# Patient Record
Sex: MALE | Race: BLACK OR AFRICAN AMERICAN | NOT HISPANIC OR LATINO | Employment: OTHER | RURAL
[De-identification: names, ages, dates, MRNs, and addresses within clinical notes are randomized per-mention and may not be internally consistent; named-entity substitution may affect disease eponyms.]

---

## 2018-01-26 ENCOUNTER — HISTORICAL (OUTPATIENT)
Dept: ADMINISTRATIVE | Facility: HOSPITAL | Age: 33
End: 2018-01-26

## 2018-01-31 LAB
LAB AP CLINICAL INFORMATION: NORMAL
LAB AP COMMENTS: NORMAL
LAB AP DIAGNOSIS - HISTORICAL: NORMAL
LAB AP GROSS PATHOLOGY - HISTORICAL: NORMAL
LAB AP SPECIMEN SUBMITTED - HISTORICAL: NORMAL

## 2018-10-09 ENCOUNTER — HISTORICAL (OUTPATIENT)
Dept: ADMINISTRATIVE | Facility: HOSPITAL | Age: 33
End: 2018-10-09

## 2018-10-12 LAB
LAB AP CLINICAL INFORMATION: NORMAL
LAB AP DIAGNOSIS - HISTORICAL: NORMAL
LAB AP GROSS PATHOLOGY - HISTORICAL: NORMAL
LAB AP SPECIMEN SUBMITTED - HISTORICAL: NORMAL

## 2019-10-28 ENCOUNTER — HISTORICAL (OUTPATIENT)
Dept: ADMINISTRATIVE | Facility: HOSPITAL | Age: 34
End: 2019-10-28

## 2020-02-21 ENCOUNTER — HISTORICAL (OUTPATIENT)
Dept: ADMINISTRATIVE | Facility: HOSPITAL | Age: 35
End: 2020-02-21

## 2020-10-01 ENCOUNTER — HISTORICAL (OUTPATIENT)
Dept: ADMINISTRATIVE | Facility: HOSPITAL | Age: 35
End: 2020-10-01

## 2020-10-01 LAB
ANION GAP SERPL CALCULATED.3IONS-SCNC: 10 MMOL/L
BACTERIA #/AREA URNS HPF: ABNORMAL /HPF
BASOPHILS # BLD AUTO: 0.03 X10E3/UL (ref 0–0.2)
BASOPHILS NFR BLD AUTO: 0.2 % (ref 0–1)
BILIRUB UR QL STRIP: NEGATIVE MG/DL
BUN SERPL-MCNC: 23 MG/DL (ref 7–18)
CALCIUM SERPL-MCNC: 8.5 MG/DL (ref 8.5–10.1)
CHLORIDE SERPL-SCNC: 99 MMOL/L (ref 98–107)
CLARITY UR: CLEAR
CLARITY UR: CLEAR
CO2 SERPL-SCNC: 27 MMOL/L (ref 21–32)
COLOR UR: YELLOW
COLOR UR: YELLOW
CREAT SERPL-MCNC: 1.67 MG/DL (ref 0.7–1.3)
CRP SERPL-MCNC: 16.2 UG/ML (ref 0–0.8)
EOSINOPHIL # BLD AUTO: 0.03 X10E3/UL (ref 0–0.5)
EOSINOPHIL NFR BLD AUTO: 0.2 % (ref 1–4)
ERYTHROCYTE [DISTWIDTH] IN BLOOD BY AUTOMATED COUNT: 13.8 % (ref 11.5–14.5)
ERYTHROCYTE [SEDIMENTATION RATE] IN BLOOD BY WESTERGREN METHOD: 135 MM/HR (ref 0–15)
GLUCOSE SERPL-MCNC: 146 MG/DL (ref 74–106)
GLUCOSE UR STRIP-MCNC: NEGATIVE MG/DL
HCT VFR BLD AUTO: 31.4 % (ref 40–54)
HGB BLD-MCNC: 9.9 G/DL (ref 13.5–18)
HYALINE CASTS #/AREA URNS LPF: ABNORMAL /LPF (ref 0–2)
IMM GRANULOCYTES # BLD AUTO: 0.06 X10E3/UL (ref 0–0.04)
IMM GRANULOCYTES NFR BLD: 0.5 % (ref 0–0.4)
KETONES UR STRIP-SCNC: 15 MG/DL
LEUKOCYTE ESTERASE UR QL STRIP: NEGATIVE LEU/UL
LYMPHOCYTES # BLD AUTO: 1.8 X10E3/UL (ref 1–4.8)
LYMPHOCYTES NFR BLD AUTO: 14.1 % (ref 27–41)
MCH RBC QN AUTO: 26.4 PG (ref 27–31)
MCHC RBC AUTO-ENTMCNC: 31.5 G/DL (ref 32–36)
MCV RBC AUTO: 83.7 FL (ref 80–96)
MONOCYTES # BLD AUTO: 1.29 X10E3/UL (ref 0–0.8)
MONOCYTES NFR BLD AUTO: 10.1 % (ref 2–6)
MPC BLD CALC-MCNC: 10 FL (ref 9.4–12.4)
MUCOUS THREADS #/AREA URNS HPF: ABNORMAL /HPF
NEUTROPHILS # BLD AUTO: 9.59 X10E3/UL (ref 1.8–7.7)
NEUTROPHILS NFR BLD AUTO: 74.9 % (ref 53–65)
NITRITE UR QL STRIP: NEGATIVE
NRBC # BLD AUTO: 0 X10E3/UL (ref 0–0)
NRBC, AUTO (.00): 0 /100 (ref 0–0)
PH UR STRIP: 5.5 PH UNITS (ref 5–8)
PLATELET # BLD AUTO: 312 X10E3/UL (ref 150–400)
POTASSIUM SERPL-SCNC: 4.3 MMOL/L (ref 3.5–5.1)
PROT UR QL STRIP: 100 MG/DL
RBC # BLD AUTO: 3.75 X10E6/UL (ref 4.6–6.2)
RBC # UR STRIP: ABNORMAL ERY/UL
RBC #/AREA URNS HPF: ABNORMAL /HPF (ref 0–3)
SARS-COV-2 RNA AMPLIFICATION, QUAL: NEGATIVE
SODIUM SERPL-SCNC: 132 MMOL/L (ref 136–145)
SP GR UR STRIP: 1.02 (ref 1–1.03)
SQUAMOUS #/AREA URNS LPF: ABNORMAL /LPF
TRICHOMONAS #/AREA URNS HPF: ABNORMAL /HPF
UROBILINOGEN UR STRIP-ACNC: 1 EU/DL
WBC # BLD AUTO: 12.8 X10E3/UL (ref 4.5–11)
WBC #/AREA URNS HPF: ABNORMAL /HPF (ref 0–5)
YEAST #/AREA URNS HPF: ABNORMAL /HPF

## 2020-10-02 ENCOUNTER — HISTORICAL (OUTPATIENT)
Dept: ADMINISTRATIVE | Facility: HOSPITAL | Age: 35
End: 2020-10-02

## 2020-10-04 ENCOUNTER — HISTORICAL (OUTPATIENT)
Dept: ADMINISTRATIVE | Facility: HOSPITAL | Age: 35
End: 2020-10-04

## 2020-10-05 ENCOUNTER — HISTORICAL (OUTPATIENT)
Dept: ADMINISTRATIVE | Facility: HOSPITAL | Age: 35
End: 2020-10-05

## 2020-10-05 LAB
GLUCOSE SERPL-MCNC: 110 MG/DL (ref 70–105)
GLUCOSE SERPL-MCNC: 145 MG/DL (ref 70–105)
GLUCOSE SERPL-MCNC: 183 MG/DL (ref 70–105)
GLUCOSE SERPL-MCNC: 211 MG/DL (ref 70–105)
GLUCOSE SERPL-MCNC: 252 MG/DL (ref 70–105)
REPORT: 25
REPORT: 38
REPORT: 38
REPORT: NORMAL

## 2020-11-22 ENCOUNTER — HISTORICAL (OUTPATIENT)
Dept: ADMINISTRATIVE | Facility: HOSPITAL | Age: 35
End: 2020-11-22

## 2020-11-22 LAB
ALBUMIN SERPL BCP-MCNC: 2.6 G/DL (ref 3.4–5)
ALBUMIN/GLOB SERPL: 0 {RATIO}
ALP SERPL-CCNC: 166 U/L (ref 50–136)
ALT SERPL W P-5'-P-CCNC: 26 U/L (ref 12–78)
ANION GAP SERPL CALCULATED.3IONS-SCNC: 13 MMOL/L
APTT PPP: 41.8 SECONDS (ref 25.2–37.3)
AST SERPL W P-5'-P-CCNC: 25 U/L (ref 15–37)
BASOPHILS # BLD AUTO: 0.01 X10E3/UL (ref 0–0.2)
BASOPHILS NFR BLD AUTO: 0.2 % (ref 0–1)
BILIRUB SERPL-MCNC: 0.3 MG/DL (ref 0.2–1)
BILIRUB UR QL STRIP: NEGATIVE MG/DL
BUN SERPL-MCNC: 31 MG/DL (ref 7–18)
CALCIUM SERPL-MCNC: 8.6 MG/DL (ref 8.5–10.1)
CHLORIDE SERPL-SCNC: 100 MMOL/L (ref 101–111)
CLARITY UR: CLEAR
CO2 SERPL-SCNC: 27 MMOL/L (ref 21–32)
COLOR UR: YELLOW
CREAT SERPL-MCNC: 2.3 MG/DL (ref 0.6–1.3)
EOSINOPHIL # BLD AUTO: 0.03 X10E3/UL (ref 0–0.5)
EOSINOPHIL NFR BLD AUTO: 0.5 % (ref 1–4)
ERYTHROCYTE [DISTWIDTH] IN BLOOD BY AUTOMATED COUNT: 14.2 % (ref 11.5–14.5)
GLOBULIN SER-MCNC: 5.7 G/DL
GLUCOSE SERPL-MCNC: 173 MG/DL (ref 74–106)
GLUCOSE UR STRIP-MCNC: >=1000 MG/DL
HCT VFR BLD AUTO: 36 % (ref 40–54)
HGB BLD-MCNC: 11.8 G/DL (ref 13.5–18)
INR BLD: 0.93 (ref 0–3.3)
KETONES UR STRIP-SCNC: NEGATIVE MG/DL
LEUKOCYTE ESTERASE UR QL STRIP: NEGATIVE LEU/UL
LYMPHOCYTES # BLD AUTO: 1.47 X10E3/UL (ref 1–4.8)
LYMPHOCYTES NFR BLD AUTO: 26.9 % (ref 27–41)
MAGNESIUM SERPL-MCNC: 1.5 MG/DL (ref 1.8–2.4)
MCH RBC QN AUTO: 26.8 PG (ref 27–31)
MCHC RBC AUTO-ENTMCNC: 32.8 G/DL (ref 32–36)
MCV RBC AUTO: 82 FL (ref 80–96)
MONOCYTES # BLD AUTO: 0.65 X10E3/UL (ref 0–0.8)
MONOCYTES NFR BLD AUTO: 11.9 % (ref 2–6)
MPC BLD CALC-MCNC: 10.3 FL (ref 9.4–12.4)
NEUTROPHILS # BLD AUTO: 3.3 X10E3/UL (ref 1.8–7.7)
NEUTROPHILS NFR BLD AUTO: 60.5 % (ref 53–65)
NITRITE UR QL STRIP: NEGATIVE
PH UR STRIP: 5 PH UNITS (ref 5–8)
PLATELET # BLD AUTO: 276 X10E3/UL (ref 150–400)
POTASSIUM SERPL-SCNC: 4.6 MMOL/L (ref 3.6–5)
PROT SERPL-MCNC: 8.3 G/DL (ref 6.4–8.2)
PROT UR QL STRIP: 30 MG/DL
PROTHROMBIN TIME: 12.7 SECONDS (ref 11.7–14.7)
RBC # BLD AUTO: 4.4 X10E6/UL (ref 4.6–6.2)
RBC # UR STRIP: ABNORMAL ERY/UL
SODIUM SERPL-SCNC: 135 MMOL/L (ref 135–145)
SP GR UR STRIP: >=1.03 (ref 1–1.03)
TROPONIN I SERPL-MCNC: <0.017 NG/ML (ref 0–0.06)
UROBILINOGEN UR STRIP-ACNC: 0.2 MG/DL
WBC # BLD AUTO: 5.46 X10E3/UL (ref 4.5–11)

## 2020-12-16 ENCOUNTER — HISTORICAL (OUTPATIENT)
Dept: ADMINISTRATIVE | Facility: HOSPITAL | Age: 35
End: 2020-12-16

## 2021-01-08 ENCOUNTER — HISTORICAL (OUTPATIENT)
Dept: ADMINISTRATIVE | Facility: HOSPITAL | Age: 36
End: 2021-01-08

## 2021-03-01 ENCOUNTER — HISTORICAL (OUTPATIENT)
Dept: ADMINISTRATIVE | Facility: HOSPITAL | Age: 36
End: 2021-03-01

## 2021-03-02 ENCOUNTER — HISTORICAL (OUTPATIENT)
Dept: ADMINISTRATIVE | Facility: HOSPITAL | Age: 36
End: 2021-03-02

## 2021-03-03 ENCOUNTER — HISTORICAL (OUTPATIENT)
Dept: ADMINISTRATIVE | Facility: HOSPITAL | Age: 36
End: 2021-03-03

## 2021-03-03 LAB
GLUCOSE SERPL-MCNC: 148 MG/DL (ref 70–105)
GLUCOSE SERPL-MCNC: 158 MG/DL (ref 70–105)
GLUCOSE SERPL-MCNC: 164 MG/DL (ref 70–105)

## 2021-03-15 ENCOUNTER — OFFICE VISIT (OUTPATIENT)
Dept: WOUND CARE | Facility: HOSPITAL | Age: 36
End: 2021-03-15
Attending: NURSE PRACTITIONER
Payer: MEDICARE

## 2021-03-15 DIAGNOSIS — E11.622 ULCER OF LOWER EXTREMITY DUE TO DIABETES MELLITUS: Primary | ICD-10-CM

## 2021-03-15 DIAGNOSIS — L97.909 ULCER OF LOWER EXTREMITY DUE TO DIABETES MELLITUS: Primary | ICD-10-CM

## 2021-03-15 DIAGNOSIS — L98.499 DIABETES WITH SKIN ULCER: ICD-10-CM

## 2021-03-15 DIAGNOSIS — E11.622 DIABETES WITH SKIN ULCER: ICD-10-CM

## 2021-03-15 DIAGNOSIS — L97.516 NON-PRESSURE CHRONIC ULCER OF OTHER PART OF RIGHT FOOT WITH BONE INVOLVEMENT WITHOUT EVIDENCE OF NECROSIS: ICD-10-CM

## 2021-03-15 PROCEDURE — 99183 HYPERBARIC OXYGEN THERAPY: CPT | Mod: ,,, | Performed by: NURSE PRACTITIONER

## 2021-03-15 PROCEDURE — 99183 PR HYPERBARIC OXYGEN THERAPY ATTENDANCE/SUPERVISION, PER SESSION: ICD-10-PCS | Mod: ,,, | Performed by: NURSE PRACTITIONER

## 2021-03-15 PROCEDURE — G0277 HBOT, FULL BODY CHAMBER, 30M: HCPCS

## 2021-03-17 ENCOUNTER — PROCEDURE VISIT (OUTPATIENT)
Dept: WOUND CARE | Facility: HOSPITAL | Age: 36
End: 2021-03-17
Attending: NURSE PRACTITIONER
Payer: MEDICARE

## 2021-03-17 DIAGNOSIS — L97.414: Primary | ICD-10-CM

## 2021-03-17 DIAGNOSIS — E11.622 DIABETES MELLITUS WITH SKIN ULCER: ICD-10-CM

## 2021-03-17 DIAGNOSIS — I10 BENIGN HYPERTENSION: ICD-10-CM

## 2021-03-17 DIAGNOSIS — L98.499 DIABETES MELLITUS WITH SKIN ULCER: ICD-10-CM

## 2021-03-17 DIAGNOSIS — L97.414 ULCER OF RIGHT HEEL, WITH NECROSIS OF BONE: ICD-10-CM

## 2021-03-17 PROCEDURE — G0277 HBOT, FULL BODY CHAMBER, 30M: HCPCS

## 2021-03-17 PROCEDURE — 99183 HYPERBARIC OXYGEN THERAPY: CPT | Mod: ,,, | Performed by: NURSE PRACTITIONER

## 2021-03-17 PROCEDURE — 99183 PR HYPERBARIC OXYGEN THERAPY ATTENDANCE/SUPERVISION, PER SESSION: ICD-10-PCS | Mod: ,,, | Performed by: NURSE PRACTITIONER

## 2021-03-18 VITALS
DIASTOLIC BLOOD PRESSURE: 101 MMHG | SYSTOLIC BLOOD PRESSURE: 164 MMHG | RESPIRATION RATE: 20 BRPM | BODY MASS INDEX: 41.75 KG/M2 | HEIGHT: 73 IN | WEIGHT: 315 LBS

## 2021-03-19 PROBLEM — I10 BENIGN HYPERTENSION: Status: ACTIVE | Noted: 2021-03-19

## 2021-03-19 PROBLEM — E11.622 DIABETES MELLITUS WITH SKIN ULCER: Status: ACTIVE | Noted: 2021-03-19

## 2021-03-19 PROBLEM — L97.414: Status: ACTIVE | Noted: 2021-03-19

## 2021-03-19 PROBLEM — L98.499 DIABETES MELLITUS WITH SKIN ULCER: Status: ACTIVE | Noted: 2021-03-19

## 2021-03-22 ENCOUNTER — OFFICE VISIT (OUTPATIENT)
Dept: WOUND CARE | Facility: HOSPITAL | Age: 36
End: 2021-03-22
Attending: NURSE PRACTITIONER
Payer: MEDICARE

## 2021-03-22 DIAGNOSIS — I73.9 PERIPHERAL VASCULAR DISEASE, UNSPECIFIED: ICD-10-CM

## 2021-03-22 DIAGNOSIS — L97.414 ULCER OF RIGHT HEEL, WITH NECROSIS OF BONE: ICD-10-CM

## 2021-03-22 DIAGNOSIS — L97.514 ULCER OF RIGHT FOOT WITH NECROSIS OF BONE: ICD-10-CM

## 2021-03-22 DIAGNOSIS — R60.0 LOWER EXTREMITY EDEMA: ICD-10-CM

## 2021-03-22 DIAGNOSIS — E11.622 DIABETES WITH SKIN ULCER: Primary | ICD-10-CM

## 2021-03-22 DIAGNOSIS — L98.499 DIABETES WITH SKIN ULCER: Primary | ICD-10-CM

## 2021-03-22 DIAGNOSIS — I10 BENIGN HYPERTENSION: ICD-10-CM

## 2021-03-22 PROCEDURE — 99183 HYPERBARIC OXYGEN THERAPY: CPT

## 2021-03-22 PROCEDURE — 99183 HYPERBARIC OXYGEN THERAPY: CPT | Mod: ,,, | Performed by: NURSE PRACTITIONER

## 2021-03-22 PROCEDURE — 99183 PR HYPERBARIC OXYGEN THERAPY ATTENDANCE/SUPERVISION, PER SESSION: ICD-10-PCS | Mod: ,,, | Performed by: NURSE PRACTITIONER

## 2021-03-23 ENCOUNTER — OFFICE VISIT (OUTPATIENT)
Dept: WOUND CARE | Facility: HOSPITAL | Age: 36
End: 2021-03-23
Attending: NURSE PRACTITIONER
Payer: MEDICARE

## 2021-03-23 DIAGNOSIS — L97.514 NON-PRESSURE CHRONIC ULCER OF OTHER PART OF RIGHT FOOT WITH NECROSIS OF BONE: Primary | ICD-10-CM

## 2021-03-23 PROCEDURE — 99183 PR HYPERBARIC OXYGEN THERAPY ATTENDANCE/SUPERVISION, PER SESSION: ICD-10-PCS | Mod: ,,, | Performed by: NURSE PRACTITIONER

## 2021-03-23 PROCEDURE — G0277 HBOT, FULL BODY CHAMBER, 30M: HCPCS

## 2021-03-23 PROCEDURE — 99183 HYPERBARIC OXYGEN THERAPY: CPT | Mod: ,,, | Performed by: NURSE PRACTITIONER

## 2021-03-24 ENCOUNTER — OFFICE VISIT (OUTPATIENT)
Dept: WOUND CARE | Facility: HOSPITAL | Age: 36
End: 2021-03-24
Attending: NURSE PRACTITIONER
Payer: MEDICARE

## 2021-03-24 DIAGNOSIS — I10 BENIGN HYPERTENSION: ICD-10-CM

## 2021-03-24 DIAGNOSIS — E08.621 DIABETIC ULCER OF MIDFOOT ASSOCIATED WITH DIABETES MELLITUS DUE TO UNDERLYING CONDITION, WITH NECROSIS OF BONE, UNSPECIFIED LATERALITY: Primary | ICD-10-CM

## 2021-03-24 DIAGNOSIS — I10 ESSENTIAL HYPERTENSION, MALIGNANT: ICD-10-CM

## 2021-03-24 DIAGNOSIS — L97.404 DIABETIC ULCER OF MIDFOOT ASSOCIATED WITH DIABETES MELLITUS DUE TO UNDERLYING CONDITION, WITH NECROSIS OF BONE, UNSPECIFIED LATERALITY: Primary | ICD-10-CM

## 2021-03-24 DIAGNOSIS — L97.414 ULCER OF RIGHT HEEL, WITH NECROSIS OF BONE: ICD-10-CM

## 2021-03-24 DIAGNOSIS — I73.9 PERIPHERAL VASCULAR DISEASE, UNSPECIFIED: ICD-10-CM

## 2021-03-24 PROCEDURE — G0277 HBOT, FULL BODY CHAMBER, 30M: HCPCS

## 2021-03-24 PROCEDURE — 99183 HYPERBARIC OXYGEN THERAPY: CPT | Mod: ,,, | Performed by: NURSE PRACTITIONER

## 2021-03-24 PROCEDURE — 99183 PR HYPERBARIC OXYGEN THERAPY ATTENDANCE/SUPERVISION, PER SESSION: ICD-10-PCS | Mod: ,,, | Performed by: NURSE PRACTITIONER

## 2021-03-25 PROBLEM — I73.9 PERIPHERAL VASCULAR DISEASE, UNSPECIFIED: Status: ACTIVE | Noted: 2021-03-25

## 2021-03-25 PROBLEM — L97.514 ULCER OF RIGHT FOOT WITH NECROSIS OF BONE: Status: ACTIVE | Noted: 2021-03-25

## 2021-03-25 PROBLEM — R60.0 LOWER EXTREMITY EDEMA: Status: ACTIVE | Noted: 2021-03-25

## 2021-03-26 PROBLEM — L97.404: Status: ACTIVE | Noted: 2021-03-26

## 2021-03-26 PROBLEM — E08.621: Status: ACTIVE | Noted: 2021-03-26

## 2021-03-29 ENCOUNTER — OFFICE VISIT (OUTPATIENT)
Dept: WOUND CARE | Facility: HOSPITAL | Age: 36
End: 2021-03-29
Attending: NURSE PRACTITIONER
Payer: MEDICARE

## 2021-03-29 DIAGNOSIS — E08.621 DIABETIC ULCER OF MIDFOOT ASSOCIATED WITH DIABETES MELLITUS DUE TO UNDERLYING CONDITION, WITH NECROSIS OF BONE, UNSPECIFIED LATERALITY: Primary | ICD-10-CM

## 2021-03-29 DIAGNOSIS — L97.404 DIABETIC ULCER OF MIDFOOT ASSOCIATED WITH DIABETES MELLITUS DUE TO UNDERLYING CONDITION, WITH NECROSIS OF BONE, UNSPECIFIED LATERALITY: Primary | ICD-10-CM

## 2021-03-29 PROCEDURE — 99183 PR HYPERBARIC OXYGEN THERAPY ATTENDANCE/SUPERVISION, PER SESSION: ICD-10-PCS | Mod: ,,, | Performed by: NURSE PRACTITIONER

## 2021-03-29 PROCEDURE — 99183 HYPERBARIC OXYGEN THERAPY: CPT | Mod: ,,, | Performed by: NURSE PRACTITIONER

## 2021-03-30 ENCOUNTER — OFFICE VISIT (OUTPATIENT)
Dept: WOUND CARE | Facility: HOSPITAL | Age: 36
End: 2021-03-30
Attending: NURSE PRACTITIONER
Payer: MEDICARE

## 2021-03-30 DIAGNOSIS — L98.499 DIABETES WITH SKIN ULCER: ICD-10-CM

## 2021-03-30 DIAGNOSIS — L97.514 ULCER OF RIGHT FOOT WITH NECROSIS OF BONE: ICD-10-CM

## 2021-03-30 DIAGNOSIS — E11.622 DIABETES WITH SKIN ULCER: ICD-10-CM

## 2021-03-30 DIAGNOSIS — E08.621 DIABETIC ULCER OF MIDFOOT ASSOCIATED WITH DIABETES MELLITUS DUE TO UNDERLYING CONDITION, WITH NECROSIS OF BONE, UNSPECIFIED LATERALITY: Primary | ICD-10-CM

## 2021-03-30 DIAGNOSIS — L97.404 DIABETIC ULCER OF MIDFOOT ASSOCIATED WITH DIABETES MELLITUS DUE TO UNDERLYING CONDITION, WITH NECROSIS OF BONE, UNSPECIFIED LATERALITY: Primary | ICD-10-CM

## 2021-03-30 DIAGNOSIS — I10 BENIGN HYPERTENSION: ICD-10-CM

## 2021-03-30 PROCEDURE — 99183 HYPERBARIC OXYGEN THERAPY: CPT | Mod: ,,, | Performed by: NURSE PRACTITIONER

## 2021-03-30 PROCEDURE — G0277 HBOT, FULL BODY CHAMBER, 30M: HCPCS

## 2021-03-30 PROCEDURE — 99183 PR HYPERBARIC OXYGEN THERAPY ATTENDANCE/SUPERVISION, PER SESSION: ICD-10-PCS | Mod: ,,, | Performed by: NURSE PRACTITIONER

## 2021-03-31 ENCOUNTER — OFFICE VISIT (OUTPATIENT)
Dept: WOUND CARE | Facility: HOSPITAL | Age: 36
End: 2021-03-31
Attending: NURSE PRACTITIONER
Payer: MEDICARE

## 2021-03-31 DIAGNOSIS — L97.414 ULCER OF RIGHT HEEL, WITH NECROSIS OF BONE: Primary | ICD-10-CM

## 2021-03-31 DIAGNOSIS — L98.499 DIABETES WITH SKIN ULCER: ICD-10-CM

## 2021-03-31 DIAGNOSIS — I10 BENIGN HYPERTENSION: ICD-10-CM

## 2021-03-31 DIAGNOSIS — E11.622 DIABETES WITH SKIN ULCER: ICD-10-CM

## 2021-03-31 DIAGNOSIS — R60.0 LOWER EXTREMITY EDEMA: ICD-10-CM

## 2021-03-31 DIAGNOSIS — I73.9 PERIPHERAL VASCULAR DISEASE, UNSPECIFIED: ICD-10-CM

## 2021-03-31 PROCEDURE — 99183 PR HYPERBARIC OXYGEN THERAPY ATTENDANCE/SUPERVISION, PER SESSION: ICD-10-PCS | Mod: ,,, | Performed by: NURSE PRACTITIONER

## 2021-03-31 PROCEDURE — 99183 HYPERBARIC OXYGEN THERAPY: CPT | Mod: ,,, | Performed by: NURSE PRACTITIONER

## 2021-03-31 PROCEDURE — G0277 HBOT, FULL BODY CHAMBER, 30M: HCPCS

## 2021-04-01 ENCOUNTER — OFFICE VISIT (OUTPATIENT)
Dept: WOUND CARE | Facility: HOSPITAL | Age: 36
End: 2021-04-01
Attending: NURSE PRACTITIONER
Payer: MEDICARE

## 2021-04-01 DIAGNOSIS — L97.404 DIABETIC ULCER OF MIDFOOT ASSOCIATED WITH DIABETES MELLITUS DUE TO UNDERLYING CONDITION, WITH NECROSIS OF BONE, UNSPECIFIED LATERALITY: ICD-10-CM

## 2021-04-01 DIAGNOSIS — E11.622 DIABETES WITH SKIN ULCER: ICD-10-CM

## 2021-04-01 DIAGNOSIS — L97.414 ULCER OF RIGHT HEEL, WITH NECROSIS OF BONE: ICD-10-CM

## 2021-04-01 DIAGNOSIS — L98.499 DIABETES WITH SKIN ULCER: ICD-10-CM

## 2021-04-01 DIAGNOSIS — I73.9 PERIPHERAL VASCULAR DISEASE, UNSPECIFIED: ICD-10-CM

## 2021-04-01 DIAGNOSIS — R60.0 LOWER EXTREMITY EDEMA: ICD-10-CM

## 2021-04-01 DIAGNOSIS — E08.621 DIABETIC ULCER OF MIDFOOT ASSOCIATED WITH DIABETES MELLITUS DUE TO UNDERLYING CONDITION, WITH NECROSIS OF BONE, UNSPECIFIED LATERALITY: ICD-10-CM

## 2021-04-01 DIAGNOSIS — L97.514 ULCER OF RIGHT FOOT WITH NECROSIS OF BONE: Primary | ICD-10-CM

## 2021-04-01 DIAGNOSIS — I10 BENIGN HYPERTENSION: ICD-10-CM

## 2021-04-01 PROCEDURE — 99183 PR HYPERBARIC OXYGEN THERAPY ATTENDANCE/SUPERVISION, PER SESSION: ICD-10-PCS | Mod: ,,, | Performed by: NURSE PRACTITIONER

## 2021-04-01 PROCEDURE — 99183 HYPERBARIC OXYGEN THERAPY: CPT | Mod: ,,, | Performed by: NURSE PRACTITIONER

## 2021-04-01 PROCEDURE — G0277 HBOT, FULL BODY CHAMBER, 30M: HCPCS

## 2021-04-13 ENCOUNTER — OFFICE VISIT (OUTPATIENT)
Dept: PAIN MEDICINE | Facility: CLINIC | Age: 36
End: 2021-04-13
Payer: MEDICARE

## 2021-04-13 VITALS
HEIGHT: 73 IN | HEART RATE: 90 BPM | DIASTOLIC BLOOD PRESSURE: 77 MMHG | SYSTOLIC BLOOD PRESSURE: 122 MMHG | RESPIRATION RATE: 19 BRPM | BODY MASS INDEX: 41.75 KG/M2 | WEIGHT: 315 LBS

## 2021-04-13 DIAGNOSIS — I73.9 PERIPHERAL VASCULAR DISEASE: Chronic | ICD-10-CM

## 2021-04-13 DIAGNOSIS — E08.621 DIABETIC ULCER OF MIDFOOT ASSOCIATED WITH DIABETES MELLITUS DUE TO UNDERLYING CONDITION, WITH NECROSIS OF BONE, UNSPECIFIED LATERALITY: Primary | ICD-10-CM

## 2021-04-13 DIAGNOSIS — E11.40 DIABETIC NEUROPATHY WITH NEUROLOGIC COMPLICATION: Chronic | ICD-10-CM

## 2021-04-13 DIAGNOSIS — E11.49 DIABETIC NEUROPATHY WITH NEUROLOGIC COMPLICATION: Chronic | ICD-10-CM

## 2021-04-13 DIAGNOSIS — Z79.899 ENCOUNTER FOR LONG-TERM (CURRENT) USE OF OTHER MEDICATIONS: ICD-10-CM

## 2021-04-13 DIAGNOSIS — L97.404 DIABETIC ULCER OF MIDFOOT ASSOCIATED WITH DIABETES MELLITUS DUE TO UNDERLYING CONDITION, WITH NECROSIS OF BONE, UNSPECIFIED LATERALITY: Primary | ICD-10-CM

## 2021-04-13 DIAGNOSIS — L97.514 ULCER OF RIGHT FOOT WITH NECROSIS OF BONE: ICD-10-CM

## 2021-04-13 DIAGNOSIS — G89.4 CHRONIC PAIN SYNDROME: Chronic | ICD-10-CM

## 2021-04-13 LAB

## 2021-04-13 PROCEDURE — 99214 OFFICE O/P EST MOD 30 MIN: CPT | Mod: PBBFAC | Performed by: PHYSICIAN ASSISTANT

## 2021-04-13 PROCEDURE — 99999 PR PBB SHADOW E&M-EST. PATIENT-LVL IV: CPT | Mod: PBBFAC,,, | Performed by: PHYSICIAN ASSISTANT

## 2021-04-13 PROCEDURE — 99204 PR OFFICE/OUTPT VISIT, NEW, LEVL IV, 45-59 MIN: ICD-10-PCS | Mod: S$PBB,,, | Performed by: PHYSICIAN ASSISTANT

## 2021-04-13 PROCEDURE — 80305 DRUG TEST PRSMV DIR OPT OBS: CPT | Mod: PBBFAC | Performed by: PHYSICIAN ASSISTANT

## 2021-04-13 PROCEDURE — 99204 OFFICE O/P NEW MOD 45 MIN: CPT | Mod: S$PBB,,, | Performed by: PHYSICIAN ASSISTANT

## 2021-04-13 PROCEDURE — 99999 PR PBB SHADOW E&M-EST. PATIENT-LVL IV: ICD-10-PCS | Mod: PBBFAC,,, | Performed by: PHYSICIAN ASSISTANT

## 2021-04-13 RX ORDER — LISINOPRIL 20 MG/1
20 TABLET ORAL DAILY
Status: ON HOLD | COMMUNITY
End: 2021-10-25 | Stop reason: HOSPADM

## 2021-04-13 RX ORDER — HYDROCHLOROTHIAZIDE 25 MG/1
25 TABLET ORAL EVERY OTHER DAY
Status: ON HOLD | COMMUNITY
End: 2021-10-25 | Stop reason: HOSPADM

## 2021-04-13 RX ORDER — DAPAGLIFLOZIN AND METFORMIN HYDROCHLORIDE 10; 1000 MG/1; MG/1
1 TABLET, FILM COATED, EXTENDED RELEASE ORAL DAILY
COMMUNITY
End: 2021-11-08

## 2021-04-13 RX ORDER — FERROUS SULFATE 325(65) MG
325 TABLET, DELAYED RELEASE (ENTERIC COATED) ORAL
COMMUNITY
End: 2022-01-28 | Stop reason: DRUGHIGH

## 2021-04-13 RX ORDER — AMOXICILLIN 500 MG/1
500 CAPSULE ORAL EVERY 12 HOURS
Status: ON HOLD | COMMUNITY
End: 2021-10-25 | Stop reason: HOSPADM

## 2021-04-13 RX ORDER — SELENIUM 50 MCG
TABLET ORAL
Status: ON HOLD | COMMUNITY
End: 2021-10-08 | Stop reason: CLARIF

## 2021-04-13 RX ORDER — GABAPENTIN 100 MG/1
100 CAPSULE ORAL EVERY 8 HOURS
Qty: 90 CAPSULE | Refills: 0 | Status: SHIPPED | OUTPATIENT
Start: 2021-04-13 | End: 2021-05-13

## 2021-04-16 LAB
6-ACETYLMORPHINE: NEGATIVE 10 NG/ML
7-AMINOCLONAZEPAM: NEGATIVE 25 NG/ML
A-HYDROXYALPRAZOLAM: NEGATIVE 25 NG/ML
ACETYL FENTANYL: NEGATIVE 2.5 NG/ML
ACETYL NORFENTANYL OXALATE: NEGATIVE 5 NG/ML
AMPHETAMINE: NEGATIVE 100 NG/ML
BENZOYLECGONINE: NEGATIVE 100 NG/ML
BUPRENORPHINE: NEGATIVE 25 NG/ML
CODEINE: NEGATIVE 25 NG/ML
CREATININE, URINE: 63 MG/DL (ref 39–259)
EDDP: NEGATIVE 25 NG/ML
FENTANYL: NEGATIVE 2.5 NG/ML
HYDROCODONE: NEGATIVE 25 NG/ML
HYDROMORPHONE: NEGATIVE 25 NG/ML
LORAZEPAM: NEGATIVE 25 NG/ML
METHADONE: NEGATIVE 25 NG/ML
METHAMPHETAMINE: NEGATIVE 100 NG/ML
MORPHINE: NEGATIVE 25 NG/ML
NORBUPRENORPHINE UR-MCNC: NEGATIVE 25 NG/ML
NORDIAZEPAM: NEGATIVE 25 NG/ML
NORFENTANYL OXOLATE: NEGATIVE 5 NG/ML
NORHYDROCODONE: NEGATIVE 50 NG/ML
NOROXYCODONE HCL: NEGATIVE 50 NG/ML
OXAZEPAM: NEGATIVE 25 NG/ML
OXYCODONE: NEGATIVE 25 NG/ML
OXYMORPHONE: NEGATIVE 25 NG/ML
PH UR STRIP: 5.5 PH UNITS (ref 5–8)
SP GR UR STRIP: 1.02 (ref 1–1.03)
TAPENTADOL: NEGATIVE 25 NG/ML
TEMAZEPAM: NEGATIVE 25 NG/ML
THC-COOH: NEGATIVE 25 NG/ML
TRAMADOL: NEGATIVE 100 NG/ML

## 2021-04-21 ENCOUNTER — OFFICE VISIT (OUTPATIENT)
Dept: WOUND CARE | Facility: HOSPITAL | Age: 36
End: 2021-04-21
Attending: NURSE PRACTITIONER
Payer: MEDICARE

## 2021-04-21 VITALS
HEART RATE: 86 BPM | SYSTOLIC BLOOD PRESSURE: 147 MMHG | DIASTOLIC BLOOD PRESSURE: 107 MMHG | WEIGHT: 315 LBS | BODY MASS INDEX: 41.75 KG/M2 | HEIGHT: 73 IN | RESPIRATION RATE: 20 BRPM

## 2021-04-21 DIAGNOSIS — I10 BENIGN HYPERTENSION: ICD-10-CM

## 2021-04-21 DIAGNOSIS — L98.499 DIABETES WITH SKIN ULCER: ICD-10-CM

## 2021-04-21 DIAGNOSIS — E11.49 DIABETIC NEUROPATHY WITH NEUROLOGIC COMPLICATION: Chronic | ICD-10-CM

## 2021-04-21 DIAGNOSIS — E11.622 DIABETES WITH SKIN ULCER: ICD-10-CM

## 2021-04-21 DIAGNOSIS — E11.40 DIABETIC NEUROPATHY WITH NEUROLOGIC COMPLICATION: Chronic | ICD-10-CM

## 2021-04-21 DIAGNOSIS — I73.9 PERIPHERAL VASCULAR DISEASE, UNSPECIFIED: ICD-10-CM

## 2021-04-21 DIAGNOSIS — I73.9 PERIPHERAL VASCULAR DISEASE: Chronic | ICD-10-CM

## 2021-04-21 DIAGNOSIS — R60.0 LOWER EXTREMITY EDEMA: ICD-10-CM

## 2021-04-21 DIAGNOSIS — L97.514 ULCER OF RIGHT FOOT WITH NECROSIS OF BONE: Primary | ICD-10-CM

## 2021-04-21 PROCEDURE — 11043 DBRDMT MUSC&/FSCA 1ST 20/<: CPT

## 2021-04-21 PROCEDURE — 87186 SC STD MICRODIL/AGAR DIL: CPT | Mod: ,,, | Performed by: CLINICAL MEDICAL LABORATORY

## 2021-04-21 PROCEDURE — 87070 CULTURE OTHR SPECIMN AEROBIC: CPT | Mod: ,,, | Performed by: CLINICAL MEDICAL LABORATORY

## 2021-04-21 PROCEDURE — 11043 DBRDMT MUSC&/FSCA 1ST 20/<: CPT | Mod: ,,, | Performed by: NURSE PRACTITIONER

## 2021-04-21 PROCEDURE — 87076 CULTURE ANAEROBE IDENT EACH: CPT | Mod: ,,, | Performed by: CLINICAL MEDICAL LABORATORY

## 2021-04-21 PROCEDURE — 11046 DBRDMT MUSC&/FSCA EA ADDL: CPT | Mod: ,,, | Performed by: NURSE PRACTITIONER

## 2021-04-21 PROCEDURE — 11046 PR DEB MUSC/FASCIA ADD-ON: ICD-10-PCS | Mod: ,,, | Performed by: NURSE PRACTITIONER

## 2021-04-21 PROCEDURE — 11046 DBRDMT MUSC&/FSCA EA ADDL: CPT | Performed by: NURSE PRACTITIONER

## 2021-04-21 PROCEDURE — 27000272 HC BANDAGE KERLIX

## 2021-04-21 PROCEDURE — 87077 CULTURE AEROBIC IDENTIFY: CPT | Mod: ,,, | Performed by: CLINICAL MEDICAL LABORATORY

## 2021-04-21 PROCEDURE — 87075 CULTURE, ANAEROBE: ICD-10-PCS | Mod: ,,, | Performed by: CLINICAL MEDICAL LABORATORY

## 2021-04-21 PROCEDURE — 87070 CULTURE, WOUND: ICD-10-PCS | Mod: ,,, | Performed by: CLINICAL MEDICAL LABORATORY

## 2021-04-21 PROCEDURE — 87070 CULTURE OTHR SPECIMN AEROBIC: CPT | Performed by: NURSE PRACTITIONER

## 2021-04-21 PROCEDURE — 87076 CULTURE, ANAEROBE: ICD-10-PCS | Mod: ,,, | Performed by: CLINICAL MEDICAL LABORATORY

## 2021-04-21 PROCEDURE — 87075 CULTR BACTERIA EXCEPT BLOOD: CPT | Mod: ,,, | Performed by: CLINICAL MEDICAL LABORATORY

## 2021-04-21 PROCEDURE — 87075 CULTR BACTERIA EXCEPT BLOOD: CPT | Performed by: NURSE PRACTITIONER

## 2021-04-21 PROCEDURE — 87077 CULTURE, WOUND: ICD-10-PCS | Mod: ,,, | Performed by: CLINICAL MEDICAL LABORATORY

## 2021-04-21 PROCEDURE — 11043 PR DEBRIDEMENT, SKIN, SUB-Q TISSUE,MUSCLE,=<20 SQ CM: ICD-10-PCS | Mod: ,,, | Performed by: NURSE PRACTITIONER

## 2021-04-21 PROCEDURE — 87186 CULTURE, WOUND: ICD-10-PCS | Mod: ,,, | Performed by: CLINICAL MEDICAL LABORATORY

## 2021-04-25 LAB
BACTERIA SPEC ANAEROBE CULT: ABNORMAL
MICROORGANISM SPEC CULT: ABNORMAL

## 2021-05-05 ENCOUNTER — OFFICE VISIT (OUTPATIENT)
Dept: WOUND CARE | Facility: HOSPITAL | Age: 36
End: 2021-05-05
Attending: NURSE PRACTITIONER
Payer: MEDICARE

## 2021-05-05 ENCOUNTER — OFFICE VISIT (OUTPATIENT)
Dept: PAIN MEDICINE | Facility: CLINIC | Age: 36
End: 2021-05-05
Payer: MEDICARE

## 2021-05-05 VITALS
HEIGHT: 73 IN | BODY MASS INDEX: 41.75 KG/M2 | SYSTOLIC BLOOD PRESSURE: 101 MMHG | HEART RATE: 96 BPM | WEIGHT: 315 LBS | DIASTOLIC BLOOD PRESSURE: 64 MMHG

## 2021-05-05 VITALS
DIASTOLIC BLOOD PRESSURE: 83 MMHG | WEIGHT: 315 LBS | RESPIRATION RATE: 18 BRPM | HEART RATE: 80 BPM | BODY MASS INDEX: 41.75 KG/M2 | SYSTOLIC BLOOD PRESSURE: 140 MMHG | HEIGHT: 73 IN

## 2021-05-05 DIAGNOSIS — G89.29 CHRONIC PAIN IN RIGHT FOOT: Chronic | ICD-10-CM

## 2021-05-05 DIAGNOSIS — L97.416 DIABETIC ULCER OF RIGHT MIDFOOT ASSOCIATED WITH DIABETES MELLITUS DUE TO UNDERLYING CONDITION, WITH BONE INVOLVEMENT WITHOUT EVIDENCE OF NECROSIS: Chronic | ICD-10-CM

## 2021-05-05 DIAGNOSIS — E08.621 DIABETIC ULCER OF RIGHT MIDFOOT ASSOCIATED WITH DIABETES MELLITUS DUE TO UNDERLYING CONDITION, WITH BONE INVOLVEMENT WITHOUT EVIDENCE OF NECROSIS: Chronic | ICD-10-CM

## 2021-05-05 DIAGNOSIS — L97.514 ULCER OF RIGHT FOOT, WITH NECROSIS OF BONE: Primary | ICD-10-CM

## 2021-05-05 DIAGNOSIS — M79.671 CHRONIC PAIN IN RIGHT FOOT: Chronic | ICD-10-CM

## 2021-05-05 DIAGNOSIS — G89.4 CHRONIC PAIN DISORDER: Primary | Chronic | ICD-10-CM

## 2021-05-05 DIAGNOSIS — E11.40 DIABETIC NEUROPATHY, PAINFUL: Chronic | ICD-10-CM

## 2021-05-05 PROCEDURE — 99213 OFFICE O/P EST LOW 20 MIN: CPT | Mod: ,,, | Performed by: FAMILY MEDICINE

## 2021-05-05 PROCEDURE — 99213 PR OFFICE/OUTPT VISIT, EST, LEVL III, 20-29 MIN: ICD-10-PCS | Mod: ,,, | Performed by: FAMILY MEDICINE

## 2021-05-05 PROCEDURE — 99213 HC OFFICE/OUTPT VISIT, EST, LEVL III, 20-29 MIN: ICD-10-PCS | Mod: PBBFAC,,, | Performed by: PAIN MEDICINE

## 2021-05-05 PROCEDURE — 99213 OFFICE O/P EST LOW 20 MIN: CPT

## 2021-05-05 PROCEDURE — 99213 OFFICE O/P EST LOW 20 MIN: CPT | Mod: PBBFAC,,, | Performed by: PAIN MEDICINE

## 2021-05-05 PROCEDURE — 99213 OFFICE O/P EST LOW 20 MIN: CPT | Mod: PBBFAC,25 | Performed by: PAIN MEDICINE

## 2021-05-05 PROCEDURE — 99214 PR OFFICE/OUTPT VISIT, EST, LEVL IV, 30-39 MIN: ICD-10-PCS | Mod: S$PBB,,, | Performed by: PAIN MEDICINE

## 2021-05-05 PROCEDURE — 99214 OFFICE O/P EST MOD 30 MIN: CPT | Mod: S$PBB,,, | Performed by: PAIN MEDICINE

## 2021-05-05 RX ORDER — HYDROCODONE BITARTRATE AND ACETAMINOPHEN 10; 325 MG/1; MG/1
1 TABLET ORAL
Qty: 30 TABLET | Refills: 0 | Status: SHIPPED | OUTPATIENT
Start: 2021-05-05 | End: 2021-06-04

## 2021-05-05 RX ORDER — SILVER SULFADIAZINE 10 G/1000G
1 CREAM TOPICAL DAILY
Status: ON HOLD | COMMUNITY
Start: 2021-04-26 | End: 2021-10-08 | Stop reason: CLARIF

## 2021-05-05 RX ORDER — MEROPENEM 1 G/1
INJECTION, POWDER, FOR SOLUTION INTRAVENOUS
Status: ON HOLD | COMMUNITY
Start: 2021-02-10 | End: 2021-10-08 | Stop reason: CLARIF

## 2021-05-05 RX ORDER — GABAPENTIN 300 MG/1
300 CAPSULE ORAL 3 TIMES DAILY
Qty: 90 CAPSULE | Refills: 3 | Status: SHIPPED | OUTPATIENT
Start: 2021-05-13 | End: 2021-06-14 | Stop reason: SDUPTHER

## 2021-05-05 RX ORDER — ROSUVASTATIN CALCIUM 10 MG/1
1 TABLET, COATED ORAL DAILY
COMMUNITY
Start: 2021-04-26 | End: 2021-11-08

## 2021-05-05 RX ORDER — LINEZOLID 600 MG/1
1 TABLET, FILM COATED ORAL
Status: ON HOLD | COMMUNITY
Start: 2021-02-10 | End: 2021-10-08 | Stop reason: CLARIF

## 2021-05-14 VITALS
HEART RATE: 78 BPM | BODY MASS INDEX: 41.75 KG/M2 | DIASTOLIC BLOOD PRESSURE: 84 MMHG | OXYGEN SATURATION: 98 % | TEMPERATURE: 98 F | SYSTOLIC BLOOD PRESSURE: 136 MMHG | RESPIRATION RATE: 20 BRPM | WEIGHT: 315 LBS | HEIGHT: 73 IN

## 2021-05-14 RX ORDER — IBUPROFEN 200 MG
1 CAPSULE ORAL DAILY
COMMUNITY
End: 2022-01-05 | Stop reason: SDUPTHER

## 2021-05-14 RX ORDER — IBUPROFEN 200 MG
CAPSULE ORAL
Status: ON HOLD | COMMUNITY
End: 2021-10-25 | Stop reason: HOSPADM

## 2021-05-19 ENCOUNTER — OFFICE VISIT (OUTPATIENT)
Dept: WOUND CARE | Facility: CLINIC | Age: 36
End: 2021-05-19
Attending: NURSE PRACTITIONER
Payer: MEDICARE

## 2021-05-19 VITALS
BODY MASS INDEX: 41.75 KG/M2 | RESPIRATION RATE: 20 BRPM | DIASTOLIC BLOOD PRESSURE: 97 MMHG | HEART RATE: 83 BPM | TEMPERATURE: 98 F | SYSTOLIC BLOOD PRESSURE: 157 MMHG | HEIGHT: 73 IN | WEIGHT: 315 LBS

## 2021-05-19 DIAGNOSIS — L98.499 DIABETES WITH SKIN ULCER: ICD-10-CM

## 2021-05-19 DIAGNOSIS — E11.40 DIABETIC NEUROPATHY WITH NEUROLOGIC COMPLICATION: Chronic | ICD-10-CM

## 2021-05-19 DIAGNOSIS — I73.9 PERIPHERAL VASCULAR DISEASE: Chronic | ICD-10-CM

## 2021-05-19 DIAGNOSIS — E11.49 DIABETIC NEUROPATHY WITH NEUROLOGIC COMPLICATION: Chronic | ICD-10-CM

## 2021-05-19 DIAGNOSIS — L97.514 ULCER OF RIGHT FOOT WITH NECROSIS OF BONE: Primary | ICD-10-CM

## 2021-05-19 DIAGNOSIS — E11.622 DIABETES WITH SKIN ULCER: ICD-10-CM

## 2021-05-19 DIAGNOSIS — R60.0 LOWER EXTREMITY EDEMA: ICD-10-CM

## 2021-05-19 DIAGNOSIS — I10 BENIGN HYPERTENSION: ICD-10-CM

## 2021-05-19 PROCEDURE — 11042 DBRDMT SUBQ TIS 1ST 20SQCM/<: CPT | Mod: PBBFAC | Performed by: NURSE PRACTITIONER

## 2021-05-19 PROCEDURE — 11042 DBRDMT SUBQ TIS 1ST 20SQCM/<: CPT

## 2021-05-19 PROCEDURE — 11045 DBRDMT SUBQ TISS EACH ADDL: CPT | Mod: PBBFAC

## 2021-05-19 PROCEDURE — 99215 OFFICE O/P EST HI 40 MIN: CPT | Mod: PBBFAC,25 | Performed by: NURSE PRACTITIONER

## 2021-05-19 PROCEDURE — 11042 DBRDMT SUBQ TIS 1ST 20SQCM/<: CPT | Mod: S$PBB,,, | Performed by: NURSE PRACTITIONER

## 2021-05-19 PROCEDURE — 11042 PR DEBRIDEMENT, SKIN, SUB-Q TISSUE,=<20 SQ CM: ICD-10-PCS | Mod: S$PBB,,, | Performed by: NURSE PRACTITIONER

## 2021-05-19 PROCEDURE — 11045 PR DEB SUBQ TISSUE ADD-ON: ICD-10-PCS | Mod: S$PBB,,, | Performed by: NURSE PRACTITIONER

## 2021-05-19 PROCEDURE — 11045 DBRDMT SUBQ TISS EACH ADDL: CPT | Mod: S$PBB,,, | Performed by: NURSE PRACTITIONER

## 2021-06-14 ENCOUNTER — OFFICE VISIT (OUTPATIENT)
Dept: PAIN MEDICINE | Facility: CLINIC | Age: 36
End: 2021-06-14
Payer: MEDICARE

## 2021-06-14 VITALS
WEIGHT: 315 LBS | HEIGHT: 73 IN | DIASTOLIC BLOOD PRESSURE: 76 MMHG | HEART RATE: 92 BPM | RESPIRATION RATE: 18 BRPM | SYSTOLIC BLOOD PRESSURE: 122 MMHG | BODY MASS INDEX: 41.75 KG/M2

## 2021-06-14 DIAGNOSIS — E11.40 DIABETIC NEUROPATHY WITH NEUROLOGIC COMPLICATION: Primary | Chronic | ICD-10-CM

## 2021-06-14 DIAGNOSIS — L97.514 ULCER OF RIGHT FOOT WITH NECROSIS OF BONE: Chronic | ICD-10-CM

## 2021-06-14 DIAGNOSIS — E11.49 DIABETIC NEUROPATHY WITH NEUROLOGIC COMPLICATION: Primary | Chronic | ICD-10-CM

## 2021-06-14 PROCEDURE — 99214 OFFICE O/P EST MOD 30 MIN: CPT | Mod: S$PBB,,, | Performed by: PHYSICIAN ASSISTANT

## 2021-06-14 PROCEDURE — 99214 OFFICE O/P EST MOD 30 MIN: CPT | Mod: PBBFAC | Performed by: PHYSICIAN ASSISTANT

## 2021-06-14 PROCEDURE — 99214 PR OFFICE/OUTPT VISIT, EST, LEVL IV, 30-39 MIN: ICD-10-PCS | Mod: S$PBB,,, | Performed by: PHYSICIAN ASSISTANT

## 2021-06-14 RX ORDER — GABAPENTIN 300 MG/1
300 CAPSULE ORAL 3 TIMES DAILY
Qty: 90 CAPSULE | Refills: 0 | Status: SHIPPED | OUTPATIENT
Start: 2021-06-14 | End: 2021-07-13 | Stop reason: SDUPTHER

## 2021-06-14 RX ORDER — HYDROCODONE BITARTRATE AND ACETAMINOPHEN 10; 325 MG/1; MG/1
1 TABLET ORAL EVERY 8 HOURS
COMMUNITY
End: 2021-06-14 | Stop reason: SDUPTHER

## 2021-06-14 RX ORDER — HYDROCODONE BITARTRATE AND ACETAMINOPHEN 10; 325 MG/1; MG/1
1 TABLET ORAL DAILY
Qty: 30 TABLET | Refills: 0 | Status: SHIPPED | OUTPATIENT
Start: 2021-06-14 | End: 2021-07-13 | Stop reason: SDUPTHER

## 2021-06-23 ENCOUNTER — OFFICE VISIT (OUTPATIENT)
Dept: WOUND CARE | Facility: CLINIC | Age: 36
End: 2021-06-23
Attending: NURSE PRACTITIONER
Payer: MEDICARE

## 2021-06-23 VITALS
BODY MASS INDEX: 41.08 KG/M2 | SYSTOLIC BLOOD PRESSURE: 153 MMHG | HEART RATE: 88 BPM | TEMPERATURE: 97 F | DIASTOLIC BLOOD PRESSURE: 92 MMHG | HEIGHT: 73 IN | RESPIRATION RATE: 20 BRPM | WEIGHT: 310 LBS

## 2021-06-23 DIAGNOSIS — L97.514 ULCER OF RIGHT FOOT WITH NECROSIS OF BONE: ICD-10-CM

## 2021-06-23 DIAGNOSIS — I73.9 PERIPHERAL VASCULAR DISEASE, UNSPECIFIED: ICD-10-CM

## 2021-06-23 DIAGNOSIS — R60.0 LOWER EXTREMITY EDEMA: ICD-10-CM

## 2021-06-23 DIAGNOSIS — L97.404 DIABETIC ULCER OF MIDFOOT ASSOCIATED WITH DIABETES MELLITUS DUE TO UNDERLYING CONDITION, WITH NECROSIS OF BONE, UNSPECIFIED LATERALITY: Primary | ICD-10-CM

## 2021-06-23 DIAGNOSIS — E08.621 DIABETIC ULCER OF MIDFOOT ASSOCIATED WITH DIABETES MELLITUS DUE TO UNDERLYING CONDITION, WITH NECROSIS OF BONE, UNSPECIFIED LATERALITY: Primary | ICD-10-CM

## 2021-06-23 PROCEDURE — 11045 DBRDMT SUBQ TISS EACH ADDL: CPT | Mod: PBBFAC | Performed by: NURSE PRACTITIONER

## 2021-06-23 PROCEDURE — 11042 DBRDMT SUBQ TIS 1ST 20SQCM/<: CPT | Mod: PBBFAC | Performed by: NURSE PRACTITIONER

## 2021-06-23 PROCEDURE — 11042 PR DEBRIDEMENT, SKIN, SUB-Q TISSUE,=<20 SQ CM: ICD-10-PCS | Mod: S$PBB,,, | Performed by: NURSE PRACTITIONER

## 2021-06-23 PROCEDURE — 11045 PR DEB SUBQ TISSUE ADD-ON: ICD-10-PCS | Mod: S$PBB,,, | Performed by: NURSE PRACTITIONER

## 2021-06-23 PROCEDURE — 99215 OFFICE O/P EST HI 40 MIN: CPT | Mod: PBBFAC,25 | Performed by: NURSE PRACTITIONER

## 2021-06-23 PROCEDURE — 11045 DBRDMT SUBQ TISS EACH ADDL: CPT | Mod: S$PBB,,, | Performed by: NURSE PRACTITIONER

## 2021-06-23 PROCEDURE — 11042 DBRDMT SUBQ TIS 1ST 20SQCM/<: CPT | Mod: S$PBB,,, | Performed by: NURSE PRACTITIONER

## 2021-07-07 PROBLEM — L97.516 NON-PRESSURE CHRONIC ULCER OF OTHER PART OF RIGHT FOOT WITH BONE INVOLVEMENT WITHOUT EVIDENCE OF NECROSIS: Status: ACTIVE | Noted: 2021-07-07

## 2021-07-13 ENCOUNTER — OFFICE VISIT (OUTPATIENT)
Dept: PAIN MEDICINE | Facility: CLINIC | Age: 36
End: 2021-07-13
Payer: MEDICARE

## 2021-07-13 VITALS
DIASTOLIC BLOOD PRESSURE: 84 MMHG | SYSTOLIC BLOOD PRESSURE: 127 MMHG | WEIGHT: 315 LBS | HEART RATE: 92 BPM | HEIGHT: 73 IN | RESPIRATION RATE: 18 BRPM | BODY MASS INDEX: 41.75 KG/M2

## 2021-07-13 DIAGNOSIS — Z79.899 ENCOUNTER FOR LONG-TERM (CURRENT) USE OF OTHER MEDICATIONS: ICD-10-CM

## 2021-07-13 DIAGNOSIS — E11.40 DIABETIC NEUROPATHY WITH NEUROLOGIC COMPLICATION: Primary | Chronic | ICD-10-CM

## 2021-07-13 DIAGNOSIS — E11.49 DIABETIC NEUROPATHY WITH NEUROLOGIC COMPLICATION: Primary | Chronic | ICD-10-CM

## 2021-07-13 DIAGNOSIS — I73.9 PERIPHERAL VASCULAR DISEASE, UNSPECIFIED: Chronic | ICD-10-CM

## 2021-07-13 DIAGNOSIS — L97.414 ULCER OF RIGHT HEEL, WITH NECROSIS OF BONE: Chronic | ICD-10-CM

## 2021-07-13 LAB
CTP QC/QA: YES
POC (AMP) AMPHETAMINE: NEGATIVE
POC (BAR) BARBITURATES: NEGATIVE
POC (BUP) BUPRENORPHINE: NEGATIVE
POC (BZO) BENZODIAZEPINES: NEGATIVE
POC (COC) COCAINE: NEGATIVE
POC (MDMA) METHYLENEDIOXYMETHAMPHETAMINE 3,4: NEGATIVE
POC (MET) METHAMPHETAMINE: NEGATIVE
POC (MOP) OPIATES: ABNORMAL
POC (MTD) METHADONE: NEGATIVE
POC (OXY) OXYCODONE: NEGATIVE
POC (PCP) PHENCYCLIDINE: NEGATIVE
POC (TCA) TRICYCLIC ANTIDEPRESSANTS: NEGATIVE
POC TEMPERATURE (URINE): 92
POC THC: NEGATIVE

## 2021-07-13 PROCEDURE — 80305 DRUG TEST PRSMV DIR OPT OBS: CPT | Mod: PBBFAC | Performed by: PHYSICIAN ASSISTANT

## 2021-07-13 PROCEDURE — 99214 OFFICE O/P EST MOD 30 MIN: CPT | Mod: S$PBB,,, | Performed by: PHYSICIAN ASSISTANT

## 2021-07-13 PROCEDURE — 99214 PR OFFICE/OUTPT VISIT, EST, LEVL IV, 30-39 MIN: ICD-10-PCS | Mod: S$PBB,,, | Performed by: PHYSICIAN ASSISTANT

## 2021-07-13 PROCEDURE — 99214 OFFICE O/P EST MOD 30 MIN: CPT | Mod: PBBFAC | Performed by: PHYSICIAN ASSISTANT

## 2021-07-13 RX ORDER — HYDROCODONE BITARTRATE AND ACETAMINOPHEN 10; 325 MG/1; MG/1
1 TABLET ORAL DAILY
Qty: 30 TABLET | Refills: 0 | Status: SHIPPED | OUTPATIENT
Start: 2021-07-14 | End: 2021-08-13

## 2021-07-13 RX ORDER — HYDROCODONE BITARTRATE AND ACETAMINOPHEN 10; 325 MG/1; MG/1
1 TABLET ORAL DAILY
Qty: 30 TABLET | Refills: 0 | Status: SHIPPED | OUTPATIENT
Start: 2021-08-13 | End: 2021-09-23 | Stop reason: SDUPTHER

## 2021-07-13 RX ORDER — GABAPENTIN 300 MG/1
300 CAPSULE ORAL 3 TIMES DAILY
Qty: 90 CAPSULE | Refills: 1 | Status: SHIPPED | OUTPATIENT
Start: 2021-07-13 | End: 2021-09-23 | Stop reason: SDUPTHER

## 2021-08-16 ENCOUNTER — CLINICAL SUPPORT (OUTPATIENT)
Dept: WOUND CARE | Facility: CLINIC | Age: 36
End: 2021-08-16
Payer: MEDICARE

## 2021-08-16 VITALS — HEART RATE: 79 BPM | RESPIRATION RATE: 20 BRPM | DIASTOLIC BLOOD PRESSURE: 92 MMHG | SYSTOLIC BLOOD PRESSURE: 147 MMHG

## 2021-08-16 DIAGNOSIS — E11.40 DIABETIC NEUROPATHY WITH NEUROLOGIC COMPLICATION: ICD-10-CM

## 2021-08-16 DIAGNOSIS — I10 BENIGN HYPERTENSION: ICD-10-CM

## 2021-08-16 DIAGNOSIS — L97.514 ULCER OF RIGHT FOOT, WITH NECROSIS OF BONE: ICD-10-CM

## 2021-08-16 DIAGNOSIS — E11.49 DIABETIC NEUROPATHY WITH NEUROLOGIC COMPLICATION: ICD-10-CM

## 2021-08-16 DIAGNOSIS — E11.622 DIABETES WITH SKIN ULCER: ICD-10-CM

## 2021-08-16 DIAGNOSIS — I10 ESSENTIAL HYPERTENSION, MALIGNANT: ICD-10-CM

## 2021-08-16 DIAGNOSIS — L98.499 DIABETES WITH SKIN ULCER: ICD-10-CM

## 2021-08-16 DIAGNOSIS — G89.4 CHRONIC PAIN SYNDROME: Chronic | ICD-10-CM

## 2021-08-16 DIAGNOSIS — I73.9 PERIPHERAL VASCULAR DISEASE, UNSPECIFIED: ICD-10-CM

## 2021-08-16 DIAGNOSIS — I73.9 PERIPHERAL VASCULAR DISEASE: ICD-10-CM

## 2021-08-16 DIAGNOSIS — L97.404 DIABETIC ULCER OF MIDFOOT ASSOCIATED WITH DIABETES MELLITUS DUE TO UNDERLYING CONDITION, WITH NECROSIS OF BONE, UNSPECIFIED LATERALITY: ICD-10-CM

## 2021-08-16 DIAGNOSIS — L97.514 ULCER OF RIGHT FOOT WITH NECROSIS OF BONE: Primary | ICD-10-CM

## 2021-08-16 DIAGNOSIS — E08.621 DIABETIC ULCER OF MIDFOOT ASSOCIATED WITH DIABETES MELLITUS DUE TO UNDERLYING CONDITION, WITH NECROSIS OF BONE, UNSPECIFIED LATERALITY: ICD-10-CM

## 2021-08-16 DIAGNOSIS — R60.0 LOWER EXTREMITY EDEMA: ICD-10-CM

## 2021-08-16 DIAGNOSIS — L97.414 ULCER OF RIGHT HEEL, WITH NECROSIS OF BONE: ICD-10-CM

## 2021-08-16 PROCEDURE — 99214 PR OFFICE/OUTPT VISIT, EST, LEVL IV, 30-39 MIN: ICD-10-PCS | Mod: S$PBB,,, | Performed by: FAMILY MEDICINE

## 2021-08-16 PROCEDURE — 99214 OFFICE O/P EST MOD 30 MIN: CPT | Mod: S$PBB,,, | Performed by: FAMILY MEDICINE

## 2021-08-16 PROCEDURE — 99214 OFFICE O/P EST MOD 30 MIN: CPT | Mod: PBBFAC | Performed by: FAMILY MEDICINE

## 2021-09-16 ENCOUNTER — CLINICAL SUPPORT (OUTPATIENT)
Dept: WOUND CARE | Facility: CLINIC | Age: 36
End: 2021-09-16
Attending: FAMILY MEDICINE
Payer: MEDICARE

## 2021-09-16 VITALS
RESPIRATION RATE: 20 BRPM | SYSTOLIC BLOOD PRESSURE: 129 MMHG | TEMPERATURE: 97 F | DIASTOLIC BLOOD PRESSURE: 94 MMHG | HEART RATE: 80 BPM

## 2021-09-16 DIAGNOSIS — L97.414 ULCER OF RIGHT HEEL, WITH NECROSIS OF BONE: ICD-10-CM

## 2021-09-16 DIAGNOSIS — I10 BENIGN HYPERTENSION: ICD-10-CM

## 2021-09-16 DIAGNOSIS — I10 ESSENTIAL HYPERTENSION, MALIGNANT: ICD-10-CM

## 2021-09-16 DIAGNOSIS — E11.49 DIABETIC NEUROPATHY WITH NEUROLOGIC COMPLICATION: ICD-10-CM

## 2021-09-16 DIAGNOSIS — L97.514 ULCER OF RIGHT FOOT, WITH NECROSIS OF BONE: ICD-10-CM

## 2021-09-16 DIAGNOSIS — M86.9 OSTEOMYELITIS, UNSPECIFIED SITE, UNSPECIFIED TYPE: ICD-10-CM

## 2021-09-16 DIAGNOSIS — I73.9 PERIPHERAL VASCULAR DISEASE: ICD-10-CM

## 2021-09-16 DIAGNOSIS — E08.621 DIABETIC ULCER OF MIDFOOT ASSOCIATED WITH DIABETES MELLITUS DUE TO UNDERLYING CONDITION, WITH NECROSIS OF BONE, UNSPECIFIED LATERALITY: ICD-10-CM

## 2021-09-16 DIAGNOSIS — L97.514 ULCER OF RIGHT FOOT WITH NECROSIS OF BONE: Primary | ICD-10-CM

## 2021-09-16 DIAGNOSIS — L97.404 DIABETIC ULCER OF MIDFOOT ASSOCIATED WITH DIABETES MELLITUS DUE TO UNDERLYING CONDITION, WITH NECROSIS OF BONE, UNSPECIFIED LATERALITY: ICD-10-CM

## 2021-09-16 DIAGNOSIS — E11.40 DIABETIC NEUROPATHY WITH NEUROLOGIC COMPLICATION: ICD-10-CM

## 2021-09-16 DIAGNOSIS — I73.9 PERIPHERAL VASCULAR DISEASE, UNSPECIFIED: ICD-10-CM

## 2021-09-16 DIAGNOSIS — R60.0 LOWER EXTREMITY EDEMA: ICD-10-CM

## 2021-09-16 DIAGNOSIS — E11.622 DIABETES WITH SKIN ULCER: ICD-10-CM

## 2021-09-16 DIAGNOSIS — L98.499 DIABETES WITH SKIN ULCER: ICD-10-CM

## 2021-09-16 PROCEDURE — 99214 OFFICE O/P EST MOD 30 MIN: CPT | Mod: S$PBB,,, | Performed by: FAMILY MEDICINE

## 2021-09-16 PROCEDURE — 99214 PR OFFICE/OUTPT VISIT, EST, LEVL IV, 30-39 MIN: ICD-10-PCS | Mod: S$PBB,,, | Performed by: FAMILY MEDICINE

## 2021-09-16 PROCEDURE — 99215 OFFICE O/P EST HI 40 MIN: CPT | Mod: PBBFAC | Performed by: FAMILY MEDICINE

## 2021-09-27 ENCOUNTER — OFFICE VISIT (OUTPATIENT)
Dept: PAIN MEDICINE | Facility: CLINIC | Age: 36
End: 2021-09-27
Payer: MEDICARE

## 2021-09-27 ENCOUNTER — CLINICAL SUPPORT (OUTPATIENT)
Dept: WOUND CARE | Facility: CLINIC | Age: 36
End: 2021-09-27
Attending: FAMILY MEDICINE
Payer: MEDICARE

## 2021-09-27 VITALS
DIASTOLIC BLOOD PRESSURE: 92 MMHG | RESPIRATION RATE: 20 BRPM | TEMPERATURE: 98 F | SYSTOLIC BLOOD PRESSURE: 149 MMHG | HEART RATE: 95 BPM

## 2021-09-27 VITALS
HEART RATE: 78 BPM | RESPIRATION RATE: 18 BRPM | WEIGHT: 315 LBS | BODY MASS INDEX: 41.75 KG/M2 | HEIGHT: 73 IN | DIASTOLIC BLOOD PRESSURE: 87 MMHG | SYSTOLIC BLOOD PRESSURE: 135 MMHG

## 2021-09-27 DIAGNOSIS — I10 BENIGN HYPERTENSION: ICD-10-CM

## 2021-09-27 DIAGNOSIS — I73.9 PERIPHERAL VASCULAR DISEASE, UNSPECIFIED: Chronic | ICD-10-CM

## 2021-09-27 DIAGNOSIS — R60.0 LOWER EXTREMITY EDEMA: ICD-10-CM

## 2021-09-27 DIAGNOSIS — I10 ESSENTIAL HYPERTENSION, MALIGNANT: ICD-10-CM

## 2021-09-27 DIAGNOSIS — E11.622 DIABETES WITH SKIN ULCER: ICD-10-CM

## 2021-09-27 DIAGNOSIS — L97.514 ULCER OF RIGHT FOOT, WITH NECROSIS OF BONE: ICD-10-CM

## 2021-09-27 DIAGNOSIS — E08.621 DIABETIC ULCER OF MIDFOOT ASSOCIATED WITH DIABETES MELLITUS DUE TO UNDERLYING CONDITION, WITH NECROSIS OF BONE, UNSPECIFIED LATERALITY: ICD-10-CM

## 2021-09-27 DIAGNOSIS — E11.40 DIABETIC NEUROPATHY WITH NEUROLOGIC COMPLICATION: ICD-10-CM

## 2021-09-27 DIAGNOSIS — E11.49 DIABETIC NEUROPATHY WITH NEUROLOGIC COMPLICATION: ICD-10-CM

## 2021-09-27 DIAGNOSIS — L98.499 DIABETES WITH SKIN ULCER: ICD-10-CM

## 2021-09-27 DIAGNOSIS — L97.404 DIABETIC ULCER OF MIDFOOT ASSOCIATED WITH DIABETES MELLITUS DUE TO UNDERLYING CONDITION, WITH NECROSIS OF BONE, UNSPECIFIED LATERALITY: ICD-10-CM

## 2021-09-27 DIAGNOSIS — E11.40 DIABETIC NEUROPATHY WITH NEUROLOGIC COMPLICATION: Primary | Chronic | ICD-10-CM

## 2021-09-27 DIAGNOSIS — E11.49 DIABETIC NEUROPATHY WITH NEUROLOGIC COMPLICATION: Primary | Chronic | ICD-10-CM

## 2021-09-27 DIAGNOSIS — L97.414 ULCER OF RIGHT HEEL, WITH NECROSIS OF BONE: Chronic | ICD-10-CM

## 2021-09-27 DIAGNOSIS — L97.514 ULCER OF RIGHT FOOT WITH NECROSIS OF BONE: Primary | ICD-10-CM

## 2021-09-27 DIAGNOSIS — I73.9 PERIPHERAL VASCULAR DISEASE: ICD-10-CM

## 2021-09-27 DIAGNOSIS — L97.414 ULCER OF RIGHT HEEL, WITH NECROSIS OF BONE: ICD-10-CM

## 2021-09-27 DIAGNOSIS — I73.9 PERIPHERAL VASCULAR DISEASE, UNSPECIFIED: ICD-10-CM

## 2021-09-27 PROCEDURE — 99214 OFFICE O/P EST MOD 30 MIN: CPT | Mod: S$PBB,,, | Performed by: FAMILY MEDICINE

## 2021-09-27 PROCEDURE — 99214 OFFICE O/P EST MOD 30 MIN: CPT | Mod: PBBFAC | Performed by: FAMILY MEDICINE

## 2021-09-27 PROCEDURE — 99214 OFFICE O/P EST MOD 30 MIN: CPT | Mod: S$PBB,,, | Performed by: PHYSICIAN ASSISTANT

## 2021-09-27 PROCEDURE — 99214 OFFICE O/P EST MOD 30 MIN: CPT | Mod: PBBFAC | Performed by: PHYSICIAN ASSISTANT

## 2021-09-27 PROCEDURE — 99214 PR OFFICE/OUTPT VISIT, EST, LEVL IV, 30-39 MIN: ICD-10-PCS | Mod: S$PBB,,, | Performed by: FAMILY MEDICINE

## 2021-09-27 PROCEDURE — 99214 PR OFFICE/OUTPT VISIT, EST, LEVL IV, 30-39 MIN: ICD-10-PCS | Mod: S$PBB,,, | Performed by: PHYSICIAN ASSISTANT

## 2021-09-27 RX ORDER — HYDROCODONE BITARTRATE AND ACETAMINOPHEN 10; 325 MG/1; MG/1
1 TABLET ORAL DAILY
Qty: 30 TABLET | Refills: 0 | Status: SHIPPED | OUTPATIENT
Start: 2021-09-27 | End: 2021-11-01 | Stop reason: SDUPTHER

## 2021-09-27 RX ORDER — GABAPENTIN 300 MG/1
300 CAPSULE ORAL EVERY 8 HOURS
Qty: 90 CAPSULE | Refills: 0 | Status: SHIPPED | OUTPATIENT
Start: 2021-09-27 | End: 2021-11-01 | Stop reason: SDUPTHER

## 2021-09-27 RX ORDER — HYDROCODONE BITARTRATE AND ACETAMINOPHEN 10; 325 MG/1; MG/1
1 TABLET ORAL DAILY
Status: ON HOLD | COMMUNITY
End: 2021-10-08 | Stop reason: CLARIF

## 2021-10-08 ENCOUNTER — HOSPITAL ENCOUNTER (INPATIENT)
Facility: HOSPITAL | Age: 36
LOS: 18 days | Discharge: HOME HEALTH | DRG: 540 | End: 2021-10-26
Attending: FAMILY MEDICINE | Admitting: FAMILY MEDICINE
Payer: MEDICARE

## 2021-10-08 ENCOUNTER — OFFICE VISIT (OUTPATIENT)
Dept: WOUND CARE | Facility: CLINIC | Age: 36
End: 2021-10-08
Attending: SURGERY
Payer: MEDICARE

## 2021-10-08 VITALS
TEMPERATURE: 97 F | HEART RATE: 82 BPM | SYSTOLIC BLOOD PRESSURE: 177 MMHG | DIASTOLIC BLOOD PRESSURE: 95 MMHG | RESPIRATION RATE: 18 BRPM

## 2021-10-08 DIAGNOSIS — E11.49 DIABETIC NEUROPATHY WITH NEUROLOGIC COMPLICATION: Chronic | ICD-10-CM

## 2021-10-08 DIAGNOSIS — I73.9 PERIPHERAL VASCULAR DISEASE, UNSPECIFIED: ICD-10-CM

## 2021-10-08 DIAGNOSIS — E11.622 DIABETES WITH SKIN ULCER: ICD-10-CM

## 2021-10-08 DIAGNOSIS — E11.40 DIABETIC NEUROPATHY WITH NEUROLOGIC COMPLICATION: ICD-10-CM

## 2021-10-08 DIAGNOSIS — I73.9 PERIPHERAL VASCULAR DISEASE: ICD-10-CM

## 2021-10-08 DIAGNOSIS — I10 PRIMARY HYPERTENSION: ICD-10-CM

## 2021-10-08 DIAGNOSIS — E11.621 TYPE 2 DIABETES MELLITUS WITH RIGHT DIABETIC FOOT ULCER: ICD-10-CM

## 2021-10-08 DIAGNOSIS — R60.0 LOWER EXTREMITY EDEMA: ICD-10-CM

## 2021-10-08 DIAGNOSIS — E11.40 DIABETIC NEUROPATHY WITH NEUROLOGIC COMPLICATION: Chronic | ICD-10-CM

## 2021-10-08 DIAGNOSIS — L97.404: ICD-10-CM

## 2021-10-08 DIAGNOSIS — E11.49 DIABETIC NEUROPATHY WITH NEUROLOGIC COMPLICATION: ICD-10-CM

## 2021-10-08 DIAGNOSIS — M86.60 CHRONIC OSTEOMYELITIS: ICD-10-CM

## 2021-10-08 DIAGNOSIS — G89.4 CHRONIC PAIN SYNDROME: Chronic | ICD-10-CM

## 2021-10-08 DIAGNOSIS — L97.404 DIABETIC ULCER OF MIDFOOT ASSOCIATED WITH DIABETES MELLITUS DUE TO UNDERLYING CONDITION, WITH NECROSIS OF BONE, UNSPECIFIED LATERALITY: ICD-10-CM

## 2021-10-08 DIAGNOSIS — E08.621 DIABETIC ULCER OF MIDFOOT ASSOCIATED WITH DIABETES MELLITUS DUE TO UNDERLYING CONDITION, WITH NECROSIS OF BONE, UNSPECIFIED LATERALITY: ICD-10-CM

## 2021-10-08 DIAGNOSIS — L97.519 TYPE 2 DIABETES MELLITUS WITH RIGHT DIABETIC FOOT ULCER: ICD-10-CM

## 2021-10-08 DIAGNOSIS — L97.514 ULCER OF RIGHT FOOT WITH NECROSIS OF BONE: Primary | ICD-10-CM

## 2021-10-08 DIAGNOSIS — L97.514 ULCER OF RIGHT FOOT, WITH NECROSIS OF BONE: ICD-10-CM

## 2021-10-08 DIAGNOSIS — E11.622 DIABETES WITH SKIN ULCER: Primary | ICD-10-CM

## 2021-10-08 DIAGNOSIS — L97.414 ULCER OF RIGHT HEEL, WITH NECROSIS OF BONE: ICD-10-CM

## 2021-10-08 DIAGNOSIS — L98.499 DIABETES WITH SKIN ULCER: Primary | ICD-10-CM

## 2021-10-08 DIAGNOSIS — E08.621 DIABETIC ULCER OF RIGHT MIDFOOT ASSOCIATED WITH DIABETES MELLITUS DUE TO UNDERLYING CONDITION, WITH NECROSIS OF BONE: ICD-10-CM

## 2021-10-08 DIAGNOSIS — L98.499 DIABETES WITH SKIN ULCER: ICD-10-CM

## 2021-10-08 DIAGNOSIS — E08.621: ICD-10-CM

## 2021-10-08 DIAGNOSIS — D86.9 SARCOIDOSIS: ICD-10-CM

## 2021-10-08 DIAGNOSIS — I10 BENIGN HYPERTENSION: ICD-10-CM

## 2021-10-08 DIAGNOSIS — L97.414 DIABETIC ULCER OF RIGHT MIDFOOT ASSOCIATED WITH DIABETES MELLITUS DUE TO UNDERLYING CONDITION, WITH NECROSIS OF BONE: ICD-10-CM

## 2021-10-08 DIAGNOSIS — I10 ESSENTIAL HYPERTENSION, MALIGNANT: ICD-10-CM

## 2021-10-08 LAB
ANION GAP SERPL CALCULATED.3IONS-SCNC: 13 MMOL/L (ref 7–16)
BUN SERPL-MCNC: 12 MG/DL (ref 7–18)
BUN/CREAT SERPL: 13 (ref 6–20)
CALCIUM SERPL-MCNC: 8.5 MG/DL (ref 8.5–10.1)
CHLORIDE SERPL-SCNC: 105 MMOL/L (ref 98–107)
CO2 SERPL-SCNC: 30 MMOL/L (ref 21–32)
CREAT SERPL-MCNC: 0.94 MG/DL (ref 0.7–1.3)
EST. AVERAGE GLUCOSE BLD GHB EST-MCNC: 174 MG/DL
FLUAV AG UPPER RESP QL IA.RAPID: NEGATIVE
FLUBV AG UPPER RESP QL IA.RAPID: NEGATIVE
GLUCOSE SERPL-MCNC: 121 MG/DL (ref 74–106)
GLUCOSE SERPL-MCNC: 127 MG/DL (ref 70–105)
GLUCOSE SERPL-MCNC: 170 MG/DL (ref 70–105)
HBA1C MFR BLD HPLC: 7.8 % (ref 4.5–6.6)
POTASSIUM SERPL-SCNC: 4.4 MMOL/L (ref 3.5–5.1)
SARS-COV+SARS-COV-2 AG RESP QL IA.RAPID: NEGATIVE
SODIUM SERPL-SCNC: 144 MMOL/L (ref 136–145)

## 2021-10-08 PROCEDURE — 99499 UNLISTED E&M SERVICE: CPT | Mod: S$PBB,,, | Performed by: SURGERY

## 2021-10-08 PROCEDURE — 87075 CULTR BACTERIA EXCEPT BLOOD: CPT | Mod: ,,, | Performed by: CLINICAL MEDICAL LABORATORY

## 2021-10-08 PROCEDURE — 11046 PR DEB MUSC/FASCIA ADD-ON: ICD-10-PCS | Mod: S$PBB,,, | Performed by: SURGERY

## 2021-10-08 PROCEDURE — 87428 SARS-COV2 (COVID) W/ FLU ANTIGEN: ICD-10-PCS | Mod: QW,CR,, | Performed by: CLINICAL MEDICAL LABORATORY

## 2021-10-08 PROCEDURE — 11000001 HC ACUTE MED/SURG PRIVATE ROOM

## 2021-10-08 PROCEDURE — 25000003 PHARM REV CODE 250: Performed by: HOSPITALIST

## 2021-10-08 PROCEDURE — 87076 CULTURE, ANAEROBE: ICD-10-PCS | Mod: ,,, | Performed by: CLINICAL MEDICAL LABORATORY

## 2021-10-08 PROCEDURE — 87076 CULTURE ANAEROBE IDENT EACH: CPT | Mod: ,,, | Performed by: CLINICAL MEDICAL LABORATORY

## 2021-10-08 PROCEDURE — 36415 COLL VENOUS BLD VENIPUNCTURE: CPT | Performed by: HOSPITALIST

## 2021-10-08 PROCEDURE — 11043 PR DEBRIDEMENT, SKIN, SUB-Q TISSUE,MUSCLE,=<20 SQ CM: ICD-10-PCS | Mod: S$PBB,,, | Performed by: SURGERY

## 2021-10-08 PROCEDURE — 87186 CULTURE, WOUND: ICD-10-PCS | Mod: ,,, | Performed by: CLINICAL MEDICAL LABORATORY

## 2021-10-08 PROCEDURE — 88304 TISSUE EXAM BY PATHOLOGIST: CPT | Mod: SUR | Performed by: SURGERY

## 2021-10-08 PROCEDURE — 99222 PR INITIAL HOSPITAL CARE,LEVL II: ICD-10-PCS | Mod: AI,,, | Performed by: HOSPITALIST

## 2021-10-08 PROCEDURE — 83036 HEMOGLOBIN GLYCOSYLATED A1C: CPT | Performed by: HOSPITALIST

## 2021-10-08 PROCEDURE — 11046 DBRDMT MUSC&/FSCA EA ADDL: CPT | Mod: S$PBB,,, | Performed by: SURGERY

## 2021-10-08 PROCEDURE — 11043 DBRDMT MUSC&/FSCA 1ST 20/<: CPT | Mod: S$PBB,,, | Performed by: SURGERY

## 2021-10-08 PROCEDURE — 87428 SARSCOV & INF VIR A&B AG IA: CPT | Mod: QW,CR,, | Performed by: CLINICAL MEDICAL LABORATORY

## 2021-10-08 PROCEDURE — 99222 1ST HOSP IP/OBS MODERATE 55: CPT | Mod: AI,,, | Performed by: HOSPITALIST

## 2021-10-08 PROCEDURE — 99499 NO LOS: ICD-10-PCS | Mod: S$PBB,,, | Performed by: SURGERY

## 2021-10-08 PROCEDURE — 11046 DBRDMT MUSC&/FSCA EA ADDL: CPT | Mod: PBBFAC | Performed by: SURGERY

## 2021-10-08 PROCEDURE — 87070 CULTURE OTHR SPECIMN AEROBIC: CPT | Mod: ,,, | Performed by: CLINICAL MEDICAL LABORATORY

## 2021-10-08 PROCEDURE — 99214 OFFICE O/P EST MOD 30 MIN: CPT | Mod: PBBFAC | Performed by: SURGERY

## 2021-10-08 PROCEDURE — 88304 TISSUE EXAM BY PATHOLOGIST: CPT | Mod: 26,,, | Performed by: PATHOLOGY

## 2021-10-08 PROCEDURE — 88304 SURGICAL PATHOLOGY: ICD-10-PCS | Mod: 26,,, | Performed by: PATHOLOGY

## 2021-10-08 PROCEDURE — 87070 CULTURE, WOUND: ICD-10-PCS | Mod: ,,, | Performed by: CLINICAL MEDICAL LABORATORY

## 2021-10-08 PROCEDURE — 63600175 PHARM REV CODE 636 W HCPCS: Performed by: HOSPITALIST

## 2021-10-08 PROCEDURE — 11043 DBRDMT MUSC&/FSCA 1ST 20/<: CPT | Mod: PBBFAC | Performed by: SURGERY

## 2021-10-08 PROCEDURE — 82962 GLUCOSE BLOOD TEST: CPT

## 2021-10-08 PROCEDURE — 80048 BASIC METABOLIC PNL TOTAL CA: CPT | Performed by: HOSPITALIST

## 2021-10-08 PROCEDURE — 87077 CULTURE, WOUND: ICD-10-PCS | Mod: ,,, | Performed by: CLINICAL MEDICAL LABORATORY

## 2021-10-08 PROCEDURE — 87075 CULTURE, ANAEROBE: ICD-10-PCS | Mod: ,,, | Performed by: CLINICAL MEDICAL LABORATORY

## 2021-10-08 PROCEDURE — 87077 CULTURE AEROBIC IDENTIFY: CPT | Mod: ,,, | Performed by: CLINICAL MEDICAL LABORATORY

## 2021-10-08 PROCEDURE — 87186 SC STD MICRODIL/AGAR DIL: CPT | Mod: ,,, | Performed by: CLINICAL MEDICAL LABORATORY

## 2021-10-08 RX ORDER — HYDROCHLOROTHIAZIDE 12.5 MG/1
25 TABLET ORAL EVERY OTHER DAY
Status: DISCONTINUED | OUTPATIENT
Start: 2021-10-09 | End: 2021-10-14

## 2021-10-08 RX ORDER — GABAPENTIN 300 MG/1
300 CAPSULE ORAL EVERY 8 HOURS
Status: DISCONTINUED | OUTPATIENT
Start: 2021-10-08 | End: 2021-10-26 | Stop reason: HOSPADM

## 2021-10-08 RX ORDER — LANOLIN ALCOHOL/MO/W.PET/CERES
800 CREAM (GRAM) TOPICAL
Status: DISCONTINUED | OUTPATIENT
Start: 2021-10-08 | End: 2021-10-26 | Stop reason: HOSPADM

## 2021-10-08 RX ORDER — LISINOPRIL 20 MG/1
20 TABLET ORAL DAILY
Status: DISCONTINUED | OUTPATIENT
Start: 2021-10-08 | End: 2021-10-18

## 2021-10-08 RX ORDER — ATORVASTATIN CALCIUM 20 MG/1
20 TABLET, FILM COATED ORAL DAILY
Status: DISCONTINUED | OUTPATIENT
Start: 2021-10-08 | End: 2021-10-26 | Stop reason: HOSPADM

## 2021-10-08 RX ORDER — GLUCAGON 1 MG
1 KIT INJECTION
Status: DISCONTINUED | OUTPATIENT
Start: 2021-10-08 | End: 2021-10-26 | Stop reason: HOSPADM

## 2021-10-08 RX ORDER — HYDROCODONE BITARTRATE AND ACETAMINOPHEN 10; 325 MG/1; MG/1
1 TABLET ORAL EVERY 8 HOURS PRN
Status: DISCONTINUED | OUTPATIENT
Start: 2021-10-08 | End: 2021-10-13

## 2021-10-08 RX ORDER — ENOXAPARIN SODIUM 100 MG/ML
40 INJECTION SUBCUTANEOUS EVERY 24 HOURS
Status: DISCONTINUED | OUTPATIENT
Start: 2021-10-08 | End: 2021-10-26 | Stop reason: HOSPADM

## 2021-10-08 RX ORDER — INSULIN ASPART 100 [IU]/ML
1-10 INJECTION, SOLUTION INTRAVENOUS; SUBCUTANEOUS
Status: DISCONTINUED | OUTPATIENT
Start: 2021-10-08 | End: 2021-10-26 | Stop reason: HOSPADM

## 2021-10-08 RX ADMIN — VANCOMYCIN HYDROCHLORIDE 2500 MG: 10 INJECTION, POWDER, LYOPHILIZED, FOR SOLUTION INTRAVENOUS at 09:10

## 2021-10-08 RX ADMIN — ENOXAPARIN SODIUM 40 MG: 40 INJECTION SUBCUTANEOUS at 04:10

## 2021-10-08 RX ADMIN — GABAPENTIN 300 MG: 300 CAPSULE ORAL at 09:10

## 2021-10-08 RX ADMIN — LISINOPRIL 20 MG: 20 TABLET ORAL at 04:10

## 2021-10-08 RX ADMIN — HYDROCODONE BITARTRATE AND ACETAMINOPHEN 1 TABLET: 10; 325 TABLET ORAL at 04:10

## 2021-10-08 RX ADMIN — ATORVASTATIN CALCIUM 20 MG: 20 TABLET, FILM COATED ORAL at 04:10

## 2021-10-09 LAB
ANION GAP SERPL CALCULATED.3IONS-SCNC: 11 MMOL/L (ref 7–16)
BUN SERPL-MCNC: 13 MG/DL (ref 7–18)
BUN/CREAT SERPL: 12 (ref 6–20)
CALCIUM SERPL-MCNC: 8.2 MG/DL (ref 8.5–10.1)
CHLORIDE SERPL-SCNC: 103 MMOL/L (ref 98–107)
CO2 SERPL-SCNC: 31 MMOL/L (ref 21–32)
CREAT SERPL-MCNC: 1.06 MG/DL (ref 0.7–1.3)
GLUCOSE SERPL-MCNC: 135 MG/DL (ref 74–106)
GLUCOSE SERPL-MCNC: 150 MG/DL (ref 70–105)
GLUCOSE SERPL-MCNC: 158 MG/DL (ref 70–105)
GLUCOSE SERPL-MCNC: 176 MG/DL (ref 70–105)
POTASSIUM SERPL-SCNC: 4 MMOL/L (ref 3.5–5.1)
SODIUM SERPL-SCNC: 141 MMOL/L (ref 136–145)

## 2021-10-09 PROCEDURE — 11000001 HC ACUTE MED/SURG PRIVATE ROOM

## 2021-10-09 PROCEDURE — 25000003 PHARM REV CODE 250: Performed by: HOSPITALIST

## 2021-10-09 PROCEDURE — 63600175 PHARM REV CODE 636 W HCPCS: Performed by: HOSPITALIST

## 2021-10-09 PROCEDURE — 97161 PT EVAL LOW COMPLEX 20 MIN: CPT

## 2021-10-09 PROCEDURE — 97165 OT EVAL LOW COMPLEX 30 MIN: CPT

## 2021-10-09 PROCEDURE — 82962 GLUCOSE BLOOD TEST: CPT

## 2021-10-09 PROCEDURE — 36415 COLL VENOUS BLD VENIPUNCTURE: CPT | Performed by: HOSPITALIST

## 2021-10-09 PROCEDURE — 80048 BASIC METABOLIC PNL TOTAL CA: CPT | Performed by: HOSPITALIST

## 2021-10-09 RX ADMIN — VANCOMYCIN HYDROCHLORIDE 2500 MG: 10 INJECTION, POWDER, LYOPHILIZED, FOR SOLUTION INTRAVENOUS at 09:10

## 2021-10-09 RX ADMIN — ENOXAPARIN SODIUM 40 MG: 40 INJECTION SUBCUTANEOUS at 05:10

## 2021-10-09 RX ADMIN — ATORVASTATIN CALCIUM 20 MG: 20 TABLET, FILM COATED ORAL at 09:10

## 2021-10-09 RX ADMIN — HYDROCHLOROTHIAZIDE 25 MG: 12.5 TABLET ORAL at 09:10

## 2021-10-09 RX ADMIN — INSULIN ASPART 2 UNITS: 100 INJECTION, SOLUTION INTRAVENOUS; SUBCUTANEOUS at 11:10

## 2021-10-09 RX ADMIN — INSULIN ASPART 2 UNITS: 100 INJECTION, SOLUTION INTRAVENOUS; SUBCUTANEOUS at 06:10

## 2021-10-09 RX ADMIN — HYDROCODONE BITARTRATE AND ACETAMINOPHEN 1 TABLET: 10; 325 TABLET ORAL at 11:10

## 2021-10-09 RX ADMIN — GABAPENTIN 300 MG: 300 CAPSULE ORAL at 02:10

## 2021-10-09 RX ADMIN — LISINOPRIL 20 MG: 20 TABLET ORAL at 09:10

## 2021-10-09 RX ADMIN — GABAPENTIN 300 MG: 300 CAPSULE ORAL at 06:10

## 2021-10-09 RX ADMIN — GABAPENTIN 300 MG: 300 CAPSULE ORAL at 09:10

## 2021-10-10 LAB
ANION GAP SERPL CALCULATED.3IONS-SCNC: 9 MMOL/L (ref 7–16)
BUN SERPL-MCNC: 13 MG/DL (ref 7–18)
BUN/CREAT SERPL: 12 (ref 6–20)
CALCIUM SERPL-MCNC: 8 MG/DL (ref 8.5–10.1)
CHLORIDE SERPL-SCNC: 104 MMOL/L (ref 98–107)
CO2 SERPL-SCNC: 33 MMOL/L (ref 21–32)
CREAT SERPL-MCNC: 1.12 MG/DL (ref 0.7–1.3)
GLUCOSE SERPL-MCNC: 158 MG/DL (ref 70–105)
GLUCOSE SERPL-MCNC: 173 MG/DL (ref 74–106)
GLUCOSE SERPL-MCNC: 175 MG/DL (ref 70–105)
GLUCOSE SERPL-MCNC: 185 MG/DL (ref 70–105)
POTASSIUM SERPL-SCNC: 4.2 MMOL/L (ref 3.5–5.1)
SODIUM SERPL-SCNC: 142 MMOL/L (ref 136–145)
VANCOMYCIN TROUGH SERPL-MCNC: 20.9 ΜG/ML (ref 10–20)

## 2021-10-10 PROCEDURE — 96372 THER/PROPH/DIAG INJ SC/IM: CPT

## 2021-10-10 PROCEDURE — 63600175 PHARM REV CODE 636 W HCPCS: Performed by: HOSPITALIST

## 2021-10-10 PROCEDURE — 99232 PR SUBSEQUENT HOSPITAL CARE,LEVL II: ICD-10-PCS | Mod: ,,, | Performed by: HOSPITALIST

## 2021-10-10 PROCEDURE — 80048 BASIC METABOLIC PNL TOTAL CA: CPT | Performed by: HOSPITALIST

## 2021-10-10 PROCEDURE — 36415 COLL VENOUS BLD VENIPUNCTURE: CPT | Performed by: HOSPITALIST

## 2021-10-10 PROCEDURE — 99232 SBSQ HOSP IP/OBS MODERATE 35: CPT | Mod: ,,, | Performed by: HOSPITALIST

## 2021-10-10 PROCEDURE — 80202 ASSAY OF VANCOMYCIN: CPT | Performed by: HOSPITALIST

## 2021-10-10 PROCEDURE — 82962 GLUCOSE BLOOD TEST: CPT

## 2021-10-10 PROCEDURE — 25000003 PHARM REV CODE 250: Performed by: HOSPITALIST

## 2021-10-10 PROCEDURE — 11000001 HC ACUTE MED/SURG PRIVATE ROOM

## 2021-10-10 RX ADMIN — LISINOPRIL 20 MG: 20 TABLET ORAL at 09:10

## 2021-10-10 RX ADMIN — HYDROCODONE BITARTRATE AND ACETAMINOPHEN 1 TABLET: 10; 325 TABLET ORAL at 05:10

## 2021-10-10 RX ADMIN — GABAPENTIN 300 MG: 300 CAPSULE ORAL at 01:10

## 2021-10-10 RX ADMIN — ATORVASTATIN CALCIUM 20 MG: 20 TABLET, FILM COATED ORAL at 09:10

## 2021-10-10 RX ADMIN — ENOXAPARIN SODIUM 40 MG: 40 INJECTION SUBCUTANEOUS at 04:10

## 2021-10-10 RX ADMIN — INSULIN ASPART 2 UNITS: 100 INJECTION, SOLUTION INTRAVENOUS; SUBCUTANEOUS at 04:10

## 2021-10-10 RX ADMIN — VANCOMYCIN HYDROCHLORIDE 2000 MG: 1 INJECTION, POWDER, LYOPHILIZED, FOR SOLUTION INTRAVENOUS at 04:10

## 2021-10-10 RX ADMIN — INSULIN ASPART 2 UNITS: 100 INJECTION, SOLUTION INTRAVENOUS; SUBCUTANEOUS at 06:10

## 2021-10-10 RX ADMIN — GABAPENTIN 300 MG: 300 CAPSULE ORAL at 09:10

## 2021-10-10 RX ADMIN — HYDROCODONE BITARTRATE AND ACETAMINOPHEN 1 TABLET: 10; 325 TABLET ORAL at 09:10

## 2021-10-10 RX ADMIN — INSULIN ASPART 2 UNITS: 100 INJECTION, SOLUTION INTRAVENOUS; SUBCUTANEOUS at 12:10

## 2021-10-10 RX ADMIN — GABAPENTIN 300 MG: 300 CAPSULE ORAL at 06:10

## 2021-10-11 LAB
ANION GAP SERPL CALCULATED.3IONS-SCNC: 6 MMOL/L (ref 7–16)
BUN SERPL-MCNC: 17 MG/DL (ref 7–18)
BUN/CREAT SERPL: 17 (ref 6–20)
CALCIUM SERPL-MCNC: 9 MG/DL (ref 8.5–10.1)
CHLORIDE SERPL-SCNC: 105 MMOL/L (ref 98–107)
CO2 SERPL-SCNC: 31 MMOL/L (ref 21–32)
CREAT SERPL-MCNC: 1.01 MG/DL (ref 0.7–1.3)
ESTROGEN SERPL-MCNC: NORMAL PG/ML
GLUCOSE SERPL-MCNC: 156 MG/DL (ref 70–105)
GLUCOSE SERPL-MCNC: 162 MG/DL (ref 70–105)
GLUCOSE SERPL-MCNC: 189 MG/DL (ref 70–105)
GLUCOSE SERPL-MCNC: 214 MG/DL (ref 74–106)
GLUCOSE SERPL-MCNC: 241 MG/DL (ref 70–105)
LAB AP CLINICAL INFORMATION: NORMAL
LAB AP GROSS DESCRIPTION: NORMAL
LAB AP LABORATORY NOTES: NORMAL
POTASSIUM SERPL-SCNC: 4.2 MMOL/L (ref 3.5–5.1)
SODIUM SERPL-SCNC: 138 MMOL/L (ref 136–145)
T3RU NFR SERPL: NORMAL %

## 2021-10-11 PROCEDURE — 36415 COLL VENOUS BLD VENIPUNCTURE: CPT | Performed by: HOSPITALIST

## 2021-10-11 PROCEDURE — 63600175 PHARM REV CODE 636 W HCPCS: Performed by: HOSPITALIST

## 2021-10-11 PROCEDURE — 80048 BASIC METABOLIC PNL TOTAL CA: CPT | Performed by: HOSPITALIST

## 2021-10-11 PROCEDURE — 82962 GLUCOSE BLOOD TEST: CPT

## 2021-10-11 PROCEDURE — 11000001 HC ACUTE MED/SURG PRIVATE ROOM

## 2021-10-11 PROCEDURE — 25000003 PHARM REV CODE 250: Performed by: HOSPITALIST

## 2021-10-11 RX ADMIN — INSULIN ASPART 1 UNITS: 100 INJECTION, SOLUTION INTRAVENOUS; SUBCUTANEOUS at 04:10

## 2021-10-11 RX ADMIN — GABAPENTIN 300 MG: 300 CAPSULE ORAL at 09:10

## 2021-10-11 RX ADMIN — ATORVASTATIN CALCIUM 20 MG: 20 TABLET, FILM COATED ORAL at 09:10

## 2021-10-11 RX ADMIN — GABAPENTIN 300 MG: 300 CAPSULE ORAL at 01:10

## 2021-10-11 RX ADMIN — VANCOMYCIN HYDROCHLORIDE 2000 MG: 1 INJECTION, POWDER, LYOPHILIZED, FOR SOLUTION INTRAVENOUS at 04:10

## 2021-10-11 RX ADMIN — HYDROCODONE BITARTRATE AND ACETAMINOPHEN 1 TABLET: 10; 325 TABLET ORAL at 04:10

## 2021-10-11 RX ADMIN — GABAPENTIN 300 MG: 300 CAPSULE ORAL at 05:10

## 2021-10-11 RX ADMIN — HYDROCODONE BITARTRATE AND ACETAMINOPHEN 1 TABLET: 10; 325 TABLET ORAL at 01:10

## 2021-10-11 RX ADMIN — HYDROCHLOROTHIAZIDE 25 MG: 12.5 TABLET ORAL at 09:10

## 2021-10-11 RX ADMIN — ENOXAPARIN SODIUM 40 MG: 40 INJECTION SUBCUTANEOUS at 05:10

## 2021-10-11 RX ADMIN — LISINOPRIL 20 MG: 20 TABLET ORAL at 09:10

## 2021-10-11 RX ADMIN — INSULIN ASPART 4 UNITS: 100 INJECTION, SOLUTION INTRAVENOUS; SUBCUTANEOUS at 07:10

## 2021-10-11 RX ADMIN — HYDROCODONE BITARTRATE AND ACETAMINOPHEN 1 TABLET: 10; 325 TABLET ORAL at 09:10

## 2021-10-11 RX ADMIN — VANCOMYCIN HYDROCHLORIDE 2500 MG: 10 INJECTION, POWDER, LYOPHILIZED, FOR SOLUTION INTRAVENOUS at 10:10

## 2021-10-11 RX ADMIN — INSULIN ASPART 2 UNITS: 100 INJECTION, SOLUTION INTRAVENOUS; SUBCUTANEOUS at 11:10

## 2021-10-12 ENCOUNTER — APPOINTMENT (OUTPATIENT)
Dept: RADIOLOGY | Facility: HOSPITAL | Age: 36
End: 2021-10-12
Payer: MEDICARE

## 2021-10-12 LAB
ANION GAP SERPL CALCULATED.3IONS-SCNC: 7 MMOL/L (ref 7–16)
BACTERIA SPEC ANAEROBE CULT: ABNORMAL
BUN SERPL-MCNC: 20 MG/DL (ref 7–18)
BUN/CREAT SERPL: 16 (ref 6–20)
CALCIUM SERPL-MCNC: 8.8 MG/DL (ref 8.5–10.1)
CHLORIDE SERPL-SCNC: 103 MMOL/L (ref 98–107)
CO2 SERPL-SCNC: 32 MMOL/L (ref 21–32)
CREAT SERPL-MCNC: 1.22 MG/DL (ref 0.7–1.3)
GLUCOSE SERPL-MCNC: 143 MG/DL (ref 70–105)
GLUCOSE SERPL-MCNC: 151 MG/DL (ref 70–105)
GLUCOSE SERPL-MCNC: 159 MG/DL (ref 74–106)
GLUCOSE SERPL-MCNC: 200 MG/DL (ref 70–105)
GLUCOSE SERPL-MCNC: 214 MG/DL (ref 70–105)
MICROORGANISM SPEC CULT: ABNORMAL
POTASSIUM SERPL-SCNC: 4.6 MMOL/L (ref 3.5–5.1)
SODIUM SERPL-SCNC: 137 MMOL/L (ref 136–145)
VANCOMYCIN SERPL-MCNC: 16.2 ΜG/ML (ref 0–20)

## 2021-10-12 PROCEDURE — 80202 ASSAY OF VANCOMYCIN: CPT | Performed by: HOSPITALIST

## 2021-10-12 PROCEDURE — 80048 BASIC METABOLIC PNL TOTAL CA: CPT | Performed by: HOSPITALIST

## 2021-10-12 PROCEDURE — 73700 CT LOWER EXTREMITY W/O DYE: CPT | Mod: 26,RT,, | Performed by: RADIOLOGY

## 2021-10-12 PROCEDURE — 11000001 HC ACUTE MED/SURG PRIVATE ROOM

## 2021-10-12 PROCEDURE — 36415 COLL VENOUS BLD VENIPUNCTURE: CPT | Performed by: HOSPITALIST

## 2021-10-12 PROCEDURE — 99232 SBSQ HOSP IP/OBS MODERATE 35: CPT | Mod: ,,, | Performed by: HOSPITALIST

## 2021-10-12 PROCEDURE — 25000003 PHARM REV CODE 250: Performed by: HOSPITALIST

## 2021-10-12 PROCEDURE — 63600175 PHARM REV CODE 636 W HCPCS: Performed by: HOSPITALIST

## 2021-10-12 PROCEDURE — 82962 GLUCOSE BLOOD TEST: CPT

## 2021-10-12 PROCEDURE — 73700 CT LOWER EXTREMITY W/O DYE: CPT | Mod: TC,RT

## 2021-10-12 PROCEDURE — 73700 CT FOOT WITHOUT CONTRAST RIGHT: ICD-10-PCS | Mod: 26,RT,, | Performed by: RADIOLOGY

## 2021-10-12 PROCEDURE — 99232 PR SUBSEQUENT HOSPITAL CARE,LEVL II: ICD-10-PCS | Mod: ,,, | Performed by: HOSPITALIST

## 2021-10-12 RX ADMIN — INSULIN ASPART 2 UNITS: 100 INJECTION, SOLUTION INTRAVENOUS; SUBCUTANEOUS at 05:10

## 2021-10-12 RX ADMIN — ENOXAPARIN SODIUM 40 MG: 40 INJECTION SUBCUTANEOUS at 05:10

## 2021-10-12 RX ADMIN — GABAPENTIN 300 MG: 300 CAPSULE ORAL at 09:10

## 2021-10-12 RX ADMIN — GABAPENTIN 300 MG: 300 CAPSULE ORAL at 05:10

## 2021-10-12 RX ADMIN — GABAPENTIN 300 MG: 300 CAPSULE ORAL at 02:10

## 2021-10-12 RX ADMIN — HYDROCODONE BITARTRATE AND ACETAMINOPHEN 1 TABLET: 10; 325 TABLET ORAL at 05:10

## 2021-10-12 RX ADMIN — HYDROCODONE BITARTRATE AND ACETAMINOPHEN 1 TABLET: 10; 325 TABLET ORAL at 09:10

## 2021-10-12 RX ADMIN — LISINOPRIL 20 MG: 20 TABLET ORAL at 09:10

## 2021-10-12 RX ADMIN — INSULIN ASPART 4 UNITS: 100 INJECTION, SOLUTION INTRAVENOUS; SUBCUTANEOUS at 02:10

## 2021-10-12 RX ADMIN — VANCOMYCIN HYDROCHLORIDE 2500 MG: 10 INJECTION, POWDER, LYOPHILIZED, FOR SOLUTION INTRAVENOUS at 05:10

## 2021-10-12 RX ADMIN — ATORVASTATIN CALCIUM 20 MG: 20 TABLET, FILM COATED ORAL at 09:10

## 2021-10-13 LAB
ANION GAP SERPL CALCULATED.3IONS-SCNC: 13 MMOL/L (ref 7–16)
BUN SERPL-MCNC: 27 MG/DL (ref 7–18)
BUN/CREAT SERPL: 22 (ref 6–20)
CALCIUM SERPL-MCNC: 8.5 MG/DL (ref 8.5–10.1)
CHLORIDE SERPL-SCNC: 102 MMOL/L (ref 98–107)
CO2 SERPL-SCNC: 31 MMOL/L (ref 21–32)
CREAT SERPL-MCNC: 1.21 MG/DL (ref 0.7–1.3)
GLUCOSE SERPL-MCNC: 168 MG/DL (ref 74–106)
GLUCOSE SERPL-MCNC: 173 MG/DL (ref 70–105)
GLUCOSE SERPL-MCNC: 177 MG/DL (ref 70–105)
GLUCOSE SERPL-MCNC: 250 MG/DL (ref 70–105)
GLUCOSE SERPL-MCNC: 290 MG/DL (ref 70–105)
POTASSIUM SERPL-SCNC: 4.3 MMOL/L (ref 3.5–5.1)
SODIUM SERPL-SCNC: 142 MMOL/L (ref 136–145)

## 2021-10-13 PROCEDURE — 82962 GLUCOSE BLOOD TEST: CPT

## 2021-10-13 PROCEDURE — 63600175 PHARM REV CODE 636 W HCPCS: Performed by: HOSPITALIST

## 2021-10-13 PROCEDURE — 25000003 PHARM REV CODE 250: Performed by: HOSPITALIST

## 2021-10-13 PROCEDURE — 11000001 HC ACUTE MED/SURG PRIVATE ROOM

## 2021-10-13 PROCEDURE — 36415 COLL VENOUS BLD VENIPUNCTURE: CPT | Performed by: HOSPITALIST

## 2021-10-13 PROCEDURE — 80048 BASIC METABOLIC PNL TOTAL CA: CPT | Performed by: HOSPITALIST

## 2021-10-13 RX ORDER — HYDROCODONE BITARTRATE AND ACETAMINOPHEN 10; 325 MG/1; MG/1
1 TABLET ORAL EVERY 6 HOURS PRN
Status: DISCONTINUED | OUTPATIENT
Start: 2021-10-13 | End: 2021-10-26 | Stop reason: HOSPADM

## 2021-10-13 RX ADMIN — INSULIN ASPART 2 UNITS: 100 INJECTION, SOLUTION INTRAVENOUS; SUBCUTANEOUS at 11:10

## 2021-10-13 RX ADMIN — ENOXAPARIN SODIUM 40 MG: 40 INJECTION SUBCUTANEOUS at 05:10

## 2021-10-13 RX ADMIN — HYDROCODONE BITARTRATE AND ACETAMINOPHEN 1 TABLET: 10; 325 TABLET ORAL at 02:10

## 2021-10-13 RX ADMIN — CEFEPIME HYDROCHLORIDE 1 G: 1 INJECTION, POWDER, FOR SOLUTION INTRAMUSCULAR; INTRAVENOUS at 11:10

## 2021-10-13 RX ADMIN — CEFEPIME HYDROCHLORIDE 1 G: 1 INJECTION, POWDER, FOR SOLUTION INTRAMUSCULAR; INTRAVENOUS at 05:10

## 2021-10-13 RX ADMIN — GABAPENTIN 300 MG: 300 CAPSULE ORAL at 02:10

## 2021-10-13 RX ADMIN — VANCOMYCIN HYDROCHLORIDE 2500 MG: 10 INJECTION, POWDER, LYOPHILIZED, FOR SOLUTION INTRAVENOUS at 10:10

## 2021-10-13 RX ADMIN — HYDROCHLOROTHIAZIDE 25 MG: 12.5 TABLET ORAL at 08:10

## 2021-10-13 RX ADMIN — INSULIN ASPART 2 UNITS: 100 INJECTION, SOLUTION INTRAVENOUS; SUBCUTANEOUS at 06:10

## 2021-10-13 RX ADMIN — INSULIN ASPART 6 UNITS: 100 INJECTION, SOLUTION INTRAVENOUS; SUBCUTANEOUS at 05:10

## 2021-10-13 RX ADMIN — ATORVASTATIN CALCIUM 20 MG: 20 TABLET, FILM COATED ORAL at 08:10

## 2021-10-13 RX ADMIN — LISINOPRIL 20 MG: 20 TABLET ORAL at 08:10

## 2021-10-13 RX ADMIN — HYDROCODONE BITARTRATE AND ACETAMINOPHEN 1 TABLET: 10; 325 TABLET ORAL at 09:10

## 2021-10-13 RX ADMIN — GABAPENTIN 300 MG: 300 CAPSULE ORAL at 09:10

## 2021-10-13 RX ADMIN — GABAPENTIN 300 MG: 300 CAPSULE ORAL at 05:10

## 2021-10-13 RX ADMIN — HYDROCODONE BITARTRATE AND ACETAMINOPHEN 1 TABLET: 10; 325 TABLET ORAL at 03:10

## 2021-10-14 PROBLEM — I73.9 PERIPHERAL VASCULAR DISEASE, UNSPECIFIED: Status: RESOLVED | Noted: 2021-03-25 | Resolved: 2021-10-14

## 2021-10-14 PROBLEM — M86.60 CHRONIC OSTEOMYELITIS: Status: ACTIVE | Noted: 2021-03-26

## 2021-10-14 LAB
GLUCOSE SERPL-MCNC: 154 MG/DL (ref 70–105)
GLUCOSE SERPL-MCNC: 208 MG/DL (ref 70–105)
GLUCOSE SERPL-MCNC: 308 MG/DL (ref 70–105)

## 2021-10-14 PROCEDURE — 25000003 PHARM REV CODE 250: Performed by: HOSPITALIST

## 2021-10-14 PROCEDURE — 99232 PR SUBSEQUENT HOSPITAL CARE,LEVL II: ICD-10-PCS | Mod: ,,, | Performed by: HOSPITALIST

## 2021-10-14 PROCEDURE — 99222 1ST HOSP IP/OBS MODERATE 55: CPT | Mod: ,,, | Performed by: SURGERY

## 2021-10-14 PROCEDURE — 99222 PR INITIAL HOSPITAL CARE,LEVL II: ICD-10-PCS | Mod: ,,, | Performed by: SURGERY

## 2021-10-14 PROCEDURE — 99232 SBSQ HOSP IP/OBS MODERATE 35: CPT | Mod: ,,, | Performed by: HOSPITALIST

## 2021-10-14 PROCEDURE — 63600175 PHARM REV CODE 636 W HCPCS: Performed by: HOSPITALIST

## 2021-10-14 PROCEDURE — 82962 GLUCOSE BLOOD TEST: CPT

## 2021-10-14 PROCEDURE — 11000001 HC ACUTE MED/SURG PRIVATE ROOM

## 2021-10-14 RX ORDER — AMLODIPINE BESYLATE 5 MG/1
5 TABLET ORAL DAILY
Status: DISCONTINUED | OUTPATIENT
Start: 2021-10-14 | End: 2021-10-15

## 2021-10-14 RX ORDER — HYDROCHLOROTHIAZIDE 12.5 MG/1
25 TABLET ORAL DAILY
Status: DISCONTINUED | OUTPATIENT
Start: 2021-10-15 | End: 2021-10-14

## 2021-10-14 RX ORDER — HYDRALAZINE HYDROCHLORIDE 25 MG/1
25 TABLET, FILM COATED ORAL EVERY 8 HOURS PRN
Status: DISCONTINUED | OUTPATIENT
Start: 2021-10-14 | End: 2021-10-26 | Stop reason: HOSPADM

## 2021-10-14 RX ORDER — HYDROCHLOROTHIAZIDE 12.5 MG/1
25 TABLET ORAL DAILY
Status: DISCONTINUED | OUTPATIENT
Start: 2021-10-14 | End: 2021-10-26 | Stop reason: HOSPADM

## 2021-10-14 RX ADMIN — CEFEPIME HYDROCHLORIDE 1 G: 1 INJECTION, POWDER, FOR SOLUTION INTRAMUSCULAR; INTRAVENOUS at 09:10

## 2021-10-14 RX ADMIN — CEFEPIME HYDROCHLORIDE 1 G: 1 INJECTION, POWDER, FOR SOLUTION INTRAMUSCULAR; INTRAVENOUS at 11:10

## 2021-10-14 RX ADMIN — GABAPENTIN 300 MG: 300 CAPSULE ORAL at 01:10

## 2021-10-14 RX ADMIN — ENOXAPARIN SODIUM 40 MG: 40 INJECTION SUBCUTANEOUS at 04:10

## 2021-10-14 RX ADMIN — HYDROCODONE BITARTRATE AND ACETAMINOPHEN 1 TABLET: 10; 325 TABLET ORAL at 09:10

## 2021-10-14 RX ADMIN — GABAPENTIN 300 MG: 300 CAPSULE ORAL at 09:10

## 2021-10-14 RX ADMIN — INSULIN ASPART 8 UNITS: 100 INJECTION, SOLUTION INTRAVENOUS; SUBCUTANEOUS at 04:10

## 2021-10-14 RX ADMIN — GABAPENTIN 300 MG: 300 CAPSULE ORAL at 05:10

## 2021-10-14 RX ADMIN — ATORVASTATIN CALCIUM 20 MG: 20 TABLET, FILM COATED ORAL at 08:10

## 2021-10-14 RX ADMIN — LISINOPRIL 20 MG: 20 TABLET ORAL at 08:10

## 2021-10-14 RX ADMIN — CEFEPIME HYDROCHLORIDE 1 G: 1 INJECTION, POWDER, FOR SOLUTION INTRAMUSCULAR; INTRAVENOUS at 02:10

## 2021-10-14 RX ADMIN — HYDRALAZINE HYDROCHLORIDE 25 MG: 25 TABLET ORAL at 04:10

## 2021-10-14 RX ADMIN — AMLODIPINE BESYLATE 5 MG: 5 TABLET ORAL at 04:10

## 2021-10-14 RX ADMIN — INSULIN ASPART 4 UNITS: 100 INJECTION, SOLUTION INTRAVENOUS; SUBCUTANEOUS at 06:10

## 2021-10-14 RX ADMIN — HYDROCODONE BITARTRATE AND ACETAMINOPHEN 1 TABLET: 10; 325 TABLET ORAL at 01:10

## 2021-10-14 RX ADMIN — HYDROCHLOROTHIAZIDE 25 MG: 12.5 TABLET ORAL at 04:10

## 2021-10-15 LAB
GLUCOSE SERPL-MCNC: 140 MG/DL (ref 70–105)
GLUCOSE SERPL-MCNC: 172 MG/DL (ref 70–105)
GLUCOSE SERPL-MCNC: 186 MG/DL (ref 70–105)
GLUCOSE SERPL-MCNC: 194 MG/DL (ref 70–105)

## 2021-10-15 PROCEDURE — 63600175 PHARM REV CODE 636 W HCPCS: Performed by: HOSPITALIST

## 2021-10-15 PROCEDURE — 99233 SBSQ HOSP IP/OBS HIGH 50: CPT | Mod: ,,, | Performed by: HOSPITALIST

## 2021-10-15 PROCEDURE — 82962 GLUCOSE BLOOD TEST: CPT

## 2021-10-15 PROCEDURE — 11000001 HC ACUTE MED/SURG PRIVATE ROOM

## 2021-10-15 PROCEDURE — 25000003 PHARM REV CODE 250: Performed by: HOSPITALIST

## 2021-10-15 PROCEDURE — 99233 PR SUBSEQUENT HOSPITAL CARE,LEVL III: ICD-10-PCS | Mod: ,,, | Performed by: HOSPITALIST

## 2021-10-15 RX ORDER — AMLODIPINE BESYLATE 10 MG/1
10 TABLET ORAL DAILY
Status: DISCONTINUED | OUTPATIENT
Start: 2021-10-16 | End: 2021-10-26 | Stop reason: HOSPADM

## 2021-10-15 RX ADMIN — GABAPENTIN 300 MG: 300 CAPSULE ORAL at 01:10

## 2021-10-15 RX ADMIN — CEFEPIME HYDROCHLORIDE 1 G: 1 INJECTION, POWDER, FOR SOLUTION INTRAMUSCULAR; INTRAVENOUS at 12:10

## 2021-10-15 RX ADMIN — GABAPENTIN 300 MG: 300 CAPSULE ORAL at 05:10

## 2021-10-15 RX ADMIN — AMLODIPINE BESYLATE 5 MG: 5 TABLET ORAL at 08:10

## 2021-10-15 RX ADMIN — HYDROCODONE BITARTRATE AND ACETAMINOPHEN 1 TABLET: 10; 325 TABLET ORAL at 06:10

## 2021-10-15 RX ADMIN — CEFEPIME HYDROCHLORIDE 1 G: 1 INJECTION, POWDER, FOR SOLUTION INTRAMUSCULAR; INTRAVENOUS at 05:10

## 2021-10-15 RX ADMIN — ATORVASTATIN CALCIUM 20 MG: 20 TABLET, FILM COATED ORAL at 08:10

## 2021-10-15 RX ADMIN — GABAPENTIN 300 MG: 300 CAPSULE ORAL at 09:10

## 2021-10-15 RX ADMIN — LISINOPRIL 20 MG: 20 TABLET ORAL at 08:10

## 2021-10-15 RX ADMIN — HYDROCHLOROTHIAZIDE 25 MG: 12.5 TABLET ORAL at 08:10

## 2021-10-15 RX ADMIN — INSULIN ASPART 2 UNITS: 100 INJECTION, SOLUTION INTRAVENOUS; SUBCUTANEOUS at 12:10

## 2021-10-15 RX ADMIN — INSULIN ASPART 2 UNITS: 100 INJECTION, SOLUTION INTRAVENOUS; SUBCUTANEOUS at 04:10

## 2021-10-15 RX ADMIN — HYDROCODONE BITARTRATE AND ACETAMINOPHEN 1 TABLET: 10; 325 TABLET ORAL at 08:10

## 2021-10-15 RX ADMIN — INSULIN ASPART 2 UNITS: 100 INJECTION, SOLUTION INTRAVENOUS; SUBCUTANEOUS at 06:10

## 2021-10-15 RX ADMIN — ENOXAPARIN SODIUM 40 MG: 40 INJECTION SUBCUTANEOUS at 05:10

## 2021-10-16 LAB
GLUCOSE SERPL-MCNC: 179 MG/DL (ref 70–105)
GLUCOSE SERPL-MCNC: 198 MG/DL (ref 70–105)
GLUCOSE SERPL-MCNC: 204 MG/DL (ref 70–105)

## 2021-10-16 PROCEDURE — 82962 GLUCOSE BLOOD TEST: CPT

## 2021-10-16 PROCEDURE — 25000003 PHARM REV CODE 250: Performed by: HOSPITALIST

## 2021-10-16 PROCEDURE — 96372 THER/PROPH/DIAG INJ SC/IM: CPT

## 2021-10-16 PROCEDURE — 11000001 HC ACUTE MED/SURG PRIVATE ROOM

## 2021-10-16 PROCEDURE — 63600175 PHARM REV CODE 636 W HCPCS: Performed by: HOSPITALIST

## 2021-10-16 RX ADMIN — LISINOPRIL 20 MG: 20 TABLET ORAL at 09:10

## 2021-10-16 RX ADMIN — CEFEPIME HYDROCHLORIDE 1 G: 1 INJECTION, POWDER, FOR SOLUTION INTRAMUSCULAR; INTRAVENOUS at 05:10

## 2021-10-16 RX ADMIN — CEFEPIME HYDROCHLORIDE 1 G: 1 INJECTION, POWDER, FOR SOLUTION INTRAMUSCULAR; INTRAVENOUS at 12:10

## 2021-10-16 RX ADMIN — INSULIN ASPART 2 UNITS: 100 INJECTION, SOLUTION INTRAVENOUS; SUBCUTANEOUS at 06:10

## 2021-10-16 RX ADMIN — HYDROCODONE BITARTRATE AND ACETAMINOPHEN 1 TABLET: 10; 325 TABLET ORAL at 11:10

## 2021-10-16 RX ADMIN — ENOXAPARIN SODIUM 40 MG: 40 INJECTION SUBCUTANEOUS at 05:10

## 2021-10-16 RX ADMIN — INSULIN ASPART 2 UNITS: 100 INJECTION, SOLUTION INTRAVENOUS; SUBCUTANEOUS at 12:10

## 2021-10-16 RX ADMIN — GABAPENTIN 300 MG: 300 CAPSULE ORAL at 05:10

## 2021-10-16 RX ADMIN — ATORVASTATIN CALCIUM 20 MG: 20 TABLET, FILM COATED ORAL at 09:10

## 2021-10-16 RX ADMIN — GABAPENTIN 300 MG: 300 CAPSULE ORAL at 09:10

## 2021-10-16 RX ADMIN — INSULIN ASPART 4 UNITS: 100 INJECTION, SOLUTION INTRAVENOUS; SUBCUTANEOUS at 04:10

## 2021-10-16 RX ADMIN — AMLODIPINE BESYLATE 10 MG: 10 TABLET ORAL at 09:10

## 2021-10-16 RX ADMIN — GABAPENTIN 300 MG: 300 CAPSULE ORAL at 02:10

## 2021-10-16 RX ADMIN — CEFEPIME HYDROCHLORIDE 1 G: 1 INJECTION, POWDER, FOR SOLUTION INTRAMUSCULAR; INTRAVENOUS at 11:10

## 2021-10-16 RX ADMIN — HYDROCHLOROTHIAZIDE 25 MG: 12.5 TABLET ORAL at 09:10

## 2021-10-17 LAB
GLUCOSE SERPL-MCNC: 147 MG/DL (ref 70–105)
GLUCOSE SERPL-MCNC: 161 MG/DL (ref 70–105)
GLUCOSE SERPL-MCNC: 162 MG/DL (ref 70–105)

## 2021-10-17 PROCEDURE — 82962 GLUCOSE BLOOD TEST: CPT

## 2021-10-17 PROCEDURE — 63600175 PHARM REV CODE 636 W HCPCS: Performed by: HOSPITALIST

## 2021-10-17 PROCEDURE — 99232 PR SUBSEQUENT HOSPITAL CARE,LEVL II: ICD-10-PCS | Mod: ,,, | Performed by: HOSPITALIST

## 2021-10-17 PROCEDURE — 11000001 HC ACUTE MED/SURG PRIVATE ROOM

## 2021-10-17 PROCEDURE — 99232 SBSQ HOSP IP/OBS MODERATE 35: CPT | Mod: ,,, | Performed by: HOSPITALIST

## 2021-10-17 PROCEDURE — 25000003 PHARM REV CODE 250: Performed by: HOSPITALIST

## 2021-10-17 RX ADMIN — INSULIN ASPART 2 UNITS: 100 INJECTION, SOLUTION INTRAVENOUS; SUBCUTANEOUS at 11:10

## 2021-10-17 RX ADMIN — CEFEPIME HYDROCHLORIDE 1 G: 1 INJECTION, POWDER, FOR SOLUTION INTRAMUSCULAR; INTRAVENOUS at 11:10

## 2021-10-17 RX ADMIN — CEFEPIME HYDROCHLORIDE 1 G: 1 INJECTION, POWDER, FOR SOLUTION INTRAMUSCULAR; INTRAVENOUS at 05:10

## 2021-10-17 RX ADMIN — HYDROCHLOROTHIAZIDE 25 MG: 12.5 TABLET ORAL at 08:10

## 2021-10-17 RX ADMIN — AMLODIPINE BESYLATE 10 MG: 10 TABLET ORAL at 08:10

## 2021-10-17 RX ADMIN — GABAPENTIN 300 MG: 300 CAPSULE ORAL at 05:10

## 2021-10-17 RX ADMIN — LISINOPRIL 20 MG: 20 TABLET ORAL at 08:10

## 2021-10-17 RX ADMIN — GABAPENTIN 300 MG: 300 CAPSULE ORAL at 09:10

## 2021-10-17 RX ADMIN — INSULIN ASPART 2 UNITS: 100 INJECTION, SOLUTION INTRAVENOUS; SUBCUTANEOUS at 03:10

## 2021-10-17 RX ADMIN — CEFEPIME HYDROCHLORIDE 1 G: 1 INJECTION, POWDER, FOR SOLUTION INTRAMUSCULAR; INTRAVENOUS at 12:10

## 2021-10-17 RX ADMIN — GABAPENTIN 300 MG: 300 CAPSULE ORAL at 02:10

## 2021-10-17 RX ADMIN — ATORVASTATIN CALCIUM 20 MG: 20 TABLET, FILM COATED ORAL at 08:10

## 2021-10-17 RX ADMIN — HYDROCODONE BITARTRATE AND ACETAMINOPHEN 1 TABLET: 10; 325 TABLET ORAL at 09:10

## 2021-10-17 RX ADMIN — ENOXAPARIN SODIUM 40 MG: 40 INJECTION SUBCUTANEOUS at 05:10

## 2021-10-18 LAB
ANION GAP SERPL CALCULATED.3IONS-SCNC: 13 MMOL/L (ref 7–16)
BASOPHILS # BLD AUTO: 0.06 K/UL (ref 0–0.2)
BASOPHILS NFR BLD AUTO: 1 % (ref 0–1)
BUN SERPL-MCNC: 48 MG/DL (ref 7–18)
BUN/CREAT SERPL: 36 (ref 6–20)
CALCIUM SERPL-MCNC: 8.5 MG/DL (ref 8.5–10.1)
CHLORIDE SERPL-SCNC: 102 MMOL/L (ref 98–107)
CO2 SERPL-SCNC: 30 MMOL/L (ref 21–32)
CREAT SERPL-MCNC: 1.34 MG/DL (ref 0.7–1.3)
DIFFERENTIAL METHOD BLD: ABNORMAL
EOSINOPHIL # BLD AUTO: 0.38 K/UL (ref 0–0.5)
EOSINOPHIL NFR BLD AUTO: 6.6 % (ref 1–4)
ERYTHROCYTE [DISTWIDTH] IN BLOOD BY AUTOMATED COUNT: 13.8 % (ref 11.5–14.5)
GLUCOSE SERPL-MCNC: 159 MG/DL (ref 70–105)
GLUCOSE SERPL-MCNC: 160 MG/DL (ref 74–106)
GLUCOSE SERPL-MCNC: 220 MG/DL (ref 70–105)
GLUCOSE SERPL-MCNC: 281 MG/DL (ref 70–105)
HCT VFR BLD AUTO: 39.8 % (ref 40–54)
HGB BLD-MCNC: 12.6 G/DL (ref 13.5–18)
IMM GRANULOCYTES # BLD AUTO: 0.01 K/UL (ref 0–0.04)
IMM GRANULOCYTES NFR BLD: 0.2 % (ref 0–0.4)
LYMPHOCYTES # BLD AUTO: 2.67 K/UL (ref 1–4.8)
LYMPHOCYTES NFR BLD AUTO: 46.4 % (ref 27–41)
MCH RBC QN AUTO: 25.9 PG (ref 27–31)
MCHC RBC AUTO-ENTMCNC: 31.7 G/DL (ref 32–36)
MCV RBC AUTO: 81.7 FL (ref 80–96)
MONOCYTES # BLD AUTO: 0.53 K/UL (ref 0–0.8)
MONOCYTES NFR BLD AUTO: 9.2 % (ref 2–6)
MPC BLD CALC-MCNC: 9.6 FL (ref 9.4–12.4)
NEUTROPHILS # BLD AUTO: 2.11 K/UL (ref 1.8–7.7)
NEUTROPHILS NFR BLD AUTO: 36.6 % (ref 53–65)
NRBC # BLD AUTO: 0 X10E3/UL
NRBC, AUTO (.00): 0 %
PLATELET # BLD AUTO: 261 K/UL (ref 150–400)
POTASSIUM SERPL-SCNC: 4.4 MMOL/L (ref 3.5–5.1)
RBC # BLD AUTO: 4.87 M/UL (ref 4.6–6.2)
SODIUM SERPL-SCNC: 141 MMOL/L (ref 136–145)
WBC # BLD AUTO: 5.76 K/UL (ref 4.5–11)

## 2021-10-18 PROCEDURE — 63600175 PHARM REV CODE 636 W HCPCS: Performed by: INTERNAL MEDICINE

## 2021-10-18 PROCEDURE — 11000001 HC ACUTE MED/SURG PRIVATE ROOM

## 2021-10-18 PROCEDURE — 63600175 PHARM REV CODE 636 W HCPCS: Performed by: FAMILY MEDICINE

## 2021-10-18 PROCEDURE — 25000003 PHARM REV CODE 250: Performed by: NURSE PRACTITIONER

## 2021-10-18 PROCEDURE — S4991 NICOTINE PATCH NONLEGEND: HCPCS | Performed by: NURSE PRACTITIONER

## 2021-10-18 PROCEDURE — 99223 PR INITIAL HOSPITAL CARE,LEVL III: ICD-10-PCS | Mod: ,,, | Performed by: INTERNAL MEDICINE

## 2021-10-18 PROCEDURE — 63600175 PHARM REV CODE 636 W HCPCS: Performed by: HOSPITALIST

## 2021-10-18 PROCEDURE — 85025 COMPLETE CBC W/AUTO DIFF WBC: CPT | Performed by: HOSPITALIST

## 2021-10-18 PROCEDURE — 99233 PR SUBSEQUENT HOSPITAL CARE,LEVL III: ICD-10-PCS | Mod: ,,, | Performed by: FAMILY MEDICINE

## 2021-10-18 PROCEDURE — 82962 GLUCOSE BLOOD TEST: CPT

## 2021-10-18 PROCEDURE — 99233 SBSQ HOSP IP/OBS HIGH 50: CPT | Mod: ,,, | Performed by: FAMILY MEDICINE

## 2021-10-18 PROCEDURE — 80048 BASIC METABOLIC PNL TOTAL CA: CPT | Performed by: HOSPITALIST

## 2021-10-18 PROCEDURE — 36415 COLL VENOUS BLD VENIPUNCTURE: CPT | Performed by: HOSPITALIST

## 2021-10-18 PROCEDURE — 25000003 PHARM REV CODE 250: Performed by: HOSPITALIST

## 2021-10-18 PROCEDURE — 25000003 PHARM REV CODE 250: Performed by: INTERNAL MEDICINE

## 2021-10-18 PROCEDURE — 99223 1ST HOSP IP/OBS HIGH 75: CPT | Mod: ,,, | Performed by: INTERNAL MEDICINE

## 2021-10-18 PROCEDURE — 25000003 PHARM REV CODE 250: Performed by: FAMILY MEDICINE

## 2021-10-18 RX ORDER — IBUPROFEN 200 MG
1 TABLET ORAL DAILY
Status: DISCONTINUED | OUTPATIENT
Start: 2021-10-18 | End: 2021-10-26 | Stop reason: HOSPADM

## 2021-10-18 RX ORDER — CIPROFLOXACIN 250 MG/1
500 TABLET, FILM COATED ORAL EVERY 12 HOURS
Status: DISCONTINUED | OUTPATIENT
Start: 2021-10-18 | End: 2021-10-20

## 2021-10-18 RX ORDER — SODIUM CHLORIDE 450 MG/100ML
INJECTION, SOLUTION INTRAVENOUS CONTINUOUS
Status: DISCONTINUED | OUTPATIENT
Start: 2021-10-18 | End: 2021-10-25

## 2021-10-18 RX ADMIN — PIPERACILLIN AND TAZOBACTAM 4.5 G: 4; .5 INJECTION, POWDER, FOR SOLUTION INTRAVENOUS at 08:10

## 2021-10-18 RX ADMIN — HYDROCHLOROTHIAZIDE 25 MG: 12.5 TABLET ORAL at 09:10

## 2021-10-18 RX ADMIN — GABAPENTIN 300 MG: 300 CAPSULE ORAL at 05:10

## 2021-10-18 RX ADMIN — SODIUM CHLORIDE: 4.5 INJECTION, SOLUTION INTRAVENOUS at 03:10

## 2021-10-18 RX ADMIN — LISINOPRIL 20 MG: 20 TABLET ORAL at 09:10

## 2021-10-18 RX ADMIN — INSULIN ASPART 6 UNITS: 100 INJECTION, SOLUTION INTRAVENOUS; SUBCUTANEOUS at 04:10

## 2021-10-18 RX ADMIN — HYDROCODONE BITARTRATE AND ACETAMINOPHEN 1 TABLET: 10; 325 TABLET ORAL at 09:10

## 2021-10-18 RX ADMIN — GABAPENTIN 300 MG: 300 CAPSULE ORAL at 02:10

## 2021-10-18 RX ADMIN — AMLODIPINE BESYLATE 10 MG: 10 TABLET ORAL at 09:10

## 2021-10-18 RX ADMIN — CEFEPIME HYDROCHLORIDE 1 G: 1 INJECTION, POWDER, FOR SOLUTION INTRAMUSCULAR; INTRAVENOUS at 05:10

## 2021-10-18 RX ADMIN — CEFEPIME HYDROCHLORIDE 1 G: 1 INJECTION, POWDER, FOR SOLUTION INTRAMUSCULAR; INTRAVENOUS at 11:10

## 2021-10-18 RX ADMIN — HYDROCODONE BITARTRATE AND ACETAMINOPHEN 1 TABLET: 10; 325 TABLET ORAL at 08:10

## 2021-10-18 RX ADMIN — GABAPENTIN 300 MG: 300 CAPSULE ORAL at 09:10

## 2021-10-18 RX ADMIN — ATORVASTATIN CALCIUM 20 MG: 20 TABLET, FILM COATED ORAL at 09:10

## 2021-10-18 RX ADMIN — PIPERACILLIN SODIUM AND TAZOBACTAM SODIUM 4.5 G: 4; .5 INJECTION, POWDER, LYOPHILIZED, FOR SOLUTION INTRAVENOUS at 04:10

## 2021-10-18 RX ADMIN — CIPROFLOXACIN 500 MG: 250 TABLET ORAL at 08:10

## 2021-10-18 RX ADMIN — ENOXAPARIN SODIUM 40 MG: 40 INJECTION SUBCUTANEOUS at 04:10

## 2021-10-18 RX ADMIN — INSULIN ASPART 2 UNITS: 100 INJECTION, SOLUTION INTRAVENOUS; SUBCUTANEOUS at 06:10

## 2021-10-18 RX ADMIN — Medication 1 PATCH: at 03:10

## 2021-10-18 RX ADMIN — INSULIN ASPART 4 UNITS: 100 INJECTION, SOLUTION INTRAVENOUS; SUBCUTANEOUS at 11:10

## 2021-10-19 LAB
GLUCOSE SERPL-MCNC: 153 MG/DL (ref 70–105)
GLUCOSE SERPL-MCNC: 183 MG/DL (ref 70–105)
GLUCOSE SERPL-MCNC: 191 MG/DL (ref 70–105)

## 2021-10-19 PROCEDURE — 63600175 PHARM REV CODE 636 W HCPCS: Performed by: HOSPITALIST

## 2021-10-19 PROCEDURE — 11000001 HC ACUTE MED/SURG PRIVATE ROOM

## 2021-10-19 PROCEDURE — 99233 PR SUBSEQUENT HOSPITAL CARE,LEVL III: ICD-10-PCS | Mod: ,,, | Performed by: FAMILY MEDICINE

## 2021-10-19 PROCEDURE — 99232 PR SUBSEQUENT HOSPITAL CARE,LEVL II: ICD-10-PCS | Mod: ,,, | Performed by: INTERNAL MEDICINE

## 2021-10-19 PROCEDURE — 63600175 PHARM REV CODE 636 W HCPCS: Performed by: INTERNAL MEDICINE

## 2021-10-19 PROCEDURE — 25000003 PHARM REV CODE 250: Performed by: INTERNAL MEDICINE

## 2021-10-19 PROCEDURE — 25000003 PHARM REV CODE 250: Performed by: NURSE PRACTITIONER

## 2021-10-19 PROCEDURE — 82962 GLUCOSE BLOOD TEST: CPT

## 2021-10-19 PROCEDURE — S4991 NICOTINE PATCH NONLEGEND: HCPCS | Performed by: NURSE PRACTITIONER

## 2021-10-19 PROCEDURE — 99233 SBSQ HOSP IP/OBS HIGH 50: CPT | Mod: ,,, | Performed by: FAMILY MEDICINE

## 2021-10-19 PROCEDURE — 25000003 PHARM REV CODE 250: Performed by: FAMILY MEDICINE

## 2021-10-19 PROCEDURE — 25000003 PHARM REV CODE 250: Performed by: HOSPITALIST

## 2021-10-19 PROCEDURE — 99232 SBSQ HOSP IP/OBS MODERATE 35: CPT | Mod: ,,, | Performed by: INTERNAL MEDICINE

## 2021-10-19 RX ORDER — BISACODYL 5 MG
10 TABLET, DELAYED RELEASE (ENTERIC COATED) ORAL DAILY PRN
Status: DISCONTINUED | OUTPATIENT
Start: 2021-10-19 | End: 2021-10-26 | Stop reason: HOSPADM

## 2021-10-19 RX ADMIN — INSULIN ASPART 2 UNITS: 100 INJECTION, SOLUTION INTRAVENOUS; SUBCUTANEOUS at 04:10

## 2021-10-19 RX ADMIN — AMLODIPINE BESYLATE 10 MG: 10 TABLET ORAL at 09:10

## 2021-10-19 RX ADMIN — ATORVASTATIN CALCIUM 20 MG: 20 TABLET, FILM COATED ORAL at 09:10

## 2021-10-19 RX ADMIN — SODIUM CHLORIDE: 4.5 INJECTION, SOLUTION INTRAVENOUS at 05:10

## 2021-10-19 RX ADMIN — GABAPENTIN 300 MG: 300 CAPSULE ORAL at 09:10

## 2021-10-19 RX ADMIN — PIPERACILLIN AND TAZOBACTAM 4.5 G: 4; .5 INJECTION, POWDER, FOR SOLUTION INTRAVENOUS at 09:10

## 2021-10-19 RX ADMIN — INSULIN ASPART 2 UNITS: 100 INJECTION, SOLUTION INTRAVENOUS; SUBCUTANEOUS at 06:10

## 2021-10-19 RX ADMIN — Medication 1 PATCH: at 09:10

## 2021-10-19 RX ADMIN — GABAPENTIN 300 MG: 300 CAPSULE ORAL at 02:10

## 2021-10-19 RX ADMIN — GABAPENTIN 300 MG: 300 CAPSULE ORAL at 05:10

## 2021-10-19 RX ADMIN — HYDROCODONE BITARTRATE AND ACETAMINOPHEN 1 TABLET: 10; 325 TABLET ORAL at 04:10

## 2021-10-19 RX ADMIN — CIPROFLOXACIN 500 MG: 250 TABLET ORAL at 09:10

## 2021-10-19 RX ADMIN — PIPERACILLIN AND TAZOBACTAM 4.5 G: 4; .5 INJECTION, POWDER, FOR SOLUTION INTRAVENOUS at 11:10

## 2021-10-19 RX ADMIN — PIPERACILLIN AND TAZOBACTAM 4.5 G: 4; .5 INJECTION, POWDER, FOR SOLUTION INTRAVENOUS at 05:10

## 2021-10-19 RX ADMIN — ENOXAPARIN SODIUM 40 MG: 40 INJECTION SUBCUTANEOUS at 04:10

## 2021-10-19 RX ADMIN — INSULIN ASPART 2 UNITS: 100 INJECTION, SOLUTION INTRAVENOUS; SUBCUTANEOUS at 11:10

## 2021-10-19 RX ADMIN — HYDROCHLOROTHIAZIDE 25 MG: 12.5 TABLET ORAL at 09:10

## 2021-10-20 LAB
ANION GAP SERPL CALCULATED.3IONS-SCNC: 11 MMOL/L (ref 7–16)
BUN SERPL-MCNC: 46 MG/DL (ref 7–18)
BUN/CREAT SERPL: 27 (ref 6–20)
CALCIUM SERPL-MCNC: 8.7 MG/DL (ref 8.5–10.1)
CHLORIDE SERPL-SCNC: 101 MMOL/L (ref 98–107)
CO2 SERPL-SCNC: 30 MMOL/L (ref 21–32)
CREAT SERPL-MCNC: 1.71 MG/DL (ref 0.7–1.3)
GLUCOSE SERPL-MCNC: 157 MG/DL (ref 70–105)
GLUCOSE SERPL-MCNC: 175 MG/DL (ref 70–105)
GLUCOSE SERPL-MCNC: 190 MG/DL (ref 70–105)
GLUCOSE SERPL-MCNC: 239 MG/DL (ref 74–106)
POTASSIUM SERPL-SCNC: 4.2 MMOL/L (ref 3.5–5.1)
SODIUM SERPL-SCNC: 138 MMOL/L (ref 136–145)

## 2021-10-20 PROCEDURE — 82962 GLUCOSE BLOOD TEST: CPT

## 2021-10-20 PROCEDURE — 25000003 PHARM REV CODE 250: Performed by: INTERNAL MEDICINE

## 2021-10-20 PROCEDURE — 99232 PR SUBSEQUENT HOSPITAL CARE,LEVL II: ICD-10-PCS | Mod: ,,, | Performed by: HOSPITALIST

## 2021-10-20 PROCEDURE — 63600175 PHARM REV CODE 636 W HCPCS: Performed by: INTERNAL MEDICINE

## 2021-10-20 PROCEDURE — 99233 SBSQ HOSP IP/OBS HIGH 50: CPT | Mod: ,,, | Performed by: INTERNAL MEDICINE

## 2021-10-20 PROCEDURE — 99233 PR SUBSEQUENT HOSPITAL CARE,LEVL III: ICD-10-PCS | Mod: ,,, | Performed by: INTERNAL MEDICINE

## 2021-10-20 PROCEDURE — 11000001 HC ACUTE MED/SURG PRIVATE ROOM

## 2021-10-20 PROCEDURE — S4991 NICOTINE PATCH NONLEGEND: HCPCS | Performed by: NURSE PRACTITIONER

## 2021-10-20 PROCEDURE — 99232 SBSQ HOSP IP/OBS MODERATE 35: CPT | Mod: ,,, | Performed by: HOSPITALIST

## 2021-10-20 PROCEDURE — 25000003 PHARM REV CODE 250: Performed by: FAMILY MEDICINE

## 2021-10-20 PROCEDURE — 80048 BASIC METABOLIC PNL TOTAL CA: CPT | Performed by: NURSE PRACTITIONER

## 2021-10-20 PROCEDURE — 25000003 PHARM REV CODE 250: Performed by: HOSPITALIST

## 2021-10-20 PROCEDURE — 63600175 PHARM REV CODE 636 W HCPCS: Performed by: HOSPITALIST

## 2021-10-20 PROCEDURE — 25000003 PHARM REV CODE 250: Performed by: NURSE PRACTITIONER

## 2021-10-20 PROCEDURE — 36415 COLL VENOUS BLD VENIPUNCTURE: CPT | Performed by: NURSE PRACTITIONER

## 2021-10-20 RX ORDER — CIPROFLOXACIN 250 MG/1
500 TABLET, FILM COATED ORAL EVERY 24 HOURS
Status: DISCONTINUED | OUTPATIENT
Start: 2021-10-21 | End: 2021-10-26 | Stop reason: HOSPADM

## 2021-10-20 RX ADMIN — INSULIN ASPART 2 UNITS: 100 INJECTION, SOLUTION INTRAVENOUS; SUBCUTANEOUS at 06:10

## 2021-10-20 RX ADMIN — ENOXAPARIN SODIUM 40 MG: 40 INJECTION SUBCUTANEOUS at 05:10

## 2021-10-20 RX ADMIN — HYDROCHLOROTHIAZIDE 25 MG: 12.5 TABLET ORAL at 08:10

## 2021-10-20 RX ADMIN — CIPROFLOXACIN 500 MG: 250 TABLET ORAL at 08:10

## 2021-10-20 RX ADMIN — AMLODIPINE BESYLATE 10 MG: 10 TABLET ORAL at 08:10

## 2021-10-20 RX ADMIN — INSULIN ASPART 2 UNITS: 100 INJECTION, SOLUTION INTRAVENOUS; SUBCUTANEOUS at 12:10

## 2021-10-20 RX ADMIN — PIPERACILLIN AND TAZOBACTAM 4.5 G: 4; .5 INJECTION, POWDER, FOR SOLUTION INTRAVENOUS at 12:10

## 2021-10-20 RX ADMIN — INSULIN ASPART 2 UNITS: 100 INJECTION, SOLUTION INTRAVENOUS; SUBCUTANEOUS at 05:10

## 2021-10-20 RX ADMIN — PIPERACILLIN AND TAZOBACTAM 4.5 G: 4; .5 INJECTION, POWDER, FOR SOLUTION INTRAVENOUS at 04:10

## 2021-10-20 RX ADMIN — Medication 1 PATCH: at 08:10

## 2021-10-20 RX ADMIN — PIPERACILLIN AND TAZOBACTAM 4.5 G: 4; .5 INJECTION, POWDER, FOR SOLUTION INTRAVENOUS at 08:10

## 2021-10-20 RX ADMIN — SODIUM CHLORIDE: 4.5 INJECTION, SOLUTION INTRAVENOUS at 10:10

## 2021-10-20 RX ADMIN — ATORVASTATIN CALCIUM 20 MG: 20 TABLET, FILM COATED ORAL at 08:10

## 2021-10-20 RX ADMIN — GABAPENTIN 300 MG: 300 CAPSULE ORAL at 05:10

## 2021-10-20 RX ADMIN — GABAPENTIN 300 MG: 300 CAPSULE ORAL at 09:10

## 2021-10-20 RX ADMIN — GABAPENTIN 300 MG: 300 CAPSULE ORAL at 02:10

## 2021-10-21 LAB
ANION GAP SERPL CALCULATED.3IONS-SCNC: 10 MMOL/L (ref 7–16)
BASOPHILS # BLD AUTO: 0.07 K/UL (ref 0–0.2)
BASOPHILS NFR BLD AUTO: 1.3 % (ref 0–1)
BUN SERPL-MCNC: 37 MG/DL (ref 7–18)
BUN/CREAT SERPL: 22 (ref 6–20)
CALCIUM SERPL-MCNC: 8.8 MG/DL (ref 8.5–10.1)
CHLORIDE SERPL-SCNC: 101 MMOL/L (ref 98–107)
CO2 SERPL-SCNC: 30 MMOL/L (ref 21–32)
CREAT SERPL-MCNC: 1.65 MG/DL (ref 0.7–1.3)
DIFFERENTIAL METHOD BLD: ABNORMAL
EOSINOPHIL # BLD AUTO: 0.25 K/UL (ref 0–0.5)
EOSINOPHIL NFR BLD AUTO: 4.5 % (ref 1–4)
ERYTHROCYTE [DISTWIDTH] IN BLOOD BY AUTOMATED COUNT: 13.8 % (ref 11.5–14.5)
GLUCOSE SERPL-MCNC: 179 MG/DL (ref 70–105)
GLUCOSE SERPL-MCNC: 215 MG/DL (ref 70–105)
GLUCOSE SERPL-MCNC: 239 MG/DL (ref 74–106)
GLUCOSE SERPL-MCNC: 250 MG/DL (ref 70–105)
HCT VFR BLD AUTO: 39.3 % (ref 40–54)
HGB BLD-MCNC: 12.7 G/DL (ref 13.5–18)
IMM GRANULOCYTES # BLD AUTO: 0.01 K/UL (ref 0–0.04)
IMM GRANULOCYTES NFR BLD: 0.2 % (ref 0–0.4)
LYMPHOCYTES # BLD AUTO: 2.7 K/UL (ref 1–4.8)
LYMPHOCYTES NFR BLD AUTO: 48.7 % (ref 27–41)
MCH RBC QN AUTO: 26.1 PG (ref 27–31)
MCHC RBC AUTO-ENTMCNC: 32.3 G/DL (ref 32–36)
MCV RBC AUTO: 80.7 FL (ref 80–96)
MONOCYTES # BLD AUTO: 0.55 K/UL (ref 0–0.8)
MONOCYTES NFR BLD AUTO: 9.9 % (ref 2–6)
MPC BLD CALC-MCNC: 9.9 FL (ref 9.4–12.4)
NEUTROPHILS # BLD AUTO: 1.96 K/UL (ref 1.8–7.7)
NEUTROPHILS NFR BLD AUTO: 35.4 % (ref 53–65)
NRBC # BLD AUTO: 0 X10E3/UL
NRBC, AUTO (.00): 0 %
PLATELET # BLD AUTO: 260 K/UL (ref 150–400)
POTASSIUM SERPL-SCNC: 4.1 MMOL/L (ref 3.5–5.1)
RBC # BLD AUTO: 4.87 M/UL (ref 4.6–6.2)
SODIUM SERPL-SCNC: 137 MMOL/L (ref 136–145)
WBC # BLD AUTO: 5.54 K/UL (ref 4.5–11)

## 2021-10-21 PROCEDURE — 85025 COMPLETE CBC W/AUTO DIFF WBC: CPT | Performed by: HOSPITALIST

## 2021-10-21 PROCEDURE — 80048 BASIC METABOLIC PNL TOTAL CA: CPT | Performed by: HOSPITALIST

## 2021-10-21 PROCEDURE — 99233 SBSQ HOSP IP/OBS HIGH 50: CPT | Mod: ,,, | Performed by: HOSPITALIST

## 2021-10-21 PROCEDURE — 99232 PR SUBSEQUENT HOSPITAL CARE,LEVL II: ICD-10-PCS | Mod: ,,, | Performed by: INTERNAL MEDICINE

## 2021-10-21 PROCEDURE — 63600175 PHARM REV CODE 636 W HCPCS: Performed by: HOSPITALIST

## 2021-10-21 PROCEDURE — 25000003 PHARM REV CODE 250: Performed by: HOSPITALIST

## 2021-10-21 PROCEDURE — 82962 GLUCOSE BLOOD TEST: CPT

## 2021-10-21 PROCEDURE — 99232 SBSQ HOSP IP/OBS MODERATE 35: CPT | Mod: ,,, | Performed by: INTERNAL MEDICINE

## 2021-10-21 PROCEDURE — 36415 COLL VENOUS BLD VENIPUNCTURE: CPT | Performed by: HOSPITALIST

## 2021-10-21 PROCEDURE — 25000003 PHARM REV CODE 250: Performed by: FAMILY MEDICINE

## 2021-10-21 PROCEDURE — 11000001 HC ACUTE MED/SURG PRIVATE ROOM

## 2021-10-21 PROCEDURE — 25000003 PHARM REV CODE 250: Performed by: NURSE PRACTITIONER

## 2021-10-21 PROCEDURE — 99233 PR SUBSEQUENT HOSPITAL CARE,LEVL III: ICD-10-PCS | Mod: ,,, | Performed by: HOSPITALIST

## 2021-10-21 PROCEDURE — S4991 NICOTINE PATCH NONLEGEND: HCPCS | Performed by: NURSE PRACTITIONER

## 2021-10-21 PROCEDURE — 25000003 PHARM REV CODE 250: Performed by: INTERNAL MEDICINE

## 2021-10-21 PROCEDURE — 63600175 PHARM REV CODE 636 W HCPCS: Performed by: INTERNAL MEDICINE

## 2021-10-21 RX ADMIN — PIPERACILLIN AND TAZOBACTAM 4.5 G: 4; .5 INJECTION, POWDER, FOR SOLUTION INTRAVENOUS at 09:10

## 2021-10-21 RX ADMIN — HYDROCODONE BITARTRATE AND ACETAMINOPHEN 1 TABLET: 10; 325 TABLET ORAL at 09:10

## 2021-10-21 RX ADMIN — INSULIN ASPART 2 UNITS: 100 INJECTION, SOLUTION INTRAVENOUS; SUBCUTANEOUS at 04:10

## 2021-10-21 RX ADMIN — INSULIN ASPART 4 UNITS: 100 INJECTION, SOLUTION INTRAVENOUS; SUBCUTANEOUS at 12:10

## 2021-10-21 RX ADMIN — GABAPENTIN 300 MG: 300 CAPSULE ORAL at 02:10

## 2021-10-21 RX ADMIN — Medication 1 PATCH: at 09:10

## 2021-10-21 RX ADMIN — HYDROCHLOROTHIAZIDE 25 MG: 12.5 TABLET ORAL at 09:10

## 2021-10-21 RX ADMIN — PIPERACILLIN AND TAZOBACTAM 4.5 G: 4; .5 INJECTION, POWDER, FOR SOLUTION INTRAVENOUS at 12:10

## 2021-10-21 RX ADMIN — CIPROFLOXACIN 500 MG: 250 TABLET ORAL at 09:10

## 2021-10-21 RX ADMIN — GABAPENTIN 300 MG: 300 CAPSULE ORAL at 06:10

## 2021-10-21 RX ADMIN — ATORVASTATIN CALCIUM 20 MG: 20 TABLET, FILM COATED ORAL at 09:10

## 2021-10-21 RX ADMIN — INSULIN ASPART 4 UNITS: 100 INJECTION, SOLUTION INTRAVENOUS; SUBCUTANEOUS at 06:10

## 2021-10-21 RX ADMIN — ENOXAPARIN SODIUM 40 MG: 40 INJECTION SUBCUTANEOUS at 05:10

## 2021-10-21 RX ADMIN — GABAPENTIN 300 MG: 300 CAPSULE ORAL at 09:10

## 2021-10-21 RX ADMIN — PIPERACILLIN AND TAZOBACTAM 4.5 G: 4; .5 INJECTION, POWDER, FOR SOLUTION INTRAVENOUS at 04:10

## 2021-10-21 RX ADMIN — SODIUM CHLORIDE: 4.5 INJECTION, SOLUTION INTRAVENOUS at 09:10

## 2021-10-21 RX ADMIN — AMLODIPINE BESYLATE 10 MG: 10 TABLET ORAL at 09:10

## 2021-10-22 ENCOUNTER — HOSPITAL ENCOUNTER (OUTPATIENT)
Dept: RADIOLOGY | Facility: HOSPITAL | Age: 36
Discharge: HOME | End: 2021-10-22
Attending: HOSPITALIST
Payer: MEDICARE

## 2021-10-22 PROBLEM — L97.414: Status: RESOLVED | Noted: 2021-03-19 | Resolved: 2021-10-22

## 2021-10-22 LAB
ANION GAP SERPL CALCULATED.3IONS-SCNC: 11 MMOL/L (ref 7–16)
BUN SERPL-MCNC: 40 MG/DL (ref 7–18)
BUN/CREAT SERPL: 26 (ref 6–20)
CALCIUM SERPL-MCNC: 8.8 MG/DL (ref 8.5–10.1)
CHLORIDE SERPL-SCNC: 100 MMOL/L (ref 98–107)
CO2 SERPL-SCNC: 29 MMOL/L (ref 21–32)
CREAT SERPL-MCNC: 1.56 MG/DL (ref 0.7–1.3)
GLUCOSE SERPL-MCNC: 123 MG/DL (ref 70–105)
GLUCOSE SERPL-MCNC: 160 MG/DL (ref 74–106)
GLUCOSE SERPL-MCNC: 196 MG/DL (ref 70–105)
GLUCOSE SERPL-MCNC: 197 MG/DL (ref 70–105)
GLUCOSE SERPL-MCNC: 214 MG/DL (ref 70–105)
POTASSIUM SERPL-SCNC: 4.2 MMOL/L (ref 3.5–5.1)
SODIUM SERPL-SCNC: 136 MMOL/L (ref 136–145)

## 2021-10-22 PROCEDURE — 99232 PR SUBSEQUENT HOSPITAL CARE,LEVL II: ICD-10-PCS | Mod: ,,, | Performed by: HOSPITALIST

## 2021-10-22 PROCEDURE — S4991 NICOTINE PATCH NONLEGEND: HCPCS | Performed by: NURSE PRACTITIONER

## 2021-10-22 PROCEDURE — 80048 BASIC METABOLIC PNL TOTAL CA: CPT | Performed by: HOSPITALIST

## 2021-10-22 PROCEDURE — 25000003 PHARM REV CODE 250: Performed by: NURSE PRACTITIONER

## 2021-10-22 PROCEDURE — 63600175 PHARM REV CODE 636 W HCPCS: Performed by: INTERNAL MEDICINE

## 2021-10-22 PROCEDURE — 25000003 PHARM REV CODE 250: Performed by: INTERNAL MEDICINE

## 2021-10-22 PROCEDURE — 36415 COLL VENOUS BLD VENIPUNCTURE: CPT | Performed by: HOSPITALIST

## 2021-10-22 PROCEDURE — 11000001 HC ACUTE MED/SURG PRIVATE ROOM

## 2021-10-22 PROCEDURE — 82962 GLUCOSE BLOOD TEST: CPT

## 2021-10-22 PROCEDURE — 63600175 PHARM REV CODE 636 W HCPCS: Performed by: HOSPITALIST

## 2021-10-22 PROCEDURE — 36573 INSJ PICC RS&I 5 YR+: CPT

## 2021-10-22 PROCEDURE — 99232 SBSQ HOSP IP/OBS MODERATE 35: CPT | Mod: ,,, | Performed by: HOSPITALIST

## 2021-10-22 PROCEDURE — 25000003 PHARM REV CODE 250: Performed by: HOSPITALIST

## 2021-10-22 RX ADMIN — ENOXAPARIN SODIUM 40 MG: 40 INJECTION SUBCUTANEOUS at 05:10

## 2021-10-22 RX ADMIN — ATORVASTATIN CALCIUM 20 MG: 20 TABLET, FILM COATED ORAL at 10:10

## 2021-10-22 RX ADMIN — AMLODIPINE BESYLATE 10 MG: 10 TABLET ORAL at 10:10

## 2021-10-22 RX ADMIN — HYDROCHLOROTHIAZIDE 25 MG: 12.5 TABLET ORAL at 10:10

## 2021-10-22 RX ADMIN — PIPERACILLIN AND TAZOBACTAM 4.5 G: 4; .5 INJECTION, POWDER, FOR SOLUTION INTRAVENOUS at 08:10

## 2021-10-22 RX ADMIN — GABAPENTIN 300 MG: 300 CAPSULE ORAL at 05:10

## 2021-10-22 RX ADMIN — INSULIN ASPART 2 UNITS: 100 INJECTION, SOLUTION INTRAVENOUS; SUBCUTANEOUS at 06:10

## 2021-10-22 RX ADMIN — INSULIN ASPART 4 UNITS: 100 INJECTION, SOLUTION INTRAVENOUS; SUBCUTANEOUS at 05:10

## 2021-10-22 RX ADMIN — GABAPENTIN 300 MG: 300 CAPSULE ORAL at 09:10

## 2021-10-22 RX ADMIN — CIPROFLOXACIN 500 MG: 250 TABLET ORAL at 10:10

## 2021-10-22 RX ADMIN — HYDROCODONE BITARTRATE AND ACETAMINOPHEN 1 TABLET: 10; 325 TABLET ORAL at 06:10

## 2021-10-22 RX ADMIN — GABAPENTIN 300 MG: 300 CAPSULE ORAL at 02:10

## 2021-10-22 RX ADMIN — PIPERACILLIN AND TAZOBACTAM 4.5 G: 4; .5 INJECTION, POWDER, FOR SOLUTION INTRAVENOUS at 04:10

## 2021-10-22 RX ADMIN — HYDROCODONE BITARTRATE AND ACETAMINOPHEN 1 TABLET: 10; 325 TABLET ORAL at 04:10

## 2021-10-22 RX ADMIN — Medication 1 PATCH: at 10:10

## 2021-10-22 RX ADMIN — PIPERACILLIN AND TAZOBACTAM 4.5 G: 4; .5 INJECTION, POWDER, FOR SOLUTION INTRAVENOUS at 12:10

## 2021-10-23 LAB
GLUCOSE SERPL-MCNC: 199 MG/DL (ref 70–105)
GLUCOSE SERPL-MCNC: 245 MG/DL (ref 70–105)

## 2021-10-23 PROCEDURE — 82962 GLUCOSE BLOOD TEST: CPT

## 2021-10-23 PROCEDURE — 25000003 PHARM REV CODE 250: Performed by: INTERNAL MEDICINE

## 2021-10-23 PROCEDURE — 63600175 PHARM REV CODE 636 W HCPCS: Performed by: HOSPITALIST

## 2021-10-23 PROCEDURE — 25000003 PHARM REV CODE 250: Performed by: NURSE PRACTITIONER

## 2021-10-23 PROCEDURE — 11000001 HC ACUTE MED/SURG PRIVATE ROOM

## 2021-10-23 PROCEDURE — 99232 SBSQ HOSP IP/OBS MODERATE 35: CPT | Mod: ,,, | Performed by: HOSPITALIST

## 2021-10-23 PROCEDURE — 25000003 PHARM REV CODE 250: Performed by: HOSPITALIST

## 2021-10-23 PROCEDURE — 99232 PR SUBSEQUENT HOSPITAL CARE,LEVL II: ICD-10-PCS | Mod: ,,, | Performed by: HOSPITALIST

## 2021-10-23 PROCEDURE — 63600175 PHARM REV CODE 636 W HCPCS: Performed by: INTERNAL MEDICINE

## 2021-10-23 PROCEDURE — S4991 NICOTINE PATCH NONLEGEND: HCPCS | Performed by: NURSE PRACTITIONER

## 2021-10-23 RX ADMIN — HYDROCHLOROTHIAZIDE 25 MG: 12.5 TABLET ORAL at 10:10

## 2021-10-23 RX ADMIN — INSULIN ASPART 2 UNITS: 100 INJECTION, SOLUTION INTRAVENOUS; SUBCUTANEOUS at 11:10

## 2021-10-23 RX ADMIN — HYDROCODONE BITARTRATE AND ACETAMINOPHEN 1 TABLET: 10; 325 TABLET ORAL at 11:10

## 2021-10-23 RX ADMIN — CIPROFLOXACIN 500 MG: 250 TABLET ORAL at 10:10

## 2021-10-23 RX ADMIN — ATORVASTATIN CALCIUM 20 MG: 20 TABLET, FILM COATED ORAL at 10:10

## 2021-10-23 RX ADMIN — AMLODIPINE BESYLATE 10 MG: 10 TABLET ORAL at 10:10

## 2021-10-23 RX ADMIN — INSULIN ASPART 4 UNITS: 100 INJECTION, SOLUTION INTRAVENOUS; SUBCUTANEOUS at 06:10

## 2021-10-23 RX ADMIN — PIPERACILLIN AND TAZOBACTAM 4.5 G: 4; .5 INJECTION, POWDER, FOR SOLUTION INTRAVENOUS at 11:10

## 2021-10-23 RX ADMIN — GABAPENTIN 300 MG: 300 CAPSULE ORAL at 06:10

## 2021-10-23 RX ADMIN — PIPERACILLIN AND TAZOBACTAM 4.5 G: 4; .5 INJECTION, POWDER, FOR SOLUTION INTRAVENOUS at 04:10

## 2021-10-23 RX ADMIN — Medication 1 PATCH: at 10:10

## 2021-10-24 LAB
GLUCOSE SERPL-MCNC: 176 MG/DL (ref 70–105)
GLUCOSE SERPL-MCNC: 191 MG/DL (ref 70–105)

## 2021-10-24 PROCEDURE — 11000001 HC ACUTE MED/SURG PRIVATE ROOM

## 2021-10-24 PROCEDURE — 63600175 PHARM REV CODE 636 W HCPCS: Performed by: HOSPITALIST

## 2021-10-24 PROCEDURE — 25000003 PHARM REV CODE 250: Performed by: HOSPITALIST

## 2021-10-24 PROCEDURE — 82962 GLUCOSE BLOOD TEST: CPT

## 2021-10-24 PROCEDURE — 25000003 PHARM REV CODE 250: Performed by: INTERNAL MEDICINE

## 2021-10-24 PROCEDURE — 63600175 PHARM REV CODE 636 W HCPCS: Performed by: INTERNAL MEDICINE

## 2021-10-24 PROCEDURE — 25000003 PHARM REV CODE 250: Performed by: FAMILY MEDICINE

## 2021-10-24 RX ADMIN — GABAPENTIN 300 MG: 300 CAPSULE ORAL at 05:10

## 2021-10-24 RX ADMIN — INSULIN ASPART 2 UNITS: 100 INJECTION, SOLUTION INTRAVENOUS; SUBCUTANEOUS at 06:10

## 2021-10-24 RX ADMIN — HYDROCHLOROTHIAZIDE 25 MG: 12.5 TABLET ORAL at 09:10

## 2021-10-24 RX ADMIN — CIPROFLOXACIN 500 MG: 250 TABLET ORAL at 09:10

## 2021-10-24 RX ADMIN — PIPERACILLIN AND TAZOBACTAM 4.5 G: 4; .5 INJECTION, POWDER, FOR SOLUTION INTRAVENOUS at 04:10

## 2021-10-24 RX ADMIN — PIPERACILLIN AND TAZOBACTAM 4.5 G: 4; .5 INJECTION, POWDER, FOR SOLUTION INTRAVENOUS at 12:10

## 2021-10-24 RX ADMIN — ATORVASTATIN CALCIUM 20 MG: 20 TABLET, FILM COATED ORAL at 09:10

## 2021-10-24 RX ADMIN — INSULIN ASPART 2 UNITS: 100 INJECTION, SOLUTION INTRAVENOUS; SUBCUTANEOUS at 12:10

## 2021-10-24 RX ADMIN — SODIUM CHLORIDE: 4.5 INJECTION, SOLUTION INTRAVENOUS at 05:10

## 2021-10-24 RX ADMIN — PIPERACILLIN AND TAZOBACTAM 4.5 G: 4; .5 INJECTION, POWDER, FOR SOLUTION INTRAVENOUS at 11:10

## 2021-10-24 RX ADMIN — AMLODIPINE BESYLATE 10 MG: 10 TABLET ORAL at 09:10

## 2021-10-25 LAB
ANION GAP SERPL CALCULATED.3IONS-SCNC: 7 MMOL/L (ref 7–16)
BASOPHILS # BLD AUTO: 0.08 K/UL (ref 0–0.2)
BASOPHILS NFR BLD AUTO: 1 % (ref 0–1)
BUN SERPL-MCNC: 34 MG/DL (ref 7–18)
BUN/CREAT SERPL: 22 (ref 6–20)
CALCIUM SERPL-MCNC: 8.8 MG/DL (ref 8.5–10.1)
CHLORIDE SERPL-SCNC: 104 MMOL/L (ref 98–107)
CO2 SERPL-SCNC: 28 MMOL/L (ref 21–32)
CREAT SERPL-MCNC: 1.58 MG/DL (ref 0.7–1.3)
CRP SERPL-MCNC: 1.27 MG/DL (ref 0–0.8)
DIFFERENTIAL METHOD BLD: ABNORMAL
EOSINOPHIL # BLD AUTO: 0.42 K/UL (ref 0–0.5)
EOSINOPHIL NFR BLD AUTO: 5.4 % (ref 1–4)
ERYTHROCYTE [DISTWIDTH] IN BLOOD BY AUTOMATED COUNT: 13.9 % (ref 11.5–14.5)
ERYTHROCYTE [SEDIMENTATION RATE] IN BLOOD BY WESTERGREN METHOD: 52 MM/HR (ref 0–15)
GLUCOSE SERPL-MCNC: 184 MG/DL (ref 70–105)
GLUCOSE SERPL-MCNC: 184 MG/DL (ref 70–105)
GLUCOSE SERPL-MCNC: 234 MG/DL (ref 70–105)
GLUCOSE SERPL-MCNC: 236 MG/DL (ref 74–106)
HCT VFR BLD AUTO: 35.8 % (ref 40–54)
HGB BLD-MCNC: 11.9 G/DL (ref 13.5–18)
IMM GRANULOCYTES # BLD AUTO: 0.02 K/UL (ref 0–0.04)
IMM GRANULOCYTES NFR BLD: 0.3 % (ref 0–0.4)
LYMPHOCYTES # BLD AUTO: 3.37 K/UL (ref 1–4.8)
LYMPHOCYTES NFR BLD AUTO: 43.5 % (ref 27–41)
MCH RBC QN AUTO: 26.4 PG (ref 27–31)
MCHC RBC AUTO-ENTMCNC: 33.2 G/DL (ref 32–36)
MCV RBC AUTO: 79.4 FL (ref 80–96)
MONOCYTES # BLD AUTO: 0.68 K/UL (ref 0–0.8)
MONOCYTES NFR BLD AUTO: 8.8 % (ref 2–6)
MPC BLD CALC-MCNC: 10 FL (ref 9.4–12.4)
NEUTROPHILS # BLD AUTO: 3.18 K/UL (ref 1.8–7.7)
NEUTROPHILS NFR BLD AUTO: 41 % (ref 53–65)
NRBC # BLD AUTO: 0 X10E3/UL
NRBC, AUTO (.00): 0 %
PLATELET # BLD AUTO: 253 K/UL (ref 150–400)
POTASSIUM SERPL-SCNC: 3.9 MMOL/L (ref 3.5–5.1)
RBC # BLD AUTO: 4.51 M/UL (ref 4.6–6.2)
SODIUM SERPL-SCNC: 135 MMOL/L (ref 136–145)
WBC # BLD AUTO: 7.75 K/UL (ref 4.5–11)

## 2021-10-25 PROCEDURE — 80048 BASIC METABOLIC PNL TOTAL CA: CPT | Performed by: HOSPITALIST

## 2021-10-25 PROCEDURE — 82962 GLUCOSE BLOOD TEST: CPT

## 2021-10-25 PROCEDURE — 99232 SBSQ HOSP IP/OBS MODERATE 35: CPT | Mod: ,,, | Performed by: INTERNAL MEDICINE

## 2021-10-25 PROCEDURE — 85651 RBC SED RATE NONAUTOMATED: CPT | Performed by: INTERNAL MEDICINE

## 2021-10-25 PROCEDURE — 85025 COMPLETE CBC W/AUTO DIFF WBC: CPT | Performed by: HOSPITALIST

## 2021-10-25 PROCEDURE — 63600175 PHARM REV CODE 636 W HCPCS: Performed by: INTERNAL MEDICINE

## 2021-10-25 PROCEDURE — 36415 COLL VENOUS BLD VENIPUNCTURE: CPT | Performed by: HOSPITALIST

## 2021-10-25 PROCEDURE — S4991 NICOTINE PATCH NONLEGEND: HCPCS | Performed by: NURSE PRACTITIONER

## 2021-10-25 PROCEDURE — 11000001 HC ACUTE MED/SURG PRIVATE ROOM

## 2021-10-25 PROCEDURE — 25000003 PHARM REV CODE 250: Performed by: FAMILY MEDICINE

## 2021-10-25 PROCEDURE — 25000003 PHARM REV CODE 250: Performed by: HOSPITALIST

## 2021-10-25 PROCEDURE — 63600175 PHARM REV CODE 636 W HCPCS: Performed by: HOSPITALIST

## 2021-10-25 PROCEDURE — 86140 C-REACTIVE PROTEIN: CPT | Performed by: INTERNAL MEDICINE

## 2021-10-25 PROCEDURE — 25000003 PHARM REV CODE 250: Performed by: INTERNAL MEDICINE

## 2021-10-25 PROCEDURE — 25000003 PHARM REV CODE 250: Performed by: NURSE PRACTITIONER

## 2021-10-25 PROCEDURE — 99232 PR SUBSEQUENT HOSPITAL CARE,LEVL II: ICD-10-PCS | Mod: ,,, | Performed by: INTERNAL MEDICINE

## 2021-10-25 RX ORDER — AMLODIPINE BESYLATE 10 MG/1
10 TABLET ORAL DAILY
Qty: 30 TABLET | Refills: 0 | Status: SHIPPED | OUTPATIENT
Start: 2021-10-26 | End: 2022-04-07 | Stop reason: SDUPTHER

## 2021-10-25 RX ORDER — CIPROFLOXACIN 500 MG/1
500 TABLET ORAL DAILY
Qty: 25 TABLET | Refills: 0 | Status: SHIPPED | OUTPATIENT
Start: 2021-10-25 | End: 2021-11-19

## 2021-10-25 RX ORDER — HYDROCHLOROTHIAZIDE 25 MG/1
25 TABLET ORAL DAILY
Qty: 30 TABLET | Refills: 0 | Status: SHIPPED | OUTPATIENT
Start: 2021-10-26 | End: 2021-12-15 | Stop reason: DRUGHIGH

## 2021-10-25 RX ADMIN — AMLODIPINE BESYLATE 10 MG: 10 TABLET ORAL at 09:10

## 2021-10-25 RX ADMIN — GABAPENTIN 300 MG: 300 CAPSULE ORAL at 02:10

## 2021-10-25 RX ADMIN — PIPERACILLIN AND TAZOBACTAM 4.5 G: 4; .5 INJECTION, POWDER, FOR SOLUTION INTRAVENOUS at 12:10

## 2021-10-25 RX ADMIN — HYDROCHLOROTHIAZIDE 25 MG: 12.5 TABLET ORAL at 09:10

## 2021-10-25 RX ADMIN — PIPERACILLIN AND TAZOBACTAM 4.5 G: 4; .5 INJECTION, POWDER, FOR SOLUTION INTRAVENOUS at 04:10

## 2021-10-25 RX ADMIN — Medication 1 PATCH: at 09:10

## 2021-10-25 RX ADMIN — CIPROFLOXACIN 500 MG: 250 TABLET ORAL at 09:10

## 2021-10-25 RX ADMIN — GABAPENTIN 300 MG: 300 CAPSULE ORAL at 12:10

## 2021-10-25 RX ADMIN — HYDROCODONE BITARTRATE AND ACETAMINOPHEN 1 TABLET: 10; 325 TABLET ORAL at 11:10

## 2021-10-25 RX ADMIN — ATORVASTATIN CALCIUM 20 MG: 20 TABLET, FILM COATED ORAL at 09:10

## 2021-10-25 RX ADMIN — INSULIN ASPART 2 UNITS: 100 INJECTION, SOLUTION INTRAVENOUS; SUBCUTANEOUS at 11:10

## 2021-10-25 RX ADMIN — GABAPENTIN 300 MG: 300 CAPSULE ORAL at 05:10

## 2021-10-25 RX ADMIN — INSULIN ASPART 4 UNITS: 100 INJECTION, SOLUTION INTRAVENOUS; SUBCUTANEOUS at 06:10

## 2021-10-25 RX ADMIN — HYDROCODONE BITARTRATE AND ACETAMINOPHEN 1 TABLET: 10; 325 TABLET ORAL at 12:10

## 2021-10-25 RX ADMIN — SODIUM CHLORIDE: 4.5 INJECTION, SOLUTION INTRAVENOUS at 04:10

## 2021-10-26 VITALS
RESPIRATION RATE: 19 BRPM | WEIGHT: 315 LBS | OXYGEN SATURATION: 96 % | DIASTOLIC BLOOD PRESSURE: 98 MMHG | HEART RATE: 74 BPM | SYSTOLIC BLOOD PRESSURE: 137 MMHG | BODY MASS INDEX: 41.75 KG/M2 | HEIGHT: 73 IN | TEMPERATURE: 98 F

## 2021-10-26 LAB — GLUCOSE SERPL-MCNC: 339 MG/DL (ref 70–105)

## 2021-10-26 PROCEDURE — 82962 GLUCOSE BLOOD TEST: CPT

## 2021-10-26 PROCEDURE — 99239 HOSP IP/OBS DSCHRG MGMT >30: CPT | Mod: ,,, | Performed by: INTERNAL MEDICINE

## 2021-10-26 PROCEDURE — 63600175 PHARM REV CODE 636 W HCPCS: Performed by: INTERNAL MEDICINE

## 2021-10-26 PROCEDURE — 25000003 PHARM REV CODE 250: Performed by: HOSPITALIST

## 2021-10-26 PROCEDURE — 25000003 PHARM REV CODE 250: Performed by: INTERNAL MEDICINE

## 2021-10-26 PROCEDURE — 99239 PR HOSPITAL DISCHARGE DAY,>30 MIN: ICD-10-PCS | Mod: ,,, | Performed by: INTERNAL MEDICINE

## 2021-10-26 PROCEDURE — 63600175 PHARM REV CODE 636 W HCPCS: Performed by: HOSPITALIST

## 2021-10-26 RX ADMIN — PIPERACILLIN AND TAZOBACTAM 4.5 G: 4; .5 INJECTION, POWDER, FOR SOLUTION INTRAVENOUS at 03:10

## 2021-10-26 RX ADMIN — AMLODIPINE BESYLATE 10 MG: 10 TABLET ORAL at 09:10

## 2021-10-26 RX ADMIN — INSULIN ASPART 8 UNITS: 100 INJECTION, SOLUTION INTRAVENOUS; SUBCUTANEOUS at 06:10

## 2021-10-26 RX ADMIN — CIPROFLOXACIN 500 MG: 250 TABLET ORAL at 09:10

## 2021-10-26 RX ADMIN — ATORVASTATIN CALCIUM 20 MG: 20 TABLET, FILM COATED ORAL at 09:10

## 2021-10-26 RX ADMIN — HYDROCHLOROTHIAZIDE 25 MG: 12.5 TABLET ORAL at 09:10

## 2021-10-26 RX ADMIN — GABAPENTIN 300 MG: 300 CAPSULE ORAL at 05:10

## 2021-10-27 ENCOUNTER — TELEPHONE (OUTPATIENT)
Dept: FAMILY MEDICINE | Facility: CLINIC | Age: 36
End: 2021-10-27
Payer: MEDICARE

## 2021-10-27 DIAGNOSIS — Z79.4 TYPE 2 DIABETES MELLITUS WITH OTHER SPECIFIED COMPLICATION, WITH LONG-TERM CURRENT USE OF INSULIN: Primary | ICD-10-CM

## 2021-10-27 DIAGNOSIS — E11.69 TYPE 2 DIABETES MELLITUS WITH OTHER SPECIFIED COMPLICATION, WITH LONG-TERM CURRENT USE OF INSULIN: Primary | ICD-10-CM

## 2021-10-27 RX ORDER — INSULIN PUMP SYRINGE, 3 ML
1 EACH MISCELLANEOUS 3 TIMES DAILY
Qty: 1 EACH | Refills: 0 | Status: SHIPPED | OUTPATIENT
Start: 2021-10-27 | End: 2022-03-08

## 2021-11-01 ENCOUNTER — OFFICE VISIT (OUTPATIENT)
Dept: SURGERY | Facility: CLINIC | Age: 36
End: 2021-11-01
Attending: SURGERY
Payer: MEDICARE

## 2021-11-01 DIAGNOSIS — L97.516 NON-PRESSURE CHRONIC ULCER OF OTHER PART OF RIGHT FOOT WITH BONE INVOLVEMENT WITHOUT EVIDENCE OF NECROSIS: ICD-10-CM

## 2021-11-01 PROCEDURE — 99213 OFFICE O/P EST LOW 20 MIN: CPT | Mod: PBBFAC | Performed by: SURGERY

## 2021-11-01 PROCEDURE — 99212 OFFICE O/P EST SF 10 MIN: CPT | Mod: S$PBB,,, | Performed by: SURGERY

## 2021-11-01 PROCEDURE — 99212 PR OFFICE/OUTPT VISIT, EST, LEVL II, 10-19 MIN: ICD-10-PCS | Mod: S$PBB,,, | Performed by: SURGERY

## 2021-11-02 ENCOUNTER — LAB REQUISITION (OUTPATIENT)
Dept: LAB | Facility: HOSPITAL | Age: 36
End: 2021-11-02
Attending: SURGERY
Payer: MEDICARE

## 2021-11-02 ENCOUNTER — OFFICE VISIT (OUTPATIENT)
Dept: PAIN MEDICINE | Facility: CLINIC | Age: 36
End: 2021-11-02
Payer: MEDICARE

## 2021-11-02 VITALS
DIASTOLIC BLOOD PRESSURE: 89 MMHG | RESPIRATION RATE: 18 BRPM | HEIGHT: 74 IN | HEART RATE: 95 BPM | SYSTOLIC BLOOD PRESSURE: 142 MMHG | BODY MASS INDEX: 40.43 KG/M2 | WEIGHT: 315 LBS

## 2021-11-02 DIAGNOSIS — I73.9 PERIPHERAL VASCULAR DISEASE, UNSPECIFIED: Chronic | ICD-10-CM

## 2021-11-02 DIAGNOSIS — E11.49 DIABETIC NEUROPATHY WITH NEUROLOGIC COMPLICATION: Primary | Chronic | ICD-10-CM

## 2021-11-02 DIAGNOSIS — Z79.899 ENCOUNTER FOR LONG-TERM (CURRENT) USE OF OTHER MEDICATIONS: ICD-10-CM

## 2021-11-02 DIAGNOSIS — E11.40 DIABETIC NEUROPATHY WITH NEUROLOGIC COMPLICATION: Primary | Chronic | ICD-10-CM

## 2021-11-02 DIAGNOSIS — Z79.2 LONG TERM (CURRENT) USE OF ANTIBIOTICS: ICD-10-CM

## 2021-11-02 DIAGNOSIS — M86.671 OTHER CHRONIC OSTEOMYELITIS, RIGHT ANKLE AND FOOT: ICD-10-CM

## 2021-11-02 DIAGNOSIS — L97.414 ULCER OF RIGHT HEEL, WITH NECROSIS OF BONE: Chronic | ICD-10-CM

## 2021-11-02 LAB
ANION GAP SERPL CALCULATED.3IONS-SCNC: 11 MMOL/L (ref 7–16)
BASOPHILS # BLD AUTO: 0.03 K/UL (ref 0–0.2)
BASOPHILS NFR BLD AUTO: 0.6 % (ref 0–1)
BUN SERPL-MCNC: 21 MG/DL (ref 7–18)
BUN/CREAT SERPL: 15 (ref 6–20)
CALCIUM SERPL-MCNC: 8.7 MG/DL (ref 8.5–10.1)
CHLORIDE SERPL-SCNC: 103 MMOL/L (ref 98–107)
CO2 SERPL-SCNC: 28 MMOL/L (ref 21–32)
CREAT SERPL-MCNC: 1.4 MG/DL (ref 0.7–1.3)
CRP SERPL-MCNC: 1.47 MG/DL (ref 0–0.8)
CTP QC/QA: YES
DIFFERENTIAL METHOD BLD: ABNORMAL
EOSINOPHIL # BLD AUTO: 0.3 K/UL (ref 0–0.5)
EOSINOPHIL NFR BLD AUTO: 6.1 % (ref 1–4)
ERYTHROCYTE [DISTWIDTH] IN BLOOD BY AUTOMATED COUNT: 14.7 % (ref 11.5–14.5)
ERYTHROCYTE [SEDIMENTATION RATE] IN BLOOD BY WESTERGREN METHOD: 50 MM/HR (ref 0–15)
GLUCOSE SERPL-MCNC: 274 MG/DL (ref 74–106)
HCT VFR BLD AUTO: 35.6 % (ref 40–54)
HGB BLD-MCNC: 12.2 G/DL (ref 13.5–18)
LYMPHOCYTES # BLD AUTO: 1.88 K/UL (ref 1–4.8)
LYMPHOCYTES NFR BLD AUTO: 38.1 % (ref 27–41)
MCH RBC QN AUTO: 27.4 PG (ref 27–31)
MCHC RBC AUTO-ENTMCNC: 34.3 G/DL (ref 32–36)
MCV RBC AUTO: 79.8 FL (ref 80–96)
MONOCYTES # BLD AUTO: 0.56 K/UL (ref 0–0.8)
MONOCYTES NFR BLD AUTO: 11.4 % (ref 2–6)
MPC BLD CALC-MCNC: 10.6 FL (ref 9.4–12.4)
NEUTROPHILS # BLD AUTO: 2.16 K/UL (ref 1.8–7.7)
NEUTROPHILS NFR BLD AUTO: 43.8 % (ref 53–65)
PLATELET # BLD AUTO: 242 K/UL (ref 150–400)
POC (AMP) AMPHETAMINE: NEGATIVE
POC (BAR) BARBITURATES: NEGATIVE
POC (BUP) BUPRENORPHINE: NEGATIVE
POC (BZO) BENZODIAZEPINES: NEGATIVE
POC (COC) COCAINE: NEGATIVE
POC (MDMA) METHYLENEDIOXYMETHAMPHETAMINE 3,4: NEGATIVE
POC (MET) METHAMPHETAMINE: NEGATIVE
POC (MOP) OPIATES: ABNORMAL
POC (MTD) METHADONE: NEGATIVE
POC (OXY) OXYCODONE: NEGATIVE
POC (PCP) PHENCYCLIDINE: NEGATIVE
POC (TCA) TRICYCLIC ANTIDEPRESSANTS: NEGATIVE
POC TEMPERATURE (URINE): 94
POC THC: NEGATIVE
POTASSIUM SERPL-SCNC: 4.3 MMOL/L (ref 3.5–5.1)
RBC # BLD AUTO: 4.46 M/UL (ref 4.6–6.2)
SODIUM SERPL-SCNC: 138 MMOL/L (ref 136–145)
WBC # BLD AUTO: 4.93 K/UL (ref 4.5–11)

## 2021-11-02 PROCEDURE — 80305 DRUG TEST PRSMV DIR OPT OBS: CPT | Mod: PBBFAC | Performed by: PHYSICIAN ASSISTANT

## 2021-11-02 PROCEDURE — 99214 OFFICE O/P EST MOD 30 MIN: CPT | Mod: S$PBB,,, | Performed by: PHYSICIAN ASSISTANT

## 2021-11-02 PROCEDURE — 99214 PR OFFICE/OUTPT VISIT, EST, LEVL IV, 30-39 MIN: ICD-10-PCS | Mod: S$PBB,,, | Performed by: PHYSICIAN ASSISTANT

## 2021-11-02 PROCEDURE — 80048 BASIC METABOLIC PNL TOTAL CA: CPT | Performed by: SURGERY

## 2021-11-02 PROCEDURE — 86140 C-REACTIVE PROTEIN: CPT | Performed by: SURGERY

## 2021-11-02 PROCEDURE — 85651 RBC SED RATE NONAUTOMATED: CPT | Performed by: SURGERY

## 2021-11-02 PROCEDURE — 99214 OFFICE O/P EST MOD 30 MIN: CPT | Mod: PBBFAC | Performed by: PHYSICIAN ASSISTANT

## 2021-11-02 PROCEDURE — 85025 COMPLETE CBC W/AUTO DIFF WBC: CPT | Performed by: SURGERY

## 2021-11-02 RX ORDER — HYDROCODONE BITARTRATE AND ACETAMINOPHEN 10; 325 MG/1; MG/1
1 TABLET ORAL DAILY
Qty: 30 TABLET | Refills: 0 | Status: SHIPPED | OUTPATIENT
Start: 2021-11-02 | End: 2021-12-02

## 2021-11-02 RX ORDER — HYDROCODONE BITARTRATE AND ACETAMINOPHEN 10; 325 MG/1; MG/1
1 TABLET ORAL DAILY
COMMUNITY
End: 2021-11-08

## 2021-11-02 RX ORDER — HYDROCODONE BITARTRATE AND ACETAMINOPHEN 10; 325 MG/1; MG/1
1 TABLET ORAL DAILY
Qty: 30 TABLET | Refills: 0 | Status: SHIPPED | OUTPATIENT
Start: 2021-12-02 | End: 2021-11-08

## 2021-11-02 RX ORDER — GABAPENTIN 300 MG/1
300 CAPSULE ORAL EVERY 8 HOURS
Qty: 90 CAPSULE | Refills: 1 | Status: SHIPPED | OUTPATIENT
Start: 2021-11-02 | End: 2021-12-20 | Stop reason: SDUPTHER

## 2021-11-08 ENCOUNTER — LAB REQUISITION (OUTPATIENT)
Dept: LAB | Facility: HOSPITAL | Age: 36
End: 2021-11-08
Attending: SURGERY
Payer: MEDICARE

## 2021-11-08 ENCOUNTER — OFFICE VISIT (OUTPATIENT)
Dept: FAMILY MEDICINE | Facility: CLINIC | Age: 36
End: 2021-11-08
Payer: MEDICARE

## 2021-11-08 VITALS
RESPIRATION RATE: 18 BRPM | SYSTOLIC BLOOD PRESSURE: 138 MMHG | TEMPERATURE: 98 F | OXYGEN SATURATION: 99 % | HEIGHT: 74 IN | DIASTOLIC BLOOD PRESSURE: 82 MMHG | HEART RATE: 99 BPM | WEIGHT: 315 LBS | BODY MASS INDEX: 40.43 KG/M2

## 2021-11-08 DIAGNOSIS — E11.69 TYPE 2 DIABETES MELLITUS WITH OTHER SPECIFIED COMPLICATION: ICD-10-CM

## 2021-11-08 DIAGNOSIS — E11.621 TYPE 2 DIABETES MELLITUS WITH RIGHT DIABETIC FOOT ULCER: ICD-10-CM

## 2021-11-08 DIAGNOSIS — E11.40 DIABETIC NEUROPATHY WITH NEUROLOGIC COMPLICATION: Primary | Chronic | ICD-10-CM

## 2021-11-08 DIAGNOSIS — M86.671 OTHER CHRONIC OSTEOMYELITIS, RIGHT ANKLE AND FOOT: ICD-10-CM

## 2021-11-08 DIAGNOSIS — E11.49 DIABETIC NEUROPATHY WITH NEUROLOGIC COMPLICATION: Primary | Chronic | ICD-10-CM

## 2021-11-08 DIAGNOSIS — L97.519 TYPE 2 DIABETES MELLITUS WITH RIGHT DIABETIC FOOT ULCER: ICD-10-CM

## 2021-11-08 LAB
ANION GAP SERPL CALCULATED.3IONS-SCNC: 12 MMOL/L (ref 7–16)
BASOPHILS # BLD AUTO: 0.04 K/UL (ref 0–0.2)
BASOPHILS NFR BLD AUTO: 0.6 % (ref 0–1)
BUN SERPL-MCNC: 33 MG/DL (ref 7–18)
BUN/CREAT SERPL: 24 (ref 6–20)
CALCIUM SERPL-MCNC: 8.9 MG/DL (ref 8.5–10.1)
CHLORIDE SERPL-SCNC: 101 MMOL/L (ref 98–107)
CO2 SERPL-SCNC: 29 MMOL/L (ref 21–32)
CREAT SERPL-MCNC: 1.38 MG/DL (ref 0.7–1.3)
CRP SERPL-MCNC: 1.34 MG/DL (ref 0–0.8)
DIFFERENTIAL METHOD BLD: ABNORMAL
EOSINOPHIL # BLD AUTO: 0.31 K/UL (ref 0–0.5)
EOSINOPHIL NFR BLD AUTO: 4.7 % (ref 1–4)
ERYTHROCYTE [DISTWIDTH] IN BLOOD BY AUTOMATED COUNT: 14.4 % (ref 11.5–14.5)
ERYTHROCYTE [SEDIMENTATION RATE] IN BLOOD BY WESTERGREN METHOD: 85 MM/HR (ref 0–15)
GLUCOSE SERPL-MCNC: 199 MG/DL (ref 74–106)
HCT VFR BLD AUTO: 35.8 % (ref 40–54)
HGB BLD-MCNC: 12 G/DL (ref 13.5–18)
LYMPHOCYTES # BLD AUTO: 1.66 K/UL (ref 1–4.8)
LYMPHOCYTES NFR BLD AUTO: 25.1 % (ref 27–41)
MCH RBC QN AUTO: 26.7 PG (ref 27–31)
MCHC RBC AUTO-ENTMCNC: 33.5 G/DL (ref 32–36)
MCV RBC AUTO: 79.6 FL (ref 80–96)
MONOCYTES # BLD AUTO: 0.69 K/UL (ref 0–0.8)
MONOCYTES NFR BLD AUTO: 10.4 % (ref 2–6)
MPC BLD CALC-MCNC: 10.4 FL (ref 9.4–12.4)
NEUTROPHILS # BLD AUTO: 3.91 K/UL (ref 1.8–7.7)
NEUTROPHILS NFR BLD AUTO: 59.2 % (ref 53–65)
PLATELET # BLD AUTO: 214 K/UL (ref 150–400)
POTASSIUM SERPL-SCNC: 4.4 MMOL/L (ref 3.5–5.1)
RBC # BLD AUTO: 4.5 M/UL (ref 4.6–6.2)
SODIUM SERPL-SCNC: 138 MMOL/L (ref 136–145)
WBC # BLD AUTO: 6.61 K/UL (ref 4.5–11)

## 2021-11-08 PROCEDURE — 99495 TRANSJ CARE MGMT MOD F2F 14D: CPT | Mod: ,,, | Performed by: NURSE PRACTITIONER

## 2021-11-08 PROCEDURE — 99495 TCM SERVICES (MODERATE COMPLEXITY): ICD-10-PCS | Mod: ,,, | Performed by: NURSE PRACTITIONER

## 2021-11-08 PROCEDURE — 85025 COMPLETE CBC W/AUTO DIFF WBC: CPT | Performed by: SURGERY

## 2021-11-08 PROCEDURE — 86140 C-REACTIVE PROTEIN: CPT | Performed by: SURGERY

## 2021-11-08 PROCEDURE — 80048 BASIC METABOLIC PNL TOTAL CA: CPT | Performed by: SURGERY

## 2021-11-08 PROCEDURE — 85651 RBC SED RATE NONAUTOMATED: CPT | Performed by: SURGERY

## 2021-11-12 ENCOUNTER — NUTRITION (OUTPATIENT)
Dept: FAMILY MEDICINE | Facility: CLINIC | Age: 36
End: 2021-11-12
Payer: MEDICARE

## 2021-11-12 DIAGNOSIS — E11.621 TYPE 2 DIABETES MELLITUS WITH FOOT ULCER, UNSPECIFIED WHETHER LONG TERM INSULIN USE: Primary | ICD-10-CM

## 2021-11-12 DIAGNOSIS — L97.509 TYPE 2 DIABETES MELLITUS WITH FOOT ULCER, UNSPECIFIED WHETHER LONG TERM INSULIN USE: Primary | ICD-10-CM

## 2021-11-12 LAB
EST. AVERAGE GLUCOSE BLD GHB EST-MCNC: 194 MG/DL
HBA1C MFR BLD HPLC: 8.4 % (ref 4.5–6.6)

## 2021-11-12 PROCEDURE — 83036 HEMOGLOBIN GLYCOSYLATED A1C: CPT | Mod: ,,, | Performed by: CLINICAL MEDICAL LABORATORY

## 2021-11-12 PROCEDURE — 83036 HEMOGLOBIN A1C: ICD-10-PCS | Mod: ,,, | Performed by: CLINICAL MEDICAL LABORATORY

## 2021-11-15 ENCOUNTER — LAB REQUISITION (OUTPATIENT)
Dept: LAB | Facility: HOSPITAL | Age: 36
End: 2021-11-15
Attending: SURGERY
Payer: MEDICARE

## 2021-11-15 DIAGNOSIS — M86.671 OTHER CHRONIC OSTEOMYELITIS, RIGHT ANKLE AND FOOT: ICD-10-CM

## 2021-11-15 DIAGNOSIS — E11.621 TYPE 2 DIABETES MELLITUS WITH FOOT ULCER (CODE): ICD-10-CM

## 2021-11-15 LAB
ANION GAP SERPL CALCULATED.3IONS-SCNC: 12 MMOL/L (ref 7–16)
BASOPHILS # BLD AUTO: 0.02 K/UL (ref 0–0.2)
BASOPHILS NFR BLD AUTO: 0.4 % (ref 0–1)
BUN SERPL-MCNC: 30 MG/DL (ref 7–18)
BUN/CREAT SERPL: 24 (ref 6–20)
CALCIUM SERPL-MCNC: 8.8 MG/DL (ref 8.5–10.1)
CHLORIDE SERPL-SCNC: 104 MMOL/L (ref 98–107)
CO2 SERPL-SCNC: 27 MMOL/L (ref 21–32)
CREAT SERPL-MCNC: 1.27 MG/DL (ref 0.7–1.3)
CRP SERPL-MCNC: 3.29 MG/DL (ref 0–0.8)
DIFFERENTIAL METHOD BLD: ABNORMAL
EOSINOPHIL # BLD AUTO: 0.17 K/UL (ref 0–0.5)
EOSINOPHIL NFR BLD AUTO: 3.8 % (ref 1–4)
ERYTHROCYTE [DISTWIDTH] IN BLOOD BY AUTOMATED COUNT: 14.8 % (ref 11.5–14.5)
ERYTHROCYTE [SEDIMENTATION RATE] IN BLOOD BY WESTERGREN METHOD: 10 MM/HR (ref 0–15)
GLUCOSE SERPL-MCNC: 115 MG/DL (ref 74–106)
HCT VFR BLD AUTO: 46 % (ref 40–54)
HGB BLD-MCNC: 15.9 G/DL (ref 13.5–18)
LYMPHOCYTES # BLD AUTO: 1.29 K/UL (ref 1–4.8)
LYMPHOCYTES NFR BLD AUTO: 28.5 % (ref 27–41)
MCH RBC QN AUTO: 27.1 PG (ref 27–31)
MCHC RBC AUTO-ENTMCNC: 34.6 G/DL (ref 32–36)
MCV RBC AUTO: 78.4 FL (ref 80–96)
MONOCYTES # BLD AUTO: 0.4 K/UL (ref 0–0.8)
MONOCYTES NFR BLD AUTO: 8.8 % (ref 2–6)
MPC BLD CALC-MCNC: 10.6 FL (ref 9.4–12.4)
NEUTROPHILS # BLD AUTO: 2.64 K/UL (ref 1.8–7.7)
NEUTROPHILS NFR BLD AUTO: 58.5 % (ref 53–65)
PLATELET # BLD AUTO: 167 K/UL (ref 150–400)
POTASSIUM SERPL-SCNC: 4.4 MMOL/L (ref 3.5–5.1)
RBC # BLD AUTO: 5.87 M/UL (ref 4.6–6.2)
SODIUM SERPL-SCNC: 139 MMOL/L (ref 136–145)
WBC # BLD AUTO: 4.52 K/UL (ref 4.5–11)

## 2021-11-15 PROCEDURE — 80048 BASIC METABOLIC PNL TOTAL CA: CPT | Performed by: SURGERY

## 2021-11-15 PROCEDURE — 86140 C-REACTIVE PROTEIN: CPT | Performed by: SURGERY

## 2021-11-15 PROCEDURE — 85025 COMPLETE CBC W/AUTO DIFF WBC: CPT | Performed by: SURGERY

## 2021-11-15 PROCEDURE — 85651 RBC SED RATE NONAUTOMATED: CPT | Performed by: SURGERY

## 2021-11-16 ENCOUNTER — TELEPHONE (OUTPATIENT)
Dept: FAMILY MEDICINE | Facility: CLINIC | Age: 36
End: 2021-11-16
Payer: MEDICARE

## 2021-11-24 ENCOUNTER — CLINICAL SUPPORT (OUTPATIENT)
Dept: WOUND CARE | Facility: CLINIC | Age: 36
End: 2021-11-24
Attending: FAMILY MEDICINE
Payer: MEDICARE

## 2021-11-24 VITALS
HEART RATE: 93 BPM | SYSTOLIC BLOOD PRESSURE: 142 MMHG | TEMPERATURE: 98 F | RESPIRATION RATE: 18 BRPM | DIASTOLIC BLOOD PRESSURE: 91 MMHG

## 2021-11-24 DIAGNOSIS — I10 BENIGN HYPERTENSION: ICD-10-CM

## 2021-11-24 DIAGNOSIS — E11.40 DIABETIC NEUROPATHY WITH NEUROLOGIC COMPLICATION: ICD-10-CM

## 2021-11-24 DIAGNOSIS — L98.499 DIABETES WITH SKIN ULCER: ICD-10-CM

## 2021-11-24 DIAGNOSIS — E11.49 DIABETIC NEUROPATHY WITH NEUROLOGIC COMPLICATION: ICD-10-CM

## 2021-11-24 DIAGNOSIS — E11.622 DIABETES WITH SKIN ULCER: ICD-10-CM

## 2021-11-24 DIAGNOSIS — L97.514 ULCER OF RIGHT FOOT, WITH NECROSIS OF BONE: ICD-10-CM

## 2021-11-24 DIAGNOSIS — I10 ESSENTIAL HYPERTENSION, MALIGNANT: ICD-10-CM

## 2021-11-24 DIAGNOSIS — R60.0 LOWER EXTREMITY EDEMA: ICD-10-CM

## 2021-11-24 DIAGNOSIS — I73.9 PERIPHERAL VASCULAR DISEASE: ICD-10-CM

## 2021-11-24 DIAGNOSIS — L97.514 ULCER OF RIGHT FOOT WITH NECROSIS OF BONE: Primary | ICD-10-CM

## 2021-11-24 PROCEDURE — 99214 OFFICE O/P EST MOD 30 MIN: CPT | Mod: S$PBB,,, | Performed by: FAMILY MEDICINE

## 2021-11-24 PROCEDURE — 99214 PR OFFICE/OUTPT VISIT, EST, LEVL IV, 30-39 MIN: ICD-10-PCS | Mod: S$PBB,,, | Performed by: FAMILY MEDICINE

## 2021-11-24 PROCEDURE — 99213 OFFICE O/P EST LOW 20 MIN: CPT | Mod: PBBFAC | Performed by: FAMILY MEDICINE

## 2021-11-24 RX ORDER — SILVER NITRATE 38.21; 12.74 MG/1; MG/1
2 STICK TOPICAL ONCE
Status: DISCONTINUED | OUTPATIENT
Start: 2021-11-24 | End: 2022-02-16 | Stop reason: CLARIF

## 2021-11-24 RX ORDER — SILVER SULFADIAZINE 10 G/1000G
1 CREAM TOPICAL DAILY
Status: ON HOLD | COMMUNITY
Start: 2021-11-18 | End: 2022-02-23 | Stop reason: HOSPADM

## 2021-12-10 ENCOUNTER — TELEPHONE (OUTPATIENT)
Dept: FAMILY MEDICINE | Facility: CLINIC | Age: 36
End: 2021-12-10
Payer: MEDICARE

## 2021-12-15 ENCOUNTER — CLINICAL SUPPORT (OUTPATIENT)
Dept: WOUND CARE | Facility: CLINIC | Age: 36
End: 2021-12-15
Attending: FAMILY MEDICINE
Payer: MEDICARE

## 2021-12-15 VITALS
DIASTOLIC BLOOD PRESSURE: 84 MMHG | TEMPERATURE: 98 F | SYSTOLIC BLOOD PRESSURE: 126 MMHG | RESPIRATION RATE: 20 BRPM | HEART RATE: 89 BPM

## 2021-12-15 DIAGNOSIS — I10 BENIGN HYPERTENSION: ICD-10-CM

## 2021-12-15 DIAGNOSIS — I73.9 PERIPHERAL VASCULAR DISEASE: ICD-10-CM

## 2021-12-15 DIAGNOSIS — R60.0 LOWER EXTREMITY EDEMA: ICD-10-CM

## 2021-12-15 DIAGNOSIS — L98.499 DIABETES WITH SKIN ULCER: ICD-10-CM

## 2021-12-15 DIAGNOSIS — L97.514 ULCER OF RIGHT FOOT, WITH NECROSIS OF BONE: ICD-10-CM

## 2021-12-15 DIAGNOSIS — I10 ESSENTIAL HYPERTENSION, MALIGNANT: ICD-10-CM

## 2021-12-15 DIAGNOSIS — E11.622 DIABETES WITH SKIN ULCER: ICD-10-CM

## 2021-12-15 DIAGNOSIS — L97.404 DIABETIC ULCER OF MIDFOOT ASSOCIATED WITH DIABETES MELLITUS DUE TO UNDERLYING CONDITION, WITH NECROSIS OF BONE, UNSPECIFIED LATERALITY: ICD-10-CM

## 2021-12-15 DIAGNOSIS — L97.414 ULCER OF RIGHT HEEL, WITH NECROSIS OF BONE: ICD-10-CM

## 2021-12-15 DIAGNOSIS — E11.49 DIABETIC NEUROPATHY WITH NEUROLOGIC COMPLICATION: ICD-10-CM

## 2021-12-15 DIAGNOSIS — I73.9 PERIPHERAL VASCULAR DISEASE, UNSPECIFIED: ICD-10-CM

## 2021-12-15 DIAGNOSIS — E08.621 DIABETIC ULCER OF MIDFOOT ASSOCIATED WITH DIABETES MELLITUS DUE TO UNDERLYING CONDITION, WITH NECROSIS OF BONE, UNSPECIFIED LATERALITY: ICD-10-CM

## 2021-12-15 DIAGNOSIS — L97.514 ULCER OF RIGHT FOOT WITH NECROSIS OF BONE: Primary | ICD-10-CM

## 2021-12-15 DIAGNOSIS — E11.40 DIABETIC NEUROPATHY WITH NEUROLOGIC COMPLICATION: ICD-10-CM

## 2021-12-15 PROCEDURE — 99214 OFFICE O/P EST MOD 30 MIN: CPT | Mod: S$PBB,,, | Performed by: FAMILY MEDICINE

## 2021-12-15 PROCEDURE — 99214 PR OFFICE/OUTPT VISIT, EST, LEVL IV, 30-39 MIN: ICD-10-PCS | Mod: S$PBB,,, | Performed by: FAMILY MEDICINE

## 2021-12-15 PROCEDURE — 99214 OFFICE O/P EST MOD 30 MIN: CPT | Mod: PBBFAC | Performed by: FAMILY MEDICINE

## 2021-12-15 RX ORDER — HYDROCODONE BITARTRATE AND ACETAMINOPHEN 10; 325 MG/1; MG/1
1 TABLET ORAL DAILY
COMMUNITY
Start: 2021-12-03 | End: 2021-12-20 | Stop reason: SDUPTHER

## 2021-12-15 RX ORDER — HYDROCHLOROTHIAZIDE 25 MG/1
25 TABLET ORAL DAILY
COMMUNITY
Start: 2021-12-03 | End: 2022-02-09 | Stop reason: SDUPTHER

## 2022-01-03 ENCOUNTER — OFFICE VISIT (OUTPATIENT)
Dept: PAIN MEDICINE | Facility: CLINIC | Age: 37
End: 2022-01-03
Payer: MEDICARE

## 2022-01-03 ENCOUNTER — OFFICE VISIT (OUTPATIENT)
Dept: WOUND CARE | Facility: CLINIC | Age: 37
End: 2022-01-03
Attending: FAMILY MEDICINE
Payer: MEDICARE

## 2022-01-03 VITALS
RESPIRATION RATE: 18 BRPM | HEIGHT: 74 IN | BODY MASS INDEX: 40.43 KG/M2 | SYSTOLIC BLOOD PRESSURE: 152 MMHG | HEART RATE: 95 BPM | DIASTOLIC BLOOD PRESSURE: 90 MMHG | WEIGHT: 315 LBS

## 2022-01-03 VITALS
TEMPERATURE: 98 F | SYSTOLIC BLOOD PRESSURE: 172 MMHG | DIASTOLIC BLOOD PRESSURE: 106 MMHG | HEART RATE: 80 BPM | RESPIRATION RATE: 20 BRPM

## 2022-01-03 DIAGNOSIS — L98.499 DIABETES WITH SKIN ULCER: ICD-10-CM

## 2022-01-03 DIAGNOSIS — L97.414 ULCER OF RIGHT HEEL, WITH NECROSIS OF BONE: Chronic | ICD-10-CM

## 2022-01-03 DIAGNOSIS — L97.514 ULCER OF RIGHT FOOT, WITH NECROSIS OF BONE: ICD-10-CM

## 2022-01-03 DIAGNOSIS — L97.514 ULCER OF RIGHT FOOT WITH NECROSIS OF BONE: Primary | ICD-10-CM

## 2022-01-03 DIAGNOSIS — E11.40 DIABETIC NEUROPATHY WITH NEUROLOGIC COMPLICATION: Primary | Chronic | ICD-10-CM

## 2022-01-03 DIAGNOSIS — I73.9 PERIPHERAL VASCULAR DISEASE, UNSPECIFIED: Chronic | ICD-10-CM

## 2022-01-03 DIAGNOSIS — E08.621 DIABETIC ULCER OF MIDFOOT ASSOCIATED WITH DIABETES MELLITUS DUE TO UNDERLYING CONDITION, WITH NECROSIS OF BONE, UNSPECIFIED LATERALITY: ICD-10-CM

## 2022-01-03 DIAGNOSIS — E11.49 DIABETIC NEUROPATHY WITH NEUROLOGIC COMPLICATION: ICD-10-CM

## 2022-01-03 DIAGNOSIS — E11.40 DIABETIC NEUROPATHY WITH NEUROLOGIC COMPLICATION: ICD-10-CM

## 2022-01-03 DIAGNOSIS — L97.404 DIABETIC ULCER OF MIDFOOT ASSOCIATED WITH DIABETES MELLITUS DUE TO UNDERLYING CONDITION, WITH NECROSIS OF BONE, UNSPECIFIED LATERALITY: ICD-10-CM

## 2022-01-03 DIAGNOSIS — I73.9 PERIPHERAL VASCULAR DISEASE, UNSPECIFIED: ICD-10-CM

## 2022-01-03 DIAGNOSIS — I73.9 PERIPHERAL VASCULAR DISEASE: ICD-10-CM

## 2022-01-03 DIAGNOSIS — I10 ESSENTIAL HYPERTENSION, MALIGNANT: ICD-10-CM

## 2022-01-03 DIAGNOSIS — E11.49 DIABETIC NEUROPATHY WITH NEUROLOGIC COMPLICATION: Primary | Chronic | ICD-10-CM

## 2022-01-03 DIAGNOSIS — I10 BENIGN HYPERTENSION: ICD-10-CM

## 2022-01-03 DIAGNOSIS — E11.622 DIABETES WITH SKIN ULCER: ICD-10-CM

## 2022-01-03 DIAGNOSIS — R60.0 LOWER EXTREMITY EDEMA: ICD-10-CM

## 2022-01-03 DIAGNOSIS — L97.414 ULCER OF RIGHT HEEL, WITH NECROSIS OF BONE: ICD-10-CM

## 2022-01-03 PROCEDURE — 99214 PR OFFICE/OUTPT VISIT, EST, LEVL IV, 30-39 MIN: ICD-10-PCS | Mod: S$PBB,,, | Performed by: PHYSICIAN ASSISTANT

## 2022-01-03 PROCEDURE — 99214 PR OFFICE/OUTPT VISIT, EST, LEVL IV, 30-39 MIN: ICD-10-PCS | Mod: S$PBB,,, | Performed by: FAMILY MEDICINE

## 2022-01-03 PROCEDURE — 99214 OFFICE O/P EST MOD 30 MIN: CPT | Mod: S$PBB,,, | Performed by: FAMILY MEDICINE

## 2022-01-03 PROCEDURE — 99214 OFFICE O/P EST MOD 30 MIN: CPT | Mod: S$PBB,,, | Performed by: PHYSICIAN ASSISTANT

## 2022-01-03 PROCEDURE — 99214 OFFICE O/P EST MOD 30 MIN: CPT | Mod: PBBFAC | Performed by: PHYSICIAN ASSISTANT

## 2022-01-03 PROCEDURE — 99214 OFFICE O/P EST MOD 30 MIN: CPT | Mod: PBBFAC | Performed by: FAMILY MEDICINE

## 2022-01-03 RX ORDER — HYDROCODONE BITARTRATE AND ACETAMINOPHEN 10; 325 MG/1; MG/1
1 TABLET ORAL DAILY
Qty: 30 TABLET | Refills: 0 | Status: SHIPPED | OUTPATIENT
Start: 2022-02-02 | End: 2022-03-11 | Stop reason: SDUPTHER

## 2022-01-03 RX ORDER — GABAPENTIN 300 MG/1
300 CAPSULE ORAL EVERY 8 HOURS
Qty: 90 CAPSULE | Refills: 1 | Status: SHIPPED | OUTPATIENT
Start: 2022-01-03 | End: 2022-03-11 | Stop reason: SDUPTHER

## 2022-01-03 RX ORDER — HYDROCODONE BITARTRATE AND ACETAMINOPHEN 10; 325 MG/1; MG/1
1 TABLET ORAL DAILY
Qty: 30 TABLET | Refills: 0 | Status: ON HOLD | OUTPATIENT
Start: 2022-01-03 | End: 2022-02-16 | Stop reason: SDUPTHER

## 2022-01-03 NOTE — PATIENT INSTRUCTIONS
Right foot wound    Clean with baby shampoo and water    Apply skin barrier to wound edges    Apply silvadene to wound    Cover with dry gauze,wrap with leobardo,paper tape    Change daily and as needed    Elevate feet as much as possible    No prolong standing

## 2022-01-03 NOTE — PROGRESS NOTES
See wound assessment. Cont silvadene/dry dressing daily. Cont to wear offloading shoe to right right foot. RTC 3 weeks. Orders faxed to [facility].

## 2022-01-03 NOTE — PATIENT INSTRUCTIONS
Patient Education       Chronic Pain Discharge Instructions   About this topic   Pain can be an unpleasant feeling that happens in any part of the body. It can be mild or very bad. Pain may come and go or you may feel it all of the time. It may be dull, sharp, or throbbing. When you are in pain, you may not feel hungry. Pain can cause upset stomach and throwing up. You may also feel nervous or have problems sleeping.  Pain is most often a warning that something is wrong. You may have had surgery or an injury. Pain may be from health problems such as migraine or cancer. Acute pain lasts for only a short time and then goes away. Chronic pain lasts for a long time and most often does not go away completely. Chronic pain may disrupt your daily activities. How a doctor treats chronic pain depends on things like the kind of pain, how bad it is, and what is causing the pain.       What care is needed at home?   · Ask your doctor what you need to do when you go home. Make sure you ask questions if you do not understand what the doctor says. This way you will know what you need to do.  · Pay attention to your levels of pain.  · Take your drugs safely.  ? Take drugs only as directed and take only drugs ordered for you. Do not share drugs.  · Store your drugs safely.  ? Keep drugs out of the reach of children and pets. A locked cabinet is the safest place to store drugs.  ? Make sure you store your drug in a safe location after every use. Set an alarm to remind you of the next dosing time rather than leaving the drug out to serve as a reminder.  · It is a good idea to keep a diary and write about your pain. This might help to see if there is a pattern to your pain. Make notes about:  ? Where your pain is  ? When you have the pain  ? How your pain feels. Is it dull, sharp, burning, stabbing, or cramping?  ? What causes your pain  ? What makes your pain better or worse  · Ice or heat may be used to ease pain.  ? Ice may help  with swelling that may happen with some pain. Place an ice pack or a bag of frozen peas wrapped in a towel over the painful part. Never put ice right on the skin. Do not leave the ice on more than 10 to 15 minutes at a time.  ? If your doctor tells you to use heat, put a heating pad on the painful part for no more than 20 minutes at a time. Never go to sleep with a heating pad on as this can cause burns.  · Try to stay calm. Anxiety and stress may make your pain worse.  · Try using massage, relaxation, breathing exercises, yoga, kathya chi, and music therapy.  · You may consider other ways to help with pain. Some of them are acupuncture, biofeedback, physical therapy, electrical stimulation, counseling, or meditation. Ask your doctor if these may help manage your pain.  What follow-up care is needed?   · Your doctor may ask you to make visits to the office to check on your progress. Be sure to keep these visits.  · If the pain is worse or comes more often, see your doctor. Also talk to your doctor if you are having trouble sleeping at night.  · You may also need to see a:  ? Physical therapist to teach you exercises to help you stretch  ? Occupational therapist to help you find ways to make you more comfortable doing your regular daily activities  ? Psychological therapist to help deal with the stress of chronic pain  ? Doctor who specializes in managing ongoing pain  What drugs may be needed?   The doctor may order drugs to:  · Help with pain and swelling  · Help treat other problems that may go along with chronic pain, such as low mood or being worried  Take your drugs as ordered by your doctor. Some of these drugs can be habit forming and may cause side effects.  Will physical activity be limited?   Physical activities may be limited due to the pain that you have.  What changes to diet are needed?   Changes in food or diet may depend on what kind of pain you have. Talk with your doctor about what kind of food is  good for you.  What problems could happen?   · Not able to function well  · Irritation, sadness, anxiety, and low mood  · Sexual dysfunction  · Loss of appetite  · Need more drugs for pain  · Side effects from drugs for pain, such as constipation, upset stomach, and dizziness  · Addiction to certain drugs for pain  What can be done to prevent this health problem?   The best thing you can do is talk to your doctor about any pain you have. Your doctor can help you make a plan to lower your pain.  Some causes of pain get better by staying active and working out. Your doctor may send you to a physical therapist to help you work on strength exercises and stretching.  Stop smoking if you are a smoker. Studies show that smokers tend to have more pain than people who do not smoke.  When do I need to call the doctor?   · Signs of infection. These include a fever of 100.4°F (38°C) or higher, chills, very bad sore throat, ear or sinus pain, pain or blood with passing urine.  · Very bad upset stomach, throwing up, or belly pain; not able to eat or drink anything  · Weight loss without trying to lose weight  · Dizziness or seeing things that are not really there  · Chest pain or trouble breathing  · Back or side pain that lasts and you dont know why. (You have not done any hard exercises or other activity that may have pulled a muscle.)  · Not able to move or do daily actions  · Very bad pain that is not helped by your drugs  · Health problem is not better or you are feeling worse  Helpful tips   · Get rid of any drug that is no longer needed. Check with your pharmacy to learn about how to get rid of unused drugs.  ? Most drugs may be thrown away in household trash after mixing with coffee grounds or edu litter and sealing in a plastic bag.  ? In [place], take any unused drugs to the pharmacy.  Teach Back: Helping You Understand   The Teach Back Method helps you understand the information we are giving you. After you talk with  the staff, tell them in your own words what you learned. This helps to make sure the staff has described each thing clearly. It also helps to explain things that may have been confusing. Before going home, make sure you can do these:  · I can tell you about my pain.  · I can tell you what may help ease my pain.  · I can tell you what I will do if I have very bad back, side, chest, or belly pain, or the pain is not helped by my drugs.  Where can I learn more?   American Academy of Family Physicians  Familydoctor.org/condition/chronic-pain   [facility]  https://www.[facility].nih.gov/health/chronic-pain-in-depth   [employer]roke  https://www.ninds.nih.gov/Disorders/All-Disorders/Chronic-Pain-Information-Page   Last Reviewed Date   2021-07-09  Consumer Information Use and Disclaimer   This information is not specific medical advice and does not replace information you receive from your health care provider. This is only a brief summary of general information. It does NOT include all information about conditions, illnesses, injuries, tests, procedures, treatments, therapies, discharge instructions or life-style choices that may apply to you. You must talk with your health care provider for complete information about your health and treatment options. This information should not be used to decide whether or not to accept your health care providers advice, instructions or recommendations. Only your health care provider has the knowledge and training to provide advice that is right for you.  Copyright   Copyright © 2021 [employer] and its affiliates and/or licensors. All rights reserved.

## 2022-01-04 NOTE — PROGRESS NOTES
Subjective:      Patient ID: Jean Pierre Paulson is a 36 y.o. male.    Chief Complaint: Non-healing Wound Follow Up and Diabetic Foot Ulcer (Right foot)    Jean Pierre Paulson a 36 y.o. male presents for follow up on all regular problems which are reviewed and discussed.     Problem List Items Addressed This Visit        Neuro    Diabetic neuropathy with neurologic complication (Chronic)       Derm    Ulcer of right foot with necrosis of bone - Primary       Cardiac/Vascular    Peripheral vascular disease (Chronic)       Endocrine    Diabetes with skin ulcer       Other    Lower extremity edema      Other Visit Diagnoses     Ulcer of right heel, with necrosis of bone        Peripheral vascular disease, unspecified        Benign hypertension        Essential hypertension, malignant        Diabetic ulcer of midfoot associated with diabetes mellitus due to underlying condition, with necrosis of bone, unspecified laterality        Ulcer of right foot, with necrosis of bone              Past Medical History:  Past Medical History:   Diagnosis Date    Anemia     Diabetes mellitus, type 2     Hypertension     Neuropathy     Obesity     Osteomyelitis 10/2020    Hx of R foot, Tx with IV Abx    Peripheral vascular disease      Past Surgical History:   Procedure Laterality Date    CHOLECYSTECTOMY      DEBRIDEMENT OF FOOT Right 10/2020    FOOT SURGERY      Right diabetic foot ulcer Elliott's Grade 3      Bone exposure to R heel, Hx of IV Abx, cultures show multiple bacterial growth, Hx of Osteomyelitis, HBOT     Review of patient's allergies indicates:   Allergen Reactions    Tomato Itching     Current Outpatient Medications on File Prior to Visit   Medication Sig Dispense Refill    amLODIPine (NORVASC) 10 MG tablet Take 1 tablet (10 mg total) by mouth once daily. 30 tablet 0    blood sugar diagnostic (BLOOD GLUCOSE TEST) Strp 1 strip by Misc.(Non-Drug; Combo Route) route once daily. accu-chek Veronika strips      blood-glucose  meter kit 1 each by Other route 3 (three) times daily. 1 each 0    ferrous sulfate 325 (65 FE) MG EC tablet Take 325 mg by mouth every 48 hours.       gabapentin (NEURONTIN) 300 MG capsule Take 1 capsule (300 mg total) by mouth every 8 (eight) hours. 90 capsule 1    hydroCHLOROthiazide (HYDRODIURIL) 50 MG tablet Take 50 mg by mouth every other day.      HYDROcodone-acetaminophen (NORCO)  mg per tablet Take 1 tablet by mouth once daily. 30 tablet 0    [START ON 2/2/2022] HYDROcodone-acetaminophen (NORCO)  mg per tablet Take 1 tablet by mouth once daily. 30 tablet 0    lisinopriL (PRINIVIL,ZESTRIL) 20 MG tablet TAKE 1 TABLET EVERY DAY (Patient not taking: No sig reported) 90 tablet 0    rosuvastatin (CRESTOR) 10 MG tablet TAKE 1 TABLET EVERY DAY 90 tablet 0    SSD 1 % cream Apply 1 application topically once daily. Right foot wound       Current Facility-Administered Medications on File Prior to Visit   Medication Dose Route Frequency Provider Last Rate Last Admin    silver nitrate applicators applicator 2 applicator  2 applicator Topical (Top) Once Tristan Topete III, DO         Social History     Socioeconomic History    Marital status: Single   Tobacco Use    Smoking status: Former Smoker    Smokeless tobacco: Never Used   Substance and Sexual Activity    Alcohol use: Yes     Comment: occasionally    Drug use: Yes     Types: Hydrocodone    Sexual activity: Yes     Family History   Problem Relation Age of Onset    Hypertension Father     Diabetes Father        Review of Systems   Constitutional: Negative.    HENT: Negative for congestion, ear pain, nosebleeds and trouble swallowing.    Eyes: Negative for pain and itching.   Respiratory: Negative for chest tightness.    Cardiovascular: Negative for chest pain.   Gastrointestinal: Negative for abdominal distention.   Endocrine: Negative for cold intolerance and heat intolerance.   Genitourinary: Negative for difficulty urinating.    Musculoskeletal: Negative for arthralgias.   Skin: Positive for color change and wound.   Neurological: Negative for dizziness.       Objective:     BP (!) 172/106   Pulse 80   Temp 97.5 °F (36.4 °C)   Resp 20     Physical Exam  Constitutional:       Appearance: Normal appearance. He is obese.   HENT:      Head: Normocephalic and atraumatic.      Right Ear: External ear normal.      Left Ear: External ear normal.      Nose: Nose normal.      Mouth/Throat:      Mouth: Mucous membranes are moist.      Pharynx: Oropharynx is clear.   Eyes:      Pupils: Pupils are equal, round, and reactive to light.   Cardiovascular:      Rate and Rhythm: Normal rate and regular rhythm.      Heart sounds: Normal heart sounds.   Pulmonary:      Effort: Pulmonary effort is normal.      Breath sounds: Normal breath sounds.   Abdominal:      Palpations: Abdomen is soft.   Musculoskeletal:         General: Normal range of motion.      Cervical back: Normal range of motion and neck supple.   Skin:     General: Skin is warm and dry.   Neurological:      General: No focal deficit present.      Mental Status: He is alert.   Psychiatric:         Mood and Affect: Mood normal.         Behavior: Behavior normal.         Thought Content: Thought content normal.         Judgment: Judgment normal.       Assessment:     1. Ulcer of right foot with necrosis of bone    2. Lower extremity edema    3. Ulcer of right heel, with necrosis of bone    4. Diabetes with skin ulcer    5. Peripheral vascular disease, unspecified    6. Peripheral vascular disease    7. Benign hypertension    8. Diabetic neuropathy with neurologic complication    9. Essential hypertension, malignant    10. Diabetic ulcer of midfoot associated with diabetes mellitus due to underlying condition, with necrosis of bone, unspecified laterality    11. Ulcer of right foot, with necrosis of bone        Plan:     Problem List Items Addressed This Visit        Neuro    Diabetic neuropathy  with neurologic complication (Chronic)       Derm    Ulcer of right foot with necrosis of bone - Primary       Cardiac/Vascular    Peripheral vascular disease (Chronic)       Endocrine    Diabetes with skin ulcer       Other    Lower extremity edema      Other Visit Diagnoses     Ulcer of right heel, with necrosis of bone        Peripheral vascular disease, unspecified        Benign hypertension        Essential hypertension, malignant        Diabetic ulcer of midfoot associated with diabetes mellitus due to underlying condition, with necrosis of bone, unspecified laterality        Ulcer of right foot, with necrosis of bone            Follow up in about 3 weeks (around 1/24/2022).      I am having Jean Pierre Paulson maintain his ferrous sulfate, BLOOD GLUCOSE TEST, amLODIPine, blood-glucose meter, lisinopriL, rosuvastatin, SSD, and hydroCHLOROthiazide. We will continue to administer silver nitrate applicators.    Jean Pierre was seen today for non-healing wound follow up and diabetic foot ulcer.    Diagnoses and all orders for this visit:    Ulcer of right foot with necrosis of bone    Lower extremity edema    Ulcer of right heel, with necrosis of bone    Diabetes with skin ulcer    Peripheral vascular disease, unspecified    Peripheral vascular disease    Benign hypertension    Diabetic neuropathy with neurologic complication    Essential hypertension, malignant    Diabetic ulcer of midfoot associated with diabetes mellitus due to underlying condition, with necrosis of bone, unspecified laterality    Ulcer of right foot, with necrosis of bone         [unfilled]  No orders of the defined types were placed in this encounter.

## 2022-01-13 RX ORDER — DAPAGLIFLOZIN AND METFORMIN HYDROCHLORIDE 5; 1000 MG/1; MG/1
1 TABLET, FILM COATED, EXTENDED RELEASE ORAL DAILY
COMMUNITY
End: 2022-01-13 | Stop reason: SDUPTHER

## 2022-01-13 RX ORDER — DAPAGLIFLOZIN AND METFORMIN HYDROCHLORIDE 5; 1000 MG/1; MG/1
1 TABLET, FILM COATED, EXTENDED RELEASE ORAL DAILY
Qty: 30 TABLET | Refills: 0 | Status: ON HOLD | OUTPATIENT
Start: 2022-01-13 | End: 2022-02-16 | Stop reason: ALTCHOICE

## 2022-02-04 ENCOUNTER — OFFICE VISIT (OUTPATIENT)
Dept: WOUND CARE | Facility: CLINIC | Age: 37
End: 2022-02-04
Attending: NURSE PRACTITIONER
Payer: MEDICARE

## 2022-02-04 VITALS
RESPIRATION RATE: 20 BRPM | DIASTOLIC BLOOD PRESSURE: 93 MMHG | TEMPERATURE: 98 F | SYSTOLIC BLOOD PRESSURE: 132 MMHG | HEART RATE: 84 BPM

## 2022-02-04 DIAGNOSIS — L97.414 ULCER OF RIGHT HEEL, WITH NECROSIS OF BONE: Primary | ICD-10-CM

## 2022-02-04 PROCEDURE — 87070 CULTURE, WOUND: ICD-10-PCS | Mod: ,,, | Performed by: CLINICAL MEDICAL LABORATORY

## 2022-02-04 PROCEDURE — 11045 PR DEB SUBQ TISSUE ADD-ON: ICD-10-PCS | Mod: S$PBB,,, | Performed by: NURSE PRACTITIONER

## 2022-02-04 PROCEDURE — 99499 UNLISTED E&M SERVICE: CPT | Mod: S$PBB,,, | Performed by: NURSE PRACTITIONER

## 2022-02-04 PROCEDURE — 87186 SC STD MICRODIL/AGAR DIL: CPT | Mod: 91,,, | Performed by: CLINICAL MEDICAL LABORATORY

## 2022-02-04 PROCEDURE — 87077 CULTURE AEROBIC IDENTIFY: CPT | Mod: ,,, | Performed by: CLINICAL MEDICAL LABORATORY

## 2022-02-04 PROCEDURE — 11045 DBRDMT SUBQ TISS EACH ADDL: CPT | Mod: S$PBB,,, | Performed by: NURSE PRACTITIONER

## 2022-02-04 PROCEDURE — 11042 DBRDMT SUBQ TIS 1ST 20SQCM/<: CPT | Mod: S$PBB,,, | Performed by: NURSE PRACTITIONER

## 2022-02-04 PROCEDURE — 99215 OFFICE O/P EST HI 40 MIN: CPT | Mod: PBBFAC,25 | Performed by: NURSE PRACTITIONER

## 2022-02-04 PROCEDURE — 87075 CULTURE, ANAEROBE: ICD-10-PCS | Mod: ,,, | Performed by: CLINICAL MEDICAL LABORATORY

## 2022-02-04 PROCEDURE — 97598 DBRDMT OPN WND ADDL 20CM/<: CPT | Mod: PBBFAC | Performed by: NURSE PRACTITIONER

## 2022-02-04 PROCEDURE — 87077 CULTURE, WOUND: ICD-10-PCS | Mod: ,,, | Performed by: CLINICAL MEDICAL LABORATORY

## 2022-02-04 PROCEDURE — 87076 CULTURE, ANAEROBE: ICD-10-PCS | Mod: ,,, | Performed by: CLINICAL MEDICAL LABORATORY

## 2022-02-04 PROCEDURE — 87075 CULTR BACTERIA EXCEPT BLOOD: CPT | Mod: ,,, | Performed by: CLINICAL MEDICAL LABORATORY

## 2022-02-04 PROCEDURE — 97597 DBRDMT OPN WND 1ST 20 CM/<: CPT | Mod: PBBFAC | Performed by: NURSE PRACTITIONER

## 2022-02-04 PROCEDURE — 87076 CULTURE ANAEROBE IDENT EACH: CPT | Mod: ,,, | Performed by: CLINICAL MEDICAL LABORATORY

## 2022-02-04 PROCEDURE — 87186 CULTURE, WOUND: ICD-10-PCS | Mod: ,,, | Performed by: CLINICAL MEDICAL LABORATORY

## 2022-02-04 PROCEDURE — 99499 NO LOS: ICD-10-PCS | Mod: S$PBB,,, | Performed by: NURSE PRACTITIONER

## 2022-02-04 PROCEDURE — 11042 PR DEBRIDEMENT, SKIN, SUB-Q TISSUE,=<20 SQ CM: ICD-10-PCS | Mod: S$PBB,,, | Performed by: NURSE PRACTITIONER

## 2022-02-04 PROCEDURE — 87070 CULTURE OTHR SPECIMN AEROBIC: CPT | Mod: ,,, | Performed by: CLINICAL MEDICAL LABORATORY

## 2022-02-04 RX ORDER — SODIUM HYPOCHLORITE 5 MG/ML
SOLUTION TOPICAL DAILY
Qty: 473 ML | Refills: 5 | Status: SHIPPED | OUTPATIENT
Start: 2022-02-04 | End: 2022-02-23

## 2022-02-04 RX ORDER — CLINDAMYCIN HYDROCHLORIDE 300 MG/1
300 CAPSULE ORAL EVERY 8 HOURS
Qty: 90 CAPSULE | Refills: 0 | Status: ON HOLD | OUTPATIENT
Start: 2022-02-04 | End: 2022-02-23 | Stop reason: HOSPADM

## 2022-02-04 NOTE — PATIENT INSTRUCTIONS
Clean wound with baby shampoo and water then spray with prescribed foot spray    Apply skin barrier to wound edges    Apply silvadene to wound bed    Cover with dry gauze,wrap with kerlix,paper tape    Change daily and as needed    Elevate feet as much as possible    No prolong standing    Increase protein as tolerated    Clindamycin 300 mg by mouth 2 x day      Patient Education       Diabetic Foot Ulcer Discharge Instructions   About this topic   Diabetes is an illness that makes your blood sugar too high. If your blood sugar is not controlled, you may have problems with your nerves and blood vessels. This can cause loss of feeling or numbness in your feet. You may not feel if you have a cut, blister, or sore on your foot. You may also have poor blood flow to your feet. This makes it harder for a wound to heal. Diabetes can also cause the skin on your feet to become very dry, leading to peeling and cracking of the skin. These are all reasons that people with diabetes are more likely to get sores on their feet. If the sore is not taken care of, it can become infected. This may lead to serious problems.     What care is needed at home?   · Ask your doctor what you need to do when you go home. Make sure you ask questions if you do not understand what the doctor says. This way you will know what you need to do.  · Talk to your doctor about how to care for your sores. Ask your doctor about:  ? When you should change your bandages  ? When you may take a bath or shower  ? If you need to use special creams or bandages on your sore  ? If you need to be careful with putting pressure on the sore. Your doctor may want you to use a wheelchair, cane, or crutches.  ? When you may go back to your normal activities like work or driving.  · Be sure to wash your hands before and after touching your wound or dressing.  · Raise your feet while in bed or when sitting in a chair to stop swelling and help the sore get better  faster.  What follow-up care is needed?   · Your doctor may ask you to make visits to the office to check on your progress. Be sure to keep these visits.  · Your doctor may have you go see a specialist. You may need to see a foot doctor called a podiatrist or a blood vessel doctor called a vascular specialist.  What drugs may be needed?   The doctor may order drugs to:  · Help with pain  · Lower swelling  · Fight an infection  · Help increase blood flow  Will physical activity be limited?   You may have to limit your activity. Talk to your doctor about the right amount of activity for you.  What changes to diet are needed?   Eating a healthy diet is important during this time. Work to keep your blood sugar within your goal ranges. Talk to your doctor if you are not sure of your blood sugar goals. This means:  · Eat whole grain foods and foods high in fiber.  · Choose many different fruits and vegetables. Fresh or frozen is best.  · Cut back on solid fats like butter or margarine. Eat less fatty or processed foods.  · Eat more low-fat or lean meats like chicken, fish, or turkey. Eat less red meat.  · Limit beer, wine, and mixed drinks (alcohol).  · Limit caffeine.  · If you need help, ask to see a dietitian.  What problems could happen?   · Infection  · The skin in and around the sore can die  · Amputation  What can be done to prevent this health problem?   · Take care of your diabetes and keep your blood sugar controlled.  · Know your blood sugar goals.  · Stop smoking. Smoking damages all the blood vessels in your body, including those in your feet. Many people who need amputations are smokers. If you need help with quitting, talk to your doctor.  · Take care of your feet.  ? Wash your feet each day with soap and warm water. Dry them carefully and check for any signs of sores or wounds. If you are unable to see the bottom of your feet, use a mirror or ask someone else to look at them. It is important to check the  bottom of your feet and toes, so you can catch any changes.  ? Put on lotion or moisturizer. Avoid lotion in the areas between your toes, as the extra moisture can cause infection.  ? Do not soak your feet, as soaking can cause dry skin.  ? Trim your nails straight across to avoid harming the skin.  · Protect your feet.  ? Do not walk barefoot. Wear shoes at all times, even in the house.  ? Wear shoes and socks that fit properly. Ask about special shoes that may be covered by insurance with a prescription.  ? Before putting on your shoes, check the inside for small items such as a pebble. If you have decreased feeling in your feet, walking on such items could cause injury.  ? If your feet are cold, wear warm socks. Do not use heating pads, any kind of heater, or soak your feet to get them warm.  ? Talk to your doctor about using a pumice stone to prevent calluses from forming.  · See your doctor if:  ? You need to have a corn or callus taken off. Do not attempt to remove corns and calluses yourself by cutting them or using chemicals.  ? You have sores or blisters on your feet.  ? You step on an object that cuts or penetrates the sole of your foot  When do I need to call the doctor?   · Signs of infection. These include a fever of 100.4°F (38°C) or higher, chills, or sore that will not heal.  · Signs of wound infection. These include swelling, redness, warmth around the wound; too much pain when touched; yellowish, greenish, or bloody discharge; foul smell coming from the wound; wound opens up.  · New blisters, cuts, or sores on your foot  · Feet or legs are numb  · Black or dead tissue in or around your ulcer  · Sore is not better or is getting worse  Teach Back: Helping You Understand   The Teach Back Method helps you understand the information we are giving you. After you talk with the staff, tell them in your own words what you learned. This helps to make sure the staff has described each thing clearly. It also  helps to explain things that may have been confusing. Before going home, make sure you can do these:  · I can tell you about my condition.  · I can tell you how I will take care of my sore and my feet.  · I can tell you what I will do if I have a new sore or signs of a wound infection.  Where can I learn more?   Centers for Disease Control and Prevention  https://www.cdc.gov/diabetes/library/features/healthy-feet.html   Last Reviewed Date   2021-04-13  Consumer Information Use and Disclaimer   This information is not specific medical advice and does not replace information you receive from your health care provider. This is only a brief summary of general information. It does NOT include all information about conditions, illnesses, injuries, tests, procedures, treatments, therapies, discharge instructions or life-style choices that may apply to you. You must talk with your health care provider for complete information about your health and treatment options. This information should not be used to decide whether or not to accept your health care providers advice, instructions or recommendations. Only your health care provider has the knowledge and training to provide advice that is right for you.  Copyright   Copyright © 2021 [employer] and its affiliates and/or licensors. All rights reserved.  Patient Education       Wound Care Discharge Instructions   About this topic   A wound is an injury to the skin that breaks the barrier to the outside. The skin protects the inside of the body from the outside world. When the skin is damaged or broken open, the wound can become infected or bleed.  Cuts and scrapes are one type of wound. You may also hear these called abrasions. Scrapes on the surface leave deeper skin layers in place. These are often caused by friction or rubbing against a rough surface.  Another kind is a puncture wound. This comes from something like a bite or stepping on a nail. Puncture wounds are  caused by a pointed or sharp object, such as a nail, needle, or tooth entering the skin. Bleeding can be very little, and the wound may be barely obvious. Bites from a human or an animal always have germs in them and need extra care.  A laceration is a cut on your skin. It is most often caused by a sharp object like a knife blade, glass, or from other things with sharp edges. If the cut is shallow, it does not need stitches.     What care is needed at home?   · Ask your doctor what you need to do when you go home. Make sure you ask questions if you do not understand what the doctor says.  · The doctor may want you to keep your wound covered as it heals. You may want to use a thin layer of antibiotic ointment to help keep the wound moist. This will also keep the dressing from sticking to the wound.  · After 24 hours, you can gently wash the wound with soap and water. Pat dry and put on a clean dressing. A telfa pad will not stick to the wound.  · Change your dressing once a day or every other day.  · Always wash your hands before and after touching the wound.  · Each time you change the dressing, look closely at the wound to be sure it is healing the right way. The wound may have a yellowish discharge, and this is normal.  · Avoid picking the scab or scratching the site which may cause more irritation.  · Do not soak in water or swim with an open wound.  · Do not rub the wound or take off any small strips of tape.  What follow-up care is needed?   Your doctor may ask you to make visits to the office to check on your progress. Be sure to keep these visits.  What drugs may be needed?   The doctor may order drugs to:  · Help with pain  · Prevent or fight an infection  You may need to have a tetanus shot.  Will physical activity be limited?   You may need to limit your activity until your wound is fully healed. Talk to your doctor about the right amount of activity for you or when it is safe to return to sports.  What  problems could happen?   · Bleeding  · Infection  · Scarring  · Poor healing  When do I need to call the doctor?   · Signs of infection. These include a fever of 100.4°F (38°C) or higher, chills, or wound that will not heal.  · The pain in and around the area gets much worse.  · There is a bad smell or pus (thick yellow, green, or gray fluid) coming from your wound.  · You notice a crunchy feeling or blisters in the skin around the wound.  · The redness around your wound gets bigger or is spreading up your arm or leg.  · Fluid that is not pus drains from your wound.  · Your swelling doesnt improve or gets worse.  Teach Back: Helping You Understand   The Teach Back Method helps you understand the information we are giving you. After you talk with the staff, tell them in your own words what you learned. This helps to make sure the staff has described each thing clearly. It also helps to explain things that may have been confusing. Before going home, make sure you can do these:  · I can tell you about my wound.  · I can tell you how to care for my wound.  · I can tell you what I will do if I have swelling, redness, or warmth around my wound.  Where can I learn more?   American Academy of Pediatrics  https://www.healthychildren.org/English/health-issues/injuries-emergencies/Pages/Treating-Cuts.aspx   Better Health Channel  https://www.betterhealth.surendra.gov.au/health/conditionsandtreatments/skin-cuts-and-abrasions   Kids Health  http://kidshealth.org/en/teens/wounds.html?ref=search   Last Reviewed Date   2021-10-08  Consumer Information Use and Disclaimer   This information is not specific medical advice and does not replace information you receive from your health care provider. This is only a brief summary of general information. It does NOT include all information about conditions, illnesses, injuries, tests, procedures, treatments, therapies, discharge instructions or life-style choices that may apply to you. You  must talk with your health care provider for complete information about your health and treatment options. This information should not be used to decide whether or not to accept your health care providers advice, instructions or recommendations. Only your health care provider has the knowledge and training to provide advice that is right for you.  Copyright   Copyright © 2021 [employer] and its affiliates and/or licensors. All rights reserved.

## 2022-02-04 NOTE — PROGRESS NOTES
See wound assessment. See debridement note. Aquacel ag/dry dressing daily. RTC 2 weeks. Refill [place] antibiotic spray called in. Start clindamycin 300 mg po BID. Cultures done.

## 2022-02-04 NOTE — PROGRESS NOTES
AUGUSTUS Macedo   [facility] - WOUND CARE  [address] [facility] [place] [address]  [phone]      PATIENT NAME: Jean Pierre Paulson  : 1985  DATE: 22  MRN: [id number]      Billing Provider: AUGUSTUS Macedo  Level of Service:   Patient PCP Information     Provider PCP Type    Mojgan Lomeli NP General          Reason for Visit / Chief Complaint: Non-healing Wound Follow Up and Diabetic Foot Ulcer (Right plantar foot)       History of Present Illness / Problem Focused Workflow     Jean Pierre Paulson presents to the clinic with 36 y.o. male who presents to clinic for follow up on chronic-non healing wound on the right Foot and Heel. Patient is semi-compliant with current treatment plan. He reports he ran out of supplies to change his dressing.  Pertinent factors that delay wound healing include multiple co-morbidities, poor vascular supply, diabetes, edema, heavy drainage, excessive wound moisture and macerated tissue, large surface area, large volume, no protective sensation, infection, and history of poor compliance. Past interventions include silvadene, debridements, and antibiotics.  Reports odor from wound and large amount of drainage.          Review of Systems     Review of Systems   Constitutional: Negative for activity change, chills and fever.   Respiratory: Negative for chest tightness and shortness of breath.    Cardiovascular: Positive for leg swelling. Negative for chest pain and palpitations.   Musculoskeletal: Positive for joint swelling.   Skin: Positive for wound.        See wound assessment, strong odor noted from wound today     Neurological: Positive for weakness and numbness.   Psychiatric/Behavioral: Negative for agitation, behavioral problems, confusion and self-injury.       Medical / Social / Family History     Past Medical History:   Diagnosis Date    Anemia     Diabetes mellitus, type 2     Hypertension     Neuropathy     Obesity      Osteomyelitis 10/2020    Hx of R foot, Tx with IV Abx    Peripheral vascular disease        Past Surgical History:   Procedure Laterality Date    CHOLECYSTECTOMY      DEBRIDEMENT OF FOOT Right 10/2020    FOOT SURGERY      Right diabetic foot ulcer Elliott's Grade 3      Bone exposure to R heel, Hx of IV Abx, cultures show multiple bacterial growth, Hx of Osteomyelitis, HBOT       Social History  Mr. Jean Pierre Paulson  reports that he has quit smoking. He has never used smokeless tobacco. He reports current alcohol use. He reports current drug use. Drug: Hydrocodone.    Family History  Mr.'s Jean Pierre Paulson family history includes Diabetes in his father; Hypertension in his father.    Medications and Allergies     Medications  No outpatient medications have been marked as taking for the 2/4/22 encounter (Office Visit) with AUGUSTUS Macedo.     Current Facility-Administered Medications for the 2/4/22 encounter (Office Visit) with AUGUSTUS Macedo   Medication Dose Route Frequency Provider Last Rate Last Admin    silver nitrate applicators applicator 2 applicator  2 applicator Topical (Top) Once Tristan Topete III, DO           Allergies  Review of patient's allergies indicates:   Allergen Reactions    Tomato Itching       Physical Examination     Vitals:    02/04/22 1109   BP: (!) 132/93   Pulse: 84   Resp: 20   Temp: 97.5 °F (36.4 °C)     Physical Exam  Vitals and nursing note reviewed.   Constitutional:       Appearance: Normal appearance.   HENT:      Head: Normocephalic.   Cardiovascular:      Rate and Rhythm: Normal rate and regular rhythm.      Pulses: Normal pulses.      Heart sounds: Normal heart sounds.   Pulmonary:      Effort: Pulmonary effort is normal. No respiratory distress.   Chest:      Chest wall: No tenderness.   Musculoskeletal:         General: Swelling present.      Right lower leg: Edema present.   Skin:     General: Skin is warm and dry.      Capillary Refill: Capillary refill  takes more than 3 seconds.      Comments: See wound assessment    Neurological:      General: No focal deficit present.      Mental Status: He is alert and oriented to person, place, and time. Mental status is at baseline.   Psychiatric:         Mood and Affect: Mood normal.         Behavior: Behavior normal.         Thought Content: Thought content normal.         Judgment: Judgment normal.         Assessment and Plan      No diagnosis found.       Wound 10/21/20 Diabetic Ulcer Right plantar Foot #1 (Active)   10/21/20     Pre-existing: Yes   Primary Wound Type: Diabetic ulc   Side: Right   Orientation: plantar   Location: Foot   Wound Number: #1   Ankle-Brachial Index:    Pulses: normal   Removal Indication and Assessment:    Wound Outcome:    (Retired) Wound Type:    (Retired) Wound Length (cm):    (Retired) Wound Width (cm):    (Retired) Depth (cm):    Wound Description (Comments):    Removal Indications:    Wound Image    02/04/22 1112   Dressing Appearance Dry;Intact;Dried drainage 02/04/22 1112   Drainage Amount Moderate 02/04/22 1112   Drainage Characteristics/Odor Serosanguineous 02/04/22 1112   Appearance Pink;Moist;Granulating 02/04/22 1112   Tissue loss description Full thickness 02/04/22 1112   Black (%), Wound Tissue Color 0 % 02/04/22 1112   Red (%), Wound Tissue Color 90 % 02/04/22 1112   Yellow (%), Wound Tissue Color 10 % 02/04/22 1112   Periwound Area Moist 02/04/22 1112   Wound Edges Callused 02/04/22 1112   Elliott Classification (diabetic foot ulcers only) Grade 3 02/04/22 1112   Wound Length (cm) 5.6 cm 02/04/22 1112   Wound Width (cm) 11.1 cm 02/04/22 1112   Wound Depth (cm) 0.6 cm 02/04/22 1112   Wound Volume (cm^3) 37.296 cm^3 02/04/22 1112   Wound Surface Area (cm^2) 62.16 cm^2 02/04/22 1112   Care Cleansed with:;Antimicrobial agent 02/04/22 1112   Dressing Applied;Silver;Gauze;Rolled gauze 02/04/22 1112   Dressing Change Due 02/05/22 02/04/22 1112         Active Problem List with Overview  Notes    Diagnosis Date Noted    Type 2 diabetes mellitus with right diabetic foot ulcer 10/08/2021    Sarcoidosis 10/08/2021    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  You may remove all bandages and shower prior to dressing changes. Bathe with a mild soap such as Dove. Do not sure antibacterial soaps such as Dial. Irrigate the wound with tepid water for 5 minutes. Dry thoroughly.  Do not take tub baths or soak in a Jacuzzi or swimming pool.  If you are unable to shower, you may use wound  or saline wash to cleanse the wound.  Elevate your leg above your heart for 30 to 45 minutes twice daily.  Elevated glucose will interfere with the healing process. Carbohydrates should be consumed sparingly and avoid anything with white flour, white sugar, white rice, white pasta, and white bread. Use whole wheat whole grain products instead.  If you are on steroids or immunosuppressants, understand that this will delay the healing process.  Monitor for signs and symptoms of infection including fever, chills, purulent drainage, increased pain, swelling, or redness and notify clinic at [phone]  Lifestyle Modifications: begin progressive daily aerobic exercise program, follow a low fat, low cholesterol diet, attempt to lose weight, decrease or avoid alcohol intake, reduce salt in diet and cooking, improve dietary compliance and continue current medications  Follow up in 2 weeks      Problem List Items Addressed This Visit     None         Future Appointments   Date Time Provider Department Center   2/7/2022  8:30 AM AWV NURSE, [facility] FAMILY MEDIC[facility] [place]   2/25/2022  9:30 AM ROOM 4, [facility] WOUND NDC [facility]   3/3/2022 10:45 AM VONNIE Ma PNTRE [facility]            Signature:  AUGUSTUS Macedo  [facility] - WOUND CARE  [address] [facility] [address]  [phone]    Date of encounter: 2/4/22

## 2022-02-07 LAB
MICROORGANISM SPEC CULT: ABNORMAL

## 2022-02-08 ENCOUNTER — TELEPHONE (OUTPATIENT)
Dept: WOUND CARE | Facility: CLINIC | Age: 37
End: 2022-02-08
Payer: MEDICARE

## 2022-02-08 DIAGNOSIS — L98.499 DIABETES WITH SKIN ULCER: ICD-10-CM

## 2022-02-08 DIAGNOSIS — E11.622 DIABETES WITH SKIN ULCER: ICD-10-CM

## 2022-02-08 DIAGNOSIS — L97.514 ULCER OF RIGHT FOOT WITH NECROSIS OF BONE: Primary | ICD-10-CM

## 2022-02-08 RX ORDER — GENTAMICIN SULFATE 1 MG/G
CREAM TOPICAL 3 TIMES DAILY
Qty: 30 G | Refills: 5 | Status: ON HOLD | OUTPATIENT
Start: 2022-02-08 | End: 2022-02-23 | Stop reason: HOSPADM

## 2022-02-08 RX ORDER — AMOXICILLIN AND CLAVULANATE POTASSIUM 875; 125 MG/1; MG/1
1 TABLET, FILM COATED ORAL EVERY 12 HOURS
Qty: 28 TABLET | Refills: 0 | Status: ON HOLD | OUTPATIENT
Start: 2022-02-08 | End: 2022-02-23 | Stop reason: HOSPADM

## 2022-02-09 LAB — BACTERIA SPEC ANAEROBE CULT: ABNORMAL

## 2022-02-09 RX ORDER — HYDROCHLOROTHIAZIDE 25 MG/1
25 TABLET ORAL DAILY
Qty: 30 TABLET | Refills: 0 | OUTPATIENT
Start: 2022-02-09

## 2022-02-09 RX ORDER — HYDROCHLOROTHIAZIDE 25 MG/1
25 TABLET ORAL DAILY
Qty: 30 TABLET | Refills: 11 | OUTPATIENT
Start: 2022-02-09 | End: 2023-02-09

## 2022-02-09 RX ORDER — HYDROCHLOROTHIAZIDE 25 MG/1
25 TABLET ORAL DAILY
Qty: 90 TABLET | Refills: 1 | Status: SHIPPED | OUTPATIENT
Start: 2022-02-09 | End: 2022-07-27 | Stop reason: SDUPTHER

## 2022-02-11 ENCOUNTER — TELEPHONE (OUTPATIENT)
Dept: WOUND CARE | Facility: CLINIC | Age: 37
End: 2022-02-11
Payer: MEDICARE

## 2022-02-11 NOTE — TELEPHONE ENCOUNTER
Contacted patient regarding culture results and scheduled f/u for Tuesday. Patient aware of possible admission and informed to bring essentials to appointment.

## 2022-02-15 ENCOUNTER — OFFICE VISIT (OUTPATIENT)
Dept: WOUND CARE | Facility: CLINIC | Age: 37
End: 2022-02-15
Attending: NURSE PRACTITIONER
Payer: MEDICARE

## 2022-02-15 ENCOUNTER — HOSPITAL ENCOUNTER (INPATIENT)
Facility: HOSPITAL | Age: 37
LOS: 21 days | Discharge: HOME HEALTH | DRG: 623 | End: 2022-03-08
Attending: FAMILY MEDICINE | Admitting: INTERNAL MEDICINE
Payer: MEDICARE

## 2022-02-15 VITALS
DIASTOLIC BLOOD PRESSURE: 98 MMHG | TEMPERATURE: 97 F | SYSTOLIC BLOOD PRESSURE: 189 MMHG | RESPIRATION RATE: 20 BRPM | HEART RATE: 94 BPM

## 2022-02-15 DIAGNOSIS — E11.622 DIABETES WITH SKIN ULCER: ICD-10-CM

## 2022-02-15 DIAGNOSIS — L97.414 ULCER OF RIGHT HEEL, WITH NECROSIS OF BONE: ICD-10-CM

## 2022-02-15 DIAGNOSIS — E13.621 FOOT ULCER DUE TO SECONDARY DM: ICD-10-CM

## 2022-02-15 DIAGNOSIS — L97.509 FOOT ULCER DUE TO SECONDARY DM: ICD-10-CM

## 2022-02-15 DIAGNOSIS — E11.621 CHRONIC DIABETIC ULCER OF RIGHT FOOT DETERMINED BY EXAMINATION: ICD-10-CM

## 2022-02-15 DIAGNOSIS — E11.40 DIABETIC NEUROPATHY WITH NEUROLOGIC COMPLICATION: Primary | Chronic | ICD-10-CM

## 2022-02-15 DIAGNOSIS — L97.516 NON-PRESSURE CHRONIC ULCER OF OTHER PART OF RIGHT FOOT WITH BONE INVOLVEMENT WITHOUT EVIDENCE OF NECROSIS: ICD-10-CM

## 2022-02-15 DIAGNOSIS — L97.519 CHRONIC DIABETIC ULCER OF RIGHT FOOT DETERMINED BY EXAMINATION: ICD-10-CM

## 2022-02-15 DIAGNOSIS — L98.499 DIABETES WITH SKIN ULCER: ICD-10-CM

## 2022-02-15 DIAGNOSIS — E11.49 DIABETIC NEUROPATHY WITH NEUROLOGIC COMPLICATION: Primary | Chronic | ICD-10-CM

## 2022-02-15 DIAGNOSIS — L97.519 TYPE 2 DIABETES MELLITUS WITH RIGHT DIABETIC FOOT ULCER: ICD-10-CM

## 2022-02-15 DIAGNOSIS — E11.621 TYPE 2 DIABETES MELLITUS WITH RIGHT DIABETIC FOOT ULCER: ICD-10-CM

## 2022-02-15 DIAGNOSIS — I73.9 PERIPHERAL VASCULAR DISEASE: Primary | ICD-10-CM

## 2022-02-15 LAB
ANION GAP SERPL CALCULATED.3IONS-SCNC: 12 MMOL/L (ref 7–16)
BASOPHILS # BLD AUTO: 0.05 K/UL (ref 0–0.2)
BASOPHILS NFR BLD AUTO: 0.8 % (ref 0–1)
BUN SERPL-MCNC: 28 MG/DL (ref 7–18)
BUN/CREAT SERPL: 22 (ref 6–20)
CALCIUM SERPL-MCNC: 8.6 MG/DL (ref 8.5–10.1)
CHLORIDE SERPL-SCNC: 103 MMOL/L (ref 98–107)
CO2 SERPL-SCNC: 29 MMOL/L (ref 21–32)
CREAT SERPL-MCNC: 1.29 MG/DL (ref 0.7–1.3)
DIFFERENTIAL METHOD BLD: ABNORMAL
EOSINOPHIL # BLD AUTO: 0.17 K/UL (ref 0–0.5)
EOSINOPHIL NFR BLD AUTO: 2.6 % (ref 1–4)
ERYTHROCYTE [DISTWIDTH] IN BLOOD BY AUTOMATED COUNT: 13.9 % (ref 11.5–14.5)
EST. AVERAGE GLUCOSE BLD GHB EST-MCNC: 174 MG/DL
GLUCOSE SERPL-MCNC: 131 MG/DL (ref 70–105)
GLUCOSE SERPL-MCNC: 165 MG/DL (ref 74–106)
GLUCOSE SERPL-MCNC: 215 MG/DL (ref 70–105)
HBA1C MFR BLD HPLC: 7.8 % (ref 4.5–6.6)
HCT VFR BLD AUTO: 35.8 % (ref 40–54)
HGB BLD-MCNC: 11.1 G/DL (ref 13.5–18)
IMM GRANULOCYTES # BLD AUTO: 0.01 K/UL (ref 0–0.04)
IMM GRANULOCYTES NFR BLD: 0.2 % (ref 0–0.4)
LYMPHOCYTES # BLD AUTO: 2.65 K/UL (ref 1–4.8)
LYMPHOCYTES NFR BLD AUTO: 40.5 % (ref 27–41)
MCH RBC QN AUTO: 26 PG (ref 27–31)
MCHC RBC AUTO-ENTMCNC: 31 G/DL (ref 32–36)
MCV RBC AUTO: 83.8 FL (ref 80–96)
MONOCYTES # BLD AUTO: 0.54 K/UL (ref 0–0.8)
MONOCYTES NFR BLD AUTO: 8.3 % (ref 2–6)
MPC BLD CALC-MCNC: 9.8 FL (ref 9.4–12.4)
NEUTROPHILS # BLD AUTO: 3.12 K/UL (ref 1.8–7.7)
NEUTROPHILS NFR BLD AUTO: 47.6 % (ref 53–65)
NRBC # BLD AUTO: 0 X10E3/UL
NRBC, AUTO (.00): 0 %
PLATELET # BLD AUTO: 301 K/UL (ref 150–400)
POTASSIUM SERPL-SCNC: 4.4 MMOL/L (ref 3.5–5.1)
RBC # BLD AUTO: 4.27 M/UL (ref 4.6–6.2)
SODIUM SERPL-SCNC: 140 MMOL/L (ref 136–145)
WBC # BLD AUTO: 6.54 K/UL (ref 4.5–11)

## 2022-02-15 PROCEDURE — 63600175 PHARM REV CODE 636 W HCPCS

## 2022-02-15 PROCEDURE — 25000003 PHARM REV CODE 250

## 2022-02-15 PROCEDURE — 99214 OFFICE O/P EST MOD 30 MIN: CPT | Mod: S$PBB,,, | Performed by: NURSE PRACTITIONER

## 2022-02-15 PROCEDURE — C9399 UNCLASSIFIED DRUGS OR BIOLOG: HCPCS

## 2022-02-15 PROCEDURE — 87040 BLOOD CULTURE FOR BACTERIA: CPT

## 2022-02-15 PROCEDURE — 36415 COLL VENOUS BLD VENIPUNCTURE: CPT

## 2022-02-15 PROCEDURE — 99215 OFFICE O/P EST HI 40 MIN: CPT | Mod: PBBFAC | Performed by: NURSE PRACTITIONER

## 2022-02-15 PROCEDURE — 82962 GLUCOSE BLOOD TEST: CPT

## 2022-02-15 PROCEDURE — 36415 COLL VENOUS BLD VENIPUNCTURE: CPT | Performed by: INTERNAL MEDICINE

## 2022-02-15 PROCEDURE — 80048 BASIC METABOLIC PNL TOTAL CA: CPT

## 2022-02-15 PROCEDURE — 99223 1ST HOSP IP/OBS HIGH 75: CPT | Mod: AI,GC,, | Performed by: INTERNAL MEDICINE

## 2022-02-15 PROCEDURE — 11000008

## 2022-02-15 PROCEDURE — 99223 PR INITIAL HOSPITAL CARE,LEVL III: ICD-10-PCS | Mod: AI,GC,, | Performed by: INTERNAL MEDICINE

## 2022-02-15 PROCEDURE — 85025 COMPLETE CBC W/AUTO DIFF WBC: CPT

## 2022-02-15 PROCEDURE — 99214 PR OFFICE/OUTPT VISIT, EST, LEVL IV, 30-39 MIN: ICD-10-PCS | Mod: S$PBB,,, | Performed by: NURSE PRACTITIONER

## 2022-02-15 PROCEDURE — 83036 HEMOGLOBIN GLYCOSYLATED A1C: CPT

## 2022-02-15 RX ORDER — ACETAMINOPHEN 325 MG/1
650 TABLET ORAL EVERY 4 HOURS PRN
Status: DISCONTINUED | OUTPATIENT
Start: 2022-02-15 | End: 2022-03-08 | Stop reason: HOSPADM

## 2022-02-15 RX ORDER — TALC
6 POWDER (GRAM) TOPICAL NIGHTLY PRN
Status: DISCONTINUED | OUTPATIENT
Start: 2022-02-15 | End: 2022-03-08 | Stop reason: HOSPADM

## 2022-02-15 RX ORDER — LACTOBACILLUS ACIDOPHILUS 500MM CELL
2 CAPSULE ORAL
Status: DISCONTINUED | OUTPATIENT
Start: 2022-02-16 | End: 2022-03-08 | Stop reason: HOSPADM

## 2022-02-15 RX ORDER — GABAPENTIN 300 MG/1
300 CAPSULE ORAL EVERY 8 HOURS
Status: DISCONTINUED | OUTPATIENT
Start: 2022-02-15 | End: 2022-03-08 | Stop reason: HOSPADM

## 2022-02-15 RX ORDER — CLINDAMYCIN PHOSPHATE 600 MG/50ML
600 INJECTION, SOLUTION INTRAVENOUS
Status: DISCONTINUED | OUTPATIENT
Start: 2022-02-15 | End: 2022-02-15

## 2022-02-15 RX ORDER — HYDROCODONE BITARTRATE AND ACETAMINOPHEN 10; 325 MG/1; MG/1
1 TABLET ORAL EVERY 6 HOURS PRN
Status: DISCONTINUED | OUTPATIENT
Start: 2022-02-15 | End: 2022-03-08 | Stop reason: HOSPADM

## 2022-02-15 RX ORDER — GLUCAGON 1 MG
1 KIT INJECTION
Status: DISCONTINUED | OUTPATIENT
Start: 2022-02-15 | End: 2022-03-08 | Stop reason: HOSPADM

## 2022-02-15 RX ORDER — ONDANSETRON 4 MG/1
8 TABLET, ORALLY DISINTEGRATING ORAL EVERY 8 HOURS PRN
Status: DISCONTINUED | OUTPATIENT
Start: 2022-02-15 | End: 2022-02-15

## 2022-02-15 RX ORDER — HYDROCHLOROTHIAZIDE 12.5 MG/1
25 TABLET ORAL DAILY
Status: DISCONTINUED | OUTPATIENT
Start: 2022-02-15 | End: 2022-03-08 | Stop reason: HOSPADM

## 2022-02-15 RX ORDER — IBUPROFEN 200 MG
16 TABLET ORAL
Status: DISCONTINUED | OUTPATIENT
Start: 2022-02-15 | End: 2022-03-08 | Stop reason: HOSPADM

## 2022-02-15 RX ORDER — ONDANSETRON 4 MG/1
8 TABLET, FILM COATED ORAL EVERY 8 HOURS PRN
Status: DISCONTINUED | OUTPATIENT
Start: 2022-02-15 | End: 2022-03-08 | Stop reason: HOSPADM

## 2022-02-15 RX ORDER — INSULIN ASPART 100 [IU]/ML
1-10 INJECTION, SOLUTION INTRAVENOUS; SUBCUTANEOUS
Status: DISCONTINUED | OUTPATIENT
Start: 2022-02-15 | End: 2022-03-08 | Stop reason: HOSPADM

## 2022-02-15 RX ORDER — POLYETHYLENE GLYCOL 3350 17 G/17G
17 POWDER, FOR SOLUTION ORAL DAILY PRN
Status: DISCONTINUED | OUTPATIENT
Start: 2022-02-15 | End: 2022-03-08 | Stop reason: HOSPADM

## 2022-02-15 RX ORDER — ATORVASTATIN CALCIUM 40 MG/1
40 TABLET, FILM COATED ORAL DAILY
Status: DISCONTINUED | OUTPATIENT
Start: 2022-02-15 | End: 2022-02-28

## 2022-02-15 RX ORDER — IBUPROFEN 200 MG
24 TABLET ORAL
Status: DISCONTINUED | OUTPATIENT
Start: 2022-02-15 | End: 2022-03-08 | Stop reason: HOSPADM

## 2022-02-15 RX ORDER — PROCHLORPERAZINE EDISYLATE 5 MG/ML
5 INJECTION INTRAMUSCULAR; INTRAVENOUS EVERY 6 HOURS PRN
Status: DISCONTINUED | OUTPATIENT
Start: 2022-02-15 | End: 2022-03-08 | Stop reason: HOSPADM

## 2022-02-15 RX ORDER — AMLODIPINE BESYLATE 10 MG/1
10 TABLET ORAL DAILY
Status: DISCONTINUED | OUTPATIENT
Start: 2022-02-15 | End: 2022-03-08 | Stop reason: HOSPADM

## 2022-02-15 RX ADMIN — CLINDAMYCIN PHOSPHATE 600 MG: 600 INJECTION, SOLUTION INTRAVENOUS at 05:02

## 2022-02-15 RX ADMIN — INSULIN ASPART 4 UNITS: 100 INJECTION, SOLUTION INTRAVENOUS; SUBCUTANEOUS at 09:02

## 2022-02-15 RX ADMIN — GABAPENTIN 300 MG: 300 CAPSULE ORAL at 09:02

## 2022-02-15 RX ADMIN — HYDROCODONE BITARTRATE AND ACETAMINOPHEN 1 TABLET: 10; 325 TABLET ORAL at 08:02

## 2022-02-15 RX ADMIN — INSULIN DETEMIR 10 UNITS: 100 INJECTION, SOLUTION SUBCUTANEOUS at 09:02

## 2022-02-15 RX ADMIN — ATORVASTATIN CALCIUM 40 MG: 40 TABLET, FILM COATED ORAL at 02:02

## 2022-02-15 RX ADMIN — INSULIN ASPART 2 UNITS: 100 INJECTION, SOLUTION INTRAVENOUS; SUBCUTANEOUS at 04:02

## 2022-02-15 RX ADMIN — HYDROCHLOROTHIAZIDE 25 MG: 12.5 TABLET ORAL at 02:02

## 2022-02-15 RX ADMIN — GABAPENTIN 300 MG: 300 CAPSULE ORAL at 04:02

## 2022-02-15 RX ADMIN — AMLODIPINE BESYLATE 10 MG: 10 TABLET ORAL at 02:02

## 2022-02-15 NOTE — PROGRESS NOTES
Pharmacist Renal Dose Adjustment Note    Jean Pierre Paulson is a 36 y.o. male being treated with the medication meropenem    Patient Data:    Vital Signs (Most Recent):  Temp: 97.9 °F (36.6 °C) (02/15/22 1430)  Pulse: 83 (02/15/22 1430)  Resp: 16 (02/15/22 1430)  BP: (!) 142/83 (02/15/22 1459)  SpO2: 96 % (02/15/22 1430)   Vital Signs (72h Range):  Temp:  [97.4 °F (36.3 °C)-97.9 °F (36.6 °C)]   Pulse:  [83-94]   Resp:  [16-20]   BP: (142-189)/(82-98)   SpO2:  [96 %]      No results for input(s): CREATININE in the last 168 hours.  Creatinine clearance cannot be calculated (Patient's most recent lab result is older than the maximum 7 days allowed.)    Medication:meropenem dose: 500 mg frequency q8h will be changed to medication:meropenem dose:500 mg frequency:q6h    Pharmacist's Name: Jami Rubi  Pharmacist's Extension: [phone]

## 2022-02-15 NOTE — PROGRESS NOTES
AUGUSTUS Macedo   [facility] - WOUND CARE  [address] [facility] [address]  [phone]      PATIENT NAME: Jean Pierre Paulson  : 1985  DATE: 2/15/22  MRN: [id number]      Billing Provider: AUGUSTUS Macedo  Level of Service:   Patient PCP Information     Provider PCP Type    Mojgan Lomeli NP General          Reason for Visit / Chief Complaint: Non-healing Wound Follow Up and Diabetic Foot Ulcer (Right plantar foot)       History of Present Illness / Problem Focused Workflow     Jean Pierre Paulson is a 36 y.o. male who presents to clinic for follow up on chronic-non healing wound on the right foot. Wound cultures are positive for multiple bacteria and wound has strong odor.  Pertinent factors that delay wound healing include multiple co-morbidities, poor vascular supply, diabetes, edema, heavy drainage, excessive wound moisture and macerated tissue, no protective sensation, infection and history of poor compliance. Admit patient to [facility] for IV antibiotics, HBOT, and debridements.       Review of Systems     Review of Systems   Constitutional: Negative for activity change, chills and fever.   Respiratory: Negative for chest tightness and shortness of breath.    Cardiovascular: Positive for leg swelling. Negative for chest pain and palpitations.   Musculoskeletal: Positive for arthralgias and joint swelling.   Skin: Positive for wound.        See wound assessment   Neurological: Positive for weakness and numbness.   Psychiatric/Behavioral: Negative for agitation, behavioral problems, confusion and self-injury.       Medical / Social / Family History     Past Medical History:   Diagnosis Date    Anemia     Diabetes mellitus with neuropathy     Diabetes mellitus, type 2     DM2 (diabetes mellitus, type 2)     HTN (hypertension)     Hypertension     Neuropathy     Obesity     Osteomyelitis 10/2020    Hx of R foot, Tx with IV Abx    Peripheral vascular disease         Past Surgical History:   Procedure Laterality Date    CHOLECYSTECTOMY      DEBRIDEMENT OF FOOT Right 10/2020    FOOT SURGERY      Right diabetic foot ulcer Elliott's Grade 3      Bone exposure to R heel, Hx of IV Abx, cultures show multiple bacterial growth, Hx of Osteomyelitis, HBOT       Social History  Mr. Jean Pierre Paulson  reports that he has quit smoking. He has never used smokeless tobacco. He reports current alcohol use. He reports current drug use. Drug: Hydrocodone.    Family History  's Jean Pierre Paulson family history includes Diabetes in his father; Hypertension in his father.    Medications and Allergies     Medications  Current Facility-Administered Medications for the 2/15/22 encounter (Office Visit) with AUGUSTUS Macedo   Medication Dose Route Frequency Provider Last Rate Last Admin    silver nitrate applicators applicator 2 applicator  2 applicator Topical (Top) Once Tristan Topete III, DO         Outpatient Medications Marked as Taking for the 2/15/22 encounter (Office Visit) with AUGUSTUS Macedo   Medication Sig Dispense Refill    blood sugar diagnostic (BLOOD GLUCOSE TEST) Strp 1 strip by Misc.(Non-Drug; Combo Route) route once daily. accu-chek Veronika strips 90 strip 5    blood-glucose meter kit 1 each by Other route 3 (three) times daily. 1 each 0    clindamycin (CLEOCIN) 300 MG capsule Take 1 capsule (300 mg total) by mouth every 8 (eight) hours. 90 capsule 0    dapagliflozin-metformin (XIGDUO XR) 5-1,000 mg TBph Take 1 tablet by mouth once daily. 30 tablet 0    FEROSUL 325 mg (65 mg iron) Tab tablet Take by mouth once daily.      gabapentin (NEURONTIN) 300 MG capsule Take 1 capsule (300 mg total) by mouth every 8 (eight) hours. 90 capsule 1    gentamicin (GARAMYCIN) 0.1 % cream Apply topically 3 (three) times daily. Apply to wound three times daily 30 g 5    hydroCHLOROthiazide (HYDRODIURIL) 25 MG tablet Take 1 tablet (25 mg total) by mouth once daily. 90 tablet 1     HYDROcodone-acetaminophen (NORCO)  mg per tablet Take 1 tablet by mouth once daily. 30 tablet 0    rosuvastatin (CRESTOR) 10 MG tablet TAKE 1 TABLET EVERY DAY 90 tablet 0    sodium hypochlorite 0.5 % (DAKIN'S SOLUTION) external solution Apply topically once daily. 473 mL 5       Allergies  Review of patient's allergies indicates:   Allergen Reactions    Tomato Itching       Physical Examination     Vitals:    02/15/22 1006   BP: (!) 189/98   Pulse: 94   Resp: 20   Temp: 97.4 °F (36.3 °C)     Physical Exam  Vitals and nursing note reviewed.   Constitutional:       Appearance: Normal appearance.   HENT:      Head: Normocephalic.   Cardiovascular:      Rate and Rhythm: Normal rate and regular rhythm.      Pulses: Normal pulses.      Heart sounds: Normal heart sounds.   Pulmonary:      Effort: Pulmonary effort is normal. No respiratory distress.   Chest:      Chest wall: No tenderness.   Musculoskeletal:         General: Swelling present.      Right lower leg: Edema present.   Skin:     General: Skin is warm and dry.      Comments: See wound assessment    Neurological:      General: No focal deficit present.      Mental Status: He is alert and oriented to person, place, and time. Mental status is at baseline.   Psychiatric:         Mood and Affect: Mood normal.         Behavior: Behavior normal.         Thought Content: Thought content normal.         Judgment: Judgment normal.         Assessment and Plan      1. Peripheral vascular disease    2. Diabetes with skin ulcer    3. Ulcer of right heel, with necrosis of bone           Wound 10/21/20 Diabetic Ulcer Right plantar Foot #1 (Active)   10/21/20     Pre-existing: Yes   Primary Wound Type: Diabetic ulc   Side: Right   Orientation: plantar   Location: Foot   Wound Number: #1   Ankle-Brachial Index:    Pulses: normal   Removal Indication and Assessment:    Wound Outcome:    (Retired) Wound Type:    (Retired) Wound Length (cm):    (Retired) Wound Width  (cm):    (Retired) Depth (cm):    Wound Description (Comments):    Removal Indications:    Wound Image    02/15/22 1021   Dressing Appearance Moist drainage;Intact 02/15/22 1021   Drainage Amount Moderate 02/15/22 1021   Drainage Characteristics/Odor Serous 02/15/22 1021   Appearance Pink;Moist;Granulating 02/15/22 1021   Tissue loss description Full thickness 02/15/22 1021   Black (%), Wound Tissue Color 0 % 02/15/22 1021   Red (%), Wound Tissue Color 100 % 02/15/22 1021   Yellow (%), Wound Tissue Color 0 % 02/15/22 1021   Periwound Area Moist 02/15/22 1021   Wound Edges Callused 02/15/22 1021   Elliott Classification (diabetic foot ulcers only) Grade 2 02/15/22 1021   Wound Length (cm) 5.5 cm 02/15/22 1021   Wound Width (cm) 10.9 cm 02/15/22 1021   Wound Depth (cm) 1 cm 02/15/22 1021   Wound Volume (cm^3) 59.95 cm^3 02/15/22 1021   Wound Surface Area (cm^2) 59.95 cm^2 02/15/22 1021   Care Cleansed with:;Antimicrobial agent 02/15/22 1021   Dressing Applied;Gauze, wet to dry;Gauze;Rolled gauze 02/15/22 1021   Dressing Change Due 02/16/22 02/15/22 1021         Active Problem List with Overview Notes    Diagnosis Date Noted    Type 2 diabetes mellitus with right diabetic foot ulcer 10/08/2021    Sarcoidosis 10/08/2021    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 07/07/2021    Chronic pain syndrome 04/13/2021    Peripheral vascular disease     Diabetic neuropathy with neurologic complication     Chronic osteomyelitis 03/26/2021    Ulcer of right foot with necrosis of bone 03/25/2021    Lower extremity edema 03/25/2021    Diabetes with skin ulcer 03/19/2021    Primary hypertension 03/19/2021         :    Plan:   Wound Treatment: Continue Dakin's to wounds  You may remove all bandages and shower prior to dressing changes. Bathe with a mild soap such as Dove. Do not sure antibacterial soaps such as Dial. Irrigate the wound with tepid water for 5  minutes. Dry thoroughly.  Do not take tub baths or soak in a Jacuzzi or swimming pool.  If you are unable to shower, you may use wound  or saline wash to cleanse the wound.  Elevate your leg above your heart for 30 to 45 minutes twice daily.  Elevated glucose will interfere with the healing process. Carbohydrates should be consumed sparingly and avoid anything with white flour, white sugar, white rice, white pasta, and white bread. Use whole wheat whole grain products instead.  If you are on steroids or immunosuppressants, understand that this will delay the healing process.  Monitor for signs and symptoms of infection including fever, chills, purulent drainage, increased pain, swelling, or redness and notify clinic at [phone]  Lifestyle Modifications: follow a low fat, low cholesterol diet, attempt to lose weight, decrease or avoid alcohol intake, reduce salt in diet and cooking, improve dietary compliance, use calcium 1 gram daily with Vit D and continue current medications  Admit to [facility]      Problem List Items Addressed This Visit        Cardiac/Vascular    Peripheral vascular disease - Primary (Chronic)       Endocrine    Diabetes with skin ulcer      Other Visit Diagnoses     Ulcer of right heel, with necrosis of bone              Future Appointments   Date Time Provider Department Center   2/25/2022  9:30 AM AUGUSTUS Macedo [facility]   3/3/2022 10:45 AM VONNIE aM [facility]            Signature:  AUGUSTUS Macedo  [facility] - WOUND CARE  [address] [facility] [address]  [phone]    Date of encounter: 2/15/22

## 2022-02-15 NOTE — NURSING
Received patient to room from wound care, patient ambulatory to room. Patient is alert and oriented. Can answer all questions and make needs known.

## 2022-02-15 NOTE — ASSESSMENT & PLAN NOTE
- Patient's latest A1C was 8.4 in November  - Repeat A1C pending   - Patient on  at home  - Moderate SSI  - POCT glucose checks   - Diabetic diet   - Levemir 10 units QHS

## 2022-02-15 NOTE — PROGRESS NOTES
See wound assessment. Dakin' moist gauze applied. Admit to [facility] under care of  for ulcer to right foot. Follow up with wound care after d/c.

## 2022-02-15 NOTE — H&P
[facility] Medicine  History & Physical    Patient Name: Jean Pierre Paulson  MRN: [id number]  Patient Class: IP- Inpatient  Admission Date: 2/15/2022  Attending Physician: Carey Vivas,*   Primary Care Provider: Mojgan Lomeli NP         Patient information was obtained from patient, past medical records and ER records.     Subjective:     Principal Problem:Ulcer of right foot with necrosis of bone    Chief Complaint:   Chief Complaint   Patient presents with    Wound Infection        HPI: The patient is a 37yo AA male with a MedHX of DM2 with neuropathy, a necrotic right foot ulcer, anemia, PVD, HTN, HLD, chronic pain syndrome, and amputation of the right big toe. [facility] to [facility] on 02/15 for a follow up on wound cultures that had been taken from his right foot plantar ulcer. The wound cultures had grown MRSA, ESBL and proteus mirabilis. The patient says that he had first noticed the ulcer in late 2018 after he stepped on a nail. He says that this was around the time that he had had his right big toe amputated. He came in to the hospital last November when he noticed that the ulcer had grown very large. He had a debridement of the ulcer by Dr. Ramírez at that time. He has been taking Clindamycin PO prior to this admission. A wound care nurse from [facility] has been visiting the patient weekly to treat his wound with silver nitrate and redress it. The patient does also have two small, superficial ulcers on his right second toe and his right ankle.     The patient will be admitted to [facility] under the [facility] (Dr. Casper) for treatment of his infected right foot ulcer and management of other medical problems.       Past Medical History:   Diagnosis Date    Anemia     Diabetes mellitus with neuropathy     DM2 (diabetes mellitus, type 2)     HTN (hypertension)     Neuropathy     Obesity     Osteomyelitis 10/2020    Diabetic foot ulcers,  right     Peripheral vascular disease        Past Surgical History:   Procedure Laterality Date    CHOLECYSTECTOMY      DEBRIDEMENT OF FOOT Right 10/2020    FOOT SURGERY      Right diabetic foot ulcer Elliott's Grade 3      Bone exposure to R heel, Hx of IV Abx, cultures show multiple bacterial growth, Hx of Osteomyelitis, HBOT       Review of patient's allergies indicates:   Allergen Reactions    Tomato Itching       No current facility-administered medications on file prior to encounter.     Current Outpatient Medications on File Prior to Encounter   Medication Sig    amLODIPine (NORVASC) 10 MG tablet Take 1 tablet (10 mg total) by mouth once daily.    blood sugar diagnostic (BLOOD GLUCOSE TEST) Strp 1 strip by Misc.(Non-Drug; Combo Route) route once daily. accu-chek Veronika strips    blood-glucose meter kit 1 each by Other route 3 (three) times daily.    clindamycin (CLEOCIN) 300 MG capsule Take 1 capsule (300 mg total) by mouth every 8 (eight) hours.    dapagliflozin-metformin (XIGDUO XR) 5-1,000 mg TBph Take 1 tablet by mouth once daily.    FEROSUL 325 mg (65 mg iron) Tab tablet Take by mouth once daily.    gabapentin (NEURONTIN) 300 MG capsule Take 1 capsule (300 mg total) by mouth every 8 (eight) hours.    gentamicin (GARAMYCIN) 0.1 % cream Apply topically 3 (three) times daily. Apply to wound three times daily    hydroCHLOROthiazide (HYDRODIURIL) 25 MG tablet Take 1 tablet (25 mg total) by mouth once daily.    HYDROcodone-acetaminophen (NORCO)  mg per tablet Take 1 tablet by mouth once daily.    rosuvastatin (CRESTOR) 10 MG tablet TAKE 1 TABLET EVERY DAY    sodium hypochlorite 0.5 % (DAKIN'S SOLUTION) external solution Apply topically once daily.    SSD 1 % cream Apply 1 application topically once daily. Right foot wound    amoxicillin-clavulanate 875-125mg (AUGMENTIN) 875-125 mg per tablet Take 1 tablet by mouth every 12 (twelve) hours. for 14 days (Patient not taking: Reported on  2/15/2022)    HYDROcodone-acetaminophen (NORCO)  mg per tablet Take 1 tablet by mouth once daily.    lisinopriL (PRINIVIL,ZESTRIL) 20 MG tablet TAKE 1 TABLET EVERY DAY (Patient not taking: No sig reported)     Family History     Problem Relation (Age of Onset)    Diabetes Father    Hypertension Father        Tobacco Use    Smoking status: Former Smoker    Smokeless tobacco: Never Used   Substance and Sexual Activity    Alcohol use: Yes     Comment: occasionally    Drug use: Yes     Types: Hydrocodone    Sexual activity: Yes     Review of Systems   Constitutional: Negative for activity change, appetite change, chills, diaphoresis, fatigue and fever.   HENT: Negative for sore throat and trouble swallowing.    Eyes: Negative for photophobia and visual disturbance.   Respiratory: Negative for cough, chest tightness and shortness of breath.    Cardiovascular: Positive for leg swelling. Negative for chest pain and palpitations.   Gastrointestinal: Negative for abdominal pain, blood in stool, constipation, diarrhea, nausea and vomiting.   Genitourinary: Negative for dysuria and hematuria.   Musculoskeletal: Positive for joint swelling. Negative for arthralgias.   Skin: Positive for wound.   Neurological: Negative for weakness, light-headedness, numbness and headaches.   Psychiatric/Behavioral: Negative for agitation, confusion and dysphoric mood.     Objective:     Vital Signs (Most Recent):    Vital Signs (24h Range):  Temp:  [97.4 °F (36.3 °C)] 97.4 °F (36.3 °C)  Pulse:  [94] 94  Resp:  [20] 20  BP: (189)/(98) 189/98        There is no height or weight on file to calculate BMI.    Physical Exam  Vitals reviewed.   Constitutional:       Appearance: Normal appearance. He is obese.   HENT:      Head: Normocephalic and atraumatic.      Nose: Nose normal.      Mouth/Throat:      Mouth: Mucous membranes are moist.      Pharynx: Oropharynx is clear.   Eyes:      Extraocular Movements: Extraocular movements intact.       Conjunctiva/sclera: Conjunctivae normal.      Pupils: Pupils are equal, round, and reactive to light.   Cardiovascular:      Rate and Rhythm: Normal rate and regular rhythm.      Pulses: Normal pulses.      Heart sounds: Normal heart sounds.   Pulmonary:      Effort: Pulmonary effort is normal.      Breath sounds: Normal breath sounds.   Abdominal:      General: Abdomen is flat. Bowel sounds are normal.      Palpations: Abdomen is soft.   Musculoskeletal:         General: Swelling present. Normal range of motion.      Cervical back: Normal range of motion.      Right lower leg: Edema present.      Left lower leg: Edema present.   Skin:     General: Skin is warm and dry.      Findings: Wound present.      Comments: Large ulcer bottom of right foot  Right big toe amputated  Two small ulcers tip of second toe right foot and by right ankle    Neurological:      General: No focal deficit present.      Mental Status: He is alert and oriented to person, place, and time.   Psychiatric:         Mood and Affect: Mood normal.         Behavior: Behavior normal.           CRANIAL NERVES     CN III, IV, VI   Pupils are equal, round, and reactive to light.       Significant Labs: All pertinent labs within the past 24 hours have been reviewed.    Significant Imaging: I have reviewed all pertinent imaging results/findings within the past 24 hours.    Assessment/Plan:     * Ulcer of right foot with necrosis of bone  * Patient's blood cultures from 02/04 taken from ulcer showed growth of ESBL, MRSA and Proteus Mirabilis   - Clindamycin 600mg IV q8  - Merrem 500mg IV q8  - Admit to [facility] under Dr. Casper (ID)  - Consult to wound care  - PT/OT  - Repeat Bcx pending       Diabetic neuropathy with neurologic complication  - Patient's latest A1C was 8.4 in November  - Repeat A1C pending   - Patient on  at home  - Moderate SSI  - POCT glucose checks   - Diabetic diet   - Levemir 10 units QHS         Peripheral vascular  disease  - Continue home gabapentin 300mg q8      Primary hypertension  - amlodipine 10mg qd  - hctz 25mg qd      Chronic pain syndrome  - gabapentin 300mg q8  - Norco 10mg q6 prn       VTE Risk Mitigation (From admission, onward)         Ordered     IP VTE LOW RISK PATIENT  Once         02/15/22 1401     Place DONATO [email or web address]  Until discontinued         02/15/22 1401                   Clara Gutierrez MD  Department of Hospital Medicine   [facility]

## 2022-02-15 NOTE — PATIENT INSTRUCTIONS
Clean wound with dakin solution    Apply skin barrier to wound edges    Apply silvadene to wound    Cover with dry gauze,ABD,wrap with kerlix and paper tape    Change daily and as needed    Elevate feet as much as possible    No prolong standing    Patient Education       Diabetic Foot Ulcer Discharge Instructions   About this topic   Diabetes is an illness that makes your blood sugar too high. If your blood sugar is not controlled, you may have problems with your nerves and blood vessels. This can cause loss of feeling or numbness in your feet. You may not feel if you have a cut, blister, or sore on your foot. You may also have poor blood flow to your feet. This makes it harder for a wound to heal. Diabetes can also cause the skin on your feet to become very dry, leading to peeling and cracking of the skin. These are all reasons that people with diabetes are more likely to get sores on their feet. If the sore is not taken care of, it can become infected. This may lead to serious problems.     What care is needed at home?   · Ask your doctor what you need to do when you go home. Make sure you ask questions if you do not understand what the doctor says. This way you will know what you need to do.  · Talk to your doctor about how to care for your sores. Ask your doctor about:  ? When you should change your bandages  ? When you may take a bath or shower  ? If you need to use special creams or bandages on your sore  ? If you need to be careful with putting pressure on the sore. Your doctor may want you to use a wheelchair, cane, or crutches.  ? When you may go back to your normal activities like work or driving.  · Be sure to wash your hands before and after touching your wound or dressing.  · Raise your feet while in bed or when sitting in a chair to stop swelling and help the sore get better faster.  What follow-up care is needed?   · Your doctor may ask you to make visits to the office to check on your progress. Be  sure to keep these visits.  · Your doctor may have you go see a specialist. You may need to see a foot doctor called a podiatrist or a blood vessel doctor called a vascular specialist.  What drugs may be needed?   The doctor may order drugs to:  · Help with pain  · Lower swelling  · Fight an infection  · Help increase blood flow  Will physical activity be limited?   You may have to limit your activity. Talk to your doctor about the right amount of activity for you.  What changes to diet are needed?   Eating a healthy diet is important during this time. Work to keep your blood sugar within your goal ranges. Talk to your doctor if you are not sure of your blood sugar goals. This means:  · Eat whole grain foods and foods high in fiber.  · Choose many different fruits and vegetables. Fresh or frozen is best.  · Cut back on solid fats like butter or margarine. Eat less fatty or processed foods.  · Eat more low-fat or lean meats like chicken, fish, or turkey. Eat less red meat.  · Limit beer, wine, and mixed drinks (alcohol).  · Limit caffeine.  · If you need help, ask to see a dietitian.  What problems could happen?   · Infection  · The skin in and around the sore can die  · Amputation  What can be done to prevent this health problem?   · Take care of your diabetes and keep your blood sugar controlled.  · Know your blood sugar goals.  · Stop smoking. Smoking damages all the blood vessels in your body, including those in your feet. Many people who need amputations are smokers. If you need help with quitting, talk to your doctor.  · Take care of your feet.  ? Wash your feet each day with soap and warm water. Dry them carefully and check for any signs of sores or wounds. If you are unable to see the bottom of your feet, use a mirror or ask someone else to look at them. It is important to check the bottom of your feet and toes, so you can catch any changes.  ? Put on lotion or moisturizer. Avoid lotion in the areas between  your toes, as the extra moisture can cause infection.  ? Do not soak your feet, as soaking can cause dry skin.  ? Trim your nails straight across to avoid harming the skin.  · Protect your feet.  ? Do not walk barefoot. Wear shoes at all times, even in the house.  ? Wear shoes and socks that fit properly. Ask about special shoes that may be covered by insurance with a prescription.  ? Before putting on your shoes, check the inside for small items such as a pebble. If you have decreased feeling in your feet, walking on such items could cause injury.  ? If your feet are cold, wear warm socks. Do not use heating pads, any kind of heater, or soak your feet to get them warm.  ? Talk to your doctor about using a pumice stone to prevent calluses from forming.  · See your doctor if:  ? You need to have a corn or callus taken off. Do not attempt to remove corns and calluses yourself by cutting them or using chemicals.  ? You have sores or blisters on your feet.  ? You step on an object that cuts or penetrates the sole of your foot  When do I need to call the doctor?   · Signs of infection. These include a fever of 100.4°F (38°C) or higher, chills, or sore that will not heal.  · Signs of wound infection. These include swelling, redness, warmth around the wound; too much pain when touched; yellowish, greenish, or bloody discharge; foul smell coming from the wound; wound opens up.  · New blisters, cuts, or sores on your foot  · Feet or legs are numb  · Black or dead tissue in or around your ulcer  · Sore is not better or is getting worse  Teach Back: Helping You Understand   The Teach Back Method helps you understand the information we are giving you. After you talk with the staff, tell them in your own words what you learned. This helps to make sure the staff has described each thing clearly. It also helps to explain things that may have been confusing. Before going home, make sure you can do these:  · I can tell you about my  condition.  · I can tell you how I will take care of my sore and my feet.  · I can tell you what I will do if I have a new sore or signs of a wound infection.  Where can I learn more?   Centers for Disease Control and Prevention  https://www.cdc.gov/diabetes/library/features/healthy-feet.html   Last Reviewed Date   2021-04-13  Consumer Information Use and Disclaimer   This information is not specific medical advice and does not replace information you receive from your health care provider. This is only a brief summary of general information. It does NOT include all information about conditions, illnesses, injuries, tests, procedures, treatments, therapies, discharge instructions or life-style choices that may apply to you. You must talk with your health care provider for complete information about your health and treatment options. This information should not be used to decide whether or not to accept your health care providers advice, instructions or recommendations. Only your health care provider has the knowledge and training to provide advice that is right for you.  Copyright   Copyright © 2021 [employer] and its affiliates and/or licensors. All rights reserved.  Patient Education       Wound Care Discharge Instructions   About this topic   A wound is an injury to the skin that breaks the barrier to the outside. The skin protects the inside of the body from the outside world. When the skin is damaged or broken open, the wound can become infected or bleed.  Cuts and scrapes are one type of wound. You may also hear these called abrasions. Scrapes on the surface leave deeper skin layers in place. These are often caused by friction or rubbing against a rough surface.  Another kind is a puncture wound. This comes from something like a bite or stepping on a nail. Puncture wounds are caused by a pointed or sharp object, such as a nail, needle, or tooth entering the skin. Bleeding can be very little, and the  wound may be barely obvious. Bites from a human or an animal always have germs in them and need extra care.  A laceration is a cut on your skin. It is most often caused by a sharp object like a knife blade, glass, or from other things with sharp edges. If the cut is shallow, it does not need stitches.     What care is needed at home?   · Ask your doctor what you need to do when you go home. Make sure you ask questions if you do not understand what the doctor says.  · The doctor may want you to keep your wound covered as it heals. You may want to use a thin layer of antibiotic ointment to help keep the wound moist. This will also keep the dressing from sticking to the wound.  · After 24 hours, you can gently wash the wound with soap and water. Pat dry and put on a clean dressing. A telfa pad will not stick to the wound.  · Change your dressing once a day or every other day.  · Always wash your hands before and after touching the wound.  · Each time you change the dressing, look closely at the wound to be sure it is healing the right way. The wound may have a yellowish discharge, and this is normal.  · Avoid picking the scab or scratching the site which may cause more irritation.  · Do not soak in water or swim with an open wound.  · Do not rub the wound or take off any small strips of tape.  What follow-up care is needed?   Your doctor may ask you to make visits to the office to check on your progress. Be sure to keep these visits.  What drugs may be needed?   The doctor may order drugs to:  · Help with pain  · Prevent or fight an infection  You may need to have a tetanus shot.  Will physical activity be limited?   You may need to limit your activity until your wound is fully healed. Talk to your doctor about the right amount of activity for you or when it is safe to return to sports.  What problems could happen?   · Bleeding  · Infection  · Scarring  · Poor healing  When do I need to call the doctor?   · Signs of  infection. These include a fever of 100.4°F (38°C) or higher, chills, or wound that will not heal.  · The pain in and around the area gets much worse.  · There is a bad smell or pus (thick yellow, green, or gray fluid) coming from your wound.  · You notice a crunchy feeling or blisters in the skin around the wound.  · The redness around your wound gets bigger or is spreading up your arm or leg.  · Fluid that is not pus drains from your wound.  · Your swelling doesnt improve or gets worse.  Teach Back: Helping You Understand   The Teach Back Method helps you understand the information we are giving you. After you talk with the staff, tell them in your own words what you learned. This helps to make sure the staff has described each thing clearly. It also helps to explain things that may have been confusing. Before going home, make sure you can do these:  · I can tell you about my wound.  · I can tell you how to care for my wound.  · I can tell you what I will do if I have swelling, redness, or warmth around my wound.  Where can I learn more?   American Academy of Pediatrics  https://www.healthychildren.org/English/health-issues/injuries-emergencies/Pages/Treating-Cuts.aspx   [employer]  https://www.betterhealth.surendra.gov.au/health/conditionsandtreatments/skin-cuts-and-abrasions   Kids Health  http://kidshealth.org/en/teens/wounds.html?ref=search   Last Reviewed Date   2021-10-08  Consumer Information Use and Disclaimer   This information is not specific medical advice and does not replace information you receive from your health care provider. This is only a brief summary of general information. It does NOT include all information about conditions, illnesses, injuries, tests, procedures, treatments, therapies, discharge instructions or life-style choices that may apply to you. You must talk with your health care provider for complete information about your health and treatment options. This information  should not be used to decide whether or not to accept your health care providers advice, instructions or recommendations. Only your health care provider has the knowledge and training to provide advice that is right for you.  Copyright   Copyright © 2021 [employer], [employer]. and its affiliates and/or licensors. All rights reserved.

## 2022-02-15 NOTE — ASSESSMENT & PLAN NOTE
* Patient's blood cultures from 02/04 taken from ulcer showed growth of ESBL, MRSA and Proteus Mirabilis   - Clindamycin 600mg IV q8  - Merrem 500mg IV q8  - Admit to [facility] hospital under Dr. Casper (ID)  - Consult to wound care  - PT/OT  - Repeat Bcx pending

## 2022-02-15 NOTE — SUBJECTIVE & OBJECTIVE
Past Medical History:   Diagnosis Date    Anemia     Diabetes mellitus with neuropathy     DM2 (diabetes mellitus, type 2)     HTN (hypertension)     Neuropathy     Obesity     Osteomyelitis 10/2020    Diabetic foot ulcers, right     Peripheral vascular disease        Past Surgical History:   Procedure Laterality Date    CHOLECYSTECTOMY      DEBRIDEMENT OF FOOT Right 10/2020    FOOT SURGERY      Right diabetic foot ulcer Elliott's Grade 3      Bone exposure to R heel, Hx of IV Abx, cultures show multiple bacterial growth, Hx of Osteomyelitis, HBOT       Review of patient's allergies indicates:   Allergen Reactions    Tomato Itching       No current facility-administered medications on file prior to encounter.     Current Outpatient Medications on File Prior to Encounter   Medication Sig    amLODIPine (NORVASC) 10 MG tablet Take 1 tablet (10 mg total) by mouth once daily.    blood sugar diagnostic (BLOOD GLUCOSE TEST) Strp 1 strip by Misc.(Non-Drug; Combo Route) route once daily. accu-chek Veronika strips    blood-glucose meter kit 1 each by Other route 3 (three) times daily.    clindamycin (CLEOCIN) 300 MG capsule Take 1 capsule (300 mg total) by mouth every 8 (eight) hours.    dapagliflozin-metformin (XIGDUO XR) 5-1,000 mg TBph Take 1 tablet by mouth once daily.    FEROSUL 325 mg (65 mg iron) Tab tablet Take by mouth once daily.    gabapentin (NEURONTIN) 300 MG capsule Take 1 capsule (300 mg total) by mouth every 8 (eight) hours.    gentamicin (GARAMYCIN) 0.1 % cream Apply topically 3 (three) times daily. Apply to wound three times daily    hydroCHLOROthiazide (HYDRODIURIL) 25 MG tablet Take 1 tablet (25 mg total) by mouth once daily.    HYDROcodone-acetaminophen (NORCO)  mg per tablet Take 1 tablet by mouth once daily.    rosuvastatin (CRESTOR) 10 MG tablet TAKE 1 TABLET EVERY DAY    sodium hypochlorite 0.5 % (DAKIN'S SOLUTION) external solution Apply topically once daily.    SSD 1 %  cream Apply 1 application topically once daily. Right foot wound    amoxicillin-clavulanate 875-125mg (AUGMENTIN) 875-125 mg per tablet Take 1 tablet by mouth every 12 (twelve) hours. for 14 days (Patient not taking: Reported on 2/15/2022)    HYDROcodone-acetaminophen (NORCO)  mg per tablet Take 1 tablet by mouth once daily.    lisinopriL (PRINIVIL,ZESTRIL) 20 MG tablet TAKE 1 TABLET EVERY DAY (Patient not taking: No sig reported)     Family History     Problem Relation (Age of Onset)    Diabetes Father    Hypertension Father        Tobacco Use    Smoking status: Former Smoker    Smokeless tobacco: Never Used   Substance and Sexual Activity    Alcohol use: Yes     Comment: occasionally    Drug use: Yes     Types: Hydrocodone    Sexual activity: Yes     Review of Systems   Constitutional: Negative for activity change, appetite change, chills, diaphoresis, fatigue and fever.   HENT: Negative for sore throat and trouble swallowing.    Eyes: Negative for photophobia and visual disturbance.   Respiratory: Negative for cough, chest tightness and shortness of breath.    Cardiovascular: Positive for leg swelling. Negative for chest pain and palpitations.   Gastrointestinal: Negative for abdominal pain, blood in stool, constipation, diarrhea, nausea and vomiting.   Genitourinary: Negative for dysuria and hematuria.   Musculoskeletal: Positive for joint swelling. Negative for arthralgias.   Skin: Positive for wound.   Neurological: Negative for weakness, light-headedness, numbness and headaches.   Psychiatric/Behavioral: Negative for agitation, confusion and dysphoric mood.     Objective:     Vital Signs (Most Recent):    Vital Signs (24h Range):  Temp:  [97.4 °F (36.3 °C)] 97.4 °F (36.3 °C)  Pulse:  [94] 94  Resp:  [20] 20  BP: (189)/(98) 189/98        There is no height or weight on file to calculate BMI.    Physical Exam  Vitals reviewed.   Constitutional:       Appearance: Normal appearance. He is obese.    HENT:      Head: Normocephalic and atraumatic.      Nose: Nose normal.      Mouth/Throat:      Mouth: Mucous membranes are moist.      Pharynx: Oropharynx is clear.   Eyes:      Extraocular Movements: Extraocular movements intact.      Conjunctiva/sclera: Conjunctivae normal.      Pupils: Pupils are equal, round, and reactive to light.   Cardiovascular:      Rate and Rhythm: Normal rate and regular rhythm.      Pulses: Normal pulses.      Heart sounds: Normal heart sounds.   Pulmonary:      Effort: Pulmonary effort is normal.      Breath sounds: Normal breath sounds.   Abdominal:      General: Abdomen is flat. Bowel sounds are normal.      Palpations: Abdomen is soft.   Musculoskeletal:         General: Swelling present. Normal range of motion.      Cervical back: Normal range of motion.      Right lower leg: Edema present.      Left lower leg: Edema present.   Skin:     General: Skin is warm and dry.      Findings: Wound present.      Comments: Large ulcer bottom of right foot  Right big toe amputated  Two small ulcers tip of second toe right foot and by right ankle    Neurological:      General: No focal deficit present.      Mental Status: He is alert and oriented to person, place, and time.   Psychiatric:         Mood and Affect: Mood normal.         Behavior: Behavior normal.           CRANIAL NERVES     CN III, IV, VI   Pupils are equal, round, and reactive to light.       Significant Labs: All pertinent labs within the past 24 hours have been reviewed.    Significant Imaging: I have reviewed all pertinent imaging results/findings within the past 24 hours.

## 2022-02-15 NOTE — PLAN OF CARE
Problem: Adult Inpatient Plan of Care  Goal: Plan of Care Review  Outcome: Ongoing, Progressing  Flowsheets (Taken 2/15/2022 1515)  Plan of Care Reviewed With: patient  Goal: Patient-Specific Goal (Individualized)  Outcome: Ongoing, Progressing  Goal: Absence of Hospital-Acquired Illness or Injury  Outcome: Ongoing, Progressing

## 2022-02-16 LAB
ANION GAP SERPL CALCULATED.3IONS-SCNC: 15 MMOL/L (ref 7–16)
BASOPHILS # BLD AUTO: 0.04 K/UL (ref 0–0.2)
BASOPHILS NFR BLD AUTO: 0.9 % (ref 0–1)
BUN SERPL-MCNC: 23 MG/DL (ref 7–18)
BUN/CREAT SERPL: 20 (ref 6–20)
CALCIUM SERPL-MCNC: 9.1 MG/DL (ref 8.5–10.1)
CHLORIDE SERPL-SCNC: 103 MMOL/L (ref 98–107)
CO2 SERPL-SCNC: 28 MMOL/L (ref 21–32)
CREAT SERPL-MCNC: 1.13 MG/DL (ref 0.7–1.3)
DIFFERENTIAL METHOD BLD: ABNORMAL
EOSINOPHIL # BLD AUTO: 0.21 K/UL (ref 0–0.5)
EOSINOPHIL NFR BLD AUTO: 4.6 % (ref 1–4)
ERYTHROCYTE [DISTWIDTH] IN BLOOD BY AUTOMATED COUNT: 13.8 % (ref 11.5–14.5)
GLUCOSE SERPL-MCNC: 139 MG/DL (ref 70–105)
GLUCOSE SERPL-MCNC: 140 MG/DL (ref 74–106)
GLUCOSE SERPL-MCNC: 161 MG/DL (ref 70–105)
GLUCOSE SERPL-MCNC: 198 MG/DL (ref 70–105)
GLUCOSE SERPL-MCNC: 269 MG/DL (ref 70–105)
HCT VFR BLD AUTO: 37.4 % (ref 40–54)
HGB BLD-MCNC: 12 G/DL (ref 13.5–18)
IMM GRANULOCYTES # BLD AUTO: 0.02 K/UL (ref 0–0.04)
IMM GRANULOCYTES NFR BLD: 0.4 % (ref 0–0.4)
LYMPHOCYTES # BLD AUTO: 2.19 K/UL (ref 1–4.8)
LYMPHOCYTES NFR BLD AUTO: 48.3 % (ref 27–41)
MCH RBC QN AUTO: 26.3 PG (ref 27–31)
MCHC RBC AUTO-ENTMCNC: 32.1 G/DL (ref 32–36)
MCV RBC AUTO: 81.8 FL (ref 80–96)
MONOCYTES # BLD AUTO: 0.4 K/UL (ref 0–0.8)
MONOCYTES NFR BLD AUTO: 8.8 % (ref 2–6)
MPC BLD CALC-MCNC: 9.9 FL (ref 9.4–12.4)
NEUTROPHILS # BLD AUTO: 1.67 K/UL (ref 1.8–7.7)
NEUTROPHILS NFR BLD AUTO: 37 % (ref 53–65)
NRBC # BLD AUTO: 0 X10E3/UL
NRBC, AUTO (.00): 0 %
PLATELET # BLD AUTO: 301 K/UL (ref 150–400)
POTASSIUM SERPL-SCNC: 4.7 MMOL/L (ref 3.5–5.1)
RBC # BLD AUTO: 4.57 M/UL (ref 4.6–6.2)
SODIUM SERPL-SCNC: 141 MMOL/L (ref 136–145)
WBC # BLD AUTO: 4.53 K/UL (ref 4.5–11)

## 2022-02-16 PROCEDURE — 85025 COMPLETE CBC W/AUTO DIFF WBC: CPT

## 2022-02-16 PROCEDURE — 82962 GLUCOSE BLOOD TEST: CPT

## 2022-02-16 PROCEDURE — 11000008

## 2022-02-16 PROCEDURE — 99232 PR SUBSEQUENT HOSPITAL CARE,LEVL II: ICD-10-PCS | Mod: GC,,, | Performed by: INTERNAL MEDICINE

## 2022-02-16 PROCEDURE — 63600175 PHARM REV CODE 636 W HCPCS: Performed by: INTERNAL MEDICINE

## 2022-02-16 PROCEDURE — 99232 SBSQ HOSP IP/OBS MODERATE 35: CPT | Mod: GC,,, | Performed by: INTERNAL MEDICINE

## 2022-02-16 PROCEDURE — 80048 BASIC METABOLIC PNL TOTAL CA: CPT

## 2022-02-16 PROCEDURE — 36415 COLL VENOUS BLD VENIPUNCTURE: CPT

## 2022-02-16 PROCEDURE — C9399 UNCLASSIFIED DRUGS OR BIOLOG: HCPCS

## 2022-02-16 PROCEDURE — 96372 THER/PROPH/DIAG INJ SC/IM: CPT

## 2022-02-16 PROCEDURE — 25000003 PHARM REV CODE 250

## 2022-02-16 PROCEDURE — 99232 PR SUBSEQUENT HOSPITAL CARE,LEVL II: ICD-10-PCS | Mod: ,,, | Performed by: NURSE PRACTITIONER

## 2022-02-16 PROCEDURE — 99232 SBSQ HOSP IP/OBS MODERATE 35: CPT | Mod: ,,, | Performed by: NURSE PRACTITIONER

## 2022-02-16 PROCEDURE — 25000003 PHARM REV CODE 250: Performed by: INTERNAL MEDICINE

## 2022-02-16 PROCEDURE — 97161 PT EVAL LOW COMPLEX 20 MIN: CPT

## 2022-02-16 PROCEDURE — 63600175 PHARM REV CODE 636 W HCPCS

## 2022-02-16 RX ADMIN — GABAPENTIN 300 MG: 300 CAPSULE ORAL at 06:02

## 2022-02-16 RX ADMIN — MEROPENEM 500 MG: 500 INJECTION, POWDER, FOR SOLUTION INTRAVENOUS at 12:02

## 2022-02-16 RX ADMIN — INSULIN DETEMIR 15 UNITS: 100 INJECTION, SOLUTION SUBCUTANEOUS at 09:02

## 2022-02-16 RX ADMIN — MEROPENEM 500 MG: 500 INJECTION, POWDER, FOR SOLUTION INTRAVENOUS at 06:02

## 2022-02-16 RX ADMIN — ATORVASTATIN CALCIUM 40 MG: 40 TABLET, FILM COATED ORAL at 08:02

## 2022-02-16 RX ADMIN — Medication 2 CAPSULE: at 07:02

## 2022-02-16 RX ADMIN — INSULIN ASPART 1 UNITS: 100 INJECTION, SOLUTION INTRAVENOUS; SUBCUTANEOUS at 09:02

## 2022-02-16 RX ADMIN — HYDROCHLOROTHIAZIDE 25 MG: 12.5 TABLET ORAL at 08:02

## 2022-02-16 RX ADMIN — INSULIN ASPART 6 UNITS: 100 INJECTION, SOLUTION INTRAVENOUS; SUBCUTANEOUS at 06:02

## 2022-02-16 RX ADMIN — GABAPENTIN 300 MG: 300 CAPSULE ORAL at 02:02

## 2022-02-16 RX ADMIN — GABAPENTIN 300 MG: 300 CAPSULE ORAL at 09:02

## 2022-02-16 RX ADMIN — INSULIN ASPART 2 UNITS: 100 INJECTION, SOLUTION INTRAVENOUS; SUBCUTANEOUS at 04:02

## 2022-02-16 RX ADMIN — AMLODIPINE BESYLATE 10 MG: 10 TABLET ORAL at 08:02

## 2022-02-16 RX ADMIN — HYDROCODONE BITARTRATE AND ACETAMINOPHEN 1 TABLET: 10; 325 TABLET ORAL at 06:02

## 2022-02-16 RX ADMIN — Medication 2 CAPSULE: at 05:02

## 2022-02-16 RX ADMIN — Medication 2 CAPSULE: at 11:02

## 2022-02-16 RX ADMIN — POLYETHYLENE GLYCOL 3350 17 G: 17 POWDER, FOR SOLUTION ORAL at 09:02

## 2022-02-16 NOTE — SUBJECTIVE & OBJECTIVE
Interval History: Patient is having some right foot pain today but says it is controlled by his prn Norco. No other acute complaints. No overnight events reported. Will increased levemir to 15 units qhs as patient's POCT glucose high during the night. Foot wrapped in bandage, was changed by wound care but prior to that did appreciate some bloody discharge through wrapping.     Review of Systems   Constitutional: Negative for activity change, appetite change, chills, diaphoresis, fatigue and fever.   HENT: Negative for sore throat and trouble swallowing.    Eyes: Negative for photophobia and visual disturbance.   Respiratory: Negative for cough, chest tightness and shortness of breath.    Cardiovascular: Positive for leg swelling. Negative for chest pain and palpitations.   Gastrointestinal: Negative for abdominal pain, blood in stool, constipation, diarrhea, nausea and vomiting.   Genitourinary: Negative for dysuria and hematuria.   Musculoskeletal: Positive for joint swelling. Negative for arthralgias.   Skin: Positive for wound.   Neurological: Negative for weakness, light-headedness, numbness and headaches.   Psychiatric/Behavioral: Negative for agitation, confusion and dysphoric mood.     Objective:     Vital Signs (Most Recent):  Temp: 98 °F (36.7 °C) (02/16/22 0712)  Pulse: 77 (02/16/22 0712)  Resp: 20 (02/16/22 0712)  BP: 125/84 (02/16/22 0712)  SpO2: 97 % (02/16/22 0712) Vital Signs (24h Range):  Temp:  [97.9 °F (36.6 °C)-98.5 °F (36.9 °C)] 98 °F (36.7 °C)  Pulse:  [73-83] 77  Resp:  [16-20] 20  SpO2:  [95 %-98 %] 97 %  BP: (125-150)/(82-95) 125/84     Weight: (!) 156.9 kg (345 lb 14.4 oz)  Body mass index is 44.41 kg/m².    Intake/Output Summary (Last 24 hours) at 2/16/2022 1227  Last data filed at 2/16/2022 0500  Gross per 24 hour   Intake 240 ml   Output --   Net 240 ml      Physical Exam  Vitals reviewed.   Constitutional:       Appearance: Normal appearance. He is obese.   HENT:      Head: Normocephalic  and atraumatic.      Nose: Nose normal.      Mouth/Throat:      Mouth: Mucous membranes are moist.      Pharynx: Oropharynx is clear.   Eyes:      Extraocular Movements: Extraocular movements intact.      Conjunctiva/sclera: Conjunctivae normal.      Pupils: Pupils are equal, round, and reactive to light.   Cardiovascular:      Rate and Rhythm: Normal rate and regular rhythm.      Pulses: Normal pulses.      Heart sounds: Normal heart sounds.   Pulmonary:      Effort: Pulmonary effort is normal.      Breath sounds: Normal breath sounds.   Abdominal:      General: Abdomen is flat. Bowel sounds are normal.      Palpations: Abdomen is soft.   Musculoskeletal:         General: Swelling present. Normal range of motion.      Cervical back: Normal range of motion.      Right lower leg: Edema present.      Left lower leg: Edema present.   Skin:     General: Skin is warm and dry.      Findings: Wound present.      Comments: Large ulcer bottom of right foot  Right big toe amputated  Two small ulcers tip of second toe right foot and by right ankle    Neurological:      General: No focal deficit present.      Mental Status: He is alert and oriented to person, place, and time.   Psychiatric:         Mood and Affect: Mood normal.         Behavior: Behavior normal.         Significant Labs: All pertinent labs within the past 24 hours have been reviewed.    Significant Imaging: I have reviewed all pertinent imaging results/findings within the past 24 hours.

## 2022-02-16 NOTE — ASSESSMENT & PLAN NOTE
* Patient's blood cultures from 02/04 taken from ulcer showed growth of ESBL, Staph Aureus and Proteus Mirabilis   - Merrem 500mg IV q6  - Admit to [facility] hospital under Dr. Casper (ID)  - Consult to wound care  - PT/OT  - Repeat Bcx pending   - daily probiotics

## 2022-02-16 NOTE — ASSESSMENT & PLAN NOTE
- Patient's latest A1C was 8.4 in November  - Repeat A1C 7.8   - Moderate SSI  - POCT glucose checks   - Diabetic diet   - Levemir 15 units QHS

## 2022-02-16 NOTE — SUBJECTIVE & OBJECTIVE
Past Medical History:   Diagnosis Date    Anemia     Chronic osteomyelitis     Diabetes mellitus with neuropathy     Diabetes mellitus, type 2     DM2 (diabetes mellitus, type 2)     HTN (hypertension)     Hypertension     Neuropathy     Obesity     Osteomyelitis 10/2020    Hx of R foot, Tx with IV Abx    Peripheral vascular disease        Past Surgical History:   Procedure Laterality Date    CHOLECYSTECTOMY      DEBRIDEMENT OF FOOT Right 10/2020    FOOT SURGERY      Right diabetic foot ulcer Elliott's Grade 3      Bone exposure to R heel, Hx of IV Abx, cultures show multiple bacterial growth, Hx of Osteomyelitis, HBOT       Review of patient's allergies indicates:   Allergen Reactions    Tomato Itching       No current facility-administered medications on file prior to encounter.     Current Outpatient Medications on File Prior to Encounter   Medication Sig    amLODIPine (NORVASC) 10 MG tablet Take 1 tablet (10 mg total) by mouth once daily.    blood sugar diagnostic (BLOOD GLUCOSE TEST) Strp 1 strip by Misc.(Non-Drug; Combo Route) route once daily. accu-chek Veronika strips    blood-glucose meter kit 1 each by Other route 3 (three) times daily.    clindamycin (CLEOCIN) 300 MG capsule Take 1 capsule (300 mg total) by mouth every 8 (eight) hours.    dapagliflozin-metformin (XIGDUO XR) 5-1,000 mg TBph Take 1 tablet by mouth once daily.    FEROSUL 325 mg (65 mg iron) Tab tablet Take by mouth once daily.    gabapentin (NEURONTIN) 300 MG capsule Take 1 capsule (300 mg total) by mouth every 8 (eight) hours.    gentamicin (GARAMYCIN) 0.1 % cream Apply topically 3 (three) times daily. Apply to wound three times daily    hydroCHLOROthiazide (HYDRODIURIL) 25 MG tablet Take 1 tablet (25 mg total) by mouth once daily.    HYDROcodone-acetaminophen (NORCO)  mg per tablet Take 1 tablet by mouth once daily.    rosuvastatin (CRESTOR) 10 MG tablet TAKE 1 TABLET EVERY DAY    sodium hypochlorite 0.5 %  (DAKIN'S SOLUTION) external solution Apply topically once daily.    SSD 1 % cream Apply 1 application topically once daily. Right foot wound    amoxicillin-clavulanate 875-125mg (AUGMENTIN) 875-125 mg per tablet Take 1 tablet by mouth every 12 (twelve) hours. for 14 days (Patient not taking: Reported on 2/15/2022)    HYDROcodone-acetaminophen (NORCO)  mg per tablet Take 1 tablet by mouth once daily.    lisinopriL (PRINIVIL,ZESTRIL) 20 MG tablet TAKE 1 TABLET EVERY DAY (Patient not taking: No sig reported)     Family History     Problem Relation (Age of Onset)    Diabetes Father    Hypertension Father        Tobacco Use    Smoking status: Former Smoker    Smokeless tobacco: Never Used   Substance and Sexual Activity    Alcohol use: Yes     Comment: occasionally    Drug use: Yes     Types: Hydrocodone    Sexual activity: Yes     Review of Systems   Constitutional: Negative for activity change, appetite change, chills, diaphoresis, fatigue and fever.   HENT: Negative for sore throat and trouble swallowing.    Eyes: Negative for photophobia and visual disturbance.   Respiratory: Negative for cough, chest tightness and shortness of breath.    Cardiovascular: Positive for leg swelling. Negative for chest pain and palpitations.   Gastrointestinal: Negative for abdominal pain, blood in stool, constipation, diarrhea, nausea and vomiting.   Genitourinary: Negative for dysuria and hematuria.   Musculoskeletal: Positive for joint swelling. Negative for arthralgias.   Skin: Positive for wound.   Neurological: Negative for weakness, light-headedness, numbness and headaches.   Psychiatric/Behavioral: Negative for agitation, confusion and dysphoric mood.     Objective:     Vital Signs (Most Recent):  Temp: 98 °F (36.7 °C) (02/15/22 1600)  Pulse: 83 (02/15/22 1600)  Resp: 20 (02/15/22 1600)  BP: (!) 150/95 (02/15/22 1600)  SpO2: 96 % (02/15/22 1600) Vital Signs (24h Range):  Temp:  [97.4 °F (36.3 °C)-98 °F (36.7 °C)]  98 °F (36.7 °C)  Pulse:  [83-94] 83  Resp:  [16-20] 20  SpO2:  [96 %] 96 %  BP: (142-189)/(82-98) 150/95     Weight: (!) 156.9 kg (346 lb)  Body mass index is 44.42 kg/m².    Physical Exam  Vitals reviewed.   Constitutional:       Appearance: Normal appearance. He is obese.   HENT:      Head: Normocephalic and atraumatic.      Nose: Nose normal.      Mouth/Throat:      Mouth: Mucous membranes are moist.      Pharynx: Oropharynx is clear.   Eyes:      Extraocular Movements: Extraocular movements intact.      Conjunctiva/sclera: Conjunctivae normal.      Pupils: Pupils are equal, round, and reactive to light.   Cardiovascular:      Rate and Rhythm: Normal rate and regular rhythm.      Pulses: Normal pulses.      Heart sounds: Normal heart sounds.   Pulmonary:      Effort: Pulmonary effort is normal.      Breath sounds: Normal breath sounds.   Abdominal:      General: Abdomen is flat. Bowel sounds are normal.      Palpations: Abdomen is soft.   Musculoskeletal:         General: Swelling present. Normal range of motion.      Cervical back: Normal range of motion.      Right lower leg: Edema present.      Left lower leg: Edema present.   Skin:     General: Skin is warm and dry.      Findings: Wound present.      Comments: Large ulcer bottom of right foot  Right big toe amputated  Two small ulcers tip of second toe right foot and by right ankle    Neurological:      General: No focal deficit present.      Mental Status: He is alert and oriented to person, place, and time.   Psychiatric:         Mood and Affect: Mood normal.         Behavior: Behavior normal.           CRANIAL NERVES     CN III, IV, VI   Pupils are equal, round, and reactive to light.       Significant Labs: All pertinent labs within the past 24 hours have been reviewed.    Significant Imaging: I have reviewed all pertinent imaging results/findings within the past 24 hours.

## 2022-02-16 NOTE — H&P
[facility]  History & Physical    Patient Name: Jean Pierre Paulson  MRN: [id number]  Patient Class: IP- Inpatient  Admission Date: 2/15/2022  Attending Physician: Carey Vivas,*   Primary Care Provider: Mojgan Lomeli NP         Patient information was obtained from patient, past medical records and ER records.     Subjective:     Principal Problem:Chronic diabetic ulcer of right foot determined by examination    Chief Complaint:   Chief Complaint   Patient presents with    Wound Infection        HPI: The patient is a 37yo AA male with a MedHX of DM2 with neuropathy, chronic osteomyelitis, a necrotic right foot ulcer, anemia, PVD, HTN, HLD, chronic pain syndrome, and amputation of the right big toe. [facility] on [facility]15 for a follow up on wound cultures that had been taken from his chronic right foot plantar ulcer. The wound cultures had grown Staph aureus, ESBL e.coli and proteus mirabilis. The patient says that he had first noticed the ulcer in late 2019 after he stepped on a nail. He says that this was around the time that he had had his right big toe amputated. He came in to the hospital last November when he noticed that the ulcer had grown very large. He had a debridement of the ulcer by Dr. Ramírez at that time. He has been taking Clindamycin PO prior to this admission. A wound care nurse from [facility] has been visiting the patient weekly to treat his wound with silver nitrate and redress it. The patient does also have two small, superficial ulcers on his right second toe and his right ankle.     The patient will be admitted to [facility] under the [facility] (Dr. Casper) for treatment of his infected right foot ulcer and management of other medical problems.       Past Medical History:   Diagnosis Date    Anemia     Chronic osteomyelitis     Diabetes mellitus with neuropathy     Diabetes mellitus, type 2     DM2 (diabetes  mellitus, type 2)     HTN (hypertension)     Hypertension     Neuropathy     Obesity     Osteomyelitis 10/2020    Hx of R foot, Tx with IV Abx    Peripheral vascular disease        Past Surgical History:   Procedure Laterality Date    CHOLECYSTECTOMY      DEBRIDEMENT OF FOOT Right 10/2020    FOOT SURGERY      Right diabetic foot ulcer Elliott's Grade 3      Bone exposure to R heel, Hx of IV Abx, cultures show multiple bacterial growth, Hx of Osteomyelitis, HBOT       Review of patient's allergies indicates:   Allergen Reactions    Tomato Itching       No current facility-administered medications on file prior to encounter.     Current Outpatient Medications on File Prior to Encounter   Medication Sig    amLODIPine (NORVASC) 10 MG tablet Take 1 tablet (10 mg total) by mouth once daily.    blood sugar diagnostic (BLOOD GLUCOSE TEST) Strp 1 strip by Misc.(Non-Drug; Combo Route) route once daily. accu-chek Veronika strips    blood-glucose meter kit 1 each by Other route 3 (three) times daily.    clindamycin (CLEOCIN) 300 MG capsule Take 1 capsule (300 mg total) by mouth every 8 (eight) hours.    dapagliflozin-metformin (XIGDUO XR) 5-1,000 mg TBph Take 1 tablet by mouth once daily.    FEROSUL 325 mg (65 mg iron) Tab tablet Take by mouth once daily.    gabapentin (NEURONTIN) 300 MG capsule Take 1 capsule (300 mg total) by mouth every 8 (eight) hours.    gentamicin (GARAMYCIN) 0.1 % cream Apply topically 3 (three) times daily. Apply to wound three times daily    hydroCHLOROthiazide (HYDRODIURIL) 25 MG tablet Take 1 tablet (25 mg total) by mouth once daily.    HYDROcodone-acetaminophen (NORCO)  mg per tablet Take 1 tablet by mouth once daily.    rosuvastatin (CRESTOR) 10 MG tablet TAKE 1 TABLET EVERY DAY    sodium hypochlorite 0.5 % (DAKIN'S SOLUTION) external solution Apply topically once daily.    SSD 1 % cream Apply 1 application topically once daily. Right foot wound    amoxicillin-clavulanate  875-125mg (AUGMENTIN) 875-125 mg per tablet Take 1 tablet by mouth every 12 (twelve) hours. for 14 days (Patient not taking: Reported on 2/15/2022)    HYDROcodone-acetaminophen (NORCO)  mg per tablet Take 1 tablet by mouth once daily.    lisinopriL (PRINIVIL,ZESTRIL) 20 MG tablet TAKE 1 TABLET EVERY DAY (Patient not taking: No sig reported)     Family History     Problem Relation (Age of Onset)    Diabetes Father    Hypertension Father        Tobacco Use    Smoking status: Former Smoker    Smokeless tobacco: Never Used   Substance and Sexual Activity    Alcohol use: Yes     Comment: occasionally    Drug use: Yes     Types: Hydrocodone    Sexual activity: Yes     Review of Systems   Constitutional: Negative for activity change, appetite change, chills, diaphoresis, fatigue and fever.   HENT: Negative for sore throat and trouble swallowing.    Eyes: Negative for photophobia and visual disturbance.   Respiratory: Negative for cough, chest tightness and shortness of breath.    Cardiovascular: Positive for leg swelling. Negative for chest pain and palpitations.   Gastrointestinal: Negative for abdominal pain, blood in stool, constipation, diarrhea, nausea and vomiting.   Genitourinary: Negative for dysuria and hematuria.   Musculoskeletal: Positive for joint swelling. Negative for arthralgias.   Skin: Positive for wound.   Neurological: Negative for weakness, light-headedness, numbness and headaches.   Psychiatric/Behavioral: Negative for agitation, confusion and dysphoric mood.     Objective:     Vital Signs (Most Recent):  Temp: 98 °F (36.7 °C) (02/15/22 1600)  Pulse: 83 (02/15/22 1600)  Resp: 20 (02/15/22 1600)  BP: (!) 150/95 (02/15/22 1600)  SpO2: 96 % (02/15/22 1600) Vital Signs (24h Range):  Temp:  [97.4 °F (36.3 °C)-98 °F (36.7 °C)] 98 °F (36.7 °C)  Pulse:  [83-94] 83  Resp:  [16-20] 20  SpO2:  [96 %] 96 %  BP: (142-189)/(82-98) 150/95     Weight: (!) 156.9 kg (346 lb)  Body mass index is 44.42  kg/m².    Physical Exam  Vitals reviewed.   Constitutional:       Appearance: Normal appearance. He is obese.   HENT:      Head: Normocephalic and atraumatic.      Nose: Nose normal.      Mouth/Throat:      Mouth: Mucous membranes are moist.      Pharynx: Oropharynx is clear.   Eyes:      Extraocular Movements: Extraocular movements intact.      Conjunctiva/sclera: Conjunctivae normal.      Pupils: Pupils are equal, round, and reactive to light.   Cardiovascular:      Rate and Rhythm: Normal rate and regular rhythm.      Pulses: Normal pulses.      Heart sounds: Normal heart sounds.   Pulmonary:      Effort: Pulmonary effort is normal.      Breath sounds: Normal breath sounds.   Abdominal:      General: Abdomen is flat. Bowel sounds are normal.      Palpations: Abdomen is soft.   Musculoskeletal:         General: Swelling present. Normal range of motion.      Cervical back: Normal range of motion.      Right lower leg: Edema present.      Left lower leg: Edema present.   Skin:     General: Skin is warm and dry.      Findings: Wound present.      Comments: Large ulcer bottom of right foot  Right big toe amputated  Two small ulcers tip of second toe right foot and by right ankle    Neurological:      General: No focal deficit present.      Mental Status: He is alert and oriented to person, place, and time.   Psychiatric:         Mood and Affect: Mood normal.         Behavior: Behavior normal.           CRANIAL NERVES     CN III, IV, VI   Pupils are equal, round, and reactive to light.       Significant Labs: All pertinent labs within the past 24 hours have been reviewed.    Significant Imaging: I have reviewed all pertinent imaging results/findings within the past 24 hours.    Assessment/Plan:     * Chronic diabetic ulcer of right foot determined by examination  * Patient's blood cultures from 02/04 taken from ulcer showed growth of ESBL, Staph Aureus and Proteus Mirabilis   - Merrem 500mg IV q6  - [facility] under Dr. Casper (ID)  - Consult to wound care  - PT/OT  - Repeat Bcx pending   - daily probiotics       Diabetic neuropathy with neurologic complication  - Patient's latest A1C was 8.4 in November  - Repeat A1C pending   - Moderate SSI  - POCT glucose checks   - Diabetic diet   - Levemir 10 units QHS         Peripheral vascular disease  - Continue home gabapentin 300mg q8      Primary hypertension  - amlodipine 10mg qd  - hctz 25mg qd      Chronic pain syndrome  - gabapentin 300mg q8  - Norco 10mg q6 prn         VTE Risk Mitigation (From admission, onward)         Ordered     IP VTE LOW RISK PATIENT  Once         02/15/22 1401     Place DONATO wadsworthe  Until discontinued         02/15/22 1401                   Clara Gutierrez MD  Department of Hospital Medicine   [facility]

## 2022-02-16 NOTE — CONSULTS
[facility]  Wound Care  Consult Note    Patient Name: Jean Pierre Paulson  MRN: [id number]  Admission Date: 2/15/2022  Hospital Length of Stay: 1 days  Attending Physician: Carey Vivas,*  Primary Care Provider: Mojgan Lomeli NP     Inpatient consult to Wound Care Nurse (New Patient)  Consult performed by: AUGUSTUS Macedo  Consult ordered by: Clara Gutierrez MD  Reason for consult: Wound care  Assessment/Recommendations:   1. Three phase bone scan, suspected chronic osteomyelitis   2. Daily dressing changes.   3. Bedside debridements.   4. Tentatively plan for HBOT pending additional imaging.         Subjective:     History of Present Illness:  36 y.o. male who presents to clinic for follow up on chronic-non healing wound on the right plantar foot. Patient was admitted due to infection in right foot.  Pertinent factors that delay wound healing include multiple co-morbidities, diabetes, edema, heavy drainage, excessive wound moisture and macerated tissue and no protective sensation.     Review of Systems   Constitutional: Negative for activity change, appetite change, chills, diaphoresis, fatigue and fever.     Respiratory: Negative for cough, chest tightness and shortness of breath.    Cardiovascular: Positive for leg swelling. Negative for chest pain and palpitations.   Musculoskeletal: Positive for joint swelling. Negative for arthralgias.   Skin: Positive for wound.   Neurological: Negative for weakness, light-headedness, and headaches.   Psychiatric/Behavioral: Negative for agitation, confusion and dysphoric mood.    Physical Exam  Vitals reviewed.   Constitutional:       General: He is not in acute distress.     Appearance: Normal appearance. He is obese.   HENT:      Head: Normocephalic.   Cardiovascular:      Rate and Rhythm: Normal rate and regular rhythm.      Pulses: Normal pulses.      Heart sounds: Normal heart sounds.   Pulmonary:      Effort: Pulmonary effort is  normal.      Breath sounds: Normal breath sounds.   Musculoskeletal:         General: Swelling, deformity and signs of injury present.      Right lower leg: Edema present.   Skin:     Capillary Refill: Capillary refill takes 2 to 3 seconds.      Comments: Wound right foot   Neurological:      General: No focal deficit present.      Mental Status: He is alert and oriented to person, place, and time. Mental status is at baseline.   Psychiatric:         Mood and Affect: Mood normal.         Behavior: Behavior normal.         Thought Content: Thought content normal.         Judgment: Judgment normal.       Assessment/Plan:     No new Assessment & Plan notes have been filed under this hospital service since the last note was generated.  Service: Wound Care      Thank you for your consult. I will follow-up with patient. Please contact us if you have any additional questions.    AUGUSTUS Macedo  Wound Care  [facility]

## 2022-02-16 NOTE — ASSESSMENT & PLAN NOTE
* Patient's blood cultures from 02/04 taken from ulcer showed growth of ESBL, Staph Aureus and Proteus Mirabilis   - Merrem 500mg IV q6  - Admit to [facility] hospital under Dr. Casper (ID)  - Consult to wound care  - PT/OT  - Repeat Bcx pending   - daily probiotics

## 2022-02-16 NOTE — ASSESSMENT & PLAN NOTE
- Patient's latest A1C was 8.4 in November  - Repeat A1C pending   - Moderate SSI  - POCT glucose checks   - Diabetic diet   - Levemir 10 units QHS

## 2022-02-16 NOTE — PLAN OF CARE
[facility]  Initial Discharge Assessment       Primary Care Provider: Mojgan Lomeli NP    Admission Diagnosis: Foot ulcer due to secondary DM [E13.621, L97.509]    Admission Date: 2/15/2022  Expected Discharge Date:     Discharge Barriers Identified: None    Payor: MEDICARE / Plan: MEDICARE PART A & B / Product Type: Government /     Extended Emergency Contact Information  Primary Emergency Contact: VERONICA GAXIOLA  Mobile Phone: [phone]  Relation: Mother  Preferred language: English   needed? No    Discharge Plan A: Home Health,Home  Discharge Plan B: Home,Home Health      [facility] [place] [address]  [place] [address]  Phone: [phone] Fax: [phone]    [facility] - [facility] [address]  [address] [address]  Phone: [phone] Fax: [phone]      Initial Assessment (most recent)     Adult Discharge Assessment - 02/16/22 1132        Discharge Assessment    Assessment Type Discharge Planning Assessment     Source of Information patient     Communicated WOOD with patient/caregiver Date not available/Unable to determine     Lives With alone     Do you have help at home or someone to help you manage your care at home? No     Equipment Currently Used at Home crutches     Do you currently have service(s) that help you manage your care at home? Yes     Name and Contact number of agency [facility]     Is the pt/caregiver preference to resume services with current agency No   Pt wants to change companies    Discharge Plan A Home Health;Home     Discharge Plan B Home;Home Health     DME Needed Upon Discharge  none     Discharge Plan discussed with: Patient     Discharge Barriers Identified None                  SS spoke with pt in room. Pt lives at home alone. Pt uses crutches. Password set and is [place]. Informed of IDT meeting and does not think anyone will need to  attend

## 2022-02-16 NOTE — PLAN OF CARE
Problem: Adult Inpatient Plan of Care  Goal: Plan of Care Review  2/16/2022 0624 by Zainab Gracia RN  Outcome: Ongoing, Progressing  2/16/2022 0408 by Zainab Gracia RN  Outcome: Ongoing, Progressing  Goal: Patient-Specific Goal (Individualized)  2/16/2022 0624 by Zainab Gracia RN  Outcome: Ongoing, Progressing  2/16/2022 0408 by Zainab Gracia RN  Outcome: Ongoing, Progressing  Goal: Absence of Hospital-Acquired Illness or Injury  2/16/2022 0624 by Zainab Gracia RN  Outcome: Ongoing, Progressing  2/16/2022 0408 by Zainab Gracia RN  Outcome: Ongoing, Progressing  Goal: Optimal Comfort and Wellbeing  2/16/2022 0624 by Zainab Gracia RN  Outcome: Ongoing, Progressing  2/16/2022 0408 by Zainab Gracia RN  Outcome: Ongoing, Progressing  Goal: Readiness for Transition of Care  2/16/2022 0624 by Zainab Gracia RN  Outcome: Ongoing, Progressing  2/16/2022 0408 by Zainab Gracia RN  Outcome: Ongoing, Progressing     Problem: Diabetes Comorbidity  Goal: Blood Glucose Level Within Targeted Range  2/16/2022 0624 by Zainab Gracia RN  Outcome: Ongoing, Progressing  2/16/2022 0408 by Zainab Gracia RN  Outcome: Ongoing, Progressing     Problem: Bariatric Environmental Safety  Goal: Safety Maintained with Care  2/16/2022 0624 by Zainab Gracia RN  Outcome: Ongoing, Progressing  2/16/2022 0408 by Zainab Gracia RN  Outcome: Ongoing, Progressing     Problem: Infection  Goal: Absence of Infection Signs and Symptoms  2/16/2022 0624 by Zainab Gracia RN  Outcome: Ongoing, Progressing  2/16/2022 0408 by Zainab Gracia RN  Outcome: Ongoing, Progressing     Problem: Impaired Wound Healing  Goal: Optimal Wound Healing  2/16/2022 0624 by Zainab Gracia RN  Outcome: Ongoing, Progressing  2/16/2022 0408 by Zainab Gracia RN  Outcome: Ongoing, Progressing     Problem: Skin Injury Risk Increased  Goal: Skin Health and Integrity  2/16/2022 0624 by Zainab Gracia RN  Outcome: Ongoing,  Progressing  2/16/2022 0408 by Zainab Gracia, RN  Outcome: Ongoing, Progressing

## 2022-02-16 NOTE — PLAN OF CARE
Problem: Occupational Therapy Goal  Goal: Occupational Therapy Goal  Description: OT evaluation completed. Pt is an eval only. Pt presents with no decline with ability to perform self care and mobility and has good strength (B). No skilled OT indicated.  Outcome: Met

## 2022-02-16 NOTE — PT/OT/SLP EVAL
Physical Therapy Evaluation and Discharge Note    Patient Name:  Jean Pierre Paulson   MRN:  [id number]    Recommendations:     Discharge Recommendations:  home   Discharge Equipment Recommendations: none   Barriers to discharge: None    Assessment:     Jean Pierre Paulson is a 36 y.o. male admitted with a medical diagnosis of Chronic diabetic ulcer of right foot determined by examination. .  At this time, patient is functioning at their prior level of function and does not require further acute PT services.     Recent Surgery: * No surgery found *      Plan:     During this hospitalization, patient does not require further acute PT services.  Please re-consult if situation changes.      Subjective     Chief Complaint: none  Patient/Family Comments/goals: agreeable to eval  Pain/Comfort:  · Pain Rating 1: 0/10    Patients cultural, spiritual, Gnosticist conflicts given the current situation: no    Living Environment:  Home alone in upper-story apartment with 2 flights of stairs, 10 steps each.   Prior to admission, patients level of function was indep, no AD, still driving.  Equipment used at home: crutches.  DME owned (not currently used): crutches.  Upon discharge, patient will have assistance from self.    Objective:     Communicated with CARIN Aguayo prior to session.  Patient found HOB elevated with peripheral IV upon PT entry to room.    General Precautions: Standard, fall   Orthopedic Precautions:    Braces: N/A   Respiratory Status: Room air    Exams:  · Cognitive Exam:  Patient is oriented to Person, Place, Time and Situation  · Sensation:    · -       Impaired  R foot absent sensation; L foot WFL  · Skin Integrity/Edema:      · -       Skin integrity: Wound on R foot  · RLE ROM: WNL  · RLE Strength: WNL  · LLE ROM: WNL  · LLE Strength: WNL    Functional Mobility:  · Bed Mobility:     · Supine to Sit: independence  · Transfers:     · Sit to Stand:  independence with no AD  · Gait: 30ft with no AD, indep  · Balance: indep  with no AD    AM-PAC 6 CLICK MOBILITY  Total Score:24       Therapeutic Activities and Exercises:   Pt is indep in bed mobility, transfers and ambulation. No PT warranted at this time.     AM-PAC 6 CLICK MOBILITY  Total Score:24     Patient left up in chair with all lines intact and call button in reach.    GOALS:   Multidisciplinary Problems     Physical Therapy Goals     Not on file          Multidisciplinary Problems (Resolved)        Problem: Physical Therapy Goal    Goal Priority Disciplines Outcome Goal Variances Interventions   Physical Therapy Goal   (Resolved)     PT, PT/OT Met     Description: Pt is indep in bed mobility, transfers and ambulation. No PT warranted at this time.                    History:     Past Medical History:   Diagnosis Date    Anemia     Chronic osteomyelitis     Diabetes mellitus with neuropathy     Diabetes mellitus, type 2     DM2 (diabetes mellitus, type 2)     HTN (hypertension)     Hypertension     Neuropathy     Obesity     Osteomyelitis 10/2020    Hx of R foot, Tx with IV Abx    Peripheral vascular disease        Past Surgical History:   Procedure Laterality Date    CHOLECYSTECTOMY      DEBRIDEMENT OF FOOT Right 10/2020    FOOT SURGERY      Right diabetic foot ulcer Elliott's Grade 3      Bone exposure to R heel, Hx of IV Abx, cultures show multiple bacterial growth, Hx of Osteomyelitis, HBOT       Time Tracking:     PT Received On: 02/16/22  PT Start Time: 0904     PT Stop Time: 0913  PT Total Time (min): 9 min     Billable Minutes: Evaluation low complexity      02/16/2022

## 2022-02-16 NOTE — PT/OT/SLP EVAL
Occupational Therapy   Evaluation and Discharge Note    Name: Jean Pierre Paulson  MRN: [id number]  Admitting Diagnosis:  Chronic diabetic ulcer of right foot determined by examination   Recent Surgery: * No surgery found *      Recommendations:     Discharge Recommendations: home  Discharge Equipment Recommendations:  none  Barriers to discharge:  None    Assessment:     Jean Pierre Paulson is a 36 y.o. male with a medical diagnosis of Chronic diabetic ulcer of right foot determined by examination. At this time, patient is functioning at their prior level of function and does not require further acute OT services.     Plan:     During this hospitalization, patient does not require further acute OT services.  Please re-consult if situation changes.    · Plan of Care Reviewed with: patient (Pt is an eval only. Educated to alert nurse/physician of change/decline in self care status.)    Subjective     Chief Complaint: Chronic Diabetic Ulcer of (R) Foot  Patient/Family Comments/goals: To return home    Occupational Profile:  Living Environment: Pt lives in an apartment with 2 steps to enter(10 steps them landing then 10 more steps)  Previous level of function: I with self care prior  Roles and Routines: Pt is a   Equipment Used at home:  crutches  Assistance upon Discharge: none stated    Pain/Comfort:  · Pain Rating 1: 0/10    Patients cultural, spiritual, Orthodoxy conflicts given the current situation: no    Objective:     Communicated with: JOVITA Mars prior to session.  Patient found HOB elevated with peripheral IV upon OT entry to room.    General Precautions: Standard, fall   Orthopedic Precautions:N/A   Braces: N/A  Respiratory Status: Room air     Occupational Performance:    Bed Mobility:    · Patient completed Rolling/Turning to Right with independence  · Patient completed Supine to Sit with independence    Functional Mobility/Transfers:  · Patient completed Sit <> Stand Transfer with independence  with  no  assistive device   · Patient completed Bed <> Chair Transfer using Step Transfer technique with independence with no assistive device  · Functional Mobility: (I)    Activities of Daily Living:  · Upper Body Dressing: independence donning gown as a robe  · Lower Body Dressing: independence donning sock and shoe cover on (R)    Cognitive/Visual Perceptual:  Cognitive/Psychosocial Skills:     -       Oriented to: Person, Place, Time and Situation   -       Follows Commands/attention:Follows one-step commands  Visual/Perceptual:      -Intact reading glasses    Physical Exam:  Balance:    -       I with EOB and mobility  Skin integrity: Visible skin intact  Edema:  None noted  Sensation:    -       Intact  Motor Planning:  wfl  Dominant hand:    -       right  Upper Extremity Range of Motion:     -       Right Upper Extremity: WFL  -       Left Upper Extremity: WFL  Upper Extremity Strength:    -       Right Upper Extremity: WFL  -       Left Upper Extremity: WFL   Strength:    -       Right Upper Extremity: WFL  -       Left Upper Extremity: WFL  Fine Motor Coordination:    -       Intact  Gross motor coordination:   WFL    AMPAC 6 Click ADL:  AMPAC Total Score: 24    Treatment & Education:  OT evaluation completed. Pt is an eval only. Pt presents with no decline in self care, mobility or strength. No skilled OT indicated  Education:    Patient left up in chair with all lines intact, call button in reach and nurse notified    GOALS:   Multidisciplinary Problems     Occupational Therapy Goals     Not on file          Multidisciplinary Problems (Resolved)        Problem: Occupational Therapy Goal    Goal Priority Disciplines Outcome Interventions   Occupational Therapy Goal   (Resolved)     OT, PT/OT Met    Description: OT evaluation completed. Pt is an eval only. Pt presents with no decline with ability to perform self care and mobility and has good strength (B). No skilled OT indicated.                   History:      Past Medical History:   Diagnosis Date    Anemia     Chronic osteomyelitis     Diabetes mellitus with neuropathy     Diabetes mellitus, type 2     DM2 (diabetes mellitus, type 2)     HTN (hypertension)     Hypertension     Neuropathy     Obesity     Osteomyelitis 10/2020    Hx of R foot, Tx with IV Abx    Peripheral vascular disease        Past Surgical History:   Procedure Laterality Date    CHOLECYSTECTOMY      DEBRIDEMENT OF FOOT Right 10/2020    FOOT SURGERY      Right diabetic foot ulcer Elliott's Grade 3      Bone exposure to R heel, Hx of IV Abx, cultures show multiple bacterial growth, Hx of Osteomyelitis, HBOT       Time Tracking:     OT Date of Treatment: 02/16/22  OT Start Time: 0852  OT Stop Time: 0910  OT Total Time (min): 18 min    Billable Minutes:Evaluation 18    2/16/2022

## 2022-02-16 NOTE — PROGRESS NOTES
[facility]  Adult Nutrition  First Assessment Note         Reason for Assessment  Reason For Assessment: consult  Nutrition Risk Screen: large or nonhealing wound, burn or pressure injury  Malnutrition  Is Patient Malnourished: No  Nutrition Diagnosis  Increased protein Needs   related to Decreased/ impaired skin integrity as evidenced by wounds    Nutrition Diagnosis Status: Chronic/ continues      Nutrition Risk  Level of Risk/Frequency of Follow-up: high   Chewing or Swallowing Difficulty?: No Chewing or swallowing difficulty  Estimated/Assessed Needs  RMR (Yazoo-St. Jeor Equation): 2558.75 Activity Factor: 1 Injury Factor: 1.2     Temp: 98 °F (36.7 °C)Oral  Weight Used For Calorie Calculations: (!) 156.9 kg (345 lb 14.4 oz)   Energy Need Method: Yazoo-St Jeor    Weight Used For Protein Calculations: 104 kg (229 lb 4.5 oz) (ABW)  Protein Requirements: 125-156  Estimated Fluid Requirement Method: RDA Method Fluid Requirements (mL): 3071        Nutrition Prescription / Recommendations  Recommendation/Intervention: will change diet to 2000 constant carbohydrate , high protein; will add angel to all meals to help facilitate wound healing  Goals: wound healing; pt will meet estimated nutritional needs  Nutrition Goal Status: new  Communication of RD Recs: reviewed with physician  Current Diet Order: diabetic  Nutrition Order Comments: no % meal intake documentation  Recommended Diet: Consistent Carbohydrate 2000 (75g Carbs)  Recommended Oral Supplement: Angel [90 kcals, 2.5g Protein, 10g Carbs(3g Sugar), 7g L-Arginine, 7g L-Glutamine, Vitamin C 300mg, 9.5mg Zinc] three times daily  Is Nutrition Support Recommended: No  Is Education Recommended: No  Monitor and Evaluation  % current Intake: New Diet order with no P.O. intake documented currently  % intake to meet estimated needs: 75 - 100 %  Food and Nutrient Intake: energy intake,food and beverage intake  Food and Nutrient Adminstration: diet  "order  Anthropometric Measurements: height/length,weight,weight change,body mass index  Biochemical Data, Medical Tests and Procedures: electrolyte and renal panel,glucose/endocrine profile  Nutrition-Focused Physical Findings: skin     Current Medical Diagnosis and Past Medical History  Diagnosis: diabetes diagnosis/complications  Past Medical History:   Diagnosis Date    Anemia     Chronic osteomyelitis     Diabetes mellitus with neuropathy     Diabetes mellitus, type 2     DM2 (diabetes mellitus, type 2)     HTN (hypertension)     Hypertension     Neuropathy     Obesity     Osteomyelitis 10/2020    Hx of R foot, Tx with IV Abx    Peripheral vascular disease      Nutrition/Diet History  Spiritual, Cultural Beliefs, Mu-ism Practices, Values that Affect Care: no  Food Allergies: other (see comments) (tomato)  Lab/Procedures/Meds  Recent Labs   Lab 02/16/22  0647      K 4.7   BUN 23*   CREATININE 1.13   *   CALCIUM 9.1        Last A1c:   Lab Results   Component Value Date    HGBA1C 7.8 (H) 02/15/2022     Lab Results   Component Value Date    RBC 4.57 (L) 02/16/2022    HGB 12.0 (L) 02/16/2022    HCT 37.4 (L) 02/16/2022    MCV 81.8 02/16/2022    MCH 26.3 (L) 02/16/2022    MCHC 32.1 02/16/2022     Pertinent Labs Reviewed: reviewed  Pertinent Labs Comments: Hct 26.3, BUN 23, Glu 140, Alb 2.6  Pertinent Medications Reviewed: reviewed  Anthropometrics  Temp: 98 °F (36.7 °C)  Height Method: Stated  Height: 6' 2" (188 cm)  Height (inches): 74 in  Weight Method: Bed Scale  Weight: (!) 156.9 kg (345 lb 14.4 oz)  Weight (lb): (!) 345.91 lb  Ideal Body Weight (IBW), Male: 190 lb  % Ideal Body Weight, Male (lb): 182.06 %  BMI (Calculated): 44.4  BMI Grade: greater than 40 - morbid obesity     Nutrition by Nursing  Diet/Nutrition Received: consistent carb/diabetic diet     Diet/Feeding Assistance: none     Last Bowel Movement: 02/14/22              Nutrition Follow-Up     Assessment and Plan  No " new Assessment & Plan notes have been filed under this hospital service since the last note was generated.  Service: Nutrition

## 2022-02-16 NOTE — PLAN OF CARE
Problem: Physical Therapy Goal  Goal: Physical Therapy Goal  Description: Pt is indep in bed mobility, transfers and ambulation. No PT warranted at this time.   Outcome: Met

## 2022-02-16 NOTE — PROGRESS NOTES
[facility] Medicine  Progress Note    Patient Name: Jean Pierre Paulson  MRN: [id number]  Patient Class: IP- Inpatient   Admission Date: 2/15/2022  Length of Stay: 1 days  Attending Physician: Carey Vivas,*  Primary Care Provider: Mojgan Lomeli NP        Subjective:     Principal Problem:Chronic diabetic ulcer of right foot determined by examination        HPI:  The patient is a 37yo AA male with a MedHX of DM2 with neuropathy, chronic osteomyelitis, a necrotic right foot ulcer, anemia, PVD, HTN, HLD, chronic pain syndrome, and amputation of the right big toe. [facility] to [facility] on [facility]/15 for a follow up on wound cultures that had been taken from his chronic right foot plantar ulcer. The wound cultures had grown Staph aureus, ESBL e.coli and proteus mirabilis. The patient says that he had first noticed the ulcer in late 2019 after he stepped on a nail. He says that this was around the time that he had had his right big toe amputated. He came in to the hospital last November when he noticed that the ulcer had grown very large. He had a debridement of the ulcer by Dr. Ramírez at that time. He has been taking Clindamycin PO prior to this admission. A wound care nurse from [facility] has been visiting the patient weekly to treat his wound with silver nitrate and redress it. The patient does also have two small, superficial ulcers on his right second toe and his right ankle.     The patient will be admitted to [facility] under the [facility] (Dr. Casper) for treatment of his infected right foot ulcer and management of other medical problems.       Overview/Hospital Course:  No notes on file    Interval History: Patient is having some right foot pain today but says it is controlled by his prn Norco. No other acute complaints. No overnight events reported. Will increased levemir to 15 units qhs as patient's POCT glucose high during the night. Foot wrapped in  bandage, was changed by wound care but prior to that did appreciate some bloody discharge through wrapping.     Review of Systems   Constitutional: Negative for activity change, appetite change, chills, diaphoresis, fatigue and fever.   HENT: Negative for sore throat and trouble swallowing.    Eyes: Negative for photophobia and visual disturbance.   Respiratory: Negative for cough, chest tightness and shortness of breath.    Cardiovascular: Positive for leg swelling. Negative for chest pain and palpitations.   Gastrointestinal: Negative for abdominal pain, blood in stool, constipation, diarrhea, nausea and vomiting.   Genitourinary: Negative for dysuria and hematuria.   Musculoskeletal: Positive for joint swelling. Negative for arthralgias.   Skin: Positive for wound.   Neurological: Negative for weakness, light-headedness, numbness and headaches.   Psychiatric/Behavioral: Negative for agitation, confusion and dysphoric mood.     Objective:     Vital Signs (Most Recent):  Temp: 98 °F (36.7 °C) (02/16/22 0712)  Pulse: 77 (02/16/22 0712)  Resp: 20 (02/16/22 0712)  BP: 125/84 (02/16/22 0712)  SpO2: 97 % (02/16/22 0712) Vital Signs (24h Range):  Temp:  [97.9 °F (36.6 °C)-98.5 °F (36.9 °C)] 98 °F (36.7 °C)  Pulse:  [73-83] 77  Resp:  [16-20] 20  SpO2:  [95 %-98 %] 97 %  BP: (125-150)/(82-95) 125/84     Weight: (!) 156.9 kg (345 lb 14.4 oz)  Body mass index is 44.41 kg/m².    Intake/Output Summary (Last 24 hours) at 2/16/2022 1227  Last data filed at 2/16/2022 0500  Gross per 24 hour   Intake 240 ml   Output --   Net 240 ml      Physical Exam  Vitals reviewed.   Constitutional:       Appearance: Normal appearance. He is obese.   HENT:      Head: Normocephalic and atraumatic.      Nose: Nose normal.      Mouth/Throat:      Mouth: Mucous membranes are moist.      Pharynx: Oropharynx is clear.   Eyes:      Extraocular Movements: Extraocular movements intact.      Conjunctiva/sclera: Conjunctivae normal.      Pupils: Pupils  are equal, round, and reactive to light.   Cardiovascular:      Rate and Rhythm: Normal rate and regular rhythm.      Pulses: Normal pulses.      Heart sounds: Normal heart sounds.   Pulmonary:      Effort: Pulmonary effort is normal.      Breath sounds: Normal breath sounds.   Abdominal:      General: Abdomen is flat. Bowel sounds are normal.      Palpations: Abdomen is soft.   Musculoskeletal:         General: Swelling present. Normal range of motion.      Cervical back: Normal range of motion.      Right lower leg: Edema present.      Left lower leg: Edema present.   Skin:     General: Skin is warm and dry.      Findings: Wound present.      Comments: Large ulcer bottom of right foot  Right big toe amputated  Two small ulcers tip of second toe right foot and by right ankle    Neurological:      General: No focal deficit present.      Mental Status: He is alert and oriented to person, place, and time.   Psychiatric:         Mood and Affect: Mood normal.         Behavior: Behavior normal.         Significant Labs: All pertinent labs within the past 24 hours have been reviewed.    Significant Imaging: I have reviewed all pertinent imaging results/findings within the past 24 hours.      Assessment/Plan:      * Chronic diabetic ulcer of right foot determined by examination  * Patient's blood cultures from 02/04 taken from ulcer showed growth of ESBL, Staph Aureus and Proteus Mirabilis   - Merrem 500mg IV q6  - Admit to [facility] under Dr. Casper (ID)  - Consult to wound care  - PT/OT  - Repeat Bcx pending   - daily probiotics       Diabetic neuropathy with neurologic complication  - Patient's latest A1C was 8.4 in November  - Repeat A1C 7.8   - Moderate SSI  - POCT glucose checks   - Diabetic diet   - Levemir 15 units QHS         Peripheral vascular disease  - Continue home gabapentin 300mg q8      Primary hypertension  - amlodipine 10mg qd  - hctz 25mg qd      Chronic pain syndrome  - gabapentin 300mg  q8  - Norco 10mg q6 prn         VTE Risk Mitigation (From admission, onward)         Ordered     IP VTE LOW RISK PATIENT  Once         02/15/22 1401     Place DONATO hose  Until discontinued         02/15/22 1401                Discharge Planning   WOOD:      Code Status: Full Code   Is the patient medically ready for discharge?:     Reason for patient still in hospital (select all that apply): Treatment  Discharge Plan A: Home Health,Home                  Clara Gutierrez MD  Department of Hospital Medicine   [facility]

## 2022-02-17 LAB
ANION GAP SERPL CALCULATED.3IONS-SCNC: 13 MMOL/L (ref 7–16)
BASOPHILS # BLD AUTO: 0.05 K/UL (ref 0–0.2)
BASOPHILS NFR BLD AUTO: 0.9 % (ref 0–1)
BUN SERPL-MCNC: 30 MG/DL (ref 7–18)
BUN/CREAT SERPL: 23 (ref 6–20)
CALCIUM SERPL-MCNC: 8.6 MG/DL (ref 8.5–10.1)
CHLORIDE SERPL-SCNC: 102 MMOL/L (ref 98–107)
CO2 SERPL-SCNC: 28 MMOL/L (ref 21–32)
CREAT SERPL-MCNC: 1.28 MG/DL (ref 0.7–1.3)
DIFFERENTIAL METHOD BLD: ABNORMAL
EOSINOPHIL # BLD AUTO: 0.24 K/UL (ref 0–0.5)
EOSINOPHIL NFR BLD AUTO: 4.2 % (ref 1–4)
ERYTHROCYTE [DISTWIDTH] IN BLOOD BY AUTOMATED COUNT: 13.8 % (ref 11.5–14.5)
GLUCOSE SERPL-MCNC: 138 MG/DL (ref 70–105)
GLUCOSE SERPL-MCNC: 163 MG/DL (ref 70–105)
GLUCOSE SERPL-MCNC: 187 MG/DL (ref 70–105)
GLUCOSE SERPL-MCNC: 193 MG/DL (ref 70–105)
GLUCOSE SERPL-MCNC: 257 MG/DL (ref 74–106)
HCT VFR BLD AUTO: 36.5 % (ref 40–54)
HGB BLD-MCNC: 11.7 G/DL (ref 13.5–18)
IMM GRANULOCYTES # BLD AUTO: 0.01 K/UL (ref 0–0.04)
IMM GRANULOCYTES NFR BLD: 0.2 % (ref 0–0.4)
LYMPHOCYTES # BLD AUTO: 2.77 K/UL (ref 1–4.8)
LYMPHOCYTES NFR BLD AUTO: 48.2 % (ref 27–41)
MCH RBC QN AUTO: 26.1 PG (ref 27–31)
MCHC RBC AUTO-ENTMCNC: 32.1 G/DL (ref 32–36)
MCV RBC AUTO: 81.5 FL (ref 80–96)
MONOCYTES # BLD AUTO: 0.46 K/UL (ref 0–0.8)
MONOCYTES NFR BLD AUTO: 8 % (ref 2–6)
MPC BLD CALC-MCNC: 10.1 FL (ref 9.4–12.4)
NEUTROPHILS # BLD AUTO: 2.22 K/UL (ref 1.8–7.7)
NEUTROPHILS NFR BLD AUTO: 38.5 % (ref 53–65)
NRBC # BLD AUTO: 0 X10E3/UL
NRBC, AUTO (.00): 0 %
PLATELET # BLD AUTO: 286 K/UL (ref 150–400)
POTASSIUM SERPL-SCNC: 4.7 MMOL/L (ref 3.5–5.1)
RBC # BLD AUTO: 4.48 M/UL (ref 4.6–6.2)
SODIUM SERPL-SCNC: 138 MMOL/L (ref 136–145)
WBC # BLD AUTO: 5.75 K/UL (ref 4.5–11)

## 2022-02-17 PROCEDURE — 25000003 PHARM REV CODE 250

## 2022-02-17 PROCEDURE — 82962 GLUCOSE BLOOD TEST: CPT

## 2022-02-17 PROCEDURE — 63600175 PHARM REV CODE 636 W HCPCS: Performed by: INTERNAL MEDICINE

## 2022-02-17 PROCEDURE — 25000003 PHARM REV CODE 250: Performed by: INTERNAL MEDICINE

## 2022-02-17 PROCEDURE — 63600175 PHARM REV CODE 636 W HCPCS

## 2022-02-17 PROCEDURE — C9399 UNCLASSIFIED DRUGS OR BIOLOG: HCPCS

## 2022-02-17 PROCEDURE — 96372 THER/PROPH/DIAG INJ SC/IM: CPT

## 2022-02-17 PROCEDURE — 36415 COLL VENOUS BLD VENIPUNCTURE: CPT

## 2022-02-17 PROCEDURE — 11000001 HC ACUTE MED/SURG PRIVATE ROOM

## 2022-02-17 PROCEDURE — 80048 BASIC METABOLIC PNL TOTAL CA: CPT

## 2022-02-17 PROCEDURE — 85025 COMPLETE CBC W/AUTO DIFF WBC: CPT

## 2022-02-17 PROCEDURE — 99232 SBSQ HOSP IP/OBS MODERATE 35: CPT | Mod: GC,,, | Performed by: INTERNAL MEDICINE

## 2022-02-17 PROCEDURE — 99232 PR SUBSEQUENT HOSPITAL CARE,LEVL II: ICD-10-PCS | Mod: GC,,, | Performed by: INTERNAL MEDICINE

## 2022-02-17 RX ADMIN — GABAPENTIN 300 MG: 300 CAPSULE ORAL at 09:02

## 2022-02-17 RX ADMIN — MEROPENEM 500 MG: 500 INJECTION, POWDER, FOR SOLUTION INTRAVENOUS at 12:02

## 2022-02-17 RX ADMIN — HYDROCODONE BITARTRATE AND ACETAMINOPHEN 1 TABLET: 10; 325 TABLET ORAL at 03:02

## 2022-02-17 RX ADMIN — GABAPENTIN 300 MG: 300 CAPSULE ORAL at 06:02

## 2022-02-17 RX ADMIN — Medication 2 CAPSULE: at 09:02

## 2022-02-17 RX ADMIN — MEROPENEM 500 MG: 500 INJECTION, POWDER, FOR SOLUTION INTRAVENOUS at 06:02

## 2022-02-17 RX ADMIN — INSULIN ASPART 2 UNITS: 100 INJECTION, SOLUTION INTRAVENOUS; SUBCUTANEOUS at 05:02

## 2022-02-17 RX ADMIN — ATORVASTATIN CALCIUM 40 MG: 40 TABLET, FILM COATED ORAL at 09:02

## 2022-02-17 RX ADMIN — INSULIN ASPART 2 UNITS: 100 INJECTION, SOLUTION INTRAVENOUS; SUBCUTANEOUS at 06:02

## 2022-02-17 RX ADMIN — GABAPENTIN 300 MG: 300 CAPSULE ORAL at 03:02

## 2022-02-17 RX ADMIN — Medication 2 CAPSULE: at 03:02

## 2022-02-17 RX ADMIN — Medication 2 CAPSULE: at 05:02

## 2022-02-17 RX ADMIN — INSULIN ASPART 1 UNITS: 100 INJECTION, SOLUTION INTRAVENOUS; SUBCUTANEOUS at 09:02

## 2022-02-17 RX ADMIN — HYDROCHLOROTHIAZIDE 25 MG: 12.5 TABLET ORAL at 09:02

## 2022-02-17 RX ADMIN — MEROPENEM 500 MG: 500 INJECTION, POWDER, FOR SOLUTION INTRAVENOUS at 03:02

## 2022-02-17 RX ADMIN — INSULIN DETEMIR 20 UNITS: 100 INJECTION, SOLUTION SUBCUTANEOUS at 09:02

## 2022-02-17 RX ADMIN — AMLODIPINE BESYLATE 10 MG: 10 TABLET ORAL at 09:02

## 2022-02-17 NOTE — ASSESSMENT & PLAN NOTE
- Patient's latest A1C was 8.4 in November  - Repeat A1C 7.8   - Moderate SSI  - POCT glucose checks   - Diabetic diet   - Levemir 20 units QHS

## 2022-02-17 NOTE — PLAN OF CARE
Problem: Adult Inpatient Plan of Care  Goal: Plan of Care Review  Outcome: Ongoing, Progressing     Problem: Diabetes Comorbidity  Goal: Blood Glucose Level Within Targeted Range  Outcome: Ongoing, Progressing     Problem: Infection  Goal: Absence of Infection Signs and Symptoms  Outcome: Ongoing, Progressing     Problem: Impaired Wound Healing  Goal: Optimal Wound Healing  Outcome: Ongoing, Progressing     Problem: Skin Injury Risk Increased  Goal: Skin Health and Integrity  Outcome: Ongoing, Progressing

## 2022-02-17 NOTE — ASSESSMENT & PLAN NOTE
* Patient's blood cultures from 02/04 taken from ulcer showed growth of ESBL, Staph Aureus and Proteus Mirabilis   - Merrem 500mg IV q6  [facility]  - Consult to wound care  - PT/OT  - Repeat Bcx no growth @ 24 hours   - daily probiotics

## 2022-02-17 NOTE — PROGRESS NOTES
[facility] Medicine  Progress Note    Patient Name: Jean Pierre Paulson  MRN: [id number]  Patient Class: IP- Inpatient   Admission Date: 2/15/2022  Length of Stay: 2 days  Attending Physician: Carey Vivas,*  Primary Care Provider: Mojgan Lomeli NP        Subjective:     Principal Problem:Chronic diabetic ulcer of right foot determined by examination        HPI:  The patient is a 37yo AA male with a MedHX of DM2 with neuropathy, chronic osteomyelitis, a necrotic right foot ulcer, anemia, PVD, HTN, HLD, chronic pain syndrome, and amputation of the right big toe. He presented to [facility] on 02/15 for a follow up on wound cultures that had been taken from his chronic right foot plantar ulcer. The wound cultures had grown Staph aureus, ESBL e.coli and proteus mirabilis. The patient says that he had first noticed the ulcer in late 2019 after he stepped on a nail. He says that this was around the time that he had had his right big toe amputated. He came in to the hospital last November when he noticed that the ulcer had grown very large. He had a debridement of the ulcer by Dr. Ramírez at that time. He has been taking Clindamycin PO prior to this admission. A wound care nurse from [facility] has been visiting the patient weekly to treat his wound with silver nitrate and redress it. The patient does also have two small, superficial ulcers on his right second toe and his right ankle.     The patient will be admitted to [facility] under the [facility] (Dr. Casper) for treatment of his infected right foot ulcer and management of other medical problems.       Overview/Hospital Course:  No notes on file    Interval History: Patient is resting comfortably in bed this morning with no cute complaints. No overnight events reported. Right foot still has bandage from yesterday, some serosanguinous discharge noted. Wound care nurse will be coming around today to change  dressing. Patient on day 3 of IV merram to treat his infected right foot ulcer. Blood cultures show no growth at 24 hours. His blood sugars have been running a little high still during the night to levemir dose increased again to 20 units.     Review of Systems   Constitutional: Negative for activity change, appetite change, chills, diaphoresis, fatigue and fever.   HENT: Negative for sore throat and trouble swallowing.    Eyes: Negative for photophobia and visual disturbance.   Respiratory: Negative for cough, chest tightness and shortness of breath.    Cardiovascular: Positive for leg swelling. Negative for chest pain and palpitations.   Gastrointestinal: Negative for abdominal pain, blood in stool, constipation, diarrhea, nausea and vomiting.   Genitourinary: Negative for dysuria and hematuria.   Musculoskeletal: Positive for joint swelling. Negative for arthralgias.   Skin: Positive for wound.   Neurological: Negative for weakness, light-headedness, numbness and headaches.   Psychiatric/Behavioral: Negative for agitation, confusion and dysphoric mood.     Objective:     Vital Signs (Most Recent):  Temp: 97.6 °F (36.4 °C) (02/17/22 0715)  Pulse: 75 (02/17/22 0400)  Resp: 18 (02/17/22 0400)  BP: 133/79 (02/17/22 0400)  SpO2: 95 % (02/17/22 0400) Vital Signs (24h Range):  Temp:  [97.6 °F (36.4 °C)-98 °F (36.7 °C)] 97.6 °F (36.4 °C)  Pulse:  [74-85] 75  Resp:  [18-20] 18  SpO2:  [95 %-97 %] 95 %  BP: (123-133)/(79-85) 133/79     Weight: (!) 156.9 kg (345 lb 14.4 oz)  Body mass index is 44.41 kg/m².    Intake/Output Summary (Last 24 hours) at 2/17/2022 0752  Last data filed at 2/17/2022 0127  Gross per 24 hour   Intake 580 ml   Output --   Net 580 ml      Physical Exam  Vitals reviewed.   Constitutional:       Appearance: Normal appearance. He is obese.   HENT:      Head: Normocephalic and atraumatic.      Nose: Nose normal.      Mouth/Throat:      Mouth: Mucous membranes are moist.      Pharynx: Oropharynx is clear.    Eyes:      Extraocular Movements: Extraocular movements intact.      Conjunctiva/sclera: Conjunctivae normal.      Pupils: Pupils are equal, round, and reactive to light.   Cardiovascular:      Rate and Rhythm: Normal rate and regular rhythm.      Pulses: Normal pulses.      Heart sounds: Normal heart sounds.   Pulmonary:      Effort: Pulmonary effort is normal.      Breath sounds: Normal breath sounds.   Abdominal:      General: Abdomen is flat. Bowel sounds are normal.      Palpations: Abdomen is soft.   Musculoskeletal:         General: Swelling present. Normal range of motion.      Cervical back: Normal range of motion.      Right lower leg: Edema present.      Left lower leg: Edema present.   Skin:     General: Skin is warm and dry.      Findings: Wound present.      Comments: bandage in place around right foot and ankle   Some serosanguinous discharge noted    Neurological:      General: No focal deficit present.      Mental Status: He is alert and oriented to person, place, and time.   Psychiatric:         Mood and Affect: Mood normal.         Behavior: Behavior normal.         Significant Labs: All pertinent labs within the past 24 hours have been reviewed.    Significant Imaging: I have reviewed all pertinent imaging results/findings within the past 24 hours.      Assessment/Plan:      * Chronic diabetic ulcer of right foot determined by examination  * Patient's blood cultures from 02/04 taken from ulcer showed growth of ESBL, Staph Aureus and Proteus Mirabilis   - Merrem 500mg IV q6  - Admit to [facility] under Dr. Casper (ID)  - Consult to wound care  - PT/OT  - Repeat Bcx no growth @ 24 hours   - daily probiotics       Diabetic neuropathy with neurologic complication  - Patient's latest A1C was 8.4 in November  - Repeat A1C 7.8   - Moderate SSI  - POCT glucose checks   - Diabetic diet   - Levemir 20 units QHS         Peripheral vascular disease  - Continue home gabapentin 300mg  q8      Primary hypertension  - amlodipine 10mg qd  - hctz 25mg qd      Chronic pain syndrome  - gabapentin 300mg q8  - Norco 10mg q6 prn         VTE Risk Mitigation (From admission, onward)         Ordered     IP VTE LOW RISK PATIENT  Once         02/15/22 1401     Place DONATO hose  Until discontinued         02/15/22 1401                Discharge Planning   WOOD:      Code Status: Full Code   Is the patient medically ready for discharge?:     Reason for patient still in hospital (select all that apply): Treatment  Discharge Plan A: Home Health,Home                  Clara Gutierrez MD  Department of Hospital Medicine   [facility]

## 2022-02-17 NOTE — SUBJECTIVE & OBJECTIVE
Interval History: Patient is resting comfortably in bed this morning with no cute complaints. No overnight events reported. Right foot still has bandage from yesterday, some serosanguinous discharge noted. Wound care nurse will be coming around today to change dressing. Patient on day 3 of IV merram to treat his infected right foot ulcer. Blood cultures show no growth at 24 hours. His blood sugars have been running a little high still during the night to levemir dose increased again to 20 units.     Review of Systems   Constitutional: Negative for activity change, appetite change, chills, diaphoresis, fatigue and fever.   HENT: Negative for sore throat and trouble swallowing.    Eyes: Negative for photophobia and visual disturbance.   Respiratory: Negative for cough, chest tightness and shortness of breath.    Cardiovascular: Positive for leg swelling. Negative for chest pain and palpitations.   Gastrointestinal: Negative for abdominal pain, blood in stool, constipation, diarrhea, nausea and vomiting.   Genitourinary: Negative for dysuria and hematuria.   Musculoskeletal: Positive for joint swelling. Negative for arthralgias.   Skin: Positive for wound.   Neurological: Negative for weakness, light-headedness, numbness and headaches.   Psychiatric/Behavioral: Negative for agitation, confusion and dysphoric mood.     Objective:     Vital Signs (Most Recent):  Temp: 97.6 °F (36.4 °C) (02/17/22 0715)  Pulse: 75 (02/17/22 0400)  Resp: 18 (02/17/22 0400)  BP: 133/79 (02/17/22 0400)  SpO2: 95 % (02/17/22 0400) Vital Signs (24h Range):  Temp:  [97.6 °F (36.4 °C)-98 °F (36.7 °C)] 97.6 °F (36.4 °C)  Pulse:  [74-85] 75  Resp:  [18-20] 18  SpO2:  [95 %-97 %] 95 %  BP: (123-133)/(79-85) 133/79     Weight: (!) 156.9 kg (345 lb 14.4 oz)  Body mass index is 44.41 kg/m².    Intake/Output Summary (Last 24 hours) at 2/17/2022 0364  Last data filed at 2/17/2022 0127  Gross per 24 hour   Intake 580 ml   Output --   Net 580 ml       Physical Exam  Vitals reviewed.   Constitutional:       Appearance: Normal appearance. He is obese.   HENT:      Head: Normocephalic and atraumatic.      Nose: Nose normal.      Mouth/Throat:      Mouth: Mucous membranes are moist.      Pharynx: Oropharynx is clear.   Eyes:      Extraocular Movements: Extraocular movements intact.      Conjunctiva/sclera: Conjunctivae normal.      Pupils: Pupils are equal, round, and reactive to light.   Cardiovascular:      Rate and Rhythm: Normal rate and regular rhythm.      Pulses: Normal pulses.      Heart sounds: Normal heart sounds.   Pulmonary:      Effort: Pulmonary effort is normal.      Breath sounds: Normal breath sounds.   Abdominal:      General: Abdomen is flat. Bowel sounds are normal.      Palpations: Abdomen is soft.   Musculoskeletal:         General: Swelling present. Normal range of motion.      Cervical back: Normal range of motion.      Right lower leg: Edema present.      Left lower leg: Edema present.   Skin:     General: Skin is warm and dry.      Findings: Wound present.      Comments: bandage in place around right foot and ankle   Some serosanguinous discharge noted    Neurological:      General: No focal deficit present.      Mental Status: He is alert and oriented to person, place, and time.   Psychiatric:         Mood and Affect: Mood normal.         Behavior: Behavior normal.         Significant Labs: All pertinent labs within the past 24 hours have been reviewed.    Significant Imaging: I have reviewed all pertinent imaging results/findings within the past 24 hours.

## 2022-02-18 LAB
ANION GAP SERPL CALCULATED.3IONS-SCNC: 13 MMOL/L (ref 7–16)
BUN SERPL-MCNC: 29 MG/DL (ref 7–18)
BUN/CREAT SERPL: 25 (ref 6–20)
CALCIUM SERPL-MCNC: 9 MG/DL (ref 8.5–10.1)
CHLORIDE SERPL-SCNC: 103 MMOL/L (ref 98–107)
CO2 SERPL-SCNC: 29 MMOL/L (ref 21–32)
CREAT SERPL-MCNC: 1.17 MG/DL (ref 0.7–1.3)
GLUCOSE SERPL-MCNC: 151 MG/DL (ref 70–105)
GLUCOSE SERPL-MCNC: 157 MG/DL (ref 70–105)
GLUCOSE SERPL-MCNC: 164 MG/DL (ref 74–106)
GLUCOSE SERPL-MCNC: 175 MG/DL (ref 70–105)
GLUCOSE SERPL-MCNC: 188 MG/DL (ref 70–105)
GLUCOSE SERPL-MCNC: 240 MG/DL (ref 70–105)
POTASSIUM SERPL-SCNC: 4.9 MMOL/L (ref 3.5–5.1)
SODIUM SERPL-SCNC: 140 MMOL/L (ref 136–145)

## 2022-02-18 PROCEDURE — 11000001 HC ACUTE MED/SURG PRIVATE ROOM

## 2022-02-18 PROCEDURE — C9399 UNCLASSIFIED DRUGS OR BIOLOG: HCPCS

## 2022-02-18 PROCEDURE — 25000003 PHARM REV CODE 250: Performed by: INTERNAL MEDICINE

## 2022-02-18 PROCEDURE — 36415 COLL VENOUS BLD VENIPUNCTURE: CPT | Performed by: INTERNAL MEDICINE

## 2022-02-18 PROCEDURE — 99232 SBSQ HOSP IP/OBS MODERATE 35: CPT | Mod: ,,, | Performed by: INTERNAL MEDICINE

## 2022-02-18 PROCEDURE — 63600175 PHARM REV CODE 636 W HCPCS

## 2022-02-18 PROCEDURE — 80048 BASIC METABOLIC PNL TOTAL CA: CPT

## 2022-02-18 PROCEDURE — 36415 COLL VENOUS BLD VENIPUNCTURE: CPT

## 2022-02-18 PROCEDURE — 25000003 PHARM REV CODE 250

## 2022-02-18 PROCEDURE — 82962 GLUCOSE BLOOD TEST: CPT

## 2022-02-18 PROCEDURE — 63600175 PHARM REV CODE 636 W HCPCS: Performed by: INTERNAL MEDICINE

## 2022-02-18 PROCEDURE — 99232 PR SUBSEQUENT HOSPITAL CARE,LEVL II: ICD-10-PCS | Mod: ,,, | Performed by: INTERNAL MEDICINE

## 2022-02-18 RX ADMIN — Medication 2 CAPSULE: at 05:02

## 2022-02-18 RX ADMIN — MEROPENEM 500 MG: 500 INJECTION, POWDER, FOR SOLUTION INTRAVENOUS at 06:02

## 2022-02-18 RX ADMIN — HYDROCODONE BITARTRATE AND ACETAMINOPHEN 1 TABLET: 10; 325 TABLET ORAL at 09:02

## 2022-02-18 RX ADMIN — INSULIN ASPART 4 UNITS: 100 INJECTION, SOLUTION INTRAVENOUS; SUBCUTANEOUS at 05:02

## 2022-02-18 RX ADMIN — ATORVASTATIN CALCIUM 40 MG: 40 TABLET, FILM COATED ORAL at 09:02

## 2022-02-18 RX ADMIN — Medication 2 CAPSULE: at 09:02

## 2022-02-18 RX ADMIN — MEROPENEM 500 MG: 500 INJECTION, POWDER, FOR SOLUTION INTRAVENOUS at 11:02

## 2022-02-18 RX ADMIN — MEROPENEM 500 MG: 500 INJECTION, POWDER, FOR SOLUTION INTRAVENOUS at 12:02

## 2022-02-18 RX ADMIN — AMLODIPINE BESYLATE 10 MG: 10 TABLET ORAL at 09:02

## 2022-02-18 RX ADMIN — INSULIN DETEMIR 20 UNITS: 100 INJECTION, SOLUTION SUBCUTANEOUS at 09:02

## 2022-02-18 RX ADMIN — INSULIN ASPART 1 UNITS: 100 INJECTION, SOLUTION INTRAVENOUS; SUBCUTANEOUS at 09:02

## 2022-02-18 RX ADMIN — GABAPENTIN 300 MG: 300 CAPSULE ORAL at 01:02

## 2022-02-18 RX ADMIN — INSULIN ASPART 2 UNITS: 100 INJECTION, SOLUTION INTRAVENOUS; SUBCUTANEOUS at 07:02

## 2022-02-18 RX ADMIN — Medication 2 CAPSULE: at 12:02

## 2022-02-18 RX ADMIN — HYDROCODONE BITARTRATE AND ACETAMINOPHEN 1 TABLET: 10; 325 TABLET ORAL at 12:02

## 2022-02-18 RX ADMIN — HYDROCHLOROTHIAZIDE 25 MG: 12.5 TABLET ORAL at 09:02

## 2022-02-18 RX ADMIN — GABAPENTIN 300 MG: 300 CAPSULE ORAL at 06:02

## 2022-02-18 RX ADMIN — GABAPENTIN 300 MG: 300 CAPSULE ORAL at 09:02

## 2022-02-18 NOTE — PLAN OF CARE
Problem: Impaired Wound Healing  Goal: Optimal Wound Healing  Outcome: Ongoing, Progressing     Problem: Skin Injury Risk Increased  Goal: Skin Health and Integrity  Outcome: Ongoing, Progressing     Problem: Infection  Goal: Absence of Infection Signs and Symptoms  Outcome: Ongoing, Progressing

## 2022-02-18 NOTE — SUBJECTIVE & OBJECTIVE
Interval History:  Patient doing well, tolerating meropenem without any allergic or other reactions.  Blood sugars were initially high when he 1st came in but better, around the 150-170 range.    Review of Systems   Constitutional: Negative for appetite change, chills and fever.   Respiratory: Negative for cough, shortness of breath and wheezing.    Cardiovascular: Negative for leg swelling.   Gastrointestinal: Negative for abdominal pain, diarrhea, nausea and vomiting.   Genitourinary: Negative for difficulty urinating and hematuria.   Skin: Negative for rash and wound (Right plantar foot wound).     Objective:     Vital Signs (Most Recent):  Temp: 98.2 °F (36.8 °C) (02/18/22 1200)  Pulse: 72 (02/18/22 1200)  Resp: 20 (02/18/22 1200)  BP: (!) 153/90 (02/18/22 1200)  SpO2: 97 % (02/18/22 1200) Vital Signs (24h Range):  Temp:  [97 °F (36.1 °C)-98.2 °F (36.8 °C)] 98.2 °F (36.8 °C)  Pulse:  [69-80] 72  Resp:  [18-20] 20  SpO2:  [96 %-100 %] 97 %  BP: (120-153)/(75-94) 153/90     Weight: (!) 156.9 kg (345 lb 14.4 oz)  Body mass index is 44.41 kg/m².    Intake/Output Summary (Last 24 hours) at 2/18/2022 1422  Last data filed at 2/18/2022 0725  Gross per 24 hour   Intake 500 ml   Output --   Net 500 ml      Physical Exam  Constitutional:       General: He is not in acute distress.     Appearance: He is obese. He is not toxic-appearing.   HENT:      Mouth/Throat:      Mouth: Mucous membranes are moist.      Pharynx: Oropharynx is clear. No oropharyngeal exudate.   Eyes:      General: No scleral icterus.        Right eye: No discharge.         Left eye: No discharge.   Cardiovascular:      Rate and Rhythm: Regular rhythm.      Heart sounds: Normal heart sounds. No murmur heard.      Pulmonary:      Effort: No respiratory distress.      Breath sounds: No wheezing, rhonchi or rales.   Abdominal:      General: Bowel sounds are normal. There is no distension.      Palpations: Abdomen is soft. There is no mass.      Tenderness:  There is no abdominal tenderness.   Musculoskeletal:      Right lower leg: Edema (Right plantar foot wound bandaged) present.      Left lower leg: Edema present.   Skin:     Coloration: Skin is not jaundiced.      Findings: No rash.   Neurological:      Mental Status: He is alert.         Significant Labs:   All pertinent labs within the past 24 hours have been reviewed.  BMP:   Recent Labs   Lab 02/18/22  0608   *      K 4.9      CO2 29   BUN 29*   CREATININE 1.17   CALCIUM 9.0     CBC:   Recent Labs   Lab 02/17/22  0306   WBC 5.75   HGB 11.7*   HCT 36.5*          Significant Imaging: I have reviewed all pertinent imaging results/findings within the past 24 hours.

## 2022-02-18 NOTE — ASSESSMENT & PLAN NOTE
* Patient's wound cultures from 02/04 taken from ulcer showed growth of ESBL E coli, MSSA and Proteus Mirabilis     2/18 Continue meropenem, local wound care.

## 2022-02-18 NOTE — PROGRESS NOTES
[facility] Medicine  Progress Note    Patient Name: Jean Pierre Paulson  MRN: [id number]  Patient Class: IP- Inpatient   Admission Date: 2/15/2022  Length of Stay: 3 days  Attending Physician: Carey Vivas,*  Primary Care Provider: Mojgan Lomeli NP        Subjective:     Principal Problem:Chronic diabetic ulcer of right foot determined by examination        HPI:  The patient is a 35yo AA male with a MedHX of DM2 with neuropathy, chronic osteomyelitis, a necrotic right foot ulcer, anemia, PVD, HTN, HLD, chronic pain syndrome, and amputation of the right big toe. He presented to [facility] on 02/15 for a follow up on wound cultures that had been taken from his chronic right foot plantar ulcer. The wound cultures had grown Staph aureus, ESBL e.coli and proteus mirabilis. The patient says that he had first noticed the ulcer in late 2019 after he stepped on a nail. He says that this was around the time that he had had his right big toe amputated. He came in to the hospital last November when he noticed that the ulcer had grown very large. He had a debridement of the ulcer by Dr. Ramírez at that time. He has been taking Clindamycin PO prior to this admission. A wound care nurse from [facility] has been visiting the patient weekly to treat his wound with silver nitrate and redress it. The patient does also have two small, superficial ulcers on his right second toe and his right ankle.     The patient will be admitted to [facility] under the [facility] (Dr. Casper) for treatment of his infected right foot ulcer and management of other medical problems.       Overview/Hospital Course:  No notes on file    Interval History:  Patient doing well, tolerating meropenem without any allergic or other reactions.  Blood sugars were initially high when he 1st came in but better, around the 150-170 range.    Review of Systems   Constitutional: Negative for appetite change, chills and  fever.   Respiratory: Negative for cough, shortness of breath and wheezing.    Cardiovascular: Negative for leg swelling.   Gastrointestinal: Negative for abdominal pain, diarrhea, nausea and vomiting.   Genitourinary: Negative for difficulty urinating and hematuria.   Skin: Negative for rash and wound (Right plantar foot wound).     Objective:     Vital Signs (Most Recent):  Temp: 98.2 °F (36.8 °C) (02/18/22 1200)  Pulse: 72 (02/18/22 1200)  Resp: 20 (02/18/22 1200)  BP: (!) 153/90 (02/18/22 1200)  SpO2: 97 % (02/18/22 1200) Vital Signs (24h Range):  Temp:  [97 °F (36.1 °C)-98.2 °F (36.8 °C)] 98.2 °F (36.8 °C)  Pulse:  [69-80] 72  Resp:  [18-20] 20  SpO2:  [96 %-100 %] 97 %  BP: (120-153)/(75-94) 153/90     Weight: (!) 156.9 kg (345 lb 14.4 oz)  Body mass index is 44.41 kg/m².    Intake/Output Summary (Last 24 hours) at 2/18/2022 1422  Last data filed at 2/18/2022 0725  Gross per 24 hour   Intake 500 ml   Output --   Net 500 ml      Physical Exam  Constitutional:       General: He is not in acute distress.     Appearance: He is obese. He is not toxic-appearing.   HENT:      Mouth/Throat:      Mouth: Mucous membranes are moist.      Pharynx: Oropharynx is clear. No oropharyngeal exudate.   Eyes:      General: No scleral icterus.        Right eye: No discharge.         Left eye: No discharge.   Cardiovascular:      Rate and Rhythm: Regular rhythm.      Heart sounds: Normal heart sounds. No murmur heard.      Pulmonary:      Effort: No respiratory distress.      Breath sounds: No wheezing, rhonchi or rales.   Abdominal:      General: Bowel sounds are normal. There is no distension.      Palpations: Abdomen is soft. There is no mass.      Tenderness: There is no abdominal tenderness.   Musculoskeletal:      Right lower leg: Edema (Right plantar foot wound bandaged) present.      Left lower leg: Edema present.   Skin:     Coloration: Skin is not jaundiced.      Findings: No rash.   Neurological:      Mental Status: He  is alert.         Significant Labs:   All pertinent labs within the past 24 hours have been reviewed.  BMP:   Recent Labs   Lab 02/18/22  0608   *      K 4.9      CO2 29   BUN 29*   CREATININE 1.17   CALCIUM 9.0     CBC:   Recent Labs   Lab 02/17/22  0306   WBC 5.75   HGB 11.7*   HCT 36.5*          Significant Imaging: I have reviewed all pertinent imaging results/findings within the past 24 hours.      Assessment/Plan:      * Chronic diabetic ulcer of right foot determined by examination  * Patient's wound cultures from 02/04 taken from ulcer showed growth of ESBL E coli, MSSA and Proteus Mirabilis     2/18 Continue meropenem, local wound care.      Diabetic neuropathy with neurologic complication  - Patient's latest A1C was 8.4 in November  - Repeat A1C 7.8   - Moderate SSI  - POCT glucose checks   - Diabetic diet   - Levemir 20 units QHS         Peripheral vascular disease  - Continue home gabapentin 300mg q8      Chronic pain syndrome  - gabapentin 300mg q8  - Norco 10mg q6 prn       Primary hypertension  - amlodipine 10mg qd  - hctz 25mg qd        VTE Risk Mitigation (From admission, onward)         Ordered     IP VTE LOW RISK PATIENT  Once         02/15/22 1401     Place DONATO hose  Until discontinued         02/15/22 1401                Discharge Planning   WOOD:      Code Status: Full Code   Is the patient medically ready for discharge?:     Reason for patient still in hospital (select all that apply): Treatment  Discharge Plan A: Home Health,Home                  Carey Vivas MD  Department of Hospital Medicine   [facility]

## 2022-02-18 NOTE — PLAN OF CARE
Problem: Adult Inpatient Plan of Care  Goal: Plan of Care Review  Outcome: Ongoing, Progressing  Goal: Patient-Specific Goal (Individualized)  Outcome: Ongoing, Progressing  Goal: Absence of Hospital-Acquired Illness or Injury  Outcome: Ongoing, Progressing  Goal: Optimal Comfort and Wellbeing  Outcome: Ongoing, Progressing  Goal: Readiness for Transition of Care  Outcome: Ongoing, Progressing     Problem: Diabetes Comorbidity  Goal: Blood Glucose Level Within Targeted Range  Outcome: Ongoing, Progressing     Problem: Bariatric Environmental Safety  Goal: Safety Maintained with Care  Outcome: Ongoing, Progressing     Problem: Infection  Goal: Absence of Infection Signs and Symptoms  Outcome: Ongoing, Progressing     Problem: Impaired Wound Healing  Goal: Optimal Wound Healing  Outcome: Ongoing, Progressing     Problem: Skin Injury Risk Increased  Goal: Skin Health and Integrity  Outcome: Ongoing, Progressing

## 2022-02-19 LAB
ANION GAP SERPL CALCULATED.3IONS-SCNC: 13 MMOL/L (ref 7–16)
BUN SERPL-MCNC: 29 MG/DL (ref 7–18)
BUN/CREAT SERPL: 26 (ref 6–20)
CALCIUM SERPL-MCNC: 9.1 MG/DL (ref 8.5–10.1)
CHLORIDE SERPL-SCNC: 102 MMOL/L (ref 98–107)
CO2 SERPL-SCNC: 26 MMOL/L (ref 21–32)
CREAT SERPL-MCNC: 1.13 MG/DL (ref 0.7–1.3)
GLUCOSE SERPL-MCNC: 172 MG/DL (ref 70–105)
GLUCOSE SERPL-MCNC: 202 MG/DL (ref 70–105)
GLUCOSE SERPL-MCNC: 209 MG/DL (ref 70–105)
GLUCOSE SERPL-MCNC: 224 MG/DL (ref 74–106)
GLUCOSE SERPL-MCNC: 253 MG/DL (ref 70–105)
POTASSIUM SERPL-SCNC: 4.8 MMOL/L (ref 3.5–5.1)
SODIUM SERPL-SCNC: 136 MMOL/L (ref 136–145)

## 2022-02-19 PROCEDURE — 82962 GLUCOSE BLOOD TEST: CPT

## 2022-02-19 PROCEDURE — 80048 BASIC METABOLIC PNL TOTAL CA: CPT

## 2022-02-19 PROCEDURE — 25000003 PHARM REV CODE 250

## 2022-02-19 PROCEDURE — 63600175 PHARM REV CODE 636 W HCPCS

## 2022-02-19 PROCEDURE — 11000001 HC ACUTE MED/SURG PRIVATE ROOM

## 2022-02-19 PROCEDURE — 25000003 PHARM REV CODE 250: Performed by: INTERNAL MEDICINE

## 2022-02-19 PROCEDURE — 99232 PR SUBSEQUENT HOSPITAL CARE,LEVL II: ICD-10-PCS | Mod: ,,, | Performed by: INTERNAL MEDICINE

## 2022-02-19 PROCEDURE — 96372 THER/PROPH/DIAG INJ SC/IM: CPT

## 2022-02-19 PROCEDURE — 63600175 PHARM REV CODE 636 W HCPCS: Performed by: INTERNAL MEDICINE

## 2022-02-19 PROCEDURE — 99232 SBSQ HOSP IP/OBS MODERATE 35: CPT | Mod: ,,, | Performed by: INTERNAL MEDICINE

## 2022-02-19 PROCEDURE — 36415 COLL VENOUS BLD VENIPUNCTURE: CPT

## 2022-02-19 PROCEDURE — C9399 UNCLASSIFIED DRUGS OR BIOLOG: HCPCS

## 2022-02-19 RX ADMIN — MEROPENEM 500 MG: 500 INJECTION, POWDER, FOR SOLUTION INTRAVENOUS at 05:02

## 2022-02-19 RX ADMIN — MEROPENEM 500 MG: 500 INJECTION, POWDER, FOR SOLUTION INTRAVENOUS at 12:02

## 2022-02-19 RX ADMIN — MEROPENEM 500 MG: 500 INJECTION, POWDER, FOR SOLUTION INTRAVENOUS at 11:02

## 2022-02-19 RX ADMIN — INSULIN ASPART 4 UNITS: 100 INJECTION, SOLUTION INTRAVENOUS; SUBCUTANEOUS at 04:02

## 2022-02-19 RX ADMIN — GABAPENTIN 300 MG: 300 CAPSULE ORAL at 02:02

## 2022-02-19 RX ADMIN — POLYETHYLENE GLYCOL 3350 17 G: 17 POWDER, FOR SOLUTION ORAL at 08:02

## 2022-02-19 RX ADMIN — GABAPENTIN 300 MG: 300 CAPSULE ORAL at 09:02

## 2022-02-19 RX ADMIN — Medication 2 CAPSULE: at 12:02

## 2022-02-19 RX ADMIN — HYDROCHLOROTHIAZIDE 25 MG: 12.5 TABLET ORAL at 09:02

## 2022-02-19 RX ADMIN — INSULIN DETEMIR 20 UNITS: 100 INJECTION, SOLUTION SUBCUTANEOUS at 08:02

## 2022-02-19 RX ADMIN — INSULIN ASPART 4 UNITS: 100 INJECTION, SOLUTION INTRAVENOUS; SUBCUTANEOUS at 12:02

## 2022-02-19 RX ADMIN — AMLODIPINE BESYLATE 10 MG: 10 TABLET ORAL at 09:02

## 2022-02-19 RX ADMIN — INSULIN ASPART 6 UNITS: 100 INJECTION, SOLUTION INTRAVENOUS; SUBCUTANEOUS at 05:02

## 2022-02-19 RX ADMIN — Medication 2 CAPSULE: at 09:02

## 2022-02-19 RX ADMIN — GABAPENTIN 300 MG: 300 CAPSULE ORAL at 05:02

## 2022-02-19 RX ADMIN — HYDROCODONE BITARTRATE AND ACETAMINOPHEN 1 TABLET: 10; 325 TABLET ORAL at 04:02

## 2022-02-19 RX ADMIN — ATORVASTATIN CALCIUM 40 MG: 40 TABLET, FILM COATED ORAL at 09:02

## 2022-02-19 RX ADMIN — INSULIN ASPART 1 UNITS: 100 INJECTION, SOLUTION INTRAVENOUS; SUBCUTANEOUS at 08:02

## 2022-02-19 RX ADMIN — HYDROCODONE BITARTRATE AND ACETAMINOPHEN 1 TABLET: 10; 325 TABLET ORAL at 05:02

## 2022-02-19 RX ADMIN — Medication 2 CAPSULE: at 04:02

## 2022-02-19 NOTE — SUBJECTIVE & OBJECTIVE
Interval History:  Patient doing well today no complaints at all, tolerating his antibiotic therapy without nausea vomiting or diarrhea.  He has not had any fever.    Review of Systems   Constitutional:  Negative for appetite change, chills and fever.   Respiratory:  Negative for cough, shortness of breath and wheezing.    Cardiovascular:  Negative for leg swelling.   Gastrointestinal:  Negative for abdominal pain, diarrhea, nausea and vomiting.   Genitourinary:  Negative for difficulty urinating and hematuria.   Skin:  Negative for rash and wound (Right plantar foot wound).   Objective:     Vital Signs (Most Recent):  Temp: 97.4 °F (36.3 °C) (02/19/22 1200)  Pulse: 82 (02/19/22 1200)  Resp: 20 (02/19/22 1200)  BP: (!) 135/91 (02/19/22 1200)  SpO2: 98 % (02/19/22 1200)   Vital Signs (24h Range):  Temp:  [97.4 °F (36.3 °C)-97.9 °F (36.6 °C)] 97.4 °F (36.3 °C)  Pulse:  [70-83] 82  Resp:  [18-20] 20  SpO2:  [96 %-99 %] 98 %  BP: (135-179)/() 135/91     Weight: (!) 156.9 kg (345 lb 14.4 oz)  Body mass index is 44.41 kg/m².    Intake/Output Summary (Last 24 hours) at 2/19/2022 1409  Last data filed at 2/19/2022 1200  Gross per 24 hour   Intake 430 ml   Output --   Net 430 ml      Physical Exam  Vitals reviewed.   Constitutional:       General: He is not in acute distress.     Appearance: He is obese. He is not toxic-appearing.   HENT:      Mouth/Throat:      Mouth: Mucous membranes are moist.      Pharynx: Oropharynx is clear. No oropharyngeal exudate.   Eyes:      General: No scleral icterus.        Right eye: No discharge.         Left eye: No discharge.   Cardiovascular:      Rate and Rhythm: Regular rhythm.      Heart sounds: Normal heart sounds. No murmur heard.  Pulmonary:      Effort: No respiratory distress.      Breath sounds: No wheezing, rhonchi or rales.   Abdominal:      General: Bowel sounds are normal. There is no distension.      Palpations: Abdomen is soft. There is no mass.      Tenderness: There  is no abdominal tenderness.   Musculoskeletal:      Right lower leg: Edema (Right plantar foot wound bandaged) present.      Left lower leg: Edema present.   Skin:     Coloration: Skin is not jaundiced.      Findings: No rash.   Neurological:      Mental Status: He is alert.       Significant Labs: All pertinent labs within the past 24 hours have been reviewed.  BMP:   Recent Labs   Lab 02/19/22  0522   *      K 4.8      CO2 26   BUN 29*   CREATININE 1.13   CALCIUM 9.1       Significant Imaging: I have reviewed all pertinent imaging results/findings within the past 24 hours.

## 2022-02-19 NOTE — ASSESSMENT & PLAN NOTE
- amlodipine 10mg qd  - hctz 25mg qd    2/19 blood pressure borderline high, but looking at diet orders he has been ordering a lot of salt rich foods.   about this.

## 2022-02-19 NOTE — PROGRESS NOTES
[facility] Medicine  Progress Note    Patient Name: Jean Pierre Paulson  MRN: [id number]  Patient Class: IP- Inpatient   Admission Date: 2/15/2022  Length of Stay: 4 days  Attending Physician: Carey Vivas,*  Primary Care Provider: Mojgan Lomeli NP        Subjective:     Principal Problem:Chronic diabetic ulcer of right foot determined by examination        HPI:  The patient is a 37yo AA male with a MedHX of DM2 with neuropathy, chronic osteomyelitis, a necrotic right foot ulcer, anemia, PVD, HTN, HLD, chronic pain syndrome, and amputation of the right big toe. He presented to the [facility] on 02/15 for a follow up on wound cultures that had been taken from his chronic right foot plantar ulcer. The wound cultures had grown Staph aureus, ESBL e.coli and proteus mirabilis. The patient says that he had first noticed the ulcer in late 2019 after he stepped on a nail. He says that this was around the time that he had had his right big toe amputated. He came in to the hospital last November when he noticed that the ulcer had grown very large. He had a debridement of the ulcer by Dr. Ramírez at that time. He has been taking Clindamycin PO prior to this admission. A wound care nurse from [facility] has been visiting the patient weekly to treat his wound with silver nitrate and redress it. The patient does also have two small, superficial ulcers on his right second toe and his right ankle.     The patient will be admitted to [facility] under the [facility] (Dr. Casper) for treatment of his infected right foot ulcer and management of other medical problems.       Overview/Hospital Course:  No notes on file    Interval History:  Patient doing well today no complaints at all, tolerating his antibiotic therapy without nausea vomiting or diarrhea.  He has not had any fever.    Review of Systems   Constitutional:  Negative for appetite change, chills and fever.   Respiratory:   Negative for cough, shortness of breath and wheezing.    Cardiovascular:  Negative for leg swelling.   Gastrointestinal:  Negative for abdominal pain, diarrhea, nausea and vomiting.   Genitourinary:  Negative for difficulty urinating and hematuria.   Skin:  Negative for rash and wound (Right plantar foot wound).   Objective:     Vital Signs (Most Recent):  Temp: 97.4 °F (36.3 °C) (02/19/22 1200)  Pulse: 82 (02/19/22 1200)  Resp: 20 (02/19/22 1200)  BP: (!) 135/91 (02/19/22 1200)  SpO2: 98 % (02/19/22 1200)   Vital Signs (24h Range):  Temp:  [97.4 °F (36.3 °C)-97.9 °F (36.6 °C)] 97.4 °F (36.3 °C)  Pulse:  [70-83] 82  Resp:  [18-20] 20  SpO2:  [96 %-99 %] 98 %  BP: (135-179)/() 135/91     Weight: (!) 156.9 kg (345 lb 14.4 oz)  Body mass index is 44.41 kg/m².    Intake/Output Summary (Last 24 hours) at 2/19/2022 1409  Last data filed at 2/19/2022 1200  Gross per 24 hour   Intake 430 ml   Output --   Net 430 ml      Physical Exam  Vitals reviewed.   Constitutional:       General: He is not in acute distress.     Appearance: He is obese. He is not toxic-appearing.   HENT:      Mouth/Throat:      Mouth: Mucous membranes are moist.      Pharynx: Oropharynx is clear. No oropharyngeal exudate.   Eyes:      General: No scleral icterus.        Right eye: No discharge.         Left eye: No discharge.   Cardiovascular:      Rate and Rhythm: Regular rhythm.      Heart sounds: Normal heart sounds. No murmur heard.  Pulmonary:      Effort: No respiratory distress.      Breath sounds: No wheezing, rhonchi or rales.   Abdominal:      General: Bowel sounds are normal. There is no distension.      Palpations: Abdomen is soft. There is no mass.      Tenderness: There is no abdominal tenderness.   Musculoskeletal:      Right lower leg: Edema (Right plantar foot wound bandaged) present.      Left lower leg: Edema present.   Skin:     Coloration: Skin is not jaundiced.      Findings: No rash.   Neurological:      Mental Status: He is  alert.       Significant Labs: All pertinent labs within the past 24 hours have been reviewed.  BMP:   Recent Labs   Lab 02/19/22  0522   *      K 4.8      CO2 26   BUN 29*   CREATININE 1.13   CALCIUM 9.1       Significant Imaging: I have reviewed all pertinent imaging results/findings within the past 24 hours.      Assessment/Plan:      * Chronic diabetic ulcer of right foot determined by examination  * Patient's wound cultures from 02/04 taken from ulcer showed growth of ESBL E coli, MSSA and Proteus Mirabilis     2/18 Continue meropenem, local wound care.      Diabetic neuropathy with neurologic complication  - Patient's latest A1C was 8.4 in November  - Repeat A1C 7.8   - Moderate SSI  - POCT glucose checks   - Diabetic diet   - Levemir 20 units QHS         Peripheral vascular disease  - Continue home gabapentin 300mg q8      Chronic pain syndrome  - gabapentin 300mg q8  - Norco 10mg q6 prn       Primary hypertension  - amlodipine 10mg qd  - hctz 25mg qd    2/19 blood pressure borderline high, but looking at diet orders he has been ordering a lot of salt rich foods.   about this.        VTE Risk Mitigation (From admission, onward)         Ordered     IP VTE LOW RISK PATIENT  Once         02/15/22 1401     Place DONATO hose  Until discontinued         02/15/22 1401                Discharge Planning   WOOD:      Code Status: Full Code   Is the patient medically ready for discharge?:     Reason for patient still in hospital (select all that apply): Treatment  Discharge Plan A: Home Health, Home                  Carey Vivas MD  Department of Hospital Medicine   [facility]

## 2022-02-20 LAB
ANION GAP SERPL CALCULATED.3IONS-SCNC: 11 MMOL/L (ref 7–16)
BASOPHILS # BLD AUTO: 0.05 K/UL (ref 0–0.2)
BASOPHILS NFR BLD AUTO: 0.9 % (ref 0–1)
BUN SERPL-MCNC: 33 MG/DL (ref 7–18)
BUN/CREAT SERPL: 26 (ref 6–20)
CALCIUM SERPL-MCNC: 8.5 MG/DL (ref 8.5–10.1)
CHLORIDE SERPL-SCNC: 100 MMOL/L (ref 98–107)
CO2 SERPL-SCNC: 31 MMOL/L (ref 21–32)
CREAT SERPL-MCNC: 1.28 MG/DL (ref 0.7–1.3)
DIFFERENTIAL METHOD BLD: ABNORMAL
EOSINOPHIL # BLD AUTO: 0.24 K/UL (ref 0–0.5)
EOSINOPHIL NFR BLD AUTO: 4.1 % (ref 1–4)
ERYTHROCYTE [DISTWIDTH] IN BLOOD BY AUTOMATED COUNT: 13.9 % (ref 11.5–14.5)
GLUCOSE SERPL-MCNC: 160 MG/DL (ref 70–105)
GLUCOSE SERPL-MCNC: 169 MG/DL (ref 70–105)
GLUCOSE SERPL-MCNC: 186 MG/DL (ref 70–105)
GLUCOSE SERPL-MCNC: 210 MG/DL (ref 74–106)
GLUCOSE SERPL-MCNC: 231 MG/DL (ref 70–105)
HCT VFR BLD AUTO: 37.7 % (ref 40–54)
HGB BLD-MCNC: 11.7 G/DL (ref 13.5–18)
IMM GRANULOCYTES # BLD AUTO: 0 K/UL (ref 0–0.04)
IMM GRANULOCYTES NFR BLD: 0 % (ref 0–0.4)
LYMPHOCYTES # BLD AUTO: 2.78 K/UL (ref 1–4.8)
LYMPHOCYTES NFR BLD AUTO: 47.9 % (ref 27–41)
MCH RBC QN AUTO: 26.4 PG (ref 27–31)
MCHC RBC AUTO-ENTMCNC: 31 G/DL (ref 32–36)
MCV RBC AUTO: 85.1 FL (ref 80–96)
MONOCYTES # BLD AUTO: 0.61 K/UL (ref 0–0.8)
MONOCYTES NFR BLD AUTO: 10.5 % (ref 2–6)
MPC BLD CALC-MCNC: 9.8 FL (ref 9.4–12.4)
NEUTROPHILS # BLD AUTO: 2.12 K/UL (ref 1.8–7.7)
NEUTROPHILS NFR BLD AUTO: 36.6 % (ref 53–65)
NRBC # BLD AUTO: 0 X10E3/UL
NRBC, AUTO (.00): 0 %
PLATELET # BLD AUTO: 294 K/UL (ref 150–400)
POTASSIUM SERPL-SCNC: 4.7 MMOL/L (ref 3.5–5.1)
RBC # BLD AUTO: 4.43 M/UL (ref 4.6–6.2)
SODIUM SERPL-SCNC: 137 MMOL/L (ref 136–145)
WBC # BLD AUTO: 5.8 K/UL (ref 4.5–11)

## 2022-02-20 PROCEDURE — C9399 UNCLASSIFIED DRUGS OR BIOLOG: HCPCS

## 2022-02-20 PROCEDURE — 82962 GLUCOSE BLOOD TEST: CPT

## 2022-02-20 PROCEDURE — 25000003 PHARM REV CODE 250

## 2022-02-20 PROCEDURE — 25000003 PHARM REV CODE 250: Performed by: INTERNAL MEDICINE

## 2022-02-20 PROCEDURE — 63600175 PHARM REV CODE 636 W HCPCS: Performed by: INTERNAL MEDICINE

## 2022-02-20 PROCEDURE — 63600175 PHARM REV CODE 636 W HCPCS

## 2022-02-20 PROCEDURE — 99232 PR SUBSEQUENT HOSPITAL CARE,LEVL II: ICD-10-PCS | Mod: ,,, | Performed by: INTERNAL MEDICINE

## 2022-02-20 PROCEDURE — 36415 COLL VENOUS BLD VENIPUNCTURE: CPT | Performed by: INTERNAL MEDICINE

## 2022-02-20 PROCEDURE — 99232 SBSQ HOSP IP/OBS MODERATE 35: CPT | Mod: ,,, | Performed by: INTERNAL MEDICINE

## 2022-02-20 PROCEDURE — 80048 BASIC METABOLIC PNL TOTAL CA: CPT | Performed by: INTERNAL MEDICINE

## 2022-02-20 PROCEDURE — 11000001 HC ACUTE MED/SURG PRIVATE ROOM

## 2022-02-20 PROCEDURE — 85025 COMPLETE CBC W/AUTO DIFF WBC: CPT | Performed by: INTERNAL MEDICINE

## 2022-02-20 RX ORDER — LISINOPRIL 5 MG/1
5 TABLET ORAL DAILY
Status: COMPLETED | OUTPATIENT
Start: 2022-02-20 | End: 2022-02-20

## 2022-02-20 RX ORDER — LISINOPRIL 5 MG/1
5 TABLET ORAL DAILY
Status: DISCONTINUED | OUTPATIENT
Start: 2022-02-21 | End: 2022-02-21

## 2022-02-20 RX ADMIN — MEROPENEM 500 MG: 500 INJECTION, POWDER, FOR SOLUTION INTRAVENOUS at 05:02

## 2022-02-20 RX ADMIN — LISINOPRIL 5 MG: 5 TABLET ORAL at 02:02

## 2022-02-20 RX ADMIN — INSULIN ASPART 2 UNITS: 100 INJECTION, SOLUTION INTRAVENOUS; SUBCUTANEOUS at 05:02

## 2022-02-20 RX ADMIN — GABAPENTIN 300 MG: 300 CAPSULE ORAL at 09:02

## 2022-02-20 RX ADMIN — HYDROCODONE BITARTRATE AND ACETAMINOPHEN 1 TABLET: 10; 325 TABLET ORAL at 05:02

## 2022-02-20 RX ADMIN — HYDROCHLOROTHIAZIDE 25 MG: 12.5 TABLET ORAL at 08:02

## 2022-02-20 RX ADMIN — INSULIN ASPART 2 UNITS: 100 INJECTION, SOLUTION INTRAVENOUS; SUBCUTANEOUS at 04:02

## 2022-02-20 RX ADMIN — ATORVASTATIN CALCIUM 40 MG: 40 TABLET, FILM COATED ORAL at 08:02

## 2022-02-20 RX ADMIN — INSULIN ASPART 4 UNITS: 100 INJECTION, SOLUTION INTRAVENOUS; SUBCUTANEOUS at 12:02

## 2022-02-20 RX ADMIN — Medication 2 CAPSULE: at 12:02

## 2022-02-20 RX ADMIN — MEROPENEM 500 MG: 500 INJECTION, POWDER, FOR SOLUTION INTRAVENOUS at 12:02

## 2022-02-20 RX ADMIN — INSULIN DETEMIR 20 UNITS: 100 INJECTION, SOLUTION SUBCUTANEOUS at 09:02

## 2022-02-20 RX ADMIN — Medication 2 CAPSULE: at 04:02

## 2022-02-20 RX ADMIN — GABAPENTIN 300 MG: 300 CAPSULE ORAL at 05:02

## 2022-02-20 RX ADMIN — GABAPENTIN 300 MG: 300 CAPSULE ORAL at 02:02

## 2022-02-20 RX ADMIN — AMLODIPINE BESYLATE 10 MG: 10 TABLET ORAL at 09:02

## 2022-02-20 RX ADMIN — Medication 2 CAPSULE: at 08:02

## 2022-02-20 NOTE — PLAN OF CARE
Problem: Adult Inpatient Plan of Care  Goal: Plan of Care Review  Outcome: Ongoing, Progressing  Goal: Patient-Specific Goal (Individualized)  Outcome: Ongoing, Progressing  Goal: Absence of Hospital-Acquired Illness or Injury  Outcome: Ongoing, Progressing  Goal: Optimal Comfort and Wellbeing  Outcome: Ongoing, Progressing  Goal: Readiness for Transition of Care  Outcome: Ongoing, Progressing     Problem: Bariatric Environmental Safety  Goal: Safety Maintained with Care  Outcome: Ongoing, Progressing     Problem: Infection  Goal: Absence of Infection Signs and Symptoms  Outcome: Ongoing, Progressing     Problem: Impaired Wound Healing  Goal: Optimal Wound Healing  Outcome: Ongoing, Progressing     Problem: Skin Injury Risk Increased  Goal: Skin Health and Integrity  Outcome: Ongoing, Progressing

## 2022-02-20 NOTE — ASSESSMENT & PLAN NOTE
- amlodipine 10mg qd  - hctz 25mg qd    2/19 blood pressure borderline high, but looking at diet orders he has been ordering a lot of salt rich foods.   about this.    2/20 BP persistently uncontrolled, adding lisinopril 5 mg daily, continue amlodipine and hydrochlorothiazide

## 2022-02-20 NOTE — ASSESSMENT & PLAN NOTE
* Patient's wound cultures from 02/04 taken from ulcer showed growth of ESBL E coli, MSSA and Proteus Mirabilis     2/18 Continue meropenem, local wound care.    2/20 optimize blood sugar control, counseled the patient re diabetic diet (he had midst eating cakes and things like that from family).

## 2022-02-20 NOTE — PROGRESS NOTES
[facility] Medicine  Progress Note    Patient Name: Jean Pierre Paulson  MRN: [id number]  Patient Class: IP- Inpatient   Admission Date: 2/15/2022  Length of Stay: 5 days  Attending Physician: Carey Vivas,*  Primary Care Provider: Mojgan Lomeli NP        Subjective:     Principal Problem:Chronic diabetic ulcer of right foot determined by examination        HPI:  The patient is a 35yo AA male with a MedHX of DM2 with neuropathy, chronic osteomyelitis, a necrotic right foot ulcer, anemia, PVD, HTN, HLD, chronic pain syndrome, and amputation of the right big toe. He presented to [facility] on 02/15 for a follow up on wound cultures that had been taken from his chronic right foot plantar ulcer. The wound cultures had grown Staph aureus, ESBL e.coli and proteus mirabilis. The patient says that he had first noticed the ulcer in late 2019 after he stepped on a nail. He says that this was around the time that he had had his right big toe amputated. He came in to the hospital last November when he noticed that the ulcer had grown very large. He had a debridement of the ulcer by Dr. Ramírez at that time. He has been taking Clindamycin PO prior to this admission. A wound care nurse from [facility] has been visiting the patient weekly to treat his wound with silver nitrate and redress it. The patient does also have two small, superficial ulcers on his right second toe and his right ankle.     The patient will be admitted to [facility] under the [facility] (Dr. Casper) for treatment of his infected right foot ulcer and management of other medical problems.       Overview/Hospital Course:  No notes on file    Interval History:  Patient doing well no complaints at all.  He had admits to not following the diabetic diet.  Sugars have been upper normal but overall improved from when he 1st came in since repeat him on the Lantus.  No fever.    Review of Systems   Constitutional:   Negative for appetite change, chills and fever.   Respiratory:  Negative for cough, shortness of breath and wheezing.    Cardiovascular:  Negative for leg swelling.   Gastrointestinal:  Negative for abdominal pain, diarrhea, nausea and vomiting.   Genitourinary:  Negative for difficulty urinating and hematuria.   Skin:  Negative for rash and wound (Right plantar foot wound).   Objective:     Vital Signs (Most Recent):  Temp: 97.3 °F (36.3 °C) (02/20/22 1200)  Pulse: 78 (02/20/22 1200)  Resp: 20 (02/20/22 1200)  BP: (!) 142/96 (02/20/22 1200)  SpO2: 98 % (02/20/22 1200)   Vital Signs (24h Range):  Temp:  [97.3 °F (36.3 °C)-98.3 °F (36.8 °C)] 97.3 °F (36.3 °C)  Pulse:  [70-84] 78  Resp:  [18-20] 20  SpO2:  [93 %-100 %] 98 %  BP: (122-153)/(74-96) 142/96     Weight: (!) 156.9 kg (345 lb 14.4 oz)  Body mass index is 44.41 kg/m².    Intake/Output Summary (Last 24 hours) at 2/20/2022 1411  Last data filed at 2/20/2022 0745  Gross per 24 hour   Intake 480 ml   Output --   Net 480 ml      Physical Exam  Vitals reviewed.   Constitutional:       General: He is not in acute distress.     Appearance: He is obese. He is not toxic-appearing.   HENT:      Mouth/Throat:      Mouth: Mucous membranes are moist.      Pharynx: Oropharynx is clear. No oropharyngeal exudate.   Eyes:      General: No scleral icterus.        Right eye: No discharge.         Left eye: No discharge.   Cardiovascular:      Rate and Rhythm: Regular rhythm.      Heart sounds: Normal heart sounds. No murmur heard.  Pulmonary:      Effort: No respiratory distress.      Breath sounds: No wheezing, rhonchi or rales.   Abdominal:      General: Bowel sounds are normal. There is no distension.      Palpations: Abdomen is soft. There is no mass.      Tenderness: There is no abdominal tenderness.   Musculoskeletal:      Right lower leg: Edema (Right plantar foot wound bandaged) present.      Left lower leg: Edema present.   Skin:     Coloration: Skin is not jaundiced.       Findings: No rash.   Neurological:      Mental Status: He is alert.       Significant Labs: All pertinent labs within the past 24 hours have been reviewed.  BMP:   Recent Labs   Lab 02/20/22 0310   *      K 4.7      CO2 31   BUN 33*   CREATININE 1.28   CALCIUM 8.5     CBC:   Recent Labs   Lab 02/20/22 0310   WBC 5.80   HGB 11.7*   HCT 37.7*          Significant Imaging: I have reviewed all pertinent imaging results/findings within the past 24 hours.      Assessment/Plan:      * Chronic diabetic ulcer of right foot determined by examination  * Patient's wound cultures from 02/04 taken from ulcer showed growth of ESBL E coli, MSSA and Proteus Mirabilis     2/18 Continue meropenem, local wound care.    2/20 optimize blood sugar control, counseled the patient re diabetic diet (he had midst eating cakes and things like that from family).    Diabetic neuropathy with neurologic complication  - Patient's latest A1C was 8.4 in November  - Repeat A1C 7.8   - Moderate SSI  - POCT glucose checks   - Diabetic diet   - Levemir 20 units QHS         Peripheral vascular disease  - Continue home gabapentin 300mg q8      Chronic pain syndrome  - gabapentin 300mg q8  - Norco 10mg q6 prn       Primary hypertension  - amlodipine 10mg qd  - hctz 25mg qd    2/19 blood pressure borderline high, but looking at diet orders he has been ordering a lot of salt rich foods.   about this.    2/20 BP persistently uncontrolled, adding lisinopril 5 mg daily, continue amlodipine and hydrochlorothiazide        VTE Risk Mitigation (From admission, onward)         Ordered     IP VTE LOW RISK PATIENT  Once         02/15/22 1401     Place DONATO hose  Until discontinued         02/15/22 1401                Discharge Planning   WOOD:      Code Status: Full Code   Is the patient medically ready for discharge?:     Reason for patient still in hospital (select all that apply): Treatment  Discharge Plan A: Home Health, Home                   Carey Vivas MD  Department of Hospital Medicine   [facility]

## 2022-02-21 LAB
BACTERIA BLD CULT: NORMAL
BACTERIA BLD CULT: NORMAL
GLUCOSE SERPL-MCNC: 190 MG/DL (ref 70–105)
GLUCOSE SERPL-MCNC: 200 MG/DL (ref 70–105)
GLUCOSE SERPL-MCNC: 217 MG/DL (ref 70–105)
GLUCOSE SERPL-MCNC: 253 MG/DL (ref 70–105)

## 2022-02-21 PROCEDURE — 25000003 PHARM REV CODE 250: Performed by: INTERNAL MEDICINE

## 2022-02-21 PROCEDURE — 11000001 HC ACUTE MED/SURG PRIVATE ROOM

## 2022-02-21 PROCEDURE — 99233 SBSQ HOSP IP/OBS HIGH 50: CPT | Mod: ,,, | Performed by: HOSPITALIST

## 2022-02-21 PROCEDURE — C9399 UNCLASSIFIED DRUGS OR BIOLOG: HCPCS

## 2022-02-21 PROCEDURE — 63600175 PHARM REV CODE 636 W HCPCS: Performed by: INTERNAL MEDICINE

## 2022-02-21 PROCEDURE — 25000003 PHARM REV CODE 250

## 2022-02-21 PROCEDURE — 99233 PR SUBSEQUENT HOSPITAL CARE,LEVL III: ICD-10-PCS | Mod: ,,, | Performed by: HOSPITALIST

## 2022-02-21 PROCEDURE — 82962 GLUCOSE BLOOD TEST: CPT

## 2022-02-21 PROCEDURE — 63600175 PHARM REV CODE 636 W HCPCS

## 2022-02-21 PROCEDURE — 25000003 PHARM REV CODE 250: Performed by: NURSE PRACTITIONER

## 2022-02-21 RX ORDER — DOCUSATE SODIUM 100 MG/1
100 CAPSULE, LIQUID FILLED ORAL 2 TIMES DAILY
Status: DISCONTINUED | OUTPATIENT
Start: 2022-02-21 | End: 2022-03-08 | Stop reason: HOSPADM

## 2022-02-21 RX ORDER — POLYETHYLENE GLYCOL 3350 17 G/17G
17 POWDER, FOR SOLUTION ORAL DAILY
Status: DISCONTINUED | OUTPATIENT
Start: 2022-02-21 | End: 2022-03-08 | Stop reason: HOSPADM

## 2022-02-21 RX ADMIN — LISINOPRIL 5 MG: 5 TABLET ORAL at 09:02

## 2022-02-21 RX ADMIN — MEROPENEM 500 MG: 500 INJECTION, POWDER, FOR SOLUTION INTRAVENOUS at 12:02

## 2022-02-21 RX ADMIN — Medication 2 CAPSULE: at 08:02

## 2022-02-21 RX ADMIN — Medication 2 CAPSULE: at 05:02

## 2022-02-21 RX ADMIN — AMLODIPINE BESYLATE 10 MG: 10 TABLET ORAL at 08:02

## 2022-02-21 RX ADMIN — DOCUSATE SODIUM 100 MG: 100 CAPSULE, LIQUID FILLED ORAL at 09:02

## 2022-02-21 RX ADMIN — MEROPENEM 500 MG: 500 INJECTION, POWDER, FOR SOLUTION INTRAVENOUS at 05:02

## 2022-02-21 RX ADMIN — ATORVASTATIN CALCIUM 40 MG: 40 TABLET, FILM COATED ORAL at 08:02

## 2022-02-21 RX ADMIN — INSULIN DETEMIR 20 UNITS: 100 INJECTION, SOLUTION SUBCUTANEOUS at 09:02

## 2022-02-21 RX ADMIN — Medication 2 CAPSULE: at 12:02

## 2022-02-21 RX ADMIN — GABAPENTIN 300 MG: 300 CAPSULE ORAL at 05:02

## 2022-02-21 RX ADMIN — INSULIN ASPART 2 UNITS: 100 INJECTION, SOLUTION INTRAVENOUS; SUBCUTANEOUS at 12:02

## 2022-02-21 RX ADMIN — INSULIN ASPART 2 UNITS: 100 INJECTION, SOLUTION INTRAVENOUS; SUBCUTANEOUS at 06:02

## 2022-02-21 RX ADMIN — GABAPENTIN 300 MG: 300 CAPSULE ORAL at 09:02

## 2022-02-21 RX ADMIN — POLYETHYLENE GLYCOL 3350 17 G: 17 POWDER, FOR SOLUTION ORAL at 09:02

## 2022-02-21 RX ADMIN — GABAPENTIN 300 MG: 300 CAPSULE ORAL at 01:02

## 2022-02-21 RX ADMIN — MEROPENEM 500 MG: 500 INJECTION, POWDER, FOR SOLUTION INTRAVENOUS at 06:02

## 2022-02-21 RX ADMIN — HYDROCHLOROTHIAZIDE 25 MG: 12.5 TABLET ORAL at 08:02

## 2022-02-21 NOTE — HOSPITAL COURSE
02/21 Awake and sitting up on the side of the bed. Complains of constipation. Had small bm yesterday. Added colace BID. Dressing to right foot ulcer. Continue wound care and merrem.  02/22 Awake and sitting up in chair without complaints. Complained of cough with lisinopril yesterday. Dcd lisinopril. Will monitor BP. Will start HBO this morning. Continue abx and wound care.  02/23 Mr. 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 States if he doesn't get his business taken care of he may not have anywhere to discharge to later. Dr. Ford discussed treatment he needed for ESBL ecoli and MRSA treatment was IV merrem, wound care and HBO. That this could not be given in home setting. Told him that refusing in pt care could put him at risk for potentially losing his foot. He states he understands and that he wants to discharge home. He will need to remain NWB on right, follow up with [facility] in one week. Wound care orders to be given by [facility].  02/24 Pt no new issues. Getting am meds and HBO.  Plans for pass today. Has to get his housing situation straightened out so doesn't lose his home. Otherwise continue current process. He did wish to take walker with him.  02/25 Did OK on pass and now back. Continue IV ATB. HBO and wound care. Recommended non wt bearing on wound.  02/26 patient remains in good spirits.  He tells me that was told by surgery may have skin graft next week.  Continue to try to remain nonweightbearing on foot.  Continue intravenous antibiotics.  Blood sugar good.  02/27 continues to do well, continue wound care and antibiotic, await surgical plans  02/28 Pt with HBO and wound care today, continue monitor blood sugars and take antibiotic, will discussed with surgery  03/01 Awake and resting in bed. Complains of constipation. States he had miralax yesterday and that it did not work. Mag citrate ordered. Dressing to right foot. Has just came back from  HBO.  BP elevated. Will add losartan.   03/02 patient seems in better spirits today.  Seen by Dr. Ramírez with plan skin graft today.  Continue with wound care.  Continue with intravenous antibiotics.  Continue with antibiotics this week and will discuss antibiotic duration with Dr. Casper next week when she returns.  Continue hyperbaric oxygen.  03/03 continue to monitor.  Continue with antibiotics and wound care as previous.  Blood sugar and blood pressure both some but generally doing okay.  Patient to keep weight off of foot.  03/04 seems better spirits today. Continue ATB and wound care. Adjust insulin.  03/05 remains in better spirits.  Continue ATB and wound care.  Blood sugar up some and continue to adjust insulin.  03/06 continues to do well.  Antibiotics and wound care.  Healing from recent skin graft.  Blood pressure blood sugar okay.  Not weight-bearing on foot.  Dr. Casper to assume care in the a.m. When feasible patient would like to continue therapy on outpatient basis  03/07 Awake and resting in bed without complaints. Dressing to right foot. Tolerating HBO well. Continue IV abx of merrem. Continue wound care.  03/08 Will discharge home today with home health to follow. Pt to continue HBO with [facility]. Discharge orders for right foot per [facility] . Medication reconciliation done and meds sent electronically. Follow up appts with PCP Mojgan COFFMAN made for one week.

## 2022-02-21 NOTE — ASSESSMENT & PLAN NOTE
* Patient's wound cultures from 02/04 taken from ulcer showed growth of ESBL E coli, MSSA and Proteus Mirabilis     2/18 Continue meropenem, local wound care.    2/20 optimize blood sugar control, counseled the patient re diabetic diet (he had midst eating cakes and things like that from family).    02/21 continue merrem and wound care

## 2022-02-21 NOTE — SUBJECTIVE & OBJECTIVE
Interval History: 02/21 Awake and sitting up on the side of the bed. Complains of constipation. Had small bm yesterday. Added colace BID. Dressing to right foot ulcer. Continue wound care and merrem.    Review of Systems   Constitutional:  Negative for appetite change, fatigue, fever and unexpected weight change.   Respiratory:  Negative for shortness of breath.    Cardiovascular:  Negative for chest pain.   Gastrointestinal:  Positive for constipation. Negative for abdominal distention, diarrhea and nausea.   Genitourinary:  Negative for dysuria.   Objective:     Vital Signs (Most Recent):  Temp: 98.2 °F (36.8 °C) (02/21/22 0800)  Pulse: 80 (02/21/22 0800)  Resp: 20 (02/21/22 0800)  BP: 129/86 (02/21/22 0841)  SpO2: (!) 93 % (02/21/22 0800)   Vital Signs (24h Range):  Temp:  [97.3 °F (36.3 °C)-98.6 °F (37 °C)] 98.2 °F (36.8 °C)  Pulse:  [74-84] 80  Resp:  [20] 20  SpO2:  [93 %-100 %] 93 %  BP: (128-153)/(86-96) 129/86     Weight: (!) 156.9 kg (345 lb 14.4 oz)  Body mass index is 44.41 kg/m².    Intake/Output Summary (Last 24 hours) at 2/21/2022 1044  Last data filed at 2/20/2022 1200  Gross per 24 hour   Intake 240 ml   Output --   Net 240 ml      Physical Exam  Constitutional:       Appearance: He is obese.   HENT:      Head: Normocephalic.      Mouth/Throat:      Mouth: Mucous membranes are moist.   Eyes:      Pupils: Pupils are equal, round, and reactive to light.   Cardiovascular:      Rate and Rhythm: Regular rhythm.      Heart sounds: Normal heart sounds.   Pulmonary:      Breath sounds: Normal breath sounds.   Abdominal:      General: Bowel sounds are normal.      Palpations: Abdomen is soft.   Skin:     General: Skin is warm and dry.      Comments: Dressing to right foot   Neurological:      Mental Status: He is alert and oriented to person, place, and time.   Psychiatric:         Mood and Affect: Mood normal.       Significant Labs: All pertinent labs within the past 24 hours have been reviewed.  Recent  Lab Results         02/21/22  0620   02/20/22  2013   02/20/22  1603   02/20/22  1106        POC Glucose 190   186   169   231               Significant Imaging: I have reviewed all pertinent imaging results/findings within the past 24 hours.

## 2022-02-21 NOTE — ASSESSMENT & PLAN NOTE
- amlodipine 10mg qd  - hctz 25mg qd    2/19 blood pressure borderline high, but looking at diet orders he has been ordering a lot of salt rich foods.   about this.    2/20 BP persistently uncontrolled, adding lisinopril 5 mg daily, continue amlodipine and hydrochlorothiazide      02/21 continue lisinopril. amlodopine and hctz

## 2022-02-21 NOTE — PROGRESS NOTES
[facility] Medicine  Progress Note    Patient Name: Jean Pierre Paulson  MRN: [id number]  Patient Class: IP- Inpatient   Admission Date: 2/15/2022  Length of Stay: 6 days  Attending Physician: Vishnu Ford MD  Primary Care Provider: Mojgan Lomeli NP        Subjective:     Principal Problem:Chronic diabetic ulcer of right foot determined by examination        HPI:  The patient is a 35yo AA male with a MedHX of DM2 with neuropathy, chronic osteomyelitis, a necrotic right foot ulcer, anemia, PVD, HTN, HLD, chronic pain syndrome, and amputation of the right big toe. He [facility] to [facility] on [facility]/15 for a follow up on wound cultures that had been taken from his chronic right foot plantar ulcer. The wound cultures had grown Staph aureus, ESBL e.coli and proteus mirabilis. The patient says that he had first noticed the ulcer in late 2019 after he stepped on a nail. He says that this was around the time that he had had his right big toe amputated. He came in to the hospital last November when he noticed that the ulcer had grown very large. He had a debridement of the ulcer by Dr. Ramírez at that time. He has been taking Clindamycin PO prior to this admission. A wound care nurse from [facility] has been visiting the patient weekly to treat his wound with silver nitrate and redress it. The patient does also have two small, superficial ulcers on his right second toe and his right ankle.     The patient will be admitted to [facility] under the [facility] (Dr. Casper) for treatment of his infected right foot ulcer and management of other medical problems.       Overview/Hospital Course:  02/21 Awake and sitting up on the side of the bed. Complains of constipation. Had small bm yesterday. Added colace BID. Dressing to right foot ulcer. Continue wound care and merrem.      Interval History: 02/21 Awake and sitting up on the side of the bed. Complains of constipation. Had small bm  yesterday. Added colace BID. Dressing to right foot ulcer. Continue wound care and merrem.    Review of Systems   Constitutional:  Negative for appetite change, fatigue, fever and unexpected weight change.   Respiratory:  Negative for shortness of breath.    Cardiovascular:  Negative for chest pain.   Gastrointestinal:  Positive for constipation. Negative for abdominal distention, diarrhea and nausea.   Genitourinary:  Negative for dysuria.   Objective:     Vital Signs (Most Recent):  Temp: 98.2 °F (36.8 °C) (02/21/22 0800)  Pulse: 80 (02/21/22 0800)  Resp: 20 (02/21/22 0800)  BP: 129/86 (02/21/22 0841)  SpO2: (!) 93 % (02/21/22 0800)   Vital Signs (24h Range):  Temp:  [97.3 °F (36.3 °C)-98.6 °F (37 °C)] 98.2 °F (36.8 °C)  Pulse:  [74-84] 80  Resp:  [20] 20  SpO2:  [93 %-100 %] 93 %  BP: (128-153)/(86-96) 129/86     Weight: (!) 156.9 kg (345 lb 14.4 oz)  Body mass index is 44.41 kg/m².    Intake/Output Summary (Last 24 hours) at 2/21/2022 1044  Last data filed at 2/20/2022 1200  Gross per 24 hour   Intake 240 ml   Output --   Net 240 ml      Physical Exam  Constitutional:       Appearance: He is obese.   HENT:      Head: Normocephalic.      Mouth/Throat:      Mouth: Mucous membranes are moist.   Eyes:      Pupils: Pupils are equal, round, and reactive to light.   Cardiovascular:      Rate and Rhythm: Regular rhythm.      Heart sounds: Normal heart sounds.   Pulmonary:      Breath sounds: Normal breath sounds.   Abdominal:      General: Bowel sounds are normal.      Palpations: Abdomen is soft.   Skin:     General: Skin is warm and dry.      Comments: Dressing to right foot   Neurological:      Mental Status: He is alert and oriented to person, place, and time.   Psychiatric:         Mood and Affect: Mood normal.       Significant Labs: All pertinent labs within the past 24 hours have been reviewed.  Recent Lab Results         02/21/22  0620   02/20/22  2013   02/20/22  1603   02/20/22  1106        POC Glucose 190    186   169   231               Significant Imaging: I have reviewed all pertinent imaging results/findings within the past 24 hours.      Assessment/Plan:      * Chronic diabetic ulcer of right foot determined by examination  * Patient's wound cultures from 02/04 taken from ulcer showed growth of ESBL E coli, MSSA and Proteus Mirabilis     2/18 Continue meropenem, local wound care.    2/20 optimize blood sugar control, counseled the patient re diabetic diet (he had midst eating cakes and things like that from family).    02/21 continue merrem and wound care    Type 2 diabetes mellitus with right diabetic foot ulcer  02/21 continue levemir and SSI       Diabetic neuropathy with neurologic complication  - Patient's latest A1C was 8.4 in November  - Repeat A1C 7.8   - Moderate SSI  - POCT glucose checks   - Diabetic diet   - Levemir 20 units QHS         Peripheral vascular disease  - Continue home gabapentin 300mg q8      Chronic pain syndrome  - gabapentin 300mg q8  - Norco 10mg q6 prn       Primary hypertension  - amlodipine 10mg qd  - hctz 25mg qd    2/19 blood pressure borderline high, but looking at diet orders he has been ordering a lot of salt rich foods.   about this.    2/20 BP persistently uncontrolled, adding lisinopril 5 mg daily, continue amlodipine and hydrochlorothiazide      02/21 continue lisinopril. amlodopine and hctz      VTE Risk Mitigation (From admission, onward)         Ordered     IP VTE LOW RISK PATIENT  Once         02/15/22 1401     Place DONATO hose  Until discontinued         02/15/22 1401                Discharge Planning   WOOD:      Code Status: Full Code   Is the patient medically ready for discharge?:     Reason for patient still in hospital (select all that apply): Treatment  Discharge Plan A: Home Health, Home                  AUGUSTUS Escalante  Department of Hospital Medicine   [facility]

## 2022-02-22 ENCOUNTER — CLINICAL SUPPORT (OUTPATIENT)
Dept: WOUND CARE | Facility: CLINIC | Age: 37
End: 2022-02-22
Attending: FAMILY MEDICINE
Payer: MEDICARE

## 2022-02-22 LAB
GLUCOSE SERPL-MCNC: 156 MG/DL (ref 70–105)
GLUCOSE SERPL-MCNC: 207 MG/DL (ref 70–105)
GLUCOSE SERPL-MCNC: 219 MG/DL (ref 70–105)
GLUCOSE SERPL-MCNC: 230 MG/DL (ref 70–105)

## 2022-02-22 PROCEDURE — 25000003 PHARM REV CODE 250: Performed by: NURSE PRACTITIONER

## 2022-02-22 PROCEDURE — 99183 PR HYPERBARIC OXYGEN THERAPY ATTENDANCE/SUPERVISION, PER SESSION: ICD-10-PCS | Mod: S$PBB,,, | Performed by: FAMILY MEDICINE

## 2022-02-22 PROCEDURE — 99232 SBSQ HOSP IP/OBS MODERATE 35: CPT | Mod: ,,, | Performed by: HOSPITALIST

## 2022-02-22 PROCEDURE — 11000001 HC ACUTE MED/SURG PRIVATE ROOM

## 2022-02-22 PROCEDURE — 96372 THER/PROPH/DIAG INJ SC/IM: CPT

## 2022-02-22 PROCEDURE — 99183 HYPERBARIC OXYGEN THERAPY: CPT | Mod: S$PBB,,, | Performed by: FAMILY MEDICINE

## 2022-02-22 PROCEDURE — 25000003 PHARM REV CODE 250

## 2022-02-22 PROCEDURE — 99232 PR SUBSEQUENT HOSPITAL CARE,LEVL II: ICD-10-PCS | Mod: ,,, | Performed by: HOSPITALIST

## 2022-02-22 PROCEDURE — 99212 OFFICE O/P EST SF 10 MIN: CPT | Mod: PBBFAC | Performed by: FAMILY MEDICINE

## 2022-02-22 PROCEDURE — C9399 UNCLASSIFIED DRUGS OR BIOLOG: HCPCS | Performed by: HOSPITALIST

## 2022-02-22 PROCEDURE — 63600175 PHARM REV CODE 636 W HCPCS: Performed by: INTERNAL MEDICINE

## 2022-02-22 PROCEDURE — 25000003 PHARM REV CODE 250: Performed by: INTERNAL MEDICINE

## 2022-02-22 PROCEDURE — 82962 GLUCOSE BLOOD TEST: CPT

## 2022-02-22 PROCEDURE — 99183 HYPERBARIC OXYGEN THERAPY: CPT | Mod: PBBFAC | Performed by: FAMILY MEDICINE

## 2022-02-22 PROCEDURE — 63600175 PHARM REV CODE 636 W HCPCS: Performed by: HOSPITALIST

## 2022-02-22 PROCEDURE — 63600175 PHARM REV CODE 636 W HCPCS

## 2022-02-22 RX ADMIN — ATORVASTATIN CALCIUM 40 MG: 40 TABLET, FILM COATED ORAL at 08:02

## 2022-02-22 RX ADMIN — Medication 2 CAPSULE: at 04:02

## 2022-02-22 RX ADMIN — INSULIN ASPART 4 UNITS: 100 INJECTION, SOLUTION INTRAVENOUS; SUBCUTANEOUS at 06:02

## 2022-02-22 RX ADMIN — MEROPENEM 500 MG: 500 INJECTION, POWDER, FOR SOLUTION INTRAVENOUS at 06:02

## 2022-02-22 RX ADMIN — AMLODIPINE BESYLATE 10 MG: 10 TABLET ORAL at 08:02

## 2022-02-22 RX ADMIN — INSULIN DETEMIR 25 UNITS: 100 INJECTION, SOLUTION SUBCUTANEOUS at 09:02

## 2022-02-22 RX ADMIN — INSULIN ASPART 2 UNITS: 100 INJECTION, SOLUTION INTRAVENOUS; SUBCUTANEOUS at 09:02

## 2022-02-22 RX ADMIN — DOCUSATE SODIUM 100 MG: 100 CAPSULE, LIQUID FILLED ORAL at 09:02

## 2022-02-22 RX ADMIN — MEROPENEM 500 MG: 500 INJECTION, POWDER, FOR SOLUTION INTRAVENOUS at 12:02

## 2022-02-22 RX ADMIN — GABAPENTIN 300 MG: 300 CAPSULE ORAL at 06:02

## 2022-02-22 RX ADMIN — GABAPENTIN 300 MG: 300 CAPSULE ORAL at 09:02

## 2022-02-22 RX ADMIN — Medication 2 CAPSULE: at 08:02

## 2022-02-22 RX ADMIN — Medication 2 CAPSULE: at 12:02

## 2022-02-22 RX ADMIN — INSULIN ASPART 4 UNITS: 100 INJECTION, SOLUTION INTRAVENOUS; SUBCUTANEOUS at 07:02

## 2022-02-22 RX ADMIN — HYDROCHLOROTHIAZIDE 25 MG: 12.5 TABLET ORAL at 08:02

## 2022-02-22 RX ADMIN — MEROPENEM 500 MG: 500 INJECTION, POWDER, FOR SOLUTION INTRAVENOUS at 11:02

## 2022-02-22 RX ADMIN — GABAPENTIN 300 MG: 300 CAPSULE ORAL at 01:02

## 2022-02-22 RX ADMIN — HYDROCODONE BITARTRATE AND ACETAMINOPHEN 1 TABLET: 10; 325 TABLET ORAL at 09:02

## 2022-02-22 RX ADMIN — HYDROCODONE BITARTRATE AND ACETAMINOPHEN 1 TABLET: 10; 325 TABLET ORAL at 12:02

## 2022-02-22 RX ADMIN — DOCUSATE SODIUM 100 MG: 100 CAPSULE, LIQUID FILLED ORAL at 08:02

## 2022-02-22 RX ADMIN — MEROPENEM 500 MG: 500 INJECTION, POWDER, FOR SOLUTION INTRAVENOUS at 05:02

## 2022-02-22 NOTE — PROGRESS NOTES
[facility] Medicine  Progress Note    Patient Name: Jean Pierre Paulson  MRN: [id number]  Patient Class: IP- Inpatient   Admission Date: 2/15/2022  Length of Stay: 7 days  Attending Physician: Vishnu Ford MD  Primary Care Provider: Mojgan Lomeli NP        Subjective:     Principal Problem:Chronic diabetic ulcer of right foot determined by examination        HPI:  The patient is a 37yo AA male with a MedHX of DM2 with neuropathy, chronic osteomyelitis, a necrotic right foot ulcer, anemia, PVD, HTN, HLD, chronic pain syndrome, and amputation of the right big toe. He presented to [facility] on 02/15 for a follow up on wound cultures that had been taken from his chronic right foot plantar ulcer. The wound cultures had grown Staph aureus, ESBL e.coli and proteus mirabilis. The patient says that he had first noticed the ulcer in late 2019 after he stepped on a nail. He says that this was around the time that he had had his right big toe amputated. He came in to the hospital last November when he noticed that the ulcer had grown very large. He had a debridement of the ulcer by Dr. Ramírez at that time. He has been taking Clindamycin PO prior to this admission. A wound care nurse from [facility] has been visiting the patient weekly to treat his wound with silver nitrate and redress it. The patient does also have two small, superficial ulcers on his right second toe and his right ankle.     The patient will be admitted to [facility] under the [facility] (Dr. Casper) for treatment of his infected right foot ulcer and management of other medical problems.       Overview/Hospital Course:  02/21 Awake and sitting up on the side of the bed. Complains of constipation. Had small bm yesterday. Added colace BID. Dressing to right foot ulcer. Continue wound care and merrem.  02/22 Awake and sitting up in chair without complaints. Complained of cough with lisinopril yesterday. Dcd lisinopril.  Will monitor BP. Will start HBO this morning. Continue abx and wound care.      Interval History: 02/22 Awake and sitting up in chair without complaints. Complained of cough with lisinopril yesterday. Dcd lisinopril. Will monitor BP. Will start HBO this morning. Continue abx and wound care.    Review of Systems   Constitutional:  Negative for appetite change.   Respiratory:  Positive for cough. Negative for shortness of breath.    Cardiovascular:  Negative for chest pain.   Gastrointestinal:  Negative for abdominal pain, constipation, diarrhea and nausea.   Objective:     Vital Signs (Most Recent):  Temp: 97.1 °F (36.2 °C) (02/22/22 0800)  Pulse: 77 (02/22/22 0800)  Resp: 16 (02/22/22 0800)  BP: 136/82 (02/22/22 0800)  SpO2: 98 % (02/22/22 0800) Vital Signs (24h Range):  Temp:  [97.1 °F (36.2 °C)-98.6 °F (37 °C)] 97.1 °F (36.2 °C)  Pulse:  [] 77  Resp:  [14-18] 16  SpO2:  [95 %-98 %] 98 %  BP: (119-156)/(69-93) 136/82     Weight: (!) 156.9 kg (345 lb 14.4 oz)  Body mass index is 44.41 kg/m².    Intake/Output Summary (Last 24 hours) at 2/22/2022 1145  Last data filed at 2/22/2022 0711  Gross per 24 hour   Intake 300 ml   Output --   Net 300 ml      Physical Exam  Constitutional:       Appearance: He is obese.   HENT:      Head: Normocephalic.      Mouth/Throat:      Mouth: Mucous membranes are moist.   Cardiovascular:      Rate and Rhythm: Normal rate and regular rhythm.      Heart sounds: Normal heart sounds.   Pulmonary:      Breath sounds: Normal breath sounds.   Abdominal:      General: Abdomen is flat. Bowel sounds are normal.      Palpations: Abdomen is soft.   Skin:     General: Skin is warm and dry.      Comments: Dressing to right foot   Neurological:      Mental Status: He is alert and oriented to person, place, and time.   Psychiatric:         Mood and Affect: Mood normal.       Significant Labs: All pertinent labs within the past 24 hours have been reviewed.  Recent Lab Results          02/22/22  0619   02/21/22  2101   02/21/22  1645        POC Glucose 219   253   217               Significant Imaging: I have reviewed all pertinent imaging results/findings within the past 24 hours.      Assessment/Plan:      * Chronic diabetic ulcer of right foot determined by examination  * Patient's wound cultures from 02/04 taken from ulcer showed growth of ESBL E coli, MSSA and Proteus Mirabilis     2/18 Continue meropenem, local wound care.    2/20 optimize blood sugar control, counseled the patient re diabetic diet (he had midst eating cakes and things like that from family).    02/21 continue merrem and wound care    Type 2 diabetes mellitus with right diabetic foot ulcer  02/21 continue levemir and SSI       Diabetic neuropathy with neurologic complication  - Patient's latest A1C was 8.4 in November  - Repeat A1C 7.8   - Moderate SSI  - POCT glucose checks   - Diabetic diet   - Levemir 20 units QHS         Peripheral vascular disease  - Continue home gabapentin 300mg q8      Chronic pain syndrome  - gabapentin 300mg q8  - Norco 10mg q6 prn       Chronic osteomyelitis  02/22 HBO this morning  Continue merrem and wound care      Primary hypertension  - amlodipine 10mg qd  - hctz 25mg qd    2/19 blood pressure borderline high, but looking at diet orders he has been ordering a lot of salt rich foods.   about this.    2/20 BP persistently uncontrolled, adding lisinopril 5 mg daily, continue amlodipine and hydrochlorothiazide      02/21 continue lisinopril. amlodopine and hctz    02/22 dcd lisinopril due to cough       VTE Risk Mitigation (From admission, onward)         Ordered     IP VTE LOW RISK PATIENT  Once         02/15/22 1401     Place DONATO hose  Until discontinued         02/15/22 1401                Discharge Planning   WOOD:      Code Status: Prior   Is the patient medically ready for discharge?:     Reason for patient still in hospital (select all that apply): Treatment  Discharge Plan A: Home  [facility] [place]                  Sofia Hendrickson, AUGUSTUS  Department of Hospital Medicine   [facility]

## 2022-02-22 NOTE — SUBJECTIVE & OBJECTIVE
Interval History: 02/22 Awake and sitting up in chair without complaints. Complained of cough with lisinopril yesterday. Dcd lisinopril. Will monitor BP. Will start HBO this morning. Continue abx and wound care.    Review of Systems   Constitutional:  Negative for appetite change.   Respiratory:  Positive for cough. Negative for shortness of breath.    Cardiovascular:  Negative for chest pain.   Gastrointestinal:  Negative for abdominal pain, constipation, diarrhea and nausea.   Objective:     Vital Signs (Most Recent):  Temp: 97.1 °F (36.2 °C) (02/22/22 0800)  Pulse: 77 (02/22/22 0800)  Resp: 16 (02/22/22 0800)  BP: 136/82 (02/22/22 0800)  SpO2: 98 % (02/22/22 0800) Vital Signs (24h Range):  Temp:  [97.1 °F (36.2 °C)-98.6 °F (37 °C)] 97.1 °F (36.2 °C)  Pulse:  [] 77  Resp:  [14-18] 16  SpO2:  [95 %-98 %] 98 %  BP: (119-156)/(69-93) 136/82     Weight: (!) 156.9 kg (345 lb 14.4 oz)  Body mass index is 44.41 kg/m².    Intake/Output Summary (Last 24 hours) at 2/22/2022 1145  Last data filed at 2/22/2022 0711  Gross per 24 hour   Intake 300 ml   Output --   Net 300 ml      Physical Exam  Constitutional:       Appearance: He is obese.   HENT:      Head: Normocephalic.      Mouth/Throat:      Mouth: Mucous membranes are moist.   Cardiovascular:      Rate and Rhythm: Normal rate and regular rhythm.      Heart sounds: Normal heart sounds.   Pulmonary:      Breath sounds: Normal breath sounds.   Abdominal:      General: Abdomen is flat. Bowel sounds are normal.      Palpations: Abdomen is soft.   Skin:     General: Skin is warm and dry.      Comments: Dressing to right foot   Neurological:      Mental Status: He is alert and oriented to person, place, and time.   Psychiatric:         Mood and Affect: Mood normal.       Significant Labs: All pertinent labs within the past 24 hours have been reviewed.  Recent Lab Results         02/22/22  0619   02/21/22  2101   02/21/22  1645        POC Glucose 219   253   217                Significant Imaging: I have reviewed all pertinent imaging results/findings within the past 24 hours.

## 2022-02-22 NOTE — ASSESSMENT & PLAN NOTE
- amlodipine 10mg qd  - hctz 25mg qd    2/19 blood pressure borderline high, but looking at diet orders he has been ordering a lot of salt rich foods.   about this.    2/20 BP persistently uncontrolled, adding lisinopril 5 mg daily, continue amlodipine and hydrochlorothiazide      02/21 continue lisinopril. amlodopine and hctz    02/22 dcd lisinopril due to cough

## 2022-02-23 ENCOUNTER — OFFICE VISIT (OUTPATIENT)
Dept: WOUND CARE | Facility: CLINIC | Age: 37
End: 2022-02-23
Attending: FAMILY MEDICINE
Payer: MEDICARE

## 2022-02-23 DIAGNOSIS — E08.621 DIABETIC ULCER OF MIDFOOT ASSOCIATED WITH DIABETES MELLITUS DUE TO UNDERLYING CONDITION, WITH NECROSIS OF BONE, UNSPECIFIED LATERALITY: Primary | ICD-10-CM

## 2022-02-23 DIAGNOSIS — D86.9 SARCOIDOSIS: ICD-10-CM

## 2022-02-23 DIAGNOSIS — M86.60 CHRONIC OSTEOMYELITIS: ICD-10-CM

## 2022-02-23 DIAGNOSIS — E11.40 DIABETIC NEUROPATHY WITH NEUROLOGIC COMPLICATION: Chronic | ICD-10-CM

## 2022-02-23 DIAGNOSIS — L97.516 NON-PRESSURE CHRONIC ULCER OF OTHER PART OF RIGHT FOOT WITH BONE INVOLVEMENT WITHOUT EVIDENCE OF NECROSIS: ICD-10-CM

## 2022-02-23 DIAGNOSIS — G89.4 CHRONIC PAIN SYNDROME: Chronic | ICD-10-CM

## 2022-02-23 DIAGNOSIS — L97.404 DIABETIC ULCER OF MIDFOOT ASSOCIATED WITH DIABETES MELLITUS DUE TO UNDERLYING CONDITION, WITH NECROSIS OF BONE, UNSPECIFIED LATERALITY: Primary | ICD-10-CM

## 2022-02-23 DIAGNOSIS — E11.49 DIABETIC NEUROPATHY WITH NEUROLOGIC COMPLICATION: Chronic | ICD-10-CM

## 2022-02-23 LAB
ANION GAP SERPL CALCULATED.3IONS-SCNC: 11 MMOL/L (ref 7–16)
BASOPHILS # BLD AUTO: 0.05 K/UL (ref 0–0.2)
BASOPHILS NFR BLD AUTO: 0.9 % (ref 0–1)
BUN SERPL-MCNC: 40 MG/DL (ref 7–18)
BUN/CREAT SERPL: 34 (ref 6–20)
CALCIUM SERPL-MCNC: 8.8 MG/DL (ref 8.5–10.1)
CHLORIDE SERPL-SCNC: 104 MMOL/L (ref 98–107)
CO2 SERPL-SCNC: 29 MMOL/L (ref 21–32)
CREAT SERPL-MCNC: 1.16 MG/DL (ref 0.7–1.3)
DIFFERENTIAL METHOD BLD: ABNORMAL
EOSINOPHIL # BLD AUTO: 0.26 K/UL (ref 0–0.5)
EOSINOPHIL NFR BLD AUTO: 4.7 % (ref 1–4)
ERYTHROCYTE [DISTWIDTH] IN BLOOD BY AUTOMATED COUNT: 13.8 % (ref 11.5–14.5)
GLUCOSE SERPL-MCNC: 163 MG/DL (ref 70–105)
GLUCOSE SERPL-MCNC: 173 MG/DL (ref 70–105)
GLUCOSE SERPL-MCNC: 187 MG/DL (ref 74–106)
GLUCOSE SERPL-MCNC: 214 MG/DL (ref 70–105)
GLUCOSE SERPL-MCNC: 243 MG/DL (ref 70–105)
HCT VFR BLD AUTO: 38.9 % (ref 40–54)
HGB BLD-MCNC: 11.8 G/DL (ref 13.5–18)
IMM GRANULOCYTES # BLD AUTO: 0.01 K/UL (ref 0–0.04)
IMM GRANULOCYTES NFR BLD: 0.2 % (ref 0–0.4)
LYMPHOCYTES # BLD AUTO: 2.69 K/UL (ref 1–4.8)
LYMPHOCYTES NFR BLD AUTO: 49.1 % (ref 27–41)
MCH RBC QN AUTO: 26 PG (ref 27–31)
MCHC RBC AUTO-ENTMCNC: 30.3 G/DL (ref 32–36)
MCV RBC AUTO: 85.7 FL (ref 80–96)
MONOCYTES # BLD AUTO: 0.54 K/UL (ref 0–0.8)
MONOCYTES NFR BLD AUTO: 9.9 % (ref 2–6)
MPC BLD CALC-MCNC: 9.7 FL (ref 9.4–12.4)
NEUTROPHILS # BLD AUTO: 1.93 K/UL (ref 1.8–7.7)
NEUTROPHILS NFR BLD AUTO: 35.2 % (ref 53–65)
NRBC # BLD AUTO: 0 X10E3/UL
NRBC, AUTO (.00): 0 %
PLATELET # BLD AUTO: 268 K/UL (ref 150–400)
POTASSIUM SERPL-SCNC: 4.6 MMOL/L (ref 3.5–5.1)
RBC # BLD AUTO: 4.54 M/UL (ref 4.6–6.2)
SODIUM SERPL-SCNC: 139 MMOL/L (ref 136–145)
WBC # BLD AUTO: 5.48 K/UL (ref 4.5–11)

## 2022-02-23 PROCEDURE — 36415 COLL VENOUS BLD VENIPUNCTURE: CPT | Performed by: INTERNAL MEDICINE

## 2022-02-23 PROCEDURE — 25000003 PHARM REV CODE 250: Performed by: INTERNAL MEDICINE

## 2022-02-23 PROCEDURE — 99183 HYPERBARIC OXYGEN THERAPY: CPT | Mod: S$PBB,,, | Performed by: FAMILY MEDICINE

## 2022-02-23 PROCEDURE — 25000003 PHARM REV CODE 250: Performed by: NURSE PRACTITIONER

## 2022-02-23 PROCEDURE — 96372 THER/PROPH/DIAG INJ SC/IM: CPT

## 2022-02-23 PROCEDURE — 63600175 PHARM REV CODE 636 W HCPCS: Performed by: INTERNAL MEDICINE

## 2022-02-23 PROCEDURE — 99232 PR SUBSEQUENT HOSPITAL CARE,LEVL II: ICD-10-PCS | Mod: ,,, | Performed by: NURSE PRACTITIONER

## 2022-02-23 PROCEDURE — 99233 PR SUBSEQUENT HOSPITAL CARE,LEVL III: ICD-10-PCS | Mod: ,,, | Performed by: HOSPITALIST

## 2022-02-23 PROCEDURE — 25000003 PHARM REV CODE 250

## 2022-02-23 PROCEDURE — 99233 SBSQ HOSP IP/OBS HIGH 50: CPT | Mod: ,,, | Performed by: HOSPITALIST

## 2022-02-23 PROCEDURE — 99183 HYPERBARIC OXYGEN THERAPY: CPT | Mod: PBBFAC | Performed by: FAMILY MEDICINE

## 2022-02-23 PROCEDURE — 11000001 HC ACUTE MED/SURG PRIVATE ROOM

## 2022-02-23 PROCEDURE — 27202728 HC CATH, DWELLING, EXT

## 2022-02-23 PROCEDURE — 82962 GLUCOSE BLOOD TEST: CPT

## 2022-02-23 PROCEDURE — 63600175 PHARM REV CODE 636 W HCPCS

## 2022-02-23 PROCEDURE — 99212 OFFICE O/P EST SF 10 MIN: CPT | Mod: PBBFAC | Performed by: FAMILY MEDICINE

## 2022-02-23 PROCEDURE — 85025 COMPLETE CBC W/AUTO DIFF WBC: CPT | Performed by: INTERNAL MEDICINE

## 2022-02-23 PROCEDURE — 80048 BASIC METABOLIC PNL TOTAL CA: CPT | Performed by: INTERNAL MEDICINE

## 2022-02-23 PROCEDURE — 63600175 PHARM REV CODE 636 W HCPCS: Performed by: HOSPITALIST

## 2022-02-23 PROCEDURE — 99183 PR HYPERBARIC OXYGEN THERAPY ATTENDANCE/SUPERVISION, PER SESSION: ICD-10-PCS | Mod: S$PBB,,, | Performed by: FAMILY MEDICINE

## 2022-02-23 PROCEDURE — 99232 SBSQ HOSP IP/OBS MODERATE 35: CPT | Mod: ,,, | Performed by: NURSE PRACTITIONER

## 2022-02-23 PROCEDURE — C9399 UNCLASSIFIED DRUGS OR BIOLOG: HCPCS | Performed by: HOSPITALIST

## 2022-02-23 RX ORDER — ACETAMINOPHEN 325 MG/1
650 TABLET ORAL EVERY 4 HOURS PRN
Refills: 0 | Status: ON HOLD
Start: 2022-02-23 | End: 2023-04-10 | Stop reason: CLARIF

## 2022-02-23 RX ADMIN — POLYETHYLENE GLYCOL 3350 17 G: 17 POWDER, FOR SOLUTION ORAL at 08:02

## 2022-02-23 RX ADMIN — GABAPENTIN 300 MG: 300 CAPSULE ORAL at 09:02

## 2022-02-23 RX ADMIN — ATORVASTATIN CALCIUM 40 MG: 40 TABLET, FILM COATED ORAL at 08:02

## 2022-02-23 RX ADMIN — INSULIN ASPART 4 UNITS: 100 INJECTION, SOLUTION INTRAVENOUS; SUBCUTANEOUS at 05:02

## 2022-02-23 RX ADMIN — AMLODIPINE BESYLATE 10 MG: 10 TABLET ORAL at 08:02

## 2022-02-23 RX ADMIN — Medication 2 CAPSULE: at 12:02

## 2022-02-23 RX ADMIN — GABAPENTIN 300 MG: 300 CAPSULE ORAL at 01:02

## 2022-02-23 RX ADMIN — MEROPENEM 500 MG: 500 INJECTION, POWDER, FOR SOLUTION INTRAVENOUS at 12:02

## 2022-02-23 RX ADMIN — INSULIN ASPART 4 UNITS: 100 INJECTION, SOLUTION INTRAVENOUS; SUBCUTANEOUS at 06:02

## 2022-02-23 RX ADMIN — INSULIN ASPART 2 UNITS: 100 INJECTION, SOLUTION INTRAVENOUS; SUBCUTANEOUS at 12:02

## 2022-02-23 RX ADMIN — GABAPENTIN 300 MG: 300 CAPSULE ORAL at 06:02

## 2022-02-23 RX ADMIN — DOCUSATE SODIUM 100 MG: 100 CAPSULE, LIQUID FILLED ORAL at 08:02

## 2022-02-23 RX ADMIN — INSULIN DETEMIR 25 UNITS: 100 INJECTION, SOLUTION SUBCUTANEOUS at 08:02

## 2022-02-23 RX ADMIN — Medication 2 CAPSULE: at 08:02

## 2022-02-23 RX ADMIN — MEROPENEM 500 MG: 500 INJECTION, POWDER, FOR SOLUTION INTRAVENOUS at 05:02

## 2022-02-23 RX ADMIN — Medication 2 CAPSULE: at 05:02

## 2022-02-23 RX ADMIN — HYDROCHLOROTHIAZIDE 25 MG: 12.5 TABLET ORAL at 08:02

## 2022-02-23 RX ADMIN — MEROPENEM 500 MG: 500 INJECTION, POWDER, FOR SOLUTION INTRAVENOUS at 06:02

## 2022-02-23 NOTE — DISCHARGE SUMMARY
[facility] Medicine  Discharge Summary      Patient Name: Jean Pierre Paulson  MRN: [id number]  Patient Class: IP- Inpatient  Admission Date: 2/15/2022  Hospital Length of Stay: 8 days  Discharge Date and Time:  02/23/2022 3:03 PM  Attending Physician: Vishnu Frod MD   Discharging Provider: AUGUSTUS Escalante  Primary Care Provider: Mojgan Lomeli NP      HPI:   The patient is a 35yo AA male with a MedHX of DM2 with neuropathy, chronic osteomyelitis, a necrotic right foot ulcer, anemia, PVD, HTN, HLD, chronic pain syndrome, and amputation of the right big toe. He presented to [facility] on 02/15 for a follow up on wound cultures that had been taken from his chronic right foot plantar ulcer. The wound cultures had grown Staph aureus, ESBL e.coli and proteus mirabilis. The patient says that he had first noticed the ulcer in late 2019 after he stepped on a nail. He says that this was around the time that he had had his right big toe amputated. He came in to the hospital last November when he noticed that the ulcer had grown very large. He had a debridement of the ulcer by Dr. Ramírez at that time. He has been taking Clindamycin PO prior to this admission. A wound care nurse from [facility] has been visiting the patient weekly to treat his wound with silver nitrate and redress it. The patient does also have two small, superficial ulcers on his right second toe and his right ankle.     The patient will be admitted to [facility] under the [facility] (Dr. Casper) for treatment of his infected right foot ulcer and management of other medical problems.       * No surgery found *      Hospital Course:   02/21 Awake and sitting up on the side of the bed. Complains of constipation. Had small bm yesterday. Added colace BID. Dressing to right foot ulcer. Continue wound care and merrem.  02/22 Awake and sitting up in chair without complaints. Complained of cough with lisinopril  yesterday. Dcd lisinopril. Will monitor BP. Will start HBO this morning. Continue abx and wound care.  02/23 Mr. 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 States if he doesn't get his business taken care of he may not have anywhere to discharge to later. Dr. Ewing discussed treatment he needed for ESBL ecoli and MRSA treatment was IV merrem, wound care and HBO. That this could not be given in home setting. Told him that refusing in pt care could put him at risk for potentially losing his foot. He states he understands and that he wants to discharge home. He will need to remain NWB on right, follow up with [facility] in one week. Wound care orders to be given by [facility].       Goals of Care Treatment Preferences:  Code Status: Full Code      Consults:   Consults (From admission, onward)        Status Ordering Provider     Inpatient consult to Social Work  Once        Provider:  (Not yet assigned)    Completed VIVEK EWING     Inpatient consult to General Surgery  Once        Provider:  Jacek Davis MD    Acknowledged HOWIE GREER     Inpatient consult to Midline team  Once        Provider:  (Not yet assigned)    Acknowledged VIVEK EWING     Inpatient consult to Wound Care Nurse (New Patient)  Once        Provider:  (Not yet assigned)    Completed BETZAIDA COKER          No new Assessment & Plan notes have been filed under this hospital service since the last note was generated.  Service: Hospital Medicine    Final Active Diagnoses:    Diagnosis Date Noted POA    PRINCIPAL PROBLEM:  Chronic diabetic ulcer of right foot determined by examination [E11.621, L97.519] 03/25/2021 Yes    Chronic pain syndrome [G89.4] 04/13/2021 Yes     Chronic    Peripheral vascular disease [I73.9]  Yes     Chronic    Diabetic neuropathy with neurologic complication [E11.40, E11.49]  Yes     Chronic    Chronic osteomyelitis [M86.60] 03/26/2021 Yes    Primary  hypertension [I10] 03/19/2021 Yes      Problems Resolved During this Admission:       Discharged Condition: stable    Disposition: Home-Health Care [facility]    Follow Up:   Contact information for after-discharge care     Home Medical Care     [facility] .    Service: Home Health Services  Contact information:  [address] [facility] [address]  [phone]                           Patient Instructions:      Diet diabetic       Significant Diagnostic Studies: Labs: All labs within the past 24 hours have been reviewed    Pending Diagnostic Studies:     Procedure Component Value Units Date/Time    EXTRA TUBES [[id number]] Collected: 02/18/22 0648    Order Status: Sent Lab Status: In process Updated: 02/18/22 0648    Specimen: Blood, Venous     Narrative:      The following orders were created for panel order EXTRA TUBES.  Procedure                               Abnormality         Status                     ---------                               -----------         ------                     Lavender Top Hold[[id number]]                                In process                   Please view results for these tests on the individual orders.    EXTRA TUBES [[id number]] Collected: 02/15/22 1534    Order Status: Sent Lab Status: In process Updated: 02/15/22 1534    Specimen: Blood, Venous     Narrative:      The following orders were created for panel order EXTRA TUBES.  Procedure                               Abnormality         Status                     ---------                               -----------         ------                     Light Blue Top Hold[[id number]]                              In process                 Red Top Hold[[id number]]                                     In process                 Pink Top Hold[[id number]]                                    In process                   Please view results for these tests on the individual orders.         Medications:  Reconciled Home  Medications:      Medication List      START taking these medications    acetaminophen 325 MG tablet  Commonly known as: TYLENOL  Take 2 tablets (650 mg total) by mouth every 4 (four) hours as needed.        CONTINUE taking these medications    amLODIPine 10 MG tablet  Commonly known as: NORVASC  Take 1 tablet (10 mg total) by mouth once daily.     blood sugar diagnostic Strp  Commonly known as: BLOOD GLUCOSE TEST  1 strip by Misc.(Non-Drug; Combo Route) route once daily. accu-chek Veronika strips     FeroSuL 325 mg (65 mg iron) Tab tablet  Generic drug: ferrous sulfate  Take 325 mg by mouth once daily.     gabapentin 300 MG capsule  Commonly known as: NEURONTIN  Take 1 capsule (300 mg total) by mouth every 8 (eight) hours.     hydroCHLOROthiazide 25 MG tablet  Commonly known as: HYDRODIURIL  Take 1 tablet (25 mg total) by mouth once daily.     HYDROcodone-acetaminophen  mg per tablet  Commonly known as: NORCO  Take 1 tablet by mouth once daily.     rosuvastatin 10 MG tablet  Commonly known as: CRESTOR  TAKE 1 TABLET EVERY DAY     TRIJARDY XR ORAL  Take by mouth once daily.        STOP taking these medications    amoxicillin-clavulanate 875-125mg 875-125 mg per tablet  Commonly known as: AUGMENTIN     clindamycin 300 MG capsule  Commonly known as: CLEOCIN     DAKIN'S SOLUTION external solution  Generic drug: sodium hypochlorite 0.5 %     gentamicin 0.1 % cream  Commonly known as: GARAMYCIN     SSD 1 % cream  Generic drug: silver sulfADIAZINE 1%        ASK your doctor about these medications    blood-glucose meter kit  1 each by Other route 3 (three) times daily.            Indwelling Lines/Drains at time of discharge:   Lines/Drains/Airways     None                 Time spent on the discharge of patient: 35 minutes         AUGUSTUS Escalante  Department of Hospital Medicine  [facility]

## 2022-02-23 NOTE — PROGRESS NOTES
[facility]  Adult Nutrition  Follow-up Note         Reason for Assessment  Reason For Assessment: RD follow-up  Nutrition Risk Screen: large or nonhealing wound, burn or pressure injury  Malnutrition  Is Patient Malnourished: No  Nutrition Diagnosis  increased protein needs   related to Decreased/ impaired skin integrity as evidenced by wounds    Nutrition Diagnosis Status: Improving      Nutrition Risk  Level of Risk/Frequency of Follow-up: high   Chewing or Swallowing Difficulty?: No Chewing or swallowing difficulty  Estimated/Assessed Needs  RMR ([place]-St. Jeor Equation): 2558.75 Activity Factor: 1 Injury Factor: 1.2     Temp: 97.7 °F (36.5 °C)Oral  Weight Used For Calorie Calculations: (!) 156.9 kg (345 lb 14.4 oz)   Energy Need Method: [place]-St Jeor Energy Calorie Requirements (kcal): 3070  Weight Used For Protein Calculations: 104 kg (229 lb 4.5 oz) (ABW)  Protein Requirements: 125-156  Estimated Fluid Requirement Method: RDA Method Fluid Requirements (mL): 3071  RDA Method (mL): 3070     Nutrition Prescription / Recommendations  Recommendation/Intervention: Continue 2000 consistent carbohydrate, high protein diet with angel at all meals. Will add ensure max protein to all meal trays to assist pt in meeting  estiamted nutritional needs.  Goals: wound healing; pt will meet estimated nutritional needs  Nutrition Goal Status: progressing towards goal  Communication of RD Recs: reviewed with physician  Current Diet Order: 2000 consistent carbohydrate , high protein diet with angel at all meals  Nutrition Order Comments: 75% meal intake documented  Oral Nutrition Supplement: angel with all meals  Recommended Diet: Consistent Carbohydrate 2000 (75g Carbs) and high protein  Recommended Oral Supplement: Angel [90 kcals, 2.5g Protein, 10g Carbs(3g Sugar), 7g L-Arginine, 7g L-Glutamine, Vitamin C 300mg, 9.5mg Zinc] three times daily and Ensure Max Protein [150 kcals, 30g Protein, 6g Carbs(2g Fiber,  "1g Sugar), 1.5g Fat] three times daily  Is Nutrition Support Recommended: No  Is Education Recommended: No  Monitor and Evaluation  % current Intake: P.O. intake of 75 - 100 %  % intake to meet estimated needs: 75 - 100 %  Food and Nutrient Intake: energy intake, food and beverage intake  Food and Nutrient Adminstration: diet order  Anthropometric Measurements: height/length, body mass index, weight, weight change  Biochemical Data, Medical Tests and Procedures: electrolyte and renal panel, glucose/endocrine profile  Nutrition-Focused Physical Findings: skin  Oral Calories (kcal): 2000  Oral Protein (gm): 100  Energy Calories Required: not meeting needs  Protein Required: not meeting needs  Current Medical Diagnosis and Past Medical History  Diagnosis: diabetes diagnosis/complications  Past Medical History:   Diagnosis Date    Anemia     Chronic osteomyelitis     Diabetes mellitus with neuropathy     Diabetes mellitus, type 2     DM2 (diabetes mellitus, type 2)     HTN (hypertension)     Hypertension     Neuropathy     Obesity     Osteomyelitis 10/2020    Hx of R foot, Tx with IV Abx    Peripheral vascular disease      Nutrition/Diet History  Spiritual, Cultural Beliefs, Denominational Practices, Values that Affect Care: no  Food Allergies: other (see comments) (tomato)  Lab/Procedures/Meds  Recent Labs   Lab 02/23/22  0642      K 4.6   BUN 40*   CREATININE 1.16   *   CALCIUM 8.8        Last A1c:   Lab Results   Component Value Date    HGBA1C 7.8 (H) 02/15/2022     Lab Results   Component Value Date    RBC 4.54 (L) 02/23/2022    HGB 11.8 (L) 02/23/2022    HCT 38.9 (L) 02/23/2022    MCV 85.7 02/23/2022    MCH 26.0 (L) 02/23/2022    MCHC 30.3 (L) 02/23/2022     Pertinent Labs Reviewed: reviewed  Pertinent Labs Comments: Hct 38.9, Glu 187, BUN 40  Pertinent Medications Reviewed: reviewed  Anthropometrics  Temp: 97.7 °F (36.5 °C)  Height Method: Stated  Height: 6' 2" (188 cm)  Height (inches): " 74 in  Weight Method: Bed Scale  Weight: (!) 156.9 kg (345 lb 14.4 oz)  Weight (lb): (!) 345.91 lb  Ideal Body Weight (IBW), Male: 190 lb  % Ideal Body Weight, Male (lb): 182.06 %  BMI (Calculated): 44.4  BMI Grade: greater than 40 - morbid obesity     Nutrition by Nursing  Diet/Nutrition Received: consistent carb/diabetic diet  Intake (%): 75%  Diet/Feeding Assistance: none  Diet/Feeding Tolerance: good  Last Bowel Movement: 02/19/22              Nutrition Follow-Up  RD Follow-up?: Yes  Assessment and Plan  No new Assessment & Plan notes have been filed under this hospital service since the last note was generated.  Service: Nutrition

## 2022-02-23 NOTE — PROGRESS NOTES
New order received for physical therapy evaluation to teach patient non weight bearing right foot. Patient evaluated earlier in his stay. Patient with peripheral neuropathy and no protective pain response in tight foot. Patient interested in a knee scooter for home use and able to demonstrate safe mobility with knee scooter. Patient has crutches to use at home for stair negotiation. Request make to  for knee scooter.No further skilled PT indicated.

## 2022-02-23 NOTE — PROGRESS NOTES
[facility]   Wound Care  Progress Note    Patient Name: Jean Pierre Paulson  MRN: [id number]  Admission Date: 2/15/2022  Hospital Length of Stay: 8 days  Attending Physician: Vishnu Ford MD    Chief Complaint: Wound Right plantar foot    Subjective:     HPI:  Jean Pierre Paulson is a 36 y.o. male with wounds to right plantar foot. Wound has improved since initiation of IV antibiotics. He started HBOT yesterday. Wound is progressing as anticipated, will consult General Surgery to evaluate for possible skin graft.     Review of Systems   Constitutional: Negative for activity change, appetite change, chills, diaphoresis, fatigue and fever.     Respiratory: Negative for cough, chest tightness and shortness of breath.    Cardiovascular: Positive for leg swelling. Negative for chest pain and palpitations.   Musculoskeletal: Positive for joint swelling. Negative for arthralgias.   Skin: Positive for wound.   Neurological: Negative for weakness, light-headedness, and headaches.   Psychiatric/Behavioral: Negative for agitation, confusion and dysphoric mood.  Objective:     Vital Signs (Most Recent):  Temp: 97.7 °F (36.5 °C) (02/23/22 0800)  Pulse: 70 (02/23/22 0800)  Resp: 20 (02/23/22 0800)  BP: (!) 152/102 (02/23/22 0800)  SpO2: 100 % (02/23/22 0800) Vital Signs (24h Range):  Temp:  [97.2 °F (36.2 °C)-98 °F (36.7 °C)] 97.7 °F (36.5 °C)  Pulse:  [70-93] 70  Resp:  [14-20] 20  SpO2:  [95 %-100 %] 100 %  BP: (131-152)/() 152/102     Weight: (!) 156.9 kg (345 lb 14.4 oz)  Body mass index is 44.41 kg/m².    Physical Exam  Vitals reviewed.   Constitutional:       General: He is not in acute distress.     Appearance: Normal appearance. He is obese.   HENT:      Head: Normocephalic.   Cardiovascular:      Rate and Rhythm: Normal rate and regular rhythm.      Pulses: Normal pulses.      Heart sounds: Normal heart sounds.   Pulmonary:      Effort: Pulmonary effort is normal.      Breath sounds: Normal breath sounds.   Musculoskeletal:          General: Swelling, deformity and signs of injury present.      Right lower leg: Edema present.   Skin:     Capillary Refill: Capillary refill takes 2 to 3 seconds.      Comments: Wound right foot   Neurological:      General: No focal deficit present.      Mental Status: He is alert and oriented to person, place, and time. Mental status is at baseline.   Psychiatric:         Mood and Affect: Mood normal.         Behavior: Behavior normal.         Thought Content: Thought content normal.         Judgment: Judgment normal.     Assessment and Plan      Assessment:  1. Wound to right plantar foot    Plan:   1. Continue Dakin's moist gauze to wound bed.   2. Continue HBOT  3. General Surgery consult for possible skin graft.     Signature:  AUGUSTUS Macedo  Wound Care    Date of encounter: 02/23/2022

## 2022-02-23 NOTE — PLAN OF CARE
Problem: Bariatric Environmental Safety  Goal: Safety Maintained with Care  Outcome: Ongoing, Progressing     Problem: Infection  Goal: Absence of Infection Signs and Symptoms  Outcome: Ongoing, Progressing

## 2022-02-23 NOTE — PLAN OF CARE
SS was consulted on pt for a knee scooter. SS spoke with pt and Medicare will not cover a knee scooter. Pt can rent from [email or web address] ([employer]) or order one from [employer]. Pt wants to change home health companies. Informed pt that he would need to contact current company and notify that he wished to change companies and will fax referral to [facility]

## 2022-02-24 ENCOUNTER — CLINICAL SUPPORT (OUTPATIENT)
Dept: WOUND CARE | Facility: CLINIC | Age: 37
End: 2022-02-24
Attending: FAMILY MEDICINE
Payer: MEDICARE

## 2022-02-24 DIAGNOSIS — L97.519 TYPE 2 DIABETES MELLITUS WITH RIGHT DIABETIC FOOT ULCER: Primary | ICD-10-CM

## 2022-02-24 DIAGNOSIS — M86.60 CHRONIC OSTEOMYELITIS: ICD-10-CM

## 2022-02-24 DIAGNOSIS — E08.621 DIABETIC ULCER OF RIGHT MIDFOOT ASSOCIATED WITH DIABETES MELLITUS DUE TO UNDERLYING CONDITION, WITH NECROSIS OF BONE: ICD-10-CM

## 2022-02-24 DIAGNOSIS — E11.621 TYPE 2 DIABETES MELLITUS WITH RIGHT DIABETIC FOOT ULCER: Primary | ICD-10-CM

## 2022-02-24 DIAGNOSIS — E11.621 CHRONIC DIABETIC ULCER OF RIGHT FOOT DETERMINED BY EXAMINATION: ICD-10-CM

## 2022-02-24 DIAGNOSIS — L97.519 CHRONIC DIABETIC ULCER OF RIGHT FOOT DETERMINED BY EXAMINATION: ICD-10-CM

## 2022-02-24 DIAGNOSIS — L97.414 DIABETIC ULCER OF RIGHT MIDFOOT ASSOCIATED WITH DIABETES MELLITUS DUE TO UNDERLYING CONDITION, WITH NECROSIS OF BONE: ICD-10-CM

## 2022-02-24 LAB
GLUCOSE SERPL-MCNC: 193 MG/DL (ref 70–105)
GLUCOSE SERPL-MCNC: 223 MG/DL (ref 70–105)

## 2022-02-24 PROCEDURE — 99183 HYPERBARIC OXYGEN THERAPY: CPT | Mod: S$PBB,,, | Performed by: FAMILY MEDICINE

## 2022-02-24 PROCEDURE — 99232 PR SUBSEQUENT HOSPITAL CARE,LEVL II: ICD-10-PCS | Mod: ,,, | Performed by: HOSPITALIST

## 2022-02-24 PROCEDURE — 25000003 PHARM REV CODE 250: Performed by: INTERNAL MEDICINE

## 2022-02-24 PROCEDURE — 99211 OFF/OP EST MAY X REQ PHY/QHP: CPT | Mod: PBBFAC | Performed by: FAMILY MEDICINE

## 2022-02-24 PROCEDURE — 82962 GLUCOSE BLOOD TEST: CPT

## 2022-02-24 PROCEDURE — 25000003 PHARM REV CODE 250

## 2022-02-24 PROCEDURE — 11000001 HC ACUTE MED/SURG PRIVATE ROOM

## 2022-02-24 PROCEDURE — 99183 HYPERBARIC OXYGEN THERAPY: CPT | Mod: PBBFAC | Performed by: FAMILY MEDICINE

## 2022-02-24 PROCEDURE — 99232 SBSQ HOSP IP/OBS MODERATE 35: CPT | Mod: ,,, | Performed by: HOSPITALIST

## 2022-02-24 PROCEDURE — 25000003 PHARM REV CODE 250: Performed by: NURSE PRACTITIONER

## 2022-02-24 PROCEDURE — 99183 PR HYPERBARIC OXYGEN THERAPY ATTENDANCE/SUPERVISION, PER SESSION: ICD-10-PCS | Mod: S$PBB,,, | Performed by: FAMILY MEDICINE

## 2022-02-24 PROCEDURE — 63600175 PHARM REV CODE 636 W HCPCS: Performed by: INTERNAL MEDICINE

## 2022-02-24 RX ADMIN — DOCUSATE SODIUM 100 MG: 100 CAPSULE, LIQUID FILLED ORAL at 08:02

## 2022-02-24 RX ADMIN — HYDROCODONE BITARTRATE AND ACETAMINOPHEN 1 TABLET: 10; 325 TABLET ORAL at 03:02

## 2022-02-24 RX ADMIN — AMLODIPINE BESYLATE 10 MG: 10 TABLET ORAL at 08:02

## 2022-02-24 RX ADMIN — MEROPENEM 500 MG: 500 INJECTION, POWDER, FOR SOLUTION INTRAVENOUS at 06:02

## 2022-02-24 RX ADMIN — HYDROCHLOROTHIAZIDE 25 MG: 12.5 TABLET ORAL at 08:02

## 2022-02-24 RX ADMIN — Medication 2 CAPSULE: at 08:02

## 2022-02-24 RX ADMIN — ATORVASTATIN CALCIUM 40 MG: 40 TABLET, FILM COATED ORAL at 08:02

## 2022-02-24 RX ADMIN — GABAPENTIN 300 MG: 300 CAPSULE ORAL at 05:02

## 2022-02-24 RX ADMIN — MEROPENEM 500 MG: 500 INJECTION, POWDER, FOR SOLUTION INTRAVENOUS at 12:02

## 2022-02-24 NOTE — PROGRESS NOTES
Subjective:      Patient ID: Jean Pierre Paulson is a 36 y.o. male.    Chief Complaint: No chief complaint on file.    Jean Pierre Paulson a 36 y.o. male presents for follow up on all regular problems which are reviewed and discussed.     Problem List Items Addressed This Visit        ID    Chronic osteomyelitis       Endocrine    Chronic diabetic ulcer of right foot determined by examination    Type 2 diabetes mellitus with right diabetic foot ulcer - Primary    RESOLVED: Diabetic ulcer of right midfoot associated with diabetes mellitus due to underlying condition, with necrosis of bone          Past Medical History:  Past Medical History:   Diagnosis Date    Anemia     Chronic osteomyelitis     Diabetes mellitus with neuropathy     Diabetes mellitus, type 2     DM2 (diabetes mellitus, type 2)     HTN (hypertension)     Hypertension     Neuropathy     Obesity     Osteomyelitis 10/2020    Hx of R foot, Tx with IV Abx    Peripheral vascular disease      Past Surgical History:   Procedure Laterality Date    CHOLECYSTECTOMY      DEBRIDEMENT OF FOOT Right 10/2020    FOOT SURGERY      Right diabetic foot ulcer Elliott's Grade 3      Bone exposure to R heel, Hx of IV Abx, cultures show multiple bacterial growth, Hx of Osteomyelitis, HBOT     Review of patient's allergies indicates:   Allergen Reactions    Tomato Itching     Current Facility-Administered Medications on File Prior to Visit   Medication Dose Route Frequency Provider Last Rate Last Admin    acetaminophen tablet 650 mg  650 mg Oral Q4H PRN Clara Gutierrez MD        amLODIPine tablet 10 mg  10 mg Oral Daily Clara Gutierrez MD   10 mg at 02/24/22 0819    atorvastatin tablet 40 mg  40 mg Oral Daily Clara Gutierrez MD   40 mg at 02/24/22 0820    dextrose 50% injection 12.5 g  12.5 g Intravenous PRN Clara Gutierrez MD        dextrose 50% injection 25 g  25 g Intravenous PRN Clara Gutierrez MD        docusate sodium capsule 100 mg  100 mg Oral BID Sofia REHMAN  Hendrickson, FNP   100 mg at 02/24/22 0820    gabapentin capsule 300 mg  300 mg Oral Q8H Clara Gutierrez MD   300 mg at 02/24/22 0523    glucagon (human recombinant) injection 1 mg  1 mg Intramuscular PRN Clara Gutierrez MD        glucose chewable tablet 16 g  16 g Oral PRN Clara Gutierrez MD        glucose chewable tablet 24 g  24 g Oral PRN Clara Gutierrez MD        hydroCHLOROthiazide tablet 25 mg  25 mg Oral Daily Clara Gutierrez MD   25 mg at 02/24/22 0819    HYDROcodone-acetaminophen  mg per tablet 1 tablet  1 tablet Oral Q6H PRN Clara Gutierrez MD   1 tablet at 02/24/22 0317    insulin aspart U-100 injection 1-10 Units  1-10 Units Subcutaneous TID AC PRN Clara Gutierrez MD   4 Units at 02/23/22 1744    insulin detemir U-100 injection 25 Units  25 Units Subcutaneous QHS Vishnu Ford MD   25 Units at 02/23/22 2032    Lactobacillus acidophilus capsule 2 capsule  2 capsule Oral TID WM Carey Vivas MD   2 capsule at 02/24/22 0820    melatonin tablet 6 mg  6 mg Oral Nightly PRN Clara Gutierrez MD        meropenem (MERREM) 500 mg in sodium chloride 0.9 % 100 mL IVPB (MB+)  500 mg Intravenous Q6H Carey Vivas MD   Stopped at 02/24/22 0735    ondansetron tablet 8 mg  8 mg Oral Q8H PRN Axel Simmons Jr., MD        polyethylene glycol packet 17 g  17 g Oral Daily PRN Clara Gutierrez MD   17 g at 02/19/22 2032    polyethylene glycol packet 17 g  17 g Oral Daily Sofia L Hendrickson, FNP   17 g at 02/23/22 0850    prochlorperazine injection Soln 5 mg  5 mg Intravenous Q6H PRN Clara Gutierrez MD         Current Outpatient Medications on File Prior to Visit   Medication Sig Dispense Refill    acetaminophen (TYLENOL) 325 MG tablet Take 2 tablets (650 mg total) by mouth every 4 (four) hours as needed.  0    amLODIPine (NORVASC) 10 MG tablet Take 1 tablet (10 mg total) by mouth once daily. 30 tablet 0    blood sugar diagnostic (BLOOD GLUCOSE TEST) Strp 1 strip by  Misc.(Non-Drug; Combo Route) route once daily. accu-chek Veronika strips 90 strip 5    blood-glucose meter kit 1 each by Other route 3 (three) times daily. 1 each 0    empaglifloz/linaglip/metformin (TRIJARDY XR ORAL) Take by mouth once daily.      FEROSUL 325 mg (65 mg iron) Tab tablet Take 325 mg by mouth once daily.      gabapentin (NEURONTIN) 300 MG capsule Take 1 capsule (300 mg total) by mouth every 8 (eight) hours. 90 capsule 1    hydroCHLOROthiazide (HYDRODIURIL) 25 MG tablet Take 1 tablet (25 mg total) by mouth once daily. 90 tablet 1    HYDROcodone-acetaminophen (NORCO)  mg per tablet Take 1 tablet by mouth once daily. 30 tablet 0    rosuvastatin (CRESTOR) 10 MG tablet TAKE 1 TABLET EVERY DAY 90 tablet 0    [DISCONTINUED] sodium hypochlorite 0.5 % (DAKIN'S SOLUTION) external solution Apply topically once daily. 473 mL 5     Social History     Socioeconomic History    Marital status: Single   Tobacco Use    Smoking status: Former Smoker    Smokeless tobacco: Never Used   Substance and Sexual Activity    Alcohol use: Yes     Comment: occasionally    Drug use: Yes     Types: Hydrocodone    Sexual activity: Yes     Family History   Problem Relation Age of Onset    Hypertension Father     Diabetes Father        Review of Systems   Constitutional: Negative.    HENT: Negative for congestion, ear pain, nosebleeds and trouble swallowing.    Eyes: Negative for pain and itching.   Respiratory: Negative for chest tightness.    Cardiovascular: Negative for chest pain.   Gastrointestinal: Negative for abdominal distention.   Endocrine: Negative for cold intolerance and heat intolerance.   Genitourinary: Negative for difficulty urinating.   Musculoskeletal: Negative for arthralgias.   Neurological: Negative for dizziness.       Objective:     There were no vitals taken for this visit.    Physical Exam  Constitutional:       Appearance: Normal appearance. He is obese.   HENT:      Head: Normocephalic  and atraumatic.      Right Ear: External ear normal.      Left Ear: External ear normal.      Nose: Nose normal.      Mouth/Throat:      Mouth: Mucous membranes are moist.      Pharynx: Oropharynx is clear.   Eyes:      Pupils: Pupils are equal, round, and reactive to light.   Cardiovascular:      Rate and Rhythm: Normal rate and regular rhythm.      Heart sounds: Normal heart sounds.   Pulmonary:      Effort: Pulmonary effort is normal.      Breath sounds: Normal breath sounds.   Abdominal:      Palpations: Abdomen is soft.   Musculoskeletal:         General: Normal range of motion.      Cervical back: Normal range of motion and neck supple.   Skin:     General: Skin is warm and dry.   Neurological:      General: No focal deficit present.      Mental Status: He is alert.   Psychiatric:         Mood and Affect: Mood normal.         Behavior: Behavior normal.         Thought Content: Thought content normal.         Judgment: Judgment normal.       Assessment:     1. Type 2 diabetes mellitus with right diabetic foot ulcer    2. Chronic diabetic ulcer of right foot determined by examination    3. Diabetic ulcer of right midfoot associated with diabetes mellitus due to underlying condition, with necrosis of bone    4. Chronic osteomyelitis        Plan:     Problem List Items Addressed This Visit        ID    Chronic osteomyelitis       Endocrine    Chronic diabetic ulcer of right foot determined by examination    Type 2 diabetes mellitus with right diabetic foot ulcer - Primary    RESOLVED: Diabetic ulcer of right midfoot associated with diabetes mellitus due to underlying condition, with necrosis of bone        No follow-ups on file.      I am having Jean Pierre Khurram maintain his amLODIPine, blood-glucose meter, gabapentin, HYDROcodone-acetaminophen, blood sugar diagnostic, FeroSuL, hydroCHLOROthiazide, rosuvastatin, empaglifloz/linaglip/metformin (TRIJARDY XR ORAL), and acetaminophen.    Diagnoses and all orders for this  visit:    Type 2 diabetes mellitus with right diabetic foot ulcer    Chronic diabetic ulcer of right foot determined by examination    Diabetic ulcer of right midfoot associated with diabetes mellitus due to underlying condition, with necrosis of bone    Chronic osteomyelitis         [unfilled]  No orders of the defined types were placed in this encounter.

## 2022-02-24 NOTE — PROGRESS NOTES
Subjective:      Patient ID: Jean Pierre Paulson is a 36 y.o. male.    Chief Complaint: Hyperbaric Oxygen Therapy (Right plantar foot)    Jean Pierre Paulson a 36 y.o. male presents for follow up on all regular problems which are reviewed and discussed.     Problem List Items Addressed This Visit        Neuro    Chronic pain syndrome (Chronic)    Diabetic neuropathy with neurologic complication (Chronic)       Derm    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       ID    Chronic osteomyelitis       Immunology/Multi System    Sarcoidosis      Other Visit Diagnoses     Diabetic ulcer of midfoot associated with diabetes mellitus due to underlying condition, with necrosis of bone, unspecified laterality    -  Primary          Past Medical History:  Past Medical History:   Diagnosis Date    Anemia     Chronic osteomyelitis     Diabetes mellitus with neuropathy     Diabetes mellitus, type 2     DM2 (diabetes mellitus, type 2)     HTN (hypertension)     Hypertension     Neuropathy     Obesity     Osteomyelitis 10/2020    Hx of R foot, Tx with IV Abx    Peripheral vascular disease      Past Surgical History:   Procedure Laterality Date    CHOLECYSTECTOMY      DEBRIDEMENT OF FOOT Right 10/2020    FOOT SURGERY      Right diabetic foot ulcer Elliott's Grade 3      Bone exposure to R heel, Hx of IV Abx, cultures show multiple bacterial growth, Hx of Osteomyelitis, HBOT     Review of patient's allergies indicates:   Allergen Reactions    Tomato Itching     Current Facility-Administered Medications on File Prior to Visit   Medication Dose Route Frequency Provider Last Rate Last Admin    acetaminophen tablet 650 mg  650 mg Oral Q4H PRN Clara Gutierrez MD        amLODIPine tablet 10 mg  10 mg Oral Daily Clara Gutierrez MD   10 mg at 02/24/22 0819    atorvastatin tablet 40 mg  40 mg Oral Daily Clara Gutierrez MD   40 mg at 02/24/22 0820    dextrose 50% injection  12.5 g  12.5 g Intravenous PRN Clara Gutierrez MD        dextrose 50% injection 25 g  25 g Intravenous PRN Clara Gutierrez MD        docusate sodium capsule 100 mg  100 mg Oral BID Sofia L Hendrickson, FNP   100 mg at 02/24/22 0820    gabapentin capsule 300 mg  300 mg Oral Q8H Clara Gutierrez MD   300 mg at 02/24/22 0523    glucagon (human recombinant) injection 1 mg  1 mg Intramuscular PRN Clara Gutierrez MD        glucose chewable tablet 16 g  16 g Oral PRN Clara Gutierrez MD        glucose chewable tablet 24 g  24 g Oral PRN Clara Gutierrez MD        hydroCHLOROthiazide tablet 25 mg  25 mg Oral Daily Clara Gutierrez MD   25 mg at 02/24/22 0819    HYDROcodone-acetaminophen  mg per tablet 1 tablet  1 tablet Oral Q6H PRN Clara Gutierrez MD   1 tablet at 02/24/22 0317    insulin aspart U-100 injection 1-10 Units  1-10 Units Subcutaneous TID AC PRN Clara Gutierrez MD   4 Units at 02/23/22 1744    insulin detemir U-100 injection 25 Units  25 Units Subcutaneous QHS Vishnu Ford MD   25 Units at 02/23/22 2032    Lactobacillus acidophilus capsule 2 capsule  2 capsule Oral TID WM Carey Vivas MD   2 capsule at 02/24/22 0820    melatonin tablet 6 mg  6 mg Oral Nightly PRN Clara Gutierrez MD        meropenem (MERREM) 500 mg in sodium chloride 0.9 % 100 mL IVPB (MB+)  500 mg Intravenous Q6H Carey Vivas MD   Stopped at 02/24/22 0735    ondansetron tablet 8 mg  8 mg Oral Q8H PRN Axel Simmons Jr., MD        polyethylene glycol packet 17 g  17 g Oral Daily PRN Clara Gutierrez MD   17 g at 02/19/22 2032    polyethylene glycol packet 17 g  17 g Oral Daily Sofia L Hendrickson, FNP   17 g at 02/23/22 0850    prochlorperazine injection Soln 5 mg  5 mg Intravenous Q6H PRN Clara Gutierrez MD         Current Outpatient Medications on File Prior to Visit   Medication Sig Dispense Refill    acetaminophen (TYLENOL) 325 MG tablet Take 2 tablets (650 mg total) by mouth every 4  (four) hours as needed.  0    amLODIPine (NORVASC) 10 MG tablet Take 1 tablet (10 mg total) by mouth once daily. 30 tablet 0    blood sugar diagnostic (BLOOD GLUCOSE TEST) Strp 1 strip by Misc.(Non-Drug; Combo Route) route once daily. accu-chek Veronika strips 90 strip 5    blood-glucose meter kit 1 each by Other route 3 (three) times daily. 1 each 0    empaglifloz/linaglip/metformin (TRIJARDY XR ORAL) Take by mouth once daily.      FEROSUL 325 mg (65 mg iron) Tab tablet Take 325 mg by mouth once daily.      gabapentin (NEURONTIN) 300 MG capsule Take 1 capsule (300 mg total) by mouth every 8 (eight) hours. 90 capsule 1    hydroCHLOROthiazide (HYDRODIURIL) 25 MG tablet Take 1 tablet (25 mg total) by mouth once daily. 90 tablet 1    HYDROcodone-acetaminophen (NORCO)  mg per tablet Take 1 tablet by mouth once daily. 30 tablet 0    rosuvastatin (CRESTOR) 10 MG tablet TAKE 1 TABLET EVERY DAY 90 tablet 0    [DISCONTINUED] sodium hypochlorite 0.5 % (DAKIN'S SOLUTION) external solution Apply topically once daily. 473 mL 5     Social History     Socioeconomic History    Marital status: Single   Tobacco Use    Smoking status: Former Smoker    Smokeless tobacco: Never Used   Substance and Sexual Activity    Alcohol use: Yes     Comment: occasionally    Drug use: Yes     Types: Hydrocodone    Sexual activity: Yes     Family History   Problem Relation Age of Onset    Hypertension Father     Diabetes Father        Review of Systems   Constitutional: Negative.    HENT: Negative for congestion, ear pain, nosebleeds and trouble swallowing.    Eyes: Negative for pain and itching.   Respiratory: Negative for chest tightness.    Cardiovascular: Negative for chest pain.   Gastrointestinal: Negative for abdominal distention.   Endocrine: Negative for cold intolerance and heat intolerance.   Genitourinary: Negative for difficulty urinating.   Musculoskeletal: Negative for arthralgias.   Neurological: Negative for  dizziness.       Objective:     There were no vitals taken for this visit.    Physical Exam  Constitutional:       Appearance: Normal appearance. He is obese.   HENT:      Head: Normocephalic and atraumatic.      Right Ear: External ear normal.      Left Ear: External ear normal.      Nose: Nose normal.      Mouth/Throat:      Mouth: Mucous membranes are moist.      Pharynx: Oropharynx is clear.   Eyes:      Pupils: Pupils are equal, round, and reactive to light.   Cardiovascular:      Rate and Rhythm: Normal rate and regular rhythm.      Heart sounds: Normal heart sounds.   Pulmonary:      Effort: Pulmonary effort is normal.      Breath sounds: Normal breath sounds.   Abdominal:      Palpations: Abdomen is soft.   Musculoskeletal:         General: Normal range of motion.      Cervical back: Normal range of motion and neck supple.   Skin:     General: Skin is warm and dry.   Neurological:      General: No focal deficit present.      Mental Status: He is alert.   Psychiatric:         Mood and Affect: Mood normal.         Behavior: Behavior normal.         Thought Content: Thought content normal.         Judgment: Judgment normal.       Assessment:     1. Diabetic ulcer of midfoot associated with diabetes mellitus due to underlying condition, with necrosis of bone, unspecified laterality    2. Chronic pain syndrome    3. Diabetic neuropathy with neurologic complication    4. 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    5. Chronic osteomyelitis    6. Sarcoidosis        Plan:     Problem List Items Addressed This Visit        Neuro    Chronic pain syndrome (Chronic)    Diabetic neuropathy with neurologic complication (Chronic)       Derm    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       ID    Chronic osteomyelitis       Immunology/Multi System    Sarcoidosis      Other Visit Diagnoses      Diabetic ulcer of midfoot associated with diabetes mellitus due to underlying condition, with necrosis of bone, unspecified laterality    -  Primary        No follow-ups on file.      I am having Jean Pierre Paulson maintain his amLODIPine, blood-glucose meter, gabapentin, HYDROcodone-acetaminophen, blood sugar diagnostic, FeroSuL, hydroCHLOROthiazide, rosuvastatin, and empaglifloz/linaglip/metformin (TRIJARDY XR ORAL).    Jean Pierre was seen today for hyperbaric oxygen therapy.    Diagnoses and all orders for this visit:    Diabetic ulcer of midfoot associated with diabetes mellitus due to underlying condition, with necrosis of bone, unspecified laterality    Chronic pain syndrome    Diabetic neuropathy with neurologic complication    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    Chronic osteomyelitis    Sarcoidosis         [unfilled]  No orders of the defined types were placed in this encounter.

## 2022-02-24 NOTE — ASSESSMENT & PLAN NOTE
* Patient's wound cultures from 02/04 taken from ulcer showed growth of ESBL E coli, MSSA and Proteus Mirabilis     2/18 Continue meropenem, local wound care.    2/20 optimize blood sugar control, counseled the patient re diabetic diet (he had midst eating cakes and things like that from family).    02/21 continue merrem and wound care    02/24 continue merrem

## 2022-02-24 NOTE — SUBJECTIVE & OBJECTIVE
Interval History: Sleeping in HBO chamber. Plans for pass today.    Review of Systems   Constitutional:  Negative for appetite change, fatigue and fever.   HENT:  Negative for congestion, hearing loss and trouble swallowing.    Respiratory:  Negative for chest tightness, shortness of breath and wheezing.    Cardiovascular:  Negative for chest pain and palpitations.   Gastrointestinal:  Negative for abdominal pain, constipation and nausea.   Genitourinary:  Negative for difficulty urinating and dysuria.   Musculoskeletal:  Negative for back pain and neck stiffness.   Skin:  Positive for wound. Negative for pallor and rash.        Right foot   Neurological:  Negative for dizziness, speech difficulty and headaches.   Psychiatric/Behavioral:  Negative for confusion and suicidal ideas.    Objective:     Vital Signs (Most Recent):  Temp: 98.3 °F (36.8 °C) (02/24/22 0800)  Pulse: 72 (02/24/22 0800)  Resp: 20 (02/24/22 0800)  BP: (!) 150/83 (02/24/22 0800)  SpO2: (!) 94 % (02/24/22 0800)   Vital Signs (24h Range):  Temp:  [97.7 °F (36.5 °C)-98.4 °F (36.9 °C)] 98.3 °F (36.8 °C)  Pulse:  [72-87] 72  Resp:  [18-20] 20  SpO2:  [94 %-99 %] 94 %  BP: (129-150)/() 150/83     Weight: (!) 156.9 kg (345 lb 14.4 oz)  Body mass index is 44.41 kg/m².    Intake/Output Summary (Last 24 hours) at 2/24/2022 1106  Last data filed at 2/23/2022 1845  Gross per 24 hour   Intake 2840 ml   Output --   Net 2840 ml      Physical Exam  Vitals reviewed.   Constitutional:       General: He is not in acute distress.     Appearance: He is obese.   Eyes:      Pupils: Pupils are equal, round, and reactive to light.   Cardiovascular:      Rate and Rhythm: Normal rate and regular rhythm.      Pulses: Normal pulses.   Pulmonary:      Effort: Pulmonary effort is normal. No respiratory distress.      Breath sounds: Normal breath sounds. No wheezing.   Abdominal:      General: Bowel sounds are normal. There is no distension.      Tenderness: There is no  abdominal tenderness.   Skin:     General: Skin is warm.      Findings: Lesion present.      Comments: Wound right foot   Neurological:      General: No focal deficit present.      Mental Status: He is alert, oriented to person, place, and time and easily aroused. Mental status is at baseline.   Psychiatric:         Mood and Affect: Mood normal.         Behavior: Behavior normal.       Significant Labs: All pertinent labs within the past 24 hours have been reviewed.  Recent Lab Results         02/24/22  0527   02/23/22  2037   02/23/22  1523   02/23/22  1133        POC Glucose 223   173   243   163               Significant Imaging: I have reviewed all pertinent imaging results/findings within the past 24 hours.

## 2022-02-25 ENCOUNTER — CLINICAL SUPPORT (OUTPATIENT)
Dept: WOUND CARE | Facility: CLINIC | Age: 37
End: 2022-02-25
Attending: FAMILY MEDICINE
Payer: MEDICARE

## 2022-02-25 DIAGNOSIS — E11.49 DIABETIC NEUROPATHY WITH NEUROLOGIC COMPLICATION: Chronic | ICD-10-CM

## 2022-02-25 DIAGNOSIS — E11.40 DIABETIC NEUROPATHY WITH NEUROLOGIC COMPLICATION: Chronic | ICD-10-CM

## 2022-02-25 DIAGNOSIS — D86.9 SARCOIDOSIS: ICD-10-CM

## 2022-02-25 DIAGNOSIS — M86.60 CHRONIC OSTEOMYELITIS: Primary | ICD-10-CM

## 2022-02-25 DIAGNOSIS — L97.516 NON-PRESSURE CHRONIC ULCER OF OTHER PART OF RIGHT FOOT WITH BONE INVOLVEMENT WITHOUT EVIDENCE OF NECROSIS: ICD-10-CM

## 2022-02-25 LAB
GLUCOSE SERPL-MCNC: 176 MG/DL (ref 70–105)
GLUCOSE SERPL-MCNC: 178 MG/DL (ref 70–105)
GLUCOSE SERPL-MCNC: 197 MG/DL (ref 70–105)
GLUCOSE SERPL-MCNC: 219 MG/DL (ref 70–105)

## 2022-02-25 PROCEDURE — 82962 GLUCOSE BLOOD TEST: CPT

## 2022-02-25 PROCEDURE — 99183 HYPERBARIC OXYGEN THERAPY: CPT | Mod: S$PBB,,, | Performed by: FAMILY MEDICINE

## 2022-02-25 PROCEDURE — 99183 HYPERBARIC OXYGEN THERAPY: CPT | Mod: PBBFAC | Performed by: FAMILY MEDICINE

## 2022-02-25 PROCEDURE — 25000003 PHARM REV CODE 250: Performed by: NURSE PRACTITIONER

## 2022-02-25 PROCEDURE — 96372 THER/PROPH/DIAG INJ SC/IM: CPT

## 2022-02-25 PROCEDURE — 25000003 PHARM REV CODE 250: Performed by: INTERNAL MEDICINE

## 2022-02-25 PROCEDURE — 11000001 HC ACUTE MED/SURG PRIVATE ROOM

## 2022-02-25 PROCEDURE — C9399 UNCLASSIFIED DRUGS OR BIOLOG: HCPCS | Performed by: HOSPITALIST

## 2022-02-25 PROCEDURE — 99232 PR SUBSEQUENT HOSPITAL CARE,LEVL II: ICD-10-PCS | Mod: ,,, | Performed by: HOSPITALIST

## 2022-02-25 PROCEDURE — 25000003 PHARM REV CODE 250

## 2022-02-25 PROCEDURE — 99183 PR HYPERBARIC OXYGEN THERAPY ATTENDANCE/SUPERVISION, PER SESSION: ICD-10-PCS | Mod: S$PBB,,, | Performed by: FAMILY MEDICINE

## 2022-02-25 PROCEDURE — 63600175 PHARM REV CODE 636 W HCPCS

## 2022-02-25 PROCEDURE — 63600175 PHARM REV CODE 636 W HCPCS: Performed by: HOSPITALIST

## 2022-02-25 PROCEDURE — 99232 SBSQ HOSP IP/OBS MODERATE 35: CPT | Mod: ,,, | Performed by: HOSPITALIST

## 2022-02-25 PROCEDURE — 63600175 PHARM REV CODE 636 W HCPCS: Performed by: INTERNAL MEDICINE

## 2022-02-25 PROCEDURE — 99212 OFFICE O/P EST SF 10 MIN: CPT | Mod: PBBFAC | Performed by: FAMILY MEDICINE

## 2022-02-25 RX ADMIN — MEROPENEM 500 MG: 500 INJECTION, POWDER, FOR SOLUTION INTRAVENOUS at 06:02

## 2022-02-25 RX ADMIN — DOCUSATE SODIUM 100 MG: 100 CAPSULE, LIQUID FILLED ORAL at 08:02

## 2022-02-25 RX ADMIN — GABAPENTIN 300 MG: 300 CAPSULE ORAL at 06:02

## 2022-02-25 RX ADMIN — MEROPENEM 500 MG: 500 INJECTION, POWDER, FOR SOLUTION INTRAVENOUS at 12:02

## 2022-02-25 RX ADMIN — INSULIN ASPART 2 UNITS: 100 INJECTION, SOLUTION INTRAVENOUS; SUBCUTANEOUS at 05:02

## 2022-02-25 RX ADMIN — DOCUSATE SODIUM 100 MG: 100 CAPSULE, LIQUID FILLED ORAL at 09:02

## 2022-02-25 RX ADMIN — HYDROCHLOROTHIAZIDE 25 MG: 12.5 TABLET ORAL at 08:02

## 2022-02-25 RX ADMIN — GABAPENTIN 300 MG: 300 CAPSULE ORAL at 09:02

## 2022-02-25 RX ADMIN — Medication 2 CAPSULE: at 04:02

## 2022-02-25 RX ADMIN — AMLODIPINE BESYLATE 10 MG: 10 TABLET ORAL at 08:02

## 2022-02-25 RX ADMIN — Medication 2 CAPSULE: at 08:02

## 2022-02-25 RX ADMIN — MEROPENEM 500 MG: 500 INJECTION, POWDER, FOR SOLUTION INTRAVENOUS at 11:02

## 2022-02-25 RX ADMIN — GABAPENTIN 300 MG: 300 CAPSULE ORAL at 01:02

## 2022-02-25 RX ADMIN — Medication 2 CAPSULE: at 01:02

## 2022-02-25 RX ADMIN — INSULIN DETEMIR 25 UNITS: 100 INJECTION, SOLUTION SUBCUTANEOUS at 09:02

## 2022-02-25 RX ADMIN — HYDROCODONE BITARTRATE AND ACETAMINOPHEN 1 TABLET: 10; 325 TABLET ORAL at 05:02

## 2022-02-25 RX ADMIN — MEROPENEM 500 MG: 500 INJECTION, POWDER, FOR SOLUTION INTRAVENOUS at 02:02

## 2022-02-25 RX ADMIN — INSULIN ASPART 1 UNITS: 100 INJECTION, SOLUTION INTRAVENOUS; SUBCUTANEOUS at 09:02

## 2022-02-25 RX ADMIN — ATORVASTATIN CALCIUM 40 MG: 40 TABLET, FILM COATED ORAL at 08:02

## 2022-02-25 RX ADMIN — INSULIN ASPART 2 UNITS: 100 INJECTION, SOLUTION INTRAVENOUS; SUBCUTANEOUS at 01:02

## 2022-02-25 NOTE — NURSING
Left facility via private vehicle for approved leave of absence.  Skin warm et dry.  Resp even et unlabored.  No s/s of acute distress noted or voiced.  MD aware that pt is driving himself from facility.  No s/s of impairment noted.  Safety measures maintained.

## 2022-02-25 NOTE — PROGRESS NOTES
[facility] Medicine  Progress Note    Patient Name: Jean Pierre Paulson  MRN: [id number]  Patient Class: IP- Inpatient   Admission Date: 2/15/2022  Length of Stay: 9 days  Attending Physician: Vishnu Ford MD  Primary Care Provider: Mojgan Lomeli NP        Subjective:     Principal Problem:Chronic diabetic ulcer of right foot determined by examination        HPI:  The patient is a 35yo AA male with a MedHX of DM2 with neuropathy, chronic osteomyelitis, a necrotic right foot ulcer, anemia, PVD, HTN, HLD, chronic pain syndrome, and amputation of the right big toe. He presented to the [facility] on 02/15 for a follow up on wound cultures that had been taken from his chronic right foot plantar ulcer. The wound cultures had grown Staph aureus, ESBL e.coli and proteus mirabilis. The patient says that he had first noticed the ulcer in late 2019 after he stepped on a nail. He says that this was around the time that he had had his right big toe amputated. He came in to the hospital last November when he noticed that the ulcer had grown very large. He had a debridement of the ulcer by Dr. Ramírez at that time. He has been taking Clindamycin PO prior to this admission. A wound care nurse from [facility] has been visiting the patient weekly to treat his wound with silver nitrate and redress it. The patient does also have two small, superficial ulcers on his right second toe and his right ankle.     The patient will be admitted to [facility] under the [facility] (Dr. Casper) for treatment of his infected right foot ulcer and management of other medical problems.       Overview/Hospital Course:  02/21 Awake and sitting up on the side of the bed. Complains of constipation. Had small bm yesterday. Added colace BID. Dressing to right foot ulcer. Continue wound care and merrem.  02/22 Awake and sitting up in chair without complaints. Complained of cough with lisinopril yesterday. Dcd lisinopril.  Will monitor BP. Will start HBO this morning. Continue abx and wound care.  02/23 Mr. 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 States if he doesn't get his business taken care of he may not have anywhere to discharge to later. Dr. Ford discussed treatment he needed for ESBL ecoli and MRSA treatment was IV merrem, wound care and HBO. That this could not be given in home setting. Told him that refusing in pt care could put him at risk for potentially losing his foot. He states he understands and that he wants to discharge home. He will need to remain NWB on right, follow up with [facility] in one week. Wound care orders to be given by [facility].  02/24 Pt no new issues. Getting am meds and HBO.  Plans for pass today. Has to get his housing situation straightened out so doesn't lose his home. Otherwise continue current process. He did wish to take walker with him.      Interval History: Sleeping in HBO chamber. Plans for pass today.    Review of Systems   Constitutional:  Negative for appetite change, fatigue and fever.   HENT:  Negative for congestion, hearing loss and trouble swallowing.    Respiratory:  Negative for chest tightness, shortness of breath and wheezing.    Cardiovascular:  Negative for chest pain and palpitations.   Gastrointestinal:  Negative for abdominal pain, constipation and nausea.   Genitourinary:  Negative for difficulty urinating and dysuria.   Musculoskeletal:  Negative for back pain and neck stiffness.   Skin:  Positive for wound. Negative for pallor and rash.        Right foot   Neurological:  Negative for dizziness, speech difficulty and headaches.   Psychiatric/Behavioral:  Negative for confusion and suicidal ideas.    Objective:     Vital Signs (Most Recent):  Temp: 98.3 °F (36.8 °C) (02/24/22 0800)  Pulse: 72 (02/24/22 0800)  Resp: 20 (02/24/22 0800)  BP: (!) 150/83 (02/24/22 0800)  SpO2: (!) 94 % (02/24/22 0800)   Vital Signs (24h  Range):  Temp:  [97.7 °F (36.5 °C)-98.4 °F (36.9 °C)] 98.3 °F (36.8 °C)  Pulse:  [72-87] 72  Resp:  [18-20] 20  SpO2:  [94 %-99 %] 94 %  BP: (129-150)/() 150/83     Weight: (!) 156.9 kg (345 lb 14.4 oz)  Body mass index is 44.41 kg/m².    Intake/Output Summary (Last 24 hours) at 2/24/2022 1106  Last data filed at 2/23/2022 1845  Gross per 24 hour   Intake 2840 ml   Output --   Net 2840 ml      Physical Exam  Vitals reviewed.   Constitutional:       General: He is not in acute distress.     Appearance: He is obese.   Eyes:      Pupils: Pupils are equal, round, and reactive to light.   Cardiovascular:      Rate and Rhythm: Normal rate and regular rhythm.      Pulses: Normal pulses.   Pulmonary:      Effort: Pulmonary effort is normal. No respiratory distress.      Breath sounds: Normal breath sounds. No wheezing.   Abdominal:      General: Bowel sounds are normal. There is no distension.      Tenderness: There is no abdominal tenderness.   Skin:     General: Skin is warm.      Findings: Lesion present.      Comments: Wound right foot   Neurological:      General: No focal deficit present.      Mental Status: He is alert, oriented to person, place, and time and easily aroused. Mental status is at baseline.   Psychiatric:         Mood and Affect: Mood normal.         Behavior: Behavior normal.       Significant Labs: All pertinent labs within the past 24 hours have been reviewed.  Recent Lab Results         02/24/22  0527   02/23/22  2037   02/23/22  1523   02/23/22  1133        POC Glucose 223   173   243   163               Significant Imaging: I have reviewed all pertinent imaging results/findings within the past 24 hours.      Assessment/Plan:      * Chronic diabetic ulcer of right foot determined by examination  * Patient's wound cultures from 02/04 taken from ulcer showed growth of ESBL E coli, MSSA and Proteus Mirabilis     2/18 Continue meropenem, local wound care.    2/20 optimize blood sugar control,  counseled the patient re diabetic diet (he had midst eating cakes and things like that from family).    02/21 continue merrem and wound care    02/24 continue merrem    Sarcoidosis  stable      Type 2 diabetes mellitus with right diabetic foot ulcer  02/21 continue levemir and SSI       Diabetic neuropathy with neurologic complication  - Patient's latest A1C was 8.4 in November  - Repeat A1C 7.8   - Moderate SSI  - POCT glucose checks   - Diabetic diet   - Levemir 20 units QHS         Peripheral vascular disease  - Continue home gabapentin 300mg q8      Chronic pain syndrome  - gabapentin 300mg q8  - Norco 10mg q6 prn       Chronic osteomyelitis  02/22 HBO this morning  Continue merrem and wound care    ]  02/24 continue merrem and wound care    Primary hypertension  - amlodipine 10mg qd  - hctz 25mg qd    2/19 blood pressure borderline high, but looking at diet orders he has been ordering a lot of salt rich foods.   about this.    2/20 BP persistently uncontrolled, adding lisinopril 5 mg daily, continue amlodipine and hydrochlorothiazide      02/21 continue lisinopril. amlodopine and hctz    02/22 dcd lisinopril due to cough       VTE Risk Mitigation (From admission, onward)         Ordered     [Order Hold - Suspended Admission]  IP VTE LOW RISK PATIENT  Once        (On hold at  since 02/24/22 1230)    02/15/22 1401     [Order Hold - Suspended Admission]  Place DONATO hose  Until discontinued        (On hold at  since 02/24/22 1230)    02/15/22 1401                Discharge Planning   WOOD: 3/10/2022     Code Status: Prior   Is the patient medically ready for discharge?:     Reason for patient still in hospital (select all that apply): Laboratory test, Treatment, Imaging, Consult recommendations and PT / OT recommendations  Discharge Plan A: Home Health, Home                  Vishnu Ford MD  Department of Hospital Medicine

## 2022-02-26 LAB
GLUCOSE SERPL-MCNC: 159 MG/DL (ref 70–105)
GLUCOSE SERPL-MCNC: 198 MG/DL (ref 70–105)
GLUCOSE SERPL-MCNC: 229 MG/DL (ref 70–105)
GLUCOSE SERPL-MCNC: 230 MG/DL (ref 70–105)

## 2022-02-26 PROCEDURE — 63600175 PHARM REV CODE 636 W HCPCS: Performed by: INTERNAL MEDICINE

## 2022-02-26 PROCEDURE — 82962 GLUCOSE BLOOD TEST: CPT

## 2022-02-26 PROCEDURE — 99232 PR SUBSEQUENT HOSPITAL CARE,LEVL II: ICD-10-PCS | Mod: ,,, | Performed by: HOSPITALIST

## 2022-02-26 PROCEDURE — 99232 SBSQ HOSP IP/OBS MODERATE 35: CPT | Mod: ,,, | Performed by: HOSPITALIST

## 2022-02-26 PROCEDURE — 63600175 PHARM REV CODE 636 W HCPCS: Performed by: HOSPITALIST

## 2022-02-26 PROCEDURE — 25000003 PHARM REV CODE 250

## 2022-02-26 PROCEDURE — 25000003 PHARM REV CODE 250: Performed by: NURSE PRACTITIONER

## 2022-02-26 PROCEDURE — C9399 UNCLASSIFIED DRUGS OR BIOLOG: HCPCS | Performed by: HOSPITALIST

## 2022-02-26 PROCEDURE — 11000001 HC ACUTE MED/SURG PRIVATE ROOM

## 2022-02-26 PROCEDURE — 25000003 PHARM REV CODE 250: Performed by: INTERNAL MEDICINE

## 2022-02-26 PROCEDURE — 63600175 PHARM REV CODE 636 W HCPCS

## 2022-02-26 PROCEDURE — 96372 THER/PROPH/DIAG INJ SC/IM: CPT

## 2022-02-26 RX ADMIN — GABAPENTIN 300 MG: 300 CAPSULE ORAL at 11:02

## 2022-02-26 RX ADMIN — INSULIN DETEMIR 25 UNITS: 100 INJECTION, SOLUTION SUBCUTANEOUS at 08:02

## 2022-02-26 RX ADMIN — DOCUSATE SODIUM 100 MG: 100 CAPSULE, LIQUID FILLED ORAL at 08:02

## 2022-02-26 RX ADMIN — INSULIN ASPART 4 UNITS: 100 INJECTION, SOLUTION INTRAVENOUS; SUBCUTANEOUS at 06:02

## 2022-02-26 RX ADMIN — MEROPENEM 500 MG: 500 INJECTION, POWDER, FOR SOLUTION INTRAVENOUS at 05:02

## 2022-02-26 RX ADMIN — GABAPENTIN 300 MG: 300 CAPSULE ORAL at 02:02

## 2022-02-26 RX ADMIN — INSULIN ASPART 2 UNITS: 100 INJECTION, SOLUTION INTRAVENOUS; SUBCUTANEOUS at 02:02

## 2022-02-26 RX ADMIN — Medication 2 CAPSULE: at 08:02

## 2022-02-26 RX ADMIN — HYDROCHLOROTHIAZIDE 25 MG: 12.5 TABLET ORAL at 08:02

## 2022-02-26 RX ADMIN — MEROPENEM 500 MG: 500 INJECTION, POWDER, FOR SOLUTION INTRAVENOUS at 12:02

## 2022-02-26 RX ADMIN — Medication 2 CAPSULE: at 06:02

## 2022-02-26 RX ADMIN — HYDROCODONE BITARTRATE AND ACETAMINOPHEN 1 TABLET: 10; 325 TABLET ORAL at 11:02

## 2022-02-26 RX ADMIN — ATORVASTATIN CALCIUM 40 MG: 40 TABLET, FILM COATED ORAL at 08:02

## 2022-02-26 RX ADMIN — Medication 2 CAPSULE: at 11:02

## 2022-02-26 RX ADMIN — AMLODIPINE BESYLATE 10 MG: 10 TABLET ORAL at 08:02

## 2022-02-26 RX ADMIN — INSULIN ASPART 1 UNITS: 100 INJECTION, SOLUTION INTRAVENOUS; SUBCUTANEOUS at 11:02

## 2022-02-26 RX ADMIN — GABAPENTIN 300 MG: 300 CAPSULE ORAL at 05:02

## 2022-02-26 NOTE — PROGRESS NOTES
[facility] Medicine  Progress Note    Patient Name: Jean Pierre Paulson  MRN: [id number]  Patient Class: IP- Inpatient   Admission Date: 2/15/2022  Length of Stay: 11 days  Attending Physician: Vishnu Ford MD  Primary Care Provider: Mojgan Lomeli NP        Subjective:     Principal Problem:Chronic diabetic ulcer of right foot determined by examination        HPI:  The patient is a 35yo AA male with a MedHX of DM2 with neuropathy, chronic osteomyelitis, a necrotic right foot ulcer, anemia, PVD, HTN, HLD, chronic pain syndrome, and amputation of the right big toe. He presented to [facility] on 02/15 for a follow up on wound cultures that had been taken from his chronic right foot plantar ulcer. The wound cultures had grown Staph aureus, ESBL e.coli and proteus mirabilis. The patient says that he had first noticed the ulcer in late 2019 after he stepped on a nail. He says that this was around the time that he had had his right big toe amputated. He came in to the hospital last November when he noticed that the ulcer had grown very large. He had a debridement of the ulcer by Dr. Ramírez at that time. He has been taking Clindamycin PO prior to this admission. A wound care nurse from [facility] has been visiting the patient weekly to treat his wound with silver nitrate and redress it. The patient does also have two small, superficial ulcers on his right second toe and his right ankle.     The patient will be admitted to [facility] under the [facility] (Dr. Casper) for treatment of his infected right foot ulcer and management of other medical problems.       Overview/Hospital Course:  02/21 Awake and sitting up on the side of the bed. Complains of constipation. Had small bm yesterday. Added colace BID. Dressing to right foot ulcer. Continue wound care and merrem.  02/22 Awake and sitting up in chair without complaints. Complained of cough with lisinopril yesterday. Dcd lisinopril.  Will monitor BP. Will start HBO this morning. Continue abx and wound care.  02/23 Mr. 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 States if he doesn't get his business taken care of he may not have anywhere to discharge to later. Dr. Ford discussed treatment he needed for ESBL ecoli and MRSA treatment was IV merrem, wound care and HBO. That this could not be given in home setting. Told him that refusing in pt care could put him at risk for potentially losing his foot. He states he understands and that he wants to discharge home. He will need to remain NWB on right, follow up with [facility] in one week. Wound care orders to be given by [facility].  02/24 Pt no new issues. Getting am meds and HBO.  Plans for pass today. Has to get his housing situation straightened out so doesn't lose his home. Otherwise continue current process. He did wish to take walker with him.  02/25 Did OK on pass and now back. Continue IV ATB. HBO and wound care. Recommended non wt bearing on wound.  02/26 patient remains in good spirits.  He tells me that was told by surgery may have skin graft next week.  Continue to try to remain nonweightbearing on foot.  Continue intravenous antibiotics.  Blood sugar good      Interval History:     Review of Systems   Constitutional:  Negative for appetite change, fatigue and fever.   HENT:  Negative for congestion, hearing loss and trouble swallowing.    Respiratory:  Negative for chest tightness, shortness of breath and wheezing.    Cardiovascular:  Negative for chest pain and palpitations.   Gastrointestinal:  Negative for abdominal pain, constipation and nausea.   Genitourinary:  Negative for difficulty urinating and dysuria.   Musculoskeletal:  Negative for back pain and neck stiffness.   Skin:  Positive for wound. Negative for pallor and rash.   Neurological:  Negative for dizziness, speech difficulty and headaches.   Psychiatric/Behavioral:  Positive  for sleep disturbance. Negative for confusion and suicidal ideas.    Objective:     Vital Signs (Most Recent):  Temp: 97.6 °F (36.4 °C) (02/26/22 0800)  Pulse: 72 (02/26/22 0800)  Resp: 16 (02/26/22 0800)  BP: 123/70 (02/26/22 0800)  SpO2: 95 % (02/26/22 0800) Vital Signs (24h Range):  Temp:  [97.6 °F (36.4 °C)-98.6 °F (37 °C)] 97.6 °F (36.4 °C)  Pulse:  [72-94] 72  Resp:  [16-19] 16  SpO2:  [95 %-97 %] 95 %  BP: (123-135)/(70-91) 123/70     Weight: (!) 156.9 kg (345 lb 14.4 oz)  Body mass index is 44.41 kg/m².    Intake/Output Summary (Last 24 hours) at 2/26/2022 1628  Last data filed at 2/26/2022 1358  Gross per 24 hour   Intake 680 ml   Output --   Net 680 ml      Physical Exam    Significant Labs: All pertinent labs within the past 24 hours have been reviewed.  BMP: No results for input(s): GLU, NA, K, CL, CO2, BUN, CREATININE, CALCIUM, MG in the last 48 hours.  CBC: No results for input(s): WBC, HGB, HCT, PLT in the last 48 hours.  CMP: No results for input(s): NA, K, CL, CO2, GLU, BUN, CREATININE, CALCIUM, PROT, ALBUMIN, BILITOT, ALKPHOS, AST, ALT, ANIONGAP, EGFRNONAA in the last 48 hours.    Invalid input(s): ESTGFAFR[place]    Significant Imaging: I have reviewed all pertinent imaging results/findings within the past 24 hours.    Microbiology Results (last 7 days)       Procedure Component Value Units Date/Time    Blood culture (site 1) [[id number]] Collected: 02/15/22 1559    Order Status: Completed Specimen: Blood Updated: 02/21/22 0729     Culture, Blood No Growth at 5 days    Blood culture (site 2) [[id number]] Collected: 02/15/22 1559    Order Status: Completed Specimen: Blood Updated: 02/21/22 0729     Culture, Blood No Growth at 5 days                Assessment/Plan:      * Chronic diabetic ulcer of right foot determined by examination  * Patient's wound cultures from 02/04 taken from ulcer showed growth of ESBL E coli, MSSA and Proteus Mirabilis     2/18 Continue meropenem, local wound care.    2/20  optimize blood sugar control, counseled the patient re diabetic diet (he had midst eating cakes and things like that from family).    02/21 continue merrem and wound care    02/24 continue merrem    Sarcoidosis  stable      Type 2 diabetes mellitus with right diabetic foot ulcer  02/21 continue levemir and SSI       Diabetic neuropathy with neurologic complication  - Patient's latest A1C was 8.4 in November  - Repeat A1C 7.8   - Moderate SSI  - POCT glucose checks   - Diabetic diet   - Levemir 20 units QHS         Peripheral vascular disease  - Continue home gabapentin 300mg q8      Chronic pain syndrome  - gabapentin 300mg q8  - Norco 10mg q6 prn       Chronic osteomyelitis  02/22 HBO this morning  Continue merrem and wound care    ]  02/24 continue merrem and wound care    Primary hypertension  - amlodipine 10mg qd  - hctz 25mg qd    2/19 blood pressure borderline high, but looking at diet orders he has been ordering a lot of salt rich foods.   about this.    2/20 BP persistently uncontrolled, adding lisinopril 5 mg daily, continue amlodipine and hydrochlorothiazide      02/21 continue lisinopril. amlodopine and hctz    02/22 dcd lisinopril due to cough       VTE Risk Mitigation (From admission, onward)         Ordered     IP VTE LOW RISK PATIENT  Once         02/15/22 1401     Place DONATO hose  Until discontinued         02/15/22 1401                Discharge Planning   WOOD: 3/10/2022     Code Status: Prior   Is the patient medically ready for discharge?:     Reason for patient still in hospital (select all that apply): Treatment, Imaging, Consult recommendations and PT / OT recommendations  Discharge Plan A: Home Health, Home                  Vishnu Ford MD  Department of Hospital Medicine   [facility]

## 2022-02-26 NOTE — PROGRESS NOTES
[facility] Medicine  Progress Note    Patient Name: Jean Pierre Paulson  MRN: [id number]  Patient Class: IP- Inpatient   Admission Date: 2/15/2022  Length of Stay: 10 days  Attending Physician: Vishnu Ford MD  Primary Care Provider: Mojgan Lomeli NP        Subjective:     Principal Problem:Chronic diabetic ulcer of right foot determined by examination        HPI:  The patient is a 35yo AA male with a MedHX of DM2 with neuropathy, chronic osteomyelitis, a necrotic right foot ulcer, anemia, PVD, HTN, HLD, chronic pain syndrome, and amputation of the right big toe. He presented to the [facility] on 02/15 for a follow up on wound cultures that had been taken from his chronic right foot plantar ulcer. The wound cultures had grown Staph aureus, ESBL e.coli and proteus mirabilis. The patient says that he had first noticed the ulcer in late 2019 after he stepped on a nail. He says that this was around the time that he had had his right big toe amputated. He came in to the hospital last November when he noticed that the ulcer had grown very large. He had a debridement of the ulcer by Dr. Ramírez at that time. He has been taking Clindamycin PO prior to this admission. A wound care nurse from [facility] has been visiting the patient weekly to treat his wound with silver nitrate and redress it. The patient does also have two small, superficial ulcers on his right second toe and his right ankle.     The patient will be admitted to [facility] under the [facility] (Dr. Casper) for treatment of his infected right foot ulcer and management of other medical problems.       Overview/Hospital Course:  02/21 Awake and sitting up on the side of the bed. Complains of constipation. Had small bm yesterday. Added colace BID. Dressing to right foot ulcer. Continue wound care and merrem.  02/22 Awake and sitting up in chair without complaints. Complained of cough with lisinopril yesterday. Dcd lisinopril.  Will monitor BP. Will start HBO this morning. Continue abx and wound care.  02/23 Mr. 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 States if he doesn't get his business taken care of he may not have anywhere to discharge to later. Dr. Ford discussed treatment he needed for ESBL ecoli and MRSA treatment was IV merrem, wound care and HBO. That this could not be given in home setting. Told him that refusing in pt care could put him at risk for potentially losing his foot. He states he understands and that he wants to discharge home. He will need to remain NWB on right, follow up with [facility] in one week. Wound care orders to be given by [facility].  02/24 Pt no new issues. Getting am meds and HBO.  Plans for pass today. Has to get his housing situation straightened out so doesn't lose his home. Otherwise continue current process. He did wish to take walker with him.  02/25 Did OK on pass and now back. Continue IV ATB. HBO and wound care. Recommended non wt bearing on wound.      Interval History:     Review of Systems   Constitutional:  Negative for appetite change, fatigue and fever.   HENT:  Negative for congestion, hearing loss and trouble swallowing.    Respiratory:  Negative for chest tightness, shortness of breath and wheezing.    Cardiovascular:  Negative for chest pain and palpitations.   Gastrointestinal:  Negative for abdominal pain, constipation and nausea.   Genitourinary:  Negative for difficulty urinating and dysuria.   Musculoskeletal:  Negative for back pain and neck stiffness.   Skin:  Positive for wound. Negative for pallor and rash.   Neurological:  Negative for dizziness, speech difficulty and headaches.   Psychiatric/Behavioral:  Positive for sleep disturbance. Negative for confusion and suicidal ideas.    Objective:     Vital Signs (Most Recent):  Temp: 98.6 °F (37 °C) (02/25/22 2000)  Pulse: 78 (02/25/22 2000)  Resp: 19 (02/25/22 2000)  BP: 129/78  (02/25/22 2000)  SpO2: 97 % (02/25/22 2000) Vital Signs (24h Range):  Temp:  [98 °F (36.7 °C)-98.6 °F (37 °C)] 98.6 °F (37 °C)  Pulse:  [73-78] 78  Resp:  [18-20] 19  SpO2:  [0 %-100 %] 97 %  BP: (129-148)/(78-97) 129/78     Weight: (!) 156.9 kg (345 lb 14.4 oz)  Body mass index is 44.41 kg/m².    Intake/Output Summary (Last 24 hours) at 2/25/2022 2040  Last data filed at 2/25/2022 1715  Gross per 24 hour   Intake 820 ml   Output --   Net 820 ml      Physical Exam  Vitals reviewed.   Constitutional:       General: He is not in acute distress.  Eyes:      Pupils: Pupils are equal, round, and reactive to light.   Cardiovascular:      Rate and Rhythm: Normal rate and regular rhythm.      Pulses: Normal pulses.   Pulmonary:      Effort: Pulmonary effort is normal. No respiratory distress.      Breath sounds: Normal breath sounds. No wheezing.   Abdominal:      General: Bowel sounds are normal. There is no distension.      Tenderness: There is no abdominal tenderness.   Skin:     General: Skin is warm.      Comments: Dressed wound to right foot plantar surface.   Neurological:      General: No focal deficit present.      Mental Status: He is alert, oriented to person, place, and time and easily aroused. Mental status is at baseline.   Psychiatric:         Mood and Affect: Mood normal.         Behavior: Behavior normal.       Significant Labs: All pertinent labs within the past 24 hours have been reviewed.  BMP: No results for input(s): GLU, NA, K, CL, CO2, BUN, CREATININE, CALCIUM, MG in the last 48 hours.  CBC: No results for input(s): WBC, HGB, HCT, PLT in the last 48 hours.  CMP: No results for input(s): NA, K, CL, CO2, GLU, BUN, CREATININE, CALCIUM, PROT, ALBUMIN, BILITOT, ALKPHOS, AST, ALT, ANIONGAP, EGFRNONAA in the last 48 hours.    Invalid input(s): ESTGF[place]    Significant Imaging: I have reviewed all pertinent imaging results/findings within the past 24 hours.      Assessment/Plan:      * Chronic diabetic  ulcer of right foot determined by examination  * Patient's wound cultures from 02/04 taken from ulcer showed growth of ESBL E coli, MSSA and Proteus Mirabilis     2/18 Continue meropenem, local wound care.    2/20 optimize blood sugar control, counseled the patient re diabetic diet (he had midst eating cakes and things like that from family).    02/21 continue merrem and wound care    02/24 continue merrem    Sarcoidosis  stable      Type 2 diabetes mellitus with right diabetic foot ulcer  02/21 continue levemir and SSI       Diabetic neuropathy with neurologic complication  - Patient's latest A1C was 8.4 in November  - Repeat A1C 7.8   - Moderate SSI  - POCT glucose checks   - Diabetic diet   - Levemir 20 units QHS         Peripheral vascular disease  - Continue home gabapentin 300mg q8      Chronic pain syndrome  - gabapentin 300mg q8  - Norco 10mg q6 prn       Chronic osteomyelitis  02/22 HBO this morning  Continue merrem and wound care    ]  02/24 continue merrem and wound care    Primary hypertension  - amlodipine 10mg qd  - hctz 25mg qd    2/19 blood pressure borderline high, but looking at diet orders he has been ordering a lot of salt rich foods.   about this.    2/20 BP persistently uncontrolled, adding lisinopril 5 mg daily, continue amlodipine and hydrochlorothiazide      02/21 continue lisinopril. amlodopine and hctz    02/22 dcd lisinopril due to cough       VTE Risk Mitigation (From admission, onward)         Ordered     IP VTE LOW RISK PATIENT  Once         02/15/22 1401     Place DONATO hose  Until discontinued         02/15/22 1401                Discharge Planning   WOOD: 3/10/2022     Code Status: Prior   Is the patient medically ready for discharge?:     Reason for patient still in hospital (select all that apply): Laboratory test, Treatment and Imaging  Discharge Plan A: Home Health, Home                  Vishnu Ford MD  Department of Hospital Medicine   [facility]

## 2022-02-26 NOTE — SUBJECTIVE & OBJECTIVE
Interval History:     Review of Systems   Constitutional:  Negative for appetite change, fatigue and fever.   HENT:  Negative for congestion, hearing loss and trouble swallowing.    Respiratory:  Negative for chest tightness, shortness of breath and wheezing.    Cardiovascular:  Negative for chest pain and palpitations.   Gastrointestinal:  Negative for abdominal pain, constipation and nausea.   Genitourinary:  Negative for difficulty urinating and dysuria.   Musculoskeletal:  Negative for back pain and neck stiffness.   Skin:  Positive for wound. Negative for pallor and rash.   Neurological:  Negative for dizziness, speech difficulty and headaches.   Psychiatric/Behavioral:  Positive for sleep disturbance. Negative for confusion and suicidal ideas.    Objective:     Vital Signs (Most Recent):  Temp: 97.6 °F (36.4 °C) (02/26/22 0800)  Pulse: 72 (02/26/22 0800)  Resp: 16 (02/26/22 0800)  BP: 123/70 (02/26/22 0800)  SpO2: 95 % (02/26/22 0800) Vital Signs (24h Range):  Temp:  [97.6 °F (36.4 °C)-98.6 °F (37 °C)] 97.6 °F (36.4 °C)  Pulse:  [72-94] 72  Resp:  [16-19] 16  SpO2:  [95 %-97 %] 95 %  BP: (123-135)/(70-91) 123/70     Weight: (!) 156.9 kg (345 lb 14.4 oz)  Body mass index is 44.41 kg/m².    Intake/Output Summary (Last 24 hours) at 2/26/2022 1628  Last data filed at 2/26/2022 1358  Gross per 24 hour   Intake 680 ml   Output --   Net 680 ml      Physical Exam    Significant Labs: All pertinent labs within the past 24 hours have been reviewed.  BMP: No results for input(s): GLU, NA, K, CL, CO2, BUN, CREATININE, CALCIUM, MG in the last 48 hours.  CBC: No results for input(s): WBC, HGB, HCT, PLT in the last 48 hours.  CMP: No results for input(s): NA, K, CL, CO2, GLU, BUN, CREATININE, CALCIUM, PROT, ALBUMIN, BILITOT, ALKPHOS, AST, ALT, ANIONGAP, EGFRNONAA in the last 48 hours.    Invalid input(s): ESTGFA[place]    Significant Imaging: I have reviewed all pertinent imaging results/findings within the past 24  hours.    Microbiology Results (last 7 days)       Procedure Component Value Units Date/Time    Blood culture (site 1) [[id number]] Collected: 02/15/22 1559    Order Status: Completed Specimen: Blood Updated: 02/21/22 0729     Culture, Blood No Growth at 5 days    Blood culture (site 2) [[id number]] Collected: 02/15/22 1559    Order Status: Completed Specimen: Blood Updated: 02/21/22 0729     Culture, Blood No Growth at 5 days

## 2022-02-26 NOTE — SUBJECTIVE & OBJECTIVE
Interval History:     Review of Systems   Constitutional:  Negative for appetite change, fatigue and fever.   HENT:  Negative for congestion, hearing loss and trouble swallowing.    Respiratory:  Negative for chest tightness, shortness of breath and wheezing.    Cardiovascular:  Negative for chest pain and palpitations.   Gastrointestinal:  Negative for abdominal pain, constipation and nausea.   Genitourinary:  Negative for difficulty urinating and dysuria.   Musculoskeletal:  Negative for back pain and neck stiffness.   Skin:  Positive for wound. Negative for pallor and rash.   Neurological:  Negative for dizziness, speech difficulty and headaches.   Psychiatric/Behavioral:  Positive for sleep disturbance. Negative for confusion and suicidal ideas.    Objective:     Vital Signs (Most Recent):  Temp: 98.6 °F (37 °C) (02/25/22 2000)  Pulse: 78 (02/25/22 2000)  Resp: 19 (02/25/22 2000)  BP: 129/78 (02/25/22 2000)  SpO2: 97 % (02/25/22 2000) Vital Signs (24h Range):  Temp:  [98 °F (36.7 °C)-98.6 °F (37 °C)] 98.6 °F (37 °C)  Pulse:  [73-78] 78  Resp:  [18-20] 19  SpO2:  [0 %-100 %] 97 %  BP: (129-148)/(78-97) 129/78     Weight: (!) 156.9 kg (345 lb 14.4 oz)  Body mass index is 44.41 kg/m².    Intake/Output Summary (Last 24 hours) at 2/25/2022 2040  Last data filed at 2/25/2022 1715  Gross per 24 hour   Intake 820 ml   Output --   Net 820 ml      Physical Exam  Vitals reviewed.   Constitutional:       General: He is not in acute distress.  Eyes:      Pupils: Pupils are equal, round, and reactive to light.   Cardiovascular:      Rate and Rhythm: Normal rate and regular rhythm.      Pulses: Normal pulses.   Pulmonary:      Effort: Pulmonary effort is normal. No respiratory distress.      Breath sounds: Normal breath sounds. No wheezing.   Abdominal:      General: Bowel sounds are normal. There is no distension.      Tenderness: There is no abdominal tenderness.   Skin:     General: Skin is warm.      Comments: Dressed  wound to right foot plantar surface.   Neurological:      General: No focal deficit present.      Mental Status: He is alert, oriented to person, place, and time and easily aroused. Mental status is at baseline.   Psychiatric:         Mood and Affect: Mood normal.         Behavior: Behavior normal.       Significant Labs: All pertinent labs within the past 24 hours have been reviewed.  BMP: No results for input(s): GLU, NA, K, CL, CO2, BUN, CREATININE, CALCIUM, MG in the last 48 hours.  CBC: No results for input(s): WBC, HGB, HCT, PLT in the last 48 hours.  CMP: No results for input(s): NA, K, CL, CO2, GLU, BUN, CREATININE, CALCIUM, PROT, ALBUMIN, BILITOT, ALKPHOS, AST, ALT, ANIONGAP, EGFRNONAA in the last 48 hours.    Invalid input(s): ESTGFAFR[place]    Significant Imaging: I have reviewed all pertinent imaging results/findings within the past 24 hours.

## 2022-02-27 LAB
ANION GAP SERPL CALCULATED.3IONS-SCNC: 13 MMOL/L (ref 7–16)
BASOPHILS # BLD AUTO: 0.03 K/UL (ref 0–0.2)
BASOPHILS NFR BLD AUTO: 0.5 % (ref 0–1)
BUN SERPL-MCNC: 54 MG/DL (ref 7–18)
BUN/CREAT SERPL: 39 (ref 6–20)
CALCIUM SERPL-MCNC: 8.9 MG/DL (ref 8.5–10.1)
CHLORIDE SERPL-SCNC: 102 MMOL/L (ref 98–107)
CO2 SERPL-SCNC: 25 MMOL/L (ref 21–32)
CREAT SERPL-MCNC: 1.38 MG/DL (ref 0.7–1.3)
DIFFERENTIAL METHOD BLD: ABNORMAL
EOSINOPHIL # BLD AUTO: 0.18 K/UL (ref 0–0.5)
EOSINOPHIL NFR BLD AUTO: 3.1 % (ref 1–4)
ERYTHROCYTE [DISTWIDTH] IN BLOOD BY AUTOMATED COUNT: 13.4 % (ref 11.5–14.5)
GLUCOSE SERPL-MCNC: 195 MG/DL (ref 70–105)
GLUCOSE SERPL-MCNC: 201 MG/DL (ref 70–105)
GLUCOSE SERPL-MCNC: 261 MG/DL (ref 70–105)
GLUCOSE SERPL-MCNC: 271 MG/DL (ref 74–106)
GLUCOSE SERPL-MCNC: 274 MG/DL (ref 70–105)
HCT VFR BLD AUTO: 36.5 % (ref 40–54)
HGB BLD-MCNC: 11.7 G/DL (ref 13.5–18)
IMM GRANULOCYTES # BLD AUTO: 0.01 K/UL (ref 0–0.04)
IMM GRANULOCYTES NFR BLD: 0.2 % (ref 0–0.4)
LYMPHOCYTES # BLD AUTO: 2.12 K/UL (ref 1–4.8)
LYMPHOCYTES NFR BLD AUTO: 36.4 % (ref 27–41)
MCH RBC QN AUTO: 25.9 PG (ref 27–31)
MCHC RBC AUTO-ENTMCNC: 32.1 G/DL (ref 32–36)
MCV RBC AUTO: 80.9 FL (ref 80–96)
MONOCYTES # BLD AUTO: 0.51 K/UL (ref 0–0.8)
MONOCYTES NFR BLD AUTO: 8.7 % (ref 2–6)
MPC BLD CALC-MCNC: 10.4 FL (ref 9.4–12.4)
NEUTROPHILS # BLD AUTO: 2.98 K/UL (ref 1.8–7.7)
NEUTROPHILS NFR BLD AUTO: 51.1 % (ref 53–65)
NRBC # BLD AUTO: 0 X10E3/UL
NRBC, AUTO (.00): 0 %
PLATELET # BLD AUTO: 249 K/UL (ref 150–400)
POTASSIUM SERPL-SCNC: 4.7 MMOL/L (ref 3.5–5.1)
RBC # BLD AUTO: 4.51 M/UL (ref 4.6–6.2)
SODIUM SERPL-SCNC: 135 MMOL/L (ref 136–145)
WBC # BLD AUTO: 5.83 K/UL (ref 4.5–11)

## 2022-02-27 PROCEDURE — 36415 COLL VENOUS BLD VENIPUNCTURE: CPT | Performed by: INTERNAL MEDICINE

## 2022-02-27 PROCEDURE — 99232 SBSQ HOSP IP/OBS MODERATE 35: CPT | Mod: ,,, | Performed by: HOSPITALIST

## 2022-02-27 PROCEDURE — 63600175 PHARM REV CODE 636 W HCPCS: Performed by: INTERNAL MEDICINE

## 2022-02-27 PROCEDURE — 63600175 PHARM REV CODE 636 W HCPCS

## 2022-02-27 PROCEDURE — C9399 UNCLASSIFIED DRUGS OR BIOLOG: HCPCS | Performed by: HOSPITALIST

## 2022-02-27 PROCEDURE — 80048 BASIC METABOLIC PNL TOTAL CA: CPT | Performed by: INTERNAL MEDICINE

## 2022-02-27 PROCEDURE — 85025 COMPLETE CBC W/AUTO DIFF WBC: CPT | Performed by: INTERNAL MEDICINE

## 2022-02-27 PROCEDURE — 96372 THER/PROPH/DIAG INJ SC/IM: CPT

## 2022-02-27 PROCEDURE — 11000001 HC ACUTE MED/SURG PRIVATE ROOM

## 2022-02-27 PROCEDURE — 25000003 PHARM REV CODE 250: Performed by: NURSE PRACTITIONER

## 2022-02-27 PROCEDURE — 82962 GLUCOSE BLOOD TEST: CPT

## 2022-02-27 PROCEDURE — 99232 PR SUBSEQUENT HOSPITAL CARE,LEVL II: ICD-10-PCS | Mod: ,,, | Performed by: HOSPITALIST

## 2022-02-27 PROCEDURE — 25000003 PHARM REV CODE 250

## 2022-02-27 PROCEDURE — 25000003 PHARM REV CODE 250: Performed by: INTERNAL MEDICINE

## 2022-02-27 PROCEDURE — 63600175 PHARM REV CODE 636 W HCPCS: Performed by: HOSPITALIST

## 2022-02-27 RX ADMIN — MEROPENEM 500 MG: 500 INJECTION, POWDER, FOR SOLUTION INTRAVENOUS at 01:02

## 2022-02-27 RX ADMIN — INSULIN ASPART 6 UNITS: 100 INJECTION, SOLUTION INTRAVENOUS; SUBCUTANEOUS at 06:02

## 2022-02-27 RX ADMIN — INSULIN ASPART 1 UNITS: 100 INJECTION, SOLUTION INTRAVENOUS; SUBCUTANEOUS at 10:02

## 2022-02-27 RX ADMIN — Medication 2 CAPSULE: at 09:02

## 2022-02-27 RX ADMIN — GABAPENTIN 300 MG: 300 CAPSULE ORAL at 05:02

## 2022-02-27 RX ADMIN — MEROPENEM 500 MG: 500 INJECTION, POWDER, FOR SOLUTION INTRAVENOUS at 12:02

## 2022-02-27 RX ADMIN — AMLODIPINE BESYLATE 10 MG: 10 TABLET ORAL at 09:02

## 2022-02-27 RX ADMIN — HYDROCODONE BITARTRATE AND ACETAMINOPHEN 1 TABLET: 10; 325 TABLET ORAL at 10:02

## 2022-02-27 RX ADMIN — Medication 2 CAPSULE: at 01:02

## 2022-02-27 RX ADMIN — INSULIN ASPART 4 UNITS: 100 INJECTION, SOLUTION INTRAVENOUS; SUBCUTANEOUS at 05:02

## 2022-02-27 RX ADMIN — MEROPENEM 500 MG: 500 INJECTION, POWDER, FOR SOLUTION INTRAVENOUS at 05:02

## 2022-02-27 RX ADMIN — INSULIN DETEMIR 25 UNITS: 100 INJECTION, SOLUTION SUBCUTANEOUS at 10:02

## 2022-02-27 RX ADMIN — DOCUSATE SODIUM 100 MG: 100 CAPSULE, LIQUID FILLED ORAL at 09:02

## 2022-02-27 RX ADMIN — HYDROCHLOROTHIAZIDE 25 MG: 12.5 TABLET ORAL at 09:02

## 2022-02-27 RX ADMIN — GABAPENTIN 300 MG: 300 CAPSULE ORAL at 09:02

## 2022-02-27 RX ADMIN — INSULIN ASPART 6 UNITS: 100 INJECTION, SOLUTION INTRAVENOUS; SUBCUTANEOUS at 01:02

## 2022-02-27 RX ADMIN — Medication 2 CAPSULE: at 05:02

## 2022-02-27 RX ADMIN — ATORVASTATIN CALCIUM 40 MG: 40 TABLET, FILM COATED ORAL at 09:02

## 2022-02-27 RX ADMIN — MEROPENEM 500 MG: 500 INJECTION, POWDER, FOR SOLUTION INTRAVENOUS at 06:02

## 2022-02-27 RX ADMIN — GABAPENTIN 300 MG: 300 CAPSULE ORAL at 01:02

## 2022-02-27 NOTE — PROGRESS NOTES
[facility] Medicine  Progress Note    Patient Name: Jean Pierre Paulson  MRN: [id number]  Patient Class: IP- Inpatient   Admission Date: 2/15/2022  Length of Stay: 12 days  Attending Physician: Vishnu Ford MD  Primary Care Provider: Mojgan Lomeli NP        Subjective:     Principal Problem:Chronic diabetic ulcer of right foot determined by examination        HPI:  The patient is a 35yo AA male with a MedHX of DM2 with neuropathy, chronic osteomyelitis, a necrotic right foot ulcer, anemia, PVD, HTN, HLD, chronic pain syndrome, and amputation of the right big toe. He presented to the [facility] on 02/15 for a follow up on wound cultures that had been taken from his chronic right foot plantar ulcer. The wound cultures had grown Staph aureus, ESBL e.coli and proteus mirabilis. The patient says that he had first noticed the ulcer in late 2019 after he stepped on a nail. He says that this was around the time that he had had his right big toe amputated. He came in to the hospital last November when he noticed that the ulcer had grown very large. He had a debridement of the ulcer by Dr. Ramírez at that time. He has been taking Clindamycin PO prior to this admission. A wound care nurse from [facility] has been visiting the patient weekly to treat his wound with silver nitrate and redress it. The patient does also have two small, superficial ulcers on his right second toe and his right ankle.     The patient will be admitted to [facility] under the [facility] (Dr. Casper) for treatment of his infected right foot ulcer and management of other medical problems.       Overview/Hospital Course:  02/21 Awake and sitting up on the side of the bed. Complains of constipation. Had small bm yesterday. Added colace BID. Dressing to right foot ulcer. Continue wound care and merrem.  02/22 Awake and sitting up in chair without complaints. Complained of cough with lisinopril yesterday. Dcd lisinopril.  Will monitor BP. Will start HBO this morning. Continue abx and wound care.  02/23 Mr. 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 States if he doesn't get his business taken care of he may not have anywhere to discharge to later. Dr. Ford discussed treatment he needed for ESBL ecoli and MRSA treatment was IV merrem, wound care and HBO. That this could not be given in home setting. Told him that refusing in pt care could put him at risk for potentially losing his foot. He states he understands and that he wants to discharge home. He will need to remain NWB on right, follow up with [facility] in one week. Wound care orders to be given by [facility].  02/24 Pt no new issues. Getting am meds and HBO.  Plans for pass today. Has to get his housing situation straightened out so doesn't lose his home. Otherwise continue current process. He did wish to take walker with him.  02/25 Did OK on pass and now back. Continue IV ATB. HBO and wound care. Recommended non wt bearing on wound.  02/26 patient remains in good spirits.  He tells me that was told by surgery may have skin graft next week.  Continue to try to remain nonweightbearing on foot.  Continue intravenous antibiotics.  Blood sugar good.  02/27 continues to do well, continue wound care and antibiotic, await surgical plans      Interval History:     Review of Systems   Constitutional:  Negative for appetite change, fatigue and fever.   HENT:  Negative for congestion, hearing loss and trouble swallowing.    Respiratory:  Negative for chest tightness, shortness of breath and wheezing.    Cardiovascular:  Negative for chest pain and palpitations.   Gastrointestinal:  Negative for abdominal pain, constipation and nausea.   Genitourinary:  Negative for difficulty urinating and dysuria.   Musculoskeletal:  Negative for back pain and neck stiffness.   Skin:  Positive for wound. Negative for pallor and rash.   Neurological:   Negative for dizziness, speech difficulty and headaches.   Psychiatric/Behavioral:  Positive for sleep disturbance. Negative for confusion and suicidal ideas.    Objective:     Vital Signs (Most Recent):  Temp: 98.4 °F (36.9 °C) (02/27/22 0800)  Pulse: 75 (02/27/22 0800)  Resp: 18 (02/27/22 1025)  BP: 129/61 (02/27/22 0800)  SpO2: (!) 92 % (02/27/22 0800) Vital Signs (24h Range):  Temp:  [97.7 °F (36.5 °C)-98.5 °F (36.9 °C)] 98.4 °F (36.9 °C)  Pulse:  [75-80] 75  Resp:  [16-20] 18  SpO2:  [92 %-100 %] 92 %  BP: (129-148)/(61-71) 129/61     Weight: (!) 156.9 kg (345 lb 14.4 oz)  Body mass index is 44.41 kg/m².    Intake/Output Summary (Last 24 hours) at 2/27/2022 1324  Last data filed at 2/26/2022 1821  Gross per 24 hour   Intake 440 ml   Output --   Net 440 ml        Physical Exam  Vitals reviewed.   Constitutional:       General: He is not in acute distress.  Eyes:      Pupils: Pupils are equal, round, and reactive to light.   Cardiovascular:      Rate and Rhythm: Normal rate and regular rhythm.      Pulses: Normal pulses.   Pulmonary:      Effort: Pulmonary effort is normal. No respiratory distress.      Breath sounds: Normal breath sounds. No wheezing.   Abdominal:      General: Bowel sounds are normal. There is no distension.      Tenderness: There is no abdominal tenderness.   Skin:     General: Skin is warm.      Comments: Dressed wound to right foot plantar surface.   Neurological:      General: No focal deficit present.      Mental Status: He is alert, oriented to person, place, and time and easily aroused. Mental status is at baseline.   Psychiatric:         Mood and Affect: Mood normal.         Behavior: Behavior normal.       Significant Labs: All pertinent labs within the past 24 hours have been reviewed.  BMP:   Recent Labs   Lab 02/27/22  0651   *   *   K 4.7      CO2 25   BUN 54*   CREATININE 1.38*   CALCIUM 8.9     CBC:   Recent Labs   Lab 02/27/22  0651   WBC 5.83   HGB 11.7*    HCT 36.5*        CMP:   Recent Labs   Lab 02/27/22  0651   *   K 4.7      CO2 25   *   BUN 54*   CREATININE 1.38*   CALCIUM 8.9   ANIONGAP 13   EGFRNONAA 61       Significant Imaging: I have reviewed all pertinent imaging results/findings within the past 24 hours.    Microbiology Results (last 7 days)       Procedure Component Value Units Date/Time    Blood culture (site 1) [[id number]] Collected: 02/15/22 1559    Order Status: Completed Specimen: Blood Updated: 02/21/22 0729     Culture, Blood No Growth at 5 days    Blood culture (site 2) [[id number]] Collected: 02/15/22 1559    Order Status: Completed Specimen: Blood Updated: 02/21/22 0729     Culture, Blood No Growth at 5 days                Assessment/Plan:      * Chronic diabetic ulcer of right foot determined by examination  * Patient's wound cultures from 02/04 taken from ulcer showed growth of ESBL E coli, MSSA and Proteus Mirabilis     2/18 Continue meropenem, local wound care.    2/20 optimize blood sugar control, counseled the patient re diabetic diet (he had midst eating cakes and things like that from family).    02/21 continue merrem and wound care    02/24 continue merrem    Sarcoidosis  stable      Type 2 diabetes mellitus with right diabetic foot ulcer  02/21 continue levemir and SSI       Diabetic neuropathy with neurologic complication  - Patient's latest A1C was 8.4 in November  - Repeat A1C 7.8   - Moderate SSI  - POCT glucose checks   - Diabetic diet   - Levemir 20 units QHS         Peripheral vascular disease  - Continue home gabapentin 300mg q8      Chronic pain syndrome  - gabapentin 300mg q8  - Norco 10mg q6 prn       Chronic osteomyelitis  02/22 HBO this morning  Continue merrem and wound care    ]  02/24 continue merrem and wound care    Primary hypertension  - amlodipine 10mg qd  - hctz 25mg qd    2/19 blood pressure borderline high, but looking at diet orders he has been ordering a lot of salt rich foods.    about this.    2/20 BP persistently uncontrolled, adding lisinopril 5 mg daily, continue amlodipine and hydrochlorothiazide      02/21 continue lisinopril. amlodopine and hctz    02/22 dcd lisinopril due to cough       VTE Risk Mitigation (From admission, onward)         Ordered     IP VTE LOW RISK PATIENT  Once         02/15/22 1401     Place DONATO hose  Until discontinued         02/15/22 1401                Discharge Planning   WOOD: 3/10/2022     Code Status: Prior   Is the patient medically ready for discharge?:     Reason for patient still in hospital (select all that apply): Laboratory test, Treatment, Imaging and Consult recommendations  Discharge Plan A: Home Health, Home                  Vishnu Ford MD  Department of Hospital Medicine   [facility]

## 2022-02-27 NOTE — SUBJECTIVE & OBJECTIVE
Interval History:     Review of Systems   Constitutional:  Negative for appetite change, fatigue and fever.   HENT:  Negative for congestion, hearing loss and trouble swallowing.    Respiratory:  Negative for chest tightness, shortness of breath and wheezing.    Cardiovascular:  Negative for chest pain and palpitations.   Gastrointestinal:  Negative for abdominal pain, constipation and nausea.   Genitourinary:  Negative for difficulty urinating and dysuria.   Musculoskeletal:  Negative for back pain and neck stiffness.   Skin:  Positive for wound. Negative for pallor and rash.   Neurological:  Negative for dizziness, speech difficulty and headaches.   Psychiatric/Behavioral:  Positive for sleep disturbance. Negative for confusion and suicidal ideas.    Objective:     Vital Signs (Most Recent):  Temp: 98.4 °F (36.9 °C) (02/27/22 0800)  Pulse: 75 (02/27/22 0800)  Resp: 18 (02/27/22 1025)  BP: 129/61 (02/27/22 0800)  SpO2: (!) 92 % (02/27/22 0800) Vital Signs (24h Range):  Temp:  [97.7 °F (36.5 °C)-98.5 °F (36.9 °C)] 98.4 °F (36.9 °C)  Pulse:  [75-80] 75  Resp:  [16-20] 18  SpO2:  [92 %-100 %] 92 %  BP: (129-148)/(61-71) 129/61     Weight: (!) 156.9 kg (345 lb 14.4 oz)  Body mass index is 44.41 kg/m².    Intake/Output Summary (Last 24 hours) at 2/27/2022 1324  Last data filed at 2/26/2022 1821  Gross per 24 hour   Intake 440 ml   Output --   Net 440 ml        Physical Exam  Vitals reviewed.   Constitutional:       General: He is not in acute distress.  Eyes:      Pupils: Pupils are equal, round, and reactive to light.   Cardiovascular:      Rate and Rhythm: Normal rate and regular rhythm.      Pulses: Normal pulses.   Pulmonary:      Effort: Pulmonary effort is normal. No respiratory distress.      Breath sounds: Normal breath sounds. No wheezing.   Abdominal:      General: Bowel sounds are normal. There is no distension.      Tenderness: There is no abdominal tenderness.   Skin:     General: Skin is warm.       Comments: Dressed wound to right foot plantar surface.   Neurological:      General: No focal deficit present.      Mental Status: He is alert, oriented to person, place, and time and easily aroused. Mental status is at baseline.   Psychiatric:         Mood and Affect: Mood normal.         Behavior: Behavior normal.       Significant Labs: All pertinent labs within the past 24 hours have been reviewed.  BMP:   Recent Labs   Lab 02/27/22 0651   *   *   K 4.7      CO2 25   BUN 54*   CREATININE 1.38*   CALCIUM 8.9     CBC:   Recent Labs   Lab 02/27/22 0651   WBC 5.83   HGB 11.7*   HCT 36.5*        CMP:   Recent Labs   Lab 02/27/22  0651   *   K 4.7      CO2 25   *   BUN 54*   CREATININE 1.38*   CALCIUM 8.9   ANIONGAP 13   EGFRNONAA 61       Significant Imaging: I have reviewed all pertinent imaging results/findings within the past 24 hours.    Microbiology Results (last 7 days)       Procedure Component Value Units Date/Time    Blood culture (site 1) [[id number]] Collected: 02/15/22 1559    Order Status: Completed Specimen: Blood Updated: 02/21/22 0729     Culture, Blood No Growth at 5 days    Blood culture (site 2) [[id number]] Collected: 02/15/22 1559    Order Status: Completed Specimen: Blood Updated: 02/21/22 0729     Culture, Blood No Growth at 5 days

## 2022-02-27 NOTE — PLAN OF CARE
Problem: Adult Inpatient Plan of Care  Goal: Absence of Hospital-Acquired Illness or Injury  Intervention: Identify and Manage Fall Risk  Flowsheets (Taken 2/27/2022 1645)  Safety Promotion/Fall Prevention:   assistive device/personal item within reach   medications reviewed     Problem: Diabetes Comorbidity  Goal: Blood Glucose Level Within Targeted Range  Intervention: Monitor and Manage Glycemia  Flowsheets (Taken 2/27/2022 1645)  Glycemic Management:   blood glucose monitored   supplemental insulin given     Problem: Skin Injury Risk Increased  Goal: Skin Health and Integrity  Intervention: Optimize Skin Protection  Flowsheets (Taken 2/27/2022 1645)  Pressure Reduction Techniques: pressure points protected  Pressure Reduction Devices: specialty bed utilized  Skin Protection: incontinence pads utilized  Head of Bed (HOB) Positioning: HOB at 30-45 degrees

## 2022-02-28 ENCOUNTER — OFFICE VISIT (OUTPATIENT)
Dept: WOUND CARE | Facility: CLINIC | Age: 37
End: 2022-02-28
Attending: FAMILY MEDICINE
Payer: MEDICARE

## 2022-02-28 DIAGNOSIS — L97.414 DIABETIC ULCER OF RIGHT MIDFOOT ASSOCIATED WITH DIABETES MELLITUS DUE TO UNDERLYING CONDITION, WITH NECROSIS OF BONE: Primary | ICD-10-CM

## 2022-02-28 DIAGNOSIS — M86.60 CHRONIC OSTEOMYELITIS: ICD-10-CM

## 2022-02-28 DIAGNOSIS — E11.49 DIABETIC NEUROPATHY WITH NEUROLOGIC COMPLICATION: Chronic | ICD-10-CM

## 2022-02-28 DIAGNOSIS — D86.9 SARCOIDOSIS: ICD-10-CM

## 2022-02-28 DIAGNOSIS — E08.621 DIABETIC ULCER OF RIGHT MIDFOOT ASSOCIATED WITH DIABETES MELLITUS DUE TO UNDERLYING CONDITION, WITH NECROSIS OF BONE: Primary | ICD-10-CM

## 2022-02-28 DIAGNOSIS — I73.9 PERIPHERAL VASCULAR DISEASE: Chronic | ICD-10-CM

## 2022-02-28 DIAGNOSIS — E11.40 DIABETIC NEUROPATHY WITH NEUROLOGIC COMPLICATION: Chronic | ICD-10-CM

## 2022-02-28 DIAGNOSIS — L97.516 NON-PRESSURE CHRONIC ULCER OF OTHER PART OF RIGHT FOOT WITH BONE INVOLVEMENT WITHOUT EVIDENCE OF NECROSIS: ICD-10-CM

## 2022-02-28 LAB
GLUCOSE SERPL-MCNC: 155 MG/DL (ref 70–105)
GLUCOSE SERPL-MCNC: 192 MG/DL (ref 70–105)
GLUCOSE SERPL-MCNC: 251 MG/DL (ref 70–105)
GLUCOSE SERPL-MCNC: 265 MG/DL (ref 70–105)

## 2022-02-28 PROCEDURE — 99232 PR SUBSEQUENT HOSPITAL CARE,LEVL II: ICD-10-PCS | Mod: ,,, | Performed by: HOSPITALIST

## 2022-02-28 PROCEDURE — 63600175 PHARM REV CODE 636 W HCPCS: Performed by: HOSPITALIST

## 2022-02-28 PROCEDURE — 99183 PR HYPERBARIC OXYGEN THERAPY ATTENDANCE/SUPERVISION, PER SESSION: ICD-10-PCS | Mod: S$PBB,,, | Performed by: FAMILY MEDICINE

## 2022-02-28 PROCEDURE — 63600175 PHARM REV CODE 636 W HCPCS

## 2022-02-28 PROCEDURE — 82962 GLUCOSE BLOOD TEST: CPT

## 2022-02-28 PROCEDURE — 25000003 PHARM REV CODE 250: Performed by: INTERNAL MEDICINE

## 2022-02-28 PROCEDURE — 25000003 PHARM REV CODE 250: Performed by: HOSPITALIST

## 2022-02-28 PROCEDURE — 25000003 PHARM REV CODE 250: Performed by: NURSE PRACTITIONER

## 2022-02-28 PROCEDURE — 99183 HYPERBARIC OXYGEN THERAPY: CPT | Mod: PBBFAC | Performed by: FAMILY MEDICINE

## 2022-02-28 PROCEDURE — 25000003 PHARM REV CODE 250

## 2022-02-28 PROCEDURE — 99183 HYPERBARIC OXYGEN THERAPY: CPT | Mod: S$PBB,,, | Performed by: FAMILY MEDICINE

## 2022-02-28 PROCEDURE — 99232 SBSQ HOSP IP/OBS MODERATE 35: CPT | Mod: ,,, | Performed by: HOSPITALIST

## 2022-02-28 PROCEDURE — C9399 UNCLASSIFIED DRUGS OR BIOLOG: HCPCS | Performed by: HOSPITALIST

## 2022-02-28 PROCEDURE — 11000001 HC ACUTE MED/SURG PRIVATE ROOM

## 2022-02-28 PROCEDURE — 63600175 PHARM REV CODE 636 W HCPCS: Performed by: INTERNAL MEDICINE

## 2022-02-28 PROCEDURE — 99213 OFFICE O/P EST LOW 20 MIN: CPT | Mod: PBBFAC | Performed by: FAMILY MEDICINE

## 2022-02-28 RX ORDER — ATORVASTATIN CALCIUM 40 MG/1
40 TABLET, FILM COATED ORAL NIGHTLY
Status: DISCONTINUED | OUTPATIENT
Start: 2022-02-28 | End: 2022-03-08 | Stop reason: HOSPADM

## 2022-02-28 RX ADMIN — INSULIN ASPART 6 UNITS: 100 INJECTION, SOLUTION INTRAVENOUS; SUBCUTANEOUS at 07:02

## 2022-02-28 RX ADMIN — GABAPENTIN 300 MG: 300 CAPSULE ORAL at 09:02

## 2022-02-28 RX ADMIN — Medication 2 CAPSULE: at 08:02

## 2022-02-28 RX ADMIN — HYDROCHLOROTHIAZIDE 25 MG: 12.5 TABLET ORAL at 08:02

## 2022-02-28 RX ADMIN — ATORVASTATIN CALCIUM 40 MG: 40 TABLET, FILM COATED ORAL at 08:02

## 2022-02-28 RX ADMIN — MEROPENEM 500 MG: 500 INJECTION, POWDER, FOR SOLUTION INTRAVENOUS at 12:02

## 2022-02-28 RX ADMIN — GABAPENTIN 300 MG: 300 CAPSULE ORAL at 02:02

## 2022-02-28 RX ADMIN — ATORVASTATIN CALCIUM 40 MG: 40 TABLET, FILM COATED ORAL at 09:02

## 2022-02-28 RX ADMIN — DOCUSATE SODIUM 100 MG: 100 CAPSULE, LIQUID FILLED ORAL at 09:02

## 2022-02-28 RX ADMIN — GABAPENTIN 300 MG: 300 CAPSULE ORAL at 06:02

## 2022-02-28 RX ADMIN — MEROPENEM 500 MG: 500 INJECTION, POWDER, FOR SOLUTION INTRAVENOUS at 05:02

## 2022-02-28 RX ADMIN — Medication 2 CAPSULE: at 11:02

## 2022-02-28 RX ADMIN — INSULIN DETEMIR 28 UNITS: 100 INJECTION, SOLUTION SUBCUTANEOUS at 09:02

## 2022-02-28 RX ADMIN — AMLODIPINE BESYLATE 10 MG: 10 TABLET ORAL at 08:02

## 2022-02-28 RX ADMIN — DOCUSATE SODIUM 100 MG: 100 CAPSULE, LIQUID FILLED ORAL at 08:02

## 2022-02-28 RX ADMIN — INSULIN ASPART 3 UNITS: 100 INJECTION, SOLUTION INTRAVENOUS; SUBCUTANEOUS at 09:02

## 2022-02-28 RX ADMIN — HYDROCODONE BITARTRATE AND ACETAMINOPHEN 1 TABLET: 10; 325 TABLET ORAL at 09:02

## 2022-02-28 RX ADMIN — Medication 2 CAPSULE: at 05:02

## 2022-02-28 NOTE — PROGRESS NOTES
Subjective:      Patient ID: Jean Pierre Paulson is a 36 y.o. male.    Chief Complaint: Hyperbaric Oxygen Therapy (Right diabetic foot ulcer)    Jean Pierre Paulson a 36 y.o. male presents for follow up on all regular problems which are reviewed and discussed.     Problem List Items Addressed This Visit        Neuro    Diabetic neuropathy with neurologic complication (Chronic)       Derm    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       ID    Chronic osteomyelitis - Primary       Immunology/Multi System    Sarcoidosis          Past Medical History:  Past Medical History:   Diagnosis Date    Anemia     Chronic osteomyelitis     Diabetes mellitus with neuropathy     Diabetes mellitus, type 2     DM2 (diabetes mellitus, type 2)     HTN (hypertension)     Hypertension     Neuropathy     Obesity     Osteomyelitis 10/2020    Hx of R foot, Tx with IV Abx    Peripheral vascular disease      Past Surgical History:   Procedure Laterality Date    CHOLECYSTECTOMY      DEBRIDEMENT OF FOOT Right 10/2020    FOOT SURGERY      Right diabetic foot ulcer Elliott's Grade 3      Bone exposure to R heel, Hx of IV Abx, cultures show multiple bacterial growth, Hx of Osteomyelitis, HBOT     Review of patient's allergies indicates:   Allergen Reactions    Tomato Itching     Current Facility-Administered Medications on File Prior to Visit   Medication Dose Route Frequency Provider Last Rate Last Admin    acetaminophen tablet 650 mg  650 mg Oral Q4H PRN Clara Gutierrez MD        amLODIPine tablet 10 mg  10 mg Oral Daily Clara Gutierrez MD   10 mg at 02/28/22 0811    atorvastatin tablet 40 mg  40 mg Oral Daily Clara Gutierrez MD   40 mg at 02/28/22 0812    dextrose 50% injection 12.5 g  12.5 g Intravenous PRN Clara Gutierrez MD        dextrose 50% injection 25 g  25 g Intravenous PRN Clara Gutierrez MD        docusate sodium capsule 100 mg  100 mg Oral BID Sofia REHMAN  Hendrickson, FNP   100 mg at 02/28/22 0812    gabapentin capsule 300 mg  300 mg Oral Q8H Clara Gutierrez MD   300 mg at 02/28/22 1448    glucagon (human recombinant) injection 1 mg  1 mg Intramuscular PRN Clara Gutierrez MD        glucose chewable tablet 16 g  16 g Oral PRN Clara Gutierrez MD        glucose chewable tablet 24 g  24 g Oral PRN Clara Gutierrez MD        hydroCHLOROthiazide tablet 25 mg  25 mg Oral Daily Clara Gutierrez MD   25 mg at 02/28/22 0812    HYDROcodone-acetaminophen  mg per tablet 1 tablet  1 tablet Oral Q6H PRN Clara Gutierrez MD   1 tablet at 02/27/22 2202    insulin aspart U-100 injection 1-10 Units  1-10 Units Subcutaneous TID AC PRN Clara Gutierrez MD   6 Units at 02/28/22 0730    insulin detemir U-100 injection 25 Units  25 Units Subcutaneous QHS Vishnu Ford MD   25 Units at 02/27/22 2203    Lactobacillus acidophilus capsule 2 capsule  2 capsule Oral TID WM Carey Vivas MD   2 capsule at 02/28/22 1153    melatonin tablet 6 mg  6 mg Oral Nightly PRN Clara Gutierrez MD        meropenem (MERREM) 500 mg in sodium chloride 0.9 % 100 mL IVPB (MB+)  500 mg Intravenous Q6H Carey Vivas MD   Stopped at 02/28/22 1300    ondansetron tablet 8 mg  8 mg Oral Q8H PRN Axel Simmons Jr., MD        polyethylene glycol packet 17 g  17 g Oral Daily PRN Clara Gutierrez MD   17 g at 02/19/22 2032    polyethylene glycol packet 17 g  17 g Oral Daily Sofia L Madhavi, FNP   17 g at 02/23/22 0850    prochlorperazine injection Soln 5 mg  5 mg Intravenous Q6H PRN Clara Gutierrez MD         Current Outpatient Medications on File Prior to Visit   Medication Sig Dispense Refill    acetaminophen (TYLENOL) 325 MG tablet Take 2 tablets (650 mg total) by mouth every 4 (four) hours as needed.  0    amLODIPine (NORVASC) 10 MG tablet Take 1 tablet (10 mg total) by mouth once daily. 30 tablet 0    blood sugar diagnostic (BLOOD GLUCOSE TEST) Strp 1 strip by  Misc.(Non-Drug; Combo Route) route once daily. accu-chek Veronika strips 90 strip 5    blood-glucose meter kit 1 each by Other route 3 (three) times daily. 1 each 0    empaglifloz/linaglip/metformin (TRIJARDY XR ORAL) Take by mouth once daily.      FEROSUL 325 mg (65 mg iron) Tab tablet Take 325 mg by mouth once daily.      gabapentin (NEURONTIN) 300 MG capsule Take 1 capsule (300 mg total) by mouth every 8 (eight) hours. 90 capsule 1    hydroCHLOROthiazide (HYDRODIURIL) 25 MG tablet Take 1 tablet (25 mg total) by mouth once daily. 90 tablet 1    HYDROcodone-acetaminophen (NORCO)  mg per tablet Take 1 tablet by mouth once daily. 30 tablet 0    rosuvastatin (CRESTOR) 10 MG tablet TAKE 1 TABLET EVERY DAY 90 tablet 0    [DISCONTINUED] sodium hypochlorite 0.5 % (DAKIN'S SOLUTION) external solution Apply topically once daily. 473 mL 5     Social History     Socioeconomic History    Marital status: Single   Tobacco Use    Smoking status: Former Smoker    Smokeless tobacco: Never Used   Substance and Sexual Activity    Alcohol use: Yes     Comment: occasionally    Drug use: Yes     Types: Hydrocodone    Sexual activity: Yes     Family History   Problem Relation Age of Onset    Hypertension Father     Diabetes Father        Review of Systems   Unable to perform ROS: Other       Objective:     There were no vitals taken for this visit.    Physical Exam  Assessment:     1. Chronic osteomyelitis    2. Sarcoidosis    3. 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    4. Diabetic neuropathy with neurologic complication        Plan:     Problem List Items Addressed This Visit        Neuro    Diabetic neuropathy with neurologic complication (Chronic)       Derm    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       ID    Chronic osteomyelitis - Primary       Immunology/Multi System     Sarcoidosis        No follow-ups on file.      I am having Jean Pierre Paulson maintain his amLODIPine, blood-glucose meter, gabapentin, HYDROcodone-acetaminophen, blood sugar diagnostic, FeroSuL, hydroCHLOROthiazide, rosuvastatin, empaglifloz/linaglip/metformin (TRIJARDY XR ORAL), and acetaminophen.    Jean Pierre was seen today for hyperbaric oxygen therapy.    Diagnoses and all orders for this visit:    Chronic osteomyelitis    Sarcoidosis    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    Diabetic neuropathy with neurologic complication         [unfilled]  No orders of the defined types were placed in this encounter.

## 2022-02-28 NOTE — PROGRESS NOTES
Wound care note;  Patient dressing to Right plantar foot performed today.  Patient back to room from [facility], up on side of bed. Alert.  Right plantar foot wound with pink slow granular tissue, edges, thick. Applied Dakins moist gauze to wound bed.  Encourage to limit excessive walking to foot.  Wound care team to follow

## 2022-02-28 NOTE — PROGRESS NOTES
[facility] Medicine  Progress Note    Patient Name: Jean Pierre Paulson  MRN: [id number]  Patient Class: IP- Inpatient   Admission Date: 2/15/2022  Length of Stay: 13 days  Attending Physician: Vishnu Ford MD  Primary Care Provider: Mojgan Lomeli NP        Subjective:     Principal Problem:Chronic diabetic ulcer of right foot determined by examination        HPI:  The patient is a 35yo AA male with a MedHX of DM2 with neuropathy, chronic osteomyelitis, a necrotic right foot ulcer, anemia, PVD, HTN, HLD, chronic pain syndrome, and amputation of the right big toe. He presented to [facility] on 02/15 for a follow up on wound cultures that had been taken from his chronic right foot plantar ulcer. The wound cultures had grown Staph aureus, ESBL e.coli and proteus mirabilis. The patient says that he had first noticed the ulcer in late 2019 after he stepped on a nail. He says that this was around the time that he had had his right big toe amputated. He came in to the hospital last November when he noticed that the ulcer had grown very large. He had a debridement of the ulcer by Dr. Ramírez at that time. He has been taking Clindamycin PO prior to this admission. A wound care nurse from [facility] has been visiting the patient weekly to treat his wound with silver nitrate and redress it. The patient does also have two small, superficial ulcers on his right second toe and his right ankle.     The patient will be admitted to [facility] under the [facility] (Dr. Casper) for treatment of his infected right foot ulcer and management of other medical problems.       Overview/Hospital Course:  02/21 Awake and sitting up on the side of the bed. Complains of constipation. Had small bm yesterday. Added colace BID. Dressing to right foot ulcer. Continue wound care and merrem.  02/22 Awake and sitting up in chair without complaints. Complained of cough with lisinopril yesterday. Dcd lisinopril.  Will monitor BP. Will start HBO this morning. Continue abx and wound care.  02/23 Mr. 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 States if he doesn't get his business taken care of he may not have anywhere to discharge to later. Dr. Ford discussed treatment he needed for ESBL ecoli and MRSA treatment was IV merrem, wound care and HBO. That this could not be given in home setting. Told him that refusing in pt care could put him at risk for potentially losing his foot. He states he understands and that he wants to discharge home. He will need to remain NWB on right, follow up with [facility] in one week. Wound care orders to be given by [facility].  02/24 Pt no new issues. Getting am meds and HBO.  Plans for pass today. Has to get his housing situation straightened out so doesn't lose his home. Otherwise continue current process. He did wish to take walker with him.  02/25 Did OK on pass and now back. Continue IV ATB. HBO and wound care. Recommended non wt bearing on wound.  02/26 patient remains in good spirits.  He tells me that was told by surgery may have skin graft next week.  Continue to try to remain nonweightbearing on foot.  Continue intravenous antibiotics.  Blood sugar good.  02/27 continues to do well, continue wound care and antibiotic, await surgical plans  02/28 Pt with HBO and wound care today, continue monitor blood sugars and take antibiotic, will discussed with surgery      Interval History:     Review of Systems   Constitutional:  Negative for appetite change, fatigue and fever.   HENT:  Negative for congestion, hearing loss and trouble swallowing.    Respiratory:  Negative for chest tightness, shortness of breath and wheezing.    Cardiovascular:  Negative for chest pain and palpitations.   Gastrointestinal:  Negative for abdominal pain, constipation and nausea.   Genitourinary:  Negative for difficulty urinating and dysuria.   Musculoskeletal:   Negative for back pain and neck stiffness.   Skin:  Positive for wound. Negative for pallor and rash.   Neurological:  Negative for dizziness, speech difficulty and headaches.   Psychiatric/Behavioral:  Positive for sleep disturbance. Negative for confusion and suicidal ideas.    Objective:     Vital Signs (Most Recent):  Temp: 98 °F (36.7 °C) (02/28/22 1200)  Pulse: 70 (02/28/22 1200)  Resp: 18 (02/28/22 1200)  BP: (!) 142/94 (02/28/22 0812)  SpO2: 96 % (02/28/22 1200) Vital Signs (24h Range):  Temp:  [97.2 °F (36.2 °C)-98.4 °F (36.9 °C)] 98 °F (36.7 °C)  Pulse:  [70-84] 70  Resp:  [14-19] 18  SpO2:  [95 %-98 %] 96 %  BP: (142-148)/(88-94) 142/94     Weight: (!) 156.9 kg (345 lb 14.4 oz)  Body mass index is 44.41 kg/m².    Intake/Output Summary (Last 24 hours) at 2/28/2022 1557  Last data filed at 2/28/2022 1200  Gross per 24 hour   Intake 600 ml   Output --   Net 600 ml        Physical Exam  Vitals reviewed.   Constitutional:       General: He is not in acute distress.  Eyes:      Pupils: Pupils are equal, round, and reactive to light.   Cardiovascular:      Rate and Rhythm: Normal rate and regular rhythm.      Pulses: Normal pulses.   Pulmonary:      Effort: Pulmonary effort is normal. No respiratory distress.      Breath sounds: Normal breath sounds. No wheezing.   Abdominal:      General: Bowel sounds are normal. There is no distension.      Tenderness: There is no abdominal tenderness.   Skin:     General: Skin is warm.      Comments: Dressed wound to right foot plantar surface.   Neurological:      General: No focal deficit present.      Mental Status: He is alert, oriented to person, place, and time and easily aroused. Mental status is at baseline.   Psychiatric:         Mood and Affect: Mood normal.         Behavior: Behavior normal.       Significant Labs: All pertinent labs within the past 24 hours have been reviewed.  BMP:   Recent Labs   Lab 02/27/22  0651   *   *   K 4.7      CO2 25    BUN 54*   CREATININE 1.38*   CALCIUM 8.9       CBC:   Recent Labs   Lab 02/27/22  0651   WBC 5.83   HGB 11.7*   HCT 36.5*          CMP:   Recent Labs   Lab 02/27/22  0651   *   K 4.7      CO2 25   *   BUN 54*   CREATININE 1.38*   CALCIUM 8.9   ANIONGAP 13   EGFRNONAA 61         Significant Imaging: I have reviewed all pertinent imaging results/findings within the past 24 hours.            Assessment/Plan:      * Chronic diabetic ulcer of right foot determined by examination  * Patient's wound cultures from 02/04 taken from ulcer showed growth of ESBL E coli, MSSA and Proteus Mirabilis     2/18 Continue meropenem, local wound care.    2/20 optimize blood sugar control, counseled the patient re diabetic diet (he had midst eating cakes and things like that from family).    02/21 continue merrem and wound care    02/24 continue merrem    Sarcoidosis  stable      Type 2 diabetes mellitus with right diabetic foot ulcer  02/21 continue levemir and SSI       Diabetic neuropathy with neurologic complication  - Patient's latest A1C was 8.4 in November  - Repeat A1C 7.8   - Moderate SSI  - POCT glucose checks   - Diabetic diet   - Levemir 20 units QHS         Peripheral vascular disease  - Continue home gabapentin 300mg q8      Chronic pain syndrome  - gabapentin 300mg q8  - Norco 10mg q6 prn       Chronic osteomyelitis  02/22 HBO this morning  Continue merrem and wound care    ]  02/24 continue merrem and wound care    Primary hypertension  - amlodipine 10mg qd  - hctz 25mg qd    2/19 blood pressure borderline high, but looking at diet orders he has been ordering a lot of salt rich foods.   about this.    2/20 BP persistently uncontrolled, adding lisinopril 5 mg daily, continue amlodipine and hydrochlorothiazide      02/21 continue lisinopril. amlodopine and hctz    02/22 dcd lisinopril due to cough       VTE Risk Mitigation (From admission, onward)         Ordered     IP VTE LOW RISK  PATIENT  Once         02/15/22 1401     Place DONATO hose  Until discontinued         02/15/22 1401                Discharge Planning   WOOD: 3/10/2022     Code Status: Prior   Is the patient medically ready for discharge?:     Reason for patient still in hospital (select all that apply): Laboratory test, Treatment and Imaging  Discharge Plan A: Home Health, Home                  Vishnu Ford MD  Department of Hospital Medicine   [facility]

## 2022-02-28 NOTE — SUBJECTIVE & OBJECTIVE
Interval History:     Review of Systems   Constitutional:  Negative for appetite change, fatigue and fever.   HENT:  Negative for congestion, hearing loss and trouble swallowing.    Respiratory:  Negative for chest tightness, shortness of breath and wheezing.    Cardiovascular:  Negative for chest pain and palpitations.   Gastrointestinal:  Negative for abdominal pain, constipation and nausea.   Genitourinary:  Negative for difficulty urinating and dysuria.   Musculoskeletal:  Negative for back pain and neck stiffness.   Skin:  Positive for wound. Negative for pallor and rash.   Neurological:  Negative for dizziness, speech difficulty and headaches.   Psychiatric/Behavioral:  Positive for sleep disturbance. Negative for confusion and suicidal ideas.    Objective:     Vital Signs (Most Recent):  Temp: 98 °F (36.7 °C) (02/28/22 1200)  Pulse: 70 (02/28/22 1200)  Resp: 18 (02/28/22 1200)  BP: (!) 142/94 (02/28/22 0812)  SpO2: 96 % (02/28/22 1200) Vital Signs (24h Range):  Temp:  [97.2 °F (36.2 °C)-98.4 °F (36.9 °C)] 98 °F (36.7 °C)  Pulse:  [70-84] 70  Resp:  [14-19] 18  SpO2:  [95 %-98 %] 96 %  BP: (142-148)/(88-94) 142/94     Weight: (!) 156.9 kg (345 lb 14.4 oz)  Body mass index is 44.41 kg/m².    Intake/Output Summary (Last 24 hours) at 2/28/2022 1557  Last data filed at 2/28/2022 1200  Gross per 24 hour   Intake 600 ml   Output --   Net 600 ml        Physical Exam  Vitals reviewed.   Constitutional:       General: He is not in acute distress.  Eyes:      Pupils: Pupils are equal, round, and reactive to light.   Cardiovascular:      Rate and Rhythm: Normal rate and regular rhythm.      Pulses: Normal pulses.   Pulmonary:      Effort: Pulmonary effort is normal. No respiratory distress.      Breath sounds: Normal breath sounds. No wheezing.   Abdominal:      General: Bowel sounds are normal. There is no distension.      Tenderness: There is no abdominal tenderness.   Skin:     General: Skin is warm.      Comments:  Dressed wound to right foot plantar surface.   Neurological:      General: No focal deficit present.      Mental Status: He is alert, oriented to person, place, and time and easily aroused. Mental status is at baseline.   Psychiatric:         Mood and Affect: Mood normal.         Behavior: Behavior normal.       Significant Labs: All pertinent labs within the past 24 hours have been reviewed.  BMP:   Recent Labs   Lab 02/27/22  0651   *   *   K 4.7      CO2 25   BUN 54*   CREATININE 1.38*   CALCIUM 8.9       CBC:   Recent Labs   Lab 02/27/22  0651   WBC 5.83   HGB 11.7*   HCT 36.5*          CMP:   Recent Labs   Lab 02/27/22  0651   *   K 4.7      CO2 25   *   BUN 54*   CREATININE 1.38*   CALCIUM 8.9   ANIONGAP 13   EGFRNONAA 61         Significant Imaging: I have reviewed all pertinent imaging results/findings within the past 24 hours.

## 2022-03-01 ENCOUNTER — CLINICAL SUPPORT (OUTPATIENT)
Dept: WOUND CARE | Facility: CLINIC | Age: 37
End: 2022-03-01
Attending: FAMILY MEDICINE
Payer: MEDICARE

## 2022-03-01 VITALS
DIASTOLIC BLOOD PRESSURE: 94 MMHG | TEMPERATURE: 98 F | RESPIRATION RATE: 20 BRPM | SYSTOLIC BLOOD PRESSURE: 153 MMHG | HEART RATE: 77 BPM

## 2022-03-01 DIAGNOSIS — L97.414 DIABETIC ULCER OF RIGHT MIDFOOT ASSOCIATED WITH DIABETES MELLITUS DUE TO UNDERLYING CONDITION, WITH NECROSIS OF BONE: Primary | ICD-10-CM

## 2022-03-01 DIAGNOSIS — E08.621 DIABETIC ULCER OF RIGHT MIDFOOT ASSOCIATED WITH DIABETES MELLITUS DUE TO UNDERLYING CONDITION, WITH NECROSIS OF BONE: Primary | ICD-10-CM

## 2022-03-01 LAB
GLUCOSE SERPL-MCNC: 192 MG/DL (ref 70–105)
GLUCOSE SERPL-MCNC: 211 MG/DL (ref 70–105)
GLUCOSE SERPL-MCNC: 227 MG/DL (ref 70–105)
GLUCOSE SERPL-MCNC: 251 MG/DL (ref 70–105)

## 2022-03-01 PROCEDURE — 25000003 PHARM REV CODE 250: Performed by: INTERNAL MEDICINE

## 2022-03-01 PROCEDURE — 99213 OFFICE O/P EST LOW 20 MIN: CPT | Mod: PBBFAC | Performed by: FAMILY MEDICINE

## 2022-03-01 PROCEDURE — C9399 UNCLASSIFIED DRUGS OR BIOLOG: HCPCS | Performed by: HOSPITALIST

## 2022-03-01 PROCEDURE — 11000001 HC ACUTE MED/SURG PRIVATE ROOM

## 2022-03-01 PROCEDURE — 63600175 PHARM REV CODE 636 W HCPCS

## 2022-03-01 PROCEDURE — 25000003 PHARM REV CODE 250: Performed by: HOSPITALIST

## 2022-03-01 PROCEDURE — 25000003 PHARM REV CODE 250: Performed by: NURSE PRACTITIONER

## 2022-03-01 PROCEDURE — 82962 GLUCOSE BLOOD TEST: CPT

## 2022-03-01 PROCEDURE — 25000003 PHARM REV CODE 250

## 2022-03-01 PROCEDURE — 63600175 PHARM REV CODE 636 W HCPCS: Performed by: HOSPITALIST

## 2022-03-01 PROCEDURE — 96372 THER/PROPH/DIAG INJ SC/IM: CPT

## 2022-03-01 PROCEDURE — 99232 PR SUBSEQUENT HOSPITAL CARE,LEVL II: ICD-10-PCS | Mod: ,,, | Performed by: HOSPITALIST

## 2022-03-01 PROCEDURE — 99232 SBSQ HOSP IP/OBS MODERATE 35: CPT | Mod: ,,, | Performed by: HOSPITALIST

## 2022-03-01 PROCEDURE — 63600175 PHARM REV CODE 636 W HCPCS: Performed by: INTERNAL MEDICINE

## 2022-03-01 RX ORDER — LOSARTAN POTASSIUM 50 MG/1
50 TABLET ORAL DAILY
Status: DISCONTINUED | OUTPATIENT
Start: 2022-03-02 | End: 2022-03-08 | Stop reason: HOSPADM

## 2022-03-01 RX ORDER — INSULIN ASPART 100 [IU]/ML
15 INJECTION, SUSPENSION SUBCUTANEOUS
Status: DISCONTINUED | OUTPATIENT
Start: 2022-03-02 | End: 2022-03-08 | Stop reason: HOSPADM

## 2022-03-01 RX ORDER — SYRING-NEEDL,DISP,INSUL,0.3 ML 29 G X1/2"
148 SYRINGE, EMPTY DISPOSABLE MISCELLANEOUS ONCE
Status: COMPLETED | OUTPATIENT
Start: 2022-03-01 | End: 2022-03-01

## 2022-03-01 RX ORDER — CLONIDINE HYDROCHLORIDE 0.1 MG/1
0.1 TABLET ORAL EVERY 4 HOURS PRN
Status: DISCONTINUED | OUTPATIENT
Start: 2022-03-01 | End: 2022-03-08 | Stop reason: HOSPADM

## 2022-03-01 RX ORDER — LOSARTAN POTASSIUM 25 MG/1
25 TABLET ORAL DAILY
Status: DISCONTINUED | OUTPATIENT
Start: 2022-03-01 | End: 2022-03-01

## 2022-03-01 RX ADMIN — INSULIN ASPART 6 UNITS: 100 INJECTION, SOLUTION INTRAVENOUS; SUBCUTANEOUS at 09:03

## 2022-03-01 RX ADMIN — Medication 2 CAPSULE: at 12:03

## 2022-03-01 RX ADMIN — INSULIN DETEMIR 32 UNITS: 100 INJECTION, SOLUTION SUBCUTANEOUS at 09:03

## 2022-03-01 RX ADMIN — Medication 2 CAPSULE: at 08:03

## 2022-03-01 RX ADMIN — GABAPENTIN 300 MG: 300 CAPSULE ORAL at 05:03

## 2022-03-01 RX ADMIN — LOSARTAN POTASSIUM 25 MG: 25 TABLET, FILM COATED ORAL at 12:03

## 2022-03-01 RX ADMIN — HYDROCHLOROTHIAZIDE 25 MG: 12.5 TABLET ORAL at 08:03

## 2022-03-01 RX ADMIN — MEROPENEM 500 MG: 500 INJECTION, POWDER, FOR SOLUTION INTRAVENOUS at 06:03

## 2022-03-01 RX ADMIN — GABAPENTIN 300 MG: 300 CAPSULE ORAL at 09:03

## 2022-03-01 RX ADMIN — AMLODIPINE BESYLATE 10 MG: 10 TABLET ORAL at 08:03

## 2022-03-01 RX ADMIN — GABAPENTIN 300 MG: 300 CAPSULE ORAL at 03:03

## 2022-03-01 RX ADMIN — INSULIN ASPART 4 UNITS: 100 INJECTION, SOLUTION INTRAVENOUS; SUBCUTANEOUS at 12:03

## 2022-03-01 RX ADMIN — Medication 2 CAPSULE: at 05:03

## 2022-03-01 RX ADMIN — POLYETHYLENE GLYCOL 3350 17 G: 17 POWDER, FOR SOLUTION ORAL at 05:03

## 2022-03-01 RX ADMIN — DOCUSATE SODIUM 100 MG: 100 CAPSULE, LIQUID FILLED ORAL at 08:03

## 2022-03-01 RX ADMIN — Medication 148 ML: at 12:03

## 2022-03-01 RX ADMIN — INSULIN ASPART 4 UNITS: 100 INJECTION, SOLUTION INTRAVENOUS; SUBCUTANEOUS at 05:03

## 2022-03-01 RX ADMIN — MEROPENEM 500 MG: 500 INJECTION, POWDER, FOR SOLUTION INTRAVENOUS at 12:03

## 2022-03-01 RX ADMIN — MEROPENEM 500 MG: 500 INJECTION, POWDER, FOR SOLUTION INTRAVENOUS at 01:03

## 2022-03-01 RX ADMIN — DOCUSATE SODIUM 100 MG: 100 CAPSULE, LIQUID FILLED ORAL at 09:03

## 2022-03-01 RX ADMIN — ATORVASTATIN CALCIUM 40 MG: 40 TABLET, FILM COATED ORAL at 09:03

## 2022-03-01 RX ADMIN — MEROPENEM 500 MG: 500 INJECTION, POWDER, FOR SOLUTION INTRAVENOUS at 05:03

## 2022-03-01 RX ADMIN — POLYETHYLENE GLYCOL 3350 17 G: 17 POWDER, FOR SOLUTION ORAL at 08:03

## 2022-03-01 NOTE — PROGRESS NOTES
[facility] Medicine  Progress Note    Patient Name: Jean Pierre Paulson  MRN: [id number]  Patient Class: IP- Inpatient   Admission Date: 2/15/2022  Length of Stay: 14 days  Attending Physician: Vishnu Ford MD  Primary Care Provider: Mojgan Lomeli NP        Subjective:     Principal Problem:Chronic diabetic ulcer of right foot determined by examination        HPI:  The patient is a 35yo AA male with a MedHX of DM2 with neuropathy, chronic osteomyelitis, a necrotic right foot ulcer, anemia, PVD, HTN, HLD, chronic pain syndrome, and amputation of the right big toe. He presented to [facility] on 02/15 for a follow up on wound cultures that had been taken from his chronic right foot plantar ulcer. The wound cultures had grown Staph aureus, ESBL e.coli and proteus mirabilis. The patient says that he had first noticed the ulcer in late 2019 after he stepped on a nail. He says that this was around the time that he had had his right big toe amputated. He came in to the hospital last November when he noticed that the ulcer had grown very large. He had a debridement of the ulcer by Dr. Ramírez at that time. He has been taking Clindamycin PO prior to this admission. A wound care nurse from [facility] has been visiting the patient weekly to treat his wound with silver nitrate and redress it. The patient does also have two small, superficial ulcers on his right second toe and his right ankle.     The patient will be admitted to [facility] under the [facility] (Dr. Casper) for treatment of his infected right foot ulcer and management of other medical problems.       Overview/Hospital Course:  02/21 Awake and sitting up on the side of the bed. Complains of constipation. Had small bm yesterday. Added colace BID. Dressing to right foot ulcer. Continue wound care and merrem.  02/22 Awake and sitting up in chair without complaints. Complained of cough with lisinopril yesterday. Dcd lisinopril.  Will monitor BP. Will start HBO this morning. Continue abx and wound care.  02/23 Mr. 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 States if he doesn't get his business taken care of he may not have anywhere to discharge to later. Dr. Ford discussed treatment he needed for ESBL ecoli and MRSA treatment was IV merrem, wound care and HBO. That this could not be given in home setting. Told him that refusing in pt care could put him at risk for potentially losing his foot. He states he understands and that he wants to discharge home. He will need to remain NWB on right, follow up with [facility] in one week. Wound care orders to be given by [facility].  02/24 Pt no new issues. Getting am meds and HBO.  Plans for pass today. Has to get his housing situation straightened out so doesn't lose his home. Otherwise continue current process. He did wish to take walker with him.  02/25 Did OK on pass and now back. Continue IV ATB. HBO and wound care. Recommended non wt bearing on wound.  02/26 patient remains in good spirits.  He tells me that was told by surgery may have skin graft next week.  Continue to try to remain nonweightbearing on foot.  Continue intravenous antibiotics.  Blood sugar good.  02/27 continues to do well, continue wound care and antibiotic, await surgical plans  02/28 Pt with HBO and wound care today, continue monitor blood sugars and take antibiotic, will discussed with surgery  03/01 Awake and resting in bed. Complains of constipation. States he had miralax yesterday and that it did not work. Mag citrate ordered. Dressing to right foot. Has just came back from HBO.  BP elevated. Will add losartan.         Interval History: 03/01 Awake and resting in bed. Complains of constipation. States he had miralax yesterday and that it did not work. Mag citrate ordered. Dressing to right foot. Has just came back from HBO.  BP elevated. Will add losartan.     Review of  Systems   Respiratory:  Negative for shortness of breath.    Cardiovascular:  Negative for chest pain.   Gastrointestinal:  Negative for constipation, diarrhea, nausea and vomiting.   Objective:     Vital Signs (Most Recent):  Temp: 97.7 °F (36.5 °C) (03/01/22 0800)  Pulse: 71 (03/01/22 0800)  Resp: 20 (03/01/22 0800)  BP: (!) 153/94 (03/01/22 0822)  SpO2: 100 % (03/01/22 0800)   Vital Signs (24h Range):  Temp:  [97.5 °F (36.4 °C)-98.8 °F (37.1 °C)] 97.7 °F (36.5 °C)  Pulse:  [70-90] 71  Resp:  [18-20] 20  SpO2:  [96 %-100 %] 100 %  BP: (118-166)/(82-94) 153/94     Weight: (!) 156.9 kg (345 lb 14.4 oz)  Body mass index is 44.41 kg/m².    Intake/Output Summary (Last 24 hours) at 3/1/2022 1101  Last data filed at 3/1/2022 0745  Gross per 24 hour   Intake 720 ml   Output --   Net 720 ml      Physical Exam  HENT:      Head: Normocephalic.      Mouth/Throat:      Mouth: Mucous membranes are moist.   Cardiovascular:      Rate and Rhythm: Regular rhythm.      Heart sounds: Normal heart sounds.   Pulmonary:      Effort: Pulmonary effort is normal.      Breath sounds: Normal breath sounds.   Abdominal:      General: Abdomen is flat. Bowel sounds are normal.      Palpations: Abdomen is soft.      Comments: Complains of constipation    Skin:     General: Skin is warm and dry.      Comments: Dressing to right foot   Neurological:      Mental Status: He is alert and oriented to person, place, and time.   Psychiatric:         Mood and Affect: Mood normal.       Significant Labs: All pertinent labs within the past 24 hours have been reviewed.  Recent Lab Results         03/01/22  0538   02/28/22  2020   02/28/22  1558   02/28/22  1128        POC Glucose 211   265   192   155               Significant Imaging: I have reviewed all pertinent imaging results/findings within the past 24 hours.      Assessment/Plan:      * Chronic diabetic ulcer of right foot determined by examination  * Patient's wound cultures from 02/04 taken from  ulcer showed growth of ESBL E coli, MSSA and Proteus Mirabilis     2/18 Continue meropenem, local wound care.    2/20 optimize blood sugar control, counseled the patient re diabetic diet (he had midst eating cakes and things like that from family).    02/21 continue merrem and wound care    02/24 continue merrem    03/01 continue merrem, hbo and wound care    Sarcoidosis  stable      Type 2 diabetes mellitus with right diabetic foot ulcer  02/21 continue levemir and SSI       Diabetic neuropathy with neurologic complication  - Patient's latest A1C was 8.4 in November  - Repeat A1C 7.8   - Moderate SSI  - POCT glucose checks   - Diabetic diet   - Levemir 20 units QHS         Peripheral vascular disease  - Continue home gabapentin 300mg q8      Chronic pain syndrome  - gabapentin 300mg q8  - Norco 10mg q6 prn       Chronic osteomyelitis  02/22 HBO this morning  Continue merrem and wound care    ]  02/24 continue merrem and wound care    03/01 continue merrem, hbo and wound care    Primary hypertension  - amlodipine 10mg qd  - hctz 25mg qd    2/19 blood pressure borderline high, but looking at diet orders he has been ordering a lot of salt rich foods.   about this.    2/20 BP persistently uncontrolled, adding lisinopril 5 mg daily, continue amlodipine and hydrochlorothiazide      02/21 continue lisinopril. amlodopine and hctz      03/01 add losartan   02/22 dcd lisinopril due to cough       VTE Risk Mitigation (From admission, onward)         Ordered     IP VTE LOW RISK PATIENT  Once         02/15/22 1401     Place DONATO hose  Until discontinued         02/15/22 1401                Discharge Planning   WOOD: 3/10/2022     Code Status: Prior   Is the patient medically ready for discharge?:     Reason for patient still in hospital (select all that apply): Treatment  Discharge Plan A: Home Health, Home                  AUGUSTUS Escalante  Department of Hospital Medicine   [facility]

## 2022-03-01 NOTE — ASSESSMENT & PLAN NOTE
- amlodipine 10mg qd  - hctz 25mg qd    2/19 blood pressure borderline high, but looking at diet orders he has been ordering a lot of salt rich foods.   about this.    2/20 BP persistently uncontrolled, adding lisinopril 5 mg daily, continue amlodipine and hydrochlorothiazide      02/21 continue lisinopril. amlodopine and hctz      03/01 add losartan   02/22 dcd lisinopril due to cough

## 2022-03-01 NOTE — SUBJECTIVE & OBJECTIVE
Interval History: 03/01 Awake and resting in bed. Complains of constipation. States he had miralax yesterday and that it did not work. Mag citrate ordered. Dressing to right foot. Has just came back from [facility].  BP elevated. Will add losartan.     Review of Systems   Respiratory:  Negative for shortness of breath.    Cardiovascular:  Negative for chest pain.   Gastrointestinal:  Negative for constipation, diarrhea, nausea and vomiting.   Objective:     Vital Signs (Most Recent):  Temp: 97.7 °F (36.5 °C) (03/01/22 0800)  Pulse: 71 (03/01/22 0800)  Resp: 20 (03/01/22 0800)  BP: (!) 153/94 (03/01/22 0822)  SpO2: 100 % (03/01/22 0800)   Vital Signs (24h Range):  Temp:  [97.5 °F (36.4 °C)-98.8 °F (37.1 °C)] 97.7 °F (36.5 °C)  Pulse:  [70-90] 71  Resp:  [18-20] 20  SpO2:  [96 %-100 %] 100 %  BP: (118-166)/(82-94) 153/94     Weight: (!) 156.9 kg (345 lb 14.4 oz)  Body mass index is 44.41 kg/m².    Intake/Output Summary (Last 24 hours) at 3/1/2022 1101  Last data filed at 3/1/2022 0745  Gross per 24 hour   Intake 720 ml   Output --   Net 720 ml      Physical Exam  HENT:      Head: Normocephalic.      Mouth/Throat:      Mouth: Mucous membranes are moist.   Cardiovascular:      Rate and Rhythm: Regular rhythm.      Heart sounds: Normal heart sounds.   Pulmonary:      Effort: Pulmonary effort is normal.      Breath sounds: Normal breath sounds.   Abdominal:      General: Abdomen is flat. Bowel sounds are normal.      Palpations: Abdomen is soft.      Comments: Complains of constipation    Skin:     General: Skin is warm and dry.      Comments: Dressing to right foot   Neurological:      Mental Status: He is alert and oriented to person, place, and time.   Psychiatric:         Mood and Affect: Mood normal.       Significant Labs: All pertinent labs within the past 24 hours have been reviewed.  Recent Lab Results         03/01/22  0538   02/28/22  2020   02/28/22  1558   02/28/22  1128        POC Glucose 211   265   192   155                Significant Imaging: I have reviewed all pertinent imaging results/findings within the past 24 hours.

## 2022-03-01 NOTE — ASSESSMENT & PLAN NOTE
02/22 HBO this morning  Continue merrem and wound care    ]  02/24 continue merrem and wound care    03/01 continue merrem, hbo and wound care

## 2022-03-01 NOTE — PROGRESS NOTES
Subjective:      Patient ID: Jean Pierre Paulson is a 36 y.o. male.    Chief Complaint: Hyperbaric Oxygen Therapy (Right diabetic foot ulcer)    Jean Pierre Paulson a 36 y.o. male presents for follow up on all regular problems which are reviewed and discussed.     Problem List Items Addressed This Visit        Neuro    Diabetic neuropathy with neurologic complication (Chronic)       Derm    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       Cardiac/Vascular    Peripheral vascular disease (Chronic)       ID    Chronic osteomyelitis       Immunology/Multi System    Sarcoidosis      Other Visit Diagnoses     Diabetic ulcer of right midfoot associated with diabetes mellitus due to underlying condition, with necrosis of bone    -  Primary          Past Medical History:  Past Medical History:   Diagnosis Date    Anemia     Chronic osteomyelitis     Diabetes mellitus with neuropathy     Diabetes mellitus, type 2     DM2 (diabetes mellitus, type 2)     HTN (hypertension)     Hypertension     Neuropathy     Obesity     Osteomyelitis 10/2020    Hx of R foot, Tx with IV Abx    Peripheral vascular disease      Past Surgical History:   Procedure Laterality Date    CHOLECYSTECTOMY      DEBRIDEMENT OF FOOT Right 10/2020    FOOT SURGERY      Right diabetic foot ulcer Elliott's Grade 3      Bone exposure to R heel, Hx of IV Abx, cultures show multiple bacterial growth, Hx of Osteomyelitis, HBOT     Review of patient's allergies indicates:   Allergen Reactions    Tomato Itching     Current Facility-Administered Medications on File Prior to Visit   Medication Dose Route Frequency Provider Last Rate Last Admin    acetaminophen tablet 650 mg  650 mg Oral Q4H PRN Clara Gutierrez MD        amLODIPine tablet 10 mg  10 mg Oral Daily Clara Gutierrez MD   10 mg at 03/01/22 0822    atorvastatin tablet 40 mg  40 mg Oral QHS Vishnu Ford MD   40 mg at 02/28/22 2130    dextrose  50% injection 12.5 g  12.5 g Intravenous PRN Clara Gutierrez MD        dextrose 50% injection 25 g  25 g Intravenous PRN Clara Gutierrez MD        docusate sodium capsule 100 mg  100 mg Oral BID Sofia L Hendrickson, FNP   100 mg at 03/01/22 0822    gabapentin capsule 300 mg  300 mg Oral Q8H Clara Gutierrez MD   300 mg at 03/01/22 0534    glucagon (human recombinant) injection 1 mg  1 mg Intramuscular PRN Clara Gutierrez MD        glucose chewable tablet 16 g  16 g Oral PRN Clara Gutierrez MD        glucose chewable tablet 24 g  24 g Oral PRN Clara Gutierrez MD        hydroCHLOROthiazide tablet 25 mg  25 mg Oral Daily Clara Gutierrez MD   25 mg at 03/01/22 0822    HYDROcodone-acetaminophen  mg per tablet 1 tablet  1 tablet Oral Q6H PRN Clara Gutierrez MD   1 tablet at 02/28/22 2129    insulin aspart U-100 injection 1-10 Units  1-10 Units Subcutaneous TID AC PRN Clara Gutierrez MD   4 Units at 03/01/22 0540    insulin detemir U-100 injection 28 Units  28 Units Subcutaneous QHS Vishnu Ford MD   28 Units at 02/28/22 2131    Lactobacillus acidophilus capsule 2 capsule  2 capsule Oral TID WM Carey Vivas MD   2 capsule at 03/01/22 0807    losartan tablet 25 mg  25 mg Oral Daily Sofia L Hendrickson, FNP        magnesium citrate solution 148 mL  148 mL Oral Once Sofia L Hendrickson, FNP        melatonin tablet 6 mg  6 mg Oral Nightly PRN Clara Gutierrez MD        meropenem (MERREM) 500 mg in sodium chloride 0.9 % 100 mL IVPB (MB+)  500 mg Intravenous Q6H Carey Vivas MD   Stopped at 03/01/22 0637    ondansetron tablet 8 mg  8 mg Oral Q8H PRN Axel Simmons Jr., MD        polyethylene glycol packet 17 g  17 g Oral Daily PRN Clara Gutierrez MD   17 g at 03/01/22 0540    polyethylene glycol packet 17 g  17 g Oral Daily Sofia L Hendrickson, FNP   17 g at 03/01/22 0822    prochlorperazine injection Soln 5 mg  5 mg Intravenous Q6H PRN Clara Gutierrez MD        [DISCONTINUED]  atorvastatin tablet 40 mg  40 mg Oral Daily Clara Gutierrez MD   40 mg at 02/28/22 0812    [DISCONTINUED] insulin detemir U-100 injection 25 Units  25 Units Subcutaneous QHS Vishnu Ford MD   25 Units at 02/27/22 7982     Current Outpatient Medications on File Prior to Visit   Medication Sig Dispense Refill    acetaminophen (TYLENOL) 325 MG tablet Take 2 tablets (650 mg total) by mouth every 4 (four) hours as needed.  0    amLODIPine (NORVASC) 10 MG tablet Take 1 tablet (10 mg total) by mouth once daily. 30 tablet 0    blood sugar diagnostic (BLOOD GLUCOSE TEST) Strp 1 strip by Misc.(Non-Drug; Combo Route) route once daily. accu-chek Veronika strips 90 strip 5    blood-glucose meter kit 1 each by Other route 3 (three) times daily. 1 each 0    empaglifloz/linaglip/metformin (TRIJARDY XR ORAL) Take by mouth once daily.      FEROSUL 325 mg (65 mg iron) Tab tablet Take 325 mg by mouth once daily.      gabapentin (NEURONTIN) 300 MG capsule Take 1 capsule (300 mg total) by mouth every 8 (eight) hours. 90 capsule 1    hydroCHLOROthiazide (HYDRODIURIL) 25 MG tablet Take 1 tablet (25 mg total) by mouth once daily. 90 tablet 1    HYDROcodone-acetaminophen (NORCO)  mg per tablet Take 1 tablet by mouth once daily. 30 tablet 0    rosuvastatin (CRESTOR) 10 MG tablet TAKE 1 TABLET EVERY DAY 90 tablet 0    [DISCONTINUED] sodium hypochlorite 0.5 % (DAKIN'S SOLUTION) external solution Apply topically once daily. 473 mL 5     Social History     Socioeconomic History    Marital status: Single   Tobacco Use    Smoking status: Former Smoker    Smokeless tobacco: Never Used   Substance and Sexual Activity    Alcohol use: Yes     Comment: occasionally    Drug use: Yes     Types: Hydrocodone    Sexual activity: Yes     Family History   Problem Relation Age of Onset    Hypertension Father     Diabetes Father        Review of Systems    Objective:     There were no vitals taken for this visit.    Physical  Exam  Assessment:     1. 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 Diabetic neuropathy with neurologic complication    3. 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    4. Chronic osteomyelitis    5. Sarcoidosis    6. Peripheral vascular disease        Plan:     Problem List Items Addressed This Visit        Neuro    Diabetic neuropathy with neurologic complication (Chronic)       Derm    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       Cardiac/Vascular    Peripheral vascular disease (Chronic)       ID    Chronic osteomyelitis       Immunology/Multi System    Sarcoidosis      Other Visit Diagnoses     Diabetic ulcer of right midfoot associated with diabetes mellitus due to underlying condition, with necrosis of bone    -  Primary        No follow-ups on file.      I am having Jean Pierre Paulson maintain his amLODIPine, blood-glucose meter, gabapentin, HYDROcodone-acetaminophen, blood sugar diagnostic, FeroSuL, hydroCHLOROthiazide, rosuvastatin, empaglifloz/linaglip/metformin (TRIJARDY XR ORAL), and acetaminophen.    Jean Pierre was seen today for hyperbaric oxygen therapy.    Diagnoses and all orders for this visit:    Diabetic ulcer of right midfoot associated with diabetes mellitus due to underlying condition, with necrosis of bone    Diabetic neuropathy with neurologic complication    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    Chronic osteomyelitis    Sarcoidosis    Peripheral vascular disease         [unfilled]  No orders of the defined types were placed in this encounter.

## 2022-03-02 ENCOUNTER — CLINICAL SUPPORT (OUTPATIENT)
Dept: WOUND CARE | Facility: CLINIC | Age: 37
End: 2022-03-02
Attending: FAMILY MEDICINE
Payer: MEDICARE

## 2022-03-02 ENCOUNTER — ANESTHESIA (OUTPATIENT)
Dept: SURGERY | Facility: HOSPITAL | Age: 37
End: 2022-03-02
Payer: MEDICARE

## 2022-03-02 ENCOUNTER — ANESTHESIA EVENT (OUTPATIENT)
Dept: SURGERY | Facility: HOSPITAL | Age: 37
End: 2022-03-02
Payer: MEDICARE

## 2022-03-02 ENCOUNTER — HOSPITAL ENCOUNTER (OUTPATIENT)
Facility: HOSPITAL | Age: 37
Discharge: NURSING, REHAB OR LONG-TERM CARE | End: 2022-03-02
Attending: SURGERY | Admitting: SURGERY
Payer: MEDICARE

## 2022-03-02 VITALS
TEMPERATURE: 98 F | OXYGEN SATURATION: 89 % | RESPIRATION RATE: 17 BRPM | SYSTOLIC BLOOD PRESSURE: 115 MMHG | HEART RATE: 76 BPM | DIASTOLIC BLOOD PRESSURE: 74 MMHG

## 2022-03-02 VITALS
HEART RATE: 76 BPM | DIASTOLIC BLOOD PRESSURE: 79 MMHG | OXYGEN SATURATION: 97 % | SYSTOLIC BLOOD PRESSURE: 115 MMHG | RESPIRATION RATE: 21 BRPM

## 2022-03-02 DIAGNOSIS — M86.60 CHRONIC OSTEOMYELITIS: ICD-10-CM

## 2022-03-02 DIAGNOSIS — E11.621 TYPE 2 DIABETES MELLITUS WITH RIGHT DIABETIC FOOT ULCER: ICD-10-CM

## 2022-03-02 DIAGNOSIS — L97.519 TYPE 2 DIABETES MELLITUS WITH RIGHT DIABETIC FOOT ULCER: ICD-10-CM

## 2022-03-02 DIAGNOSIS — L97.414 DIABETIC ULCER OF RIGHT MIDFOOT ASSOCIATED WITH DIABETES MELLITUS DUE TO UNDERLYING CONDITION, WITH NECROSIS OF BONE: Primary | ICD-10-CM

## 2022-03-02 DIAGNOSIS — E11.622 DIABETES WITH SKIN ULCER: Primary | ICD-10-CM

## 2022-03-02 DIAGNOSIS — E11.622 DIABETES WITH SKIN ULCER: ICD-10-CM

## 2022-03-02 DIAGNOSIS — E08.621 DIABETIC ULCER OF RIGHT MIDFOOT ASSOCIATED WITH DIABETES MELLITUS DUE TO UNDERLYING CONDITION, WITH NECROSIS OF BONE: Primary | ICD-10-CM

## 2022-03-02 DIAGNOSIS — L98.499 DIABETES WITH SKIN ULCER: ICD-10-CM

## 2022-03-02 DIAGNOSIS — L98.499 DIABETES WITH SKIN ULCER: Primary | ICD-10-CM

## 2022-03-02 DIAGNOSIS — L97.519 CHRONIC DIABETIC ULCER OF RIGHT FOOT DETERMINED BY EXAMINATION: ICD-10-CM

## 2022-03-02 DIAGNOSIS — E11.621 CHRONIC DIABETIC ULCER OF RIGHT FOOT DETERMINED BY EXAMINATION: ICD-10-CM

## 2022-03-02 LAB
ANION GAP SERPL CALCULATED.3IONS-SCNC: 13 MMOL/L (ref 7–16)
BASOPHILS # BLD AUTO: 0.04 K/UL (ref 0–0.2)
BASOPHILS NFR BLD AUTO: 0.8 % (ref 0–1)
BUN SERPL-MCNC: 45 MG/DL (ref 7–18)
BUN/CREAT SERPL: 36 (ref 6–20)
CALCIUM SERPL-MCNC: 8.4 MG/DL (ref 8.5–10.1)
CHLORIDE SERPL-SCNC: 103 MMOL/L (ref 98–107)
CO2 SERPL-SCNC: 25 MMOL/L (ref 21–32)
CREAT SERPL-MCNC: 1.24 MG/DL (ref 0.7–1.3)
DIFFERENTIAL METHOD BLD: ABNORMAL
EOSINOPHIL # BLD AUTO: 0.18 K/UL (ref 0–0.5)
EOSINOPHIL NFR BLD AUTO: 3.8 % (ref 1–4)
ERYTHROCYTE [DISTWIDTH] IN BLOOD BY AUTOMATED COUNT: 13.4 % (ref 11.5–14.5)
GLUCOSE SERPL-MCNC: 163 MG/DL (ref 70–105)
GLUCOSE SERPL-MCNC: 202 MG/DL (ref 70–105)
GLUCOSE SERPL-MCNC: 204 MG/DL (ref 74–106)
GLUCOSE SERPL-MCNC: 247 MG/DL (ref 70–105)
GLUCOSE SERPL-MCNC: 262 MG/DL (ref 70–105)
HCT VFR BLD AUTO: 36.9 % (ref 40–54)
HGB BLD-MCNC: 12.2 G/DL (ref 13.5–18)
IMM GRANULOCYTES # BLD AUTO: 0.01 K/UL (ref 0–0.04)
IMM GRANULOCYTES NFR BLD: 0.2 % (ref 0–0.4)
LYMPHOCYTES # BLD AUTO: 2 K/UL (ref 1–4.8)
LYMPHOCYTES NFR BLD AUTO: 41.8 % (ref 27–41)
MCH RBC QN AUTO: 26.2 PG (ref 27–31)
MCHC RBC AUTO-ENTMCNC: 33.1 G/DL (ref 32–36)
MCV RBC AUTO: 79.2 FL (ref 80–96)
MONOCYTES # BLD AUTO: 0.54 K/UL (ref 0–0.8)
MONOCYTES NFR BLD AUTO: 11.3 % (ref 2–6)
MPC BLD CALC-MCNC: 10.6 FL (ref 9.4–12.4)
NEUTROPHILS # BLD AUTO: 2.02 K/UL (ref 1.8–7.7)
NEUTROPHILS NFR BLD AUTO: 42.1 % (ref 53–65)
NRBC # BLD AUTO: 0 X10E3/UL
NRBC, AUTO (.00): 0 %
PLATELET # BLD AUTO: 207 K/UL (ref 150–400)
POTASSIUM SERPL-SCNC: 4.5 MMOL/L (ref 3.5–5.1)
RBC # BLD AUTO: 4.66 M/UL (ref 4.6–6.2)
SODIUM SERPL-SCNC: 136 MMOL/L (ref 136–145)
WBC # BLD AUTO: 4.79 K/UL (ref 4.5–11)

## 2022-03-02 PROCEDURE — 99183 HYPERBARIC OXYGEN THERAPY: CPT | Mod: S$PBB,,, | Performed by: FAMILY MEDICINE

## 2022-03-02 PROCEDURE — 80048 BASIC METABOLIC PNL TOTAL CA: CPT | Performed by: INTERNAL MEDICINE

## 2022-03-02 PROCEDURE — 11000001 HC ACUTE MED/SURG PRIVATE ROOM

## 2022-03-02 PROCEDURE — 27000716 HC OXISENSOR PROBE, ANY SIZE: Performed by: ANESTHESIOLOGY

## 2022-03-02 PROCEDURE — D9220A PRA ANESTHESIA: ICD-10-PCS | Mod: ANES,ICN,, | Performed by: ANESTHESIOLOGY

## 2022-03-02 PROCEDURE — 85025 COMPLETE CBC W/AUTO DIFF WBC: CPT | Performed by: INTERNAL MEDICINE

## 2022-03-02 PROCEDURE — 96372 THER/PROPH/DIAG INJ SC/IM: CPT

## 2022-03-02 PROCEDURE — D9220A PRA ANESTHESIA: ICD-10-PCS | Mod: CRNA,,, | Performed by: NURSE ANESTHETIST, CERTIFIED REGISTERED

## 2022-03-02 PROCEDURE — D9220A PRA ANESTHESIA: Mod: ANES,ICN,, | Performed by: ANESTHESIOLOGY

## 2022-03-02 PROCEDURE — 37000009 HC ANESTHESIA EA ADD 15 MINS: Performed by: SURGERY

## 2022-03-02 PROCEDURE — 63600175 PHARM REV CODE 636 W HCPCS: Performed by: NURSE ANESTHETIST, CERTIFIED REGISTERED

## 2022-03-02 PROCEDURE — 63600175 PHARM REV CODE 636 W HCPCS: Performed by: HOSPITALIST

## 2022-03-02 PROCEDURE — 99213 OFFICE O/P EST LOW 20 MIN: CPT | Mod: PBBFAC | Performed by: FAMILY MEDICINE

## 2022-03-02 PROCEDURE — C9399 UNCLASSIFIED DRUGS OR BIOLOG: HCPCS | Performed by: HOSPITALIST

## 2022-03-02 PROCEDURE — 82962 GLUCOSE BLOOD TEST: CPT

## 2022-03-02 PROCEDURE — 63600175 PHARM REV CODE 636 W HCPCS

## 2022-03-02 PROCEDURE — 99183 HYPERBARIC OXYGEN THERAPY: CPT | Mod: PBBFAC | Performed by: FAMILY MEDICINE

## 2022-03-02 PROCEDURE — 36415 COLL VENOUS BLD VENIPUNCTURE: CPT | Performed by: INTERNAL MEDICINE

## 2022-03-02 PROCEDURE — 71000033 HC RECOVERY, INTIAL HOUR: Performed by: SURGERY

## 2022-03-02 PROCEDURE — 25000003 PHARM REV CODE 250: Performed by: SURGERY

## 2022-03-02 PROCEDURE — 27000260 *HC AIRWAY ORAL: Performed by: ANESTHESIOLOGY

## 2022-03-02 PROCEDURE — 36000707: Performed by: SURGERY

## 2022-03-02 PROCEDURE — 37000008 HC ANESTHESIA 1ST 15 MINUTES: Performed by: SURGERY

## 2022-03-02 PROCEDURE — 15004 WOUND PREP F/N/HF/G: CPT | Mod: ,,, | Performed by: SURGERY

## 2022-03-02 PROCEDURE — 25000003 PHARM REV CODE 250: Performed by: INTERNAL MEDICINE

## 2022-03-02 PROCEDURE — 27201423 OPTIME MED/SURG SUP & DEVICES STERILE SUPPLY: Performed by: SURGERY

## 2022-03-02 PROCEDURE — 25000003 PHARM REV CODE 250: Performed by: NURSE ANESTHETIST, CERTIFIED REGISTERED

## 2022-03-02 PROCEDURE — 99232 PR SUBSEQUENT HOSPITAL CARE,LEVL II: ICD-10-PCS | Mod: ,,, | Performed by: HOSPITALIST

## 2022-03-02 PROCEDURE — D9220A PRA ANESTHESIA: Mod: CRNA,,, | Performed by: NURSE ANESTHETIST, CERTIFIED REGISTERED

## 2022-03-02 PROCEDURE — 25000003 PHARM REV CODE 250: Performed by: NURSE PRACTITIONER

## 2022-03-02 PROCEDURE — 27000284 HC CANNULA NASAL: Performed by: ANESTHESIOLOGY

## 2022-03-02 PROCEDURE — 27000177 HC AIRWAY, LARYNGEAL MASK: Performed by: ANESTHESIOLOGY

## 2022-03-02 PROCEDURE — 27000655: Performed by: ANESTHESIOLOGY

## 2022-03-02 PROCEDURE — 15120 PR SPLIT GRFT,HEAD,FAC,HAND,FEET <100 SQCM: ICD-10-PCS | Mod: ,,, | Performed by: SURGERY

## 2022-03-02 PROCEDURE — 63600175 PHARM REV CODE 636 W HCPCS: Performed by: INTERNAL MEDICINE

## 2022-03-02 PROCEDURE — 99183 PR HYPERBARIC OXYGEN THERAPY ATTENDANCE/SUPERVISION, PER SESSION: ICD-10-PCS | Mod: S$PBB,,, | Performed by: FAMILY MEDICINE

## 2022-03-02 PROCEDURE — 27000510 HC BLANKET BAIR HUGGER ANY SIZE: Performed by: ANESTHESIOLOGY

## 2022-03-02 PROCEDURE — 36000706: Performed by: SURGERY

## 2022-03-02 PROCEDURE — 25000003 PHARM REV CODE 250

## 2022-03-02 PROCEDURE — 99232 SBSQ HOSP IP/OBS MODERATE 35: CPT | Mod: ,,, | Performed by: HOSPITALIST

## 2022-03-02 PROCEDURE — 15120 SPLT AGRFT F/S/N/H/F/G/M 1ST: CPT | Mod: ,,, | Performed by: SURGERY

## 2022-03-02 PROCEDURE — 15004 PR WND PREP PED, FACE/NCK/HND/FT/GEN 1ST 100 CM: ICD-10-PCS | Mod: ,,, | Performed by: SURGERY

## 2022-03-02 PROCEDURE — 25000003 PHARM REV CODE 250: Performed by: HOSPITALIST

## 2022-03-02 RX ORDER — LIDOCAINE HYDROCHLORIDE 20 MG/ML
INJECTION, SOLUTION EPIDURAL; INFILTRATION; INTRACAUDAL; PERINEURAL
Status: DISCONTINUED | OUTPATIENT
Start: 2022-03-02 | End: 2022-03-02

## 2022-03-02 RX ORDER — GLYCOPYRROLATE 0.2 MG/ML
INJECTION INTRAMUSCULAR; INTRAVENOUS
Status: DISCONTINUED | OUTPATIENT
Start: 2022-03-02 | End: 2022-03-02

## 2022-03-02 RX ORDER — PHENYLEPHRINE HYDROCHLORIDE 10 MG/ML
INJECTION INTRAVENOUS
Status: DISCONTINUED | OUTPATIENT
Start: 2022-03-02 | End: 2022-03-02

## 2022-03-02 RX ORDER — CEFAZOLIN SODIUM 1 G/3ML
INJECTION, POWDER, FOR SOLUTION INTRAMUSCULAR; INTRAVENOUS
Status: DISCONTINUED | OUTPATIENT
Start: 2022-03-02 | End: 2022-03-02

## 2022-03-02 RX ORDER — MIDAZOLAM HYDROCHLORIDE 5 MG/ML
INJECTION INTRAMUSCULAR; INTRAVENOUS
Status: DISCONTINUED | OUTPATIENT
Start: 2022-03-02 | End: 2022-03-02

## 2022-03-02 RX ORDER — ONDANSETRON 2 MG/ML
INJECTION INTRAMUSCULAR; INTRAVENOUS
Status: DISCONTINUED | OUTPATIENT
Start: 2022-03-02 | End: 2022-03-02

## 2022-03-02 RX ORDER — LIDOCAINE HYDROCHLORIDE AND EPINEPHRINE 10; 10 MG/ML; UG/ML
INJECTION, SOLUTION INFILTRATION; PERINEURAL
Status: DISCONTINUED | OUTPATIENT
Start: 2022-03-02 | End: 2022-03-02 | Stop reason: HOSPADM

## 2022-03-02 RX ORDER — FENTANYL CITRATE 50 UG/ML
INJECTION, SOLUTION INTRAMUSCULAR; INTRAVENOUS
Status: DISCONTINUED | OUTPATIENT
Start: 2022-03-02 | End: 2022-03-02

## 2022-03-02 RX ORDER — PROPOFOL 10 MG/ML
VIAL (ML) INTRAVENOUS
Status: DISCONTINUED | OUTPATIENT
Start: 2022-03-02 | End: 2022-03-02

## 2022-03-02 RX ADMIN — PHENYLEPHRINE HYDROCHLORIDE 100 MCG: 10 INJECTION INTRAVENOUS at 11:03

## 2022-03-02 RX ADMIN — PHENYLEPHRINE HYDROCHLORIDE 100 MCG: 10 INJECTION INTRAVENOUS at 12:03

## 2022-03-02 RX ADMIN — ONDANSETRON 8 MG: 2 INJECTION INTRAMUSCULAR; INTRAVENOUS at 11:03

## 2022-03-02 RX ADMIN — LIDOCAINE HYDROCHLORIDE 100 MG: 20 INJECTION, SOLUTION EPIDURAL; INFILTRATION; INTRACAUDAL; PERINEURAL at 11:03

## 2022-03-02 RX ADMIN — HYDROCHLOROTHIAZIDE 25 MG: 12.5 TABLET ORAL at 03:03

## 2022-03-02 RX ADMIN — INSULIN DETEMIR 35 UNITS: 100 INJECTION, SOLUTION SUBCUTANEOUS at 09:03

## 2022-03-02 RX ADMIN — MIDAZOLAM HYDROCHLORIDE 2 MG: 5 INJECTION, SOLUTION INTRAMUSCULAR; INTRAVENOUS at 11:03

## 2022-03-02 RX ADMIN — HYDROCODONE BITARTRATE AND ACETAMINOPHEN 1 TABLET: 10; 325 TABLET ORAL at 10:03

## 2022-03-02 RX ADMIN — DOCUSATE SODIUM 100 MG: 100 CAPSULE, LIQUID FILLED ORAL at 09:03

## 2022-03-02 RX ADMIN — GABAPENTIN 300 MG: 300 CAPSULE ORAL at 09:03

## 2022-03-02 RX ADMIN — MEROPENEM 500 MG: 500 INJECTION, POWDER, FOR SOLUTION INTRAVENOUS at 06:03

## 2022-03-02 RX ADMIN — Medication 2 CAPSULE: at 06:03

## 2022-03-02 RX ADMIN — SODIUM CHLORIDE: 9 INJECTION, SOLUTION INTRAVENOUS at 11:03

## 2022-03-02 RX ADMIN — FENTANYL CITRATE 100 MCG: 50 INJECTION INTRAMUSCULAR; INTRAVENOUS at 11:03

## 2022-03-02 RX ADMIN — INSULIN ASPART 2 UNITS: 100 INJECTION, SOLUTION INTRAVENOUS; SUBCUTANEOUS at 06:03

## 2022-03-02 RX ADMIN — MEROPENEM 500 MG: 500 INJECTION, POWDER, FOR SOLUTION INTRAVENOUS at 05:03

## 2022-03-02 RX ADMIN — CEFAZOLIN 3 G: 1 INJECTION, POWDER, FOR SOLUTION INTRAMUSCULAR; INTRAVENOUS; PARENTERAL at 11:03

## 2022-03-02 RX ADMIN — MEROPENEM 500 MG: 500 INJECTION, POWDER, FOR SOLUTION INTRAVENOUS at 11:03

## 2022-03-02 RX ADMIN — INSULIN ASPART 3 UNITS: 100 INJECTION, SOLUTION INTRAVENOUS; SUBCUTANEOUS at 09:03

## 2022-03-02 RX ADMIN — AMLODIPINE BESYLATE 10 MG: 10 TABLET ORAL at 03:03

## 2022-03-02 RX ADMIN — PROPOFOL 200 MG: 10 INJECTION, EMULSION INTRAVENOUS at 11:03

## 2022-03-02 RX ADMIN — HYDROCODONE BITARTRATE AND ACETAMINOPHEN 1 TABLET: 10; 325 TABLET ORAL at 03:03

## 2022-03-02 RX ADMIN — GLYCOPYRROLATE 0.2 MG: 0.2 INJECTION INTRAMUSCULAR; INTRAVENOUS at 12:03

## 2022-03-02 RX ADMIN — ATORVASTATIN CALCIUM 40 MG: 40 TABLET, FILM COATED ORAL at 09:03

## 2022-03-02 RX ADMIN — MEROPENEM 500 MG: 500 INJECTION, POWDER, FOR SOLUTION INTRAVENOUS at 01:03

## 2022-03-02 RX ADMIN — LOSARTAN POTASSIUM 50 MG: 50 TABLET, FILM COATED ORAL at 03:03

## 2022-03-02 NOTE — PROGRESS NOTES
Subjective:      Patient ID: Jean Pierre Paulson is a 36 y.o. male.    Chief Complaint: Hyperbaric Oxygen Therapy (R DFU Elliott's Grade 3)    Jean Pierre Paulson a 36 y.o. male presents for follow up on all regular problems which are reviewed and discussed.     Problem List Items Addressed This Visit        ID    Chronic osteomyelitis       Endocrine    Diabetes with skin ulcer    Chronic diabetic ulcer of right foot determined by examination    Type 2 diabetes mellitus with right diabetic foot ulcer    RESOLVED: Diabetic ulcer of right midfoot associated with diabetes mellitus due to underlying condition, with necrosis of bone - Primary          Past Medical History:  Past Medical History:   Diagnosis Date    Anemia     Chronic osteomyelitis     Diabetes mellitus with neuropathy     Diabetes mellitus, type 2     DM2 (diabetes mellitus, type 2)     HTN (hypertension)     Hypertension     Neuropathy     Obesity     Osteomyelitis 10/2020    Hx of R foot, Tx with IV Abx    Peripheral vascular disease      Past Surgical History:   Procedure Laterality Date    CHOLECYSTECTOMY      DEBRIDEMENT OF FOOT Right 10/2020    FOOT SURGERY      Right diabetic foot ulcer Elliott's Grade 3      Bone exposure to R heel, Hx of IV Abx, cultures show multiple bacterial growth, Hx of Osteomyelitis, HBOT     Review of patient's allergies indicates:   Allergen Reactions    Tomato Itching     Current Facility-Administered Medications on File Prior to Visit   Medication Dose Route Frequency Provider Last Rate Last Admin    acetaminophen tablet 650 mg  650 mg Oral Q4H PRN Clara Gutierrez MD        amLODIPine tablet 10 mg  10 mg Oral Daily Clara Gutierrez MD   10 mg at 03/02/22 1526    atorvastatin tablet 40 mg  40 mg Oral QHS Vishnu Ford MD   40 mg at 03/01/22 2134    cloNIDine tablet 0.1 mg  0.1 mg Oral Q4H PRN Vishnu Ford MD        dextrose 50% injection 12.5 g  12.5 g Intravenous PRN Clara Gutierrez MD        dextrose 50%  injection 25 g  25 g Intravenous PRN Clara Gutierrez MD        docusate sodium capsule 100 mg  100 mg Oral BID Sofia L Hendrickson, FNP   100 mg at 03/01/22 2135    gabapentin capsule 300 mg  300 mg Oral Q8H Clara Gutierrez MD   300 mg at 03/01/22 2135    glucagon (human recombinant) injection 1 mg  1 mg Intramuscular PRN Clara Gutierrez MD        glucose chewable tablet 16 g  16 g Oral PRN Clara Gutierrez MD        glucose chewable tablet 24 g  24 g Oral PRN Clara Gutierrez MD        hydroCHLOROthiazide tablet 25 mg  25 mg Oral Daily Clara Gutierrez MD   25 mg at 03/02/22 1526    HYDROcodone-acetaminophen  mg per tablet 1 tablet  1 tablet Oral Q6H PRN Clara Gutierrez MD   1 tablet at 03/02/22 1514    insulin asp prt-insulin aspart (NovoLOG 70/30) injection 15 Units  15 Units Subcutaneous with breakfast Vishnu Ford MD        insulin aspart U-100 injection 1-10 Units  1-10 Units Subcutaneous TID AC PRN Clara Gutierrez MD   6 Units at 03/01/22 2139    insulin detemir U-100 injection 35 Units  35 Units Subcutaneous QHS Vishnu Ford MD        Lactobacillus acidophilus capsule 2 capsule  2 capsule Oral TID WM Carey Vivas MD   2 capsule at 03/01/22 1702    losartan tablet 50 mg  50 mg Oral Daily Vishnu Ford MD   50 mg at 03/02/22 1526    melatonin tablet 6 mg  6 mg Oral Nightly PRN Clara Gutierrez MD        meropenem (MERREM) 500 mg in sodium chloride 0.9 % 100 mL IVPB (MB+)  500 mg Intravenous Q6H Carey Vivas MD   Stopped at 03/02/22 0652    ondansetron tablet 8 mg  8 mg Oral Q8H PRN Axel Simmons Jr., MD        polyethylene glycol packet 17 g  17 g Oral Daily PRN Clara Gutierrez MD   17 g at 03/01/22 0540    polyethylene glycol packet 17 g  17 g Oral Daily Sofia L Hendrickson, FNP   17 g at 03/01/22 0822    prochlorperazine injection Soln 5 mg  5 mg Intravenous Q6H PRN Clara Gutierrez MD        [DISCONTINUED] insulin detemir U-100 injection 32 Units  32  Units Subcutaneous QHS Vishnu Ford MD   32 Units at 03/01/22 2135    [DISCONTINUED] LIDOcaine-EPINEPHrine 1%-1:100,000 injection    PRN Greg Ramírez MD   30 mL at 03/02/22 1235    [DISCONTINUED] mineral oil, light sterile Oil    PRN Greg Ramírez MD   30 mL at 03/02/22 1235     Current Outpatient Medications on File Prior to Visit   Medication Sig Dispense Refill    acetaminophen (TYLENOL) 325 MG tablet Take 2 tablets (650 mg total) by mouth every 4 (four) hours as needed.  0    amLODIPine (NORVASC) 10 MG tablet Take 1 tablet (10 mg total) by mouth once daily. 30 tablet 0    blood sugar diagnostic (BLOOD GLUCOSE TEST) Strp 1 strip by Misc.(Non-Drug; Combo Route) route once daily. accu-chek Veronika strips 90 strip 5    blood-glucose meter kit 1 each by Other route 3 (three) times daily. 1 each 0    empaglifloz/linaglip/metformin (TRIJARDY XR ORAL) Take by mouth once daily.      FEROSUL 325 mg (65 mg iron) Tab tablet Take 325 mg by mouth once daily.      gabapentin (NEURONTIN) 300 MG capsule Take 1 capsule (300 mg total) by mouth every 8 (eight) hours. 90 capsule 1    hydroCHLOROthiazide (HYDRODIURIL) 25 MG tablet Take 1 tablet (25 mg total) by mouth once daily. 90 tablet 1    HYDROcodone-acetaminophen (NORCO)  mg per tablet Take 1 tablet by mouth once daily. 30 tablet 0    rosuvastatin (CRESTOR) 10 MG tablet TAKE 1 TABLET EVERY DAY 90 tablet 0    [DISCONTINUED] sodium hypochlorite 0.5 % (DAKIN'S SOLUTION) external solution Apply topically once daily. 473 mL 5     Social History     Socioeconomic History    Marital status: Single   Tobacco Use    Smoking status: Former Smoker    Smokeless tobacco: Never Used   Substance and Sexual Activity    Alcohol use: Yes     Comment: occasionally    Drug use: Yes     Types: Hydrocodone    Sexual activity: Yes     Family History   Problem Relation Age of Onset    Hypertension Father     Diabetes Father        Review of Systems   Unable to perform ROS:  Other       Objective:     There were no vitals taken for this visit.    Physical Exam  Constitutional:       Appearance: Normal appearance. He is obese.   HENT:      Head: Normocephalic and atraumatic.      Right Ear: External ear normal.      Left Ear: External ear normal.      Nose: Nose normal.      Mouth/Throat:      Mouth: Mucous membranes are moist.      Pharynx: Oropharynx is clear.   Eyes:      Pupils: Pupils are equal, round, and reactive to light.   Cardiovascular:      Rate and Rhythm: Normal rate and regular rhythm.      Heart sounds: Normal heart sounds.   Pulmonary:      Effort: Pulmonary effort is normal.      Breath sounds: Normal breath sounds.   Abdominal:      Palpations: Abdomen is soft.   Musculoskeletal:         General: Normal range of motion.      Cervical back: Normal range of motion and neck supple.   Skin:     General: Skin is warm and dry.   Neurological:      General: No focal deficit present.      Mental Status: He is alert.   Psychiatric:         Mood and Affect: Mood normal.         Behavior: Behavior normal.         Thought Content: Thought content normal.         Judgment: Judgment normal.       Assessment:     1. 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 Type 2 diabetes mellitus with right diabetic foot ulcer    3. Chronic diabetic ulcer of right foot determined by examination    4. Diabetes with skin ulcer    5. Chronic osteomyelitis        Plan:     Problem List Items Addressed This Visit        ID    Chronic osteomyelitis       Endocrine    Diabetes with skin ulcer    Chronic diabetic ulcer of right foot determined by examination    Type 2 diabetes mellitus with right diabetic foot ulcer    RESOLVED: Diabetic ulcer of right midfoot associated with diabetes mellitus due to underlying condition, with necrosis of bone - Primary        No follow-ups on file.      I am having Jean Pierre Paulson maintain his  amLODIPine, blood-glucose meter, gabapentin, HYDROcodone-acetaminophen, blood sugar diagnostic, FeroSuL, hydroCHLOROthiazide, rosuvastatin, empaglifloz/linaglip/metformin (TRIJARDY XR ORAL), and acetaminophen.    Jean Pierre was seen today for hyperbaric oxygen therapy.    Diagnoses and all orders for this visit:    Diabetic ulcer of right midfoot associated with diabetes mellitus due to underlying condition, with necrosis of bone    Type 2 diabetes mellitus with right diabetic foot ulcer    Chronic diabetic ulcer of right foot determined by examination    Diabetes with skin ulcer    Chronic osteomyelitis         [unfilled]  No orders of the defined types were placed in this encounter.

## 2022-03-02 NOTE — SUBJECTIVE & OBJECTIVE
Interval History:     Review of Systems   Constitutional:  Negative for appetite change, fatigue and fever.   HENT:  Negative for congestion, hearing loss and trouble swallowing.    Respiratory:  Negative for chest tightness, shortness of breath and wheezing.    Cardiovascular:  Negative for chest pain and palpitations.   Gastrointestinal:  Negative for abdominal pain, constipation and nausea.   Genitourinary:  Negative for difficulty urinating and dysuria.   Musculoskeletal:  Negative for back pain and neck stiffness.   Skin:  Positive for wound. Negative for pallor and rash.   Neurological:  Negative for dizziness, speech difficulty and headaches.   Psychiatric/Behavioral:  Positive for sleep disturbance. Negative for confusion and suicidal ideas.    Objective:     Vital Signs (Most Recent):  Temp: 97.4 °F (36.3 °C) (03/02/22 0800)  Pulse: 80 (03/02/22 0800)  Resp: 14 (03/02/22 0800)  BP: (!) 148/84 (03/02/22 0800)  SpO2: 96 % (03/02/22 0800) Vital Signs (24h Range):  Temp:  [96.9 °F (36.1 °C)-98.2 °F (36.8 °C)] 97.4 °F (36.3 °C)  Pulse:  [72-80] 80  Resp:  [14-20] 14  SpO2:  [92 %-99 %] 96 %  BP: (129-153)/() 148/84     Weight: (!) 156.9 kg (345 lb 14.4 oz)  Body mass index is 44.41 kg/m².    Intake/Output Summary (Last 24 hours) at 3/2/2022 0901  Last data filed at 3/1/2022 1715  Gross per 24 hour   Intake 580 ml   Output --   Net 580 ml        Physical Exam  Vitals reviewed.   Constitutional:       General: He is not in acute distress.  Eyes:      Pupils: Pupils are equal, round, and reactive to light.   Cardiovascular:      Rate and Rhythm: Normal rate and regular rhythm.      Pulses: Normal pulses.   Pulmonary:      Effort: Pulmonary effort is normal. No respiratory distress.      Breath sounds: Normal breath sounds. No wheezing.   Abdominal:      General: Bowel sounds are normal. There is no distension.      Tenderness: There is no abdominal tenderness.   Skin:     General: Skin is warm.      Comments:  Dressed wound to right foot plantar surface.   Neurological:      General: No focal deficit present.      Mental Status: He is alert, oriented to person, place, and time and easily aroused. Mental status is at baseline.   Psychiatric:         Mood and Affect: Mood normal.         Behavior: Behavior normal.       Significant Labs: All pertinent labs within the past 24 hours have been reviewed.  BMP:   Recent Labs   Lab 03/02/22  0705   *      K 4.5      CO2 25   BUN 45*   CREATININE 1.24   CALCIUM 8.4*       CBC:   Recent Labs   Lab 03/02/22  0705   WBC 4.79   HGB 12.2*   HCT 36.9*          CMP:   Recent Labs   Lab 03/02/22  0705      K 4.5      CO2 25   *   BUN 45*   CREATININE 1.24   CALCIUM 8.4*   ANIONGAP 13   EGFRNONAA 69       Microbiology Results (last 7 days)       ** No results found for the last 168 hours. **          Intake/Output - Last 3 Shifts         02/28 0700 03/01 0659 03/01 0700 03/02 0659 03/02 0700  03/03 0659    P.O. 720 720     IV Piggyback  100     Total Intake(mL/kg) 720 (4.6) 820 (5.2)     Net +720 +820            Urine Occurrence  2 x     Stool Occurrence  1 x                 Significant Imaging: I have reviewed all pertinent imaging results/findings within the past 24 hours.

## 2022-03-02 NOTE — NURSING
Returned to unit via stretcher.  Skin warm et dry.  Resp even et unlabored.  Dressing to left thigh dry, intact, with no bleeding noted.  Wound vac in place to right foot and suctioning per wound care nurse.  Family x2 at bedside.  No s/s of acute distress noted.  Safety measures in place et maintained.

## 2022-03-02 NOTE — PROGRESS NOTES
[facility] - Peri Services  Adult Nutrition  Follow-up Note         Reason for Assessment  Reason For Assessment: RD follow-up     Malnutrition  Is Patient Malnourished: No  Nutrition Diagnosis  Increased protein Needs   related to Decreased/ impaired skin integrity as evidenced by wounds    Nutrition Diagnosis Status: Improving      Nutrition Risk  Level of Risk/Frequency of Follow-up: high   Chewing or Swallowing Difficulty?: No Chewing or swallowing difficulty  Estimated/Assessed Needs    Activity Factor: 1 Injury Factor: 1.2               Energy Calorie Requirements (kcal): 3070     Protein Requirements: 125-156  Estimated Fluid Requirement Method: RDA Method Fluid Requirements (mL): 3071  RDA Method (mL): 3070     Nutrition Prescription / Recommendations  Recommendation/Intervention: continue current diet and supplements; continue to honor food preferences when available.  Goals: wound healing; pt will meet estimated nutritional  needs  Nutrition Goal Status: progressing towards goal  Communication of RD Recs: reviewed with physician  Current Diet Order: 2000 consistent carbohydrate, high protein diet with angel ensure max protein at all meals  Nutrition Order Comments: 75% meal intake documented  Oral Nutrition Supplement: angel and ensure max protein with all meals  Recommended Diet: Consistent Carbohydrate 2000 (75g Carbs)/ high protein  Recommended Oral Supplement: Angel [90 kcals, 2.5g Protein, 10g Carbs(3g Sugar), 7g L-Arginine, 7g L-Glutamine, Vitamin C 300mg, 9.5mg Zinc] three times daily and Ensure Max Protein [150 kcals, 30g Protein, 6g Carbs(2g Fiber, 1g Sugar), 1.5g Fat] three times daily  Is Nutrition Support Recommended: No  Is Education Recommended: No  Monitor and Evaluation  % current Intake: P.O. intake of 50 - 75 %  % intake to meet estimated needs: 75 - 100 %  Food and Nutrient Intake: energy intake, food and beverage intake  Food and Nutrient Adminstration: diet order  Anthropometric  Measurements: height/length, body mass index, weight, weight change  Biochemical Data, Medical Tests and Procedures: electrolyte and renal panel, glucose/endocrine profile  Nutrition-Focused Physical Findings: skin  Oral Calories (kcal): 2000  Oral Protein (gm): 100  Energy Calories Required: not meeting needs  Protein Required: not meeting needs  Tolerance: tolerating  Current Medical Diagnosis and Past Medical History     Past Medical History:   Diagnosis Date    Anemia     Chronic osteomyelitis     Diabetes mellitus with neuropathy     Diabetes mellitus, type 2     DM2 (diabetes mellitus, type 2)     HTN (hypertension)     Hypertension     Neuropathy     Obesity     Osteomyelitis 10/2020    Hx of R foot, Tx with IV Abx    Peripheral vascular disease      Nutrition/Diet History  Food Allergies: other (see comments)  Lab/Procedures/Meds  Recent Labs   Lab 03/02/22  0705      K 4.5   BUN 45*   CREATININE 1.24   *   CALCIUM 8.4*        Last A1c:   Lab Results   Component Value Date    HGBA1C 7.8 (H) 02/15/2022     Lab Results   Component Value Date    RBC 4.66 03/02/2022    HGB 12.2 (L) 03/02/2022    HCT 36.9 (L) 03/02/2022    MCV 79.2 (L) 03/02/2022    MCH 26.2 (L) 03/02/2022    MCHC 33.1 03/02/2022     Pertinent Labs Reviewed: reviewed  Pertinent Labs Comments: Hc t 36.9, Glu 204  Anthropometrics        Nutrition by Nursing                             Nutrition Follow-Up  RD Follow-up?: Yes  Assessment and Plan  No new Assessment & Plan notes have been filed under this hospital service since the last note was generated.  Service: Nutrition

## 2022-03-02 NOTE — PLAN OF CARE
1425 Released to ARUN Nuñez RN. Pt is awake and alert. His mother and another relative is in his room.

## 2022-03-02 NOTE — TRANSFER OF CARE
Anesthesia Transfer of Care Note    Patient: Jean Pierre Paulson    Procedure(s) Performed: Procedure(s) (LRB):  APPLICATION, GRAFT, SKIN, SPLIT-THICKNESS, TO LOWER EXTREMITY (Right)    Patient location: PACU    Anesthesia Type: general    Transport from OR: Transported from OR on 6-10 L/min O2 by face mask with adequate spontaneous ventilation    Post pain: adequate analgesia    Post assessment: no apparent anesthetic complications    Post vital signs: stable    Level of consciousness: responds to stimulation and awake    Nausea/Vomiting: no nausea/vomiting    Complications: none    Transfer of care protocol was followedComments: Good SV continue, NAD noted, VSS, RTRN      Last vitals:   Visit Vitals  /81   Pulse 75   Temp 36.5 °C (97.7 °F) (Oral)   Resp (!) 25   SpO2 96%

## 2022-03-02 NOTE — H&P (VIEW-ONLY)
[facility]  General Surgery  Consult Note    Patient Name: Jean Pierre Paulson  MRN: [id number]  Code Status: Prior  Admission Date: 2/15/2022  Hospital Length of Stay: 14 days  Attending Physician: Vishnu Ford MD  Primary Care Provider: Mojgan Lomeli NP    Patient information was obtained from patient, past medical records and primary team.     Consults  Subjective:     Principal Problem: Chronic diabetic ulcer of right foot determined by examination    History of Present Illness: 37 y/o male patient, seen by me in the past for management of right foot ulcer.  He has a long history of problems with the foot and was documented to have osteomyelitis, with a positive probe to bone in the past.  I saw him three months ago, and HBO was to be started, but he didn't qualify, and was treated with IV abx.  He is now in HBO (5 treatments, I think) for Grade III diabetic ulcer and was recently seen by Dr. Davis, who suggested a skin graft.  I was asked by Dr. Ford to see patient and consider skin graft placement.  He had a bone scan recently, revealing no evidence of osteomyelitis.        No current facility-administered medications on file prior to encounter.     Current Outpatient Medications on File Prior to Encounter   Medication Sig    amLODIPine (NORVASC) 10 MG tablet Take 1 tablet (10 mg total) by mouth once daily.    blood sugar diagnostic (BLOOD GLUCOSE TEST) Strp 1 strip by Misc.(Non-Drug; Combo Route) route once daily. accu-chek Veronika strips    blood-glucose meter kit 1 each by Other route 3 (three) times daily.    FEROSUL 325 mg (65 mg iron) Tab tablet Take 325 mg by mouth once daily.    gabapentin (NEURONTIN) 300 MG capsule Take 1 capsule (300 mg total) by mouth every 8 (eight) hours.    hydroCHLOROthiazide (HYDRODIURIL) 25 MG tablet Take 1 tablet (25 mg total) by mouth once daily.    rosuvastatin (CRESTOR) 10 MG tablet TAKE 1 TABLET EVERY DAY    [DISCONTINUED] sodium hypochlorite 0.5 %  (DAKIN'S SOLUTION) external solution Apply topically once daily.    empaglifloz/linaglip/metformin (TRIJARDY XR ORAL) Take by mouth once daily.    HYDROcodone-acetaminophen (NORCO)  mg per tablet Take 1 tablet by mouth once daily.       Review of patient's allergies indicates:   Allergen Reactions    Tomato Itching       Past Medical History:   Diagnosis Date    Anemia     Chronic osteomyelitis     Diabetes mellitus with neuropathy     Diabetes mellitus, type 2     DM2 (diabetes mellitus, type 2)     HTN (hypertension)     Hypertension     Neuropathy     Obesity     Osteomyelitis 10/2020    Hx of R foot, Tx with IV Abx    Peripheral vascular disease      Past Surgical History:   Procedure Laterality Date    CHOLECYSTECTOMY      DEBRIDEMENT OF FOOT Right 10/2020    FOOT SURGERY      Right diabetic foot ulcer Elliott's Grade 3      Bone exposure to R heel, Hx of IV Abx, cultures show multiple bacterial growth, Hx of Osteomyelitis, HBOT     Family History       Problem Relation (Age of Onset)    Diabetes Father    Hypertension Father          Tobacco Use    Smoking status: Former Smoker    Smokeless tobacco: Never Used   Substance and Sexual Activity    Alcohol use: Yes     Comment: occasionally    Drug use: Yes     Types: Hydrocodone    Sexual activity: Yes     Review of Systems   Constitutional:  Negative for appetite change and fever.   HENT:  Negative for sore throat. Hearing loss: cough.   Eyes:  Negative for visual disturbance.   Respiratory:  Negative for cough and shortness of breath.    Cardiovascular:  Negative for chest pain.   Gastrointestinal:  Negative for abdominal pain.   Endocrine: Negative.    Genitourinary: Negative.    Musculoskeletal: Negative.    Skin:  Negative for rash and wound.   Neurological:  Negative for numbness.   Hematological: Negative.    Psychiatric/Behavioral: Negative.     Objective:     Vital Signs (Most Recent):  Temp: 96.9 °F (36.1 °C) (03/01/22  1500)  Pulse: 77 (03/01/22 1500)  Resp: 20 (03/01/22 1500)  BP: (!) 134/90 (03/01/22 1500)  SpO2: 99 % (03/01/22 1500)   Vital Signs (24h Range):  Temp:  [96.9 °F (36.1 °C)-98.8 °F (37.1 °C)] 96.9 °F (36.1 °C)  Pulse:  [71-82] 77  Resp:  [18-20] 20  SpO2:  [96 %-100 %] 99 %  BP: (134-166)/() 134/90     Weight: (!) 156.9 kg (345 lb 14.4 oz)  Body mass index is 44.41 kg/m².    Physical Exam  Cardiovascular:      Rate and Rhythm: Normal rate.   Pulmonary:      Effort: Pulmonary effort is normal.   Abdominal:      General: There is no distension.   Musculoskeletal:         General: No swelling.   Skin:     Comments: Plantar foot ulcer  9cm x 6cm x 0.4cm with good granulation tissue throughout.  He still has a tunneling area, very small opening posteriorly.   Neurological:      General: No focal deficit present.      Mental Status: He is alert. Mental status is at baseline.   Psychiatric:         Mood and Affect: Mood normal.       Significant Labs:  I have reviewed all pertinent lab results within the past 24 hours.  CBC:   Recent Labs   Lab 02/27/22  0651   WBC 5.83   RBC 4.51*   HGB 11.7*   HCT 36.5*      MCV 80.9   MCH 25.9*   MCHC 32.1     BMP:   Recent Labs   Lab 02/27/22  0651   *   *   K 4.7      CO2 25   BUN 54*   CREATININE 1.38*   CALCIUM 8.9       Significant Diagnostics:  I have reviewed all pertinent imaging results/findings within the past 24 hours.      Assessment/Plan:     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  Will attempt STSG;  Discussed surgery with the patient to include infection, bleeding, donor site pain.  He understands, wishes to proceed.       VTE Risk Mitigation (From admission, onward)         Ordered     IP VTE LOW RISK PATIENT  Once         02/15/22 1401     Place DONATO hose  Until discontinued         02/15/22 1401                Thank you for your consult. I will follow-up with patient.  Please contact us if you have any additional questions.    Greg Ramírez MD  General Surgery  [facility]

## 2022-03-02 NOTE — OP NOTE
[facility], [place] - Periop Services     Operative Note    SUMMARY     Date of Procedure: 3/2/2022     Procedure: Procedure(s) (LRB):  APPLICATION, GRAFT, SKIN, SPLIT-THICKNESS, TO LOWER EXTREMITY (Right)     Surgeon(s) and Role:     * Greg Ramírez MD - Primary    Assisting Surgeon: None    Pre-Operative Diagnosis: Foot ulcer due to secondary DM [E13.621, L97.509]  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 [L97.516]    Post-Operative Diagnosis: Post-Op Diagnosis Codes:     * Foot ulcer due to secondary DM [E13.621, L97.509]     * 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 [L97.516]    Anesthesia: General    Technical Procedures Used:  Patient was brought to the operating room and placed in supine position.  General anesthesia was administered per anesthesia staff.  The right foot and left thigh were prepped and draped standard fashion.  The right foot was debrided roughly with a gauze and sharply with a curette removing fibrinous exudate.  Callus was excised with a 15 blade circumferentially around the wound.    Subsequent to this, the graft was harvested setting the dermatome to 12 mm and taking 2 passes with the 2 in blade.  The graft was then meshed at 3-1, applied to the wound.  Staples were used circumferentially to secure the graft.  After good coverage, the sarah dressing was applied and cellophane dressings placed.  Once the pump was connected we could not get adequate seal, and ultimately it was determined that the pump was just not strong enough. as we had reinforced the entire dressing.   Decision was made to obtain and a traditional wound VAC.  Slight defect was created in the dressing and Medela wound VAC gauze was placed over the defect.  The cellophane dressings were placed and then a track pad.  Further cellophane dressings were applied and the VAC pump connected to 80 mmHg  with a good seal.  VAC gauze dressing was applied to the heel along with 4x4s for additional padding, and a Kerlix were used to wrap the foot.  Subsequent to this, an Ace bandage was used to wrap around the foot to provide for additional immobility.  Patient was awakened from anesthesia in stable condition.  Needle sponge instrument counts were reported correct at the end of the case.    Description of the Findings of the Procedure:  Ulcer on the plantar surface of right foot measures 9.6 cm by 6.4 cm by 0.4 cm.  Split-thickness skin graft was performed, harvesting the graft at 12 microns from the left thigh using 2 passes.  Graft was meshed at 3-1 and applied to the wound.  A wound VAC was applied at the end the procedure, but the sarah VAC pump was not strong enough, leading me to employ the [employer] wound VAC pump on top of the already placed dressing.    Assistant(s): none    Complications: No    Estimated Blood Loss (EBL):  5cc           Implants: * No implants in log *    Specimens:   Specimen (24h ago, onward)            None           Condition: Good      Complications:  None

## 2022-03-02 NOTE — PLAN OF CARE
Problem: Skin Injury Risk Increased  Goal: Skin Health and Integrity  Outcome: Ongoing, Progressing     Problem: Impaired Wound Healing  Goal: Optimal Wound Healing  Outcome: Ongoing, Progressing     Problem: Infection  Goal: Absence of Infection Signs and Symptoms  Outcome: Ongoing, Progressing

## 2022-03-02 NOTE — ASSESSMENT & PLAN NOTE
Will attempt STSG;  Discussed surgery with the patient to include infection, bleeding, donor site pain.  He understands, wishes to proceed.

## 2022-03-02 NOTE — SUBJECTIVE & OBJECTIVE
No current facility-administered medications on file prior to encounter.     Current Outpatient Medications on File Prior to Encounter   Medication Sig    amLODIPine (NORVASC) 10 MG tablet Take 1 tablet (10 mg total) by mouth once daily.    blood sugar diagnostic (BLOOD GLUCOSE TEST) Strp 1 strip by Misc.(Non-Drug; Combo Route) route once daily. accu-chek Veronika strips    blood-glucose meter kit 1 each by Other route 3 (three) times daily.    FEROSUL 325 mg (65 mg iron) Tab tablet Take 325 mg by mouth once daily.    gabapentin (NEURONTIN) 300 MG capsule Take 1 capsule (300 mg total) by mouth every 8 (eight) hours.    hydroCHLOROthiazide (HYDRODIURIL) 25 MG tablet Take 1 tablet (25 mg total) by mouth once daily.    rosuvastatin (CRESTOR) 10 MG tablet TAKE 1 TABLET EVERY DAY    [DISCONTINUED] sodium hypochlorite 0.5 % (DAKIN'S SOLUTION) external solution Apply topically once daily.    empaglifloz/linaglip/metformin (TRIJARDY XR ORAL) Take by mouth once daily.    HYDROcodone-acetaminophen (NORCO)  mg per tablet Take 1 tablet by mouth once daily.       Review of patient's allergies indicates:   Allergen Reactions    Tomato Itching       Past Medical History:   Diagnosis Date    Anemia     Chronic osteomyelitis     Diabetes mellitus with neuropathy     Diabetes mellitus, type 2     DM2 (diabetes mellitus, type 2)     HTN (hypertension)     Hypertension     Neuropathy     Obesity     Osteomyelitis 10/2020    Hx of R foot, Tx with IV Abx    Peripheral vascular disease      Past Surgical History:   Procedure Laterality Date    CHOLECYSTECTOMY      DEBRIDEMENT OF FOOT Right 10/2020    FOOT SURGERY      Right diabetic foot ulcer Elliott's Grade 3      Bone exposure to R heel, Hx of IV Abx, cultures show multiple bacterial growth, Hx of Osteomyelitis, HBOT     Family History       Problem Relation (Age of Onset)    Diabetes Father    Hypertension Father          Tobacco Use    Smoking status: Former Smoker    Smokeless  tobacco: Never Used   Substance and Sexual Activity    Alcohol use: Yes     Comment: occasionally    Drug use: Yes     Types: Hydrocodone    Sexual activity: Yes     Review of Systems   Constitutional:  Negative for appetite change and fever.   HENT:  Negative for sore throat. Hearing loss: cough.   Eyes:  Negative for visual disturbance.   Respiratory:  Negative for cough and shortness of breath.    Cardiovascular:  Negative for chest pain.   Gastrointestinal:  Negative for abdominal pain.   Endocrine: Negative.    Genitourinary: Negative.    Musculoskeletal: Negative.    Skin:  Negative for rash and wound.   Neurological:  Negative for numbness.   Hematological: Negative.    Psychiatric/Behavioral: Negative.     Objective:     Vital Signs (Most Recent):  Temp: 96.9 °F (36.1 °C) (03/01/22 1500)  Pulse: 77 (03/01/22 1500)  Resp: 20 (03/01/22 1500)  BP: (!) 134/90 (03/01/22 1500)  SpO2: 99 % (03/01/22 1500)   Vital Signs (24h Range):  Temp:  [96.9 °F (36.1 °C)-98.8 °F (37.1 °C)] 96.9 °F (36.1 °C)  Pulse:  [71-82] 77  Resp:  [18-20] 20  SpO2:  [96 %-100 %] 99 %  BP: (134-166)/() 134/90     Weight: (!) 156.9 kg (345 lb 14.4 oz)  Body mass index is 44.41 kg/m².    Physical Exam  Cardiovascular:      Rate and Rhythm: Normal rate.   Pulmonary:      Effort: Pulmonary effort is normal.   Abdominal:      General: There is no distension.   Musculoskeletal:         General: No swelling.   Skin:     Comments: Plantar foot ulcer  9cm x 6cm x 0.4cm with good granulation tissue throughout.  He still has a tunneling area, very small opening posteriorly.   Neurological:      General: No focal deficit present.      Mental Status: He is alert. Mental status is at baseline.   Psychiatric:         Mood and Affect: Mood normal.       Significant Labs:  I have reviewed all pertinent lab results within the past 24 hours.  CBC:   Recent Labs   Lab 02/27/22  0651   WBC 5.83   RBC 4.51*   HGB 11.7*   HCT 36.5*      MCV 80.9   MCH  25.9*   MCHC 32.1     BMP:   Recent Labs   Lab 02/27/22  0651   *   *   K 4.7      CO2 25   BUN 54*   CREATININE 1.38*   CALCIUM 8.9       Significant Diagnostics:  I have reviewed all pertinent imaging results/findings within the past 24 hours.

## 2022-03-02 NOTE — CONSULTS
[facility]  General Surgery  Consult Note    Patient Name: Jean Pierre Paulson  MRN: [id number]  Code Status: Prior  Admission Date: 2/15/2022  Hospital Length of Stay: 14 days  Attending Physician: Vishnu Ford MD  Primary Care Provider: Mojgan Lomeli NP    Patient information was obtained from patient, past medical records and primary team.     Consults  Subjective:     Principal Problem: Chronic diabetic ulcer of right foot determined by examination    History of Present Illness: 37 y/o male patient, seen by me in the past for management of right foot ulcer.  He has a long history of problems with the foot and was documented to have osteomyelitis, with a positive probe to bone in the past.  I saw him three months ago, and HBO was to be started, but he didn't qualify, and was treated with IV abx.  He is now in HBO (5 treatments, I think) for Grade III diabetic ulcer and was recently seen by Dr. Davis, who suggested a skin graft.  I was asked by Dr. Ford to see patient and consider skin graft placement.  He had a bone scan recently, revealing no evidence of osteomyelitis.        No current facility-administered medications on file prior to encounter.     Current Outpatient Medications on File Prior to Encounter   Medication Sig    amLODIPine (NORVASC) 10 MG tablet Take 1 tablet (10 mg total) by mouth once daily.    blood sugar diagnostic (BLOOD GLUCOSE TEST) Strp 1 strip by Misc.(Non-Drug; Combo Route) route once daily. accu-chek Veronika strips    blood-glucose meter kit 1 each by Other route 3 (three) times daily.    FEROSUL 325 mg (65 mg iron) Tab tablet Take 325 mg by mouth once daily.    gabapentin (NEURONTIN) 300 MG capsule Take 1 capsule (300 mg total) by mouth every 8 (eight) hours.    hydroCHLOROthiazide (HYDRODIURIL) 25 MG tablet Take 1 tablet (25 mg total) by mouth once daily.    rosuvastatin (CRESTOR) 10 MG tablet TAKE 1 TABLET EVERY DAY    [DISCONTINUED] sodium hypochlorite 0.5 %  (DAKIN'S SOLUTION) external solution Apply topically once daily.    empaglifloz/linaglip/metformin (TRIJARDY XR ORAL) Take by mouth once daily.    HYDROcodone-acetaminophen (NORCO)  mg per tablet Take 1 tablet by mouth once daily.       Review of patient's allergies indicates:   Allergen Reactions    Tomato Itching       Past Medical History:   Diagnosis Date    Anemia     Chronic osteomyelitis     Diabetes mellitus with neuropathy     Diabetes mellitus, type 2     DM2 (diabetes mellitus, type 2)     HTN (hypertension)     Hypertension     Neuropathy     Obesity     Osteomyelitis 10/2020    Hx of R foot, Tx with IV Abx    Peripheral vascular disease      Past Surgical History:   Procedure Laterality Date    CHOLECYSTECTOMY      DEBRIDEMENT OF FOOT Right 10/2020    FOOT SURGERY      Right diabetic foot ulcer Elliott's Grade 3      Bone exposure to R heel, Hx of IV Abx, cultures show multiple bacterial growth, Hx of Osteomyelitis, HBOT     Family History       Problem Relation (Age of Onset)    Diabetes Father    Hypertension Father          Tobacco Use    Smoking status: Former Smoker    Smokeless tobacco: Never Used   Substance and Sexual Activity    Alcohol use: Yes     Comment: occasionally    Drug use: Yes     Types: Hydrocodone    Sexual activity: Yes     Review of Systems   Constitutional:  Negative for appetite change and fever.   HENT:  Negative for sore throat. Hearing loss: cough.   Eyes:  Negative for visual disturbance.   Respiratory:  Negative for cough and shortness of breath.    Cardiovascular:  Negative for chest pain.   Gastrointestinal:  Negative for abdominal pain.   Endocrine: Negative.    Genitourinary: Negative.    Musculoskeletal: Negative.    Skin:  Negative for rash and wound.   Neurological:  Negative for numbness.   Hematological: Negative.    Psychiatric/Behavioral: Negative.     Objective:     Vital Signs (Most Recent):  Temp: 96.9 °F (36.1 °C) (03/01/22  1500)  Pulse: 77 (03/01/22 1500)  Resp: 20 (03/01/22 1500)  BP: (!) 134/90 (03/01/22 1500)  SpO2: 99 % (03/01/22 1500)   Vital Signs (24h Range):  Temp:  [96.9 °F (36.1 °C)-98.8 °F (37.1 °C)] 96.9 °F (36.1 °C)  Pulse:  [71-82] 77  Resp:  [18-20] 20  SpO2:  [96 %-100 %] 99 %  BP: (134-166)/() 134/90     Weight: (!) 156.9 kg (345 lb 14.4 oz)  Body mass index is 44.41 kg/m².    Physical Exam  Cardiovascular:      Rate and Rhythm: Normal rate.   Pulmonary:      Effort: Pulmonary effort is normal.   Abdominal:      General: There is no distension.   Musculoskeletal:         General: No swelling.   Skin:     Comments: Plantar foot ulcer  9cm x 6cm x 0.4cm with good granulation tissue throughout.  He still has a tunneling area, very small opening posteriorly.   Neurological:      General: No focal deficit present.      Mental Status: He is alert. Mental status is at baseline.   Psychiatric:         Mood and Affect: Mood normal.       Significant Labs:  I have reviewed all pertinent lab results within the past 24 hours.  CBC:   Recent Labs   Lab 02/27/22  0651   WBC 5.83   RBC 4.51*   HGB 11.7*   HCT 36.5*      MCV 80.9   MCH 25.9*   MCHC 32.1     BMP:   Recent Labs   Lab 02/27/22  0651   *   *   K 4.7      CO2 25   BUN 54*   CREATININE 1.38*   CALCIUM 8.9       Significant Diagnostics:  I have reviewed all pertinent imaging results/findings within the past 24 hours.      Assessment/Plan:     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  Will attempt STSG;  Discussed surgery with the patient to include infection, bleeding, donor site pain.  He understands, wishes to proceed.       VTE Risk Mitigation (From admission, onward)         Ordered     IP VTE LOW RISK PATIENT  Once         02/15/22 1401     Place DONATO hose  Until discontinued         02/15/22 1401                Thank you for your consult. I will follow-up with patient.  Please contact us if you have any additional questions.    Greg Ramírez MD  General Surgery  [facility] - [facility]

## 2022-03-02 NOTE — INTERVAL H&P NOTE
The patient has been examined and the H&P has been reviewed:    I concur with the findings and no changes have occurred since H&P was written.    Surgery risks, benefits and alternative options discussed and understood by patient/family.   119.9

## 2022-03-02 NOTE — ANESTHESIA PREPROCEDURE EVALUATION
03/02/2022  Jean Pierre Paulson is a 36 y.o., male.    Past Medical History:   Diagnosis Date    Anemia     Chronic osteomyelitis     Diabetes mellitus with neuropathy     Diabetes mellitus, type 2     DM2 (diabetes mellitus, type 2)     HTN (hypertension)     Hypertension     Neuropathy     Obesity     Osteomyelitis 10/2020    Hx of R foot, Tx with IV Abx    Peripheral vascular disease        Pre-op Assessment    I have reviewed the Patient Summary Reports.    I have reviewed the NPO Status.   I have reviewed the Medications.     Review of Systems  Social:  Non-Smoker, No Alcohol Use    Hematology/Oncology:  Hematology Normal   Oncology Normal     EENT/Dental:EENT/Dental Normal   Cardiovascular:   Hypertension PVD ECG has been reviewed.    Pulmonary:  Pulmonary Normal    Renal/:  Renal/ Normal     Hepatic/GI:  Hepatic/GI Normal    Musculoskeletal:  Musculoskeletal Normal    Neurological:   Chronic Pain Syndrome   Endocrine:   Diabetes    Dermatological:  Skin Normal    Psych:  Psychiatric Normal           Physical Exam  General: Well nourished, Cooperative, Alert and Oriented    Airway:  Mallampati: II / II  Mouth Opening: Normal  TM Distance: Normal  Neck ROM: Normal ROM    Dental:  Intact    Chest/Lungs:  Clear to auscultation    Heart:  Rate: Normal  Rhythm: Regular Rhythm  Sounds: Normal        Chemistry        Component Value Date/Time     03/02/2022 0705    K 4.5 03/02/2022 0705     03/02/2022 0705    CO2 25 03/02/2022 0705    BUN 45 (H) 03/02/2022 0705    CREATININE 1.24 03/02/2022 0705     (H) 03/02/2022 0705        Component Value Date/Time    CALCIUM 8.4 (L) 03/02/2022 0705    ALKPHOS 154 (H) 04/26/2021 1035    AST 17 04/26/2021 1035    ALT 27 04/26/2021 1035    BILITOT 0.2 04/26/2021 1035    ESTGFRAFRICA 82 11/15/2021 1300    EGFRNONAA 69 03/02/2022 0705        Lab  Results   Component Value Date    WBC 4.79 03/02/2022    RBC 4.66 03/02/2022    HGB 12.2 (L) 03/02/2022    MCV 79.2 (L) 03/02/2022    MCH 26.2 (L) 03/02/2022    MCHC 33.1 03/02/2022    RDW 13.4 03/02/2022     03/02/2022    MPV 10.6 03/02/2022    LYMPH 41.8 (H) 03/02/2022    LYMPH 2.00 03/02/2022    MONO 11.3 (H) 03/02/2022    EOS 0.18 03/02/2022    BASO 0.04 03/02/2022         Anesthesia Plan  Type of Anesthesia, risks & benefits discussed:    Anesthesia Type: Gen Supraglottic Airway  Intra-op Monitoring Plan: Standard ASA Monitors  Post Op Pain Control Plan: multimodal analgesia  Induction:  IV  Airway Plan: Direct  Informed Consent: Informed consent signed with the Patient and all parties understand the risks and agree with anesthesia plan.  All questions answered.   ASA Score: 4  Day of Surgery Review of History & Physical: H&P Update referred to the surgeon/provider.    Ready For Surgery From Anesthesia Perspective.     .

## 2022-03-02 NOTE — PROGRESS NOTES
[facility] Medicine  Progress Note    Patient Name: Jean Pierre Paulson  MRN: [id number]  Patient Class: IP- Inpatient   Admission Date: 2/15/2022  Length of Stay: 15 days  Attending Physician: Vishnu Ford MD  Primary Care Provider: Mogjan Lomeli NP        Subjective:     Principal Problem:Chronic diabetic ulcer of right foot determined by examination        HPI:  The patient is a 35yo AA male with a MedHX of DM2 with neuropathy, chronic osteomyelitis, a necrotic right foot ulcer, anemia, PVD, HTN, HLD, chronic pain syndrome, and amputation of the right big toe. He presented to [facility] on 02/15 for a follow up on wound cultures that had been taken from his chronic right foot plantar ulcer. The wound cultures had grown Staph aureus, ESBL e.coli and proteus mirabilis. The patient says that he had first noticed the ulcer in late 2019 after he stepped on a nail. He says that this was around the time that he had had his right big toe amputated. He came in to the hospital last November when he noticed that the ulcer had grown very large. He had a debridement of the ulcer by Dr. Ramírez at that time. He has been taking Clindamycin PO prior to this admission. A wound care nurse from [facility] has been visiting the patient weekly to treat his wound with silver nitrate and redress it. The patient does also have two small, superficial ulcers on his right second toe and his right ankle.     The patient will be admitted to [facility] under the [facility] (Dr. Casper) for treatment of his infected right foot ulcer and management of other medical problems.       Overview/Hospital Course:  02/21 Awake and sitting up on the side of the bed. Complains of constipation. Had small bm yesterday. Added colace BID. Dressing to right foot ulcer. Continue wound care and merrem.  02/22 Awake and sitting up in chair without complaints. Complained of cough with lisinopril yesterday. Dcd lisinopril.  Will monitor BP. Will start HBO this morning. Continue abx and wound care.  02/23 Mr. 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 States if he doesn't get his business taken care of he may not have anywhere to discharge to later. Dr. Ford discussed treatment he needed for ESBL ecoli and MRSA treatment was IV merrem, wound care and HBO. That this could not be given in home setting. Told him that refusing in pt care could put him at risk for potentially losing his foot. He states he understands and that he wants to discharge home. He will need to remain NWB on right, follow up with [facility] in one week. Wound care orders to be given by [facility].  02/24 Pt no new issues. Getting am meds and HBO.  Plans for pass today. Has to get his housing situation straightened out so doesn't lose his home. Otherwise continue current process. He did wish to take walker with him.  02/25 Did OK on pass and now back. Continue IV ATB. HBO and wound care. Recommended non wt bearing on wound.  02/26 patient remains in good spirits.  He tells me that was told by surgery may have skin graft next week.  Continue to try to remain nonweightbearing on foot.  Continue intravenous antibiotics.  Blood sugar good.  02/27 continues to do well, continue wound care and antibiotic, await surgical plans  02/28 Pt with HBO and wound care today, continue monitor blood sugars and take antibiotic, will discussed with surgery  03/01 Awake and resting in bed. Complains of constipation. States he had miralax yesterday and that it did not work. Mag citrate ordered. Dressing to right foot. Has just came back from HBO.  BP elevated. Will add losartan.   03/02 patient seems in better spirits today.  Seen by Dr. Ramírez with plan skin graft today.  Continue with wound care.  Continue with intravenous antibiotics.  Continue with antibiotics this week and will discuss antibiotic duration with Dr. Casper next week when  she returns.  Continue hyperbaric oxygen.      Interval History:     Review of Systems   Constitutional:  Negative for appetite change, fatigue and fever.   HENT:  Negative for congestion, hearing loss and trouble swallowing.    Respiratory:  Negative for chest tightness, shortness of breath and wheezing.    Cardiovascular:  Negative for chest pain and palpitations.   Gastrointestinal:  Negative for abdominal pain, constipation and nausea.   Genitourinary:  Negative for difficulty urinating and dysuria.   Musculoskeletal:  Negative for back pain and neck stiffness.   Skin:  Positive for wound. Negative for pallor and rash.   Neurological:  Negative for dizziness, speech difficulty and headaches.   Psychiatric/Behavioral:  Positive for sleep disturbance. Negative for confusion and suicidal ideas.    Objective:     Vital Signs (Most Recent):  Temp: 97.4 °F (36.3 °C) (03/02/22 0800)  Pulse: 80 (03/02/22 0800)  Resp: 14 (03/02/22 0800)  BP: (!) 148/84 (03/02/22 0800)  SpO2: 96 % (03/02/22 0800) Vital Signs (24h Range):  Temp:  [96.9 °F (36.1 °C)-98.2 °F (36.8 °C)] 97.4 °F (36.3 °C)  Pulse:  [72-80] 80  Resp:  [14-20] 14  SpO2:  [92 %-99 %] 96 %  BP: (129-153)/() 148/84     Weight: (!) 156.9 kg (345 lb 14.4 oz)  Body mass index is 44.41 kg/m².    Intake/Output Summary (Last 24 hours) at 3/2/2022 0901  Last data filed at 3/1/2022 1715  Gross per 24 hour   Intake 580 ml   Output --   Net 580 ml        Physical Exam  Vitals reviewed.   Constitutional:       General: He is not in acute distress.  Eyes:      Pupils: Pupils are equal, round, and reactive to light.   Cardiovascular:      Rate and Rhythm: Normal rate and regular rhythm.      Pulses: Normal pulses.   Pulmonary:      Effort: Pulmonary effort is normal. No respiratory distress.      Breath sounds: Normal breath sounds. No wheezing.   Abdominal:      General: Bowel sounds are normal. There is no distension.      Tenderness: There is no abdominal tenderness.    Skin:     General: Skin is warm.      Comments: Dressed wound to right foot plantar surface.   Neurological:      General: No focal deficit present.      Mental Status: He is alert, oriented to person, place, and time and easily aroused. Mental status is at baseline.   Psychiatric:         Mood and Affect: Mood normal.         Behavior: Behavior normal.       Significant Labs: All pertinent labs within the past 24 hours have been reviewed.  BMP:   Recent Labs   Lab 03/02/22  0705   *      K 4.5      CO2 25   BUN 45*   CREATININE 1.24   CALCIUM 8.4*       CBC:   Recent Labs   Lab 03/02/22  0705   WBC 4.79   HGB 12.2*   HCT 36.9*          CMP:   Recent Labs   Lab 03/02/22  0705      K 4.5      CO2 25   *   BUN 45*   CREATININE 1.24   CALCIUM 8.4*   ANIONGAP 13   EGFRNONAA 69       Microbiology Results (last 7 days)       ** No results found for the last 168 hours. **          Intake/Output - Last 3 Shifts         02/28 0700  03/01 0659 03/01 0700 03/02 0659 03/02 0700  03/03 0659    P.O. 720 720     IV Piggyback  100     Total Intake(mL/kg) 720 (4.6) 820 (5.2)     Net +720 +820            Urine Occurrence  2 x     Stool Occurrence  1 x                 Significant Imaging: I have reviewed all pertinent imaging results/findings within the past 24 hours.            Assessment/Plan:      * Chronic diabetic ulcer of right foot determined by examination  * Patient's wound cultures from 02/04 taken from ulcer showed growth of ESBL E coli, MSSA and Proteus Mirabilis     2/18 Continue meropenem, local wound care.    2/20 optimize blood sugar control, counseled the patient re diabetic diet (he had midst eating cakes and things like that from family).    02/21 continue merrem and wound care    02/24 continue merrem    03/01 continue merrem, hbo and wound care    Sarcoidosis  stable      Type 2 diabetes mellitus with right diabetic foot ulcer  02/21 continue levemir and SSI        Diabetic neuropathy with neurologic complication  - Patient's latest A1C was 8.4 in November  - Repeat A1C 7.8   - Moderate SSI  - POCT glucose checks   - Diabetic diet   - Levemir 20 units QHS         Peripheral vascular disease  - Continue home gabapentin 300mg q8      Chronic pain syndrome  - gabapentin 300mg q8  - Norco 10mg q6 prn       Chronic osteomyelitis  02/22 HBO this morning  Continue merrem and wound care    ]  02/24 continue merrem and wound care    03/01 continue merrem, hbo and wound care    Primary hypertension  - amlodipine 10mg qd  - hctz 25mg qd    2/19 blood pressure borderline high, but looking at diet orders he has been ordering a lot of salt rich foods.   about this.    2/20 BP persistently uncontrolled, adding lisinopril 5 mg daily, continue amlodipine and hydrochlorothiazide      02/21 continue lisinopril. amlodopine and hctz      03/01 add losartan   02/22 dcd lisinopril due to cough       VTE Risk Mitigation (From admission, onward)         Ordered     IP VTE LOW RISK PATIENT  Once         02/15/22 1401     Place DONATO hose  Until discontinued         02/15/22 1401                Discharge Planning   WOOD: 3/10/2022     Code Status: Prior   Is the patient medically ready for discharge?:     Reason for patient still in hospital (select all that apply): Laboratory test, Treatment, Imaging, Consult recommendations and PT / OT recommendations  Discharge Plan A: Home Health, Home                  Vishnu Ford MD  Department of Hospital Medicine   [facility]

## 2022-03-03 ENCOUNTER — CLINICAL SUPPORT (OUTPATIENT)
Dept: WOUND CARE | Facility: CLINIC | Age: 37
End: 2022-03-03
Attending: FAMILY MEDICINE
Payer: MEDICARE

## 2022-03-03 LAB
GLUCOSE SERPL-MCNC: 145 MG/DL (ref 70–105)
GLUCOSE SERPL-MCNC: 182 MG/DL (ref 70–105)
GLUCOSE SERPL-MCNC: 187 MG/DL (ref 70–105)
GLUCOSE SERPL-MCNC: 197 MG/DL (ref 70–105)

## 2022-03-03 PROCEDURE — 99213 OFFICE O/P EST LOW 20 MIN: CPT | Mod: PBBFAC | Performed by: FAMILY MEDICINE

## 2022-03-03 PROCEDURE — 82962 GLUCOSE BLOOD TEST: CPT

## 2022-03-03 PROCEDURE — 25000003 PHARM REV CODE 250: Performed by: INTERNAL MEDICINE

## 2022-03-03 PROCEDURE — 96372 THER/PROPH/DIAG INJ SC/IM: CPT

## 2022-03-03 PROCEDURE — 11000001 HC ACUTE MED/SURG PRIVATE ROOM

## 2022-03-03 PROCEDURE — 25000003 PHARM REV CODE 250

## 2022-03-03 PROCEDURE — 63600175 PHARM REV CODE 636 W HCPCS

## 2022-03-03 PROCEDURE — 63600175 PHARM REV CODE 636 W HCPCS: Performed by: HOSPITALIST

## 2022-03-03 PROCEDURE — 25000003 PHARM REV CODE 250: Performed by: NURSE PRACTITIONER

## 2022-03-03 PROCEDURE — C9399 UNCLASSIFIED DRUGS OR BIOLOG: HCPCS | Performed by: HOSPITALIST

## 2022-03-03 PROCEDURE — 63600175 PHARM REV CODE 636 W HCPCS: Performed by: INTERNAL MEDICINE

## 2022-03-03 PROCEDURE — 25000003 PHARM REV CODE 250: Performed by: HOSPITALIST

## 2022-03-03 PROCEDURE — 99232 PR SUBSEQUENT HOSPITAL CARE,LEVL II: ICD-10-PCS | Mod: ,,, | Performed by: HOSPITALIST

## 2022-03-03 PROCEDURE — 99232 SBSQ HOSP IP/OBS MODERATE 35: CPT | Mod: ,,, | Performed by: HOSPITALIST

## 2022-03-03 RX ORDER — HYDRALAZINE HYDROCHLORIDE 25 MG/1
25 TABLET, FILM COATED ORAL EVERY 12 HOURS
Status: DISCONTINUED | OUTPATIENT
Start: 2022-03-03 | End: 2022-03-08 | Stop reason: HOSPADM

## 2022-03-03 RX ADMIN — POLYETHYLENE GLYCOL 3350 17 G: 17 POWDER, FOR SOLUTION ORAL at 09:03

## 2022-03-03 RX ADMIN — GABAPENTIN 300 MG: 300 CAPSULE ORAL at 01:03

## 2022-03-03 RX ADMIN — GABAPENTIN 300 MG: 300 CAPSULE ORAL at 07:03

## 2022-03-03 RX ADMIN — GABAPENTIN 300 MG: 300 CAPSULE ORAL at 09:03

## 2022-03-03 RX ADMIN — INSULIN ASPART 1 UNITS: 100 INJECTION, SOLUTION INTRAVENOUS; SUBCUTANEOUS at 09:03

## 2022-03-03 RX ADMIN — DOCUSATE SODIUM 100 MG: 100 CAPSULE, LIQUID FILLED ORAL at 09:03

## 2022-03-03 RX ADMIN — MEROPENEM 500 MG: 500 INJECTION, POWDER, FOR SOLUTION INTRAVENOUS at 11:03

## 2022-03-03 RX ADMIN — Medication 2 CAPSULE: at 01:03

## 2022-03-03 RX ADMIN — HYDRALAZINE HYDROCHLORIDE 25 MG: 25 TABLET ORAL at 09:03

## 2022-03-03 RX ADMIN — ATORVASTATIN CALCIUM 40 MG: 40 TABLET, FILM COATED ORAL at 09:03

## 2022-03-03 RX ADMIN — MEROPENEM 500 MG: 500 INJECTION, POWDER, FOR SOLUTION INTRAVENOUS at 06:03

## 2022-03-03 RX ADMIN — INSULIN ASPART 2 UNITS: 100 INJECTION, SOLUTION INTRAVENOUS; SUBCUTANEOUS at 07:03

## 2022-03-03 RX ADMIN — LOSARTAN POTASSIUM 50 MG: 50 TABLET, FILM COATED ORAL at 09:03

## 2022-03-03 RX ADMIN — INSULIN ASPART 15 UNITS: 100 INJECTION, SUSPENSION SUBCUTANEOUS at 07:03

## 2022-03-03 RX ADMIN — INSULIN ASPART 2 UNITS: 100 INJECTION, SOLUTION INTRAVENOUS; SUBCUTANEOUS at 05:03

## 2022-03-03 RX ADMIN — MEROPENEM 500 MG: 500 INJECTION, POWDER, FOR SOLUTION INTRAVENOUS at 12:03

## 2022-03-03 RX ADMIN — HYDROCODONE BITARTRATE AND ACETAMINOPHEN 1 TABLET: 10; 325 TABLET ORAL at 01:03

## 2022-03-03 RX ADMIN — HYDROCODONE BITARTRATE AND ACETAMINOPHEN 1 TABLET: 10; 325 TABLET ORAL at 09:03

## 2022-03-03 RX ADMIN — Medication 2 CAPSULE: at 09:03

## 2022-03-03 RX ADMIN — INSULIN DETEMIR 35 UNITS: 100 INJECTION, SOLUTION SUBCUTANEOUS at 10:03

## 2022-03-03 RX ADMIN — HYDROCHLOROTHIAZIDE 25 MG: 12.5 TABLET ORAL at 09:03

## 2022-03-03 RX ADMIN — AMLODIPINE BESYLATE 10 MG: 10 TABLET ORAL at 09:03

## 2022-03-03 RX ADMIN — Medication 2 CAPSULE: at 05:03

## 2022-03-03 NOTE — SUBJECTIVE & OBJECTIVE
Interval History:     Review of Systems   Constitutional:  Negative for appetite change, fatigue and fever.   HENT:  Negative for congestion, hearing loss and trouble swallowing.    Respiratory:  Negative for chest tightness, shortness of breath and wheezing.    Cardiovascular:  Negative for chest pain and palpitations.   Gastrointestinal:  Negative for abdominal pain, constipation and nausea.   Genitourinary:  Negative for difficulty urinating and dysuria.   Musculoskeletal:  Negative for back pain and neck stiffness.   Skin:  Positive for wound. Negative for pallor and rash.   Neurological:  Negative for dizziness, speech difficulty and headaches.   Psychiatric/Behavioral:  Positive for sleep disturbance. Negative for confusion and suicidal ideas.    Objective:     Vital Signs (Most Recent):  Temp: 97.7 °F (36.5 °C) (03/03/22 0800)  Pulse: 70 (03/03/22 0800)  Resp: 20 (03/03/22 1328)  BP: 131/81 (03/03/22 0800)  SpO2: 97 % (03/03/22 0800) Vital Signs (24h Range):  Temp:  [97.5 °F (36.4 °C)-98.9 °F (37.2 °C)] 97.7 °F (36.5 °C)  Pulse:  [70-86] 70  Resp:  [11-20] 20  SpO2:  [89 %-98 %] 97 %  BP: (109-152)/(69-97) 131/81     Weight: (!) 156.9 kg (345 lb 14.4 oz)  Body mass index is 44.41 kg/m².    Intake/Output Summary (Last 24 hours) at 3/3/2022 1346  Last data filed at 3/3/2022 0900  Gross per 24 hour   Intake 580 ml   Output 1500 ml   Net -920 ml        Physical Exam  Vitals reviewed.   Constitutional:       General: He is not in acute distress.     Appearance: He is obese.   Eyes:      Pupils: Pupils are equal, round, and reactive to light.   Cardiovascular:      Rate and Rhythm: Normal rate and regular rhythm.      Pulses: Normal pulses.   Pulmonary:      Effort: Pulmonary effort is normal. No respiratory distress.      Breath sounds: Normal breath sounds. No wheezing.   Abdominal:      General: Bowel sounds are normal. There is no distension.      Tenderness: There is no abdominal tenderness.   Skin:      General: Skin is warm.      Comments: Dressed wound to right foot plantar surface.   Neurological:      General: No focal deficit present.      Mental Status: He is alert, oriented to person, place, and time and easily aroused. Mental status is at baseline.   Psychiatric:         Mood and Affect: Mood normal.         Behavior: Behavior normal.       Significant Labs: All pertinent labs within the past 24 hours have been reviewed.  BMP:   Recent Labs   Lab 03/02/22  0705   *      K 4.5      CO2 25   BUN 45*   CREATININE 1.24   CALCIUM 8.4*       CBC:   Recent Labs   Lab 03/02/22  0705   WBC 4.79   HGB 12.2*   HCT 36.9*          CMP:   Recent Labs   Lab 03/02/22  0705      K 4.5      CO2 25   *   BUN 45*   CREATININE 1.24   CALCIUM 8.4*   ANIONGAP 13   EGFRNONAA 69       Microbiology Results (last 7 days)       ** No results found for the last 168 hours. **          Intake/Output - Last 3 Shifts         03/01 0700  03/02 0659 03/02 0700 03/03 0659 03/03 0700  03/04 0659    P.O. 720 480 240    IV Piggyback 100 700     Total Intake(mL/kg) 820 (5.2) 1180 (7.5) 240 (1.5)    Urine (mL/kg/hr)  900 (0.2) 600 (0.6)    Other  0     Stool   0    Total Output  900 600    Net +820 +280 -360           Urine Occurrence 2 x  1 x    Stool Occurrence 1 x  1 x                Significant Imaging: I have reviewed all pertinent imaging results/findings within the past 24 hours.

## 2022-03-03 NOTE — PROGRESS NOTES
Tolerated HBOT well. Dressing to right foot intact with wound vac clamped off. Transferred back to room 135 via stretcher in stable condition

## 2022-03-03 NOTE — PROGRESS NOTES
[facility] Medicine  Progress Note    Patient Name: Jean Pierre Paulson  MRN: [id number]  Patient Class: IP- Inpatient   Admission Date: 2/15/2022  Length of Stay: 16 days  Attending Physician: Vishnu Ford MD  Primary Care Provider: Mojgan Lomeli NP        Subjective:     Principal Problem:Chronic diabetic ulcer of right foot determined by examination        HPI:  The patient is a 35yo AA male with a MedHX of DM2 with neuropathy, chronic osteomyelitis, a necrotic right foot ulcer, anemia, PVD, HTN, HLD, chronic pain syndrome, and amputation of the right big toe. He presented to the [facility] on 02/15 for a follow up on wound cultures that had been taken from his chronic right foot plantar ulcer. The wound cultures had grown Staph aureus, ESBL e.coli and proteus mirabilis. The patient says that he had first noticed the ulcer in late 2019 after he stepped on a nail. He says that this was around the time that he had had his right big toe amputated. He came in to the hospital last November when he noticed that the ulcer had grown very large. He had a debridement of the ulcer by Dr. Ramírez at that time. He has been taking Clindamycin PO prior to this admission. A wound care nurse from [facility] has been visiting the patient weekly to treat his wound with silver nitrate and redress it. The patient does also have two small, superficial ulcers on his right second toe and his right ankle.     The patient will be admitted to [facility] under the [facility] (Dr. Casper) for treatment of his infected right foot ulcer and management of other medical problems.       Overview/Hospital Course:  02/21 Awake and sitting up on the side of the bed. Complains of constipation. Had small bm yesterday. Added colace BID. Dressing to right foot ulcer. Continue wound care and merrem.  02/22 Awake and sitting up in chair without complaints. Complained of cough with lisinopril yesterday. Dcd lisinopril.  Will monitor BP. Will start HBO this morning. Continue abx and wound care.  02/23 Mr. 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 States if he doesn't get his business taken care of he may not have anywhere to discharge to later. Dr. Ford discussed treatment he needed for ESBL ecoli and MRSA treatment was IV merrem, wound care and HBO. That this could not be given in home setting. Told him that refusing in pt care could put him at risk for potentially losing his foot. He states he understands and that he wants to discharge home. He will need to remain NWB on right, follow up with [facility] in one week. Wound care orders to be given by [facility].  02/24 Pt no new issues. Getting am meds and HBO.  Plans for pass today. Has to get his housing situation straightened out so doesn't lose his home. Otherwise continue current process. He did wish to take walker with him.  02/25 Did OK on pass and now back. Continue IV ATB. HBO and wound care. Recommended non wt bearing on wound.  02/26 patient remains in good spirits.  He tells me that was told by surgery may have skin graft next week.  Continue to try to remain nonweightbearing on foot.  Continue intravenous antibiotics.  Blood sugar good.  02/27 continues to do well, continue wound care and antibiotic, await surgical plans  02/28 Pt with HBO and wound care today, continue monitor blood sugars and take antibiotic, will discussed with surgery  03/01 Awake and resting in bed. Complains of constipation. States he had miralax yesterday and that it did not work. Mag citrate ordered. Dressing to right foot. Has just came back from HBO.  BP elevated. Will add losartan.   03/02 patient seems in better spirits today.  Seen by Dr. Ramírez with plan skin graft today.  Continue with wound care.  Continue with intravenous antibiotics.  Continue with antibiotics this week and will discuss antibiotic duration with Dr. Casper next week when  she returns.  Continue hyperbaric oxygen.  03/03 continue to monitor.  Continue with antibiotics and wound care as previous.  Blood sugar and blood pressure both some but generally doing okay.  Patient to keep weight off of foot.          Interval History:     Review of Systems   Constitutional:  Negative for appetite change, fatigue and fever.   HENT:  Negative for congestion, hearing loss and trouble swallowing.    Respiratory:  Negative for chest tightness, shortness of breath and wheezing.    Cardiovascular:  Negative for chest pain and palpitations.   Gastrointestinal:  Negative for abdominal pain, constipation and nausea.   Genitourinary:  Negative for difficulty urinating and dysuria.   Musculoskeletal:  Negative for back pain and neck stiffness.   Skin:  Positive for wound. Negative for pallor and rash.   Neurological:  Negative for dizziness, speech difficulty and headaches.   Psychiatric/Behavioral:  Positive for sleep disturbance. Negative for confusion and suicidal ideas.    Objective:     Vital Signs (Most Recent):  Temp: 97.7 °F (36.5 °C) (03/03/22 0800)  Pulse: 70 (03/03/22 0800)  Resp: 20 (03/03/22 1328)  BP: 131/81 (03/03/22 0800)  SpO2: 97 % (03/03/22 0800) Vital Signs (24h Range):  Temp:  [97.5 °F (36.4 °C)-98.9 °F (37.2 °C)] 97.7 °F (36.5 °C)  Pulse:  [70-86] 70  Resp:  [11-20] 20  SpO2:  [89 %-98 %] 97 %  BP: (109-152)/(69-97) 131/81     Weight: (!) 156.9 kg (345 lb 14.4 oz)  Body mass index is 44.41 kg/m².    Intake/Output Summary (Last 24 hours) at 3/3/2022 1346  Last data filed at 3/3/2022 0900  Gross per 24 hour   Intake 580 ml   Output 1500 ml   Net -920 ml        Physical Exam  Vitals reviewed.   Constitutional:       General: He is not in acute distress.     Appearance: He is obese.   Eyes:      Pupils: Pupils are equal, round, and reactive to light.   Cardiovascular:      Rate and Rhythm: Normal rate and regular rhythm.      Pulses: Normal pulses.   Pulmonary:      Effort: Pulmonary  effort is normal. No respiratory distress.      Breath sounds: Normal breath sounds. No wheezing.   Abdominal:      General: Bowel sounds are normal. There is no distension.      Tenderness: There is no abdominal tenderness.   Skin:     General: Skin is warm.      Comments: Dressed wound to right foot plantar surface.   Neurological:      General: No focal deficit present.      Mental Status: He is alert, oriented to person, place, and time and easily aroused. Mental status is at baseline.   Psychiatric:         Mood and Affect: Mood normal.         Behavior: Behavior normal.       Significant Labs: All pertinent labs within the past 24 hours have been reviewed.  BMP:   Recent Labs   Lab 03/02/22  0705   *      K 4.5      CO2 25   BUN 45*   CREATININE 1.24   CALCIUM 8.4*       CBC:   Recent Labs   Lab 03/02/22  0705   WBC 4.79   HGB 12.2*   HCT 36.9*          CMP:   Recent Labs   Lab 03/02/22  0705      K 4.5      CO2 25   *   BUN 45*   CREATININE 1.24   CALCIUM 8.4*   ANIONGAP 13   EGFRNONAA 69       Microbiology Results (last 7 days)       ** No results found for the last 168 hours. **          Intake/Output - Last 3 Shifts         03/01 0700  03/02 0659 03/02 0700  03/03 0659 03/03 0700  03/04 0659    P.O. 720 480 240    IV Piggyback 100 700     Total Intake(mL/kg) 820 (5.2) 1180 (7.5) 240 (1.5)    Urine (mL/kg/hr)  900 (0.2) 600 (0.6)    Other  0     Stool   0    Total Output  900 600    Net +820 +280 -360           Urine Occurrence 2 x  1 x    Stool Occurrence 1 x  1 x                Significant Imaging: I have reviewed all pertinent imaging results/findings within the past 24 hours.            Assessment/Plan:      * Chronic diabetic ulcer of right foot determined by examination  * Patient's wound cultures from 02/04 taken from ulcer showed growth of ESBL E coli, MSSA and Proteus Mirabilis     2/18 Continue meropenem, local wound care.    2/20 optimize blood sugar  control, counseled the patient re diabetic diet (he had midst eating cakes and things like that from family).    02/21 continue merrem and wound care    02/24 continue merrem    03/01 continue merrem, hbo and wound care    Sarcoidosis  stable      Type 2 diabetes mellitus with right diabetic foot ulcer  02/21 continue levemir and SSI       Diabetic neuropathy with neurologic complication  - Patient's latest A1C was 8.4 in November  - Repeat A1C 7.8   - Moderate SSI  - POCT glucose checks   - Diabetic diet   - Levemir 20 units QHS         Peripheral vascular disease  - Continue home gabapentin 300mg q8      Chronic pain syndrome  - gabapentin 300mg q8  - Norco 10mg q6 prn       Chronic osteomyelitis  02/22 HBO this morning  Continue merrem and wound care    ]  02/24 continue merrem and wound care    03/01 continue merrem, hbo and wound care    Primary hypertension  - amlodipine 10mg qd  - hctz 25mg qd    2/19 blood pressure borderline high, but looking at diet orders he has been ordering a lot of salt rich foods.   about this.    2/20 BP persistently uncontrolled, adding lisinopril 5 mg daily, continue amlodipine and hydrochlorothiazide      02/21 continue lisinopril. amlodopine and hctz      03/01 add losartan   02/22 dcd lisinopril due to cough       VTE Risk Mitigation (From admission, onward)         Ordered     IP VTE LOW RISK PATIENT  Once         02/15/22 1401     Place DONATO hose  Until discontinued         02/15/22 1401                Discharge Planning   WOOD: 3/10/2022     Code Status: Prior   Is the patient medically ready for discharge?:     Reason for patient still in hospital (select all that apply): Laboratory test, Treatment, Imaging and Consult recommendations  Discharge Plan A: Home Health, Home                  Vishnu Ford MD  Department of Hospital Medicine   [facility]

## 2022-03-04 ENCOUNTER — CLINICAL SUPPORT (OUTPATIENT)
Dept: WOUND CARE | Facility: CLINIC | Age: 37
End: 2022-03-04
Payer: MEDICARE

## 2022-03-04 LAB
GLUCOSE SERPL-MCNC: 117 MG/DL (ref 70–105)
GLUCOSE SERPL-MCNC: 194 MG/DL (ref 70–105)
GLUCOSE SERPL-MCNC: 229 MG/DL (ref 70–105)
GLUCOSE SERPL-MCNC: 246 MG/DL (ref 70–105)
GLUCOSE SERPL-MCNC: 280 MG/DL (ref 70–105)

## 2022-03-04 PROCEDURE — 63600175 PHARM REV CODE 636 W HCPCS: Performed by: HOSPITALIST

## 2022-03-04 PROCEDURE — 25000003 PHARM REV CODE 250: Performed by: INTERNAL MEDICINE

## 2022-03-04 PROCEDURE — 99232 PR SUBSEQUENT HOSPITAL CARE,LEVL II: ICD-10-PCS | Mod: ,,, | Performed by: HOSPITALIST

## 2022-03-04 PROCEDURE — 96372 THER/PROPH/DIAG INJ SC/IM: CPT

## 2022-03-04 PROCEDURE — 99213 OFFICE O/P EST LOW 20 MIN: CPT | Mod: PBBFAC

## 2022-03-04 PROCEDURE — 99232 SBSQ HOSP IP/OBS MODERATE 35: CPT | Mod: ,,, | Performed by: HOSPITALIST

## 2022-03-04 PROCEDURE — C9399 UNCLASSIFIED DRUGS OR BIOLOG: HCPCS | Performed by: HOSPITALIST

## 2022-03-04 PROCEDURE — 63600175 PHARM REV CODE 636 W HCPCS: Performed by: INTERNAL MEDICINE

## 2022-03-04 PROCEDURE — 25000003 PHARM REV CODE 250

## 2022-03-04 PROCEDURE — 63600175 PHARM REV CODE 636 W HCPCS

## 2022-03-04 PROCEDURE — 25000003 PHARM REV CODE 250: Performed by: NURSE PRACTITIONER

## 2022-03-04 PROCEDURE — 82962 GLUCOSE BLOOD TEST: CPT

## 2022-03-04 PROCEDURE — 11000001 HC ACUTE MED/SURG PRIVATE ROOM

## 2022-03-04 PROCEDURE — 25000003 PHARM REV CODE 250: Performed by: HOSPITALIST

## 2022-03-04 RX ADMIN — AMLODIPINE BESYLATE 10 MG: 10 TABLET ORAL at 08:03

## 2022-03-04 RX ADMIN — HYDROCHLOROTHIAZIDE 25 MG: 12.5 TABLET ORAL at 08:03

## 2022-03-04 RX ADMIN — GABAPENTIN 300 MG: 300 CAPSULE ORAL at 09:03

## 2022-03-04 RX ADMIN — LOSARTAN POTASSIUM 50 MG: 50 TABLET, FILM COATED ORAL at 08:03

## 2022-03-04 RX ADMIN — MEROPENEM 500 MG: 500 INJECTION, POWDER, FOR SOLUTION INTRAVENOUS at 11:03

## 2022-03-04 RX ADMIN — MEROPENEM 500 MG: 500 INJECTION, POWDER, FOR SOLUTION INTRAVENOUS at 05:03

## 2022-03-04 RX ADMIN — Medication 2 CAPSULE: at 11:03

## 2022-03-04 RX ADMIN — INSULIN DETEMIR 35 UNITS: 100 INJECTION, SOLUTION SUBCUTANEOUS at 09:03

## 2022-03-04 RX ADMIN — ATORVASTATIN CALCIUM 40 MG: 40 TABLET, FILM COATED ORAL at 09:03

## 2022-03-04 RX ADMIN — GABAPENTIN 300 MG: 300 CAPSULE ORAL at 07:03

## 2022-03-04 RX ADMIN — DOCUSATE SODIUM 100 MG: 100 CAPSULE, LIQUID FILLED ORAL at 08:03

## 2022-03-04 RX ADMIN — HYDROCODONE BITARTRATE AND ACETAMINOPHEN 1 TABLET: 10; 325 TABLET ORAL at 11:03

## 2022-03-04 RX ADMIN — Medication 2 CAPSULE: at 05:03

## 2022-03-04 RX ADMIN — Medication 2 CAPSULE: at 08:03

## 2022-03-04 RX ADMIN — MEROPENEM 500 MG: 500 INJECTION, POWDER, FOR SOLUTION INTRAVENOUS at 07:03

## 2022-03-04 RX ADMIN — DOCUSATE SODIUM 100 MG: 100 CAPSULE, LIQUID FILLED ORAL at 09:03

## 2022-03-04 RX ADMIN — INSULIN ASPART 2 UNITS: 100 INJECTION, SOLUTION INTRAVENOUS; SUBCUTANEOUS at 08:03

## 2022-03-04 RX ADMIN — INSULIN ASPART 6 UNITS: 100 INJECTION, SOLUTION INTRAVENOUS; SUBCUTANEOUS at 05:03

## 2022-03-04 RX ADMIN — INSULIN ASPART 4 UNITS: 100 INJECTION, SOLUTION INTRAVENOUS; SUBCUTANEOUS at 07:03

## 2022-03-04 RX ADMIN — HYDRALAZINE HYDROCHLORIDE 25 MG: 25 TABLET ORAL at 09:03

## 2022-03-04 RX ADMIN — HYDRALAZINE HYDROCHLORIDE 25 MG: 25 TABLET ORAL at 08:03

## 2022-03-04 RX ADMIN — INSULIN ASPART 15 UNITS: 100 INJECTION, SUSPENSION SUBCUTANEOUS at 07:03

## 2022-03-04 RX ADMIN — GABAPENTIN 300 MG: 300 CAPSULE ORAL at 04:03

## 2022-03-04 NOTE — PLAN OF CARE
Per IDTM will continue with current plan of care. Pt wanting to discharge soon. Dr Ford wanting pt to see ID before he discharges. SS will follow up on Monday

## 2022-03-04 NOTE — PROGRESS NOTES
Wound care note;  Patient back from surgery.   Has BEVERLY and NPWT  Device to Right plantar foot. NPWT suctioning at 80 mmHg continuously.  Wound care team to follow.

## 2022-03-05 LAB
GLUCOSE SERPL-MCNC: 197 MG/DL (ref 70–105)
GLUCOSE SERPL-MCNC: 221 MG/DL (ref 70–105)
GLUCOSE SERPL-MCNC: 232 MG/DL (ref 70–105)
GLUCOSE SERPL-MCNC: 234 MG/DL (ref 70–105)

## 2022-03-05 PROCEDURE — 63600175 PHARM REV CODE 636 W HCPCS

## 2022-03-05 PROCEDURE — 11000001 HC ACUTE MED/SURG PRIVATE ROOM

## 2022-03-05 PROCEDURE — 25000003 PHARM REV CODE 250: Performed by: HOSPITALIST

## 2022-03-05 PROCEDURE — 63600175 PHARM REV CODE 636 W HCPCS: Performed by: INTERNAL MEDICINE

## 2022-03-05 PROCEDURE — 25000003 PHARM REV CODE 250

## 2022-03-05 PROCEDURE — 99232 PR SUBSEQUENT HOSPITAL CARE,LEVL II: ICD-10-PCS | Mod: ,,, | Performed by: HOSPITALIST

## 2022-03-05 PROCEDURE — 25000003 PHARM REV CODE 250: Performed by: INTERNAL MEDICINE

## 2022-03-05 PROCEDURE — 99232 SBSQ HOSP IP/OBS MODERATE 35: CPT | Mod: ,,, | Performed by: HOSPITALIST

## 2022-03-05 PROCEDURE — 82962 GLUCOSE BLOOD TEST: CPT

## 2022-03-05 PROCEDURE — 25000003 PHARM REV CODE 250: Performed by: NURSE PRACTITIONER

## 2022-03-05 PROCEDURE — 63600175 PHARM REV CODE 636 W HCPCS: Performed by: HOSPITALIST

## 2022-03-05 PROCEDURE — 96372 THER/PROPH/DIAG INJ SC/IM: CPT

## 2022-03-05 PROCEDURE — C9399 UNCLASSIFIED DRUGS OR BIOLOG: HCPCS | Performed by: HOSPITALIST

## 2022-03-05 RX ADMIN — GABAPENTIN 300 MG: 300 CAPSULE ORAL at 02:03

## 2022-03-05 RX ADMIN — DOCUSATE SODIUM 100 MG: 100 CAPSULE, LIQUID FILLED ORAL at 09:03

## 2022-03-05 RX ADMIN — GABAPENTIN 300 MG: 300 CAPSULE ORAL at 05:03

## 2022-03-05 RX ADMIN — Medication 2 CAPSULE: at 04:03

## 2022-03-05 RX ADMIN — HYDRALAZINE HYDROCHLORIDE 25 MG: 25 TABLET ORAL at 09:03

## 2022-03-05 RX ADMIN — HYDROCODONE BITARTRATE AND ACETAMINOPHEN 1 TABLET: 10; 325 TABLET ORAL at 09:03

## 2022-03-05 RX ADMIN — HYDROCHLOROTHIAZIDE 25 MG: 12.5 TABLET ORAL at 09:03

## 2022-03-05 RX ADMIN — INSULIN ASPART 15 UNITS: 100 INJECTION, SUSPENSION SUBCUTANEOUS at 06:03

## 2022-03-05 RX ADMIN — INSULIN ASPART 2 UNITS: 100 INJECTION, SOLUTION INTRAVENOUS; SUBCUTANEOUS at 09:03

## 2022-03-05 RX ADMIN — INSULIN DETEMIR 40 UNITS: 100 INJECTION, SOLUTION SUBCUTANEOUS at 09:03

## 2022-03-05 RX ADMIN — Medication 2 CAPSULE: at 07:03

## 2022-03-05 RX ADMIN — POLYETHYLENE GLYCOL 3350 17 G: 17 POWDER, FOR SOLUTION ORAL at 09:03

## 2022-03-05 RX ADMIN — MEROPENEM 500 MG: 500 INJECTION, POWDER, FOR SOLUTION INTRAVENOUS at 06:03

## 2022-03-05 RX ADMIN — MEROPENEM 500 MG: 500 INJECTION, POWDER, FOR SOLUTION INTRAVENOUS at 05:03

## 2022-03-05 RX ADMIN — AMLODIPINE BESYLATE 10 MG: 10 TABLET ORAL at 09:03

## 2022-03-05 RX ADMIN — INSULIN ASPART 4 UNITS: 100 INJECTION, SOLUTION INTRAVENOUS; SUBCUTANEOUS at 05:03

## 2022-03-05 RX ADMIN — MEROPENEM 500 MG: 500 INJECTION, POWDER, FOR SOLUTION INTRAVENOUS at 12:03

## 2022-03-05 RX ADMIN — ATORVASTATIN CALCIUM 40 MG: 40 TABLET, FILM COATED ORAL at 09:03

## 2022-03-05 RX ADMIN — GABAPENTIN 300 MG: 300 CAPSULE ORAL at 09:03

## 2022-03-05 RX ADMIN — INSULIN ASPART 2 UNITS: 100 INJECTION, SOLUTION INTRAVENOUS; SUBCUTANEOUS at 12:03

## 2022-03-05 RX ADMIN — INSULIN ASPART 4 UNITS: 100 INJECTION, SOLUTION INTRAVENOUS; SUBCUTANEOUS at 04:03

## 2022-03-05 RX ADMIN — Medication 2 CAPSULE: at 12:03

## 2022-03-05 RX ADMIN — LOSARTAN POTASSIUM 50 MG: 50 TABLET, FILM COATED ORAL at 09:03

## 2022-03-05 NOTE — PROGRESS NOTES
[facility] Medicine  Progress Note    Patient Name: Jean Pierre Paulson  MRN: [id number]  Patient Class: IP- Inpatient   Admission Date: 2/15/2022  Length of Stay: 17 days  Attending Physician: Vishnu Ford MD  Primary Care Provider: Mojgan Lomeli NP        Subjective:     Principal Problem:Chronic diabetic ulcer of right foot determined by examination        HPI:  The patient is a 37yo AA male with a MedHX of DM2 with neuropathy, chronic osteomyelitis, a necrotic right foot ulcer, anemia, PVD, HTN, HLD, chronic pain syndrome, and amputation of the right big toe. He presented to [facility] on 02/15 for a follow up on wound cultures that had been taken from his chronic right foot plantar ulcer. The wound cultures had grown Staph aureus, ESBL e.coli and proteus mirabilis. The patient says that he had first noticed the ulcer in late 2019 after he stepped on a nail. He says that this was around the time that he had had his right big toe amputated. He came in to the hospital last November when he noticed that the ulcer had grown very large. He had a debridement of the ulcer by Dr. Ramírez at that time. He has been taking Clindamycin PO prior to this admission. A wound care nurse from [facility] has been visiting the patient weekly to treat his wound with silver nitrate and redress it. The patient does also have two small, superficial ulcers on his right second toe and his right ankle.     The patient will be admitted to [facility] under the [facility] (Dr. Casper) for treatment of his infected right foot ulcer and management of other medical problems.       Overview/Hospital Course:  02/21 Awake and sitting up on the side of the bed. Complains of constipation. Had small bm yesterday. Added colace BID. Dressing to right foot ulcer. Continue wound care and merrem.  02/22 Awake and sitting up in chair without complaints. Complained of cough with lisinopril yesterday. Dcd lisinopril.  Will monitor BP. Will start HBO this morning. Continue abx and wound care.  02/23 Mr. 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 States if he doesn't get his business taken care of he may not have anywhere to discharge to later. Dr. Ford discussed treatment he needed for ESBL ecoli and MRSA treatment was IV merrem, wound care and HBO. That this could not be given in home setting. Told him that refusing in pt care could put him at risk for potentially losing his foot. He states he understands and that he wants to discharge home. He will need to remain NWB on right, follow up with [facility] in one week. Wound care orders to be given by [facility].  02/24 Pt no new issues. Getting am meds and HBO.  Plans for pass today. Has to get his housing situation straightened out so doesn't lose his home. Otherwise continue current process. He did wish to take walker with him.  02/25 Did OK on pass and now back. Continue IV ATB. HBO and wound care. Recommended non wt bearing on wound.  02/26 patient remains in good spirits.  He tells me that was told by surgery may have skin graft next week.  Continue to try to remain nonweightbearing on foot.  Continue intravenous antibiotics.  Blood sugar good.  02/27 continues to do well, continue wound care and antibiotic, await surgical plans  02/28 Pt with HBO and wound care today, continue monitor blood sugars and take antibiotic, will discussed with surgery  03/01 Awake and resting in bed. Complains of constipation. States he had miralax yesterday and that it did not work. Mag citrate ordered. Dressing to right foot. Has just came back from HBO.  BP elevated. Will add losartan.   03/02 patient seems in better spirits today.  Seen by Dr. Ramírez with plan skin graft today.  Continue with wound care.  Continue with intravenous antibiotics.  Continue with antibiotics this week and will discuss antibiotic duration with Dr. Casper next week when  she returns.  Continue hyperbaric oxygen.  03/03 continue to monitor.  Continue with antibiotics and wound care as previous.  Blood sugar and blood pressure both some but generally doing okay.  Patient to keep weight off of foot.  03/04 seems better spirits today. Continue ATB and wound care. Adjust insulin.        Interval History:     Review of Systems   Constitutional:  Negative for appetite change, fatigue and fever.   HENT:  Negative for congestion, hearing loss and trouble swallowing.    Respiratory:  Negative for chest tightness, shortness of breath and wheezing.    Cardiovascular:  Negative for chest pain and palpitations.   Gastrointestinal:  Negative for abdominal pain, constipation and nausea.   Genitourinary:  Negative for difficulty urinating and dysuria.   Musculoskeletal:  Negative for back pain and neck stiffness.   Skin:  Positive for wound. Negative for pallor and rash.   Neurological:  Negative for dizziness, speech difficulty and headaches.   Psychiatric/Behavioral:  Positive for sleep disturbance. Negative for confusion and suicidal ideas.    Objective:     Vital Signs (Most Recent):  Temp: 97.9 °F (36.6 °C) (03/04/22 2000)  Pulse: 96 (03/04/22 2000)  Resp: 16 (03/04/22 2000)  BP: 119/68 (03/04/22 2000)  SpO2: 95 % (03/04/22 2000) Vital Signs (24h Range):  Temp:  [97.2 °F (36.2 °C)-98.7 °F (37.1 °C)] 97.9 °F (36.6 °C)  Pulse:  [71-96] 96  Resp:  [16-20] 16  SpO2:  [95 %-100 %] 95 %  BP: (118-128)/(68-81) 119/68     Weight: (!) 156.9 kg (345 lb 14.4 oz)  Body mass index is 44.41 kg/m².    Intake/Output Summary (Last 24 hours) at 3/4/2022 2147  Last data filed at 3/4/2022 2100  Gross per 24 hour   Intake 120 ml   Output --   Net 120 ml        Physical Exam  Vitals reviewed.   Constitutional:       General: He is not in acute distress.     Appearance: He is obese.   Eyes:      Pupils: Pupils are equal, round, and reactive to light.   Cardiovascular:      Rate and Rhythm: Normal rate and regular  rhythm.      Pulses: Normal pulses.   Pulmonary:      Effort: Pulmonary effort is normal. No respiratory distress.      Breath sounds: Normal breath sounds. No wheezing.   Abdominal:      General: Bowel sounds are normal. There is no distension.      Tenderness: There is no abdominal tenderness.   Skin:     General: Skin is warm.      Comments: Dressed wound to right foot plantar surface.   Neurological:      General: No focal deficit present.      Mental Status: He is alert, oriented to person, place, and time and easily aroused. Mental status is at baseline.   Psychiatric:         Mood and Affect: Mood normal.         Behavior: Behavior normal.       Significant Labs: All pertinent labs within the past 24 hours have been reviewed.  BMP: No results for input(s): GLU, NA, K, CL, CO2, BUN, CREATININE, CALCIUM, MG in the last 48 hours.    CBC: No results for input(s): WBC, HGB, HCT, PLT in the last 48 hours.    CMP: No results for input(s): NA, K, CL, CO2, GLU, BUN, CREATININE, CALCIUM, PROT, ALBUMIN, BILITOT, ALKPHOS, AST, ALT, ANIONGAP, EGFRNONAA in the last 48 hours.    Invalid input(s): [facility]    Microbiology Results (last 7 days)       ** No results found for the last 168 hours. **          Intake/Output - Last 3 Shifts         03/03 0700  03/04 0659 03/04 0700  03/05 0659    P.O. 720 120    IV Piggyback 100     Total Intake(mL/kg) 820 (5.2) 120 (0.8)    Urine (mL/kg/hr) 1300 (0.3)     Other 0     Stool 0     Total Output 1300     Net -480 +120          Urine Occurrence 1 x     Stool Occurrence 1 x                 Significant Imaging: I have reviewed all pertinent imaging results/findings within the past 24 hours.            Assessment/Plan:      * Chronic diabetic ulcer of right foot determined by examination  * Patient's wound cultures from 02/04 taken from ulcer showed growth of ESBL E coli, MSSA and Proteus Mirabilis     2/18 Continue meropenem, local wound care.    2/20 optimize blood sugar control,  counseled the patient re diabetic diet (he had midst eating cakes and things like that from family).    02/21 continue merrem and wound care    02/24 continue merrem        03/01 continue merrem, hbo and wound care    Sarcoidosis  stable      Type 2 diabetes mellitus with right diabetic foot ulcer  02/21 continue levemir and SSI       Diabetic neuropathy with neurologic complication  - Patient's latest A1C was 8.4 in November  - Repeat A1C 7.8   - Moderate SSI  - POCT glucose checks   - Diabetic diet   - Levemir 20 units QHS         Peripheral vascular disease  - Continue home gabapentin 300mg q8      Chronic pain syndrome  - gabapentin 300mg q8  - Norco 10mg q6 prn       Chronic osteomyelitis  02/22 HBO this morning  Continue merrem and wound care    ]  02/24 continue merrem and wound care    03/01 continue merrem, hbo and wound care    Primary hypertension  - amlodipine 10mg qd  - hctz 25mg qd    2/19 blood pressure borderline high, but looking at diet orders he has been ordering a lot of salt rich foods.   about this.    2/20 BP persistently uncontrolled, adding lisinopril 5 mg daily, continue amlodipine and hydrochlorothiazide      02/21 continue lisinopril. amlodopine and hctz      03/01 add losartan   02/22 dcd lisinopril due to cough       VTE Risk Mitigation (From admission, onward)         Ordered     IP VTE LOW RISK PATIENT  Once         02/15/22 1401     Place DONATO hose  Until discontinued         02/15/22 1401                Discharge Planning   WOOD: 3/10/2022     Code Status: Prior   Is the patient medically ready for discharge?:     Reason for patient still in hospital (select all that apply): Laboratory test, Treatment and Imaging  Discharge Plan A: Home Health, Home                  Vishnu Ford MD  Department of Hospital Medicine   [facility]   0

## 2022-03-05 NOTE — PROGRESS NOTES
[facility] Medicine  Progress Note    Patient Name: Jean Pierre Paulson  MRN: [id number]  Patient Class: IP- Inpatient   Admission Date: 2/15/2022  Length of Stay: 18 days  Attending Physician: Vishnu Ford MD  Primary Care Provider: Mojgan Lomeli NP        Subjective:     Principal Problem:Chronic diabetic ulcer of right foot determined by examination        HPI:  The patient is a 35yo AA male with a MedHX of DM2 with neuropathy, chronic osteomyelitis, a necrotic right foot ulcer, anemia, PVD, HTN, HLD, chronic pain syndrome, and amputation of the right big toe. He presented to [facility] on 02/15 for a follow up on wound cultures that had been taken from his chronic right foot plantar ulcer. The wound cultures had grown Staph aureus, ESBL e.coli and proteus mirabilis. The patient says that he had first noticed the ulcer in late 2019 after he stepped on a nail. He says that this was around the time that he had had his right big toe amputated. He came in to the hospital last November when he noticed that the ulcer had grown very large. He had a debridement of the ulcer by Dr. Ramírez at that time. He has been taking Clindamycin PO prior to this admission. A wound care nurse from [facility] has been visiting the patient weekly to treat his wound with silver nitrate and redress it. The patient does also have two small, superficial ulcers on his right second toe and his right ankle.     The patient will be admitted to [facility] under [facility] (Dr. Casper) for treatment of his infected right foot ulcer and management of other medical problems.       Overview/Hospital Course:  02/21 Awake and sitting up on the side of the bed. Complains of constipation. Had small bm yesterday. Added colace BID. Dressing to right foot ulcer. Continue wound care and merrem.  02/22 Awake and sitting up in chair without complaints. Complained of cough with lisinopril yesterday. Dcd lisinopril.  Will monitor BP. Will start HBO this morning. Continue abx and wound care.  02/23 Mr. 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 States if he doesn't get his business taken care of he may not have anywhere to discharge to later. Dr. Ford discussed treatment he needed for ESBL ecoli and MRSA treatment was IV merrem, wound care and HBO. That this could not be given in home setting. Told him that refusing in pt care could put him at risk for potentially losing his foot. He states he understands and that he wants to discharge home. He will need to remain NWB on right, follow up with [facility] in one week. Wound care orders to be given by [facility].  02/24 Pt no new issues. Getting am meds and HBO.  Plans for pass today. Has to get his housing situation straightened out so doesn't lose his home. Otherwise continue current process. He did wish to take walker with him.  02/25 Did OK on pass and now back. Continue IV ATB. HBO and wound care. Recommended non wt bearing on wound.  02/26 patient remains in good spirits.  He tells me that was told by surgery may have skin graft next week.  Continue to try to remain nonweightbearing on foot.  Continue intravenous antibiotics.  Blood sugar good.  02/27 continues to do well, continue wound care and antibiotic, await surgical plans  02/28 Pt with HBO and wound care today, continue monitor blood sugars and take antibiotic, will discussed with surgery  03/01 Awake and resting in bed. Complains of constipation. States he had miralax yesterday and that it did not work. Mag citrate ordered. Dressing to right foot. Has just came back from HBO.  BP elevated. Will add losartan.   03/02 patient seems in better spirits today.  Seen by Dr. Ramírez with plan skin graft today.  Continue with wound care.  Continue with intravenous antibiotics.  Continue with antibiotics this week and will discuss antibiotic duration with Dr. Casper next week when  she returns.  Continue hyperbaric oxygen.  03/03 continue to monitor.  Continue with antibiotics and wound care as previous.  Blood sugar and blood pressure both some but generally doing okay.  Patient to keep weight off of foot.  03/04 seems better spirits today. Continue ATB and wound care. Adjust insulin.  03/05 remains in better spirits.  Continue ATB and wound care.  Blood sugar up some and continue to adjust insulin.        Interval History:     Review of Systems   Constitutional:  Negative for appetite change, fatigue and fever.   HENT:  Negative for congestion, hearing loss and trouble swallowing.    Respiratory:  Negative for chest tightness, shortness of breath and wheezing.    Cardiovascular:  Negative for chest pain and palpitations.   Gastrointestinal:  Negative for abdominal pain, constipation and nausea.   Genitourinary:  Negative for difficulty urinating and dysuria.   Musculoskeletal:  Negative for back pain and neck stiffness.   Skin:  Positive for wound. Negative for pallor and rash.   Neurological:  Negative for dizziness, speech difficulty and headaches.   Psychiatric/Behavioral:  Positive for sleep disturbance. Negative for confusion and suicidal ideas.    Objective:     Vital Signs (Most Recent):  Temp: 97.3 °F (36.3 °C) (03/05/22 1200)  Pulse: 83 (03/05/22 1200)  Resp: 20 (03/05/22 1200)  BP: 138/87 (03/05/22 1200)  SpO2: 97 % (03/05/22 1200) Vital Signs (24h Range):  Temp:  [97.3 °F (36.3 °C)-98.2 °F (36.8 °C)] 97.3 °F (36.3 °C)  Pulse:  [] 83  Resp:  [16-20] 20  SpO2:  [95 %-97 %] 97 %  BP: (103-152)/(68-87) 138/87     Weight: (!) 156.9 kg (345 lb 14.4 oz)  Body mass index is 44.41 kg/m².    Intake/Output Summary (Last 24 hours) at 3/5/2022 1508  Last data filed at 3/5/2022 0600  Gross per 24 hour   Intake 120 ml   Output 1000 ml   Net -880 ml        Physical Exam  Vitals reviewed.   Constitutional:       General: He is not in acute distress.     Appearance: He is obese.   Eyes:       Pupils: Pupils are equal, round, and reactive to light.   Cardiovascular:      Rate and Rhythm: Normal rate and regular rhythm.      Pulses: Normal pulses.   Pulmonary:      Effort: Pulmonary effort is normal. No respiratory distress.      Breath sounds: Normal breath sounds. No wheezing.   Abdominal:      General: Bowel sounds are normal. There is no distension.      Tenderness: There is no abdominal tenderness.   Skin:     General: Skin is warm.      Comments: Dressed wound to right foot plantar surface.   Neurological:      General: No focal deficit present.      Mental Status: He is alert, oriented to person, place, and time and easily aroused. Mental status is at baseline.   Psychiatric:         Mood and Affect: Mood normal.         Behavior: Behavior normal.       Significant Labs: All pertinent labs within the past 24 hours have been reviewed.  BMP: No results for input(s): GLU, NA, K, CL, CO2, BUN, CREATININE, CALCIUM, MG in the last 48 hours.    CBC: No results for input(s): WBC, HGB, HCT, PLT in the last 48 hours.    CMP: No results for input(s): NA, K, CL, CO2, GLU, BUN, CREATININE, CALCIUM, PROT, ALBUMIN, BILITOT, ALKPHOS, AST, ALT, ANIONGAP, EGFRNONAA in the last 48 hours.    Invalid input(s): [facility]    Microbiology Results (last 7 days)       ** No results found for the last 168 hours. **          Intake/Output - Last 3 Shifts         03/03 0700  03/04 0659 03/04 0700  03/05 0659 03/05 0700  03/06 0659    P.O. 720 120     IV Piggyback 100      Total Intake(mL/kg) 820 (5.2) 120 (0.8)     Urine (mL/kg/hr) 1300 (0.3) 1000 (0.3)     Other 0      Stool 0      Total Output 1300 1000     Net -480 -880            Urine Occurrence 1 x      Stool Occurrence 1 x                  Significant Imaging: I have reviewed all pertinent imaging results/findings within the past 24 hours.            Assessment/Plan:      * Chronic diabetic ulcer of right foot determined by examination  * Patient's wound cultures from  02/04 taken from ulcer showed growth of ESBL E coli, MSSA and Proteus Mirabilis     2/18 Continue meropenem, local wound care.    2/20 optimize blood sugar control, counseled the patient re diabetic diet (he had midst eating cakes and things like that from family).    02/21 continue merrem and wound care    02/24 continue merrem        03/01 continue merrem, hbo and wound care    Sarcoidosis  stable      Type 2 diabetes mellitus with right diabetic foot ulcer  02/21 continue levemir and SSI       Diabetic neuropathy with neurologic complication  - Patient's latest A1C was 8.4 in November  - Repeat A1C 7.8   - Moderate SSI  - POCT glucose checks   - Diabetic diet   - Levemir 20 units QHS         Peripheral vascular disease  - Continue home gabapentin 300mg q8      Chronic pain syndrome  - gabapentin 300mg q8  - Norco 10mg q6 prn       Chronic osteomyelitis  02/22 HBO this morning  Continue merrem and wound care    ]  02/24 continue merrem and wound care    03/01 continue merrem, hbo and wound care    Primary hypertension  - amlodipine 10mg qd  - hctz 25mg qd    2/19 blood pressure borderline high, but looking at diet orders he has been ordering a lot of salt rich foods.   about this.    2/20 BP persistently uncontrolled, adding lisinopril 5 mg daily, continue amlodipine and hydrochlorothiazide      02/21 continue lisinopril. amlodopine and hctz      03/01 add losartan   02/22 dcd lisinopril due to cough       VTE Risk Mitigation (From admission, onward)         Ordered     IP VTE LOW RISK PATIENT  Once         02/15/22 1401     Place DONATO hose  Until discontinued         02/15/22 1401                Discharge Planning   WOOD: 3/10/2022     Code Status: Prior   Is the patient medically ready for discharge?:     Reason for patient still in hospital (select all that apply): Laboratory test, Treatment, Imaging and Consult recommendations  Discharge Plan A: Home Health, Home                  Vishnu Ford,  MD  Department of [facility] Medicine   [facility]

## 2022-03-05 NOTE — ASSESSMENT & PLAN NOTE
* Patient's wound cultures from 02/04 taken from ulcer showed growth of ESBL E coli, MSSA and Proteus Mirabilis     2/18 Continue meropenem, local wound care.    2/20 optimize blood sugar control, counseled the patient re diabetic diet (he had midst eating cakes and things like that from family).    02/21 continue merrem and wound care    02/24 continue merrem        03/01 continue merrem, hbo and wound care

## 2022-03-05 NOTE — ANESTHESIA POSTPROCEDURE EVALUATION
Anesthesia Post Evaluation    Patient: Jean Pierre Paulson    Procedure(s) Performed: Procedure(s) (LRB):  APPLICATION, GRAFT, SKIN, SPLIT-THICKNESS, TO LOWER EXTREMITY (Right)    Final Anesthesia Type: general      Patient location during evaluation: PACU  Patient participation: Yes- Able to Participate  Level of consciousness: awake and alert  Post-procedure vital signs: reviewed and stable  Pain management: adequate  Airway patency: patent  TEDDY mitigation strategies: Multimodal analgesia  PONV status at discharge: No PONV  Anesthetic complications: no      Cardiovascular status: blood pressure returned to baseline  Respiratory status: unassisted  Hydration status: euvolemic  Follow-up not needed.          Vitals Value Taken Time   /87 03/05/22 1200   Temp 36.3 °C (97.3 °F) 03/05/22 1200   Pulse 83 03/05/22 1200   Resp 20 03/05/22 1200   SpO2 97 % 03/05/22 1200         Event Time   Out of Recovery 14:13:25         Pain/Erica Score: Pain Rating Prior to Med Admin: 8 (3/5/2022  9:03 AM)  Pain Rating Post Med Admin: 2 (3/5/2022 10:03 AM)

## 2022-03-05 NOTE — PLAN OF CARE
Problem: Adult Inpatient Plan of Care  Goal: Plan of Care Review  3/5/2022 0453 by Nasra Harmon LPN  Outcome: Ongoing, Progressing  3/5/2022 0451 by Nasra Harmon LPN  Outcome: Ongoing, Progressing  Goal: Patient-Specific Goal (Individualized)  3/5/2022 0453 by Nasra Harmon LPN  Outcome: Ongoing, Progressing  3/5/2022 0451 by Nasra Harmon LPN  Outcome: Ongoing, Progressing  Goal: Absence of Hospital-Acquired Illness or Injury  3/5/2022 0453 by Nasra Harmon LPN  Outcome: Ongoing, Progressing  3/5/2022 0451 by Nasra Harmon LPN  Outcome: Ongoing, Progressing  Goal: Optimal Comfort and Wellbeing  3/5/2022 0453 by Nasra Harmon LPN  Outcome: Ongoing, Progressing  3/5/2022 0451 by Nasra Harmon LPN  Outcome: Ongoing, Progressing  Goal: Readiness for Transition of Care  3/5/2022 0453 by Nasra Harmon LPN  Outcome: Ongoing, Progressing  3/5/2022 0451 by Nasra Harmon LPN  Outcome: Ongoing, Progressing

## 2022-03-05 NOTE — SUBJECTIVE & OBJECTIVE
Interval History:     Review of Systems   Constitutional:  Negative for appetite change, fatigue and fever.   HENT:  Negative for congestion, hearing loss and trouble swallowing.    Respiratory:  Negative for chest tightness, shortness of breath and wheezing.    Cardiovascular:  Negative for chest pain and palpitations.   Gastrointestinal:  Negative for abdominal pain, constipation and nausea.   Genitourinary:  Negative for difficulty urinating and dysuria.   Musculoskeletal:  Negative for back pain and neck stiffness.   Skin:  Positive for wound. Negative for pallor and rash.   Neurological:  Negative for dizziness, speech difficulty and headaches.   Psychiatric/Behavioral:  Positive for sleep disturbance. Negative for confusion and suicidal ideas.    Objective:     Vital Signs (Most Recent):  Temp: 97.9 °F (36.6 °C) (03/04/22 2000)  Pulse: 96 (03/04/22 2000)  Resp: 16 (03/04/22 2000)  BP: 119/68 (03/04/22 2000)  SpO2: 95 % (03/04/22 2000) Vital Signs (24h Range):  Temp:  [97.2 °F (36.2 °C)-98.7 °F (37.1 °C)] 97.9 °F (36.6 °C)  Pulse:  [71-96] 96  Resp:  [16-20] 16  SpO2:  [95 %-100 %] 95 %  BP: (118-128)/(68-81) 119/68     Weight: (!) 156.9 kg (345 lb 14.4 oz)  Body mass index is 44.41 kg/m².    Intake/Output Summary (Last 24 hours) at 3/4/2022 2147  Last data filed at 3/4/2022 2100  Gross per 24 hour   Intake 120 ml   Output --   Net 120 ml        Physical Exam  Vitals reviewed.   Constitutional:       General: He is not in acute distress.     Appearance: He is obese.   Eyes:      Pupils: Pupils are equal, round, and reactive to light.   Cardiovascular:      Rate and Rhythm: Normal rate and regular rhythm.      Pulses: Normal pulses.   Pulmonary:      Effort: Pulmonary effort is normal. No respiratory distress.      Breath sounds: Normal breath sounds. No wheezing.   Abdominal:      General: Bowel sounds are normal. There is no distension.      Tenderness: There is no abdominal tenderness.   Skin:     General:  Skin is warm.      Comments: Dressed wound to right foot plantar surface.   Neurological:      General: No focal deficit present.      Mental Status: He is alert, oriented to person, place, and time and easily aroused. Mental status is at baseline.   Psychiatric:         Mood and Affect: Mood normal.         Behavior: Behavior normal.       Significant Labs: All pertinent labs within the past 24 hours have been reviewed.  BMP: No results for input(s): GLU, NA, K, CL, CO2, BUN, CREATININE, CALCIUM, MG in the last 48 hours.    CBC: No results for input(s): WBC, HGB, HCT, PLT in the last 48 hours.    CMP: No results for input(s): NA, K, CL, CO2, GLU, BUN, CREATININE, CALCIUM, PROT, ALBUMIN, BILITOT, ALKPHOS, AST, ALT, ANIONGAP, EGFRNONAA in the last 48 hours.    Invalid input(s): ESTGF[place]    Microbiology Results (last 7 days)       ** No results found for the last 168 hours. **          Intake/Output - Last 3 Shifts         03/03 0700  03/04 0659 03/04 0700  03/05 0659    P.O. 720 120    IV Piggyback 100     Total Intake(mL/kg) 820 (5.2) 120 (0.8)    Urine (mL/kg/hr) 1300 (0.3)     Other 0     Stool 0     Total Output 1300     Net -480 +120          Urine Occurrence 1 x     Stool Occurrence 1 x                 Significant Imaging: I have reviewed all pertinent imaging results/findings within the past 24 hours.

## 2022-03-05 NOTE — SUBJECTIVE & OBJECTIVE
Interval History:     Review of Systems   Constitutional:  Negative for appetite change, fatigue and fever.   HENT:  Negative for congestion, hearing loss and trouble swallowing.    Respiratory:  Negative for chest tightness, shortness of breath and wheezing.    Cardiovascular:  Negative for chest pain and palpitations.   Gastrointestinal:  Negative for abdominal pain, constipation and nausea.   Genitourinary:  Negative for difficulty urinating and dysuria.   Musculoskeletal:  Negative for back pain and neck stiffness.   Skin:  Positive for wound. Negative for pallor and rash.   Neurological:  Negative for dizziness, speech difficulty and headaches.   Psychiatric/Behavioral:  Positive for sleep disturbance. Negative for confusion and suicidal ideas.    Objective:     Vital Signs (Most Recent):  Temp: 97.3 °F (36.3 °C) (03/05/22 1200)  Pulse: 83 (03/05/22 1200)  Resp: 20 (03/05/22 1200)  BP: 138/87 (03/05/22 1200)  SpO2: 97 % (03/05/22 1200) Vital Signs (24h Range):  Temp:  [97.3 °F (36.3 °C)-98.2 °F (36.8 °C)] 97.3 °F (36.3 °C)  Pulse:  [] 83  Resp:  [16-20] 20  SpO2:  [95 %-97 %] 97 %  BP: (103-152)/(68-87) 138/87     Weight: (!) 156.9 kg (345 lb 14.4 oz)  Body mass index is 44.41 kg/m².    Intake/Output Summary (Last 24 hours) at 3/5/2022 1508  Last data filed at 3/5/2022 0600  Gross per 24 hour   Intake 120 ml   Output 1000 ml   Net -880 ml        Physical Exam  Vitals reviewed.   Constitutional:       General: He is not in acute distress.     Appearance: He is obese.   Eyes:      Pupils: Pupils are equal, round, and reactive to light.   Cardiovascular:      Rate and Rhythm: Normal rate and regular rhythm.      Pulses: Normal pulses.   Pulmonary:      Effort: Pulmonary effort is normal. No respiratory distress.      Breath sounds: Normal breath sounds. No wheezing.   Abdominal:      General: Bowel sounds are normal. There is no distension.      Tenderness: There is no abdominal tenderness.   Skin:      General: Skin is warm.      Comments: Dressed wound to right foot plantar surface.   Neurological:      General: No focal deficit present.      Mental Status: He is alert, oriented to person, place, and time and easily aroused. Mental status is at baseline.   Psychiatric:         Mood and Affect: Mood normal.         Behavior: Behavior normal.       Significant Labs: All pertinent labs within the past 24 hours have been reviewed.  BMP: No results for input(s): GLU, NA, K, CL, CO2, BUN, CREATININE, CALCIUM, MG in the last 48 hours.    CBC: No results for input(s): WBC, HGB, HCT, PLT in the last 48 hours.    CMP: No results for input(s): NA, K, CL, CO2, GLU, BUN, CREATININE, CALCIUM, PROT, ALBUMIN, BILITOT, ALKPHOS, AST, ALT, ANIONGAP, EGFRNONAA in the last 48 hours.    Invalid input(s): [place]    Microbiology Results (last 7 days)       ** No results found for the last 168 hours. **          Intake/Output - Last 3 Shifts         03/03 0700  03/04 0659 03/04 0700  03/05 0659 03/05 0700  03/06 0659    P.O. 720 120     IV Piggyback 100      Total Intake(mL/kg) 820 (5.2) 120 (0.8)     Urine (mL/kg/hr) 1300 (0.3) 1000 (0.3)     Other 0      Stool 0      Total Output 1300 1000     Net -480 -880            Urine Occurrence 1 x      Stool Occurrence 1 x                  Significant Imaging: I have reviewed all pertinent imaging results/findings within the past 24 hours.

## 2022-03-06 LAB
ANION GAP SERPL CALCULATED.3IONS-SCNC: 10 MMOL/L (ref 7–16)
BASOPHILS # BLD AUTO: 0.04 K/UL (ref 0–0.2)
BASOPHILS NFR BLD AUTO: 0.7 % (ref 0–1)
BUN SERPL-MCNC: 55 MG/DL (ref 7–18)
BUN/CREAT SERPL: 37 (ref 6–20)
CALCIUM SERPL-MCNC: 8.8 MG/DL (ref 8.5–10.1)
CHLORIDE SERPL-SCNC: 103 MMOL/L (ref 98–107)
CO2 SERPL-SCNC: 29 MMOL/L (ref 21–32)
CREAT SERPL-MCNC: 1.48 MG/DL (ref 0.7–1.3)
DIFFERENTIAL METHOD BLD: ABNORMAL
EOSINOPHIL # BLD AUTO: 0.24 K/UL (ref 0–0.5)
EOSINOPHIL NFR BLD AUTO: 4.3 % (ref 1–4)
ERYTHROCYTE [DISTWIDTH] IN BLOOD BY AUTOMATED COUNT: 13.8 % (ref 11.5–14.5)
GLUCOSE SERPL-MCNC: 158 MG/DL (ref 74–106)
GLUCOSE SERPL-MCNC: 164 MG/DL (ref 70–105)
GLUCOSE SERPL-MCNC: 178 MG/DL (ref 70–105)
GLUCOSE SERPL-MCNC: 186 MG/DL (ref 70–105)
GLUCOSE SERPL-MCNC: 226 MG/DL (ref 70–105)
HCT VFR BLD AUTO: 38.5 % (ref 40–54)
HGB BLD-MCNC: 11.6 G/DL (ref 13.5–18)
IMM GRANULOCYTES # BLD AUTO: 0.01 K/UL (ref 0–0.04)
IMM GRANULOCYTES NFR BLD: 0.2 % (ref 0–0.4)
LYMPHOCYTES # BLD AUTO: 2.65 K/UL (ref 1–4.8)
LYMPHOCYTES NFR BLD AUTO: 47.8 % (ref 27–41)
MCH RBC QN AUTO: 25.6 PG (ref 27–31)
MCHC RBC AUTO-ENTMCNC: 30.1 G/DL (ref 32–36)
MCV RBC AUTO: 84.8 FL (ref 80–96)
MONOCYTES # BLD AUTO: 0.55 K/UL (ref 0–0.8)
MONOCYTES NFR BLD AUTO: 9.9 % (ref 2–6)
MPC BLD CALC-MCNC: 10 FL (ref 9.4–12.4)
NEUTROPHILS # BLD AUTO: 2.05 K/UL (ref 1.8–7.7)
NEUTROPHILS NFR BLD AUTO: 37.1 % (ref 53–65)
NRBC # BLD AUTO: 0 X10E3/UL
NRBC, AUTO (.00): 0 %
PLATELET # BLD AUTO: 255 K/UL (ref 150–400)
POTASSIUM SERPL-SCNC: 5.2 MMOL/L (ref 3.5–5.1)
RBC # BLD AUTO: 4.54 M/UL (ref 4.6–6.2)
SODIUM SERPL-SCNC: 137 MMOL/L (ref 136–145)
WBC # BLD AUTO: 5.54 K/UL (ref 4.5–11)

## 2022-03-06 PROCEDURE — 25000003 PHARM REV CODE 250: Performed by: NURSE PRACTITIONER

## 2022-03-06 PROCEDURE — 85025 COMPLETE CBC W/AUTO DIFF WBC: CPT | Performed by: INTERNAL MEDICINE

## 2022-03-06 PROCEDURE — 11000001 HC ACUTE MED/SURG PRIVATE ROOM

## 2022-03-06 PROCEDURE — 99232 SBSQ HOSP IP/OBS MODERATE 35: CPT | Mod: ,,, | Performed by: HOSPITALIST

## 2022-03-06 PROCEDURE — 80048 BASIC METABOLIC PNL TOTAL CA: CPT | Performed by: INTERNAL MEDICINE

## 2022-03-06 PROCEDURE — 82962 GLUCOSE BLOOD TEST: CPT

## 2022-03-06 PROCEDURE — 25000003 PHARM REV CODE 250

## 2022-03-06 PROCEDURE — 63600175 PHARM REV CODE 636 W HCPCS: Performed by: INTERNAL MEDICINE

## 2022-03-06 PROCEDURE — 99232 PR SUBSEQUENT HOSPITAL CARE,LEVL II: ICD-10-PCS | Mod: ,,, | Performed by: HOSPITALIST

## 2022-03-06 PROCEDURE — 96372 THER/PROPH/DIAG INJ SC/IM: CPT

## 2022-03-06 PROCEDURE — 63600175 PHARM REV CODE 636 W HCPCS

## 2022-03-06 PROCEDURE — 25000003 PHARM REV CODE 250: Performed by: INTERNAL MEDICINE

## 2022-03-06 PROCEDURE — 63600175 PHARM REV CODE 636 W HCPCS: Performed by: HOSPITALIST

## 2022-03-06 PROCEDURE — 25000003 PHARM REV CODE 250: Performed by: HOSPITALIST

## 2022-03-06 PROCEDURE — C9399 UNCLASSIFIED DRUGS OR BIOLOG: HCPCS | Performed by: HOSPITALIST

## 2022-03-06 PROCEDURE — 36415 COLL VENOUS BLD VENIPUNCTURE: CPT | Performed by: INTERNAL MEDICINE

## 2022-03-06 RX ADMIN — ATORVASTATIN CALCIUM 40 MG: 40 TABLET, FILM COATED ORAL at 09:03

## 2022-03-06 RX ADMIN — GABAPENTIN 300 MG: 300 CAPSULE ORAL at 10:03

## 2022-03-06 RX ADMIN — INSULIN ASPART 2 UNITS: 100 INJECTION, SOLUTION INTRAVENOUS; SUBCUTANEOUS at 12:03

## 2022-03-06 RX ADMIN — GABAPENTIN 300 MG: 300 CAPSULE ORAL at 05:03

## 2022-03-06 RX ADMIN — AMLODIPINE BESYLATE 10 MG: 10 TABLET ORAL at 08:03

## 2022-03-06 RX ADMIN — INSULIN ASPART 15 UNITS: 100 INJECTION, SUSPENSION SUBCUTANEOUS at 06:03

## 2022-03-06 RX ADMIN — HYDROCODONE BITARTRATE AND ACETAMINOPHEN 1 TABLET: 10; 325 TABLET ORAL at 04:03

## 2022-03-06 RX ADMIN — HYDROCHLOROTHIAZIDE 25 MG: 12.5 TABLET ORAL at 08:03

## 2022-03-06 RX ADMIN — INSULIN ASPART 2 UNITS: 100 INJECTION, SOLUTION INTRAVENOUS; SUBCUTANEOUS at 06:03

## 2022-03-06 RX ADMIN — MEROPENEM 500 MG: 500 INJECTION, POWDER, FOR SOLUTION INTRAVENOUS at 12:03

## 2022-03-06 RX ADMIN — Medication 2 CAPSULE: at 12:03

## 2022-03-06 RX ADMIN — DOCUSATE SODIUM 100 MG: 100 CAPSULE, LIQUID FILLED ORAL at 09:03

## 2022-03-06 RX ADMIN — Medication 2 CAPSULE: at 08:03

## 2022-03-06 RX ADMIN — HYDROCODONE BITARTRATE AND ACETAMINOPHEN 1 TABLET: 10; 325 TABLET ORAL at 09:03

## 2022-03-06 RX ADMIN — HYDRALAZINE HYDROCHLORIDE 25 MG: 25 TABLET ORAL at 08:03

## 2022-03-06 RX ADMIN — LOSARTAN POTASSIUM 50 MG: 50 TABLET, FILM COATED ORAL at 08:03

## 2022-03-06 RX ADMIN — MEROPENEM 500 MG: 500 INJECTION, POWDER, FOR SOLUTION INTRAVENOUS at 05:03

## 2022-03-06 RX ADMIN — INSULIN DETEMIR 40 UNITS: 100 INJECTION, SOLUTION SUBCUTANEOUS at 09:03

## 2022-03-06 RX ADMIN — INSULIN ASPART 2 UNITS: 100 INJECTION, SOLUTION INTRAVENOUS; SUBCUTANEOUS at 05:03

## 2022-03-06 RX ADMIN — DOCUSATE SODIUM 100 MG: 100 CAPSULE, LIQUID FILLED ORAL at 08:03

## 2022-03-06 RX ADMIN — POLYETHYLENE GLYCOL 3350 17 G: 17 POWDER, FOR SOLUTION ORAL at 08:03

## 2022-03-06 RX ADMIN — Medication 2 CAPSULE: at 05:03

## 2022-03-06 RX ADMIN — ACETAMINOPHEN 650 MG: 325 TABLET, FILM COATED ORAL at 03:03

## 2022-03-06 RX ADMIN — GABAPENTIN 300 MG: 300 CAPSULE ORAL at 02:03

## 2022-03-06 RX ADMIN — HYDRALAZINE HYDROCHLORIDE 25 MG: 25 TABLET ORAL at 09:03

## 2022-03-06 RX ADMIN — MEROPENEM 500 MG: 500 INJECTION, POWDER, FOR SOLUTION INTRAVENOUS at 11:03

## 2022-03-06 NOTE — SUBJECTIVE & OBJECTIVE
Interval History:     Review of Systems   Constitutional:  Negative for appetite change, fatigue and fever.   HENT:  Negative for congestion, hearing loss and trouble swallowing.    Respiratory:  Negative for chest tightness, shortness of breath and wheezing.    Cardiovascular:  Negative for chest pain and palpitations.   Gastrointestinal:  Negative for abdominal pain, constipation and nausea.   Genitourinary:  Negative for difficulty urinating and dysuria.   Musculoskeletal:  Negative for back pain and neck stiffness.   Skin:  Positive for wound. Negative for pallor and rash.   Neurological:  Negative for dizziness, speech difficulty and headaches.   Psychiatric/Behavioral:  Positive for sleep disturbance. Negative for confusion and suicidal ideas.    Objective:     Vital Signs (Most Recent):  Temp: 98.4 °F (36.9 °C) (03/06/22 1200)  Pulse: 87 (03/06/22 1200)  Resp: 20 (03/06/22 1200)  BP: 136/86 (03/06/22 1200)  SpO2: 96 % (03/06/22 1200) Vital Signs (24h Range):  Temp:  [97.5 °F (36.4 °C)-98.4 °F (36.9 °C)] 98.4 °F (36.9 °C)  Pulse:  [76-87] 87  Resp:  [18-20] 20  SpO2:  [94 %-97 %] 96 %  BP: (133-156)/(75-86) 136/86     Weight: (!) 156.9 kg (345 lb 14.4 oz)  Body mass index is 44.41 kg/m².    Intake/Output Summary (Last 24 hours) at 3/6/2022 1510  Last data filed at 3/6/2022 1400  Gross per 24 hour   Intake 340 ml   Output 2000 ml   Net -1660 ml        Physical Exam  Vitals reviewed.   Constitutional:       General: He is not in acute distress.     Appearance: He is obese.   Eyes:      Pupils: Pupils are equal, round, and reactive to light.   Cardiovascular:      Rate and Rhythm: Normal rate and regular rhythm.      Pulses: Normal pulses.   Pulmonary:      Effort: Pulmonary effort is normal. No respiratory distress.      Breath sounds: Normal breath sounds. No wheezing.   Abdominal:      General: Bowel sounds are normal. There is no distension.      Tenderness: There is no abdominal tenderness.   Skin:      General: Skin is warm.      Comments: Dressed wound to right foot plantar surface.   Neurological:      General: No focal deficit present.      Mental Status: He is alert, oriented to person, place, and time and easily aroused. Mental status is at baseline.   Psychiatric:         Mood and Affect: Mood normal.         Behavior: Behavior normal.       Significant Labs: All pertinent labs within the past 24 hours have been reviewed.  BMP:   Recent Labs   Lab 03/06/22  0356   *      K 5.2*      CO2 29   BUN 55*   CREATININE 1.48*   CALCIUM 8.8       CBC:   Recent Labs   Lab 03/06/22 0356   WBC 5.54   HGB 11.6*   HCT 38.5*          CMP:   Recent Labs   Lab 03/06/22 0356      K 5.2*      CO2 29   *   BUN 55*   CREATININE 1.48*   CALCIUM 8.8   ANIONGAP 10   EGFRNONAA 57*       Microbiology Results (last 7 days)       ** No results found for the last 168 hours. **          Intake/Output - Last 3 Shifts         03/04 0700  03/05 0659 03/05 0700  03/06 0659 03/06 0700  03/07 0659    P.O. 120  240    IV Piggyback   100    Total Intake(mL/kg) 120 (0.8)  340 (2.2)    Urine (mL/kg/hr) 1000 (0.3) 1300 (0.3) 700 (0.5)    Other       Stool       Total Output 1000 1300 700    Net -880 -1300 -360           Urine Occurrence   3 x                Significant Imaging: I have reviewed all pertinent imaging results/findings within the past 24 hours.

## 2022-03-06 NOTE — PROGRESS NOTES
[facility]  Progress Note    Patient Name: Jean Pierre Paulson  MRN: [id number]  Patient Class: IP- Inpatient   Admission Date: 2/15/2022  Length of Stay: 19 days  Attending Physician: Vishnu Ford MD  Primary Care Provider: Mojgan Lomeli NP        Subjective:     Principal Problem:Chronic diabetic ulcer of right foot determined by examination        HPI:  The patient is a 37yo AA male with a MedHX of DM2 with neuropathy, chronic osteomyelitis, a necrotic right foot ulcer, anemia, PVD, HTN, HLD, chronic pain syndrome, and amputation of the right big toe. He presented to the [facility] on 02/15 for a follow up on wound cultures that had been taken from his chronic right foot plantar ulcer. The wound cultures had grown Staph aureus, ESBL e.coli and proteus mirabilis. The patient says that he had first noticed the ulcer in late 2019 after he stepped on a nail. He says that this was around the time that he had had his right big toe amputated. He came in to the hospital last November when he noticed that the ulcer had grown very large. He had a debridement of the ulcer by Dr. Ramírez at that time. He has been taking Clindamycin PO prior to this admission. A wound care nurse from [facility] has been visiting the patient weekly to treat his wound with silver nitrate and redress it. The patient does also have two small, superficial ulcers on his right second toe and his right ankle.     The patient will be admitted to [facility] under the [facility] (Dr. Casper) for treatment of his infected right foot ulcer and management of other medical problems.       Overview/Hospital Course:  02/21 Awake and sitting up on the side of the bed. Complains of constipation. Had small bm yesterday. Added colace BID. Dressing to right foot ulcer. Continue wound care and merrem.  02/22 Awake and sitting up in chair without complaints. Complained of cough with lisinopril yesterday. Dcd lisinopril.  Will monitor BP. Will start HBO this morning. Continue abx and wound care.  02/23 Mr. 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 States if he doesn't get his business taken care of he may not have anywhere to discharge to later. Dr. Ford discussed treatment he needed for ESBL ecoli and MRSA treatment was IV merrem, wound care and HBO. That this could not be given in home setting. Told him that refusing in pt care could put him at risk for potentially losing his foot. He states he understands and that he wants to discharge home. He will need to remain NWB on right, follow up with [facility] in one week. Wound care orders to be given by [facility].  02/24 Pt no new issues. Getting am meds and HBO.  Plans for pass today. Has to get his housing situation straightened out so doesn't lose his home. Otherwise continue current process. He did wish to take walker with him.  02/25 Did OK on pass and now back. Continue IV ATB. HBO and wound care. Recommended non wt bearing on wound.  02/26 patient remains in good spirits.  He tells me that was told by surgery may have skin graft next week.  Continue to try to remain nonweightbearing on foot.  Continue intravenous antibiotics.  Blood sugar good.  02/27 continues to do well, continue wound care and antibiotic, await surgical plans  02/28 Pt with HBO and wound care today, continue monitor blood sugars and take antibiotic, will discussed with surgery  03/01 Awake and resting in bed. Complains of constipation. States he had miralax yesterday and that it did not work. Mag citrate ordered. Dressing to right foot. Has just came back from HBO.  BP elevated. Will add losartan.   03/02 patient seems in better spirits today.  Seen by Dr. Ramírez with plan skin graft today.  Continue with wound care.  Continue with intravenous antibiotics.  Continue with antibiotics this week and will discuss antibiotic duration with Dr. Casper next week when  she returns.  Continue hyperbaric oxygen.  03/03 continue to monitor.  Continue with antibiotics and wound care as previous.  Blood sugar and blood pressure both some but generally doing okay.  Patient to keep weight off of foot.  03/04 seems better spirits today. Continue ATB and wound care. Adjust insulin.  03/05 remains in better spirits.  Continue ATB and wound care.  Blood sugar up some and continue to adjust insulin.  03/06 continues to do well.  Antibiotics and wound care.  Healing from recent skin graft.  Blood pressure blood sugar okay.  Not weight-bearing on foot.  Dr. Casper to assume care in the a.m. When feasible patient would like to continue therapy on outpatient basis      Interval History:     Review of Systems   Constitutional:  Negative for appetite change, fatigue and fever.   HENT:  Negative for congestion, hearing loss and trouble swallowing.    Respiratory:  Negative for chest tightness, shortness of breath and wheezing.    Cardiovascular:  Negative for chest pain and palpitations.   Gastrointestinal:  Negative for abdominal pain, constipation and nausea.   Genitourinary:  Negative for difficulty urinating and dysuria.   Musculoskeletal:  Negative for back pain and neck stiffness.   Skin:  Positive for wound. Negative for pallor and rash.   Neurological:  Negative for dizziness, speech difficulty and headaches.   Psychiatric/Behavioral:  Positive for sleep disturbance. Negative for confusion and suicidal ideas.    Objective:     Vital Signs (Most Recent):  Temp: 98.4 °F (36.9 °C) (03/06/22 1200)  Pulse: 87 (03/06/22 1200)  Resp: 20 (03/06/22 1200)  BP: 136/86 (03/06/22 1200)  SpO2: 96 % (03/06/22 1200) Vital Signs (24h Range):  Temp:  [97.5 °F (36.4 °C)-98.4 °F (36.9 °C)] 98.4 °F (36.9 °C)  Pulse:  [76-87] 87  Resp:  [18-20] 20  SpO2:  [94 %-97 %] 96 %  BP: (133-156)/(75-86) 136/86     Weight: (!) 156.9 kg (345 lb 14.4 oz)  Body mass index is 44.41 kg/m².    Intake/Output Summary (Last 24  hours) at 3/6/2022 1510  Last data filed at 3/6/2022 1400  Gross per 24 hour   Intake 340 ml   Output 2000 ml   Net -1660 ml        Physical Exam  Vitals reviewed.   Constitutional:       General: He is not in acute distress.     Appearance: He is obese.   Eyes:      Pupils: Pupils are equal, round, and reactive to light.   Cardiovascular:      Rate and Rhythm: Normal rate and regular rhythm.      Pulses: Normal pulses.   Pulmonary:      Effort: Pulmonary effort is normal. No respiratory distress.      Breath sounds: Normal breath sounds. No wheezing.   Abdominal:      General: Bowel sounds are normal. There is no distension.      Tenderness: There is no abdominal tenderness.   Skin:     General: Skin is warm.      Comments: Dressed wound to right foot plantar surface.   Neurological:      General: No focal deficit present.      Mental Status: He is alert, oriented to person, place, and time and easily aroused. Mental status is at baseline.   Psychiatric:         Mood and Affect: Mood normal.         Behavior: Behavior normal.       Significant Labs: All pertinent labs within the past 24 hours have been reviewed.  BMP:   Recent Labs   Lab 03/06/22  0356   *      K 5.2*      CO2 29   BUN 55*   CREATININE 1.48*   CALCIUM 8.8       CBC:   Recent Labs   Lab 03/06/22 0356   WBC 5.54   HGB 11.6*   HCT 38.5*          CMP:   Recent Labs   Lab 03/06/22 0356      K 5.2*      CO2 29   *   BUN 55*   CREATININE 1.48*   CALCIUM 8.8   ANIONGAP 10   EGFRNONAA 57*       Microbiology Results (last 7 days)       ** No results found for the last 168 hours. **          Intake/Output - Last 3 Shifts         03/04 0700  03/05 0659 03/05 0700  03/06 0659 03/06 0700  03/07 0659    P.O. 120  240    IV Piggyback   100    Total Intake(mL/kg) 120 (0.8)  340 (2.2)    Urine (mL/kg/hr) 1000 (0.3) 1300 (0.3) 700 (0.5)    Other       Stool       Total Output 1000 1300 700    Net -295 -9314 -700            Urine Occurrence   3 x                Significant Imaging: I have reviewed all pertinent imaging results/findings within the past 24 hours.            Assessment/Plan:      * Chronic diabetic ulcer of right foot determined by examination  * Patient's wound cultures from 02/04 taken from ulcer showed growth of ESBL E coli, MSSA and Proteus Mirabilis     2/18 Continue meropenem, local wound care.    2/20 optimize blood sugar control, counseled the patient re diabetic diet (he had midst eating cakes and things like that from family).    02/21 continue merrem and wound care    02/24 continue merrem        03/01 continue merrem, hbo and wound care    Sarcoidosis  stable      Type 2 diabetes mellitus with right diabetic foot ulcer  02/21 continue levemir and SSI       Diabetic neuropathy with neurologic complication  - Patient's latest A1C was 8.4 in November  - Repeat A1C 7.8   - Moderate SSI  - POCT glucose checks   - Diabetic diet   - Levemir 20 units QHS         Peripheral vascular disease  - Continue home gabapentin 300mg q8      Chronic pain syndrome  - gabapentin 300mg q8  - Norco 10mg q6 prn       Chronic osteomyelitis  02/22 HBO this morning  Continue merrem and wound care    ]  02/24 continue merrem and wound care    03/01 continue merrem, hbo and wound care    Primary hypertension  - amlodipine 10mg qd  - hctz 25mg qd    2/19 blood pressure borderline high, but looking at diet orders he has been ordering a lot of salt rich foods.   about this.    2/20 BP persistently uncontrolled, adding lisinopril 5 mg daily, continue amlodipine and hydrochlorothiazide      02/21 continue lisinopril. amlodopine and hctz      03/01 add losartan   02/22 dcd lisinopril due to cough     Diabetes with skin ulcer          VTE Risk Mitigation (From admission, onward)         Ordered     IP VTE LOW RISK PATIENT  Once         02/15/22 1401     Place DONATO hose  Until discontinued         02/15/22 1401                Discharge  Planning   WOOD: 3/10/2022     Code Status: Prior   Is the patient medically ready for discharge?:     Reason for patient still in hospital (select all that apply): Patient trending condition, Laboratory test and Treatment  Discharge Plan A: Home Health, Home                  Vishnu Ford MD  Department of Hospital Medicine   [facility]

## 2022-03-07 ENCOUNTER — OFFICE VISIT (OUTPATIENT)
Dept: WOUND CARE | Facility: CLINIC | Age: 37
End: 2022-03-07
Attending: FAMILY MEDICINE
Payer: MEDICARE

## 2022-03-07 VITALS — TEMPERATURE: 97 F | HEART RATE: 82 BPM | RESPIRATION RATE: 20 BRPM

## 2022-03-07 DIAGNOSIS — E11.49 DIABETIC NEUROPATHY WITH NEUROLOGIC COMPLICATION: Chronic | ICD-10-CM

## 2022-03-07 DIAGNOSIS — M86.60 CHRONIC OSTEOMYELITIS: ICD-10-CM

## 2022-03-07 DIAGNOSIS — E11.40 DIABETIC NEUROPATHY WITH NEUROLOGIC COMPLICATION: Chronic | ICD-10-CM

## 2022-03-07 DIAGNOSIS — L97.516 NON-PRESSURE CHRONIC ULCER OF OTHER PART OF RIGHT FOOT WITH BONE INVOLVEMENT WITHOUT EVIDENCE OF NECROSIS: Primary | ICD-10-CM

## 2022-03-07 DIAGNOSIS — L97.414 ULCER OF RIGHT HEEL, WITH NECROSIS OF BONE: ICD-10-CM

## 2022-03-07 LAB
GLUCOSE SERPL-MCNC: 131 MG/DL (ref 70–105)
GLUCOSE SERPL-MCNC: 141 MG/DL (ref 70–105)
GLUCOSE SERPL-MCNC: 189 MG/DL (ref 70–105)
GLUCOSE SERPL-MCNC: 223 MG/DL (ref 70–105)

## 2022-03-07 PROCEDURE — 63600175 PHARM REV CODE 636 W HCPCS: Performed by: HOSPITALIST

## 2022-03-07 PROCEDURE — 82962 GLUCOSE BLOOD TEST: CPT

## 2022-03-07 PROCEDURE — 99213 OFFICE O/P EST LOW 20 MIN: CPT | Mod: PBBFAC | Performed by: FAMILY MEDICINE

## 2022-03-07 PROCEDURE — 99232 SBSQ HOSP IP/OBS MODERATE 35: CPT | Mod: ,,, | Performed by: NURSE PRACTITIONER

## 2022-03-07 PROCEDURE — 99232 PR SUBSEQUENT HOSPITAL CARE,LEVL II: ICD-10-PCS | Mod: ,,, | Performed by: NURSE PRACTITIONER

## 2022-03-07 PROCEDURE — 63600175 PHARM REV CODE 636 W HCPCS

## 2022-03-07 PROCEDURE — 99183 PR HYPERBARIC OXYGEN THERAPY ATTENDANCE/SUPERVISION, PER SESSION: ICD-10-PCS | Mod: S$PBB,,, | Performed by: FAMILY MEDICINE

## 2022-03-07 PROCEDURE — G0277 HBOT, FULL BODY CHAMBER, 30M: HCPCS | Performed by: FAMILY MEDICINE

## 2022-03-07 PROCEDURE — 25000003 PHARM REV CODE 250: Performed by: INTERNAL MEDICINE

## 2022-03-07 PROCEDURE — 25000003 PHARM REV CODE 250

## 2022-03-07 PROCEDURE — 99183 HYPERBARIC OXYGEN THERAPY: CPT | Mod: S$PBB,,, | Performed by: FAMILY MEDICINE

## 2022-03-07 PROCEDURE — 25000003 PHARM REV CODE 250: Performed by: NURSE PRACTITIONER

## 2022-03-07 PROCEDURE — 11000001 HC ACUTE MED/SURG PRIVATE ROOM

## 2022-03-07 PROCEDURE — 99232 SBSQ HOSP IP/OBS MODERATE 35: CPT | Mod: ,,, | Performed by: INTERNAL MEDICINE

## 2022-03-07 PROCEDURE — 25000003 PHARM REV CODE 250: Performed by: HOSPITALIST

## 2022-03-07 PROCEDURE — 99183 HYPERBARIC OXYGEN THERAPY: CPT | Mod: PBBFAC | Performed by: FAMILY MEDICINE

## 2022-03-07 PROCEDURE — 63600175 PHARM REV CODE 636 W HCPCS: Performed by: INTERNAL MEDICINE

## 2022-03-07 PROCEDURE — C9399 UNCLASSIFIED DRUGS OR BIOLOG: HCPCS | Performed by: HOSPITALIST

## 2022-03-07 PROCEDURE — 99232 PR SUBSEQUENT HOSPITAL CARE,LEVL II: ICD-10-PCS | Mod: ,,, | Performed by: INTERNAL MEDICINE

## 2022-03-07 RX ADMIN — POLYETHYLENE GLYCOL 3350 17 G: 17 POWDER, FOR SOLUTION ORAL at 08:03

## 2022-03-07 RX ADMIN — MEROPENEM 500 MG: 500 INJECTION, POWDER, FOR SOLUTION INTRAVENOUS at 12:03

## 2022-03-07 RX ADMIN — INSULIN ASPART 15 UNITS: 100 INJECTION, SUSPENSION SUBCUTANEOUS at 07:03

## 2022-03-07 RX ADMIN — HYDRALAZINE HYDROCHLORIDE 25 MG: 25 TABLET ORAL at 08:03

## 2022-03-07 RX ADMIN — Medication 2 CAPSULE: at 05:03

## 2022-03-07 RX ADMIN — DOCUSATE SODIUM 100 MG: 100 CAPSULE, LIQUID FILLED ORAL at 08:03

## 2022-03-07 RX ADMIN — Medication 2 CAPSULE: at 08:03

## 2022-03-07 RX ADMIN — HYDROCODONE BITARTRATE AND ACETAMINOPHEN 1 TABLET: 10; 325 TABLET ORAL at 08:03

## 2022-03-07 RX ADMIN — LOSARTAN POTASSIUM 50 MG: 50 TABLET, FILM COATED ORAL at 08:03

## 2022-03-07 RX ADMIN — HYDROCHLOROTHIAZIDE 25 MG: 12.5 TABLET ORAL at 08:03

## 2022-03-07 RX ADMIN — Medication 2 CAPSULE: at 01:03

## 2022-03-07 RX ADMIN — HYDROCODONE BITARTRATE AND ACETAMINOPHEN 1 TABLET: 10; 325 TABLET ORAL at 06:03

## 2022-03-07 RX ADMIN — INSULIN ASPART 2 UNITS: 100 INJECTION, SOLUTION INTRAVENOUS; SUBCUTANEOUS at 06:03

## 2022-03-07 RX ADMIN — INSULIN ASPART 2 UNITS: 100 INJECTION, SOLUTION INTRAVENOUS; SUBCUTANEOUS at 08:03

## 2022-03-07 RX ADMIN — GABAPENTIN 300 MG: 300 CAPSULE ORAL at 02:03

## 2022-03-07 RX ADMIN — GABAPENTIN 300 MG: 300 CAPSULE ORAL at 05:03

## 2022-03-07 RX ADMIN — INSULIN DETEMIR 40 UNITS: 100 INJECTION, SOLUTION SUBCUTANEOUS at 08:03

## 2022-03-07 RX ADMIN — MEROPENEM 500 MG: 500 INJECTION, POWDER, FOR SOLUTION INTRAVENOUS at 05:03

## 2022-03-07 RX ADMIN — AMLODIPINE BESYLATE 10 MG: 10 TABLET ORAL at 08:03

## 2022-03-07 RX ADMIN — GABAPENTIN 300 MG: 300 CAPSULE ORAL at 10:03

## 2022-03-07 RX ADMIN — ATORVASTATIN CALCIUM 40 MG: 40 TABLET, FILM COATED ORAL at 08:03

## 2022-03-07 NOTE — ASSESSMENT & PLAN NOTE
* Patient's wound cultures from 02/04 taken from ulcer showed growth of ESBL E coli, MSSA and Proteus Mirabilis     2/18 Continue meropenem, local wound care.    2/20 optimize blood sugar control, counseled the patient re diabetic diet (he had midst eating cakes and things like that from family).    02/21 continue merrem and wound care    02/24 continue merrem        03/01 continue merrem, hbo and wound care    03/07 continue merrem, HBO and wound care

## 2022-03-07 NOTE — PROGRESS NOTES
[facility] Medicine  Progress Note    Patient Name: Jean Pierre Paulson  MRN: [id number]  Patient Class: IP- Inpatient   Admission Date: 2/15/2022  Length of Stay: 20 days  Attending Physician: Carey Vivas,*  Primary Care Provider: Mojgan Lomeli NP        Subjective:     Principal Problem:Chronic diabetic ulcer of right foot determined by examination        HPI:  The patient is a 37yo AA male with a MedHX of DM2 with neuropathy, chronic osteomyelitis, a necrotic right foot ulcer, anemia, PVD, HTN, HLD, chronic pain syndrome, and amputation of the right big toe. He presented to [facility] on 02/15 for a follow up on wound cultures that had been taken from his chronic right foot plantar ulcer. The wound cultures had grown Staph aureus, ESBL e.coli and proteus mirabilis. The patient says that he had first noticed the ulcer in late 2019 after he stepped on a nail. He says that this was around the time that he had had his right big toe amputated. He came in to the hospital last November when he noticed that the ulcer had grown very large. He had a debridement of the ulcer by Dr. Ramírez at that time. He has been taking Clindamycin PO prior to this admission. A wound care nurse from [facility] has been visiting the patient weekly to treat his wound with silver nitrate and redress it. The patient does also have two small, superficial ulcers on his right second toe and his right ankle.     The patient will be admitted to [facility] under the [facility] (Dr. Casper) for treatment of his infected right foot ulcer and management of other medical problems.       Overview/Hospital Course:  02/21 Awake and sitting up on the side of the bed. Complains of constipation. Had small bm yesterday. Added colace BID. Dressing to right foot ulcer. Continue wound care and merrem.  02/22 Awake and sitting up in chair without complaints. Complained of cough with lisinopril yesterday. Dcd  lisinopril. Will monitor BP. Will start HBO this morning. Continue abx and wound care.  02/23 Mr. 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 States if he doesn't get his business taken care of he may not have anywhere to discharge to later. Dr. Ford discussed treatment he needed for ESBL ecoli and MRSA treatment was IV merrem, wound care and HBO. That this could not be given in home setting. Told him that refusing in pt care could put him at risk for potentially losing his foot. He states he understands and that he wants to discharge home. He will need to remain NWB on right, follow up with [facility] in one week. Wound care orders to be given by [facility].  02/24 Pt no new issues. Getting am meds and HBO.  Plans for pass today. Has to get his housing situation straightened out so doesn't lose his home. Otherwise continue current process. He did wish to take walker with him.  02/25 Did OK on pass and now back. Continue IV ATB. HBO and wound care. Recommended non wt bearing on wound.  02/26 patient remains in good spirits.  He tells me that was told by surgery may have skin graft next week.  Continue to try to remain nonweightbearing on foot.  Continue intravenous antibiotics.  Blood sugar good.  02/27 continues to do well, continue wound care and antibiotic, await surgical plans  02/28 Pt with HBO and wound care today, continue monitor blood sugars and take antibiotic, will discussed with surgery  03/01 Awake and resting in bed. Complains of constipation. States he had miralax yesterday and that it did not work. Mag citrate ordered. Dressing to right foot. Has just came back from HBO.  BP elevated. Will add losartan.   03/02 patient seems in better spirits today.  Seen by Dr. Ramírez with plan skin graft today.  Continue with wound care.  Continue with intravenous antibiotics.  Continue with antibiotics this week and will discuss antibiotic duration with Dr. Casper  next week when she returns.  Continue hyperbaric oxygen.  03/03 continue to monitor.  Continue with antibiotics and wound care as previous.  Blood sugar and blood pressure both some but generally doing okay.  Patient to keep weight off of foot.  03/04 seems better spirits today. Continue ATB and wound care. Adjust insulin.  03/05 remains in better spirits.  Continue ATB and wound care.  Blood sugar up some and continue to adjust insulin.  03/06 continues to do well.  Antibiotics and wound care.  Healing from recent skin graft.  Blood pressure blood sugar okay.  Not weight-bearing on foot.  Dr. Casper to assume care in the a.m. When feasible patient would like to continue therapy on outpatient basis  03/07 Awake and resting in bed without complaints. Dressing to right foot. Tolerating HBO well. Continue IV abx of merrem. Continue wound care.      Interval History: Continue wound care and HBO. Continue merrem.    Review of Systems   Respiratory:  Negative for shortness of breath.    Cardiovascular:  Negative for chest pain.   Gastrointestinal:  Negative for constipation, diarrhea, nausea and vomiting.        Bm yesterday    Objective:     Vital Signs (Most Recent):  Temp: 97.8 °F (36.6 °C) (03/07/22 0800)  Pulse: 84 (03/07/22 0800)  Resp: 20 (03/07/22 0800)  BP: 129/73 (03/07/22 0800)  SpO2: (!) 93 % (03/07/22 0800)   Vital Signs (24h Range):  Temp:  [97.3 °F (36.3 °C)-98.7 °F (37.1 °C)] 97.4 °F (36.3 °C)  Pulse:  [78-84] 82  Resp:  [19-20] 20  SpO2:  [93 %-99 %] 93 %  BP: (128-157)/(73-93) 129/73     Weight: (!) 156.9 kg (345 lb 14.4 oz)  Body mass index is 44.41 kg/m².    Intake/Output Summary (Last 24 hours) at 3/7/2022 1519  Last data filed at 3/7/2022 1200  Gross per 24 hour   Intake 200 ml   Output 400 ml   Net -200 ml      Physical Exam  Constitutional:       Appearance: He is obese.   HENT:      Head: Normocephalic.      Mouth/Throat:      Mouth: Mucous membranes are moist.   Cardiovascular:      Rate and  Rhythm: Regular rhythm.      Heart sounds: Normal heart sounds.   Pulmonary:      Breath sounds: Normal breath sounds.   Abdominal:      General: Abdomen is flat. Bowel sounds are normal.      Palpations: Abdomen is soft.   Skin:     General: Skin is warm and dry.      Comments: Dressing to right foot    Neurological:      Mental Status: He is oriented to person, place, and time.   Psychiatric:         Mood and Affect: Mood normal.       Significant Labs: All pertinent labs within the past 24 hours have been reviewed.  Recent Lab Results         03/07/22  1146   03/07/22  0543   03/06/22  2116   03/06/22  1529        POC Glucose 141   189   226   178               Significant Imaging: I have reviewed all pertinent imaging results/findings within the past 24 hours.      Assessment/Plan:      * Chronic diabetic ulcer of right foot determined by examination  * Patient's wound cultures from 02/04 taken from ulcer showed growth of ESBL E coli, MSSA and Proteus Mirabilis     2/18 Continue meropenem, local wound care.    2/20 optimize blood sugar control, counseled the patient re diabetic diet (he had midst eating cakes and things like that from family).    02/21 continue merrem and wound care    02/24 continue merrem        03/01 continue merrem, hbo and wound care    03/07 continue merrem, HBO and wound care    Sarcoidosis  stable      Type 2 diabetes mellitus with right diabetic foot ulcer  02/21 continue levemir and SSI       03/07 blood glucose levels stable     Diabetic neuropathy with neurologic complication  - Patient's latest A1C was 8.4 in November  - Repeat A1C 7.8   - Moderate SSI  - POCT glucose checks   - Diabetic diet   - Levemir 20 units QHS         Peripheral vascular disease  - Continue home gabapentin 300mg q8      Chronic pain syndrome  - gabapentin 300mg q8  - Norco 10mg q6 prn       Chronic osteomyelitis  02/22 HBO this morning  Continue merrem and wound care    ]  02/24 continue merrem and wound  care    03/01 continue merrem, hbo and wound care    03/07 continue merrem, hbo and wound care    Primary hypertension  - amlodipine 10mg qd  - hctz 25mg qd    2/19 blood pressure borderline high, but looking at diet orders he has been ordering a lot of salt rich foods.   about this.    2/20 BP persistently uncontrolled, adding lisinopril 5 mg daily, continue amlodipine and hydrochlorothiazide      02/21 continue lisinopril. amlodopine and hctz      03/01 add losartan   02/22 dcd lisinopril due to cough     03/07 continue norvasc and losartan    Diabetes with skin ulcer          VTE Risk Mitigation (From admission, onward)         Ordered     IP VTE LOW RISK PATIENT  Once         02/15/22 1401     Place DONATO hose  Until discontinued         02/15/22 1401                Discharge Planning   WOOD: 3/10/2022     Code Status: Prior   Is the patient medically ready for discharge?:     Reason for patient still in hospital (select all that apply): Treatment  Discharge Plan A: Home Health, Home                  AUGUSTUS Escalante  Department of Hospital Medicine   [facility]

## 2022-03-07 NOTE — ASSESSMENT & PLAN NOTE
- amlodipine 10mg qd  - hctz 25mg qd    2/19 blood pressure borderline high, but looking at diet orders he has been ordering a lot of salt rich foods.   about this.    2/20 BP persistently uncontrolled, adding lisinopril 5 mg daily, continue amlodipine and hydrochlorothiazide      02/21 continue lisinopril. amlodopine and hctz      03/01 add losartan   02/22 dcd lisinopril due to cough     03/07 continue norvasc and losartan

## 2022-03-07 NOTE — PLAN OF CARE
Problem: Adult Inpatient Plan of Care  Goal: Absence of Hospital-Acquired Illness or Injury  Outcome: Ongoing, Progressing  Goal: Readiness for Transition of Care  Outcome: Ongoing, Progressing     Problem: Impaired Wound Healing  Goal: Optimal Wound Healing  Outcome: Ongoing, Progressing

## 2022-03-07 NOTE — ASSESSMENT & PLAN NOTE
02/22 HBO this morning  Continue merrem and wound care    ]  02/24 continue merrem and wound care    03/01 continue merrem, hbo and wound care    03/07 continue merrem, hbo and wound care

## 2022-03-07 NOTE — SUBJECTIVE & OBJECTIVE
Interval History: Continue wound care and HBO. Continue merrem.    Review of Systems   Respiratory:  Negative for shortness of breath.    Cardiovascular:  Negative for chest pain.   Gastrointestinal:  Negative for constipation, diarrhea, nausea and vomiting.        Bm yesterday    Objective:     Vital Signs (Most Recent):  Temp: 97.8 °F (36.6 °C) (03/07/22 0800)  Pulse: 84 (03/07/22 0800)  Resp: 20 (03/07/22 0800)  BP: 129/73 (03/07/22 0800)  SpO2: (!) 93 % (03/07/22 0800)   Vital Signs (24h Range):  Temp:  [97.3 °F (36.3 °C)-98.7 °F (37.1 °C)] 97.4 °F (36.3 °C)  Pulse:  [78-84] 82  Resp:  [19-20] 20  SpO2:  [93 %-99 %] 93 %  BP: (128-157)/(73-93) 129/73     Weight: (!) 156.9 kg (345 lb 14.4 oz)  Body mass index is 44.41 kg/m².    Intake/Output Summary (Last 24 hours) at 3/7/2022 1519  Last data filed at 3/7/2022 1200  Gross per 24 hour   Intake 200 ml   Output 400 ml   Net -200 ml      Physical Exam  Constitutional:       Appearance: He is obese.   HENT:      Head: Normocephalic.      Mouth/Throat:      Mouth: Mucous membranes are moist.   Cardiovascular:      Rate and Rhythm: Regular rhythm.      Heart sounds: Normal heart sounds.   Pulmonary:      Breath sounds: Normal breath sounds.   Abdominal:      General: Abdomen is flat. Bowel sounds are normal.      Palpations: Abdomen is soft.   Skin:     General: Skin is warm and dry.      Comments: Dressing to right foot    Neurological:      Mental Status: He is oriented to person, place, and time.   Psychiatric:         Mood and Affect: Mood normal.       Significant Labs: All pertinent labs within the past 24 hours have been reviewed.  Recent Lab Results         03/07/22  1146   03/07/22  0543   03/06/22  2116   03/06/22  1529        POC Glucose 141   189   226   178               Significant Imaging: I have reviewed all pertinent imaging results/findings within the past 24 hours.

## 2022-03-07 NOTE — PROGRESS NOTES
Subjective:      Patient ID: Jean Pierre Paulson is a 36 y.o. male.    Chief Complaint: Hyperbaric Oxygen Therapy and Diabetic Foot Ulcer (Right foot)    Jean Pierre Paulson a 36 y.o. male presents for follow up on all regular problems which are reviewed and discussed.     Problem List Items Addressed This Visit        Neuro    Diabetic neuropathy with neurologic complication (Chronic)       Derm    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 - Primary    RESOLVED: Ulcer of right heel, with necrosis of bone       ID    Chronic osteomyelitis          Past Medical History:  Past Medical History:   Diagnosis Date    Anemia     Chronic osteomyelitis     Diabetes mellitus with neuropathy     Diabetes mellitus, type 2     DM2 (diabetes mellitus, type 2)     HTN (hypertension)     Hypertension     Neuropathy     Obesity     Osteomyelitis 10/2020    Hx of R foot, Tx with IV Abx    Peripheral vascular disease      Past Surgical History:   Procedure Laterality Date    APPLICATION OF SPLIT-THICKNESS SKIN GRAFT (STSG) TO LOWER EXTREMITY Right 3/2/2022    Procedure: APPLICATION, GRAFT, SKIN, SPLIT-THICKNESS, TO LOWER EXTREMITY;  Surgeon: Greg Ramírez MD;  Location: [facility];  Service: General;  Laterality: Right;    CHOLECYSTECTOMY      DEBRIDEMENT OF FOOT Right 10/2020    FOOT SURGERY      Right diabetic foot ulcer Elliott's Grade 3      Bone exposure to R heel, Hx of IV Abx, cultures show multiple bacterial growth, Hx of Osteomyelitis, HBOT     Review of patient's allergies indicates:   Allergen Reactions    Tomato Itching     Current Facility-Administered Medications on File Prior to Visit   Medication Dose Route Frequency Provider Last Rate Last Admin    acetaminophen tablet 650 mg  650 mg Oral Q4H PRN Clara Gutierrez MD   650 mg at 03/06/22 0346    amLODIPine tablet 10 mg  10 mg Oral Daily Clara Gutierrez MD   10 mg at 03/07/22 0852    atorvastatin  tablet 40 mg  40 mg Oral QHS Vishnu Ford MD   40 mg at 03/06/22 2100    cloNIDine tablet 0.1 mg  0.1 mg Oral Q4H PRN Vishnu Ford MD        dextrose 50% injection 12.5 g  12.5 g Intravenous PRN Clara Gutierrez MD        dextrose 50% injection 25 g  25 g Intravenous PRN Clara Gutierrez MD        docusate sodium capsule 100 mg  100 mg Oral BID Sofia L Hendrickson, FNP   100 mg at 03/07/22 0851    gabapentin capsule 300 mg  300 mg Oral Q8H Clara Gutierrez MD   300 mg at 03/07/22 0551    glucagon (human recombinant) injection 1 mg  1 mg Intramuscular PRN Clara Gutierrez MD        glucose chewable tablet 16 g  16 g Oral PRN Clara Gutierrez MD        glucose chewable tablet 24 g  24 g Oral PRN Clara Gutierrez MD        hydrALAZINE tablet 25 mg  25 mg Oral Q12H Vishnu Ford MD   25 mg at 03/07/22 0853    hydroCHLOROthiazide tablet 25 mg  25 mg Oral Daily Clara Gutierrez MD   25 mg at 03/07/22 0852    HYDROcodone-acetaminophen  mg per tablet 1 tablet  1 tablet Oral Q6H PRN Clara Gutierrez MD   1 tablet at 03/07/22 0652    insulin asp prt-insulin aspart (NovoLOG 70/30) injection 15 Units  15 Units Subcutaneous with breakfast Vishnu Ford MD   15 Units at 03/07/22 0715    insulin aspart U-100 injection 1-10 Units  1-10 Units Subcutaneous TID AC PRN Clara Gutierrez MD   2 Units at 03/07/22 0645    insulin detemir U-100 injection 40 Units  40 Units Subcutaneous QHS Vishnu Ford MD   40 Units at 03/06/22 2100    Lactobacillus acidophilus capsule 2 capsule  2 capsule Oral TID WM Carey Vivas MD   2 capsule at 03/07/22 0851    losartan tablet 50 mg  50 mg Oral Daily Vishnu Ford MD   50 mg at 03/07/22 0851    melatonin tablet 6 mg  6 mg Oral Nightly PRN Clara Gutierrez MD        meropenem (MERREM) 500 mg in sodium chloride 0.9 % 100 mL IVPB (MB+)  500 mg Intravenous Q6H Carey Vivas MD   Stopped at 03/07/22 0649    ondansetron tablet 8 mg  8 mg Oral Q8H PRN Axel  TIFFANIE Simmons Jr., MD        polyethylene glycol packet 17 g  17 g Oral Daily PRN Clara Gutierrez MD   17 g at 03/01/22 0540    polyethylene glycol packet 17 g  17 g Oral Daily Sofia HendricksonAUGUSTUS   17 g at 03/07/22 0854    prochlorperazine injection Soln 5 mg  5 mg Intravenous Q6H PRN Clara Gutierrez MD         Current Outpatient Medications on File Prior to Visit   Medication Sig Dispense Refill    acetaminophen (TYLENOL) 325 MG tablet Take 2 tablets (650 mg total) by mouth every 4 (four) hours as needed.  0    amLODIPine (NORVASC) 10 MG tablet Take 1 tablet (10 mg total) by mouth once daily. 30 tablet 0    blood sugar diagnostic (BLOOD GLUCOSE TEST) Strp 1 strip by Misc.(Non-Drug; Combo Route) route once daily. accu-chek Veronika strips 90 strip 5    blood-glucose meter kit 1 each by Other route 3 (three) times daily. 1 each 0    empaglifloz/linaglip/metformin (TRIJARDY XR ORAL) Take by mouth once daily.      FEROSUL 325 mg (65 mg iron) Tab tablet Take 325 mg by mouth once daily.      gabapentin (NEURONTIN) 300 MG capsule Take 1 capsule (300 mg total) by mouth every 8 (eight) hours. 90 capsule 1    hydroCHLOROthiazide (HYDRODIURIL) 25 MG tablet Take 1 tablet (25 mg total) by mouth once daily. 90 tablet 1    HYDROcodone-acetaminophen (NORCO)  mg per tablet Take 1 tablet by mouth once daily. 30 tablet 0    rosuvastatin (CRESTOR) 10 MG tablet TAKE 1 TABLET EVERY DAY 90 tablet 0    [DISCONTINUED] sodium hypochlorite 0.5 % (DAKIN'S SOLUTION) external solution Apply topically once daily. 473 mL 5     Social History     Socioeconomic History    Marital status: Single   Tobacco Use    Smoking status: Former Smoker    Smokeless tobacco: Never Used   Substance and Sexual Activity    Alcohol use: Yes     Comment: occasionally    Drug use: Yes     Types: Hydrocodone    Sexual activity: Yes     Family History   Problem Relation Age of Onset    Hypertension Father     Diabetes Father        Review of  Systems   Constitutional: Negative.    HENT: Negative for congestion, ear pain, nosebleeds and trouble swallowing.    Eyes: Negative for pain and itching.   Respiratory: Negative for chest tightness.    Cardiovascular: Negative for chest pain.   Gastrointestinal: Negative for abdominal distention.   Endocrine: Negative for cold intolerance and heat intolerance.   Genitourinary: Negative for difficulty urinating.   Musculoskeletal: Negative for arthralgias.   Neurological: Negative for dizziness.       Objective:     Pulse 82   Temp 97.4 °F (36.3 °C)   Resp 20     Physical Exam  Constitutional:       Appearance: Normal appearance. He is obese.   HENT:      Head: Normocephalic and atraumatic.      Right Ear: External ear normal.      Left Ear: External ear normal.      Nose: Nose normal.      Mouth/Throat:      Mouth: Mucous membranes are moist.      Pharynx: Oropharynx is clear.   Eyes:      Pupils: Pupils are equal, round, and reactive to light.   Cardiovascular:      Rate and Rhythm: Normal rate and regular rhythm.      Heart sounds: Normal heart sounds.   Pulmonary:      Effort: Pulmonary effort is normal.      Breath sounds: Normal breath sounds.   Abdominal:      Palpations: Abdomen is soft.   Musculoskeletal:         General: Normal range of motion.      Cervical back: Normal range of motion and neck supple.   Skin:     General: Skin is warm and dry.   Neurological:      General: No focal deficit present.      Mental Status: He is alert.   Psychiatric:         Mood and Affect: Mood normal.         Behavior: Behavior normal.         Thought Content: Thought content normal.         Judgment: Judgment normal.       Assessment:     1. 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 Diabetic neuropathy with neurologic complication    3. Chronic osteomyelitis    4. Ulcer of right heel, with necrosis of bone        Plan:     Problem List Items Addressed  This Visit        Neuro    Diabetic neuropathy with neurologic complication (Chronic)       Derm    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 - Primary    RESOLVED: Ulcer of right heel, with necrosis of bone       ID    Chronic osteomyelitis        No follow-ups on file.      I am having Jean Pierre Paulson maintain his amLODIPine, blood-glucose meter, gabapentin, HYDROcodone-acetaminophen, blood sugar diagnostic, FeroSuL, hydroCHLOROthiazide, rosuvastatin, empaglifloz/linaglip/metformin (TRIJARDY XR ORAL), and acetaminophen.    Jean Pierre was seen today for hyperbaric oxygen therapy and diabetic foot ulcer.    Diagnoses and all orders for this visit:    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    Diabetic neuropathy with neurologic complication    Chronic osteomyelitis    Ulcer of right heel, with necrosis of bone         [unfilled]  No orders of the defined types were placed in this encounter.

## 2022-03-08 ENCOUNTER — CLINICAL SUPPORT (OUTPATIENT)
Dept: WOUND CARE | Facility: CLINIC | Age: 37
End: 2022-03-08
Attending: FAMILY MEDICINE
Payer: MEDICARE

## 2022-03-08 VITALS
HEART RATE: 84 BPM | SYSTOLIC BLOOD PRESSURE: 138 MMHG | RESPIRATION RATE: 18 BRPM | HEIGHT: 74 IN | TEMPERATURE: 98 F | OXYGEN SATURATION: 94 % | WEIGHT: 315 LBS | BODY MASS INDEX: 40.43 KG/M2 | DIASTOLIC BLOOD PRESSURE: 73 MMHG

## 2022-03-08 DIAGNOSIS — L97.516 NON-PRESSURE CHRONIC ULCER OF OTHER PART OF RIGHT FOOT WITH BONE INVOLVEMENT WITHOUT EVIDENCE OF NECROSIS: Primary | ICD-10-CM

## 2022-03-08 LAB
GLUCOSE SERPL-MCNC: 194 MG/DL (ref 70–105)
GLUCOSE SERPL-MCNC: 212 MG/DL (ref 70–105)

## 2022-03-08 PROCEDURE — 99239 HOSP IP/OBS DSCHRG MGMT >30: CPT | Mod: ,,, | Performed by: INTERNAL MEDICINE

## 2022-03-08 PROCEDURE — 25000003 PHARM REV CODE 250: Performed by: NURSE PRACTITIONER

## 2022-03-08 PROCEDURE — 63600175 PHARM REV CODE 636 W HCPCS: Performed by: INTERNAL MEDICINE

## 2022-03-08 PROCEDURE — 25000003 PHARM REV CODE 250: Performed by: HOSPITALIST

## 2022-03-08 PROCEDURE — 25000003 PHARM REV CODE 250: Performed by: INTERNAL MEDICINE

## 2022-03-08 PROCEDURE — 99213 OFFICE O/P EST LOW 20 MIN: CPT | Mod: PBBFAC | Performed by: FAMILY MEDICINE

## 2022-03-08 PROCEDURE — 82962 GLUCOSE BLOOD TEST: CPT

## 2022-03-08 PROCEDURE — 99239 PR HOSPITAL DISCHARGE DAY,>30 MIN: ICD-10-PCS | Mod: ,,, | Performed by: INTERNAL MEDICINE

## 2022-03-08 PROCEDURE — 25000003 PHARM REV CODE 250

## 2022-03-08 PROCEDURE — 63600175 PHARM REV CODE 636 W HCPCS

## 2022-03-08 RX ORDER — DAPAGLIFLOZIN AND METFORMIN HYDROCHLORIDE 5; 1000 MG/1; MG/1
1 TABLET, FILM COATED, EXTENDED RELEASE ORAL DAILY
COMMUNITY
End: 2022-03-23 | Stop reason: SDUPTHER

## 2022-03-08 RX ORDER — LOSARTAN POTASSIUM 50 MG/1
50 TABLET ORAL DAILY
Qty: 30 TABLET | Refills: 0 | Status: SHIPPED | OUTPATIENT
Start: 2022-03-09 | End: 2022-04-29 | Stop reason: SDUPTHER

## 2022-03-08 RX ORDER — POLYETHYLENE GLYCOL 3350 17 G/17G
17 POWDER, FOR SOLUTION ORAL DAILY PRN
Qty: 30 PACKET | Refills: 0 | Status: SHIPPED | OUTPATIENT
Start: 2022-03-08 | End: 2022-04-07

## 2022-03-08 RX ORDER — DOXYCYCLINE HYCLATE 100 MG
100 TABLET ORAL 2 TIMES DAILY
Qty: 42 TABLET | Refills: 0 | Status: SHIPPED | OUTPATIENT
Start: 2022-03-08 | End: 2022-03-29

## 2022-03-08 RX ORDER — INSULIN ASPART 100 [IU]/ML
15 INJECTION, SUSPENSION SUBCUTANEOUS DAILY
Qty: 450 UNITS | Refills: 0 | Status: SHIPPED | OUTPATIENT
Start: 2022-03-08 | End: 2022-03-08 | Stop reason: HOSPADM

## 2022-03-08 RX ORDER — AMOXICILLIN AND CLAVULANATE POTASSIUM 875; 125 MG/1; MG/1
1 TABLET, FILM COATED ORAL EVERY 12 HOURS
Qty: 42 TABLET | Refills: 0 | Status: SHIPPED | OUTPATIENT
Start: 2022-03-08 | End: 2022-03-29

## 2022-03-08 RX ORDER — HYDRALAZINE HYDROCHLORIDE 25 MG/1
25 TABLET, FILM COATED ORAL EVERY 12 HOURS
Qty: 60 TABLET | Refills: 0 | Status: SHIPPED | OUTPATIENT
Start: 2022-03-08 | End: 2022-07-27 | Stop reason: SDUPTHER

## 2022-03-08 RX ORDER — LACTOBACILLUS ACIDOPHILUS 500MM CELL
2 CAPSULE ORAL
Qty: 126 CAPSULE | Refills: 0 | Status: SHIPPED | OUTPATIENT
Start: 2022-03-08 | End: 2022-03-29

## 2022-03-08 RX ADMIN — GABAPENTIN 300 MG: 300 CAPSULE ORAL at 05:03

## 2022-03-08 RX ADMIN — INSULIN ASPART 4 UNITS: 100 INJECTION, SOLUTION INTRAVENOUS; SUBCUTANEOUS at 01:03

## 2022-03-08 RX ADMIN — GABAPENTIN 300 MG: 300 CAPSULE ORAL at 03:03

## 2022-03-08 RX ADMIN — DOCUSATE SODIUM 100 MG: 100 CAPSULE, LIQUID FILLED ORAL at 08:03

## 2022-03-08 RX ADMIN — POLYETHYLENE GLYCOL 3350 17 G: 17 POWDER, FOR SOLUTION ORAL at 08:03

## 2022-03-08 RX ADMIN — MEROPENEM 500 MG: 500 INJECTION, POWDER, FOR SOLUTION INTRAVENOUS at 12:03

## 2022-03-08 RX ADMIN — Medication 2 CAPSULE: at 08:03

## 2022-03-08 RX ADMIN — AMLODIPINE BESYLATE 10 MG: 10 TABLET ORAL at 08:03

## 2022-03-08 RX ADMIN — HYDROCHLOROTHIAZIDE 25 MG: 12.5 TABLET ORAL at 08:03

## 2022-03-08 RX ADMIN — HYDROCODONE BITARTRATE AND ACETAMINOPHEN 1 TABLET: 10; 325 TABLET ORAL at 06:03

## 2022-03-08 RX ADMIN — LOSARTAN POTASSIUM 50 MG: 50 TABLET, FILM COATED ORAL at 08:03

## 2022-03-08 RX ADMIN — Medication 2 CAPSULE: at 01:03

## 2022-03-08 RX ADMIN — MEROPENEM 500 MG: 500 INJECTION, POWDER, FOR SOLUTION INTRAVENOUS at 01:03

## 2022-03-08 RX ADMIN — MEROPENEM 500 MG: 500 INJECTION, POWDER, FOR SOLUTION INTRAVENOUS at 05:03

## 2022-03-08 RX ADMIN — HYDRALAZINE HYDROCHLORIDE 25 MG: 25 TABLET ORAL at 08:03

## 2022-03-08 NOTE — ASSESSMENT & PLAN NOTE
- Patient's latest A1C was 8.4 in November  - Repeat A1C 7.8   - Moderate SSI  - POCT glucose checks   - Diabetic diet   - Levemir 20 units QHS     03/08 discharge home   Continue home xigduo

## 2022-03-08 NOTE — PLAN OF CARE
[facility]  Discharge Final Note    Primary Care Provider: Mojgan Lomeli NP    Expected Discharge Date: 3/10/2022    Final Discharge Note (most recent)     Final Note - 03/08/22 1132        Final Note    Assessment Type Final Discharge Note     Anticipated Discharge Disposition Home-Health Care Svc        Post-Acute Status    Post-Acute Authorization Home Health     Home Health Status Set-up Complete/Auth obtained     Discharge Delays None known at this time                 Important Message from Medicare  Important Message from Medicare regarding Discharge Appeal Rights: Given to patient/caregiver, Explained to patient/caregiver, Signed/date by patient/caregiver     Date IMM was signed: 03/08/22  Time IMM was signed: 1132     Follow-up providers     Mojgan Lomeli NP   Specialty: Family Medicine   Relationship: PCP - General    [address]  [facility] [place] [address]   Phone: [phone]       Next Steps: Follow up in 1 week(s)    [facility]   Specialty: Wound Care    [address] [place] [address] [place] [address]   Phone: [phone]       Next Steps: Follow up on 2/24/2022    Instructions: Appt: 02/24/2022 @0900          After-discharge care            Home Medical Care     [facility]   Service: Home Health Services    [address] [facility] [place] [address]   Phone: [phone]                     SS notified that pt is ok to discharge home today. SS checked with pt and he is ok with staying with [facility]. SS sent info to [facility]. IM obtained.

## 2022-03-08 NOTE — DISCHARGE SUMMARY
[facility]  Discharge Summary      Patient Name: Jean Pierre Paulson  MRN: [id number]  Patient Class: IP- Inpatient  Admission Date: 2/15/2022  Hospital Length of Stay: 21 days  Discharge Date and Time:  03/08/2022 3:02 PM  Attending Physician: Carey Vivas,*   Discharging Provider: AUGUSTUS Escalante  Primary Care Provider: Mojgan Lomeli NP      HPI:   The patient is a 37yo AA male with a MedHX of DM2 with neuropathy, chronic osteomyelitis, a necrotic right foot ulcer, anemia, PVD, HTN, HLD, chronic pain syndrome, and amputation of the right big toe. He presented to [facility] on 02/15 for a follow up on wound cultures that had been taken from his chronic right foot plantar ulcer. The wound cultures had grown Staph aureus, ESBL e.coli and proteus mirabilis. The patient says that he had first noticed the ulcer in late 2019 after he stepped on a nail. He says that this was around the time that he had had his right big toe amputated. He came in to the hospital last November when he noticed that the ulcer had grown very large. He had a debridement of the ulcer by Dr. Ramírez at that time. He has been taking Clindamycin PO prior to this admission. A wound care nurse from [facility] has been visiting the patient weekly to treat his wound with silver nitrate and redress it. The patient does also have two small, superficial ulcers on his right second toe and his right ankle.     The patient will be admitted to [facility] under the [facility] (Dr. Casper) for treatment of his infected right foot ulcer and management of other medical problems.       * No surgery found *      Hospital Course:   02/21 Awake and sitting up on the side of the bed. Complains of constipation. Had small bm yesterday. Added colace BID. Dressing to right foot ulcer. Continue wound care and merrem.  02/22 Awake and sitting up in chair without complaints. Complained of cough with lisinopril  yesterday. Dcd lisinopril. Will monitor BP. Will start HBO this morning. Continue abx and wound care.  02/23 Mr. 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 States if he doesn't get his business taken care of he may not have anywhere to discharge to later. Dr. Ford discussed treatment he needed for ESBL ecoli and MRSA treatment was IV merrem, wound care and HBO. That this could not be given in home setting. Told him that refusing in pt care could put him at risk for potentially losing his foot. He states he understands and that he wants to discharge home. He will need to remain NWB on right, follow up with [facility] in one week. Wound care orders to be given by [facility].  02/24 Pt no new issues. Getting am meds and HBO.  Plans for pass today. Has to get his housing situation straightened out so doesn't lose his home. Otherwise continue current process. He did wish to take walker with him.  02/25 Did OK on pass and now back. Continue IV ATB. HBO and wound care. Recommended non wt bearing on wound.  02/26 patient remains in good spirits.  He tells me that was told by surgery may have skin graft next week.  Continue to try to remain nonweightbearing on foot.  Continue intravenous antibiotics.  Blood sugar good.  02/27 continues to do well, continue wound care and antibiotic, await surgical plans  02/28 Pt with HBO and wound care today, continue monitor blood sugars and take antibiotic, will discussed with surgery  03/01 Awake and resting in bed. Complains of constipation. States he had miralax yesterday and that it did not work. Mag citrate ordered. Dressing to right foot. Has just came back from HBO.  BP elevated. Will add losartan.   03/02 patient seems in better spirits today.  Seen by Dr. Ramírez with plan skin graft today.  Continue with wound care.  Continue with intravenous antibiotics.  Continue with antibiotics this week and will discuss antibiotic duration with  Dr. Casper next week when she returns.  Continue hyperbaric oxygen.  03/03 continue to monitor.  Continue with antibiotics and wound care as previous.  Blood sugar and blood pressure both some but generally doing okay.  Patient to keep weight off of foot.  03/04 seems better spirits today. Continue ATB and wound care. Adjust insulin.  03/05 remains in better spirits.  Continue ATB and wound care.  Blood sugar up some and continue to adjust insulin.  03/06 continues to do well.  Antibiotics and wound care.  Healing from recent skin graft.  Blood pressure blood sugar okay.  Not weight-bearing on foot.  Dr. Casper to assume care in the a.m. When feasible patient would like to continue therapy on outpatient basis  03/07 Awake and resting in bed without complaints. Dressing to right foot. Tolerating HBO well. Continue IV abx of merrem. Continue wound care.  03/08 Will discharge home today with home health to follow. Pt to continue HBO with [facility]. Discharge orders for right foot per [facility] . Medication reconciliation done and meds sent electronically. Follow up appts with PCP Mojgan COFFMAN made for one week.        Goals of Care Treatment Preferences:  Code Status: Full Code      Consults:   Consults (From admission, onward)        Status Ordering Provider     Inpatient consult to Social Work  Once        Provider:  (Not yet assigned)    Completed VIVEK EWING     Inpatient consult to General Surgery  Once        Provider:  Jacek Davis MD    Acknowledged HOWIE GREER     Inpatient consult to Midline team  Once        Provider:  (Not yet assigned)    Acknowledged VIVEK EWING     Inpatient consult to Wound Care Nurse (New Patient)  Once        Provider:  (Not yet assigned)    Completed BETZAIDA COKER          Diabetic neuropathy with neurologic complication  - Patient's latest A1C was 8.4 in November  - Repeat A1C 7.8   - Moderate SSI  - POCT glucose checks   - Diabetic  diet   - Levemir 20 units QHS     03/08 discharge home   Continue home xigduo    Chronic osteomyelitis  02/22 HBO this morning  Continue merrem and wound care    ]  02/24 continue merrem and wound care    03/01 continue merrem, hbo and wound care    03/07 continue merrem, hbo and wound care    03/08 continue doxycycline and augmentin BID for 3 weeks     Primary hypertension  - amlodipine 10mg qd  - hctz 25mg qd    2/19 blood pressure borderline high, but looking at diet orders he has been ordering a lot of salt rich foods.   about this.    2/20 BP persistently uncontrolled, adding lisinopril 5 mg daily, continue amlodipine and hydrochlorothiazide      02/21 continue lisinopril. amlodopine and hctz      03/01 add losartan   02/22 dcd lisinopril due to cough     03/07 continue norvasc and losartan      Final Active Diagnoses:    Diagnosis Date Noted POA    PRINCIPAL PROBLEM:  Chronic diabetic ulcer of right foot determined by examination [E11.621, L97.519] 03/25/2021 Yes    Sarcoidosis [D86.9] 10/08/2021 Yes    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 [L97.516] 07/07/2021 Yes    Chronic pain syndrome [G89.4] 04/13/2021 Yes     Chronic    Peripheral vascular disease [I73.9]  Yes     Chronic    Diabetic neuropathy with neurologic complication [E11.40, E11.49]  Yes     Chronic    Chronic osteomyelitis [M86.60] 03/26/2021 Yes    Primary hypertension [I10] 03/19/2021 Yes      Problems Resolved During this Admission:       Discharged Condition: stable    [place] [facility]    [place] Up:   Contact information for follow-up providers     [facility] Follow up.    Specialty: Wound Care  Why: schedule outpt HBO and wound follow up, For wound re-check   F/U APPT DAILY  Contact information:  [address] [place] [id number] [phone]           Mojgan Lomeli NP Follow up in 1 week(s).    Specialty:  Family Medicine  Why: APPT   nurse will try to get patient in sooner will notify him:             03./28/2022@ 2498  Contact information:  [address]  [facility] [place] [address]  [phone]                   Contact information for after-discharge care     Home Medical Care     [facility] .    Service: Home Health Services  Contact information:  [address] [facility] [address]  [phone]                           Patient Instructions:      Diet diabetic     Diet diabetic       Significant Diagnostic Studies: Labs: All labs within the past 24 hours have been reviewed    Pending Diagnostic Studies:     Procedure Component Value Units Date/Time    EXTRA TUBES [[id number]] Collected: 02/18/22 0648    Order Status: Sent Lab Status: In process Updated: 02/18/22 0648    Specimen: Blood, Venous     Narrative:      The following orders were created for panel order EXTRA TUBES.  Procedure                               Abnormality         Status                     ---------                               -----------         ------                     Lavender Top Hold[[id number]]                                In process                   Please view results for these tests on the individual orders.    EXTRA TUBES [[id number]] Collected: 02/15/22 1534    Order Status: Sent Lab Status: In process Updated: 02/15/22 1534    Specimen: Blood, Venous     Narrative:      The following orders were created for panel order EXTRA TUBES.  Procedure                               Abnormality         Status                     ---------                               -----------         ------                     Light Blue Top Hold[[id number]]                              In process                 Red Top Hold[[id number]]                                     In process                 Pink Top Hold[[id number]]                                    In process                   Please view results  for these tests on the individual orders.         Medications:  Reconciled Home Medications:      Medication List      START taking these medications    acetaminophen 325 MG tablet  Commonly known as: TYLENOL  Take 2 tablets (650 mg total) by mouth every 4 (four) hours as needed.     doxycycline 100 MG tablet  Commonly known as: VIBRA-TABS  Take 1 tablet (100 mg total) by mouth 2 (two) times daily. for 21 days     hydrALAZINE 25 MG tablet  Commonly known as: APRESOLINE  Take 1 tablet (25 mg total) by mouth every 12 (twelve) hours.     Lactobacillus acidophilus 500 million cell Cap  Take 2 capsules by mouth 3 (three) times daily with meals. for 21 days     losartan 50 MG tablet  Commonly known as: COZAAR  Take 1 tablet (50 mg total) by mouth once daily.  Start taking on: March 9, 2022     polyethylene glycol 17 gram Pwpk  Commonly known as: GLYCOLAX  Take 17 g by mouth daily as needed.        CONTINUE taking these medications    amLODIPine 10 MG tablet  Commonly known as: NORVASC  Take 1 tablet (10 mg total) by mouth once daily.     amoxicillin-clavulanate 875-125mg 875-125 mg per tablet  Commonly known as: AUGMENTIN  Take 1 tablet by mouth every 12 (twelve) hours. for 21 days     blood sugar diagnostic Strp  Commonly known as: BLOOD GLUCOSE TEST  1 strip by Misc.(Non-Drug; Combo Route) route once daily. accu-chek Veronika strips     FeroSuL 325 mg (65 mg iron) Tab tablet  Generic drug: ferrous sulfate  Take 325 mg by mouth once daily.     gabapentin 300 MG capsule  Commonly known as: NEURONTIN  Take 1 capsule (300 mg total) by mouth every 8 (eight) hours.     hydroCHLOROthiazide 25 MG tablet  Commonly known as: HYDRODIURIL  Take 1 tablet (25 mg total) by mouth once daily.     HYDROcodone-acetaminophen  mg per tablet  Commonly known as: NORCO  Take 1 tablet by mouth once daily.     rosuvastatin 10 MG tablet  Commonly known as: CRESTOR  TAKE 1 TABLET EVERY DAY     XIGDUO XR 5-1,000 mg Tbph  Generic drug:  dapagliflozin-metformin  Take 1 tablet by mouth once daily.        STOP taking these medications    blood-glucose meter kit     clindamycin 300 MG capsule  Commonly known as: CLEOCIN     DAKIN'S SOLUTION external solution  Generic drug: sodium hypochlorite 0.5 %     gentamicin 0.1 % cream  Commonly known as: GARAMYCIN     SSD 1 % cream  Generic drug: silver sulfADIAZINE 1%     TRIJARDY XR ORAL            Indwelling Lines/Drains at time of discharge:   Lines/Drains/Airways     None                 Time spent on the discharge of patient: 35 minutes         AUGUSTUS Escalante  Department of Hospital Medicine  [facility]

## 2022-03-08 NOTE — ASSESSMENT & PLAN NOTE
02/22 HBO this morning  Continue merrem and wound care    ]  02/24 continue merrem and wound care    03/01 continue merrem, hbo and wound care    03/07 continue merrem, hbo and wound care    03/08 continue doxycycline and augmentin BID for 3 weeks

## 2022-03-08 NOTE — PLAN OF CARE
Problem: Adult Inpatient Plan of Care  Goal: Plan of Care Review  Outcome: Ongoing, Progressing  Goal: Patient-Specific Goal (Individualized)  Outcome: Ongoing, Progressing  Goal: Absence of Hospital-Acquired Illness or Injury  Outcome: Ongoing, Progressing  Goal: Optimal Comfort and Wellbeing  Outcome: Ongoing, Progressing  Goal: Readiness for Transition of Care  Outcome: Ongoing, Progressing     Problem: Diabetes Comorbidity  Goal: Blood Glucose Level Within Targeted Range  Outcome: Ongoing, Progressing     Problem: Bariatric Environmental Safety  Goal: Safety Maintained with Care  Outcome: Ongoing, Progressing     Problem: Infection  Goal: Absence of Infection Signs and Symptoms  Outcome: Ongoing, Progressing     Problem: Impaired Wound Healing  Goal: Optimal Wound Healing  Outcome: Ongoing, Progressing     Problem: Skin Injury Risk Increased  Goal: Skin Health and Integrity  Outcome: Ongoing, Progressing     Problem: Fall Injury Risk  Goal: Absence of Fall and Fall-Related Injury  Outcome: Ongoing, Progressing

## 2022-03-08 NOTE — NURSING
Pt left the facility via wheelchair, voices no c/o, all belongings given to pt and family, paper work and follow-ups noted, safety measures in effect.

## 2022-03-08 NOTE — PROGRESS NOTES
[facility]   Wound Care  Progress Note    Patient Name: Jean Pierre Paulson  MRN: [id number]  Admission Date: 2/15/2022  Attending Physician: Carey Vivas,*    Chief Complaint: Wound right foot    Past Medical History:   Diagnosis Date    Anemia     Chronic osteomyelitis     Diabetes mellitus with neuropathy     Diabetes mellitus, type 2     DM2 (diabetes mellitus, type 2)     HTN (hypertension)     Hypertension     Neuropathy     Obesity     Osteomyelitis 10/2020    Hx of R foot, Tx with IV Abx    Peripheral vascular disease         Subjective:     HPI:  Jean Pierre Paulson is a 36 y.o. male with wound to right foot. He had surgery for skin graft on 3/2/22 with wound vac placement. Dr. Ramírez removed wound vac today, graft intact and healing well. Patient is currently undergoing HBOT. Tentative d/d tomorrow, will continue HBOT outpatient.     Review of Systems   Constitutional: Negative for activity change, appetite change, chills, diaphoresis, fatigue and fever.     Respiratory: Negative for cough, chest tightness and shortness of breath.    Cardiovascular: Positive for leg swelling. Negative for chest pain and palpitations.   Musculoskeletal: Negative for arthralgias.   Skin: Positive for wound.   Neurological: Negative for weakness, light-headedness, and headaches.   Psychiatric/Behavioral: Negative for agitation, confusion and dysphoric mood.  Objective:     Vital Signs (Most Recent):  Temp: 97.7 °F (36.5 °C) (03/08/22 0800)  Pulse: 84 (03/08/22 0800)  Resp: 18 (03/08/22 0607)  BP: 138/73 (03/08/22 0852)  SpO2: (!) 94 % (03/08/22 0800) Vital Signs (24h Range):  Temp:  [97.4 °F (36.3 °C)-97.7 °F (36.5 °C)] 97.7 °F (36.5 °C)  Pulse:  [80-87] 84  Resp:  [17-20] 18  SpO2:  [94 %-99 %] 94 %  BP: (127-142)/(69-93) 138/73     Weight: (!) 156.9 kg (345 lb 14.4 oz)  Body mass index is 44.41 kg/m².    Physical Exam  Vitals reviewed.   Constitutional:       General: He is not in acute distress.     Appearance:  Normal appearance. He is obese.   HENT:      Head: Normocephalic.   Cardiovascular:      Rate and Rhythm: Normal rate and regular rhythm.      Pulses: Normal pulses.      Heart sounds: Normal heart sounds.   Pulmonary:      Effort: Pulmonary effort is normal.      Breath sounds: Normal breath sounds.   Musculoskeletal:            Right lower leg: Edema present.   Skin:     Comments: Wound right foot   Neurological:      General: No focal deficit present.      Mental Status: He is alert and oriented to person, place, and time. Mental status is at baseline.   Psychiatric:         Mood and Affect: Mood normal.         Behavior: Behavior normal.         Thought Content: Thought content normal.         Judgment: Judgment normal.     Assessment and Plan      Assessment:  1. Wound right foot.     Plan:   1. Continue HBOT  2. Apply saline moistened 4x4 to wound. Change daily.   3. Tentative discharge 3/8 following HBOT and after Dr. Ramírez sees patient.         Signature:  AUGUSTUS Macedo  Wound Care    Date of encounter: 3/8/2021

## 2022-03-08 NOTE — PLAN OF CARE
Problem: Infection  Goal: Absence of Infection Signs and Symptoms  Outcome: Met     Problem: Impaired Wound Healing  Goal: Optimal Wound Healing  Outcome: Met

## 2022-03-08 NOTE — PROGRESS NOTES
Discharge meds reviewed.  List printed off and given to and discussed with pt.  Education was provided, and all questions were answered.

## 2022-03-09 ENCOUNTER — TELEPHONE (OUTPATIENT)
Dept: FAMILY MEDICINE | Facility: CLINIC | Age: 37
End: 2022-03-09

## 2022-03-10 ENCOUNTER — CLINICAL SUPPORT (OUTPATIENT)
Dept: WOUND CARE | Facility: CLINIC | Age: 37
End: 2022-03-10
Attending: NURSE PRACTITIONER
Payer: MEDICARE

## 2022-03-10 DIAGNOSIS — E08.621 DIABETIC ULCER OF RIGHT MIDFOOT ASSOCIATED WITH DIABETES MELLITUS DUE TO UNDERLYING CONDITION, WITH NECROSIS OF BONE: ICD-10-CM

## 2022-03-10 DIAGNOSIS — L97.414 DIABETIC ULCER OF RIGHT MIDFOOT ASSOCIATED WITH DIABETES MELLITUS DUE TO UNDERLYING CONDITION, WITH NECROSIS OF BONE: ICD-10-CM

## 2022-03-10 DIAGNOSIS — L97.516 NON-PRESSURE CHRONIC ULCER OF OTHER PART OF RIGHT FOOT WITH BONE INVOLVEMENT WITHOUT EVIDENCE OF NECROSIS: Primary | ICD-10-CM

## 2022-03-10 DIAGNOSIS — M86.60 CHRONIC OSTEOMYELITIS: ICD-10-CM

## 2022-03-10 DIAGNOSIS — E11.40 DIABETIC NEUROPATHY WITH NEUROLOGIC COMPLICATION: Chronic | ICD-10-CM

## 2022-03-10 DIAGNOSIS — L97.414 ULCER OF RIGHT HEEL, WITH NECROSIS OF BONE: ICD-10-CM

## 2022-03-10 DIAGNOSIS — E11.49 DIABETIC NEUROPATHY WITH NEUROLOGIC COMPLICATION: Chronic | ICD-10-CM

## 2022-03-10 PROCEDURE — G0277 HBOT, FULL BODY CHAMBER, 30M: HCPCS | Performed by: FAMILY MEDICINE

## 2022-03-10 PROCEDURE — 82962 GLUCOSE BLOOD TEST: CPT | Mod: PBBFAC | Performed by: FAMILY MEDICINE

## 2022-03-10 PROCEDURE — 99183 PR HYPERBARIC OXYGEN THERAPY ATTENDANCE/SUPERVISION, PER SESSION: ICD-10-PCS | Mod: S$PBB,,, | Performed by: FAMILY MEDICINE

## 2022-03-10 PROCEDURE — 99211 OFF/OP EST MAY X REQ PHY/QHP: CPT | Mod: PBBFAC,25 | Performed by: FAMILY MEDICINE

## 2022-03-10 PROCEDURE — 99183 HYPERBARIC OXYGEN THERAPY: CPT | Mod: PBBFAC | Performed by: FAMILY MEDICINE

## 2022-03-10 PROCEDURE — 99183 HYPERBARIC OXYGEN THERAPY: CPT | Mod: S$PBB,,, | Performed by: FAMILY MEDICINE

## 2022-03-10 NOTE — PROGRESS NOTES
Subjective:      Patient ID: Jean Pierre Paulson is a 36 y.o. male.    Chief Complaint: No chief complaint on file.    Jean Pierre Paulson a 36 y.o. male presents for follow up on all regular problems which are reviewed and discussed.     Problem List Items Addressed This Visit        Neuro    Diabetic neuropathy with neurologic complication (Chronic)       Derm    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 - Primary       ID    Chronic osteomyelitis          Past Medical History:  Past Medical History:   Diagnosis Date    Anemia     Chronic osteomyelitis     Diabetes mellitus with neuropathy     Diabetes mellitus, type 2     DM2 (diabetes mellitus, type 2)     HTN (hypertension)     Hypertension     Neuropathy     Obesity     Osteomyelitis 10/2020    Hx of R foot, Tx with IV Abx    Peripheral vascular disease      Past Surgical History:   Procedure Laterality Date    APPLICATION OF SPLIT-THICKNESS SKIN GRAFT (STSG) TO LOWER EXTREMITY Right 3/2/2022    Procedure: APPLICATION, GRAFT, SKIN, SPLIT-THICKNESS, TO LOWER EXTREMITY;  Surgeon: Greg Ramírez MD;  Location: [facility];  Service: General;  Laterality: Right;    CHOLECYSTECTOMY      DEBRIDEMENT OF FOOT Right 10/2020    FOOT SURGERY      Right diabetic foot ulcer Elliott's Grade 3      Bone exposure to R heel, Hx of IV Abx, cultures show multiple bacterial growth, Hx of Osteomyelitis, HBOT     Review of patient's allergies indicates:   Allergen Reactions    Tomato Itching     Current Outpatient Medications on File Prior to Visit   Medication Sig Dispense Refill    acetaminophen (TYLENOL) 325 MG tablet Take 2 tablets (650 mg total) by mouth every 4 (four) hours as needed.  0    amLODIPine (NORVASC) 10 MG tablet Take 1 tablet (10 mg total) by mouth once daily. 30 tablet 0    amoxicillin-clavulanate 875-125mg (AUGMENTIN) 875-125 mg per tablet Take 1 tablet by mouth every 12 (twelve) hours. for 21  days 42 tablet 0    blood sugar diagnostic (BLOOD GLUCOSE TEST) Strp 1 strip by Misc.(Non-Drug; Combo Route) route once daily. accu-chek Veronika strips 90 strip 5    dapagliflozin-metformin (XIGDUO XR) 5-1,000 mg TBph Take 1 tablet by mouth once daily.      doxycycline (VIBRA-TABS) 100 MG tablet Take 1 tablet (100 mg total) by mouth 2 (two) times daily. for 21 days 42 tablet 0    FEROSUL 325 mg (65 mg iron) Tab tablet Take 325 mg by mouth once daily.      gabapentin (NEURONTIN) 300 MG capsule Take 1 capsule (300 mg total) by mouth every 8 (eight) hours. 90 capsule 1    hydrALAZINE (APRESOLINE) 25 MG tablet Take 1 tablet (25 mg total) by mouth every 12 (twelve) hours. 60 tablet 0    hydroCHLOROthiazide (HYDRODIURIL) 25 MG tablet Take 1 tablet (25 mg total) by mouth once daily. 90 tablet 1    HYDROcodone-acetaminophen (NORCO)  mg per tablet Take 1 tablet by mouth once daily. 30 tablet 0    Lactobacillus acidophilus 500 million cell Cap Take 2 capsules by mouth 3 (three) times daily with meals. for 21 days 126 capsule 0    losartan (COZAAR) 50 MG tablet Take 1 tablet (50 mg total) by mouth once daily. 30 tablet 0    polyethylene glycol (GLYCOLAX) 17 gram PwPk Take 17 g by mouth daily as needed. 30 packet 0    rosuvastatin (CRESTOR) 10 MG tablet TAKE 1 TABLET EVERY DAY 90 tablet 0    [DISCONTINUED] blood-glucose meter kit 1 each by Other route 3 (three) times daily. 1 each 0    [DISCONTINUED] insulin asp prt-insulin aspart, NovoLOG 70/30, (NOVOLOG 70/30) 100 unit/mL (70-30) Soln Inject 15 Units into the skin once daily. 450 Units 0    [DISCONTINUED] insulin detemir U-100 (LEVEMIR) 100 unit/mL injection Inject 40 Units into the skin every evening. 12 mL 0    [DISCONTINUED] sodium hypochlorite 0.5 % (DAKIN'S SOLUTION) external solution Apply topically once daily. 473 mL 5     No current facility-administered medications on file prior to visit.     Social History     Socioeconomic History    Marital  status: Single   Tobacco Use    Smoking status: Former Smoker    Smokeless tobacco: Never Used   Substance and Sexual Activity    Alcohol use: Yes     Comment: occasionally    Drug use: Yes     Types: Hydrocodone    Sexual activity: Yes     Family History   Problem Relation Age of Onset    Hypertension Father     Diabetes Father        Review of Systems   Unable to perform ROS: Other       Objective:     There were no vitals taken for this visit.    Physical Exam  Assessment:     1. 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 Diabetic neuropathy with neurologic complication    3. Chronic osteomyelitis        Plan:     Problem List Items Addressed This Visit        Neuro    Diabetic neuropathy with neurologic complication (Chronic)       Derm    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 - Primary       ID    Chronic osteomyelitis        No follow-ups on file.      I am having Jean Pierre Paulson maintain his amLODIPine, gabapentin, HYDROcodone-acetaminophen, blood sugar diagnostic, FeroSuL, hydroCHLOROthiazide, rosuvastatin, acetaminophen, hydrALAZINE, Lactobacillus acidophilus, losartan, polyethylene glycol, amoxicillin-clavulanate 875-125mg, doxycycline, and XIGDUO XR.    Diagnoses and all orders for this visit:    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    Diabetic neuropathy with neurologic complication    Chronic osteomyelitis         [unfilled]  No orders of the defined types were placed in this encounter.

## 2022-03-10 NOTE — TELEPHONE ENCOUNTER
Spoke with pt for [facility] call. He reports he is doing good. He denies any new issues r/t right foot wound. He vu of daily HBO visits through 3/21/22. Pt vu of f/u appt with  Dr Nash on 3/15/22 at 330. Pt reports his blood sugar levels are coming down, stated they range from 150-220. Reviewed diabetic diet, he vu. He reports he has all needed diabetic supplies. Pt states he decided not to use [facility] and was referred to [facility] by [facility] today. He vu of dressing changes and s/s to monitor for and report to dr. Pt denies any DME needs. Medications reconciled with current med list. Pt vu of medications changes. He reports he has all medications and taking as directed but declined Miralax, stated it does not work for him. Instructed to take medications to all dr appts and notify HH or his dr for changes in his condition.

## 2022-03-10 NOTE — PATIENT INSTRUCTIONS
Right foot clean around graft site with dakin's solution    Apply mepitel over graft site then apply moisten dakin's gauze,cover with dry gauze,wrap with leobardo or kerlix,paper tape. Change daily and as needed    Elevate feet as much as possible    No standing    Offload with crutches or knee scooter    RTC in am for HBO

## 2022-03-11 ENCOUNTER — OFFICE VISIT (OUTPATIENT)
Dept: WOUND CARE | Facility: CLINIC | Age: 37
End: 2022-03-11
Attending: FAMILY MEDICINE
Payer: MEDICARE

## 2022-03-11 VITALS
DIASTOLIC BLOOD PRESSURE: 69 MMHG | RESPIRATION RATE: 20 BRPM | HEART RATE: 90 BPM | TEMPERATURE: 97 F | SYSTOLIC BLOOD PRESSURE: 107 MMHG

## 2022-03-11 DIAGNOSIS — Z71.89 COMPLEX CARE COORDINATION: ICD-10-CM

## 2022-03-11 DIAGNOSIS — L97.414 ULCER OF RIGHT HEEL, WITH NECROSIS OF BONE: Primary | ICD-10-CM

## 2022-03-11 PROCEDURE — 99183 HYPERBARIC OXYGEN THERAPY: CPT | Mod: PBBFAC | Performed by: FAMILY MEDICINE

## 2022-03-11 PROCEDURE — 99183 HYPERBARIC OXYGEN THERAPY: CPT | Mod: S$PBB,,, | Performed by: FAMILY MEDICINE

## 2022-03-11 PROCEDURE — G0277 HBOT, FULL BODY CHAMBER, 30M: HCPCS | Performed by: FAMILY MEDICINE

## 2022-03-11 PROCEDURE — 99214 OFFICE O/P EST MOD 30 MIN: CPT | Mod: PBBFAC | Performed by: FAMILY MEDICINE

## 2022-03-11 PROCEDURE — 99183 PR HYPERBARIC OXYGEN THERAPY ATTENDANCE/SUPERVISION, PER SESSION: ICD-10-PCS | Mod: S$PBB,,, | Performed by: FAMILY MEDICINE

## 2022-03-11 PROCEDURE — 82962 GLUCOSE BLOOD TEST: CPT | Mod: PBBFAC | Performed by: FAMILY MEDICINE

## 2022-03-11 NOTE — PATIENT INSTRUCTIONS
RTC on Monday am 3/14/22.     Right foot wound clean with dakin's solution    Apply mepitel to graft site,cover with dakin's moist gauze and dry gauze,wrap with kerlix and paper tape  Change daily and as needed

## 2022-03-11 NOTE — PROGRESS NOTES
Disclaimer:  This note has been generated using voice recognition software.  There may be type of graft focal areas that have been missed during a proof reading      Subjective:      Patient ID: Jean Pierre Paulson is a 36 y.o. male.    Chief Complaint: Foot Pain      Pain  This is a chronic problem. The current episode started more than 1 year ago. The problem occurs daily. The problem has been waxing and waning. Associated symptoms include arthralgias. Pertinent negatives include no change in bowel habit, chest pain, chills, coughing, diaphoresis, fatigue, neck pain, rash, sore throat, vertigo or vomiting.     Review of Systems   Constitutional: Negative for activity change, appetite change, chills, diaphoresis, fatigue and unexpected weight change.   HENT: Negative for drooling, ear discharge, ear pain, facial swelling, nosebleeds, sore throat, trouble swallowing, voice change and goiter.    Eyes: Negative for photophobia, pain, discharge, redness and visual disturbance.   Respiratory: Negative for apnea, cough, choking, chest tightness, shortness of breath, wheezing and stridor.    Cardiovascular: Negative for chest pain, palpitations and leg swelling.   Gastrointestinal: Negative for abdominal distention, change in bowel habit, diarrhea, rectal pain, vomiting, fecal incontinence and change in bowel habit.   Endocrine: Negative for cold intolerance, heat intolerance, polydipsia, polyphagia and polyuria.   Genitourinary: Negative for bladder incontinence, dysuria, flank pain and frequency.   Musculoskeletal: Positive for arthralgias, back pain and leg pain. Negative for neck pain and neck stiffness.   Integumentary:  Negative for color change, pallor and rash.   Neurological: Negative for dizziness, vertigo, seizures, syncope, facial asymmetry, speech difficulty, light-headedness, disturbances in coordination, memory loss and coordination difficulties.   Hematological: Negative for adenopathy. Does not bruise/bleed  "easily.   Psychiatric/Behavioral: Negative for agitation, behavioral problems, confusion, decreased concentration, dysphoric mood, hallucinations, self-injury and suicidal ideas. The patient is not nervous/anxious and is not hyperactive.             Objective:  Vitals:    03/14/22 1259 03/14/22 1300   BP:  (!) 178/93   Pulse:  91   Resp: 18    SpO2: 98%    Weight: (!) 150.6 kg (332 lb)    Height: 6' 1" (1.854 m)    PainSc:   7   6         Physical Exam  Vitals and nursing note reviewed. Exam conducted with a chaperone present.   Constitutional:       General: He is awake.      Appearance: Normal appearance. He is not diaphoretic.   HENT:      Head: Normocephalic and atraumatic.      Nose: Nose normal.      Mouth/Throat:      Mouth: Mucous membranes are moist.      Pharynx: Oropharynx is clear.   Eyes:      Conjunctiva/sclera: Conjunctivae normal.      Pupils: Pupils are equal, round, and reactive to light.   Cardiovascular:      Rate and Rhythm: Normal rate.   Pulmonary:      Effort: Pulmonary effort is normal. No respiratory distress.   Abdominal:      Palpations: Abdomen is soft.      Tenderness: There is no guarding.   Musculoskeletal:         General: Normal range of motion.      Cervical back: Normal range of motion and neck supple. No rigidity.      Lumbar back: Tenderness present.      Right foot: Tenderness present.      Left foot: Tenderness present.   Skin:     General: Skin is warm and dry.      Coloration: Skin is not jaundiced or pale.   Neurological:      General: No focal deficit present.      Mental Status: He is alert and oriented to person, place, and time. Mental status is at baseline.      Cranial Nerves: Cranial nerves are intact. No cranial nerve deficit (II-XII).   Psychiatric:         Mood and Affect: Mood normal.         Behavior: Behavior normal. Behavior is cooperative.         Thought Content: Thought content normal.           Orders Placed This Encounter   Procedures    POCT Urine Drug " Screen Presump     Interpretive Information:     Negative:  No drug detected at the cut off level.   Positive:  This result represents presumptive positive for the   tested drug, other substances may yield a positive response other   than the analyte of interest. This result should be utilized for   diagnostic purpose only. Confirmation testing will be performed upon physician request only.           NM Bone Scan 3 Phase Foot  Narrative: EXAMINATION:  NM BONE SCAN 3 PHASE FOOT    CLINICAL HISTORY:  Post-menopause state, Osteoporosis  Foot swelling, diabetic, osteomyelitis suspected, xray done;suspect chronic osteomyelitis;    COMPARISON:  CT 12 October 2021    FINDINGS:  Patient was injected with 25.0 millicuries of technetium 99M MDP intravenously.  Blood flow, blood pool and delayed images were obtained.    Increased activity diffusely in the right foot on the blood flow and blood pool imaging.  There is increased osseous uptake on the delayed imaging mostly in the right midfoot but also faintly seen in the right inferior calcaneus and portions of the left midfoot.  The right foot activity mostly correlates with area of severe degenerative changes and malalignment seen on the CT.  Impression: Increased activity diffusely in the right foot soft tissues in the blood flow and blood pool images.  There is increased activity in the midfoot correlating with area of degenerative changes and malalignment.  Faint activity in calcaneus more typical of reactive changes.  Findings do not appear typical for acute osteomyelitis.    Electronically signed by: Sheng Anderson  Date:    02/17/2022  Time:    12:38       No results displayed because visit has over 200 results.      Office Visit on 02/04/2022   Component Date Value Ref Range Status    Culture, Wound/Abscess 02/04/2022 Heavy Growth Escherichia coli ESBL (A)  Final    Culture, Wound/Abscess 02/04/2022 Heavy Growth Staphylococcus aureus (A)  Final    Culture,  Wound/Abscess 02/04/2022 Heavy Growth Proteus mirabilis (A)  Final    Culture, Anaerobe 02/04/2022 Prevotella bivia (A)  Final   Lab Requisition on 11/15/2021   Component Date Value Ref Range Status    Sodium 11/15/2021 139  136 - 145 mmol/L Final    Potassium 11/15/2021 4.4  3.5 - 5.1 mmol/L Final    Chloride 11/15/2021 104  98 - 107 mmol/L Final    CO2 11/15/2021 27  21 - 32 mmol/L Final    Anion Gap 11/15/2021 12  7 - 16 mmol/L Final    Glucose 11/15/2021 115 (A) 74 - 106 mg/dL Final    BUN 11/15/2021 30 (A) 7 - 18 mg/dL Final    Creatinine 11/15/2021 1.27  0.70 - 1.30 mg/dL Final    BUN/Creatinine Ratio 11/15/2021 24 (A) 6 - 20 Final    Calcium 11/15/2021 8.8  8.5 - 10.1 mg/dL Final    eGFR  11/15/2021 82  >=60 mL/min/1.73m² Final    ESR Westergren 11/15/2021 10  0 - 15 mm/Hr Final    CRP 11/15/2021 3.29 (A) 0.00 - 0.80 mg/dL Final    WBC 11/15/2021 4.52  4.50 - 11.00 K/uL Final    RBC 11/15/2021 5.87  4.60 - 6.20 M/uL Final    Hemoglobin 11/15/2021 15.9  13.5 - 18.0 g/dL Final    Hematocrit 11/15/2021 46.0  40.0 - 54.0 % Final    MCV 11/15/2021 78.4 (A) 80.0 - 96.0 fL Final    MCH 11/15/2021 27.1  27.0 - 31.0 pg Final    MCHC 11/15/2021 34.6  32.0 - 36.0 g/dL Final    RDW 11/15/2021 14.8 (A) 11.5 - 14.5 % Final    Platelet Count 11/15/2021 167  150 - 400 K/uL Final    MPV 11/15/2021 10.6  9.4 - 12.4 fL Final    Neutrophils % 11/15/2021 58.5  53.0 - 65.0 % Final    Lymphocytes % 11/15/2021 28.5  27.0 - 41.0 % Final    Neutrophils, Abs 11/15/2021 2.64  1.80 - 7.70 K/uL Final    Lymphocytes, Absolute 11/15/2021 1.29  1.00 - 4.80 K/uL Final    Diff Type 11/15/2021 Auto   Final    Monocytes % 11/15/2021 8.8 (A) 2.0 - 6.0 % Final    Eosinophils % 11/15/2021 3.8  1.0 - 4.0 % Final    Basophils % 11/15/2021 0.4  0.0 - 1.0 % Final    Monocytes, Absolute 11/15/2021 0.40  0.00 - 0.80 K/uL Final    Eosinophils, Absolute 11/15/2021 0.17  0.00 - 0.50 K/uL Final    Basophils,  Absolute 11/15/2021 0.02  0.00 - 0.20 K/uL Final   Nutrition on 11/12/2021   Component Date Value Ref Range Status    Hemoglobin A1C 11/12/2021 8.4 (A) 4.5 - 6.6 % Final    Estimated Average Glucose 11/12/2021 194  mg/dL Final   Lab Requisition on 11/08/2021   Component Date Value Ref Range Status    Sodium 11/08/2021 138  136 - 145 mmol/L Final    Potassium 11/08/2021 4.4  3.5 - 5.1 mmol/L Final    Chloride 11/08/2021 101  98 - 107 mmol/L Final    CO2 11/08/2021 29  21 - 32 mmol/L Final    Anion Gap 11/08/2021 12  7 - 16 mmol/L Final    Glucose 11/08/2021 199 (A) 74 - 106 mg/dL Final    BUN 11/08/2021 33 (A) 7 - 18 mg/dL Final    Creatinine 11/08/2021 1.38 (A) 0.70 - 1.30 mg/dL Final    BUN/Creatinine Ratio 11/08/2021 24 (A) 6 - 20 Final    Calcium 11/08/2021 8.9  8.5 - 10.1 mg/dL Final    eGFR  11/08/2021 74  >=60 mL/min/1.73m² Final    CRP 11/08/2021 1.34 (A) 0.00 - 0.80 mg/dL Final    ESR Westergren 11/08/2021 85 (A) 0 - 15 mm/Hr Final    WBC 11/08/2021 6.61  4.50 - 11.00 K/uL Final    RBC 11/08/2021 4.50 (A) 4.60 - 6.20 M/uL Final    Hemoglobin 11/08/2021 12.0 (A) 13.5 - 18.0 g/dL Final    Hematocrit 11/08/2021 35.8 (A) 40.0 - 54.0 % Final    MCV 11/08/2021 79.6 (A) 80.0 - 96.0 fL Final    MCH 11/08/2021 26.7 (A) 27.0 - 31.0 pg Final    MCHC 11/08/2021 33.5  32.0 - 36.0 g/dL Final    RDW 11/08/2021 14.4  11.5 - 14.5 % Final    Platelet Count 11/08/2021 214  150 - 400 K/uL Final    MPV 11/08/2021 10.4  9.4 - 12.4 fL Final    Neutrophils % 11/08/2021 59.2  53.0 - 65.0 % Final    Lymphocytes % 11/08/2021 25.1 (A) 27.0 - 41.0 % Final    Neutrophils, Abs 11/08/2021 3.91  1.80 - 7.70 K/uL Final    Lymphocytes, Absolute 11/08/2021 1.66  1.00 - 4.80 K/uL Final    Diff Type 11/08/2021 Auto   Final    Monocytes % 11/08/2021 10.4 (A) 2.0 - 6.0 % Final    Eosinophils % 11/08/2021 4.7 (A) 1.0 - 4.0 % Final    Basophils % 11/08/2021 0.6  0.0 - 1.0 % Final    Monocytes,  Absolute 11/08/2021 0.69  0.00 - 0.80 K/uL Final    Eosinophils, Absolute 11/08/2021 0.31  0.00 - 0.50 K/uL Final    Basophils, Absolute 11/08/2021 0.04  0.00 - 0.20 K/uL Final   Office Visit on 11/02/2021   Component Date Value Ref Range Status    POC Amphetamines 11/02/2021 Negative  Negative, Inconclusive Final    POC Barbiturates 11/02/2021 Negative  Negative, Inconclusive Final    POC Benzodiazepines 11/02/2021 Negative  Negative, Inconclusive Final    POC Cocaine 11/02/2021 Negative  Negative, Inconclusive Final    POC THC 11/02/2021 Negative  Negative, Inconclusive Final    POC Methadone 11/02/2021 Negative  Negative, Inconclusive Final    POC Methamphetamine 11/02/2021 Negative  Negative, Inconclusive Final    POC Opiates 11/02/2021 Presumptive Positive (A) Negative, Inconclusive Final    POC Oxycodone 11/02/2021 Negative  Negative, Inconclusive Final    POC Phencyclidine 11/02/2021 Negative  Negative, Inconclusive Final    POC Methylenedioxymethamphetamine * 11/02/2021 Negative  Negative, Inconclusive Final    POC Tricyclic Antidepressants 11/02/2021 Negative  Negative, Inconclusive Final    POC Buprenorphine 11/02/2021 Negative   Final     Acceptable 11/02/2021 Yes   Final    POC Temperature (Urine) 11/02/2021 94   Final   Lab Requisition on 11/02/2021   Component Date Value Ref Range Status    Sodium 11/02/2021 138  136 - 145 mmol/L Final    Potassium 11/02/2021 4.3  3.5 - 5.1 mmol/L Final    Chloride 11/02/2021 103  98 - 107 mmol/L Final    CO2 11/02/2021 28  21 - 32 mmol/L Final    Anion Gap 11/02/2021 11  7 - 16 mmol/L Final    Glucose 11/02/2021 274 (A) 74 - 106 mg/dL Final    BUN 11/02/2021 21 (A) 7 - 18 mg/dL Final    Creatinine 11/02/2021 1.40 (A) 0.70 - 1.30 mg/dL Final    BUN/Creatinine Ratio 11/02/2021 15  6 - 20 Final    Calcium 11/02/2021 8.7  8.5 - 10.1 mg/dL Final    eGFR  11/02/2021 73  >=60 mL/min/1.73m² Final    CRP 11/02/2021  1.47 (A) 0.00 - 0.80 mg/dL Final    ESR Franciscoergren 11/02/2021 50 (A) 0 - 15 mm/Hr Final    WBC 11/02/2021 4.93  4.50 - 11.00 K/uL Final    RBC 11/02/2021 4.46 (A) 4.60 - 6.20 M/uL Final    Hemoglobin 11/02/2021 12.2 (A) 13.5 - 18.0 g/dL Final    Hematocrit 11/02/2021 35.6 (A) 40.0 - 54.0 % Final    MCV 11/02/2021 79.8 (A) 80.0 - 96.0 fL Final    MCH 11/02/2021 27.4  27.0 - 31.0 pg Final    MCHC 11/02/2021 34.3  32.0 - 36.0 g/dL Final    RDW 11/02/2021 14.7 (A) 11.5 - 14.5 % Final    Platelet Count 11/02/2021 242  150 - 400 K/uL Final    MPV 11/02/2021 10.6  9.4 - 12.4 fL Final    Neutrophils % 11/02/2021 43.8 (A) 53.0 - 65.0 % Final    Lymphocytes % 11/02/2021 38.1  27.0 - 41.0 % Final    Neutrophils, Abs 11/02/2021 2.16  1.80 - 7.70 K/uL Final    Lymphocytes, Absolute 11/02/2021 1.88  1.00 - 4.80 K/uL Final    Diff Type 11/02/2021 Auto   Final    Monocytes % 11/02/2021 11.4 (A) 2.0 - 6.0 % Final    Eosinophils % 11/02/2021 6.1 (A) 1.0 - 4.0 % Final    Basophils % 11/02/2021 0.6  0.0 - 1.0 % Final    Monocytes, Absolute 11/02/2021 0.56  0.00 - 0.80 K/uL Final    Eosinophils, Absolute 11/02/2021 0.30  0.00 - 0.50 K/uL Final    Basophils, Absolute 11/02/2021 0.03  0.00 - 0.20 K/uL Final   No results displayed because visit has over 200 results.      Office Visit on 10/08/2021   Component Date Value Ref Range Status    Influenza A 10/08/2021 Negative  Negative, Invalid Final    Influenza B 10/08/2021 Negative  Negative, Invalid Final    COVID-19 Ag 10/08/2021 Negative  Negative, Invalid Final    Culture, Anaerobe 10/08/2021 Prevotella bivia (A)  Final    Culture, Wound/Abscess 10/08/2021 Light Growth Pseudomonas aeruginosa (A)  Final    Final Diagnosis 10/08/2021    Final                    Value:This result contains rich text formatting which cannot be displayed here.    Gross Description 10/08/2021    Final                    Value:This result contains rich text formatting which cannot be  displayed here.    Microscopic Description 10/08/2021    Final                    Value:This result contains rich text formatting which cannot be displayed here.    Clinical Information 10/08/2021    Final                    Value:This result contains rich text formatting which cannot be displayed here.    Laboratory Notes 10/08/2021    Final                    Value:This result contains rich text formatting which cannot be displayed here.           Assessment:      1. Ulcer of right heel, with necrosis of bone    2. Diabetic neuropathy with neurologic complication    3. Peripheral vascular disease, unspecified    4. Encounter for long-term (current) use of other medications            A's of Opioid Risk Assessment  Activity:Patient can perform ADL.   Analgesia:Patients pain is partially controlled by current medication. Patient has tried OTC medications such as Tylenol and Ibuprofen with out relief.   Adverse Effects: Patient denies constipation or sedation.  Aberrant Behavior:  reviewed with no aberrant drug seeking/taking behavior.  Overdose reversal drug naloxone discussed    Drug screen reviewed      Requested Prescriptions     Signed Prescriptions Disp Refills    gabapentin (NEURONTIN) 300 MG capsule 90 capsule 1     Sig: Take 1 capsule (300 mg total) by mouth every 8 (eight) hours.    HYDROcodone-acetaminophen (NORCO)  mg per tablet 30 tablet 0     Sig: Take 1 tablet by mouth once daily.    HYDROcodone-acetaminophen (NORCO)  mg per tablet 30 tablet 0     Sig: Take 1 tablet by mouth once daily.         Plan:    Uses crutches/lower extremity scooter for ambulation     Lower extremity discomfort right greater than left    Recovering after skin graft right foot    Has resume hyperbaric treatment    He states current medications helping control his discomfort    He would like to continue with conservative management    Continue activity as directed    Follow-up 2 months,      Dr. Paulson May  2022    Bring original prescription medication bottles/container/box with labels to each visit

## 2022-03-11 NOTE — PROGRESS NOTES
Graft taking well to right foot well. Mepitel and dakin's moist gauze applied. RTC on Monday for HBOT. HBO check list given.

## 2022-03-14 ENCOUNTER — CLINICAL SUPPORT (OUTPATIENT)
Dept: WOUND CARE | Facility: CLINIC | Age: 37
End: 2022-03-14
Attending: NURSE PRACTITIONER
Payer: MEDICARE

## 2022-03-14 ENCOUNTER — OFFICE VISIT (OUTPATIENT)
Dept: PAIN MEDICINE | Facility: CLINIC | Age: 37
End: 2022-03-14
Payer: MEDICARE

## 2022-03-14 VITALS
HEART RATE: 100 BPM | RESPIRATION RATE: 20 BRPM | TEMPERATURE: 98 F | SYSTOLIC BLOOD PRESSURE: 163 MMHG | DIASTOLIC BLOOD PRESSURE: 96 MMHG

## 2022-03-14 VITALS
WEIGHT: 315 LBS | SYSTOLIC BLOOD PRESSURE: 178 MMHG | HEART RATE: 91 BPM | OXYGEN SATURATION: 98 % | DIASTOLIC BLOOD PRESSURE: 93 MMHG | HEIGHT: 73 IN | RESPIRATION RATE: 18 BRPM | BODY MASS INDEX: 41.75 KG/M2

## 2022-03-14 DIAGNOSIS — L97.414 ULCER OF RIGHT HEEL, WITH NECROSIS OF BONE: Primary | Chronic | ICD-10-CM

## 2022-03-14 DIAGNOSIS — E11.49 DIABETIC NEUROPATHY WITH NEUROLOGIC COMPLICATION: Chronic | ICD-10-CM

## 2022-03-14 DIAGNOSIS — E11.40 DIABETIC NEUROPATHY WITH NEUROLOGIC COMPLICATION: Chronic | ICD-10-CM

## 2022-03-14 DIAGNOSIS — Z79.899 ENCOUNTER FOR LONG-TERM (CURRENT) USE OF OTHER MEDICATIONS: ICD-10-CM

## 2022-03-14 DIAGNOSIS — I73.9 PERIPHERAL VASCULAR DISEASE, UNSPECIFIED: Chronic | ICD-10-CM

## 2022-03-14 DIAGNOSIS — L97.414 ULCER OF RIGHT HEEL, WITH NECROSIS OF BONE: Primary | ICD-10-CM

## 2022-03-14 LAB
CTP QC/QA: YES
GLUCOSE SERPL-MCNC: 134 MG/DL (ref 70–110)
GLUCOSE SERPL-MCNC: 134 MG/DL (ref 70–110)
GLUCOSE SERPL-MCNC: 226 MG/DL (ref 70–110)
POC (AMP) AMPHETAMINE: NEGATIVE
POC (BAR) BARBITURATES: NEGATIVE
POC (BUP) BUPRENORPHINE: NEGATIVE
POC (BZO) BENZODIAZEPINES: NEGATIVE
POC (COC) COCAINE: NEGATIVE
POC (MDMA) METHYLENEDIOXYMETHAMPHETAMINE 3,4: NEGATIVE
POC (MET) METHAMPHETAMINE: NEGATIVE
POC (MOP) OPIATES: NEGATIVE
POC (MTD) METHADONE: NEGATIVE
POC (OXY) OXYCODONE: NEGATIVE
POC (PCP) PHENCYCLIDINE: NEGATIVE
POC (TCA) TRICYCLIC ANTIDEPRESSANTS: NEGATIVE
POC TEMPERATURE (URINE): 92
POC THC: NEGATIVE

## 2022-03-14 PROCEDURE — 82962 GLUCOSE BLOOD TEST: CPT | Mod: PBBFAC | Performed by: FAMILY MEDICINE

## 2022-03-14 PROCEDURE — 80305 DRUG TEST PRSMV DIR OPT OBS: CPT | Mod: PBBFAC | Performed by: PHYSICIAN ASSISTANT

## 2022-03-14 PROCEDURE — 99215 OFFICE O/P EST HI 40 MIN: CPT | Mod: PBBFAC | Performed by: FAMILY MEDICINE

## 2022-03-14 PROCEDURE — 99214 OFFICE O/P EST MOD 30 MIN: CPT | Mod: S$PBB,,, | Performed by: PHYSICIAN ASSISTANT

## 2022-03-14 PROCEDURE — 99214 PR OFFICE/OUTPT VISIT, EST, LEVL IV, 30-39 MIN: ICD-10-PCS | Mod: S$PBB,,, | Performed by: PHYSICIAN ASSISTANT

## 2022-03-14 PROCEDURE — 99215 OFFICE O/P EST HI 40 MIN: CPT | Mod: PBBFAC | Performed by: PHYSICIAN ASSISTANT

## 2022-03-14 RX ORDER — HYDROCODONE BITARTRATE AND ACETAMINOPHEN 10; 325 MG/1; MG/1
1 TABLET ORAL DAILY
Qty: 30 TABLET | Refills: 0 | Status: SHIPPED | OUTPATIENT
Start: 2022-03-24 | End: 2022-07-12 | Stop reason: SDUPTHER

## 2022-03-14 RX ORDER — GABAPENTIN 300 MG/1
300 CAPSULE ORAL EVERY 8 HOURS
Qty: 90 CAPSULE | Refills: 1 | Status: SHIPPED | OUTPATIENT
Start: 2022-03-14 | End: 2022-04-26

## 2022-03-14 RX ORDER — HYDROCODONE BITARTRATE AND ACETAMINOPHEN 10; 325 MG/1; MG/1
1 TABLET ORAL DAILY
Qty: 30 TABLET | Refills: 0 | Status: SHIPPED | OUTPATIENT
Start: 2022-04-23 | End: 2022-06-13

## 2022-03-14 NOTE — PROGRESS NOTES
See HBO treatment and wound assessment. Staples removed from skin graft to right foot. Hydrofera blue applied to heel wound,cont mepitel/dakin's moist gauze. RTC in am for HBOT

## 2022-03-14 NOTE — PATIENT INSTRUCTIONS
Right foot wound clean with baby shampoo and water    Right heel opening,pack with hydrofera blue    Apply mepitel to wound then apply moisten dakin's gauze,cvoer with dry gauze and kerlix,paper tape    Change daily and as needed    Elevate feet as much as possible    No standing    Knee scooter

## 2022-03-14 NOTE — PROGRESS NOTES
Subjective:      Patient ID: Jean Pierre Paulson is a 36 y.o. male.    Chief Complaint: Hyperbaric Oxygen Therapy    Jean Pierre Paulson a 36 y.o. male presents for follow up on all regular problems which are reviewed and discussed.     Problem List Items Addressed This Visit    None     Visit Diagnoses     Ulcer of right heel, with necrosis of bone    -  Primary    Relevant Orders    POCT Glucose, Hand-Held Device (Completed)          Past Medical History:  Past Medical History:   Diagnosis Date    Anemia     Chronic osteomyelitis     Diabetes mellitus with neuropathy     Diabetes mellitus, type 2     DM2 (diabetes mellitus, type 2)     HTN (hypertension)     Hypertension     Neuropathy     Obesity     Osteomyelitis 10/2020    Hx of R foot, Tx with IV Abx    Peripheral vascular disease      Past Surgical History:   Procedure Laterality Date    APPLICATION OF SPLIT-THICKNESS SKIN GRAFT (STSG) TO LOWER EXTREMITY Right 3/2/2022    Procedure: APPLICATION, GRAFT, SKIN, SPLIT-THICKNESS, TO LOWER EXTREMITY;  Surgeon: Greg Ramírez MD;  Location: [facility];  Service: General;  Laterality: Right;    CHOLECYSTECTOMY      DEBRIDEMENT OF FOOT Right 10/2020    FOOT SURGERY      Right diabetic foot ulcer Elliott's Grade 3      Bone exposure to R heel, Hx of IV Abx, cultures show multiple bacterial growth, Hx of Osteomyelitis, HBOT     Review of patient's allergies indicates:   Allergen Reactions    Tomato Itching     Current Outpatient Medications on File Prior to Visit   Medication Sig Dispense Refill    acetaminophen (TYLENOL) 325 MG tablet Take 2 tablets (650 mg total) by mouth every 4 (four) hours as needed.  0    amLODIPine (NORVASC) 10 MG tablet Take 1 tablet (10 mg total) by mouth once daily. 30 tablet 0    amoxicillin-clavulanate 875-125mg (AUGMENTIN) 875-125 mg per tablet Take 1 tablet by mouth every 12 (twelve) hours. for 21 days 42 tablet 0    blood sugar diagnostic (BLOOD GLUCOSE TEST) Strp 1 strip by  Misc.(Non-Drug; Combo Route) route once daily. accu-chek Veronika strips 90 strip 5    dapagliflozin-metformin (XIGDUO XR) 5-1,000 mg TBph Take 1 tablet by mouth once daily.      doxycycline (VIBRA-TABS) 100 MG tablet Take 1 tablet (100 mg total) by mouth 2 (two) times daily. for 21 days 42 tablet 0    FEROSUL 325 mg (65 mg iron) Tab tablet Take 325 mg by mouth once daily.      hydrALAZINE (APRESOLINE) 25 MG tablet Take 1 tablet (25 mg total) by mouth every 12 (twelve) hours. 60 tablet 0    hydroCHLOROthiazide (HYDRODIURIL) 25 MG tablet Take 1 tablet (25 mg total) by mouth once daily. 90 tablet 1    Lactobacillus acidophilus 500 million cell Cap Take 2 capsules by mouth 3 (three) times daily with meals. for 21 days 126 capsule 0    losartan (COZAAR) 50 MG tablet Take 1 tablet (50 mg total) by mouth once daily. 30 tablet 0    polyethylene glycol (GLYCOLAX) 17 gram PwPk Take 17 g by mouth daily as needed. 30 packet 0    rosuvastatin (CRESTOR) 10 MG tablet TAKE 1 TABLET EVERY DAY 90 tablet 0    [DISCONTINUED] blood-glucose meter kit 1 each by Other route 3 (three) times daily. 1 each 0    [DISCONTINUED] insulin asp prt-insulin aspart, NovoLOG 70/30, (NOVOLOG 70/30) 100 unit/mL (70-30) Soln Inject 15 Units into the skin once daily. 450 Units 0    [DISCONTINUED] insulin detemir U-100 (LEVEMIR) 100 unit/mL injection Inject 40 Units into the skin every evening. 12 mL 0    [DISCONTINUED] sodium hypochlorite 0.5 % (DAKIN'S SOLUTION) external solution Apply topically once daily. 473 mL 5     No current facility-administered medications on file prior to visit.     Social History     Socioeconomic History    Marital status: Single   Tobacco Use    Smoking status: Former Smoker    Smokeless tobacco: Never Used   Substance and Sexual Activity    Alcohol use: Yes     Comment: occasionally    Drug use: Yes     Types: Hydrocodone    Sexual activity: Yes     Family History   Problem Relation Age of Onset     Hypertension Father     Diabetes Father        Review of Systems    Objective:     /69   Pulse 90   Temp 97.3 °F (36.3 °C)   Resp 20     Physical Exam  Assessment:     1. Ulcer of right heel, with necrosis of bone        Plan:     Problem List Items Addressed This Visit    None     Visit Diagnoses     Ulcer of right heel, with necrosis of bone    -  Primary    Relevant Orders    POCT Glucose, Hand-Held Device (Completed)        No follow-ups on file.      I am having Jean Pierre Paulson maintain his amLODIPine, blood sugar diagnostic, FeroSuL, hydroCHLOROthiazide, rosuvastatin, acetaminophen, hydrALAZINE, Lactobacillus acidophilus, losartan, polyethylene glycol, amoxicillin-clavulanate 875-125mg, doxycycline, and XIGDUO XR.    Jean Pierre was seen today for hyperbaric oxygen therapy.    Diagnoses and all orders for this visit:    Ulcer of right heel, with necrosis of bone  -     POCT Glucose, Hand-Held Device         [unfilled]  Orders Placed This Encounter   Procedures    POCT Glucose, Hand-Held Device

## 2022-03-17 ENCOUNTER — OFFICE VISIT (OUTPATIENT)
Dept: WOUND CARE | Facility: CLINIC | Age: 37
End: 2022-03-17
Attending: FAMILY MEDICINE
Payer: MEDICARE

## 2022-03-17 VITALS
DIASTOLIC BLOOD PRESSURE: 78 MMHG | TEMPERATURE: 97 F | SYSTOLIC BLOOD PRESSURE: 113 MMHG | HEART RATE: 93 BPM | RESPIRATION RATE: 20 BRPM

## 2022-03-17 DIAGNOSIS — L97.414 ULCER OF RIGHT HEEL, WITH NECROSIS OF BONE: Primary | ICD-10-CM

## 2022-03-17 PROCEDURE — G0277 HBOT, FULL BODY CHAMBER, 30M: HCPCS | Performed by: FAMILY MEDICINE

## 2022-03-17 PROCEDURE — 99183 HYPERBARIC OXYGEN THERAPY: CPT | Mod: S$PBB,,, | Performed by: FAMILY MEDICINE

## 2022-03-17 PROCEDURE — 99183 PR HYPERBARIC OXYGEN THERAPY ATTENDANCE/SUPERVISION, PER SESSION: ICD-10-PCS | Mod: S$PBB,,, | Performed by: FAMILY MEDICINE

## 2022-03-17 PROCEDURE — 82962 GLUCOSE BLOOD TEST: CPT | Mod: 91,PBBFAC | Performed by: FAMILY MEDICINE

## 2022-03-17 PROCEDURE — 99183 HYPERBARIC OXYGEN THERAPY: CPT | Mod: PBBFAC | Performed by: FAMILY MEDICINE

## 2022-03-17 PROCEDURE — 99215 OFFICE O/P EST HI 40 MIN: CPT | Mod: PBBFAC | Performed by: FAMILY MEDICINE

## 2022-03-21 ENCOUNTER — CLINICAL SUPPORT (OUTPATIENT)
Dept: WOUND CARE | Facility: CLINIC | Age: 37
End: 2022-03-21
Attending: FAMILY MEDICINE
Payer: MEDICARE

## 2022-03-21 VITALS
SYSTOLIC BLOOD PRESSURE: 116 MMHG | DIASTOLIC BLOOD PRESSURE: 94 MMHG | HEART RATE: 67 BPM | RESPIRATION RATE: 20 BRPM | TEMPERATURE: 97 F

## 2022-03-21 DIAGNOSIS — L97.414 ULCER OF RIGHT HEEL, WITH NECROSIS OF BONE: Primary | ICD-10-CM

## 2022-03-21 LAB
GLUCOSE SERPL-MCNC: 123 MG/DL (ref 70–110)
GLUCOSE SERPL-MCNC: 152 MG/DL (ref 70–110)
GLUCOSE SERPL-MCNC: 176 MG/DL (ref 70–110)
GLUCOSE SERPL-MCNC: 222 MG/DL (ref 70–110)

## 2022-03-21 PROCEDURE — 99183 PR HYPERBARIC OXYGEN THERAPY ATTENDANCE/SUPERVISION, PER SESSION: ICD-10-PCS | Mod: S$PBB,,, | Performed by: FAMILY MEDICINE

## 2022-03-21 PROCEDURE — 99183 HYPERBARIC OXYGEN THERAPY: CPT | Mod: S$PBB,,, | Performed by: FAMILY MEDICINE

## 2022-03-21 PROCEDURE — 99183 HYPERBARIC OXYGEN THERAPY: CPT | Mod: PBBFAC | Performed by: FAMILY MEDICINE

## 2022-03-21 PROCEDURE — G0277 HBOT, FULL BODY CHAMBER, 30M: HCPCS | Performed by: FAMILY MEDICINE

## 2022-03-21 PROCEDURE — 99214 OFFICE O/P EST MOD 30 MIN: CPT | Mod: PBBFAC | Performed by: FAMILY MEDICINE

## 2022-03-23 ENCOUNTER — OFFICE VISIT (OUTPATIENT)
Dept: WOUND CARE | Facility: CLINIC | Age: 37
End: 2022-03-23
Attending: FAMILY MEDICINE
Payer: MEDICARE

## 2022-03-23 VITALS
RESPIRATION RATE: 20 BRPM | DIASTOLIC BLOOD PRESSURE: 89 MMHG | SYSTOLIC BLOOD PRESSURE: 150 MMHG | HEART RATE: 90 BPM | TEMPERATURE: 97 F

## 2022-03-23 DIAGNOSIS — L97.414 ULCER OF RIGHT HEEL, WITH NECROSIS OF BONE: Primary | ICD-10-CM

## 2022-03-23 LAB — GLUCOSE SERPL-MCNC: 281 MG/DL (ref 70–110)

## 2022-03-23 PROCEDURE — 99499 UNLISTED E&M SERVICE: CPT | Mod: S$PBB,,, | Performed by: FAMILY MEDICINE

## 2022-03-23 PROCEDURE — 99499 NO LOS: ICD-10-PCS | Mod: S$PBB,,, | Performed by: FAMILY MEDICINE

## 2022-03-23 PROCEDURE — 99183 HYPERBARIC OXYGEN THERAPY: CPT | Mod: S$PBB,,, | Performed by: FAMILY MEDICINE

## 2022-03-23 PROCEDURE — 99183 HYPERBARIC OXYGEN THERAPY: CPT | Mod: PBBFAC | Performed by: FAMILY MEDICINE

## 2022-03-23 PROCEDURE — G0277 HBOT, FULL BODY CHAMBER, 30M: HCPCS | Performed by: FAMILY MEDICINE

## 2022-03-23 PROCEDURE — 82962 GLUCOSE BLOOD TEST: CPT | Mod: PBBFAC | Performed by: FAMILY MEDICINE

## 2022-03-23 PROCEDURE — 99183 PR HYPERBARIC OXYGEN THERAPY ATTENDANCE/SUPERVISION, PER SESSION: ICD-10-PCS | Mod: S$PBB,,, | Performed by: FAMILY MEDICINE

## 2022-03-23 PROCEDURE — 99214 OFFICE O/P EST MOD 30 MIN: CPT | Mod: PBBFAC | Performed by: FAMILY MEDICINE

## 2022-03-23 RX ORDER — DAPAGLIFLOZIN AND METFORMIN HYDROCHLORIDE 5; 1000 MG/1; MG/1
1 TABLET, FILM COATED, EXTENDED RELEASE ORAL DAILY
Qty: 30 TABLET | Refills: 5 | Status: SHIPPED | OUTPATIENT
Start: 2022-03-23 | End: 2022-06-14 | Stop reason: CLARIF

## 2022-03-23 NOTE — LETTER
Re: Jean Pierre Paulson  [address] [place] [address]    To Whom it May concern,     Jean Pierre Paulson, date of birth 1985, is currently under my care. Due to medical complications and safety concerns, it is my professional recommendation that he resides in an apartment on the first floor.     If you have any questions or need additional information, please contact us at [phone]                                                    Patient Name: Jean Pierre Paulson  : 1985  Patient Phone #: [phone]

## 2022-03-23 NOTE — LETTER
[facility] - Wound Care  [address] [place] [address] [place] [address]  Phone: [phone]  Fax: [phone]             To Whom it May concern,        Jean Pierre Paulson, date of birth 1985, is currently under my care. Due to medical complications and safety concerns, it is my professional recommendation that he resides in an apartment on the first floor.       If you have any questions or need additional information, please contact us at [phone]              Signature:  AUGUSTUS Macedo  [facility] - WOUND CARE  [address] [place] [id number]  [phone]        Date of encounter: 3/23/22

## 2022-03-23 NOTE — PROGRESS NOTES
Subjective:      Patient ID: Jean Pierre Paulson is a 36 y.o. male.    Chief Complaint: Hyperbaric Oxygen Therapy and Diabetic Foot Ulcer    Jean Pierre Paulson a 36 y.o. male presents for follow up on all regular problems which are reviewed and discussed.     Problem List Items Addressed This Visit    None     Visit Diagnoses     Ulcer of right heel, with necrosis of bone    -  Primary    Relevant Orders    POCT Glucose, Hand-Held Device (Completed)    POCT Glucose, Hand-Held Device (Completed)    POCT Glucose, Hand-Held Device (Completed)    POCT Glucose, Hand-Held Device (Completed)          Past Medical History:  Past Medical History:   Diagnosis Date    Anemia     Chronic osteomyelitis     Diabetes mellitus with neuropathy     Diabetes mellitus, type 2     DM2 (diabetes mellitus, type 2)     HTN (hypertension)     Hypertension     Neuropathy     Obesity     Osteomyelitis 10/2020    Hx of R foot, Tx with IV Abx    Peripheral vascular disease      Past Surgical History:   Procedure Laterality Date    APPLICATION OF SPLIT-THICKNESS SKIN GRAFT (STSG) TO LOWER EXTREMITY Right 3/2/2022    Procedure: APPLICATION, GRAFT, SKIN, SPLIT-THICKNESS, TO LOWER EXTREMITY;  Surgeon: Greg Ramírez MD;  Location: [facility];  Service: General;  Laterality: Right;    CHOLECYSTECTOMY      DEBRIDEMENT OF FOOT Right 10/2020    FOOT SURGERY      Right diabetic foot ulcer Elliott's Grade 3      Bone exposure to R heel, Hx of IV Abx, cultures show multiple bacterial growth, Hx of Osteomyelitis, HBOT     Review of patient's allergies indicates:   Allergen Reactions    Tomato Itching     Current Outpatient Medications on File Prior to Visit   Medication Sig Dispense Refill    acetaminophen (TYLENOL) 325 MG tablet Take 2 tablets (650 mg total) by mouth every 4 (four) hours as needed.  0    amLODIPine (NORVASC) 10 MG tablet Take 1 tablet (10 mg total) by mouth once daily. 30 tablet 0    amoxicillin-clavulanate 875-125mg (AUGMENTIN)  875-125 mg per tablet Take 1 tablet by mouth every 12 (twelve) hours. for 21 days 42 tablet 0    blood sugar diagnostic (BLOOD GLUCOSE TEST) Strp 1 strip by Misc.(Non-Drug; Combo Route) route once daily. accu-chek Veronika strips 90 strip 5    dapagliflozin-metformin (XIGDUO XR) 5-1,000 mg TBph Take 1 tablet by mouth once daily.      doxycycline (VIBRA-TABS) 100 MG tablet Take 1 tablet (100 mg total) by mouth 2 (two) times daily. for 21 days 42 tablet 0    FEROSUL 325 mg (65 mg iron) Tab tablet Take 325 mg by mouth once daily.      gabapentin (NEURONTIN) 300 MG capsule Take 1 capsule (300 mg total) by mouth every 8 (eight) hours. 90 capsule 1    hydrALAZINE (APRESOLINE) 25 MG tablet Take 1 tablet (25 mg total) by mouth every 12 (twelve) hours. 60 tablet 0    hydroCHLOROthiazide (HYDRODIURIL) 25 MG tablet Take 1 tablet (25 mg total) by mouth once daily. 90 tablet 1    [START ON 3/24/2022] HYDROcodone-acetaminophen (NORCO)  mg per tablet Take 1 tablet by mouth once daily. 30 tablet 0    [START ON 4/23/2022] HYDROcodone-acetaminophen (NORCO)  mg per tablet Take 1 tablet by mouth once daily. 30 tablet 0    Lactobacillus acidophilus 500 million cell Cap Take 2 capsules by mouth 3 (three) times daily with meals. for 21 days 126 capsule 0    losartan (COZAAR) 50 MG tablet Take 1 tablet (50 mg total) by mouth once daily. 30 tablet 0    polyethylene glycol (GLYCOLAX) 17 gram PwPk Take 17 g by mouth daily as needed. 30 packet 0    rosuvastatin (CRESTOR) 10 MG tablet TAKE 1 TABLET EVERY DAY 90 tablet 0    [DISCONTINUED] blood-glucose meter kit 1 each by Other route 3 (three) times daily. 1 each 0    [DISCONTINUED] insulin asp prt-insulin aspart, NovoLOG 70/30, (NOVOLOG 70/30) 100 unit/mL (70-30) Soln Inject 15 Units into the skin once daily. 450 Units 0    [DISCONTINUED] insulin detemir U-100 (LEVEMIR) 100 unit/mL injection Inject 40 Units into the skin every evening. 12 mL 0    [DISCONTINUED] sodium  hypochlorite 0.5 % (DAKIN'S SOLUTION) external solution Apply topically once daily. 473 mL 5     No current facility-administered medications on file prior to visit.     Social History     Socioeconomic History    Marital status: Single   Tobacco Use    Smoking status: Former Smoker    Smokeless tobacco: Never Used   Substance and Sexual Activity    Alcohol use: Yes     Comment: occasionally    Drug use: Yes     Types: Hydrocodone    Sexual activity: Yes     Family History   Problem Relation Age of Onset    Hypertension Father     Diabetes Father        Review of Systems   Unable to perform ROS: Other   Constitutional: Negative.    HENT: Negative for congestion, ear pain, nosebleeds and trouble swallowing.    Eyes: Negative for pain and itching.   Respiratory: Negative for chest tightness.    Cardiovascular: Negative for chest pain.   Gastrointestinal: Negative for abdominal distention.   Endocrine: Negative for cold intolerance and heat intolerance.   Genitourinary: Negative for difficulty urinating.   Musculoskeletal: Negative for arthralgias.   Neurological: Negative for dizziness.       Objective:     /78   Pulse 93   Temp 97.3 °F (36.3 °C)   Resp 20     Physical Exam  Constitutional:       Appearance: Normal appearance. He is obese.   HENT:      Head: Normocephalic and atraumatic.      Right Ear: External ear normal.      Left Ear: External ear normal.      Nose: Nose normal.      Mouth/Throat:      Mouth: Mucous membranes are moist.      Pharynx: Oropharynx is clear.   Eyes:      Pupils: Pupils are equal, round, and reactive to light.   Cardiovascular:      Rate and Rhythm: Normal rate and regular rhythm.      Heart sounds: Normal heart sounds.   Pulmonary:      Effort: Pulmonary effort is normal.      Breath sounds: Normal breath sounds.   Abdominal:      Palpations: Abdomen is soft.   Musculoskeletal:         General: Normal range of motion.      Cervical back: Normal range of motion and  neck supple.   Skin:     General: Skin is warm and dry.   Neurological:      General: No focal deficit present.      Mental Status: He is alert.   Psychiatric:         Mood and Affect: Mood normal.         Behavior: Behavior normal.         Thought Content: Thought content normal.         Judgment: Judgment normal.       Assessment:     1. Ulcer of right heel, with necrosis of bone        Plan:     Problem List Items Addressed This Visit    None     Visit Diagnoses     Ulcer of right heel, with necrosis of bone    -  Primary    Relevant Orders    POCT Glucose, Hand-Held Device (Completed)    POCT Glucose, Hand-Held Device (Completed)    POCT Glucose, Hand-Held Device (Completed)    POCT Glucose, Hand-Held Device (Completed)        No follow-ups on file.      I am having Jean Pierre Paulson maintain his amLODIPine, blood sugar diagnostic, FeroSuL, hydroCHLOROthiazide, rosuvastatin, acetaminophen, hydrALAZINE, Lactobacillus acidophilus, losartan, polyethylene glycol, amoxicillin-clavulanate 875-125mg, doxycycline, XIGDUO XR, gabapentin, HYDROcodone-acetaminophen, and HYDROcodone-acetaminophen.    Jean Pierre was seen today for hyperbaric oxygen therapy and diabetic foot ulcer.    Diagnoses and all orders for this visit:    Ulcer of right heel, with necrosis of bone  -     POCT Glucose, Hand-Held Device  -     POCT Glucose, Hand-Held Device  -     POCT Glucose, Hand-Held Device  -     POCT Glucose, Hand-Held Device         [unfilled]  Orders Placed This Encounter   Procedures    POCT Glucose, Hand-Held Device    POCT Glucose, Hand-Held Device    POCT Glucose, Hand-Held Device    POCT Glucose, Hand-Held Device

## 2022-03-23 NOTE — LETTER
AUTHORIZATION FOR RELEASE OF   CONFIDENTIAL INFORMATION    Dear ***,    We are seeing Jean Pierre Paulson, date of birth 1985, in the clinic at [facility]. Mojgan Lomeli NP is the patient's PCP. Jean Pierre Paulson has an outstanding lab/procedure at the time we reviewed his chart. In order to help keep his health information updated, he has authorized us to request the following medical record(s):        (  )  MAMMOGRAM                                      (  )  COLONOSCOPY      (  )  PAP SMEAR                                          (  )  OUTSIDE LAB RESULTS     (  )  DEXA SCAN                                          (  )  EYE EXAM            (  )  FOOT EXAM                                          (  )  ENTIRE RECORD     (  )  OUTSIDE IMMUNIZATIONS                 (  )  _______________         Please fax records to Mojgan Lomeli NP, ***     If you have any questions, please contact *** at [phone].           Patient Name: Jean Pierre Paulson  : 1985  Patient Phone #: [phone]

## 2022-03-24 ENCOUNTER — OFFICE VISIT (OUTPATIENT)
Dept: WOUND CARE | Facility: CLINIC | Age: 37
End: 2022-03-24
Attending: FAMILY MEDICINE
Payer: MEDICARE

## 2022-03-24 VITALS
SYSTOLIC BLOOD PRESSURE: 181 MMHG | DIASTOLIC BLOOD PRESSURE: 98 MMHG | TEMPERATURE: 97 F | RESPIRATION RATE: 20 BRPM | HEART RATE: 78 BPM

## 2022-03-24 DIAGNOSIS — L97.414 ULCER OF RIGHT HEEL, WITH NECROSIS OF BONE: Primary | ICD-10-CM

## 2022-03-24 LAB — GLUCOSE SERPL-MCNC: 154 MG/DL (ref 70–110)

## 2022-03-24 PROCEDURE — 99183 PR HYPERBARIC OXYGEN THERAPY ATTENDANCE/SUPERVISION, PER SESSION: ICD-10-PCS | Mod: S$PBB,,, | Performed by: FAMILY MEDICINE

## 2022-03-24 PROCEDURE — 99212 OFFICE O/P EST SF 10 MIN: CPT | Mod: PBBFAC | Performed by: FAMILY MEDICINE

## 2022-03-24 PROCEDURE — 99183 HYPERBARIC OXYGEN THERAPY: CPT | Mod: S$PBB,,, | Performed by: FAMILY MEDICINE

## 2022-03-24 PROCEDURE — 99499 NO LOS: ICD-10-PCS | Mod: S$PBB,,, | Performed by: FAMILY MEDICINE

## 2022-03-24 PROCEDURE — 82962 GLUCOSE BLOOD TEST: CPT | Mod: PBBFAC | Performed by: FAMILY MEDICINE

## 2022-03-24 PROCEDURE — G0277 HBOT, FULL BODY CHAMBER, 30M: HCPCS | Performed by: FAMILY MEDICINE

## 2022-03-24 PROCEDURE — 99499 UNLISTED E&M SERVICE: CPT | Mod: S$PBB,,, | Performed by: FAMILY MEDICINE

## 2022-03-24 PROCEDURE — 99183 HYPERBARIC OXYGEN THERAPY: CPT | Mod: PBBFAC | Performed by: FAMILY MEDICINE

## 2022-03-24 NOTE — PROGRESS NOTES
Subjective:      Patient ID: Jean Pierre Paulson is a 36 y.o. male.    Chief Complaint: Hyperbaric Oxygen Therapy and Diabetic Foot Ulcer    Jean Pierre Paulson a 36 y.o. male presents for follow up on all regular problems which are reviewed and discussed.     Problem List Items Addressed This Visit    None     Visit Diagnoses     Ulcer of right heel, with necrosis of bone    -  Primary    Relevant Orders    POCT Glucose, Hand-Held Device (Completed)          Past Medical History:  Past Medical History:   Diagnosis Date    Anemia     Chronic osteomyelitis     Diabetes mellitus with neuropathy     Diabetes mellitus, type 2     DM2 (diabetes mellitus, type 2)     HTN (hypertension)     Hypertension     Neuropathy     Obesity     Osteomyelitis 10/2020    Hx of R foot, Tx with IV Abx    Peripheral vascular disease      Past Surgical History:   Procedure Laterality Date    APPLICATION OF SPLIT-THICKNESS SKIN GRAFT (STSG) TO LOWER EXTREMITY Right 3/2/2022    Procedure: APPLICATION, GRAFT, SKIN, SPLIT-THICKNESS, TO LOWER EXTREMITY;  Surgeon: Greg Ramírez MD;  Location: [facility], [place];  Service: General;  Laterality: Right;    CHOLECYSTECTOMY      DEBRIDEMENT OF FOOT Right 10/2020    FOOT SURGERY      Right diabetic foot ulcer Elliott's Grade 3      Bone exposure to R heel, Hx of IV Abx, cultures show multiple bacterial growth, Hx of Osteomyelitis, HBOT     Review of patient's allergies indicates:   Allergen Reactions    Tomato Itching     Current Outpatient Medications on File Prior to Visit   Medication Sig Dispense Refill    acetaminophen (TYLENOL) 325 MG tablet Take 2 tablets (650 mg total) by mouth every 4 (four) hours as needed.  0    amLODIPine (NORVASC) 10 MG tablet Take 1 tablet (10 mg total) by mouth once daily. 30 tablet 0    amoxicillin-clavulanate 875-125mg (AUGMENTIN) 875-125 mg per tablet Take 1 tablet by mouth every 12 (twelve) hours. for 21 days 42 tablet 0    blood sugar diagnostic (BLOOD GLUCOSE TEST)  Strp 1 strip by Misc.(Non-Drug; Combo Route) route once daily. accu-chek Veronika strips 90 strip 5    doxycycline (VIBRA-TABS) 100 MG tablet Take 1 tablet (100 mg total) by mouth 2 (two) times daily. for 21 days 42 tablet 0    FEROSUL 325 mg (65 mg iron) Tab tablet Take 325 mg by mouth once daily.      gabapentin (NEURONTIN) 300 MG capsule Take 1 capsule (300 mg total) by mouth every 8 (eight) hours. 90 capsule 1    hydrALAZINE (APRESOLINE) 25 MG tablet Take 1 tablet (25 mg total) by mouth every 12 (twelve) hours. 60 tablet 0    hydroCHLOROthiazide (HYDRODIURIL) 25 MG tablet Take 1 tablet (25 mg total) by mouth once daily. 90 tablet 1    HYDROcodone-acetaminophen (NORCO)  mg per tablet Take 1 tablet by mouth once daily. 30 tablet 0    [START ON 4/23/2022] HYDROcodone-acetaminophen (NORCO)  mg per tablet Take 1 tablet by mouth once daily. 30 tablet 0    Lactobacillus acidophilus 500 million cell Cap Take 2 capsules by mouth 3 (three) times daily with meals. for 21 days 126 capsule 0    losartan (COZAAR) 50 MG tablet Take 1 tablet (50 mg total) by mouth once daily. 30 tablet 0    polyethylene glycol (GLYCOLAX) 17 gram PwPk Take 17 g by mouth daily as needed. 30 packet 0    rosuvastatin (CRESTOR) 10 MG tablet TAKE 1 TABLET EVERY DAY 90 tablet 0    [DISCONTINUED] blood-glucose meter kit 1 each by Other route 3 (three) times daily. 1 each 0    [DISCONTINUED] insulin asp prt-insulin aspart, NovoLOG 70/30, (NOVOLOG 70/30) 100 unit/mL (70-30) Soln Inject 15 Units into the skin once daily. 450 Units 0    [DISCONTINUED] insulin detemir U-100 (LEVEMIR) 100 unit/mL injection Inject 40 Units into the skin every evening. 12 mL 0    [DISCONTINUED] sodium hypochlorite 0.5 % (DAKIN'S SOLUTION) external solution Apply topically once daily. 473 mL 5     No current facility-administered medications on file prior to visit.     Social History     Socioeconomic History    Marital status: Single   Tobacco Use     Smoking status: Former Smoker    Smokeless tobacco: Never Used   Substance and Sexual Activity    Alcohol use: Yes     Comment: occasionally    Drug use: Yes     Types: Hydrocodone    Sexual activity: Yes     Family History   Problem Relation Age of Onset    Hypertension Father     Diabetes Father        Review of Systems   Unable to perform ROS: Other       Objective:     BP (!) 150/89   Pulse 90   Temp 97.1 °F (36.2 °C)   Resp 20     Physical Exam  Assessment:     1. Ulcer of right heel, with necrosis of bone        Plan:     Problem List Items Addressed This Visit    None     Visit Diagnoses     Ulcer of right heel, with necrosis of bone    -  Primary    Relevant Orders    POCT Glucose, Hand-Held Device (Completed)        No follow-ups on file.      I am having Jean Pierre Paulson maintain his amLODIPine, blood sugar diagnostic, FeroSuL, hydroCHLOROthiazide, rosuvastatin, acetaminophen, hydrALAZINE, Lactobacillus acidophilus, losartan, polyethylene glycol, amoxicillin-clavulanate 875-125mg, doxycycline, gabapentin, HYDROcodone-acetaminophen, HYDROcodone-acetaminophen, and XIGDUO XR.    Jean Pierre was seen today for hyperbaric oxygen therapy and diabetic foot ulcer.    Diagnoses and all orders for this visit:    Ulcer of right heel, with necrosis of bone  -     POCT Glucose, Hand-Held Device    Other orders  -     dapagliflozin-metformin (XIGDUO XR) 5-1,000 mg TBph; Take 1 tablet by mouth once daily.      Medications Ordered This Encounter   Medications    dapagliflozin-metformin (XIGDUO XR) 5-1,000 mg TBph     Sig: Take 1 tablet by mouth once daily.     Dispense:  30 tablet     Refill:  5     [unfilled]  Orders Placed This Encounter   Procedures    POCT Glucose, Hand-Held Device

## 2022-03-24 NOTE — PROGRESS NOTES
Subjective:      Patient ID: Jean Pierre Paulson is a 36 y.o. male.    Chief Complaint: No chief complaint on file.    Jean Pierre Paulson a 36 y.o. male presents for follow up on all regular problems which are reviewed and discussed.     Problem List Items Addressed This Visit    None     Visit Diagnoses     Ulcer of right heel, with necrosis of bone    -  Primary          Past Medical History:  Past Medical History:   Diagnosis Date    Anemia     Chronic osteomyelitis     Diabetes mellitus with neuropathy     Diabetes mellitus, type 2     DM2 (diabetes mellitus, type 2)     HTN (hypertension)     Hypertension     Neuropathy     Obesity     Osteomyelitis 10/2020    Hx of R foot, Tx with IV Abx    Peripheral vascular disease      Past Surgical History:   Procedure Laterality Date    APPLICATION OF SPLIT-THICKNESS SKIN GRAFT (STSG) TO LOWER EXTREMITY Right 3/2/2022    Procedure: APPLICATION, GRAFT, SKIN, SPLIT-THICKNESS, TO LOWER EXTREMITY;  Surgeon: Greg Ramírez MD;  Location: [facility], [place];  Service: General;  Laterality: Right;    CHOLECYSTECTOMY      DEBRIDEMENT OF FOOT Right 10/2020    FOOT SURGERY      Right diabetic foot ulcer Elliott's Grade 3      Bone exposure to R heel, Hx of IV Abx, cultures show multiple bacterial growth, Hx of Osteomyelitis, HBOT     Review of patient's allergies indicates:   Allergen Reactions    Tomato Itching     Current Outpatient Medications on File Prior to Visit   Medication Sig Dispense Refill    acetaminophen (TYLENOL) 325 MG tablet Take 2 tablets (650 mg total) by mouth every 4 (four) hours as needed.  0    amLODIPine (NORVASC) 10 MG tablet Take 1 tablet (10 mg total) by mouth once daily. 30 tablet 0    amoxicillin-clavulanate 875-125mg (AUGMENTIN) 875-125 mg per tablet Take 1 tablet by mouth every 12 (twelve) hours. for 21 days 42 tablet 0    blood sugar diagnostic (BLOOD GLUCOSE TEST) Strp 1 strip by Misc.(Non-Drug; Combo Route) route once daily. accu-chek Veronika strips  90 strip 5    dapagliflozin-metformin (XIGDUO XR) 5-1,000 mg TBph Take 1 tablet by mouth once daily. 30 tablet 5    doxycycline (VIBRA-TABS) 100 MG tablet Take 1 tablet (100 mg total) by mouth 2 (two) times daily. for 21 days 42 tablet 0    FEROSUL 325 mg (65 mg iron) Tab tablet Take 325 mg by mouth once daily.      gabapentin (NEURONTIN) 300 MG capsule Take 1 capsule (300 mg total) by mouth every 8 (eight) hours. 90 capsule 1    hydrALAZINE (APRESOLINE) 25 MG tablet Take 1 tablet (25 mg total) by mouth every 12 (twelve) hours. 60 tablet 0    hydroCHLOROthiazide (HYDRODIURIL) 25 MG tablet Take 1 tablet (25 mg total) by mouth once daily. 90 tablet 1    HYDROcodone-acetaminophen (NORCO)  mg per tablet Take 1 tablet by mouth once daily. 30 tablet 0    [START ON 4/23/2022] HYDROcodone-acetaminophen (NORCO)  mg per tablet Take 1 tablet by mouth once daily. 30 tablet 0    Lactobacillus acidophilus 500 million cell Cap Take 2 capsules by mouth 3 (three) times daily with meals. for 21 days 126 capsule 0    losartan (COZAAR) 50 MG tablet Take 1 tablet (50 mg total) by mouth once daily. 30 tablet 0    polyethylene glycol (GLYCOLAX) 17 gram PwPk Take 17 g by mouth daily as needed. 30 packet 0    rosuvastatin (CRESTOR) 10 MG tablet TAKE 1 TABLET EVERY DAY 90 tablet 0    [DISCONTINUED] blood-glucose meter kit 1 each by Other route 3 (three) times daily. 1 each 0    [DISCONTINUED] insulin asp prt-insulin aspart, NovoLOG 70/30, (NOVOLOG 70/30) 100 unit/mL (70-30) Soln Inject 15 Units into the skin once daily. 450 Units 0    [DISCONTINUED] insulin detemir U-100 (LEVEMIR) 100 unit/mL injection Inject 40 Units into the skin every evening. 12 mL 0    [DISCONTINUED] sodium hypochlorite 0.5 % (DAKIN'S SOLUTION) external solution Apply topically once daily. 473 mL 5     No current facility-administered medications on file prior to visit.     Social History     Socioeconomic History    Marital status: Single    Tobacco Use    Smoking status: Former Smoker    Smokeless tobacco: Never Used   Substance and Sexual Activity    Alcohol use: Yes     Comment: occasionally    Drug use: Yes     Types: Hydrocodone    Sexual activity: Yes     Family History   Problem Relation Age of Onset    Hypertension Father     Diabetes Father        Review of Systems   Unable to perform ROS: Other   Constitutional: Negative.    HENT: Negative for congestion, ear pain, nosebleeds and trouble swallowing.    Eyes: Negative for pain and itching.   Respiratory: Negative for chest tightness.    Cardiovascular: Negative for chest pain.   Gastrointestinal: Negative for abdominal distention.   Endocrine: Negative for cold intolerance and heat intolerance.   Genitourinary: Negative for difficulty urinating.   Musculoskeletal: Negative for arthralgias.   Neurological: Negative for dizziness.       Objective:     There were no vitals taken for this visit.    Physical Exam  Assessment:     1. Ulcer of right heel, with necrosis of bone        Plan:     Problem List Items Addressed This Visit    None     Visit Diagnoses     Ulcer of right heel, with necrosis of bone    -  Primary        No follow-ups on file.      I am having Jean Pierre Paulson maintain his amLODIPine, blood sugar diagnostic, FeroSuL, hydroCHLOROthiazide, rosuvastatin, acetaminophen, hydrALAZINE, Lactobacillus acidophilus, losartan, polyethylene glycol, amoxicillin-clavulanate 875-125mg, doxycycline, gabapentin, HYDROcodone-acetaminophen, HYDROcodone-acetaminophen, and XIGDUO XR.    Diagnoses and all orders for this visit:    Ulcer of right heel, with necrosis of bone         [unfilled]  No orders of the defined types were placed in this encounter.

## 2022-03-25 ENCOUNTER — OFFICE VISIT (OUTPATIENT)
Dept: WOUND CARE | Facility: CLINIC | Age: 37
End: 2022-03-25
Attending: FAMILY MEDICINE
Payer: MEDICARE

## 2022-03-25 VITALS
TEMPERATURE: 97 F | DIASTOLIC BLOOD PRESSURE: 98 MMHG | SYSTOLIC BLOOD PRESSURE: 148 MMHG | HEART RATE: 91 BPM | RESPIRATION RATE: 20 BRPM

## 2022-03-25 DIAGNOSIS — L97.414 ULCER OF RIGHT HEEL, WITH NECROSIS OF BONE: Primary | ICD-10-CM

## 2022-03-25 LAB — GLUCOSE SERPL-MCNC: 174 MG/DL (ref 70–110)

## 2022-03-25 PROCEDURE — 99499 NO LOS: ICD-10-PCS | Mod: S$PBB,,, | Performed by: SURGERY

## 2022-03-25 PROCEDURE — 99215 OFFICE O/P EST HI 40 MIN: CPT | Mod: PBBFAC | Performed by: SURGERY

## 2022-03-25 PROCEDURE — 82962 GLUCOSE BLOOD TEST: CPT | Mod: PBBFAC | Performed by: SURGERY

## 2022-03-25 PROCEDURE — 99499 UNLISTED E&M SERVICE: CPT | Mod: S$PBB,,, | Performed by: SURGERY

## 2022-03-28 ENCOUNTER — OFFICE VISIT (OUTPATIENT)
Dept: WOUND CARE | Facility: CLINIC | Age: 37
End: 2022-03-28
Attending: FAMILY MEDICINE
Payer: MEDICARE

## 2022-03-28 VITALS
DIASTOLIC BLOOD PRESSURE: 93 MMHG | HEART RATE: 68 BPM | SYSTOLIC BLOOD PRESSURE: 163 MMHG | RESPIRATION RATE: 20 BRPM | TEMPERATURE: 98 F

## 2022-03-28 DIAGNOSIS — E11.621 CHRONIC DIABETIC ULCER OF RIGHT FOOT DETERMINED BY EXAMINATION: ICD-10-CM

## 2022-03-28 DIAGNOSIS — D86.9 SARCOIDOSIS: ICD-10-CM

## 2022-03-28 DIAGNOSIS — L97.519 CHRONIC DIABETIC ULCER OF RIGHT FOOT DETERMINED BY EXAMINATION: ICD-10-CM

## 2022-03-28 DIAGNOSIS — L97.414 DIABETIC ULCER OF RIGHT MIDFOOT ASSOCIATED WITH DIABETES MELLITUS DUE TO UNDERLYING CONDITION, WITH NECROSIS OF BONE: Primary | ICD-10-CM

## 2022-03-28 DIAGNOSIS — E08.621 DIABETIC ULCER OF RIGHT MIDFOOT ASSOCIATED WITH DIABETES MELLITUS DUE TO UNDERLYING CONDITION, WITH NECROSIS OF BONE: Primary | ICD-10-CM

## 2022-03-28 DIAGNOSIS — M86.60 CHRONIC OSTEOMYELITIS: ICD-10-CM

## 2022-03-28 DIAGNOSIS — E11.621 TYPE 2 DIABETES MELLITUS WITH RIGHT DIABETIC FOOT ULCER: ICD-10-CM

## 2022-03-28 DIAGNOSIS — L97.519 TYPE 2 DIABETES MELLITUS WITH RIGHT DIABETIC FOOT ULCER: ICD-10-CM

## 2022-03-28 PROCEDURE — 99499 NO LOS: ICD-10-PCS | Mod: S$PBB,,, | Performed by: FAMILY MEDICINE

## 2022-03-28 PROCEDURE — 82962 GLUCOSE BLOOD TEST: CPT | Mod: PBBFAC | Performed by: FAMILY MEDICINE

## 2022-03-28 PROCEDURE — 99183 HYPERBARIC OXYGEN THERAPY: CPT | Mod: S$PBB,,, | Performed by: FAMILY MEDICINE

## 2022-03-28 PROCEDURE — G0277 HBOT, FULL BODY CHAMBER, 30M: HCPCS | Performed by: FAMILY MEDICINE

## 2022-03-28 PROCEDURE — 99214 OFFICE O/P EST MOD 30 MIN: CPT | Mod: PBBFAC,25 | Performed by: FAMILY MEDICINE

## 2022-03-28 PROCEDURE — 99183 HYPERBARIC OXYGEN THERAPY: CPT | Mod: PBBFAC | Performed by: FAMILY MEDICINE

## 2022-03-28 PROCEDURE — 99499 UNLISTED E&M SERVICE: CPT | Mod: S$PBB,,, | Performed by: FAMILY MEDICINE

## 2022-03-28 PROCEDURE — 99183 PR HYPERBARIC OXYGEN THERAPY ATTENDANCE/SUPERVISION, PER SESSION: ICD-10-PCS | Mod: S$PBB,,, | Performed by: FAMILY MEDICINE

## 2022-03-28 NOTE — PROGRESS NOTES
Subjective:      Patient ID: Jean Pierre Paulson is a 36 y.o. male.    Chief Complaint: Hyperbaric Oxygen Therapy (Elliott Grade 3 right foot) and Diabetic Foot Ulcer (Right foot with bone exposure)    Jean Pierre Paulson a 36 y.o. male presents for follow up on all regular problems which are reviewed and discussed.     Problem List Items Addressed This Visit        ID    Chronic osteomyelitis       Immunology/Multi System    Sarcoidosis       Endocrine    Chronic diabetic ulcer of right foot determined by examination    Type 2 diabetes mellitus with right diabetic foot ulcer    RESOLVED: Diabetic ulcer of right midfoot associated with diabetes mellitus due to underlying condition, with necrosis of bone - Primary          Past Medical History:  Past Medical History:   Diagnosis Date    Anemia     Chronic osteomyelitis     Diabetes mellitus with neuropathy     Diabetes mellitus, type 2     DM2 (diabetes mellitus, type 2)     HTN (hypertension)     Hypertension     Neuropathy     Obesity     Osteomyelitis 10/2020    Hx of R foot, Tx with IV Abx    Peripheral vascular disease      Past Surgical History:   Procedure Laterality Date    APPLICATION OF SPLIT-THICKNESS SKIN GRAFT (STSG) TO LOWER EXTREMITY Right 3/2/2022    Procedure: APPLICATION, GRAFT, SKIN, SPLIT-THICKNESS, TO LOWER EXTREMITY;  Surgeon: Greg Ramírez MD;  Location: [facility];  Service: General;  Laterality: Right;    CHOLECYSTECTOMY      DEBRIDEMENT OF FOOT Right 10/2020    FOOT SURGERY      Right diabetic foot ulcer Elliott's Grade 3      Bone exposure to R heel, Hx of IV Abx, cultures show multiple bacterial growth, Hx of Osteomyelitis, HBOT     Review of patient's allergies indicates:   Allergen Reactions    Tomato Itching     Current Outpatient Medications on File Prior to Visit   Medication Sig Dispense Refill    acetaminophen (TYLENOL) 325 MG tablet Take 2 tablets (650 mg total) by mouth every 4 (four) hours as needed.  0    amLODIPine (NORVASC)  10 MG tablet Take 1 tablet (10 mg total) by mouth once daily. 30 tablet 0    amoxicillin-clavulanate 875-125mg (AUGMENTIN) 875-125 mg per tablet Take 1 tablet by mouth every 12 (twelve) hours. for 21 days 42 tablet 0    blood sugar diagnostic (BLOOD GLUCOSE TEST) Strp 1 strip by Misc.(Non-Drug; Combo Route) route once daily. accu-chek Veronika strips 90 strip 5    dapagliflozin-metformin (XIGDUO XR) 5-1,000 mg TBph Take 1 tablet by mouth once daily. 30 tablet 5    doxycycline (VIBRA-TABS) 100 MG tablet Take 1 tablet (100 mg total) by mouth 2 (two) times daily. for 21 days 42 tablet 0    FEROSUL 325 mg (65 mg iron) Tab tablet Take 325 mg by mouth once daily.      gabapentin (NEURONTIN) 300 MG capsule Take 1 capsule (300 mg total) by mouth every 8 (eight) hours. 90 capsule 1    hydrALAZINE (APRESOLINE) 25 MG tablet Take 1 tablet (25 mg total) by mouth every 12 (twelve) hours. 60 tablet 0    hydroCHLOROthiazide (HYDRODIURIL) 25 MG tablet Take 1 tablet (25 mg total) by mouth once daily. 90 tablet 1    HYDROcodone-acetaminophen (NORCO)  mg per tablet Take 1 tablet by mouth once daily. 30 tablet 0    [START ON 4/23/2022] HYDROcodone-acetaminophen (NORCO)  mg per tablet Take 1 tablet by mouth once daily. 30 tablet 0    Lactobacillus acidophilus 500 million cell Cap Take 2 capsules by mouth 3 (three) times daily with meals. for 21 days 126 capsule 0    losartan (COZAAR) 50 MG tablet Take 1 tablet (50 mg total) by mouth once daily. 30 tablet 0    polyethylene glycol (GLYCOLAX) 17 gram PwPk Take 17 g by mouth daily as needed. 30 packet 0    rosuvastatin (CRESTOR) 10 MG tablet TAKE 1 TABLET EVERY DAY 90 tablet 0    [DISCONTINUED] blood-glucose meter kit 1 each by Other route 3 (three) times daily. 1 each 0    [DISCONTINUED] insulin asp prt-insulin aspart, NovoLOG 70/30, (NOVOLOG 70/30) 100 unit/mL (70-30) Soln Inject 15 Units into the skin once daily. 450 Units 0    [DISCONTINUED] insulin detemir  U-100 (LEVEMIR) 100 unit/mL injection Inject 40 Units into the skin every evening. 12 mL 0    [DISCONTINUED] sodium hypochlorite 0.5 % (DAKIN'S SOLUTION) external solution Apply topically once daily. 473 mL 5     No current facility-administered medications on file prior to visit.     Social History     Socioeconomic History    Marital status: Single   Tobacco Use    Smoking status: Former Smoker    Smokeless tobacco: Never Used   Substance and Sexual Activity    Alcohol use: Yes     Comment: occasionally    Drug use: Yes     Types: Hydrocodone    Sexual activity: Yes     Family History   Problem Relation Age of Onset    Hypertension Father     Diabetes Father        Review of Systems   Unable to perform ROS: Other       Objective:     BP (!) 163/93   Pulse 68   Temp 97.7 °F (36.5 °C)   Resp 20     Physical Exam  Assessment:     1. 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 Type 2 diabetes mellitus with right diabetic foot ulcer    3. Chronic diabetic ulcer of right foot determined by examination    4. Sarcoidosis    5. Chronic osteomyelitis        Plan:     Problem List Items Addressed This Visit        ID    Chronic osteomyelitis       Immunology/Multi System    Sarcoidosis       Endocrine    Chronic diabetic ulcer of right foot determined by examination    Type 2 diabetes mellitus with right diabetic foot ulcer    RESOLVED: Diabetic ulcer of right midfoot associated with diabetes mellitus due to underlying condition, with necrosis of bone - Primary        No follow-ups on file.      I am having Jean Pierre Paulson maintain his amLODIPine, blood sugar diagnostic, FeroSuL, hydroCHLOROthiazide, rosuvastatin, acetaminophen, hydrALAZINE, Lactobacillus acidophilus, losartan, polyethylene glycol, amoxicillin-clavulanate 875-125mg, doxycycline, gabapentin, HYDROcodone-acetaminophen, HYDROcodone-acetaminophen, and XIGDUO XR.    Jean Pierre was  seen today for hyperbaric oxygen therapy and diabetic foot ulcer.    Diagnoses and all orders for this visit:    Diabetic ulcer of right midfoot associated with diabetes mellitus due to underlying condition, with necrosis of bone    Type 2 diabetes mellitus with right diabetic foot ulcer    Chronic diabetic ulcer of right foot determined by examination    Sarcoidosis    Chronic osteomyelitis         [unfilled]  No orders of the defined types were placed in this encounter.

## 2022-03-29 LAB — GLUCOSE SERPL-MCNC: 183 MG/DL (ref 70–110)

## 2022-03-29 NOTE — PATIENT INSTRUCTIONS
Right foot clean with vashe (no substitute)     Apply hydrofera blue to wound,cover with dry gauze,wrap with kerlix and ace wrap from toe to below knee. Change daily and as needed    Elevate feet as much as possible    No standing

## 2022-04-06 ENCOUNTER — OFFICE VISIT (OUTPATIENT)
Dept: WOUND CARE | Facility: CLINIC | Age: 37
End: 2022-04-06
Attending: FAMILY MEDICINE
Payer: MEDICARE

## 2022-04-06 VITALS
SYSTOLIC BLOOD PRESSURE: 142 MMHG | DIASTOLIC BLOOD PRESSURE: 76 MMHG | HEART RATE: 95 BPM | RESPIRATION RATE: 20 BRPM | TEMPERATURE: 98 F

## 2022-04-06 DIAGNOSIS — L97.516 NON-PRESSURE CHRONIC ULCER OF OTHER PART OF RIGHT FOOT WITH BONE INVOLVEMENT WITHOUT EVIDENCE OF NECROSIS: Primary | ICD-10-CM

## 2022-04-06 DIAGNOSIS — L97.519 TYPE 2 DIABETES MELLITUS WITH RIGHT DIABETIC FOOT ULCER: ICD-10-CM

## 2022-04-06 DIAGNOSIS — E11.621 TYPE 2 DIABETES MELLITUS WITH RIGHT DIABETIC FOOT ULCER: ICD-10-CM

## 2022-04-06 PROCEDURE — 99213 PR OFFICE/OUTPT VISIT, EST, LEVL III, 20-29 MIN: ICD-10-PCS | Mod: S$PBB,,, | Performed by: NURSE PRACTITIONER

## 2022-04-06 PROCEDURE — 99215 OFFICE O/P EST HI 40 MIN: CPT | Mod: PBBFAC | Performed by: NURSE PRACTITIONER

## 2022-04-06 PROCEDURE — 99213 OFFICE O/P EST LOW 20 MIN: CPT | Mod: S$PBB,,, | Performed by: NURSE PRACTITIONER

## 2022-04-06 NOTE — PROGRESS NOTES
See wound assessment. Cont hydrofera blue/absorbent dressing daily. Start [place]stone prescribed wound gel daily when available and drawtex. RTC 2 weeks. Orders faxed to [facility]

## 2022-04-06 NOTE — PATIENT INSTRUCTIONS
Clean with vashe solution (no substitutions) apply hydrofera blue to wound,cover with drawtex and dry gauze,wrap with kerlix,paper tape. Change daily and as needed    When available apply [place] prescribed wound gel to wound,cover with gauze and drawtex,wrap with kerlix,paper tape. Change daily and as needed    Elevate feet as much as possible    No standing    Take a multivitamin daily    Keep blood sugars less than 150    Increase protein as tolerated

## 2022-04-07 RX ORDER — AMLODIPINE BESYLATE 10 MG/1
10 TABLET ORAL DAILY
Qty: 30 TABLET | Refills: 2 | Status: SHIPPED | OUTPATIENT
Start: 2022-04-07 | End: 2022-07-27 | Stop reason: SDUPTHER

## 2022-04-07 NOTE — PROGRESS NOTES
AUGUSTUS Macedo   [facility] - WOUND CARE  [address] [facility] [address]  [phone]      PATIENT NAME: Jean Pierre Paulson  : 1985  DATE: 22  MRN: [id number]      Billing Provider: AUGUSTUS Macedo  Level of Service:   Patient PCP Information     Provider PCP Type    Mojgan Lomeli NP General          Reason for Visit / Chief Complaint: Non-healing Wound Follow Up (Right heel)       History of Present Illness / Problem Focused Workflow     Jean Pierre Paulson is a 36 y.o. male who presents to clinic for follow up on chronic-non healing wound on the right foot after hospital discharge and completion of HBOT. Wound has some maceration around edges from drainage, overall wound has improved significantlyl. No odor noted. Patient is wearing boots today, reports that he does not have walking boot due to wear and tear. Pertinent factors that delay wound healing include multiple co-morbidities, poor vascular supply, diabetes, edema, heavy drainage, excessive wound moisture and macerated tissue, no protective sensation, infection and history of poor compliance.       Review of Systems     Review of Systems   Constitutional: Negative for activity change, chills and fever.   Respiratory: Negative for chest tightness and shortness of breath.    Cardiovascular: Positive for leg swelling. Negative for chest pain and palpitations.   Musculoskeletal: Positive for arthralgias and joint swelling.   Skin: Positive for wound.        See wound assessment   Neurological: Positive for weakness and numbness.   Psychiatric/Behavioral: Negative for agitation, behavioral problems, confusion and self-injury.       Medical / Social / Family History     Past Medical History:   Diagnosis Date    Anemia     Chronic osteomyelitis     Diabetes mellitus with neuropathy     Diabetes mellitus, type 2     DM2 (diabetes mellitus, type 2)     HTN (hypertension)     Hypertension     Neuropathy      Obesity     Osteomyelitis 10/2020    Hx of R foot, Tx with IV Abx    Peripheral vascular disease        Past Surgical History:   Procedure Laterality Date    APPLICATION OF SPLIT-THICKNESS SKIN GRAFT (STSG) TO LOWER EXTREMITY Right 3/2/2022    Procedure: APPLICATION, GRAFT, SKIN, SPLIT-THICKNESS, TO LOWER EXTREMITY;  Surgeon: Greg Ramírez MD;  Location: [facility];  Service: General;  Laterality: Right;    CHOLECYSTECTOMY      DEBRIDEMENT OF FOOT Right 10/2020    FOOT SURGERY      Right diabetic foot ulcer Elliott's Grade 3      Bone exposure to R heel, Hx of IV Abx, cultures show multiple bacterial growth, Hx of Osteomyelitis, HBOT       Social History  Mr. Jean Pierre Paulson  reports that he has quit smoking. He has never used smokeless tobacco. He reports current alcohol use. He reports current drug use. Drug: Hydrocodone.    Family History  Mr.'s Jean Pierre Paulson family history includes Diabetes in his father; Hypertension in his father.    Medications and Allergies     Medications  No outpatient medications have been marked as taking for the 4/6/22 encounter (Office Visit) with AUGUSTUS Macedo.       Allergies  Review of patient's allergies indicates:   Allergen Reactions    Tomato Itching       Physical Examination     Vitals:    04/06/22 1035   BP: (!) 142/76   Pulse: 95   Resp: 20   Temp: 97.8 °F (36.6 °C)     Physical Exam  Vitals and nursing note reviewed.   Constitutional:       Appearance: Normal appearance.   HENT:      Head: Normocephalic.   Cardiovascular:      Rate and Rhythm: Normal rate and regular rhythm.      Pulses: Normal pulses.      Heart sounds: Normal heart sounds.   Pulmonary:      Effort: Pulmonary effort is normal. No respiratory distress.   Chest:      Chest wall: No tenderness.   Musculoskeletal:         General: Swelling present.      Right lower leg: Edema present.   Skin:     General: Skin is warm and dry.      Comments: See wound assessment    Neurological:      General: No  focal deficit present.      Mental Status: He is alert and oriented to person, place, and time. Mental status is at baseline.   Psychiatric:         Mood and Affect: Mood normal.         Behavior: Behavior normal.         Thought Content: Thought content normal.         Judgment: Judgment normal.         Assessment and Plan      1. 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 Type 2 diabetes mellitus with right diabetic foot ulcer           Wound 02/16/22 1020 Diabetic Ulcer Right Plantar (Active)   02/16/22 1020    Pre-existing: Yes   Primary Wound Type: Diabetic ulc   Side: Right   Orientation:    Location: Plantar   Wound Number:    Ankle-Brachial Index:    Pulses:    Removal Indication and Assessment:    Wound Outcome:    (Retired) Wound Type:    (Retired) Wound Length (cm):    (Retired) Wound Width (cm):    (Retired) Depth (cm):    Wound Description (Comments):    Removal Indications:    Wound Image    04/06/22 1036   Dressing Appearance Moist drainage 04/06/22 1036   Drainage Amount Moderate 04/06/22 1036   Drainage Characteristics/Odor Serosanguineous 04/06/22 1036   Appearance Pink;Moist;Granulating 04/06/22 1036   Tissue loss description Full thickness 04/06/22 1036   Black (%), Wound Tissue Color 0 % 04/06/22 1036   Red (%), Wound Tissue Color 100 % 04/06/22 1036   Yellow (%), Wound Tissue Color 0 % 04/06/22 1036   Periwound Area Moist 04/06/22 1036   Wound Edges Callused 04/06/22 1036   Elliott Classification (diabetic foot ulcers only) Grade 3 04/06/22 1036   Wound Length (cm) 4.6 cm 04/06/22 1036   Wound Width (cm) 11.1 cm 04/06/22 1036   Wound Depth (cm) 0.7 cm 04/06/22 1036   Wound Volume (cm^3) 35.742 cm^3 04/06/22 1036   Wound Surface Area (cm^2) 51.06 cm^2 04/06/22 1036   Care Cleansed with:;Antimicrobial agent 04/06/22 1036   Dressing Applied;Gauze, wet to dry;Gauze;Rolled gauze 04/06/22 1036   Periwound Care Moisture barrier  applied 04/06/22 1036   Off Loading Off loading shoe 04/06/22 1036   Dressing Change Due 04/07/22 04/06/22 1036         Active Problem List with Overview Notes    Diagnosis Date Noted    Type 2 diabetes mellitus with right diabetic foot ulcer 10/08/2021    Sarcoidosis 10/08/2021    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 07/07/2021    Chronic pain syndrome 04/13/2021    Peripheral vascular disease     Diabetic neuropathy with neurologic complication     Chronic osteomyelitis 03/26/2021    Chronic diabetic ulcer of right foot determined by examination 03/25/2021    Lower extremity edema 03/25/2021    Diabetes with skin ulcer 03/19/2021    Primary hypertension 03/19/2021      Plan:   Clean with vashe solution (no substitutions) apply hydrofera blue to wound,cover with drawtex and dry gauze,wrap with kerlix,paper tape. Change daily and as needed  When available apply [place] prescribed wound gel to wound,cover with gauze and drawtex,wrap with kerlix,paper tape. Change daily and as needed  Avoid prolonged standing  Take a multivitamin daily  Keep blood sugars less than 150  Increase protein as tolerated  Monitor closely for s/s of infection including fever, chills, increase in pain, odor from wound, and increased redness from foot. Go to ER if any complications develop.   Keep leg elevated and avoid pressure on wound.  Wear walking boot when ambulating.   Discussed benefits of wound vac, patient is considering wound vac.       Problem List Items Addressed This Visit        Derm    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 - Primary       Endocrine    Type 2 diabetes mellitus with right diabetic foot ulcer          Future Appointments   Date Time Provider Department Center   4/20/2022 10:00 AM AUGUSTUS Macedo [facility]   4/29/2022  1:30 PM AWV NURSE, [facility]  FAMILY MEDICINE [facility] [place]   5/11/2022 10:15 AM Nadja Paulson MD [facility]            Signature:  AUGUSTUS Macedo  [facility] - WOUND CARE  [address] [facility] [place] [address]  [phone]    Date of encounter: 4/6/22

## 2022-04-29 ENCOUNTER — OFFICE VISIT (OUTPATIENT)
Dept: FAMILY MEDICINE | Facility: CLINIC | Age: 37
End: 2022-04-29
Payer: MEDICARE

## 2022-04-29 VITALS
WEIGHT: 315 LBS | DIASTOLIC BLOOD PRESSURE: 86 MMHG | BODY MASS INDEX: 41.75 KG/M2 | HEIGHT: 73 IN | TEMPERATURE: 98 F | RESPIRATION RATE: 18 BRPM | SYSTOLIC BLOOD PRESSURE: 130 MMHG | OXYGEN SATURATION: 98 % | HEART RATE: 88 BPM

## 2022-04-29 DIAGNOSIS — L97.519 TYPE 2 DIABETES MELLITUS WITH RIGHT DIABETIC FOOT ULCER: ICD-10-CM

## 2022-04-29 DIAGNOSIS — L97.414 ULCER OF RIGHT HEEL, WITH NECROSIS OF BONE: Chronic | ICD-10-CM

## 2022-04-29 DIAGNOSIS — E11.49 DIABETIC NEUROPATHY WITH NEUROLOGIC COMPLICATION: Chronic | ICD-10-CM

## 2022-04-29 DIAGNOSIS — Z00.00 ENCOUNTER FOR SUBSEQUENT ANNUAL WELLNESS VISIT (AWV) IN MEDICARE PATIENT: Primary | ICD-10-CM

## 2022-04-29 DIAGNOSIS — I10 PRIMARY HYPERTENSION: ICD-10-CM

## 2022-04-29 DIAGNOSIS — E11.621 TYPE 2 DIABETES MELLITUS WITH RIGHT DIABETIC FOOT ULCER: ICD-10-CM

## 2022-04-29 DIAGNOSIS — I73.9 PERIPHERAL VASCULAR DISEASE, UNSPECIFIED: Chronic | ICD-10-CM

## 2022-04-29 DIAGNOSIS — E11.40 DIABETIC NEUROPATHY WITH NEUROLOGIC COMPLICATION: Chronic | ICD-10-CM

## 2022-04-29 LAB
CREAT UR-MCNC: 68 MG/DL (ref 39–259)
MICROALBUMIN UR-MCNC: 28.8 MG/DL (ref 0–2.8)
MICROALBUMIN/CREAT RATIO PNL UR: 423.5 MG/G (ref 0–30)

## 2022-04-29 PROCEDURE — G0439 PR MEDICARE ANNUAL WELLNESS SUBSEQUENT VISIT: ICD-10-PCS | Mod: ,,, | Performed by: NURSE PRACTITIONER

## 2022-04-29 PROCEDURE — 82570 MICROALBUMIN / CREATININE RATIO URINE: ICD-10-PCS | Mod: ,,, | Performed by: CLINICAL MEDICAL LABORATORY

## 2022-04-29 PROCEDURE — 82043 MICROALBUMIN / CREATININE RATIO URINE: ICD-10-PCS | Mod: ,,, | Performed by: CLINICAL MEDICAL LABORATORY

## 2022-04-29 PROCEDURE — 82043 UR ALBUMIN QUANTITATIVE: CPT | Mod: ,,, | Performed by: CLINICAL MEDICAL LABORATORY

## 2022-04-29 PROCEDURE — G0439 PPPS, SUBSEQ VISIT: HCPCS | Mod: ,,, | Performed by: NURSE PRACTITIONER

## 2022-04-29 PROCEDURE — 82570 ASSAY OF URINE CREATININE: CPT | Mod: ,,, | Performed by: CLINICAL MEDICAL LABORATORY

## 2022-04-29 RX ORDER — GABAPENTIN 300 MG/1
300 CAPSULE ORAL EVERY 8 HOURS
Qty: 270 CAPSULE | Refills: 1 | Status: SHIPPED | OUTPATIENT
Start: 2022-04-29 | End: 2022-07-27 | Stop reason: SDUPTHER

## 2022-04-29 RX ORDER — LOSARTAN POTASSIUM 50 MG/1
50 TABLET ORAL DAILY
Qty: 90 TABLET | Refills: 1 | Status: SHIPPED | OUTPATIENT
Start: 2022-04-29 | End: 2022-07-27 | Stop reason: SDUPTHER

## 2022-04-29 NOTE — PATIENT INSTRUCTIONS
Counseling and Referral of Other Preventative  (Italic type indicates deductible and co-insurance are waived)    Patient Name: Jean Pierre Paulson  Today's Date: 4/29/2022    Health Maintenance         Date Due Completion Date    Diabetes Urine Screening Never done ---    COVID-19 Vaccine (1) Never done ---    Foot Exam Never done ---    Eye Exam Never done ---    HIV Screening Never done ---    TETANUS VACCINE Never done ---    Influenza Vaccine (1) Never done ---    Lipid Panel 04/26/2022 4/26/2021    Hepatitis C Screening 04/29/2022 (Originally 1985) ---    Hemoglobin A1c 08/15/2022 2/15/2022          Orders Placed This Encounter   Procedures    Microalbumin/Creatinine Ratio, Urine

## 2022-04-29 NOTE — PROGRESS NOTES
[facility]     PATIENT NAME: Jean Pierre Paulson   : 1985    AGE: 36 y.o. DATE: 2022   MRN: [id number]        Reason for Visit / Chief Complaint: Medicare AWV Follow Up (Medicare subsequent AWV)        Jean Pierre Paulson presents for a subsequent Medicare AWV today.     The following components were reviewed and updated:    Medical/Social/Family History:  Past Medical History:   Diagnosis Date    Anemia     Chronic osteomyelitis     Diabetes mellitus with neuropathy     Diabetes mellitus, type 2     DM2 (diabetes mellitus, type 2)     HTN (hypertension)     Hypertension     Neuropathy     Obesity     Osteomyelitis 10/2020    Hx of R foot, Tx with IV Abx    Peripheral vascular disease         Family History   Problem Relation Age of Onset    Hypertension Father     Diabetes Father     Hypertension Maternal Grandmother     Diabetes Maternal Grandmother         Social History     Tobacco Use   Smoking Status Former Smoker    Packs/day: 0.25    Types: Cigarettes    Start date:     Quit date: 2022    Years since quittin.2   Smokeless Tobacco Never Used      Social History     Substance and Sexual Activity   Alcohol Use Yes    Alcohol/week: 2.0 standard drinks    Types: 1 Glasses of wine, 1 Cans of beer per week    Comment: occasionally       Family History   Problem Relation Age of Onset    Hypertension Father     Diabetes Father     Hypertension Maternal Grandmother     Diabetes Maternal Grandmother        Past Surgical History:   Procedure Laterality Date    APPLICATION OF SPLIT-THICKNESS SKIN GRAFT (STSG) TO LOWER EXTREMITY Right 3/2/2022    Procedure: APPLICATION, GRAFT, SKIN, SPLIT-THICKNESS, TO LOWER EXTREMITY;  Surgeon: Greg Ramírez MD;  Location: [facility];  Service: General;  Laterality: Right;    CHOLECYSTECTOMY      DEBRIDEMENT OF FOOT Right 10/2020    FOOT SURGERY      Right diabetic foot ulcer Elliott's Grade 3      Bone  exposure to R heel, Hx of IV Abx, cultures show multiple bacterial growth, Hx of Osteomyelitis, HBOT         · Allergies and Current Medications     Review of patient's allergies indicates:   Allergen Reactions    Tomato Itching       Current Outpatient Medications:     acetaminophen (TYLENOL) 325 MG tablet, Take 2 tablets (650 mg total) by mouth every 4 (four) hours as needed., Disp: , Rfl: 0    amLODIPine (NORVASC) 10 MG tablet, Take 1 tablet (10 mg total) by mouth once daily., Disp: 30 tablet, Rfl: 2    blood sugar diagnostic (BLOOD GLUCOSE TEST) Strp, 1 strip by Misc.(Non-Drug; Combo Route) route once daily. accu-chek Veronika strips, Disp: 90 strip, Rfl: 5    dapagliflozin-metformin (XIGDUO XR) 5-1,000 mg TBph, Take 1 tablet by mouth once daily., Disp: 30 tablet, Rfl: 5    FEROSUL 325 mg (65 mg iron) Tab tablet, Take 325 mg by mouth once daily., Disp: , Rfl:     hydroCHLOROthiazide (HYDRODIURIL) 25 MG tablet, Take 1 tablet (25 mg total) by mouth once daily., Disp: 90 tablet, Rfl: 1    HYDROcodone-acetaminophen (NORCO)  mg per tablet, Take 1 tablet by mouth once daily., Disp: 30 tablet, Rfl: 0    HYDROcodone-acetaminophen (NORCO)  mg per tablet, Take 1 tablet by mouth once daily., Disp: 30 tablet, Rfl: 0    rosuvastatin (CRESTOR) 10 MG tablet, TAKE 1 TABLET EVERY DAY, Disp: 90 tablet, Rfl: 0    gabapentin (NEURONTIN) 300 MG capsule, Take 1 capsule (300 mg total) by mouth every 8 (eight) hours., Disp: 270 capsule, Rfl: 1    hydrALAZINE (APRESOLINE) 25 MG tablet, Take 1 tablet (25 mg total) by mouth every 12 (twelve) hours., Disp: 60 tablet, Rfl: 0    losartan (COZAAR) 50 MG tablet, Take 1 tablet (50 mg total) by mouth once daily., Disp: 90 tablet, Rfl: 1    · Health Risk Assessment   Fall Risk: No   Advance Directive:  Does not have an advanced directive. Verbal education and written education included in today's AVS.   Depression: PHQ 9 score of 1    HTN:  DASH diet, exercise, weight  management, med compliance, home BP monitoring, and follow-up discussed.   T2DM:  diabetic diet, glucose monitoring, activity level, weight management, med compliance, and follow-up discussed.   Tobacco use: former  STI: N/A    Alcohol misuse: no   Statin Use: yes      · Health Risk Assessment  Are you male or female?: Male  During the past four weeks, how much have you been bothered by emotional problems such as feeling anxious, depressed, irritable, sad, or downhearted and blue?: Slightly  During the past five weeks, has your physical and/or emotional health limited your social activities with family, friends, neighbors, or groups?: Not at all  During the past four weeks, how much bodily pain have you generally had?: Moderate pain  During the past four weeks, was someone available to help if you needed and wanted help?: Yes, as much as I wanted  During the past four weeks, what was the hardest physical activity you could do for at least two minutes?: Light  Can you get to places out of walking distance without help?  (For example, can you travel alone on buses or taxis, or drive your own car?): No  Can you go shopping for groceries or clothes without someone's help?: Yes  Can you prepare your own meals?: Yes  Can you do your own housework without help?: Yes  Because of any health problems, do you need the help of another person with your personal care needs such as eating, bathing, dressing, or getting around the house?: No  Can you handle your own money without help?: Yes  During the past four weeks, how would you rate your health in general?: Good  How have things been going for you during the past four weeks?: Pretty well  Are you having difficulties driving your car?: No  Do you always fasten your seat belt when you are in a car?: Yes, usually  How often in the past four weeks have you been bothered by falling or dizzy when standing up?: Never  How often in the past four weeks have you been bothered by sexual  problems?: Always  How often in the past four weeks have you been bothered by trouble eating well?: Never  How often in the past four weeks have you been bothered by teeth or denture problems?: Sometimes  How often in the past four weeks have you been bothered with problems using the telephone?: Never  How often in the past four weeks have you been bothered by tiredness or fatigue?: Never  Have you fallen two or more times in the past year?: No  Are you afraid of falling?: No  Are you a smoker?: No  During the past four weeks, how many drinks of wine, beer, or other alcoholic beverages did you have?: One drink or less per week  Do you exercise for about 20 minutes three or more days a week?: No, I usually do not exercise this much  Have you been given any information to help you with hazards in your house that might hurt you?: Yes  Have you been given any information to help you with keeping track of your medications?: Yes  How often do you have trouble taking medicines the way you've been told to take them?: I always take them as prescribed  How confident are you that you can control and manage most of your health problems?: Somewhat confident  What is your race? (Check all that apply.):     · Health Maintenance   Last eye exam: 08/20/2019 [facility]. He will make appt   Last CV screen with lipids: 04/26/2021   Diabetes screening with fasting glucose or A1c: 03/06/2022 Glu 158   Colorectal cancer screen: N/A   Flu Vaccine: refused   Pneumonia vaccines: Pneumo 23 12/13/2011   COVID vaccine: refused   Hep B vaccine: N/A   DEXA: N/A   Last pap/pelvic: N/A   Last Mammogram: N/A   Last PSA screen: not of age to require  AAA screening: N/A (once in lifetime for males 65-75 who have smoked > 100 cigarettes in lifetime)  HIV Screeing: N/A  Hepatitis C Screen: N/A  Low Dose CT Scan: N/A    Health Maintenance Topics with due status: Not Due       Topic Last Completion Date    Hemoglobin A1c 02/15/2022      Health Maintenance Due   Topic Date Due    Diabetes Urine Screening  Never done        Incontinence  Bowel: no  Bladder: no    Lab results available in Epic or see dates from [facility] above:   Lab Results   Component Value Date    CHOL 134 04/26/2021     Lab Results   Component Value Date    HDL 42 04/26/2021     Lab Results   Component Value Date    LDLCALC 54 04/26/2021     Lab Results   Component Value Date    TRIG 191 (H) 04/26/2021     Lab Results   Component Value Date    CHOLHDL 3.2 04/26/2021       Lab Results   Component Value Date    HGBA1C 7.8 (H) 02/15/2022       Sodium   Date Value Ref Range Status   03/06/2022 137 136 - 145 mmol/L Final     Potassium   Date Value Ref Range Status   03/06/2022 5.2 (H) 3.5 - 5.1 mmol/L Final     Chloride   Date Value Ref Range Status   03/06/2022 103 98 - 107 mmol/L Final     CO2   Date Value Ref Range Status   03/06/2022 29 21 - 32 mmol/L Final     Glucose   Date Value Ref Range Status   03/06/2022 158 (H) 74 - 106 mg/dL Final     BUN   Date Value Ref Range Status   03/06/2022 55 (H) 7 - 18 mg/dL Final     Creatinine   Date Value Ref Range Status   03/06/2022 1.48 (H) 0.70 - 1.30 mg/dL Final     Calcium   Date Value Ref Range Status   03/06/2022 8.8 8.5 - 10.1 mg/dL Final     Total Protein   Date Value Ref Range Status   04/26/2021 7.7 6.4 - 8.2 g/dL Final     Albumin   Date Value Ref Range Status   04/26/2021 2.6 (L) 3.5 - 5.0 g/dL Final     Bilirubin, Total   Date Value Ref Range Status   04/26/2021 0.2 >0.0 - 1.2 mg/dL Final     Alk Phos   Date Value Ref Range Status   04/26/2021 154 (H) 45 - 115 U/L Final     AST   Date Value Ref Range Status   04/26/2021 17 15 - 37 U/L Final     ALT   Date Value Ref Range Status   04/26/2021 27 16 - 61 U/L Final     Anion Gap   Date Value Ref Range Status   03/06/2022 10 7 - 16 mmol/L Final     eGFR    Date Value Ref Range Status   11/15/2021 82 >=60 mL/min/1.73m² Final     eGFR   Date Value Ref Range Status  "  03/06/2022 57 (L) >=60 mL/min/1.73m² Final         No results found for: PSA         · Care Team   PCP: [facility]    Eye specialist: Dr. Fermin  Other: Dr. Topete at [facility], [facility]       **See Completed Assessments for Annual Wellness visit within the encounter summary    The following assessments were completed & reviewed:  · Depression Screening  · Cognitive function Screening  · Timed Get Up Test  · Whisper Test  · Vision Screen  · Health Risk Assessment  · Checklist of ADLs and IADLs      Objective  Vitals:    04/29/22 1356 04/29/22 1424   BP: (!) 140/80 130/86   Pulse: 88    Resp: 18    Temp: 98.1 °F (36.7 °C)    TempSrc: Oral    SpO2: 98%    Weight: (!) 159.5 kg (351 lb 9.6 oz)    Height: 6' 1" (1.854 m)    PainSc:   3    PainLoc: Foot       Body mass index is 46.39 kg/m².  Ideal body weight: 79.9 kg (176 lb 2.4 oz)   Heart healthy diabetic diet and exercise as he can do encouraged and info given.Encouraged weight loss.     Physical Exam  Vitals and nursing note reviewed.   Constitutional:       Appearance: Normal appearance.   HENT:      Head: Normocephalic.      Right Ear: Tympanic membrane, ear canal and external ear normal.      Left Ear: Tympanic membrane, ear canal and external ear normal.      Nose: Nose normal.      Mouth/Throat:      Mouth: Mucous membranes are moist.      Pharynx: Oropharynx is clear.   Eyes:      Extraocular Movements: Extraocular movements intact.      Conjunctiva/sclera: Conjunctivae normal.      Pupils: Pupils are equal, round, and reactive to light.   Cardiovascular:      Rate and Rhythm: Normal rate and regular rhythm.      Pulses: Normal pulses.      Heart sounds: Normal heart sounds.   Pulmonary:      Effort: Pulmonary effort is normal.      Breath sounds: Normal breath sounds.   Abdominal:      General: Bowel sounds are normal.      Palpations: Abdomen is soft.   Musculoskeletal:         General: Normal range of motion.      Comments: Boot splint on right leg, due " to ulcerated area on right foot, did not take dressing off as he goes to wound care   Skin:     General: Skin is warm and dry.      Capillary Refill: Capillary refill takes less than 2 seconds.   Neurological:      General: No focal deficit present.      Mental Status: He is alert and oriented to person, place, and time.   Psychiatric:         Mood and Affect: Mood normal.         Behavior: Behavior normal.           Assessment:     1. Primary hypertension  - losartan (COZAAR) 50 MG tablet; Take 1 tablet (50 mg total) by mouth once daily.  Dispense: 90 tablet; Refill: 1    2. Diabetic neuropathy with neurologic complication  - gabapentin (NEURONTIN) 300 MG capsule; Take 1 capsule (300 mg total) by mouth every 8 (eight) hours.  Dispense: 270 capsule; Refill: 1    3. Ulcer of right heel, with necrosis of bone  - gabapentin (NEURONTIN) 300 MG capsule; Take 1 capsule (300 mg total) by mouth every 8 (eight) hours.  Dispense: 270 capsule; Refill: 1    4. Peripheral vascular disease, unspecified  - gabapentin (NEURONTIN) 300 MG capsule; Take 1 capsule (300 mg total) by mouth every 8 (eight) hours.  Dispense: 270 capsule; Refill: 1    5. Type 2 diabetes mellitus with right diabetic foot ulcer  - Microalbumin/Creatinine Ratio, Urine; Future  - Microalbumin/Creatinine Ratio, Urine    6. BMI 45.0-49.9, adult    7. Encounter for subsequent annual wellness visit (AWV) in Medicare patient         Plan:    Discussed and provided with a screening schedule and personal prevention plan in accordance with USPSTF age appropriate recommendations and Medicare screening guidelines.   Education, counseling, and referrals were provided as needed.  After Visit Summary printed and given to patient which includes written education and a list of any referrals if indicated.     F/u plan for yearly AWV.

## 2022-05-02 ENCOUNTER — OFFICE VISIT (OUTPATIENT)
Dept: WOUND CARE | Facility: CLINIC | Age: 37
End: 2022-05-02
Attending: FAMILY MEDICINE
Payer: MEDICARE

## 2022-05-02 VITALS
SYSTOLIC BLOOD PRESSURE: 118 MMHG | RESPIRATION RATE: 20 BRPM | DIASTOLIC BLOOD PRESSURE: 71 MMHG | TEMPERATURE: 98 F | HEART RATE: 100 BPM

## 2022-05-02 DIAGNOSIS — E11.621 CHRONIC DIABETIC ULCER OF RIGHT FOOT DETERMINED BY EXAMINATION: ICD-10-CM

## 2022-05-02 DIAGNOSIS — L97.519 CHRONIC DIABETIC ULCER OF RIGHT FOOT DETERMINED BY EXAMINATION: ICD-10-CM

## 2022-05-02 DIAGNOSIS — L97.516 NON-PRESSURE CHRONIC ULCER OF OTHER PART OF RIGHT FOOT WITH BONE INVOLVEMENT WITHOUT EVIDENCE OF NECROSIS: Primary | ICD-10-CM

## 2022-05-02 PROCEDURE — 99499 UNLISTED E&M SERVICE: CPT | Mod: S$PBB,,, | Performed by: NURSE PRACTITIONER

## 2022-05-02 PROCEDURE — 11045 PR DEB SUBQ TISSUE ADD-ON: ICD-10-PCS | Mod: S$PBB,79,, | Performed by: NURSE PRACTITIONER

## 2022-05-02 PROCEDURE — 99215 OFFICE O/P EST HI 40 MIN: CPT | Mod: PBBFAC,25 | Performed by: NURSE PRACTITIONER

## 2022-05-02 PROCEDURE — 11042 PR DEBRIDEMENT, SKIN, SUB-Q TISSUE,=<20 SQ CM: ICD-10-PCS | Mod: S$PBB,79,, | Performed by: NURSE PRACTITIONER

## 2022-05-02 PROCEDURE — 11042 DBRDMT SUBQ TIS 1ST 20SQCM/<: CPT | Mod: PBBFAC | Performed by: NURSE PRACTITIONER

## 2022-05-02 PROCEDURE — 11042 DBRDMT SUBQ TIS 1ST 20SQCM/<: CPT | Mod: S$PBB,79,, | Performed by: NURSE PRACTITIONER

## 2022-05-02 PROCEDURE — 11045 DBRDMT SUBQ TISS EACH ADDL: CPT | Mod: PBBFAC | Performed by: NURSE PRACTITIONER

## 2022-05-02 PROCEDURE — 99499 NO LOS: ICD-10-PCS | Mod: S$PBB,,, | Performed by: NURSE PRACTITIONER

## 2022-05-02 PROCEDURE — 11045 DBRDMT SUBQ TISS EACH ADDL: CPT | Mod: S$PBB,79,, | Performed by: NURSE PRACTITIONER

## 2022-05-02 NOTE — PROGRESS NOTES
See wound assessment. Cont keystone wound gel daily. Pack heel with moisten keystone gel daily. Cont walking boot. RTC 2 weeks. Orders faxed to [facility]

## 2022-05-02 NOTE — PROGRESS NOTES
AUGUSTUS Macedo   [facility] - WOUND CARE  [address] [facility] [place] [address]  [phone]      PATIENT NAME: Jean Pierre Paulson  : 1985  DATE: 22  MRN: [id number]      Billing Provider: AUGUSTUS Macedo  Level of Service:   Patient PCP Information     Provider PCP Type    Mojgan Lomeli NP General          Reason for Visit / Chief Complaint: Non-healing Wound Follow Up (Right foot)       History of Present Illness / Problem Focused Workflow     Jean Pierre Paulson is a 36 y.o. male who presents to clinic for follow up on chronic-non healing wound on the right foot. Wound has some maceration around edges from drainage around heel and callus noted filippo wound. He is using Keystone spray as prescribed. Patient is wearing walking boot. Pertinent factors that delay wound healing include multiple co-morbidities, poor vascular supply, diabetes, edema, heavy drainage, excessive wound moisture and macerated tissue, no protective sensation, infection and history of poor compliance.       Review of Systems     Review of Systems   Constitutional: Negative for activity change, chills and fever.   Respiratory: Negative for chest tightness and shortness of breath.    Cardiovascular: Positive for leg swelling. Negative for chest pain and palpitations.   Musculoskeletal: Positive for arthralgias and joint swelling.   Skin: Positive for wound.        See wound assessment   Neurological: Positive for weakness and numbness.   Psychiatric/Behavioral: Negative for agitation, behavioral problems, confusion and self-injury.       Medical / Social / Family History     Past Medical History:   Diagnosis Date    Anemia     Chronic osteomyelitis     Diabetes mellitus with neuropathy     Diabetes mellitus, type 2     DM2 (diabetes mellitus, type 2)     HTN (hypertension)     Hypertension     Neuropathy     Obesity     Osteomyelitis 10/2020    Hx of R foot, Tx with IV Abx    Peripheral  vascular disease        Past Surgical History:   Procedure Laterality Date    APPLICATION OF SPLIT-THICKNESS SKIN GRAFT (STSG) TO LOWER EXTREMITY Right 3/2/2022    Procedure: APPLICATION, GRAFT, SKIN, SPLIT-THICKNESS, TO LOWER EXTREMITY;  Surgeon: Greg Ramírez MD;  Location: [facility];  Service: General;  Laterality: Right;    CHOLECYSTECTOMY      DEBRIDEMENT OF FOOT Right 10/2020    FOOT SURGERY      Right diabetic foot ulcer Elliott's Grade 3      Bone exposure to R heel, Hx of IV Abx, cultures show multiple bacterial growth, Hx of Osteomyelitis, HBOT       Social History  Mr. Jean Pierre Paulsno  reports that he quit smoking about 2 months ago. His smoking use included cigarettes. He started smoking about 22 years ago. He smoked 0.25 packs per day. He has never used smokeless tobacco. He reports current alcohol use of about 2.0 standard drinks of alcohol per week. He reports current drug use. Drug: Hydrocodone.    Family History  Mr.'s Jean Pierre Paulson family history includes Diabetes in his father and maternal grandmother; Hypertension in his father and maternal grandmother.    Medications and Allergies     Medications  Outpatient Medications Marked as Taking for the 5/2/22 encounter (Office Visit) with AUGUSTUS Macedo   Medication Sig Dispense Refill    acetaminophen (TYLENOL) 325 MG tablet Take 2 tablets (650 mg total) by mouth every 4 (four) hours as needed.  0    amLODIPine (NORVASC) 10 MG tablet Take 1 tablet (10 mg total) by mouth once daily. 30 tablet 2    blood sugar diagnostic (BLOOD GLUCOSE TEST) Strp 1 strip by Misc.(Non-Drug; Combo Route) route once daily. accu-chek Veronika strips 90 strip 5    dapagliflozin-metformin (XIGDUO XR) 5-1,000 mg TBph Take 1 tablet by mouth once daily. 30 tablet 5    FEROSUL 325 mg (65 mg iron) Tab tablet Take 325 mg by mouth once daily.      gabapentin (NEURONTIN) 300 MG capsule Take 1 capsule (300 mg total) by mouth every 8 (eight) hours. 270 capsule 1     hydroCHLOROthiazide (HYDRODIURIL) 25 MG tablet Take 1 tablet (25 mg total) by mouth once daily. 90 tablet 1    HYDROcodone-acetaminophen (NORCO)  mg per tablet Take 1 tablet by mouth once daily. 30 tablet 0    HYDROcodone-acetaminophen (NORCO)  mg per tablet Take 1 tablet by mouth once daily. 30 tablet 0    losartan (COZAAR) 50 MG tablet Take 1 tablet (50 mg total) by mouth once daily. 90 tablet 1    rosuvastatin (CRESTOR) 10 MG tablet TAKE 1 TABLET EVERY DAY 90 tablet 0       Allergies  Review of patient's allergies indicates:   Allergen Reactions    Tomato Itching       Physical Examination     Vitals:    05/02/22 1034   BP: 118/71   Pulse: 100   Resp: 20   Temp: 97.7 °F (36.5 °C)     Physical Exam  Vitals and nursing note reviewed.   Constitutional:       Appearance: Normal appearance.   HENT:      Head: Normocephalic.   Cardiovascular:      Rate and Rhythm: Normal rate and regular rhythm.      Pulses: Normal pulses.      Heart sounds: Normal heart sounds.   Pulmonary:      Effort: Pulmonary effort is normal. No respiratory distress.   Chest:      Chest wall: No tenderness.   Musculoskeletal:         General: Swelling present.      Right lower leg: Edema present.   Skin:     General: Skin is warm and dry.      Capillary Refill: Capillary refill takes 2 to 3 seconds.      Comments: See wound assessment    Neurological:      General: No focal deficit present.      Mental Status: He is alert and oriented to person, place, and time. Mental status is at baseline.   Psychiatric:         Mood and Affect: Mood normal.         Behavior: Behavior normal.         Thought Content: Thought content normal.         Judgment: Judgment normal.         Assessment and Plan      1. 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 Chronic diabetic ulcer of right foot determined by examination           Incision/Site 03/02/22 9047 Right Foot posterior  (Active)   03/02/22 1335   Present Prior to Hospital Arrival?:    Side: Right   Location: Foot   Orientation: posterior   Incision Type:    Closure Method:    Additional Comments:    Removal Indication and Assessment:    Wound Outcome:    Removal Indications:    Wound Image      05/02/22 1035   Dressing Appearance Moist drainage 05/02/22 1035   Drainage Amount Moderate 05/02/22 1035   Drainage Characteristics/Odor Serosanguineous 05/02/22 1035   Appearance Pink;Moist;Granulating 05/02/22 1035   Black (%), Wound Tissue Color 0 % 05/02/22 1035   Red (%), Wound Tissue Color 100 % 05/02/22 1035   Yellow (%), Wound Tissue Color 0 % 05/02/22 1035   Periwound Area Moist 05/02/22 1035   Wound Edges Callused 05/02/22 1035   Wound Length (cm) 5 cm 05/02/22 1035   Wound Width (cm) 10 cm 05/02/22 1035   Wound Depth (cm) 0.5 cm 05/02/22 1035   Wound Volume (cm^3) 25 cm^3 05/02/22 1035   Wound Surface Area (cm^2) 50 cm^2 05/02/22 1035   Tunneling (depth (cm)/location) 3:00 1.5 05/02/22 1035   Dressing Applied;Gauze, wet to dry;Gauze;Rolled gauze 05/02/22 1035   Periwound Care Moisture barrier applied 05/02/22 1035   Compression Tubular elasticized bandage 05/02/22 1035   Off Loading Off loading shoe 05/02/22 1035   Dressing Change Due 05/03/22 05/02/22 1035            Incision/Site 03/02/22 1345 Left Thigh anterior (Active)   03/02/22 1345   Present Prior to Hospital Arrival?:    Side: Left   Location: Thigh   Orientation: anterior   Incision Type:    Closure Method:    Additional Comments: 4x4,tape   Removal Indication and Assessment:    Wound Outcome:    Removal Indications:    Wound Image    05/02/22 1039   Dressing Appearance Open to air 05/02/22 1039   Drainage Amount None 05/02/22 1039   Appearance Intact;Pink;Dry 05/02/22 1039   Black (%), Wound Tissue Color 0 % 05/02/22 1039   Red (%), Wound Tissue Color 0 % 05/02/22 1039   Periwound Area Dry;Intact 05/02/22 1039   Wound Edges Rolled/closed 05/02/22 1039   Wound Length  (cm) 0 cm 05/02/22 1039   Wound Width (cm) 0 cm 05/02/22 1039   Wound Depth (cm) 0 cm 05/02/22 1039   Wound Volume (cm^3) 0 cm^3 05/02/22 1039   Wound Surface Area (cm^2) 0 cm^2 05/02/22 1039   Care Cleansed with:;Antimicrobial agent 05/02/22 1039   Dressing Change Due 05/03/22 05/02/22 1039         Active Problem List with Overview Notes    Diagnosis Date Noted    Encounter for subsequent annual wellness visit (AWV) in Medicare patient 04/29/2022    BMI 45.0-49.9, adult 04/29/2022    Type 2 diabetes mellitus with right diabetic foot ulcer 10/08/2021    Sarcoidosis 10/08/2021    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 07/07/2021    Chronic pain syndrome 04/13/2021    Peripheral vascular disease     Diabetic neuropathy with neurologic complication     Chronic osteomyelitis 03/26/2021    Chronic diabetic ulcer of right foot determined by examination 03/25/2021    Peripheral vascular disease, unspecified 03/25/2021    Lower extremity edema 03/25/2021    Ulcer of right heel, with necrosis of bone 03/19/2021    Diabetes with skin ulcer 03/19/2021    Primary hypertension 03/19/2021         Plan:   Right foot wound clean with baby shampoo and water  Apply keystone gel to wound bed.   Pack right heel with new gauze soaked in keystone spray.  Cover wound bed with keystone spray.  Cover with dry gauze and kerlix,paper tape  Change daily and as needed  Elevate feet as much as possible  Avoid prolonged standing  Knee scooter   Monitor closely for s/s of infection including fever, chills, increase in pain, odor from wound, and increased redness from foot. Go to ER if any complications develop.   Keep leg elevated and avoid pressure on wound.  Diabetes:  Monitor glucose closely. Check fasting glucose and 2 hours after meals. HgA1C goal <7, fasting glucose , and 2 hours after meals <180  Hypertension:  Check blood pressure twice daily, goal  <120/80  Diet:   Increase protein intake, avoid fried, fatty foods and foods high in simple carbs.   Vitamins:  Take vitamin C 1000 mg, zinc 50mg, vitamin d 5000 units, and a daily multivitamin. Angel is a good source of protein and nutrients to aid in wound healing.   F/u in 2 weeks.       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 - Primary          Future Appointments   Date Time Provider Department Center   5/11/2022 10:15 AM Nadja Paulson MD [facility]   5/16/2022 10:00 AM [facility]   6/13/2022  1:45 PM Mojgan Lomeli NP [facility] [place] [place] LOUANN Morris   5/1/2023  1:30 PM AWV NURSE, [facility]            Signature:  AUGUSTUS Macedo  [facility] - WOUND CARE  [address]  [place] [address]  [phone]    Date of encounter: 5/2/22

## 2022-05-02 NOTE — PATIENT INSTRUCTIONS
Right foot wound clean with baby shampoo and water     Apply keystone gel to wound bed.     Pack right heel with new gauze soaked in keystone spray.    Cover wound bed with keystone spray.     Cover with dry gauze and kerlix,paper tape     Change daily and as needed     Elevate feet as much as possible     No standing     Knee scooter         Monitor closely for s/s of infection including fever, chills, increase in pain, odor from wound, and increased redness from foot. Go to ER if any complications develop.   Keep leg elevated and avoid pressure on wound.  Diabetes:  Monitor glucose closely. Check fasting glucose and 2 hours after meals. HgA1C goal <7, fasting glucose , and 2 hours after meals <180  Hypertension:  Check blood pressure twice daily, goal <120/80  Diet:   Increase protein intake, avoid fried, fatty foods and foods high in simple carbs.   Vitamins:  Take vitamin C 1000 mg, zinc 50mg, vitamin d 5000 units, and a daily multivitamin. Angel is a good source of protein and nutrients to aid in wound healing.     F/u in 2 weeks.

## 2022-05-02 NOTE — PROGRESS NOTES
Debridement Performed for Assessment: Wound# 1  Performed By: Provider: Yelitza Alas NP  Assistant:    Debridement: Surgical    Photo taken post procedure:    Time-Out Taken: Yes  Level: Skin/Subcutaneous Tissue  Post Debridement Measurements  Length: (cm) 8  Width: (cm) 13.0  Depth: (cm) 0.2      Area: (cm²) 104  Percent Debrided: 100%  Total Area Debrided: (sq cm)     Tissue and other material debrided:  Adipose, Dermis, Epidermis, Subcutaneous  Devitalized Tissue Debrided:Biofilm, callus  Instrument: Curette  Bleeding: Moderate  Hemostasis Achieved: Pressure  Procedural Pain: Insensate  Post Procedural Pain: Insensate  Response to Treatment: Procedure was tolerated well    Devitalized materials/tissue Removed  the following was removed during debridement  subcutaneous, muscle      Post Debridement Diagnosis chronic wound right heel  Post debridement diagnosis  Same as Pre-operative debridement diagnosis, No Complications noted.      Grafts or implants applied  Was a graft or implant applied?  No      Procedure assistant  Procedure assisted by:  Lizabeth Stone LPN  Assistant is the same as nurse listed above      Complications related to procedure  Did any complication occure during procedure?  No complications noted during or after procedure.      Specimen  Specimen collect during procedure?  No specimen collected      Anaesthesia:  Anesthesia used  None      Blood Loss:  Blood loss during procedure  less than 5 cc

## 2022-05-10 DIAGNOSIS — R89.9 ELEVATED LABORATORY TEST RESULT: Primary | ICD-10-CM

## 2022-05-10 DIAGNOSIS — L97.519 TYPE 2 DIABETES MELLITUS WITH RIGHT DIABETIC FOOT ULCER: ICD-10-CM

## 2022-05-10 DIAGNOSIS — E11.621 TYPE 2 DIABETES MELLITUS WITH RIGHT DIABETIC FOOT ULCER: ICD-10-CM

## 2022-05-11 ENCOUNTER — OFFICE VISIT (OUTPATIENT)
Dept: PAIN MEDICINE | Facility: CLINIC | Age: 37
End: 2022-05-11
Payer: MEDICARE

## 2022-05-11 VITALS
HEIGHT: 73 IN | DIASTOLIC BLOOD PRESSURE: 102 MMHG | OXYGEN SATURATION: 98 % | HEART RATE: 79 BPM | RESPIRATION RATE: 18 BRPM | SYSTOLIC BLOOD PRESSURE: 159 MMHG | BODY MASS INDEX: 41.75 KG/M2 | WEIGHT: 315 LBS

## 2022-05-11 DIAGNOSIS — E11.40 DIABETIC NEUROPATHY WITH NEUROLOGIC COMPLICATION: Primary | Chronic | ICD-10-CM

## 2022-05-11 DIAGNOSIS — E11.49 DIABETIC NEUROPATHY WITH NEUROLOGIC COMPLICATION: Primary | Chronic | ICD-10-CM

## 2022-05-11 DIAGNOSIS — L97.414 ULCER OF RIGHT HEEL, WITH NECROSIS OF BONE: Chronic | ICD-10-CM

## 2022-05-11 DIAGNOSIS — I73.9 PERIPHERAL VASCULAR DISEASE, UNSPECIFIED: Chronic | ICD-10-CM

## 2022-05-11 DIAGNOSIS — Z79.899 ENCOUNTER FOR LONG-TERM (CURRENT) USE OF OTHER MEDICATIONS: Chronic | ICD-10-CM

## 2022-05-11 PROCEDURE — 99212 OFFICE O/P EST SF 10 MIN: CPT | Mod: S$PBB,,, | Performed by: PAIN MEDICINE

## 2022-05-11 PROCEDURE — 99212 PR OFFICE/OUTPT VISIT, EST, LEVL II, 10-19 MIN: ICD-10-PCS | Mod: S$PBB,,, | Performed by: PAIN MEDICINE

## 2022-05-11 PROCEDURE — 99215 OFFICE O/P EST HI 40 MIN: CPT | Mod: PBBFAC | Performed by: PAIN MEDICINE

## 2022-05-11 NOTE — PROGRESS NOTES
She Disclaimer: This note has been generated using voice-recognition software. There may be typographical errors that have been missed during proof-reading        Patient ID: Jean Pierre Paulson is a 36 y.o. male.      Chief Complaint: Foot Pain      36-year-old male returns for re-evaluation of diabetic right foot ulceration an osteomyelitis.  He has been treated with Norco and gabapentin for an extended period of time.  He notes worsening right foot pain and occasionally requires Norco twice a day.  Neurontin is providing some relief of the  neuropathy. He is not a candidate for nerve block injections.  He remains in wound care for treatment of his diabetic foot ulceration.  He returns today for medication refill            Pain Assessment  Pain Assessment: 0-10  Pain Score:   5  Pain Location: Foot  Pain Orientation: Right  Pain Descriptors: Aching      A's of Opioid Risk Assessment  Activity:Patient can  perform ADL.   Analgesia:Patients pain is  controlled by current medication.   Adverse Effects: Patient denies constipation or sedation.  Aberrant Behavior:  reviewed with no aberrant drug seeking/taking behavior.      Patient denies any suicidal or homicidal ideations    Physical Therapy/Home Exercise: yes      NM Bone Scan 3 Phase Foot  Narrative: EXAMINATION:  NM BONE SCAN 3 PHASE FOOT    CLINICAL HISTORY:  Post-menopause state, Osteoporosis  Foot swelling, diabetic, osteomyelitis suspected, xray done;suspect chronic osteomyelitis;    COMPARISON:  CT 12 October 2021    FINDINGS:  Patient was injected with 25.0 millicuries of technetium 99M MDP intravenously.  Blood flow, blood pool and delayed images were obtained.    Increased activity diffusely in the right foot on the blood flow and blood pool imaging.  There is increased osseous uptake on the delayed imaging mostly in the right midfoot but also faintly seen in the right inferior calcaneus and portions of the left midfoot.  The right foot activity mostly  correlates with area of severe degenerative changes and malalignment seen on the CT.  Impression: Increased activity diffusely in the right foot soft tissues in the blood flow and blood pool images.  There is increased activity in the midfoot correlating with area of degenerative changes and malalignment.  Faint activity in calcaneus more typical of reactive changes.  Findings do not appear typical for acute osteomyelitis.    Electronically signed by: Sheng Anderson  Date:    02/17/2022  Time:    12:38      Review of Systems   Constitutional: Negative.    HENT: Negative.    Eyes: Negative.    Respiratory: Negative.    Cardiovascular: Negative.    Gastrointestinal: Negative.    Endocrine: Negative.    Genitourinary: Negative.    Musculoskeletal: Negative.    Integumentary:  Positive for wound (Plantar of right foot).   Allergic/Immunologic: Negative.    Neurological: Positive for numbness.   Hematological: Negative.    Psychiatric/Behavioral: Negative.              Past Medical History:   Diagnosis Date    Anemia     Chronic osteomyelitis     Diabetes mellitus with neuropathy     Diabetes mellitus, type 2     DM2 (diabetes mellitus, type 2)     HTN (hypertension)     Hypertension     Neuropathy     Obesity     Osteomyelitis 10/2020    Hx of R foot, Tx with IV Abx    Peripheral vascular disease      Past Surgical History:   Procedure Laterality Date    APPLICATION OF SPLIT-THICKNESS SKIN GRAFT (STSG) TO LOWER EXTREMITY Right 3/2/2022    Procedure: APPLICATION, GRAFT, SKIN, SPLIT-THICKNESS, TO LOWER EXTREMITY;  Surgeon: Greg Ramírez MD;  Location: [facility];  Service: General;  Laterality: Right;    CHOLECYSTECTOMY      DEBRIDEMENT OF FOOT Right 10/2020    FOOT SURGERY      Right diabetic foot ulcer Elliott's Grade 3      Bone exposure to R heel, Hx of IV Abx, cultures show multiple bacterial growth, Hx of Osteomyelitis, HBOT     Social History     Socioeconomic History    Marital status: Single     Number of children: 1   Tobacco Use    Smoking status: Former Smoker     Packs/day: 0.25     Types: Cigarettes     Start date:      Quit date: 2022     Years since quittin.2    Smokeless tobacco: Never Used   Substance and Sexual Activity    Alcohol use: Yes     Alcohol/week: 2.0 standard drinks     Types: 1 Glasses of wine, 1 Cans of beer per week     Comment: occasionally    Drug use: Yes     Types: Hydrocodone    Sexual activity: Yes     Partners: Female     Family History   Problem Relation Age of Onset    Hypertension Father     Diabetes Father     Hypertension Maternal Grandmother     Diabetes Maternal Grandmother      Review of patient's allergies indicates:   Allergen Reactions    Tomato Itching     has a current medication list which includes the following prescription(s): acetaminophen, blood sugar diagnostic, xigduo xr, ferosul, gabapentin, hydrochlorothiazide, hydrocodone-acetaminophen, hydrocodone-acetaminophen, losartan, rosuvastatin, amlodipine, hydralazine, [START ON 2022] hydrocodone-acetaminophen, [START ON 6/15/2022] hydrocodone-acetaminophen, [DISCONTINUED] blood-glucose meter, [DISCONTINUED] insulin asp prt-insulin aspart (novolog 70/30), [DISCONTINUED] insulin detemir u-100, and [DISCONTINUED] dakin's solution.      Objective:  Vitals:    22 1048   BP: (!) 159/102   Pulse: 79   Resp: 18        Physical Exam  Vitals and nursing note reviewed.   Constitutional:       General: He is not in acute distress.     Appearance: Normal appearance. He is not ill-appearing, toxic-appearing or diaphoretic.   HENT:      Head: Normocephalic and atraumatic.      Nose: Nose normal.      Mouth/Throat:      Mouth: Mucous membranes are moist.   Eyes:      Extraocular Movements: Extraocular movements intact.      Pupils: Pupils are equal, round, and reactive to light.   Cardiovascular:      Rate and Rhythm: Normal rate and regular rhythm.      Heart sounds: Normal heart sounds.    Pulmonary:      Effort: Pulmonary effort is normal. No respiratory distress.      Breath sounds: Normal breath sounds. No stridor. No wheezing or rhonchi.   Abdominal:      General: Bowel sounds are normal.      Palpations: Abdomen is soft.   Musculoskeletal:         General: No swelling, tenderness or deformity.      Cervical back: Normal and normal range of motion. No spasms or tenderness. No pain with movement. Normal range of motion.      Thoracic back: Normal.      Lumbar back: No spasms, tenderness or bony tenderness. Normal range of motion. Negative right straight leg raise test and negative left straight leg raise test. No scoliosis.      Right lower leg: No edema.      Left lower leg: No edema.      Right foot: Decreased range of motion.   Feet:      Right foot:      Skin integrity: Ulcer present.   Skin:     General: Skin is warm.   Neurological:      General: No focal deficit present.      Mental Status: He is alert and oriented to person, place, and time. Mental status is at baseline.      Cranial Nerves: Cranial nerves are intact. No cranial nerve deficit.      Sensory: Sensation is intact. No sensory deficit.      Motor: No weakness.      Coordination: Coordination normal.      Gait: Gait normal.      Deep Tendon Reflexes: Reflexes are normal and symmetric.   Psychiatric:         Mood and Affect: Mood normal.         Behavior: Behavior normal.           Assessment:      1. Diabetic neuropathy with neurologic complication    2. Ulcer of right heel, with necrosis of bone    3. Peripheral vascular disease, unspecified    4. Encounter for long-term (current) use of other medications          Plan:  1. reviewed  2.Addiction, Dependency, Tolerance, Opioid abuse-misuse, Death, Diversion Discussed. Overdose reversal drug Naloxone discussed.  3.Refill/Continue medications for pain control and function.  Increase Norco to 1.5 tablets a day quantity of 45 for better pain relief       Requested Prescriptions      Signed Prescriptions Disp Refills    HYDROcodone-acetaminophen (NORCO)  mg per tablet 45 tablet 0     Sig: Take 1 tablet by mouth every 12 (twelve) hours as needed for Pain.    HYDROcodone-acetaminophen (NORCO)  mg per tablet 45 tablet 0     Sig: Take 1 tablet by mouth every 12 (twelve) hours as needed for Pain.     4.Patient defers nerve block injections, physical therapy or surgical consultation     5.Follow with VONNIE Miller in 2 months for re-evaluation and medication refill         report:  Reviewed and consistent with medication use as prescribed.      The total time spent for evaluation and management on 05/12/2022 including reviewing separately obtained history, performing a medically appropriate exam and evaluation, documenting clinical information in the health record, independently interpreting results and communicating them to the patient/family/caregiver, and ordering medications/tests/procedures was between 15-29 minutes.    The above plan and management options were discussed at length with patient. Patient is in agreement with the above and verbalized understanding. It will be communicated with the referring physician via electronic record, fax, or mail.

## 2022-05-12 RX ORDER — HYDROCODONE BITARTRATE AND ACETAMINOPHEN 10; 325 MG/1; MG/1
1 TABLET ORAL EVERY 12 HOURS PRN
Qty: 45 TABLET | Refills: 0 | Status: SHIPPED | OUTPATIENT
Start: 2022-05-16 | End: 2022-06-13

## 2022-05-12 RX ORDER — HYDROCODONE BITARTRATE AND ACETAMINOPHEN 10; 325 MG/1; MG/1
1 TABLET ORAL EVERY 12 HOURS PRN
Qty: 45 TABLET | Refills: 0 | Status: SHIPPED | OUTPATIENT
Start: 2022-06-15 | End: 2022-06-13

## 2022-05-13 NOTE — PROGRESS NOTES
AUGUSTUS Macedo   [facility] - WOUND CARE  [address] [facility] [address]  [phone]      PATIENT NAME: Jean Pierre Paulson  : 1985  DATE: 22  MRN: [id number]      Billing Provider: AUGUSTUS Macedo  Level of Service:   Patient PCP Information     Provider PCP Type    Mojgan Lomeli NP General          Reason for Visit / Chief Complaint: Non-healing Wound Follow Up and Diabetic Foot Ulcer (Right foot)       History of Present Illness / Problem Focused Workflow     Jean Pierre Paulson is a 36 y.o. male who presents to clinic for follow up on chronic-non healing wound on the right foot. Wound has improved since last visit. Continues to have callus filippo wound. Bedside debridement today. He is using Keystone spray as prescribed and hydrofera blue. Patient is wearing walking boot. Pertinent factors that delay wound healing include multiple co-morbidities, poor vascular supply, diabetes, edema, heavy drainage, excessive wound moisture and macerated tissue, no protective sensation, infection and history of poor compliance.       Review of Systems     Review of Systems   Constitutional: Negative for activity change, chills and fever.   Respiratory: Negative for chest tightness and shortness of breath.    Cardiovascular: Positive for leg swelling. Negative for chest pain and palpitations.   Musculoskeletal: Positive for arthralgias and joint swelling.   Skin: Positive for wound.        See wound assessment   Neurological: Positive for weakness and numbness.   Psychiatric/Behavioral: Negative for agitation, behavioral problems, confusion and self-injury.       Medical / Social / Family History     Past Medical History:   Diagnosis Date    Anemia     Chronic osteomyelitis     Diabetes mellitus with neuropathy     Diabetes mellitus, type 2     DM2 (diabetes mellitus, type 2)     HTN (hypertension)     Hypertension     Neuropathy     Obesity     Osteomyelitis 10/2020     Hx of R foot, Tx with IV Abx    Peripheral vascular disease        Past Surgical History:   Procedure Laterality Date    APPLICATION OF SPLIT-THICKNESS SKIN GRAFT (STSG) TO LOWER EXTREMITY Right 3/2/2022    Procedure: APPLICATION, GRAFT, SKIN, SPLIT-THICKNESS, TO LOWER EXTREMITY;  Surgeon: Greg Ramírez MD;  Location: [facility];  Service: General;  Laterality: Right;    CHOLECYSTECTOMY      DEBRIDEMENT OF FOOT Right 10/2020    FOOT SURGERY      Right diabetic foot ulcer Elliott's Grade 3      Bone exposure to R heel, Hx of IV Abx, cultures show multiple bacterial growth, Hx of Osteomyelitis, HBOT       Social History  Mr. Jean Pierre Paulson  reports that he quit smoking about 3 months ago. His smoking use included cigarettes. He started smoking about 22 years ago. He smoked 0.25 packs per day. He has never used smokeless tobacco. He reports current alcohol use of about 2.0 standard drinks of alcohol per week. He reports current drug use. Drug: Hydrocodone.    Family History  Mr.'s Jean Pierre Paulson family history includes Diabetes in his father and maternal grandmother; Hypertension in his father and maternal grandmother.    Medications and Allergies     Medications  Outpatient Medications Marked as Taking for the 5/16/22 encounter (Office Visit) with AUGUSTUS Macedo   Medication Sig Dispense Refill    acetaminophen (TYLENOL) 325 MG tablet Take 2 tablets (650 mg total) by mouth every 4 (four) hours as needed.  0    dapagliflozin-metformin (XIGDUO XR) 5-1,000 mg TBph Take 1 tablet by mouth once daily. 30 tablet 5    FEROSUL 325 mg (65 mg iron) Tab tablet Take 325 mg by mouth once daily.      gabapentin (NEURONTIN) 300 MG capsule Take 1 capsule (300 mg total) by mouth every 8 (eight) hours. 270 capsule 1    hydroCHLOROthiazide (HYDRODIURIL) 25 MG tablet Take 1 tablet (25 mg total) by mouth once daily. 90 tablet 1    HYDROcodone-acetaminophen (NORCO)  mg per tablet Take 1 tablet by mouth every 12  (twelve) hours as needed for Pain. 45 tablet 0    losartan (COZAAR) 50 MG tablet Take 1 tablet (50 mg total) by mouth once daily. 90 tablet 1    rosuvastatin (CRESTOR) 10 MG tablet TAKE 1 TABLET EVERY DAY 90 tablet 0       Allergies  Review of patient's allergies indicates:   Allergen Reactions    Tomato Itching       Physical Examination     Vitals:    05/16/22 1029   BP: (!) 156/88   Pulse:    Resp:    Temp:      Physical Exam  Vitals and nursing note reviewed.   Constitutional:       Appearance: Normal appearance.   HENT:      Head: Normocephalic.   Cardiovascular:      Rate and Rhythm: Normal rate and regular rhythm.      Pulses: Normal pulses.      Heart sounds: Normal heart sounds.   Pulmonary:      Effort: Pulmonary effort is normal. No respiratory distress.   Chest:      Chest wall: No tenderness.   Musculoskeletal:         General: Swelling present.      Right lower leg: Edema present.   Skin:     General: Skin is warm and dry.      Capillary Refill: Capillary refill takes 2 to 3 seconds.      Comments: See wound assessment    Neurological:      General: No focal deficit present.      Mental Status: He is alert and oriented to person, place, and time. Mental status is at baseline.   Psychiatric:         Mood and Affect: Mood normal.         Behavior: Behavior normal.         Thought Content: Thought content normal.         Judgment: Judgment normal.         Assessment and Plan      1. 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           Wound 02/16/22 1020 Diabetic Ulcer Right Plantar (Active)   02/16/22 1020    Pre-existing: Yes   Primary Wound Type: Diabetic ulc   Side: Right   Orientation:    Location: Plantar   Wound Number:    Ankle-Brachial Index:    Pulses:    Removal Indication and Assessment:    Wound Outcome:    (Retired) Wound Type:    (Retired) Wound Length (cm):    (Retired) Wound Width (cm):    (Retired) Depth (cm):    Wound  Description (Comments):    Removal Indications:    Wound Image    05/16/22 1037   Dressing Appearance Intact;Moist drainage 05/16/22 1011   Drainage Amount Moderate 05/16/22 1011   Drainage Characteristics/Odor Serosanguineous 05/16/22 1011   Appearance Pink;Moist;Granulating;Muscle 05/16/22 1011   Tissue loss description Full thickness 05/16/22 1011   Black (%), Wound Tissue Color 0 % 05/16/22 1011   Red (%), Wound Tissue Color 100 % 05/16/22 1011   Yellow (%), Wound Tissue Color 0 % 05/16/22 1011   Periwound Area Moist;Pale white 05/16/22 1011   Wound Edges Callused 05/16/22 1011   Elliott Classification (diabetic foot ulcers only) Grade 2 05/16/22 1011   Wound Length (cm) 12.5 cm 05/16/22 1037   Wound Width (cm) 6.1 cm 05/16/22 1037   Wound Depth (cm) 0.3 cm 05/16/22 1037   Wound Volume (cm^3) 22.875 cm^3 05/16/22 1037   Wound Surface Area (cm^2) 76.25 cm^2 05/16/22 1037   Care Cleansed with:;Antimicrobial agent 05/16/22 1011   Dressing Applied;Hydrofiber;Gauze;Rolled gauze 05/16/22 1011   Periwound Care Moisture barrier applied 05/16/22 1011   Compression Tubular elasticized bandage 05/16/22 1011   Off Loading Off loading shoe 05/16/22 1011   Dressing Change Due 05/17/22 05/16/22 1011         Active Problem List with Overview Notes    Diagnosis Date Noted    Encounter for subsequent annual wellness visit (AWV) in Medicare patient 04/29/2022    BMI 45.0-49.9, adult 04/29/2022    Type 2 diabetes mellitus with right diabetic foot ulcer 10/08/2021    Sarcoidosis 10/08/2021    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 07/07/2021    Chronic pain syndrome 04/13/2021    Peripheral vascular disease     Diabetic neuropathy with neurologic complication     Chronic osteomyelitis 03/26/2021    Chronic diabetic ulcer of right foot determined by examination 03/25/2021    Peripheral vascular disease, unspecified 03/25/2021    Lower extremity edema  03/25/2021    Ulcer of right heel, with necrosis of bone 03/19/2021    Diabetes with skin ulcer 03/19/2021    Primary hypertension 03/19/2021         :    Plan:   Right foot wound clean with baby shampoo and water  Apply keystone gel to wound bed.   Pack right heel with new gauze soaked in keystone spray.  Cover wound with hydrofera blue  Cover with dry gauze,wrap with kerlix and ace wrap from toes to knee  Change daily and as needed  Elevate feet as much as possible  Avoid prolonged standing  Knee scooter and/or walking boot  Monitor closely for s/s of infection including fever, chills, increase in pain, odor from wound, and increased redness from foot. Go to ER if any complications develop.   Keep leg elevated and avoid pressure on wound.  Diabetes:  Monitor glucose closely. Check fasting glucose and 2 hours after meals. HgA1C goal <7, fasting glucose , and 2 hours after meals <180  Hypertension:  Check blood pressure twice daily, goal <120/80  Diet:   Increase protein intake, avoid fried, fatty foods and foods high in simple carbs.   Vitamins:  Take vitamin C 1000 mg, zinc 50mg, vitamin d 5000 units, and a daily multivitamin. Angel is a good source of protein and nutrients to aid in wound healing.   F/u in 2 weeks.    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 - Primary    Relevant Orders    Debridement          Future Appointments   Date Time Provider Department Center   5/31/2022 10:00 AM AUGUSTUS Macedo [facility]   6/13/2022  1:45 PM [facility]   7/12/2022  1:00 PM [facility]   5/1/2023  1:30 PM AWV NURSE, [facility]            Signature:  AUGUSTUS Macedo  [facility] - WOUND CARE  [address]  [place]  [address]  [phone]    Date of encounter: 5/16/22

## 2022-05-13 NOTE — PATIENT INSTRUCTIONS
Right foot wound clean with baby shampoo and water  Apply keystone gel to wound bed.   Pack right heel with new gauze soaked in keystone spray.  Cover wound with hydrofera blue  Cover with dry gauze,wrap with kerlix and ace wrap from toes to knee  Change daily and as needed  Elevate feet as much as possible  Avoid prolonged standing  Knee scooter and/or walking boot  Monitor closely for s/s of infection including fever, chills, increase in pain, odor from wound, and increased redness from foot. Go to ER if any complications develop.   Keep leg elevated and avoid pressure on wound.  Diabetes:  Monitor glucose closely. Check fasting glucose and 2 hours after meals. HgA1C goal <7, fasting glucose , and 2 hours after meals <180  Hypertension:  Check blood pressure twice daily, goal <120/80  Diet:   Increase protein intake, avoid fried, fatty foods and foods high in simple carbs.   Vitamins:  Take vitamin C 1000 mg, zinc 50mg, vitamin d 5000 units, and a daily multivitamin. Angel is a good source of protein and nutrients to aid in wound healing.   F/u in 2 weeks.

## 2022-05-16 ENCOUNTER — OFFICE VISIT (OUTPATIENT)
Dept: WOUND CARE | Facility: CLINIC | Age: 37
End: 2022-05-16
Attending: FAMILY MEDICINE
Payer: MEDICARE

## 2022-05-16 VITALS
RESPIRATION RATE: 20 BRPM | DIASTOLIC BLOOD PRESSURE: 88 MMHG | TEMPERATURE: 98 F | HEART RATE: 90 BPM | SYSTOLIC BLOOD PRESSURE: 156 MMHG

## 2022-05-16 DIAGNOSIS — L97.516 NON-PRESSURE CHRONIC ULCER OF OTHER PART OF RIGHT FOOT WITH BONE INVOLVEMENT WITHOUT EVIDENCE OF NECROSIS: Primary | ICD-10-CM

## 2022-05-16 PROCEDURE — 11042 DEBRIDEMENT: ICD-10-PCS | Mod: S$PBB,58,, | Performed by: NURSE PRACTITIONER

## 2022-05-16 PROCEDURE — 99215 OFFICE O/P EST HI 40 MIN: CPT | Mod: PBBFAC | Performed by: NURSE PRACTITIONER

## 2022-05-16 PROCEDURE — 99499 UNLISTED E&M SERVICE: CPT | Mod: S$PBB,,, | Performed by: NURSE PRACTITIONER

## 2022-05-16 PROCEDURE — 11045 DBRDMT SUBQ TISS EACH ADDL: CPT | Mod: PBBFAC | Performed by: NURSE PRACTITIONER

## 2022-05-16 PROCEDURE — 11045 DEBRIDEMENT: ICD-10-PCS | Mod: S$PBB,58,, | Performed by: NURSE PRACTITIONER

## 2022-05-16 PROCEDURE — 11042 DBRDMT SUBQ TIS 1ST 20SQCM/<: CPT | Mod: S$PBB,58,, | Performed by: NURSE PRACTITIONER

## 2022-05-16 PROCEDURE — 99499 NO LOS: ICD-10-PCS | Mod: S$PBB,,, | Performed by: NURSE PRACTITIONER

## 2022-05-16 NOTE — PROCEDURES
Debridement    Date/Time: 5/16/2022 10:00 AM  Performed by: AUGUSTUS Macedo  Authorized by: AUGUSTUS Macedo     Consent Done?:  Yes (Written)  Local anesthesia used?: No      Wound Details:    Location:  Right foot    Location:  Right Dorsal Foot       Length (cm):  6.1       Area (sq cm):  76.25       Width (cm):  12.5       Percent Debrided (%):  100       Depth (cm):  0.3       Total Area Debrided (sq cm):  76.25    Depth of debridement:  Subcutaneous tissue    Tissue debrided:  Subcutaneous    Devitalized tissue debrided:  Callus and Exudate    Instruments:  Scissors    Bleeding:  None  Patient tolerance:  Patient tolerated the procedure well with no immediate complications     Assistant Lizabeth Stone LPN

## 2022-05-16 NOTE — PROGRESS NOTES
Debridement Performed for Assessment: Wound# 1  Performed By: Provider: Yelitza Ward NP  Assistant: Lizabeth Stone LPN    Debridement: Surgical    Photo taken post procedure:    Time-Out Taken: Yes  Level: Skin/Subcutaneous Tissue  Post Debridement Measurements  Length: (cm) 6.1  Width: (cm) 12.5  Depth: (cm) 0.3      Area: (cm²) 76.25  Percent Debrided: 100%  Total Area Debrided: (sq cm)     Tissue and other material debrided:  Adipose, Dermis, Epidermis, Subcutaneous  Devitalized Tissue Debrided:Biofilm, callus  Instrument: scissors and nippers  Bleeding: Moderate  Hemostasis Achieved: Pressure  Procedural Pain: Insensate  Post Procedural Pain: Insensate  Response to Treatment: Procedure was tolerated well    Devitalized materials/tissue Removed  the following was removed during debridement  subcutaneous      Post Debridement Diagnosis chronic right diabetic foot ulcer  Post debridement diagnosis  Same as Pre-operative debridement diagnosis, No Complications noted.      Grafts or implants applied  Was a graft or implant applied?  No      Procedure assistant  Procedure assisted by:  Lizabeth Stone LPN  Assistant is the same as nurse listed above      Complications related to procedure  Did any complication occure during procedure?  No complications noted during or after procedure.      Specimen  Specimen collect during procedure?  No specimen collected      Anaesthesia:  Anesthesia used  None      Blood Loss:  Blood loss during procedure  less than 5 cc

## 2022-05-17 ENCOUNTER — TELEPHONE (OUTPATIENT)
Dept: FAMILY MEDICINE | Facility: CLINIC | Age: 37
End: 2022-05-17
Payer: MEDICARE

## 2022-05-17 DIAGNOSIS — I10 PRIMARY HYPERTENSION: Primary | ICD-10-CM

## 2022-05-17 RX ORDER — HYDRALAZINE HYDROCHLORIDE 50 MG/1
50 TABLET, FILM COATED ORAL EVERY 12 HOURS
Qty: 60 TABLET | Refills: 0 | Status: CANCELLED | OUTPATIENT
Start: 2022-05-17 | End: 2023-05-17

## 2022-05-17 NOTE — TELEPHONE ENCOUNTER
Samantha from [employer] called to report that the pts blood pressure was 150/100. Reported to AUGUSTUS Carreno and was received an updated medication list from samantha. AUGUSTUS Carreno spoke to Samantha with [facility] and gave her orders.

## 2022-05-22 NOTE — HPI
35 y/o male patient, seen by me in the past for management of right foot ulcer.  He has a long history of problems with the foot and was documented to have osteomyelitis, with a positive probe to bone in the past.  I saw him three months ago, and HBO was to be started, but he didn't qualify, and was treated with IV abx.  He is now in HBO (5 treatments, I think) for Grade III diabetic ulcer and was recently seen by Dr. Davis, who suggested a skin graft.  I was asked by Dr. oFrd to see patient and consider skin graft placement.  He had a bone scan recently, revealing no evidence of osteomyelitis.    
The patient is a 37yo AA male with a MedHX of DM2 with neuropathy, chronic osteomyelitis, a necrotic right foot ulcer, anemia, PVD, HTN, HLD, chronic pain syndrome, and amputation of the right big toe. He presented to [facility] on 02/15 for a follow up on wound cultures that had been taken from his chronic right foot plantar ulcer. The wound cultures had grown Staph aureus, ESBL e.coli and proteus mirabilis. The patient says that he had first noticed the ulcer in late 2019 after he stepped on a nail. He says that this was around the time that he had had his right big toe amputated. He came in to the hospital last November when he noticed that the ulcer had grown very large. He had a debridement of the ulcer by Dr. Ramírez at that time. He has been taking Clindamycin PO prior to this admission. A wound care nurse from [facility] has been visiting the patient weekly to treat his wound with silver nitrate and redress it. The patient does also have two small, superficial ulcers on his right second toe and his right ankle.     The patient will be admitted to [facility] under the [facility] (Dr. Casper) for treatment of his infected right foot ulcer and management of other medical problems.   
(1) Other Diagnosis

## 2022-05-31 ENCOUNTER — EXTERNAL CHRONIC CARE MANAGEMENT (OUTPATIENT)
Dept: FAMILY MEDICINE | Facility: CLINIC | Age: 37
End: 2022-05-31
Payer: MEDICARE

## 2022-05-31 ENCOUNTER — OFFICE VISIT (OUTPATIENT)
Dept: WOUND CARE | Facility: CLINIC | Age: 37
End: 2022-05-31
Attending: FAMILY MEDICINE
Payer: MEDICARE

## 2022-05-31 VITALS
DIASTOLIC BLOOD PRESSURE: 75 MMHG | HEART RATE: 97 BPM | RESPIRATION RATE: 20 BRPM | SYSTOLIC BLOOD PRESSURE: 120 MMHG | TEMPERATURE: 97 F

## 2022-05-31 DIAGNOSIS — L97.516 NON-PRESSURE CHRONIC ULCER OF OTHER PART OF RIGHT FOOT WITH BONE INVOLVEMENT WITHOUT EVIDENCE OF NECROSIS: Primary | ICD-10-CM

## 2022-05-31 PROCEDURE — G0511 CCM/BHI BY RHC/FQHC 20MIN MO: HCPCS | Mod: ,,, | Performed by: NURSE PRACTITIONER

## 2022-05-31 PROCEDURE — 99024 PR POST-OP FOLLOW-UP VISIT: ICD-10-PCS | Mod: S$PBB,,, | Performed by: NURSE PRACTITIONER

## 2022-05-31 PROCEDURE — 99024 POSTOP FOLLOW-UP VISIT: CPT | Mod: S$PBB,,, | Performed by: NURSE PRACTITIONER

## 2022-05-31 PROCEDURE — 99215 OFFICE O/P EST HI 40 MIN: CPT | Mod: PBBFAC | Performed by: NURSE PRACTITIONER

## 2022-05-31 PROCEDURE — G0511 PR CHRONIC CARE MGMT, RHC OR FQHC ONLY, 20 MINS OR MORE: ICD-10-PCS | Mod: ,,, | Performed by: NURSE PRACTITIONER

## 2022-05-31 NOTE — PROGRESS NOTES
AUGUSTUS Macedo   [facility] - WOUND CARE  [address] [facility] [address]  [phone]      PATIENT NAME: Jean Pierre Paulson  : 1985  DATE: 22  MRN: [id number]      Billing Provider: AUGUSTUS Macedo  Level of Service:   Patient PCP Information     Provider PCP Type    Mojgan Lomeli NP General          Reason for Visit / Chief Complaint: Diabetic Foot Ulcer and Non-healing Wound Follow Up (Right foot)       History of Present Illness / Problem Focused Workflow     Jean Pierre Paulson is a 36 y.o. male who presents to clinic for follow up on chronic-non healing wound on the right foot. Wound has improved since last visit. He is using Keystone spray as prescribed and hydrofera blue. Patient is wearing walking boot. Pertinent factors that delay wound healing include multiple co-morbidities, poor vascular supply, diabetes, edema, heavy drainage, excessive wound moisture and macerated tissue, no protective sensation, infection and history of poor compliance.       Review of Systems     Review of Systems   Constitutional: Negative for activity change, chills and fever.   Respiratory: Negative for chest tightness and shortness of breath.    Cardiovascular: Positive for leg swelling. Negative for chest pain and palpitations.   Musculoskeletal: Positive for arthralgias and joint swelling.   Skin: Positive for wound.        See wound assessment   Neurological: Positive for weakness and numbness.   Psychiatric/Behavioral: Negative for agitation, behavioral problems, confusion and self-injury.       Medical / Social / Family History     Past Medical History:   Diagnosis Date    Anemia     Chronic osteomyelitis     Diabetes mellitus with neuropathy     Diabetes mellitus, type 2     DM2 (diabetes mellitus, type 2)     HTN (hypertension)     Hypertension     Neuropathy     Obesity     Osteomyelitis 10/2020    Hx of R foot, Tx with IV Abx    Peripheral vascular disease         Past Surgical History:   Procedure Laterality Date    APPLICATION OF SPLIT-THICKNESS SKIN GRAFT (STSG) TO LOWER EXTREMITY Right 3/2/2022    Procedure: APPLICATION, GRAFT, SKIN, SPLIT-THICKNESS, TO LOWER EXTREMITY;  Surgeon: Greg Ramírez MD;  Location: [facility];  Service: General;  Laterality: Right;    CHOLECYSTECTOMY      DEBRIDEMENT OF FOOT Right 10/2020    FOOT SURGERY      Right diabetic foot ulcer Elliott's Grade 3      Bone exposure to R heel, Hx of IV Abx, cultures show multiple bacterial growth, Hx of Osteomyelitis, HBOT       Social History  Mr. Jean Pierre Paulson  reports that he quit smoking about 3 months ago. His smoking use included cigarettes. He started smoking about 22 years ago. He smoked 0.25 packs per day. He has never used smokeless tobacco. He reports current alcohol use of about 2.0 standard drinks of alcohol per week. He reports current drug use. Drug: Hydrocodone.    Family History  Mr.'s Jean Pierre Paulson family history includes Diabetes in his father and maternal grandmother; Hypertension in his father and maternal grandmother.    Medications and Allergies     Medications  Outpatient Medications Marked as Taking for the 5/31/22 encounter (Office Visit) with AUGUSTUS Macedo   Medication Sig Dispense Refill    acetaminophen (TYLENOL) 325 MG tablet Take 2 tablets (650 mg total) by mouth every 4 (four) hours as needed.  0    blood sugar diagnostic (BLOOD GLUCOSE TEST) Strp 1 strip by Misc.(Non-Drug; Combo Route) route once daily. accu-chek Veronika strips 90 strip 5    dapagliflozin-metformin (XIGDUO XR) 5-1,000 mg TBph Take 1 tablet by mouth once daily. 30 tablet 5    FEROSUL 325 mg (65 mg iron) Tab tablet Take 325 mg by mouth once daily.      gabapentin (NEURONTIN) 300 MG capsule Take 1 capsule (300 mg total) by mouth every 8 (eight) hours. 270 capsule 1    hydroCHLOROthiazide (HYDRODIURIL) 25 MG tablet Take 1 tablet (25 mg total) by mouth once daily. 90 tablet 1     HYDROcodone-acetaminophen (NORCO)  mg per tablet Take 1 tablet by mouth once daily. 30 tablet 0    HYDROcodone-acetaminophen (NORCO)  mg per tablet Take 1 tablet by mouth every 12 (twelve) hours as needed for Pain. 45 tablet 0       Allergies  Review of patient's allergies indicates:   Allergen Reactions    Tomato Itching       Physical Examination     Vitals:    05/31/22 1018   BP: 120/75   Pulse: 97   Resp: 20   Temp: 97.4 °F (36.3 °C)     Physical Exam  Vitals and nursing note reviewed.   Constitutional:       Appearance: Normal appearance.   HENT:      Head: Normocephalic.   Cardiovascular:      Rate and Rhythm: Normal rate and regular rhythm.      Pulses: Normal pulses.      Heart sounds: Normal heart sounds.   Pulmonary:      Effort: Pulmonary effort is normal. No respiratory distress.   Chest:      Chest wall: No tenderness.   Musculoskeletal:         General: Swelling present.      Right lower leg: Edema present.   Skin:     General: Skin is warm and dry.      Capillary Refill: Capillary refill takes 2 to 3 seconds.      Comments: See wound assessment    Neurological:      General: No focal deficit present.      Mental Status: He is alert and oriented to person, place, and time. Mental status is at baseline.   Psychiatric:         Mood and Affect: Mood normal.         Behavior: Behavior normal.         Thought Content: Thought content normal.         Judgment: Judgment normal.         Assessment and Plan      1. 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           Wound 02/16/22 1020 Diabetic Ulcer Right Plantar (Active)   02/16/22 1020    Pre-existing: Yes   Primary Wound Type: Diabetic ulc   Side: Right   Orientation:    Location: Plantar   Wound Number:    Ankle-Brachial Index:    Pulses:    Removal Indication and Assessment:    Wound Outcome:    (Retired) Wound Type:    (Retired) Wound Length (cm):    (Retired) Wound Width (cm):     (Retired) Depth (cm):    Wound Description (Comments):    Removal Indications:    Wound Image    05/31/22 1023   Dressing Appearance Moist drainage 05/31/22 1023   Drainage Amount Moderate 05/31/22 1023   Drainage Characteristics/Odor Serosanguineous 05/31/22 1023   Appearance Pink;Moist;Granulating 05/31/22 1023   Tissue loss description Full thickness 05/31/22 1023   Black (%), Wound Tissue Color 0 % 05/31/22 1023   Red (%), Wound Tissue Color 100 % 05/31/22 1023   Yellow (%), Wound Tissue Color 0 % 05/31/22 1023   Periwound Area Moist;Pale white 05/31/22 1023   Wound Edges Callused 05/31/22 1023   Elliott Classification (diabetic foot ulcers only) Grade 2 05/31/22 1023   Wound Length (cm) 5.1 cm 05/31/22 1023   Wound Width (cm) 10.4 cm 05/31/22 1023   Wound Depth (cm) 0.7 cm 05/31/22 1023   Wound Volume (cm^3) 37.128 cm^3 05/31/22 1023   Wound Surface Area (cm^2) 53.04 cm^2 05/31/22 1023   Care Cleansed with:;Antimicrobial agent;Soap and water 05/31/22 1023   Dressing Applied;Gauze, wet to dry;Gauze;Absorptive Pad;Rolled gauze 05/31/22 1023   Periwound Care Moisture barrier applied 05/31/22 1023   Compression Tubular elasticized bandage 05/31/22 1023   Off Loading Off loading shoe 05/31/22 1023   Dressing Change Due 06/01/22 05/31/22 1023         Active Problem List with Overview Notes    Diagnosis Date Noted    Encounter for subsequent annual wellness visit (AWV) in Medicare patient 04/29/2022    BMI 45.0-49.9, adult 04/29/2022    Type 2 diabetes mellitus with right diabetic foot ulcer 10/08/2021    Sarcoidosis 10/08/2021    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 07/07/2021    Chronic pain syndrome 04/13/2021    Peripheral vascular disease     Diabetic neuropathy with neurologic complication     Chronic osteomyelitis 03/26/2021    Chronic diabetic ulcer of right foot determined by examination 03/25/2021    Peripheral vascular  disease, unspecified 03/25/2021    Lower extremity edema 03/25/2021    Ulcer of right heel, with necrosis of bone 03/19/2021    Diabetes with skin ulcer 03/19/2021    Primary hypertension 03/19/2021         :    Plan:   Right foot wound clean with baby shampoo and water  Apply keystone gel to wound bed.   Pack right heel with new gauze soaked in keystone spray.  Cover wound with hydrofera blue  Cover with dry gauze,wrap with kerlix and ace wrap from toes to knee  Change daily and as needed  Elevate feet as much as possible  Avoid prolonged standing  Knee scooter and/or walking boot  Monitor closely for s/s of infection including fever, chills, increase in pain, odor from wound, and increased redness from foot. Go to ER if any complications develop.   Keep leg elevated and avoid pressure on wound.  Diabetes:  Monitor glucose closely. Check fasting glucose and 2 hours after meals. HgA1C goal <7, fasting glucose , and 2 hours after meals <180  Hypertension:  Check blood pressure twice daily, goal <120/80  Diet:   Increase protein intake, avoid fried, fatty foods and foods high in simple carbs.   Vitamins:  Take vitamin C 1000 mg, zinc 50mg, vitamin d 5000 units, and a daily multivitamin. Angel is a good source of protein and nutrients to aid in wound healing.   F/u in 2 weeks.  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   5/1/2023  1:30 PM AWV NURSE, [facility] FAMILY MEDICINE [facility] RICHARDMED [place]            Signature:  AUGUSTUS Macedo  [facility] - WOUND CARE  [address]  [place] [address]  [phone]    Date of encounter: 5/31/22

## 2022-06-13 ENCOUNTER — OFFICE VISIT (OUTPATIENT)
Dept: FAMILY MEDICINE | Facility: CLINIC | Age: 37
End: 2022-06-13
Payer: MEDICARE

## 2022-06-13 VITALS
SYSTOLIC BLOOD PRESSURE: 144 MMHG | WEIGHT: 315 LBS | HEART RATE: 105 BPM | TEMPERATURE: 98 F | HEIGHT: 73 IN | DIASTOLIC BLOOD PRESSURE: 78 MMHG | RESPIRATION RATE: 18 BRPM | OXYGEN SATURATION: 99 % | BODY MASS INDEX: 41.75 KG/M2

## 2022-06-13 DIAGNOSIS — I10 PRIMARY HYPERTENSION: ICD-10-CM

## 2022-06-13 DIAGNOSIS — L97.519 TYPE 2 DIABETES MELLITUS WITH RIGHT DIABETIC FOOT ULCER: Primary | ICD-10-CM

## 2022-06-13 DIAGNOSIS — E11.621 TYPE 2 DIABETES MELLITUS WITH RIGHT DIABETIC FOOT ULCER: Primary | ICD-10-CM

## 2022-06-13 LAB
ALBUMIN SERPL BCP-MCNC: 3 G/DL (ref 3.5–5)
ALBUMIN/GLOB SERPL: 0.7 {RATIO}
ALP SERPL-CCNC: 145 U/L (ref 45–115)
ALT SERPL W P-5'-P-CCNC: 34 U/L (ref 16–61)
ANION GAP SERPL CALCULATED.3IONS-SCNC: 7 MMOL/L (ref 7–16)
AST SERPL W P-5'-P-CCNC: 20 U/L (ref 15–37)
BILIRUB SERPL-MCNC: 0.4 MG/DL (ref 0–1.2)
BUN SERPL-MCNC: 27 MG/DL (ref 7–18)
BUN/CREAT SERPL: 17 (ref 6–20)
CALCIUM SERPL-MCNC: 8.8 MG/DL (ref 8.5–10.1)
CHLORIDE SERPL-SCNC: 105 MMOL/L (ref 98–107)
CHOLEST SERPL-MCNC: 144 MG/DL (ref 0–200)
CHOLEST/HDLC SERPL: 2.9 {RATIO}
CO2 SERPL-SCNC: 29 MMOL/L (ref 21–32)
CREAT SERPL-MCNC: 1.61 MG/DL (ref 0.7–1.3)
EST. AVERAGE GLUCOSE BLD GHB EST-MCNC: 174 MG/DL
GLOBULIN SER-MCNC: 4.6 G/DL (ref 2–4)
GLUCOSE SERPL-MCNC: 212 MG/DL (ref 74–106)
HBA1C MFR BLD HPLC: 7.8 % (ref 4.5–6.6)
HDLC SERPL-MCNC: 49 MG/DL (ref 40–60)
LDLC SERPL CALC-MCNC: 62 MG/DL
LDLC/HDLC SERPL: 1.3 {RATIO}
NONHDLC SERPL-MCNC: 95 MG/DL
POTASSIUM SERPL-SCNC: 5.1 MMOL/L (ref 3.5–5.1)
PROT SERPL-MCNC: 7.6 G/DL (ref 6.4–8.2)
SODIUM SERPL-SCNC: 136 MMOL/L (ref 136–145)
TRIGL SERPL-MCNC: 166 MG/DL (ref 35–150)
VLDLC SERPL-MCNC: 33 MG/DL

## 2022-06-13 PROCEDURE — 80061 LIPID PANEL: ICD-10-PCS | Mod: ,,, | Performed by: CLINICAL MEDICAL LABORATORY

## 2022-06-13 PROCEDURE — 99214 OFFICE O/P EST MOD 30 MIN: CPT | Mod: ,,, | Performed by: NURSE PRACTITIONER

## 2022-06-13 PROCEDURE — 83036 HEMOGLOBIN GLYCOSYLATED A1C: CPT | Mod: ,,, | Performed by: CLINICAL MEDICAL LABORATORY

## 2022-06-13 PROCEDURE — 99214 PR OFFICE/OUTPT VISIT, EST, LEVL IV, 30-39 MIN: ICD-10-PCS | Mod: ,,, | Performed by: NURSE PRACTITIONER

## 2022-06-13 PROCEDURE — 80061 LIPID PANEL: CPT | Mod: ,,, | Performed by: CLINICAL MEDICAL LABORATORY

## 2022-06-13 PROCEDURE — 83036 HEMOGLOBIN A1C: ICD-10-PCS | Mod: ,,, | Performed by: CLINICAL MEDICAL LABORATORY

## 2022-06-13 PROCEDURE — 80053 COMPREHEN METABOLIC PANEL: CPT | Mod: ,,, | Performed by: CLINICAL MEDICAL LABORATORY

## 2022-06-13 PROCEDURE — 80053 COMPREHENSIVE METABOLIC PANEL: ICD-10-PCS | Mod: ,,, | Performed by: CLINICAL MEDICAL LABORATORY

## 2022-06-13 NOTE — PROGRESS NOTES
Mojgan Lomeli NP   [facility]  [address]  [place]     PATIENT NAME: Jean Pierre Paulson  : 1985  DATE: 22  MRN: [id number]      Billing Provider: Mojgan Lomeli NP  Level of Service:   Patient PCP Information     Provider PCP Type    Mojgan Lomeli NP General          Reason for Visit / Chief Complaint: Hypertension (Home health nurse states that his blood pressure has been elevated. )       Update PCP  Update Chief Complaint         History of Present Illness / Problem Focused Workflow     Jean Pierre Paulson presents to the clinic here for eval of htn, home farzad nurse stated that his bp has been elevated.       Review of Systems     Review of Systems   Constitutional: Negative for chills, fatigue and fever.   HENT: Negative for nasal congestion, ear pain, facial swelling, hearing loss, mouth dryness, mouth sores, postnasal drip, rhinorrhea, sinus pressure/congestion and goiter.    Eyes: Negative for discharge and itching.   Respiratory: Negative for cough, shortness of breath and wheezing.    Cardiovascular: Negative for chest pain and leg swelling.   Gastrointestinal: Negative for abdominal pain, change in bowel habit and change in bowel habit.   Genitourinary: Negative for difficulty urinating, dysuria, enuresis, frequency, hematuria and urgency.   Neurological: Negative for dizziness, vertigo, syncope, weakness and headaches.   Psychiatric/Behavioral: Negative for decreased concentration.        Medical / Social / Family History     Past Medical History:   Diagnosis Date    Anemia     Chronic osteomyelitis     Diabetes mellitus with neuropathy     Diabetes mellitus, type 2     DM2 (diabetes mellitus, type 2)     HTN (hypertension)     Hypertension     Neuropathy     Obesity     Osteomyelitis 10/2020    Hx of R foot, Tx with IV Abx    Peripheral vascular disease        Past Surgical History:   Procedure Laterality Date    APPLICATION OF SPLIT-THICKNESS SKIN GRAFT (STSG) TO LOWER  EXTREMITY Right 3/2/2022    Procedure: APPLICATION, GRAFT, SKIN, SPLIT-THICKNESS, TO LOWER EXTREMITY;  Surgeon: Greg Ramírez MD;  Location: [facility];  Service: General;  Laterality: Right;    CHOLECYSTECTOMY      DEBRIDEMENT OF FOOT Right 10/2020    FOOT SURGERY      Right diabetic foot ulcer Elliott's Grade 3      Bone exposure to R heel, Hx of IV Abx, cultures show multiple bacterial growth, Hx of Osteomyelitis, HBOT       Social History    reports that he quit smoking about 4 months ago. His smoking use included cigarettes. He started smoking about 22 years ago. He smoked 0.25 packs per day. He has never used smokeless tobacco. He reports current alcohol use of about 2.0 standard drinks of alcohol per week. He reports current drug use. Drug: Hydrocodone.    Family History  's family history includes Diabetes in his father and maternal grandmother; Hypertension in his father and maternal grandmother.    Medications and Allergies     Medications  Outpatient Medications Marked as Taking for the 6/13/22 encounter (Office Visit) with Mojgan Lomeli NP   Medication Sig Dispense Refill    acetaminophen (TYLENOL) 325 MG tablet Take 2 tablets (650 mg total) by mouth every 4 (four) hours as needed.  0    blood sugar diagnostic (BLOOD GLUCOSE TEST) Strp 1 strip by Misc.(Non-Drug; Combo Route) route once daily. accu-chek Veronika strips 90 strip 5    dapagliflozin-metformin (XIGDUO XR) 5-1,000 mg TBph Take 1 tablet by mouth once daily. 30 tablet 5    FEROSUL 325 mg (65 mg iron) Tab tablet Take 325 mg by mouth once daily.      gabapentin (NEURONTIN) 300 MG capsule Take 1 capsule (300 mg total) by mouth every 8 (eight) hours. 270 capsule 1    hydroCHLOROthiazide (HYDRODIURIL) 25 MG tablet Take 1 tablet (25 mg total) by mouth once daily. 90 tablet 1    HYDROcodone-acetaminophen (NORCO)  mg per tablet Take 1 tablet by mouth once daily. 30 tablet 0    rosuvastatin (CRESTOR) 10 MG tablet TAKE 1 TABLET  "EVERY DAY 90 tablet 0       Allergies  Review of patient's allergies indicates:   Allergen Reactions    Tomato Itching       Physical Examination     Vitals:    06/13/22 1339   BP: (!) 144/78   BP Location: Left arm   Patient Position: Sitting   BP Method: Medium (Manual)   Pulse: 105   Resp: 18   Temp: 98.3 °F (36.8 °C)   SpO2: 99%   Weight: (!) 162.8 kg (359 lb)   Height: 6' 1" (1.854 m)      Physical Exam  Constitutional:       Appearance: Normal appearance.   HENT:      Head: Normocephalic.      Right Ear: Tympanic membrane, ear canal and external ear normal.      Left Ear: Tympanic membrane, ear canal and external ear normal.      Nose: Nose normal.      Mouth/Throat:      Mouth: Mucous membranes are moist.      Pharynx: Oropharynx is clear.   Eyes:      Extraocular Movements: Extraocular movements intact.      Conjunctiva/sclera: Conjunctivae normal.      Pupils: Pupils are equal, round, and reactive to light.   Cardiovascular:      Rate and Rhythm: Normal rate and regular rhythm.      Pulses: Normal pulses.      Heart sounds: Normal heart sounds.   Pulmonary:      Effort: Pulmonary effort is normal.      Breath sounds: Normal breath sounds.   Abdominal:      General: Bowel sounds are normal.      Palpations: Abdomen is soft.   Musculoskeletal:         General: Normal range of motion.      Cervical back: Normal range of motion and neck supple.      Comments: Partial amputation of right foot, wears boot splint   Skin:     General: Skin is warm and dry.      Capillary Refill: Capillary refill takes less than 2 seconds.   Neurological:      General: No focal deficit present.      Mental Status: He is alert and oriented to person, place, and time.   Psychiatric:         Mood and Affect: Mood normal.         Behavior: Behavior normal.          Assessment and Plan (including Health Maintenance)      Problem List  Smart Sets  Document Outside HM   :    Plan: will cont to monitor bp and take meds as orders , return " to clinic in 3 months and as needed    Type 2 diabetes mellitus with right diabetic foot ulcer  -     Hemoglobin A1C; Future; Expected date: 06/13/2022  -     Lipid Panel; Future; Expected date: 06/13/2022    Primary hypertension  -     Comprehensive Metabolic Panel; Future; Expected date: 06/13/2022  -     Lipid Panel; Future; Expected date: 06/13/2022            Health Maintenance Due   Topic Date Due    Hepatitis C Screening  Never done    Foot Exam  Never done    Eye Exam  Never done    Lipid Panel  04/26/2022       Problem List Items Addressed This Visit        Cardiac/Vascular    Primary hypertension    Relevant Orders    Comprehensive Metabolic Panel    Lipid Panel       Endocrine    Type 2 diabetes mellitus with right diabetic foot ulcer - Primary    Relevant Orders    Hemoglobin A1C    Lipid Panel            Health Maintenance Topics with due status: Not Due       Topic Last Completion Date    Hemoglobin A1c 02/15/2022    Diabetes Urine Screening 04/29/2022    Influenza Vaccine Not Due       Procedures     Future Appointments   Date Time Provider Department Center   6/21/2022 10:00 AM AUGUSTUS Macedo [facility]   7/12/2022  1:00 PM VONNIE Ma [facility]   9/13/2022 10:30 AM Mojgan Lomeli NP [facility] LOUANN Morris   5/1/2023  1:30 PM AWV NURSEJAMIL [facility]        Follow up in about 3 months (around 9/13/2022).       Signature:  Mojgan Lomeli NP    Date of encounter: 6/13/22

## 2022-06-14 RX ORDER — EMPAGLIFLOZIN, LINAGLIPTIN, METFORMIN HYDROCHLORIDE 5; 2.5; 1 MG/1; MG/1; MG/1
TABLET, EXTENDED RELEASE ORAL
COMMUNITY
End: 2022-07-27 | Stop reason: SDUPTHER

## 2022-06-14 NOTE — PROGRESS NOTES
Stop xigduo and start trijardia 5/2.5/1000 po daily, watch low carb diet, exercise daily, pt has been notified to  samples

## 2022-06-24 NOTE — PROGRESS NOTES
Patient underwent hyperbaric oxygen therapy treatment today, and voiced no complaints.  There were no observed complications.

## 2022-06-30 ENCOUNTER — EXTERNAL CHRONIC CARE MANAGEMENT (OUTPATIENT)
Dept: FAMILY MEDICINE | Facility: CLINIC | Age: 37
End: 2022-06-30
Payer: MEDICARE

## 2022-06-30 PROCEDURE — G0511 CCM/BHI BY RHC/FQHC 20MIN MO: HCPCS | Mod: ,,, | Performed by: NURSE PRACTITIONER

## 2022-06-30 PROCEDURE — G0511 PR CHRONIC CARE MGMT, RHC OR FQHC ONLY, 20 MINS OR MORE: ICD-10-PCS | Mod: ,,, | Performed by: NURSE PRACTITIONER

## 2022-07-12 ENCOUNTER — OFFICE VISIT (OUTPATIENT)
Dept: PAIN MEDICINE | Facility: CLINIC | Age: 37
End: 2022-07-12
Payer: MEDICARE

## 2022-07-12 VITALS
HEART RATE: 86 BPM | RESPIRATION RATE: 17 BRPM | WEIGHT: 315 LBS | BODY MASS INDEX: 38.36 KG/M2 | SYSTOLIC BLOOD PRESSURE: 145 MMHG | DIASTOLIC BLOOD PRESSURE: 89 MMHG | HEIGHT: 76 IN

## 2022-07-12 DIAGNOSIS — I73.9 PERIPHERAL VASCULAR DISEASE, UNSPECIFIED: Primary | Chronic | ICD-10-CM

## 2022-07-12 DIAGNOSIS — E11.40 DIABETIC NEUROPATHY WITH NEUROLOGIC COMPLICATION: Chronic | ICD-10-CM

## 2022-07-12 DIAGNOSIS — E11.49 DIABETIC NEUROPATHY WITH NEUROLOGIC COMPLICATION: Chronic | ICD-10-CM

## 2022-07-12 DIAGNOSIS — L97.414 ULCER OF RIGHT HEEL, WITH NECROSIS OF BONE: Chronic | ICD-10-CM

## 2022-07-12 DIAGNOSIS — Z79.899 ENCOUNTER FOR LONG-TERM (CURRENT) USE OF OTHER MEDICATIONS: ICD-10-CM

## 2022-07-12 LAB
CTP QC/QA: YES
POC (AMP) AMPHETAMINE: NEGATIVE
POC (BAR) BARBITURATES: NEGATIVE
POC (BUP) BUPRENORPHINE: NEGATIVE
POC (BZO) BENZODIAZEPINES: NEGATIVE
POC (COC) COCAINE: NEGATIVE
POC (MDMA) METHYLENEDIOXYMETHAMPHETAMINE 3,4: NEGATIVE
POC (MET) METHAMPHETAMINE: NEGATIVE
POC (MOP) OPIATES: ABNORMAL
POC (MTD) METHADONE: NEGATIVE
POC (OXY) OXYCODONE: NEGATIVE
POC (PCP) PHENCYCLIDINE: NEGATIVE
POC (TCA) TRICYCLIC ANTIDEPRESSANTS: NEGATIVE
POC TEMPERATURE (URINE): 94
POC THC: NEGATIVE

## 2022-07-12 PROCEDURE — 99214 OFFICE O/P EST MOD 30 MIN: CPT | Mod: S$PBB,,, | Performed by: PHYSICIAN ASSISTANT

## 2022-07-12 PROCEDURE — 99214 OFFICE O/P EST MOD 30 MIN: CPT | Mod: PBBFAC | Performed by: PHYSICIAN ASSISTANT

## 2022-07-12 PROCEDURE — 80305 DRUG TEST PRSMV DIR OPT OBS: CPT | Mod: PBBFAC | Performed by: PHYSICIAN ASSISTANT

## 2022-07-12 PROCEDURE — 99214 PR OFFICE/OUTPT VISIT, EST, LEVL IV, 30-39 MIN: ICD-10-PCS | Mod: S$PBB,,, | Performed by: PHYSICIAN ASSISTANT

## 2022-07-12 RX ORDER — HYDROCODONE BITARTRATE AND ACETAMINOPHEN 10; 325 MG/1; MG/1
1 TABLET ORAL DAILY
Qty: 30 TABLET | Refills: 0 | Status: SHIPPED | OUTPATIENT
Start: 2022-07-15 | End: 2022-09-20 | Stop reason: SDUPTHER

## 2022-07-12 RX ORDER — HYDROCODONE BITARTRATE AND ACETAMINOPHEN 10; 325 MG/1; MG/1
1 TABLET ORAL DAILY
Qty: 30 TABLET | Refills: 0 | Status: SHIPPED | OUTPATIENT
Start: 2022-08-13 | End: 2022-09-20 | Stop reason: SDUPTHER

## 2022-07-12 NOTE — PROGRESS NOTES
Subjective:      Patient ID: Jean Pierre Paulson is a 37 y.o. male.    Chief Complaint: Foot Pain      Pain  This is a chronic problem. The current episode started more than 1 year ago. The problem occurs daily. The problem has been unchanged. Associated symptoms include arthralgias. Pertinent negatives include no change in bowel habit, chest pain, chills, coughing, diaphoresis, fatigue, neck pain, rash, sore throat, vertigo or vomiting.     Review of Systems   Constitutional: Negative for activity change, appetite change, chills, diaphoresis, fatigue and unexpected weight change.   HENT: Negative for drooling, ear discharge, ear pain, facial swelling, nosebleeds, sore throat, trouble swallowing, voice change and goiter.    Eyes: Negative for photophobia, pain, discharge, redness and visual disturbance.   Respiratory: Negative for apnea, cough, choking, chest tightness, shortness of breath, wheezing and stridor.    Cardiovascular: Negative for chest pain, palpitations and leg swelling.   Gastrointestinal: Negative for abdominal distention, change in bowel habit, diarrhea, rectal pain, vomiting, fecal incontinence and change in bowel habit.   Endocrine: Negative for cold intolerance, heat intolerance, polydipsia, polyphagia and polyuria.   Genitourinary: Negative for bladder incontinence, dysuria, flank pain and frequency.   Musculoskeletal: Positive for arthralgias, back pain and leg pain. Negative for neck pain and neck stiffness.   Integumentary:  Negative for color change, pallor and rash.   Neurological: Negative for dizziness, vertigo, seizures, syncope, facial asymmetry, speech difficulty, light-headedness, disturbances in coordination, memory loss and coordination difficulties.   Hematological: Negative for adenopathy. Does not bruise/bleed easily.   Psychiatric/Behavioral: Negative for agitation, behavioral problems, confusion, decreased concentration, dysphoric mood, hallucinations, self-injury and suicidal ideas.  "The patient is not nervous/anxious and is not hyperactive.             Objective:  Vitals:    07/12/22 1253 07/12/22 1254   BP: (!) 145/89    Pulse: 86    Resp: 17    Weight: (!) 157.4 kg (347 lb)    Height: 6' 4" (1.93 m)    PainSc:   5   5         Physical Exam  Vitals and nursing note reviewed. Exam conducted with a chaperone present.   Constitutional:       General: He is awake.      Appearance: Normal appearance. He is not diaphoretic.   HENT:      Head: Normocephalic and atraumatic.      Nose: Nose normal.      Mouth/Throat:      Mouth: Mucous membranes are moist.      Pharynx: Oropharynx is clear.   Eyes:      Conjunctiva/sclera: Conjunctivae normal.      Pupils: Pupils are equal, round, and reactive to light.   Cardiovascular:      Rate and Rhythm: Normal rate.   Pulmonary:      Effort: Pulmonary effort is normal. No respiratory distress.   Abdominal:      Palpations: Abdomen is soft.      Tenderness: There is no guarding.   Musculoskeletal:         General: Normal range of motion.      Cervical back: Normal range of motion and neck supple. No rigidity.      Lumbar back: Tenderness present.      Right foot: Tenderness present.      Left foot: Tenderness present.   Skin:     General: Skin is warm and dry.      Coloration: Skin is not jaundiced or pale.   Neurological:      General: No focal deficit present.      Mental Status: He is alert and oriented to person, place, and time. Mental status is at baseline.      Cranial Nerves: No cranial nerve deficit (II-XII).   Psychiatric:         Mood and Affect: Mood normal.         Behavior: Behavior normal. Behavior is cooperative.         Thought Content: Thought content normal.           Orders Placed This Encounter   Procedures    POCT Urine Drug Screen Presump     Interpretive Information:     Negative:  No drug detected at the cut off level.   Positive:  This result represents presumptive positive for the   tested drug, other substances may yield a positive " response other   than the analyte of interest. This result should be utilized for   diagnostic purpose only. Confirmation testing will be performed upon physician request only.           NM Bone Scan 3 Phase Foot  Narrative: EXAMINATION:  NM BONE SCAN 3 PHASE FOOT    CLINICAL HISTORY:  Post-menopause state, Osteoporosis  Foot swelling, diabetic, osteomyelitis suspected, xray done;suspect chronic osteomyelitis;    COMPARISON:  CT 12 October 2021    FINDINGS:  Patient was injected with 25.0 millicuries of technetium 99M MDP intravenously.  Blood flow, blood pool and delayed images were obtained.    Increased activity diffusely in the right foot on the blood flow and blood pool imaging.  There is increased osseous uptake on the delayed imaging mostly in the right midfoot but also faintly seen in the right inferior calcaneus and portions of the left midfoot.  The right foot activity mostly correlates with area of severe degenerative changes and malalignment seen on the CT.  Impression: Increased activity diffusely in the right foot soft tissues in the blood flow and blood pool images.  There is increased activity in the midfoot correlating with area of degenerative changes and malalignment.  Faint activity in calcaneus more typical of reactive changes.  Findings do not appear typical for acute osteomyelitis.    Electronically signed by: Sheng Anderson  Date:    02/17/2022  Time:    12:38       Office Visit on 06/13/2022   Component Date Value Ref Range Status    Hemoglobin A1C 06/13/2022 7.8 (A) 4.5 - 6.6 % Final    Estimated Average Glucose 06/13/2022 174  mg/dL Final    Sodium 06/13/2022 136  136 - 145 mmol/L Final    Potassium 06/13/2022 5.1  3.5 - 5.1 mmol/L Final    Chloride 06/13/2022 105  98 - 107 mmol/L Final    CO2 06/13/2022 29  21 - 32 mmol/L Final    Anion Gap 06/13/2022 7  7 - 16 mmol/L Final    Glucose 06/13/2022 212 (A) 74 - 106 mg/dL Final    BUN 06/13/2022 27 (A) 7 - 18 mg/dL Final     Creatinine 06/13/2022 1.61 (A) 0.70 - 1.30 mg/dL Final    BUN/Creatinine Ratio 06/13/2022 17  6 - 20 Final    Calcium 06/13/2022 8.8  8.5 - 10.1 mg/dL Final    Total Protein 06/13/2022 7.6  6.4 - 8.2 g/dL Final    Albumin 06/13/2022 3.0 (A) 3.5 - 5.0 g/dL Final    Globulin 06/13/2022 4.6 (A) 2.0 - 4.0 g/dL Final    A/G Ratio 06/13/2022 0.7   Final    Bilirubin, Total 06/13/2022 0.4  0.0 - 1.2 mg/dL Final    Alk Phos 06/13/2022 145 (A) 45 - 115 U/L Final    ALT 06/13/2022 34  16 - 61 U/L Final    AST 06/13/2022 20  15 - 37 U/L Final    eGFR 06/13/2022 51 (A) >=60 mL/min/1.73m² Final    Triglycerides 06/13/2022 166 (A) 35 - 150 mg/dL Final    Cholesterol 06/13/2022 144  0 - 200 mg/dL Final    HDL Cholesterol 06/13/2022 49  40 - 60 mg/dL Final    Cholesterol/HDL Ratio (Risk Factor) 06/13/2022 2.9   Final    Non-HDL 06/13/2022 95  mg/dL Final    LDL Calculated 06/13/2022 62  mg/dL Final    LDL/HDL 06/13/2022 1.3   Final    VLDL 06/13/2022 33  mg/dL Final   Office Visit on 04/29/2022   Component Date Value Ref Range Status    Creatinine, Urine 04/29/2022 68  39 - 259 mg/dL Final    Microalbumin 04/29/2022 28.8 (A) 0.0 - 2.8 mg/dL Final    Microalbumin/Creatinine Ratio 04/29/2022 423.5 (A) 0.0 - 30.0 mg/g Final   Office Visit on 03/28/2022   Component Date Value Ref Range Status    POC Glucose 03/28/2022 183 (A) 70 - 110 MG/DL Final   Office Visit on 03/25/2022   Component Date Value Ref Range Status    POC Glucose 03/25/2022 174 (A) 70 - 110 MG/DL Final   Office Visit on 03/24/2022   Component Date Value Ref Range Status    POC Glucose 03/24/2022 154 (A) 70 - 110 MG/DL Final   Office Visit on 03/23/2022   Component Date Value Ref Range Status    POC Glucose 03/23/2022 281 (A) 70 - 110 MG/DL Final   Office Visit on 03/17/2022   Component Date Value Ref Range Status    POC Glucose 03/17/2022 222 (A) 70 - 110 MG/DL Final    POC Glucose 03/17/2022 123 (A) 70 - 110 MG/DL Final    POC Glucose  03/21/2022 176 (A) 70 - 110 MG/DL Final    POC Glucose 03/21/2022 152 (A) 70 - 110 MG/DL Final   Clinical Support on 03/14/2022   Component Date Value Ref Range Status    POC Glucose 03/14/2022 134 (A) 70 - 110 MG/DL Final   Office Visit on 03/14/2022   Component Date Value Ref Range Status    POC Amphetamines 03/14/2022 Negative  Negative, Inconclusive Final    POC Barbiturates 03/14/2022 Negative  Negative, Inconclusive Final    POC Benzodiazepines 03/14/2022 Negative  Negative, Inconclusive Final    POC Cocaine 03/14/2022 Negative  Negative, Inconclusive Final    POC THC 03/14/2022 Negative  Negative, Inconclusive Final    POC Methadone 03/14/2022 Negative  Negative, Inconclusive Final    POC Methamphetamine 03/14/2022 Negative  Negative, Inconclusive Final    POC Opiates 03/14/2022 Negative  Negative, Inconclusive Final    POC Oxycodone 03/14/2022 Negative  Negative, Inconclusive Final    POC Phencyclidine 03/14/2022 Negative  Negative, Inconclusive Final    POC Methylenedioxymethamphetamine * 03/14/2022 Negative  Negative, Inconclusive Final    POC Tricyclic Antidepressants 03/14/2022 Negative  Negative, Inconclusive Final    POC Buprenorphine 03/14/2022 Negative   Final     Acceptable 03/14/2022 Yes   Final    POC Temperature (Urine) 03/14/2022 92   Final   Office Visit on 03/11/2022   Component Date Value Ref Range Status    POC Glucose 03/11/2022 134 (A) 70 - 110 MG/DL Final   There may be more visits with results that are not included.           Assessment:      1. Peripheral vascular disease, unspecified    2. Diabetic neuropathy with neurologic complication    3. Ulcer of right heel, with necrosis of bone    4. Encounter for long-term (current) use of other medications            A's of Opioid Risk Assessment  Activity:Patient can perform ADL.   Analgesia:Patients pain is partially controlled by current medication. Patient has tried OTC medications such as Tylenol and  Ibuprofen with out relief.   Adverse Effects: Patient denies constipation or sedation.  Aberrant Behavior:  reviewed with no aberrant drug seeking/taking behavior.  Overdose reversal drug naloxone discussed    Drug screen reviewed      Requested Prescriptions     Signed Prescriptions Disp Refills    HYDROcodone-acetaminophen (NORCO)  mg per tablet 30 tablet 0     Sig: Take 1 tablet by mouth once daily.    HYDROcodone-acetaminophen (NORCO)  mg per tablet 30 tablet 0     Sig: Take 1 tablet by mouth once daily.         Plan:    Uses crutches/lower extremity scooter for ambulation     Lower extremity discomfort right greater than left    Ulcer right foot has worsened continues to follow in management    He states current medications helping control his discomfort    He would like to continue with conservative management    Continue activity as directed continue current medication    Follow-up 2 months    Dr. Paulson, May 2023    Bring original prescription medication bottles/container/box with labels to each visit

## 2022-07-26 NOTE — PATIENT INSTRUCTIONS
Right foot wound clean with baby shampoo and water  Apply keystone gel to wound bed.   Apply Dakins or Vashe moistened 4x4s, Drawtex to wound bed  Cover with dry gauze,wrap with kerlix and ace wrap from toes to knee  Change twice a day and as needed  Elevate feet as much as possible  Avoid prolonged standing  Knee scooter and/or walking boot  Monitor closely for s/s of infection including fever, chills, increase in pain, odor from wound, and increased redness from foot. Go to ER if any complications develop.   Keep leg elevated and avoid pressure on wound.  Diabetes:  Monitor glucose closely. Check fasting glucose and 2 hours after meals. HgA1C goal <7, fasting glucose , and 2 hours after meals <180  Hypertension:  Check blood pressure twice daily, goal <120/80  Diet:   Increase protein intake, avoid fried, fatty foods and foods high in simple carbs.   Vitamins:  Take vitamin C 1000 mg, zinc 50mg, vitamin d 5000 units, and a daily multivitamin. Angel is a good source of protein and nutrients to aid in wound healing.   F/u in 2 weeks.

## 2022-07-26 NOTE — PROGRESS NOTES
AUGUSTUS Macedo   [facility] - WOUND CARE  [address] [facility] [place] [address]  [phone]      PATIENT NAME: Jean Pierre Paulson  : 1985  DATE: 22  MRN: [id number]      Billing Provider: AUGUSTUS Macedo  Level of Service:   Patient PCP Information     Provider PCP Type    Mojgan Lomeli NP General          Reason for Visit / Chief Complaint: Non-pressure chronic ulcer of other part of right foot with       History of Present Illness / Problem Focused Workflow     Jean Pierre Paulson is a 36 y.o. male who presents to clinic for follow up on chronic-non healing wound on the right foot. Patient reports he was working but due to pain on lateral aspect of foot and increased drainage he had to quit. Wound is draining copious amount of serous drainage. Maceration and callus noted filippo-wound, bedside debridement today. Purulent drainage noted from lateral aspect of wound, cultures obtained today. Use keystone spray and vashe for infection management. Patient is wearing walking boot. Pertinent factors that delay wound healing include multiple co-morbidities, poor vascular supply, diabetes, edema, heavy drainage, excessive wound moisture and macerated tissue, no protective sensation, infection and history of poor compliance.       Review of Systems     Review of Systems   Constitutional: Negative for activity change, chills and fever.   Respiratory: Negative for chest tightness and shortness of breath.    Cardiovascular: Positive for leg swelling. Negative for chest pain and palpitations.   Musculoskeletal: Positive for arthralgias, gait problem and joint swelling.   Skin: Positive for color change and wound.        See wound assessment   Neurological: Positive for weakness and numbness.   Psychiatric/Behavioral: Negative for agitation, behavioral problems, confusion and self-injury.       Medical / Social / Family History     Past Medical History:   Diagnosis Date    Anemia      Chronic osteomyelitis     Diabetes mellitus with neuropathy     Diabetes mellitus, type 2     DM2 (diabetes mellitus, type 2)     HTN (hypertension)     Hypertension     Neuropathy     Obesity     Osteomyelitis 10/2020    Hx of R foot, Tx with IV Abx    Peripheral vascular disease        Past Surgical History:   Procedure Laterality Date    APPLICATION OF SPLIT-THICKNESS SKIN GRAFT (STSG) TO LOWER EXTREMITY Right 3/2/2022    Procedure: APPLICATION, GRAFT, SKIN, SPLIT-THICKNESS, TO LOWER EXTREMITY;  Surgeon: Greg Ramírez MD;  Location: [facility] [place];  Service: General;  Laterality: Right;    CHOLECYSTECTOMY      DEBRIDEMENT OF FOOT Right 10/2020    FOOT SURGERY      Right diabetic foot ulcer Elliott's Grade 3      Bone exposure to R heel, Hx of IV Abx, cultures show multiple bacterial growth, Hx of Osteomyelitis, HBOT       Social History  Mr. Jean Pierre Paulson  reports that he quit smoking about 5 months ago. His smoking use included cigarettes. He started smoking about 22 years ago. He smoked 0.25 packs per day. He has never used smokeless tobacco. He reports current alcohol use of about 2.0 standard drinks of alcohol per week. He reports current drug use. Drug: Hydrocodone.    Family History  Mr.'s Jean Pierre Paulson family history includes Diabetes in his father and maternal grandmother; Hypertension in his father and maternal grandmother.    Medications and Allergies     Medications  No outpatient medications have been marked as taking for the 7/27/22 encounter (Office Visit) with AUGUSTUS Macedo.       Allergies  Review of patient's allergies indicates:   Allergen Reactions    Tomato Itching       Physical Examination     Vitals:    07/27/22 0839   BP: 129/83   Pulse: 87   Resp: 20   Temp: 98.2 °F (36.8 °C)     Physical Exam  Vitals and nursing note reviewed.   Constitutional:       Appearance: Normal appearance.   HENT:      Head: Normocephalic.   Cardiovascular:      Rate and Rhythm: Normal rate and  regular rhythm.      Pulses: Normal pulses.      Heart sounds: Normal heart sounds.   Pulmonary:      Effort: Pulmonary effort is normal. No respiratory distress.   Chest:      Chest wall: No tenderness.   Musculoskeletal:         General: Swelling, tenderness and deformity present.      Right lower leg: Edema present.   Skin:     General: Skin is warm and dry.      Capillary Refill: Capillary refill takes 2 to 3 seconds.      Comments: See LDA for photos and measurements   Neurological:      General: No focal deficit present.      Mental Status: He is alert and oriented to person, place, and time. Mental status is at baseline.   Psychiatric:         Mood and Affect: Mood normal.         Behavior: Behavior normal.         Thought Content: Thought content normal.         Judgment: Judgment normal.         Assessment and Plan      1. 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 Chronic diabetic ulcer of right foot determined by examination           Wound 02/16/22 1020 Diabetic Ulcer Right Plantar (Active)   02/16/22 1020    Pre-existing: Yes   Primary Wound Type: Diabetic ulc   Side: Right   Orientation:    Location: Plantar   Wound Number:    Ankle-Brachial Index:    Pulses:    Removal Indication and Assessment:    Wound Outcome:    (Retired) Wound Type:    (Retired) Wound Length (cm):    (Retired) Wound Width (cm):    (Retired) Depth (cm):    Wound Description (Comments):    Removal Indications:    Wound Image      07/27/22 0846   Dressing Appearance Moist drainage 07/27/22 0846   Drainage Amount Moderate 07/27/22 0846   Drainage Characteristics/Odor Purulent;Malodorous 07/27/22 0846   Appearance Pink;Red;Yellow;Slough;Moist;Granulating 07/27/22 0846   Tissue loss description Full thickness 07/27/22 0846   Black (%), Wound Tissue Color 0 % 07/27/22 0846   Red (%), Wound Tissue Color 70 % 07/27/22 0846   Yellow (%), Wound Tissue Color 30 % 07/27/22  0846   Periwound Area Macerated;Moist;Pale white;Redness 07/27/22 0846   Wound Edges Callused;Open 07/27/22 0846   Wound Length (cm) 12.5 cm 07/27/22 0846   Wound Width (cm) 8 cm 07/27/22 0846   Wound Depth (cm) 1 cm 07/27/22 0846   Wound Volume (cm^3) 100 cm^3 07/27/22 0846   Wound Surface Area (cm^2) 100 cm^2 07/27/22 0846   Care Cleansed with:;Soap and water;Applied:;Skin Barrier 07/27/22 0846   Dressing Applied;Methylene blue/gentian violet;Gauze;Rolled gauze 07/27/22 0846   Periwound Care Moisture barrier applied 07/27/22 0846         Active Problem List with Overview Notes    Diagnosis Date Noted    Encounter for subsequent annual wellness visit (AWV) in Medicare patient 04/29/2022    BMI 45.0-49.9, adult 04/29/2022    Type 2 diabetes mellitus with right diabetic foot ulcer 10/08/2021    Sarcoidosis 10/08/2021    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 07/07/2021    Chronic pain syndrome 04/13/2021    Peripheral vascular disease     Diabetic neuropathy with neurologic complication     Chronic osteomyelitis 03/26/2021    Chronic diabetic ulcer of right foot determined by examination 03/25/2021    Peripheral vascular disease, unspecified 03/25/2021    Lower extremity edema 03/25/2021    Ulcer of right heel, with necrosis of bone 03/19/2021    Diabetes with skin ulcer 03/19/2021    Primary hypertension 03/19/2021      Plan:   Right foot wound clean with baby shampoo and water  Apply keystone gel to wound bed.   Apply Dakins or Vashe moistened 4x4s, Drawtex to wound bed  Cover with dry gauze,wrap with kerlix and ace wrap from toes to knee  Change twice a day and as needed  Elevate feet as much as possible  Avoid prolonged standing  Knee scooter and/or walking boot  Monitor closely for s/s of infection including fever, chills, increase in pain, odor from wound, and increased redness from foot. Go to ER if any complications develop.    Keep leg elevated and avoid pressure on wound.  Diabetes:  Monitor glucose closely. Check fasting glucose and 2 hours after meals. HgA1C goal <7, fasting glucose , and 2 hours after meals <180  Hypertension:  Check blood pressure twice daily, goal <120/80  Diet:   Increase protein intake, avoid fried, fatty foods and foods high in simple carbs.   Vitamins:  Take vitamin C 1000 mg, zinc 50mg, vitamin d 5000 units, and a daily multivitamin. Angel is a good source of protein and nutrients to aid in wound healing.   F/u in 2 weeks.    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 - Primary    Relevant Orders    Culture, Anaerobe    Culture, Wound          Future Appointments   Date Time Provider Department Center   8/10/2022  9:00 AM [facility]   8/24/2022 10:15 AM Mojgan Lomeli NP [facility]   9/8/2022  1:00 PM VONNIE Ma [facility]   9/13/2022 10:30 AM Mojgan Lomeli NP [facility]   5/1/2023  1:30 PM AWV NURSE, [facility] [place]            Signature:  AUGUSTUS Macedo  [facility] - WOUND CARE  [address]  [place] [address]  [phone]    Date of encounter: 7/27/22

## 2022-07-27 ENCOUNTER — OFFICE VISIT (OUTPATIENT)
Dept: FAMILY MEDICINE | Facility: CLINIC | Age: 37
End: 2022-07-27
Payer: MEDICARE

## 2022-07-27 ENCOUNTER — OFFICE VISIT (OUTPATIENT)
Dept: WOUND CARE | Facility: CLINIC | Age: 37
End: 2022-07-27
Attending: FAMILY MEDICINE
Payer: MEDICARE

## 2022-07-27 VITALS
DIASTOLIC BLOOD PRESSURE: 83 MMHG | SYSTOLIC BLOOD PRESSURE: 129 MMHG | HEART RATE: 87 BPM | TEMPERATURE: 98 F | RESPIRATION RATE: 20 BRPM

## 2022-07-27 VITALS
OXYGEN SATURATION: 98 % | DIASTOLIC BLOOD PRESSURE: 78 MMHG | BODY MASS INDEX: 38.36 KG/M2 | TEMPERATURE: 99 F | HEART RATE: 95 BPM | HEIGHT: 76 IN | SYSTOLIC BLOOD PRESSURE: 128 MMHG | RESPIRATION RATE: 18 BRPM | WEIGHT: 315 LBS

## 2022-07-27 DIAGNOSIS — E11.49 DIABETIC NEUROPATHY WITH NEUROLOGIC COMPLICATION: Chronic | ICD-10-CM

## 2022-07-27 DIAGNOSIS — L97.516 NON-PRESSURE CHRONIC ULCER OF OTHER PART OF RIGHT FOOT WITH BONE INVOLVEMENT WITHOUT EVIDENCE OF NECROSIS: Primary | ICD-10-CM

## 2022-07-27 DIAGNOSIS — L97.414 ULCER OF RIGHT HEEL, WITH NECROSIS OF BONE: Chronic | ICD-10-CM

## 2022-07-27 DIAGNOSIS — L97.519 CHRONIC DIABETIC ULCER OF RIGHT FOOT DETERMINED BY EXAMINATION: ICD-10-CM

## 2022-07-27 DIAGNOSIS — E11.40 DIABETIC NEUROPATHY WITH NEUROLOGIC COMPLICATION: Chronic | ICD-10-CM

## 2022-07-27 DIAGNOSIS — E11.621 CHRONIC DIABETIC ULCER OF RIGHT FOOT DETERMINED BY EXAMINATION: ICD-10-CM

## 2022-07-27 DIAGNOSIS — E11.621 TYPE 2 DIABETES MELLITUS WITH RIGHT DIABETIC FOOT ULCER: Primary | ICD-10-CM

## 2022-07-27 DIAGNOSIS — I10 PRIMARY HYPERTENSION: ICD-10-CM

## 2022-07-27 DIAGNOSIS — L97.519 TYPE 2 DIABETES MELLITUS WITH RIGHT DIABETIC FOOT ULCER: Primary | ICD-10-CM

## 2022-07-27 DIAGNOSIS — I73.9 PERIPHERAL VASCULAR DISEASE, UNSPECIFIED: Chronic | ICD-10-CM

## 2022-07-27 LAB
CREAT UR-MCNC: 115 MG/DL (ref 39–259)
EST. AVERAGE GLUCOSE BLD GHB EST-MCNC: 157 MG/DL
HBA1C MFR BLD HPLC: 7.3 % (ref 4.5–6.6)
MICROALBUMIN UR-MCNC: 16.1 MG/DL (ref 0–2.8)
MICROALBUMIN/CREAT RATIO PNL UR: 140 MG/G (ref 0–30)

## 2022-07-27 PROCEDURE — 11046 DEBRIDEMENT: ICD-10-PCS | Mod: S$PBB,,, | Performed by: NURSE PRACTITIONER

## 2022-07-27 PROCEDURE — 87186 CULTURE, WOUND: ICD-10-PCS | Mod: ,,, | Performed by: CLINICAL MEDICAL LABORATORY

## 2022-07-27 PROCEDURE — 87070 CULTURE OTHR SPECIMN AEROBIC: CPT | Mod: ,,, | Performed by: CLINICAL MEDICAL LABORATORY

## 2022-07-27 PROCEDURE — 87077 CULTURE AEROBIC IDENTIFY: CPT | Mod: ,,, | Performed by: CLINICAL MEDICAL LABORATORY

## 2022-07-27 PROCEDURE — 82043 MICROALBUMIN / CREATININE RATIO URINE: ICD-10-PCS | Mod: ,,, | Performed by: CLINICAL MEDICAL LABORATORY

## 2022-07-27 PROCEDURE — 99214 OFFICE O/P EST MOD 30 MIN: CPT | Mod: ,,, | Performed by: NURSE PRACTITIONER

## 2022-07-27 PROCEDURE — 87077 CULTURE, WOUND: ICD-10-PCS | Mod: ,,, | Performed by: CLINICAL MEDICAL LABORATORY

## 2022-07-27 PROCEDURE — 11043 DEBRIDEMENT: ICD-10-PCS | Mod: S$PBB,,, | Performed by: NURSE PRACTITIONER

## 2022-07-27 PROCEDURE — 87076 CULTURE, ANAEROBE: ICD-10-PCS | Mod: ,,, | Performed by: CLINICAL MEDICAL LABORATORY

## 2022-07-27 PROCEDURE — 83036 HEMOGLOBIN GLYCOSYLATED A1C: CPT | Mod: ,,, | Performed by: CLINICAL MEDICAL LABORATORY

## 2022-07-27 PROCEDURE — 87070 CULTURE, WOUND: ICD-10-PCS | Mod: ,,, | Performed by: CLINICAL MEDICAL LABORATORY

## 2022-07-27 PROCEDURE — 99499 NO LOS: ICD-10-PCS | Mod: S$PBB,,, | Performed by: NURSE PRACTITIONER

## 2022-07-27 PROCEDURE — 87075 CULTR BACTERIA EXCEPT BLOOD: CPT | Mod: ,,, | Performed by: CLINICAL MEDICAL LABORATORY

## 2022-07-27 PROCEDURE — 87075 CULTURE, ANAEROBE: ICD-10-PCS | Mod: ,,, | Performed by: CLINICAL MEDICAL LABORATORY

## 2022-07-27 PROCEDURE — 87186 SC STD MICRODIL/AGAR DIL: CPT | Mod: ,,, | Performed by: CLINICAL MEDICAL LABORATORY

## 2022-07-27 PROCEDURE — 87076 CULTURE ANAEROBE IDENT EACH: CPT | Mod: ,,, | Performed by: CLINICAL MEDICAL LABORATORY

## 2022-07-27 PROCEDURE — 82043 UR ALBUMIN QUANTITATIVE: CPT | Mod: ,,, | Performed by: CLINICAL MEDICAL LABORATORY

## 2022-07-27 PROCEDURE — 11046 DBRDMT MUSC&/FSCA EA ADDL: CPT | Mod: PBBFAC | Performed by: NURSE PRACTITIONER

## 2022-07-27 PROCEDURE — 99214 PR OFFICE/OUTPT VISIT, EST, LEVL IV, 30-39 MIN: ICD-10-PCS | Mod: ,,, | Performed by: NURSE PRACTITIONER

## 2022-07-27 PROCEDURE — 99499 UNLISTED E&M SERVICE: CPT | Mod: S$PBB,,, | Performed by: NURSE PRACTITIONER

## 2022-07-27 PROCEDURE — 82570 ASSAY OF URINE CREATININE: CPT | Mod: ,,, | Performed by: CLINICAL MEDICAL LABORATORY

## 2022-07-27 PROCEDURE — 83036 HEMOGLOBIN A1C: ICD-10-PCS | Mod: ,,, | Performed by: CLINICAL MEDICAL LABORATORY

## 2022-07-27 PROCEDURE — 82570 MICROALBUMIN / CREATININE RATIO URINE: ICD-10-PCS | Mod: ,,, | Performed by: CLINICAL MEDICAL LABORATORY

## 2022-07-27 PROCEDURE — 11043 DBRDMT MUSC&/FSCA 1ST 20/<: CPT | Mod: S$PBB,,, | Performed by: NURSE PRACTITIONER

## 2022-07-27 PROCEDURE — 99215 OFFICE O/P EST HI 40 MIN: CPT | Mod: PBBFAC | Performed by: NURSE PRACTITIONER

## 2022-07-27 RX ORDER — ROSUVASTATIN CALCIUM 10 MG/1
10 TABLET, COATED ORAL DAILY
Qty: 90 TABLET | Refills: 1 | Status: ON HOLD | OUTPATIENT
Start: 2022-07-27 | End: 2023-04-18 | Stop reason: HOSPADM

## 2022-07-27 RX ORDER — AMLODIPINE BESYLATE 10 MG/1
10 TABLET ORAL DAILY
Qty: 90 TABLET | Refills: 1 | Status: ON HOLD | OUTPATIENT
Start: 2022-07-27 | End: 2023-04-18 | Stop reason: HOSPADM

## 2022-07-27 RX ORDER — LOSARTAN POTASSIUM 50 MG/1
50 TABLET ORAL DAILY
Qty: 90 TABLET | Refills: 1 | Status: SHIPPED | OUTPATIENT
Start: 2022-07-27 | End: 2024-02-14

## 2022-07-27 RX ORDER — GABAPENTIN 300 MG/1
300 CAPSULE ORAL EVERY 8 HOURS
Qty: 270 CAPSULE | Refills: 1 | Status: SHIPPED | OUTPATIENT
Start: 2022-07-27

## 2022-07-27 RX ORDER — HYDROCHLOROTHIAZIDE 25 MG/1
25 TABLET ORAL DAILY
Qty: 90 TABLET | Refills: 1 | Status: SHIPPED | OUTPATIENT
Start: 2022-07-27

## 2022-07-27 RX ORDER — HYDRALAZINE HYDROCHLORIDE 25 MG/1
25 TABLET, FILM COATED ORAL EVERY 12 HOURS
Qty: 180 TABLET | Refills: 1 | Status: SHIPPED | OUTPATIENT
Start: 2022-07-27 | End: 2024-02-14

## 2022-07-27 RX ORDER — EMPAGLIFLOZIN, LINAGLIPTIN, METFORMIN HYDROCHLORIDE 5; 2.5; 1 MG/1; MG/1; MG/1
1 TABLET, EXTENDED RELEASE ORAL DAILY
Qty: 90 TABLET | Refills: 1 | Status: ON HOLD | OUTPATIENT
Start: 2022-07-27 | End: 2023-04-10 | Stop reason: ALTCHOICE

## 2022-07-27 RX ORDER — SEMAGLUTIDE 2.68 MG/ML
2 INJECTION, SOLUTION SUBCUTANEOUS
COMMUNITY
End: 2023-03-31 | Stop reason: SDUPTHER

## 2022-07-27 RX ORDER — FERROUS SULFATE TAB 325 MG (65 MG ELEMENTAL FE) 325 (65 FE) MG
325 TAB ORAL DAILY
Qty: 90 TABLET | Refills: 1 | Status: SHIPPED | OUTPATIENT
Start: 2022-07-27 | End: 2024-02-10

## 2022-07-27 NOTE — PROCEDURES
Debridement    Date/Time: 7/27/2022 8:00 AM  Performed by: AUGUSTUS Macedo  Authorized by: AUGUSTUS Macedo     Consent Done?:  Yes (Written)  Local anesthesia used?: No      Wound Details:    Location:  Right foot    Location:  Right Plantar    Type of Debridement:  Excisional       Length (cm):  13       Area (sq cm):  76.7       Width (cm):  5.9       Percent Debrided (%):  100       Depth (cm):  1.2       Total Area Debrided (sq cm):  76.7    Depth of debridement:  Muscle/fascia/tendon    Tissue debrided:  Adipose, Dermis, Epidermis, Fascia, Muscle and Subcutaneous    Devitalized tissue debrided:  Biofilm, Callus, Clots, Exudate and Slough    Instruments:  Scissors and Blade    Bleeding:  Minimal  Hemostasis Achieved: Yes    Method Used:  Pressure  Patient tolerance:  Patient tolerated the procedure well with no immediate complications     Assistant CYNDY Alberto RN

## 2022-07-27 NOTE — PROGRESS NOTES
Mojgan Lomeli NP   [facility]  [address]  [place]     PATIENT NAME: Jean Pierre Paulson  : 1985  DATE: 22  MRN: [id number]      Billing Provider: Mojgan Lomeli NP  Level of Service:   Patient PCP Information     Provider PCP Type    Mojgan Lomeli NP General          Reason for Visit / Chief Complaint: Follow-up (HTN/ DM), Medication Refill, Hypertension, and Diabetes (Checks sugars 2-3 times/ day. BS: 162 this AM. )       Update PCP  Update Chief Complaint         History of Present Illness / Problem Focused Workflow     Jean Pierre Paulson presents to the clinic, here for htn/cm Checks blood sugars 2-3 x per day, reports 162 this am.        Review of Systems     Review of Systems   Constitutional: Negative for chills, fatigue and fever.   HENT: Negative for nasal congestion, ear pain, facial swelling, hearing loss, mouth dryness, mouth sores, postnasal drip, rhinorrhea, sinus pressure/congestion and goiter.    Eyes: Negative for discharge and itching.   Respiratory: Negative for cough, shortness of breath and wheezing.    Cardiovascular: Negative for chest pain and leg swelling.   Gastrointestinal: Negative for abdominal pain, change in bowel habit and change in bowel habit.   Genitourinary: Negative for difficulty urinating, dysuria, enuresis, frequency, hematuria and urgency.   Integumentary:         Wearing boot splint on right foot   Neurological: Negative for dizziness, vertigo, syncope, weakness and headaches.   Psychiatric/Behavioral: Negative for decreased concentration.        Medical / Social / Family History     Past Medical History:   Diagnosis Date    Anemia     Chronic osteomyelitis     Diabetes mellitus with neuropathy     Diabetes mellitus, type 2     DM2 (diabetes mellitus, type 2)     HTN (hypertension)     Hypertension     Neuropathy     Obesity     Osteomyelitis 10/2020    Hx of R foot, Tx with IV Abx    Peripheral vascular disease        Past Surgical History:    Procedure Laterality Date    APPLICATION OF SPLIT-THICKNESS SKIN GRAFT (STSG) TO LOWER EXTREMITY Right 3/2/2022    Procedure: APPLICATION, GRAFT, SKIN, SPLIT-THICKNESS, TO LOWER EXTREMITY;  Surgeon: Greg Ramírez MD;  Location: [facility];  Service: General;  Laterality: Right;    CHOLECYSTECTOMY      DEBRIDEMENT OF FOOT Right 10/2020    FOOT SURGERY      Right diabetic foot ulcer Elliott's Grade 3      Bone exposure to R heel, Hx of IV Abx, cultures show multiple bacterial growth, Hx of Osteomyelitis, HBOT       Social History    reports that he quit smoking about 5 months ago. His smoking use included cigarettes. He started smoking about 22 years ago. He smoked 0.25 packs per day. He has never used smokeless tobacco. He reports current alcohol use of about 2.0 standard drinks of alcohol per week. He reports current drug use. Drug: Hydrocodone.    Family History  's family history includes Diabetes in his father and maternal grandmother; Hypertension in his father and maternal grandmother.    Medications and Allergies     Medications  Outpatient Medications Marked as Taking for the 7/27/22 encounter (Office Visit) with Mojgan Lomeli NP   Medication Sig Dispense Refill    acetaminophen (TYLENOL) 325 MG tablet Take 2 tablets (650 mg total) by mouth every 4 (four) hours as needed.  0    blood sugar diagnostic (BLOOD GLUCOSE TEST) Strp 1 strip by Misc.(Non-Drug; Combo Route) route once daily. accu-chek Veronika strips 90 strip 5    HYDROcodone-acetaminophen (NORCO)  mg per tablet Take 1 tablet by mouth once daily. 30 tablet 0    [DISCONTINUED] amLODIPine (NORVASC) 10 MG tablet Take 1 tablet (10 mg total) by mouth once daily. 30 tablet 2    [DISCONTINUED] empaglifloz-linaglip-metformin (TRIJARDY XR) 5-2.5-1,000 mg TBph Take by mouth.      [DISCONTINUED] FEROSUL 325 mg (65 mg iron) Tab tablet Take 325 mg by mouth once daily.      [DISCONTINUED] gabapentin (NEURONTIN) 300 MG capsule Take 1  "capsule (300 mg total) by mouth every 8 (eight) hours. 270 capsule 1    [DISCONTINUED] hydrALAZINE (APRESOLINE) 25 MG tablet Take 1 tablet (25 mg total) by mouth every 12 (twelve) hours. 60 tablet 0    [DISCONTINUED] hydroCHLOROthiazide (HYDRODIURIL) 25 MG tablet Take 1 tablet (25 mg total) by mouth once daily. 90 tablet 1    [DISCONTINUED] losartan (COZAAR) 50 MG tablet Take 1 tablet (50 mg total) by mouth once daily. 90 tablet 1    [DISCONTINUED] rosuvastatin (CRESTOR) 10 MG tablet TAKE 1 TABLET EVERY DAY 90 tablet 0       Allergies  Review of patient's allergies indicates:   Allergen Reactions    Tomato Itching       Physical Examination     Vitals:    07/27/22 1321   BP: 128/78   BP Location: Right arm   Patient Position: Sitting   Pulse: 95   Resp: 18   Temp: 98.8 °F (37.1 °C)   TempSrc: Oral   SpO2: 98%   Weight: (!) 156.4 kg (344 lb 12.8 oz)   Height: 6' 4" (1.93 m)      Physical Exam  Constitutional:       Appearance: Normal appearance.   HENT:      Head: Normocephalic.      Right Ear: Tympanic membrane, ear canal and external ear normal.      Left Ear: Tympanic membrane, ear canal and external ear normal.      Nose: Nose normal.      Mouth/Throat:      Mouth: Mucous membranes are moist.      Pharynx: Oropharynx is clear.   Eyes:      Extraocular Movements: Extraocular movements intact.      Conjunctiva/sclera: Conjunctivae normal.      Pupils: Pupils are equal, round, and reactive to light.   Cardiovascular:      Rate and Rhythm: Normal rate and regular rhythm.      Pulses: Normal pulses.      Heart sounds: Normal heart sounds.   Pulmonary:      Effort: Pulmonary effort is normal.      Breath sounds: Normal breath sounds.   Abdominal:      General: Bowel sounds are normal.      Palpations: Abdomen is soft.   Musculoskeletal:         General: Normal range of motion.      Cervical back: Normal range of motion and neck supple.   Skin:     General: Skin is warm and dry.      Capillary Refill: Capillary " refill takes less than 2 seconds.      Comments: Pt has boot splint on right foot and dressing intact. I did not remove dressing as he is seeing wound care   Neurological:      General: No focal deficit present.      Mental Status: He is alert and oriented to person, place, and time.   Psychiatric:         Mood and Affect: Mood normal.         Behavior: Behavior normal.          Assessment and Plan (including Health Maintenance)      Problem List  Smart Sets  Document Outside HM   :    Plan: will start on ozempic and watch low fat low chol, low carb diet, cont all oterh meds. retrun to clinic as scheduled    Type 2 diabetes mellitus with right diabetic foot ulcer  -     Hemoglobin A1C; Future; Expected date: 07/27/2022  -     Microalbumin/Creatinine Ratio, Urine; Future; Expected date: 07/27/2022  -     empaglifloz-linaglip-metformin (TRIJARDY XR) 5-2.5-1,000 mg TBph; Take 1 tablet by mouth once daily.  Dispense: 90 tablet; Refill: 1    Type 2 diabetes mellitus with other specified complication, with long-term current use of insulin  -     Hemoglobin A1C; Future; Expected date: 07/27/2022  -     Microalbumin/Creatinine Ratio, Urine; Future; Expected date: 07/27/2022  -     empaglifloz-linaglip-metformin (TRIJARDY XR) 5-2.5-1,000 mg TBph; Take 1 tablet by mouth once daily.  Dispense: 90 tablet; Refill: 1    Diabetic neuropathy with neurologic complication  -     gabapentin (NEURONTIN) 300 MG capsule; Take 1 capsule (300 mg total) by mouth every 8 (eight) hours.  Dispense: 270 capsule; Refill: 1    Ulcer of right heel, with necrosis of bone  -     FEROSUL 325 mg (65 mg iron) Tab tablet; Take 1 tablet (325 mg total) by mouth once daily.  Dispense: 90 tablet; Refill: 1  -     gabapentin (NEURONTIN) 300 MG capsule; Take 1 capsule (300 mg total) by mouth every 8 (eight) hours.  Dispense: 270 capsule; Refill: 1    Peripheral vascular disease, unspecified  -     FEROSUL 325 mg (65 mg iron) Tab tablet; Take 1 tablet (325 mg  total) by mouth once daily.  Dispense: 90 tablet; Refill: 1  -     gabapentin (NEURONTIN) 300 MG capsule; Take 1 capsule (300 mg total) by mouth every 8 (eight) hours.  Dispense: 270 capsule; Refill: 1  -     rosuvastatin (CRESTOR) 10 MG tablet; Take 1 tablet (10 mg total) by mouth once daily.  Dispense: 90 tablet; Refill: 1    Primary hypertension  -     amLODIPine (NORVASC) 10 MG tablet; Take 1 tablet (10 mg total) by mouth once daily.  Dispense: 90 tablet; Refill: 1  -     hydrALAZINE (APRESOLINE) 25 MG tablet; Take 1 tablet (25 mg total) by mouth every 12 (twelve) hours.  Dispense: 180 tablet; Refill: 1  -     hydroCHLOROthiazide (HYDRODIURIL) 25 MG tablet; Take 1 tablet (25 mg total) by mouth once daily.  Dispense: 90 tablet; Refill: 1  -     losartan (COZAAR) 50 MG tablet; Take 1 tablet (50 mg total) by mouth once daily.  Dispense: 90 tablet; Refill: 1            Health Maintenance Due   Topic Date Due    Hepatitis C Screening  Never done    Foot Exam  Never done    Eye Exam  Never done    Pneumococcal Vaccines (Age 0-64) (2 - PCV) 12/13/2012       Problem List Items Addressed This Visit        Neuro    Diabetic neuropathy with neurologic complication (Chronic)    Relevant Medications    gabapentin (NEURONTIN) 300 MG capsule       Cardiac/Vascular    Primary hypertension    Relevant Medications    amLODIPine (NORVASC) 10 MG tablet    hydrALAZINE (APRESOLINE) 25 MG tablet    hydroCHLOROthiazide (HYDRODIURIL) 25 MG tablet    losartan (COZAAR) 50 MG tablet    Peripheral vascular disease, unspecified    Relevant Medications    FEROSUL 325 mg (65 mg iron) Tab tablet    gabapentin (NEURONTIN) 300 MG capsule    rosuvastatin (CRESTOR) 10 MG tablet       Endocrine    Type 2 diabetes mellitus with right diabetic foot ulcer - Primary    Relevant Medications    empaglifloz-linaglip-metformin (TRIJARDY XR) 5-2.5-1,000 mg TBph    Other Relevant Orders    Hemoglobin A1C    Microalbumin/Creatinine Ratio, Urine        Orthopedic    Ulcer of right heel, with necrosis of bone    Relevant Medications    FEROSUL 325 mg (65 mg iron) Tab tablet    gabapentin (NEURONTIN) 300 MG capsule      Other Visit Diagnoses     Type 2 diabetes mellitus with other specified complication, with long-term current use of insulin        Relevant Medications    empaglifloz-linaglip-metformin (TRIJARDY XR) 5-2.5-1,000 mg TBph    Other Relevant Orders    Hemoglobin A1C    Microalbumin/Creatinine Ratio, Urine            Health Maintenance Topics with due status: Not Due       Topic Last Completion Date    Diabetes Urine Screening 04/29/2022    Lipid Panel 06/13/2022    Hemoglobin A1c 06/13/2022    Influenza Vaccine Not Due       Procedures     Future Appointments   Date Time Provider Department Center   8/10/2022  9:00 AM [facility]   8/24/2022 10:15 [facility]   9/8/2022  1:00 PM [facility]   9/13/2022 10:30 [facility] [place]   5/1/2023  1:30 PM AWV NURSE, [facility]        No follow-ups on file.       Signature:  Mojgan Lomeli NP    Date of encounter: 7/27/22

## 2022-07-28 NOTE — PROGRESS NOTES
Discussed blood sugar and added ozempic while in office, needs referral to nephrology if he has not seen anyoine due to elevated microalbumin/cr

## 2022-07-28 NOTE — PROGRESS NOTES
Debridement Performed for Assessment: Wound#   Performed By: Provider: Yelitza Alas NP  Assistant: Gregorio HUSSEIN    Debridement: Surgical    Photo taken post procedure:    Time-Out Taken: Yes  Level: Skin/Subcutaneous Tissue/ Muscle  Post Debridement Measurements  Length: (cm) 13  Width: (cm) 5.9  Depth: (cm) 1.2      Area: (cm²) 76.7  Percent Debrided: 100%  Total Area Debrided: (sq cm)     Tissue and other material debrided:  Adipose, Dermis, Epidermis, Subcutaneous, Muscle, fascia  Devitalized Tissue Debrided:Biofilm, Slough, Eschar  Instrument: Curette, scissors, blade  Bleeding: Moderate  Hemostasis Achieved: Pressure  Procedural Pain: Insensate  Post Procedural Pain: Insensate  Response to Treatment: Procedure was tolerated well    Devitalized materials/tissue Removed  the following was removed during debridement  subcutaneous, muscle      Post Debridement Diagnosis chronic wound right plantar foot  Post debridement diagnosis  Same as Pre-operative debridement diagnosis, No Complications noted.      Grafts or implants applied  Was a graft or implant applied?  No      Procedure assistant  Procedure assisted by: Sisi Alberto RN  Assistant is the same as nurse listed above      Complications related to procedure  Did any complication occure during procedure?  No complications noted during or after procedure.      Specimen  Specimen collect during procedure?  No specimen collected      Anaesthesia:  Anesthesia used  None      Blood Loss:  Blood loss during procedure  less than 5 cc

## 2022-07-29 LAB — MICROORGANISM SPEC CULT: ABNORMAL

## 2022-07-31 ENCOUNTER — EXTERNAL CHRONIC CARE MANAGEMENT (OUTPATIENT)
Dept: FAMILY MEDICINE | Facility: CLINIC | Age: 37
End: 2022-07-31
Payer: MEDICARE

## 2022-07-31 LAB
BACTERIA SPEC ANAEROBE CULT: ABNORMAL
BACTERIA SPEC ANAEROBE CULT: ABNORMAL

## 2022-07-31 PROCEDURE — G0511 PR CHRONIC CARE MGMT, RHC OR FQHC ONLY, 20 MINS OR MORE: ICD-10-PCS | Mod: ,,, | Performed by: NURSE PRACTITIONER

## 2022-07-31 PROCEDURE — G0511 CCM/BHI BY RHC/FQHC 20MIN MO: HCPCS | Mod: ,,, | Performed by: NURSE PRACTITIONER

## 2022-08-01 PROBLEM — Z79.4 TYPE 2 DIABETES MELLITUS, WITH LONG-TERM CURRENT USE OF INSULIN: Status: ACTIVE | Noted: 2022-08-01

## 2022-08-01 PROBLEM — E11.9 TYPE 2 DIABETES MELLITUS, WITH LONG-TERM CURRENT USE OF INSULIN: Status: ACTIVE | Noted: 2022-08-01

## 2022-08-01 PROBLEM — Z00.00 ENCOUNTER FOR SUBSEQUENT ANNUAL WELLNESS VISIT (AWV) IN MEDICARE PATIENT: Status: RESOLVED | Noted: 2022-04-29 | Resolved: 2022-08-01

## 2022-08-02 NOTE — PROGRESS NOTES
Spoke to pat regarding culture.ALEXANDREA. Pat has [facility]. Will send culture to [facility]  for IV antibiotics

## 2022-08-03 RX ORDER — DAPAGLIFLOZIN AND METFORMIN HYDROCHLORIDE 5; 1000 MG/1; MG/1
1 TABLET, FILM COATED, EXTENDED RELEASE ORAL DAILY
COMMUNITY
Start: 2022-07-19 | End: 2023-02-13 | Stop reason: ALTCHOICE

## 2022-08-09 NOTE — PROGRESS NOTES
AUGUSTUS Macedo   [facility]  [address] [facility] [place] [address]  [phone]      PATIENT NAME: Jean Pierre Paulson  : 1985  DATE: 8/10/22  MRN: [id number]      Billing Provider: AUGUSTUS Macedo  Level of Service: NO LOS  Patient PCP Information     Provider PCP Type    Mojgan Lomeli NP General          Reason for Visit / Chief Complaint: Non-healing Wound Follow Up (Right foot)       History of Present Illness / Problem Focused Workflow     Jean Pierre Paulson is a 36 y.o. male who presents to clinic for follow up on chronic-non healing wound on the right foot.  Wound continues to drain copious amount of serous drainage. Maceration and callus noted filippo-wound, bedside debridement today. Patient started 6 weeks of IV antibiotics this week. Odor has improved. Patient is wearing walking boot. Significant PMH  Includes diabetes and hypertension. Last HgA1C 7.3 in 2022, diabetes is managed by PCP. He is due for arterial dopplers, last doppler in 2020. Denies fever or chills.       Review of Systems     Review of Systems   Constitutional: Negative for activity change, chills and fever.   Respiratory: Negative for chest tightness and shortness of breath.    Cardiovascular: Positive for leg swelling. Negative for chest pain and palpitations.   Musculoskeletal: Positive for arthralgias, gait problem and joint swelling.   Skin: Positive for color change and wound.        See wound assessment   Neurological: Positive for weakness and numbness.   Psychiatric/Behavioral: Negative for agitation, behavioral problems, confusion and self-injury.       Medical / Social / Family History     Past Medical History:   Diagnosis Date    Anemia     Chronic osteomyelitis     Diabetes mellitus with neuropathy     Diabetes mellitus, type 2     DM2 (diabetes mellitus, type 2)     HTN (hypertension)     Hypertension     Neuropathy     Obesity     Osteomyelitis  10/2020    Hx of R foot, Tx with IV Abx    Peripheral vascular disease        Past Surgical History:   Procedure Laterality Date    APPLICATION OF SPLIT-THICKNESS SKIN GRAFT (STSG) TO LOWER EXTREMITY Right 3/2/2022    Procedure: APPLICATION, GRAFT, SKIN, SPLIT-THICKNESS, TO LOWER EXTREMITY;  Surgeon: Greg Ramírez MD;  Location: [facility];  Service: General;  Laterality: Right;    CHOLECYSTECTOMY      DEBRIDEMENT OF FOOT Right 10/2020    FOOT SURGERY      Right diabetic foot ulcer Elliott's Grade 3      Bone exposure to R heel, Hx of IV Abx, cultures show multiple bacterial growth, Hx of Osteomyelitis, HBOT       Social History  Mr. Jean Pierre Paulson  reports that he quit smoking about 6 months ago. His smoking use included cigarettes. He started smoking about 22 years ago. He smoked 0.25 packs per day. He has never used smokeless tobacco. He reports current alcohol use of about 2.0 standard drinks of alcohol per week. He reports current drug use. Drug: Hydrocodone.    Family History  Mr.'s Jean Pierre Paulson family history includes Diabetes in his father and maternal grandmother; Hypertension in his father and maternal grandmother.    Medications and Allergies     Medications  Outpatient Medications Marked as Taking for the 8/10/22 encounter (Office Visit) with AUGUSTUS Macedo   Medication Sig Dispense Refill    acetaminophen (TYLENOL) 325 MG tablet Take 2 tablets (650 mg total) by mouth every 4 (four) hours as needed.  0    amLODIPine (NORVASC) 10 MG tablet Take 1 tablet (10 mg total) by mouth once daily. 90 tablet 1    blood sugar diagnostic (BLOOD GLUCOSE TEST) Strp 1 strip by Misc.(Non-Drug; Combo Route) route once daily. accu-chek Veronika strips 90 strip 5    empaglifloz-linaglip-metformin (TRIJARDY XR) 5-2.5-1,000 mg TBph Take 1 tablet by mouth once daily. 90 tablet 1    FEROSUL 325 mg (65 mg iron) Tab tablet Take 1 tablet (325 mg total) by mouth once daily. 90 tablet 1    gabapentin (NEURONTIN) 300 MG  capsule Take 1 capsule (300 mg total) by mouth every 8 (eight) hours. 270 capsule 1    hydrALAZINE (APRESOLINE) 25 MG tablet Take 1 tablet (25 mg total) by mouth every 12 (twelve) hours. 180 tablet 1    hydroCHLOROthiazide (HYDRODIURIL) 25 MG tablet Take 1 tablet (25 mg total) by mouth once daily. 90 tablet 1    HYDROcodone-acetaminophen (NORCO)  mg per tablet Take 1 tablet by mouth once daily. 30 tablet 0    [START ON 8/13/2022] HYDROcodone-acetaminophen (NORCO)  mg per tablet Take 1 tablet by mouth once daily. 30 tablet 0    losartan (COZAAR) 50 MG tablet Take 1 tablet (50 mg total) by mouth once daily. 90 tablet 1    rosuvastatin (CRESTOR) 10 MG tablet Take 1 tablet (10 mg total) by mouth once daily. 90 tablet 1    semaglutide (OZEMPIC) 2 mg/dose (8 mg/3 mL) PnIj Inject 2 mg into the skin every 7 days.      XIGDUO XR 5-1,000 mg TBph Take 1 tablet by mouth once daily.         Allergies  Review of patient's allergies indicates:   Allergen Reactions    Tomato Itching       Physical Examination     Vitals:    08/10/22 0954   BP: (!) 144/94   Pulse: 89   Resp: 20   Temp: 97.9 °F (36.6 °C)     Physical Exam  Vitals and nursing note reviewed.   Constitutional:       Appearance: Normal appearance.   HENT:      Head: Normocephalic.   Cardiovascular:      Rate and Rhythm: Normal rate and regular rhythm.      Pulses: Normal pulses.      Heart sounds: Normal heart sounds.   Pulmonary:      Effort: Pulmonary effort is normal. No respiratory distress.   Chest:      Chest wall: No tenderness.   Musculoskeletal:         General: Swelling, tenderness and deformity present.      Right lower leg: Edema present.   Skin:     General: Skin is warm and dry.      Capillary Refill: Capillary refill takes 2 to 3 seconds.      Comments: See LDA for photos and measurements   Neurological:      General: No focal deficit present.      Mental Status: He is alert and oriented to person, place, and time. Mental status is at  baseline.   Psychiatric:         Mood and Affect: Mood normal.         Behavior: Behavior normal.         Thought Content: Thought content normal.         Judgment: Judgment normal.         Assessment and Plan      1. 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           Incision/Site 03/02/22 1335 Right Foot posterior (Active)   03/02/22 1335   Present Prior to Hospital Arrival?:    Side: Right   Location: Foot   Orientation: posterior   Incision Type:    Closure Method:    Additional Comments:    Removal Indication and Assessment:    Wound Outcome:    Removal Indications:    Wound Image      08/10/22 1000   Dressing Appearance Moist drainage 08/10/22 1000   Drainage Amount Moderate 08/10/22 1000   Drainage Characteristics/Odor Serosanguineous 08/10/22 1000   Appearance Pink;Moist;Granulating 08/10/22 1000   Black (%), Wound Tissue Color 0 % 08/10/22 1000   Red (%), Wound Tissue Color 100 % 08/10/22 1000   Yellow (%), Wound Tissue Color 0 % 08/10/22 1000   Periwound Area Moist 08/10/22 1000   Wound Edges Callused 08/10/22 1000   Wound Length (cm) 11.1 cm 08/10/22 1000   Wound Width (cm) 6.2 cm 08/10/22 1000   Wound Depth (cm) 2 cm 08/10/22 1000   Wound Volume (cm^3) 137.64 cm^3 08/10/22 1000   Wound Surface Area (cm^2) 68.82 cm^2 08/10/22 1000   Care Cleansed with:;Soap and water 08/10/22 1000   Dressing Applied;Gauze;Rolled gauze 08/10/22 1000   Periwound Care Moisture barrier applied 08/10/22 1000   Compression Two layer compression 08/10/22 1000   Dressing Change Due 08/11/22 08/10/22 1000         Active Problem List with Overview Notes    Diagnosis Date Noted    Type 2 diabetes mellitus, with long-term current use of insulin 08/01/2022    BMI 45.0-49.9, adult 04/29/2022    Type 2 diabetes mellitus with right diabetic foot ulcer 10/08/2021    Sarcoidosis 10/08/2021    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  without evidence of necrosis 07/07/2021    Chronic pain syndrome 04/13/2021    Peripheral vascular disease     Diabetic neuropathy with neurologic complication     Chronic osteomyelitis 03/26/2021    Chronic diabetic ulcer of right foot determined by examination 03/25/2021    Peripheral vascular disease, unspecified 03/25/2021    Lower extremity edema 03/25/2021    Ulcer of right heel, with necrosis of bone 03/19/2021    Diabetes with skin ulcer 03/19/2021    Primary hypertension 03/19/2021        Plan:   Right foot wound clean with baby shampoo and water  Hold keystone wound gel for now   Apply Dakins or Vashe moistened  Drawtex to wound bed  Cover with dry gauze,wrap with kerlix and ace wrap from toes to knee  Change twice a day and as needed  Elevate feet as much as possible  Avoid prolonged standing  Knee scooter and/or walking boot  Monitor closely for s/s of infection including fever, chills, increase in pain, odor from wound, and increased redness from foot. Go to ER if any complications develop.   Keep leg elevated and avoid pressure on wound.  Diabetes:  Monitor glucose closely. Check fasting glucose and 2 hours after meals. HgA1C goal <7, fasting glucose , and 2 hours after meals <180  Hypertension:  Check blood pressure twice daily, goal <120/80  Diet:   Increase protein intake, avoid fried, fatty foods and foods high in simple carbs.   Vitamins:  Take vitamin C 1000 mg, zinc 50mg, vitamin d 5000 units, and a daily multivitamin. Angel is a good source of protein and nutrients to aid in wound healing.   F/u in 2 weeks.  Cont IV antibiotics as prescribed    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 - Primary    Relevant Orders    Debridement          Future Appointments   Date Time Provider Department Center   8/24/2022 10:00 AM AUGUSTUS Macedo [facility]   9/8/2022  1:00 PM [facility]   9/13/2022 10:30 AM Mojgan Lomeli NP [facility] Joss [facility]   5/1/2023  1:30 PM AWV NURSE, [facility]            Signature:  AUGUSTUS Macedo  [facility]  [address] [facility] [address]  [phone]    Date of encounter: 8/10/22

## 2022-08-09 NOTE — PATIENT INSTRUCTIONS
Right foot wound clean with baby shampoo and water  Hold keystone wound gel for now   Apply Dakins or Vashe moistened  Drawtex to wound bed  Cover with dry gauze,wrap with kerlix and ace wrap from toes to knee  Change twice a day and as needed  Elevate feet as much as possible  Avoid prolonged standing  Knee scooter and/or walking boot  Monitor closely for s/s of infection including fever, chills, increase in pain, odor from wound, and increased redness from foot. Go to ER if any complications develop.   Keep leg elevated and avoid pressure on wound.  Diabetes:  Monitor glucose closely. Check fasting glucose and 2 hours after meals. HgA1C goal <7, fasting glucose , and 2 hours after meals <180  Hypertension:  Check blood pressure twice daily, goal <120/80  Diet:   Increase protein intake, avoid fried, fatty foods and foods high in simple carbs.   Vitamins:  Take vitamin C 1000 mg, zinc 50mg, vitamin d 5000 units, and a daily multivitamin. Angel is a good source of protein and nutrients to aid in wound healing.   F/u in 2 weeks.  Cont IV antibiotics as prescribed

## 2022-08-10 ENCOUNTER — OFFICE VISIT (OUTPATIENT)
Dept: WOUND CARE | Facility: CLINIC | Age: 37
End: 2022-08-10
Attending: FAMILY MEDICINE
Payer: MEDICARE

## 2022-08-10 VITALS
HEIGHT: 76 IN | SYSTOLIC BLOOD PRESSURE: 144 MMHG | DIASTOLIC BLOOD PRESSURE: 94 MMHG | BODY MASS INDEX: 38.36 KG/M2 | RESPIRATION RATE: 20 BRPM | TEMPERATURE: 98 F | HEART RATE: 89 BPM | WEIGHT: 315 LBS

## 2022-08-10 DIAGNOSIS — L97.516 NON-PRESSURE CHRONIC ULCER OF OTHER PART OF RIGHT FOOT WITH BONE INVOLVEMENT WITHOUT EVIDENCE OF NECROSIS: Primary | ICD-10-CM

## 2022-08-10 PROCEDURE — 11045 DEBRIDEMENT: ICD-10-PCS | Mod: S$PBB,,, | Performed by: NURSE PRACTITIONER

## 2022-08-10 PROCEDURE — 11045 DBRDMT SUBQ TISS EACH ADDL: CPT | Mod: PBBFAC | Performed by: NURSE PRACTITIONER

## 2022-08-10 PROCEDURE — 11042 DEBRIDEMENT: ICD-10-PCS | Mod: S$PBB,,, | Performed by: NURSE PRACTITIONER

## 2022-08-10 PROCEDURE — 99499 UNLISTED E&M SERVICE: CPT | Mod: S$PBB,,, | Performed by: NURSE PRACTITIONER

## 2022-08-10 PROCEDURE — 99499 NO LOS: ICD-10-PCS | Mod: S$PBB,,, | Performed by: NURSE PRACTITIONER

## 2022-08-10 PROCEDURE — 99215 OFFICE O/P EST HI 40 MIN: CPT | Mod: PBBFAC,25 | Performed by: NURSE PRACTITIONER

## 2022-08-10 PROCEDURE — 11042 DBRDMT SUBQ TIS 1ST 20SQCM/<: CPT | Mod: S$PBB,,, | Performed by: NURSE PRACTITIONER

## 2022-08-10 NOTE — PROGRESS NOTES
See wound assessment. Cont IV antibiotics as prescribed. PICC line noted to left arm. Debridement done. Stop keystone gel for now. vashe moisten drawtex daily. RTC 2 weeks. Orders faxed to [facility] . Monitor closely for s/s of infection including fever, chills, increase in pain, odor from wound, and increased redness from foot. Go to ER if any complications develop.   Keep leg elevated and avoid pressure on wound. Diabetes:  Monitor glucose closely. Check fasting glucose and 2 hours after meals. HgA1C goal <7, fasting glucose , and 2 hours after meals <180  Hypertension:  Check blood pressure twice daily, goal <120/80  Diet:   Increase protein intake, avoid fried, fatty foods and foods high in simple carbs.   Vitamins:  Take vitamin C 1000 mg, zinc 50mg, vitamin d 5000 units, and a daily multivitamin. Angel is a good source of protein and nutrients to aid in wound healing.

## 2022-08-10 NOTE — PROGRESS NOTES
Debridement Performed for Assessment: Wound# 1  Performed By: Provider: Yelitza Alas NP  Assistant:    Debridement: Surgical    Photo taken post procedure: yes    Time-Out Taken: Yes  Level: Skin/Subcutaneous Tissue  Post Debridement Measurements  Length: (cm) 12.5  Width: (cm) 8.4  Depth: (cm) 1.3      Area: (cm²) 100.5  Percent Debrided: 100%  Total Area Debrided: (sq cm)     Tissue and other material debrided:  Adipose, Dermis, Epidermis, Subcutaneous  Devitalized Tissue Debrided:Biofilm, callus  Instrument: Scissors and pick ups  Bleeding: Moderate  Hemostasis Achieved: Pressure  Procedural Pain: Insensate  Post Procedural Pain: Insensate  Response to Treatment: Procedure was tolerated well    Devitalized materials/tissue Removed  the following was removed during debridement  subcutaneous, muscle      Post Debridement Diagnosis chronic right foot diabetic ulcer  Post debridement diagnosis  Same as Pre-operative debridement diagnosis, No Complications noted.      Grafts or implants applied  Was a graft or implant applied?  No      Procedure assistant  Procedure assisted by:  Lizabeth Stone LPN  Assistant is the same as nurse listed above      Complications related to procedure  Did any complication occure during procedure?  No complications noted during or after procedure.      Specimen  Specimen collect during procedure?  No specimen collected      Anaesthesia:  Anesthesia used  None      Blood Loss:  Blood loss during procedure  less than 5 cc

## 2022-08-11 NOTE — PROCEDURES
Debridement    Date/Time: 8/10/2022 9:00 AM  Performed by: AUGUSTUS Macedo  Authorized by: AUGUSTUS Macedo     Consent Done?:  Yes (Written)    Wound Details:    Location:  Right foot    Location:  Right Plantar    Type of Debridement:  Excisional       Length (cm):  12.5       Area (sq cm):  105       Width (cm):  8.4       Percent Debrided (%):  100       Depth (cm):  1.3       Total Area Debrided (sq cm):  105    Depth of debridement:  Subcutaneous tissue    Tissue debrided:  Adipose, Dermis, Epidermis and Subcutaneous    Devitalized tissue debrided:  Callus, Clots and Exudate    Instruments:  Curette and Scissors    Bleeding:  Minimal  Hemostasis Achieved: Yes    Method Used:  Pressure  Patient tolerance:  Patient tolerated the procedure well with no immediate complications     Assistant JOSE ALFREDO Stone LPN

## 2022-08-26 NOTE — PROGRESS NOTES
AUGUSTUS Macedo   [facility]  [address] [facility] [place] [address]  [phone]      PATIENT NAME: Jean Pierre Paulson  : 1985  DATE: 22  MRN: [id number]      Billing Provider: AUGUSTUS Macedo  Level of Service:   Patient PCP Information       Provider PCP Type    Mojgan Lomeli NP General            Reason for Visit / Chief Complaint: Non-pressure chronic ulcer of other part of right foot with       History of Present Illness / Problem Focused Workflow     Jean Pierre Paulson is a 36 y.o. male who presents to clinic for follow up on chronic-non healing wound on the right foot. Drainage has improved since being on IV antibiotics and wound is smaller without odor. Callus noted filippo-wound, bedside debridement today. Patient is wearing walking boot. Significant PMH  Includes diabetes and hypertension. Last HgA1C 7.3 in 2022, diabetes is managed by PCP. He is due for arterial dopplers, last doppler in 2020. Denies fever or chills.       Review of Systems     Review of Systems   Constitutional:  Negative for activity change, chills and fever.   Respiratory:  Negative for chest tightness and shortness of breath.    Cardiovascular:  Positive for leg swelling. Negative for chest pain and palpitations.   Musculoskeletal:  Positive for arthralgias, gait problem and joint swelling.   Skin:  Positive for color change and wound.        See wound assessment   Neurological:  Positive for weakness and numbness.   Psychiatric/Behavioral:  Negative for agitation, behavioral problems, confusion and self-injury.      Medical / Social / Family History     Past Medical History:   Diagnosis Date    Anemia     Chronic osteomyelitis     Diabetes mellitus with neuropathy     Diabetes mellitus, type 2     DM2 (diabetes mellitus, type 2)     HTN (hypertension)     Hypertension     Neuropathy     Obesity     Osteomyelitis 10/2020    Hx of R foot, Tx with IV Abx     Peripheral vascular disease        Past Surgical History:   Procedure Laterality Date    APPLICATION OF SPLIT-THICKNESS SKIN GRAFT (STSG) TO LOWER EXTREMITY Right 3/2/2022    Procedure: APPLICATION, GRAFT, SKIN, SPLIT-THICKNESS, TO LOWER EXTREMITY;  Surgeon: Greg Rmaírez MD;  Location: [facility];  Service: General;  Laterality: Right;    CHOLECYSTECTOMY      DEBRIDEMENT OF FOOT Right 10/2020    FOOT SURGERY      Right diabetic foot ulcer Elliott's Grade 3      Bone exposure to R heel, Hx of IV Abx, cultures show multiple bacterial growth, Hx of Osteomyelitis, HBOT       Social History  Mr. Jean Pierre Paulson  reports that he quit smoking about 6 months ago. His smoking use included cigarettes. He started smoking about 22 years ago. He smoked an average of .25 packs per day. He has never used smokeless tobacco. He reports current alcohol use of about 2.0 standard drinks per week. He reports current drug use. Drug: Hydrocodone.    Family History  Mr.'s Jean Pierre Paulson family history includes Diabetes in his father and maternal grandmother; Hypertension in his father and maternal grandmother.    Medications and Allergies     Medications  Outpatient Medications Marked as Taking for the 8/29/22 encounter (Office Visit) with AUGUSTUS Macedo   Medication Sig Dispense Refill    acetaminophen (TYLENOL) 325 MG tablet Take 2 tablets (650 mg total) by mouth every 4 (four) hours as needed.  0    blood sugar diagnostic (BLOOD GLUCOSE TEST) Strp 1 strip by Misc.(Non-Drug; Combo Route) route once daily. accu-chek Veronika strips 90 strip 5    empaglifloz-linaglip-metformin (TRIJARDY XR) 5-2.5-1,000 mg TBph Take 1 tablet by mouth once daily. 90 tablet 1    FEROSUL 325 mg (65 mg iron) Tab tablet Take 1 tablet (325 mg total) by mouth once daily. 90 tablet 1    gabapentin (NEURONTIN) 300 MG capsule Take 1 capsule (300 mg total) by mouth every 8 (eight) hours. 270 capsule 1    hydroCHLOROthiazide (HYDRODIURIL) 25 MG tablet Take 1 tablet  (25 mg total) by mouth once daily. 90 tablet 1    HYDROcodone-acetaminophen (NORCO)  mg per tablet Take 1 tablet by mouth once daily. 30 tablet 0    HYDROcodone-acetaminophen (NORCO)  mg per tablet Take 1 tablet by mouth once daily. 30 tablet 0    losartan (COZAAR) 50 MG tablet Take 1 tablet (50 mg total) by mouth once daily. 90 tablet 1    rosuvastatin (CRESTOR) 10 MG tablet Take 1 tablet (10 mg total) by mouth once daily. 90 tablet 1    XIGDUO XR 5-1,000 mg TBph Take 1 tablet by mouth once daily.         Allergies  Review of patient's allergies indicates:   Allergen Reactions    Tomato Itching       Physical Examination     Vitals:    08/29/22 1035   BP: 119/87   Pulse: 98   Resp: 18   Temp: 98 °F (36.7 °C)     Physical Exam  Vitals and nursing note reviewed.   Constitutional:       Appearance: Normal appearance.   HENT:      Head: Normocephalic.   Cardiovascular:      Rate and Rhythm: Normal rate and regular rhythm.      Pulses: Normal pulses.      Heart sounds: Normal heart sounds.   Pulmonary:      Effort: Pulmonary effort is normal. No respiratory distress.   Chest:      Chest wall: No tenderness.   Musculoskeletal:         General: Swelling, tenderness and deformity present.      Right lower leg: Edema present.   Skin:     General: Skin is warm and dry.      Capillary Refill: Capillary refill takes 2 to 3 seconds.      Comments: See LDA for photos and measurements   Neurological:      General: No focal deficit present.      Mental Status: He is alert and oriented to person, place, and time. Mental status is at baseline.   Psychiatric:         Mood and Affect: Mood normal.         Behavior: Behavior normal.         Thought Content: Thought content normal.         Judgment: Judgment normal.       Assessment and Plan             Incision/Site 03/02/22 1335 Right Foot posterior (Active)   03/02/22 1335   Present Prior to Hospital Arrival?:    Side: Right   Location: Foot   Orientation: posterior    Incision Type:    Closure Method:    Additional Comments:    Removal Indication and Assessment:    Wound Outcome:    Removal Indications:    Wound Image    08/29/22 1116   Dressing Appearance Dried drainage 08/29/22 1031   Drainage Amount Small 08/29/22 1031   Drainage Characteristics/Odor Serosanguineous 08/29/22 1031   Appearance Pink;Red;Moist 08/29/22 1031   Black (%), Wound Tissue Color 0 % 08/29/22 1031   Red (%), Wound Tissue Color 100 % 08/29/22 1031   Yellow (%), Wound Tissue Color 0 % 08/29/22 1031   Periwound Area Dry;[place];Redness 08/29/22 1031   Wound Edges Callused;Open 08/29/22 1031   Wound Length (cm) 11 cm 08/29/22 1116   Wound Width (cm) 8.3 cm 08/29/22 1116   Wound Depth (cm) 0.8 cm 08/29/22 1116   Wound Volume (cm^3) 73.04 cm^3 08/29/22 1116   Wound Surface Area (cm^2) 91.3 cm^2 08/29/22 1116   Care Debrided 08/29/22 1116   Dressing Applied;Gauze, wet to dry;Gauze;Rolled gauze 08/29/22 1031   Periwound Care Moisturizer applied 08/29/22 1031   Off Loading Off loading shoe 08/29/22 1031   Dressing Change Due 08/30/22 08/29/22 1031     Problem List Items Addressed This Visit          Orthopedic    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 NO substitutions.  Cover with dry gauze,wrap with kerlix and ace wrap from toes to knee  Change twice a day and as needed  Elevate feet as much as possible  Avoid prolonged standing  Knee scooter and/or walking boot  Monitor closely for s/s of infection including fever, chills, increase in pain, odor from wound, and increased redness from foot. Go to ER if any complications develop.   Keep leg elevated and avoid pressure on wound.  Diabetes:  Monitor  glucose closely. Check fasting glucose and 2 hours after meals. HgA1C goal <7, fasting glucose , and 2 hours after meals <180  Hypertension:  Check blood pressure twice daily, goal <120/80  Diet:   Increase protein intake, avoid fried, fatty foods and foods high in simple carbs.   Vitamins:  Take vitamin C 1000 mg, zinc 50mg, vitamin d 5000 units, and a daily multivitamin. Angel is a good source of protein and nutrients to aid in wound healing.   F/u in 2 weeks.  Home Health to call Wound Care Center at [phone] with any questions regarding substitutions.            Future Appointments   Date Time Provider Department Center   9/8/2022  1:00 PM VONNIE [facility]   9/13/2022 10:00 AM [facility] Maced[facility]   9/13/2022 10:30 AM Mojgan Lomeli NP [facility]   5/1/2023  1:30 PM AWV NURSE, [facility] [place]            Signature:  AUGUSTUS Macedo  [facility]  [address]  [place] [address]  [phone]    Date of encounter: 8/29/22

## 2022-08-26 NOTE — PATIENT INSTRUCTIONS
Right foot wound clean with baby shampoo and water  Apply aloe vista to periwound  and medial heel to keep cracked skin moisturized  Apply Vashe moistened  Drawtex to wound bed. NO substitutions.  Cover with dry gauze,wrap with kerlix and ace wrap from toes to knee  Change twice a day and as needed  Elevate feet as much as possible  Avoid prolonged standing  Knee scooter and/or walking boot  Monitor closely for s/s of infection including fever, chills, increase in pain, odor from wound, and increased redness from foot. Go to ER if any complications develop.   Keep leg elevated and avoid pressure on wound.  Diabetes:  Monitor glucose closely. Check fasting glucose and 2 hours after meals. HgA1C goal <7, fasting glucose , and 2 hours after meals <180  Hypertension:  Check blood pressure twice daily, goal <120/80  Diet:   Increase protein intake, avoid fried, fatty foods and foods high in simple carbs.   Vitamins:  Take vitamin C 1000 mg, zinc 50mg, vitamin d 5000 units, and a daily multivitamin. Angel is a good source of protein and nutrients to aid in wound healing.   F/u in 2 weeks.  Home Health to call Wound Care Center at [phone] with any questions regarding substitutions.

## 2022-08-29 ENCOUNTER — OFFICE VISIT (OUTPATIENT)
Dept: WOUND CARE | Facility: CLINIC | Age: 37
End: 2022-08-29
Attending: FAMILY MEDICINE
Payer: MEDICARE

## 2022-08-29 VITALS
HEART RATE: 98 BPM | RESPIRATION RATE: 18 BRPM | DIASTOLIC BLOOD PRESSURE: 87 MMHG | SYSTOLIC BLOOD PRESSURE: 119 MMHG | TEMPERATURE: 98 F

## 2022-08-29 DIAGNOSIS — L97.516 NON-PRESSURE CHRONIC ULCER OF OTHER PART OF RIGHT FOOT WITH BONE INVOLVEMENT WITHOUT EVIDENCE OF NECROSIS: Primary | ICD-10-CM

## 2022-08-29 PROCEDURE — 99499 UNLISTED E&M SERVICE: CPT | Mod: S$PBB,,, | Performed by: NURSE PRACTITIONER

## 2022-08-29 PROCEDURE — 11042 DBRDMT SUBQ TIS 1ST 20SQCM/<: CPT | Mod: S$PBB,,, | Performed by: NURSE PRACTITIONER

## 2022-08-29 PROCEDURE — 99213 OFFICE O/P EST LOW 20 MIN: CPT | Mod: PBBFAC,25 | Performed by: NURSE PRACTITIONER

## 2022-08-29 PROCEDURE — 11042 DEBRIDEMENT: ICD-10-PCS | Mod: S$PBB,,, | Performed by: NURSE PRACTITIONER

## 2022-08-29 PROCEDURE — 99499 NO LOS: ICD-10-PCS | Mod: S$PBB,,, | Performed by: NURSE PRACTITIONER

## 2022-08-29 PROCEDURE — 11045 DBRDMT SUBQ TISS EACH ADDL: CPT | Mod: PBBFAC | Performed by: NURSE PRACTITIONER

## 2022-08-29 PROCEDURE — 11045 DEBRIDEMENT: ICD-10-PCS | Mod: S$PBB,,, | Performed by: NURSE PRACTITIONER

## 2022-08-29 NOTE — PROGRESS NOTES
Debridement Performed for Assessment: Wound# right plantar diabetic foot ulcer  Performed By: Provider: Yelitza Alas NP  Assistant:Sisi Alberto RN    Debridement: Surgical    Photo taken post procedure:    Time-Out Taken: Yes  Level: Skin/Subcutaneous Tissue  Post Debridement Measurements  Length: (cm) 11  Width: (cm) 8.3  Depth: (cm) 0.8      Area: (cm²) 91.3  Percent Debrided: 100%  Total Area Debrided: (sq cm)     Tissue and other material debrided:  Adipose, Dermis, Epidermis, Subcutaneous  Devitalized Tissue Debrided:Biofilm, Slough, Eschar, callus periwound  Instrument: Curette  Bleeding: Moderate  Hemostasis Achieved: Pressure  Procedural Pain: Insensate  Post Procedural Pain: Insensate  Response to Treatment: Procedure was tolerated well    Devitalized materials/tissue Removed  the following was removed during debridement  subcutaneous      Post Debridement Diagnosis right plantar diabetic foot ulcer  Post debridement diagnosis  Same as Pre-operative debridement diagnosis, No Complications noted.      Grafts or implants applied  Was a graft or implant applied?  No      Procedure assistant  Procedure assisted by:  Assistant is the same as nurse listed above      Complications related to procedure  Did any complication occure during procedure?  No complications noted during or after procedure.      Specimen  Specimen collect during procedure?  No specimen collected      Anaesthesia:  Anesthesia used  None      Blood Loss:  Blood loss during procedure  less than 5 cc

## 2022-08-29 NOTE — ASSESSMENT & PLAN NOTE
Right foot wound clean with baby shampoo and water  Apply aloe vista to periwound  and medial heel to keep cracked skin moisturized  Apply Vashe moistened  Drawtex to wound bed. NO substitutions.  Cover with dry gauze,wrap with kerlix and ace wrap from toes to knee  Change twice a day and as needed  Elevate feet as much as possible  Avoid prolonged standing  Knee scooter and/or walking boot  Monitor closely for s/s of infection including fever, chills, increase in pain, odor from wound, and increased redness from foot. Go to ER if any complications develop.   Keep leg elevated and avoid pressure on wound.  Diabetes:  Monitor glucose closely. Check fasting glucose and 2 hours after meals. HgA1C goal <7, fasting glucose , and 2 hours after meals <180  Hypertension:  Check blood pressure twice daily, goal <120/80  Diet:   Increase protein intake, avoid fried, fatty foods and foods high in simple carbs.   Vitamins:  Take vitamin C 1000 mg, zinc 50mg, vitamin d 5000 units, and a daily multivitamin. Angel is a good source of protein and nutrients to aid in wound healing.   F/u in 2 weeks.  Home Health to call Wound Care Center at [phone] with any questions regarding substitutions.

## 2022-08-29 NOTE — PROCEDURES
Debridement    Date/Time: 8/29/2022 10:00 AM  Performed by: AUGUSTUS Macedo  Authorized by: AUGUSTUS Maecdo     Consent Done?:  Yes (Written)    Wound Details:    Location:  Right foot    Location:  Right Plantar    Type of Debridement:  Excisional       Length (cm):  11       Area (sq cm):  91.3       Width (cm):  8.3       Percent Debrided (%):  100       Depth (cm):  0.8       Total Area Debrided (sq cm):  91.3    Depth of debridement:  Subcutaneous tissue    Tissue debrided:  Adipose, Dermis, Epidermis and Subcutaneous    Devitalized tissue debrided:  Biofilm, Callus and Exudate    Instruments:  Curette    Bleeding:  Minimal  Hemostasis Achieved: Yes    Method Used:  Pressure  Patient tolerance:  Patient tolerated the procedure well with no immediate complications     Assistant CYNDY Alberto RN

## 2022-09-16 ENCOUNTER — OFFICE VISIT (OUTPATIENT)
Dept: WOUND CARE | Facility: CLINIC | Age: 37
End: 2022-09-16
Attending: SURGERY
Payer: MEDICARE

## 2022-09-16 DIAGNOSIS — L97.516 NON-PRESSURE CHRONIC ULCER OF OTHER PART OF RIGHT FOOT WITH BONE INVOLVEMENT WITHOUT EVIDENCE OF NECROSIS: Primary | ICD-10-CM

## 2022-09-16 PROCEDURE — 99499 NO LOS: ICD-10-PCS | Mod: S$PBB,,, | Performed by: SURGERY

## 2022-09-16 PROCEDURE — 99499 UNLISTED E&M SERVICE: CPT | Mod: S$PBB,,, | Performed by: SURGERY

## 2022-09-16 PROCEDURE — 99213 OFFICE O/P EST LOW 20 MIN: CPT | Mod: PBBFAC | Performed by: SURGERY

## 2022-09-16 NOTE — PROGRESS NOTES
Greg Ramírez MD   [facility]  [address]  [place] [address]  [phone]      PATIENT NAME: Jean Pierre Paulson  : 1985  DATE: 22  MRN: [id number]      Billing Provider: Greg Ramírez MD  Level of Service:   Patient PCP Information       Provider PCP Type    Mojgan Lomeli NP General            Reason for Visit / Chief Complaint: Non-pressure chronic ulcer of other part of right foot with       History of Present Illness / Problem Focused Workflow     Jean Pierre Paulson is a HPI    Review of Systems     Review of Systems    Medical / Social / Family History     Past Medical History:   Diagnosis Date    Anemia     Chronic osteomyelitis     Diabetes mellitus with neuropathy     Diabetes mellitus, type 2     DM2 (diabetes mellitus, type 2)     HTN (hypertension)     Hypertension     Neuropathy     Obesity     Osteomyelitis 10/2020    Hx of R foot, Tx with IV Abx    Peripheral vascular disease        Past Surgical History:   Procedure Laterality Date    APPLICATION OF SPLIT-THICKNESS SKIN GRAFT (STSG) TO LOWER EXTREMITY Right 3/2/2022    Procedure: APPLICATION, GRAFT, SKIN, SPLIT-THICKNESS, TO LOWER EXTREMITY;  Surgeon: Greg Ramírez MD;  Location: [facility];  Service: General;  Laterality: Right;    CHOLECYSTECTOMY      DEBRIDEMENT OF FOOT Right 10/2020    FOOT SURGERY      Right diabetic foot ulcer Elliott's Grade 3      Bone exposure to R heel, Hx of IV Abx, cultures show multiple bacterial growth, Hx of Osteomyelitis, HBOT       Social History  Mr. Jean Pierre Paulson  reports that he quit smoking about 7 months ago. His smoking use included cigarettes. He started smoking about 22 years ago. He smoked an average of .25 packs per day. He has never used smokeless tobacco. He reports current alcohol use of about 2.0 standard drinks per week. He reports current drug use. Drug: Hydrocodone.    Family History  Mr.'s Jean Pierre Paulson family history includes Diabetes in his father  and maternal grandmother; Hypertension in his father and maternal grandmother.    Medications and Allergies     Medications  No outpatient medications have been marked as taking for the 9/16/22 encounter (Office Visit) with Greg Ramírez MD.       Allergies  Review of patient's allergies indicates:   Allergen Reactions    Tomato Itching       Physical Examination   There were no vitals filed for this visit.  Physical Exam    Assessment and Plan             Incision/Site 03/02/22 1335 Right Foot posterior (Active)   03/02/22 1335   Present Prior to Hospital Arrival?:    Side: Right   Location: Foot   Orientation: posterior   Incision Type:    Closure Method:    Additional Comments:    Removal Indication and Assessment:    Wound Outcome:    Removal Indications:    Wound Image      09/16/22 0947   Dressing Appearance Moist drainage 09/16/22 0947   Drainage Amount Large 09/16/22 0947   Drainage Characteristics/Odor Serosanguineous 09/16/22 0947   Appearance Pink;Red;Moist 09/16/22 0947   Black (%), Wound Tissue Color 0 % 09/16/22 0947   Red (%), Wound Tissue Color 100 % 09/16/22 0947   Yellow (%), Wound Tissue Color 0 % 09/16/22 0947   Periwound Area Dry 09/16/22 0947   Wound Edges Callused;Open 09/16/22 0947   Wound Length (cm) 10 cm 09/16/22 0947   Wound Width (cm) 6.8 cm 09/16/22 0947   Wound Depth (cm) 2 cm 09/16/22 0947   Wound Volume (cm^3) 136 cm^3 09/16/22 0947   Wound Surface Area (cm^2) 68 cm^2 09/16/22 0947   Care Cleansed with:;Soap and water;Applied:;Moisturizing agent 09/16/22 0947   Dressing Applied;Gauze, wet to dry;Foam;Gauze;Absorptive Pad;Compression wrap;Rolled gauze 09/16/22 0947   Periwound Care Moisturizer applied 09/16/22 0947   Compression Two layer compression 09/16/22 0947   Off Loading Off loading shoe 09/16/22 0947   Dressing Change Due 09/17/22 09/16/22 0947     Problem List Items Addressed This Visit          Orthopedic    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  involvement without evidence of necrosis - Primary    Overview                         Future Appointments   Date Time Provider Department Center   9/22/2022 10:15 AM VONNIE [facility]   9/30/2022 10:00 AM [email or web address]    5/1/2023  1:30 PM AWV NURSE, [facility] FAMILY MEDICINE [facility] LOUANN [place]            Signature:  CLAUDIA RUIZ LPN  [facility]  [address]  [place] [address]  [phone]    Date of encounter: 9/16/22

## 2022-09-16 NOTE — PATIENT INSTRUCTIONS
Right foot wound clean with baby shampoo and water  Apply aloe vista to periwound  and medial heel to keep cracked skin moisturized  Apply Vashe moistened  Drawtex to wound bed. NO substitutions.  Cover with dry gauze,wrap with kerlix and ace wrap from toes to knee  Change twice a day and as needed  Elevate feet as much as possible  Avoid prolonged standing  Knee scooter and/or walking boot  Monitor closely for s/s of infection including fever, chills, increase in pain, odor from wound, and increased redness from foot. Go to ER if any complications develop.   Keep leg elevated and avoid pressure on wound.  Diabetes:  Monitor glucose closely. Check fasting glucose and 2 hours after meals. HgA1C goal <7, fasting glucose , and 2 hours after meals <180  Hypertension:  Check blood pressure twice daily, goal <120/80  Diet:   Increase protein intake, avoid fried, fatty foods and foods high in simple carbs.   Vitamins:  Take vitamin C 1000 mg, zinc 50mg, vitamin d 5000 units, and a daily multivitamin. Angel is a good source of protein and nutrients to aid in wound healing.   Home health may remove PICC line

## 2022-09-16 NOTE — PROGRESS NOTES
Debridement Performed for Assessment: Wound# 1  Performed By: Provider: Dr.Kevin Ramírez  Assistant:  Lizabeth Stone LPN    Debridement: Surgical    Photo taken post procedure:    Time-Out Taken: Yes  Level: Skin/Subcutaneous Tissue  Post Debridement Measurements  Length: (cm) 10.2  Width: (cm) 6.7  Depth: (cm) 2.0      Area: (cm²) 68.34  Percent Debrided: 100%  Total Area Debrided: (sq cm)     Tissue and other material debrided:  Adipose, Dermis, Epidermis, Subcutaneous  Devitalized Tissue Debrided:Biofilm, callus  Instrument: Curette,nippers and scissors  Bleeding: Moderate  Hemostasis Achieved: Pressure  Procedural Pain: Insensate  Post Procedural Pain: Insensate  Response to Treatment: Procedure was tolerated well    Devitalized materials/tissue Removed  the following was removed during debridement  subcutaneous, muscle      Post Debridement Diagnosis chronic right diabetic foot ulcer  Post debridement diagnosis  Same as Pre-operative debridement diagnosis, No Complications noted.      Grafts or implants applied  Was a graft or implant applied?  No      Procedure assistant  Procedure assisted by:  Lizabeth Stone LPN  Assistant is the same as nurse listed above      Complications related to procedure  Did any complication occure during procedure?  No complications noted during or after procedure.      Specimen  Specimen collect during procedure?  No specimen collected      Anaesthesia:  Anesthesia used  None      Blood Loss:  Blood loss during procedure  less than 5 cc

## 2022-09-16 NOTE — PROGRESS NOTES
Greg Ramírez MD   [facility]  [address] [facility] [address]  [phone]      PATIENT NAME: Jean Pierre Paulson  : 1985  DATE: 22  MRN: [id number]      Billing Provider: Greg Ramírez MD  Level of Service:   Patient PCP Information       Provider PCP Type    Mojgan Lomeli NP General            Reason for Visit / Chief Complaint: Non-pressure chronic ulcer of other part of right foot with       History of Present Illness / Problem Focused Workflow     Jean Pierre Paulson is a 36 y.o. male who presents to clinic for follow up on chronic-non healing wound on the right foot. IV antibiotics are complete. Wound is smaller and odor has decreased significantly. Callus noted filippo-wound, bedside debridement today. Patient is wearing walking boot. Significant PMH  Includes diabetes and hypertension. Last HgA1C 7.3 in 2022, diabetes is managed by PCP. He is due for arterial dopplers, last doppler in 2020. Denies fever or chills.     Review of Systems     Review of Systems   Constitutional:  Negative for activity change, chills and fever.   Respiratory:  Negative for chest tightness and shortness of breath.    Cardiovascular:  Positive for leg swelling. Negative for chest pain and palpitations.   Musculoskeletal:  Positive for arthralgias, gait problem and joint swelling.   Skin:  Positive for color change and wound.        See wound assessment   Neurological:  Positive for weakness and numbness.   Psychiatric/Behavioral:  Negative for agitation, behavioral problems, confusion and self-injury.      Medical / Social / Family History     Past Medical History:   Diagnosis Date    Anemia     Chronic osteomyelitis     Diabetes mellitus with neuropathy     Diabetes mellitus, type 2     DM2 (diabetes mellitus, type 2)     HTN (hypertension)     Hypertension     Neuropathy     Obesity     Osteomyelitis 10/2020    Hx of R foot, Tx with IV Abx    Peripheral  vascular disease        Past Surgical History:   Procedure Laterality Date    APPLICATION OF SPLIT-THICKNESS SKIN GRAFT (STSG) TO LOWER EXTREMITY Right 3/2/2022    Procedure: APPLICATION, GRAFT, SKIN, SPLIT-THICKNESS, TO LOWER EXTREMITY;  Surgeon: Greg Ramírez MD;  Location: [facility];  Service: General;  Laterality: Right;    CHOLECYSTECTOMY      DEBRIDEMENT OF FOOT Right 10/2020    FOOT SURGERY      Right diabetic foot ulcer Elliott's Grade 3      Bone exposure to R heel, Hx of IV Abx, cultures show multiple bacterial growth, Hx of Osteomyelitis, HBOT       Social History  Mr. Jean Pierre Paulson  reports that he quit smoking about 7 months ago. His smoking use included cigarettes. He started smoking about 22 years ago. He smoked an average of .25 packs per day. He has never used smokeless tobacco. He reports current alcohol use of about 2.0 standard drinks per week. He reports current drug use. Drug: Hydrocodone.    Family History  Mr.'s Jean Pierre Paulson family history includes Diabetes in his father and maternal grandmother; Hypertension in his father and maternal grandmother.    Medications and Allergies     Medications  No outpatient medications have been marked as taking for the 9/16/22 encounter (Office Visit) with Greg Ramírez MD.       Allergies  Review of patient's allergies indicates:   Allergen Reactions    Tomato Itching       Physical Examination   There were no vitals filed for this visit.  Physical Exam  Vitals and nursing note reviewed.   Constitutional:       Appearance: Normal appearance.   HENT:      Head: Normocephalic.   Cardiovascular:      Rate and Rhythm: Normal rate and regular rhythm.      Pulses: Normal pulses.      Heart sounds: Normal heart sounds.   Pulmonary:      Effort: Pulmonary effort is normal. No respiratory distress.   Chest:      Chest wall: No tenderness.   Musculoskeletal:         General: Swelling, tenderness and deformity present.      Right lower leg: Edema present.    Skin:     General: Skin is warm and dry.      Capillary Refill: Capillary refill takes 2 to 3 seconds.      Comments: See LDA for photos and measurements   Neurological:      General: No focal deficit present.      Mental Status: He is alert and oriented to person, place, and time. Mental status is at baseline.   Psychiatric:         Mood and Affect: Mood normal.         Behavior: Behavior normal.         Thought Content: Thought content normal.         Judgment: Judgment normal.     Assessment and Plan             Incision/Site 03/02/22 1335 Right Foot posterior (Active)   03/02/22 1335   Present Prior to Hospital Arrival?:    Side: Right   Location: Foot   Orientation: posterior   Incision Type:    Closure Method:    Additional Comments:    Removal Indication and Assessment:    Wound Outcome:    Removal Indications:    Wound Image      09/16/22 0947   Dressing Appearance Moist drainage 09/16/22 0947   Drainage Amount Large 09/16/22 0947   Drainage Characteristics/Odor Serosanguineous 09/16/22 0947   Appearance Pink;Red;Moist 09/16/22 0947   Black (%), Wound Tissue Color 0 % 09/16/22 0947   Red (%), Wound Tissue Color 100 % 09/16/22 0947   Yellow (%), Wound Tissue Color 0 % 09/16/22 0947   Periwound Area Dry 09/16/22 0947   Wound Edges Callused;Open 09/16/22 0947   Wound Length (cm) 10 cm 09/16/22 0947   Wound Width (cm) 6.8 cm 09/16/22 0947   Wound Depth (cm) 2 cm 09/16/22 0947   Wound Volume (cm^3) 136 cm^3 09/16/22 0947   Wound Surface Area (cm^2) 68 cm^2 09/16/22 0947   Care Cleansed with:;Soap and water;Applied:;Moisturizing agent 09/16/22 0947   Dressing Applied;Gauze, wet to dry;Foam;Gauze;Absorptive Pad;Compression wrap;Rolled gauze 09/16/22 0947   Periwound Care Moisturizer applied 09/16/22 0947   Compression Two layer compression 09/16/22 0947   Off Loading Off loading shoe 09/16/22 0947   Dressing Change Due 09/17/22 09/16/22 0947     Problem List Items Addressed This Visit          Orthopedic     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 - Primary    Overview                         Future Appointments   Date Time Provider Department Center   9/22/2022 10:15 AM VONNIE Ma [facility]   9/30/2022 10:00 AM [facility]   5/1/2023  1:30 PM AWV NURSE, [facility]            Signature:  Sisi Alberto RN  [facility]  [address]  [facility] [address]  [phone]    Date of encounter: 9/16/22

## 2022-09-22 ENCOUNTER — OFFICE VISIT (OUTPATIENT)
Dept: PAIN MEDICINE | Facility: CLINIC | Age: 37
End: 2022-09-22
Payer: MEDICARE

## 2022-09-22 VITALS
WEIGHT: 315 LBS | DIASTOLIC BLOOD PRESSURE: 99 MMHG | HEART RATE: 89 BPM | SYSTOLIC BLOOD PRESSURE: 161 MMHG | BODY MASS INDEX: 41.75 KG/M2 | HEIGHT: 73 IN

## 2022-09-22 DIAGNOSIS — E11.40 DIABETIC NEUROPATHY WITH NEUROLOGIC COMPLICATION: Chronic | ICD-10-CM

## 2022-09-22 DIAGNOSIS — I73.9 PERIPHERAL VASCULAR DISEASE, UNSPECIFIED: Chronic | ICD-10-CM

## 2022-09-22 DIAGNOSIS — L97.414 ULCER OF RIGHT HEEL, WITH NECROSIS OF BONE: Primary | Chronic | ICD-10-CM

## 2022-09-22 DIAGNOSIS — E11.49 DIABETIC NEUROPATHY WITH NEUROLOGIC COMPLICATION: Chronic | ICD-10-CM

## 2022-09-22 PROCEDURE — 99214 PR OFFICE/OUTPT VISIT, EST, LEVL IV, 30-39 MIN: ICD-10-PCS | Mod: S$PBB,,, | Performed by: PHYSICIAN ASSISTANT

## 2022-09-22 PROCEDURE — 99214 OFFICE O/P EST MOD 30 MIN: CPT | Mod: PBBFAC | Performed by: PHYSICIAN ASSISTANT

## 2022-09-22 PROCEDURE — 99214 OFFICE O/P EST MOD 30 MIN: CPT | Mod: S$PBB,,, | Performed by: PHYSICIAN ASSISTANT

## 2022-09-22 RX ORDER — HYDROCODONE BITARTRATE AND ACETAMINOPHEN 10; 325 MG/1; MG/1
1 TABLET ORAL EVERY 12 HOURS PRN
Qty: 60 TABLET | Refills: 0 | Status: SHIPPED | OUTPATIENT
Start: 2022-10-22 | End: 2022-11-16 | Stop reason: SDUPTHER

## 2022-09-22 RX ORDER — HYDROCODONE BITARTRATE AND ACETAMINOPHEN 10; 325 MG/1; MG/1
1 TABLET ORAL EVERY 12 HOURS PRN
Qty: 60 TABLET | Refills: 0 | Status: SHIPPED | OUTPATIENT
Start: 2022-09-22 | End: 2022-11-16 | Stop reason: SDUPTHER

## 2022-10-14 NOTE — PROGRESS NOTES
AUGUSTUS Macedo   [facility]  [address] [place] [address]  [phone]      PATIENT NAME: Jean Pierre Paulson  : 1985  DATE: 10/17/22  MRN: [id number]      Billing Provider: AUGUSTUS Macedo  Level of Service:   Patient PCP Information       Provider PCP Type    Mojgan Lomeli NP General            Reason for Visit / Chief Complaint: Diabetic Foot Ulcer and Non-healing Wound Follow Up (Right foot ulcer)       History of Present Illness / Problem Focused Workflow     Jean Pierre Paulson is a 36 y.o. male who presents to clinic for follow up on chronic-non healing wound on the right foot. Wound continues to show improvement. Patient is wearing off-loading boot. Wound continues to have thick callus filippo-wound, bedside debridement today. Significant PMH  Includes diabetes and hypertension. Last HgA1C 7.3 in 2022, diabetes is managed by PCP. He is due for arterial dopplers, last doppler in 2020. Denies fever or chills.       Review of Systems     Review of Systems   Constitutional:  Negative for activity change, chills and fever.   Respiratory:  Negative for chest tightness and shortness of breath.    Cardiovascular:  Positive for leg swelling. Negative for chest pain and palpitations.   Musculoskeletal:  Positive for arthralgias, gait problem and joint swelling.   Skin:  Positive for color change and wound.        See wound assessment   Neurological:  Positive for weakness and numbness.   Psychiatric/Behavioral:  Negative for agitation, behavioral problems, confusion and self-injury.      Medical / Social / Family History     Past Medical History:   Diagnosis Date    Anemia     Chronic osteomyelitis     Diabetes mellitus with neuropathy     Diabetes mellitus, type 2     DM2 (diabetes mellitus, type 2)     HTN (hypertension)     Hypertension     Neuropathy     Obesity     Osteomyelitis 10/2020    Hx of R foot, Tx with IV Abx     Peripheral vascular disease        Past Surgical History:   Procedure Laterality Date    APPLICATION OF SPLIT-THICKNESS SKIN GRAFT (STSG) TO LOWER EXTREMITY Right 3/2/2022    Procedure: APPLICATION, GRAFT, SKIN, SPLIT-THICKNESS, TO LOWER EXTREMITY;  Surgeon: Greg Ramírez MD;  Location: [facility];  Service: General;  Laterality: Right;    CHOLECYSTECTOMY      DEBRIDEMENT OF FOOT Right 10/2020    FOOT SURGERY      Right diabetic foot ulcer Elliott's Grade 3      Bone exposure to R heel, Hx of IV Abx, cultures show multiple bacterial growth, Hx of Osteomyelitis, HBOT       Social History  Mr. Jean Pierre Paulson  reports that he quit smoking about 8 months ago. His smoking use included cigarettes. He started smoking about 22 years ago. He smoked an average of .25 packs per day. He has never used smokeless tobacco. He reports current alcohol use of about 2.0 standard drinks per week. He reports current drug use. Drug: Hydrocodone.    Family History  Mr.'s Jean Pierre Paulson family history includes Diabetes in his father and maternal grandmother; Hypertension in his father and maternal grandmother.    Medications and Allergies     Medications  Outpatient Medications Marked as Taking for the 10/17/22 encounter (Office Visit) with AUGUSTUS Macedo   Medication Sig Dispense Refill    acetaminophen (TYLENOL) 325 MG tablet Take 2 tablets (650 mg total) by mouth every 4 (four) hours as needed.  0    blood sugar diagnostic (BLOOD GLUCOSE TEST) Strp 1 strip by Misc.(Non-Drug; Combo Route) route once daily. accu-chek Veronika strips 90 strip 5    empaglifloz-linaglip-metformin (TRIJARDY XR) 5-2.5-1,000 mg TBph Take 1 tablet by mouth once daily. 90 tablet 1    FEROSUL 325 mg (65 mg iron) Tab tablet Take 1 tablet (325 mg total) by mouth once daily. 90 tablet 1    gabapentin (NEURONTIN) 300 MG capsule Take 1 capsule (300 mg total) by mouth every 8 (eight) hours. 270 capsule 1    hydroCHLOROthiazide (HYDRODIURIL) 25 MG tablet Take  1 tablet (25 mg total) by mouth once daily. 90 tablet 1    HYDROcodone-acetaminophen (NORCO)  mg per tablet Take 1 tablet by mouth every 12 (twelve) hours as needed for Pain. 60 tablet 0    [START ON 10/22/2022] HYDROcodone-acetaminophen (NORCO)  mg per tablet Take 1 tablet by mouth every 12 (twelve) hours as needed for Pain. 60 tablet 0    rosuvastatin (CRESTOR) 10 MG tablet Take 1 tablet (10 mg total) by mouth once daily. 90 tablet 1    semaglutide (OZEMPIC) 2 mg/dose (8 mg/3 mL) PnIj Inject 2 mg into the skin every 7 days.      XIGDUO XR 5-1,000 mg TBph Take 1 tablet by mouth once daily.         Allergies  Review of patient's allergies indicates:   Allergen Reactions    Tomato Itching       Physical Examination     Vitals:    10/17/22 1104   BP: (!) 155/97   Pulse: 89   Resp: 20   Temp: 97.8 °F (36.6 °C)     Physical Exam  Vitals and nursing note reviewed.   Constitutional:       Appearance: Normal appearance.   HENT:      Head: Normocephalic.   Cardiovascular:      Rate and Rhythm: Normal rate and regular rhythm.      Pulses: Normal pulses.      Heart sounds: Normal heart sounds.   Pulmonary:      Effort: Pulmonary effort is normal. No respiratory distress.   Chest:      Chest wall: No tenderness.   Musculoskeletal:         General: Swelling, tenderness and deformity present.      Right lower leg: Edema present.   Skin:     General: Skin is warm and dry.      Capillary Refill: Capillary refill takes 2 to 3 seconds.      Comments: See LDA for photos and measurements   Neurological:      General: No focal deficit present.      Mental Status: He is alert and oriented to person, place, and time. Mental status is at baseline.   Psychiatric:         Mood and Affect: Mood normal.         Behavior: Behavior normal.         Thought Content: Thought content normal.         Judgment: Judgment normal.     Assessment and Plan             Wound 02/16/22 1020 Diabetic Ulcer Right Plantar (Active)   02/16/22  1020    Pre-existing: Yes   Primary Wound Type: Diabetic ulc   Side: Right   Orientation:    Location: Plantar   Wound Number:    Ankle-Brachial Index:    Pulses:    Removal Indication and Assessment:    Wound Outcome:    (Retired) Wound Type:    (Retired) Wound Length (cm):    (Retired) Wound Width (cm):    (Retired) Depth (cm):    Wound Description (Comments):    Removal Indications:    Wound Image      10/17/22 1056   Dressing Appearance Intact;Clean;Dried drainage 10/17/22 1056   Drainage Amount Moderate 10/17/22 1056   Drainage Characteristics/Odor Yellow 10/17/22 1056   Appearance Intact;Pink;Red;Yellow;Slough;Fibrin;Moist;Granulating;Adipose 10/17/22 1056   Black (%), Wound Tissue Color 0 % 10/17/22 1056   Red (%), Wound Tissue Color 90 % 10/17/22 1056   Yellow (%), Wound Tissue Color 10 % 10/17/22 1056   Periwound Area Intact 10/17/22 1056   Wound Edges Callused 10/17/22 1056   Elliott Classification (diabetic foot ulcers only) Grade 1 10/17/22 1056   Wound Length (cm) 9.9 cm 10/17/22 1056   Wound Width (cm) 6.8 cm 10/17/22 1056   Wound Depth (cm) 0.6 cm 10/17/22 1056   Wound Volume (cm^3) 40.392 cm^3 10/17/22 1056   Wound Surface Area (cm^2) 67.32 cm^2 10/17/22 1056   Care Cleansed with:;Antimicrobial agent 10/17/22 1056   Dressing Applied;Silver;Foam;Gauze;Rolled gauze;Cast padding;Compression wrap 10/17/22 1056   Periwound Care Moisture barrier applied 10/17/22 1056     Problem List Items Addressed This Visit          Endocrine    Type 2 diabetes mellitus with right diabetic foot ulcer    Overview                      Current Assessment & Plan     Right foot wound clean with baby shampoo and water or vashe  Irrigate wound with [place] antibiotic spray 10 ml leave on for 15 minutes then irrigate with 10ml vashe solution apply vashe moisten drawtex to wound,cover with dry gauze and ABD pad,wrap with kerlix and secure with paper tape. Change daily and as needed    Elevate feet as much as possible  Avoid  prolonged standing  Knee scooter and/or walking boot  Monitor closely for s/s of infection including fever, chills, increase in pain, odor from wound, and increased redness from foot. Go to ER if any complications develop.   Keep leg elevated and avoid pressure on wound.  Diabetes:  Monitor glucose closely. Check fasting glucose and 2 hours after meals. HgA1C goal <7, fasting glucose , and 2 hours after meals <180  Hypertension:  Check blood pressure twice daily, goal <120/80  Diet:   Increase protein intake, avoid fried, fatty foods and foods high in simple carbs.   Vitamins:  Take vitamin C 1000 mg, zinc 50mg, vitamin d 5000 units, and a daily multivitamin. Angel is a good source of protein and nutrients to aid in wound healing.          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 - Primary    Overview                         Future Appointments   Date Time Provider Department Center   11/7/2022 10:00 AM AUGUSTUS Macedo [facility]   11/21/2022 10:30 AM VONNIE Ma [facility]   5/1/2023  1:30 PM AWV NURSE, [facility] LOUANN [facility]            Signature:  AUGUSTUS Macedo  [facility]  [address] [facility] [place] [address]  [phone]    Date of encounter: 10/17/22

## 2022-10-14 NOTE — PATIENT INSTRUCTIONS
Right foot wound clean with baby shampoo and water or vashe  Irrigate wound with [place] antibiotic spray 10 ml leave on for 15 minutes then irrigate with 10ml vashe solution apply vashe moisten drawtex to wound,cover with dry gauze and ABD pad,wrap with kerlix and secure with paper tape. Change daily and as needed    Elevate feet as much as possible  Avoid prolonged standing  Knee scooter and/or walking boot  Monitor closely for s/s of infection including fever, chills, increase in pain, odor from wound, and increased redness from foot. Go to ER if any complications develop.   Keep leg elevated and avoid pressure on wound.  Diabetes:  Monitor glucose closely. Check fasting glucose and 2 hours after meals. HgA1C goal <7, fasting glucose , and 2 hours after meals <180  Hypertension:  Check blood pressure twice daily, goal <120/80  Diet:   Increase protein intake, avoid fried, fatty foods and foods high in simple carbs.   Vitamins:  Take vitamin C 1000 mg, zinc 50mg, vitamin d 5000 units, and a daily multivitamin. Angel is a good source of protein and nutrients to aid in wound healing.

## 2022-10-17 ENCOUNTER — OFFICE VISIT (OUTPATIENT)
Dept: WOUND CARE | Facility: CLINIC | Age: 37
End: 2022-10-17
Attending: FAMILY MEDICINE
Payer: MEDICARE

## 2022-10-17 VITALS
HEART RATE: 89 BPM | DIASTOLIC BLOOD PRESSURE: 97 MMHG | SYSTOLIC BLOOD PRESSURE: 155 MMHG | RESPIRATION RATE: 20 BRPM | TEMPERATURE: 98 F

## 2022-10-17 DIAGNOSIS — L97.519 TYPE 2 DIABETES MELLITUS WITH RIGHT DIABETIC FOOT ULCER: Primary | ICD-10-CM

## 2022-10-17 DIAGNOSIS — E11.621 TYPE 2 DIABETES MELLITUS WITH RIGHT DIABETIC FOOT ULCER: Primary | ICD-10-CM

## 2022-10-17 DIAGNOSIS — L97.516 NON-PRESSURE CHRONIC ULCER OF OTHER PART OF RIGHT FOOT WITH BONE INVOLVEMENT WITHOUT EVIDENCE OF NECROSIS: ICD-10-CM

## 2022-10-17 PROCEDURE — 11045 DEBRIDEMENT: ICD-10-PCS | Mod: S$PBB,,, | Performed by: NURSE PRACTITIONER

## 2022-10-17 PROCEDURE — 11042 DEBRIDEMENT: ICD-10-PCS | Mod: S$PBB,,, | Performed by: NURSE PRACTITIONER

## 2022-10-17 PROCEDURE — 99499 NO LOS: ICD-10-PCS | Mod: S$PBB,,, | Performed by: NURSE PRACTITIONER

## 2022-10-17 PROCEDURE — 11042 DBRDMT SUBQ TIS 1ST 20SQCM/<: CPT | Mod: S$PBB,,, | Performed by: NURSE PRACTITIONER

## 2022-10-17 PROCEDURE — 99499 UNLISTED E&M SERVICE: CPT | Mod: S$PBB,,, | Performed by: NURSE PRACTITIONER

## 2022-10-17 PROCEDURE — 11045 DBRDMT SUBQ TISS EACH ADDL: CPT | Mod: PBBFAC | Performed by: NURSE PRACTITIONER

## 2022-10-17 PROCEDURE — 99215 OFFICE O/P EST HI 40 MIN: CPT | Mod: PBBFAC | Performed by: NURSE PRACTITIONER

## 2022-10-17 NOTE — PROGRESS NOTES
Debridement Performed for Assessment: Wound# 1  Performed By: Provider: Yelitza Alas NP  Assistant: SONIA LugoRN    Debridement: Surgical    Photo taken post procedure: yes    Time-Out Taken: Yes  Level: Skin/Subcutaneous Tissue/ Muscle  Post Debridement Measurements  Length: (cm) 11.0  Width: (cm) 9.0  Depth: (cm) 0.8      Area: (cm²) 99.0  Percent Debrided: 100%  Total Area Debrided: (sq cm)     Tissue and other material debrided:  Adipose, Dermis, Epidermis, Subcutaneous  Devitalized Tissue Debrided:Biofilm, callus  Instrument: Scissors and blade  Bleeding: Moderate  Hemostasis Achieved: Pressure  Procedural Pain: Insensate  Post Procedural Pain: Insensate  Response to Treatment: Procedure was tolerated well    Devitalized materials/tissue Removed  the following was removed during debridement  subcutaneous      Post Debridement Diagnosis chronic right diabetic foot ulcer  Post debridement diagnosis  Same as Pre-operative debridement diagnosis, No Complications noted.      Grafts or implants applied  Was a graft or implant applied?  No      Procedure assistant  Procedure assisted by: AubreyRN  Assistant is the same as nurse listed above      Complications related to procedure  Did any complication occure during procedure?  No complications noted during or after procedure.      Specimen  Specimen collect during procedure?  No specimen collected      Anaesthesia:  Anesthesia used  None      Blood Loss:  Blood loss during procedure  less than 5 cc

## 2022-10-18 NOTE — PROCEDURES
"Debridement    Date/Time: 10/17/2022 10:00 AM  Performed by: AUGUSTUS Macedo  Authorized by: AUGUSTUS Macedo     Time out: Immediately prior to procedure a "time out" was called to verify the correct patient, procedure, equipment, support staff and site/side marked as required.    Consent Done?:  Yes (Written)    Type of Debridement:  Excisional       Length (cm):  11       Area (sq cm):  99       Width (cm):  9       Percent Debrided (%):  100       Depth (cm):  0.8       Total Area Debrided (sq cm):  99    Depth of debridement:  Subcutaneous tissue    Tissue debrided:  Adipose, Dermis, Epidermis and Subcutaneous    Devitalized tissue debrided:  Callus, Clots and Exudate    Instruments:  Scissors    Bleeding:  Minimal  Hemostasis Achieved: Yes    Method Used:  Pressure  Patient tolerance:  Patient tolerated the procedure well with no immediate complications     Assistant JOSE ALFREDO Stone LPN  "

## 2022-10-18 NOTE — ASSESSMENT & PLAN NOTE
Right foot wound clean with baby shampoo and water or vashe  Irrigate wound with [place] antibiotic spray 10 ml leave on for 15 minutes then irrigate with 10ml vashe solution apply vashe moisten drawtex to wound,cover with dry gauze and ABD pad,wrap with kerlix and secure with paper tape. Change daily and as needed    Elevate feet as much as possible  Avoid prolonged standing  Knee scooter and/or walking boot  Monitor closely for s/s of infection including fever, chills, increase in pain, odor from wound, and increased redness from foot. Go to ER if any complications develop.   Keep leg elevated and avoid pressure on wound.  Diabetes:  Monitor glucose closely. Check fasting glucose and 2 hours after meals. HgA1C goal <7, fasting glucose , and 2 hours after meals <180  Hypertension:  Check blood pressure twice daily, goal <120/80  Diet:   Increase protein intake, avoid fried, fatty foods and foods high in simple carbs.   Vitamins:  Take vitamin C 1000 mg, zinc 50mg, vitamin d 5000 units, and a daily multivitamin. Angel is a good source of protein and nutrients to aid in wound healing.

## 2022-11-02 NOTE — ASSESSMENT & PLAN NOTE
* Patient's wound cultures from 02/04 taken from ulcer showed growth of ESBL E coli, MSSA and Proteus Mirabilis     2/18 Continue meropenem, local wound care.    2/20 optimize blood sugar control, counseled the patient re diabetic diet (he had midst eating cakes and things like that from family).    02/21 continue merrem and wound care    02/24 continue merrem    03/01 continue merrem, hbo and wound care   Clofazimine Pregnancy And Lactation Text: This medication is Pregnancy Category C and isn't considered safe during pregnancy. It is excreted in breast milk.

## 2022-11-15 ENCOUNTER — OFFICE VISIT (OUTPATIENT)
Dept: WOUND CARE | Facility: CLINIC | Age: 37
End: 2022-11-15
Attending: FAMILY MEDICINE
Payer: MEDICARE

## 2022-11-15 VITALS — TEMPERATURE: 97 F | RESPIRATION RATE: 18 BRPM

## 2022-11-15 DIAGNOSIS — L97.519 TYPE 2 DIABETES MELLITUS WITH RIGHT DIABETIC FOOT ULCER: ICD-10-CM

## 2022-11-15 DIAGNOSIS — E11.621 TYPE 2 DIABETES MELLITUS WITH RIGHT DIABETIC FOOT ULCER: ICD-10-CM

## 2022-11-15 PROCEDURE — 11045 DBRDMT SUBQ TISS EACH ADDL: CPT | Mod: S$PBB,,, | Performed by: NURSE PRACTITIONER

## 2022-11-15 PROCEDURE — 11042 PR DEBRIDEMENT, SKIN, SUB-Q TISSUE,=<20 SQ CM: ICD-10-PCS | Mod: S$PBB,,, | Performed by: NURSE PRACTITIONER

## 2022-11-15 PROCEDURE — 99499 UNLISTED E&M SERVICE: CPT | Mod: S$PBB,,, | Performed by: NURSE PRACTITIONER

## 2022-11-15 PROCEDURE — 99499 NO LOS: ICD-10-PCS | Mod: S$PBB,,, | Performed by: NURSE PRACTITIONER

## 2022-11-15 PROCEDURE — 11042 DBRDMT SUBQ TIS 1ST 20SQCM/<: CPT | Mod: S$PBB,,, | Performed by: NURSE PRACTITIONER

## 2022-11-15 PROCEDURE — 11045 PR DEB SUBQ TISSUE ADD-ON: ICD-10-PCS | Mod: S$PBB,,, | Performed by: NURSE PRACTITIONER

## 2022-11-15 PROCEDURE — 99214 OFFICE O/P EST MOD 30 MIN: CPT | Mod: PBBFAC,25 | Performed by: NURSE PRACTITIONER

## 2022-11-15 PROCEDURE — 11042 DBRDMT SUBQ TIS 1ST 20SQCM/<: CPT | Mod: PBBFAC | Performed by: NURSE PRACTITIONER

## 2022-11-15 PROCEDURE — 11045 DBRDMT SUBQ TISS EACH ADDL: CPT | Mod: PBBFAC | Performed by: NURSE PRACTITIONER

## 2022-11-15 NOTE — PATIENT INSTRUCTIONS
Right foot wound clean with baby shampoo and water or vashe  Irrigate wound with [place] antibiotic spray 10 ml leave on for 15 minutes then irrigate with 10ml vashe solution    Apply Regranex to wound bed, let sit for 10 minutes, then apply vashe moisten drawtex to wound,cover with dry gauze and ABD pad,wrap with kerlix and secure with paper tape. Change daily and as needed     Elevate feet as much as possible  Avoid prolonged standing  Knee scooter and/or walking boot  Monitor closely for s/s of infection including fever, chills, increase in pain, odor from wound, and increased redness from foot. Go to ER if any complications develop.   Keep leg elevated and avoid pressure on wound.  Diabetes:  Monitor glucose closely. Check fasting glucose and 2 hours after meals. HgA1C goal <7, fasting glucose , and 2 hours after meals <180  Hypertension:  Check blood pressure twice daily, goal <120/80  Diet:   Increase protein intake, avoid fried, fatty foods and foods high in simple carbs.   Vitamins:  Take vitamin C 1000 mg, zinc 50mg, vitamin d 5000 units, and a daily multivitamin. Angel is a good source of protein and nutrients to aid in wound healing.     [id number] - [place], [place] - [address] AT [facility] (Ph: [phone])

## 2022-11-15 NOTE — ASSESSMENT & PLAN NOTE
Right foot wound clean with baby shampoo and water or vashe  Irrigate wound with [place] antibiotic spray 10 ml leave on for 15 minutes then irrigate with 10ml vashe solution    Apply Regranex to wound bed, let sit for 10 minutes, then apply vashe moisten drawtex to wound,cover with dry gauze and ABD pad,wrap with kerlix and secure with paper tape. Change daily and as needed     Elevate feet as much as possible  Avoid prolonged standing  Knee scooter and/or walking boot  Monitor closely for s/s of infection including fever, chills, increase in pain, odor from wound, and increased redness from foot. Go to ER if any complications develop.   Keep leg elevated and avoid pressure on wound.  Diabetes:  Monitor glucose closely. Check fasting glucose and 2 hours after meals. HgA1C goal <7, fasting glucose , and 2 hours after meals <180  Hypertension:  Check blood pressure twice daily, goal <120/80  Diet:   Increase protein intake, avoid fried, fatty foods and foods high in simple carbs.   Vitamins:  Take vitamin C 1000 mg, zinc 50mg, vitamin d 5000 units, and a daily multivitamin. Angel is a good source of protein and nutrients to aid in wound healing.

## 2022-11-15 NOTE — PROGRESS NOTES
Debridement Performed for Assessment: Wound# 1  Performed By: Provider: Yelitza Ward NP  Assistant:  Lizabeth Stone LPN    Debridement: Surgical    Photo taken post procedure: yes    Time-Out Taken: Yes  Level: Skin/Subcutaneous Tissue/ Muscle  Post Debridement Measurements  Length: (cm) 17.0  Width: (cm) 8.0  Depth: (cm) 0.5      Area: (cm²) 136.0  Percent Debrided: 100%  Total Area Debrided: (sq cm)     Tissue and other material debrided:  Adipose, Dermis, Epidermis, Subcutaneous  Devitalized Tissue Debrided:Biofilm,callous  Instrument: Scissors and nippers  Bleeding: Moderate  Hemostasis Achieved: Pressure  Procedural Pain: Insensate  Post Procedural Pain: Insensate  Response to Treatment: Procedure was tolerated well    Devitalized materials/tissue Removed  the following was removed during debridement  subcutaneous      Post Debridement Diagnosis  chronic right foot diabetic ulcer  Post debridement diagnosis  Same as Pre-operative debridement diagnosis, No Complications noted.      Grafts or implants applied  Was a graft or implant applied?  No      Procedure assistant  Procedure assisted by:  Lizabeth Stone LPN  Assistant is the same as nurse listed above      Complications related to procedure  Did any complication occure during procedure?  No complications noted during or after procedure.      Specimen  Specimen collect during procedure?  No specimen collected      Anaesthesia:  Anesthesia used  None      Blood Loss:  Blood loss during procedure  less than 5 cc

## 2022-11-15 NOTE — PROGRESS NOTES
AUGUSTUS Macedo   [facility]  [address] [facility] [place] [address]  [phone]      PATIENT NAME: Jean Pierre Paulson  : 1985  DATE: 11/15/22  MRN: [id number]      Billing Provider: AUGUSTUS Macedo  Level of Service:   Patient PCP Information       Provider PCP Type    Mojgan Lomeli NP General            Reason for Visit / Chief Complaint: Type 2 diabetes mellitus with right diabetic foot ulcer       History of Present Illness / Problem Focused Workflow     Jean Pierre Paulson is a 36 y.o. male who presents to clinic for follow up on chronic-non healing wound on the right foot. Wound continues to show improvement. Patient is wearing off-loading boot. Biofilm noted to wound bed. Wound continues to have thick callus filippo-wound, bedside debridement today. PA for regranex was approved. Significant PMH  Includes diabetes and hypertension. Last HgA1C 7.3 in 2022, diabetes is managed by PCP. He is due for arterial dopplers, last doppler in 2020. Denies fever or chills.       Review of Systems     Review of Systems   Constitutional:  Negative for activity change, chills and fever.   Respiratory:  Negative for chest tightness and shortness of breath.    Cardiovascular:  Positive for leg swelling. Negative for chest pain and palpitations.   Musculoskeletal:  Positive for arthralgias, gait problem and joint swelling.   Skin:  Positive for color change and wound.        See wound assessment   Neurological:  Positive for weakness and numbness.   Psychiatric/Behavioral:  Negative for agitation, behavioral problems, confusion and self-injury.      Medical / Social / Family History     Past Medical History:   Diagnosis Date    Anemia     Chronic osteomyelitis     Diabetes mellitus with neuropathy     Diabetes mellitus, type 2     DM2 (diabetes mellitus, type 2)     HTN (hypertension)     Hypertension     Neuropathy     Obesity     Osteomyelitis 10/2020    Hx of R  foot, Tx with IV Abx    Peripheral vascular disease        Past Surgical History:   Procedure Laterality Date    APPLICATION OF SPLIT-THICKNESS SKIN GRAFT (STSG) TO LOWER EXTREMITY Right 3/2/2022    Procedure: APPLICATION, GRAFT, SKIN, SPLIT-THICKNESS, TO LOWER EXTREMITY;  Surgeon: Greg Ramírez MD;  Location: [facility];  Service: General;  Laterality: Right;    CHOLECYSTECTOMY      DEBRIDEMENT OF FOOT Right 10/2020    FOOT SURGERY      Right diabetic foot ulcer Elliott's Grade 3      Bone exposure to R heel, Hx of IV Abx, cultures show multiple bacterial growth, Hx of Osteomyelitis, HBOT       Social History  Mr. Jean Pierre Paulson  reports that he quit smoking about 9 months ago. His smoking use included cigarettes. He started smoking about 22 years ago. He smoked an average of .25 packs per day. He has never used smokeless tobacco. He reports current alcohol use of about 2.0 standard drinks per week. He reports current drug use. Drug: Hydrocodone.    Family History  Mr.'s Jean Pierre Paulson family history includes Diabetes in his father and maternal grandmother; Hypertension in his father and maternal grandmother.    Medications and Allergies     Medications  Outpatient Medications Marked as Taking for the 11/15/22 encounter (Office Visit) with AUGUSTUS Macedo   Medication Sig Dispense Refill    acetaminophen (TYLENOL) 325 MG tablet Take 2 tablets (650 mg total) by mouth every 4 (four) hours as needed.  0    blood sugar diagnostic (BLOOD GLUCOSE TEST) Strp 1 strip by Misc.(Non-Drug; Combo Route) route once daily. accu-chek Veronika strips 90 strip 5    empaglifloz-linaglip-metformin (TRIJARDY XR) 5-2.5-1,000 mg TBph Take 1 tablet by mouth once daily. 90 tablet 1    FEROSUL 325 mg (65 mg iron) Tab tablet Take 1 tablet (325 mg total) by mouth once daily. 90 tablet 1    gabapentin (NEURONTIN) 300 MG capsule Take 1 capsule (300 mg total) by mouth every 8 (eight) hours. 270 capsule 1    hydroCHLOROthiazide (HYDRODIURIL) 25  MG tablet Take 1 tablet (25 mg total) by mouth once daily. 90 tablet 1    HYDROcodone-acetaminophen (NORCO)  mg per tablet Take 1 tablet by mouth every 12 (twelve) hours as needed for Pain. 60 tablet 0    HYDROcodone-acetaminophen (NORCO)  mg per tablet Take 1 tablet by mouth every 12 (twelve) hours as needed for Pain. 60 tablet 0    semaglutide (OZEMPIC) 2 mg/dose (8 mg/3 mL) PnIj Inject 2 mg into the skin every 7 days.      XIGDUO XR 5-1,000 mg TBph Take 1 tablet by mouth once daily.         Allergies  Review of patient's allergies indicates:   Allergen Reactions    Tomato Itching       Physical Examination     Vitals:    11/15/22 1048   Resp: 18   Temp: 97.2 °F (36.2 °C)     Physical Exam  Vitals and nursing note reviewed.   Constitutional:       Appearance: Normal appearance.   HENT:      Head: Normocephalic.   Cardiovascular:      Rate and Rhythm: Normal rate and regular rhythm.      Pulses: Normal pulses.      Heart sounds: Normal heart sounds.   Pulmonary:      Effort: Pulmonary effort is normal. No respiratory distress.   Chest:      Chest wall: No tenderness.   Musculoskeletal:         General: Swelling, tenderness and deformity present.      Right lower leg: Edema present.   Skin:     General: Skin is warm and dry.      Capillary Refill: Capillary refill takes 2 to 3 seconds.      Comments: See LDA for photos and measurements   Neurological:      General: No focal deficit present.      Mental Status: He is alert and oriented to person, place, and time. Mental status is at baseline.   Psychiatric:         Mood and Affect: Mood normal.         Behavior: Behavior normal.         Thought Content: Thought content normal.         Judgment: Judgment normal.     Assessment and Plan             Wound 02/16/22 1020 Diabetic Ulcer Right Plantar (Active)   02/16/22 1020    Pre-existing: Yes   Primary Wound Type: Diabetic ulc   Side: Right   Orientation:    Location: Plantar   Wound Number:     Ankle-Brachial Index:    Pulses:    Removal Indication and Assessment:    Wound Outcome:    (Retired) Wound Type:    (Retired) Wound Length (cm):    (Retired) Wound Width (cm):    (Retired) Depth (cm):    Wound Description (Comments):    Removal Indications:    Wound Image     11/15/22 1044   Drainage Amount Moderate 11/15/22 1044   Drainage Characteristics/Odor Yellow 11/15/22 1044   Appearance Intact;Pink;Red;Yellow;Moist;Granulating;Adipose;Muscle 11/15/22 1044   Black (%), Wound Tissue Color 0 % 11/15/22 1044   Red (%), Wound Tissue Color 80 % 11/15/22 1044   Yellow (%), Wound Tissue Color 20 % 11/15/22 1044   Periwound Area Intact 11/15/22 1044   Wound Edges Callused 11/15/22 1044   Elliott Classification (diabetic foot ulcers only) Grade 1 11/15/22 1044   Wound Length (cm) 14 cm 11/15/22 1044   Wound Width (cm) 7.4 cm 11/15/22 1044   Wound Depth (cm) 0.9 cm 11/15/22 1044   Wound Volume (cm^3) 93.24 cm^3 11/15/22 1044   Wound Surface Area (cm^2) 103.6 cm^2 11/15/22 1044   Care Cleansed with:;Antimicrobial agent 11/15/22 1044   Dressing Applied;Hydrofiber;Gauze;Absorptive Pad;Rolled gauze 11/15/22 1044   Periwound Care Moisture barrier applied 11/15/22 1044     Problem List Items Addressed This Visit          Endocrine    Type 2 diabetes mellitus with right diabetic foot ulcer    Overview                          Current Assessment & Plan     Right foot wound clean with baby shampoo and water or vashe  Irrigate wound with [place] antibiotic spray 10 ml leave on for 15 minutes then irrigate with 10ml vashe solution    Apply Regranex to wound bed, let sit for 10 minutes, then apply vashe moisten drawtex to wound,cover with dry gauze and ABD pad,wrap with kerlix and secure with paper tape. Change daily and as needed     Elevate feet as much as possible  Avoid prolonged standing  Knee scooter and/or walking boot  Monitor closely for s/s of infection including fever, chills, increase in pain, odor from wound, and  increased redness from foot. Go to ER if any complications develop.   Keep leg elevated and avoid pressure on wound.  Diabetes:  Monitor glucose closely. Check fasting glucose and 2 hours after meals. HgA1C goal <7, fasting glucose , and 2 hours after meals <180  Hypertension:  Check blood pressure twice daily, goal <120/80  Diet:   Increase protein intake, avoid fried, fatty foods and foods high in simple carbs.   Vitamins:  Take vitamin C 1000 mg, zinc 50mg, vitamin d 5000 units, and a daily multivitamin. Angel is a good source of protein and nutrients to aid in wound healing.          Relevant Medications    becaplermin (REGRANEX) 0.01 % gel       Future Appointments   Date Time Provider Department Center   11/21/2022 10:30 AM VONNIE Ma [facility]   11/22/2022 10:00 AM [facility] Maced[facility]   5/1/2023  1:30 PM AWV NURSE, [facility] LOUANN Core[place]            Signature:  AUGUSTUS Macedo  [facility]  [address]  [place] [address]  [phone]    Date of encounter: 11/15/22

## 2022-11-16 NOTE — PROGRESS NOTES
Subjective:         Patient ID: Jean Pierre Paulson is a 37 y.o. male.    Chief Complaint: Leg Pain (Right foot)      Pain  This is a chronic problem. The current episode started more than 1 year ago. The problem occurs daily. The problem has been unchanged. Associated symptoms include arthralgias and neck pain. Pertinent negatives include no anorexia, change in bowel habit, chest pain, chills, coughing, diaphoresis, fever, sore throat, vertigo or vomiting.   Review of Systems   Constitutional:  Negative for activity change, appetite change, chills, diaphoresis, fever and unexpected weight change.   HENT:  Negative for drooling, ear discharge, ear pain, facial swelling, nosebleeds, sore throat, trouble swallowing, voice change and goiter.    Eyes:  Negative for photophobia, pain, discharge, redness and visual disturbance.   Respiratory:  Negative for apnea, cough, choking, chest tightness, shortness of breath, wheezing and stridor.    Cardiovascular:  Negative for chest pain, palpitations and leg swelling.   Gastrointestinal:  Negative for abdominal distention, anorexia, change in bowel habit, diarrhea, rectal pain, vomiting, fecal incontinence and change in bowel habit.   Endocrine: Negative for cold intolerance, heat intolerance, polydipsia, polyphagia and polyuria.   Genitourinary:  Negative for bladder incontinence, dysuria, flank pain and frequency.   Musculoskeletal:  Positive for arthralgias, back pain, leg pain and neck pain. Negative for neck stiffness.   Integumentary:  Negative for color change and pallor.   Neurological:  Negative for dizziness, vertigo, seizures, syncope, facial asymmetry, speech difficulty, light-headedness, coordination difficulties, memory loss and coordination difficulties.   Hematological:  Negative for adenopathy. Does not bruise/bleed easily.   Psychiatric/Behavioral:  Negative for agitation, behavioral problems, confusion, decreased concentration, dysphoric mood, hallucinations,  "self-injury and suicidal ideas. The patient is not nervous/anxious and is not hyperactive.          Past Medical History:   Diagnosis Date    Anemia     Chronic osteomyelitis     Diabetes mellitus with neuropathy     Diabetes mellitus, type 2     DM2 (diabetes mellitus, type 2)     HTN (hypertension)     Hypertension     Neuropathy     Obesity     Osteomyelitis 10/2020    Hx of R foot, Tx with IV Abx    Peripheral vascular disease      Past Surgical History:   Procedure Laterality Date    APPLICATION OF SPLIT-THICKNESS SKIN GRAFT (STSG) TO LOWER EXTREMITY Right 3/2/2022    Procedure: APPLICATION, GRAFT, SKIN, SPLIT-THICKNESS, TO LOWER EXTREMITY;  Surgeon: Greg Ramírez MD;  Location: [facility];  Service: General;  Laterality: Right;    CHOLECYSTECTOMY      DEBRIDEMENT OF FOOT Right 10/2020    FOOT SURGERY      Right diabetic foot ulcer Elliott's Grade 3      Bone exposure to R heel, Hx of IV Abx, cultures show multiple bacterial growth, Hx of Osteomyelitis, HBOT     Social History     Socioeconomic History    Marital status: Single    Number of children: 1   Tobacco Use    Smoking status: Former     Packs/day: 0.25     Types: Cigarettes     Start date:      Quit date: 2022     Years since quittin.8    Smokeless tobacco: Never   Substance and Sexual Activity    Alcohol use: Yes     Alcohol/week: 2.0 standard drinks     Types: 1 Glasses of wine, 1 Cans of beer per week     Comment: occasionally    Drug use: Yes     Types: Hydrocodone    Sexual activity: Yes     Partners: Female     Family History   Problem Relation Age of Onset    Hypertension Father     Diabetes Father     Hypertension Maternal Grandmother     Diabetes Maternal Grandmother      Review of patient's allergies indicates:   Allergen Reactions    Tomato Itching        Objective:  Vitals:    22 1049   BP: (!) 174/99   Pulse: 88   Resp: 16   Weight: (!) 155.6 kg (343 lb)   Height: 6' 1" (1.854 m)   PainSc:   4         Physical " Exam  Vitals and nursing note reviewed. Exam conducted with a chaperone present.   Constitutional:       General: He is awake. He is not in acute distress.     Appearance: Normal appearance. He is not ill-appearing, toxic-appearing or diaphoretic.   HENT:      Head: Normocephalic and atraumatic.      Nose: Nose normal.      Mouth/Throat:      Mouth: Mucous membranes are moist.      Pharynx: Oropharynx is clear.   Eyes:      Conjunctiva/sclera: Conjunctivae normal.      Pupils: Pupils are equal, round, and reactive to light.   Cardiovascular:      Rate and Rhythm: Normal rate.   Pulmonary:      Effort: Pulmonary effort is normal. No respiratory distress.   Abdominal:      Palpations: Abdomen is soft.      Tenderness: There is no guarding.   Musculoskeletal:         General: Normal range of motion.      Cervical back: Normal range of motion and neck supple. No rigidity.      Lumbar back: Tenderness present.      Right foot: Tenderness present.      Left foot: Tenderness present.   Skin:     General: Skin is warm and dry.      Coloration: Skin is not jaundiced or pale.   Neurological:      General: No focal deficit present.      Mental Status: He is alert and oriented to person, place, and time. Mental status is at baseline.      Cranial Nerves: No cranial nerve deficit (II-XII).   Psychiatric:         Mood and Affect: Mood normal.         Behavior: Behavior normal. Behavior is cooperative.         Thought Content: Thought content normal.         NM Bone Scan 3 Phase Foot  Narrative: EXAMINATION:  NM BONE SCAN 3 PHASE FOOT    CLINICAL HISTORY:  Post-menopause state, Osteoporosis  Foot swelling, diabetic, osteomyelitis suspected, xray done;suspect chronic osteomyelitis;    COMPARISON:  CT 12 October 2021    FINDINGS:  Patient was injected with 25.0 millicuries of technetium 99M MDP intravenously.  Blood flow, blood pool and delayed images were obtained.    Increased activity diffusely in the right foot on the blood flow  and blood pool imaging.  There is increased osseous uptake on the delayed imaging mostly in the right midfoot but also faintly seen in the right inferior calcaneus and portions of the left midfoot.  The right foot activity mostly correlates with area of severe degenerative changes and malalignment seen on the CT.  Impression: Increased activity diffusely in the right foot soft tissues in the blood flow and blood pool images.  There is increased activity in the midfoot correlating with area of degenerative changes and malalignment.  Faint activity in calcaneus more typical of reactive changes.  Findings do not appear typical for acute osteomyelitis.    Electronically signed by: Shneg Anderson  Date:    02/17/2022  Time:    12:38       Lab Requisition on 09/15/2022   Component Date Value Ref Range Status    Sodium 09/15/2022 139  136 - 145 mmol/L Final    Potassium 09/15/2022 4.2  3.5 - 5.1 mmol/L Final    Chloride 09/15/2022 105  98 - 107 mmol/L Final    CO2 09/15/2022 26  21 - 32 mmol/L Final    Anion Gap 09/15/2022 12  7 - 16 mmol/L Final    Glucose 09/15/2022 137 (H)  74 - 106 mg/dL Final    BUN 09/15/2022 18  7 - 18 mg/dL Final    Creatinine 09/15/2022 1.28  0.70 - 1.30 mg/dL Final    BUN/Creatinine Ratio 09/15/2022 14  6 - 20 Final    Calcium 09/15/2022 8.7  8.5 - 10.1 mg/dL Final    eGFR 09/15/2022 73  >=60 mL/min/1.73m² Final    CRP 09/15/2022 3.91 (H)  0.00 - 0.80 mg/dL Final    ESR Westergren 09/15/2022 105 (H)  0 - 15 mm/Hr Final    WBC 09/15/2022 6.58  4.50 - 11.00 K/uL Final    RBC 09/15/2022 4.17 (L)  4.60 - 6.20 M/uL Final    Hemoglobin 09/15/2022 11.2 (L)  13.5 - 18.0 g/dL Final    Hematocrit 09/15/2022 34.9 (L)  40.0 - 54.0 % Final    MCV 09/15/2022 83.7  80.0 - 96.0 fL Final    MCH 09/15/2022 26.9 (L)  27.0 - 31.0 pg Final    MCHC 09/15/2022 32.1  32.0 - 36.0 g/dL Final    RDW 09/15/2022 13.7  11.5 - 14.5 % Final    Platelet Count 09/15/2022 333  150 - 400 K/uL Final    MPV 09/15/2022 10.0  9.4 -  12.4 fL Final    Neutrophils % 09/15/2022 53.3  53.0 - 65.0 % Final    Lymphocytes % 09/15/2022 34.3  27.0 - 41.0 % Final    Monocytes % 09/15/2022 7.6 (H)  2.0 - 6.0 % Final    Eosinophils % 09/15/2022 3.8  1.0 - 4.0 % Final    Basophils % 09/15/2022 0.8  0.0 - 1.0 % Final    Immature Granulocytes % 09/15/2022 0.2  0.0 - 0.4 % Final    nRBC, Auto 09/15/2022 0.0  <=0.0 % Final    Neutrophils, Abs 09/15/2022 3.51  1.80 - 7.70 K/uL Final    Lymphocytes, Absolute 09/15/2022 2.26  1.00 - 4.80 K/uL Final    Monocytes, Absolute 09/15/2022 0.50  0.00 - 0.80 K/uL Final    Eosinophils, Absolute 09/15/2022 0.25  0.00 - 0.50 K/uL Final    Basophils, Absolute 09/15/2022 0.05  0.00 - 0.20 K/uL Final    Immature Granulocytes, Absolute 09/15/2022 0.01  0.00 - 0.04 K/uL Final    nRBC, Absolute 09/15/2022 0.00  <=0.00 x10e3/uL Final    Diff Type 09/15/2022 Auto   Final   Lab Requisition on 09/09/2022   Component Date Value Ref Range Status    Sodium 09/09/2022 142  136 - 145 mmol/L Final    Potassium 09/09/2022 4.5  3.5 - 5.1 mmol/L Final    Chloride 09/09/2022 110 (H)  98 - 107 mmol/L Final    CO2 09/09/2022 23  21 - 32 mmol/L Final    Anion Gap 09/09/2022 14  7 - 16 mmol/L Final    Glucose 09/09/2022 84  74 - 106 mg/dL Final    BUN 09/09/2022 14  7 - 18 mg/dL Final    Creatinine 09/09/2022 0.82  0.70 - 1.30 mg/dL Final    BUN/Creatinine Ratio 09/09/2022 17  6 - 20 Final    Calcium 09/09/2022 7.8 (L)  8.5 - 10.1 mg/dL Final    eGFR 09/09/2022 115  >=60 mL/min/1.73m² Final    CRP 09/09/2022 2.15 (H)  0.00 - 0.80 mg/dL Final   Lab Requisition on 09/02/2022   Component Date Value Ref Range Status    Sodium 09/02/2022 138  136 - 145 mmol/L Final    Potassium 09/02/2022 5.2 (H)  3.5 - 5.1 mmol/L Final    Chloride 09/02/2022 106  98 - 107 mmol/L Final    CO2 09/02/2022 28  21 - 32 mmol/L Final    Anion Gap 09/02/2022 9  7 - 16 mmol/L Final    Glucose 09/02/2022 109 (H)  74 - 106 mg/dL Final    BUN 09/02/2022 19 (H)  7 - 18 mg/dL Final     Creatinine 09/02/2022 1.15  0.70 - 1.30 mg/dL Final    BUN/Creatinine Ratio 09/02/2022 17  6 - 20 Final    Calcium 09/02/2022 8.4 (L)  8.5 - 10.1 mg/dL Final    eGFR 09/02/2022 83  >=60 mL/min/1.73m² Final    CRP 09/02/2022 3.73 (H)  0.00 - 0.80 mg/dL Final    ESR Westergren 09/02/2022 78 (H)  0 - 15 mm/Hr Final    WBC 09/02/2022 6.83  4.50 - 11.00 K/uL Final    RBC 09/02/2022 4.25 (L)  4.60 - 6.20 M/uL Final    Hemoglobin 09/02/2022 11.6 (L)  13.5 - 18.0 g/dL Final    Hematocrit 09/02/2022 35.0 (L)  40.0 - 54.0 % Final    MCV 09/02/2022 82.4  80.0 - 96.0 fL Final    MCH 09/02/2022 27.3  27.0 - 31.0 pg Final    MCHC 09/02/2022 33.1  32.0 - 36.0 g/dL Final    RDW 09/02/2022 14.1  11.5 - 14.5 % Final    Platelet Count 09/02/2022 224  150 - 400 K/uL Final    MPV 09/02/2022 10.8  9.4 - 12.4 fL Final    Neutrophils % 09/02/2022 58.2  53.0 - 65.0 % Final    Lymphocytes % 09/02/2022 28.0  27.0 - 41.0 % Final    Monocytes % 09/02/2022 9.1 (H)  2.0 - 6.0 % Final    Eosinophils % 09/02/2022 3.8  1.0 - 4.0 % Final    Basophils % 09/02/2022 0.6  0.0 - 1.0 % Final    Immature Granulocytes % 09/02/2022 0.3  0.0 - 0.4 % Final    nRBC, Auto 09/02/2022 0.0  <=0.0 % Final    Neutrophils, Abs 09/02/2022 3.98  1.80 - 7.70 K/uL Final    Lymphocytes, Absolute 09/02/2022 1.91  1.00 - 4.80 K/uL Final    Monocytes, Absolute 09/02/2022 0.62  0.00 - 0.80 K/uL Final    Eosinophils, Absolute 09/02/2022 0.26  0.00 - 0.50 K/uL Final    Basophils, Absolute 09/02/2022 0.04  0.00 - 0.20 K/uL Final    Immature Granulocytes, Absolute 09/02/2022 0.02  0.00 - 0.04 K/uL Final    nRBC, Absolute 09/02/2022 0.00  <=0.00 x10e3/uL Final    Diff Type 09/02/2022 Auto   Final   Lab Requisition on 09/01/2022   Component Date Value Ref Range Status    Sodium 09/01/2022 137  136 - 145 mmol/L Final    Potassium 09/01/2022 4.6  3.5 - 5.1 mmol/L Final    Chloride 09/01/2022 105  98 - 107 mmol/L Final    CO2 09/01/2022 28  21 - 32 mmol/L Final    Anion Gap 09/01/2022 9   7 - 16 mmol/L Final    Glucose 09/01/2022 103  74 - 106 mg/dL Final    BUN 09/01/2022 17  7 - 18 mg/dL Final    Creatinine 09/01/2022 1.09  0.70 - 1.30 mg/dL Final    BUN/Creatinine Ratio 09/01/2022 16  6 - 20 Final    Calcium 09/01/2022 9.0  8.5 - 10.1 mg/dL Final    Total Protein 09/01/2022 8.1  6.4 - 8.2 g/dL Final    Albumin 09/01/2022 2.8 (L)  3.5 - 5.0 g/dL Final    Globulin 09/01/2022 5.3 (H)  2.0 - 4.0 g/dL Final    A/G Ratio 09/01/2022 0.5   Final    Bilirubin, Total 09/01/2022 0.4  >0.0 - 1.2 mg/dL Final    Alk Phos 09/01/2022 153 (H)  45 - 115 U/L Final    ALT 09/01/2022 51  16 - 61 U/L Final    AST 09/01/2022 28  15 - 37 U/L Final    eGFR 09/01/2022 89  >=60 mL/min/1.73m² Final    Triglycerides 09/01/2022 109  35 - 150 mg/dL Final    Cholesterol 09/01/2022 120  0 - 200 mg/dL Final    HDL Cholesterol 09/01/2022 45  40 - 60 mg/dL Final    Cholesterol/HDL Ratio (Risk Factor) 09/01/2022 2.7   Final    Non-HDL 09/01/2022 75  mg/dL Final    LDL Calculated 09/01/2022 53  mg/dL Final    LDL/HDL 09/01/2022 1.2   Final    VLDL 09/01/2022 22  mg/dL Final    Free T4 09/01/2022 0.94  0.76 - 1.46 ng/dL Final    TSH 09/01/2022 2.720  0.358 - 3.740 uIU/mL Final    Hemoglobin A1C 09/01/2022 6.8 (H)  4.5 - 6.6 % Final    Estimated Average Glucose 09/01/2022 140  mg/dL Final    WBC 09/01/2022 5.89  4.50 - 11.00 K/uL Final    RBC 09/01/2022 4.63  4.60 - 6.20 M/uL Final    Hemoglobin 09/01/2022 12.4 (L)  13.5 - 18.0 g/dL Final    Hematocrit 09/01/2022 38.7 (L)  40.0 - 54.0 % Final    MCV 09/01/2022 83.6  80.0 - 96.0 fL Final    MCH 09/01/2022 26.8 (L)  27.0 - 31.0 pg Final    MCHC 09/01/2022 32.0  32.0 - 36.0 g/dL Final    RDW 09/01/2022 13.9  11.5 - 14.5 % Final    Platelet Count 09/01/2022 268  150 - 400 K/uL Final    MPV 09/01/2022 10.7  9.4 - 12.4 fL Final    Neutrophils % 09/01/2022 50.0 (L)  53.0 - 65.0 % Final    Lymphocytes % 09/01/2022 36.3  27.0 - 41.0 % Final    Monocytes % 09/01/2022 9.0 (H)  2.0 - 6.0 % Final     Eosinophils % 09/01/2022 3.6  1.0 - 4.0 % Final    Basophils % 09/01/2022 0.8  0.0 - 1.0 % Final    Immature Granulocytes % 09/01/2022 0.3  0.0 - 0.4 % Final    nRBC, Auto 09/01/2022 0.0  <=0.0 % Final    Neutrophils, Abs 09/01/2022 2.94  1.80 - 7.70 K/uL Final    Lymphocytes, Absolute 09/01/2022 2.14  1.00 - 4.80 K/uL Final    Monocytes, Absolute 09/01/2022 0.53  0.00 - 0.80 K/uL Final    Eosinophils, Absolute 09/01/2022 0.21  0.00 - 0.50 K/uL Final    Basophils, Absolute 09/01/2022 0.05  0.00 - 0.20 K/uL Final    Immature Granulocytes, Absolute 09/01/2022 0.02  0.00 - 0.04 K/uL Final    nRBC, Absolute 09/01/2022 0.00  <=0.00 x10e3/uL Final    Diff Type 09/01/2022 Auto   Final   Lab Requisition on 08/26/2022   Component Date Value Ref Range Status    Sodium 08/26/2022 139  136 - 145 mmol/L Final    Potassium 08/26/2022 4.2  3.5 - 5.1 mmol/L Final    Chloride 08/26/2022 106  98 - 107 mmol/L Final    CO2 08/26/2022 28  21 - 32 mmol/L Final    Anion Gap 08/26/2022 9  7 - 16 mmol/L Final    Glucose 08/26/2022 82  74 - 106 mg/dL Final    BUN 08/26/2022 23 (H)  7 - 18 mg/dL Final    Creatinine 08/26/2022 0.97  0.70 - 1.30 mg/dL Final    BUN/Creatinine Ratio 08/26/2022 24 (H)  6 - 20 Final    Calcium 08/26/2022 8.6  8.5 - 10.1 mg/dL Final    eGFR 08/26/2022 102  >=60 mL/min/1.73m² Final    CRP 08/26/2022 1.06 (H)  0.00 - 0.80 mg/dL Final    ESR Westergren 08/26/2022 43 (H)  0 - 15 mm/Hr Final    WBC 08/26/2022 4.74  4.50 - 11.00 K/uL Final    RBC 08/26/2022 4.56 (L)  4.60 - 6.20 M/uL Final    Hemoglobin 08/26/2022 12.5 (L)  13.5 - 18.0 g/dL Final    Hematocrit 08/26/2022 38.4 (L)  40.0 - 54.0 % Final    MCV 08/26/2022 84.2  80.0 - 96.0 fL Final    MCH 08/26/2022 27.4  27.0 - 31.0 pg Final    MCHC 08/26/2022 32.6  32.0 - 36.0 g/dL Final    RDW 08/26/2022 13.8  11.5 - 14.5 % Final    Platelet Count 08/26/2022 253  150 - 400 K/uL Final    MPV 08/26/2022 10.3  9.4 - 12.4 fL Final    Neutrophils % 08/26/2022 38.7 (L)  53.0  - 65.0 % Final    Lymphocytes % 08/26/2022 46.0 (H)  27.0 - 41.0 % Final    Monocytes % 08/26/2022 8.4 (H)  2.0 - 6.0 % Final    Eosinophils % 08/26/2022 6.1 (H)  1.0 - 4.0 % Final    Basophils % 08/26/2022 0.8  0.0 - 1.0 % Final    Immature Granulocytes % 08/26/2022 0.0  0.0 - 0.4 % Final    nRBC, Auto 08/26/2022 0.0  <=0.0 % Final    Neutrophils, Abs 08/26/2022 1.83  1.80 - 7.70 K/uL Final    Lymphocytes, Absolute 08/26/2022 2.18  1.00 - 4.80 K/uL Final    Monocytes, Absolute 08/26/2022 0.40  0.00 - 0.80 K/uL Final    Eosinophils, Absolute 08/26/2022 0.29  0.00 - 0.50 K/uL Final    Basophils, Absolute 08/26/2022 0.04  0.00 - 0.20 K/uL Final    Immature Granulocytes, Absolute 08/26/2022 0.00  0.00 - 0.04 K/uL Final    nRBC, Absolute 08/26/2022 0.00  <=0.00 x10e3/uL Final    Diff Type 08/26/2022 Auto   Final   Lab Requisition on 08/19/2022   Component Date Value Ref Range Status    Sodium 08/19/2022 139  136 - 145 mmol/L Final    Potassium 08/19/2022 4.3  3.5 - 5.1 mmol/L Final    Chloride 08/19/2022 107  98 - 107 mmol/L Final    CO2 08/19/2022 28  21 - 32 mmol/L Final    Anion Gap 08/19/2022 8  7 - 16 mmol/L Final    Glucose 08/19/2022 92  74 - 106 mg/dL Final    BUN 08/19/2022 20 (H)  7 - 18 mg/dL Final    Creatinine 08/19/2022 1.02  0.70 - 1.30 mg/dL Final    BUN/Creatinine Ratio 08/19/2022 20  6 - 20 Final    Calcium 08/19/2022 8.6  8.5 - 10.1 mg/dL Final    eGFR 08/19/2022 96  >=60 mL/min/1.73m² Final    CRP 08/19/2022 0.43  0.00 - 0.80 mg/dL Final    ESR Westergren 08/19/2022 65 (H)  0 - 15 mm/Hr Final    WBC 08/19/2022 5.59  4.50 - 11.00 K/uL Final    RBC 08/19/2022 4.71  4.60 - 6.20 M/uL Final    Hemoglobin 08/19/2022 12.7 (L)  13.5 - 18.0 g/dL Final    Hematocrit 08/19/2022 38.5 (L)  40.0 - 54.0 % Final    MCV 08/19/2022 81.7  80.0 - 96.0 fL Final    MCH 08/19/2022 27.0  27.0 - 31.0 pg Final    MCHC 08/19/2022 33.0  32.0 - 36.0 g/dL Final    RDW 08/19/2022 13.3  11.5 - 14.5 % Final    Platelet Count  08/19/2022 289  150 - 400 K/uL Final    MPV 08/19/2022 9.8  9.4 - 12.4 fL Final    Neutrophils % 08/19/2022 42.2 (L)  53.0 - 65.0 % Final    Lymphocytes % 08/19/2022 44.0 (H)  27.0 - 41.0 % Final    Monocytes % 08/19/2022 6.6 (H)  2.0 - 6.0 % Final    Eosinophils % 08/19/2022 5.9 (H)  1.0 - 4.0 % Final    Basophils % 08/19/2022 1.1 (H)  0.0 - 1.0 % Final    Immature Granulocytes % 08/19/2022 0.2  0.0 - 0.4 % Final    nRBC, Auto 08/19/2022 0.0  <=0.0 % Final    Neutrophils, Abs 08/19/2022 2.36  1.80 - 7.70 K/uL Final    Lymphocytes, Absolute 08/19/2022 2.46  1.00 - 4.80 K/uL Final    Monocytes, Absolute 08/19/2022 0.37  0.00 - 0.80 K/uL Final    Eosinophils, Absolute 08/19/2022 0.33  0.00 - 0.50 K/uL Final    Basophils, Absolute 08/19/2022 0.06  0.00 - 0.20 K/uL Final    Immature Granulocytes, Absolute 08/19/2022 0.01  0.00 - 0.04 K/uL Final    nRBC, Absolute 08/19/2022 0.00  <=0.00 x10e3/uL Final    Diff Type 08/19/2022 Auto   Final   Lab Requisition on 08/18/2022   Component Date Value Ref Range Status    Sodium 08/18/2022 143  136 - 145 mmol/L Final    Potassium 08/18/2022 3.4 (L)  3.5 - 5.1 mmol/L Final    Chloride 08/18/2022 113 (H)  98 - 107 mmol/L Final    CO2 08/18/2022 26  21 - 32 mmol/L Final    Anion Gap 08/18/2022 7  7 - 16 mmol/L Final    Glucose 08/18/2022 81  74 - 106 mg/dL Final    BUN 08/18/2022 24 (H)  7 - 18 mg/dL Final    Creatinine 08/18/2022 0.86  0.70 - 1.30 mg/dL Final    BUN/Creatinine Ratio 08/18/2022 28 (H)  6 - 20 Final    Calcium 08/18/2022 7.6 (L)  8.5 - 10.1 mg/dL Final    eGFR 08/18/2022 113  >=60 mL/min/1.73m² Final    CRP 08/18/2022 0.48  0.00 - 0.80 mg/dL Final    ESR Westergren 08/18/2022 1  0 - 15 mm/Hr Final    WBC 08/18/2022 4.55  4.50 - 11.00 K/uL Final    RBC 08/18/2022 8.56 (H)  4.60 - 6.20 M/uL Final    Hemoglobin 08/18/2022 23.1 (HH)  13.5 - 18.0 g/dL Final    Hematocrit 08/18/2022 68.7 (HH)  40.0 - 54.0 % Final    MCV 08/18/2022 80.3  80.0 - 96.0 fL Final    MCH 08/18/2022  27.0  27.0 - 31.0 pg Final    MCHC 08/18/2022 33.6  32.0 - 36.0 g/dL Final    RDW 08/18/2022 16.8 (H)  11.5 - 14.5 % Final    Platelet Count 08/18/2022 76 (L)  150 - 400 K/uL Final    MPV 08/18/2022 9.9  9.4 - 12.4 fL Final    Neutrophils % 08/18/2022 62.8  53.0 - 65.0 % Final    Lymphocytes % 08/18/2022 23.3 (L)  27.0 - 41.0 % Final    Monocytes % 08/18/2022 5.1  2.0 - 6.0 % Final    Eosinophils % 08/18/2022 7.5 (H)  1.0 - 4.0 % Final    Basophils % 08/18/2022 1.1 (H)  0.0 - 1.0 % Final    Immature Granulocytes % 08/18/2022 0.2  0.0 - 0.4 % Final    nRBC, Auto 08/18/2022 0.0  <=0.0 % Final    Neutrophils, Abs 08/18/2022 2.86  1.80 - 7.70 K/uL Final    Lymphocytes, Absolute 08/18/2022 1.06  1.00 - 4.80 K/uL Final    Monocytes, Absolute 08/18/2022 0.23  0.00 - 0.80 K/uL Final    Eosinophils, Absolute 08/18/2022 0.34  0.00 - 0.50 K/uL Final    Basophils, Absolute 08/18/2022 0.05  0.00 - 0.20 K/uL Final    Immature Granulocytes, Absolute 08/18/2022 0.01  0.00 - 0.04 K/uL Final    nRBC, Absolute 08/18/2022 0.00  <=0.00 x10e3/uL Final    Diff Type 08/18/2022 Auto   Final    Platelet Morphology 08/18/2022 Decreased (A)  Normal Final    Anisocytosis 08/18/2022 1+   Final   Lab Requisition on 08/12/2022   Component Date Value Ref Range Status    ESR Westergren 08/12/2022 66 (H)  0 - 15 mm/Hr Final    WBC 08/12/2022 4.96  4.50 - 11.00 K/uL Final    RBC 08/12/2022 4.81  4.60 - 6.20 M/uL Final    Hemoglobin 08/12/2022 13.0 (L)  13.5 - 18.0 g/dL Final    Hematocrit 08/12/2022 40.0  40.0 - 54.0 % Final    MCV 08/12/2022 83.2  80.0 - 96.0 fL Final    MCH 08/12/2022 27.0  27.0 - 31.0 pg Final    MCHC 08/12/2022 32.5  32.0 - 36.0 g/dL Final    RDW 08/12/2022 13.3  11.5 - 14.5 % Final    Platelet Count 08/12/2022 292  150 - 400 K/uL Final    MPV 08/12/2022 9.6  9.4 - 12.4 fL Final    Neutrophils % 08/12/2022 43.4 (L)  53.0 - 65.0 % Final    Lymphocytes % 08/12/2022 42.7 (H)  27.0 - 41.0 % Final    Monocytes % 08/12/2022 7.1 (H)  2.0  - 6.0 % Final    Eosinophils % 08/12/2022 5.6 (H)  1.0 - 4.0 % Final    Basophils % 08/12/2022 1.2 (H)  0.0 - 1.0 % Final    Immature Granulocytes % 08/12/2022 0.0  0.0 - 0.4 % Final    nRBC, Auto 08/12/2022 0.0  <=0.0 % Final    Neutrophils, Abs 08/12/2022 2.15  1.80 - 7.70 K/uL Final    Lymphocytes, Absolute 08/12/2022 2.12  1.00 - 4.80 K/uL Final    Monocytes, Absolute 08/12/2022 0.35  0.00 - 0.80 K/uL Final    Eosinophils, Absolute 08/12/2022 0.28  0.00 - 0.50 K/uL Final    Basophils, Absolute 08/12/2022 0.06  0.00 - 0.20 K/uL Final    Immature Granulocytes, Absolute 08/12/2022 0.00  0.00 - 0.04 K/uL Final    nRBC, Absolute 08/12/2022 0.00  <=0.00 x10e3/uL Final    Diff Type 08/12/2022 Scan Smear   Final    Segmented Neutrophils, Man % 08/12/2022 46 (L)  50 - 62 % Final    Lymphocytes, Man % 08/12/2022 39  27 - 41 % Final    Monocytes, Man % 08/12/2022 9 (H)  2 - 6 % Final    Eosinophils, Man % 08/12/2022 6 (H)  1 - 4 % Final    Platelet Morphology 08/12/2022 Few Large Platelets (A)  Normal Final    RBC Morphology 08/12/2022 Normal   Final    Atypical Lymphocytes 08/12/2022 Rare   Final   Lab Requisition on 08/11/2022   Component Date Value Ref Range Status    Sodium 08/11/2022 137  136 - 145 mmol/L Final    Potassium 08/11/2022 4.4  3.5 - 5.1 mmol/L Final    Chloride 08/11/2022 105  98 - 107 mmol/L Final    CO2 08/11/2022 24  21 - 32 mmol/L Final    Anion Gap 08/11/2022 12  7 - 16 mmol/L Final    Glucose 08/11/2022 91  74 - 106 mg/dL Final    BUN 08/11/2022 26 (H)  7 - 18 mg/dL Final    Creatinine 08/11/2022 0.98  0.70 - 1.30 mg/dL Final    BUN/Creatinine Ratio 08/11/2022 27 (H)  6 - 20 Final    Calcium 08/11/2022 8.9  8.5 - 10.1 mg/dL Final    eGFR 08/11/2022 101  >=60 mL/min/1.73m² Final    CRP 08/11/2022 0.91 (H)  0.00 - 0.80 mg/dL Final   Office Visit on 07/27/2022   Component Date Value Ref Range Status    Hemoglobin A1C 07/27/2022 7.3 (H)  4.5 - 6.6 % Final    Estimated Average Glucose 07/27/2022 157   mg/dL Final    Creatinine, Urine 07/27/2022 115  39 - 259 mg/dL Final    Microalbumin 07/27/2022 16.1 (H)  0.0 - 2.8 mg/dL Final    Microalbumin/Creatinine Ratio 07/27/2022 140.0 (H)  0.0 - 30.0 mg/g Final   There may be more visits with results that are not included.         Orders Placed This Encounter   Procedures    Drug Screen Definitive 14, Urine     Standing Status:   Future     Number of Occurrences:   1     Standing Expiration Date:   1/20/2024     Order Specific Question:   Specimen Source     Answer:   Urine    POCT Urine Drug Screen Presump     Interpretive Information:     Negative:  No drug detected at the cut off level.   Positive:  This result represents presumptive positive for the   tested drug, other substances may yield a positive response other   than the analyte of interest. This result should be utilized for   diagnostic purpose only. Confirmation testing will be performed upon physician request only.            Requested Prescriptions     Signed Prescriptions Disp Refills    HYDROcodone-acetaminophen (NORCO)  mg per tablet 60 tablet 0     Sig: Take 1 tablet by mouth every 12 (twelve) hours as needed for Pain.    HYDROcodone-acetaminophen (NORCO)  mg per tablet 60 tablet 0     Sig: Take 1 tablet by mouth every 12 (twelve) hours as needed for Pain.       Assessment:     1. Diabetic neuropathy with neurologic complication    2. Ulcer of right heel, with necrosis of bone    3. Peripheral vascular disease, unspecified    4. Encounter for long-term (current) use of other medications         A's of Opioid Risk Assessment  Activity:Patient can perform ADL.   Analgesia:Patients pain is partially controlled by current medication. Patient has tried OTC medications such as Tylenol and Ibuprofen with out relief.   Adverse Effects: Patient denies constipation or sedation.  Aberrant Behavior:  reviewed with no aberrant drug seeking/taking behavior.  Overdose reversal drug naloxone  discussed    Drug screen reviewed      Plan:    Presumptive drug screen negative last refill October 25    Will order definitive drug screen for clarification    Uses crutches/scooter for ambulation     Lower extremity discomfort right greater than left    Ulcer right foot continues to follow in management    PICC line left biceps    Walking boot right lower extremity    He states current medications helping control his discomfort, would like to continue with conservative management    Continue activity as directed continue current medication    Follow-up 2 months    Dr. Paulson, May 2023    Bring original prescription medication bottles/container/box with labels to each visit    Pill count    Physical therapy    Massage therapy declines

## 2022-11-21 ENCOUNTER — OFFICE VISIT (OUTPATIENT)
Dept: PAIN MEDICINE | Facility: CLINIC | Age: 37
End: 2022-11-21
Payer: MEDICARE

## 2022-11-21 VITALS
SYSTOLIC BLOOD PRESSURE: 174 MMHG | BODY MASS INDEX: 41.75 KG/M2 | DIASTOLIC BLOOD PRESSURE: 99 MMHG | WEIGHT: 315 LBS | HEART RATE: 88 BPM | HEIGHT: 73 IN | RESPIRATION RATE: 16 BRPM

## 2022-11-21 DIAGNOSIS — I73.9 PERIPHERAL VASCULAR DISEASE, UNSPECIFIED: Chronic | ICD-10-CM

## 2022-11-21 DIAGNOSIS — Z79.899 ENCOUNTER FOR LONG-TERM (CURRENT) USE OF OTHER MEDICATIONS: ICD-10-CM

## 2022-11-21 DIAGNOSIS — L97.414 ULCER OF RIGHT HEEL, WITH NECROSIS OF BONE: Chronic | ICD-10-CM

## 2022-11-21 DIAGNOSIS — E11.40 DIABETIC NEUROPATHY WITH NEUROLOGIC COMPLICATION: Primary | Chronic | ICD-10-CM

## 2022-11-21 DIAGNOSIS — E11.49 DIABETIC NEUROPATHY WITH NEUROLOGIC COMPLICATION: Primary | Chronic | ICD-10-CM

## 2022-11-21 LAB

## 2022-11-21 PROCEDURE — 99214 OFFICE O/P EST MOD 30 MIN: CPT | Mod: S$PBB,,, | Performed by: PHYSICIAN ASSISTANT

## 2022-11-21 PROCEDURE — G0481 PR DRUG TEST DEF 8-14 CLASSES: ICD-10-PCS | Mod: ,,, | Performed by: CLINICAL MEDICAL LABORATORY

## 2022-11-21 PROCEDURE — 80305 DRUG TEST PRSMV DIR OPT OBS: CPT | Mod: PBBFAC | Performed by: PHYSICIAN ASSISTANT

## 2022-11-21 PROCEDURE — G0481 DRUG TEST DEF 8-14 CLASSES: HCPCS | Mod: ,,, | Performed by: CLINICAL MEDICAL LABORATORY

## 2022-11-21 PROCEDURE — 99214 PR OFFICE/OUTPT VISIT, EST, LEVL IV, 30-39 MIN: ICD-10-PCS | Mod: S$PBB,,, | Performed by: PHYSICIAN ASSISTANT

## 2022-11-21 PROCEDURE — 99214 OFFICE O/P EST MOD 30 MIN: CPT | Mod: PBBFAC | Performed by: PHYSICIAN ASSISTANT

## 2022-11-21 RX ORDER — HYDROCODONE BITARTRATE AND ACETAMINOPHEN 10; 325 MG/1; MG/1
1 TABLET ORAL EVERY 12 HOURS PRN
Qty: 60 TABLET | Refills: 0 | Status: SHIPPED | OUTPATIENT
Start: 2022-12-23 | End: 2023-01-23 | Stop reason: SDUPTHER

## 2022-11-21 RX ORDER — HYDROCODONE BITARTRATE AND ACETAMINOPHEN 10; 325 MG/1; MG/1
1 TABLET ORAL EVERY 12 HOURS PRN
Qty: 60 TABLET | Refills: 0 | Status: SHIPPED | OUTPATIENT
Start: 2022-11-23 | End: 2023-01-23 | Stop reason: SDUPTHER

## 2022-11-23 LAB
6-ACETYLMORPHINE, URINE (RUSH): NEGATIVE 10 NG/ML
7-AMINOCLONAZEPAM, URINE (RUSH): NEGATIVE 25 NG/ML
A-HYDROXYALPRAZOLAM, URINE (RUSH): NEGATIVE 25 NG/ML
ACETYL FENTANYL, URINE (RUSH): NEGATIVE 2.5 NG/ML
ACETYL NORFENTANYL OXALATE, URINE (RUSH): NEGATIVE 5 NG/ML
AMPHET UR QL SCN: NEGATIVE
BENZOYLECGONINE, URINE (RUSH): NEGATIVE 100 NG/ML
BUPRENORPHINE UR QL SCN: NEGATIVE 25 NG/ML
CODEINE, URINE (RUSH): NEGATIVE 25 NG/ML
CREAT UR-MCNC: 92 MG/DL (ref 39–259)
EDDP, URINE (RUSH): NEGATIVE 25 NG/ML
FENTANYL, URINE (RUSH): NEGATIVE 2.5 NG/ML
HYDROCODONE, URINE (RUSH): NEGATIVE 25 NG/ML
HYDROMORPHONE, URINE (RUSH): NEGATIVE 25 NG/ML
LORAZEPAM, URINE (RUSH): NEGATIVE 25 NG/ML
METHADONE UR QL SCN: NEGATIVE 25 NG/ML
METHAMPHET UR QL SCN: NEGATIVE
MORPHINE, URINE (RUSH): NEGATIVE 25 NG/ML
NORBUPRENORPHINE, URINE (RUSH): NEGATIVE 25 NG/ML
NORDIAZEPAM, URINE (RUSH): NEGATIVE 25 NG/ML
NORFENTANYL OXALATE, URINE (RUSH): NEGATIVE 5 NG/ML
NORHYDROCODONE, URINE (RUSH): NEGATIVE 50 NG/ML
NOROXYCODONE HCL, URINE (RUSH): NEGATIVE 50 NG/ML
OXAZEPAM, URINE (RUSH): NEGATIVE 25 NG/ML
OXYCODONE UR QL SCN: NEGATIVE 25 NG/ML
OXYMORPHONE, URINE (RUSH): NEGATIVE 25 NG/ML
PH UR STRIP: 5.5 PH UNITS
SP GR UR STRIP: 1.01
TAPENTADOL, URINE (RUSH): NEGATIVE 25 NG/ML
TEMAZEPAM, URINE (RUSH): NEGATIVE 25 NG/ML
THC-COOH, URINE (RUSH): NEGATIVE 25 NG/ML
TRAMADOL, URINE (RUSH): NEGATIVE 100 NG/ML

## 2022-12-12 NOTE — PATIENT INSTRUCTIONS
Right foot wound clean with vashe. Apply keystone spray leave on for 10-15 min then rinse with vashe. Apply vashe moisten drawtex to wound (cut slightly larger than wound) cover with dry gauze, and ABD,wrap with kerlix,secure with paper tape. Change daily and as needed     Elevate feet as much as possible  Avoid prolonged standing  Knee scooter and/or walking boot  Monitor closely for s/s of infection including fever, chills, increase in pain, odor from wound, and increased redness from foot. Go to ER if any complications develop.   Keep leg elevated and avoid pressure on wound.  Diabetes:  Monitor glucose closely. Check fasting glucose and 2 hours after meals. HgA1C goal <7, fasting glucose , and 2 hours after meals <180  Hypertension:  Check blood pressure twice daily, goal <120/80  Diet:   Increase protein intake, avoid fried, fatty foods and foods high in simple carbs.   Vitamins:  Take vitamin C 1000 mg, zinc 50mg, vitamin d 5000 units, and a daily multivitamin. Angel is a good source of protein and nutrients to aid in wound healing.

## 2022-12-12 NOTE — PROGRESS NOTES
AUGUSTUS Macedo   [facility]  [address] [facility] [address]  [phone]      PATIENT NAME: Jean Pierre Paulson  : 1985  DATE: 22  MRN: [id number]      Billing Provider: AUGUSTUS Macedo  Level of Service:   Patient PCP Information       Provider PCP Type    Mojgan Lomeli NP General            Reason for Visit / Chief Complaint: Non-healing Wound Follow Up (Right foot)       History of Present Illness / Problem Focused Workflow     Jean Pierre Paulson is a 36 y.o. male who presents to clinic for follow up on chronic-non healing wound on the right foot. Wound  is larger today with increased drainage and malodorous. Reports pain to lateral aspect of foot, x-ray today. Cultures pending. Patient is wearing off-loading boot. Biofilm noted to wound bed. Wound continues to have thick callus filippo-wound, bedside debridement today. Start PO antibiotics today, new POC initiated. Significant PMH  Includes diabetes and hypertension. Last HgA1C 7.3 in 2022, diabetes is managed by PCP. He is due for arterial dopplers, last doppler in 2020. Denies fever or chills.     Review of Systems     Review of Systems   Constitutional:  Negative for activity change, chills and fever.   Respiratory:  Negative for chest tightness and shortness of breath.    Cardiovascular:  Positive for leg swelling. Negative for chest pain and palpitations.   Musculoskeletal:  Positive for arthralgias, gait problem and joint swelling.   Skin:  Positive for color change and wound.        See wound assessment   Neurological:  Positive for weakness and numbness.   Psychiatric/Behavioral:  Negative for agitation, behavioral problems, confusion and self-injury.      Medical / Social / Family History     Past Medical History:   Diagnosis Date    Anemia     Chronic osteomyelitis     Diabetes mellitus with neuropathy     Diabetes mellitus, type 2     DM2 (diabetes mellitus, type 2)      HTN (hypertension)     Hypertension     Neuropathy     Obesity     Osteomyelitis 10/2020    Hx of R foot, Tx with IV Abx    Peripheral vascular disease        Past Surgical History:   Procedure Laterality Date    APPLICATION OF SPLIT-THICKNESS SKIN GRAFT (STSG) TO LOWER EXTREMITY Right 3/2/2022    Procedure: APPLICATION, GRAFT, SKIN, SPLIT-THICKNESS, TO LOWER EXTREMITY;  Surgeon: Greg Ramírez MD;  Location: [facility];  Service: General;  Laterality: Right;    CHOLECYSTECTOMY      DEBRIDEMENT OF FOOT Right 10/2020    FOOT SURGERY      Right diabetic foot ulcer Elliott's Grade 3      Bone exposure to R heel, Hx of IV Abx, cultures show multiple bacterial growth, Hx of Osteomyelitis, HBOT       Social History  Mr. Jean Pierre Paulson  reports that he quit smoking about 10 months ago. His smoking use included cigarettes. He started smoking about 22 years ago. He smoked an average of .25 packs per day. He has never used smokeless tobacco. He reports current alcohol use of about 2.0 standard drinks per week. He reports current drug use. Drug: Hydrocodone.    Family History  .rufus Paulson family history includes Diabetes in his father and maternal grandmother; Hypertension in his father and maternal grandmother.    Medications and Allergies     Medications  Outpatient Medications Marked as Taking for the 12/13/22 encounter (Office Visit) with AUGUSTUS Macedo   Medication Sig Dispense Refill    acetaminophen (TYLENOL) 325 MG tablet Take 2 tablets (650 mg total) by mouth every 4 (four) hours as needed.  0    becaplermin (REGRANEX) 0.01 % gel Apply topically every morning. for 90 doses 90 g 1    blood sugar diagnostic (BLOOD GLUCOSE TEST) Strp 1 strip by Misc.(Non-Drug; Combo Route) route once daily. accu-chek Veronika strips 90 strip 5    empaglifloz-linaglip-metformin (TRIJARDY XR) 5-2.5-1,000 mg TBph Take 1 tablet by mouth once daily. 90 tablet 1    FEROSUL 325 mg (65 mg iron) Tab tablet Take 1  tablet (325 mg total) by mouth once daily. 90 tablet 1    gabapentin (NEURONTIN) 300 MG capsule Take 1 capsule (300 mg total) by mouth every 8 (eight) hours. 270 capsule 1    hydroCHLOROthiazide (HYDRODIURIL) 25 MG tablet Take 1 tablet (25 mg total) by mouth once daily. 90 tablet 1    HYDROcodone-acetaminophen (NORCO)  mg per tablet Take 1 tablet by mouth every 12 (twelve) hours as needed for Pain. 60 tablet 0    [START ON 12/23/2022] HYDROcodone-acetaminophen (NORCO)  mg per tablet Take 1 tablet by mouth every 12 (twelve) hours as needed for Pain. 60 tablet 0    rosuvastatin (CRESTOR) 10 MG tablet Take 1 tablet (10 mg total) by mouth once daily. 90 tablet 1    semaglutide (OZEMPIC) 2 mg/dose (8 mg/3 mL) PnIj Inject 2 mg into the skin every 7 days.         Allergies  Review of patient's allergies indicates:   Allergen Reactions    Tomato Itching       Physical Examination     Vitals:    12/13/22 1100   BP: (!) 142/92   Pulse: 89   Resp: 20   Temp: 97.4 °F (36.3 °C)     Physical Exam  Vitals and nursing note reviewed.   Constitutional:       Appearance: Normal appearance.   HENT:      Head: Normocephalic.   Cardiovascular:      Rate and Rhythm: Normal rate and regular rhythm.      Pulses: Normal pulses.      Heart sounds: Normal heart sounds.   Pulmonary:      Effort: Pulmonary effort is normal. No respiratory distress.   Chest:      Chest wall: No tenderness.   Musculoskeletal:         General: Swelling, tenderness and deformity present.      Right lower leg: Edema present.   Skin:     General: Skin is warm and dry.      Capillary Refill: Capillary refill takes 2 to 3 seconds.      Comments: See LDA for photos and measurements   Neurological:      General: No focal deficit present.      Mental Status: He is alert and oriented to person, place, and time. Mental status is at baseline.   Psychiatric:         Mood and Affect: Mood normal.         Behavior: Behavior normal.         Thought Content:  Thought content normal.         Judgment: Judgment normal.     Assessment and Plan             Incision/Site 03/02/22 1335 Right Foot posterior (Active)   03/02/22 1335   Present Prior to Hospital Arrival?:    Side: Right   Location: Foot   Orientation: posterior   Incision Type:    Closure Method:    Additional Comments:    Removal Indication and Assessment:    Wound Outcome:    Removal Indications:    Wound Image      12/13/22 1101   Dressing Appearance Moist drainage 12/13/22 1101   Drainage Amount Moderate 12/13/22 1101   Drainage Characteristics/Odor Serosanguineous 12/13/22 1101   Appearance Pink;Moist;Granulating;Muscle 12/13/22 1101   Black (%), Wound Tissue Color 0 % 12/13/22 1101   Red (%), Wound Tissue Color 100 % 12/13/22 1101   Yellow (%), Wound Tissue Color 0 % 12/13/22 1101   Periwound Area Moist 12/13/22 1101   Wound Edges Callused 12/13/22 1101   Wound Length (cm) 10.2 cm 12/13/22 1101   Wound Width (cm) 6.1 cm 12/13/22 1101   Wound Depth (cm) 0.8 cm 12/13/22 1101   Wound Volume (cm^3) 49.776 cm^3 12/13/22 1101   Wound Surface Area (cm^2) 62.22 cm^2 12/13/22 1101   Care Cleansed with:;Soap and water 12/13/22 1101   Dressing Applied;Gauze, wet to dry;Gauze;Rolled gauze 12/13/22 1101   Periwound Care Moisturizer applied 12/13/22 1101   Dressing Change Due 12/14/22 12/13/22 1101     Problem List Items Addressed This Visit          Endocrine    Type 2 diabetes mellitus with right diabetic foot ulcer - Primary    Overview                          Relevant Orders    US Lower Extrem Arteries Bilat with JULIENNE (xpd)    X-Ray Foot Complete 3 view Right (Completed)    Culture, Wound    Culture, Anaerobe       Orthopedic    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    Overview                       Other Visit Diagnoses       Edema, unspecified type        Relevant Orders    US Lower Extrem Arteries Bilat with JULIENNE (xpd)    PVD (peripheral vascular  disease)        Relevant Orders    US Lower Extrem Arteries Bilat with JULIENNE (xpd)            Future Appointments   Date Time Provider Department Center   1/5/2023  9:00 AM [address]   1/5/2023 10:00 AM [facility]USTUS Macedo [facility]   1/23/2023 10:00 AM VONNIE Ma [facility]   5/1/2023  1:30 PM AWV NURSE, [facility] [place]            Signature:  AUGUSTUS Macedo  [facility]  [address]  [place] [address]  [phone]    Date of encounter: 12/13/22

## 2022-12-13 ENCOUNTER — OFFICE VISIT (OUTPATIENT)
Dept: WOUND CARE | Facility: CLINIC | Age: 37
End: 2022-12-13
Attending: FAMILY MEDICINE
Payer: MEDICARE

## 2022-12-13 ENCOUNTER — HOSPITAL ENCOUNTER (OUTPATIENT)
Dept: RADIOLOGY | Facility: HOSPITAL | Age: 37
Discharge: HOME | End: 2022-12-13
Attending: NURSE PRACTITIONER
Payer: MEDICARE

## 2022-12-13 VITALS
DIASTOLIC BLOOD PRESSURE: 92 MMHG | TEMPERATURE: 97 F | SYSTOLIC BLOOD PRESSURE: 142 MMHG | RESPIRATION RATE: 20 BRPM | HEART RATE: 89 BPM

## 2022-12-13 DIAGNOSIS — E11.621 TYPE 2 DIABETES MELLITUS WITH RIGHT DIABETIC FOOT ULCER: Primary | ICD-10-CM

## 2022-12-13 DIAGNOSIS — L97.516 NON-PRESSURE CHRONIC ULCER OF OTHER PART OF RIGHT FOOT WITH BONE INVOLVEMENT WITHOUT EVIDENCE OF NECROSIS: ICD-10-CM

## 2022-12-13 DIAGNOSIS — E11.621 TYPE 2 DIABETES MELLITUS WITH RIGHT DIABETIC FOOT ULCER: ICD-10-CM

## 2022-12-13 DIAGNOSIS — I73.9 PVD (PERIPHERAL VASCULAR DISEASE): ICD-10-CM

## 2022-12-13 DIAGNOSIS — L97.519 TYPE 2 DIABETES MELLITUS WITH RIGHT DIABETIC FOOT ULCER: Primary | ICD-10-CM

## 2022-12-13 DIAGNOSIS — R60.9 EDEMA, UNSPECIFIED TYPE: ICD-10-CM

## 2022-12-13 DIAGNOSIS — L97.519 TYPE 2 DIABETES MELLITUS WITH RIGHT DIABETIC FOOT ULCER: ICD-10-CM

## 2022-12-13 PROCEDURE — 99215 OFFICE O/P EST HI 40 MIN: CPT | Mod: PBBFAC,25 | Performed by: NURSE PRACTITIONER

## 2022-12-13 PROCEDURE — 87075 CULTR BACTERIA EXCEPT BLOOD: CPT | Mod: ,,, | Performed by: CLINICAL MEDICAL LABORATORY

## 2022-12-13 PROCEDURE — 87077 CULTURE, WOUND: ICD-10-PCS | Mod: ,,, | Performed by: CLINICAL MEDICAL LABORATORY

## 2022-12-13 PROCEDURE — 87070 CULTURE, WOUND: ICD-10-PCS | Mod: ,,, | Performed by: CLINICAL MEDICAL LABORATORY

## 2022-12-13 PROCEDURE — 11045 DEBRIDEMENT: ICD-10-PCS | Mod: S$PBB,,, | Performed by: NURSE PRACTITIONER

## 2022-12-13 PROCEDURE — 73620 X-RAY EXAM OF FOOT: CPT | Mod: 26,RT,, | Performed by: RADIOLOGY

## 2022-12-13 PROCEDURE — 87186 CULTURE, WOUND: ICD-10-PCS | Mod: 91,,, | Performed by: CLINICAL MEDICAL LABORATORY

## 2022-12-13 PROCEDURE — 87075 CULTURE, ANAEROBE: ICD-10-PCS | Mod: ,,, | Performed by: CLINICAL MEDICAL LABORATORY

## 2022-12-13 PROCEDURE — 87186 SC STD MICRODIL/AGAR DIL: CPT | Mod: ,,, | Performed by: CLINICAL MEDICAL LABORATORY

## 2022-12-13 PROCEDURE — 73620 XR FOOT COMPLETE 3 VIEW RIGHT: ICD-10-PCS | Mod: 26,RT,, | Performed by: RADIOLOGY

## 2022-12-13 PROCEDURE — 11042 DEBRIDEMENT: ICD-10-PCS | Mod: S$PBB,,, | Performed by: NURSE PRACTITIONER

## 2022-12-13 PROCEDURE — 99499 UNLISTED E&M SERVICE: CPT | Mod: S$PBB,,, | Performed by: NURSE PRACTITIONER

## 2022-12-13 PROCEDURE — 73630 X-RAY EXAM OF FOOT: CPT | Mod: TC,RT

## 2022-12-13 PROCEDURE — 99499 NO LOS: ICD-10-PCS | Mod: S$PBB,,, | Performed by: NURSE PRACTITIONER

## 2022-12-13 PROCEDURE — 11042 DBRDMT SUBQ TIS 1ST 20SQCM/<: CPT | Mod: S$PBB,,, | Performed by: NURSE PRACTITIONER

## 2022-12-13 PROCEDURE — 87070 CULTURE OTHR SPECIMN AEROBIC: CPT | Mod: ,,, | Performed by: CLINICAL MEDICAL LABORATORY

## 2022-12-13 PROCEDURE — 87077 CULTURE AEROBIC IDENTIFY: CPT | Mod: ,,, | Performed by: CLINICAL MEDICAL LABORATORY

## 2022-12-13 PROCEDURE — 11045 DBRDMT SUBQ TISS EACH ADDL: CPT | Mod: PBBFAC | Performed by: NURSE PRACTITIONER

## 2022-12-13 RX ORDER — CEFUROXIME AXETIL 500 MG/1
500 TABLET ORAL EVERY 12 HOURS
Qty: 28 TABLET | Refills: 0 | Status: SHIPPED | OUTPATIENT
Start: 2022-12-13 | End: 2022-12-27

## 2022-12-13 RX ORDER — CLINDAMYCIN HYDROCHLORIDE 300 MG/1
300 CAPSULE ORAL EVERY 8 HOURS
Qty: 42 CAPSULE | Refills: 0 | Status: SHIPPED | OUTPATIENT
Start: 2022-12-13 | End: 2022-12-27

## 2022-12-13 NOTE — PROGRESS NOTES
Debridement Performed for Assessment: Wound# 1  Performed By: Provider: Yelitza Alas NP  Assistant: Lizabeth Stone LPN    Debridement: Surgical    Photo taken post procedure:    Time-Out Taken: Yes  Level: Skin/Subcutaneous Tissue  Post Debridement Measurements  Length: (cm) 11.1  Width: (cm) 6.8  Depth: (cm) 0.9      Area: (cm²) 75.48  Percent Debrided: 100%  Total Area Debrided: (sq cm)     Tissue and other material debrided:  Adipose, Dermis, Epidermis, Subcutaneous  Devitalized Tissue Debrided:Biofilm, callus  Instrument: Curette  Bleeding: Moderate  Hemostasis Achieved: Pressure  Procedural Pain: Insensate  Post Procedural Pain: Insensate  Response to Treatment: Procedure was tolerated well    Devitalized materials/tissue Removed  the following was removed during debridement  subcutaneous      Post Debridement Diagnosis  chronic right foot diabetic ulcer  Post debridement diagnosis  Same as Pre-operative debridement diagnosis, No Complications noted.      Grafts or implants applied  Was a graft or implant applied?  No      Procedure assistant Lizabeth Stone LPN  Procedure assisted by:  Assistant is the same as nurse listed above      Complications related to procedure  Did any complication occure during procedure?  No complications noted during or after procedure.      Specimen  Specimen collect during procedure?  No specimen collected      Anaesthesia:  Anesthesia used  None      Blood Loss:  Blood loss during procedure  less than 5 cc

## 2022-12-13 NOTE — PROCEDURES
Debridement    Date/Time: 12/13/2022 10:00 AM  Performed by: AUGUSTUS Macedo  Authorized by: AUGUSTUS Macedo     Consent Done?:  Yes (Written)    Wound Details:    Location:  Right foot    Location:  Right Plantar    Type of Debridement:  Excisional       Length (cm):  11.1       Area (sq cm):  75.48       Width (cm):  6.8       Percent Debrided (%):  100       Depth (cm):  0.9       Total Area Debrided (sq cm):  75.48    Depth of debridement:  Subcutaneous tissue    Tissue debrided:  Adipose, Dermis, Epidermis and Subcutaneous    Devitalized tissue debrided:  Biofilm, Callus, Clots and Exudate    Instruments:  Curette and Scissors    Bleeding:  Minimal  Hemostasis Achieved: Yes    Method Used:  Pressure  Patient tolerance:  Patient tolerated the procedure well with no immediate complications     Assistant JOSE ALFREDO Stone LPN

## 2022-12-16 ENCOUNTER — TELEPHONE (OUTPATIENT)
Dept: WOUND CARE | Facility: CLINIC | Age: 37
End: 2022-12-16
Payer: MEDICARE

## 2022-12-16 LAB
BACTERIA SPEC ANAEROBE CULT: ABNORMAL
MICROORGANISM SPEC CULT: ABNORMAL

## 2022-12-16 RX ORDER — SULFAMETHOXAZOLE AND TRIMETHOPRIM 800; 160 MG/1; MG/1
1 TABLET ORAL 2 TIMES DAILY
Qty: 60 TABLET | Refills: 0 | Status: SHIPPED | OUTPATIENT
Start: 2022-12-16 | End: 2023-01-15

## 2022-12-16 NOTE — PROGRESS NOTES
Reviewed culture results with pat and instructed pat to stop taking clindamycin and penicillin. Start taking bactrim DS po BID. VU. Pt voiced wound is looking better; and not as wet.

## 2022-12-16 NOTE — TELEPHONE ENCOUNTER
----- Message from AUGUSTUS Macedo sent at 12/16/2022 11:22 AM CST -----  Sending in Bactrtim Ds, stop clindamycin and penicillin.

## 2023-01-19 NOTE — PROGRESS NOTES
AUGUSTUS Macedo   [facility]  [address] [facility] [address]  [phone]      PATIENT NAME: Jean Pierre Paulson  : 1985  DATE: 23  MRN: [id number]      Billing Provider: AUGUSTUS Macedo  Level of Service:   Patient PCP Information       Provider PCP Type    Mojgan Lomeli NP General            Reason for Visit / Chief Complaint: Diabetic Foot Ulcer (Right foot)       History of Present Illness / Problem Focused Workflow     Jean Pierre Paulson is a 36 y.o. male who presents to clinic for follow up on chronic-non healing wound on the right foot. Wound  is larger today with increased drainage and malodorous. Patient is wearing off-loading boot. Thick biofilm noted to wound bed. Wound continues to have thick callus filippo-wound, bedside debridement today. Patient reports increased wound drainage. Significant PMH  Includes diabetes and hypertension. Last HgA1C 7.3 in 2022, diabetes is managed by PCP. He is due for arterial dopplers, last doppler in 2020. Denies fever or chills.     Review of Systems     Review of Systems   Constitutional:  Negative for activity change, chills and fever.   Respiratory:  Negative for chest tightness and shortness of breath.    Cardiovascular:  Positive for leg swelling. Negative for chest pain and palpitations.   Musculoskeletal:  Positive for arthralgias, gait problem and joint swelling.   Skin:  Positive for color change and wound.        See wound assessment   Neurological:  Positive for weakness and numbness.   Psychiatric/Behavioral:  Negative for agitation, behavioral problems, confusion and self-injury.      Medical / Social / Family History     Past Medical History:   Diagnosis Date    Anemia     Chronic osteomyelitis     Diabetes mellitus with neuropathy     Diabetes mellitus, type 2     DM2 (diabetes mellitus, type 2)     HTN (hypertension)     Hypertension     Neuropathy     Obesity      Osteomyelitis 10/2020    Hx of R foot, Tx with IV Abx    Peripheral vascular disease        Past Surgical History:   Procedure Laterality Date    APPLICATION OF SPLIT-THICKNESS SKIN GRAFT (STSG) TO LOWER EXTREMITY Right 3/2/2022    Procedure: APPLICATION, GRAFT, SKIN, SPLIT-THICKNESS, TO LOWER EXTREMITY;  Surgeon: Greg Ramírez MD;  Location: [facility];  Service: General;  Laterality: Right;    CHOLECYSTECTOMY      DEBRIDEMENT OF FOOT Right 10/2020    FOOT SURGERY      Right diabetic foot ulcer Elliott's Grade 3      Bone exposure to R heel, Hx of IV Abx, cultures show multiple bacterial growth, Hx of Osteomyelitis, HBOT       Social History  Mr. Jean Pierre Paulson  reports that he quit smoking about a year ago. His smoking use included cigarettes. He started smoking about 23 years ago. He smoked an average of .25 packs per day. He has never used smokeless tobacco. He reports current alcohol use of about 2.0 standard drinks per week. He reports current drug use. Drug: Hydrocodone.    Family History  Mr.'s Jean Pierre Paulson family history includes Diabetes in his father and maternal grandmother; Hypertension in his father and maternal grandmother.    Medications and Allergies     Medications  Outpatient Medications Marked as Taking for the 1/20/23 encounter (Office Visit) with AUGUSTUS Macedo   Medication Sig Dispense Refill    acetaminophen (TYLENOL) 325 MG tablet Take 2 tablets (650 mg total) by mouth every 4 (four) hours as needed.  0    becaplermin (REGRANEX) 0.01 % gel Apply topically every morning. for 90 doses 90 g 1    blood sugar diagnostic (BLOOD GLUCOSE TEST) Strp 1 strip by Misc.(Non-Drug; Combo Route) route once daily. accu-chek Veronika strips 90 strip 5    empaglifloz-linaglip-metformin (TRIJARDY XR) 5-2.5-1,000 mg TBph Take 1 tablet by mouth once daily. 90 tablet 1    FEROSUL 325 mg (65 mg iron) Tab tablet Take 1 tablet (325 mg total) by mouth once daily. 90 tablet 1    gabapentin (NEURONTIN)  300 MG capsule Take 1 capsule (300 mg total) by mouth every 8 (eight) hours. 270 capsule 1    HYDROcodone-acetaminophen (NORCO)  mg per tablet Take 1 tablet by mouth every 12 (twelve) hours as needed for Pain. 60 tablet 0    HYDROcodone-acetaminophen (NORCO)  mg per tablet Take 1 tablet by mouth every 12 (twelve) hours as needed for Pain. 60 tablet 0    rosuvastatin (CRESTOR) 10 MG tablet Take 1 tablet (10 mg total) by mouth once daily. 90 tablet 1    semaglutide (OZEMPIC) 2 mg/dose (8 mg/3 mL) PnIj Inject 2 mg into the skin every 7 days.      XIGDUO XR 5-1,000 mg TBph Take 1 tablet by mouth once daily.         Allergies  Review of patient's allergies indicates:   Allergen Reactions    Tomato Itching       Physical Examination     Vitals:    01/20/23 1059   BP: (!) 144/85   Pulse: 90   Resp: 18   Temp: 97.7 °F (36.5 °C)     Physical Exam  Vitals and nursing note reviewed.   Constitutional:       Appearance: Normal appearance.   HENT:      Head: Normocephalic.   Cardiovascular:      Rate and Rhythm: Normal rate and regular rhythm.      Pulses: Normal pulses.      Heart sounds: Normal heart sounds.   Pulmonary:      Effort: Pulmonary effort is normal. No respiratory distress.   Chest:      Chest wall: No tenderness.   Musculoskeletal:         General: Swelling, tenderness and deformity present.      Right lower leg: Edema present.   Skin:     General: Skin is warm and dry.      Capillary Refill: Capillary refill takes 2 to 3 seconds.      Comments: See LDA for photos and measurements   Neurological:      General: No focal deficit present.      Mental Status: He is alert and oriented to person, place, and time. Mental status is at baseline.   Psychiatric:         Mood and Affect: Mood normal.         Behavior: Behavior normal.         Thought Content: Thought content normal.         Judgment: Judgment normal.     Assessment and Plan             Wound 02/16/22 1020 Diabetic Ulcer Right Plantar (Active)    02/16/22 1020    Pre-existing: Yes   Primary Wound Type: Diabetic ulc   Side: Right   Orientation:    Location: Plantar   Wound Number:    Ankle-Brachial Index:    Pulses:    Removal Indication and Assessment:    Wound Outcome:    (Retired) Wound Type:    (Retired) Wound Length (cm):    (Retired) Wound Width (cm):    (Retired) Depth (cm):    Wound Description (Comments):    Removal Indications:    Wound Image      01/20/23 1110   Drainage Amount Moderate 01/20/23 1110   Wound Length (cm) 11.2 cm 01/20/23 1110   Wound Width (cm) 5.6 cm 01/20/23 1110   Wound Depth (cm) 0.9 cm 01/20/23 1110   Wound Volume (cm^3) 56.448 cm^3 01/20/23 1110   Wound Surface Area (cm^2) 62.72 cm^2 01/20/23 1110   Care Cleansed with:;Antimicrobial agent 01/20/23 1110   Dressing Applied;Gauze, wet to dry;Gauze;Rolled gauze 01/20/23 1110   Periwound Care Moisture barrier applied 01/20/23 1110   Compression Two layer compression 01/20/23 1110     Problem List Items Addressed This Visit          Endocrine    Chronic diabetic ulcer of right foot determined by examination    Current Assessment & Plan     Right foot wound clean with acetic acid ( 1 cup white vinegar in 3 cups plain water) Apply gentamicin cream to wound bed,let sit for 10 min then apply. Apply acetic acid moisten drawtex to wound (cut slightly larger than wound) cover with dry gauze, and ABD,wrap with kerlix,secure with paper tape. Change daily and as needed     Elevate feet as much as possible  Avoid prolonged standing  Knee scooter and/or walking boot  Monitor closely for s/s of infection including fever, chills, increase in pain, odor from wound, and increased redness from foot. Go to ER if any complications develop.   Keep leg elevated and avoid pressure on wound.  Diabetes:  Monitor glucose closely. Check fasting glucose and 2 hours after meals. HgA1C goal <7, fasting glucose , and 2 hours after meals <180  Hypertension:  Check blood pressure twice daily, goal  <120/80  Diet:   Increase protein intake, avoid fried, fatty foods and foods high in simple carbs.   Vitamins:  Take vitamin C 1000 mg, zinc 50mg, vitamin d 5000 units, and a daily multivitamin. Angel is a good source of protein and nutrients to aid in wound healing.          Type 2 diabetes mellitus with right diabetic foot ulcer - Primary    Overview                                 Orthopedic    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    Overview                          Current Assessment & Plan     Right foot wound clean with acetic acid ( 1 cup white vinegar in 3 cups plain water) Apply gentamicin cream to wound bed,let sit for 10 min then apply. Apply acetic acid moisten drawtex to wound (cut slightly larger than wound) cover with dry gauze, and ABD,wrap with kerlix,secure with paper tape. Change daily and as needed     Elevate feet as much as possible  Avoid prolonged standing  Knee scooter and/or walking boot  Monitor closely for s/s of infection including fever, chills, increase in pain, odor from wound, and increased redness from foot. Go to ER if any complications develop.   Keep leg elevated and avoid pressure on wound.  Diabetes:  Monitor glucose closely. Check fasting glucose and 2 hours after meals. HgA1C goal <7, fasting glucose , and 2 hours after meals <180  Hypertension:  Check blood pressure twice daily, goal <120/80  Diet:   Increase protein intake, avoid fried, fatty foods and foods high in simple carbs.   Vitamins:  Take vitamin C 1000 mg, zinc 50mg, vitamin d 5000 units, and a daily multivitamin. Angel is a good source of protein and nutrients to aid in wound healing.           Other Visit Diagnoses       PVD (peripheral vascular disease)                Future Appointments   Date Time Provider Department Center   [facility]23 10[facility]   2/3/2023 10:00 AM AUGUSTUS Macedo [place]  [facility]   5/1/2023  1:30 PM AWV NURSE, [facility] [place] Coreas [place]            Signature:  AUGUSTUS Macedo  [facility]  [address] [facility] [place] [address]  [phone]    Date of encounter: 1/20/23

## 2023-01-19 NOTE — PATIENT INSTRUCTIONS
Right foot wound clean with acetic acid ( 1 cup white vinegar in 3 cups plain water) Apply gentamicin cream to wound bed,let sit for 10 min then apply. Apply acetic acid moisten drawtex to wound (cut slightly larger than wound) cover with dry gauze, and ABD,wrap with kerlix,secure with paper tape. Change daily and as needed     Elevate feet as much as possible  Avoid prolonged standing  Knee scooter and/or walking boot  Monitor closely for s/s of infection including fever, chills, increase in pain, odor from wound, and increased redness from foot. Go to ER if any complications develop.   Keep leg elevated and avoid pressure on wound.  Diabetes:  Monitor glucose closely. Check fasting glucose and 2 hours after meals. HgA1C goal <7, fasting glucose , and 2 hours after meals <180  Hypertension:  Check blood pressure twice daily, goal <120/80  Diet:   Increase protein intake, avoid fried, fatty foods and foods high in simple carbs.   Vitamins:  Take vitamin C 1000 mg, zinc 50mg, vitamin d 5000 units, and a daily multivitamin. Angel is a good source of protein and nutrients to aid in wound healing.

## 2023-01-20 ENCOUNTER — OFFICE VISIT (OUTPATIENT)
Dept: WOUND CARE | Facility: CLINIC | Age: 38
End: 2023-01-20
Attending: FAMILY MEDICINE
Payer: MEDICARE

## 2023-01-20 VITALS
TEMPERATURE: 98 F | RESPIRATION RATE: 18 BRPM | SYSTOLIC BLOOD PRESSURE: 144 MMHG | HEART RATE: 90 BPM | DIASTOLIC BLOOD PRESSURE: 85 MMHG

## 2023-01-20 DIAGNOSIS — L97.519 CHRONIC DIABETIC ULCER OF RIGHT FOOT DETERMINED BY EXAMINATION: ICD-10-CM

## 2023-01-20 DIAGNOSIS — E11.621 CHRONIC DIABETIC ULCER OF RIGHT FOOT DETERMINED BY EXAMINATION: ICD-10-CM

## 2023-01-20 DIAGNOSIS — I73.9 PVD (PERIPHERAL VASCULAR DISEASE): ICD-10-CM

## 2023-01-20 DIAGNOSIS — L97.519 TYPE 2 DIABETES MELLITUS WITH RIGHT DIABETIC FOOT ULCER: Primary | ICD-10-CM

## 2023-01-20 DIAGNOSIS — E11.621 TYPE 2 DIABETES MELLITUS WITH RIGHT DIABETIC FOOT ULCER: Primary | ICD-10-CM

## 2023-01-20 DIAGNOSIS — L97.516 NON-PRESSURE CHRONIC ULCER OF OTHER PART OF RIGHT FOOT WITH BONE INVOLVEMENT WITHOUT EVIDENCE OF NECROSIS: ICD-10-CM

## 2023-01-20 PROCEDURE — 99213 OFFICE O/P EST LOW 20 MIN: CPT | Mod: PBBFAC | Performed by: NURSE PRACTITIONER

## 2023-01-20 PROCEDURE — 11043 DBRDMT MUSC&/FSCA 1ST 20/<: CPT | Mod: PBBFAC | Performed by: NURSE PRACTITIONER

## 2023-01-20 PROCEDURE — 99499 UNLISTED E&M SERVICE: CPT | Mod: S$PBB,,, | Performed by: NURSE PRACTITIONER

## 2023-01-20 PROCEDURE — 11043 DEBRIDEMENT: ICD-10-PCS | Mod: S$PBB,,, | Performed by: NURSE PRACTITIONER

## 2023-01-20 PROCEDURE — 99499 NO LOS: ICD-10-PCS | Mod: S$PBB,,, | Performed by: NURSE PRACTITIONER

## 2023-01-20 PROCEDURE — 11046 DEBRIDEMENT: ICD-10-PCS | Mod: S$PBB,,, | Performed by: NURSE PRACTITIONER

## 2023-01-20 PROCEDURE — 11046 DBRDMT MUSC&/FSCA EA ADDL: CPT | Mod: S$PBB,,, | Performed by: NURSE PRACTITIONER

## 2023-01-20 RX ORDER — SULFAMETHOXAZOLE AND TRIMETHOPRIM 800; 160 MG/1; MG/1
1 TABLET ORAL 2 TIMES DAILY
Qty: 60 TABLET | Refills: 0 | Status: SHIPPED | OUTPATIENT
Start: 2023-01-20 | End: 2023-02-19

## 2023-01-20 RX ORDER — GENTAMICIN SULFATE 1 MG/G
OINTMENT TOPICAL 2 TIMES DAILY
Qty: 60 G | Refills: 2 | Status: SHIPPED | OUTPATIENT
Start: 2023-01-20 | End: 2023-02-19

## 2023-01-20 NOTE — PROCEDURES
Debridement    Date/Time: 1/20/2023 10:00 AM  Performed by: AUGUSTUS Macedo  Authorized by: AUGUSTUS Macedo     Consent Done?:  Yes (Written)    Wound Details:    Location:  Right foot    Location:  Right Plantar    Type of Debridement:  Excisional       Length (cm):  13.1       Area (sq cm):  120.52       Width (cm):  9.2       Percent Debrided (%):  100       Depth (cm):  0.9       Total Area Debrided (sq cm):  120.52    Depth of debridement:  Muscle/fascia/tendon    Tissue debrided:  Adipose, Dermis, Epidermis, Muscle and Subcutaneous    Devitalized tissue debrided:  Biofilm, Callus, Clots, Exudate, Fibrin and Slough    Instruments:  Curette    Bleeding:  Moderate  Hemostasis Achieved: Yes    Method Used:  Pressure  Patient tolerance:  Patient tolerated the procedure well with no immediate complications     Assistant JOSE ALFREDO Stone LPN

## 2023-01-23 ENCOUNTER — OFFICE VISIT (OUTPATIENT)
Dept: PAIN MEDICINE | Facility: CLINIC | Age: 38
End: 2023-01-23
Payer: MEDICARE

## 2023-01-23 VITALS
HEIGHT: 74 IN | HEART RATE: 85 BPM | RESPIRATION RATE: 18 BRPM | WEIGHT: 315 LBS | BODY MASS INDEX: 40.43 KG/M2 | DIASTOLIC BLOOD PRESSURE: 92 MMHG | SYSTOLIC BLOOD PRESSURE: 156 MMHG

## 2023-01-23 DIAGNOSIS — E11.40 DIABETIC NEUROPATHY WITH NEUROLOGIC COMPLICATION: Primary | Chronic | ICD-10-CM

## 2023-01-23 DIAGNOSIS — L97.414 ULCER OF RIGHT HEEL, WITH NECROSIS OF BONE: Chronic | ICD-10-CM

## 2023-01-23 DIAGNOSIS — I73.9 PERIPHERAL VASCULAR DISEASE, UNSPECIFIED: Chronic | ICD-10-CM

## 2023-01-23 DIAGNOSIS — E11.49 DIABETIC NEUROPATHY WITH NEUROLOGIC COMPLICATION: Primary | Chronic | ICD-10-CM

## 2023-01-23 PROCEDURE — 99214 PR OFFICE/OUTPT VISIT, EST, LEVL IV, 30-39 MIN: ICD-10-PCS | Mod: S$PBB,,, | Performed by: PHYSICIAN ASSISTANT

## 2023-01-23 PROCEDURE — 99215 OFFICE O/P EST HI 40 MIN: CPT | Mod: PBBFAC | Performed by: PHYSICIAN ASSISTANT

## 2023-01-23 PROCEDURE — 99214 OFFICE O/P EST MOD 30 MIN: CPT | Mod: S$PBB,,, | Performed by: PHYSICIAN ASSISTANT

## 2023-01-23 RX ORDER — HYDROCODONE BITARTRATE AND ACETAMINOPHEN 10; 325 MG/1; MG/1
1 TABLET ORAL EVERY 12 HOURS PRN
Qty: 60 TABLET | Refills: 0 | Status: ON HOLD | OUTPATIENT
Start: 2023-01-23 | End: 2023-04-10 | Stop reason: CLARIF

## 2023-01-23 RX ORDER — HYDROCODONE BITARTRATE AND ACETAMINOPHEN 10; 325 MG/1; MG/1
1 TABLET ORAL EVERY 12 HOURS PRN
Qty: 60 TABLET | Refills: 0 | Status: SHIPPED | OUTPATIENT
Start: 2023-02-22 | End: 2023-04-06 | Stop reason: SDUPTHER

## 2023-01-23 RX ORDER — NALOXONE HYDROCHLORIDE 4 MG/.1ML
2 SPRAY NASAL ONCE
Qty: 2 EACH | Refills: 0 | Status: SHIPPED | OUTPATIENT
Start: 2023-01-23 | End: 2023-01-23

## 2023-01-23 NOTE — PROGRESS NOTES
Subjective:         Patient ID: Jean Pierre Paulson is a 37 y.o. male.    Chief Complaint: Foot Pain        Pain  This is a chronic problem. The current episode started more than 1 year ago. The problem occurs daily. The problem has been waxing and waning. Associated symptoms include arthralgias and neck pain. Pertinent negatives include no anorexia, chest pain, chills, coughing, diaphoresis, fatigue, fever, sore throat, vertigo or vomiting.   Review of Systems   Constitutional:  Negative for activity change, appetite change, chills, diaphoresis, fatigue, fever and unexpected weight change.   HENT:  Negative for drooling, ear discharge, ear pain, facial swelling, nosebleeds, sore throat, trouble swallowing, voice change and goiter.    Eyes:  Negative for photophobia, pain, discharge, redness and visual disturbance.   Respiratory:  Negative for apnea, cough, choking, chest tightness, shortness of breath, wheezing and stridor.    Cardiovascular:  Negative for chest pain, palpitations and leg swelling.   Gastrointestinal:  Negative for abdominal distention, anorexia, diarrhea, rectal pain, vomiting and fecal incontinence.   Endocrine: Negative for cold intolerance, heat intolerance, polydipsia, polyphagia and polyuria.   Genitourinary:  Negative for bladder incontinence, dysuria, flank pain and frequency.   Musculoskeletal:  Positive for arthralgias, back pain, leg pain and neck pain. Negative for neck stiffness.   Integumentary:  Negative for color change and pallor.   Neurological:  Negative for dizziness, vertigo, seizures, syncope, facial asymmetry, speech difficulty, light-headedness, coordination difficulties, memory loss and coordination difficulties.   Hematological:  Negative for adenopathy. Does not bruise/bleed easily.   Psychiatric/Behavioral:  Negative for agitation, behavioral problems, confusion, decreased concentration, dysphoric mood, hallucinations, self-injury and suicidal ideas. The patient is not  "nervous/anxious and is not hyperactive.          Past Medical History:   Diagnosis Date    Anemia     Chronic osteomyelitis     Diabetes mellitus with neuropathy     Diabetes mellitus, type 2     DM2 (diabetes mellitus, type 2)     HTN (hypertension)     Hypertension     Neuropathy     Obesity     Osteomyelitis 10/2020    Hx of R foot, Tx with IV Abx    Peripheral vascular disease      Past Surgical History:   Procedure Laterality Date    APPLICATION OF SPLIT-THICKNESS SKIN GRAFT (STSG) TO LOWER EXTREMITY Right 3/2/2022    Procedure: APPLICATION, GRAFT, SKIN, SPLIT-THICKNESS, TO LOWER EXTREMITY;  Surgeon: Greg Ramírez MD;  Location: [facility];  Service: General;  Laterality: Right;    CHOLECYSTECTOMY      DEBRIDEMENT OF FOOT Right 10/2020    FOOT SURGERY      Right diabetic foot ulcer Elliott's Grade 3      Bone exposure to R heel, Hx of IV Abx, cultures show multiple bacterial growth, Hx of Osteomyelitis, HBOT     Social History     Socioeconomic History    Marital status: Single    Number of children: 1   Tobacco Use    Smoking status: Former     Packs/day: 0.25     Types: Cigarettes     Start date:      Quit date: 2022     Years since quittin.9    Smokeless tobacco: Never   Substance and Sexual Activity    Alcohol use: Yes     Alcohol/week: 2.0 standard drinks     Types: 1 Glasses of wine, 1 Cans of beer per week     Comment: occasionally    Drug use: Yes     Types: Hydrocodone    Sexual activity: Yes     Partners: Female     Family History   Problem Relation Age of Onset    Hypertension Father     Diabetes Father     Hypertension Maternal Grandmother     Diabetes Maternal Grandmother      Review of patient's allergies indicates:   Allergen Reactions    Tomato Itching        Objective:  Vitals:    23 1036   BP: (!) 156/92   Pulse: 85   Resp: 18   Weight: (!) 155.1 kg (342 lb)   Height: 6' 2" (1.88 m)   PainSc:   3         Physical Exam  Vitals and nursing note reviewed. Exam conducted with a " chaperone present.   Constitutional:       General: He is awake. He is not in acute distress.     Appearance: Normal appearance. He is not ill-appearing, toxic-appearing or diaphoretic.   HENT:      Head: Normocephalic and atraumatic.      Nose: Nose normal.      Mouth/Throat:      Mouth: Mucous membranes are moist.      Pharynx: Oropharynx is clear.   Eyes:      Conjunctiva/sclera: Conjunctivae normal.      Pupils: Pupils are equal, round, and reactive to light.   Cardiovascular:      Rate and Rhythm: Normal rate.   Pulmonary:      Effort: Pulmonary effort is normal. No respiratory distress.   Abdominal:      Palpations: Abdomen is soft.      Tenderness: There is no guarding.   Musculoskeletal:         General: Normal range of motion.      Cervical back: Normal range of motion and neck supple. No rigidity.      Lumbar back: Tenderness present.      Right foot: Tenderness present.      Left foot: Tenderness present.   Skin:     General: Skin is warm and dry.      Coloration: Skin is not jaundiced or pale.   Neurological:      General: No focal deficit present.      Mental Status: He is alert and oriented to person, place, and time. Mental status is at baseline.      Cranial Nerves: No cranial nerve deficit (II-XII).   Psychiatric:         Mood and Affect: Mood normal.         Behavior: Behavior normal. Behavior is cooperative.         Thought Content: Thought content normal.         X-Ray Foot Complete 3 view Right  Narrative: EXAMINATION:  XR FOOT COMPLETE 3 VIEW RIGHT    CLINICAL HISTORY:  Type 2 diabetes mellitus with foot ulcer    COMPARISON:  None available    FINDINGS:  No evidence of acute fracture.  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  Severe midfoot degenerative changes without abnormal alignment similar to previous study.  Definite acute erosions not identified.  Soft tissue ulceration present on the plantar foot.  No other soft tissue abnormality is  seen.  Impression: No definite findings of acute osteomyelitis.    Electronically signed by: Sheng Anderson  Date:    12/13/2022  Time:    12:31         Office Visit on 12/13/2022   Component Date Value Ref Range Status    Culture, Wound/Abscess 12/13/2022 Light Growth Enterobacter cloacae (A)   Final    Culture, Wound/Abscess 12/13/2022 Light Growth Klebsiella pneumoniae (A)   Final    Culture, Wound/Abscess 12/13/2022 Heavy Growth Staphylococcus aureus (A)   Final    Culture, Anaerobe 12/13/2022 PREVOTELLA SPECIES (A)   Final   Office Visit on 11/21/2022   Component Date Value Ref Range Status    POC Amphetamines 11/21/2022 Negative  Negative, Inconclusive Final    POC Barbiturates 11/21/2022 Negative  Negative, Inconclusive Final    POC Benzodiazepines 11/21/2022 Negative  Negative, Inconclusive Final    POC Cocaine 11/21/2022 Negative  Negative, Inconclusive Final    POC THC 11/21/2022 Negative  Negative, Inconclusive Final    POC Methadone 11/21/2022 Negative  Negative, Inconclusive Final    POC Methamphetamine 11/21/2022 Negative  Negative, Inconclusive Final    POC Opiates 11/21/2022 Negative  Negative, Inconclusive Final    POC Oxycodone 11/21/2022 Negative  Negative, Inconclusive Final    POC Phencyclidine 11/21/2022 Negative  Negative, Inconclusive Final    POC Methylenedioxymethamphetamine * 11/21/2022 Negative  Negative, Inconclusive Final    POC Tricyclic Antidepressants 11/21/2022 Negative  Negative, Inconclusive Final    POC Buprenorphine 11/21/2022 Negative   Final     Acceptable 11/21/2022 Yes   Final    POC Temperature (Urine) 11/21/2022 94   Final    pH, UA 11/21/2022 5.5  5.0 to 8.0 pH Units Final    Creatinine, Urine 11/21/2022 92  39 - 259 mg/dL Final    6-Acetylmorphine 11/21/2022 Negative  10 ng/mL Final    7-Aminoclonazepam 11/21/2022 Negative  Negative 25 ng/mL Final    a-Hydroxyalprazolam 11/21/2022 Negative  Negative 25 ng/mL Final    Acetyl Fentanyl 11/21/2022 Negative   2.5 ng/mL Final    Acetyl Norfentanyl Oxalate 11/21/2022 Negative  5 ng/mL Final    Benzoylecgonine 11/21/2022 Negative  100 ng/mL Final    Buprenorphine 11/21/2022 Negative  25 ng/mL Final    Codeine 11/21/2022 Negative  25 ng/mL Final    EDDP 11/21/2022 Negative  25 ng/mL Final    Fentanyl 11/21/2022 Negative  2.5 ng/mL Final    Hydrocodone 11/21/2022 Negative  25 ng/mL Final    Hydromorphone 11/21/2022 Negative  25 ng/mL Final    Lorazepam 11/21/2022 Negative  25 ng/mL Final    Morphine 11/21/2022 Negative  25 ng/mL Final    Norbuprenorphine 11/21/2022 Negative  25 ng/mL Final    Nordiazepam 11/21/2022 Negative  25 ng/mL Final    Norfentanyl Oxalate 11/21/2022 Negative  5 ng/mL Final    Norhydrocodone 11/21/2022 Negative  50 ng/mL Final    Noroxycodone HCL 11/21/2022 Negative  50 ng/mL Final    Oxazepam 11/21/2022 Negative  25 ng/mL Final    Oxymorphone 11/21/2022 Negative  25 ng/mL Final    Tapentadol 11/21/2022 Negative  25 ng/mL Final    Temazepam 11/21/2022 Negative  25 ng/mL Final    THC-COOH 11/21/2022 Negative  25 ng/mL Final    Tramadol 11/21/2022 Negative  100 ng/mL Final    Amphetamine, Urine 11/21/2022 Negative  Negative Final    Methamphetamines, Urine 11/21/2022 Negative  Negative Final    Methadone, Urine 11/21/2022 Negative  Negative 25 ng/mL Final    Oxycodone, Urine 11/21/2022 Negative  Negative 25 ng/mL Final    Specific Gravity, UA 11/21/2022 1.013  <=1.030 Final   Lab Requisition on 09/15/2022   Component Date Value Ref Range Status    Sodium 09/15/2022 139  136 - 145 mmol/L Final    Potassium 09/15/2022 4.2  3.5 - 5.1 mmol/L Final    Chloride 09/15/2022 105  98 - 107 mmol/L Final    CO2 09/15/2022 26  21 - 32 mmol/L Final    Anion Gap 09/15/2022 12  7 - 16 mmol/L Final    Glucose 09/15/2022 137 (H)  74 - 106 mg/dL Final    BUN 09/15/2022 18  7 - 18 mg/dL Final    Creatinine 09/15/2022 1.28  0.70 - 1.30 mg/dL Final    BUN/Creatinine Ratio 09/15/2022 14  6 - 20 Final    Calcium 09/15/2022 8.7   8.5 - 10.1 mg/dL Final    eGFR 09/15/2022 73  >=60 mL/min/1.73m² Final    CRP 09/15/2022 3.91 (H)  0.00 - 0.80 mg/dL Final    ESR Westergren 09/15/2022 105 (H)  0 - 15 mm/Hr Final    WBC 09/15/2022 6.58  4.50 - 11.00 K/uL Final    RBC 09/15/2022 4.17 (L)  4.60 - 6.20 M/uL Final    Hemoglobin 09/15/2022 11.2 (L)  13.5 - 18.0 g/dL Final    Hematocrit 09/15/2022 34.9 (L)  40.0 - 54.0 % Final    MCV 09/15/2022 83.7  80.0 - 96.0 fL Final    MCH 09/15/2022 26.9 (L)  27.0 - 31.0 pg Final    MCHC 09/15/2022 32.1  32.0 - 36.0 g/dL Final    RDW 09/15/2022 13.7  11.5 - 14.5 % Final    Platelet Count 09/15/2022 333  150 - 400 K/uL Final    MPV 09/15/2022 10.0  9.4 - 12.4 fL Final    Neutrophils % 09/15/2022 53.3  53.0 - 65.0 % Final    Lymphocytes % 09/15/2022 34.3  27.0 - 41.0 % Final    Monocytes % 09/15/2022 7.6 (H)  2.0 - 6.0 % Final    Eosinophils % 09/15/2022 3.8  1.0 - 4.0 % Final    Basophils % 09/15/2022 0.8  0.0 - 1.0 % Final    Immature Granulocytes % 09/15/2022 0.2  0.0 - 0.4 % Final    nRBC, Auto 09/15/2022 0.0  <=0.0 % Final    Neutrophils, Abs 09/15/2022 3.51  1.80 - 7.70 K/uL Final    Lymphocytes, Absolute 09/15/2022 2.26  1.00 - 4.80 K/uL Final    Monocytes, Absolute 09/15/2022 0.50  0.00 - 0.80 K/uL Final    Eosinophils, Absolute 09/15/2022 0.25  0.00 - 0.50 K/uL Final    Basophils, Absolute 09/15/2022 0.05  0.00 - 0.20 K/uL Final    Immature Granulocytes, Absolute 09/15/2022 0.01  0.00 - 0.04 K/uL Final    nRBC, Absolute 09/15/2022 0.00  <=0.00 x10e3/uL Final    Diff Type 09/15/2022 Auto   Final   Lab Requisition on 09/09/2022   Component Date Value Ref Range Status    Sodium 09/09/2022 142  136 - 145 mmol/L Final    Potassium 09/09/2022 4.5  3.5 - 5.1 mmol/L Final    Chloride 09/09/2022 110 (H)  98 - 107 mmol/L Final    CO2 09/09/2022 23  21 - 32 mmol/L Final    Anion Gap 09/09/2022 14  7 - 16 mmol/L Final    Glucose 09/09/2022 84  74 - 106 mg/dL Final    BUN 09/09/2022 14  7 - 18 mg/dL Final    Creatinine  09/09/2022 0.82  0.70 - 1.30 mg/dL Final    BUN/Creatinine Ratio 09/09/2022 17  6 - 20 Final    Calcium 09/09/2022 7.8 (L)  8.5 - 10.1 mg/dL Final    eGFR 09/09/2022 115  >=60 mL/min/1.73m² Final    CRP 09/09/2022 2.15 (H)  0.00 - 0.80 mg/dL Final   Lab Requisition on 09/02/2022   Component Date Value Ref Range Status    Sodium 09/02/2022 138  136 - 145 mmol/L Final    Potassium 09/02/2022 5.2 (H)  3.5 - 5.1 mmol/L Final    Chloride 09/02/2022 106  98 - 107 mmol/L Final    CO2 09/02/2022 28  21 - 32 mmol/L Final    Anion Gap 09/02/2022 9  7 - 16 mmol/L Final    Glucose 09/02/2022 109 (H)  74 - 106 mg/dL Final    BUN 09/02/2022 19 (H)  7 - 18 mg/dL Final    Creatinine 09/02/2022 1.15  0.70 - 1.30 mg/dL Final    BUN/Creatinine Ratio 09/02/2022 17  6 - 20 Final    Calcium 09/02/2022 8.4 (L)  8.5 - 10.1 mg/dL Final    eGFR 09/02/2022 83  >=60 mL/min/1.73m² Final    CRP 09/02/2022 3.73 (H)  0.00 - 0.80 mg/dL Final    ESR Westergren 09/02/2022 78 (H)  0 - 15 mm/Hr Final    WBC 09/02/2022 6.83  4.50 - 11.00 K/uL Final    RBC 09/02/2022 4.25 (L)  4.60 - 6.20 M/uL Final    Hemoglobin 09/02/2022 11.6 (L)  13.5 - 18.0 g/dL Final    Hematocrit 09/02/2022 35.0 (L)  40.0 - 54.0 % Final    MCV 09/02/2022 82.4  80.0 - 96.0 fL Final    MCH 09/02/2022 27.3  27.0 - 31.0 pg Final    MCHC 09/02/2022 33.1  32.0 - 36.0 g/dL Final    RDW 09/02/2022 14.1  11.5 - 14.5 % Final    Platelet Count 09/02/2022 224  150 - 400 K/uL Final    MPV 09/02/2022 10.8  9.4 - 12.4 fL Final    Neutrophils % 09/02/2022 58.2  53.0 - 65.0 % Final    Lymphocytes % 09/02/2022 28.0  27.0 - 41.0 % Final    Monocytes % 09/02/2022 9.1 (H)  2.0 - 6.0 % Final    Eosinophils % 09/02/2022 3.8  1.0 - 4.0 % Final    Basophils % 09/02/2022 0.6  0.0 - 1.0 % Final    Immature Granulocytes % 09/02/2022 0.3  0.0 - 0.4 % Final    nRBC, Auto 09/02/2022 0.0  <=0.0 % Final    Neutrophils, Abs 09/02/2022 3.98  1.80 - 7.70 K/uL Final    Lymphocytes, Absolute 09/02/2022 1.91  1.00 -  4.80 K/uL Final    Monocytes, Absolute 09/02/2022 0.62  0.00 - 0.80 K/uL Final    Eosinophils, Absolute 09/02/2022 0.26  0.00 - 0.50 K/uL Final    Basophils, Absolute 09/02/2022 0.04  0.00 - 0.20 K/uL Final    Immature Granulocytes, Absolute 09/02/2022 0.02  0.00 - 0.04 K/uL Final    nRBC, Absolute 09/02/2022 0.00  <=0.00 x10e3/uL Final    Diff Type 09/02/2022 Auto   Final   Lab Requisition on 09/01/2022   Component Date Value Ref Range Status    Sodium 09/01/2022 137  136 - 145 mmol/L Final    Potassium 09/01/2022 4.6  3.5 - 5.1 mmol/L Final    Chloride 09/01/2022 105  98 - 107 mmol/L Final    CO2 09/01/2022 28  21 - 32 mmol/L Final    Anion Gap 09/01/2022 9  7 - 16 mmol/L Final    Glucose 09/01/2022 103  74 - 106 mg/dL Final    BUN 09/01/2022 17  7 - 18 mg/dL Final    Creatinine 09/01/2022 1.09  0.70 - 1.30 mg/dL Final    BUN/Creatinine Ratio 09/01/2022 16  6 - 20 Final    Calcium 09/01/2022 9.0  8.5 - 10.1 mg/dL Final    Total Protein 09/01/2022 8.1  6.4 - 8.2 g/dL Final    Albumin 09/01/2022 2.8 (L)  3.5 - 5.0 g/dL Final    Globulin 09/01/2022 5.3 (H)  2.0 - 4.0 g/dL Final    A/G Ratio 09/01/2022 0.5   Final    Bilirubin, Total 09/01/2022 0.4  >0.0 - 1.2 mg/dL Final    Alk Phos 09/01/2022 153 (H)  45 - 115 U/L Final    ALT 09/01/2022 51  16 - 61 U/L Final    AST 09/01/2022 28  15 - 37 U/L Final    eGFR 09/01/2022 89  >=60 mL/min/1.73m² Final    Triglycerides 09/01/2022 109  35 - 150 mg/dL Final    Cholesterol 09/01/2022 120  0 - 200 mg/dL Final    HDL Cholesterol 09/01/2022 45  40 - 60 mg/dL Final    Cholesterol/HDL Ratio (Risk Factor) 09/01/2022 2.7   Final    Non-HDL 09/01/2022 75  mg/dL Final    LDL Calculated 09/01/2022 53  mg/dL Final    LDL/HDL 09/01/2022 1.2   Final    VLDL 09/01/2022 22  mg/dL Final    Free T4 09/01/2022 0.94  0.76 - 1.46 ng/dL Final    TSH 09/01/2022 2.720  0.358 - 3.740 uIU/mL Final    Hemoglobin A1C 09/01/2022 6.8 (H)  4.5 - 6.6 % Final    Estimated Average Glucose 09/01/2022 140   mg/dL Final    WBC 09/01/2022 5.89  4.50 - 11.00 K/uL Final    RBC 09/01/2022 4.63  4.60 - 6.20 M/uL Final    Hemoglobin 09/01/2022 12.4 (L)  13.5 - 18.0 g/dL Final    Hematocrit 09/01/2022 38.7 (L)  40.0 - 54.0 % Final    MCV 09/01/2022 83.6  80.0 - 96.0 fL Final    MCH 09/01/2022 26.8 (L)  27.0 - 31.0 pg Final    MCHC 09/01/2022 32.0  32.0 - 36.0 g/dL Final    RDW 09/01/2022 13.9  11.5 - 14.5 % Final    Platelet Count 09/01/2022 268  150 - 400 K/uL Final    MPV 09/01/2022 10.7  9.4 - 12.4 fL Final    Neutrophils % 09/01/2022 50.0 (L)  53.0 - 65.0 % Final    Lymphocytes % 09/01/2022 36.3  27.0 - 41.0 % Final    Monocytes % 09/01/2022 9.0 (H)  2.0 - 6.0 % Final    Eosinophils % 09/01/2022 3.6  1.0 - 4.0 % Final    Basophils % 09/01/2022 0.8  0.0 - 1.0 % Final    Immature Granulocytes % 09/01/2022 0.3  0.0 - 0.4 % Final    nRBC, Auto 09/01/2022 0.0  <=0.0 % Final    Neutrophils, Abs 09/01/2022 2.94  1.80 - 7.70 K/uL Final    Lymphocytes, Absolute 09/01/2022 2.14  1.00 - 4.80 K/uL Final    Monocytes, Absolute 09/01/2022 0.53  0.00 - 0.80 K/uL Final    Eosinophils, Absolute 09/01/2022 0.21  0.00 - 0.50 K/uL Final    Basophils, Absolute 09/01/2022 0.05  0.00 - 0.20 K/uL Final    Immature Granulocytes, Absolute 09/01/2022 0.02  0.00 - 0.04 K/uL Final    nRBC, Absolute 09/01/2022 0.00  <=0.00 x10e3/uL Final    Diff Type 09/01/2022 Auto   Final   Lab Requisition on 08/26/2022   Component Date Value Ref Range Status    Sodium 08/26/2022 139  136 - 145 mmol/L Final    Potassium 08/26/2022 4.2  3.5 - 5.1 mmol/L Final    Chloride 08/26/2022 106  98 - 107 mmol/L Final    CO2 08/26/2022 28  21 - 32 mmol/L Final    Anion Gap 08/26/2022 9  7 - 16 mmol/L Final    Glucose 08/26/2022 82  74 - 106 mg/dL Final    BUN 08/26/2022 23 (H)  7 - 18 mg/dL Final    Creatinine 08/26/2022 0.97  0.70 - 1.30 mg/dL Final    BUN/Creatinine Ratio 08/26/2022 24 (H)  6 - 20 Final    Calcium 08/26/2022 8.6  8.5 - 10.1 mg/dL Final    eGFR 08/26/2022 102   >=60 mL/min/1.73m² Final    CRP 08/26/2022 1.06 (H)  0.00 - 0.80 mg/dL Final    ESR Westergren 08/26/2022 43 (H)  0 - 15 mm/Hr Final    WBC 08/26/2022 4.74  4.50 - 11.00 K/uL Final    RBC 08/26/2022 4.56 (L)  4.60 - 6.20 M/uL Final    Hemoglobin 08/26/2022 12.5 (L)  13.5 - 18.0 g/dL Final    Hematocrit 08/26/2022 38.4 (L)  40.0 - 54.0 % Final    MCV 08/26/2022 84.2  80.0 - 96.0 fL Final    MCH 08/26/2022 27.4  27.0 - 31.0 pg Final    MCHC 08/26/2022 32.6  32.0 - 36.0 g/dL Final    RDW 08/26/2022 13.8  11.5 - 14.5 % Final    Platelet Count 08/26/2022 253  150 - 400 K/uL Final    MPV 08/26/2022 10.3  9.4 - 12.4 fL Final    Neutrophils % 08/26/2022 38.7 (L)  53.0 - 65.0 % Final    Lymphocytes % 08/26/2022 46.0 (H)  27.0 - 41.0 % Final    Monocytes % 08/26/2022 8.4 (H)  2.0 - 6.0 % Final    Eosinophils % 08/26/2022 6.1 (H)  1.0 - 4.0 % Final    Basophils % 08/26/2022 0.8  0.0 - 1.0 % Final    Immature Granulocytes % 08/26/2022 0.0  0.0 - 0.4 % Final    nRBC, Auto 08/26/2022 0.0  <=0.0 % Final    Neutrophils, Abs 08/26/2022 1.83  1.80 - 7.70 K/uL Final    Lymphocytes, Absolute 08/26/2022 2.18  1.00 - 4.80 K/uL Final    Monocytes, Absolute 08/26/2022 0.40  0.00 - 0.80 K/uL Final    Eosinophils, Absolute 08/26/2022 0.29  0.00 - 0.50 K/uL Final    Basophils, Absolute 08/26/2022 0.04  0.00 - 0.20 K/uL Final    Immature Granulocytes, Absolute 08/26/2022 0.00  0.00 - 0.04 K/uL Final    nRBC, Absolute 08/26/2022 0.00  <=0.00 x10e3/uL Final    Diff Type 08/26/2022 Auto   Final   Lab Requisition on 08/19/2022   Component Date Value Ref Range Status    Sodium 08/19/2022 139  136 - 145 mmol/L Final    Potassium 08/19/2022 4.3  3.5 - 5.1 mmol/L Final    Chloride 08/19/2022 107  98 - 107 mmol/L Final    CO2 08/19/2022 28  21 - 32 mmol/L Final    Anion Gap 08/19/2022 8  7 - 16 mmol/L Final    Glucose 08/19/2022 92  74 - 106 mg/dL Final    BUN 08/19/2022 20 (H)  7 - 18 mg/dL Final    Creatinine 08/19/2022 1.02  0.70 - 1.30 mg/dL Final     BUN/Creatinine Ratio 08/19/2022 20  6 - 20 Final    Calcium 08/19/2022 8.6  8.5 - 10.1 mg/dL Final    eGFR 08/19/2022 96  >=60 mL/min/1.73m² Final    CRP 08/19/2022 0.43  0.00 - 0.80 mg/dL Final    ESR Westergren 08/19/2022 65 (H)  0 - 15 mm/Hr Final    WBC 08/19/2022 5.59  4.50 - 11.00 K/uL Final    RBC 08/19/2022 4.71  4.60 - 6.20 M/uL Final    Hemoglobin 08/19/2022 12.7 (L)  13.5 - 18.0 g/dL Final    Hematocrit 08/19/2022 38.5 (L)  40.0 - 54.0 % Final    MCV 08/19/2022 81.7  80.0 - 96.0 fL Final    MCH 08/19/2022 27.0  27.0 - 31.0 pg Final    MCHC 08/19/2022 33.0  32.0 - 36.0 g/dL Final    RDW 08/19/2022 13.3  11.5 - 14.5 % Final    Platelet Count 08/19/2022 289  150 - 400 K/uL Final    MPV 08/19/2022 9.8  9.4 - 12.4 fL Final    Neutrophils % 08/19/2022 42.2 (L)  53.0 - 65.0 % Final    Lymphocytes % 08/19/2022 44.0 (H)  27.0 - 41.0 % Final    Monocytes % 08/19/2022 6.6 (H)  2.0 - 6.0 % Final    Eosinophils % 08/19/2022 5.9 (H)  1.0 - 4.0 % Final    Basophils % 08/19/2022 1.1 (H)  0.0 - 1.0 % Final    Immature Granulocytes % 08/19/2022 0.2  0.0 - 0.4 % Final    nRBC, Auto 08/19/2022 0.0  <=0.0 % Final    Neutrophils, Abs 08/19/2022 2.36  1.80 - 7.70 K/uL Final    Lymphocytes, Absolute 08/19/2022 2.46  1.00 - 4.80 K/uL Final    Monocytes, Absolute 08/19/2022 0.37  0.00 - 0.80 K/uL Final    Eosinophils, Absolute 08/19/2022 0.33  0.00 - 0.50 K/uL Final    Basophils, Absolute 08/19/2022 0.06  0.00 - 0.20 K/uL Final    Immature Granulocytes, Absolute 08/19/2022 0.01  0.00 - 0.04 K/uL Final    nRBC, Absolute 08/19/2022 0.00  <=0.00 x10e3/uL Final    Diff Type 08/19/2022 Auto   Final   Lab Requisition on 08/18/2022   Component Date Value Ref Range Status    Sodium 08/18/2022 143  136 - 145 mmol/L Final    Potassium 08/18/2022 3.4 (L)  3.5 - 5.1 mmol/L Final    Chloride 08/18/2022 113 (H)  98 - 107 mmol/L Final    CO2 08/18/2022 26  21 - 32 mmol/L Final    Anion Gap 08/18/2022 7  7 - 16 mmol/L Final    Glucose  08/18/2022 81  74 - 106 mg/dL Final    BUN 08/18/2022 24 (H)  7 - 18 mg/dL Final    Creatinine 08/18/2022 0.86  0.70 - 1.30 mg/dL Final    BUN/Creatinine Ratio 08/18/2022 28 (H)  6 - 20 Final    Calcium 08/18/2022 7.6 (L)  8.5 - 10.1 mg/dL Final    eGFR 08/18/2022 113  >=60 mL/min/1.73m² Final    CRP 08/18/2022 0.48  0.00 - 0.80 mg/dL Final    ESR Westergren 08/18/2022 1  0 - 15 mm/Hr Final    WBC 08/18/2022 4.55  4.50 - 11.00 K/uL Final    RBC 08/18/2022 8.56 (H)  4.60 - 6.20 M/uL Final    Hemoglobin 08/18/2022 23.1 (HH)  13.5 - 18.0 g/dL Final    Hematocrit 08/18/2022 68.7 (HH)  40.0 - 54.0 % Final    MCV 08/18/2022 80.3  80.0 - 96.0 fL Final    MCH 08/18/2022 27.0  27.0 - 31.0 pg Final    MCHC 08/18/2022 33.6  32.0 - 36.0 g/dL Final    RDW 08/18/2022 16.8 (H)  11.5 - 14.5 % Final    Platelet Count 08/18/2022 76 (L)  150 - 400 K/uL Final    MPV 08/18/2022 9.9  9.4 - 12.4 fL Final    Neutrophils % 08/18/2022 62.8  53.0 - 65.0 % Final    Lymphocytes % 08/18/2022 23.3 (L)  27.0 - 41.0 % Final    Monocytes % 08/18/2022 5.1  2.0 - 6.0 % Final    Eosinophils % 08/18/2022 7.5 (H)  1.0 - 4.0 % Final    Basophils % 08/18/2022 1.1 (H)  0.0 - 1.0 % Final    Immature Granulocytes % 08/18/2022 0.2  0.0 - 0.4 % Final    nRBC, Auto 08/18/2022 0.0  <=0.0 % Final    Neutrophils, Abs 08/18/2022 2.86  1.80 - 7.70 K/uL Final    Lymphocytes, Absolute 08/18/2022 1.06  1.00 - 4.80 K/uL Final    Monocytes, Absolute 08/18/2022 0.23  0.00 - 0.80 K/uL Final    Eosinophils, Absolute 08/18/2022 0.34  0.00 - 0.50 K/uL Final    Basophils, Absolute 08/18/2022 0.05  0.00 - 0.20 K/uL Final    Immature Granulocytes, Absolute 08/18/2022 0.01  0.00 - 0.04 K/uL Final    nRBC, Absolute 08/18/2022 0.00  <=0.00 x10e3/uL Final    Diff Type 08/18/2022 Auto   Final    Platelet Morphology 08/18/2022 Decreased (A)  Normal Final    Anisocytosis 08/18/2022 1+   Final   Lab Requisition on 08/12/2022   Component Date Value Ref Range Status    ESR Westergren  08/12/2022 66 (H)  0 - 15 mm/Hr Final    WBC 08/12/2022 4.96  4.50 - 11.00 K/uL Final    RBC 08/12/2022 4.81  4.60 - 6.20 M/uL Final    Hemoglobin 08/12/2022 13.0 (L)  13.5 - 18.0 g/dL Final    Hematocrit 08/12/2022 40.0  40.0 - 54.0 % Final    MCV 08/12/2022 83.2  80.0 - 96.0 fL Final    MCH 08/12/2022 27.0  27.0 - 31.0 pg Final    MCHC 08/12/2022 32.5  32.0 - 36.0 g/dL Final    RDW 08/12/2022 13.3  11.5 - 14.5 % Final    Platelet Count 08/12/2022 292  150 - 400 K/uL Final    MPV 08/12/2022 9.6  9.4 - 12.4 fL Final    Neutrophils % 08/12/2022 43.4 (L)  53.0 - 65.0 % Final    Lymphocytes % 08/12/2022 42.7 (H)  27.0 - 41.0 % Final    Monocytes % 08/12/2022 7.1 (H)  2.0 - 6.0 % Final    Eosinophils % 08/12/2022 5.6 (H)  1.0 - 4.0 % Final    Basophils % 08/12/2022 1.2 (H)  0.0 - 1.0 % Final    Immature Granulocytes % 08/12/2022 0.0  0.0 - 0.4 % Final    nRBC, Auto 08/12/2022 0.0  <=0.0 % Final    Neutrophils, Abs 08/12/2022 2.15  1.80 - 7.70 K/uL Final    Lymphocytes, Absolute 08/12/2022 2.12  1.00 - 4.80 K/uL Final    Monocytes, Absolute 08/12/2022 0.35  0.00 - 0.80 K/uL Final    Eosinophils, Absolute 08/12/2022 0.28  0.00 - 0.50 K/uL Final    Basophils, Absolute 08/12/2022 0.06  0.00 - 0.20 K/uL Final    Immature Granulocytes, Absolute 08/12/2022 0.00  0.00 - 0.04 K/uL Final    nRBC, Absolute 08/12/2022 0.00  <=0.00 x10e3/uL Final    Diff Type 08/12/2022 Scan Smear   Final    Segmented Neutrophils, Man % 08/12/2022 46 (L)  50 - 62 % Final    Lymphocytes, Man % 08/12/2022 39  27 - 41 % Final    Monocytes, Man % 08/12/2022 9 (H)  2 - 6 % Final    Eosinophils, Man % 08/12/2022 6 (H)  1 - 4 % Final    Platelet Morphology 08/12/2022 Few Large Platelets (A)  Normal Final    RBC Morphology 08/12/2022 Normal   Final    Atypical Lymphocytes 08/12/2022 Rare   Final   There may be more visits with results that are not included.         No orders of the defined types were placed in this encounter.        Requested Prescriptions      Signed Prescriptions Disp Refills    naloxone (NARCAN) 4 mg/actuation Spry 2 each 0     Si sprays (8 mg total) by Nasal route once. Call 911, Two sprays alternate nostril, may repeat 2-3 minutes as needed for 1 dose    HYDROcodone-acetaminophen (NORCO)  mg per tablet 60 tablet 0     Sig: Take 1 tablet by mouth every 12 (twelve) hours as needed for Pain.    HYDROcodone-acetaminophen (NORCO)  mg per tablet 60 tablet 0     Sig: Take 1 tablet by mouth every 12 (twelve) hours as needed for Pain.       Assessment:     1. Diabetic neuropathy with neurologic complication    2. Ulcer of right heel, with necrosis of bone    3. Peripheral vascular disease, unspecified         A's of Opioid Risk Assessment  Activity:Patient can perform ADL.   Analgesia:Patients pain is partially controlled by current medication. Patient has tried OTC medications such as Tylenol and Ibuprofen with out relief.   Adverse Effects: Patient denies constipation or sedation.  Aberrant Behavior:  reviewed with no aberrant drug seeking/taking behavior.  Overdose reversal drug naloxone discussed    Drug screen reviewed      Plan:    Lower extremity discomfort right greater than left    Ulcer right foot continues to follow wound management    Walking boot right lower extremity    Continue activity as directed continue current medication    Follow-up 2 months    Dr. Paulson, May 2023    Bring original prescription medication bottles/container/box with labels to each visit    Pill count    Physical therapy    Massage therapy declines

## 2023-02-09 DIAGNOSIS — Z71.89 COMPLEX CARE COORDINATION: ICD-10-CM

## 2023-02-13 ENCOUNTER — OFFICE VISIT (OUTPATIENT)
Dept: WOUND CARE | Facility: CLINIC | Age: 38
End: 2023-02-13
Attending: FAMILY MEDICINE
Payer: MEDICARE

## 2023-02-13 VITALS
SYSTOLIC BLOOD PRESSURE: 135 MMHG | TEMPERATURE: 97 F | DIASTOLIC BLOOD PRESSURE: 71 MMHG | HEART RATE: 87 BPM | RESPIRATION RATE: 20 BRPM

## 2023-02-13 DIAGNOSIS — L97.414 ULCER OF RIGHT HEEL, WITH NECROSIS OF BONE: Primary | ICD-10-CM

## 2023-02-13 PROCEDURE — G0179 PR HOME HEALTH MD RECERTIFICATION: ICD-10-PCS | Mod: ,,, | Performed by: NURSE PRACTITIONER

## 2023-02-13 PROCEDURE — 11045 DBRDMT SUBQ TISS EACH ADDL: CPT | Mod: S$PBB,,, | Performed by: NURSE PRACTITIONER

## 2023-02-13 PROCEDURE — 99499 UNLISTED E&M SERVICE: CPT | Mod: S$PBB,,, | Performed by: NURSE PRACTITIONER

## 2023-02-13 PROCEDURE — 11042 DEBRIDEMENT: ICD-10-PCS | Mod: S$PBB,,, | Performed by: NURSE PRACTITIONER

## 2023-02-13 PROCEDURE — 11045 DEBRIDEMENT: ICD-10-PCS | Mod: S$PBB,,, | Performed by: NURSE PRACTITIONER

## 2023-02-13 PROCEDURE — G0179 MD RECERTIFICATION HHA PT: HCPCS | Mod: ,,, | Performed by: NURSE PRACTITIONER

## 2023-02-13 PROCEDURE — 99499 NO LOS: ICD-10-PCS | Mod: S$PBB,,, | Performed by: NURSE PRACTITIONER

## 2023-02-13 PROCEDURE — 99213 OFFICE O/P EST LOW 20 MIN: CPT | Mod: PBBFAC | Performed by: NURSE PRACTITIONER

## 2023-02-13 PROCEDURE — 11042 DBRDMT SUBQ TIS 1ST 20SQCM/<: CPT | Mod: PBBFAC | Performed by: NURSE PRACTITIONER

## 2023-02-13 NOTE — PROGRESS NOTES
AUGUSTUS Macedo   [facility]  [address] [facility] [place] [address]  [phone]      PATIENT NAME: Jean Pierre Paulson  : 1985  DATE: 23  MRN: [id number]      Billing Provider: AUGUSTUS Macedo  Level of Service:   Patient PCP Information       Provider PCP Type    Mojgan Lomeli NP General            Reason for Visit / Chief Complaint: Diabetic Foot Ulcer (Right plantar foot)       History of Present Illness / Problem Focused Workflow     Jean Pierre Paulson is a 36 y.o. male who presents to clinic for follow up on chronic-non healing wound on the right foot. Biofilm to wound bed. Jordyn-wound is macerated and malodorous, bedside debridement today. He reports he has been walking on foot more due to death in family. He is taking PO antibiotics as prescribed and wearing off-loading boot. He reports he has ordered a new boot and is waiting for it to come in.     Reports Home Health certification nurse brought up concerns that he was abusing pain medication. Medication list reviewed with patient and updated in [facility], he has multiple prescriptions from pain management because they are written in advance for 2 months by Pain Treatment provider.     Significant PMH  Includes diabetes and hypertension. Last HgA1C 7.3 in 2022, diabetes is managed by PCP. He is due for arterial dopplers, last doppler in 2020. Denies fever or chills.     Review of Systems     Review of Systems   Constitutional:  Negative for activity change, chills and fever.   Respiratory:  Negative for chest tightness and shortness of breath.    Cardiovascular:  Positive for leg swelling. Negative for chest pain and palpitations.   Musculoskeletal:  Positive for arthralgias, gait problem and joint swelling.   Skin:  Positive for color change and wound.        See wound assessment   Neurological:  Positive for weakness and numbness.   Psychiatric/Behavioral:  Negative for agitation,  behavioral problems, confusion and self-injury.      Medical / Social / Family History     Past Medical History:   Diagnosis Date    Anemia     Chronic osteomyelitis     Diabetes mellitus with neuropathy     Diabetes mellitus, type 2     DM2 (diabetes mellitus, type 2)     HTN (hypertension)     Hypertension     Neuropathy     Obesity     Osteomyelitis 10/2020    Hx of R foot, Tx with IV Abx    Peripheral vascular disease        Past Surgical History:   Procedure Laterality Date    APPLICATION OF SPLIT-THICKNESS SKIN GRAFT (STSG) TO LOWER EXTREMITY Right 3/2/2022    Procedure: APPLICATION, GRAFT, SKIN, SPLIT-THICKNESS, TO LOWER EXTREMITY;  Surgeon: Greg Ramírez MD;  Location: [facility];  Service: General;  Laterality: Right;    CHOLECYSTECTOMY      DEBRIDEMENT OF FOOT Right 10/2020    FOOT SURGERY      Right diabetic foot ulcer Elliott's Grade 3      Bone exposure to R heel, Hx of IV Abx, cultures show multiple bacterial growth, Hx of Osteomyelitis, HBOT       Social History  Mr. Jean Pierre Paulson  reports that he quit smoking about a year ago. His smoking use included cigarettes. He started smoking about 23 years ago. He smoked an average of .25 packs per day. He has never used smokeless tobacco. He reports current alcohol use of about 2.0 standard drinks per week. He reports current drug use. Drug: Hydrocodone.    Family History  Mr.'s Jean Pierre Paulson family history includes Diabetes in his father and maternal grandmother; Hypertension in his father and maternal grandmother.    Medications and Allergies     Medications  Outpatient Medications Marked as Taking for the 2/13/23 encounter (Office Visit) with AUGUSTUS Macedo   Medication Sig Dispense Refill    amLODIPine (NORVASC) 10 MG tablet Take 1 tablet (10 mg total) by mouth once daily. 90 tablet 1    blood sugar diagnostic (BLOOD GLUCOSE TEST) Strp 1 strip by Misc.(Non-Drug; Combo Route) route once daily. accu-chek Veronika strips 90 strip 5     empaglifloz-linaglip-metformin (TRIJARDY XR) 5-2.5-1,000 mg TBph Take 1 tablet by mouth once daily. 90 tablet 1    FEROSUL 325 mg (65 mg iron) Tab tablet Take 1 tablet (325 mg total) by mouth once daily. 90 tablet 1    gabapentin (NEURONTIN) 300 MG capsule Take 1 capsule (300 mg total) by mouth every 8 (eight) hours. 270 capsule 1    HYDROcodone-acetaminophen (NORCO)  mg per tablet Take 1 tablet by mouth every 12 (twelve) hours as needed for Pain. 60 tablet 0    [START ON 2/22/2023] HYDROcodone-acetaminophen (NORCO)  mg per tablet Take 1 tablet by mouth every 12 (twelve) hours as needed for Pain. 60 tablet 0    losartan (COZAAR) 50 MG tablet Take 1 tablet (50 mg total) by mouth once daily. 90 tablet 1    rosuvastatin (CRESTOR) 10 MG tablet Take 1 tablet (10 mg total) by mouth once daily. 90 tablet 1    semaglutide (OZEMPIC) 2 mg/dose (8 mg/3 mL) PnIj Inject 2 mg into the skin every 7 days.      sulfamethoxazole-trimethoprim 800-160mg (BACTRIM DS) 800-160 mg Tab Take 1 tablet by mouth 2 (two) times daily. 60 tablet 0       Allergies  Review of patient's allergies indicates:   Allergen Reactions    Tomato Itching       Physical Examination     Vitals:    02/13/23 1040   BP: 135/71   Pulse: 87   Resp: 20   Temp: 97.4 °F (36.3 °C)     Physical Exam  Vitals and nursing note reviewed.   Constitutional:       Appearance: Normal appearance.   HENT:      Head: Normocephalic.   Cardiovascular:      Rate and Rhythm: Normal rate and regular rhythm.      Pulses: Normal pulses.      Heart sounds: Normal heart sounds.   Pulmonary:      Effort: Pulmonary effort is normal. No respiratory distress.   Chest:      Chest wall: No tenderness.   Musculoskeletal:         General: Swelling, tenderness and deformity present.      Right lower leg: Edema present.   Skin:     General: Skin is warm and dry.      Capillary Refill: Capillary refill takes 2 to 3 seconds.      Comments: See LDA for photos and measurements   Neurological:       General: No focal deficit present.      Mental Status: He is alert and oriented to person, place, and time. Mental status is at baseline.   Psychiatric:         Mood and Affect: Mood normal.         Behavior: Behavior normal.         Thought Content: Thought content normal.         Judgment: Judgment normal.     Assessment and Plan             Wound 02/16/22 1020 Diabetic Ulcer Right Plantar (Active)   02/16/22 1020    Pre-existing: Yes   Primary Wound Type: Diabetic ulc   Side: Right   Orientation:    Location: Plantar   Wound Number:    Ankle-Brachial Index:    Pulses:    Removal Indication and Assessment:    Wound Outcome:    (Retired) Wound Type:    (Retired) Wound Length (cm):    (Retired) Wound Width (cm):    (Retired) Depth (cm):    Wound Description (Comments):    Removal Indications:    Wound Image     02/13/23 1042   Drainage Amount Moderate 02/13/23 1042   Drainage Characteristics/Odor Yellow 02/13/23 1042   Appearance Intact;Pink;Red;Yellow;Slough;Fibrin;Moist;Granulating;Adipose 02/13/23 1042   Black (%), Wound Tissue Color 0 % 02/13/23 1042   Red (%), Wound Tissue Color 90 % 02/13/23 1042   Yellow (%), Wound Tissue Color 10 % 02/13/23 1042   Periwound Area Intact;Macerated 02/13/23 1042   Wound Edges Callused 02/13/23 1042   Elliott Classification (diabetic foot ulcers only) Grade 1 02/13/23 1042   Wound Length (cm) 10.8 cm 02/13/23 1042   Wound Width (cm) 5.6 cm 02/13/23 1042   Wound Depth (cm) 0.9 cm 02/13/23 1042   Wound Volume (cm^3) 54.432 cm^3 02/13/23 1042   Wound Surface Area (cm^2) 60.48 cm^2 02/13/23 1042   Care Cleansed with:;Antimicrobial agent 02/13/23 1042   Dressing Applied;Gauze, wet to dry;Gauze;Rolled gauze 02/13/23 1042   Periwound Care Moisture barrier applied 02/13/23 1042     Problem List Items Addressed This Visit          Orthopedic    Ulcer of right heel, with necrosis of bone - Primary    Overview                      Current Assessment & Plan     Right foot wound clean  with Dakin's solution or baby shampoo and water  Apply dakin's moisten drawtex to wound (cut drawtex slightly larger than wound) then apply dry drawtex over wound,cover with ABD,wrap with kerlix,secure with paper tape. Change daily and as needed     Elevate feet as much as possible  Avoid prolonged standing  Knee scooter and/or walking boot  Monitor closely for s/s of infection including fever, chills, increase in pain, odor from wound, and increased redness from foot. Go to ER if any complications develop.   Keep leg elevated and avoid pressure on wound.  Diabetes:  Monitor glucose closely. Check fasting glucose and 2 hours after meals. HgA1C goal <7, fasting glucose , and 2 hours after meals <180  Hypertension:  Check blood pressure twice daily, goal <120/80  Diet:   Increase protein intake, avoid fried, fatty foods and foods high in simple carbs.   Vitamins:  Take vitamin C 1000 mg, zinc 50mg, vitamin d 5000 units, and a daily multivitamin. Angel is a good source of protein and nutrients to aid in wound healing.             Future Appointments   Date Time Provider Department Center   3/3/2023  9:00 AM AUGUSTUS Macedo [facility]   3/23/2023 10:00 AM VONNIE Ma [facility]   5/1/2023  1:30 PM AW[facility] NURSE, [place] [facility] LOUANN Morris            Signature:  AUGUSTUS Macedo  [facility]  [address]  [place] [address]  [phone]    Date of encounter: 2/13/23

## 2023-02-13 NOTE — ASSESSMENT & PLAN NOTE
Right foot wound clean with Dakin's solution or baby shampoo and water  Apply dakin's moisten drawtex to wound (cut drawtex slightly larger than wound) then apply dry drawtex over wound,cover with ABD,wrap with kerlix,secure with paper tape. Change daily and as needed     Elevate feet as much as possible  Avoid prolonged standing  Knee scooter and/or walking boot  Monitor closely for s/s of infection including fever, chills, increase in pain, odor from wound, and increased redness from foot. Go to ER if any complications develop.   Keep leg elevated and avoid pressure on wound.  Diabetes:  Monitor glucose closely. Check fasting glucose and 2 hours after meals. HgA1C goal <7, fasting glucose , and 2 hours after meals <180  Hypertension:  Check blood pressure twice daily, goal <120/80  Diet:   Increase protein intake, avoid fried, fatty foods and foods high in simple carbs.   Vitamins:  Take vitamin C 1000 mg, zinc 50mg, vitamin d 5000 units, and a daily multivitamin. Angel is a good source of protein and nutrients to aid in wound healing.

## 2023-02-13 NOTE — PROGRESS NOTES
Debridement Performed for Assessment: Wound# 1  Performed By: Provider: Yelitza Alas NP  Assistant:  Lizabeth Stone LPN    Debridement: Surgical    Photo taken post procedure: yes    Time-Out Taken: Yes  Level: Skin/Subcutaneous Tissue  Post Debridement Measurements  Length: (cm) 12.6  Width: (cm) 7.5  Depth: (cm) 0.8      Area: (cm²) 94.5  Percent Debrided: 100%  Total Area Debrided: (sq cm)     Tissue and other material debrided:  Adipose, Dermis, Epidermis, Subcutaneous  Devitalized Tissue Debrided:Biofilm, callous  Instrument: Curette  Bleeding: Moderate  Hemostasis Achieved: Pressure  Procedural Pain: Insensate  Post Procedural Pain: Insensate  Response to Treatment: Procedure was tolerated well    Devitalized materials/tissue Removed  the following was removed during debridement  subcutaneous      Post Debridement Diagnosis  chronic right foot ulcer  Post debridement diagnosis  Same as Pre-operative debridement diagnosis, No Complications noted.      Grafts or implants applied  Was a graft or implant applied?  No      Procedure assistant  Procedure assisted by:  Lizabeth Stone LPN  Assistant is the same as nurse listed above      Complications related to procedure  Did any complication occure during procedure?  No complications noted during or after procedure.      Specimen  Specimen collect during procedure?  No specimen collected      Anaesthesia:  Anesthesia used  None      Blood Loss:  Blood loss during procedure  less than 5 cc

## 2023-03-02 ENCOUNTER — OFFICE VISIT (OUTPATIENT)
Dept: WOUND CARE | Facility: CLINIC | Age: 38
End: 2023-03-02
Attending: FAMILY MEDICINE
Payer: MEDICARE

## 2023-03-02 VITALS — SYSTOLIC BLOOD PRESSURE: 143 MMHG | HEART RATE: 93 BPM | RESPIRATION RATE: 20 BRPM | DIASTOLIC BLOOD PRESSURE: 94 MMHG

## 2023-03-02 DIAGNOSIS — L97.414 ULCER OF RIGHT HEEL, WITH NECROSIS OF BONE: ICD-10-CM

## 2023-03-02 PROCEDURE — 99215 OFFICE O/P EST HI 40 MIN: CPT | Mod: PBBFAC | Performed by: NURSE PRACTITIONER

## 2023-03-02 PROCEDURE — 11043 DBRDMT MUSC&/FSCA 1ST 20/<: CPT | Mod: PBBFAC | Performed by: NURSE PRACTITIONER

## 2023-03-02 PROCEDURE — 99499 UNLISTED E&M SERVICE: CPT | Mod: S$PBB,,, | Performed by: NURSE PRACTITIONER

## 2023-03-02 PROCEDURE — 11046 DEBRIDEMENT: ICD-10-PCS | Mod: S$PBB,,, | Performed by: NURSE PRACTITIONER

## 2023-03-02 PROCEDURE — 99499 NO LOS: ICD-10-PCS | Mod: S$PBB,,, | Performed by: NURSE PRACTITIONER

## 2023-03-02 PROCEDURE — 11043 DEBRIDEMENT: ICD-10-PCS | Mod: S$PBB,,, | Performed by: NURSE PRACTITIONER

## 2023-03-02 PROCEDURE — 11046 DBRDMT MUSC&/FSCA EA ADDL: CPT | Mod: S$PBB,,, | Performed by: NURSE PRACTITIONER

## 2023-03-02 RX ORDER — SULFAMETHOXAZOLE AND TRIMETHOPRIM 800; 160 MG/1; MG/1
1 TABLET ORAL 2 TIMES DAILY
Qty: 60 TABLET | Refills: 2 | Status: SHIPPED | OUTPATIENT
Start: 2023-03-02 | End: 2023-04-01

## 2023-03-02 NOTE — PATIENT INSTRUCTIONS
Right foot wound clean with acetic acid ( 1 cup white vinegar in 3 cups plain water) Apply gentamicin cream to wound bed,let sit for 10 min then apply. Apply acetic acid moisten drawtex to wound (cut slightly larger than wound) cover with dry gauze, and ABD,wrap with kerlix, and ace wrap. Change daily and as needed     Elevate feet as much as possible  Avoid prolonged standing  Knee scooter and/or walking boot  Monitor closely for s/s of infection including fever, chills, increase in pain, odor from wound, and increased redness from foot. Go to ER if any complications develop.   Keep leg elevated and avoid pressure on wound.  Diabetes:  Monitor glucose closely. Check fasting glucose and 2 hours after meals. HgA1C goal <7, fasting glucose , and 2 hours after meals <180  Hypertension:  Check blood pressure twice daily, goal <120/80  Diet:   Increase protein intake, avoid fried, fatty foods and foods high in simple carbs.   Vitamins:  Take vitamin C 1000 mg, zinc 50mg, vitamin d 5000 units, and a daily multivitamin. Angel is a good source of protein and nutrients to aid in wound healing.

## 2023-03-02 NOTE — PROGRESS NOTES
AUGUSTUS Macedo   [facility]  [address] [facility] [place] [address]  [phone]      PATIENT NAME: Jean Pierre Paulson  : 1985  DATE: 3/2/23  MRN: [id number]      Billing Provider: AUGUSTUS Macedo  Level of Service:   Patient PCP Information       Provider PCP Type    Mojgan Lomeli NP General            Reason for Visit / Chief Complaint: Non-healing Wound Follow Up (R DFU )       History of Present Illness / Problem Focused Workflow     Jean Pierre Paulson is a 37 y.o. male who presents to clinic for follow up on chronic-non healing wound on the right foot. Wound has improved since last visit. Continues to have biofilm to wound bed and callus filippo-wound, bedside debridement today. He reports he has been wearing off-loading boot and staying off foot as much as possible. He is taking PO antibiotics as prescribed.     Significant PMH  Includes diabetes and hypertension. Last HgA1C 7.3 in 2022, diabetes is managed by PCP. He is due for arterial dopplers, last doppler in 2020. Denies fever or chills.     Review of Systems     Review of Systems   Constitutional:  Negative for activity change, chills and fever.   Respiratory:  Negative for chest tightness and shortness of breath.    Cardiovascular:  Positive for leg swelling. Negative for chest pain and palpitations.   Musculoskeletal:  Positive for arthralgias, gait problem and joint swelling.   Skin:  Positive for color change and wound.        See wound assessment   Neurological:  Positive for weakness and numbness.   Psychiatric/Behavioral:  Negative for agitation, behavioral problems, confusion and self-injury.      Medical / Social / Family History     Past Medical History:   Diagnosis Date    Anemia     Chronic osteomyelitis     Diabetes mellitus with neuropathy     Diabetes mellitus, type 2     DM2 (diabetes mellitus, type 2)     HTN (hypertension)     Hypertension     Neuropathy      Obesity     Osteomyelitis 10/2020    Hx of R foot, Tx with IV Abx    Peripheral vascular disease        Past Surgical History:   Procedure Laterality Date    APPLICATION OF SPLIT-THICKNESS SKIN GRAFT (STSG) TO LOWER EXTREMITY Right 3/2/2022    Procedure: APPLICATION, GRAFT, SKIN, SPLIT-THICKNESS, TO LOWER EXTREMITY;  Surgeon: Greg Ramírez MD;  Location: [facility];  Service: General;  Laterality: Right;    CHOLECYSTECTOMY      DEBRIDEMENT OF FOOT Right 10/2020    FOOT SURGERY      Right diabetic foot ulcer Elliott's Grade 3      Bone exposure to R heel, Hx of IV Abx, cultures show multiple bacterial growth, Hx of Osteomyelitis, HBOT       Social History  Mr. Jean Pierre Paulson  reports that he quit smoking about 12 months ago. His smoking use included cigarettes. He started smoking about 23 years ago. He smoked an average of .25 packs per day. He has never used smokeless tobacco. He reports current alcohol use of about 2.0 standard drinks per week. He reports current drug use. Drug: Hydrocodone.    Family History  Mr.'s Jean Pierre Paulson family history includes Diabetes in his father and maternal grandmother; Hypertension in his father and maternal grandmother.    Medications and Allergies     Medications  Outpatient Medications Marked as Taking for the 3/2/23 encounter (Office Visit) with AUGUSTUS Macedo   Medication Sig Dispense Refill    acetaminophen (TYLENOL) 325 MG tablet Take 2 tablets (650 mg total) by mouth every 4 (four) hours as needed.  0    amLODIPine (NORVASC) 10 MG tablet Take 1 tablet (10 mg total) by mouth once daily. 90 tablet 1    blood sugar diagnostic (BLOOD GLUCOSE TEST) Strp 1 strip by Misc.(Non-Drug; Combo Route) route once daily. accu-chek Veronika strips 90 strip 5    empaglifloz-linaglip-metformin (TRIJARDY XR) 5-2.5-1,000 mg TBph Take 1 tablet by mouth once daily. 90 tablet 1    FEROSUL 325 mg (65 mg iron) Tab tablet Take 1 tablet (325 mg total) by mouth once daily. 90 tablet 1     gabapentin (NEURONTIN) 300 MG capsule Take 1 capsule (300 mg total) by mouth every 8 (eight) hours. 270 capsule 1    hydroCHLOROthiazide (HYDRODIURIL) 25 MG tablet Take 1 tablet (25 mg total) by mouth once daily. 90 tablet 1    HYDROcodone-acetaminophen (NORCO)  mg per tablet Take 1 tablet by mouth every 12 (twelve) hours as needed for Pain. 60 tablet 0    rosuvastatin (CRESTOR) 10 MG tablet Take 1 tablet (10 mg total) by mouth once daily. 90 tablet 1    semaglutide (OZEMPIC) 2 mg/dose (8 mg/3 mL) PnIj Inject 2 mg into the skin every 7 days.         Allergies  Review of patient's allergies indicates:   Allergen Reactions    Tomato Itching       Physical Examination     Vitals:    03/02/23 1032   BP: (!) 143/94   Pulse: 93   Resp: 20     Physical Exam  Vitals and nursing note reviewed.   Constitutional:       Appearance: Normal appearance.   HENT:      Head: Normocephalic.   Cardiovascular:      Rate and Rhythm: Normal rate and regular rhythm.      Pulses: Normal pulses.      Heart sounds: Normal heart sounds.   Pulmonary:      Effort: Pulmonary effort is normal. No respiratory distress.   Chest:      Chest wall: No tenderness.   Musculoskeletal:         General: Swelling, tenderness and deformity present.      Right lower leg: Edema present.   Skin:     General: Skin is warm and dry.      Capillary Refill: Capillary refill takes 2 to 3 seconds.      Comments: See LDA for photos and measurements   Neurological:      General: No focal deficit present.      Mental Status: He is alert and oriented to person, place, and time. Mental status is at baseline.   Psychiatric:         Mood and Affect: Mood normal.         Behavior: Behavior normal.         Thought Content: Thought content normal.         Judgment: Judgment normal.     Assessment and Plan             Wound 02/16/22 1020 Diabetic Ulcer Right Plantar (Active)   02/16/22 1020    Pre-existing: Yes   Primary Wound Type: Diabetic ulc   Side: Right    Orientation:    Location: Plantar   Wound Number:    Ankle-Brachial Index:    Pulses:    Removal Indication and Assessment:    Wound Outcome:    (Retired) Wound Type:    (Retired) Wound Length (cm):    (Retired) Wound Width (cm):    (Retired) Depth (cm):    Wound Description (Comments):    Removal Indications:    Wound Image      03/02/23 1054   Dressing Appearance Moist drainage 03/02/23 1054   Drainage Amount Moderate 03/02/23 1054   Drainage Characteristics/Odor Serosanguineous 03/02/23 1054   Appearance Pink;Moist;Granulating 03/02/23 1054   Tissue loss description Full thickness 03/02/23 1054   Black (%), Wound Tissue Color 0 % 03/02/23 1054   Red (%), Wound Tissue Color 100 % 03/02/23 1054   Yellow (%), Wound Tissue Color 0 % 03/02/23 1054   Periwound Area Dry;Scar tissue 03/02/23 1054   Wound Edges Callused;Open 03/02/23 1054   Elliott Classification (diabetic foot ulcers only) Grade 3 03/02/23 1054   Wound Length (cm) 10 cm 03/02/23 1054   Wound Width (cm) 6.4 cm 03/02/23 1054   Wound Depth (cm) 0.5 cm 03/02/23 1054   Wound Volume (cm^3) 32 cm^3 03/02/23 1054   Wound Surface Area (cm^2) 64 cm^2 03/02/23 1054   Care Cleansed with:;Antimicrobial agent 03/02/23 1054   Dressing Applied;Gauze;Absorptive Pad;Rolled gauze 03/02/23 1054   Periwound Care Moisturizer applied 03/02/23 1054   Compression Two layer compression 03/02/23 1054   Dressing Change Due 03/03/23 03/02/23 1054     Problem List Items Addressed This Visit          Orthopedic    Ulcer of right heel, with necrosis of bone    Overview                          Current Assessment & Plan     Right foot wound clean with acetic acid ( 1 cup white vinegar in 3 cups plain water) Apply gentamicin cream to wound bed,let sit for 10 min then apply. Apply acetic acid moisten drawtex to wound (cut slightly larger than wound) cover with dry gauze, and ABD,wrap with kerlix, and ace wrap. Change daily and as needed     Elevate feet as much as possible  Avoid  prolonged standing  Knee scooter and/or walking boot  Monitor closely for s/s of infection including fever, chills, increase in pain, odor from wound, and increased redness from foot. Go to ER if any complications develop.   Keep leg elevated and avoid pressure on wound.  Diabetes:  Monitor glucose closely. Check fasting glucose and 2 hours after meals. HgA1C goal <7, fasting glucose , and 2 hours after meals <180  Hypertension:  Check blood pressure twice daily, goal <120/80  Diet:   Increase protein intake, avoid fried, fatty foods and foods high in simple carbs.   Vitamins:  Take vitamin C 1000 mg, zinc 50mg, vitamin d 5000 units, and a daily multivitamin. Angel is a good source of protein and nutrients to aid in wound healing.             Future Appointments   Date Time Provider Department Center   3/23/2023  9:00 AM AUGUSTUS Macedo [facility]   3/23/2023 10:00 [facility]   5/1/2023  1:30 PM AWV NURSE, [facility] LOUANN Core[place]            Signature:  AUGUSTUS Macedo  [facility]  [address]  [place] [address]  [phone]    Date of encounter: 3/2/23

## 2023-03-02 NOTE — PROGRESS NOTES
Debridement Performed for Assessment: Wound# Right plantar foot  Performed By: Provider: Yelitza Ward NP  Assistant: KARINA Sevilla    Debridement: Surgical    Photo taken post procedure:    Time-Out Taken: Yes  Level: Skin/Subcutaneous Tissue/ Muscle  Post Debridement Measurements  Length: (cm) 11  Width: (cm) 7.1  Depth: (cm) 0.6      Area: (cm²) 77.7  Percent Debrided: 100%  Total Area Debrided: (sq cm)     Tissue and other material debrided:  Adipose, Dermis, Epidermis, Subcutaneous  Devitalized Tissue Debrided:Biofilm,callus  Instrument: Curette  Bleeding: Moderate  Hemostasis Achieved: Pressure  Procedural Pain: Insensate  Post Procedural Pain: Insensate  Response to Treatment: Procedure was tolerated well    Devitalized materials/tissue Removed  the following was removed during debridement  subcutaneous      Post Debridement Diagnosis  chronic right diabetic foot ulcer  Post debridement diagnosis  Same as Pre-operative debridement diagnosis, No Complications noted.      Grafts or implants applied  Was a graft or implant applied?  No      Procedure assistant  Procedure assisted by:  Lizabeth Stone LPN  Assistant is the same as nurse listed above      Complications related to procedure  Did any complication occure during procedure?  No complications noted during or after procedure.      Specimen  Specimen collect during procedure?  No specimen collected      Anaesthesia:  Anesthesia used  None      Blood Loss:  Blood loss during procedure  less than 5 cc

## 2023-03-03 NOTE — PROCEDURES
Debridement    Date/Time: 3/2/2023 10:00 AM  Performed by: AUGUSTUS Macedo  Authorized by: AUGUSTUS Macedo     Consent Done?:  Yes (Written)    Wound Details:    Location:  Right foot    Location:  Right Plantar    Type of Debridement:  Excisional       Length (cm):  11       Area (sq cm):  78.1       Width (cm):  7.1       Percent Debrided (%):  100       Depth (cm):  0.6       Total Area Debrided (sq cm):  78.1    Depth of debridement:  Muscle/fascia/tendon    Tissue debrided:  Adipose, Dermis, Epidermis and Subcutaneous    Devitalized tissue debrided:  Biofilm, Exudate and Callus    Instruments:  Curette    Bleeding:  Moderate  Hemostasis Achieved: Yes    Method Used:  Pressure  Patient tolerance:  Patient tolerated the procedure well with no immediate complications     Assistant JOSE ALFREDO Stone LPN

## 2023-03-15 ENCOUNTER — EXTERNAL HOME HEALTH (OUTPATIENT)
Dept: HOME HEALTH SERVICES | Facility: HOSPITAL | Age: 38
End: 2023-03-15
Payer: MEDICARE

## 2023-03-28 ENCOUNTER — DOCUMENT SCAN (OUTPATIENT)
Dept: HOME HEALTH SERVICES | Facility: HOSPITAL | Age: 38
End: 2023-03-28
Payer: MEDICARE

## 2023-03-30 NOTE — PATIENT INSTRUCTIONS
Right foot wound clean with vashe  Apply silver polymem to wound bed (cut slightly larger than wound)cover with dry gauze, and ABD,wrap with kerlix,secure with paper tape. Change daily and as needed   Pack open area to heel with silver polymem rope with each dressing change.   Elevate feet as much as possible  Avoid prolonged standing  Knee scooter and/or walking boot  Monitor closely for s/s of infection including fever, chills, increase in pain, odor from wound, and increased redness from foot. Go to ER if any complications develop.   Keep leg elevated and avoid pressure on wound.  Diabetes:  Monitor glucose closely. Check fasting glucose and 2 hours after meals. HgA1C goal <7, fasting glucose , and 2 hours after meals <180  Hypertension:  Check blood pressure twice daily, goal <120/80  Diet:   Increase protein intake, avoid fried, fatty foods and foods high in simple carbs.   Vitamins:  Take vitamin C 1000 mg, zinc 50mg, vitamin d 5000 units, and a daily multivitamin. Angel is a good source of protein and nutrients to aid in wound healing.

## 2023-03-30 NOTE — PROGRESS NOTES
AUGUSTUS Macedo   [facility]  [address] [facility] [place] [address]  [phone]      PATIENT NAME: Jean Pierre Paulson  : 1985  DATE: 3/31/23  MRN: [id number]      Billing Provider: AUGUSTUS Macedo  Level of Service:   Patient PCP Information       Provider PCP Type    Mojgan Lomeli NP General            Reason for Visit / Chief Complaint: Diabetic Foot Ulcer (R DFU)       History of Present Illness / Problem Focused Workflow     Jean Pierre Paulson is a 37 y.o. male who presents to clinic for follow up on chronic-non healing wound on the right foot. Wound is malodorous today with increase in drainage. Patient is not wearing off-loading boot today and reports he has been ambulating on foot more than usual. Cultures and x-ray today. Continues to have biofilm to wound bed and callus filippo-wound, bedside debridement today. Initiate zyvox, will adjust antibiotics pending culture results. Reports he is out of his ozempic and it has been on backorder at Pharmacy. Sent prescription to [facility].   Reinforced importance of following plan of care, wearing off-loading boot, keeping pressure off foot, taking antibiotics as prescribed, and increasing protein intake.     Significant PMH  Includes diabetes and hypertension. Last HgA1C 7.3 in 2022, diabetes is managed by PCP. He is due for arterial dopplers, last doppler in 2020. Denies fever or chills.     Review of Systems     Review of Systems   Constitutional:  Negative for activity change, chills and fever.   Respiratory:  Negative for chest tightness and shortness of breath.    Cardiovascular:  Positive for leg swelling. Negative for chest pain and palpitations.   Musculoskeletal:  Positive for arthralgias, gait problem and joint swelling.   Skin:  Positive for color change and wound.        See wound assessment   Neurological:  Positive for weakness and numbness.   Psychiatric/Behavioral:   Negative for agitation, behavioral problems, confusion and self-injury.      Medical / Social / Family History     Past Medical History:   Diagnosis Date    Anemia     Chronic osteomyelitis     Diabetes mellitus with neuropathy     Diabetes mellitus, type 2     DM2 (diabetes mellitus, type 2)     HTN (hypertension)     Hypertension     Neuropathy     Obesity     Osteomyelitis 10/2020    Hx of R foot, Tx with IV Abx    Peripheral vascular disease        Past Surgical History:   Procedure Laterality Date    APPLICATION OF SPLIT-THICKNESS SKIN GRAFT (STSG) TO LOWER EXTREMITY Right 3/2/2022    Procedure: APPLICATION, GRAFT, SKIN, SPLIT-THICKNESS, TO LOWER EXTREMITY;  Surgeon: Greg Ramírez MD;  Location: [facility];  Service: General;  Laterality: Right;    CHOLECYSTECTOMY      DEBRIDEMENT OF FOOT Right 10/2020    FOOT SURGERY      Right diabetic foot ulcer Elliott's Grade 3      Bone exposure to R heel, Hx of IV Abx, cultures show multiple bacterial growth, Hx of Osteomyelitis, HBOT       Social History  Mr. Jean Pierre Paulson  reports that he quit smoking about 13 months ago. His smoking use included cigarettes. He started smoking about 23 years ago. He smoked an average of .25 packs per day. He has never used smokeless tobacco. He reports current alcohol use of about 2.0 standard drinks per week. He reports current drug use. Drug: Hydrocodone.    Family History  Mr.'s Jean Pierre Paulson family history includes Diabetes in his father and maternal grandmother; Hypertension in his father and maternal grandmother.    Medications and Allergies     Medications  Outpatient Medications Marked as Taking for the 3/31/23 encounter (Office Visit) with AUGUSTUS Macedo   Medication Sig Dispense Refill    acetaminophen (TYLENOL) 325 MG tablet Take 2 tablets (650 mg total) by mouth every 4 (four) hours as needed.  0    blood sugar diagnostic (BLOOD GLUCOSE TEST) Strp 1 strip by Misc.(Non-Drug; Combo Route) route  once daily. accu-chek Veronika strips 90 strip 5    empaglifloz-linaglip-metformin (TRIJARDY XR) 5-2.5-1,000 mg TBph Take 1 tablet by mouth once daily. 90 tablet 1    FEROSUL 325 mg (65 mg iron) Tab tablet Take 1 tablet (325 mg total) by mouth once daily. 90 tablet 1    gabapentin (NEURONTIN) 300 MG capsule Take 1 capsule (300 mg total) by mouth every 8 (eight) hours. 270 capsule 1    hydroCHLOROthiazide (HYDRODIURIL) 25 MG tablet Take 1 tablet (25 mg total) by mouth once daily. 90 tablet 1    HYDROcodone-acetaminophen (NORCO)  mg per tablet Take 1 tablet by mouth every 12 (twelve) hours as needed for Pain. 60 tablet 0    HYDROcodone-acetaminophen (NORCO)  mg per tablet Take 1 tablet by mouth every 12 (twelve) hours as needed for Pain. 60 tablet 0    rosuvastatin (CRESTOR) 10 MG tablet Take 1 tablet (10 mg total) by mouth once daily. 90 tablet 1    sulfamethoxazole-trimethoprim 800-160mg (BACTRIM DS) 800-160 mg Tab Take 1 tablet by mouth 2 (two) times daily. 60 tablet 2    [DISCONTINUED] semaglutide (OZEMPIC) 2 mg/dose (8 mg/3 mL) PnIj Inject 2 mg into the skin every 7 days.         Allergies  Review of patient's allergies indicates:   Allergen Reactions    Tomato Itching       Physical Examination     Vitals:    03/31/23 1049   BP: (!) 161/97   Pulse: 81   Resp: 20   Temp: 98 °F (36.7 °C)     Physical Exam  Vitals and nursing note reviewed.   Constitutional:       Appearance: Normal appearance.   HENT:      Head: Normocephalic.   Cardiovascular:      Rate and Rhythm: Normal rate and regular rhythm.      Pulses: Normal pulses.      Heart sounds: Normal heart sounds.   Pulmonary:      Effort: Pulmonary effort is normal. No respiratory distress.   Chest:      Chest wall: No tenderness.   Musculoskeletal:         General: Swelling, tenderness and deformity present.      Right lower leg: Edema present.   Skin:     General: Skin is warm and dry.      Capillary Refill: Capillary refill takes 2 to 3  seconds.      Comments: See LDA for photos and measurements   Neurological:      General: No focal deficit present.      Mental Status: He is alert and oriented to person, place, and time. Mental status is at baseline.   Psychiatric:         Mood and Affect: Mood normal.         Behavior: Behavior normal.         Thought Content: Thought content normal.         Judgment: Judgment normal.     Assessment and Plan             Wound 02/16/22 1020 Diabetic Ulcer Right Plantar (Active)   02/16/22 1020    Pre-existing: Yes   Primary Wound Type: Diabetic ulc   Side: Right   Orientation:    Location: Plantar   Wound Number:    Ankle-Brachial Index:    Pulses:    Removal Indication and Assessment:    Wound Outcome:    (Retired) Wound Type:    (Retired) Wound Length (cm):    (Retired) Wound Width (cm):    (Retired) Depth (cm):    Wound Description (Comments):    Removal Indications:    Wound Image     03/31/23 1049   Dressing Appearance Saturated 03/31/23 1049   Drainage Amount Moderate 03/31/23 1049   Drainage Characteristics/Odor Montemayor;Green;Sanguineous 03/31/23 1049   Appearance Pink;Moist;Granulating 03/31/23 1049   Tissue loss description Full thickness 03/31/23 1049   Black (%), Wound Tissue Color 0 % 03/31/23 1049   Red (%), Wound Tissue Color 100 % 03/31/23 1049   Yellow (%), Wound Tissue Color 0 % 03/31/23 1049   Periwound Area Moist 03/31/23 1049   Wound Edges Callused 03/31/23 1049   Elliott Classification (diabetic foot ulcers only) Grade 2 03/31/23 1049   Wound Length (cm) 7 cm 03/31/23 1049   Wound Width (cm) 11.1 cm 03/31/23 1049   Wound Depth (cm) 1 cm 03/31/23 1049   Wound Volume (cm^3) 77.7 cm^3 03/31/23 1049   Wound Surface Area (cm^2) 77.7 cm^2 03/31/23 1049   Care Cleansed with:;Antimicrobial agent 03/31/23 1049   Dressing Applied;Hydrofiber;Absorptive Pad;Gauze;Rolled gauze 03/31/23 1049   Periwound Care Moisturizer applied 03/31/23 1049   Dressing Change Due 04/01/23 03/31/23 1049     Problem List Items  Addressed This Visit          Orthopedic    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    Overview                              Current Assessment & Plan     Right foot wound clean with acetic acid ( 1 cup white vinegar in 3 cups plain water) Apply gentamicin cream to wound bed,let sit for 10 min then apply. Apply acetic acid moisten drawtex to wound (cut slightly larger than wound) cover with dry gauze, and ABD,wrap with kerlix,secure with paper tape. Change daily and as needed     Elevate feet as much as possible  Avoid prolonged standing  Knee scooter and/or walking boot  Monitor closely for s/s of infection including fever, chills, increase in pain, odor from wound, and increased redness from foot. Go to ER if any complications develop.   Keep leg elevated and avoid pressure on wound.  Diabetes:  Monitor glucose closely. Check fasting glucose and 2 hours after meals. HgA1C goal <7, fasting glucose , and 2 hours after meals <180  Hypertension:  Check blood pressure twice daily, goal <120/80  Diet:   Increase protein intake, avoid fried, fatty foods and foods high in simple carbs.   Vitamins:  Take vitamin C 1000 mg, zinc 50mg, vitamin d 5000 units, and a daily multivitamin. Angel is a good source of protein and nutrients to aid in wound healing.          Relevant Orders    Culture, Anaerobe    Culture, Wound    X-Ray Foot Complete 3 view Right     Other Visit Diagnoses       Pain in right foot    -  Primary    Relevant Orders    X-Ray Foot Complete 3 view Right    Drainage from wound        Relevant Orders    Culture, Anaerobe    Culture, Wound            Future Appointments   Date Time Provider Department Center   4/3/2023  9:45 AM VONNIE Ma [facility]   4/14/2023 10:00 AM [facility] Maced[facility]   5/1/2023  1:30 PM AWV NURSE, [facility] FAMILY MEDICINE [facility] LOUANN [place]            Signature:   AUGUSTUS Macedo  [facility]  [address] [facility] [address]  [phone]    Date of encounter: 3/31/23

## 2023-03-31 ENCOUNTER — OFFICE VISIT (OUTPATIENT)
Dept: WOUND CARE | Facility: CLINIC | Age: 38
End: 2023-03-31
Attending: FAMILY MEDICINE
Payer: MEDICARE

## 2023-03-31 ENCOUNTER — HOSPITAL ENCOUNTER (OUTPATIENT)
Dept: RADIOLOGY | Facility: HOSPITAL | Age: 38
Discharge: HOME | End: 2023-03-31
Attending: NURSE PRACTITIONER
Payer: MEDICARE

## 2023-03-31 ENCOUNTER — TELEPHONE (OUTPATIENT)
Dept: WOUND CARE | Facility: CLINIC | Age: 38
End: 2023-03-31
Payer: MEDICARE

## 2023-03-31 VITALS
RESPIRATION RATE: 20 BRPM | SYSTOLIC BLOOD PRESSURE: 161 MMHG | TEMPERATURE: 98 F | DIASTOLIC BLOOD PRESSURE: 97 MMHG | HEART RATE: 81 BPM

## 2023-03-31 DIAGNOSIS — L97.516 NON-PRESSURE CHRONIC ULCER OF OTHER PART OF RIGHT FOOT WITH BONE INVOLVEMENT WITHOUT EVIDENCE OF NECROSIS: Primary | ICD-10-CM

## 2023-03-31 DIAGNOSIS — M79.671 PAIN IN RIGHT FOOT: ICD-10-CM

## 2023-03-31 DIAGNOSIS — L97.516 NON-PRESSURE CHRONIC ULCER OF OTHER PART OF RIGHT FOOT WITH BONE INVOLVEMENT WITHOUT EVIDENCE OF NECROSIS: ICD-10-CM

## 2023-03-31 DIAGNOSIS — T14.8XXA DRAINAGE FROM WOUND: ICD-10-CM

## 2023-03-31 PROCEDURE — 87075 CULTR BACTERIA EXCEPT BLOOD: CPT | Mod: ,,, | Performed by: CLINICAL MEDICAL LABORATORY

## 2023-03-31 PROCEDURE — 11043 DEBRIDEMENT: ICD-10-PCS | Mod: S$PBB,,, | Performed by: NURSE PRACTITIONER

## 2023-03-31 PROCEDURE — 87077 CULTURE, WOUND: ICD-10-PCS | Mod: ,,, | Performed by: CLINICAL MEDICAL LABORATORY

## 2023-03-31 PROCEDURE — 99215 OFFICE O/P EST HI 40 MIN: CPT | Mod: PBBFAC,25 | Performed by: NURSE PRACTITIONER

## 2023-03-31 PROCEDURE — 99499 UNLISTED E&M SERVICE: CPT | Mod: S$PBB,,, | Performed by: NURSE PRACTITIONER

## 2023-03-31 PROCEDURE — 73630 XR FOOT COMPLETE 3 VIEW RIGHT: ICD-10-PCS | Mod: 26,RT,, | Performed by: STUDENT IN AN ORGANIZED HEALTH CARE EDUCATION/TRAINING PROGRAM

## 2023-03-31 PROCEDURE — 99499 NO LOS: ICD-10-PCS | Mod: S$PBB,,, | Performed by: NURSE PRACTITIONER

## 2023-03-31 PROCEDURE — 87075 CULTURE, ANAEROBE: ICD-10-PCS | Mod: ,,, | Performed by: CLINICAL MEDICAL LABORATORY

## 2023-03-31 PROCEDURE — 87077 CULTURE AEROBIC IDENTIFY: CPT | Mod: ,,, | Performed by: CLINICAL MEDICAL LABORATORY

## 2023-03-31 PROCEDURE — 73630 X-RAY EXAM OF FOOT: CPT | Mod: 26,RT,, | Performed by: STUDENT IN AN ORGANIZED HEALTH CARE EDUCATION/TRAINING PROGRAM

## 2023-03-31 PROCEDURE — 11043 DBRDMT MUSC&/FSCA 1ST 20/<: CPT | Mod: PBBFAC | Performed by: NURSE PRACTITIONER

## 2023-03-31 PROCEDURE — 11046 DEBRIDEMENT: ICD-10-PCS | Mod: S$PBB,,, | Performed by: NURSE PRACTITIONER

## 2023-03-31 PROCEDURE — 11046 DBRDMT MUSC&/FSCA EA ADDL: CPT | Mod: S$PBB,,, | Performed by: NURSE PRACTITIONER

## 2023-03-31 PROCEDURE — 87070 CULTURE, WOUND: ICD-10-PCS | Mod: ,,, | Performed by: CLINICAL MEDICAL LABORATORY

## 2023-03-31 PROCEDURE — 87070 CULTURE OTHR SPECIMN AEROBIC: CPT | Mod: ,,, | Performed by: CLINICAL MEDICAL LABORATORY

## 2023-03-31 PROCEDURE — 73630 X-RAY EXAM OF FOOT: CPT | Mod: TC,RT

## 2023-03-31 PROCEDURE — 87186 SC STD MICRODIL/AGAR DIL: CPT | Mod: ,,, | Performed by: CLINICAL MEDICAL LABORATORY

## 2023-03-31 PROCEDURE — 87186 CULTURE, WOUND: ICD-10-PCS | Mod: ,,, | Performed by: CLINICAL MEDICAL LABORATORY

## 2023-03-31 RX ORDER — LINEZOLID 600 MG/1
600 TABLET, FILM COATED ORAL EVERY 12 HOURS
Qty: 14 TABLET | Refills: 0 | Status: SHIPPED | OUTPATIENT
Start: 2023-03-31 | End: 2023-03-31 | Stop reason: SDUPTHER

## 2023-03-31 RX ORDER — SEMAGLUTIDE 2.68 MG/ML
2 INJECTION, SOLUTION SUBCUTANEOUS
Qty: 8 ML | Refills: 2 | Status: SHIPPED | OUTPATIENT
Start: 2023-03-31

## 2023-03-31 RX ORDER — LINEZOLID 600 MG/1
600 TABLET, FILM COATED ORAL EVERY 12 HOURS
Qty: 14 TABLET | Refills: 0 | Status: ON HOLD | OUTPATIENT
Start: 2023-03-31 | End: 2023-04-10 | Stop reason: ALTCHOICE

## 2023-03-31 NOTE — PROCEDURES
Debridement    Date/Time: 3/31/2023 10:00 AM  Performed by: AUGUSTUS Macedo  Authorized by: AUGUSTUS Macedo     Consent Done?:  Yes (Written)    Wound Details:    Location:  Right foot    Location:  Right Plantar    Type of Debridement:  Excisional       Length (cm):  8.1       Area (sq cm):  102.06       Width (cm):  12.6       Percent Debrided (%):  100       Depth (cm):  0.8       Total Area Debrided (sq cm):  102.06    Depth of debridement:  Muscle/fascia/tendon    Tissue debrided:  Adipose, Dermis, Epidermis, Muscle and Subcutaneous    Devitalized tissue debrided:  Biofilm, Callus, Clots, Exudate and Slough    Instruments:  Curette  Bleeding:  Minimal  Hemostasis Achieved: Yes  Method Used:  Pressure  Patient tolerance:  Patient tolerated the procedure well with no immediate complications     Assistant JOSE ALFREDO Stone LPN

## 2023-03-31 NOTE — PROGRESS NOTES
Debridement Performed for Assessment: Wound# 1  Performed By: Provider: Yelitza Alas NP  Assistant:  Lizabeth Stone LPN    Debridement: Surgical    Photo taken post procedure:    Time-Out Taken: Yes  Level: Skin/Subcutaneous Tissue/ Muscle  Post Debridement Measurements  Length: (cm) 8.1  Width: (cm) 12.6  Depth: (cm) 0.8      Area: (cm²) 108.36   Percent Debrided: 100%  Total Area Debrided: (sq cm)     Tissue and other material debrided:  Adipose, Dermis, Epidermis, Subcutaneous, Muscle  Devitalized Tissue Debrided:Biofilm, Slough, Eschar  Instrument: Curette  Bleeding: Moderate  Hemostasis Achieved: Pressure  Procedural Pain: Insensate  Post Procedural Pain: Insensate  Response to Treatment: Procedure was tolerated well    Devitalized materials/tissue Removed  the following was removed during debridement  subcutaneous, muscle      Post Debridement Diagnosis  chronic right heel diabetic ulcer  Post debridement diagnosis  Same as Pre-operative debridement diagnosis, No Complications noted.      Grafts or implants applied  Was a graft or implant applied?  No      Procedure assistant  Procedure assisted by:  Assistant is the same as nurse listed above      Complications related to procedure  Did any complication occure during procedure?  No complications noted during or after procedure.      Specimen  Specimen collect during procedure?  No specimen collected      Anaesthesia:  Anesthesia used  None      Blood Loss:  Blood loss during procedure  less than 5 cc

## 2023-03-31 NOTE — TELEPHONE ENCOUNTER
----- Message from AUGUSTUS Macedo sent at 3/31/2023  2:10 PM CDT -----  No acute concerns on his x-ray. Will adjust antibiotics once culture results. Reinforce importance of wearing off-loading shoe and staying off his foot.

## 2023-04-03 LAB
BACTERIA SPEC ANAEROBE CULT: NORMAL
MICROORGANISM SPEC CULT: ABNORMAL
MICROORGANISM SPEC CULT: ABNORMAL

## 2023-04-03 NOTE — PROGRESS NOTES
Pt agreed to admission with IV abx and HBO. When do you want him to come in. I'll sent Prema his face sheet to see if he is approve for admission

## 2023-04-06 ENCOUNTER — OFFICE VISIT (OUTPATIENT)
Dept: PAIN MEDICINE | Facility: CLINIC | Age: 38
End: 2023-04-06
Payer: MEDICARE

## 2023-04-06 ENCOUNTER — HOSPITAL ENCOUNTER (INPATIENT)
Facility: HOSPITAL | Age: 38
LOS: 12 days | Discharge: STILL ADMITTED OR ADMINISTRATIVE | DRG: 638 | End: 2023-04-18
Attending: INTERNAL MEDICINE | Admitting: INTERNAL MEDICINE
Payer: MEDICARE

## 2023-04-06 VITALS
WEIGHT: 315 LBS | HEIGHT: 73 IN | BODY MASS INDEX: 41.75 KG/M2 | SYSTOLIC BLOOD PRESSURE: 150 MMHG | RESPIRATION RATE: 18 BRPM | HEART RATE: 75 BPM | DIASTOLIC BLOOD PRESSURE: 81 MMHG

## 2023-04-06 DIAGNOSIS — L97.518 NON-PRESSURE CHRONIC ULCER OF OTHER PART OF RIGHT FOOT WITH OTHER SPECIFIED SEVERITY: Primary | ICD-10-CM

## 2023-04-06 DIAGNOSIS — E11.49 DIABETIC NEUROPATHY WITH NEUROLOGIC COMPLICATION: Primary | Chronic | ICD-10-CM

## 2023-04-06 DIAGNOSIS — E11.49 DIABETIC NEUROPATHY WITH NEUROLOGIC COMPLICATION: Chronic | ICD-10-CM

## 2023-04-06 DIAGNOSIS — E78.5 HYPERLIPIDEMIA, UNSPECIFIED HYPERLIPIDEMIA TYPE: ICD-10-CM

## 2023-04-06 DIAGNOSIS — E11.621 DIABETIC ULCER OF RIGHT HEEL ASSOCIATED WITH TYPE 2 DIABETES MELLITUS, WITH FAT LAYER EXPOSED: ICD-10-CM

## 2023-04-06 DIAGNOSIS — E11.40 DIABETIC NEUROPATHY WITH NEUROLOGIC COMPLICATION: Chronic | ICD-10-CM

## 2023-04-06 DIAGNOSIS — G47.33 OSA (OBSTRUCTIVE SLEEP APNEA): ICD-10-CM

## 2023-04-06 DIAGNOSIS — I10 PRIMARY HYPERTENSION: ICD-10-CM

## 2023-04-06 DIAGNOSIS — I73.9 PERIPHERAL VASCULAR DISEASE, UNSPECIFIED: Chronic | ICD-10-CM

## 2023-04-06 DIAGNOSIS — N17.9 AKI (ACUTE KIDNEY INJURY): ICD-10-CM

## 2023-04-06 DIAGNOSIS — D64.9 NORMOCYTIC ANEMIA: ICD-10-CM

## 2023-04-06 DIAGNOSIS — L97.414 ULCER OF RIGHT HEEL, WITH NECROSIS OF BONE: Chronic | ICD-10-CM

## 2023-04-06 DIAGNOSIS — E66.01 SEVERE OBESITY (BMI >= 40): ICD-10-CM

## 2023-04-06 DIAGNOSIS — S81.801A NON-HEALING WOUND OF RIGHT LOWER EXTREMITY: ICD-10-CM

## 2023-04-06 DIAGNOSIS — R07.9 CHEST PAIN: ICD-10-CM

## 2023-04-06 DIAGNOSIS — E11.622 TYPE 2 DIABETES MELLITUS WITH OTHER SKIN ULCER, WITH LONG-TERM CURRENT USE OF INSULIN: ICD-10-CM

## 2023-04-06 DIAGNOSIS — Z01.818 PRE-OP EVALUATION: ICD-10-CM

## 2023-04-06 DIAGNOSIS — E11.40 DIABETIC NEUROPATHY WITH NEUROLOGIC COMPLICATION: Primary | Chronic | ICD-10-CM

## 2023-04-06 DIAGNOSIS — L97.412 DIABETIC ULCER OF RIGHT HEEL ASSOCIATED WITH TYPE 2 DIABETES MELLITUS, WITH FAT LAYER EXPOSED: ICD-10-CM

## 2023-04-06 DIAGNOSIS — Z79.4 TYPE 2 DIABETES MELLITUS WITH OTHER SKIN ULCER, WITH LONG-TERM CURRENT USE OF INSULIN: ICD-10-CM

## 2023-04-06 DIAGNOSIS — Z79.899 ENCOUNTER FOR LONG-TERM (CURRENT) USE OF OTHER MEDICATIONS: ICD-10-CM

## 2023-04-06 LAB

## 2023-04-06 PROCEDURE — 99215 OFFICE O/P EST HI 40 MIN: CPT

## 2023-04-06 PROCEDURE — 85651 RBC SED RATE NONAUTOMATED: CPT | Performed by: NURSE PRACTITIONER

## 2023-04-06 PROCEDURE — 80305 DRUG TEST PRSMV DIR OPT OBS: CPT | Mod: PBBFAC | Performed by: PHYSICIAN ASSISTANT

## 2023-04-06 PROCEDURE — 99215 OFFICE O/P EST HI 40 MIN: CPT | Mod: PBBFAC | Performed by: PHYSICIAN ASSISTANT

## 2023-04-06 PROCEDURE — 99214 OFFICE O/P EST MOD 30 MIN: CPT | Mod: S$PBB,,, | Performed by: PHYSICIAN ASSISTANT

## 2023-04-06 PROCEDURE — 80053 COMPREHEN METABOLIC PANEL: CPT | Performed by: NURSE PRACTITIONER

## 2023-04-06 PROCEDURE — 11000001 HC ACUTE MED/SURG PRIVATE ROOM

## 2023-04-06 PROCEDURE — 99214 PR OFFICE/OUTPT VISIT, EST, LEVL IV, 30-39 MIN: ICD-10-PCS | Mod: S$PBB,,, | Performed by: PHYSICIAN ASSISTANT

## 2023-04-06 PROCEDURE — 84134 ASSAY OF PREALBUMIN: CPT | Performed by: NURSE PRACTITIONER

## 2023-04-06 PROCEDURE — 86140 C-REACTIVE PROTEIN: CPT | Performed by: NURSE PRACTITIONER

## 2023-04-06 PROCEDURE — 80305 DRUG TEST PRSMV DIR OPT OBS: CPT

## 2023-04-06 PROCEDURE — 85025 COMPLETE CBC W/AUTO DIFF WBC: CPT | Performed by: NURSE PRACTITIONER

## 2023-04-06 RX ORDER — HYDROCODONE BITARTRATE AND ACETAMINOPHEN 10; 325 MG/1; MG/1
1 TABLET ORAL EVERY 12 HOURS PRN
Qty: 60 TABLET | Refills: 0 | Status: CANCELLED | OUTPATIENT
Start: 2023-04-06

## 2023-04-06 RX ORDER — SODIUM CHLORIDE 0.9 % (FLUSH) 0.9 %
10 SYRINGE (ML) INJECTION EVERY 12 HOURS PRN
Status: DISCONTINUED | OUTPATIENT
Start: 2023-04-07 | End: 2023-04-10

## 2023-04-06 RX ORDER — ZINC SULFATE 50(220)MG
220 CAPSULE ORAL 2 TIMES DAILY
Status: DISCONTINUED | OUTPATIENT
Start: 2023-04-06 | End: 2023-04-18 | Stop reason: HOSPADM

## 2023-04-06 RX ORDER — ASCORBIC ACID 500 MG
500 TABLET ORAL 2 TIMES DAILY
Status: DISCONTINUED | OUTPATIENT
Start: 2023-04-06 | End: 2023-04-18 | Stop reason: HOSPADM

## 2023-04-06 RX ORDER — HYDROCODONE BITARTRATE AND ACETAMINOPHEN 10; 325 MG/1; MG/1
1 TABLET ORAL EVERY 12 HOURS PRN
Qty: 60 TABLET | Refills: 0 | Status: SHIPPED | OUTPATIENT
Start: 2023-04-06 | End: 2023-05-03 | Stop reason: SDUPTHER

## 2023-04-06 NOTE — PROGRESS NOTES
Subjective:         Patient ID: Jean Pierre Paulson is a 37 y.o. male.    Chief Complaint: Foot Pain (right)        Pain  This is a chronic problem. The current episode started more than 1 year ago. The problem occurs daily. The problem has been waxing and waning. Associated symptoms include arthralgias and neck pain. Pertinent negatives include no anorexia, chest pain, chills, coughing, fatigue, fever, sore throat, vertigo or vomiting.   Review of Systems   Constitutional:  Negative for activity change, appetite change, chills, fatigue, fever and unexpected weight change.   HENT:  Negative for drooling, ear discharge, ear pain, facial swelling, nosebleeds, sore throat, trouble swallowing, voice change and goiter.    Eyes:  Negative for photophobia, pain, discharge, redness and visual disturbance.   Respiratory:  Negative for apnea, cough, choking, chest tightness, shortness of breath, wheezing and stridor.    Cardiovascular:  Negative for chest pain, palpitations and leg swelling.   Gastrointestinal:  Negative for abdominal distention, anorexia, diarrhea, rectal pain, vomiting and fecal incontinence.   Endocrine: Negative for cold intolerance, heat intolerance, polydipsia, polyphagia and polyuria.   Genitourinary:  Negative for bladder incontinence, dysuria, flank pain and frequency.   Musculoskeletal:  Positive for arthralgias, back pain, leg pain and neck pain. Negative for neck stiffness.   Integumentary:  Negative for color change and pallor.   Neurological:  Negative for dizziness, vertigo, seizures, syncope, facial asymmetry, speech difficulty, light-headedness, coordination difficulties, memory loss and coordination difficulties.   Hematological:  Negative for adenopathy. Does not bruise/bleed easily.   Psychiatric/Behavioral:  Negative for agitation, behavioral problems, confusion, decreased concentration, dysphoric mood, hallucinations, self-injury and suicidal ideas. The patient is not nervous/anxious and is  "not hyperactive.          Past Medical History:   Diagnosis Date    Anemia     Chronic osteomyelitis     Diabetes mellitus with neuropathy     Diabetes mellitus, type 2     DM2 (diabetes mellitus, type 2)     HTN (hypertension)     Hypertension     Neuropathy     Obesity     Osteomyelitis 10/2020    Hx of R foot, Tx with IV Abx    Peripheral vascular disease      Past Surgical History:   Procedure Laterality Date    APPLICATION OF SPLIT-THICKNESS SKIN GRAFT (STSG) TO LOWER EXTREMITY Right 3/2/2022    Procedure: APPLICATION, GRAFT, SKIN, SPLIT-THICKNESS, TO LOWER EXTREMITY;  Surgeon: Greg Ramírez MD;  Location: [facility], [place];  Service: General;  Laterality: Right;    CHOLECYSTECTOMY      DEBRIDEMENT OF FOOT Right 10/2020    FOOT SURGERY      Right diabetic foot ulcer Elliott's Grade 3      Bone exposure to R heel, Hx of IV Abx, cultures show multiple bacterial growth, Hx of Osteomyelitis, HBOT     Social History     Socioeconomic History    Marital status: Single    Number of children: 1   Tobacco Use    Smoking status: Every Day     Packs/day: 0.25     Types: Cigarettes     Start date:      Last attempt to quit: 2022     Years since quittin.1    Smokeless tobacco: Never   Substance and Sexual Activity    Alcohol use: Yes     Alcohol/week: 2.0 standard drinks     Types: 1 Glasses of wine, 1 Cans of beer per week     Comment: occasionally    Drug use: Yes     Types: Hydrocodone    Sexual activity: Yes     Partners: Female     Family History   Problem Relation Age of Onset    Hypertension Father     Diabetes Father     Hypertension Maternal Grandmother     Diabetes Maternal Grandmother      Review of patient's allergies indicates:   Allergen Reactions    Tomato Itching        Objective:  Vitals:    23 1501   BP: (!) 150/81   Pulse: 75   Resp: 18   Weight: (!) 160.5 kg (353 lb 12.8 oz)   Height: 6' 1" (1.854 m)   PainSc:   4         Physical Exam  Vitals and nursing note reviewed. Exam conducted with a " chaperone present.   Constitutional:       General: He is awake. He is not in acute distress.     Appearance: Normal appearance. He is not toxic-appearing or diaphoretic.   HENT:      Head: Normocephalic and atraumatic.      Nose: Nose normal.      Mouth/Throat:      Mouth: Mucous membranes are moist.      Pharynx: Oropharynx is clear.   Eyes:      Conjunctiva/sclera: Conjunctivae normal.      Pupils: Pupils are equal, round, and reactive to light.   Cardiovascular:      Rate and Rhythm: Normal rate.   Pulmonary:      Effort: Pulmonary effort is normal. No respiratory distress.   Abdominal:      Palpations: Abdomen is soft.      Tenderness: There is no guarding.   Musculoskeletal:         General: Normal range of motion.      Cervical back: Normal range of motion and neck supple. No rigidity.      Lumbar back: Tenderness present.      Right foot: Tenderness present.      Left foot: Tenderness present.   Skin:     General: Skin is warm and dry.      Coloration: Skin is not jaundiced or pale.   Neurological:      General: No focal deficit present.      Mental Status: He is alert and oriented to person, place, and time. Mental status is at baseline.      Cranial Nerves: No cranial nerve deficit (II-XII).   Psychiatric:         Mood and Affect: Mood normal.         Behavior: Behavior normal. Behavior is cooperative.         Thought Content: Thought content normal.         X-Ray Foot Complete 3 view Right  Narrative: EXAMINATION:  XR FOOT COMPLETE 3 VIEW RIGHT    CLINICAL HISTORY:  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    TECHNIQUE:  XR FOOT COMPLETE 3 VIEW RIGHT    COMPARISON:  12/13/22    FINDINGS:  Postoperative change of the foot is redemonstrated.    No acute fracture or dislocation.    Widening of the Lisfranc joint, similar.    Severe midfoot degenerative changes are similar.    No radiopaque foreign bodies.    Subcutaneous soft tissue  edema.  Impression: No evidence of bony destruction or periosteal reaction to suggest osteomyelitis.  Correlate with need for MRI of the foot.    Edema versus cellulitis of the foot.    Electronically signed by: Anjel Neely  Date:    03/31/2023  Time:    12:10         Office Visit on 03/31/2023   Component Date Value Ref Range Status    Culture, Anaerobe 03/31/2023 No Anaerobes Isolated   Final    Culture, Wound/Abscess 03/31/2023 Heavy Growth Enterobacter cloacae (A)   Final    Culture, Wound/Abscess 03/31/2023 Isolated From Broth Enterococcus gallinarum (A)   Final   Lab Requisition on 03/02/2023   Component Date Value Ref Range Status    Triglycerides 03/02/2023 184 (H)  35 - 150 mg/dL Final    Cholesterol 03/02/2023 95  0 - 200 mg/dL Final    HDL Cholesterol 03/02/2023 39 (L)  40 - 60 mg/dL Final    Cholesterol/HDL Ratio (Risk Factor) 03/02/2023 2.4   Final    Non-HDL 03/02/2023 56  mg/dL Final    LDL Calculated 03/02/2023 19  mg/dL Final    VLDL 03/02/2023 37  mg/dL Final    Hemoglobin A1C 03/02/2023 6.4  4.5 - 6.6 % Final    Estimated Average Glucose 03/02/2023 127  mg/dL Final    Sodium 03/02/2023 138  136 - 145 mmol/L Final    Potassium 03/02/2023 4.6  3.5 - 5.1 mmol/L Final    Chloride 03/02/2023 109 (H)  98 - 107 mmol/L Final    CO2 03/02/2023 28  21 - 32 mmol/L Final    Anion Gap 03/02/2023 6 (L)  7 - 16 mmol/L Final    Glucose 03/02/2023 142 (H)  74 - 106 mg/dL Final    BUN 03/02/2023 21 (H)  7 - 18 mg/dL Final    Creatinine 03/02/2023 1.27  0.70 - 1.30 mg/dL Final    BUN/Creatinine Ratio 03/02/2023 17  6 - 20 Final    Calcium 03/02/2023 9.0  8.5 - 10.1 mg/dL Final    Total Protein 03/02/2023 7.5  6.4 - 8.2 g/dL Final    Albumin 03/02/2023 2.7 (L)  3.5 - 5.0 g/dL Final    Globulin 03/02/2023 4.8 (H)  2.0 - 4.0 g/dL Final    A/G Ratio 03/02/2023 0.6   Final    Bilirubin, Total 03/02/2023 0.2  >0.0 - 1.2 mg/dL Final    Alk Phos 03/02/2023 136 (H)  45 - 115 U/L Final    ALT 03/02/2023 37  16 - 61 U/L  Final    AST 03/02/2023 20  15 - 37 U/L Final    eGFR 03/02/2023 74  >=60 mL/min/1.73m² Final    WBC 03/02/2023 5.27  4.50 - 11.00 K/uL Final    RBC 03/02/2023 4.47 (L)  4.60 - 6.20 M/uL Final    Hemoglobin 03/02/2023 11.6 (L)  13.5 - 18.0 g/dL Final    Hematocrit 03/02/2023 38.1 (L)  40.0 - 54.0 % Final    MCV 03/02/2023 85.2  80.0 - 96.0 fL Final    MCH 03/02/2023 26.0 (L)  27.0 - 31.0 pg Final    MCHC 03/02/2023 30.4 (L)  32.0 - 36.0 g/dL Final    RDW 03/02/2023 14.7 (H)  11.5 - 14.5 % Final    Platelet Count 03/02/2023 297  150 - 400 K/uL Final    MPV 03/02/2023 10.4  9.4 - 12.4 fL Final    Neutrophils % 03/02/2023 45.3 (L)  53.0 - 65.0 % Final    Lymphocytes % 03/02/2023 37.8  27.0 - 41.0 % Final    Monocytes % 03/02/2023 11.6 (H)  2.0 - 6.0 % Final    Eosinophils % 03/02/2023 4.0  1.0 - 4.0 % Final    Basophils % 03/02/2023 1.1 (H)  0.0 - 1.0 % Final    Immature Granulocytes % 03/02/2023 0.2  0.0 - 0.4 % Final    nRBC, Auto 03/02/2023 0.0  <=0.0 % Final    Neutrophils, Abs 03/02/2023 2.39  1.80 - 7.70 K/uL Final    Lymphocytes, Absolute 03/02/2023 1.99  1.00 - 4.80 K/uL Final    Monocytes, Absolute 03/02/2023 0.61  0.00 - 0.80 K/uL Final    Eosinophils, Absolute 03/02/2023 0.21  0.00 - 0.50 K/uL Final    Basophils, Absolute 03/02/2023 0.06  0.00 - 0.20 K/uL Final    Immature Granulocytes, Absolute 03/02/2023 0.01  0.00 - 0.04 K/uL Final    nRBC, Absolute 03/02/2023 0.00  <=0.00 x10e3/uL Final    Diff Type 03/02/2023 Auto   Final   Office Visit on 12/13/2022   Component Date Value Ref Range Status    Culture, Wound/Abscess 12/13/2022 Light Growth Enterobacter cloacae (A)   Final    Culture, Wound/Abscess 12/13/2022 Light Growth Klebsiella pneumoniae (A)   Final    Culture, Wound/Abscess 12/13/2022 Heavy Growth Staphylococcus aureus (A)   Final    Culture, Anaerobe 12/13/2022 PREVOTELLA SPECIES (A)   Final   Office Visit on 11/21/2022   Component Date Value Ref Range Status    POC Amphetamines 11/21/2022  Negative  Negative, Inconclusive Final    POC Barbiturates 11/21/2022 Negative  Negative, Inconclusive Final    POC Benzodiazepines 11/21/2022 Negative  Negative, Inconclusive Final    POC Cocaine 11/21/2022 Negative  Negative, Inconclusive Final    POC THC 11/21/2022 Negative  Negative, Inconclusive Final    POC Methadone 11/21/2022 Negative  Negative, Inconclusive Final    POC Methamphetamine 11/21/2022 Negative  Negative, Inconclusive Final    POC Opiates 11/21/2022 Negative  Negative, Inconclusive Final    POC Oxycodone 11/21/2022 Negative  Negative, Inconclusive Final    POC Phencyclidine 11/21/2022 Negative  Negative, Inconclusive Final    POC Methylenedioxymethamphetamine * 11/21/2022 Negative  Negative, Inconclusive Final    POC Tricyclic Antidepressants 11/21/2022 Negative  Negative, Inconclusive Final    POC Buprenorphine 11/21/2022 Negative   Final     Acceptable 11/21/2022 Yes   Final    POC Temperature (Urine) 11/21/2022 94   Final    pH, UA 11/21/2022 5.5  5.0 to 8.0 pH Units Final    Creatinine, Urine 11/21/2022 92  39 - 259 mg/dL Final    6-Acetylmorphine 11/21/2022 Negative  10 ng/mL Final    7-Aminoclonazepam 11/21/2022 Negative  Negative 25 ng/mL Final    a-Hydroxyalprazolam 11/21/2022 Negative  Negative 25 ng/mL Final    Acetyl Fentanyl 11/21/2022 Negative  2.5 ng/mL Final    Acetyl Norfentanyl Oxalate 11/21/2022 Negative  5 ng/mL Final    Benzoylecgonine 11/21/2022 Negative  100 ng/mL Final    Buprenorphine 11/21/2022 Negative  25 ng/mL Final    Codeine 11/21/2022 Negative  25 ng/mL Final    EDDP 11/21/2022 Negative  25 ng/mL Final    Fentanyl 11/21/2022 Negative  2.5 ng/mL Final    Hydrocodone 11/21/2022 Negative  25 ng/mL Final    Hydromorphone 11/21/2022 Negative  25 ng/mL Final    Lorazepam 11/21/2022 Negative  25 ng/mL Final    Morphine 11/21/2022 Negative  25 ng/mL Final    Norbuprenorphine 11/21/2022 Negative  25 ng/mL Final    Nordiazepam 11/21/2022 Negative  25 ng/mL  Final    Norfentanyl Oxalate 11/21/2022 Negative  5 ng/mL Final    Norhydrocodone 11/21/2022 Negative  50 ng/mL Final    Noroxycodone HCL 11/21/2022 Negative  50 ng/mL Final    Oxazepam 11/21/2022 Negative  25 ng/mL Final    Oxymorphone 11/21/2022 Negative  25 ng/mL Final    Tapentadol 11/21/2022 Negative  25 ng/mL Final    Temazepam 11/21/2022 Negative  25 ng/mL Final    THC-COOH 11/21/2022 Negative  25 ng/mL Final    Tramadol 11/21/2022 Negative  100 ng/mL Final    Amphetamine, Urine 11/21/2022 Negative  Negative Final    Methamphetamines, Urine 11/21/2022 Negative  Negative Final    Methadone, Urine 11/21/2022 Negative  Negative 25 ng/mL Final    Oxycodone, Urine 11/21/2022 Negative  Negative 25 ng/mL Final    Specific Gravity, UA 11/21/2022 1.013  <=1.030 Final         Orders Placed This Encounter   Procedures    POCT Urine Drug Screen Presump     Interpretive Information:     Negative:  No drug detected at the cut off level.   Positive:  This result represents presumptive positive for the   tested drug, other substances may yield a positive response other   than the analyte of interest. This result should be utilized for   diagnostic purpose only. Confirmation testing will be performed upon physician request only.            Requested Prescriptions     Signed Prescriptions Disp Refills    HYDROcodone-acetaminophen (NORCO)  mg per tablet 60 tablet 0     Sig: Take 1 tablet by mouth every 12 (twelve) hours as needed for Pain.       Assessment:     1. Diabetic neuropathy with neurologic complication    2. Ulcer of right heel, with necrosis of bone    3. Peripheral vascular disease, unspecified    4. Encounter for long-term (current) use of other medications         A's of Opioid Risk Assessment  Activity:Patient can perform ADL.   Analgesia:Patients pain is partially controlled by current medication. Patient has tried OTC medications such as Tylenol and Ibuprofen with out relief.   Adverse Effects: Patient  denies constipation or sedation.  Aberrant Behavior:  reviewed with no aberrant drug seeking/taking behavior.  Overdose reversal drug naloxone discussed    Drug screen reviewed      Plan:    Narcan March 2023    Lower extremity discomfort right greater than left    Ulcer right foot continues to follow wound management please see photographs    Walking boot right lower extremity    Presumptive drug screen negative today     Patient states he is having difficulty obtaining transportation to and from clinic he missed his last appointment by more than a week he has been on medication     He verbalized understanding if he can not keep office visits for compliance with urine drug screens for narcotics management will discontinue narcotics    Continue activity as directed     Continue current medication    Follow-up 1 month drug screen    Dr. Paulson, May 2023    Bring original prescription medication bottles/container/box with labels to each visit

## 2023-04-07 PROBLEM — D64.9 NORMOCYTIC ANEMIA: Status: ACTIVE | Noted: 2023-04-07

## 2023-04-07 PROBLEM — Z01.818 PRE-OP EVALUATION: Status: ACTIVE | Noted: 2023-04-07

## 2023-04-07 PROBLEM — G47.33 OSA (OBSTRUCTIVE SLEEP APNEA): Status: ACTIVE | Noted: 2023-04-07

## 2023-04-07 PROBLEM — L97.518 NON-PRESSURE CHRONIC ULCER OF OTHER PART OF RIGHT FOOT WITH OTHER SPECIFIED SEVERITY: Status: ACTIVE | Noted: 2021-07-07

## 2023-04-07 PROBLEM — E66.01 SEVERE OBESITY (BMI >= 40): Status: ACTIVE | Noted: 2022-04-29

## 2023-04-07 PROBLEM — E78.5 HLD (HYPERLIPIDEMIA): Status: ACTIVE | Noted: 2023-04-07

## 2023-04-07 PROBLEM — N17.9 AKI (ACUTE KIDNEY INJURY): Status: ACTIVE | Noted: 2023-04-07

## 2023-04-07 LAB
ALBUMIN SERPL BCP-MCNC: 2.5 G/DL (ref 3.5–5)
ALBUMIN SERPL BCP-MCNC: 2.5 G/DL (ref 3.5–5)
ALBUMIN/GLOB SERPL: 0.5 {RATIO}
ALBUMIN/GLOB SERPL: 0.6 {RATIO}
ALP SERPL-CCNC: 120 U/L (ref 45–115)
ALP SERPL-CCNC: 123 U/L (ref 45–115)
ALT SERPL W P-5'-P-CCNC: 36 U/L (ref 16–61)
ALT SERPL W P-5'-P-CCNC: 38 U/L (ref 16–61)
ANION GAP SERPL CALCULATED.3IONS-SCNC: 14 MMOL/L (ref 7–16)
ANION GAP SERPL CALCULATED.3IONS-SCNC: 14 MMOL/L (ref 7–16)
ANISOCYTOSIS BLD QL SMEAR: ABNORMAL
AST SERPL W P-5'-P-CCNC: 17 U/L (ref 15–37)
AST SERPL W P-5'-P-CCNC: 18 U/L (ref 15–37)
ATYPICAL LYMPHOCYTES: ABNORMAL
BASOPHILS # BLD AUTO: 0.05 K/UL (ref 0–0.2)
BASOPHILS # BLD AUTO: 0.06 K/UL (ref 0–0.2)
BASOPHILS NFR BLD AUTO: 0.8 % (ref 0–1)
BASOPHILS NFR BLD AUTO: 1.1 % (ref 0–1)
BILIRUB SERPL-MCNC: 0.2 MG/DL (ref ?–1.2)
BILIRUB SERPL-MCNC: 0.2 MG/DL (ref ?–1.2)
BUN SERPL-MCNC: 35 MG/DL (ref 7–18)
BUN SERPL-MCNC: 38 MG/DL (ref 7–18)
BUN/CREAT SERPL: 23 (ref 6–20)
BUN/CREAT SERPL: 23 (ref 6–20)
CALCIUM SERPL-MCNC: 8.6 MG/DL (ref 8.5–10.1)
CALCIUM SERPL-MCNC: 8.7 MG/DL (ref 8.5–10.1)
CHLORIDE SERPL-SCNC: 105 MMOL/L (ref 98–107)
CHLORIDE SERPL-SCNC: 105 MMOL/L (ref 98–107)
CK SERPL-CCNC: 250 U/L (ref 39–308)
CO2 SERPL-SCNC: 23 MMOL/L (ref 21–32)
CO2 SERPL-SCNC: 24 MMOL/L (ref 21–32)
CREAT SERPL-MCNC: 1.54 MG/DL (ref 0.7–1.3)
CREAT SERPL-MCNC: 1.67 MG/DL (ref 0.7–1.3)
CRP SERPL-MCNC: 2.9 MG/DL (ref 0–0.8)
DIFFERENTIAL METHOD BLD: ABNORMAL
DIFFERENTIAL METHOD BLD: ABNORMAL
EGFR (NO RACE VARIABLE) (RUSH/TITUS): 53 ML/MIN/1.73M²
EGFR (NO RACE VARIABLE) (RUSH/TITUS): 58 ML/MIN/1.73M²
EOSINOPHIL # BLD AUTO: 0.22 K/UL (ref 0–0.5)
EOSINOPHIL # BLD AUTO: 0.24 K/UL (ref 0–0.5)
EOSINOPHIL NFR BLD AUTO: 3.8 % (ref 1–4)
EOSINOPHIL NFR BLD AUTO: 4 % (ref 1–4)
EOSINOPHIL NFR BLD MANUAL: 12 % (ref 1–4)
ERYTHROCYTE [DISTWIDTH] IN BLOOD BY AUTOMATED COUNT: 15.7 % (ref 11.5–14.5)
ERYTHROCYTE [DISTWIDTH] IN BLOOD BY AUTOMATED COUNT: 15.8 % (ref 11.5–14.5)
ERYTHROCYTE [SEDIMENTATION RATE] IN BLOOD BY WESTERGREN METHOD: 69 MM/HR (ref 0–15)
ERYTHROCYTE [SEDIMENTATION RATE] IN BLOOD BY WESTERGREN METHOD: 76 MM/HR (ref 0–15)
FERRITIN SERPL-MCNC: 98 NG/ML (ref 26–388)
FOLATE SERPL-MCNC: 13.6 NG/ML (ref 3.1–17.5)
GLOBULIN SER-MCNC: 4.2 G/DL (ref 2–4)
GLOBULIN SER-MCNC: 4.9 G/DL (ref 2–4)
GLUCOSE SERPL-MCNC: 113 MG/DL (ref 70–105)
GLUCOSE SERPL-MCNC: 131 MG/DL (ref 70–105)
GLUCOSE SERPL-MCNC: 138 MG/DL (ref 74–106)
GLUCOSE SERPL-MCNC: 149 MG/DL (ref 70–105)
GLUCOSE SERPL-MCNC: 158 MG/DL (ref 74–106)
GLUCOSE SERPL-MCNC: 198 MG/DL (ref 70–105)
HCT VFR BLD AUTO: 32.6 % (ref 40–54)
HCT VFR BLD AUTO: 32.9 % (ref 40–54)
HGB BLD-MCNC: 10.3 G/DL (ref 13.5–18)
HGB BLD-MCNC: 9.8 G/DL (ref 13.5–18)
HYPOCHROMIA BLD QL SMEAR: ABNORMAL
IMM GRANULOCYTES # BLD AUTO: 0.01 K/UL (ref 0–0.04)
IMM GRANULOCYTES # BLD AUTO: 0.02 K/UL (ref 0–0.04)
IMM GRANULOCYTES NFR BLD: 0.2 % (ref 0–0.4)
IMM GRANULOCYTES NFR BLD: 0.4 % (ref 0–0.4)
INDIRECT COOMBS: NORMAL
INR BLD: 1.04
IRON SATN MFR SERPL: 17 % (ref 14–50)
IRON SERPL-MCNC: 44 ΜG/DL (ref 65–175)
LYMPHOCYTES # BLD AUTO: 2.48 K/UL (ref 1–4.8)
LYMPHOCYTES # BLD AUTO: 2.84 K/UL (ref 1–4.8)
LYMPHOCYTES NFR BLD AUTO: 44.4 % (ref 27–41)
LYMPHOCYTES NFR BLD AUTO: 44.7 % (ref 27–41)
LYMPHOCYTES NFR BLD MANUAL: 45 % (ref 27–41)
MAGNESIUM SERPL-MCNC: 1.6 MG/DL (ref 1.7–2.3)
MCH RBC QN AUTO: 26.1 PG (ref 27–31)
MCH RBC QN AUTO: 26.8 PG (ref 27–31)
MCHC RBC AUTO-ENTMCNC: 30.1 G/DL (ref 32–36)
MCHC RBC AUTO-ENTMCNC: 31.3 G/DL (ref 32–36)
MCV RBC AUTO: 85.7 FL (ref 80–96)
MCV RBC AUTO: 86.9 FL (ref 80–96)
MONOCYTES # BLD AUTO: 0.57 K/UL (ref 0–0.8)
MONOCYTES # BLD AUTO: 0.58 K/UL (ref 0–0.8)
MONOCYTES NFR BLD AUTO: 10.3 % (ref 2–6)
MONOCYTES NFR BLD AUTO: 9.1 % (ref 2–6)
MONOCYTES NFR BLD MANUAL: 7 % (ref 2–6)
MPC BLD CALC-MCNC: 9.3 FL (ref 9.4–12.4)
MPC BLD CALC-MCNC: 9.4 FL (ref 9.4–12.4)
NEUTROPHILS # BLD AUTO: 2.2 K/UL (ref 1.8–7.7)
NEUTROPHILS # BLD AUTO: 2.68 K/UL (ref 1.8–7.7)
NEUTROPHILS NFR BLD AUTO: 39.5 % (ref 53–65)
NEUTROPHILS NFR BLD AUTO: 41.7 % (ref 53–65)
NEUTS SEG NFR BLD MANUAL: 36 % (ref 50–62)
NRBC # BLD AUTO: 0 X10E3/UL
NRBC # BLD AUTO: 0 X10E3/UL
NRBC, AUTO (.00): 0 %
NRBC, AUTO (.00): 0 %
NT-PROBNP SERPL-MCNC: 108 PG/ML (ref 1–125)
PLATELET # BLD AUTO: 244 K/UL (ref 150–400)
PLATELET # BLD AUTO: 246 K/UL (ref 150–400)
PLATELET MORPHOLOGY: NORMAL
POTASSIUM SERPL-SCNC: 4.6 MMOL/L (ref 3.5–5.1)
POTASSIUM SERPL-SCNC: 4.6 MMOL/L (ref 3.5–5.1)
PREALB SERPL NEPH-MCNC: 21 MG/DL (ref 20–40)
PROT SERPL-MCNC: 6.7 G/DL (ref 6.4–8.2)
PROT SERPL-MCNC: 7.4 G/DL (ref 6.4–8.2)
PROTHROMBIN TIME: 13.2 SECONDS (ref 11.7–14.7)
RBC # BLD AUTO: 3.75 M/UL (ref 4.6–6.2)
RBC # BLD AUTO: 3.84 M/UL (ref 4.6–6.2)
RH BLD: NORMAL
SARS-COV-2 RDRP RESP QL NAA+PROBE: NEGATIVE
SODIUM SERPL-SCNC: 137 MMOL/L (ref 136–145)
SODIUM SERPL-SCNC: 138 MMOL/L (ref 136–145)
SPECIMEN OUTDATE: NORMAL
TIBC SERPL-MCNC: 258 ΜG/DL (ref 250–450)
VIT B12 SERPL-MCNC: 264 PG/ML (ref 193–986)
WBC # BLD AUTO: 5.55 K/UL (ref 4.5–11)
WBC # BLD AUTO: 6.4 K/UL (ref 4.5–11)

## 2023-04-07 PROCEDURE — 86900 BLOOD TYPING SEROLOGIC ABO: CPT

## 2023-04-07 PROCEDURE — 85610 PROTHROMBIN TIME: CPT

## 2023-04-07 PROCEDURE — 85025 COMPLETE CBC W/AUTO DIFF WBC: CPT

## 2023-04-07 PROCEDURE — 82728 ASSAY OF FERRITIN: CPT

## 2023-04-07 PROCEDURE — 87040 BLOOD CULTURE FOR BACTERIA: CPT

## 2023-04-07 PROCEDURE — 83550 IRON BINDING TEST: CPT

## 2023-04-07 PROCEDURE — 99223 1ST HOSP IP/OBS HIGH 75: CPT | Mod: AI,GC,, | Performed by: HOSPITALIST

## 2023-04-07 PROCEDURE — 87635 SARS-COV-2 COVID-19 AMP PRB: CPT

## 2023-04-07 PROCEDURE — 63600175 PHARM REV CODE 636 W HCPCS

## 2023-04-07 PROCEDURE — 94761 N-INVAS EAR/PLS OXIMETRY MLT: CPT

## 2023-04-07 PROCEDURE — 99223 PR INITIAL HOSPITAL CARE,LEVL III: ICD-10-PCS | Mod: AI,GC,, | Performed by: HOSPITALIST

## 2023-04-07 PROCEDURE — 82962 GLUCOSE BLOOD TEST: CPT

## 2023-04-07 PROCEDURE — 25000003 PHARM REV CODE 250: Performed by: NURSE PRACTITIONER

## 2023-04-07 PROCEDURE — 85651 RBC SED RATE NONAUTOMATED: CPT

## 2023-04-07 PROCEDURE — 82746 ASSAY OF FOLIC ACID SERUM: CPT

## 2023-04-07 PROCEDURE — 83735 ASSAY OF MAGNESIUM: CPT

## 2023-04-07 PROCEDURE — 83540 ASSAY OF IRON: CPT

## 2023-04-07 PROCEDURE — 99499 NO LOS: ICD-10-PCS | Mod: ,,, | Performed by: INTERNAL MEDICINE

## 2023-04-07 PROCEDURE — 25000003 PHARM REV CODE 250

## 2023-04-07 PROCEDURE — 11000001 HC ACUTE MED/SURG PRIVATE ROOM

## 2023-04-07 PROCEDURE — 83880 ASSAY OF NATRIURETIC PEPTIDE: CPT

## 2023-04-07 PROCEDURE — 80053 COMPREHEN METABOLIC PANEL: CPT

## 2023-04-07 PROCEDURE — 82550 ASSAY OF CK (CPK): CPT

## 2023-04-07 PROCEDURE — 99499 UNLISTED E&M SERVICE: CPT | Mod: ,,, | Performed by: INTERNAL MEDICINE

## 2023-04-07 RX ORDER — INSULIN ASPART 100 [IU]/ML
1-10 INJECTION, SOLUTION INTRAVENOUS; SUBCUTANEOUS EVERY 6 HOURS
Status: DISCONTINUED | OUTPATIENT
Start: 2023-04-07 | End: 2023-04-11

## 2023-04-07 RX ORDER — ONDANSETRON 2 MG/ML
8 INJECTION INTRAMUSCULAR; INTRAVENOUS EVERY 8 HOURS PRN
Status: DISCONTINUED | OUTPATIENT
Start: 2023-04-07 | End: 2023-04-18 | Stop reason: HOSPADM

## 2023-04-07 RX ORDER — ATORVASTATIN CALCIUM 40 MG/1
40 TABLET, FILM COATED ORAL NIGHTLY
Status: DISCONTINUED | OUTPATIENT
Start: 2023-04-07 | End: 2023-04-18 | Stop reason: HOSPADM

## 2023-04-07 RX ORDER — TRAZODONE HYDROCHLORIDE 50 MG/1
50 TABLET ORAL NIGHTLY PRN
Status: DISCONTINUED | OUTPATIENT
Start: 2023-04-07 | End: 2023-04-18 | Stop reason: HOSPADM

## 2023-04-07 RX ORDER — DEXTROSE 40 %
15 GEL (GRAM) ORAL
Status: DISCONTINUED | OUTPATIENT
Start: 2023-04-07 | End: 2023-04-10

## 2023-04-07 RX ORDER — ACETAMINOPHEN 500 MG
1000 TABLET ORAL EVERY 8 HOURS PRN
Status: DISCONTINUED | OUTPATIENT
Start: 2023-04-07 | End: 2023-04-18 | Stop reason: HOSPADM

## 2023-04-07 RX ORDER — HYDROCODONE BITARTRATE AND ACETAMINOPHEN 10; 325 MG/1; MG/1
1 TABLET ORAL EVERY 6 HOURS PRN
Status: DISCONTINUED | OUTPATIENT
Start: 2023-04-07 | End: 2023-04-18 | Stop reason: HOSPADM

## 2023-04-07 RX ORDER — HYDROCODONE BITARTRATE AND ACETAMINOPHEN 10; 325 MG/1; MG/1
1 TABLET ORAL 2 TIMES DAILY
Status: DISCONTINUED | OUTPATIENT
Start: 2023-04-07 | End: 2023-04-18 | Stop reason: HOSPADM

## 2023-04-07 RX ORDER — LOSARTAN POTASSIUM 50 MG/1
50 TABLET ORAL DAILY
Status: DISCONTINUED | OUTPATIENT
Start: 2023-04-07 | End: 2023-04-07

## 2023-04-07 RX ORDER — MORPHINE SULFATE 4 MG/ML
4 INJECTION, SOLUTION INTRAMUSCULAR; INTRAVENOUS EVERY 4 HOURS PRN
Status: DISCONTINUED | OUTPATIENT
Start: 2023-04-07 | End: 2023-04-10

## 2023-04-07 RX ORDER — NALOXONE HCL 0.4 MG/ML
0.02 VIAL (ML) INJECTION
Status: DISCONTINUED | OUTPATIENT
Start: 2023-04-07 | End: 2023-04-18 | Stop reason: HOSPADM

## 2023-04-07 RX ORDER — GABAPENTIN 300 MG/1
300 CAPSULE ORAL EVERY 8 HOURS
Status: DISCONTINUED | OUTPATIENT
Start: 2023-04-07 | End: 2023-04-18 | Stop reason: HOSPADM

## 2023-04-07 RX ORDER — HYDRALAZINE HYDROCHLORIDE 20 MG/ML
10 INJECTION INTRAMUSCULAR; INTRAVENOUS EVERY 6 HOURS PRN
Status: DISCONTINUED | OUTPATIENT
Start: 2023-04-07 | End: 2023-04-18 | Stop reason: HOSPADM

## 2023-04-07 RX ORDER — GLUCAGON 1 MG
1 KIT INJECTION
Status: DISCONTINUED | OUTPATIENT
Start: 2023-04-07 | End: 2023-04-18 | Stop reason: HOSPADM

## 2023-04-07 RX ORDER — AMLODIPINE BESYLATE 10 MG/1
10 TABLET ORAL DAILY
Status: DISCONTINUED | OUTPATIENT
Start: 2023-04-07 | End: 2023-04-08

## 2023-04-07 RX ORDER — HYDROCHLOROTHIAZIDE 12.5 MG/1
25 TABLET ORAL DAILY
Status: DISCONTINUED | OUTPATIENT
Start: 2023-04-07 | End: 2023-04-07

## 2023-04-07 RX ORDER — SODIUM CHLORIDE, SODIUM LACTATE, POTASSIUM CHLORIDE, CALCIUM CHLORIDE 600; 310; 30; 20 MG/100ML; MG/100ML; MG/100ML; MG/100ML
INJECTION, SOLUTION INTRAVENOUS CONTINUOUS
Status: DISCONTINUED | OUTPATIENT
Start: 2023-04-07 | End: 2023-04-07

## 2023-04-07 RX ORDER — HYDRALAZINE HYDROCHLORIDE 25 MG/1
25 TABLET, FILM COATED ORAL EVERY 8 HOURS
Status: DISCONTINUED | OUTPATIENT
Start: 2023-04-07 | End: 2023-04-18 | Stop reason: HOSPADM

## 2023-04-07 RX ORDER — HYDRALAZINE HYDROCHLORIDE 25 MG/1
25 TABLET, FILM COATED ORAL EVERY 12 HOURS
Status: DISCONTINUED | OUTPATIENT
Start: 2023-04-07 | End: 2023-04-07

## 2023-04-07 RX ORDER — DEXTROSE 40 %
30 GEL (GRAM) ORAL
Status: DISCONTINUED | OUTPATIENT
Start: 2023-04-07 | End: 2023-04-10

## 2023-04-07 RX ORDER — SODIUM CHLORIDE 0.9 % (FLUSH) 0.9 %
10 SYRINGE (ML) INJECTION EVERY 12 HOURS PRN
Status: DISCONTINUED | OUTPATIENT
Start: 2023-04-07 | End: 2023-04-18 | Stop reason: HOSPADM

## 2023-04-07 RX ADMIN — OXYCODONE HYDROCHLORIDE AND ACETAMINOPHEN 500 MG: 500 TABLET ORAL at 09:04

## 2023-04-07 RX ADMIN — THERA TABS 1 TABLET: TAB at 11:04

## 2023-04-07 RX ADMIN — GABAPENTIN 300 MG: 300 CAPSULE ORAL at 05:04

## 2023-04-07 RX ADMIN — GABAPENTIN 300 MG: 300 CAPSULE ORAL at 09:04

## 2023-04-07 RX ADMIN — PIPERACILLIN AND TAZOBACTAM 4.5 G: 4; .5 INJECTION, POWDER, LYOPHILIZED, FOR SOLUTION INTRAVENOUS; PARENTERAL at 03:04

## 2023-04-07 RX ADMIN — PIPERACILLIN AND TAZOBACTAM 4.5 G: 4; .5 INJECTION, POWDER, LYOPHILIZED, FOR SOLUTION INTRAVENOUS; PARENTERAL at 06:04

## 2023-04-07 RX ADMIN — HYDROCODONE BITARTRATE AND ACETAMINOPHEN 1 TABLET: 10; 325 TABLET ORAL at 11:04

## 2023-04-07 RX ADMIN — HYDROCODONE BITARTRATE AND ACETAMINOPHEN 1 TABLET: 10; 325 TABLET ORAL at 12:04

## 2023-04-07 RX ADMIN — PIPERACILLIN AND TAZOBACTAM 4.5 G: 4; .5 INJECTION, POWDER, LYOPHILIZED, FOR SOLUTION INTRAVENOUS; PARENTERAL at 09:04

## 2023-04-07 RX ADMIN — ATORVASTATIN CALCIUM 40 MG: 40 TABLET, FILM COATED ORAL at 09:04

## 2023-04-07 RX ADMIN — GABAPENTIN 300 MG: 300 CAPSULE ORAL at 03:04

## 2023-04-07 RX ADMIN — OXYCODONE HYDROCHLORIDE AND ACETAMINOPHEN 500 MG: 500 TABLET ORAL at 11:04

## 2023-04-07 RX ADMIN — HYDRALAZINE HYDROCHLORIDE 25 MG: 25 TABLET ORAL at 05:04

## 2023-04-07 RX ADMIN — AMLODIPINE BESYLATE 10 MG: 10 TABLET ORAL at 11:04

## 2023-04-07 RX ADMIN — ZINC SULFATE 220 MG (50 MG) CAPSULE 220 MG: CAPSULE at 12:04

## 2023-04-07 RX ADMIN — DAPTOMYCIN 1000 MG: 500 INJECTION, POWDER, LYOPHILIZED, FOR SOLUTION INTRAVENOUS at 05:04

## 2023-04-07 RX ADMIN — SODIUM CHLORIDE, POTASSIUM CHLORIDE, SODIUM LACTATE AND CALCIUM CHLORIDE: 600; 310; 30; 20 INJECTION, SOLUTION INTRAVENOUS at 04:04

## 2023-04-07 RX ADMIN — PIPERACILLIN AND TAZOBACTAM 4.5 G: 4; .5 INJECTION, POWDER, LYOPHILIZED, FOR SOLUTION INTRAVENOUS; PARENTERAL at 04:04

## 2023-04-07 RX ADMIN — HYDRALAZINE HYDROCHLORIDE 25 MG: 25 TABLET ORAL at 09:04

## 2023-04-07 RX ADMIN — ZINC SULFATE 220 MG (50 MG) CAPSULE 220 MG: CAPSULE at 09:04

## 2023-04-07 RX ADMIN — OXYCODONE HYDROCHLORIDE AND ACETAMINOPHEN 500 MG: 500 TABLET ORAL at 12:04

## 2023-04-07 RX ADMIN — MORPHINE SULFATE 4 MG: 4 INJECTION INTRAVENOUS at 08:04

## 2023-04-07 RX ADMIN — ZINC SULFATE 220 MG (50 MG) CAPSULE 220 MG: CAPSULE at 11:04

## 2023-04-07 RX ADMIN — ATORVASTATIN CALCIUM 40 MG: 40 TABLET, FILM COATED ORAL at 03:04

## 2023-04-07 RX ADMIN — HYDROCODONE BITARTRATE AND ACETAMINOPHEN 1 TABLET: 10; 325 TABLET ORAL at 09:04

## 2023-04-07 RX ADMIN — MORPHINE SULFATE 4 MG: 4 INJECTION INTRAVENOUS at 04:04

## 2023-04-07 RX ADMIN — HYDRALAZINE HYDROCHLORIDE 25 MG: 25 TABLET ORAL at 03:04

## 2023-04-07 NOTE — SUBJECTIVE & OBJECTIVE
Subjective:     HPI:  37 y.o. male admitted to [facility] with wound infection to chronic-non healing wound on the right foot. Wound is malodorous with increase in drainage.Cultures from 3/31 positive for Enterobacter cloaca and Enterococcus gallinarum. He has completed multiple courses of PO antibiotics, most recently Zyvox.     Significant PMH  Includes diabetes and hypertension. Last HgA1C 6.4  in March 23, diabetes is managed by PCP. He is due for arterial dopplers, last doppler in October 2020. Denies fever or chills.     Hospital Course:   04/07/2023 - Wound care consulted to evaluate and follow wounds . POC established today. Barriers to wound healing identified and preventive measures in place            Scheduled Meds:   ascorbic acid (vitamin C)  500 mg Oral BID    HYDROcodone-acetaminophen  1 tablet Oral BID    multivitamin  1 tablet Oral Daily    zinc sulfate  220 mg Oral BID     Continuous Infusions:  PRN Meds:sodium chloride 0.9%    Review of Systems   Constitutional:  Positive for activity change. Negative for chills and fever.   Respiratory:  Negative for chest tightness and shortness of breath.    Cardiovascular:  Positive for leg swelling. Negative for chest pain and palpitations.   Musculoskeletal:  Positive for arthralgias and joint swelling.   Skin:  Positive for wound.        wound   Neurological:  Positive for weakness and numbness.   Psychiatric/Behavioral:  Negative for agitation, behavioral problems, confusion and self-injury.    Objective:     Vital Signs (Most Recent):  Temp: 98 °F (36.7 °C) (04/07/23 0008)  Pulse: 80 (04/07/23 0008)  Resp: 18 (04/07/23 0008)  BP: 135/87 (04/07/23 0008)  SpO2: 100 % (04/07/23 0008) Vital Signs (24h Range):  Temp:  [98 °F (36.7 °C)] 98 °F (36.7 °C)  Pulse:  [75-80] 80  Resp:  [18] 18  SpO2:  [100 %] 100 %  BP: (135-150)/(81-87) 135/87        There is no height or weight on file to calculate BMI.    Physical Exam  Vitals reviewed.   Constitutional:        Appearance: Normal appearance.   HENT:      Head: Normocephalic.   Cardiovascular:      Rate and Rhythm: Normal rate and regular rhythm.      Pulses: Normal pulses.      Heart sounds: Normal heart sounds.   Pulmonary:      Effort: Pulmonary effort is normal.      Breath sounds: Normal breath sounds.   Musculoskeletal:         General: Swelling present.      Right lower leg: Edema present.   Skin:     Findings: Erythema present.      Comments: Wound, see LDA for photos/measurements   Neurological:      Mental Status: He is alert. Mental status is at baseline.      Motor: Weakness present.

## 2023-04-07 NOTE — HOSPITAL COURSE
04/07/2023 - Wound care consulted to evaluate and follow wounds . POC established today. Barriers to wound healing identified and preventive measures in place  04/10/2023 - Start HBOT in AM. Artrial doppler and three phase bone scan ordered today. Drawtex and vashe today. Bedside debridement in AM per Wound Care.   4/14/2023 - Patient agreeable to NPWT outpatient and IV antbiotics. Discussed with Dr. Simmons about POC, Set up Wound Vac with PMR and have PICC line placed today.   04/17/2023 - D/c home with IV antibiotics, NPWT, and outpatient HBOT once medically cleared. Wound vac is in clinic to be applied prior to d/c.

## 2023-04-07 NOTE — SUBJECTIVE & OBJECTIVE
Past Medical History:   Diagnosis Date    Anemia     Chronic osteomyelitis     Diabetes mellitus with neuropathy     Diabetes mellitus, type 2     DM2 (diabetes mellitus, type 2)     HTN (hypertension)     Hypertension     Neuropathy     Obesity     Osteomyelitis 10/2020    Hx of R foot, Tx with IV Abx    Peripheral vascular disease        Past Surgical History:   Procedure Laterality Date    APPLICATION OF SPLIT-THICKNESS SKIN GRAFT (STSG) TO LOWER EXTREMITY Right 3/2/2022    Procedure: APPLICATION, GRAFT, SKIN, SPLIT-THICKNESS, TO LOWER EXTREMITY;  Surgeon: Greg Ramírez MD;  Location: [facility];  Service: General;  Laterality: Right;    CHOLECYSTECTOMY      DEBRIDEMENT OF FOOT Right 10/2020    FOOT SURGERY      Right diabetic foot ulcer Elliott's Grade 3      Bone exposure to R heel, Hx of IV Abx, cultures show multiple bacterial growth, Hx of Osteomyelitis, HBOT       Review of patient's allergies indicates:   Allergen Reactions    Tomato Itching       No current facility-administered medications on file prior to encounter.     Current Outpatient Medications on File Prior to Encounter   Medication Sig    blood sugar diagnostic (BLOOD GLUCOSE TEST) Strp 1 strip by Misc.(Non-Drug; Combo Route) route once daily. accu-chek Veronika strips    FEROSUL 325 mg (65 mg iron) Tab tablet Take 1 tablet (325 mg total) by mouth once daily.    gabapentin (NEURONTIN) 300 MG capsule Take 1 capsule (300 mg total) by mouth every 8 (eight) hours.    hydroCHLOROthiazide (HYDRODIURIL) 25 MG tablet Take 1 tablet (25 mg total) by mouth once daily.    HYDROcodone-acetaminophen (NORCO)  mg per tablet Take 1 tablet by mouth every 12 (twelve) hours as needed for Pain.    HYDROcodone-acetaminophen (NORCO)  mg per tablet Take 1 tablet by mouth every 12 (twelve) hours as needed for Pain.    linezolid (ZYVOX) 600 mg Tab Take 1 tablet (600 mg total) by mouth every 12 (twelve) hours. for 7 days    rosuvastatin (CRESTOR) 10 MG tablet  Take 1 tablet (10 mg total) by mouth once daily.    semaglutide (OZEMPIC) 2 mg/dose (8 mg/3 mL) PnIj Inject 2 mg into the skin every 7 days.    acetaminophen (TYLENOL) 325 MG tablet Take 2 tablets (650 mg total) by mouth every 4 (four) hours as needed.    amLODIPine (NORVASC) 10 MG tablet Take 1 tablet (10 mg total) by mouth once daily.    empaglifloz-linaglip-metformin (TRIJARDY XR) 5-2.5-1,000 mg TBph Take 1 tablet by mouth once daily. (Patient not taking: Reported on 2023)    hydrALAZINE (APRESOLINE) 25 MG tablet Take 1 tablet (25 mg total) by mouth every 12 (twelve) hours.    losartan (COZAAR) 50 MG tablet Take 1 tablet (50 mg total) by mouth once daily.    [DISCONTINUED] blood-glucose meter kit 1 each by Other route 3 (three) times daily.    [DISCONTINUED] insulin asp prt-insulin aspart, NovoLOG 70/30, (NOVOLOG 70/30) 100 unit/mL (70-30) Soln Inject 15 Units into the skin once daily.    [DISCONTINUED] insulin detemir U-100 (LEVEMIR) 100 unit/mL injection Inject 40 Units into the skin every evening.    [DISCONTINUED] sodium hypochlorite 0.5 % (DAKIN'S SOLUTION) external solution Apply topically once daily.     Family History       Problem Relation (Age of Onset)    Diabetes Father, Maternal Grandmother    Hypertension Father, Maternal Grandmother          Tobacco Use    Smoking status: Every Day     Packs/day: 0.25     Years: 25.00     Pack years: 6.25     Types: Cigarettes     Start date:      Last attempt to quit: 2022     Years since quittin.1    Smokeless tobacco: Never   Substance and Sexual Activity    Alcohol use: Yes     Alcohol/week: 2.0 standard drinks     Types: 1 Glasses of wine, 1 Cans of beer per week     Comment: occasionally    Drug use: Never    Sexual activity: Yes     Partners: Female     Review of Systems   Constitutional:  Negative for activity change, chills, fatigue and fever.   HENT:  Negative for congestion and sore throat.    Respiratory:  Negative for cough, chest  tightness, shortness of breath, wheezing and stridor.    Cardiovascular:  Positive for leg swelling. Negative for chest pain and palpitations.   Gastrointestinal:  Negative for abdominal distention, abdominal pain, anal bleeding, blood in stool, constipation, diarrhea, nausea and vomiting.   Genitourinary:  Negative for difficulty urinating and dysuria.   Musculoskeletal:  Negative for arthralgias and back pain.   Skin:  Negative for color change.   Neurological:  Positive for numbness. Negative for dizziness, tremors, syncope, speech difficulty, weakness, light-headedness and headaches.   Hematological:  Negative for adenopathy.   Psychiatric/Behavioral:  Negative for agitation.    Objective:     Vital Signs (Most Recent):  Temp: 98 °F (36.7 °C) (04/07/23 0008)  Pulse: 80 (04/07/23 0008)  Resp: 18 (04/07/23 0026)  BP: 135/87 (04/07/23 0008)  SpO2: 100 % (04/07/23 0008) Vital Signs (24h Range):  Temp:  [98 °F (36.7 °C)] 98 °F (36.7 °C)  Pulse:  [75-80] 80  Resp:  [18] 18  SpO2:  [100 %] 100 %  BP: (135-150)/(81-87) 135/87        There is no height or weight on file to calculate BMI.    Physical Exam  Vitals and nursing note reviewed.   Constitutional:       General: He is not in acute distress.     Appearance: Normal appearance. He is obese. He is not ill-appearing or toxic-appearing.   HENT:      Head: Normocephalic and atraumatic.      Right Ear: External ear normal.      Left Ear: External ear normal.      Nose: Nose normal. No congestion or rhinorrhea.      Mouth/Throat:      Mouth: Mucous membranes are moist.   Eyes:      General:         Right eye: No discharge.         Left eye: No discharge.      Conjunctiva/sclera: Conjunctivae normal.   Cardiovascular:      Rate and Rhythm: Normal rate and regular rhythm.      Pulses: Normal pulses.      Heart sounds: Normal heart sounds. No murmur heard.    No friction rub.   Pulmonary:      Effort: Pulmonary effort is normal. No respiratory distress.      Breath sounds:  Normal breath sounds. No wheezing or rales.   Abdominal:      General: Bowel sounds are normal. There is no distension.      Palpations: Abdomen is soft.      Tenderness: There is no abdominal tenderness. There is no guarding.   Musculoskeletal:         General: Swelling present. No tenderness. Normal range of motion.      Cervical back: Neck supple.      Right lower leg: Edema present.      Left lower leg: Edema present.        Feet:       Comments: Edema in the both LE bilaterally   Skin lesion all over the bilateral LE   Feet:      Right foot:      Skin integrity: Skin breakdown, erythema and warmth present.      Comments: Foul smelling wound with drainage at the sole of the right foot.   Right foot and RLE is warm to touch up to the knee.  Right great toe has been amputated    Skin:     General: Skin is warm.      Coloration: Skin is not jaundiced.      Findings: No bruising or lesion.   Neurological:      Mental Status: He is alert and oriented to person, place, and time.   Psychiatric:         Behavior: Behavior normal.           Significant Labs: All pertinent labs within the past 24 hours have been reviewed.    Significant Imaging: I have reviewed all pertinent imaging results/findings within the past 24 hours.

## 2023-04-07 NOTE — ASSESSMENT & PLAN NOTE
Foul smelling, draining, warmth to touch wound at the sole of the right foot   Cultures from 03/31 grew Enterobacter cloacae and enterococcus gallinarum  Cultures from December grew Enterbacter cloacnae, klebsiella pneumonaiae, staph aureus  Based on sensitivities and discussion with pharmacist Zosyn and Daptomycin would cover all of the above and would give good coverage for other possible bacteria including gram negatives/pseudomonas.   ID consulted for adjustment of antibiotics  Surgery consulted   Wound care consulted  Blood cultures pending  New xray of the foot pending   ESR, CRP, CMP and CBC pending   NPO after midnight, PT INR, chest xray, EKG and type and screen, Holding home losartan and HCTZ for possible surgery tomorrow

## 2023-04-07 NOTE — NURSING
Patient NPO for procedure. No order to given or hold medication. Dr. Yanez notified via secure chat.

## 2023-04-07 NOTE — ASSESSMENT & PLAN NOTE
Currently well controlled  Continue Norvasc 10 mg daily   Increased Hydralazine 25 mg BID to TID  Holding home Losartan and HCTZ for possible surgery tomorrow.   Hydralazine 10 mg IV with parameters

## 2023-04-07 NOTE — H&P
[facility] Hospital Medicine  History & Physical    Patient Name: Jean Pierre Paulson  MRN: [id number]  Patient Class: IP- Inpatient  Admission Date: 4/6/2023  Attending Physician: Sanjay Yanez MD   Primary Care Provider: Mojgan Lomeli NP         Patient information was obtained from patient, past medical records and ER records.     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 Patient reports he has developed this wound over a year ago after trauma due to a nail. Infection has caused osteomylitis within the last year and he has had multiple surgeries/debridement since but the wound is non healing and keeps getting infected. He visited the wound care clinic on 03/31/23 and saw NP Yelitza Alas who realized the wound is infected, took wound cultures and started patient on Linezolid. Patient completed the one week course, cultures came back showing Enterobacter cloacae and enterococcus gallinarum resistant to multiple antibiotics therefore he was suggested to come to the hospital.     Patient denies fever, chills, nausea, vomiting, cough, palpitation, chest pain, abdominal pain, diarrhea or problems with urination. Wound is malodorous with increase in drainage in recent weeks. Patient has bilateral LE edema but has never seen a cardiologist or a nephrologist in the past.     PMHx includes Diabetes with last A1c of 6.4 on 03/2023 on [place]. Patient has had his right great toe amputated back in 2018 due to a diabetic wound. He has diabetic neuropathy with minimal sensation in both feet but intact sensation above ankle. He has neever had a diabetic  eye exam. Patient has HTN and his bp ranges 135-160 systolic at home with current home regimen. Patient has been referred to sleep studies previously but has not been compliant.    Workup so far:  ESR 69 with CRP of 2.9  WBC wnl, H/H 10.3/32.9  Cr of 1.67 with eGFRof 53          Past Medical History:   Diagnosis Date    Anemia     Chronic osteomyelitis     Diabetes mellitus with neuropathy     Diabetes mellitus, type 2     DM2 (diabetes mellitus, type 2)     HTN (hypertension)     Hypertension     Neuropathy     Obesity     Osteomyelitis 10/2020    Hx of R foot, Tx with IV Abx    Peripheral vascular disease        Past Surgical History:   Procedure Laterality Date    APPLICATION OF SPLIT-THICKNESS SKIN GRAFT (STSG) TO LOWER EXTREMITY Right 3/2/2022    Procedure: APPLICATION, GRAFT, SKIN, SPLIT-THICKNESS, TO LOWER EXTREMITY;  Surgeon: Greg Ramírez MD;  Location: [facility];  Service: General;  Laterality: Right;    CHOLECYSTECTOMY      DEBRIDEMENT OF FOOT Right 10/2020    FOOT SURGERY      Right diabetic foot ulcer Elliott's Grade 3      Bone exposure to R heel, Hx of IV Abx, cultures show multiple bacterial growth, Hx of Osteomyelitis, HBOT       Review of patient's allergies indicates:   Allergen Reactions    Tomato Itching       No current facility-administered medications on file prior to encounter.     Current Outpatient Medications on File Prior to Encounter   Medication Sig    blood sugar diagnostic (BLOOD GLUCOSE TEST) Strp 1 strip by Misc.(Non-Drug; Combo Route) route once daily. accu-chek Veronika strips    FEROSUL 325 mg (65 mg iron) Tab tablet Take 1 tablet (325 mg total) by mouth once daily.    gabapentin (NEURONTIN) 300 MG capsule Take 1 capsule (300 mg total) by mouth every 8 (eight) hours.    hydroCHLOROthiazide (HYDRODIURIL) 25 MG tablet Take 1 tablet (25 mg total) by mouth once daily.    HYDROcodone-acetaminophen (NORCO)  mg per tablet Take 1 tablet by mouth every 12  (twelve) hours as needed for Pain.    HYDROcodone-acetaminophen (NORCO)  mg per tablet Take 1 tablet by mouth every 12 (twelve) hours as needed for Pain.    linezolid (ZYVOX) 600 mg Tab Take 1 tablet (600 mg total) by mouth every 12 (twelve) hours. for 7 days    rosuvastatin (CRESTOR) 10 MG tablet Take 1 tablet (10 mg total) by mouth once daily.    semaglutide (OZEMPIC) 2 mg/dose (8 mg/3 mL) PnIj Inject 2 mg into the skin every 7 days.    acetaminophen (TYLENOL) 325 MG tablet Take 2 tablets (650 mg total) by mouth every 4 (four) hours as needed.    amLODIPine (NORVASC) 10 MG tablet Take 1 tablet (10 mg total) by mouth once daily.    empaglifloz-linaglip-metformin (TRIJARDY XR) 5-2.5-1,000 mg TBph Take 1 tablet by mouth once daily. (Patient not taking: Reported on 2023)    hydrALAZINE (APRESOLINE) 25 MG tablet Take 1 tablet (25 mg total) by mouth every 12 (twelve) hours.    losartan (COZAAR) 50 MG tablet Take 1 tablet (50 mg total) by mouth once daily.    [DISCONTINUED] blood-glucose meter kit 1 each by Other route 3 (three) times daily.    [DISCONTINUED] insulin asp prt-insulin aspart, NovoLOG 70/30, (NOVOLOG 70/30) 100 unit/mL (70-30) Soln Inject 15 Units into the skin once daily.    [DISCONTINUED] insulin detemir U-100 (LEVEMIR) 100 unit/mL injection Inject 40 Units into the skin every evening.    [DISCONTINUED] sodium hypochlorite 0.5 % (DAKIN'S SOLUTION) external solution Apply topically once daily.     Family History       Problem Relation (Age of Onset)    Diabetes Father, Maternal Grandmother    Hypertension Father, Maternal Grandmother          Tobacco Use    Smoking status: Every Day     Packs/day: 0.25     Years: 25.00     Pack years: 6.25     Types: Cigarettes     Start date:      Last attempt to quit: 2022     Years since quittin.1    Smokeless tobacco: Never   Substance and Sexual Activity    Alcohol use: Yes     Alcohol/week: 2.0 standard drinks     Types: 1  Glasses of wine, 1 Cans of beer per week     Comment: occasionally    Drug use: Never    Sexual activity: Yes     Partners: Female     Review of Systems   Constitutional:  Negative for activity change, chills, fatigue and fever.   HENT:  Negative for congestion and sore throat.    Respiratory:  Negative for cough, chest tightness, shortness of breath, wheezing and stridor.    Cardiovascular:  Positive for leg swelling. Negative for chest pain and palpitations.   Gastrointestinal:  Negative for abdominal distention, abdominal pain, anal bleeding, blood in stool, constipation, diarrhea, nausea and vomiting.   Genitourinary:  Negative for difficulty urinating and dysuria.   Musculoskeletal:  Negative for arthralgias and back pain.   Skin:  Negative for color change.   Neurological:  Positive for numbness. Negative for dizziness, tremors, syncope, speech difficulty, weakness, light-headedness and headaches.   Hematological:  Negative for adenopathy.   Psychiatric/Behavioral:  Negative for agitation.    Objective:     Vital Signs (Most Recent):  Temp: 98 °F (36.7 °C) (04/07/23 0008)  Pulse: 80 (04/07/23 0008)  Resp: 18 (04/07/23 0026)  BP: 135/87 (04/07/23 0008)  SpO2: 100 % (04/07/23 0008) Vital Signs (24h Range):  Temp:  [98 °F (36.7 °C)] 98 °F (36.7 °C)  Pulse:  [75-80] 80  Resp:  [18] 18  SpO2:  [100 %] 100 %  BP: (135-150)/(81-87) 135/87        There is no height or weight on file to calculate BMI.    Physical Exam  Vitals and nursing note reviewed.   Constitutional:       General: He is not in acute distress.     Appearance: Normal appearance. He is obese. He is not ill-appearing or toxic-appearing.   HENT:      Head: Normocephalic and atraumatic.      Right Ear: External ear normal.      Left Ear: External ear normal.      Nose: Nose normal. No congestion or rhinorrhea.      Mouth/Throat:      Mouth: Mucous membranes are moist.   Eyes:      General:         Right eye: No discharge.         Left eye: No  discharge.      Conjunctiva/sclera: Conjunctivae normal.   Cardiovascular:      Rate and Rhythm: Normal rate and regular rhythm.      Pulses: Normal pulses.      Heart sounds: Normal heart sounds. No murmur heard.    No friction rub.   Pulmonary:      Effort: Pulmonary effort is normal. No respiratory distress.      Breath sounds: Normal breath sounds. No wheezing or rales.   Abdominal:      General: Bowel sounds are normal. There is no distension.      Palpations: Abdomen is soft.      Tenderness: There is no abdominal tenderness. There is no guarding.   Musculoskeletal:         General: Swelling present. No tenderness. Normal range of motion.      Cervical back: Neck supple.      Right lower leg: Edema present.      Left lower leg: Edema present.        Feet:       Comments: Edema in the both LE bilaterally   Skin lesion all over the bilateral LE   Feet:      Right foot:      Skin integrity: Skin breakdown, erythema and warmth present.      Comments: Foul smelling wound with drainage at the sole of the right foot.   Right foot and RLE is warm to touch up to the knee.  Right great toe has been amputated    Skin:     General: Skin is warm.      Coloration: Skin is not jaundiced.      Findings: No bruising or lesion.   Neurological:      Mental Status: He is alert and oriented to person, place, and time.   Psychiatric:         Behavior: Behavior normal.           Significant Labs: All pertinent labs within the past 24 hours have been reviewed.    Significant Imaging: I have reviewed all pertinent imaging results/findings within the past 24 hours.    Assessment/Plan:     * Non-pressure chronic ulcer of other part of right foot with other specified severity  Foul smelling, draining, warmth to touch wound at the sole of the right foot   Cultures from 03/31 grew Enterobacter cloacae and enterococcus gallinarum  Cultures from December grew Enterbacter cloacnae, klebsiella pneumonaiae, staph aureus  Based on sensitivities  and discussion with pharmacist Zosyn and Daptomycin would cover all of the above and would give good coverage for other possible bacteria including gram negatives/pseudomonas.   ID consulted for adjustment of antibiotics  Surgery consulted   Wound care consulted  Blood cultures pending  New xray of the foot pending   ESR, CRP, CMP and CBC pending   NPO after midnight, PT INR, chest xray, EKG and type and screen, Holding home losartan and HCTZ for possible surgery tomorrow       HTN (hypertension)  Currently well controlled  Continue Norvasc 10 mg daily   Increased Hydralazine 25 mg BID to TID  Holding home Losartan and HCTZ for possible surgery tomorrow.   Hydralazine 10 mg IV with parameters        Type 2 diabetes mellitus, with long-term current use of insulin  Patient's FSGs are controlled on current medication regimen.  Last A1c reviewed-   Lab Results   Component Value Date    HGBA1C 6.4 03/02/2023     Most recent fingerstick glucose reviewed- No results for input(s): POCTGLUCOSE in the last 24 hours.  Current correctional scale  Medium  Maintain anti-hyperglycemic dose as follows-   Antihyperglycemics (From admission, onward)    Start     Stop Route Frequency Ordered    04/07/23 0600  insulin aspart U-100 injection 1-10 Units         -- SubQ Every 6 hours 04/07/23 0122        Hold Oral hypoglycemics while patient is in the hospital.    Peripheral vascular disease, unspecified    Last arterial doppler was in 2020  Patient can benefit from both venous and arterial LE doppler    NOLAN (acute kidney injury)  Patient with acute kidney injury likely due to pre-renal azotemia NOLAN is currently stable. Labs reviewed- Renal function/electrolytes with Estimated Creatinine Clearance: 94.2 mL/min (A) (based on SCr of 1.67 mg/dL (H)). according to latest data. Monitor urine output and serial BMP and adjust therapy as needed. Avoid nephrotoxins and renally dose meds for GFR listed above.     Cr of 1.67 from the baseline of  1.2  eGFR of 53  Continue monitoring the kidneys   LR infusion with rate of 100 cc/hr    Normocytic anemia    H/H 10.3/32.9  MCV 85.7   Most likley due to anemia of chronic disease  Irone studies, ferritin, b12 and folic acid pending     HLD (hyperlipidemia)    Lipid panel was recently done outpatient  Continue home Lipitor     TEDDY (obstructive sleep apnea)  Patient has been referred to sleep studies in the past but has been non compliant  Follow up orders with Dr. Baca was placed. TEDDY and its consequences were discussed with the patient. Patient seems to be motivated to get the sleep studies done      Severe obesity (BMI >= 40)  Body mass index is 46.68 kg/m². Morbid obesity complicates all aspects of disease management from diagnostic modalities to treatment. Weight loss encouraged and health benefits explained to patient.         Bilateral lower extremity edema  Patient on norvasc  Hx of PVD  ProBNP pending   Patient can benefit from an Echo and possibly outpatient cardiology referral based on the echo      VTE Risk Mitigation (From admission, onward)         Ordered     Place sequential compression device  Until discontinued         04/07/23 0122     IP VTE HIGH RISK PATIENT  Once         04/07/23 0122     Reason for No Pharmacological VTE Prophylaxis  Once        Question:  Reasons:  Answer:  Physician Provided (leave comment)  Comment:  possible surgery tomorrow am    04/07/23 0122     Place sequential compression device  Until discontinued         04/06/23 7946                           Art Lemus MD  Department of Hospital Medicine  [facility]

## 2023-04-07 NOTE — ASSESSMENT & PLAN NOTE
Clean wound vashe  Spray skin barrier around wound  Apply vashe moistened drawtex to wound bed, apply dry drawtex to wick drainage  Cover and secure with 4x4s, leobardo, and paper tape  Change daily and PRN for soilage  Elevate extremity when in bed  Waffle boots  Bedrest with bathroom privileges  Start HBO  Therapy on 4/10/23  IV antibiotics - culture positive for Enterobacter cloaca and Enterococcus gallinarum.

## 2023-04-07 NOTE — ASSESSMENT & PLAN NOTE
Patient on norvasc  Hx of PVD  ProBNP pending   Patient can benefit from an Echo and possibly outpatient cardiology referral based on the echo

## 2023-04-07 NOTE — ASSESSMENT & PLAN NOTE
Body mass index is 46.68 kg/m². Morbid obesity complicates all aspects of disease management from diagnostic modalities to treatment. Weight loss encouraged and health benefits explained to patient.

## 2023-04-07 NOTE — SUBJECTIVE & OBJECTIVE
Interval History:     NAEO  Cont abx  Awaitng wound care team.     Review of Systems   Constitutional:  Negative for activity change, chills, fatigue and fever.   HENT:  Negative for congestion and sore throat.    Respiratory:  Negative for cough, chest tightness, shortness of breath, wheezing and stridor.    Cardiovascular:  Positive for leg swelling. Negative for chest pain and palpitations.   Gastrointestinal:  Negative for abdominal distention, abdominal pain, anal bleeding, blood in stool, constipation, diarrhea, nausea and vomiting.   Genitourinary:  Negative for difficulty urinating and dysuria.   Musculoskeletal:  Negative for arthralgias and back pain.   Skin:  Positive for wound. Negative for color change.   Neurological:  Negative for dizziness, tremors, syncope, speech difficulty, weakness, light-headedness and headaches.   Hematological:  Negative for adenopathy.   Psychiatric/Behavioral:  Negative for agitation.    Objective:     Vital Signs (Most Recent):  Temp: 98 °F (36.7 °C) (04/07/23 1200)  Pulse: 82 (04/07/23 1200)  Resp: 20 (04/07/23 1200)  BP: 133/74 (04/07/23 1200)  SpO2: 100 % (04/07/23 1200) Vital Signs (24h Range):  Temp:  [98 °F (36.7 °C)-98.2 °F (36.8 °C)] 98 °F (36.7 °C)  Pulse:  [71-82] 82  Resp:  [18-20] 20  SpO2:  [98 %-100 %] 100 %  BP: (121-150)/(72-87) 133/74     Weight: (!) 160.5 kg (353 lb 13.4 oz)  Body mass index is 46.68 kg/m².    Intake/Output Summary (Last 24 hours) at 4/7/2023 1423  Last data filed at 4/7/2023 1053  Gross per 24 hour   Intake 1023.33 ml   Output --   Net 1023.33 ml      Physical Exam  Vitals and nursing note reviewed.   Constitutional:       General: He is not in acute distress.     Appearance: Normal appearance. He is obese. He is not ill-appearing or toxic-appearing.   HENT:      Head: Normocephalic and atraumatic.      Right Ear: External ear normal.      Left Ear: External ear normal.      Nose: Nose normal. No congestion or rhinorrhea.       Mouth/Throat:      Mouth: Mucous membranes are moist.   Eyes:      General:         Right eye: No discharge.         Left eye: No discharge.      Conjunctiva/sclera: Conjunctivae normal.   Cardiovascular:      Rate and Rhythm: Normal rate and regular rhythm.      Pulses: Normal pulses.      Heart sounds: Normal heart sounds. No murmur heard.    No friction rub.   Pulmonary:      Effort: Pulmonary effort is normal. No respiratory distress.      Breath sounds: Normal breath sounds. No wheezing or rales.   Abdominal:      General: Bowel sounds are normal. There is no distension.      Palpations: Abdomen is soft.      Tenderness: There is no abdominal tenderness. There is no guarding.   Musculoskeletal:         General: Swelling present. No tenderness. Normal range of motion.      Cervical back: Neck supple.      Right lower leg: Edema present.      Left lower leg: Edema present.        Feet:       Comments: Edema in the both LE bilaterally   Skin lesion all over the bilateral LE   Feet:      Right foot:      Skin integrity: Skin breakdown, erythema and warmth present.      Comments: Foul smelling wound with drainage at the sole of the right foot.   Right foot and RLE is warm to touch up to the knee.  Right great toe has been amputated    Skin:     General: Skin is warm.      Coloration: Skin is not jaundiced.      Findings: Lesion present. No bruising.      Comments: See Media   Neurological:      Mental Status: He is alert and oriented to person, place, and time.   Psychiatric:         Behavior: Behavior normal.       Significant Labs: All pertinent labs within the past 24 hours have been reviewed.    Significant Imaging: I have reviewed all pertinent imaging results/findings within the past 24 hours.

## 2023-04-07 NOTE — PROGRESS NOTES
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 Patient reports he has developed this wound over a year ago after trauma due to a nail. Infection has caused osteomylitis within the last year and he has had multiple surgeries/debridement since but the wound is non healing and keeps getting infected. He visited the wound care clinic on 03/31/23 and saw NP Yelitza Alas who realized the wound is infected, took wound cultures and started patient on Linezolid. Patient completed the one week course, cultures came back showing Enterobacter cloacae and enterococcus gallinarum resistant to multiple antibiotics therefore he was suggested to come to the hospital.     Patient denies fever, chills, nausea, vomiting, cough, palpitation, chest pain, abdominal pain, diarrhea or problems with urination. Wound is malodorous with increase in drainage in recent weeks. Patient has bilateral LE edema but has never seen a cardiologist or a nephrologist in the past.     PMHx includes Diabetes with last A1c of 6.4 on 03/2023 on [place]. Patient has had his right great toe amputated back in 2018 due to a diabetic wound. He has diabetic neuropathy with minimal sensation in both feet but intact sensation above ankle. He has neever had a diabetic eye exam. Patient has HTN and his bp ranges 135-160 systolic at home with current home regimen. Patient has been referred to sleep studies previously but  has not been compliant.    Workup so far:  ESR 69 with CRP of 2.9  WBC wnl, H/H 10.3/32.9  Cr of 1.67 with eGFRof 53          Overview/Hospital Course:  No notes on file    Interval History:     NAEO  Cont abx  Awaitng wound care team.     Review of Systems   Constitutional:  Negative for activity change, chills, fatigue and fever.   HENT:  Negative for congestion and sore throat.    Respiratory:  Negative for cough, chest tightness, shortness of breath, wheezing and stridor.    Cardiovascular:  Positive for leg swelling. Negative for chest pain and palpitations.   Gastrointestinal:  Negative for abdominal distention, abdominal pain, anal bleeding, blood in stool, constipation, diarrhea, nausea and vomiting.   Genitourinary:  Negative for difficulty urinating and dysuria.   Musculoskeletal:  Negative for arthralgias and back pain.   Skin:  Positive for wound. Negative for color change.   Neurological:  Negative for dizziness, tremors, syncope, speech difficulty, weakness, light-headedness and headaches.   Hematological:  Negative for adenopathy.   Psychiatric/Behavioral:  Negative for agitation.    Objective:     Vital Signs (Most Recent):  Temp: 98 °F (36.7 °C) (04/07/23 1200)  Pulse: 82 (04/07/23 1200)  Resp: 20 (04/07/23 1200)  BP: 133/74 (04/07/23 1200)  SpO2: 100 % (04/07/23 1200) Vital Signs (24h Range):  Temp:  [98 °F (36.7 °C)-98.2 °F (36.8 °C)] 98 °F (36.7 °C)  Pulse:  [71-82] 82  Resp:  [18-20] 20  SpO2:  [98 %-100 %] 100 %  BP: (121-150)/(72-87) 133/74     Weight: (!) 160.5 kg (353 lb 13.4 oz)  Body mass index is 46.68 kg/m².    Intake/Output Summary (Last 24 hours) at 4/7/2023 1423  Last data filed at 4/7/2023 1053  Gross per 24 hour   Intake 1023.33 ml   Output --   Net 1023.33 ml      Physical Exam  Vitals and nursing note reviewed.   Constitutional:       General: He is not in acute distress.     Appearance: Normal appearance. He is obese. He is not ill-appearing or toxic-appearing.   HENT:      Head:  Normocephalic and atraumatic.      Right Ear: External ear normal.      Left Ear: External ear normal.      Nose: Nose normal. No congestion or rhinorrhea.      Mouth/Throat:      Mouth: Mucous membranes are moist.   Eyes:      General:         Right eye: No discharge.         Left eye: No discharge.      Conjunctiva/sclera: Conjunctivae normal.   Cardiovascular:      Rate and Rhythm: Normal rate and regular rhythm.      Pulses: Normal pulses.      Heart sounds: Normal heart sounds. No murmur heard.    No friction rub.   Pulmonary:      Effort: Pulmonary effort is normal. No respiratory distress.      Breath sounds: Normal breath sounds. No wheezing or rales.   Abdominal:      General: Bowel sounds are normal. There is no distension.      Palpations: Abdomen is soft.      Tenderness: There is no abdominal tenderness. There is no guarding.   Musculoskeletal:         General: Swelling present. No tenderness. Normal range of motion.      Cervical back: Neck supple.      Right lower leg: Edema present.      Left lower leg: Edema present.        Feet:       Comments: Edema in the both LE bilaterally   Skin lesion all over the bilateral LE   Feet:      Right foot:      Skin integrity: Skin breakdown, erythema and warmth present.      Comments: Foul smelling wound with drainage at the sole of the right foot.   Right foot and RLE is warm to touch up to the knee.  Right great toe has been amputated    Skin:     General: Skin is warm.      Coloration: Skin is not jaundiced.      Findings: Lesion present. No bruising.      Comments: See Media   Neurological:      Mental Status: He is alert and oriented to person, place, and time.   Psychiatric:         Behavior: Behavior normal.       Significant Labs: All pertinent labs within the past 24 hours have been reviewed.    Significant Imaging: I have reviewed all pertinent imaging results/findings within the past 24 hours.      Assessment/Plan:      * Non-pressure chronic ulcer of  other part of right foot with other specified severity  Foul smelling, draining, warmth to touch wound at the sole of the right foot   Cultures from 03/31 grew Enterobacter cloacae and enterococcus gallinarum  Cultures from December grew Enterbacter cloacnae, klebsiella pneumonaiae, staph aureus  Based on sensitivities and discussion with pharmacist Zosyn and Daptomycin would cover all of the above and would give good coverage for other possible bacteria including gram negatives/pseudomonas.   ID consulted for adjustment of antibiotics  Surgery consulted   Wound care consulted  Blood cultures pending  New xray of the foot pending   ESR, CRP, CMP and CBC pending   NPO after midnight, PT INR, chest xray, EKG and type and screen, Holding home losartan and HCTZ for possible surgery tomorrow       Normocytic anemia    H/H 10.3/32.9  MCV 85.7   Most likley due to anemia of chronic disease  Irone studies, ferritin, b12 and folic acid pending     NOLAN (acute kidney injury)  Patient with acute kidney injury likely due to pre-renal azotemia NOLAN is currently stable. Labs reviewed- Renal function/electrolytes with Estimated Creatinine Clearance: 94.2 mL/min (A) (based on SCr of 1.67 mg/dL (H)). according to latest data. Monitor urine output and serial BMP and adjust therapy as needed. Avoid nephrotoxins and renally dose meds for GFR listed above.     Cr of 1.67 from the baseline of 1.2  eGFR of 53  Continue monitoring the kidneys   LR infusion with rate of 100 cc/hr    HLD (hyperlipidemia)    Lipid panel was recently done outpatient  Continue home Lipitor     TEDDY (obstructive sleep apnea)  Patient has been referred to sleep studies in the past but has been non compliant  Follow up orders with Dr. Baca was placed. TEDDY and its consequences were discussed with the patient. Patient seems to be motivated to get the sleep studies done      Type 2 diabetes mellitus, with long-term current use of insulin  Patient's FSGs are  controlled on current medication regimen.  Last A1c reviewed-   Lab Results   Component Value Date    HGBA1C 6.4 03/02/2023     Most recent fingerstick glucose reviewed- No results for input(s): POCTGLUCOSE in the last 24 hours.  Current correctional scale  Medium  Maintain anti-hyperglycemic dose as follows-   Antihyperglycemics (From admission, onward)    Start     Stop Route Frequency Ordered    04/07/23 0600  insulin aspart U-100 injection 1-10 Units         -- SubQ Every 6 hours 04/07/23 0122        Hold Oral hypoglycemics while patient is in the hospital.    Severe obesity (BMI >= 40)  Body mass index is 46.68 kg/m². Morbid obesity complicates all aspects of disease management from diagnostic modalities to treatment. Weight loss encouraged and health benefits explained to patient.         Bilateral lower extremity edema  Patient on norvasc  Hx of PVD  ProBNP pending   Patient can benefit from an Echo and possibly outpatient cardiology referral based on the echo    Peripheral vascular disease, unspecified    Last arterial doppler was in 2020  Patient can benefit from both venous and arterial LE doppler    HTN (hypertension)  Currently well controlled  Continue Norvasc 10 mg daily   Increased Hydralazine 25 mg BID to TID  Holding home Losartan and HCTZ for possible surgery tomorrow.   Hydralazine 10 mg IV with parameters          VTE Risk Mitigation (From admission, onward)         Ordered     Place sequential compression device  Until discontinued         04/07/23 0122     IP VTE HIGH RISK PATIENT  Once         04/07/23 0122     Reason for No Pharmacological VTE Prophylaxis  Once        Question:  Reasons:  Answer:  Physician Provided (leave comment)  Comment:  possible surgery tomorrow am    04/07/23 0122     Place sequential compression device  Until discontinued         04/06/23 4141                Discharge Planning   WOOD:      Code Status: Full Code   Is the patient medically ready for discharge?:      Reason for patient still in hospital (select all that apply): Treatment                     Rehmat KATHY Yanez MD  Department of Hospital Medicine   [facility]

## 2023-04-07 NOTE — ASSESSMENT & PLAN NOTE
H/H 10.3/32.9  MCV 85.7   Most likley due to anemia of chronic disease  Irone studies, ferritin, b12 and folic acid pending

## 2023-04-07 NOTE — ASSESSMENT & PLAN NOTE
Patient has been referred to sleep studies in the past but has been non compliant  Follow up orders with Dr. Baca was placed. TEDDY and its consequences were discussed with the patient. Patient seems to be motivated to get the sleep studies done

## 2023-04-07 NOTE — HPI
37 y.o. male admitted to [facility] with wound infection to chronic-non healing wound on the right foot. Wound is malodorous with increase in drainage.Cultures from 3/31 positive for Enterobacter cloaca and Enterococcus gallinarum. He has completed multiple courses of PO antibiotics, most recently Zyvox.     Significant PMH  Includes diabetes and hypertension. Last HgA1C 6.4  in March 23, diabetes is managed by PCP. He is due for arterial dopplers, last doppler in October 2020. Denies fever or chills.

## 2023-04-07 NOTE — PROGRESS NOTES
[facility]  Wound Care and Hyperbarics  [facility]  Progress Note    Patient Name: Jean Pierre Paulson  MRN: [id number]  Admission Date: 4/6/2023  Hospital Length of Stay: 1 days  Attending Physician: Sanjay Yanez MD  Primary Care Provider: Mojgan Lomeli NP         Subjective:     HPI:  37 y.o. male admitted to [facility] with wound infection to chronic-non healing wound on the right foot. Wound is malodorous with increase in drainage.Cultures from 3/31 positive for Enterobacter cloaca and Enterococcus gallinarum. He has completed multiple courses of PO antibiotics, most recently Zyvox.     Significant PMH  Includes diabetes and hypertension. Last HgA1C 6.4  in March 23, diabetes is managed by PCP. He is due for arterial dopplers, last doppler in October 2020. Denies fever or chills.     Hospital Course:   04/07/2023 - Wound care consulted to evaluate and follow wounds . POC established today. Barriers to wound healing identified and preventive measures in place            Scheduled Meds:   ascorbic acid (vitamin C)  500 mg Oral BID    HYDROcodone-acetaminophen  1 tablet Oral BID    multivitamin  1 tablet Oral Daily    zinc sulfate  220 mg Oral BID     Continuous Infusions:  PRN Meds:sodium chloride 0.9%    Review of Systems   Constitutional:  Positive for activity change. Negative for chills and fever.   Respiratory:  Negative for chest tightness and shortness of breath.    Cardiovascular:  Positive for leg swelling. Negative for chest pain and palpitations.   Musculoskeletal:  Positive for arthralgias and joint swelling.   Skin:  Positive for wound.        wound   Neurological:  Positive for weakness and numbness.   Psychiatric/Behavioral:  Negative for agitation, behavioral problems, confusion and self-injury.    Objective:     Vital Signs (Most Recent):  Temp: 98 °F (36.7 °C) (04/07/23 0008)  Pulse: 80 (04/07/23 0008)  Resp: 18 (04/07/23 0008)  BP: 135/87 (04/07/23 0008)  SpO2: 100 % (04/07/23  0008) Vital Signs (24h Range):  Temp:  [98 °F (36.7 °C)] 98 °F (36.7 °C)  Pulse:  [75-80] 80  Resp:  [18] 18  SpO2:  [100 %] 100 %  BP: (135-150)/(81-87) 135/87        There is no height or weight on file to calculate BMI.    Physical Exam  Vitals reviewed.   Constitutional:       Appearance: Normal appearance.   HENT:      Head: Normocephalic.   Cardiovascular:      Rate and Rhythm: Normal rate and regular rhythm.      Pulses: Normal pulses.      Heart sounds: Normal heart sounds.   Pulmonary:      Effort: Pulmonary effort is normal.      Breath sounds: Normal breath sounds.   Musculoskeletal:         General: Swelling present.      Right lower leg: Edema present.   Skin:     Findings: Erythema present.      Comments: Wound, see LDA for photos/measurements   Neurological:      Mental Status: He is alert. Mental status is at baseline.      Motor: Weakness present.         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           AUGUSTUS Macedo  Wound Care and Hyperbarics [facility]  [facility]

## 2023-04-07 NOTE — ASSESSMENT & PLAN NOTE
Patient's FSGs are controlled on current medication regimen.  Last A1c reviewed-   Lab Results   Component Value Date    HGBA1C 6.4 03/02/2023     Most recent fingerstick glucose reviewed- No results for input(s): POCTGLUCOSE in the last 24 hours.  Current correctional scale  Medium  Maintain anti-hyperglycemic dose as follows-   Antihyperglycemics (From admission, onward)    Start     Stop Route Frequency Ordered    04/07/23 0600  insulin aspart U-100 injection 1-10 Units         -- SubQ Every 6 hours 04/07/23 0122        Hold Oral hypoglycemics while patient is in the hospital.

## 2023-04-07 NOTE — ASSESSMENT & PLAN NOTE
Patient with acute kidney injury likely due to pre-renal azotemia NOLAN is currently stable. Labs reviewed- Renal function/electrolytes with Estimated Creatinine Clearance: 94.2 mL/min (A) (based on SCr of 1.67 mg/dL (H)). according to latest data. Monitor urine output and serial BMP and adjust therapy as needed. Avoid nephrotoxins and renally dose meds for GFR listed above.     Cr of 1.67 from the baseline of 1.2  eGFR of 53  Continue monitoring the kidneys   LR infusion with rate of 100 cc/hr

## 2023-04-07 NOTE — HPI
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 Patient reports he has developed this wound over a year ago after trauma due to a nail. Infection has caused osteomylitis within the last year and he has had multiple surgeries/debridement since but the wound is non healing and keeps getting infected. He visited the wound care clinic on 03/31/23 and saw NP Yelitza Alas who realized the wound is infected, took wound cultures and started patient on Linezolid. Patient completed the one week course, cultures came back showing Enterobacter cloacae and enterococcus gallinarum resistant to multiple antibiotics therefore he was suggested to come to the hospital.     Patient denies fever, chills, nausea, vomiting, cough, palpitation, chest pain, abdominal pain, diarrhea or problems with urination. Wound is malodorous with increase in drainage in recent weeks. Patient has bilateral LE edema but has never seen a cardiologist or a nephrologist in the past.     PMHx includes Diabetes with last A1c of 6.4 on 03/2023 on [place]. Patient has had his right great toe amputated back in 2018 due to a diabetic wound. He has diabetic neuropathy with minimal sensation in both feet but intact sensation above ankle. He has neever had a diabetic eye exam. Patient has HTN and his bp ranges 135-160 systolic at home with current home regimen. Patient has been referred to sleep studies previously but has not been compliant.    Workup so far:  ESR 69 with CRP of 2.9  WBC wnl, H/H 10.3/32.9  Cr of 1.67 with eGFRof 53

## 2023-04-08 PROBLEM — Z01.818 PRE-OP EVALUATION: Status: RESOLVED | Noted: 2023-04-07 | Resolved: 2023-04-08

## 2023-04-08 LAB
GLUCOSE SERPL-MCNC: 131 MG/DL (ref 70–105)
GLUCOSE SERPL-MCNC: 222 MG/DL (ref 70–105)
GLUCOSE SERPL-MCNC: 263 MG/DL (ref 70–105)
GLUCOSE SERPL-MCNC: 414 MG/DL (ref 70–105)

## 2023-04-08 PROCEDURE — 63600175 PHARM REV CODE 636 W HCPCS

## 2023-04-08 PROCEDURE — 25000003 PHARM REV CODE 250: Performed by: NURSE PRACTITIONER

## 2023-04-08 PROCEDURE — 96372 THER/PROPH/DIAG INJ SC/IM: CPT

## 2023-04-08 PROCEDURE — 99232 PR SUBSEQUENT HOSPITAL CARE,LEVL II: ICD-10-PCS | Mod: ,,, | Performed by: INTERNAL MEDICINE

## 2023-04-08 PROCEDURE — 63600175 PHARM REV CODE 636 W HCPCS: Performed by: INTERNAL MEDICINE

## 2023-04-08 PROCEDURE — 11000001 HC ACUTE MED/SURG PRIVATE ROOM

## 2023-04-08 PROCEDURE — 94761 N-INVAS EAR/PLS OXIMETRY MLT: CPT

## 2023-04-08 PROCEDURE — 82962 GLUCOSE BLOOD TEST: CPT

## 2023-04-08 PROCEDURE — 99232 SBSQ HOSP IP/OBS MODERATE 35: CPT | Mod: ,,, | Performed by: INTERNAL MEDICINE

## 2023-04-08 PROCEDURE — 25000003 PHARM REV CODE 250

## 2023-04-08 RX ORDER — AMLODIPINE BESYLATE 2.5 MG/1
2.5 TABLET ORAL DAILY
Status: DISCONTINUED | OUTPATIENT
Start: 2023-04-09 | End: 2023-04-18 | Stop reason: HOSPADM

## 2023-04-08 RX ADMIN — HYDRALAZINE HYDROCHLORIDE 25 MG: 25 TABLET ORAL at 02:04

## 2023-04-08 RX ADMIN — HYDRALAZINE HYDROCHLORIDE 25 MG: 25 TABLET ORAL at 05:04

## 2023-04-08 RX ADMIN — ZINC SULFATE 220 MG (50 MG) CAPSULE 220 MG: CAPSULE at 09:04

## 2023-04-08 RX ADMIN — HYDRALAZINE HYDROCHLORIDE 25 MG: 25 TABLET ORAL at 09:04

## 2023-04-08 RX ADMIN — HYDROCODONE BITARTRATE AND ACETAMINOPHEN 1 TABLET: 10; 325 TABLET ORAL at 09:04

## 2023-04-08 RX ADMIN — MORPHINE SULFATE 4 MG: 4 INJECTION INTRAVENOUS at 09:04

## 2023-04-08 RX ADMIN — OXYCODONE HYDROCHLORIDE AND ACETAMINOPHEN 500 MG: 500 TABLET ORAL at 09:04

## 2023-04-08 RX ADMIN — PIPERACILLIN AND TAZOBACTAM 4.5 G: 4; .5 INJECTION, POWDER, LYOPHILIZED, FOR SOLUTION INTRAVENOUS; PARENTERAL at 05:04

## 2023-04-08 RX ADMIN — HYDROCODONE BITARTRATE AND ACETAMINOPHEN 1 TABLET: 10; 325 TABLET ORAL at 04:04

## 2023-04-08 RX ADMIN — INSULIN DETEMIR 20 UNITS: 100 INJECTION, SOLUTION SUBCUTANEOUS at 09:04

## 2023-04-08 RX ADMIN — INSULIN ASPART 6 UNITS: 100 INJECTION, SOLUTION INTRAVENOUS; SUBCUTANEOUS at 05:04

## 2023-04-08 RX ADMIN — AMLODIPINE BESYLATE 10 MG: 10 TABLET ORAL at 09:04

## 2023-04-08 RX ADMIN — GABAPENTIN 300 MG: 300 CAPSULE ORAL at 05:04

## 2023-04-08 RX ADMIN — INSULIN ASPART 4 UNITS: 100 INJECTION, SOLUTION INTRAVENOUS; SUBCUTANEOUS at 06:04

## 2023-04-08 RX ADMIN — INSULIN ASPART 1 UNITS: 100 INJECTION, SOLUTION INTRAVENOUS; SUBCUTANEOUS at 12:04

## 2023-04-08 RX ADMIN — PIPERACILLIN AND TAZOBACTAM 4.5 G: 4; .5 INJECTION, POWDER, LYOPHILIZED, FOR SOLUTION INTRAVENOUS; PARENTERAL at 02:04

## 2023-04-08 RX ADMIN — ATORVASTATIN CALCIUM 40 MG: 40 TABLET, FILM COATED ORAL at 09:04

## 2023-04-08 RX ADMIN — THERA TABS 1 TABLET: TAB at 09:04

## 2023-04-08 RX ADMIN — TRAZODONE HYDROCHLORIDE 50 MG: 50 TABLET ORAL at 09:04

## 2023-04-08 RX ADMIN — GABAPENTIN 300 MG: 300 CAPSULE ORAL at 02:04

## 2023-04-08 RX ADMIN — TRAZODONE HYDROCHLORIDE 50 MG: 50 TABLET ORAL at 12:04

## 2023-04-08 RX ADMIN — PIPERACILLIN AND TAZOBACTAM 4.5 G: 4; .5 INJECTION, POWDER, LYOPHILIZED, FOR SOLUTION INTRAVENOUS; PARENTERAL at 09:04

## 2023-04-08 RX ADMIN — GABAPENTIN 300 MG: 300 CAPSULE ORAL at 09:04

## 2023-04-08 RX ADMIN — DAPTOMYCIN 1000 MG: 500 INJECTION, POWDER, LYOPHILIZED, FOR SOLUTION INTRAVENOUS at 05:04

## 2023-04-08 NOTE — PLAN OF CARE
Problem: Adult Inpatient Plan of Care  Goal: Patient-Specific Goal (Individualized)  Outcome: Ongoing, Progressing  Goal: Absence of Hospital-Acquired Illness or Injury  Outcome: Ongoing, Progressing     Problem: Diabetes Comorbidity  Goal: Blood Glucose Level Within Targeted Range  Outcome: Ongoing, Progressing

## 2023-04-08 NOTE — PROGRESS NOTES
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 Patient reports he has developed this wound over a year ago after trauma due to a nail. Infection has caused osteomylitis within the last year and he has had multiple surgeries/debridement since but the wound is non healing and keeps getting infected. He visited the wound care clinic on 03/31/23 and saw NP Yelitza Alas who realized the wound is infected, took wound cultures and started patient on Linezolid. Patient completed the one week course, cultures came back showing Enterobacter cloacae and enterococcus gallinarum resistant to multiple antibiotics therefore he was suggested to come to the hospital.     Patient denies fever, chills, nausea, vomiting, cough, palpitation, chest pain, abdominal pain, diarrhea or problems with urination. Wound is malodorous with increase in drainage in recent weeks. Patient has bilateral LE edema but has never seen a cardiologist or a nephrologist in the past.     PMHx includes Diabetes with last A1c of 6.4 on 03/2023 on [place]. Patient has had his right great toe amputated back in 2018 due to a diabetic wound. He has diabetic neuropathy with minimal sensation in both feet but intact sensation above ankle. He has neever had a diabetic eye exam. Patient has HTN and his bp ranges 135-160 systolic at home with current home regimen. Patient has been referred to sleep studies previously but  has not been compliant.    Workup so far:  ESR 69 with CRP of 2.9  WBC wnl, H/H 10.3/32.9  Cr of 1.67 with eGFRof 53          Overview/Hospital Course:  No notes on file    Interval History:     NAEO  Bp on the lower side, will redcue norvasc.     Review of Systems   Constitutional:  Negative for activity change, chills, fatigue and fever.   HENT:  Negative for congestion and sore throat.    Respiratory:  Negative for cough, chest tightness, shortness of breath, wheezing and stridor.    Cardiovascular:  Positive for leg swelling. Negative for chest pain and palpitations.   Gastrointestinal:  Negative for abdominal distention, abdominal pain, anal bleeding, blood in stool, constipation, diarrhea, nausea and vomiting.   Genitourinary:  Negative for difficulty urinating and dysuria.   Musculoskeletal:  Negative for arthralgias and back pain.   Skin:  Positive for wound. Negative for color change.   Neurological:  Negative for dizziness, tremors, syncope, speech difficulty, weakness, light-headedness and headaches.   Hematological:  Negative for adenopathy.   Psychiatric/Behavioral:  Negative for agitation.    Objective:     Vital Signs (Most Recent):  Temp: 97.2 °F (36.2 °C) (04/08/23 0800)  Pulse: 83 (04/08/23 0800)  Resp: 18 (04/08/23 0923)  BP: (!) 97/57 (04/08/23 0800)  SpO2: 96 % (04/08/23 0800) Vital Signs (24h Range):  Temp:  [97.2 °F (36.2 °C)-98.2 °F (36.8 °C)] 97.2 °F (36.2 °C)  Pulse:  [77-89] 83  Resp:  [18-20] 18  SpO2:  [96 %-100 %] 96 %  BP: ()/(57-87) 97/57     Weight: (!) 160.5 kg (353 lb 13.4 oz)  Body mass index is 46.68 kg/m².    Intake/Output Summary (Last 24 hours) at 4/8/2023 0998  Last data filed at 4/8/2023 0646  Gross per 24 hour   Intake 1043.33 ml   Output 1550 ml   Net -506.67 ml        Physical Exam  Vitals and nursing note reviewed.   Constitutional:       General: He is not in acute distress.     Appearance: Normal appearance. He is obese. He is not ill-appearing or  toxic-appearing.   HENT:      Head: Normocephalic and atraumatic.      Right Ear: External ear normal.      Left Ear: External ear normal.      Nose: Nose normal. No congestion or rhinorrhea.      Mouth/Throat:      Mouth: Mucous membranes are moist.   Eyes:      General:         Right eye: No discharge.         Left eye: No discharge.      Conjunctiva/sclera: Conjunctivae normal.   Cardiovascular:      Rate and Rhythm: Normal rate and regular rhythm.      Pulses: Normal pulses.      Heart sounds: Normal heart sounds. No murmur heard.    No friction rub.   Pulmonary:      Effort: Pulmonary effort is normal. No respiratory distress.      Breath sounds: Normal breath sounds. No wheezing or rales.   Abdominal:      General: Bowel sounds are normal. There is no distension.      Palpations: Abdomen is soft.      Tenderness: There is no abdominal tenderness. There is no guarding.   Musculoskeletal:         General: Swelling present. No tenderness. Normal range of motion.      Cervical back: Neck supple.      Right lower leg: Edema present.      Left lower leg: Edema present.        Feet:       Comments: Edema in the both LE bilaterally   Skin lesion all over the bilateral LE   Feet:      Right foot:      Skin integrity: Skin breakdown, erythema and warmth present.      Comments: Foul smelling wound with drainage at the sole of the right foot.   Right foot and RLE is warm to touch up to the knee.  Right great toe has been amputated    Skin:     General: Skin is warm.      Coloration: Skin is not jaundiced.      Findings: Lesion present. No bruising.      Comments: See Media   Neurological:      Mental Status: He is alert and oriented to person, place, and time.   Psychiatric:         Behavior: Behavior normal.       Significant Labs: All pertinent labs within the past 24 hours have been reviewed.    Significant Imaging: I have reviewed all pertinent imaging results/findings within the past 24 hours.      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   ID consulted for adjustment of antibiotics  Surgery consulted   Wound care consulted  Blood cultures pending  New xray of the foot pending   ESR, CRP, CMP and CBC pending   NPO after midnight, PT INR, chest xray, EKG and type and screen, Holding home losartan and HCTZ for possible surgery tomorrow       Normocytic anemia    H/H 10.3/32.9  MCV 85.7   Most likley due to anemia of chronic disease  Irone studies, ferritin, b12 and folic acid pending     NOLAN (acute kidney injury)  Patient with acute kidney injury likely due to pre-renal azotemia NOLAN is currently stable. Labs reviewed- Renal function/electrolytes with Estimated Creatinine Clearance: 94.2 mL/min (A) (based on SCr of 1.67 mg/dL (H)). according to latest data. Monitor urine output and serial BMP and adjust therapy as needed. Avoid nephrotoxins and renally dose meds for GFR listed above.     Cr of 1.67 from the baseline of 1.2  eGFR of 53  Continue monitoring the kidneys   LR infusion with rate of 100 cc/hr    HLD (hyperlipidemia)    Lipid panel was recently done outpatient  Continue home Lipitor     TEDDY (obstructive sleep apnea)  Patient has been referred to sleep studies in the past but has been non compliant  Follow up orders with Dr. Baca was placed. TEDDY and its consequences were discussed with the patient. Patient seems to be motivated to get the sleep studies done      Type 2 diabetes mellitus, with long-term current use  of insulin  Patient's FSGs are controlled on current medication regimen.  Last A1c reviewed-   Lab Results   Component Value Date    HGBA1C 6.4 03/02/2023     Most recent fingerstick glucose reviewed- No results for input(s): POCTGLUCOSE in the last 24 hours.  Current correctional scale  Medium  Maintain anti-hyperglycemic dose as follows-   Antihyperglycemics (From admission, onward)    Start     Stop Route Frequency Ordered    04/07/23 0600  insulin aspart U-100 injection 1-10 Units         -- SubQ Every 6 hours 04/07/23 0122        Hold Oral hypoglycemics while patient is in the hospital.    Severe obesity (BMI >= 40)  Body mass index is 46.68 kg/m². Morbid obesity complicates all aspects of disease management from diagnostic modalities to treatment. Weight loss encouraged and health benefits explained to patient.         Bilateral lower extremity edema  Patient on norvasc  Hx of PVD  ProBNP pending   Patient can benefit from an Echo and possibly outpatient cardiology referral based on the echo    Peripheral vascular disease, unspecified    Last arterial doppler was in 2020  Patient can benefit from both venous and arterial LE doppler    HTN (hypertension)  Currently well controlled  Continue Norvasc 10 mg daily   Increased Hydralazine 25 mg BID to TID  Holding home Losartan and HCTZ for possible surgery tomorrow.   Hydralazine 10 mg IV with parameters          VTE Risk Mitigation (From admission, onward)         Ordered     Place sequential compression device  Until discontinued         04/07/23 0122     IP VTE HIGH RISK PATIENT  Once         04/07/23 0122     Reason for No Pharmacological VTE Prophylaxis  Once        Question:  Reasons:  Answer:  Physician Provided (leave comment)  Comment:  possible surgery tomorrow am    04/07/23 0122     Place sequential compression device  Until discontinued         04/06/23 6668                Discharge Planning   WOOD:      Code Status: Full Code   Is the patient medically  ready for discharge?:     Reason for patient still in hospital (select all that apply): Treatment                     Rehmat KATHY Yanez MD  Department of Hospital Medicine   [facility]

## 2023-04-08 NOTE — SUBJECTIVE & OBJECTIVE
Interval History:     NAEO  Bp on the lower side, will redcue norvasc.     Review of Systems   Constitutional:  Negative for activity change, chills, fatigue and fever.   HENT:  Negative for congestion and sore throat.    Respiratory:  Negative for cough, chest tightness, shortness of breath, wheezing and stridor.    Cardiovascular:  Positive for leg swelling. Negative for chest pain and palpitations.   Gastrointestinal:  Negative for abdominal distention, abdominal pain, anal bleeding, blood in stool, constipation, diarrhea, nausea and vomiting.   Genitourinary:  Negative for difficulty urinating and dysuria.   Musculoskeletal:  Negative for arthralgias and back pain.   Skin:  Positive for wound. Negative for color change.   Neurological:  Negative for dizziness, tremors, syncope, speech difficulty, weakness, light-headedness and headaches.   Hematological:  Negative for adenopathy.   Psychiatric/Behavioral:  Negative for agitation.    Objective:     Vital Signs (Most Recent):  Temp: 97.2 °F (36.2 °C) (04/08/23 0800)  Pulse: 83 (04/08/23 0800)  Resp: 18 (04/08/23 0923)  BP: (!) 97/57 (04/08/23 0800)  SpO2: 96 % (04/08/23 0800) Vital Signs (24h Range):  Temp:  [97.2 °F (36.2 °C)-98.2 °F (36.8 °C)] 97.2 °F (36.2 °C)  Pulse:  [77-89] 83  Resp:  [18-20] 18  SpO2:  [96 %-100 %] 96 %  BP: ()/(57-87) 97/57     Weight: (!) 160.5 kg (353 lb 13.4 oz)  Body mass index is 46.68 kg/m².    Intake/Output Summary (Last 24 hours) at 4/8/2023 0939  Last data filed at 4/8/2023 0646  Gross per 24 hour   Intake 1043.33 ml   Output 1550 ml   Net -506.67 ml        Physical Exam  Vitals and nursing note reviewed.   Constitutional:       General: He is not in acute distress.     Appearance: Normal appearance. He is obese. He is not ill-appearing or toxic-appearing.   HENT:      Head: Normocephalic and atraumatic.      Right Ear: External ear normal.      Left Ear: External ear normal.      Nose: Nose normal. No congestion or  rhinorrhea.      Mouth/Throat:      Mouth: Mucous membranes are moist.   Eyes:      General:         Right eye: No discharge.         Left eye: No discharge.      Conjunctiva/sclera: Conjunctivae normal.   Cardiovascular:      Rate and Rhythm: Normal rate and regular rhythm.      Pulses: Normal pulses.      Heart sounds: Normal heart sounds. No murmur heard.    No friction rub.   Pulmonary:      Effort: Pulmonary effort is normal. No respiratory distress.      Breath sounds: Normal breath sounds. No wheezing or rales.   Abdominal:      General: Bowel sounds are normal. There is no distension.      Palpations: Abdomen is soft.      Tenderness: There is no abdominal tenderness. There is no guarding.   Musculoskeletal:         General: Swelling present. No tenderness. Normal range of motion.      Cervical back: Neck supple.      Right lower leg: Edema present.      Left lower leg: Edema present.        Feet:       Comments: Edema in the both LE bilaterally   Skin lesion all over the bilateral LE   Feet:      Right foot:      Skin integrity: Skin breakdown, erythema and warmth present.      Comments: Foul smelling wound with drainage at the sole of the right foot.   Right foot and RLE is warm to touch up to the knee.  Right great toe has been amputated    Skin:     General: Skin is warm.      Coloration: Skin is not jaundiced.      Findings: Lesion present. No bruising.      Comments: See Media   Neurological:      Mental Status: He is alert and oriented to person, place, and time.   Psychiatric:         Behavior: Behavior normal.       Significant Labs: All pertinent labs within the past 24 hours have been reviewed.    Significant Imaging: I have reviewed all pertinent imaging results/findings within the past 24 hours.

## 2023-04-08 NOTE — PLAN OF CARE
Problem: Adult Inpatient Plan of Care  Goal: Plan of Care Review  Outcome: Ongoing, Progressing  Goal: Patient-Specific Goal (Individualized)  Outcome: Ongoing, Progressing  Goal: Absence of Hospital-Acquired Illness or Injury  Outcome: Ongoing, Progressing  Goal: Optimal Comfort and Wellbeing  Outcome: Ongoing, Progressing  Goal: Readiness for Transition of Care  Outcome: Ongoing, Progressing     Problem: Diabetes Comorbidity  Goal: Blood Glucose Level Within Targeted Range  Outcome: Ongoing, Progressing     Problem: Impaired Wound Healing  Goal: Optimal Wound Healing  Outcome: Ongoing, Progressing     Problem: Fluid and Electrolyte Imbalance (Acute Kidney Injury/Impairment)  Goal: Fluid and Electrolyte Balance  Outcome: Ongoing, Progressing     Problem: Oral Intake Inadequate (Acute Kidney Injury/Impairment)  Goal: Optimal Nutrition Intake  Outcome: Ongoing, Progressing     Problem: Renal Function Impairment (Acute Kidney Injury/Impairment)  Goal: Effective Renal Function  Outcome: Ongoing, Progressing     Problem: Gas Exchange Impaired  Goal: Optimal Gas Exchange  Outcome: Ongoing, Progressing

## 2023-04-09 PROBLEM — N18.4 CKD (CHRONIC KIDNEY DISEASE), STAGE IV: Status: ACTIVE | Noted: 2023-04-09

## 2023-04-09 PROBLEM — N17.9 AKI (ACUTE KIDNEY INJURY): Status: RESOLVED | Noted: 2023-04-07 | Resolved: 2023-04-09

## 2023-04-09 LAB
ALBUMIN SERPL BCP-MCNC: 2.3 G/DL (ref 3.5–5)
ALBUMIN/GLOB SERPL: 0.5 {RATIO}
ALP SERPL-CCNC: 129 U/L (ref 45–115)
ALT SERPL W P-5'-P-CCNC: 48 U/L (ref 16–61)
ANION GAP SERPL CALCULATED.3IONS-SCNC: 12 MMOL/L (ref 7–16)
ANISOCYTOSIS BLD QL SMEAR: ABNORMAL
AST SERPL W P-5'-P-CCNC: 29 U/L (ref 15–37)
ATYPICAL LYMPHOCYTES: ABNORMAL
BASOPHILS # BLD AUTO: 0.05 K/UL (ref 0–0.2)
BASOPHILS NFR BLD AUTO: 1.1 % (ref 0–1)
BASOPHILS NFR BLD MANUAL: 2 % (ref 0–1)
BILIRUB SERPL-MCNC: 0.2 MG/DL (ref ?–1.2)
BUN SERPL-MCNC: 38 MG/DL (ref 7–18)
BUN/CREAT SERPL: 21 (ref 6–20)
CALCIUM SERPL-MCNC: 8 MG/DL (ref 8.5–10.1)
CHLORIDE SERPL-SCNC: 107 MMOL/L (ref 98–107)
CO2 SERPL-SCNC: 23 MMOL/L (ref 21–32)
CREAT SERPL-MCNC: 1.82 MG/DL (ref 0.7–1.3)
DIFFERENTIAL METHOD BLD: ABNORMAL
EGFR (NO RACE VARIABLE) (RUSH/TITUS): 48 ML/MIN/1.73M²
EOSINOPHIL # BLD AUTO: 0.21 K/UL (ref 0–0.5)
EOSINOPHIL NFR BLD AUTO: 4.6 % (ref 1–4)
EOSINOPHIL NFR BLD MANUAL: 3 % (ref 1–4)
ERYTHROCYTE [DISTWIDTH] IN BLOOD BY AUTOMATED COUNT: 15.5 % (ref 11.5–14.5)
GLOBULIN SER-MCNC: 4.6 G/DL (ref 2–4)
GLUCOSE SERPL-MCNC: 173 MG/DL (ref 70–105)
GLUCOSE SERPL-MCNC: 177 MG/DL (ref 70–105)
GLUCOSE SERPL-MCNC: 179 MG/DL (ref 74–106)
GLUCOSE SERPL-MCNC: 192 MG/DL (ref 70–105)
GLUCOSE SERPL-MCNC: 207 MG/DL (ref 70–105)
HCT VFR BLD AUTO: 33.2 % (ref 40–54)
HGB BLD-MCNC: 10.2 G/DL (ref 13.5–18)
HYPOCHROMIA BLD QL SMEAR: ABNORMAL
IMM GRANULOCYTES # BLD AUTO: 0 K/UL (ref 0–0.04)
IMM GRANULOCYTES NFR BLD: 0 % (ref 0–0.4)
LYMPHOCYTES # BLD AUTO: 1.86 K/UL (ref 1–4.8)
LYMPHOCYTES NFR BLD AUTO: 41.2 % (ref 27–41)
LYMPHOCYTES NFR BLD MANUAL: 41 % (ref 27–41)
MCH RBC QN AUTO: 26.1 PG (ref 27–31)
MCHC RBC AUTO-ENTMCNC: 30.7 G/DL (ref 32–36)
MCV RBC AUTO: 84.9 FL (ref 80–96)
MONOCYTES # BLD AUTO: 0.5 K/UL (ref 0–0.8)
MONOCYTES NFR BLD AUTO: 11.1 % (ref 2–6)
MONOCYTES NFR BLD MANUAL: 9 % (ref 2–6)
MPC BLD CALC-MCNC: 9.5 FL (ref 9.4–12.4)
NEUTROPHILS # BLD AUTO: 1.9 K/UL (ref 1.8–7.7)
NEUTROPHILS NFR BLD AUTO: 42 % (ref 53–65)
NEUTS SEG NFR BLD MANUAL: 45 % (ref 50–62)
NRBC # BLD AUTO: 0 X10E3/UL
NRBC, AUTO (.00): 0 %
OVALOCYTES BLD QL SMEAR: ABNORMAL
PLATELET # BLD AUTO: 226 K/UL (ref 150–400)
PLATELET MORPHOLOGY: ABNORMAL
POLYCHROMASIA BLD QL SMEAR: ABNORMAL
POTASSIUM SERPL-SCNC: 4.6 MMOL/L (ref 3.5–5.1)
PROT SERPL-MCNC: 6.9 G/DL (ref 6.4–8.2)
RBC # BLD AUTO: 3.91 M/UL (ref 4.6–6.2)
SODIUM SERPL-SCNC: 137 MMOL/L (ref 136–145)
WBC # BLD AUTO: 4.52 K/UL (ref 4.5–11)

## 2023-04-09 PROCEDURE — 94761 N-INVAS EAR/PLS OXIMETRY MLT: CPT

## 2023-04-09 PROCEDURE — 25000003 PHARM REV CODE 250: Performed by: NURSE PRACTITIONER

## 2023-04-09 PROCEDURE — 99232 SBSQ HOSP IP/OBS MODERATE 35: CPT | Mod: ,,, | Performed by: INTERNAL MEDICINE

## 2023-04-09 PROCEDURE — 96372 THER/PROPH/DIAG INJ SC/IM: CPT

## 2023-04-09 PROCEDURE — 25000003 PHARM REV CODE 250

## 2023-04-09 PROCEDURE — 25000003 PHARM REV CODE 250: Performed by: INTERNAL MEDICINE

## 2023-04-09 PROCEDURE — 82962 GLUCOSE BLOOD TEST: CPT

## 2023-04-09 PROCEDURE — 11000001 HC ACUTE MED/SURG PRIVATE ROOM

## 2023-04-09 PROCEDURE — 85025 COMPLETE CBC W/AUTO DIFF WBC: CPT | Performed by: INTERNAL MEDICINE

## 2023-04-09 PROCEDURE — 99232 PR SUBSEQUENT HOSPITAL CARE,LEVL II: ICD-10-PCS | Mod: ,,, | Performed by: INTERNAL MEDICINE

## 2023-04-09 PROCEDURE — 63600175 PHARM REV CODE 636 W HCPCS: Performed by: INTERNAL MEDICINE

## 2023-04-09 PROCEDURE — 63600175 PHARM REV CODE 636 W HCPCS

## 2023-04-09 PROCEDURE — 80053 COMPREHEN METABOLIC PANEL: CPT | Performed by: INTERNAL MEDICINE

## 2023-04-09 RX ADMIN — INSULIN ASPART 4 UNITS: 100 INJECTION, SOLUTION INTRAVENOUS; SUBCUTANEOUS at 06:04

## 2023-04-09 RX ADMIN — HYDROCODONE BITARTRATE AND ACETAMINOPHEN 1 TABLET: 10; 325 TABLET ORAL at 09:04

## 2023-04-09 RX ADMIN — ATORVASTATIN CALCIUM 40 MG: 40 TABLET, FILM COATED ORAL at 09:04

## 2023-04-09 RX ADMIN — HYDROCODONE BITARTRATE AND ACETAMINOPHEN 1 TABLET: 10; 325 TABLET ORAL at 04:04

## 2023-04-09 RX ADMIN — ZINC SULFATE 220 MG (50 MG) CAPSULE 220 MG: CAPSULE at 08:04

## 2023-04-09 RX ADMIN — AMLODIPINE BESYLATE 2.5 MG: 2.5 TABLET ORAL at 08:04

## 2023-04-09 RX ADMIN — GABAPENTIN 300 MG: 300 CAPSULE ORAL at 09:04

## 2023-04-09 RX ADMIN — OXYCODONE HYDROCHLORIDE AND ACETAMINOPHEN 500 MG: 500 TABLET ORAL at 09:04

## 2023-04-09 RX ADMIN — MORPHINE SULFATE 4 MG: 4 INJECTION INTRAVENOUS at 08:04

## 2023-04-09 RX ADMIN — DAPTOMYCIN 1000 MG: 500 INJECTION, POWDER, LYOPHILIZED, FOR SOLUTION INTRAVENOUS at 04:04

## 2023-04-09 RX ADMIN — TRAZODONE HYDROCHLORIDE 50 MG: 50 TABLET ORAL at 10:04

## 2023-04-09 RX ADMIN — GABAPENTIN 300 MG: 300 CAPSULE ORAL at 05:04

## 2023-04-09 RX ADMIN — INSULIN DETEMIR 20 UNITS: 100 INJECTION, SOLUTION SUBCUTANEOUS at 09:04

## 2023-04-09 RX ADMIN — HYDRALAZINE HYDROCHLORIDE 25 MG: 25 TABLET ORAL at 05:04

## 2023-04-09 RX ADMIN — PIPERACILLIN AND TAZOBACTAM 4.5 G: 4; .5 INJECTION, POWDER, LYOPHILIZED, FOR SOLUTION INTRAVENOUS; PARENTERAL at 09:04

## 2023-04-09 RX ADMIN — HYDROCODONE BITARTRATE AND ACETAMINOPHEN 1 TABLET: 10; 325 TABLET ORAL at 08:04

## 2023-04-09 RX ADMIN — INSULIN ASPART 2 UNITS: 100 INJECTION, SOLUTION INTRAVENOUS; SUBCUTANEOUS at 12:04

## 2023-04-09 RX ADMIN — PIPERACILLIN AND TAZOBACTAM 4.5 G: 4; .5 INJECTION, POWDER, LYOPHILIZED, FOR SOLUTION INTRAVENOUS; PARENTERAL at 02:04

## 2023-04-09 RX ADMIN — HYDRALAZINE HYDROCHLORIDE 25 MG: 25 TABLET ORAL at 09:04

## 2023-04-09 RX ADMIN — THERA TABS 1 TABLET: TAB at 08:04

## 2023-04-09 RX ADMIN — INSULIN ASPART 2 UNITS: 100 INJECTION, SOLUTION INTRAVENOUS; SUBCUTANEOUS at 05:04

## 2023-04-09 RX ADMIN — OXYCODONE HYDROCHLORIDE AND ACETAMINOPHEN 500 MG: 500 TABLET ORAL at 08:04

## 2023-04-09 RX ADMIN — PIPERACILLIN AND TAZOBACTAM 4.5 G: 4; .5 INJECTION, POWDER, LYOPHILIZED, FOR SOLUTION INTRAVENOUS; PARENTERAL at 05:04

## 2023-04-09 RX ADMIN — HYDRALAZINE HYDROCHLORIDE 25 MG: 25 TABLET ORAL at 02:04

## 2023-04-09 RX ADMIN — ZINC SULFATE 220 MG (50 MG) CAPSULE 220 MG: CAPSULE at 09:04

## 2023-04-09 RX ADMIN — GABAPENTIN 300 MG: 300 CAPSULE ORAL at 02:04

## 2023-04-09 RX ADMIN — INSULIN ASPART 1 UNITS: 100 INJECTION, SOLUTION INTRAVENOUS; SUBCUTANEOUS at 12:04

## 2023-04-09 NOTE — SUBJECTIVE & OBJECTIVE
Interval History:     NAEO  Wanting diet coke    Review of Systems   Constitutional:  Negative for activity change, chills, fatigue and fever.   HENT:  Negative for congestion and sore throat.    Respiratory:  Negative for cough, chest tightness, shortness of breath, wheezing and stridor.    Cardiovascular:  Positive for leg swelling. Negative for chest pain and palpitations.   Gastrointestinal:  Negative for abdominal distention, abdominal pain, anal bleeding, blood in stool, constipation, diarrhea, nausea and vomiting.   Genitourinary:  Negative for difficulty urinating and dysuria.   Musculoskeletal:  Negative for arthralgias and back pain.   Skin:  Positive for wound. Negative for color change.   Neurological:  Negative for dizziness, tremors, syncope, speech difficulty, weakness, light-headedness and headaches.   Hematological:  Negative for adenopathy.   Psychiatric/Behavioral:  Negative for agitation.    Objective:     Vital Signs (Most Recent):  Temp: 96.9 °F (36.1 °C) (04/09/23 0800)  Pulse: 75 (04/09/23 0800)  Resp: 18 (04/09/23 0800)  BP: 117/75 (04/09/23 0800)  SpO2: 100 % (04/09/23 0800) Vital Signs (24h Range):  Temp:  [96.9 °F (36.1 °C)-98.3 °F (36.8 °C)] 96.9 °F (36.1 °C)  Pulse:  [70-80] 75  Resp:  [18] 18  SpO2:  [97 %-100 %] 100 %  BP: (117-131)/(70-78) 117/75     Weight: (!) 160.5 kg (353 lb 13.4 oz)  Body mass index is 46.68 kg/m².    Intake/Output Summary (Last 24 hours) at 4/9/2023 1219  Last data filed at 4/9/2023 0446  Gross per 24 hour   Intake 150 ml   Output --   Net 150 ml        Physical Exam  Vitals and nursing note reviewed.   Constitutional:       General: He is not in acute distress.     Appearance: Normal appearance. He is obese. He is not ill-appearing or toxic-appearing.   HENT:      Head: Normocephalic and atraumatic.      Right Ear: External ear normal.      Left Ear: External ear normal.      Nose: Nose normal. No congestion or rhinorrhea.      Mouth/Throat:      Mouth: Mucous  membranes are moist.   Eyes:      General:         Right eye: No discharge.         Left eye: No discharge.      Conjunctiva/sclera: Conjunctivae normal.   Cardiovascular:      Rate and Rhythm: Normal rate and regular rhythm.      Pulses: Normal pulses.      Heart sounds: Normal heart sounds. No murmur heard.    No friction rub.   Pulmonary:      Effort: Pulmonary effort is normal. No respiratory distress.      Breath sounds: Normal breath sounds. No wheezing or rales.   Abdominal:      General: Bowel sounds are normal. There is no distension.      Palpations: Abdomen is soft.      Tenderness: There is no abdominal tenderness. There is no guarding.   Musculoskeletal:         General: Swelling present. No tenderness. Normal range of motion.      Cervical back: Neck supple.      Right lower leg: Edema present.      Left lower leg: Edema present.        Feet:       Comments: Edema in the both LE bilaterally   Skin lesion all over the bilateral LE   Feet:      Right foot:      Skin integrity: Skin breakdown, erythema and warmth present.      Comments: Foul smelling wound with drainage at the sole of the right foot.   Right foot and RLE is warm to touch up to the knee.  Right great toe has been amputated    Skin:     General: Skin is warm.      Coloration: Skin is not jaundiced.      Findings: Lesion present. No bruising.      Comments: See Media   Neurological:      Mental Status: He is alert and oriented to person, place, and time.   Psychiatric:         Behavior: Behavior normal.       Significant Labs: All pertinent labs within the past 24 hours have been reviewed.    Significant Imaging: I have reviewed all pertinent imaging results/findings within the past 24 hours.

## 2023-04-09 NOTE — PROGRESS NOTES
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 Patient reports he has developed this wound over a year ago after trauma due to a nail. Infection has caused osteomylitis within the last year and he has had multiple surgeries/debridement since but the wound is non healing and keeps getting infected. He visited the wound care clinic on 03/31/23 and saw NP Yelitza Alas who realized the wound is infected, took wound cultures and started patient on Linezolid. Patient completed the one week course, cultures came back showing Enterobacter cloacae and enterococcus gallinarum resistant to multiple antibiotics therefore he was suggested to come to the hospital.     Patient denies fever, chills, nausea, vomiting, cough, palpitation, chest pain, abdominal pain, diarrhea or problems with urination. Wound is malodorous with increase in drainage in recent weeks. Patient has bilateral LE edema but has never seen a cardiologist or a nephrologist in the past.     PMHx includes Diabetes with last A1c of 6.4 on 03/2023 on [place]. Patient has had his right great toe amputated back in 2018 due to a diabetic wound. He has diabetic neuropathy with minimal sensation in both feet but intact sensation above ankle. He has neever had a diabetic eye exam. Patient has HTN and his bp ranges 135-160 systolic at home with current home regimen. Patient has been referred to sleep studies previously but  has not been compliant.    Workup so far:  ESR 69 with CRP of 2.9  WBC wnl, H/H 10.3/32.9  Cr of 1.67 with eGFRof 53          Overview/Hospital Course:  No notes on file    Interval History:     NAEO  Wanting diet coke    Review of Systems   Constitutional:  Negative for activity change, chills, fatigue and fever.   HENT:  Negative for congestion and sore throat.    Respiratory:  Negative for cough, chest tightness, shortness of breath, wheezing and stridor.    Cardiovascular:  Positive for leg swelling. Negative for chest pain and palpitations.   Gastrointestinal:  Negative for abdominal distention, abdominal pain, anal bleeding, blood in stool, constipation, diarrhea, nausea and vomiting.   Genitourinary:  Negative for difficulty urinating and dysuria.   Musculoskeletal:  Negative for arthralgias and back pain.   Skin:  Positive for wound. Negative for color change.   Neurological:  Negative for dizziness, tremors, syncope, speech difficulty, weakness, light-headedness and headaches.   Hematological:  Negative for adenopathy.   Psychiatric/Behavioral:  Negative for agitation.    Objective:     Vital Signs (Most Recent):  Temp: 96.9 °F (36.1 °C) (04/09/23 0800)  Pulse: 75 (04/09/23 0800)  Resp: 18 (04/09/23 0800)  BP: 117/75 (04/09/23 0800)  SpO2: 100 % (04/09/23 0800) Vital Signs (24h Range):  Temp:  [96.9 °F (36.1 °C)-98.3 °F (36.8 °C)] 96.9 °F (36.1 °C)  Pulse:  [70-80] 75  Resp:  [18] 18  SpO2:  [97 %-100 %] 100 %  BP: (117-131)/(70-78) 117/75     Weight: (!) 160.5 kg (353 lb 13.4 oz)  Body mass index is 46.68 kg/m².    Intake/Output Summary (Last 24 hours) at 4/9/2023 1219  Last data filed at 4/9/2023 0446  Gross per 24 hour   Intake 150 ml   Output --   Net 150 ml        Physical Exam  Vitals and nursing note reviewed.   Constitutional:       General: He is not in acute distress.     Appearance: Normal appearance. He is obese. He is not ill-appearing or toxic-appearing.   HENT:      Head: Normocephalic and  atraumatic.      Right Ear: External ear normal.      Left Ear: External ear normal.      Nose: Nose normal. No congestion or rhinorrhea.      Mouth/Throat:      Mouth: Mucous membranes are moist.   Eyes:      General:         Right eye: No discharge.         Left eye: No discharge.      Conjunctiva/sclera: Conjunctivae normal.   Cardiovascular:      Rate and Rhythm: Normal rate and regular rhythm.      Pulses: Normal pulses.      Heart sounds: Normal heart sounds. No murmur heard.    No friction rub.   Pulmonary:      Effort: Pulmonary effort is normal. No respiratory distress.      Breath sounds: Normal breath sounds. No wheezing or rales.   Abdominal:      General: Bowel sounds are normal. There is no distension.      Palpations: Abdomen is soft.      Tenderness: There is no abdominal tenderness. There is no guarding.   Musculoskeletal:         General: Swelling present. No tenderness. Normal range of motion.      Cervical back: Neck supple.      Right lower leg: Edema present.      Left lower leg: Edema present.        Feet:       Comments: Edema in the both LE bilaterally   Skin lesion all over the bilateral LE   Feet:      Right foot:      Skin integrity: Skin breakdown, erythema and warmth present.      Comments: Foul smelling wound with drainage at the sole of the right foot.   Right foot and RLE is warm to touch up to the knee.  Right great toe has been amputated    Skin:     General: Skin is warm.      Coloration: Skin is not jaundiced.      Findings: Lesion present. No bruising.      Comments: See Media   Neurological:      Mental Status: He is alert and oriented to person, place, and time.   Psychiatric:         Behavior: Behavior normal.       Significant Labs: All pertinent labs within the past 24 hours have been reviewed.    Significant Imaging: I have reviewed all pertinent imaging results/findings within the past 24 hours.      Assessment/Plan:      * Non-pressure chronic ulcer of other part of  right foot with other specified severity  Foul smelling, draining, warmth to touch wound at the sole of the right foot   Cultures from 03/31 grew Enterobacter cloacae and enterococcus gallinarum  Cultures from December grew Enterbacter cloacnae, klebsiella pneumonaiae, staph aureus  Based on sensitivities and discussion with pharmacist Zosyn and Daptomycin would cover all of the above and would give good coverage for other possible bacteria including gram negatives/pseudomonas.   ID consulted for adjustment of antibiotics  Surgery consulted   Wound care consulted  Blood cultures pending  New xray of the foot pending   ESR, CRP, CMP and CBC pending   NPO after midnight, PT INR, chest xray, EKG and type and screen, Holding home losartan and HCTZ for possible surgery tomorrow       CKD (chronic kidney disease), stage IV  CKD  CTM      Normocytic anemia    H/H 10.3/32.9  MCV 85.7   Most likley due to anemia of chronic disease  Irone studies, ferritin, b12 and folic acid pending     HLD (hyperlipidemia)    Lipid panel was recently done outpatient  Continue home Lipitor     TEDDY (obstructive sleep apnea)  Patient has been referred to sleep studies in the past but has been non compliant  Follow up orders with Dr. Baca was placed. TEDDY and its consequences were discussed with the patient. Patient seems to be motivated to get the sleep studies done      Type 2 diabetes mellitus, with long-term current use of insulin  Patient's FSGs are controlled on current medication regimen.  Last A1c reviewed-   Lab Results   Component Value Date    HGBA1C 6.4 03/02/2023     Most recent fingerstick glucose reviewed- No results for input(s): POCTGLUCOSE in the last 24 hours.  Current correctional scale  Medium  Maintain anti-hyperglycemic dose as follows-   Antihyperglycemics (From admission, onward)    Start     Stop Route Frequency Ordered    04/07/23 0600  insulin aspart U-100 injection 1-10 Units         -- SubQ Every 6 hours  04/07/23 0122        Hold Oral hypoglycemics while patient is in the hospital.    Severe obesity (BMI >= 40)  Body mass index is 46.68 kg/m². Morbid obesity complicates all aspects of disease management from diagnostic modalities to treatment. Weight loss encouraged and health benefits explained to patient.         Bilateral lower extremity edema  Patient on norvasc  Hx of PVD  ProBNP pending   Patient can benefit from an Echo and possibly outpatient cardiology referral based on the echo    Peripheral vascular disease, unspecified    Last arterial doppler was in 2020  Patient can benefit from both venous and arterial LE doppler    HTN (hypertension)  Currently well controlled  Continue Norvasc 10 mg daily   Increased Hydralazine 25 mg BID to TID  Holding home Losartan and HCTZ for possible surgery tomorrow.   Hydralazine 10 mg IV with parameters          VTE Risk Mitigation (From admission, onward)         Ordered     Place sequential compression device  Until discontinued         04/07/23 0122     IP VTE HIGH RISK PATIENT  Once         04/07/23 0122     Reason for No Pharmacological VTE Prophylaxis  Once        Question:  Reasons:  Answer:  Physician Provided (leave comment)  Comment:  possible surgery tomorrow am    04/07/23 0122     Place sequential compression device  Until discontinued         04/06/23 0218                Discharge Planning   WOOD:      Code Status: Full Code   Is the patient medically ready for discharge?:     Reason for patient still in hospital (select all that apply): Treatment                     Rehmat KATHY Yanez MD  Department of Hospital Medicine   [facility]

## 2023-04-10 ENCOUNTER — APPOINTMENT (OUTPATIENT)
Dept: RADIOLOGY | Facility: HOSPITAL | Age: 38
End: 2023-04-10
Payer: MEDICARE

## 2023-04-10 LAB
ALBUMIN SERPL BCP-MCNC: 2.6 G/DL (ref 3.5–5)
ALBUMIN/GLOB SERPL: 0.6 {RATIO}
ALP SERPL-CCNC: 135 U/L (ref 45–115)
ALT SERPL W P-5'-P-CCNC: 53 U/L (ref 16–61)
ANION GAP SERPL CALCULATED.3IONS-SCNC: 12 MMOL/L (ref 7–16)
AST SERPL W P-5'-P-CCNC: 27 U/L (ref 15–37)
BASOPHILS # BLD AUTO: 0.04 K/UL (ref 0–0.2)
BASOPHILS NFR BLD AUTO: 0.8 % (ref 0–1)
BILIRUB SERPL-MCNC: 0.3 MG/DL (ref ?–1.2)
BUN SERPL-MCNC: 28 MG/DL (ref 7–18)
BUN/CREAT SERPL: 19 (ref 6–20)
CALCIUM SERPL-MCNC: 8.3 MG/DL (ref 8.5–10.1)
CHLORIDE SERPL-SCNC: 105 MMOL/L (ref 98–107)
CO2 SERPL-SCNC: 26 MMOL/L (ref 21–32)
CREAT SERPL-MCNC: 1.51 MG/DL (ref 0.7–1.3)
DIFFERENTIAL METHOD BLD: ABNORMAL
EGFR (NO RACE VARIABLE) (RUSH/TITUS): 60 ML/MIN/1.73M²
EOSINOPHIL # BLD AUTO: 0.23 K/UL (ref 0–0.5)
EOSINOPHIL NFR BLD AUTO: 4.7 % (ref 1–4)
ERYTHROCYTE [DISTWIDTH] IN BLOOD BY AUTOMATED COUNT: 15.9 % (ref 11.5–14.5)
GLOBULIN SER-MCNC: 4.4 G/DL (ref 2–4)
GLUCOSE SERPL-MCNC: 142 MG/DL (ref 74–106)
GLUCOSE SERPL-MCNC: 152 MG/DL (ref 70–105)
GLUCOSE SERPL-MCNC: 196 MG/DL (ref 70–105)
GLUCOSE SERPL-MCNC: 203 MG/DL (ref 70–105)
GLUCOSE SERPL-MCNC: 236 MG/DL (ref 70–105)
HCT VFR BLD AUTO: 33.5 % (ref 40–54)
HGB BLD-MCNC: 10.1 G/DL (ref 13.5–18)
IMM GRANULOCYTES # BLD AUTO: 0.02 K/UL (ref 0–0.04)
IMM GRANULOCYTES NFR BLD: 0.4 % (ref 0–0.4)
LYMPHOCYTES # BLD AUTO: 1.84 K/UL (ref 1–4.8)
LYMPHOCYTES NFR BLD AUTO: 37.8 % (ref 27–41)
MCH RBC QN AUTO: 26.2 PG (ref 27–31)
MCHC RBC AUTO-ENTMCNC: 30.1 G/DL (ref 32–36)
MCV RBC AUTO: 86.8 FL (ref 80–96)
MONOCYTES # BLD AUTO: 0.39 K/UL (ref 0–0.8)
MONOCYTES NFR BLD AUTO: 8 % (ref 2–6)
MPC BLD CALC-MCNC: 10 FL (ref 9.4–12.4)
NEUTROPHILS # BLD AUTO: 2.35 K/UL (ref 1.8–7.7)
NEUTROPHILS NFR BLD AUTO: 48.3 % (ref 53–65)
NRBC # BLD AUTO: 0 X10E3/UL
NRBC, AUTO (.00): 0 %
PLATELET # BLD AUTO: 284 K/UL (ref 150–400)
POTASSIUM SERPL-SCNC: 4.5 MMOL/L (ref 3.5–5.1)
PROT SERPL-MCNC: 7 G/DL (ref 6.4–8.2)
RBC # BLD AUTO: 3.86 M/UL (ref 4.6–6.2)
SODIUM SERPL-SCNC: 138 MMOL/L (ref 136–145)
WBC # BLD AUTO: 4.87 K/UL (ref 4.5–11)

## 2023-04-10 PROCEDURE — 93005 ELECTROCARDIOGRAM TRACING: CPT

## 2023-04-10 PROCEDURE — 99232 SBSQ HOSP IP/OBS MODERATE 35: CPT | Mod: ,,, | Performed by: INTERNAL MEDICINE

## 2023-04-10 PROCEDURE — 85025 COMPLETE CBC W/AUTO DIFF WBC: CPT | Performed by: INTERNAL MEDICINE

## 2023-04-10 PROCEDURE — 99232 SBSQ HOSP IP/OBS MODERATE 35: CPT | Mod: ,,, | Performed by: NURSE PRACTITIONER

## 2023-04-10 PROCEDURE — 93925 LOWER EXTREMITY STUDY: CPT

## 2023-04-10 PROCEDURE — 63600175 PHARM REV CODE 636 W HCPCS

## 2023-04-10 PROCEDURE — 93925 LOWER EXTREMITY STUDY: CPT | Mod: 26,,, | Performed by: RADIOLOGY

## 2023-04-10 PROCEDURE — 80053 COMPREHEN METABOLIC PANEL: CPT | Performed by: INTERNAL MEDICINE

## 2023-04-10 PROCEDURE — 93010 ELECTROCARDIOGRAM REPORT: CPT | Mod: ,,, | Performed by: HOSPITALIST

## 2023-04-10 PROCEDURE — 93922 US ARTERIAL LOWER EXTREMITY BILAT WITH ABI (XPD): ICD-10-PCS | Mod: 26,,, | Performed by: RADIOLOGY

## 2023-04-10 PROCEDURE — 25000003 PHARM REV CODE 250: Performed by: INTERNAL MEDICINE

## 2023-04-10 PROCEDURE — 93922 UPR/L XTREMITY ART 2 LEVELS: CPT

## 2023-04-10 PROCEDURE — 63600175 PHARM REV CODE 636 W HCPCS: Performed by: INTERNAL MEDICINE

## 2023-04-10 PROCEDURE — 99232 PR SUBSEQUENT HOSPITAL CARE,LEVL II: ICD-10-PCS | Mod: ,,, | Performed by: NURSE PRACTITIONER

## 2023-04-10 PROCEDURE — 93925 US ARTERIAL LOWER EXTREMITY BILAT WITH ABI (XPD): ICD-10-PCS | Mod: 26,,, | Performed by: RADIOLOGY

## 2023-04-10 PROCEDURE — 25000003 PHARM REV CODE 250: Performed by: NURSE PRACTITIONER

## 2023-04-10 PROCEDURE — 99232 PR SUBSEQUENT HOSPITAL CARE,LEVL II: ICD-10-PCS | Mod: ,,, | Performed by: INTERNAL MEDICINE

## 2023-04-10 PROCEDURE — 25000003 PHARM REV CODE 250

## 2023-04-10 PROCEDURE — 82962 GLUCOSE BLOOD TEST: CPT

## 2023-04-10 PROCEDURE — 93925 LOWER EXTREMITY STUDY: CPT | Mod: TC

## 2023-04-10 PROCEDURE — 93010 EKG 12-LEAD: ICD-10-PCS | Mod: ,,, | Performed by: HOSPITALIST

## 2023-04-10 PROCEDURE — 93922 UPR/L XTREMITY ART 2 LEVELS: CPT | Mod: 26,,, | Performed by: RADIOLOGY

## 2023-04-10 PROCEDURE — 11000001 HC ACUTE MED/SURG PRIVATE ROOM

## 2023-04-10 PROCEDURE — 96372 THER/PROPH/DIAG INJ SC/IM: CPT

## 2023-04-10 RX ORDER — IBUPROFEN 200 MG
16 TABLET ORAL
Status: DISCONTINUED | OUTPATIENT
Start: 2023-04-10 | End: 2023-04-18 | Stop reason: HOSPADM

## 2023-04-10 RX ORDER — IBUPROFEN 200 MG
24 TABLET ORAL
Status: DISCONTINUED | OUTPATIENT
Start: 2023-04-10 | End: 2023-04-18 | Stop reason: HOSPADM

## 2023-04-10 RX ADMIN — ZINC SULFATE 220 MG (50 MG) CAPSULE 220 MG: CAPSULE at 10:04

## 2023-04-10 RX ADMIN — OXYCODONE HYDROCHLORIDE AND ACETAMINOPHEN 500 MG: 500 TABLET ORAL at 09:04

## 2023-04-10 RX ADMIN — INSULIN ASPART 4 UNITS: 100 INJECTION, SOLUTION INTRAVENOUS; SUBCUTANEOUS at 06:04

## 2023-04-10 RX ADMIN — HYDRALAZINE HYDROCHLORIDE 25 MG: 25 TABLET ORAL at 10:04

## 2023-04-10 RX ADMIN — DAPTOMYCIN 1000 MG: 500 INJECTION, POWDER, LYOPHILIZED, FOR SOLUTION INTRAVENOUS at 04:04

## 2023-04-10 RX ADMIN — OXYCODONE HYDROCHLORIDE AND ACETAMINOPHEN 500 MG: 500 TABLET ORAL at 10:04

## 2023-04-10 RX ADMIN — HYDROCODONE BITARTRATE AND ACETAMINOPHEN 1 TABLET: 10; 325 TABLET ORAL at 10:04

## 2023-04-10 RX ADMIN — PIPERACILLIN AND TAZOBACTAM 4.5 G: 4; .5 INJECTION, POWDER, LYOPHILIZED, FOR SOLUTION INTRAVENOUS; PARENTERAL at 03:04

## 2023-04-10 RX ADMIN — AMLODIPINE BESYLATE 2.5 MG: 2.5 TABLET ORAL at 09:04

## 2023-04-10 RX ADMIN — HYDRALAZINE HYDROCHLORIDE 25 MG: 25 TABLET ORAL at 03:04

## 2023-04-10 RX ADMIN — ZINC SULFATE 220 MG (50 MG) CAPSULE 220 MG: CAPSULE at 09:04

## 2023-04-10 RX ADMIN — HYDROCODONE BITARTRATE AND ACETAMINOPHEN 1 TABLET: 10; 325 TABLET ORAL at 09:04

## 2023-04-10 RX ADMIN — PIPERACILLIN AND TAZOBACTAM 4.5 G: 4; .5 INJECTION, POWDER, LYOPHILIZED, FOR SOLUTION INTRAVENOUS; PARENTERAL at 10:04

## 2023-04-10 RX ADMIN — HYDROCODONE BITARTRATE AND ACETAMINOPHEN 1 TABLET: 10; 325 TABLET ORAL at 04:04

## 2023-04-10 RX ADMIN — GABAPENTIN 300 MG: 300 CAPSULE ORAL at 05:04

## 2023-04-10 RX ADMIN — INSULIN ASPART 2 UNITS: 100 INJECTION, SOLUTION INTRAVENOUS; SUBCUTANEOUS at 11:04

## 2023-04-10 RX ADMIN — ATORVASTATIN CALCIUM 40 MG: 40 TABLET, FILM COATED ORAL at 10:04

## 2023-04-10 RX ADMIN — THERA TABS 1 TABLET: TAB at 09:04

## 2023-04-10 RX ADMIN — GABAPENTIN 300 MG: 300 CAPSULE ORAL at 10:04

## 2023-04-10 RX ADMIN — INSULIN ASPART 2 UNITS: 100 INJECTION, SOLUTION INTRAVENOUS; SUBCUTANEOUS at 12:04

## 2023-04-10 RX ADMIN — INSULIN DETEMIR 20 UNITS: 100 INJECTION, SOLUTION SUBCUTANEOUS at 10:04

## 2023-04-10 RX ADMIN — HYDROCODONE BITARTRATE AND ACETAMINOPHEN 1 TABLET: 10; 325 TABLET ORAL at 05:04

## 2023-04-10 RX ADMIN — GABAPENTIN 300 MG: 300 CAPSULE ORAL at 03:04

## 2023-04-10 RX ADMIN — INSULIN ASPART 2 UNITS: 100 INJECTION, SOLUTION INTRAVENOUS; SUBCUTANEOUS at 06:04

## 2023-04-10 RX ADMIN — HYDRALAZINE HYDROCHLORIDE 25 MG: 25 TABLET ORAL at 06:04

## 2023-04-10 RX ADMIN — PIPERACILLIN AND TAZOBACTAM 4.5 G: 4; .5 INJECTION, POWDER, LYOPHILIZED, FOR SOLUTION INTRAVENOUS; PARENTERAL at 05:04

## 2023-04-10 NOTE — PLAN OF CARE
[facility]  Initial Discharge Assessment       Primary Care Provider: oMjgan Lomeli NP    Admission Diagnosis: Non-healing wound of right lower extremity [S81.801A]    Admission Date: 4/6/2023  Expected Discharge Date:     Discharge Barriers Identified: None    Payor: MEDICARE / Plan: MEDICARE PART A & B / Product Type: Government /     Extended Emergency Contact Information  Primary Emergency Contact: VERONICA GAXIOLA  Mobile Phone: [phone]  Relation: Mother  Preferred language: English   needed? No    Discharge Plan A: Home Health, Home  Discharge Plan B: Home Health, Home      [facility] [place] - [address]  [address]  [place] [address]  Phone: [phone] Fax: [phone]    [facility] - [place], [place] - [address] [facility] [address]  [address] [facility] [address]  Phone: [phone] Fax: [phone]    [id number] - [facility] [address] AT [facility]  [address]  [facility] [address]  Phone: [phone] Fax: [phone]    [facility] [address] ([facility]) - [facility] [address] [place], [place] [address] [address]  [facility] [address]  Phone: [phone] Fax: [phone]    [facility] - [place], [place] - [address]  [address]  [facility] [address]  Phone: [phone] Fax: [phone]      Initial Assessment (most recent)       Adult Discharge Assessment - 04/10/23 1601          Discharge Assessment    Assessment Type Discharge Planning Assessment     Source of Information patient     People in Home alone     Do you expect to return to your current living situation? Yes     Do you have help at home or someone to help you manage your care at home? No     Prior to hospitilization cognitive status: Alert/Oriented     Current cognitive status: Alert/Oriented     Equipment Currently Used at Home other (see comments)   Knee Scooter    Readmission within 30  days? No     Patient currently being followed by outpatient case management? No     Do you currently have service(s) that help you manage your care at home? Yes     Name and Contact number of agency [facility]- [phone]     Is the pt/caregiver preference to resume services with current agency Yes     Do you take prescription medications? Yes     Do you have prescription coverage? Yes     Coverage Medicare     Do you have any problems affording any of your prescribed medications? No     Is the patient taking medications as prescribed? yes     Who is going to help you get home at discharge? Mother     How do you get to doctors appointments? car, drives self     Are you on dialysis? No     Do you take coumadin? No     Discharge Plan A Home Health;Home     Discharge Plan B Home Health;Home     DME Needed Upon Discharge  none     Discharge Plan discussed with: Patient     Discharge Barriers Identified None        Physical Activity    On average, how many days per week do you engage in moderate to strenuous exercise (like a brisk walk)? 0 days     On average, how many minutes do you engage in exercise at this level? 0 min        Financial Resource Strain    How hard is it for you to pay for the very basics like food, housing, medical care, and heating? Not hard at all        Housing Stability    In the last 12 months, was there a time when you were not able to pay the mortgage or rent on time? No     In the last 12 months, was there a time when you did not have a steady place to sleep or slept in a shelter (including now)? No        Transportation Needs    In the past 12 months, has lack of transportation kept you from medical appointments or from getting medications? No     In the past 12 months, has lack of transportation kept you from meetings, work, or from getting things needed for daily living? Yes        Food Insecurity    Within the past 12 months, you worried that your food would run out before you got the  money to buy more. Never true     Within the past 12 months, the food you bought just didn't last and you didn't have money to get more. Never true        Stress    Do you feel stress - tense, restless, nervous, or anxious, or unable to sleep at night because your mind is troubled all the time - these days? Not at all        Social Connections    In a typical week, how many times do you talk on the phone with family, friends, or neighbors? More than three times a week     How often do you get together with friends or relatives? More than three times a week     How often do you attend Rastafari or Christian services? Never     Do you belong to any clubs or organizations such as Rastafari groups, unions, fraternal or athletic groups, or school groups? No     How often do you attend meetings of the clubs or organizations you belong to? Never     Are you , , , , never , or living with a partner? Never         Alcohol Use    Q1: How often do you have a drink containing alcohol? Never     Q2: How many drinks containing alcohol do you have on a typical day when you are drinking? Patient does not drink     Q3: How often do you have six or more drinks on one occasion? Never                   Pt lives at home alone, current with [facility] and uses a knee scooter when needed, choice obtained. Pt plans to dc back home and resume care with [facility] at [place]. Pt and SW agree upon plan of care. Pt informed of IDTm and password set  up. SDOH question completed, SW will cont to follow for dc needs.

## 2023-04-10 NOTE — SUBJECTIVE & OBJECTIVE
Chart review completed  Email sent to facility requesting update on patient  This care manager assistant will continue to monitor via chart review throughout bundle episode  This CM Assistant received a email from [facility] with a update on the patient  The patient is no longer skilled she is at baseline and has been seizure free since returning from the hospital   I have removed myself off of the care team, added the CM to the care team who will follow the patient through the bundle episode, sent the care manager a inbasket notifying them of the bundle episode, updated the BPCI form, and updated the care coordination note  Interval History:     NAEO  Pt doing fairly well, awaiting wound care recs.     Review of Systems   Constitutional:  Negative for activity change, chills, fatigue and fever.   HENT:  Negative for congestion and sore throat.    Respiratory:  Negative for cough, chest tightness, shortness of breath, wheezing and stridor.    Cardiovascular:  Positive for leg swelling. Negative for chest pain and palpitations.   Gastrointestinal:  Negative for abdominal distention, abdominal pain, anal bleeding, blood in stool, constipation, diarrhea, nausea and vomiting.   Genitourinary:  Negative for difficulty urinating and dysuria.   Musculoskeletal:  Negative for arthralgias and back pain.   Skin:  Positive for wound. Negative for color change.   Neurological:  Negative for dizziness, tremors, syncope, speech difficulty, weakness, light-headedness and headaches.   Hematological:  Negative for adenopathy.   Psychiatric/Behavioral:  Negative for agitation.    Objective:     Vital Signs (Most Recent):  Temp: 98.1 °F (36.7 °C) (04/10/23 0400)  Pulse: 78 (04/10/23 0400)  Resp: 18 (04/10/23 0430)  BP: (!) 140/71 (04/10/23 0531)  SpO2: 99 % (04/10/23 0400) Vital Signs (24h Range):  Temp:  [96.9 °F (36.1 °C)-98.2 °F (36.8 °C)] 98.1 °F (36.7 °C)  Pulse:  [72-82] 78  Resp:  [18] 18  SpO2:  [96 %-100 %] 99 %  BP: (117-142)/(71-78) 140/71     Weight: (!) 160.5 kg (353 lb 13.4 oz)  Body mass index is 46.68 kg/m².    Intake/Output Summary (Last 24 hours) at 4/10/2023 0635  Last data filed at 4/10/2023 0500  Gross per 24 hour   Intake 150 ml   Output --   Net 150 ml        Physical Exam  Vitals and nursing note reviewed.   Constitutional:       General: He is not in acute distress.     Appearance: Normal appearance. He is obese. He is not ill-appearing or toxic-appearing.   HENT:      Head: Normocephalic and atraumatic.      Right Ear: External ear normal.      Left Ear: External ear normal.      Nose: Nose normal. No congestion or rhinorrhea.       Mouth/Throat:      Mouth: Mucous membranes are moist.   Eyes:      General:         Right eye: No discharge.         Left eye: No discharge.      Conjunctiva/sclera: Conjunctivae normal.   Cardiovascular:      Rate and Rhythm: Normal rate and regular rhythm.      Pulses: Normal pulses.      Heart sounds: Normal heart sounds. No murmur heard.    No friction rub.   Pulmonary:      Effort: Pulmonary effort is normal. No respiratory distress.      Breath sounds: Normal breath sounds. No wheezing or rales.   Abdominal:      General: Bowel sounds are normal. There is no distension.      Palpations: Abdomen is soft.      Tenderness: There is no abdominal tenderness. There is no guarding.   Musculoskeletal:         General: Swelling present. No tenderness. Normal range of motion.      Cervical back: Neck supple.      Right lower leg: Edema present.      Left lower leg: Edema present.        Feet:       Comments: Edema in the both LE bilaterally   Skin lesion all over the bilateral LE   Feet:      Right foot:      Skin integrity: Skin breakdown, erythema and warmth present.      Comments: Foul smelling wound with drainage at the sole of the right foot.   Right foot and RLE is warm to touch up to the knee.  Right great toe has been amputated    Skin:     General: Skin is warm.      Coloration: Skin is not jaundiced.      Findings: Lesion present. No bruising.      Comments: See Media   Neurological:      Mental Status: He is alert and oriented to person, place, and time.   Psychiatric:         Behavior: Behavior normal.       Significant Labs: All pertinent labs within the past 24 hours have been reviewed.    Significant Imaging: I have reviewed all pertinent imaging results/findings within the past 24 hours.

## 2023-04-10 NOTE — SUBJECTIVE & OBJECTIVE
Subjective:     HPI:  37 y.o. male admitted to [facility] with wound infection to chronic-non healing wound on the right foot. Wound is malodorous with increase in drainage.Cultures from 3/31 positive for Enterobacter cloaca and Enterococcus gallinarum. He has completed multiple courses of PO antibiotics, most recently Zyvox.     Significant PMH  Includes diabetes and hypertension. Last HgA1C 6.4  in March 23, diabetes is managed by PCP. He is due for arterial dopplers, last doppler in October 2020. Denies fever or chills.     Hospital Course:   04/07/2023 - Wound care consulted to evaluate and follow wounds . POC established today. Barriers to wound healing identified and preventive measures in place  04/10/2023 - Start HBOT in AM. Artrial doppler and three phase bone scan ordered today. Drawtex and vashe today. Bedside debridement in AM per Wound Care.             Scheduled Meds:   amLODIPine  2.5 mg Oral Daily    ascorbic acid (vitamin C)  500 mg Oral BID    atorvastatin  40 mg Oral QHS    DAPTOmycin (CUBICIN) IV (PEDS and ADULTS)  1,000 mg Intravenous Q24H    gabapentin  300 mg Oral Q8H    hydrALAZINE  25 mg Oral Q8H    HYDROcodone-acetaminophen  1 tablet Oral BID    insulin aspart U-100  1-10 Units Subcutaneous Q6H    insulin detemir U-100  20 Units Subcutaneous QHS    multivitamin  1 tablet Oral Daily    piperacillin-tazobactam (ZOSYN) IVPB  4.5 g Intravenous Q8H    zinc sulfate  220 mg Oral BID     Continuous Infusions:  PRN Meds:acetaminophen, dextrose 50%, dextrose 50%, glucagon (human recombinant), glucose, glucose, hydrALAZINE, HYDROcodone-acetaminophen, morphine, naloxone, ondansetron, sodium chloride 0.9%, sodium chloride 0.9%, trazodone    Review of Systems   Constitutional:  Positive for activity change. Negative for chills and fever.   Respiratory:  Negative for chest tightness and shortness of breath.    Cardiovascular:  Positive for leg swelling. Negative for chest pain and palpitations.    Musculoskeletal:  Positive for arthralgias and joint swelling.   Skin:  Positive for wound.        wound   Neurological:  Positive for weakness and numbness.   Psychiatric/Behavioral:  Negative for agitation, behavioral problems, confusion and self-injury.    Objective:     Vital Signs (Most Recent):  Temp: 97.7 °F (36.5 °C) (04/10/23 0744)  Pulse: 83 (04/10/23 0744)  Resp: 18 (04/10/23 0935)  BP: 131/72 (04/10/23 0744)  SpO2: 98 % (04/10/23 0744) Vital Signs (24h Range):  Temp:  [97.7 °F (36.5 °C)-98.2 °F (36.8 °C)] 97.7 °F (36.5 °C)  Pulse:  [72-83] 83  Resp:  [18] 18  SpO2:  [96 %-100 %] 98 %  BP: (127-142)/(71-78) 131/72     Weight: (!) 160.5 kg (353 lb 13.4 oz)  Body mass index is 46.68 kg/m².    Physical Exam  Vitals reviewed.   Constitutional:       Appearance: Normal appearance.   HENT:      Head: Normocephalic.   Cardiovascular:      Rate and Rhythm: Normal rate and regular rhythm.      Pulses: Normal pulses.      Heart sounds: Normal heart sounds.   Pulmonary:      Effort: Pulmonary effort is normal.      Breath sounds: Normal breath sounds.   Musculoskeletal:         General: Swelling present.      Right lower leg: Edema present.   Skin:     Findings: Erythema present.      Comments: Wound, see LDA for photos/measurements   Neurological:      Mental Status: He is alert. Mental status is at baseline.      Motor: Weakness present.

## 2023-04-10 NOTE — ASSESSMENT & PLAN NOTE
Clean wound vashe  Spray skin barrier around wound  Apply vashe moistened drawtex to wound bed, apply dry drawtex to wick drainage  Cover and secure with 4x4s, leobardo, and paper tape  Change daily and PRN for soilage  Elevate extremity when in bed  Waffle boots  Bedrest with bathroom privileges  Start HBO  Therapy on 4/11/23  IV antibiotics - culture positive for Enterobacter cloaca and Enterococcus gallinarum.

## 2023-04-10 NOTE — NURSING
Wound care note: 36yo male admitted to [facility] with diagnosis of diabetic ulcer. Assessed by AUGUSTUS Olmstead. Orders for daily vashe dressing to right foot.

## 2023-04-10 NOTE — PROGRESS NOTES
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 Patient reports he has developed this wound over a year ago after trauma due to a nail. Infection has caused osteomylitis within the last year and he has had multiple surgeries/debridement since but the wound is non healing and keeps getting infected. He visited the wound care clinic on 03/31/23 and saw NP Yelitza Alas who realized the wound is infected, took wound cultures and started patient on Linezolid. Patient completed the one week course, cultures came back showing Enterobacter cloacae and enterococcus gallinarum resistant to multiple antibiotics therefore he was suggested to come to the hospital.     Patient denies fever, chills, nausea, vomiting, cough, palpitation, chest pain, abdominal pain, diarrhea or problems with urination. Wound is malodorous with increase in drainage in recent weeks. Patient has bilateral LE edema but has never seen a cardiologist or a nephrologist in the past.     PMHx includes Diabetes with last A1c of 6.4 on 03/2023 on [place]. Patient has had his right great toe amputated back in 2018 due to a diabetic wound. He has diabetic neuropathy with minimal sensation in both feet but intact sensation above ankle. He has neever had a diabetic eye exam. Patient has HTN and his bp ranges 135-160 systolic at home with current home regimen. Patient has been referred to sleep studies previously but  has not been compliant.    Workup so far:  ESR 69 with CRP of 2.9  WBC wnl, H/H 10.3/32.9  Cr of 1.67 with eGFRof 53          Overview/Hospital Course:  No notes on file    Interval History:     MODESTO  Pt doing fairly well, awaiting wound care recs.     Review of Systems   Constitutional:  Negative for activity change, chills, fatigue and fever.   HENT:  Negative for congestion and sore throat.    Respiratory:  Negative for cough, chest tightness, shortness of breath, wheezing and stridor.    Cardiovascular:  Positive for leg swelling. Negative for chest pain and palpitations.   Gastrointestinal:  Negative for abdominal distention, abdominal pain, anal bleeding, blood in stool, constipation, diarrhea, nausea and vomiting.   Genitourinary:  Negative for difficulty urinating and dysuria.   Musculoskeletal:  Negative for arthralgias and back pain.   Skin:  Positive for wound. Negative for color change.   Neurological:  Negative for dizziness, tremors, syncope, speech difficulty, weakness, light-headedness and headaches.   Hematological:  Negative for adenopathy.   Psychiatric/Behavioral:  Negative for agitation.    Objective:     Vital Signs (Most Recent):  Temp: 98.1 °F (36.7 °C) (04/10/23 0400)  Pulse: 78 (04/10/23 0400)  Resp: 18 (04/10/23 0430)  BP: (!) 140/71 (04/10/23 0531)  SpO2: 99 % (04/10/23 0400) Vital Signs (24h Range):  Temp:  [96.9 °F (36.1 °C)-98.2 °F (36.8 °C)] 98.1 °F (36.7 °C)  Pulse:  [72-82] 78  Resp:  [18] 18  SpO2:  [96 %-100 %] 99 %  BP: (117-142)/(71-78) 140/71     Weight: (!) 160.5 kg (353 lb 13.4 oz)  Body mass index is 46.68 kg/m².    Intake/Output Summary (Last 24 hours) at 4/10/2023 0635  Last data filed at 4/10/2023 0500  Gross per 24 hour   Intake 150 ml   Output --   Net 150 ml        Physical Exam  Vitals and nursing note reviewed.   Constitutional:       General: He is not in acute distress.     Appearance: Normal appearance. He is obese. He is not ill-appearing or toxic-appearing.    HENT:      Head: Normocephalic and atraumatic.      Right Ear: External ear normal.      Left Ear: External ear normal.      Nose: Nose normal. No congestion or rhinorrhea.      Mouth/Throat:      Mouth: Mucous membranes are moist.   Eyes:      General:         Right eye: No discharge.         Left eye: No discharge.      Conjunctiva/sclera: Conjunctivae normal.   Cardiovascular:      Rate and Rhythm: Normal rate and regular rhythm.      Pulses: Normal pulses.      Heart sounds: Normal heart sounds. No murmur heard.    No friction rub.   Pulmonary:      Effort: Pulmonary effort is normal. No respiratory distress.      Breath sounds: Normal breath sounds. No wheezing or rales.   Abdominal:      General: Bowel sounds are normal. There is no distension.      Palpations: Abdomen is soft.      Tenderness: There is no abdominal tenderness. There is no guarding.   Musculoskeletal:         General: Swelling present. No tenderness. Normal range of motion.      Cervical back: Neck supple.      Right lower leg: Edema present.      Left lower leg: Edema present.        Feet:       Comments: Edema in the both LE bilaterally   Skin lesion all over the bilateral LE   Feet:      Right foot:      Skin integrity: Skin breakdown, erythema and warmth present.      Comments: Foul smelling wound with drainage at the sole of the right foot.   Right foot and RLE is warm to touch up to the knee.  Right great toe has been amputated    Skin:     General: Skin is warm.      Coloration: Skin is not jaundiced.      Findings: Lesion present. No bruising.      Comments: See Media   Neurological:      Mental Status: He is alert and oriented to person, place, and time.   Psychiatric:         Behavior: Behavior normal.       Significant Labs: All pertinent labs within the past 24 hours have been reviewed.    Significant Imaging: I have reviewed all pertinent imaging results/findings within the past 24 hours.      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   ID consulted for adjustment of antibiotics  Surgery consulted   Wound care consulted  Blood cultures pending  New xray of the foot pending   ESR, CRP, CMP and CBC pending   NPO after midnight, PT INR, chest xray, EKG and type and screen, Holding home losartan and HCTZ for possible surgery tomorrow       CKD (chronic kidney disease), stage IV  CKD  CTM      Normocytic anemia    H/H 10.3/32.9  MCV 85.7   Most likley due to anemia of chronic disease  Irone studies, ferritin, b12 and folic acid pending     HLD (hyperlipidemia)    Lipid panel was recently done outpatient  Continue home Lipitor     TEDDY (obstructive sleep apnea)  Patient has been referred to sleep studies in the past but has been non compliant  Follow up orders with Dr. Baca was placed. TEDDY and its consequences were discussed with the patient. Patient seems to be motivated to get the sleep studies done      Type 2 diabetes mellitus, with long-term current use of insulin  Patient's FSGs are controlled on current medication regimen.  Last A1c reviewed-   Lab Results   Component Value Date    HGBA1C 6.4 03/02/2023     Most recent fingerstick glucose reviewed- No results for input(s): POCTGLUCOSE in the last 24 hours.  Current correctional scale  Medium  Maintain anti-hyperglycemic dose as follows-   Antihyperglycemics (From admission, onward)    Start     Stop Route Frequency Ordered    04/07/23 0600  insulin aspart U-100 injection  1-10 Units         -- SubQ Every 6 hours 04/07/23 0122        Hold Oral hypoglycemics while patient is in the hospital.    Severe obesity (BMI >= 40)  Body mass index is 46.68 kg/m². Morbid obesity complicates all aspects of disease management from diagnostic modalities to treatment. Weight loss encouraged and health benefits explained to patient.         Bilateral lower extremity edema  Patient on norvasc  Hx of PVD  ProBNP pending   Patient can benefit from an Echo and possibly outpatient cardiology referral based on the echo    Peripheral vascular disease, unspecified    Last arterial doppler was in 2020  Patient can benefit from both venous and arterial LE doppler    HTN (hypertension)  Currently well controlled  Continue Norvasc 10 mg daily   Increased Hydralazine 25 mg BID to TID  Holding home Losartan and HCTZ for possible surgery tomorrow.   Hydralazine 10 mg IV with parameters          VTE Risk Mitigation (From admission, onward)         Ordered     Place sequential compression device  Until discontinued         04/07/23 0122     IP VTE HIGH RISK PATIENT  Once         04/07/23 0122     Reason for No Pharmacological VTE Prophylaxis  Once        Question:  Reasons:  Answer:  Physician Provided (leave comment)  Comment:  possible surgery tomorrow am    04/07/23 0122     Place sequential compression device  Until discontinued         04/06/23 8813                Discharge Planning   WOOD:      Code Status: Full Code   Is the patient medically ready for discharge?:     Reason for patient still in hospital (select all that apply): Treatment                     Rehmat KATHY Yanez MD  Department of Hospital Medicine   [facility]

## 2023-04-11 ENCOUNTER — OFFICE VISIT (OUTPATIENT)
Dept: WOUND CARE | Facility: CLINIC | Age: 38
End: 2023-04-11
Attending: FAMILY MEDICINE
Payer: MEDICARE

## 2023-04-11 VITALS
TEMPERATURE: 98 F | DIASTOLIC BLOOD PRESSURE: 81 MMHG | HEART RATE: 78 BPM | SYSTOLIC BLOOD PRESSURE: 114 MMHG | RESPIRATION RATE: 18 BRPM

## 2023-04-11 DIAGNOSIS — E08.621 DIABETIC ULCER OF RIGHT MIDFOOT ASSOCIATED WITH DIABETES MELLITUS DUE TO UNDERLYING CONDITION, WITH NECROSIS OF BONE: Primary | ICD-10-CM

## 2023-04-11 DIAGNOSIS — L97.414 DIABETIC ULCER OF RIGHT MIDFOOT ASSOCIATED WITH DIABETES MELLITUS DUE TO UNDERLYING CONDITION, WITH NECROSIS OF BONE: Primary | ICD-10-CM

## 2023-04-11 LAB
ALBUMIN SERPL BCP-MCNC: 2.4 G/DL (ref 3.5–5)
ALBUMIN/GLOB SERPL: 0.6 {RATIO}
ALP SERPL-CCNC: 129 U/L (ref 45–115)
ALT SERPL W P-5'-P-CCNC: 66 U/L (ref 16–61)
ANION GAP SERPL CALCULATED.3IONS-SCNC: 12 MMOL/L (ref 7–16)
ANISOCYTOSIS BLD QL SMEAR: ABNORMAL
AST SERPL W P-5'-P-CCNC: 36 U/L (ref 15–37)
ATYPICAL LYMPHOCYTES: ABNORMAL
BASOPHILS # BLD AUTO: 0.04 K/UL (ref 0–0.2)
BASOPHILS NFR BLD AUTO: 1 % (ref 0–1)
BASOPHILS NFR BLD MANUAL: 1 % (ref 0–1)
BILIRUB SERPL-MCNC: 0.3 MG/DL (ref ?–1.2)
BUN SERPL-MCNC: 26 MG/DL (ref 7–18)
BUN/CREAT SERPL: 17 (ref 6–20)
CALCIUM SERPL-MCNC: 8.4 MG/DL (ref 8.5–10.1)
CHLORIDE SERPL-SCNC: 105 MMOL/L (ref 98–107)
CO2 SERPL-SCNC: 26 MMOL/L (ref 21–32)
CREAT SERPL-MCNC: 1.54 MG/DL (ref 0.7–1.3)
DIFFERENTIAL METHOD BLD: ABNORMAL
EGFR (NO RACE VARIABLE) (RUSH/TITUS): 58 ML/MIN/1.73M²
EOSINOPHIL # BLD AUTO: 0.18 K/UL (ref 0–0.5)
EOSINOPHIL NFR BLD AUTO: 4.3 % (ref 1–4)
EOSINOPHIL NFR BLD MANUAL: 4 % (ref 1–4)
ERYTHROCYTE [DISTWIDTH] IN BLOOD BY AUTOMATED COUNT: 15.8 % (ref 11.5–14.5)
GLOBULIN SER-MCNC: 4.1 G/DL (ref 2–4)
GLUCOSE SERPL-MCNC: 135 MG/DL (ref 70–105)
GLUCOSE SERPL-MCNC: 168 MG/DL (ref 70–105)
GLUCOSE SERPL-MCNC: 199 MG/DL (ref 70–105)
GLUCOSE SERPL-MCNC: 253 MG/DL (ref 74–106)
GLUCOSE SERPL-MCNC: 260 MG/DL (ref 70–105)
HCT VFR BLD AUTO: 31.9 % (ref 40–54)
HGB BLD-MCNC: 9.7 G/DL (ref 13.5–18)
HYPOCHROMIA BLD QL SMEAR: ABNORMAL
IMM GRANULOCYTES # BLD AUTO: 0.01 K/UL (ref 0–0.04)
IMM GRANULOCYTES NFR BLD: 0.2 % (ref 0–0.4)
LYMPHOCYTES # BLD AUTO: 1.79 K/UL (ref 1–4.8)
LYMPHOCYTES NFR BLD AUTO: 42.6 % (ref 27–41)
LYMPHOCYTES NFR BLD MANUAL: 39 % (ref 27–41)
MCH RBC QN AUTO: 26.3 PG (ref 27–31)
MCHC RBC AUTO-ENTMCNC: 30.4 G/DL (ref 32–36)
MCV RBC AUTO: 86.4 FL (ref 80–96)
MONOCYTES # BLD AUTO: 0.48 K/UL (ref 0–0.8)
MONOCYTES NFR BLD AUTO: 11.4 % (ref 2–6)
MONOCYTES NFR BLD MANUAL: 9 % (ref 2–6)
MPC BLD CALC-MCNC: 9.3 FL (ref 9.4–12.4)
NEUTROPHILS # BLD AUTO: 1.7 K/UL (ref 1.8–7.7)
NEUTROPHILS NFR BLD AUTO: 40.5 % (ref 53–65)
NEUTS BAND NFR BLD MANUAL: 1 % (ref 1–5)
NEUTS SEG NFR BLD MANUAL: 46 % (ref 50–62)
NRBC # BLD AUTO: 0 X10E3/UL
NRBC, AUTO (.00): 0 %
OVALOCYTES BLD QL SMEAR: ABNORMAL
PLATELET # BLD AUTO: 252 K/UL (ref 150–400)
PLATELET MORPHOLOGY: ABNORMAL
POLYCHROMASIA BLD QL SMEAR: ABNORMAL
POTASSIUM SERPL-SCNC: 4.8 MMOL/L (ref 3.5–5.1)
PROT SERPL-MCNC: 6.5 G/DL (ref 6.4–8.2)
RBC # BLD AUTO: 3.69 M/UL (ref 4.6–6.2)
SODIUM SERPL-SCNC: 138 MMOL/L (ref 136–145)
WBC # BLD AUTO: 4.2 K/UL (ref 4.5–11)

## 2023-04-11 PROCEDURE — 25000003 PHARM REV CODE 250: Performed by: NURSE PRACTITIONER

## 2023-04-11 PROCEDURE — 99183 HYPERBARIC OXYGEN THERAPY: CPT

## 2023-04-11 PROCEDURE — 96372 THER/PROPH/DIAG INJ SC/IM: CPT

## 2023-04-11 PROCEDURE — 80053 COMPREHEN METABOLIC PANEL: CPT | Performed by: INTERNAL MEDICINE

## 2023-04-11 PROCEDURE — 99183 HYPERBARIC OXYGEN THERAPY: CPT | Mod: S$PBB,,, | Performed by: FAMILY MEDICINE

## 2023-04-11 PROCEDURE — 25000003 PHARM REV CODE 250: Performed by: INTERNAL MEDICINE

## 2023-04-11 PROCEDURE — 99232 PR SUBSEQUENT HOSPITAL CARE,LEVL II: ICD-10-PCS | Mod: ,,, | Performed by: INTERNAL MEDICINE

## 2023-04-11 PROCEDURE — 99499 NO LOS: ICD-10-PCS | Mod: S$PBB,,, | Performed by: FAMILY MEDICINE

## 2023-04-11 PROCEDURE — 82962 GLUCOSE BLOOD TEST: CPT

## 2023-04-11 PROCEDURE — 11000001 HC ACUTE MED/SURG PRIVATE ROOM

## 2023-04-11 PROCEDURE — 99183 HYPERBARIC OXYGEN THERAPY: CPT | Mod: PBBFAC | Performed by: FAMILY MEDICINE

## 2023-04-11 PROCEDURE — 99215 OFFICE O/P EST HI 40 MIN: CPT | Mod: PBBFAC,25 | Performed by: FAMILY MEDICINE

## 2023-04-11 PROCEDURE — 63600175 PHARM REV CODE 636 W HCPCS: Performed by: INTERNAL MEDICINE

## 2023-04-11 PROCEDURE — 85025 COMPLETE CBC W/AUTO DIFF WBC: CPT | Performed by: INTERNAL MEDICINE

## 2023-04-11 PROCEDURE — 63600175 PHARM REV CODE 636 W HCPCS

## 2023-04-11 PROCEDURE — 76937 US GUIDE VASCULAR ACCESS: CPT

## 2023-04-11 PROCEDURE — 99215 OFFICE O/P EST HI 40 MIN: CPT

## 2023-04-11 PROCEDURE — 82962 GLUCOSE BLOOD TEST: CPT | Mod: PBBFAC | Performed by: FAMILY MEDICINE

## 2023-04-11 PROCEDURE — 99499 UNLISTED E&M SERVICE: CPT | Mod: S$PBB,,, | Performed by: FAMILY MEDICINE

## 2023-04-11 PROCEDURE — 25000003 PHARM REV CODE 250

## 2023-04-11 PROCEDURE — 99183 PR HYPERBARIC OXYGEN THERAPY ATTENDANCE/SUPERVISION, PER SESSION: ICD-10-PCS | Mod: S$PBB,,, | Performed by: FAMILY MEDICINE

## 2023-04-11 PROCEDURE — 99232 SBSQ HOSP IP/OBS MODERATE 35: CPT | Mod: ,,, | Performed by: INTERNAL MEDICINE

## 2023-04-11 RX ORDER — INSULIN ASPART 100 [IU]/ML
1-10 INJECTION, SOLUTION INTRAVENOUS; SUBCUTANEOUS
Status: DISCONTINUED | OUTPATIENT
Start: 2023-04-11 | End: 2023-04-18 | Stop reason: HOSPADM

## 2023-04-11 RX ORDER — DEXTROSE 40 %
30 GEL (GRAM) ORAL
Status: DISCONTINUED | OUTPATIENT
Start: 2023-04-11 | End: 2023-04-11

## 2023-04-11 RX ORDER — DEXTROSE 40 %
15 GEL (GRAM) ORAL
Status: DISCONTINUED | OUTPATIENT
Start: 2023-04-11 | End: 2023-04-11

## 2023-04-11 RX ORDER — GLUCAGON 1 MG
1 KIT INJECTION
Status: DISCONTINUED | OUTPATIENT
Start: 2023-04-11 | End: 2023-04-11

## 2023-04-11 RX ADMIN — GABAPENTIN 300 MG: 300 CAPSULE ORAL at 01:04

## 2023-04-11 RX ADMIN — DAPTOMYCIN 1000 MG: 500 INJECTION, POWDER, LYOPHILIZED, FOR SOLUTION INTRAVENOUS at 04:04

## 2023-04-11 RX ADMIN — OXYCODONE HYDROCHLORIDE AND ACETAMINOPHEN 500 MG: 500 TABLET ORAL at 09:04

## 2023-04-11 RX ADMIN — HYDRALAZINE HYDROCHLORIDE 25 MG: 25 TABLET ORAL at 01:04

## 2023-04-11 RX ADMIN — ZINC SULFATE 220 MG (50 MG) CAPSULE 220 MG: CAPSULE at 09:04

## 2023-04-11 RX ADMIN — ATORVASTATIN CALCIUM 40 MG: 40 TABLET, FILM COATED ORAL at 08:04

## 2023-04-11 RX ADMIN — INSULIN ASPART 1 UNITS: 100 INJECTION, SOLUTION INTRAVENOUS; SUBCUTANEOUS at 08:04

## 2023-04-11 RX ADMIN — INSULIN DETEMIR 20 UNITS: 100 INJECTION, SOLUTION SUBCUTANEOUS at 08:04

## 2023-04-11 RX ADMIN — HYDROCODONE BITARTRATE AND ACETAMINOPHEN 1 TABLET: 10; 325 TABLET ORAL at 09:04

## 2023-04-11 RX ADMIN — INSULIN ASPART 6 UNITS: 100 INJECTION, SOLUTION INTRAVENOUS; SUBCUTANEOUS at 05:04

## 2023-04-11 RX ADMIN — HYDROCODONE BITARTRATE AND ACETAMINOPHEN 1 TABLET: 10; 325 TABLET ORAL at 08:04

## 2023-04-11 RX ADMIN — HYDRALAZINE HYDROCHLORIDE 25 MG: 25 TABLET ORAL at 05:04

## 2023-04-11 RX ADMIN — AMLODIPINE BESYLATE 2.5 MG: 2.5 TABLET ORAL at 09:04

## 2023-04-11 RX ADMIN — ZINC SULFATE 220 MG (50 MG) CAPSULE 220 MG: CAPSULE at 08:04

## 2023-04-11 RX ADMIN — GABAPENTIN 300 MG: 300 CAPSULE ORAL at 05:04

## 2023-04-11 RX ADMIN — GABAPENTIN 300 MG: 300 CAPSULE ORAL at 09:04

## 2023-04-11 RX ADMIN — THERA TABS 1 TABLET: TAB at 09:04

## 2023-04-11 RX ADMIN — OXYCODONE HYDROCHLORIDE AND ACETAMINOPHEN 500 MG: 500 TABLET ORAL at 08:04

## 2023-04-11 RX ADMIN — HYDRALAZINE HYDROCHLORIDE 25 MG: 25 TABLET ORAL at 09:04

## 2023-04-11 RX ADMIN — PIPERACILLIN AND TAZOBACTAM 4.5 G: 4; .5 INJECTION, POWDER, LYOPHILIZED, FOR SOLUTION INTRAVENOUS; PARENTERAL at 05:04

## 2023-04-11 RX ADMIN — PIPERACILLIN AND TAZOBACTAM 4.5 G: 4; .5 INJECTION, POWDER, FOR SOLUTION INTRAVENOUS; PARENTERAL at 08:04

## 2023-04-11 NOTE — PROGRESS NOTES
[facility]  Wound Care and Hyperbaric  [facility]  Progress Note    Patient Name: Jean Pierre Paulson  MRN: [id number]  Admission Date: 4/6/2023  Hospital Length of Stay: 4 days  Attending Physician: Sanjay Yanez MD  Primary Care Provider: Mojgan Lomeli NP         Subjective:     HPI:  37 y.o. male admitted to [facility] with wound infection to chronic-non healing wound on the right foot. Wound is malodorous with increase in drainage.Cultures from 3/31 positive for Enterobacter cloaca and Enterococcus gallinarum. He has completed multiple courses of PO antibiotics, most recently Zyvox.     Significant PMH  Includes diabetes and hypertension. Last HgA1C 6.4  in March 23, diabetes is managed by PCP. He is due for arterial dopplers, last doppler in October 2020. Denies fever or chills.     Hospital Course:   04/07/2023 - Wound care consulted to evaluate and follow wounds . POC established today. Barriers to wound healing identified and preventive measures in place  04/10/2023 - Start HBOT in AM. Artrial doppler and three phase bone scan ordered today. Drawtex and vashe today. Bedside debridement in AM per Wound Care.             Scheduled Meds:   amLODIPine  2.5 mg Oral Daily    ascorbic acid (vitamin C)  500 mg Oral BID    atorvastatin  40 mg Oral QHS    DAPTOmycin (CUBICIN) IV (PEDS and ADULTS)  1,000 mg Intravenous Q24H    gabapentin  300 mg Oral Q8H    hydrALAZINE  25 mg Oral Q8H    HYDROcodone-acetaminophen  1 tablet Oral BID    insulin aspart U-100  1-10 Units Subcutaneous Q6H    insulin detemir U-100  20 Units Subcutaneous QHS    multivitamin  1 tablet Oral Daily    piperacillin-tazobactam (ZOSYN) IVPB  4.5 g Intravenous Q8H    zinc sulfate  220 mg Oral BID     Continuous Infusions:  PRN Meds:acetaminophen, dextrose 50%, dextrose 50%, glucagon (human recombinant), glucose, glucose, hydrALAZINE, HYDROcodone-acetaminophen, morphine, naloxone, ondansetron, sodium chloride 0.9%, sodium chloride  0.9%, trazodone    Review of Systems   Constitutional:  Positive for activity change. Negative for chills and fever.   Respiratory:  Negative for chest tightness and shortness of breath.    Cardiovascular:  Positive for leg swelling. Negative for chest pain and palpitations.   Musculoskeletal:  Positive for arthralgias and joint swelling.   Skin:  Positive for wound.        wound   Neurological:  Positive for weakness and numbness.   Psychiatric/Behavioral:  Negative for agitation, behavioral problems, confusion and self-injury.    Objective:     Vital Signs (Most Recent):  Temp: 97.7 °F (36.5 °C) (04/10/23 0744)  Pulse: 83 (04/10/23 0744)  Resp: 18 (04/10/23 0935)  BP: 131/72 (04/10/23 0744)  SpO2: 98 % (04/10/23 0744) Vital Signs (24h Range):  Temp:  [97.7 °F (36.5 °C)-98.2 °F (36.8 °C)] 97.7 °F (36.5 °C)  Pulse:  [72-83] 83  Resp:  [18] 18  SpO2:  [96 %-100 %] 98 %  BP: (127-142)/(71-78) 131/72     Weight: (!) 160.5 kg (353 lb 13.4 oz)  Body mass index is 46.68 kg/m².    Physical Exam  Vitals reviewed.   Constitutional:       Appearance: Normal appearance.   HENT:      Head: Normocephalic.   Cardiovascular:      Rate and Rhythm: Normal rate and regular rhythm.      Pulses: Normal pulses.      Heart sounds: Normal heart sounds.   Pulmonary:      Effort: Pulmonary effort is normal.      Breath sounds: Normal breath sounds.   Musculoskeletal:         General: Swelling present.      Right lower leg: Edema present.   Skin:     Findings: Erythema present.      Comments: Wound, see LDA for photos/measurements   Neurological:      Mental Status: He is alert. Mental status is at baseline.      Motor: Weakness present.           Assessment/Plan:     Cardiac/Vascular  Peripheral vascular disease, unspecified  Impression:     The ankle to brachial index is 1.25 on the right and 1.34 on the left.  No arterial occlusions.  Right inguinal lymphadenopathy.  This is similar dating back to comparison exam.  Recommend clinical  correlation.    Heart Healthy Diet-reduce sodium intake to 1,500  mg/day and limit alcohol  Smoking Cessation - if smoke set goals to quit  Exercise daily  Weight loss -  Maintain healthy weight.  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         AUGUSTUS Macedo  Wound Car and Hyperbaric [facility]  [facility]

## 2023-04-11 NOTE — PLAN OF CARE
Problem: Diabetes Comorbidity  Goal: Blood Glucose Level Within Targeted Range  Outcome: Ongoing, Progressing     Problem: Diabetes Comorbidity  Goal: Blood Glucose Level Within Targeted Range  Outcome: Ongoing, Progressing     Problem: Fall Injury Risk  Goal: Absence of Fall and Fall-Related Injury  Intervention: Identify and Manage Contributors  Flowsheets (Taken 4/11/2023 0323)  Self-Care Promotion: safe use of adaptive equipment encouraged  Medication Review/Management: medications reviewed  Intervention: Promote Injury-Free Environment  Flowsheets (Taken 4/11/2023 0323)  Safety Promotion/Fall Prevention:   assistive device/personal item within reach   Fall Risk signage in place   Fall Risk reviewed with patient/family   lighting adjusted     Problem: Fall Injury Risk  Goal: Absence of Fall and Fall-Related Injury  Intervention: Identify and Manage Contributors  Flowsheets (Taken 4/11/2023 0323)  Self-Care Promotion: safe use of adaptive equipment encouraged  Medication Review/Management: medications reviewed  Intervention: Promote Injury-Free Environment  Flowsheets (Taken 4/11/2023 0323)  Safety Promotion/Fall Prevention:   assistive device/personal item within reach   Fall Risk signage in place   Fall Risk reviewed with patient/family   lighting adjusted     Problem: Fall Injury Risk  Goal: Absence of Fall and Fall-Related Injury  Intervention: Identify and Manage Contributors  Flowsheets (Taken 4/11/2023 0323)  Self-Care Promotion: safe use of adaptive equipment encouraged  Medication Review/Management: medications reviewed     Problem: Fall Injury Risk  Goal: Absence of Fall and Fall-Related Injury  Intervention: Promote Injury-Free Environment  Flowsheets (Taken 4/11/2023 0323)  Safety Promotion/Fall Prevention:   assistive device/personal item within reach   Fall Risk signage in place   Fall Risk reviewed with patient/family   lighting adjusted

## 2023-04-11 NOTE — PROGRESS NOTES
Subjective:      Patient ID: Jean Pierre Paulson is a 37 y.o. male.    Chief Complaint: Hyperbaric Oxygen Therapy (R DFU Grade 3)    Jean Pierre Paulson a 37 y.o. male presents for follow up on all regular problems which are reviewed and discussed.     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, with necrosis of bone    -  Primary            Past Medical History:  Past Medical History:   Diagnosis Date    NOLAN (acute kidney injury) 04/07/2023    Anemia     Diabetes mellitus with neuropathy     HLD (hyperlipidemia) 04/07/2023    HTN (hypertension)     Obesity     TEDDY (obstructive sleep apnea) 04/07/2023    Osteomyelitis 10/2020    Hx of R foot, Tx with IV Abx    Peripheral vascular disease      Past Surgical History:   Procedure Laterality Date    APPLICATION OF SPLIT-THICKNESS SKIN GRAFT (STSG) TO LOWER EXTREMITY Right 03/02/2022    Procedure: APPLICATION, GRAFT, SKIN, SPLIT-THICKNESS, TO LOWER EXTREMITY;  Surgeon: Greg Ramírez MD;  Location: [facility];  Service: General;  Laterality: Right;    CHOLECYSTECTOMY      DEBRIDEMENT OF FOOT Right 10/2020    right great toe amputation       Review of patient's allergies indicates:   Allergen Reactions    Tomato Itching     Current Facility-Administered Medications on File Prior to Visit   Medication Dose Route Frequency Provider Last Rate Last Admin    acetaminophen tablet 1,000 mg  1,000 mg Oral Q8H PRN Art Lemus MD        amLODIPine tablet 2.5 mg  2.5 mg Oral Daily Rehmat KATHY Yanez MD   2.5 mg at 04/13/23 0945    ascorbic acid (vitamin C) tablet 500 mg  500 mg Oral BID AUGUSTUS Macedo   500 mg at 04/13/23 0945    atorvastatin tablet 40 mg  40 mg Oral QHS Art Lemus MD   40 mg at 04/12/23 2157    DAPTOmycin (CUBICIN) 1,000 mg in sodium chloride 0.9% SolP 50 mL IVPB  1,000 mg Intravenous Q24H Art Lemus MD   Stopped at  04/13/23 0431    dextrose 50% injection 12.5 g  12.5 g Intravenous PRN Art Lemus MD        dextrose 50% injection 25 g  25 g Intravenous PRN Art Lemus MD        gabapentin capsule 300 mg  300 mg Oral Q8H Art Lemus MD   300 mg at 04/13/23 0502    glucagon (human recombinant) injection 1 mg  1 mg Intramuscular PRN Art Lemus MD        glucose chewable tablet 16 g  16 g Oral PRN Rehmat U MD Renata        glucose chewable tablet 24 g  24 g Oral PRN Rehmat U MD Renata        hydrALAZINE injection 10 mg  10 mg Intravenous Q6H PRN Art Lemus MD        hydrALAZINE tablet 25 mg  25 mg Oral Q8H Art Lemus MD   25 mg at 04/13/23 0502    HYDROcodone-acetaminophen  mg per tablet 1 tablet  1 tablet Oral BID AUGUSTUS Macedo   1 tablet at 04/13/23 0943    HYDROcodone-acetaminophen  mg per tablet 1 tablet  1 tablet Oral Q6H PRN Art Lemus MD   1 tablet at 04/13/23 0457    insulin aspart U-100 injection 1-10 Units  1-10 Units Subcutaneous QID (AC + HS) PRN Rehmamayur Yanez MD   2 Units at 04/13/23 0616    insulin detemir U-100 injection 20 Units  20 Units Subcutaneous QHS Rehmat KATHY Yanez MD   20 Units at 04/12/23 2157    morphine injection 2 mg  2 mg Intravenous Q4H PRN Antony Lamas MD   2 mg at 04/12/23 2040    multivitamin tablet  1 tablet Oral Daily AUGUSTUS Macedo   1 tablet at 04/13/23 0943    naloxone 0.4 mg/mL injection 0.02 mg  0.02 mg Intravenous PRN Art Lemus MD        ondansetron injection 8 mg  8 mg Intravenous Q8H PRN Art Lemus MD        piperacillin-tazobactam (ZOSYN) 4.5 g in dextrose 5 % in water (D5W) 5 % 100 mL IVPB (MB+)  4.5 g Intravenous Q8H Rehmat KATHY Yanez MD   Stopped at 04/13/23 0859    sodium chloride 0.9% flush 10 mL  10 mL Intravenous Q12H PRN Art Lemus MD        traZODone tablet 50 mg  50 mg Oral Nightly PRN Art Lemus MD   50 mg at 04/12/23 2212    zinc sulfate capsule 220 mg  220 mg Oral BID  Yelitza Bensonauralizette, FNP   220 mg at 04/13/23 0945     Current Outpatient Medications on File Prior to Visit   Medication Sig Dispense Refill    amLODIPine (NORVASC) 10 MG tablet Take 1 tablet (10 mg total) by mouth once daily. 90 tablet 1    blood sugar diagnostic (BLOOD GLUCOSE TEST) Strp 1 strip by Misc.(Non-Drug; Combo Route) route once daily. accu-chek Veronika strips 90 strip 5    FEROSUL 325 mg (65 mg iron) Tab tablet Take 1 tablet (325 mg total) by mouth once daily. 90 tablet 1    gabapentin (NEURONTIN) 300 MG capsule Take 1 capsule (300 mg total) by mouth every 8 (eight) hours. 270 capsule 1    hydrALAZINE (APRESOLINE) 25 MG tablet Take 1 tablet (25 mg total) by mouth every 12 (twelve) hours. 180 tablet 1    hydroCHLOROthiazide (HYDRODIURIL) 25 MG tablet Take 1 tablet (25 mg total) by mouth once daily. 90 tablet 1    HYDROcodone-acetaminophen (NORCO)  mg per tablet Take 1 tablet by mouth every 12 (twelve) hours as needed for Pain. 60 tablet 0    losartan (COZAAR) 50 MG tablet Take 1 tablet (50 mg total) by mouth once daily. 90 tablet 1    rosuvastatin (CRESTOR) 10 MG tablet Take 1 tablet (10 mg total) by mouth once daily. 90 tablet 1    semaglutide (OZEMPIC) 2 mg/dose (8 mg/3 mL) PnIj Inject 2 mg into the skin every 7 days. 8 mL 2    [DISCONTINUED] blood-glucose meter kit 1 each by Other route 3 (three) times daily. 1 each 0    [DISCONTINUED] insulin asp prt-insulin aspart, NovoLOG 70/30, (NOVOLOG 70/30) 100 unit/mL (70-30) Soln Inject 15 Units into the skin once daily. 450 Units 0    [DISCONTINUED] insulin detemir U-100 (LEVEMIR) 100 unit/mL injection Inject 40 Units into the skin every evening. 12 mL 0    [DISCONTINUED] sodium hypochlorite 0.5 % (DAKIN'S SOLUTION) external solution Apply topically once daily. 473 mL 5     Social History     Socioeconomic History    Marital status: Single    Number of children: 1   Tobacco Use    Smoking status: Every Day     Packs/day: 0.25     Years:  25.00     Pack years: 6.25     Types: Cigarettes     Start date:      Last attempt to quit: 2022     Years since quittin.1    Smokeless tobacco: Never   Substance and Sexual Activity    Alcohol use: Yes     Alcohol/week: 2.0 standard drinks     Types: 1 Glasses of wine, 1 Cans of beer per week     Comment: occasionally    Drug use: Never    Sexual activity: Yes     Partners: Female     Social Determinants of Health     Financial Resource Strain: Low Risk     Difficulty of Paying Living Expenses: Not hard at all   Food Insecurity: No Food Insecurity    Worried About Running Out of Food in the Last Year: Never true    Ran Out of Food in the Last Year: Never true   Transportation Needs: Unmet Transportation Needs    Lack of Transportation (Medical): No    Lack of Transportation (Non-Medical): Yes   Physical Activity: Inactive    Days of Exercise per Week: 0 days    Minutes of Exercise per Session: 0 min   Stress: No Stress Concern Present    Feeling of Stress : Not at all   Social Connections: Socially Isolated    Frequency of Communication with Friends and Family: More than three times a week    Frequency of Social Gatherings with Friends and Family: More than three times a week    Attends Confucianist Services: Never    Active Member of Clubs or Organizations: No    Attends Club or Organization Meetings: Never    Marital Status: Never    Housing Stability: Unknown    Unable to Pay for Housing in the Last Year: No    Unstable Housing in the Last Year: No     Family History   Problem Relation Age of Onset    Hypertension Father     Diabetes Father     Hypertension Maternal Grandmother     Diabetes Maternal Grandmother        Review of Systems   Unable to perform ROS: Other     Objective:     /81   Pulse 78   Temp 98.1 °F (36.7 °C)   Resp 18     Physical Exam  Constitutional:       Appearance: Normal appearance. He is obese.   Assessment:     1. 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      I am having Jean Pierre Paulson maintain his blood sugar diagnostic, amLODIPine, FeroSuL, gabapentin, hydrALAZINE, hydroCHLOROthiazide, losartan, rosuvastatin, OZEMPIC, and HYDROcodone-acetaminophen.    Jean Pierre was seen today for hyperbaric oxygen therapy.    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    Alli HBOT well. No complaints voiced

## 2023-04-11 NOTE — PROGRESS NOTES
[facility] Hospital Medicine  Progress Note    Patient Name: Jean Pierre Paulson  MRN: [id number]  Patient Class: IP- Inpatient   Admission Date: 4/6/2023  Length of Stay: 5 days  Attending Physician: Sanjay Yanez MD  Primary Care Provider: Mojgan Lomeli NP        Subjective:     Principal Problem:Non-pressure chronic ulcer of other part of right foot with other specified severity        HPI:  37 year old male presented to [facility] via recommendation of wound clinic NP due to a chronic non healing infectious diabetic wound on the sole of the right foot. Patient reports he has developed this wound over a year ago after trauma due to a nail. Infection has caused osteomylitis within the last year and he has had multiple surgeries/debridement since but the wound is non healing and keeps getting infected. He visited the wound care clinic on 03/31/23 and saw NP Yelitza Alas who realized the wound is infected, took wound cultures and started patient on Linezolid. Patient completed the one week course, cultures came back showing Enterobacter cloacae and enterococcus gallinarum resistant to multiple antibiotics therefore he was suggested to come to the hospital.     Patient denies fever, chills, nausea, vomiting, cough, palpitation, chest pain, abdominal pain, diarrhea or problems with urination. Wound is malodorous with increase in drainage in recent weeks. Patient has bilateral LE edema but has never seen a cardiologist or a nephrologist in the past.     PMHx includes Diabetes with last A1c of 6.4 on 03/2023 on [place]. Patient has had his right great toe amputated back in 2018 due to a diabetic wound. He has diabetic neuropathy with minimal sensation in both feet but intact sensation above ankle. He has neever had a diabetic eye exam. Patient has HTN and his bp ranges 135-160 systolic at home with current home regimen. Patient has been referred to sleep studies previously but  has not been compliant.    Workup so far:  ESR 69 with CRP of 2.9  WBC wnl, H/H 10.3/32.9  Cr of 1.67 with eGFRof 53          Overview/Hospital Course:  No notes on file    Interval History:     NAEO  Going to [facility] today.     Review of Systems   Constitutional:  Negative for activity change, chills, fatigue and fever.   HENT:  Negative for congestion and sore throat.    Respiratory:  Negative for cough, chest tightness, shortness of breath, wheezing and stridor.    Cardiovascular:  Positive for leg swelling. Negative for chest pain and palpitations.   Gastrointestinal:  Negative for abdominal distention, abdominal pain, anal bleeding, blood in stool, constipation, diarrhea, nausea and vomiting.   Genitourinary:  Negative for difficulty urinating and dysuria.   Musculoskeletal:  Negative for arthralgias and back pain.   Skin:  Positive for wound. Negative for color change.   Neurological:  Negative for dizziness, tremors, syncope, speech difficulty, weakness, light-headedness and headaches.   Hematological:  Negative for adenopathy.   Psychiatric/Behavioral:  Negative for agitation.    Objective:     Vital Signs (Most Recent):  Temp: 98.1 °F (36.7 °C) (04/11/23 0819)  Pulse: 78 (04/11/23 0819)  Resp: 18 (04/11/23 0903)  BP: 114/81 (04/11/23 0819)  SpO2: 97 % (04/11/23 0819) Vital Signs (24h Range):  Temp:  [97.6 °F (36.4 °C)-98.5 °F (36.9 °C)] 98.1 °F (36.7 °C)  Pulse:  [75-84] 78  Resp:  [18] 18  SpO2:  [97 %-100 %] 97 %  BP: (114-145)/(68-83) 114/81     Weight: (!) 160.5 kg (353 lb 13.4 oz)  Body mass index is 46.68 kg/m².    Intake/Output Summary (Last 24 hours) at 4/11/2023 1154  Last data filed at 4/11/2023 0456  Gross per 24 hour   Intake 468.75 ml   Output --   Net 468.75 ml        Physical Exam  Vitals and nursing note reviewed.   Constitutional:       General: He is not in acute distress.     Appearance: Normal appearance. He is obese. He is not ill-appearing or toxic-appearing.   HENT:      Head:  Normocephalic and atraumatic.      Right Ear: External ear normal.      Left Ear: External ear normal.      Nose: Nose normal. No congestion or rhinorrhea.      Mouth/Throat:      Mouth: Mucous membranes are moist.   Eyes:      General:         Right eye: No discharge.         Left eye: No discharge.      Conjunctiva/sclera: Conjunctivae normal.   Cardiovascular:      Rate and Rhythm: Normal rate and regular rhythm.      Pulses: Normal pulses.      Heart sounds: Normal heart sounds. No murmur heard.    No friction rub.   Pulmonary:      Effort: Pulmonary effort is normal. No respiratory distress.      Breath sounds: Normal breath sounds. No wheezing or rales.   Abdominal:      General: Bowel sounds are normal. There is no distension.      Palpations: Abdomen is soft.      Tenderness: There is no abdominal tenderness. There is no guarding.   Musculoskeletal:         General: Swelling present. No tenderness. Normal range of motion.      Cervical back: Neck supple.      Right lower leg: Edema present.      Left lower leg: Edema present.        Feet:       Comments: Edema in the both LE bilaterally   Skin lesion all over the bilateral LE   Feet:      Right foot:      Skin integrity: Skin breakdown, erythema and warmth present.      Comments: Foul smelling wound with drainage at the sole of the right foot.   Right foot and RLE is warm to touch up to the knee.  Right great toe has been amputated    Skin:     General: Skin is warm.      Coloration: Skin is not jaundiced.      Findings: Lesion present. No bruising.      Comments: See Media   Neurological:      Mental Status: He is alert and oriented to person, place, and time.   Psychiatric:         Behavior: Behavior normal.       Significant Labs: All pertinent labs within the past 24 hours have been reviewed.    Significant Imaging: I have reviewed all pertinent imaging results/findings within the past 24 hours.      Assessment/Plan:      * Non-pressure chronic ulcer of  other part of right foot with other specified severity  Foul smelling, draining, warmth to touch wound at the sole of the right foot   Cultures from 03/31 grew Enterobacter cloacae and enterococcus gallinarum  Cultures from December grew Enterbacter cloacnae, klebsiella pneumonaiae, staph aureus  Based on sensitivities and discussion with pharmacist Zosyn and Daptomycin would cover all of the above and would give good coverage for other possible bacteria including gram negatives/pseudomonas.   ID consulted for adjustment of antibiotics  Surgery consulted   Wound care consulted  Blood cultures pending  New xray of the foot pending   ESR, CRP, CMP and CBC pending   NPO after midnight, PT INR, chest xray, EKG and type and screen, Holding home losartan and HCTZ for possible surgery tomorrow       CKD (chronic kidney disease), stage IV  CKD  CTM      Normocytic anemia    H/H 10.3/32.9  MCV 85.7   Most likley due to anemia of chronic disease  Irone studies, ferritin, b12 and folic acid pending     HLD (hyperlipidemia)    Lipid panel was recently done outpatient  Continue home Lipitor     TEDDY (obstructive sleep apnea)  Patient has been referred to sleep studies in the past but has been non compliant  Follow up orders with Dr. Baca was placed. TEDDY and its consequences were discussed with the patient. Patient seems to be motivated to get the sleep studies done      Type 2 diabetes mellitus, with long-term current use of insulin  Patient's FSGs are controlled on current medication regimen.  Last A1c reviewed-   Lab Results   Component Value Date    HGBA1C 6.4 03/02/2023     Most recent fingerstick glucose reviewed- No results for input(s): POCTGLUCOSE in the last 24 hours.  Current correctional scale  Medium  Maintain anti-hyperglycemic dose as follows-   Antihyperglycemics (From admission, onward)    Start     Stop Route Frequency Ordered    04/07/23 0600  insulin aspart U-100 injection 1-10 Units         -- SubQ Every  6 hours 04/07/23 0122        Hold Oral hypoglycemics while patient is in the hospital.    Severe obesity (BMI >= 40)  Body mass index is 46.68 kg/m². Morbid obesity complicates all aspects of disease management from diagnostic modalities to treatment. Weight loss encouraged and health benefits explained to patient.         Bilateral lower extremity edema  Patient on norvasc  Hx of PVD  ProBNP pending   Patient can benefit from an Echo and possibly outpatient cardiology referral based on the echo    Peripheral vascular disease, unspecified    Last arterial doppler was in 2020  Patient can benefit from both venous and arterial LE doppler    HTN (hypertension)  Currently well controlled  Continue Norvasc 10 mg daily   Increased Hydralazine 25 mg BID to TID  Holding home Losartan and HCTZ for possible surgery tomorrow.   Hydralazine 10 mg IV with parameters          VTE Risk Mitigation (From admission, onward)         Ordered     [Order Hold - Suspended Admission]  Place sequential compression device  Until discontinued        (On hold at [facility] since 04/11/23 1100)    04/07/23 0122     [Order Hold - Suspended Admission]  IP VTE HIGH RISK PATIENT  Once        (On hold at [facility] since 04/11/23 1100)    04/07/23 0122     [Order Hold - Suspended Admission]  Reason for No Pharmacological VTE Prophylaxis  Once        (On hold at [facility] since 04/11/23 1100)   Question:  Reasons:  Answer:  Physician Provided (leave comment)  Comment:  possible surgery tomorrow am    04/07/23 0122     [Order Hold - Suspended Admission]  Place sequential compression device  Until discontinued        (On hold at [facility] since 04/11/23 1100)    04/06/23 8186                Discharge Planning   WOOD:      Code Status: Full Code   Is the patient medically ready for discharge?:     Reason for patient still in hospital (select  all that apply): Treatment  Discharge Plan A: Home Health, Home                  Rehmat U MD Renata  Department of Hospital Medicine   [facility]

## 2023-04-11 NOTE — SUBJECTIVE & OBJECTIVE
Interval History:     NAEO  Going to [facility] today.     Review of Systems   Constitutional:  Negative for activity change, chills, fatigue and fever.   HENT:  Negative for congestion and sore throat.    Respiratory:  Negative for cough, chest tightness, shortness of breath, wheezing and stridor.    Cardiovascular:  Positive for leg swelling. Negative for chest pain and palpitations.   Gastrointestinal:  Negative for abdominal distention, abdominal pain, anal bleeding, blood in stool, constipation, diarrhea, nausea and vomiting.   Genitourinary:  Negative for difficulty urinating and dysuria.   Musculoskeletal:  Negative for arthralgias and back pain.   Skin:  Positive for wound. Negative for color change.   Neurological:  Negative for dizziness, tremors, syncope, speech difficulty, weakness, light-headedness and headaches.   Hematological:  Negative for adenopathy.   Psychiatric/Behavioral:  Negative for agitation.    Objective:     Vital Signs (Most Recent):  Temp: 98.1 °F (36.7 °C) (04/11/23 0819)  Pulse: 78 (04/11/23 0819)  Resp: 18 (04/11/23 0903)  BP: 114/81 (04/11/23 0819)  SpO2: 97 % (04/11/23 0819) Vital Signs (24h Range):  Temp:  [97.6 °F (36.4 °C)-98.5 °F (36.9 °C)] 98.1 °F (36.7 °C)  Pulse:  [75-84] 78  Resp:  [18] 18  SpO2:  [97 %-100 %] 97 %  BP: (114-145)/(68-83) 114/81     Weight: (!) 160.5 kg (353 lb 13.4 oz)  Body mass index is 46.68 kg/m².    Intake/Output Summary (Last 24 hours) at 4/11/2023 1154  Last data filed at 4/11/2023 0456  Gross per 24 hour   Intake 468.75 ml   Output --   Net 468.75 ml        Physical Exam  Vitals and nursing note reviewed.   Constitutional:       General: He is not in acute distress.     Appearance: Normal appearance. He is obese. He is not ill-appearing or toxic-appearing.   HENT:      Head: Normocephalic and atraumatic.      Right Ear: External ear normal.      Left Ear: External ear normal.      Nose: Nose normal. No congestion or rhinorrhea.      Mouth/Throat:       Mouth: Mucous membranes are moist.   Eyes:      General:         Right eye: No discharge.         Left eye: No discharge.      Conjunctiva/sclera: Conjunctivae normal.   Cardiovascular:      Rate and Rhythm: Normal rate and regular rhythm.      Pulses: Normal pulses.      Heart sounds: Normal heart sounds. No murmur heard.    No friction rub.   Pulmonary:      Effort: Pulmonary effort is normal. No respiratory distress.      Breath sounds: Normal breath sounds. No wheezing or rales.   Abdominal:      General: Bowel sounds are normal. There is no distension.      Palpations: Abdomen is soft.      Tenderness: There is no abdominal tenderness. There is no guarding.   Musculoskeletal:         General: Swelling present. No tenderness. Normal range of motion.      Cervical back: Neck supple.      Right lower leg: Edema present.      Left lower leg: Edema present.        Feet:       Comments: Edema in the both LE bilaterally   Skin lesion all over the bilateral LE   Feet:      Right foot:      Skin integrity: Skin breakdown, erythema and warmth present.      Comments: Foul smelling wound with drainage at the sole of the right foot.   Right foot and RLE is warm to touch up to the knee.  Right great toe has been amputated    Skin:     General: Skin is warm.      Coloration: Skin is not jaundiced.      Findings: Lesion present. No bruising.      Comments: See Media   Neurological:      Mental Status: He is alert and oriented to person, place, and time.   Psychiatric:         Behavior: Behavior normal.       Significant Labs: All pertinent labs within the past 24 hours have been reviewed.    Significant Imaging: I have reviewed all pertinent imaging results/findings within the past 24 hours.

## 2023-04-12 ENCOUNTER — OFFICE VISIT (OUTPATIENT)
Dept: WOUND CARE | Facility: CLINIC | Age: 38
End: 2023-04-12
Attending: FAMILY MEDICINE
Payer: MEDICARE

## 2023-04-12 DIAGNOSIS — L97.414 DIABETIC ULCER OF RIGHT MIDFOOT ASSOCIATED WITH DIABETES MELLITUS DUE TO UNDERLYING CONDITION, WITH NECROSIS OF BONE: Primary | ICD-10-CM

## 2023-04-12 DIAGNOSIS — E08.621 DIABETIC ULCER OF RIGHT MIDFOOT ASSOCIATED WITH DIABETES MELLITUS DUE TO UNDERLYING CONDITION, WITH NECROSIS OF BONE: Primary | ICD-10-CM

## 2023-04-12 LAB
ALBUMIN SERPL BCP-MCNC: 2.4 G/DL (ref 3.5–5)
ALBUMIN/GLOB SERPL: 0.5 {RATIO}
ALP SERPL-CCNC: 113 U/L (ref 45–115)
ALT SERPL W P-5'-P-CCNC: 88 U/L (ref 16–61)
ANION GAP SERPL CALCULATED.3IONS-SCNC: 12 MMOL/L (ref 7–16)
AST SERPL W P-5'-P-CCNC: 49 U/L (ref 15–37)
BASOPHILS # BLD AUTO: 0.03 K/UL (ref 0–0.2)
BASOPHILS NFR BLD AUTO: 0.7 % (ref 0–1)
BILIRUB SERPL-MCNC: 0.4 MG/DL (ref ?–1.2)
BUN SERPL-MCNC: 22 MG/DL (ref 7–18)
BUN/CREAT SERPL: 17 (ref 6–20)
CALCIUM SERPL-MCNC: 8.7 MG/DL (ref 8.5–10.1)
CHLORIDE SERPL-SCNC: 107 MMOL/L (ref 98–107)
CO2 SERPL-SCNC: 24 MMOL/L (ref 21–32)
CREAT SERPL-MCNC: 1.3 MG/DL (ref 0.7–1.3)
DIFFERENTIAL METHOD BLD: ABNORMAL
EGFR (NO RACE VARIABLE) (RUSH/TITUS): 72 ML/MIN/1.73M²
EOSINOPHIL # BLD AUTO: 0.21 K/UL (ref 0–0.5)
EOSINOPHIL NFR BLD AUTO: 4.9 % (ref 1–4)
ERYTHROCYTE [DISTWIDTH] IN BLOOD BY AUTOMATED COUNT: 15.8 % (ref 11.5–14.5)
GLOBULIN SER-MCNC: 4.8 G/DL (ref 2–4)
GLUCOSE SERPL-MCNC: 123 MG/DL (ref 74–106)
GLUCOSE SERPL-MCNC: 131 MG/DL (ref 70–105)
GLUCOSE SERPL-MCNC: 145 MG/DL (ref 70–105)
GLUCOSE SERPL-MCNC: 293 MG/DL (ref 70–105)
HCT VFR BLD AUTO: 33.4 % (ref 40–54)
HGB BLD-MCNC: 10.1 G/DL (ref 13.5–18)
IMM GRANULOCYTES # BLD AUTO: 0.01 K/UL (ref 0–0.04)
IMM GRANULOCYTES NFR BLD: 0.2 % (ref 0–0.4)
LYMPHOCYTES # BLD AUTO: 1.76 K/UL (ref 1–4.8)
LYMPHOCYTES NFR BLD AUTO: 41.3 % (ref 27–41)
MCH RBC QN AUTO: 26.4 PG (ref 27–31)
MCHC RBC AUTO-ENTMCNC: 30.2 G/DL (ref 32–36)
MCV RBC AUTO: 87.2 FL (ref 80–96)
MONOCYTES # BLD AUTO: 0.46 K/UL (ref 0–0.8)
MONOCYTES NFR BLD AUTO: 10.8 % (ref 2–6)
MPC BLD CALC-MCNC: 9.5 FL (ref 9.4–12.4)
NEUTROPHILS # BLD AUTO: 1.79 K/UL (ref 1.8–7.7)
NEUTROPHILS NFR BLD AUTO: 42.1 % (ref 53–65)
NRBC # BLD AUTO: 0 X10E3/UL
NRBC, AUTO (.00): 0 %
PLATELET # BLD AUTO: 261 K/UL (ref 150–400)
POTASSIUM SERPL-SCNC: 4.8 MMOL/L (ref 3.5–5.1)
PROT SERPL-MCNC: 7.2 G/DL (ref 6.4–8.2)
RBC # BLD AUTO: 3.83 M/UL (ref 4.6–6.2)
SODIUM SERPL-SCNC: 138 MMOL/L (ref 136–145)
WBC # BLD AUTO: 4.26 K/UL (ref 4.5–11)

## 2023-04-12 PROCEDURE — 80053 COMPREHEN METABOLIC PANEL: CPT | Performed by: INTERNAL MEDICINE

## 2023-04-12 PROCEDURE — 63600175 PHARM REV CODE 636 W HCPCS

## 2023-04-12 PROCEDURE — 99183 PR HYPERBARIC OXYGEN THERAPY ATTENDANCE/SUPERVISION, PER SESSION: ICD-10-PCS | Mod: S$PBB,,, | Performed by: FAMILY MEDICINE

## 2023-04-12 PROCEDURE — 63600175 PHARM REV CODE 636 W HCPCS: Performed by: INTERNAL MEDICINE

## 2023-04-12 PROCEDURE — 99215 OFFICE O/P EST HI 40 MIN: CPT | Mod: PBBFAC | Performed by: FAMILY MEDICINE

## 2023-04-12 PROCEDURE — 99499 UNLISTED E&M SERVICE: CPT | Mod: S$PBB,,, | Performed by: FAMILY MEDICINE

## 2023-04-12 PROCEDURE — 82962 GLUCOSE BLOOD TEST: CPT

## 2023-04-12 PROCEDURE — 99183 HYPERBARIC OXYGEN THERAPY: CPT | Mod: S$PBB,,, | Performed by: FAMILY MEDICINE

## 2023-04-12 PROCEDURE — 85025 COMPLETE CBC W/AUTO DIFF WBC: CPT | Performed by: INTERNAL MEDICINE

## 2023-04-12 PROCEDURE — 25000003 PHARM REV CODE 250

## 2023-04-12 PROCEDURE — 99183 HYPERBARIC OXYGEN THERAPY: CPT | Mod: PBBFAC | Performed by: FAMILY MEDICINE

## 2023-04-12 PROCEDURE — 11000001 HC ACUTE MED/SURG PRIVATE ROOM

## 2023-04-12 PROCEDURE — 25000003 PHARM REV CODE 250: Performed by: NURSE PRACTITIONER

## 2023-04-12 PROCEDURE — 25000003 PHARM REV CODE 250: Performed by: INTERNAL MEDICINE

## 2023-04-12 PROCEDURE — 99499 NO LOS: ICD-10-PCS | Mod: S$PBB,,, | Performed by: FAMILY MEDICINE

## 2023-04-12 PROCEDURE — 99183 HYPERBARIC OXYGEN THERAPY: CPT

## 2023-04-12 PROCEDURE — 96372 THER/PROPH/DIAG INJ SC/IM: CPT

## 2023-04-12 PROCEDURE — 99215 OFFICE O/P EST HI 40 MIN: CPT

## 2023-04-12 RX ORDER — MORPHINE SULFATE 2 MG/ML
2 INJECTION, SOLUTION INTRAMUSCULAR; INTRAVENOUS EVERY 4 HOURS PRN
Status: DISCONTINUED | OUTPATIENT
Start: 2023-04-12 | End: 2023-04-18 | Stop reason: HOSPADM

## 2023-04-12 RX ADMIN — HYDRALAZINE HYDROCHLORIDE 25 MG: 25 TABLET ORAL at 05:04

## 2023-04-12 RX ADMIN — GABAPENTIN 300 MG: 300 CAPSULE ORAL at 05:04

## 2023-04-12 RX ADMIN — HYDROCODONE BITARTRATE AND ACETAMINOPHEN 1 TABLET: 10; 325 TABLET ORAL at 09:04

## 2023-04-12 RX ADMIN — INSULIN ASPART 4 UNITS: 100 INJECTION, SOLUTION INTRAVENOUS; SUBCUTANEOUS at 12:04

## 2023-04-12 RX ADMIN — HYDRALAZINE HYDROCHLORIDE 25 MG: 25 TABLET ORAL at 02:04

## 2023-04-12 RX ADMIN — THERA TABS 1 TABLET: TAB at 08:04

## 2023-04-12 RX ADMIN — MORPHINE SULFATE 2 MG: 2 INJECTION, SOLUTION INTRAMUSCULAR; INTRAVENOUS at 08:04

## 2023-04-12 RX ADMIN — OXYCODONE HYDROCHLORIDE AND ACETAMINOPHEN 500 MG: 500 TABLET ORAL at 08:04

## 2023-04-12 RX ADMIN — ZINC SULFATE 220 MG (50 MG) CAPSULE 220 MG: CAPSULE at 09:04

## 2023-04-12 RX ADMIN — DAPTOMYCIN 1000 MG: 500 INJECTION, POWDER, LYOPHILIZED, FOR SOLUTION INTRAVENOUS at 04:04

## 2023-04-12 RX ADMIN — HYDRALAZINE HYDROCHLORIDE 25 MG: 25 TABLET ORAL at 09:04

## 2023-04-12 RX ADMIN — HYDROCODONE BITARTRATE AND ACETAMINOPHEN 1 TABLET: 10; 325 TABLET ORAL at 02:04

## 2023-04-12 RX ADMIN — INSULIN DETEMIR 20 UNITS: 100 INJECTION, SOLUTION SUBCUTANEOUS at 09:04

## 2023-04-12 RX ADMIN — HYDROCODONE BITARTRATE AND ACETAMINOPHEN 1 TABLET: 10; 325 TABLET ORAL at 08:04

## 2023-04-12 RX ADMIN — PIPERACILLIN AND TAZOBACTAM 4.5 G: 4; .5 INJECTION, POWDER, FOR SOLUTION INTRAVENOUS; PARENTERAL at 12:04

## 2023-04-12 RX ADMIN — TRAZODONE HYDROCHLORIDE 50 MG: 50 TABLET ORAL at 10:04

## 2023-04-12 RX ADMIN — GABAPENTIN 300 MG: 300 CAPSULE ORAL at 02:04

## 2023-04-12 RX ADMIN — PIPERACILLIN AND TAZOBACTAM 4.5 G: 4; .5 INJECTION, POWDER, FOR SOLUTION INTRAVENOUS; PARENTERAL at 04:04

## 2023-04-12 RX ADMIN — PIPERACILLIN AND TAZOBACTAM 4.5 G: 4; .5 INJECTION, POWDER, FOR SOLUTION INTRAVENOUS; PARENTERAL at 08:04

## 2023-04-12 RX ADMIN — GABAPENTIN 300 MG: 300 CAPSULE ORAL at 09:04

## 2023-04-12 RX ADMIN — OXYCODONE HYDROCHLORIDE AND ACETAMINOPHEN 500 MG: 500 TABLET ORAL at 09:04

## 2023-04-12 RX ADMIN — AMLODIPINE BESYLATE 2.5 MG: 2.5 TABLET ORAL at 08:04

## 2023-04-12 RX ADMIN — ZINC SULFATE 220 MG (50 MG) CAPSULE 220 MG: CAPSULE at 08:04

## 2023-04-12 RX ADMIN — ATORVASTATIN CALCIUM 40 MG: 40 TABLET, FILM COATED ORAL at 09:04

## 2023-04-12 NOTE — NURSING
AAOX3 in bed. Stated will take his bath this am before HBO. Placed linen and towels etc in room. CB near

## 2023-04-12 NOTE — PLAN OF CARE
Problem: Adult Inpatient Plan of Care  Goal: Plan of Care Review  Outcome: Ongoing, Progressing  Goal: Patient-Specific Goal (Individualized)  Outcome: Ongoing, Progressing  Goal: Absence of Hospital-Acquired Illness or Injury  Outcome: Ongoing, Progressing     Problem: Diabetes Comorbidity  Goal: Blood Glucose Level Within Targeted Range  Outcome: Ongoing, Progressing     Problem: Fall Injury Risk  Goal: Absence of Fall and Fall-Related Injury  Outcome: Ongoing, Progressing

## 2023-04-12 NOTE — PLAN OF CARE
Problem: Adult Inpatient Plan of Care  Goal: Plan of Care Review  Outcome: Ongoing, Progressing  Goal: Patient-Specific Goal (Individualized)  Outcome: Ongoing, Progressing  Goal: Absence of Hospital-Acquired Illness or Injury  Outcome: Ongoing, Progressing  Goal: Optimal Comfort and Wellbeing  Outcome: Ongoing, Progressing  Goal: Readiness for Transition of Care  Outcome: Ongoing, Progressing     Problem: Diabetes Comorbidity  Goal: Blood Glucose Level Within Targeted Range  Outcome: Ongoing, Progressing     Problem: Impaired Wound Healing  Goal: Optimal Wound Healing  Outcome: Ongoing, Progressing     Problem: Fluid and Electrolyte Imbalance (Acute Kidney Injury/Impairment)  Goal: Fluid and Electrolyte Balance  Outcome: Ongoing, Progressing     Problem: Oral Intake Inadequate (Acute Kidney Injury/Impairment)  Goal: Optimal Nutrition Intake  Outcome: Ongoing, Progressing     Problem: Renal Function Impairment (Acute Kidney Injury/Impairment)  Goal: Effective Renal Function  Outcome: Ongoing, Progressing     Problem: Gas Exchange Impaired  Goal: Optimal Gas Exchange  Outcome: Ongoing, Progressing     Problem: Fall Injury Risk  Goal: Absence of Fall and Fall-Related Injury  Outcome: Ongoing, Progressing

## 2023-04-13 ENCOUNTER — OFFICE VISIT (OUTPATIENT)
Dept: WOUND CARE | Facility: CLINIC | Age: 38
End: 2023-04-13
Attending: FAMILY MEDICINE
Payer: MEDICARE

## 2023-04-13 VITALS
RESPIRATION RATE: 18 BRPM | DIASTOLIC BLOOD PRESSURE: 90 MMHG | SYSTOLIC BLOOD PRESSURE: 183 MMHG | HEART RATE: 70 BPM | TEMPERATURE: 98 F

## 2023-04-13 VITALS
RESPIRATION RATE: 18 BRPM | TEMPERATURE: 98 F | SYSTOLIC BLOOD PRESSURE: 146 MMHG | DIASTOLIC BLOOD PRESSURE: 82 MMHG | HEART RATE: 83 BPM

## 2023-04-13 DIAGNOSIS — E08.621 DIABETIC ULCER OF RIGHT MIDFOOT ASSOCIATED WITH DIABETES MELLITUS DUE TO UNDERLYING CONDITION, WITH NECROSIS OF BONE: Primary | ICD-10-CM

## 2023-04-13 DIAGNOSIS — L97.414 DIABETIC ULCER OF RIGHT MIDFOOT ASSOCIATED WITH DIABETES MELLITUS DUE TO UNDERLYING CONDITION, WITH NECROSIS OF BONE: Primary | ICD-10-CM

## 2023-04-13 LAB
ALBUMIN SERPL BCP-MCNC: 2.5 G/DL (ref 3.5–5)
ALBUMIN/GLOB SERPL: 0.5 {RATIO}
ALP SERPL-CCNC: 136 U/L (ref 45–115)
ALT SERPL W P-5'-P-CCNC: 90 U/L (ref 16–61)
ANION GAP SERPL CALCULATED.3IONS-SCNC: 13 MMOL/L (ref 7–16)
AST SERPL W P-5'-P-CCNC: 34 U/L (ref 15–37)
BACTERIA BLD CULT: NORMAL
BACTERIA BLD CULT: NORMAL
BASOPHILS # BLD AUTO: 0.04 K/UL (ref 0–0.2)
BASOPHILS NFR BLD AUTO: 0.9 % (ref 0–1)
BILIRUB SERPL-MCNC: 0.4 MG/DL (ref ?–1.2)
BUN SERPL-MCNC: 22 MG/DL (ref 7–18)
BUN/CREAT SERPL: 15 (ref 6–20)
CALCIUM SERPL-MCNC: 8.7 MG/DL (ref 8.5–10.1)
CHLORIDE SERPL-SCNC: 104 MMOL/L (ref 98–107)
CO2 SERPL-SCNC: 25 MMOL/L (ref 21–32)
CREAT SERPL-MCNC: 1.46 MG/DL (ref 0.7–1.3)
DIFFERENTIAL METHOD BLD: ABNORMAL
EGFR (NO RACE VARIABLE) (RUSH/TITUS): 62 ML/MIN/1.73M²
EOSINOPHIL # BLD AUTO: 0.21 K/UL (ref 0–0.5)
EOSINOPHIL NFR BLD AUTO: 4.6 % (ref 1–4)
ERYTHROCYTE [DISTWIDTH] IN BLOOD BY AUTOMATED COUNT: 15.6 % (ref 11.5–14.5)
GLOBULIN SER-MCNC: 4.8 G/DL (ref 2–4)
GLUCOSE SERPL-MCNC: 142 MG/DL (ref 70–110)
GLUCOSE SERPL-MCNC: 147 MG/DL (ref 70–110)
GLUCOSE SERPL-MCNC: 168 MG/DL (ref 70–110)
GLUCOSE SERPL-MCNC: 176 MG/DL (ref 70–105)
GLUCOSE SERPL-MCNC: 237 MG/DL (ref 74–106)
GLUCOSE SERPL-MCNC: 250 MG/DL (ref 70–110)
GLUCOSE SERPL-MCNC: 318 MG/DL (ref 70–105)
HCT VFR BLD AUTO: 35.1 % (ref 40–54)
HGB BLD-MCNC: 10.9 G/DL (ref 13.5–18)
IMM GRANULOCYTES # BLD AUTO: 0 K/UL (ref 0–0.04)
IMM GRANULOCYTES NFR BLD: 0 % (ref 0–0.4)
LYMPHOCYTES # BLD AUTO: 2.09 K/UL (ref 1–4.8)
LYMPHOCYTES NFR BLD AUTO: 45.5 % (ref 27–41)
MCH RBC QN AUTO: 26.7 PG (ref 27–31)
MCHC RBC AUTO-ENTMCNC: 31.1 G/DL (ref 32–36)
MCV RBC AUTO: 85.8 FL (ref 80–96)
MONOCYTES # BLD AUTO: 0.4 K/UL (ref 0–0.8)
MONOCYTES NFR BLD AUTO: 8.7 % (ref 2–6)
MPC BLD CALC-MCNC: 9.4 FL (ref 9.4–12.4)
NEUTROPHILS # BLD AUTO: 1.85 K/UL (ref 1.8–7.7)
NEUTROPHILS NFR BLD AUTO: 40.3 % (ref 53–65)
NRBC # BLD AUTO: 0 X10E3/UL
NRBC, AUTO (.00): 0 %
PLATELET # BLD AUTO: 243 K/UL (ref 150–400)
POTASSIUM SERPL-SCNC: 4.3 MMOL/L (ref 3.5–5.1)
PROT SERPL-MCNC: 7.3 G/DL (ref 6.4–8.2)
RBC # BLD AUTO: 4.09 M/UL (ref 4.6–6.2)
SODIUM SERPL-SCNC: 138 MMOL/L (ref 136–145)
WBC # BLD AUTO: 4.59 K/UL (ref 4.5–11)

## 2023-04-13 PROCEDURE — 25000003 PHARM REV CODE 250: Performed by: FAMILY MEDICINE

## 2023-04-13 PROCEDURE — 25000003 PHARM REV CODE 250: Performed by: NURSE PRACTITIONER

## 2023-04-13 PROCEDURE — 25000003 PHARM REV CODE 250: Performed by: INTERNAL MEDICINE

## 2023-04-13 PROCEDURE — 85025 COMPLETE CBC W/AUTO DIFF WBC: CPT | Performed by: INTERNAL MEDICINE

## 2023-04-13 PROCEDURE — 99213 OFFICE O/P EST LOW 20 MIN: CPT | Mod: PBBFAC | Performed by: FAMILY MEDICINE

## 2023-04-13 PROCEDURE — 99232 PR SUBSEQUENT HOSPITAL CARE,LEVL II: ICD-10-PCS | Mod: ,,, | Performed by: FAMILY MEDICINE

## 2023-04-13 PROCEDURE — 99183 HYPERBARIC OXYGEN THERAPY: CPT | Mod: S$PBB,,, | Performed by: FAMILY MEDICINE

## 2023-04-13 PROCEDURE — 63600175 PHARM REV CODE 636 W HCPCS

## 2023-04-13 PROCEDURE — 99499 NO LOS: ICD-10-PCS | Mod: S$PBB,,, | Performed by: FAMILY MEDICINE

## 2023-04-13 PROCEDURE — 63600175 PHARM REV CODE 636 W HCPCS: Performed by: INTERNAL MEDICINE

## 2023-04-13 PROCEDURE — 99183 PR HYPERBARIC OXYGEN THERAPY ATTENDANCE/SUPERVISION, PER SESSION: ICD-10-PCS | Mod: S$PBB,,, | Performed by: FAMILY MEDICINE

## 2023-04-13 PROCEDURE — 11000001 HC ACUTE MED/SURG PRIVATE ROOM

## 2023-04-13 PROCEDURE — 99213 OFFICE O/P EST LOW 20 MIN: CPT

## 2023-04-13 PROCEDURE — 25000003 PHARM REV CODE 250

## 2023-04-13 PROCEDURE — 99183 HYPERBARIC OXYGEN THERAPY: CPT | Mod: PBBFAC | Performed by: FAMILY MEDICINE

## 2023-04-13 PROCEDURE — 99232 SBSQ HOSP IP/OBS MODERATE 35: CPT | Mod: ,,, | Performed by: FAMILY MEDICINE

## 2023-04-13 PROCEDURE — 80053 COMPREHEN METABOLIC PANEL: CPT | Performed by: INTERNAL MEDICINE

## 2023-04-13 PROCEDURE — 82962 GLUCOSE BLOOD TEST: CPT

## 2023-04-13 PROCEDURE — 99183 HYPERBARIC OXYGEN THERAPY: CPT

## 2023-04-13 PROCEDURE — 99499 UNLISTED E&M SERVICE: CPT | Mod: S$PBB,,, | Performed by: FAMILY MEDICINE

## 2023-04-13 RX ORDER — DOCUSATE SODIUM 100 MG/1
100 CAPSULE, LIQUID FILLED ORAL DAILY
Status: DISCONTINUED | OUTPATIENT
Start: 2023-04-14 | End: 2023-04-18 | Stop reason: HOSPADM

## 2023-04-13 RX ORDER — BISACODYL 5 MG
5 TABLET, DELAYED RELEASE (ENTERIC COATED) ORAL ONCE
Status: COMPLETED | OUTPATIENT
Start: 2023-04-13 | End: 2023-04-13

## 2023-04-13 RX ADMIN — OXYCODONE HYDROCHLORIDE AND ACETAMINOPHEN 500 MG: 500 TABLET ORAL at 08:04

## 2023-04-13 RX ADMIN — GABAPENTIN 300 MG: 300 CAPSULE ORAL at 03:04

## 2023-04-13 RX ADMIN — PIPERACILLIN AND TAZOBACTAM 4.5 G: 4; .5 INJECTION, POWDER, FOR SOLUTION INTRAVENOUS; PARENTERAL at 04:04

## 2023-04-13 RX ADMIN — HYDROCODONE BITARTRATE AND ACETAMINOPHEN 1 TABLET: 10; 325 TABLET ORAL at 03:04

## 2023-04-13 RX ADMIN — DAPTOMYCIN 1000 MG: 500 INJECTION, POWDER, LYOPHILIZED, FOR SOLUTION INTRAVENOUS at 04:04

## 2023-04-13 RX ADMIN — OXYCODONE HYDROCHLORIDE AND ACETAMINOPHEN 500 MG: 500 TABLET ORAL at 09:04

## 2023-04-13 RX ADMIN — HYDROCODONE BITARTRATE AND ACETAMINOPHEN 1 TABLET: 10; 325 TABLET ORAL at 09:04

## 2023-04-13 RX ADMIN — PIPERACILLIN AND TAZOBACTAM 4.5 G: 4; .5 INJECTION, POWDER, FOR SOLUTION INTRAVENOUS; PARENTERAL at 12:04

## 2023-04-13 RX ADMIN — ATORVASTATIN CALCIUM 40 MG: 40 TABLET, FILM COATED ORAL at 08:04

## 2023-04-13 RX ADMIN — HYDROCODONE BITARTRATE AND ACETAMINOPHEN 1 TABLET: 10; 325 TABLET ORAL at 08:04

## 2023-04-13 RX ADMIN — PIPERACILLIN AND TAZOBACTAM 4.5 G: 4; .5 INJECTION, POWDER, FOR SOLUTION INTRAVENOUS; PARENTERAL at 08:04

## 2023-04-13 RX ADMIN — ZINC SULFATE 220 MG (50 MG) CAPSULE 220 MG: CAPSULE at 08:04

## 2023-04-13 RX ADMIN — HYDRALAZINE HYDROCHLORIDE 25 MG: 25 TABLET ORAL at 03:04

## 2023-04-13 RX ADMIN — HYDRALAZINE HYDROCHLORIDE 25 MG: 25 TABLET ORAL at 08:04

## 2023-04-13 RX ADMIN — HYDRALAZINE HYDROCHLORIDE 25 MG: 25 TABLET ORAL at 05:04

## 2023-04-13 RX ADMIN — INSULIN DETEMIR 20 UNITS: 100 INJECTION, SOLUTION SUBCUTANEOUS at 08:04

## 2023-04-13 RX ADMIN — GABAPENTIN 300 MG: 300 CAPSULE ORAL at 05:04

## 2023-04-13 RX ADMIN — AMLODIPINE BESYLATE 2.5 MG: 2.5 TABLET ORAL at 09:04

## 2023-04-13 RX ADMIN — INSULIN ASPART 2 UNITS: 100 INJECTION, SOLUTION INTRAVENOUS; SUBCUTANEOUS at 06:04

## 2023-04-13 RX ADMIN — HYDROCODONE BITARTRATE AND ACETAMINOPHEN 1 TABLET: 10; 325 TABLET ORAL at 04:04

## 2023-04-13 RX ADMIN — INSULIN ASPART 4 UNITS: 100 INJECTION, SOLUTION INTRAVENOUS; SUBCUTANEOUS at 09:04

## 2023-04-13 RX ADMIN — GABAPENTIN 300 MG: 300 CAPSULE ORAL at 09:04

## 2023-04-13 RX ADMIN — ZINC SULFATE 220 MG (50 MG) CAPSULE 220 MG: CAPSULE at 09:04

## 2023-04-13 RX ADMIN — BISACODYL 5 MG: 5 TABLET, COATED ORAL at 09:04

## 2023-04-13 RX ADMIN — THERA TABS 1 TABLET: TAB at 09:04

## 2023-04-13 NOTE — SUBJECTIVE & OBJECTIVE
Interval History: Feels well. Tolerating HBO. C/o mild constipation.     Review of Systems  Objective:     Vital Signs (Most Recent):  Temp: 98.3 °F (36.8 °C) (04/13/23 0426)  Pulse: 69 (04/13/23 0426)  Resp: 18 (04/13/23 0457)  BP: (!) 159/96 (04/13/23 0426)  SpO2: 97 % (04/13/23 0426)   Vital Signs (24h Range):  Temp:  [97.6 °F (36.4 °C)-98.3 °F (36.8 °C)] 98.3 °F (36.8 °C)  Pulse:  [69-86] 69  Resp:  [18] 18  SpO2:  [95 %-100 %] 97 %  BP: (145-183)/(77-98) 159/96     Weight: (!) 160.5 kg (353 lb 13.4 oz)  Body mass index is 46.68 kg/m².    Intake/Output Summary (Last 24 hours) at 4/13/2023 0644  Last data filed at 4/13/2023 0431  Gross per 24 hour   Intake 750 ml   Output --   Net 750 ml      Physical Exam  Vitals and nursing note reviewed.   Constitutional:       General: He is not in acute distress.     Appearance: Normal appearance. He is obese. He is not ill-appearing or toxic-appearing.   HENT:      Head: Normocephalic and atraumatic.      Right Ear: External ear normal.      Left Ear: External ear normal.      Nose: Nose normal. No congestion or rhinorrhea.      Mouth/Throat:      Mouth: Mucous membranes are moist.   Eyes:      General:         Right eye: No discharge.         Left eye: No discharge.      Conjunctiva/sclera: Conjunctivae normal.   Cardiovascular:      Rate and Rhythm: Normal rate and regular rhythm.      Pulses: Normal pulses.      Heart sounds: Normal heart sounds. No murmur heard.    No friction rub.   Pulmonary:      Effort: Pulmonary effort is normal. No respiratory distress.      Breath sounds: Normal breath sounds. No wheezing or rales.   Abdominal:      General: Bowel sounds are normal. There is no distension.      Palpations: Abdomen is soft.      Tenderness: There is no abdominal tenderness. There is no guarding.   Musculoskeletal:         General: Swelling present. No tenderness. Normal range of motion.      Cervical back: Neck supple.      Right lower leg: Edema present.       Left lower leg: Edema present.        Feet:       Comments: Edema in the both LE bilaterally   Skin lesion all over the bilateral LE   Feet:      Right foot:      Skin integrity: Skin breakdown, erythema and warmth present.      Comments: Foul smelling wound with drainage at the sole of the right foot.   Right foot and RLE is warm to touch up to the knee.  Right great toe has been amputated    Skin:     General: Skin is warm.      Coloration: Skin is not jaundiced.      Findings: Lesion present. No bruising.      Comments: See Media   Neurological:      Mental Status: He is alert and oriented to person, place, and time.   Psychiatric:         Behavior: Behavior normal.       Significant Labs: All pertinent labs within the past 24 hours have been reviewed.    Significant Imaging: I have reviewed all pertinent imaging results/findings within the past 24 hours.

## 2023-04-13 NOTE — PROGRESS NOTES
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 Patient reports he has developed this wound over a year ago after trauma due to a nail. Infection has caused osteomylitis within the last year and he has had multiple surgeries/debridement since but the wound is non healing and keeps getting infected. He visited the wound care clinic on 03/31/23 and saw NP Yelitza Alas who realized the wound is infected, took wound cultures and started patient on Linezolid. Patient completed the one week course, cultures came back showing Enterobacter cloacae and enterococcus gallinarum resistant to multiple antibiotics therefore he was suggested to come to the hospital.     Patient denies fever, chills, nausea, vomiting, cough, palpitation, chest pain, abdominal pain, diarrhea or problems with urination. Wound is malodorous with increase in drainage in recent weeks. Patient has bilateral LE edema but has never seen a cardiologist or a nephrologist in the past.     PMHx includes Diabetes with last A1c of 6.4 on 03/2023 on [place]. Patient has had his right great toe amputated back in 2018 due to a diabetic wound. He has diabetic neuropathy with minimal sensation in both feet but intact sensation above ankle. He has neever had a diabetic eye exam. Patient has HTN and his bp ranges 135-160 systolic at home with current home regimen. Patient has been referred to sleep studies  previously but has not been compliant.    Workup so far:  ESR 69 with CRP of 2.9  WBC wnl, H/H 10.3/32.9  Cr of 1.67 with eGFRof 53          Overview/Hospital Course:  No notes on file    Interval History: Feels well. Tolerating HBO. C/o mild constipation.     Review of Systems  Objective:     Vital Signs (Most Recent):  Temp: 98.3 °F (36.8 °C) (04/13/23 0426)  Pulse: 69 (04/13/23 0426)  Resp: 18 (04/13/23 0457)  BP: (!) 159/96 (04/13/23 0426)  SpO2: 97 % (04/13/23 0426)   Vital Signs (24h Range):  Temp:  [97.6 °F (36.4 °C)-98.3 °F (36.8 °C)] 98.3 °F (36.8 °C)  Pulse:  [69-86] 69  Resp:  [18] 18  SpO2:  [95 %-100 %] 97 %  BP: (145-183)/(77-98) 159/96     Weight: (!) 160.5 kg (353 lb 13.4 oz)  Body mass index is 46.68 kg/m².    Intake/Output Summary (Last 24 hours) at 4/13/2023 0644  Last data filed at 4/13/2023 0431  Gross per 24 hour   Intake 750 ml   Output --   Net 750 ml      Physical Exam  Vitals and nursing note reviewed.   Constitutional:       General: He is not in acute distress.     Appearance: Normal appearance. He is obese. He is not ill-appearing or toxic-appearing.   HENT:      Head: Normocephalic and atraumatic.      Right Ear: External ear normal.      Left Ear: External ear normal.      Nose: Nose normal. No congestion or rhinorrhea.      Mouth/Throat:      Mouth: Mucous membranes are moist.   Eyes:      General:         Right eye: No discharge.         Left eye: No discharge.      Conjunctiva/sclera: Conjunctivae normal.   Cardiovascular:      Rate and Rhythm: Normal rate and regular rhythm.      Pulses: Normal pulses.      Heart sounds: Normal heart sounds. No murmur heard.    No friction rub.   Pulmonary:      Effort: Pulmonary effort is normal. No respiratory distress.      Breath sounds: Normal breath sounds. No wheezing or rales.   Abdominal:      General: Bowel sounds are normal. There is no distension.      Palpations: Abdomen is soft.      Tenderness: There is no abdominal tenderness. There  is no guarding.   Musculoskeletal:         General: Swelling present. No tenderness. Normal range of motion.      Cervical back: Neck supple.      Right lower leg: Edema present.      Left lower leg: Edema present.        Feet:       Comments: Edema in the both LE bilaterally   Skin lesion all over the bilateral LE   Feet:      Right foot:      Skin integrity: Skin breakdown, erythema and warmth present.      Comments: Foul smelling wound with drainage at the sole of the right foot.   Right foot and RLE is warm to touch up to the knee.  Right great toe has been amputated    Skin:     General: Skin is warm.      Coloration: Skin is not jaundiced.      Findings: Lesion present. No bruising.      Comments: See Media   Neurological:      Mental Status: He is alert and oriented to person, place, and time.   Psychiatric:         Behavior: Behavior normal.       Significant Labs: All pertinent labs within the past 24 hours have been reviewed.    Significant Imaging: I have reviewed all pertinent imaging results/findings within the past 24 hours.      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   ID consulted for adjustment of antibiotics  Surgery consulted   Wound care consulted  Blood cultures pending  New xray of the foot pending   ESR, CRP, CMP and CBC pending   NPO after midnight, PT INR, chest xray, EKG and type and screen, Holding home losartan and HCTZ for possible surgery tomorrow       Type 2 diabetes mellitus, with  long-term current use of insulin  Patient's FSGs are controlled on current medication regimen.  Last A1c reviewed-   Lab Results   Component Value Date    HGBA1C 6.4 03/02/2023     Most recent fingerstick glucose reviewed- No results for input(s): POCTGLUCOSE in the last 24 hours.  Current correctional scale  Medium  Maintain anti-hyperglycemic dose as follows-   Antihyperglycemics (From admission, onward)    Start     Stop Route Frequency Ordered    04/07/23 0600  insulin aspart U-100 injection 1-10 Units         -- SubQ Every 6 hours 04/07/23 0122        Hold Oral hypoglycemics while patient is in the hospital.    CKD (chronic kidney disease), stage IV  CKD  CTM      Normocytic anemia    H/H 10.3/32.9  MCV 85.7   Most likley due to anemia of chronic disease  Irone studies, ferritin, b12 and folic acid pending     HLD (hyperlipidemia)    Lipid panel was recently done outpatient  Continue home Lipitor     TEDDY (obstructive sleep apnea)  Patient has been referred to sleep studies in the past but has been non compliant  Follow up orders with Dr. Baca was placed. TEDDY and its consequences were discussed with the patient. Patient seems to be motivated to get the sleep studies done      Severe obesity (BMI >= 40)  Body mass index is 46.68 kg/m². Morbid obesity complicates all aspects of disease management from diagnostic modalities to treatment. Weight loss encouraged and health benefits explained to patient.         Bilateral lower extremity edema  Patient on norvasc  Hx of PVD  ProBNP pending   Patient can benefit from an Echo and possibly outpatient cardiology referral based on the echo    Peripheral vascular disease, unspecified    Last arterial doppler was in 2020  Patient can benefit from both venous and arterial LE doppler    HTN (hypertension)  Currently well controlled  Continue Norvasc 10 mg daily   Increased Hydralazine 25 mg BID to TID  Holding home Losartan and HCTZ for possible surgery tomorrow.    Hydralazine 10 mg IV with parameters          VTE Risk Mitigation (From admission, onward)         Ordered     Place sequential compression device  Until discontinued         04/07/23 0122     IP VTE HIGH RISK PATIENT  Once         04/07/23 0122     Reason for No Pharmacological VTE Prophylaxis  Once        Question:  Reasons:  Answer:  Physician Provided (leave comment)  Comment:  possible surgery tomorrow am    04/07/23 0122     Place sequential compression device  Until discontinued         04/06/23 2338                Discharge Planning   WOOD:      Code Status: Full Code   Is the patient medically ready for discharge?:     Reason for patient still in hospital (select all that apply): Treatment  Discharge Plan A: Home Health, Home                  Axel Simmons Jr, MD  Department of Hospital Medicine   [facility]

## 2023-04-13 NOTE — PROGRESS NOTES
[facility]  Adult Nutrition  First Assessment Note         Reason for Assessment  Reason For Assessment: length of stay   Nutrition Risk Screen: no indicators present      Assessment and Plan  Patient was seen for a length of stay. He was admitted to [facility] for non-pressure chronic ulcer of other part of right foot with other specified severity on 4/6/32. He is currently on an 1800kcal Diabetic Diet with Ensure Max Protein TID. Documented intake is % of meals. Weight has been stable since admission. Current weight is 353 pounds with a BMI of 46.68 (morbidly obese). Nutritional needs calculated using adjusted body weight of 226 pounds related to patient being >120% of his ideal body weight.      Recommend continue current diet order as able/appropriate and tolerated. Encourage good po intakes. Also recommend adding Angel BID to aid in wound healing. Patient is currently receiving vitamin C, ZnSO4, and MVI to aid in wound healing.     Last BM 4/10/23 per flowsheet.    Medications/labs reviewed. RD following.       Malnutrition  Is Patient Malnourished: No  Skin Integrity  Bebeto Risk Assessment  Sensory Perception: 3-->slightly limited  Moisture: 4-->rarely moist  Activity: 3-->walks occasionally  Mobility: 4-->no limitation  Nutrition: 3-->adequate  Friction and Shear: 3-->no apparent problem  Bebeto Score: 20  Comments on skin integrity: infection diabetic wound to R foot  Nutrition Diagnosis  Increased nutrient needs r/t wound healing as evidenced by non-pressure chronic ulcer of other part of right foot with other specified severity    Nutrition Diagnosis Status: New    Nutrition Risk  Level of Risk/Frequency of Follow-up: low - moderate    Recent Labs   Lab 04/13/23  0321 04/13/23  0553   *  --    POCGLU  --  176*     Comments on Glucose: Glucose elevated. PMH of diabetes and diabetic foot wound    Nutrition Prescription /  Recommendations  Recommendation/Intervention: Continue current diet order as able/appropriate and tolerated, continue nutritional supplement, encourage good po intakes. Also recommend adding Angel BID to aid in wound healing.  Goals: Weight maintenance, consume % of meals and supplements  Nutrition Goal Status: new  Current Diet Order: 1800 kcal Diabetic Diet  Oral Nutrition Supplement: Ensure Max Protein  Chewing or Swallowing Difficulty?: No Chewing or swallowing difficulty  Recommended Diet: Consistent Carbohydrate 1800 (60g Carbs)  Recommended Oral Supplement: Angel [90 kcals, 2.5g Protein, 10g Carbs(3g Sugar), 7g L-Arginine, 7g L-Glutamine, Vitamin C 300mg, 9.5mg Zinc] twice daily and Ensure Max Protein [150 kcals, 30g Protein, 6g Carbs(2g Fiber, 1g Sugar), 1.5g Fat] three times daily  Is Nutrition Support Recommended: No  Is Education Recommended: No  Monitor and Evaluation  % current Intake: P.O. intake of 75 - 100 %  % intake to meet estimated needs: 75 - 100 %  Food and Nutrient Adminstration: diet order  Anthropometric Measurements: weight, weight change  Biochemical Data, Medical Tests and Procedures: electrolyte and renal panel, gastrointestinal profile, glucose/endocrine profile, inflammatory profile, lipid profile  Tolerance: tolerating  Current Medical Diagnosis and Past Medical History  Diagnosis: diabetes diagnosis/complications  Past Medical History:   Diagnosis Date    NOLAN (acute kidney injury) 04/07/2023    Anemia     Diabetes mellitus with neuropathy     HLD (hyperlipidemia) 04/07/2023    HTN (hypertension)     Obesity     TEDDY (obstructive sleep apnea) 04/07/2023    Osteomyelitis 10/2020    Hx of R foot, Tx with IV Abx    Peripheral vascular disease      Nutrition/Diet History  Food Allergies: other (see comments) (tomato)  Lab/Procedures/Meds  Recent Labs   Lab 04/13/23  0321      K 4.3   BUN 22*   CREATININE 1.46*   CALCIUM 8.7   ALBUMIN 2.5*      ALT 90*   AST 34   BUN, Cr.  "Elevated: PMH of CKDIV. Will monitor. Alb. Low-poss r/t inflammatory response given wound   Last A1c:   Lab Results   Component Value Date    HGBA1C 6.4 03/02/2023     Lab Results   Component Value Date    RBC 4.09 (L) 04/13/2023    HGB 10.9 (L) 04/13/2023    HCT 35.1 (L) 04/13/2023    MCV 85.8 04/13/2023    MCH 26.7 (L) 04/13/2023    MCHC 31.1 (L) 04/13/2023    TIBC 258 04/07/2023     Pertinent Labs Reviewed: reviewed  Pertinent Labs Comments: Sodium: 138  Potassium: 4.3  Chloride: 104  CO2: 25  Anion Gap: 13  BUN: 22 (H)  Creatinine: 1.46 (H)  BUN/CREAT RATIO: 15  eGFR: 62  Glucose: 237 (H)  Calcium: 8.7  Alkaline Phosphatase: 136 (H)  PROTEIN TOTAL: 7.3  Albumin: 2.5 (L)  Albumin/Globulin Ratio: 0.5  BILIRUBIN TOTAL: 0.4  AST: 34  ALT: 90 (H)  Globulin, Total: 4.8 (H)  Pertinent Medications Reviewed: reviewed  Pertinent Medications Comments: amlodipine, Vitamin C, atorvastatin, gabapentin, hydralazine, hydrocodone, insulin, MV, Zosyn, ZnSO4.  Anthropometrics  Temp: 98 °F (36.7 °C)  Height: 6' 1" (185.4 cm)  Height (inches): 73 in  Weight Method: Bed Scale  Weight: (!) 160.5 kg (353 lb 13.4 oz)  Weight (lb): (!) 353.84 lb  Ideal Body Weight (IBW), Male: 184 lb  % Ideal Body Weight, Male (lb): 192.3 %  BMI (Calculated): 46.7     Estimated/Assessed Needs  RMR ([place]-St. Jeor Equation): 2573.88     Temp: 98 °F (36.7 °C)Oral  Weight Used For Calorie Calculations: 102.5 kg (226 lb)     Energy Calorie Requirements (kcal): 2562-3075kcal (25-30kcal/kg Adj BW  Weight Used For Protein Calculations: 102.5 kg (226 lb)  Protein Requirements: 123-154g (1.2-1.5/kg Ajd BW)       RDA Method (mL): 2562     Nutrition by Nursing  Diet/Nutrition Received: consistent carb/diabetic diet  Intake (%): 100%  Diet/Feeding Assistance: none  Diet/Feeding Tolerance: good  Last Bowel Movement: 04/10/23              Nutrition Follow-Up  RD Follow-up?: Yes      "

## 2023-04-14 ENCOUNTER — OFFICE VISIT (OUTPATIENT)
Dept: WOUND CARE | Facility: CLINIC | Age: 38
End: 2023-04-14
Attending: SURGERY
Payer: MEDICARE

## 2023-04-14 ENCOUNTER — HOSPITAL ENCOUNTER (OUTPATIENT)
Dept: RADIOLOGY | Facility: HOSPITAL | Age: 38
Discharge: HOME | End: 2023-04-14
Payer: MEDICARE

## 2023-04-14 VITALS
HEART RATE: 69 BPM | TEMPERATURE: 98 F | SYSTOLIC BLOOD PRESSURE: 131 MMHG | RESPIRATION RATE: 20 BRPM | DIASTOLIC BLOOD PRESSURE: 85 MMHG

## 2023-04-14 DIAGNOSIS — L97.414 ULCER OF RIGHT HEEL, WITH NECROSIS OF BONE: Primary | ICD-10-CM

## 2023-04-14 PROBLEM — L97.412 DIABETIC ULCER OF RIGHT HEEL ASSOCIATED WITH TYPE 2 DIABETES MELLITUS, WITH FAT LAYER EXPOSED: Status: ACTIVE | Noted: 2021-03-19

## 2023-04-14 PROBLEM — E11.621 DIABETIC ULCER OF RIGHT HEEL ASSOCIATED WITH TYPE 2 DIABETES MELLITUS, WITH FAT LAYER EXPOSED: Status: ACTIVE | Noted: 2021-03-19

## 2023-04-14 LAB
ALBUMIN SERPL BCP-MCNC: 2.4 G/DL (ref 3.5–5)
ALBUMIN/GLOB SERPL: 0.5 {RATIO}
ALP SERPL-CCNC: 130 U/L (ref 45–115)
ALT SERPL W P-5'-P-CCNC: 84 U/L (ref 16–61)
ANION GAP SERPL CALCULATED.3IONS-SCNC: 9 MMOL/L (ref 7–16)
AST SERPL W P-5'-P-CCNC: 31 U/L (ref 15–37)
BASOPHILS # BLD AUTO: 0.05 K/UL (ref 0–0.2)
BASOPHILS NFR BLD AUTO: 1 % (ref 0–1)
BILIRUB SERPL-MCNC: 0.3 MG/DL (ref ?–1.2)
BUN SERPL-MCNC: 20 MG/DL (ref 7–18)
BUN/CREAT SERPL: 15 (ref 6–20)
CALCIUM SERPL-MCNC: 8.6 MG/DL (ref 8.5–10.1)
CHLORIDE SERPL-SCNC: 103 MMOL/L (ref 98–107)
CO2 SERPL-SCNC: 26 MMOL/L (ref 21–32)
CREAT SERPL-MCNC: 1.32 MG/DL (ref 0.7–1.3)
DIFFERENTIAL METHOD BLD: ABNORMAL
EGFR (NO RACE VARIABLE) (RUSH/TITUS): 70 ML/MIN/1.73M²
EOSINOPHIL # BLD AUTO: 0.2 K/UL (ref 0–0.5)
EOSINOPHIL NFR BLD AUTO: 4 % (ref 1–4)
EOSINOPHIL NFR BLD MANUAL: 2 % (ref 1–4)
ERYTHROCYTE [DISTWIDTH] IN BLOOD BY AUTOMATED COUNT: 15.1 % (ref 11.5–14.5)
GLOBULIN SER-MCNC: 4.7 G/DL (ref 2–4)
GLUCOSE SERPL-MCNC: 198 MG/DL (ref 74–106)
GLUCOSE SERPL-MCNC: 202 MG/DL (ref 70–105)
GLUCOSE SERPL-MCNC: 206 MG/DL (ref 70–105)
GLUCOSE SERPL-MCNC: 257 MG/DL (ref 70–105)
HCT VFR BLD AUTO: 31.6 % (ref 40–54)
HGB BLD-MCNC: 9.8 G/DL (ref 13.5–18)
IMM GRANULOCYTES # BLD AUTO: 0.01 K/UL (ref 0–0.04)
IMM GRANULOCYTES NFR BLD: 0.2 % (ref 0–0.4)
LYMPHOCYTES # BLD AUTO: 2.35 K/UL (ref 1–4.8)
LYMPHOCYTES NFR BLD AUTO: 47.2 % (ref 27–41)
LYMPHOCYTES NFR BLD MANUAL: 42 % (ref 27–41)
MCH RBC QN AUTO: 26 PG (ref 27–31)
MCHC RBC AUTO-ENTMCNC: 31 G/DL (ref 32–36)
MCV RBC AUTO: 83.8 FL (ref 80–96)
MONOCYTES # BLD AUTO: 0.49 K/UL (ref 0–0.8)
MONOCYTES NFR BLD AUTO: 9.8 % (ref 2–6)
MONOCYTES NFR BLD MANUAL: 10 % (ref 2–6)
MPC BLD CALC-MCNC: 9.7 FL (ref 9.4–12.4)
NEUTROPHILS # BLD AUTO: 1.88 K/UL (ref 1.8–7.7)
NEUTROPHILS NFR BLD AUTO: 37.8 % (ref 53–65)
NEUTS BAND NFR BLD MANUAL: 2 % (ref 1–5)
NEUTS SEG NFR BLD MANUAL: 44 % (ref 50–62)
NRBC # BLD AUTO: 0 X10E3/UL
NRBC, AUTO (.00): 0 %
PLATELET # BLD AUTO: 229 K/UL (ref 150–400)
PLATELET MORPHOLOGY: NORMAL
POTASSIUM SERPL-SCNC: 4.4 MMOL/L (ref 3.5–5.1)
PROT SERPL-MCNC: 7.1 G/DL (ref 6.4–8.2)
RBC # BLD AUTO: 3.77 M/UL (ref 4.6–6.2)
RBC MORPH BLD: NORMAL
SODIUM SERPL-SCNC: 134 MMOL/L (ref 136–145)
WBC # BLD AUTO: 4.98 K/UL (ref 4.5–11)

## 2023-04-14 PROCEDURE — 27000525 IR PICC LINE PLACEMENT W/O PORT, W/IMG > 5 Y/O

## 2023-04-14 PROCEDURE — 99232 SBSQ HOSP IP/OBS MODERATE 35: CPT | Mod: ,,, | Performed by: NURSE PRACTITIONER

## 2023-04-14 PROCEDURE — 36573 INSJ PICC RS&I 5 YR+: CPT

## 2023-04-14 PROCEDURE — 82962 GLUCOSE BLOOD TEST: CPT

## 2023-04-14 PROCEDURE — 25000003 PHARM REV CODE 250: Performed by: INTERNAL MEDICINE

## 2023-04-14 PROCEDURE — 99211 OFF/OP EST MAY X REQ PHY/QHP: CPT

## 2023-04-14 PROCEDURE — 99232 PR SUBSEQUENT HOSPITAL CARE,LEVL II: ICD-10-PCS | Mod: ,,, | Performed by: NURSE PRACTITIONER

## 2023-04-14 PROCEDURE — 25000003 PHARM REV CODE 250

## 2023-04-14 PROCEDURE — 99211 OFF/OP EST MAY X REQ PHY/QHP: CPT | Mod: PBBFAC | Performed by: SURGERY

## 2023-04-14 PROCEDURE — 82962 GLUCOSE BLOOD TEST: CPT | Mod: PBBFAC | Performed by: SURGERY

## 2023-04-14 PROCEDURE — 63600175 PHARM REV CODE 636 W HCPCS: Performed by: INTERNAL MEDICINE

## 2023-04-14 PROCEDURE — 99183 HYPERBARIC OXYGEN THERAPY: CPT | Mod: S$PBB,,, | Performed by: SURGERY

## 2023-04-14 PROCEDURE — 99183 PR HYPERBARIC OXYGEN THERAPY ATTENDANCE/SUPERVISION, PER SESSION: ICD-10-PCS | Mod: S$PBB,,, | Performed by: SURGERY

## 2023-04-14 PROCEDURE — 99499 UNLISTED E&M SERVICE: CPT | Mod: S$PBB,,, | Performed by: SURGERY

## 2023-04-14 PROCEDURE — 25000003 PHARM REV CODE 250: Performed by: FAMILY MEDICINE

## 2023-04-14 PROCEDURE — 27000525 HC TRAY ALL

## 2023-04-14 PROCEDURE — 99499 NO LOS: ICD-10-PCS | Mod: S$PBB,,, | Performed by: SURGERY

## 2023-04-14 PROCEDURE — 80053 COMPREHEN METABOLIC PANEL: CPT | Performed by: INTERNAL MEDICINE

## 2023-04-14 PROCEDURE — 63600175 PHARM REV CODE 636 W HCPCS

## 2023-04-14 PROCEDURE — 76937 US GUIDE VASCULAR ACCESS: CPT

## 2023-04-14 PROCEDURE — 99232 SBSQ HOSP IP/OBS MODERATE 35: CPT | Mod: ,,, | Performed by: FAMILY MEDICINE

## 2023-04-14 PROCEDURE — 99232 PR SUBSEQUENT HOSPITAL CARE,LEVL II: ICD-10-PCS | Mod: ,,, | Performed by: FAMILY MEDICINE

## 2023-04-14 PROCEDURE — C1751 CATH, INF, PER/CENT/MIDLINE: HCPCS

## 2023-04-14 PROCEDURE — 11000001 HC ACUTE MED/SURG PRIVATE ROOM

## 2023-04-14 PROCEDURE — 99183 HYPERBARIC OXYGEN THERAPY: CPT | Mod: PBBFAC | Performed by: SURGERY

## 2023-04-14 PROCEDURE — 99183 HYPERBARIC OXYGEN THERAPY: CPT

## 2023-04-14 PROCEDURE — 85025 COMPLETE CBC W/AUTO DIFF WBC: CPT | Performed by: INTERNAL MEDICINE

## 2023-04-14 PROCEDURE — 25000003 PHARM REV CODE 250: Performed by: NURSE PRACTITIONER

## 2023-04-14 RX ADMIN — GABAPENTIN 300 MG: 300 CAPSULE ORAL at 09:04

## 2023-04-14 RX ADMIN — ZINC SULFATE 220 MG (50 MG) CAPSULE 220 MG: CAPSULE at 08:04

## 2023-04-14 RX ADMIN — THERA TABS 1 TABLET: TAB at 08:04

## 2023-04-14 RX ADMIN — INSULIN DETEMIR 20 UNITS: 100 INJECTION, SOLUTION SUBCUTANEOUS at 10:04

## 2023-04-14 RX ADMIN — PIPERACILLIN AND TAZOBACTAM 4.5 G: 4; .5 INJECTION, POWDER, FOR SOLUTION INTRAVENOUS; PARENTERAL at 03:04

## 2023-04-14 RX ADMIN — OXYCODONE HYDROCHLORIDE AND ACETAMINOPHEN 500 MG: 500 TABLET ORAL at 09:04

## 2023-04-14 RX ADMIN — HYDRALAZINE HYDROCHLORIDE 25 MG: 25 TABLET ORAL at 09:04

## 2023-04-14 RX ADMIN — HYDROCODONE BITARTRATE AND ACETAMINOPHEN 1 TABLET: 10; 325 TABLET ORAL at 05:04

## 2023-04-14 RX ADMIN — HYDRALAZINE HYDROCHLORIDE 25 MG: 25 TABLET ORAL at 05:04

## 2023-04-14 RX ADMIN — INSULIN ASPART 4 UNITS: 100 INJECTION, SOLUTION INTRAVENOUS; SUBCUTANEOUS at 05:04

## 2023-04-14 RX ADMIN — ZINC SULFATE 220 MG (50 MG) CAPSULE 220 MG: CAPSULE at 09:04

## 2023-04-14 RX ADMIN — HYDROCODONE BITARTRATE AND ACETAMINOPHEN 1 TABLET: 10; 325 TABLET ORAL at 10:04

## 2023-04-14 RX ADMIN — GABAPENTIN 300 MG: 300 CAPSULE ORAL at 05:04

## 2023-04-14 RX ADMIN — DOCUSATE SODIUM 100 MG: 100 CAPSULE, LIQUID FILLED ORAL at 08:04

## 2023-04-14 RX ADMIN — ATORVASTATIN CALCIUM 40 MG: 40 TABLET, FILM COATED ORAL at 09:04

## 2023-04-14 RX ADMIN — AMLODIPINE BESYLATE 2.5 MG: 2.5 TABLET ORAL at 08:04

## 2023-04-14 RX ADMIN — HYDROCODONE BITARTRATE AND ACETAMINOPHEN 1 TABLET: 10; 325 TABLET ORAL at 08:04

## 2023-04-14 RX ADMIN — GABAPENTIN 300 MG: 300 CAPSULE ORAL at 04:04

## 2023-04-14 RX ADMIN — DAPTOMYCIN 1000 MG: 500 INJECTION, POWDER, LYOPHILIZED, FOR SOLUTION INTRAVENOUS at 04:04

## 2023-04-14 RX ADMIN — OXYCODONE HYDROCHLORIDE AND ACETAMINOPHEN 500 MG: 500 TABLET ORAL at 08:04

## 2023-04-14 RX ADMIN — INSULIN ASPART 3 UNITS: 100 INJECTION, SOLUTION INTRAVENOUS; SUBCUTANEOUS at 10:04

## 2023-04-14 RX ADMIN — HYDRALAZINE HYDROCHLORIDE 25 MG: 25 TABLET ORAL at 04:04

## 2023-04-14 NOTE — SUBJECTIVE & OBJECTIVE
Subjective:     HPI:  37 y.o. male admitted to [facility] with wound infection to chronic-non healing wound on the right foot. Wound is malodorous with increase in drainage.Cultures from 3/31 positive for Enterobacter cloaca and Enterococcus gallinarum. He has completed multiple courses of PO antibiotics, most recently Zyvox.     Significant PMH  Includes diabetes and hypertension. Last HgA1C 6.4  in March 23, diabetes is managed by PCP. He is due for arterial dopplers, last doppler in October 2020. Denies fever or chills.     Hospital Course:   04/07/2023 - Wound care consulted to evaluate and follow wounds . POC established today. Barriers to wound healing identified and preventive measures in place  04/10/2023 - Start HBOT in AM. Artrial doppler and three phase bone scan ordered today. Drawtex and vashe today. Bedside debridement in AM per Wound Care.   4/14/2023 - Patient agreeable to NPWT outpatient and IV antbiotics. Discussed with Dr. Simmons about POC, Set up Wound Vac with PMR and have PICC line placed today.           Scheduled Meds:   amLODIPine  2.5 mg Oral Daily    ascorbic acid (vitamin C)  500 mg Oral BID    atorvastatin  40 mg Oral QHS    DAPTOmycin (CUBICIN) IV (PEDS and ADULTS)  1,000 mg Intravenous Q24H    docusate sodium  100 mg Oral Daily    gabapentin  300 mg Oral Q8H    hydrALAZINE  25 mg Oral Q8H    HYDROcodone-acetaminophen  1 tablet Oral BID    insulin detemir U-100  20 Units Subcutaneous QHS    multivitamin  1 tablet Oral Daily    piperacillin-tazobactam (ZOSYN) IVPB  4.5 g Intravenous Q8H    zinc sulfate  220 mg Oral BID     Continuous Infusions:  PRN Meds:acetaminophen, dextrose 50%, dextrose 50%, glucagon (human recombinant), glucose, glucose, hydrALAZINE, HYDROcodone-acetaminophen, insulin aspart U-100, morphine, naloxone, ondansetron, sodium chloride 0.9%, trazodone    Review of Systems   Constitutional:  Positive for activity change. Negative for chills and fever.   Respiratory:   Negative for chest tightness and shortness of breath.    Cardiovascular:  Positive for leg swelling. Negative for chest pain and palpitations.   Musculoskeletal:  Positive for arthralgias and joint swelling.   Skin:  Positive for wound.        wound   Neurological:  Positive for weakness and numbness.   Psychiatric/Behavioral:  Negative for agitation, behavioral problems, confusion and self-injury.    Objective:     Vital Signs (Most Recent):  Temp: 96.3 °F (35.7 °C) (04/14/23 1235)  Pulse: 86 (04/14/23 1235)  Resp: 18 (04/14/23 1235)  BP: (!) 148/88 (04/14/23 1235)  SpO2: 97 % (04/14/23 1235)   Vital Signs (24h Range):  Temp:  [96.3 °F (35.7 °C)-98.8 °F (37.1 °C)] 96.3 °F (35.7 °C)  Pulse:  [69-86] 86  Resp:  [18-20] 18  SpO2:  [95 %-100 %] 97 %  BP: (131-196)/() 148/88     Weight: (!) 160.5 kg (353 lb 13.4 oz)  Body mass index is 46.68 kg/m².    Physical Exam  Vitals reviewed.   Constitutional:       Appearance: Normal appearance.   HENT:      Head: Normocephalic.   Cardiovascular:      Rate and Rhythm: Normal rate and regular rhythm.      Pulses: Normal pulses.      Heart sounds: Normal heart sounds.   Pulmonary:      Effort: Pulmonary effort is normal.      Breath sounds: Normal breath sounds.   Musculoskeletal:         General: Swelling present.      Right lower leg: Edema present.   Skin:     Findings: Erythema present.      Comments: Wound, see LDA for photos/measurements   Neurological:      Mental Status: He is alert. Mental status is at baseline.      Motor: Weakness present.

## 2023-04-14 NOTE — ASSESSMENT & PLAN NOTE
Foul smelling, draining, warmth to touch wound at the sole of the right foot   Cultures from 03/31 grew Enterobacter cloacae and enterococcus gallinarum  Cultures from December grew Enterbacter cloacnae, klebsiella pneumonaiae, staph aureus  Based on sensitivities and discussion with pharmacist Zosyn and Daptomycin would cover all of the above and would give good coverage for other possible bacteria including gram negatives/pseudomonas.

## 2023-04-14 NOTE — PROGRESS NOTES
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 Patient reports he has developed this wound over a year ago after trauma due to a nail. Infection has caused osteomylitis within the last year and he has had multiple surgeries/debridement since but the wound is non healing and keeps getting infected. He visited the wound care clinic on 03/31/23 and saw NP Yelitza Alas who realized the wound is infected, took wound cultures and started patient on Linezolid. Patient completed the one week course, cultures came back showing Enterobacter cloacae and enterococcus gallinarum resistant to multiple antibiotics therefore he was suggested to come to the hospital.     Patient denies fever, chills, nausea, vomiting, cough, palpitation, chest pain, abdominal pain, diarrhea or problems with urination. Wound is malodorous with increase in drainage in recent weeks. Patient has bilateral LE edema but has never seen a cardiologist or a nephrologist in the past.     PMHx includes Diabetes with last A1c of 6.4 on 03/2023 on [place]. Patient has had his right great toe amputated back in 2018 due to a diabetic wound. He has diabetic neuropathy with minimal sensation in both feet but intact sensation above ankle. He has neever had a diabetic eye exam. Patient has HTN and his bp ranges 135-160 systolic at home with current home regimen. Patient has been referred to sleep studies  previously but has not been compliant.    Workup so far:  ESR 69 with CRP of 2.9  WBC wnl, H/H 10.3/32.9  Cr of 1.67 with eGFRof 53          Overview/Hospital Course:  No notes on file    Interval History: No complaints.  A little happier with the food from the grill.     Review of Systems  Objective:     Vital Signs (Most Recent):  Temp: 98 °F (36.7 °C) (04/14/23 0700)  Pulse: 69 (04/14/23 0700)  Resp: 18 (04/14/23 0823)  BP: 131/85 (04/14/23 0700)  SpO2: 95 % (04/14/23 0700) Vital Signs (24h Range):  Temp:  [98 °F (36.7 °C)-98.8 °F (37.1 °C)] 98 °F (36.7 °C)  Pulse:  [69-83] 69  Resp:  [18-20] 18  SpO2:  [95 %-100 %] 95 %  BP: (131-196)/() 131/85     Weight: (!) 160.5 kg (353 lb 13.4 oz)  Body mass index is 46.68 kg/m².    Intake/Output Summary (Last 24 hours) at 4/14/2023 1109  Last data filed at 4/13/2023 1715  Gross per 24 hour   Intake 240 ml   Output --   Net 240 ml      Physical Exam  Vitals and nursing note reviewed.   Constitutional:       General: He is not in acute distress.     Appearance: Normal appearance. He is obese. He is not ill-appearing or toxic-appearing.   HENT:      Head: Normocephalic and atraumatic.      Right Ear: External ear normal.      Left Ear: External ear normal.      Nose: Nose normal. No congestion or rhinorrhea.      Mouth/Throat:      Mouth: Mucous membranes are moist.   Eyes:      General:         Right eye: No discharge.         Left eye: No discharge.      Conjunctiva/sclera: Conjunctivae normal.   Cardiovascular:      Rate and Rhythm: Normal rate and regular rhythm.      Pulses: Normal pulses.      Heart sounds: Normal heart sounds. No murmur heard.    No friction rub.   Pulmonary:      Effort: Pulmonary effort is normal. No respiratory distress.      Breath sounds: Normal breath sounds. No wheezing or rales.   Abdominal:      General: Bowel sounds are normal. There is no distension.      Palpations: Abdomen is soft.      Tenderness: There is no abdominal tenderness.  There is no guarding.   Musculoskeletal:         General: Swelling present. No tenderness. Normal range of motion.      Cervical back: Neck supple.      Right lower leg: Edema present.      Left lower leg: Edema present.        Feet:    Feet:      Right foot:      Skin integrity: Skin breakdown, erythema and warmth present.   Skin:     General: Skin is warm.      Coloration: Skin is not jaundiced.      Findings: Lesion present. No bruising.   Neurological:      Mental Status: He is alert and oriented to person, place, and time.   Psychiatric:         Behavior: Behavior normal.       Significant Labs: All pertinent labs within the past 24 hours have been reviewed.    Significant Imaging: I have reviewed all pertinent imaging results/findings within the past 24 hours.      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       Type 2 diabetes mellitus, with long-term current use of insulin  Patient's FSGs are controlled on current medication regimen.  Last A1c reviewed-   Lab Results   Component Value Date    HGBA1C 6.4 03/02/2023     Most recent fingerstick glucose reviewed- No results for input(s): POCTGLUCOSE in the last 24 hours.  Current correctional scale  Medium  Maintain anti-hyperglycemic dose as follows-   Antihyperglycemics (From admission, onward)    Start     Stop Route Frequency Ordered    04/11/23 1252  [MAR Hold - Suspended Admission]  insulin aspart U-100 injection 1-10 Units   (MAR Hold at [facility] since Fri 4/14/2023 at 1032.Hold Reason: Patient on Leave of Absence)    -- SubQ Before meals & nightly PRN 04/11/23 1153    04/08/23 2100  [MAR Hold - Suspended Admission]  insulin detemir U-100 injection 20 Units   (MAR Hold at [facility] since Fri 4/14/2023 at 1032.Hold Reason: Patient on Leave of Absence)    -- SubQ Nightly 04/08/23 0940        Hold Oral hypoglycemics while patient is in the hospital.    CKD (chronic kidney disease), stage IV  CKD  CTM      Normocytic anemia    H/H 10.3/32.9  MCV 85.7   Most likley due to anemia of chronic disease  Irone studies, ferritin, b12 and folic acid pending     HLD (hyperlipidemia)    Lipid panel was recently done outpatient  Continue home Lipitor     TEDDY (obstructive sleep apnea)  Patient has been referred to sleep studies in the past but has been non compliant  Follow up orders with Dr. Baca was placed. TEDDY and its consequences were discussed with the patient. Patient seems to be motivated to get the sleep studies done      Severe obesity (BMI >= 40)  Body mass index is 46.68 kg/m². Morbid obesity complicates all aspects of disease management from diagnostic modalities to treatment. Weight loss encouraged and health benefits explained to patient.         Bilateral lower extremity edema  Patient on norvasc  Hx of PVD  ProBNP pending   Patient can benefit from an Echo and possibly outpatient cardiology referral based on the echo    Peripheral vascular disease, unspecified    Last arterial doppler was in 2020  Patient can benefit from both venous and arterial LE doppler    HTN (hypertension)  Currently well controlled  Continue Norvasc 10 mg daily   Increased Hydralazine 25 mg BID to TID  Holding home Losartan and HCTZ for possible surgery tomorrow.   Hydralazine 10 mg IV with parameters          VTE Risk Mitigation (From admission, onward)         Ordered     [Order Hold - Suspended Admission]  Place sequential  compression device  Until discontinued        (On hold at [facility] since 04/14/23 1032)    04/07/23 0122     [Order Hold - Suspended Admission]  IP VTE HIGH RISK PATIENT  Once        (On hold at [facility] since 04/14/23 1032)    04/07/23 0122     [Order Hold - Suspended Admission]  Reason for No Pharmacological VTE Prophylaxis  Once        (On hold at [facility] since 04/14/23 1032)   Question:  Reasons:  Answer:  Physician Provided (leave comment)  Comment:  possible surgery tomorrow am    04/07/23 0122     [Order Hold - Suspended Admission]  Place sequential compression device  Until discontinued        (On hold at [facility] since 04/14/23 1032)    04/06/23 0388                Discharge Planning   WOOD:      Code Status: Full Code   Is the patient medically ready for discharge?:     Reason for patient still in hospital (select all that apply): Treatment  Discharge Plan A: Home Health, [place]                  Axel Simmons Jr, MD  Department of Hospital Medicine   [facility]

## 2023-04-14 NOTE — PROGRESS NOTES
Patient is seen on rounds in the hyperbaric chamber.  Patient tolerated his hyperbaric with no issues or problems.

## 2023-04-14 NOTE — SUBJECTIVE & OBJECTIVE
Interval History: No complaints.  A little happier with the food from the grill.     Review of Systems  Objective:     Vital Signs (Most Recent):  Temp: 98 °F (36.7 °C) (04/14/23 0700)  Pulse: 69 (04/14/23 0700)  Resp: 18 (04/14/23 0823)  BP: 131/85 (04/14/23 0700)  SpO2: 95 % (04/14/23 0700) Vital Signs (24h Range):  Temp:  [98 °F (36.7 °C)-98.8 °F (37.1 °C)] 98 °F (36.7 °C)  Pulse:  [69-83] 69  Resp:  [18-20] 18  SpO2:  [95 %-100 %] 95 %  BP: (131-196)/() 131/85     Weight: (!) 160.5 kg (353 lb 13.4 oz)  Body mass index is 46.68 kg/m².    Intake/Output Summary (Last 24 hours) at 4/14/2023 1109  Last data filed at 4/13/2023 1715  Gross per 24 hour   Intake 240 ml   Output --   Net 240 ml      Physical Exam  Vitals and nursing note reviewed.   Constitutional:       General: He is not in acute distress.     Appearance: Normal appearance. He is obese. He is not ill-appearing or toxic-appearing.   HENT:      Head: Normocephalic and atraumatic.      Right Ear: External ear normal.      Left Ear: External ear normal.      Nose: Nose normal. No congestion or rhinorrhea.      Mouth/Throat:      Mouth: Mucous membranes are moist.   Eyes:      General:         Right eye: No discharge.         Left eye: No discharge.      Conjunctiva/sclera: Conjunctivae normal.   Cardiovascular:      Rate and Rhythm: Normal rate and regular rhythm.      Pulses: Normal pulses.      Heart sounds: Normal heart sounds. No murmur heard.    No friction rub.   Pulmonary:      Effort: Pulmonary effort is normal. No respiratory distress.      Breath sounds: Normal breath sounds. No wheezing or rales.   Abdominal:      General: Bowel sounds are normal. There is no distension.      Palpations: Abdomen is soft.      Tenderness: There is no abdominal tenderness. There is no guarding.   Musculoskeletal:         General: Swelling present. No tenderness. Normal range of motion.      Cervical back: Neck supple.      Right lower leg: Edema present.       Left lower leg: Edema present.        Feet:    Feet:      Right foot:      Skin integrity: Skin breakdown, erythema and warmth present.   Skin:     General: Skin is warm.      Coloration: Skin is not jaundiced.      Findings: Lesion present. No bruising.   Neurological:      Mental Status: He is alert and oriented to person, place, and time.   Psychiatric:         Behavior: Behavior normal.       Significant Labs: All pertinent labs within the past 24 hours have been reviewed.    Significant Imaging: I have reviewed all pertinent imaging results/findings within the past 24 hours.

## 2023-04-14 NOTE — ASSESSMENT & PLAN NOTE
Patient's FSGs are controlled on current medication regimen.  Last A1c reviewed-   Lab Results   Component Value Date    HGBA1C 6.4 03/02/2023     Most recent fingerstick glucose reviewed- No results for input(s): POCTGLUCOSE in the last 24 hours.  Current correctional scale  Medium  Maintain anti-hyperglycemic dose as follows-   Antihyperglycemics (From admission, onward)    Start     Stop Route Frequency Ordered    04/11/23 1252  [MAR Hold - Suspended Admission]  insulin aspart U-100 injection 1-10 Units   (MAR Hold at [facility] since Fri 4/14/2023 at 1032.Hold Reason: Patient on Leave of Absence)    -- SubQ Before meals & nightly PRN 04/11/23 1153    04/08/23 2100  [MAR Hold - Suspended Admission]  insulin detemir U-100 injection 20 Units   (MAR Hold at [facility] since Fri 4/14/2023 at 1032.Hold Reason: Patient on Leave of Absence)    -- SubQ Nightly 04/08/23 0940        Hold Oral hypoglycemics while patient is in the hospital.

## 2023-04-14 NOTE — ASSESSMENT & PLAN NOTE
Clean wound vashe  Spray skin barrier around wound  Apply vashe moistened drawtex to wound bed, apply dry drawtex to wick drainage  Cover and secure with 4x4s, leobardo, and paper tape  Change daily and PRN for soilage  Elevate extremity when in bed  Waffle boots  Bedrest with bathroom privileges  PICC ordered today  NPWT ordered from PMR  IV antibiotics - culture positive for Enterobacter cloaca and Enterococcus gallinarum.   Tentatively, plan to d/c home with NPWT and IV antibiotics

## 2023-04-14 NOTE — PROGRESS NOTES
PICC insertion  Performed by A Adrianna RRA  Consent obtained for PICC line.  Formal timeout was called all staff present agree patient procedure.  The left upper extremity was prepped with ChloraPrep and sterile field was established.  1% lidocaine was used as local anesthetic.  Under ultrasound guidance the brachial vein was localized and accessed with a micropuncture needle.  An 018 guidewire was passed through the vein to the level superior vena cava.  A 55 cm PICC line was then placed over the wire with its tip terminating at the atrial caval junction.  The wire was removed and both ports were flushed with heparinized saline.  The PICC line was then cleaned and secured.  The patient tolerated the procedure well there were no immediate postprocedure complications.  Total fluoroscopy time was 18 seconds.   MEDICINE

## 2023-04-14 NOTE — PLAN OF CARE
Problem: Adult Inpatient Plan of Care  Goal: Readiness for Transition of Care  Outcome: Ongoing, Progressing     Problem: Diabetes Comorbidity  Goal: Blood Glucose Level Within Targeted Range  Outcome: Ongoing, Progressing     Problem: Impaired Wound Healing  Goal: Optimal Wound Healing  Outcome: Ongoing, Progressing

## 2023-04-14 NOTE — PROGRESS NOTES
[facility]  Wound Care and Hyperbarics [facility]  Progress Note    Patient Name: Jean Pierre Paulson  MRN: [id number]  Admission Date: 4/6/2023  Hospital Length of Stay: 8 days  Attending Physician: Axel Simmons Jr., MD  Primary Care Provider: Mojgan Lomeli NP         Subjective:     HPI:  37 y.o. male admitted to [facility] with wound infection to chronic-non healing wound on the right foot. Wound is malodorous with increase in drainage.Cultures from 3/31 positive for Enterobacter cloaca and Enterococcus gallinarum. He has completed multiple courses of PO antibiotics, most recently Zyvox.     Significant PMH  Includes diabetes and hypertension. Last HgA1C 6.4  in March 23, diabetes is managed by PCP. He is due for arterial dopplers, last doppler in October 2020. Denies fever or chills.     Hospital Course:   04/07/2023 - Wound care consulted to evaluate and follow wounds . POC established today. Barriers to wound healing identified and preventive measures in place  04/10/2023 - Start HBOT in AM. Artrial doppler and three phase bone scan ordered today. Drawtex and vashe today. Bedside debridement in AM per Wound Care.   4/14/2023 - Patient agreeable to NPWT outpatient and IV antbiotics. Discussed with Dr. Simmons about POC, Set up Wound Vac with PMR and have PICC line placed today.           Scheduled Meds:   amLODIPine  2.5 mg Oral Daily    ascorbic acid (vitamin C)  500 mg Oral BID    atorvastatin  40 mg Oral QHS    DAPTOmycin (CUBICIN) IV (PEDS and ADULTS)  1,000 mg Intravenous Q24H    docusate sodium  100 mg Oral Daily    gabapentin  300 mg Oral Q8H    hydrALAZINE  25 mg Oral Q8H    HYDROcodone-acetaminophen  1 tablet Oral BID    insulin detemir U-100  20 Units Subcutaneous QHS    multivitamin  1 tablet Oral Daily    piperacillin-tazobactam (ZOSYN) IVPB  4.5 g Intravenous Q8H    zinc sulfate  220 mg Oral BID     Continuous Infusions:  PRN Meds:acetaminophen, dextrose 50%, dextrose 50%,  glucagon (human recombinant), glucose, glucose, hydrALAZINE, HYDROcodone-acetaminophen, insulin aspart U-100, morphine, naloxone, ondansetron, sodium chloride 0.9%, trazodone    Review of Systems   Constitutional:  Positive for activity change. Negative for chills and fever.   Respiratory:  Negative for chest tightness and shortness of breath.    Cardiovascular:  Positive for leg swelling. Negative for chest pain and palpitations.   Musculoskeletal:  Positive for arthralgias and joint swelling.   Skin:  Positive for wound.        wound   Neurological:  Positive for weakness and numbness.   Psychiatric/Behavioral:  Negative for agitation, behavioral problems, confusion and self-injury.    Objective:     Vital Signs (Most Recent):  Temp: 96.3 °F (35.7 °C) (04/14/23 1235)  Pulse: 86 (04/14/23 1235)  Resp: 18 (04/14/23 1235)  BP: (!) 148/88 (04/14/23 1235)  SpO2: 97 % (04/14/23 1235)   Vital Signs (24h Range):  Temp:  [96.3 °F (35.7 °C)-98.8 °F (37.1 °C)] 96.3 °F (35.7 °C)  Pulse:  [69-86] 86  Resp:  [18-20] 18  SpO2:  [95 %-100 %] 97 %  BP: (131-196)/() 148/88     Weight: (!) 160.5 kg (353 lb 13.4 oz)  Body mass index is 46.68 kg/m².    Physical Exam  Vitals reviewed.   Constitutional:       Appearance: Normal appearance.   HENT:      Head: Normocephalic.   Cardiovascular:      Rate and Rhythm: Normal rate and regular rhythm.      Pulses: Normal pulses.      Heart sounds: Normal heart sounds.   Pulmonary:      Effort: Pulmonary effort is normal.      Breath sounds: Normal breath sounds.   Musculoskeletal:         General: Swelling present.      Right lower leg: Edema present.   Skin:     Findings: Erythema present.      Comments: Wound, see LDA for photos/measurements   Neurological:      Mental Status: He is alert. Mental status is at baseline.      Motor: Weakness present.           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   Tentatively, plan to d/c home with NPWT and IV antibiotics          AUGUSTUS Macedo  Wound Care and Hyperbarics [facility]  [facility]

## 2023-04-15 LAB
ALBUMIN SERPL BCP-MCNC: 2.3 G/DL (ref 3.5–5)
ALBUMIN/GLOB SERPL: 0.6 {RATIO}
ALP SERPL-CCNC: 132 U/L (ref 45–115)
ALT SERPL W P-5'-P-CCNC: 76 U/L (ref 16–61)
ANION GAP SERPL CALCULATED.3IONS-SCNC: 15 MMOL/L (ref 7–16)
ANISOCYTOSIS BLD QL SMEAR: ABNORMAL
AST SERPL W P-5'-P-CCNC: 31 U/L (ref 15–37)
BASOPHILS # BLD AUTO: 0.05 K/UL (ref 0–0.2)
BASOPHILS NFR BLD AUTO: 0.9 % (ref 0–1)
BASOPHILS NFR BLD MANUAL: 1 % (ref 0–1)
BILIRUB SERPL-MCNC: 0.3 MG/DL (ref ?–1.2)
BUN SERPL-MCNC: 28 MG/DL (ref 7–18)
BUN/CREAT SERPL: 18 (ref 6–20)
CALCIUM SERPL-MCNC: 7.9 MG/DL (ref 8.5–10.1)
CHLORIDE SERPL-SCNC: 106 MMOL/L (ref 98–107)
CO2 SERPL-SCNC: 24 MMOL/L (ref 21–32)
CREAT SERPL-MCNC: 1.54 MG/DL (ref 0.7–1.3)
CRENATED CELLS: ABNORMAL
DIFFERENTIAL METHOD BLD: ABNORMAL
EGFR (NO RACE VARIABLE) (RUSH/TITUS): 58 ML/MIN/1.73M²
EOSINOPHIL # BLD AUTO: 0.18 K/UL (ref 0–0.5)
EOSINOPHIL NFR BLD AUTO: 3.2 % (ref 1–4)
EOSINOPHIL NFR BLD MANUAL: 2 % (ref 1–4)
ERYTHROCYTE [DISTWIDTH] IN BLOOD BY AUTOMATED COUNT: 15.5 % (ref 11.5–14.5)
GLOBULIN SER-MCNC: 4.1 G/DL (ref 2–4)
GLUCOSE SERPL-MCNC: 210 MG/DL (ref 70–105)
GLUCOSE SERPL-MCNC: 248 MG/DL (ref 70–105)
GLUCOSE SERPL-MCNC: 265 MG/DL (ref 74–106)
GLUCOSE SERPL-MCNC: 278 MG/DL (ref 70–105)
HCT VFR BLD AUTO: 30.5 % (ref 40–54)
HGB BLD-MCNC: 9 G/DL (ref 13.5–18)
HYPOCHROMIA BLD QL SMEAR: ABNORMAL
IMM GRANULOCYTES # BLD AUTO: 0.02 K/UL (ref 0–0.04)
IMM GRANULOCYTES NFR BLD: 0.4 % (ref 0–0.4)
LYMPHOCYTES # BLD AUTO: 2.15 K/UL (ref 1–4.8)
LYMPHOCYTES NFR BLD AUTO: 38.3 % (ref 27–41)
LYMPHOCYTES NFR BLD MANUAL: 33 % (ref 27–41)
MCH RBC QN AUTO: 25.9 PG (ref 27–31)
MCHC RBC AUTO-ENTMCNC: 29.5 G/DL (ref 32–36)
MCV RBC AUTO: 87.9 FL (ref 80–96)
MONOCYTES # BLD AUTO: 0.51 K/UL (ref 0–0.8)
MONOCYTES NFR BLD AUTO: 9.1 % (ref 2–6)
MONOCYTES NFR BLD MANUAL: 11 % (ref 2–6)
MPC BLD CALC-MCNC: 10.1 FL (ref 9.4–12.4)
NEUTROPHILS # BLD AUTO: 2.71 K/UL (ref 1.8–7.7)
NEUTROPHILS NFR BLD AUTO: 48.1 % (ref 53–65)
NEUTS SEG NFR BLD MANUAL: 53 % (ref 50–62)
NRBC # BLD AUTO: 0 X10E3/UL
NRBC, AUTO (.00): 0 %
OVALOCYTES BLD QL SMEAR: ABNORMAL
PLATELET # BLD AUTO: 205 K/UL (ref 150–400)
PLATELET MORPHOLOGY: ABNORMAL
POTASSIUM SERPL-SCNC: 4.4 MMOL/L (ref 3.5–5.1)
PROT SERPL-MCNC: 6.4 G/DL (ref 6.4–8.2)
RBC # BLD AUTO: 3.47 M/UL (ref 4.6–6.2)
SODIUM SERPL-SCNC: 141 MMOL/L (ref 136–145)
WBC # BLD AUTO: 5.62 K/UL (ref 4.5–11)

## 2023-04-15 PROCEDURE — 85025 COMPLETE CBC W/AUTO DIFF WBC: CPT | Performed by: INTERNAL MEDICINE

## 2023-04-15 PROCEDURE — 11000001 HC ACUTE MED/SURG PRIVATE ROOM

## 2023-04-15 PROCEDURE — 99232 SBSQ HOSP IP/OBS MODERATE 35: CPT | Mod: ,,, | Performed by: FAMILY MEDICINE

## 2023-04-15 PROCEDURE — 80053 COMPREHEN METABOLIC PANEL: CPT | Performed by: INTERNAL MEDICINE

## 2023-04-15 PROCEDURE — 25000003 PHARM REV CODE 250: Performed by: NURSE PRACTITIONER

## 2023-04-15 PROCEDURE — 63600175 PHARM REV CODE 636 W HCPCS

## 2023-04-15 PROCEDURE — 25000003 PHARM REV CODE 250: Performed by: INTERNAL MEDICINE

## 2023-04-15 PROCEDURE — 63600175 PHARM REV CODE 636 W HCPCS: Performed by: INTERNAL MEDICINE

## 2023-04-15 PROCEDURE — 99232 PR SUBSEQUENT HOSPITAL CARE,LEVL II: ICD-10-PCS | Mod: ,,, | Performed by: FAMILY MEDICINE

## 2023-04-15 PROCEDURE — 25000003 PHARM REV CODE 250

## 2023-04-15 PROCEDURE — 94761 N-INVAS EAR/PLS OXIMETRY MLT: CPT

## 2023-04-15 PROCEDURE — 82962 GLUCOSE BLOOD TEST: CPT

## 2023-04-15 PROCEDURE — 25000003 PHARM REV CODE 250: Performed by: FAMILY MEDICINE

## 2023-04-15 RX ADMIN — AMLODIPINE BESYLATE 2.5 MG: 2.5 TABLET ORAL at 08:04

## 2023-04-15 RX ADMIN — PIPERACILLIN AND TAZOBACTAM 4.5 G: 4; .5 INJECTION, POWDER, FOR SOLUTION INTRAVENOUS; PARENTERAL at 05:04

## 2023-04-15 RX ADMIN — INSULIN ASPART 4 UNITS: 100 INJECTION, SOLUTION INTRAVENOUS; SUBCUTANEOUS at 12:04

## 2023-04-15 RX ADMIN — ZINC SULFATE 220 MG (50 MG) CAPSULE 220 MG: CAPSULE at 09:04

## 2023-04-15 RX ADMIN — MORPHINE SULFATE 2 MG: 2 INJECTION, SOLUTION INTRAMUSCULAR; INTRAVENOUS at 01:04

## 2023-04-15 RX ADMIN — GABAPENTIN 300 MG: 300 CAPSULE ORAL at 09:04

## 2023-04-15 RX ADMIN — PIPERACILLIN AND TAZOBACTAM 4.5 G: 4; .5 INJECTION, POWDER, FOR SOLUTION INTRAVENOUS; PARENTERAL at 08:04

## 2023-04-15 RX ADMIN — THERA TABS 1 TABLET: TAB at 09:04

## 2023-04-15 RX ADMIN — OXYCODONE HYDROCHLORIDE AND ACETAMINOPHEN 500 MG: 500 TABLET ORAL at 09:04

## 2023-04-15 RX ADMIN — GABAPENTIN 300 MG: 300 CAPSULE ORAL at 01:04

## 2023-04-15 RX ADMIN — GABAPENTIN 300 MG: 300 CAPSULE ORAL at 05:04

## 2023-04-15 RX ADMIN — PIPERACILLIN AND TAZOBACTAM 4.5 G: 4; .5 INJECTION, POWDER, FOR SOLUTION INTRAVENOUS; PARENTERAL at 01:04

## 2023-04-15 RX ADMIN — INSULIN ASPART 6 UNITS: 100 INJECTION, SOLUTION INTRAVENOUS; SUBCUTANEOUS at 06:04

## 2023-04-15 RX ADMIN — INSULIN ASPART 2 UNITS: 100 INJECTION, SOLUTION INTRAVENOUS; SUBCUTANEOUS at 09:04

## 2023-04-15 RX ADMIN — HYDRALAZINE HYDROCHLORIDE 25 MG: 25 TABLET ORAL at 09:04

## 2023-04-15 RX ADMIN — ZINC SULFATE 220 MG (50 MG) CAPSULE 220 MG: CAPSULE at 08:04

## 2023-04-15 RX ADMIN — HYDROCODONE BITARTRATE AND ACETAMINOPHEN 1 TABLET: 10; 325 TABLET ORAL at 08:04

## 2023-04-15 RX ADMIN — HYDRALAZINE HYDROCHLORIDE 25 MG: 25 TABLET ORAL at 01:04

## 2023-04-15 RX ADMIN — HYDRALAZINE HYDROCHLORIDE 25 MG: 25 TABLET ORAL at 05:04

## 2023-04-15 RX ADMIN — DAPTOMYCIN 1000 MG: 500 INJECTION, POWDER, LYOPHILIZED, FOR SOLUTION INTRAVENOUS at 04:04

## 2023-04-15 RX ADMIN — DOCUSATE SODIUM 100 MG: 100 CAPSULE, LIQUID FILLED ORAL at 08:04

## 2023-04-15 RX ADMIN — HYDROCODONE BITARTRATE AND ACETAMINOPHEN 1 TABLET: 10; 325 TABLET ORAL at 09:04

## 2023-04-15 RX ADMIN — OXYCODONE HYDROCHLORIDE AND ACETAMINOPHEN 500 MG: 500 TABLET ORAL at 08:04

## 2023-04-15 RX ADMIN — INSULIN DETEMIR 20 UNITS: 100 INJECTION, SOLUTION SUBCUTANEOUS at 09:04

## 2023-04-15 RX ADMIN — ATORVASTATIN CALCIUM 40 MG: 40 TABLET, FILM COATED ORAL at 09:04

## 2023-04-15 NOTE — PLAN OF CARE
Problem: Adult Inpatient Plan of Care  Goal: Plan of Care Review  Outcome: Ongoing, Progressing  Goal: Patient-Specific Goal (Individualized)  Outcome: Ongoing, Progressing  Goal: Absence of Hospital-Acquired Illness or Injury  Outcome: Ongoing, Progressing  Goal: Optimal Comfort and Wellbeing  Outcome: Ongoing, Progressing  Goal: Readiness for Transition of Care  Outcome: Ongoing, Progressing     Problem: Diabetes Comorbidity  Goal: Blood Glucose Level Within Targeted Range  Outcome: Ongoing, Progressing     Problem: Bariatric Environmental Safety  Goal: Safety Maintained with Care  Outcome: Ongoing, Progressing     Problem: Impaired Wound Healing  Goal: Optimal Wound Healing  Outcome: Ongoing, Progressing     Problem: Fluid and Electrolyte Imbalance (Acute Kidney Injury/Impairment)  Goal: Fluid and Electrolyte Balance  Outcome: Ongoing, Progressing     Problem: Oral Intake Inadequate (Acute Kidney Injury/Impairment)  Goal: Optimal Nutrition Intake  Outcome: Ongoing, Progressing     Problem: Renal Function Impairment (Acute Kidney Injury/Impairment)  Goal: Effective Renal Function  Outcome: Ongoing, Progressing     Problem: Gas Exchange Impaired  Goal: Optimal Gas Exchange  Outcome: Ongoing, Progressing     Problem: Fall Injury Risk  Goal: Absence of Fall and Fall-Related Injury  Outcome: Ongoing, Progressing     Problem: Infection  Goal: Absence of Infection Signs and Symptoms  Outcome: Ongoing, Progressing

## 2023-04-15 NOTE — PLAN OF CARE
Problem: Fall Injury Risk  Goal: Absence of Fall and Fall-Related Injury  Outcome: Ongoing, Progressing     Problem: Fall Injury Risk  Goal: Absence of Fall and Fall-Related Injury  Outcome: Ongoing, Progressing     Problem: Infection  Goal: Absence of Infection Signs and Symptoms  Outcome: Ongoing, Progressing     Problem: Infection  Goal: Absence of Infection Signs and Symptoms  Outcome: Ongoing, Progressing

## 2023-04-15 NOTE — SUBJECTIVE & OBJECTIVE
Interval History: Feeling ok. Trying to arrange home abx and outpatient HBO.     Review of Systems  Objective:     Vital Signs (Most Recent):  Temp: 97.2 °F (36.2 °C) (04/15/23 0800)  Pulse: 85 (04/15/23 0800)  Resp: 18 (04/15/23 0840)  BP: 135/88 (04/15/23 0800)  SpO2: 97 % (04/15/23 0800) Vital Signs (24h Range):  Temp:  [96.3 °F (35.7 °C)-98.3 °F (36.8 °C)] 97.2 °F (36.2 °C)  Pulse:  [69-88] 85  Resp:  [18-20] 18  SpO2:  [93 %-99 %] 97 %  BP: (131-161)/(79-92) 135/88     Weight: (!) 160.5 kg (353 lb 13.4 oz)  Body mass index is 46.68 kg/m².    Intake/Output Summary (Last 24 hours) at 4/15/2023 0859  Last data filed at 4/15/2023 0522  Gross per 24 hour   Intake 150 ml   Output --   Net 150 ml      Physical Exam  Vitals and nursing note reviewed.   Constitutional:       General: He is not in acute distress.     Appearance: Normal appearance. He is obese. He is not ill-appearing or toxic-appearing.   HENT:      Head: Normocephalic and atraumatic.      Right Ear: External ear normal.      Left Ear: External ear normal.      Nose: Nose normal. No congestion or rhinorrhea.      Mouth/Throat:      Mouth: Mucous membranes are moist.   Eyes:      General:         Right eye: No discharge.         Left eye: No discharge.      Conjunctiva/sclera: Conjunctivae normal.   Cardiovascular:      Rate and Rhythm: Normal rate and regular rhythm.      Pulses: Normal pulses.      Heart sounds: Normal heart sounds. No murmur heard.    No friction rub.   Pulmonary:      Effort: Pulmonary effort is normal. No respiratory distress.      Breath sounds: Normal breath sounds. No wheezing or rales.   Abdominal:      General: Bowel sounds are normal. There is no distension.      Palpations: Abdomen is soft.      Tenderness: There is no abdominal tenderness. There is no guarding.   Musculoskeletal:         General: Swelling present. No tenderness. Normal range of motion.      Cervical back: Neck supple.      Right lower leg: Edema present.       Left lower leg: Edema present.        Feet:    Feet:      Right foot:      Skin integrity: Skin breakdown, erythema and warmth present.   Skin:     General: Skin is warm.      Coloration: Skin is not jaundiced.      Findings: Lesion present. No bruising.   Neurological:      Mental Status: He is alert and oriented to person, place, and time.   Psychiatric:         Behavior: Behavior normal.       Significant Labs: All pertinent labs within the past 24 hours have been reviewed.    Significant Imaging: I have reviewed all pertinent imaging results/findings within the past 24 hours.

## 2023-04-15 NOTE — PROGRESS NOTES
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 Patient reports he has developed this wound over a year ago after trauma due to a nail. Infection has caused osteomylitis within the last year and he has had multiple surgeries/debridement since but the wound is non healing and keeps getting infected. He visited the wound care clinic on 03/31/23 and saw NP Yelitza Alas who realized the wound is infected, took wound cultures and started patient on Linezolid. Patient completed the one week course, cultures came back showing Enterobacter cloacae and enterococcus gallinarum resistant to multiple antibiotics therefore he was suggested to come to the hospital.     Patient denies fever, chills, nausea, vomiting, cough, palpitation, chest pain, abdominal pain, diarrhea or problems with urination. Wound is malodorous with increase in drainage in recent weeks. Patient has bilateral LE edema but has never seen a cardiologist or a nephrologist in the past.     PMHx includes Diabetes with last A1c of 6.4 on 03/2023 on [place]. Patient has had his right great toe amputated back in 2018 due to a diabetic wound. He has diabetic neuropathy with minimal sensation in both feet but intact sensation above ankle. He has neever had a diabetic eye exam. Patient has HTN and his bp ranges 135-160 systolic at home with current home regimen. Patient has been referred to sleep studies  previously but has not been compliant.    Workup so far:  ESR 69 with CRP of 2.9  WBC wnl, H/H 10.3/32.9  Cr of 1.67 with eGFRof 53          Overview/Hospital Course:  No notes on file    Interval History: Feeling ok. Trying to arrange home abx and outpatient HBO.     Review of Systems  Objective:     Vital Signs (Most Recent):  Temp: 97.2 °F (36.2 °C) (04/15/23 0800)  Pulse: 85 (04/15/23 0800)  Resp: 18 (04/15/23 0840)  BP: 135/88 (04/15/23 0800)  SpO2: 97 % (04/15/23 0800) Vital Signs (24h Range):  Temp:  [96.3 °F (35.7 °C)-98.3 °F (36.8 °C)] 97.2 °F (36.2 °C)  Pulse:  [69-88] 85  Resp:  [18-20] 18  SpO2:  [93 %-99 %] 97 %  BP: (131-161)/(79-92) 135/88     Weight: (!) 160.5 kg (353 lb 13.4 oz)  Body mass index is 46.68 kg/m².    Intake/Output Summary (Last 24 hours) at 4/15/2023 0859  Last data filed at 4/15/2023 0522  Gross per 24 hour   Intake 150 ml   Output --   Net 150 ml      Physical Exam  Vitals and nursing note reviewed.   Constitutional:       General: He is not in acute distress.     Appearance: Normal appearance. He is obese. He is not ill-appearing or toxic-appearing.   HENT:      Head: Normocephalic and atraumatic.      Right Ear: External ear normal.      Left Ear: External ear normal.      Nose: Nose normal. No congestion or rhinorrhea.      Mouth/Throat:      Mouth: Mucous membranes are moist.   Eyes:      General:         Right eye: No discharge.         Left eye: No discharge.      Conjunctiva/sclera: Conjunctivae normal.   Cardiovascular:      Rate and Rhythm: Normal rate and regular rhythm.      Pulses: Normal pulses.      Heart sounds: Normal heart sounds. No murmur heard.    No friction rub.   Pulmonary:      Effort: Pulmonary effort is normal. No respiratory distress.      Breath sounds: Normal breath sounds. No wheezing or rales.   Abdominal:      General: Bowel sounds are normal. There is no distension.      Palpations: Abdomen is soft.      Tenderness: There is no abdominal tenderness.  There is no guarding.   Musculoskeletal:         General: Swelling present. No tenderness. Normal range of motion.      Cervical back: Neck supple.      Right lower leg: Edema present.      Left lower leg: Edema present.        Feet:    Feet:      Right foot:      Skin integrity: Skin breakdown, erythema and warmth present.   Skin:     General: Skin is warm.      Coloration: Skin is not jaundiced.      Findings: Lesion present. No bruising.   Neurological:      Mental Status: He is alert and oriented to person, place, and time.   Psychiatric:         Behavior: Behavior normal.       Significant Labs: All pertinent labs within the past 24 hours have been reviewed.    Significant Imaging: I have reviewed all pertinent imaging results/findings within the past 24 hours.      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       Type 2 diabetes mellitus, with long-term current use of insulin  Patient's FSGs are controlled on current medication regimen.  Last A1c reviewed-   Lab Results   Component Value Date    HGBA1C 6.4 03/02/2023     Most recent fingerstick glucose reviewed- No results for input(s): POCTGLUCOSE in the last 24 hours.  Current correctional scale  Medium  Maintain anti-hyperglycemic dose as follows-   Antihyperglycemics (From admission, onward)    Start     Stop Route Frequency Ordered    04/11/23 1252  [MAR Hold - Suspended Admission]  insulin aspart U-100 injection 1-10 Units   (MAR Hold at [facility] since Fri 4/14/2023 at 1032.Hold Reason: Patient on Leave of Absence)    -- SubQ Before meals & nightly PRN 04/11/23 1153    04/08/23 2100  [MAR Hold - Suspended Admission]  insulin detemir U-100 injection 20 Units   (MAR Hold at [facility] since Fri 4/14/2023 at 1032.Hold Reason: Patient on Leave of Absence)    -- SubQ Nightly 04/08/23 0940        Hold Oral hypoglycemics while patient is in the hospital.    CKD (chronic kidney disease), stage IV  CKD  CTM      Normocytic anemia    H/H 10.3/32.9  MCV 85.7   Most likley due to anemia of chronic disease  Irone studies, ferritin, b12 and folic acid pending     HLD (hyperlipidemia)    Lipid panel was recently done outpatient  Continue home Lipitor     TEDDY (obstructive sleep apnea)  Patient has been referred to sleep studies in the past but has been non compliant  Follow up orders with Dr. Baca was placed. TEDDY and its consequences were discussed with the patient. Patient seems to be motivated to get the sleep studies done      Severe obesity (BMI >= 40)  Body mass index is 46.68 kg/m². Morbid obesity complicates all aspects of disease management from diagnostic modalities to treatment. Weight loss encouraged and health benefits explained to patient.         Bilateral lower extremity edema  Patient on norvasc  Hx of PVD  ProBNP pending   Patient can benefit from an Echo and possibly outpatient cardiology referral based on the echo    Peripheral vascular disease, unspecified    Last arterial doppler was in 2020  Patient can benefit from both venous and arterial LE doppler    HTN (hypertension)  Currently well controlled  Continue Norvasc 10 mg daily   Increased Hydralazine 25 mg BID to TID  Holding home Losartan and HCTZ for possible surgery tomorrow.   Hydralazine 10 mg IV with parameters          VTE Risk Mitigation (From admission, onward)         Ordered     Place sequential compression device  Until discontinued          04/07/23 0122     IP VTE HIGH RISK PATIENT  Once         04/07/23 0122     Reason for No Pharmacological VTE Prophylaxis  Once        Question:  Reasons:  Answer:  Physician Provided (leave comment)  Comment:  possible surgery tomorrow am    04/07/23 0122     Place sequential compression device  Until discontinued         04/06/23 3517                Discharge Planning   WOOD:      Code Status: Prior   Is the patient medically ready for discharge?:     Reason for patient still in hospital (select all that apply): Treatment  Discharge Plan A: Home Health, Home                  Axel Simmons Jr, MD  Department of Hospital Medicine   [facility]

## 2023-04-16 LAB
ALBUMIN SERPL BCP-MCNC: 2.6 G/DL (ref 3.5–5)
ALBUMIN/GLOB SERPL: 0.7 {RATIO}
ALP SERPL-CCNC: 136 U/L (ref 45–115)
ALT SERPL W P-5'-P-CCNC: 70 U/L (ref 16–61)
ANION GAP SERPL CALCULATED.3IONS-SCNC: 9 MMOL/L (ref 7–16)
ANISOCYTOSIS BLD QL SMEAR: ABNORMAL
AST SERPL W P-5'-P-CCNC: 28 U/L (ref 15–37)
BASOPHILS # BLD AUTO: 0.03 K/UL (ref 0–0.2)
BASOPHILS NFR BLD AUTO: 0.5 % (ref 0–1)
BASOPHILS NFR BLD MANUAL: 1 % (ref 0–1)
BILIRUB SERPL-MCNC: 0.4 MG/DL (ref ?–1.2)
BUN SERPL-MCNC: 31 MG/DL (ref 7–18)
BUN/CREAT SERPL: 22 (ref 6–20)
CALCIUM SERPL-MCNC: 8.7 MG/DL (ref 8.5–10.1)
CHLORIDE SERPL-SCNC: 106 MMOL/L (ref 98–107)
CO2 SERPL-SCNC: 29 MMOL/L (ref 21–32)
CREAT SERPL-MCNC: 1.38 MG/DL (ref 0.7–1.3)
DIFFERENTIAL METHOD BLD: ABNORMAL
EGFR (NO RACE VARIABLE) (RUSH/TITUS): 67 ML/MIN/1.73M²
EOSINOPHIL # BLD AUTO: 0.23 K/UL (ref 0–0.5)
EOSINOPHIL NFR BLD AUTO: 4.2 % (ref 1–4)
EOSINOPHIL NFR BLD MANUAL: 7 % (ref 1–4)
ERYTHROCYTE [DISTWIDTH] IN BLOOD BY AUTOMATED COUNT: 15.3 % (ref 11.5–14.5)
GLOBULIN SER-MCNC: 4 G/DL (ref 2–4)
GLUCOSE SERPL-MCNC: 181 MG/DL (ref 70–105)
GLUCOSE SERPL-MCNC: 187 MG/DL (ref 74–106)
GLUCOSE SERPL-MCNC: 193 MG/DL (ref 70–105)
GLUCOSE SERPL-MCNC: 212 MG/DL (ref 70–105)
GLUCOSE SERPL-MCNC: 223 MG/DL (ref 70–105)
GLUCOSE SERPL-MCNC: 255 MG/DL (ref 70–105)
HCT VFR BLD AUTO: 32.9 % (ref 40–54)
HGB BLD-MCNC: 9.9 G/DL (ref 13.5–18)
IMM GRANULOCYTES # BLD AUTO: 0.01 K/UL (ref 0–0.04)
IMM GRANULOCYTES NFR BLD: 0.2 % (ref 0–0.4)
LYMPHOCYTES # BLD AUTO: 2.32 K/UL (ref 1–4.8)
LYMPHOCYTES NFR BLD AUTO: 41.9 % (ref 27–41)
LYMPHOCYTES NFR BLD MANUAL: 35 % (ref 27–41)
MCH RBC QN AUTO: 25.9 PG (ref 27–31)
MCHC RBC AUTO-ENTMCNC: 30.1 G/DL (ref 32–36)
MCV RBC AUTO: 86.1 FL (ref 80–96)
MONOCYTES # BLD AUTO: 0.58 K/UL (ref 0–0.8)
MONOCYTES NFR BLD AUTO: 10.5 % (ref 2–6)
MONOCYTES NFR BLD MANUAL: 7 % (ref 2–6)
MPC BLD CALC-MCNC: 9.6 FL (ref 9.4–12.4)
NEUTROPHILS # BLD AUTO: 2.37 K/UL (ref 1.8–7.7)
NEUTROPHILS NFR BLD AUTO: 42.7 % (ref 53–65)
NEUTS SEG NFR BLD MANUAL: 50 % (ref 50–62)
NRBC # BLD AUTO: 0 X10E3/UL
NRBC, AUTO (.00): 0 %
PLATELET # BLD AUTO: 209 K/UL (ref 150–400)
PLATELET MORPHOLOGY: NORMAL
POTASSIUM SERPL-SCNC: 4.6 MMOL/L (ref 3.5–5.1)
PROT SERPL-MCNC: 6.6 G/DL (ref 6.4–8.2)
RBC # BLD AUTO: 3.82 M/UL (ref 4.6–6.2)
SODIUM SERPL-SCNC: 139 MMOL/L (ref 136–145)
WBC # BLD AUTO: 5.54 K/UL (ref 4.5–11)

## 2023-04-16 PROCEDURE — 63600175 PHARM REV CODE 636 W HCPCS: Performed by: INTERNAL MEDICINE

## 2023-04-16 PROCEDURE — 63600175 PHARM REV CODE 636 W HCPCS

## 2023-04-16 PROCEDURE — 11000001 HC ACUTE MED/SURG PRIVATE ROOM

## 2023-04-16 PROCEDURE — 25000003 PHARM REV CODE 250: Performed by: FAMILY MEDICINE

## 2023-04-16 PROCEDURE — 99232 SBSQ HOSP IP/OBS MODERATE 35: CPT | Mod: ,,, | Performed by: FAMILY MEDICINE

## 2023-04-16 PROCEDURE — 25000003 PHARM REV CODE 250: Performed by: INTERNAL MEDICINE

## 2023-04-16 PROCEDURE — 82962 GLUCOSE BLOOD TEST: CPT

## 2023-04-16 PROCEDURE — 99232 PR SUBSEQUENT HOSPITAL CARE,LEVL II: ICD-10-PCS | Mod: ,,, | Performed by: FAMILY MEDICINE

## 2023-04-16 PROCEDURE — 25000003 PHARM REV CODE 250

## 2023-04-16 PROCEDURE — 94761 N-INVAS EAR/PLS OXIMETRY MLT: CPT

## 2023-04-16 PROCEDURE — 85025 COMPLETE CBC W/AUTO DIFF WBC: CPT | Performed by: INTERNAL MEDICINE

## 2023-04-16 PROCEDURE — 25000003 PHARM REV CODE 250: Performed by: NURSE PRACTITIONER

## 2023-04-16 PROCEDURE — 80053 COMPREHEN METABOLIC PANEL: CPT | Performed by: INTERNAL MEDICINE

## 2023-04-16 RX ADMIN — HYDRALAZINE HYDROCHLORIDE 25 MG: 25 TABLET ORAL at 02:04

## 2023-04-16 RX ADMIN — ZINC SULFATE 220 MG (50 MG) CAPSULE 220 MG: CAPSULE at 09:04

## 2023-04-16 RX ADMIN — INSULIN ASPART 4 UNITS: 100 INJECTION, SOLUTION INTRAVENOUS; SUBCUTANEOUS at 05:04

## 2023-04-16 RX ADMIN — DAPTOMYCIN 1000 MG: 500 INJECTION, POWDER, LYOPHILIZED, FOR SOLUTION INTRAVENOUS at 04:04

## 2023-04-16 RX ADMIN — PIPERACILLIN AND TAZOBACTAM 4.5 G: 4; .5 INJECTION, POWDER, FOR SOLUTION INTRAVENOUS; PARENTERAL at 01:04

## 2023-04-16 RX ADMIN — DOCUSATE SODIUM 100 MG: 100 CAPSULE, LIQUID FILLED ORAL at 09:04

## 2023-04-16 RX ADMIN — HYDRALAZINE HYDROCHLORIDE 25 MG: 25 TABLET ORAL at 09:04

## 2023-04-16 RX ADMIN — OXYCODONE HYDROCHLORIDE AND ACETAMINOPHEN 500 MG: 500 TABLET ORAL at 09:04

## 2023-04-16 RX ADMIN — MORPHINE SULFATE 2 MG: 2 INJECTION, SOLUTION INTRAMUSCULAR; INTRAVENOUS at 06:04

## 2023-04-16 RX ADMIN — GABAPENTIN 300 MG: 300 CAPSULE ORAL at 06:04

## 2023-04-16 RX ADMIN — GABAPENTIN 300 MG: 300 CAPSULE ORAL at 09:04

## 2023-04-16 RX ADMIN — AMLODIPINE BESYLATE 2.5 MG: 2.5 TABLET ORAL at 09:04

## 2023-04-16 RX ADMIN — PIPERACILLIN AND TAZOBACTAM 4.5 G: 4; .5 INJECTION, POWDER, FOR SOLUTION INTRAVENOUS; PARENTERAL at 09:04

## 2023-04-16 RX ADMIN — HYDRALAZINE HYDROCHLORIDE 25 MG: 25 TABLET ORAL at 06:04

## 2023-04-16 RX ADMIN — THERA TABS 1 TABLET: TAB at 09:04

## 2023-04-16 RX ADMIN — HYDROCODONE BITARTRATE AND ACETAMINOPHEN 1 TABLET: 10; 325 TABLET ORAL at 09:04

## 2023-04-16 RX ADMIN — ATORVASTATIN CALCIUM 40 MG: 40 TABLET, FILM COATED ORAL at 09:04

## 2023-04-16 RX ADMIN — INSULIN ASPART 2 UNITS: 100 INJECTION, SOLUTION INTRAVENOUS; SUBCUTANEOUS at 06:04

## 2023-04-16 RX ADMIN — INSULIN ASPART 2 UNITS: 100 INJECTION, SOLUTION INTRAVENOUS; SUBCUTANEOUS at 11:04

## 2023-04-16 RX ADMIN — INSULIN DETEMIR 20 UNITS: 100 INJECTION, SOLUTION SUBCUTANEOUS at 09:04

## 2023-04-16 RX ADMIN — INSULIN ASPART 2 UNITS: 100 INJECTION, SOLUTION INTRAVENOUS; SUBCUTANEOUS at 09:04

## 2023-04-16 RX ADMIN — GABAPENTIN 300 MG: 300 CAPSULE ORAL at 02:04

## 2023-04-16 RX ADMIN — PIPERACILLIN AND TAZOBACTAM 4.5 G: 4; .5 INJECTION, POWDER, FOR SOLUTION INTRAVENOUS; PARENTERAL at 04:04

## 2023-04-16 NOTE — PROGRESS NOTES
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 Patient reports he has developed this wound over a year ago after trauma due to a nail. Infection has caused osteomylitis within the last year and he has had multiple surgeries/debridement since but the wound is non healing and keeps getting infected. He visited the wound care clinic on 03/31/23 and saw NP Yelitza Alas who realized the wound is infected, took wound cultures and started patient on Linezolid. Patient completed the one week course, cultures came back showing Enterobacter cloacae and enterococcus gallinarum resistant to multiple antibiotics therefore he was suggested to come to the hospital.     Patient denies fever, chills, nausea, vomiting, cough, palpitation, chest pain, abdominal pain, diarrhea or problems with urination. Wound is malodorous with increase in drainage in recent weeks. Patient has bilateral LE edema but has never seen a cardiologist or a nephrologist in the past.     PMHx includes Diabetes with last A1c of 6.4 on 03/2023 on [place]. Patient has had his right great toe amputated back in 2018 due to a diabetic wound. He has diabetic neuropathy with minimal sensation in both feet but intact sensation above ankle. He has neever had a diabetic eye exam. Patient has HTN and his bp ranges 135-160 systolic at home with current home regimen. Patient has been referred to sleep studies  previously but has not been compliant.    Workup so far:  ESR 69 with CRP of 2.9  WBC wnl, H/H 10.3/32.9  Cr of 1.67 with eGFRof 53          Overview/Hospital Course:  No notes on file    Interval History: Feels well.  Eager to go home Monday if home abx can be arranged.     Review of Systems  Objective:     Vital Signs (Most Recent):  Temp: 98.1 °F (36.7 °C) (04/16/23 1144)  Pulse: 78 (04/16/23 1144)  Resp: 18 (04/16/23 1144)  BP: (!) 154/89 (04/16/23 1144)  SpO2: 95 % (04/16/23 1144)   Vital Signs (24h Range):  Temp:  [97.5 °F (36.4 °C)-98.1 °F (36.7 °C)] 98.1 °F (36.7 °C)  Pulse:  [78-96] 78  Resp:  [18-20] 18  SpO2:  [94 %-97 %] 95 %  BP: (133-166)/(79-95) 154/89     Weight: (!) 160.5 kg (353 lb 13.4 oz)  Body mass index is 46.68 kg/m².    Intake/Output Summary (Last 24 hours) at 4/16/2023 1231  Last data filed at 4/16/2023 0522  Gross per 24 hour   Intake 1650 ml   Output --   Net 1650 ml      Physical Exam  Vitals and nursing note reviewed.   Constitutional:       General: He is not in acute distress.     Appearance: Normal appearance. He is obese. He is not ill-appearing or toxic-appearing.   HENT:      Head: Normocephalic and atraumatic.      Right Ear: External ear normal.      Left Ear: External ear normal.      Nose: Nose normal. No congestion or rhinorrhea.      Mouth/Throat:      Mouth: Mucous membranes are moist.   Eyes:      General:         Right eye: No discharge.         Left eye: No discharge.      Conjunctiva/sclera: Conjunctivae normal.   Cardiovascular:      Rate and Rhythm: Normal rate and regular rhythm.      Pulses: Normal pulses.      Heart sounds: Normal heart sounds. No murmur heard.    No friction rub.   Pulmonary:      Effort: Pulmonary effort is normal. No respiratory distress.      Breath sounds: Normal breath sounds. No wheezing or rales.   Abdominal:      General: Bowel sounds are normal. There is no distension.      Palpations: Abdomen is soft.      Tenderness: There is no  abdominal tenderness. There is no guarding.   Musculoskeletal:         General: Swelling present. No tenderness. Normal range of motion.      Cervical back: Neck supple.      Right lower leg: Edema present.      Left lower leg: Edema present.        Feet:    Feet:      Right foot:      Skin integrity: Skin breakdown, erythema and warmth present.   Skin:     General: Skin is warm.      Coloration: Skin is not jaundiced.      Findings: Lesion present. No bruising.   Neurological:      Mental Status: He is alert and oriented to person, place, and time.   Psychiatric:         Behavior: Behavior normal.       Significant Labs: All pertinent labs within the past 24 hours have been reviewed.    Significant Imaging: I have reviewed all pertinent imaging results/findings within the past 24 hours.      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       Type 2 diabetes mellitus, with long-term current use of insulin  Patient's FSGs are controlled on current medication regimen.  Last A1c reviewed-   Lab Results   Component Value Date    HGBA1C 6.4 03/02/2023     Most recent fingerstick glucose reviewed- No results for input(s): POCTGLUCOSE in the last 24 hours.  Current correctional scale  Medium  Maintain anti-hyperglycemic dose as follows-   Antihyperglycemics (From admission, onward)    Start     Stop Route Frequency Ordered    04/11/23 1252  [MAR Hold - Suspended Admission]  insulin aspart U-100 injection 1-10 Units   (MAR Hold  at [facility] since Fri 4/14/2023 at 1032.Hold Reason: Patient on Leave of Absence)    -- SubQ Before meals & nightly PRN 04/11/23 1153    04/08/23 2100  [MAR Hold - Suspended Admission]  insulin detemir U-100 injection 20 Units   (MAR Hold at [facility] since Fri 4/14/2023 at 1032.Hold Reason: Patient on Leave of Absence)    -- SubQ Nightly 04/08/23 0940        Hold Oral hypoglycemics while patient is in the hospital.    CKD (chronic kidney disease), stage IV  CKD  CTM      Normocytic anemia    H/H 10.3/32.9  MCV 85.7   Most likley due to anemia of chronic disease  Irone studies, ferritin, b12 and folic acid pending     HLD (hyperlipidemia)    Lipid panel was recently done outpatient  Continue home Lipitor     TEDDY (obstructive sleep apnea)  Patient has been referred to sleep studies in the past but has been non compliant  Follow up orders with Dr. Baca was placed. TEDDY and its consequences were discussed with the patient. Patient seems to be motivated to get the sleep studies done      Severe obesity (BMI >= 40)  Body mass index is 46.68 kg/m². Morbid obesity complicates all aspects of disease management from diagnostic modalities to treatment. Weight loss encouraged and health benefits explained to patient.         Bilateral lower extremity edema  Patient on norvasc  Hx of PVD  ProBNP pending   Patient can benefit from an Echo and possibly outpatient cardiology referral based on the echo    Peripheral vascular disease, unspecified    Last arterial doppler was in 2020  Patient can benefit from both venous and arterial LE doppler    HTN (hypertension)  Currently well controlled  Continue Norvasc 10 mg daily   Increased Hydralazine 25 mg BID to TID  Holding home Losartan and HCTZ for possible surgery tomorrow.   Hydralazine 10 mg IV with parameters          VTE Risk Mitigation (From admission, onward)         Ordered     Place sequential compression device   Until discontinued         04/07/23 0122     IP VTE HIGH RISK PATIENT  Once         04/07/23 0122     Reason for No Pharmacological VTE Prophylaxis  Once        Question:  Reasons:  Answer:  Physician Provided (leave comment)  Comment:  possible surgery tomorrow am    04/07/23 0122     Place sequential compression device  Until discontinued         04/06/23 4386                Discharge Planning   WOOD:      Code Status: Prior   Is the patient medically ready for discharge?:     Reason for patient still in hospital (select all that apply): Treatment  Discharge Plan A: Home Health, Home                  Axel Simmons Jr, MD  Department of Hospital Medicine   [facility]

## 2023-04-16 NOTE — PROGRESS NOTES
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 Patient reports he has developed this wound over a year ago after trauma due to a nail. Infection has caused osteomylitis within the last year and he has had multiple surgeries/debridement since but the wound is non healing and keeps getting infected. He visited the wound care clinic on 03/31/23 and saw NP Yelitza Alas who realized the wound is infected, took wound cultures and started patient on Linezolid. Patient completed the one week course, cultures came back showing Enterobacter cloacae and enterococcus gallinarum resistant to multiple antibiotics therefore he was suggested to come to the hospital.     Patient denies fever, chills, nausea, vomiting, cough, palpitation, chest pain, abdominal pain, diarrhea or problems with urination. Wound is malodorous with increase in drainage in recent weeks. Patient has bilateral LE edema but has never seen a cardiologist or a nephrologist in the past.     PMHx includes Diabetes with last A1c of 6.4 on 03/2023 on [place]. Patient has had his right great toe amputated back in 2018 due to a diabetic wound. He has diabetic neuropathy with minimal sensation in both feet but intact sensation above ankle. He has neever had a diabetic eye exam. Patient has HTN and his bp ranges 135-160 systolic at home with current home regimen. Patient has been referred to sleep studies  previously but has not been compliant.    Workup so far:  ESR 69 with CRP of 2.9  WBC wnl, H/H 10.3/32.9  Cr of 1.67 with eGFRof 53          Overview/Hospital Course:  No notes on file    Interval History: Feeling well. Hopeful can finish tx as outpatient.  Will see if we can arrange outpt abx tomorrow.  Spoke with wound care Fri. They can dive as outpatient.     Review of Systems  Objective:     Vital Signs (Most Recent):  Temp: 98.1 °F (36.7 °C) (04/16/23 1144)  Pulse: 78 (04/16/23 1144)  Resp: 18 (04/16/23 1144)  BP: (!) 154/89 (04/16/23 1144)  SpO2: 95 % (04/16/23 1144)   Vital Signs (24h Range):  Temp:  [97.5 °F (36.4 °C)-98.1 °F (36.7 °C)] 98.1 °F (36.7 °C)  Pulse:  [78-96] 78  Resp:  [18-20] 18  SpO2:  [94 %-97 %] 95 %  BP: (133-166)/(79-95) 154/89     Weight: (!) 160.5 kg (353 lb 13.4 oz)  Body mass index is 46.68 kg/m².    Intake/Output Summary (Last 24 hours) at 4/16/2023 1325  Last data filed at 4/16/2023 0522  Gross per 24 hour   Intake 1468.75 ml   Output --   Net 1468.75 ml      Physical Exam  Vitals reviewed.   Constitutional:       General: He is not in acute distress.     Appearance: He is not toxic-appearing.   Cardiovascular:      Rate and Rhythm: Normal rate and regular rhythm.      Heart sounds: Normal heart sounds.   Pulmonary:      Effort: Pulmonary effort is normal. No respiratory distress.      Breath sounds: Normal breath sounds. No wheezing.   Abdominal:      General: Bowel sounds are normal. There is no distension.      Palpations: Abdomen is soft.      Tenderness: There is no abdominal tenderness.   Skin:     General: Skin is warm and dry.   Neurological:      Mental Status: He is alert and oriented to person, place, and time. Mental status is at baseline.       Significant Labs: All pertinent labs within the past 24 hours have been reviewed.  BMP:   Recent Labs   Lab 04/16/23  0532   *      K 4.6      CO2 29   BUN 31*   CREATININE 1.38*   CALCIUM 8.7     CBC:   Recent  Labs   Lab 04/15/23  0435 04/16/23  0532   WBC 5.62 5.54   HGB 9.0* 9.9*   HCT 30.5* 32.9*    209       Significant Imaging: I have reviewed all pertinent imaging results/findings within the past 24 hours.      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       Type 2 diabetes mellitus, with long-term current use of insulin  Patient's FSGs are controlled on current medication regimen.  Last A1c reviewed-   Lab Results   Component Value Date    HGBA1C 6.4 03/02/2023     Most recent fingerstick glucose reviewed- No results for input(s): POCTGLUCOSE in the last 24 hours.  Current correctional scale  Medium  Maintain anti-hyperglycemic dose as follows-   Antihyperglycemics (From admission, onward)    Start     Stop Route Frequency Ordered    04/11/23 1252  [MAR Hold - Suspended Admission]  insulin aspart U-100 injection 1-10 Units   (MAR Hold at [facility] since Fri 4/14/2023 at 1032.Hold Reason: Patient on Leave of Absence)    -- SubQ Before meals & nightly PRN 04/11/23 1153    04/08/23 2100  [MAR Hold - Suspended Admission]  insulin detemir U-100 injection 20 Units   (MAR Hold at [facility] since Fri 4/14/2023 at 1032.Hold Reason: Patient on Leave of Absence)    -- SubQ Nightly 04/08/23 0940        Hold Oral hypoglycemics while patient is in the hospital.    CKD (chronic kidney disease), stage IV  CKD  CTM      Normocytic anemia    H/H 10.3/32.9  MCV 85.7    Most likley due to anemia of chronic disease  Irone studies, ferritin, b12 and folic acid pending     HLD (hyperlipidemia)    Lipid panel was recently done outpatient  Continue home Lipitor     TEDDY (obstructive sleep apnea)  Patient has been referred to sleep studies in the past but has been non compliant  Follow up orders with Dr. Baca was placed. TEDDY and its consequences were discussed with the patient. Patient seems to be motivated to get the sleep studies done      Severe obesity (BMI >= 40)  Body mass index is 46.68 kg/m². Morbid obesity complicates all aspects of disease management from diagnostic modalities to treatment. Weight loss encouraged and health benefits explained to patient.         Bilateral lower extremity edema  Patient on norvasc  Hx of PVD  ProBNP pending   Patient can benefit from an Echo and possibly outpatient cardiology referral based on the echo    Peripheral vascular disease, unspecified    Last arterial doppler was in 2020  Patient can benefit from both venous and arterial LE doppler    HTN (hypertension)  Currently well controlled  Continue Norvasc 10 mg daily   Increased Hydralazine 25 mg BID to TID  Holding home Losartan and HCTZ for possible surgery tomorrow.   Hydralazine 10 mg IV with parameters          VTE Risk Mitigation (From admission, onward)         Ordered     Place sequential compression device  Until discontinued         04/07/23 0122     IP VTE HIGH RISK PATIENT  Once         04/07/23 0122     Reason for No Pharmacological VTE Prophylaxis  Once        Question:  Reasons:  Answer:  Physician Provided (leave comment)  Comment:  possible surgery tomorrow am    04/07/23 0122     Place sequential compression device  Until discontinued         04/06/23 6148                Discharge Planning   WOOD:      Code Status: Prior   Is the patient medically ready for discharge?:     Reason for patient still in hospital (select all that apply): Treatment  Discharge Plan A: Home  [facility] [place]                  Axel Simmons Jr, MD  Department of Hospital Medicine   [facility]

## 2023-04-16 NOTE — SUBJECTIVE & OBJECTIVE
Interval History: Feels well.  Eager to go home Monday if home abx can be arranged.     Review of Systems  Objective:     Vital Signs (Most Recent):  Temp: 98.1 °F (36.7 °C) (04/16/23 1144)  Pulse: 78 (04/16/23 1144)  Resp: 18 (04/16/23 1144)  BP: (!) 154/89 (04/16/23 1144)  SpO2: 95 % (04/16/23 1144)   Vital Signs (24h Range):  Temp:  [97.5 °F (36.4 °C)-98.1 °F (36.7 °C)] 98.1 °F (36.7 °C)  Pulse:  [78-96] 78  Resp:  [18-20] 18  SpO2:  [94 %-97 %] 95 %  BP: (133-166)/(79-95) 154/89     Weight: (!) 160.5 kg (353 lb 13.4 oz)  Body mass index is 46.68 kg/m².    Intake/Output Summary (Last 24 hours) at 4/16/2023 1231  Last data filed at 4/16/2023 0522  Gross per 24 hour   Intake 1650 ml   Output --   Net 1650 ml      Physical Exam  Vitals and nursing note reviewed.   Constitutional:       General: He is not in acute distress.     Appearance: Normal appearance. He is obese. He is not ill-appearing or toxic-appearing.   HENT:      Head: Normocephalic and atraumatic.      Right Ear: External ear normal.      Left Ear: External ear normal.      Nose: Nose normal. No congestion or rhinorrhea.      Mouth/Throat:      Mouth: Mucous membranes are moist.   Eyes:      General:         Right eye: No discharge.         Left eye: No discharge.      Conjunctiva/sclera: Conjunctivae normal.   Cardiovascular:      Rate and Rhythm: Normal rate and regular rhythm.      Pulses: Normal pulses.      Heart sounds: Normal heart sounds. No murmur heard.    No friction rub.   Pulmonary:      Effort: Pulmonary effort is normal. No respiratory distress.      Breath sounds: Normal breath sounds. No wheezing or rales.   Abdominal:      General: Bowel sounds are normal. There is no distension.      Palpations: Abdomen is soft.      Tenderness: There is no abdominal tenderness. There is no guarding.   Musculoskeletal:         General: Swelling present. No tenderness. Normal range of motion.      Cervical back: Neck supple.      Right lower leg:  Edema present.      Left lower leg: Edema present.        Feet:    Feet:      Right foot:      Skin integrity: Skin breakdown, erythema and warmth present.   Skin:     General: Skin is warm.      Coloration: Skin is not jaundiced.      Findings: Lesion present. No bruising.   Neurological:      Mental Status: He is alert and oriented to person, place, and time.   Psychiatric:         Behavior: Behavior normal.       Significant Labs: All pertinent labs within the past 24 hours have been reviewed.    Significant Imaging: I have reviewed all pertinent imaging results/findings within the past 24 hours.

## 2023-04-16 NOTE — PLAN OF CARE
Problem: Diabetes Comorbidity  Goal: Blood Glucose Level Within Targeted Range  Outcome: Ongoing, Progressing     Problem: Diabetes Comorbidity  Goal: Blood Glucose Level Within Targeted Range  Outcome: Ongoing, Progressing     Problem: Bariatric Environmental Safety  Goal: Safety Maintained with Care  Outcome: Ongoing, Progressing     Problem: Bariatric Environmental Safety  Goal: Safety Maintained with Care  Outcome: Ongoing, Progressing     Problem: Impaired Wound Healing  Goal: Optimal Wound Healing  Outcome: Ongoing, Progressing     Problem: Impaired Wound Healing  Goal: Optimal Wound Healing  Outcome: Ongoing, Progressing

## 2023-04-16 NOTE — SUBJECTIVE & OBJECTIVE
Interval History: Feeling well. Hopeful can finish tx as outpatient.  Will see if we can arrange outpt abx tomorrow.  Spoke with wound care Fri. They can dive as outpatient.     Review of Systems  Objective:     Vital Signs (Most Recent):  Temp: 98.1 °F (36.7 °C) (04/16/23 1144)  Pulse: 78 (04/16/23 1144)  Resp: 18 (04/16/23 1144)  BP: (!) 154/89 (04/16/23 1144)  SpO2: 95 % (04/16/23 1144)   Vital Signs (24h Range):  Temp:  [97.5 °F (36.4 °C)-98.1 °F (36.7 °C)] 98.1 °F (36.7 °C)  Pulse:  [78-96] 78  Resp:  [18-20] 18  SpO2:  [94 %-97 %] 95 %  BP: (133-166)/(79-95) 154/89     Weight: (!) 160.5 kg (353 lb 13.4 oz)  Body mass index is 46.68 kg/m².    Intake/Output Summary (Last 24 hours) at 4/16/2023 1325  Last data filed at 4/16/2023 0522  Gross per 24 hour   Intake 1468.75 ml   Output --   Net 1468.75 ml      Physical Exam  Vitals reviewed.   Constitutional:       General: He is not in acute distress.     Appearance: He is not toxic-appearing.   Cardiovascular:      Rate and Rhythm: Normal rate and regular rhythm.      Heart sounds: Normal heart sounds.   Pulmonary:      Effort: Pulmonary effort is normal. No respiratory distress.      Breath sounds: Normal breath sounds. No wheezing.   Abdominal:      General: Bowel sounds are normal. There is no distension.      Palpations: Abdomen is soft.      Tenderness: There is no abdominal tenderness.   Skin:     General: Skin is warm and dry.   Neurological:      Mental Status: He is alert and oriented to person, place, and time. Mental status is at baseline.       Significant Labs: All pertinent labs within the past 24 hours have been reviewed.  BMP:   Recent Labs   Lab 04/16/23  0532   *      K 4.6      CO2 29   BUN 31*   CREATININE 1.38*   CALCIUM 8.7     CBC:   Recent Labs   Lab 04/15/23  0435 04/16/23  0532   WBC 5.62 5.54   HGB 9.0* 9.9*   HCT 30.5* 32.9*    209       Significant Imaging: I have reviewed all pertinent imaging results/findings  within the past 24 hours.

## 2023-04-17 ENCOUNTER — OFFICE VISIT (OUTPATIENT)
Dept: WOUND CARE | Facility: CLINIC | Age: 38
End: 2023-04-17
Attending: FAMILY MEDICINE
Payer: MEDICARE

## 2023-04-17 VITALS
SYSTOLIC BLOOD PRESSURE: 164 MMHG | DIASTOLIC BLOOD PRESSURE: 90 MMHG | HEART RATE: 77 BPM | RESPIRATION RATE: 20 BRPM | TEMPERATURE: 98 F

## 2023-04-17 DIAGNOSIS — L97.414 ULCER OF RIGHT HEEL, WITH NECROSIS OF BONE: Primary | ICD-10-CM

## 2023-04-17 LAB
ALBUMIN SERPL BCP-MCNC: 2.6 G/DL (ref 3.5–5)
ALBUMIN/GLOB SERPL: 0.6 {RATIO}
ALP SERPL-CCNC: 111 U/L (ref 45–115)
ALT SERPL W P-5'-P-CCNC: 68 U/L (ref 16–61)
ANION GAP SERPL CALCULATED.3IONS-SCNC: 12 MMOL/L (ref 7–16)
ANISOCYTOSIS BLD QL SMEAR: ABNORMAL
AST SERPL W P-5'-P-CCNC: 26 U/L (ref 15–37)
BASOPHILS # BLD AUTO: 0.05 K/UL (ref 0–0.2)
BASOPHILS NFR BLD AUTO: 1 % (ref 0–1)
BASOPHILS NFR BLD MANUAL: 2 % (ref 0–1)
BILIRUB SERPL-MCNC: 0.4 MG/DL (ref ?–1.2)
BUN SERPL-MCNC: 32 MG/DL (ref 7–18)
BUN/CREAT SERPL: 21 (ref 6–20)
CALCIUM SERPL-MCNC: 8.5 MG/DL (ref 8.5–10.1)
CHLORIDE SERPL-SCNC: 105 MMOL/L (ref 98–107)
CO2 SERPL-SCNC: 27 MMOL/L (ref 21–32)
CREAT SERPL-MCNC: 1.56 MG/DL (ref 0.7–1.3)
DIFFERENTIAL METHOD BLD: ABNORMAL
EGFR (NO RACE VARIABLE) (RUSH/TITUS): 58 ML/MIN/1.73M²
EOSINOPHIL # BLD AUTO: 0.25 K/UL (ref 0–0.5)
EOSINOPHIL NFR BLD AUTO: 4.8 % (ref 1–4)
EOSINOPHIL NFR BLD MANUAL: 1 % (ref 1–4)
ERYTHROCYTE [DISTWIDTH] IN BLOOD BY AUTOMATED COUNT: 15.4 % (ref 11.5–14.5)
GLOBULIN SER-MCNC: 4.3 G/DL (ref 2–4)
GLUCOSE SERPL-MCNC: 104 MG/DL (ref 70–110)
GLUCOSE SERPL-MCNC: 144 MG/DL (ref 70–105)
GLUCOSE SERPL-MCNC: 145 MG/DL (ref 70–110)
GLUCOSE SERPL-MCNC: 167 MG/DL (ref 74–106)
GLUCOSE SERPL-MCNC: 190 MG/DL (ref 70–105)
GLUCOSE SERPL-MCNC: 216 MG/DL (ref 70–105)
GLUCOSE SERPL-MCNC: 299 MG/DL (ref 70–105)
HCT VFR BLD AUTO: 31.4 % (ref 40–54)
HGB BLD-MCNC: 9.6 G/DL (ref 13.5–18)
HYPOCHROMIA BLD QL SMEAR: ABNORMAL
IMM GRANULOCYTES # BLD AUTO: 0.01 K/UL (ref 0–0.04)
IMM GRANULOCYTES NFR BLD: 0.2 % (ref 0–0.4)
LYMPHOCYTES # BLD AUTO: 2.19 K/UL (ref 1–4.8)
LYMPHOCYTES NFR BLD AUTO: 41.9 % (ref 27–41)
LYMPHOCYTES NFR BLD MANUAL: 46 % (ref 27–41)
MCH RBC QN AUTO: 26.4 PG (ref 27–31)
MCHC RBC AUTO-ENTMCNC: 30.6 G/DL (ref 32–36)
MCV RBC AUTO: 86.3 FL (ref 80–96)
MONOCYTES # BLD AUTO: 0.55 K/UL (ref 0–0.8)
MONOCYTES NFR BLD AUTO: 10.5 % (ref 2–6)
MONOCYTES NFR BLD MANUAL: 7 % (ref 2–6)
MPC BLD CALC-MCNC: 9.8 FL (ref 9.4–12.4)
NEUTROPHILS # BLD AUTO: 2.18 K/UL (ref 1.8–7.7)
NEUTROPHILS NFR BLD AUTO: 41.6 % (ref 53–65)
NEUTS SEG NFR BLD MANUAL: 45 % (ref 50–62)
NRBC # BLD AUTO: 0 X10E3/UL
NRBC, AUTO (.00): 0 %
PLATELET # BLD AUTO: 212 K/UL (ref 150–400)
PLATELET MORPHOLOGY: NORMAL
POTASSIUM SERPL-SCNC: 4.8 MMOL/L (ref 3.5–5.1)
PROT SERPL-MCNC: 6.9 G/DL (ref 6.4–8.2)
RBC # BLD AUTO: 3.64 M/UL (ref 4.6–6.2)
SODIUM SERPL-SCNC: 139 MMOL/L (ref 136–145)
WBC # BLD AUTO: 5.23 K/UL (ref 4.5–11)

## 2023-04-17 PROCEDURE — 80053 COMPREHEN METABOLIC PANEL: CPT | Performed by: INTERNAL MEDICINE

## 2023-04-17 PROCEDURE — 99232 SBSQ HOSP IP/OBS MODERATE 35: CPT | Mod: ,,, | Performed by: FAMILY MEDICINE

## 2023-04-17 PROCEDURE — 99183 HYPERBARIC OXYGEN THERAPY: CPT | Mod: S$PBB,,, | Performed by: FAMILY MEDICINE

## 2023-04-17 PROCEDURE — 99499 UNLISTED E&M SERVICE: CPT | Mod: S$PBB,,, | Performed by: FAMILY MEDICINE

## 2023-04-17 PROCEDURE — 99499 NO LOS: ICD-10-PCS | Mod: S$PBB,,, | Performed by: FAMILY MEDICINE

## 2023-04-17 PROCEDURE — 99183 HYPERBARIC OXYGEN THERAPY: CPT

## 2023-04-17 PROCEDURE — 99215 OFFICE O/P EST HI 40 MIN: CPT

## 2023-04-17 PROCEDURE — 25000003 PHARM REV CODE 250: Performed by: FAMILY MEDICINE

## 2023-04-17 PROCEDURE — 82962 GLUCOSE BLOOD TEST: CPT | Mod: PBBFAC | Performed by: FAMILY MEDICINE

## 2023-04-17 PROCEDURE — 94761 N-INVAS EAR/PLS OXIMETRY MLT: CPT

## 2023-04-17 PROCEDURE — 63600175 PHARM REV CODE 636 W HCPCS

## 2023-04-17 PROCEDURE — 63600175 PHARM REV CODE 636 W HCPCS: Performed by: INTERNAL MEDICINE

## 2023-04-17 PROCEDURE — 25000003 PHARM REV CODE 250: Performed by: INTERNAL MEDICINE

## 2023-04-17 PROCEDURE — 11000001 HC ACUTE MED/SURG PRIVATE ROOM

## 2023-04-17 PROCEDURE — 99183 HYPERBARIC OXYGEN THERAPY: CPT | Mod: PBBFAC | Performed by: FAMILY MEDICINE

## 2023-04-17 PROCEDURE — 99232 PR SUBSEQUENT HOSPITAL CARE,LEVL II: ICD-10-PCS | Mod: ,,, | Performed by: FAMILY MEDICINE

## 2023-04-17 PROCEDURE — 82962 GLUCOSE BLOOD TEST: CPT

## 2023-04-17 PROCEDURE — 99183 PR HYPERBARIC OXYGEN THERAPY ATTENDANCE/SUPERVISION, PER SESSION: ICD-10-PCS | Mod: S$PBB,,, | Performed by: FAMILY MEDICINE

## 2023-04-17 PROCEDURE — 99232 PR SUBSEQUENT HOSPITAL CARE,LEVL II: ICD-10-PCS | Mod: ,,, | Performed by: NURSE PRACTITIONER

## 2023-04-17 PROCEDURE — 99900035 HC TECH TIME PER 15 MIN (STAT)

## 2023-04-17 PROCEDURE — 99215 OFFICE O/P EST HI 40 MIN: CPT | Mod: PBBFAC | Performed by: FAMILY MEDICINE

## 2023-04-17 PROCEDURE — 99232 SBSQ HOSP IP/OBS MODERATE 35: CPT | Mod: ,,, | Performed by: NURSE PRACTITIONER

## 2023-04-17 PROCEDURE — 25000003 PHARM REV CODE 250: Performed by: NURSE PRACTITIONER

## 2023-04-17 PROCEDURE — 25000003 PHARM REV CODE 250

## 2023-04-17 PROCEDURE — 85025 COMPLETE CBC W/AUTO DIFF WBC: CPT | Performed by: INTERNAL MEDICINE

## 2023-04-17 RX ADMIN — ZINC SULFATE 220 MG (50 MG) CAPSULE 220 MG: CAPSULE at 09:04

## 2023-04-17 RX ADMIN — GABAPENTIN 300 MG: 300 CAPSULE ORAL at 02:04

## 2023-04-17 RX ADMIN — DAPTOMYCIN 1000 MG: 500 INJECTION, POWDER, LYOPHILIZED, FOR SOLUTION INTRAVENOUS at 04:04

## 2023-04-17 RX ADMIN — OXYCODONE HYDROCHLORIDE AND ACETAMINOPHEN 500 MG: 500 TABLET ORAL at 09:04

## 2023-04-17 RX ADMIN — ATORVASTATIN CALCIUM 40 MG: 40 TABLET, FILM COATED ORAL at 08:04

## 2023-04-17 RX ADMIN — INSULIN DETEMIR 20 UNITS: 100 INJECTION, SOLUTION SUBCUTANEOUS at 08:04

## 2023-04-17 RX ADMIN — HYDRALAZINE HYDROCHLORIDE 25 MG: 25 TABLET ORAL at 09:04

## 2023-04-17 RX ADMIN — GABAPENTIN 300 MG: 300 CAPSULE ORAL at 09:04

## 2023-04-17 RX ADMIN — HYDROCODONE BITARTRATE AND ACETAMINOPHEN 1 TABLET: 10; 325 TABLET ORAL at 08:04

## 2023-04-17 RX ADMIN — HYDRALAZINE HYDROCHLORIDE 25 MG: 25 TABLET ORAL at 02:04

## 2023-04-17 RX ADMIN — GABAPENTIN 300 MG: 300 CAPSULE ORAL at 05:04

## 2023-04-17 RX ADMIN — MORPHINE SULFATE 2 MG: 2 INJECTION, SOLUTION INTRAMUSCULAR; INTRAVENOUS at 12:04

## 2023-04-17 RX ADMIN — HYDROCODONE BITARTRATE AND ACETAMINOPHEN 1 TABLET: 10; 325 TABLET ORAL at 09:04

## 2023-04-17 RX ADMIN — MORPHINE SULFATE 2 MG: 2 INJECTION, SOLUTION INTRAMUSCULAR; INTRAVENOUS at 05:04

## 2023-04-17 RX ADMIN — AMLODIPINE BESYLATE 2.5 MG: 2.5 TABLET ORAL at 09:04

## 2023-04-17 RX ADMIN — OXYCODONE HYDROCHLORIDE AND ACETAMINOPHEN 500 MG: 500 TABLET ORAL at 08:04

## 2023-04-17 RX ADMIN — HYDRALAZINE HYDROCHLORIDE 25 MG: 25 TABLET ORAL at 05:04

## 2023-04-17 RX ADMIN — PIPERACILLIN AND TAZOBACTAM 4.5 G: 4; .5 INJECTION, POWDER, FOR SOLUTION INTRAVENOUS; PARENTERAL at 12:04

## 2023-04-17 RX ADMIN — PIPERACILLIN AND TAZOBACTAM 4.5 G: 4; .5 INJECTION, POWDER, FOR SOLUTION INTRAVENOUS; PARENTERAL at 06:04

## 2023-04-17 RX ADMIN — THERA TABS 1 TABLET: TAB at 09:04

## 2023-04-17 RX ADMIN — INSULIN ASPART 2 UNITS: 100 INJECTION, SOLUTION INTRAVENOUS; SUBCUTANEOUS at 06:04

## 2023-04-17 RX ADMIN — DOCUSATE SODIUM 100 MG: 100 CAPSULE, LIQUID FILLED ORAL at 09:04

## 2023-04-17 RX ADMIN — ZINC SULFATE 220 MG (50 MG) CAPSULE 220 MG: CAPSULE at 08:04

## 2023-04-17 RX ADMIN — INSULIN ASPART 3 UNITS: 100 INJECTION, SOLUTION INTRAVENOUS; SUBCUTANEOUS at 09:04

## 2023-04-17 NOTE — PLAN OF CARE
[facility]  Discharge Reassessment    Primary Care Provider: Mojgan Lomeli NP    Expected Discharge Date:     Reassessment (most recent)       Discharge Reassessment - 04/17/23 1352          Discharge Reassessment    Assessment Type Discharge Planning Reassessment     Discharge Plan discussed with: Patient     Discharge Plan A Home Health     Discharge Plan B Home Health     DME Needed Upon Discharge  none     Discharge Barriers Identified None     Why the patient remains in the hospital Requires continued medical care        Post-Acute Status    Post-Acute Authorization IV Infusion     IV Infusion Status Referral(s) sent     Patient choice form signed by patient/caregiver List from [employer];List with quality metrics by geographic area provided     Discharge Delays None known at this time                   SW consulted for home IV abx. Cubicin 1 gram IV q 24 hours x 20 days and Zosyn 4.5 grams IV over 4 hours q 8 hours for 20 days. Referral faxed to [facility], SADE awaiting insurance approval.    1452: SADE spoke with Lashell at [facility] and insurance has not approved for iv abx at this time. SADE will follow up in a.m.

## 2023-04-17 NOTE — PROGRESS NOTES
[facility]  Wound Care and Hyperbarics [facility]  Progress Note    Patient Name: Jean Pierre Paulson  MRN: [id number]  Admission Date: 4/6/2023  Hospital Length of Stay: 11 days  Attending Physician: Axel Simmons Jr., MD  Primary Care Provider: Mojgan Lomeli NP         Subjective:     HPI:  37 y.o. male admitted to [facility] with wound infection to chronic-non healing wound on the right foot. Wound is malodorous with increase in drainage.Cultures from 3/31 positive for Enterobacter cloaca and Enterococcus gallinarum. He has completed multiple courses of PO antibiotics, most recently Zyvox.     Significant PMH  Includes diabetes and hypertension. Last HgA1C 6.4  in March 23, diabetes is managed by PCP. He is due for arterial dopplers, last doppler in October 2020. Denies fever or chills.     Hospital Course:   04/07/2023 - Wound care consulted to evaluate and follow wounds . POC established today. Barriers to wound healing identified and preventive measures in place  04/10/2023 - Start HBOT in AM. Artrial doppler and three phase bone scan ordered today. Drawtex and vashe today. Bedside debridement in AM per Wound Care.   4/14/2023 - Patient agreeable to NPWT outpatient and IV antbiotics. Discussed with Dr. Simmons about POC, Set up Wound Vac with PMR and have PICC line placed today.   04/17/2023 - D/c home with IV antibiotics, NPWT, and outpatient HBOT once medically cleared. Wound vac is in clinic to be applied prior to d/c.             Scheduled Meds:   amLODIPine  2.5 mg Oral Daily    ascorbic acid (vitamin C)  500 mg Oral BID    atorvastatin  40 mg Oral QHS    DAPTOmycin (CUBICIN) IV (PEDS and ADULTS)  1,000 mg Intravenous Q24H    docusate sodium  100 mg Oral Daily    gabapentin  300 mg Oral Q8H    hydrALAZINE  25 mg Oral Q8H    HYDROcodone-acetaminophen  1 tablet Oral BID    insulin detemir U-100  20 Units Subcutaneous QHS    multivitamin  1 tablet Oral Daily    piperacillin-tazobactam  (ZOSYN) IVPB  4.5 g Intravenous Q8H    zinc sulfate  220 mg Oral BID     Continuous Infusions:  PRN Meds:acetaminophen, dextrose 50%, dextrose 50%, glucagon (human recombinant), glucose, glucose, hydrALAZINE, HYDROcodone-acetaminophen, insulin aspart U-100, morphine, naloxone, ondansetron, sodium chloride 0.9%, trazodone    Review of Systems   Constitutional:  Positive for activity change. Negative for chills and fever.   Respiratory:  Negative for chest tightness and shortness of breath.    Cardiovascular:  Positive for leg swelling. Negative for chest pain and palpitations.   Musculoskeletal:  Positive for arthralgias and joint swelling.   Skin:  Positive for wound.        wound   Neurological:  Positive for weakness and numbness.   Psychiatric/Behavioral:  Negative for agitation, behavioral problems, confusion and self-injury.    Objective:     Vital Signs (Most Recent):  Temp: 98.4 °F (36.9 °C) (04/17/23 0800)  Pulse: 77 (04/17/23 0800)  Resp: 16 (04/17/23 1239)  BP: (!) 164/90 (04/17/23 0800)  SpO2: 100 % (04/17/23 0800)   Vital Signs (24h Range):  Temp:  [97.3 °F (36.3 °C)-98.4 °F (36.9 °C)] 98.4 °F (36.9 °C)  Pulse:  [74-86] 77  Resp:  [16-20] 16  SpO2:  [94 %-100 %] 100 %  BP: (124-170)/(74-90) 164/90     Weight: (!) 160.5 kg (353 lb 13.4 oz)  Body mass index is 46.68 kg/m².    Physical Exam  Vitals reviewed.   Constitutional:       Appearance: Normal appearance.   HENT:      Head: Normocephalic.   Cardiovascular:      Rate and Rhythm: Normal rate and regular rhythm.      Pulses: Normal pulses.      Heart sounds: Normal heart sounds.   Pulmonary:      Effort: Pulmonary effort is normal.      Breath sounds: Normal breath sounds.   Musculoskeletal:         General: Swelling and tenderness present.      Right lower leg: Edema present.   Skin:     Findings: Erythema present.      Comments: Wound, see LDA for photos/measurements   Neurological:      Mental Status: He is alert. Mental status is at baseline.       Motor: Weakness present.       Assessment/Plan:     Endocrine  Diabetic ulcer of right heel associated with type 2 diabetes mellitus, with fat layer exposed                  HBOT, will continue outpatient post discharge  Continue vashe moistened drawtex today.   Will apply wound vac at discharge  Continue IV antibiotics  NPWT - ordered through PMR and approved.         AUGUSTUS Macedo  Wound Care and Hyperbarics [facility]  [facility]

## 2023-04-17 NOTE — SUBJECTIVE & OBJECTIVE
Subjective:     HPI:  37 y.o. male admitted to [facility] with wound infection to chronic-non healing wound on the right foot. Wound is malodorous with increase in drainage.Cultures from 3/31 positive for Enterobacter cloaca and Enterococcus gallinarum. He has completed multiple courses of PO antibiotics, most recently Zyvox.     Significant PMH  Includes diabetes and hypertension. Last HgA1C 6.4  in March 23, diabetes is managed by PCP. He is due for arterial dopplers, last doppler in October 2020. Denies fever or chills.     Hospital Course:   04/07/2023 - Wound care consulted to evaluate and follow wounds . POC established today. Barriers to wound healing identified and preventive measures in place  04/10/2023 - Start HBOT in AM. Artrial doppler and three phase bone scan ordered today. Drawtex and vashe today. Bedside debridement in AM per Wound Care.   4/14/2023 - Patient agreeable to NPWT outpatient and IV antbiotics. Discussed with Dr. Simmons about POC, Set up Wound Vac with PMR and have PICC line placed today.   04/17/2023 - D/c home with IV antibiotics, NPWT, and outpatient HBOT once medically cleared. Wound vac is in clinic to be applied prior to d/c.             Scheduled Meds:   amLODIPine  2.5 mg Oral Daily    ascorbic acid (vitamin C)  500 mg Oral BID    atorvastatin  40 mg Oral QHS    DAPTOmycin (CUBICIN) IV (PEDS and ADULTS)  1,000 mg Intravenous Q24H    docusate sodium  100 mg Oral Daily    gabapentin  300 mg Oral Q8H    hydrALAZINE  25 mg Oral Q8H    HYDROcodone-acetaminophen  1 tablet Oral BID    insulin detemir U-100  20 Units Subcutaneous QHS    multivitamin  1 tablet Oral Daily    piperacillin-tazobactam (ZOSYN) IVPB  4.5 g Intravenous Q8H    zinc sulfate  220 mg Oral BID     Continuous Infusions:  PRN Meds:acetaminophen, dextrose 50%, dextrose 50%, glucagon (human recombinant), glucose, glucose, hydrALAZINE, HYDROcodone-acetaminophen, insulin aspart U-100, morphine, naloxone, ondansetron, sodium  chloride 0.9%, trazodone    Review of Systems   Constitutional:  Positive for activity change. Negative for chills and fever.   Respiratory:  Negative for chest tightness and shortness of breath.    Cardiovascular:  Positive for leg swelling. Negative for chest pain and palpitations.   Musculoskeletal:  Positive for arthralgias and joint swelling.   Skin:  Positive for wound.        wound   Neurological:  Positive for weakness and numbness.   Psychiatric/Behavioral:  Negative for agitation, behavioral problems, confusion and self-injury.    Objective:     Vital Signs (Most Recent):  Temp: 98.4 °F (36.9 °C) (04/17/23 0800)  Pulse: 77 (04/17/23 0800)  Resp: 16 (04/17/23 1239)  BP: (!) 164/90 (04/17/23 0800)  SpO2: 100 % (04/17/23 0800)   Vital Signs (24h Range):  Temp:  [97.3 °F (36.3 °C)-98.4 °F (36.9 °C)] 98.4 °F (36.9 °C)  Pulse:  [74-86] 77  Resp:  [16-20] 16  SpO2:  [94 %-100 %] 100 %  BP: (124-170)/(74-90) 164/90     Weight: (!) 160.5 kg (353 lb 13.4 oz)  Body mass index is 46.68 kg/m².    Physical Exam  Vitals reviewed.   Constitutional:       Appearance: Normal appearance.   HENT:      Head: Normocephalic.   Cardiovascular:      Rate and Rhythm: Normal rate and regular rhythm.      Pulses: Normal pulses.      Heart sounds: Normal heart sounds.   Pulmonary:      Effort: Pulmonary effort is normal.      Breath sounds: Normal breath sounds.   Musculoskeletal:         General: Swelling and tenderness present.      Right lower leg: Edema present.   Skin:     Findings: Erythema present.      Comments: Wound, see LDA for photos/measurements   Neurological:      Mental Status: He is alert. Mental status is at baseline.      Motor: Weakness present.

## 2023-04-17 NOTE — PLAN OF CARE
Problem: Diabetes Comorbidity  Goal: Blood Glucose Level Within Targeted Range  Outcome: Ongoing, Progressing     Problem: Diabetes Comorbidity  Goal: Blood Glucose Level Within Targeted Range  Outcome: Ongoing, Progressing     Problem: Fall Injury Risk  Goal: Absence of Fall and Fall-Related Injury  Outcome: Ongoing, Progressing     Problem: Fall Injury Risk  Goal: Absence of Fall and Fall-Related Injury  Outcome: Ongoing, Progressing

## 2023-04-17 NOTE — ASSESSMENT & PLAN NOTE
HBOT, will continue outpatient post discharge  Continue vashe moistened drawtex today.   Will apply wound vac at discharge  Continue IV antibiotics  NPWT - ordered through PMR and approved.

## 2023-04-18 ENCOUNTER — OFFICE VISIT (OUTPATIENT)
Dept: WOUND CARE | Facility: CLINIC | Age: 38
End: 2023-04-18
Attending: FAMILY MEDICINE
Payer: MEDICARE

## 2023-04-18 VITALS
DIASTOLIC BLOOD PRESSURE: 94 MMHG | OXYGEN SATURATION: 98 % | HEART RATE: 74 BPM | WEIGHT: 315 LBS | RESPIRATION RATE: 18 BRPM | SYSTOLIC BLOOD PRESSURE: 158 MMHG | HEIGHT: 73 IN | BODY MASS INDEX: 41.75 KG/M2 | TEMPERATURE: 98 F

## 2023-04-18 VITALS
HEART RATE: 88 BPM | TEMPERATURE: 98 F | RESPIRATION RATE: 18 BRPM | SYSTOLIC BLOOD PRESSURE: 153 MMHG | DIASTOLIC BLOOD PRESSURE: 87 MMHG

## 2023-04-18 DIAGNOSIS — L97.414 ULCER OF RIGHT HEEL, WITH NECROSIS OF BONE: Primary | ICD-10-CM

## 2023-04-18 LAB
ALBUMIN SERPL BCP-MCNC: 2.6 G/DL (ref 3.5–5)
ALBUMIN/GLOB SERPL: 0.6 {RATIO}
ALP SERPL-CCNC: 128 U/L (ref 45–115)
ALT SERPL W P-5'-P-CCNC: 75 U/L (ref 16–61)
ANION GAP SERPL CALCULATED.3IONS-SCNC: 14 MMOL/L (ref 7–16)
ANISOCYTOSIS BLD QL SMEAR: ABNORMAL
AST SERPL W P-5'-P-CCNC: 30 U/L (ref 15–37)
ATYPICAL LYMPHOCYTES: ABNORMAL
BASOPHILS # BLD AUTO: 0.05 K/UL (ref 0–0.2)
BASOPHILS NFR BLD AUTO: 1.1 % (ref 0–1)
BASOPHILS NFR BLD MANUAL: 3 % (ref 0–1)
BILIRUB SERPL-MCNC: 0.4 MG/DL (ref ?–1.2)
BUN SERPL-MCNC: 26 MG/DL (ref 7–18)
BUN/CREAT SERPL: 17 (ref 6–20)
CALCIUM SERPL-MCNC: 8.3 MG/DL (ref 8.5–10.1)
CHLORIDE SERPL-SCNC: 101 MMOL/L (ref 98–107)
CO2 SERPL-SCNC: 27 MMOL/L (ref 21–32)
CREAT SERPL-MCNC: 1.5 MG/DL (ref 0.7–1.3)
CRENATED CELLS: ABNORMAL
DIFFERENTIAL METHOD BLD: ABNORMAL
EGFR (NO RACE VARIABLE) (RUSH/TITUS): 60 ML/MIN/1.73M²
EOSINOPHIL # BLD AUTO: 0.21 K/UL (ref 0–0.5)
EOSINOPHIL NFR BLD AUTO: 4.6 % (ref 1–4)
EOSINOPHIL NFR BLD MANUAL: 6 % (ref 1–4)
ERYTHROCYTE [DISTWIDTH] IN BLOOD BY AUTOMATED COUNT: 15.4 % (ref 11.5–14.5)
GLOBULIN SER-MCNC: 4.6 G/DL (ref 2–4)
GLUCOSE SERPL-MCNC: 275 MG/DL (ref 70–105)
GLUCOSE SERPL-MCNC: 285 MG/DL (ref 74–106)
HCT VFR BLD AUTO: 33.5 % (ref 40–54)
HGB BLD-MCNC: 10.1 G/DL (ref 13.5–18)
HYPOCHROMIA BLD QL SMEAR: ABNORMAL
IMM GRANULOCYTES # BLD AUTO: 0 K/UL (ref 0–0.04)
IMM GRANULOCYTES NFR BLD: 0 % (ref 0–0.4)
LYMPHOCYTES # BLD AUTO: 2.01 K/UL (ref 1–4.8)
LYMPHOCYTES NFR BLD AUTO: 43.6 % (ref 27–41)
LYMPHOCYTES NFR BLD MANUAL: 36 % (ref 27–41)
MCH RBC QN AUTO: 26.4 PG (ref 27–31)
MCHC RBC AUTO-ENTMCNC: 30.1 G/DL (ref 32–36)
MCV RBC AUTO: 87.7 FL (ref 80–96)
MONOCYTES # BLD AUTO: 0.47 K/UL (ref 0–0.8)
MONOCYTES NFR BLD AUTO: 10.2 % (ref 2–6)
MONOCYTES NFR BLD MANUAL: 10 % (ref 2–6)
MPC BLD CALC-MCNC: 10.1 FL (ref 9.4–12.4)
NEUTROPHILS # BLD AUTO: 1.87 K/UL (ref 1.8–7.7)
NEUTROPHILS NFR BLD AUTO: 40.5 % (ref 53–65)
NEUTS BAND NFR BLD MANUAL: 1 % (ref 1–5)
NEUTS SEG NFR BLD MANUAL: 44 % (ref 50–62)
NRBC # BLD AUTO: 0 X10E3/UL
NRBC, AUTO (.00): 0 %
OVALOCYTES BLD QL SMEAR: ABNORMAL
PLATELET # BLD AUTO: 205 K/UL (ref 150–400)
PLATELET MORPHOLOGY: NORMAL
POLYCHROMASIA BLD QL SMEAR: ABNORMAL
POTASSIUM SERPL-SCNC: 4.4 MMOL/L (ref 3.5–5.1)
PROT SERPL-MCNC: 7.2 G/DL (ref 6.4–8.2)
RBC # BLD AUTO: 3.82 M/UL (ref 4.6–6.2)
SODIUM SERPL-SCNC: 138 MMOL/L (ref 136–145)
WBC # BLD AUTO: 4.61 K/UL (ref 4.5–11)

## 2023-04-18 PROCEDURE — 25000003 PHARM REV CODE 250: Performed by: NURSE PRACTITIONER

## 2023-04-18 PROCEDURE — 99239 HOSP IP/OBS DSCHRG MGMT >30: CPT | Mod: ,,, | Performed by: FAMILY MEDICINE

## 2023-04-18 PROCEDURE — 99215 OFFICE O/P EST HI 40 MIN: CPT | Mod: PBBFAC | Performed by: FAMILY MEDICINE

## 2023-04-18 PROCEDURE — 82962 GLUCOSE BLOOD TEST: CPT | Mod: PBBFAC | Performed by: FAMILY MEDICINE

## 2023-04-18 PROCEDURE — 99499 UNLISTED E&M SERVICE: CPT | Mod: S$PBB,,, | Performed by: FAMILY MEDICINE

## 2023-04-18 PROCEDURE — 96372 THER/PROPH/DIAG INJ SC/IM: CPT

## 2023-04-18 PROCEDURE — 99239 PR HOSPITAL DISCHARGE DAY,>30 MIN: ICD-10-PCS | Mod: ,,, | Performed by: FAMILY MEDICINE

## 2023-04-18 PROCEDURE — 85025 COMPLETE CBC W/AUTO DIFF WBC: CPT | Performed by: INTERNAL MEDICINE

## 2023-04-18 PROCEDURE — 99499 NO LOS: ICD-10-PCS | Mod: S$PBB,,, | Performed by: FAMILY MEDICINE

## 2023-04-18 PROCEDURE — 82962 GLUCOSE BLOOD TEST: CPT

## 2023-04-18 PROCEDURE — 63600175 PHARM REV CODE 636 W HCPCS

## 2023-04-18 PROCEDURE — 25000003 PHARM REV CODE 250

## 2023-04-18 PROCEDURE — 99183 PR HYPERBARIC OXYGEN THERAPY ATTENDANCE/SUPERVISION, PER SESSION: ICD-10-PCS | Mod: S$PBB,,, | Performed by: FAMILY MEDICINE

## 2023-04-18 PROCEDURE — 25000003 PHARM REV CODE 250: Performed by: FAMILY MEDICINE

## 2023-04-18 PROCEDURE — 63600175 PHARM REV CODE 636 W HCPCS: Performed by: INTERNAL MEDICINE

## 2023-04-18 PROCEDURE — 99183 HYPERBARIC OXYGEN THERAPY: CPT | Mod: PBBFAC | Performed by: FAMILY MEDICINE

## 2023-04-18 PROCEDURE — 80053 COMPREHEN METABOLIC PANEL: CPT | Performed by: INTERNAL MEDICINE

## 2023-04-18 PROCEDURE — 99183 HYPERBARIC OXYGEN THERAPY: CPT

## 2023-04-18 PROCEDURE — 25000003 PHARM REV CODE 250: Performed by: INTERNAL MEDICINE

## 2023-04-18 PROCEDURE — 99215 OFFICE O/P EST HI 40 MIN: CPT

## 2023-04-18 PROCEDURE — 99183 HYPERBARIC OXYGEN THERAPY: CPT | Mod: S$PBB,,, | Performed by: FAMILY MEDICINE

## 2023-04-18 RX ORDER — AMLODIPINE BESYLATE 2.5 MG/1
2.5 TABLET ORAL DAILY
Qty: 30 TABLET | Refills: 11 | Status: SHIPPED | OUTPATIENT
Start: 2023-04-19 | End: 2023-12-14 | Stop reason: SDUPTHER

## 2023-04-18 RX ADMIN — INSULIN ASPART 3 UNITS: 100 INJECTION, SOLUTION INTRAVENOUS; SUBCUTANEOUS at 06:04

## 2023-04-18 RX ADMIN — MORPHINE SULFATE 2 MG: 2 INJECTION, SOLUTION INTRAMUSCULAR; INTRAVENOUS at 01:04

## 2023-04-18 RX ADMIN — THERA TABS 1 TABLET: TAB at 08:04

## 2023-04-18 RX ADMIN — DAPTOMYCIN 1000 MG: 500 INJECTION, POWDER, LYOPHILIZED, FOR SOLUTION INTRAVENOUS at 04:04

## 2023-04-18 RX ADMIN — PIPERACILLIN AND TAZOBACTAM 4.5 G: 4; .5 INJECTION, POWDER, FOR SOLUTION INTRAVENOUS; PARENTERAL at 03:04

## 2023-04-18 RX ADMIN — GABAPENTIN 300 MG: 300 CAPSULE ORAL at 05:04

## 2023-04-18 RX ADMIN — HYDROCODONE BITARTRATE AND ACETAMINOPHEN 1 TABLET: 10; 325 TABLET ORAL at 08:04

## 2023-04-18 RX ADMIN — ZINC SULFATE 220 MG (50 MG) CAPSULE 220 MG: CAPSULE at 08:04

## 2023-04-18 RX ADMIN — INSULIN ASPART 2 UNITS: 100 INJECTION, SOLUTION INTRAVENOUS; SUBCUTANEOUS at 01:04

## 2023-04-18 RX ADMIN — GABAPENTIN 300 MG: 300 CAPSULE ORAL at 02:04

## 2023-04-18 RX ADMIN — OXYCODONE HYDROCHLORIDE AND ACETAMINOPHEN 500 MG: 500 TABLET ORAL at 08:04

## 2023-04-18 RX ADMIN — HYDRALAZINE HYDROCHLORIDE 25 MG: 25 TABLET ORAL at 05:04

## 2023-04-18 RX ADMIN — HYDRALAZINE HYDROCHLORIDE 25 MG: 25 TABLET ORAL at 01:04

## 2023-04-18 RX ADMIN — AMLODIPINE BESYLATE 2.5 MG: 2.5 TABLET ORAL at 08:04

## 2023-04-18 NOTE — HOSPITAL COURSE
38 y/o with with infected diabetic foot wound to R foot and heel.  Patient admitted and started on IV zosyn and cubicin for cultures growing enterobacter and enterococcus.  He was started on HBO.  Wound has improved over the last 10 days and it is felt he can complete HBO and iv abx as outpatient.  Arrangements have been made for this and he will be discharged today. He will follow up in wound care clinic tomorrow for HBO.  We will hold his statin while on cubicin.  Plan total 30 days of therapy with IV abx. He is being discharged with PICC line.

## 2023-04-18 NOTE — NURSING
Accompanied to his personal vehicle amb.  Personal items, dressing supplies, and IV meds in hand.  SAYRA PICC line secure and dated 04/15/2023.  Wearing waffle boot.  No status changes noted since initial assess this shift.

## 2023-04-18 NOTE — DISCHARGE SUMMARY
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 Patient reports he has developed this wound over a year ago after trauma due to a nail. Infection has caused osteomylitis within the last year and he has had multiple surgeries/debridement since but the wound is non healing and keeps getting infected. He visited the wound care clinic on 03/31/23 and saw NP Yelitza Alas who realized the wound is infected, took wound cultures and started patient on Linezolid. Patient completed the one week course, cultures came back showing Enterobacter cloacae and enterococcus gallinarum resistant to multiple antibiotics therefore he was suggested to come to the hospital.     Patient denies fever, chills, nausea, vomiting, cough, palpitation, chest pain, abdominal pain, diarrhea or problems with urination. Wound is malodorous with increase in drainage in recent weeks. Patient has bilateral LE edema but has never seen a cardiologist or a nephrologist in the past.     PMHx includes Diabetes with last A1c of 6.4 on 03/2023 on [place]. Patient has had his right great toe amputated back in 2018 due to a diabetic wound. He has diabetic neuropathy with minimal sensation in both feet but intact sensation above ankle. He has neever had a diabetic eye exam. Patient has HTN and his bp ranges 135-160 systolic at home with current home  regimen. Patient has been referred to sleep studies previously but has not been compliant.    Workup so far:  ESR 69 with CRP of 2.9  WBC wnl, H/H 10.3/32.9  Cr of 1.67 with eGFRof 53          * No surgery found *      Hospital Course:   36 y/o with with infected diabetic foot wound to R foot and heel.  Patient admitted and started on IV zosyn and cubicin for cultures growing enterobacter and enterococcus.  He was started on HBO.  Wound has improved over the last 10 days and it is felt he can complete HBO and iv abx as outpatient.  Arrangements have been made for this and he will be discharged today. He will follow up in wound care clinic tomorrow for HBO.  We will hold his statin while on cubicin.  Plan total 30 days of therapy with IV abx. He is being discharged with PICC line.        Goals of Care Treatment Preferences:  Code Status: Full Code      Consults:   Consults (From admission, onward)        Status Ordering Provider     Inpatient consult to Social Work  Once        Provider:  (Not yet assigned)    Completed HERNANDEZ ORNELAS JR     Inpatient consult to Infectious Diseases  Once        Provider:  (Not yet assigned)    Acknowledged KAREY ODELL          No new Assessment & Plan notes have been filed under this hospital service since the last note was generated.  Service: Hospital Medicine    Final Active Diagnoses:    Diagnosis Date Noted POA    PRINCIPAL PROBLEM:  Non-pressure chronic ulcer of other part of right foot with other specified severity [L97.518] 07/07/2021 Yes    Type 2 diabetes mellitus, with long-term current use of insulin [E11.9, Z79.4] 08/01/2022 Not Applicable    CKD (chronic kidney disease), stage IV [N18.4] 04/09/2023 Yes    TEDDY (obstructive sleep apnea) [G47.33] 04/07/2023 Yes    HLD (hyperlipidemia) [E78.5] 04/07/2023 Yes    Normocytic anemia [D64.9] 04/07/2023 Yes    Severe obesity (BMI >= 40) [E66.01] 04/29/2022 Yes    Peripheral vascular disease, unspecified [I73.9]  03/25/2021 Yes    Bilateral lower extremity edema [R60.0] 03/25/2021 Yes    HTN (hypertension) [I10] 03/19/2021 Yes    Diabetic ulcer of right heel associated with type 2 diabetes mellitus, with fat layer exposed [E11.621, L97.412] 03/19/2021 Yes      Problems Resolved During this Admission:    Diagnosis Date Noted Date Resolved POA    NOLAN (acute kidney injury) [N17.9] 04/07/2023 04/09/2023 Yes    Pre-op evaluation [Z01.818] 04/07/2023 04/08/2023 Not Applicable    Diabetic neuropathy with neurologic complication [E11.40, E11.49]  04/07/2023 Yes     Chronic       Discharged Condition: good    Disposition: Home-Health Care [facility]    Follow Up:   Contact information for follow-up providers     Elissa Baca MD Follow up.    Specialty: Pulmonary Disease  Why: Sleep studies  Contact information:  [address] [place] [phone]                   Contact information for after-discharge care     Home Medical Care     [facility] .    Service: Home Health Services  Contact information:  [address] [place] [id number]  [phone]                           Patient Instructions:   No discharge procedures on file.    Significant Diagnostic Studies: Labs: All labs within the past 24 hours have been reviewed    Pending Diagnostic Studies:     None         Medications:  Reconciled Home Medications:      Medication List      START taking these medications    dextrose 5 % in water (D5W) 5 % PgBk 100 mL with piperacillin-tazobactam 4.5 gram SolR 4.5 g  Inject 4.5 g into the vein every 8 (eight) hours.     sodium chloride 0.9% SolP 50 mL with DAPTOmycin 500 mg SolR 1,000 mg  Inject 1,000 mg into the vein once daily. for 20 days        CHANGE how you take these medications    amLODIPine 2.5 MG tablet  Commonly known as: NORVASC  Take 1 tablet (2.5 mg total) by mouth once daily.  Start taking on: April 19, 2023  What changed:   · medication strength  · how much to take        CONTINUE  taking these medications    blood sugar diagnostic Strp  Commonly known as: BLOOD GLUCOSE TEST  1 strip by Misc.(Non-Drug; Combo Route) route once daily. accu-chek Veronika strips     FeroSuL 325 mg (65 mg iron) Tab tablet  Generic drug: ferrous sulfate  Take 1 tablet (325 mg total) by mouth once daily.     gabapentin 300 MG capsule  Commonly known as: NEURONTIN  Take 1 capsule (300 mg total) by mouth every 8 (eight) hours.     hydrALAZINE 25 MG tablet  Commonly known as: APRESOLINE  Take 1 tablet (25 mg total) by mouth every 12 (twelve) hours.     hydroCHLOROthiazide 25 MG tablet  Commonly known as: HYDRODIURIL  Take 1 tablet (25 mg total) by mouth once daily.     HYDROcodone-acetaminophen  mg per tablet  Commonly known as: NORCO  Take 1 tablet by mouth every 12 (twelve) hours as needed for Pain.     losartan 50 MG tablet  Commonly known as: COZAAR  Take 1 tablet (50 mg total) by mouth once daily.     OZEMPIC 2 mg/dose (8 mg/3 mL) Pnij  Generic drug: semaglutide  Inject 2 mg into the skin every 7 days.        STOP taking these medications    rosuvastatin 10 MG tablet  Commonly known as: CRESTOR            Indwelling Lines/Drains at time of discharge:   Lines/Drains/Airways     Peripherally Inserted Central Catheter Line  Duration           PICC Double Lumen 04/14/23 1436 left brachial 3 days                Time spent on the discharge of patient: 35 minutes         Axel Simmons Jr, MD  Department of Hospital Medicine  [facility]

## 2023-04-18 NOTE — SUBJECTIVE & OBJECTIVE
Interval History: Trying to arrange abx. For outpatient .     Review of Systems  Objective:     Vital Signs (Most Recent):  Temp: 97.9 °F (36.6 °C) (04/18/23 0400)  Pulse: 78 (04/18/23 0400)  Resp: 20 (04/18/23 0400)  BP: (!) 144/87 (04/18/23 0400)  SpO2: 97 % (04/18/23 0400)   Vital Signs (24h Range):  Temp:  [97.5 °F (36.4 °C)-98.4 °F (36.9 °C)] 97.9 °F (36.6 °C)  Pulse:  [74-80] 78  Resp:  [16-20] 20  SpO2:  [94 %-100 %] 97 %  BP: (144-164)/(78-90) 144/87     Weight: (!) 160.5 kg (353 lb 13.4 oz)  Body mass index is 46.68 kg/m².    Intake/Output Summary (Last 24 hours) at 4/18/2023 0649  Last data filed at 4/18/2023 0501  Gross per 24 hour   Intake 211.67 ml   Output --   Net 211.67 ml      Physical Exam  Vitals reviewed.   Constitutional:       General: He is not in acute distress.     Appearance: He is not toxic-appearing.   Cardiovascular:      Rate and Rhythm: Normal rate and regular rhythm.      Heart sounds: Normal heart sounds.   Pulmonary:      Effort: Pulmonary effort is normal. No respiratory distress.      Breath sounds: Normal breath sounds. No wheezing.   Abdominal:      General: Bowel sounds are normal. There is no distension.      Palpations: Abdomen is soft.      Tenderness: There is no abdominal tenderness.   Skin:     General: Skin is warm and dry.   Neurological:      Mental Status: He is alert and oriented to person, place, and time. Mental status is at baseline.       Significant Labs: All pertinent labs within the past 24 hours have been reviewed.    Significant Imaging: I have reviewed all pertinent imaging results/findings within the past 24 hours.

## 2023-04-18 NOTE — PLAN OF CARE
Patient discharging home today with IV abx through [facility] and HH through [facility]. No further needs.

## 2023-04-18 NOTE — PLAN OF CARE
Problem: Adult Inpatient Plan of Care  Goal: Plan of Care Review  Outcome: Unable to Meet, Plan Revised  Goal: Patient-Specific Goal (Individualized)  Outcome: Unable to Meet, Plan Revised  Goal: Absence of Hospital-Acquired Illness or Injury  Outcome: Unable to Meet, Plan Revised  Goal: Optimal Comfort and Wellbeing  Outcome: Unable to Meet, Plan Revised  Goal: Readiness for Transition of Care  Outcome: Unable to Meet, Plan Revised     Problem: Diabetes Comorbidity  Goal: Blood Glucose Level Within Targeted Range  Outcome: Unable to Meet, Plan Revised     Problem: Bariatric Environmental Safety  Goal: Safety Maintained with Care  Outcome: Unable to Meet, Plan Revised     Problem: Impaired Wound Healing  Goal: Optimal Wound Healing  Outcome: Unable to Meet, Plan Revised     Problem: Fluid and Electrolyte Imbalance (Acute Kidney Injury/Impairment)  Goal: Fluid and Electrolyte Balance  Outcome: Unable to Meet, Plan Revised     Problem: Oral Intake Inadequate (Acute Kidney Injury/Impairment)  Goal: Optimal Nutrition Intake  Outcome: Unable to Meet, Plan Revised     Problem: Renal Function Impairment (Acute Kidney Injury/Impairment)  Goal: Effective Renal Function  Outcome: Unable to Meet, Plan Revised     Problem: Gas Exchange Impaired  Goal: Optimal Gas Exchange  Outcome: Unable to Meet, Plan Revised     Problem: Fall Injury Risk  Goal: Absence of Fall and Fall-Related Injury  Outcome: Unable to Meet, Plan Revised     Problem: Infection  Goal: Absence of Infection Signs and Symptoms  Outcome: Unable to Meet, Plan Revised     Problem: Skin Injury Risk Increased  Goal: Skin Health and Integrity  Outcome: Unable to Meet, Plan Revised

## 2023-04-18 NOTE — PROGRESS NOTES
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 Patient reports he has developed this wound over a year ago after trauma due to a nail. Infection has caused osteomylitis within the last year and he has had multiple surgeries/debridement since but the wound is non healing and keeps getting infected. He visited the wound care clinic on 03/31/23 and saw NP Yelitza Alas who realized the wound is infected, took wound cultures and started patient on Linezolid. Patient completed the one week course, cultures came back showing Enterobacter cloacae and enterococcus gallinarum resistant to multiple antibiotics therefore he was suggested to come to the hospital.     Patient denies fever, chills, nausea, vomiting, cough, palpitation, chest pain, abdominal pain, diarrhea or problems with urination. Wound is malodorous with increase in drainage in recent weeks. Patient has bilateral LE edema but has never seen a cardiologist or a nephrologist in the past.     PMHx includes Diabetes with last A1c of 6.4 on 03/2023 on [place]. Patient has had his right great toe amputated back in 2018 due to a diabetic wound. He has diabetic neuropathy with minimal sensation in both feet but intact sensation above ankle. He has neever had a diabetic eye exam. Patient has HTN and his bp ranges 135-160 systolic at home with current home regimen. Patient has been referred to sleep studies  previously but has not been compliant.    Workup so far:  ESR 69 with CRP of 2.9  WBC wnl, H/H 10.3/32.9  Cr of 1.67 with eGFRof 53          Overview/Hospital Course:  No notes on file    Interval History: Trying to arrange abx. For outpatient .     Review of Systems  Objective:     Vital Signs (Most Recent):  Temp: 97.9 °F (36.6 °C) (04/18/23 0400)  Pulse: 78 (04/18/23 0400)  Resp: 20 (04/18/23 0400)  BP: (!) 144/87 (04/18/23 0400)  SpO2: 97 % (04/18/23 0400)   Vital Signs (24h Range):  Temp:  [97.5 °F (36.4 °C)-98.4 °F (36.9 °C)] 97.9 °F (36.6 °C)  Pulse:  [74-80] 78  Resp:  [16-20] 20  SpO2:  [94 %-100 %] 97 %  BP: (144-164)/(78-90) 144/87     Weight: (!) 160.5 kg (353 lb 13.4 oz)  Body mass index is 46.68 kg/m².    Intake/Output Summary (Last 24 hours) at 4/18/2023 0649  Last data filed at 4/18/2023 0501  Gross per 24 hour   Intake 211.67 ml   Output --   Net 211.67 ml      Physical Exam  Vitals reviewed.   Constitutional:       General: He is not in acute distress.     Appearance: He is not toxic-appearing.   Cardiovascular:      Rate and Rhythm: Normal rate and regular rhythm.      Heart sounds: Normal heart sounds.   Pulmonary:      Effort: Pulmonary effort is normal. No respiratory distress.      Breath sounds: Normal breath sounds. No wheezing.   Abdominal:      General: Bowel sounds are normal. There is no distension.      Palpations: Abdomen is soft.      Tenderness: There is no abdominal tenderness.   Skin:     General: Skin is warm and dry.   Neurological:      Mental Status: He is alert and oriented to person, place, and time. Mental status is at baseline.       Significant Labs: All pertinent labs within the past 24 hours have been reviewed.    Significant Imaging: I have reviewed all pertinent imaging results/findings within the past 24 hours.      Assessment/Plan:      * Non-pressure chronic ulcer of other part of right foot with other specified severity  Foul smelling, draining, warmth to touch  wound at the sole of the right foot   Cultures from 03/31 grew Enterobacter cloacae and enterococcus gallinarum  Cultures from December grew Enterbacter cloacnae, klebsiella pneumonaiae, staph aureus  Based on sensitivities and discussion with pharmacist Zosyn and Daptomycin would cover all of the above and would give good coverage for other possible bacteria including gram negatives/pseudomonas.       Type 2 diabetes mellitus, with long-term current use of insulin  Patient's FSGs are controlled on current medication regimen.  Last A1c reviewed-   Lab Results   Component Value Date    HGBA1C 6.4 03/02/2023     Most recent fingerstick glucose reviewed- No results for input(s): POCTGLUCOSE in the last 24 hours.  Current correctional scale  Medium  Maintain anti-hyperglycemic dose as follows-   Antihyperglycemics (From admission, onward)    Start     Stop Route Frequency Ordered    04/11/23 1252  [MAR Hold - Suspended Admission]  insulin aspart U-100 injection 1-10 Units   (MAR Hold at [facility] since Fri 4/14/2023 at 1032.Hold Reason: Patient on Leave of Absence)    -- SubQ Before meals & nightly PRN 04/11/23 1153    04/08/23 2100  [MAR Hold - Suspended Admission]  insulin detemir U-100 injection 20 Units   (MAR Hold at [facility] since Fri 4/14/2023 at 1032.Hold Reason: Patient on Leave of Absence)    -- SubQ Nightly 04/08/23 0940        Hold Oral hypoglycemics while patient is in the hospital.    CKD (chronic kidney disease), stage IV  CKD  CTM      Normocytic anemia    H/H 10.3/32.9  MCV 85.7   Most likley due to anemia of chronic disease  Irone studies, ferritin, b12 and folic acid pending     HLD (hyperlipidemia)    Lipid panel was recently done outpatient  Continue home Lipitor     TEDDY (obstructive sleep apnea)  Patient has been referred to sleep studies in the past but has been non compliant  Follow up orders with Dr. Baca was placed. TEDDY and its  consequences were discussed with the patient. Patient seems to be motivated to get the sleep studies done      Severe obesity (BMI >= 40)  Body mass index is 46.68 kg/m². Morbid obesity complicates all aspects of disease management from diagnostic modalities to treatment. Weight loss encouraged and health benefits explained to patient.         Bilateral lower extremity edema  Patient on norvasc  Hx of PVD  ProBNP pending   Patient can benefit from an Echo and possibly outpatient cardiology referral based on the echo    Peripheral vascular disease, unspecified    Last arterial doppler was in 2020  Patient can benefit from both venous and arterial LE doppler    HTN (hypertension)  Currently well controlled  Continue Norvasc 10 mg daily   Increased Hydralazine 25 mg BID to TID  Holding home Losartan and HCTZ for possible surgery tomorrow.   Hydralazine 10 mg IV with parameters          VTE Risk Mitigation (From admission, onward)         Ordered     Place sequential compression device  Until discontinued         04/07/23 0122     IP VTE HIGH RISK PATIENT  Once         04/07/23 0122     Reason for No Pharmacological VTE Prophylaxis  Once        Question:  Reasons:  Answer:  Physician Provided (leave comment)  Comment:  possible surgery tomorrow am    04/07/23 0122     Place sequential compression device  Until discontinued         04/06/23 3898                Discharge Planning   WOOD:      Code Status: Full Code   Is the patient medically ready for discharge?:     Reason for patient still in hospital (select all that apply): Treatment  Discharge Plan A: Home Health   Discharge Delays: None known at this time              Axel Simmons Jr, MD  Department of Hospital Medicine   [facility]

## 2023-04-19 ENCOUNTER — OFFICE VISIT (OUTPATIENT)
Dept: WOUND CARE | Facility: CLINIC | Age: 38
End: 2023-04-19
Attending: FAMILY MEDICINE
Payer: MEDICARE

## 2023-04-19 VITALS — TEMPERATURE: 98 F | RESPIRATION RATE: 18 BRPM

## 2023-04-19 DIAGNOSIS — L97.414 ULCER OF RIGHT HEEL, WITH NECROSIS OF BONE: Primary | ICD-10-CM

## 2023-04-19 PROCEDURE — 99183 HYPERBARIC OXYGEN THERAPY: CPT | Mod: PBBFAC | Performed by: FAMILY MEDICINE

## 2023-04-19 PROCEDURE — 99499 UNLISTED E&M SERVICE: CPT | Mod: S$PBB,,, | Performed by: FAMILY MEDICINE

## 2023-04-19 PROCEDURE — 99499 NO LOS: ICD-10-PCS | Mod: S$PBB,,, | Performed by: FAMILY MEDICINE

## 2023-04-19 PROCEDURE — 99183 PR HYPERBARIC OXYGEN THERAPY ATTENDANCE/SUPERVISION, PER SESSION: ICD-10-PCS | Mod: S$PBB,,, | Performed by: FAMILY MEDICINE

## 2023-04-19 PROCEDURE — 99215 OFFICE O/P EST HI 40 MIN: CPT | Mod: PBBFAC | Performed by: FAMILY MEDICINE

## 2023-04-19 PROCEDURE — 99183 HYPERBARIC OXYGEN THERAPY: CPT | Mod: S$PBB,,, | Performed by: FAMILY MEDICINE

## 2023-04-19 NOTE — PLAN OF CARE
[facility]  Discharge Final Note    Primary Care Provider: Mojgan Lomeli NP    Expected Discharge Date: 4/18/2023    Final Discharge Note (most recent)       Final Note - 04/19/23 0919          Final Note    Assessment Type Final Discharge Note     Anticip[facility] Discharge Disposition [facility]    What phone number can be called within the next 1-3 days to see how you are doing after discharge? [phone]        Post-Acute Status    Post-Acute Authorization Home Health;IV Infusion     Home Health Status Set-up Complete/Auth obtained     IV Infusion Status Set-up Complete/Auth obtained     Patient choice form signed by patient/caregiver List with quality metrics by geographic area provided;List from CMS Compare     Discharge Delays None known at this time                     Important Message from Medicare  Important Message from Medicare regarding Discharge Appeal Rights: Signed/date by patient/caregiver     Date IMM was signed: 04/18/23  Time IMM was signed: 1430     Follow-up providers       Elissa Baca MD   Specialty: Pulmonary Disease    [facility]  [address] [place] [phone]       Next Steps: Follow up    Instructions: Sleep studies  APPT: Tuesday 05/02/2023 @ 0945   A package will be mailed to the patient to filled out and brought to appointment  # [phone]              After-discharge care                Dialysis/Infusion       [facility]   Service: Home Infusion and Injection    [address] [facility] [address]   Phone: [phone]                 Home Medical Care       [facility]   Service: Home Health Services    [address]  [facility] [address]   Phone: [phone]                             Per Dir of [facility] pts iv abx was approved, pt and MD notified. IV abx was delivered to pts room on yesterday. SW faxed dc orders to [facility]. Pt discharged home on yesterday.  No other needs.

## 2023-04-20 ENCOUNTER — OFFICE VISIT (OUTPATIENT)
Dept: WOUND CARE | Facility: CLINIC | Age: 38
End: 2023-04-20
Attending: FAMILY MEDICINE
Payer: MEDICARE

## 2023-04-20 VITALS — SYSTOLIC BLOOD PRESSURE: 163 MMHG | DIASTOLIC BLOOD PRESSURE: 90 MMHG | HEART RATE: 86 BPM | RESPIRATION RATE: 20 BRPM

## 2023-04-20 DIAGNOSIS — L97.414 ULCER OF RIGHT HEEL, WITH NECROSIS OF BONE: Primary | ICD-10-CM

## 2023-04-20 LAB
GLUCOSE SERPL-MCNC: 128 MG/DL (ref 70–110)
GLUCOSE SERPL-MCNC: 148 MG/DL (ref 70–110)
GLUCOSE SERPL-MCNC: 149 MG/DL (ref 70–110)
GLUCOSE SERPL-MCNC: 172 MG/DL (ref 70–110)
GLUCOSE SERPL-MCNC: 182 MG/DL (ref 70–110)

## 2023-04-20 PROCEDURE — 99183 HYPERBARIC OXYGEN THERAPY: CPT | Mod: PBBFAC | Performed by: FAMILY MEDICINE

## 2023-04-20 PROCEDURE — 82962 GLUCOSE BLOOD TEST: CPT | Mod: PBBFAC | Performed by: FAMILY MEDICINE

## 2023-04-20 PROCEDURE — 99214 OFFICE O/P EST MOD 30 MIN: CPT | Mod: PBBFAC,25 | Performed by: FAMILY MEDICINE

## 2023-04-20 PROCEDURE — 99183 PR HYPERBARIC OXYGEN THERAPY ATTENDANCE/SUPERVISION, PER SESSION: ICD-10-PCS | Mod: S$PBB,,, | Performed by: FAMILY MEDICINE

## 2023-04-20 PROCEDURE — 99499 UNLISTED E&M SERVICE: CPT | Mod: S$PBB,,, | Performed by: FAMILY MEDICINE

## 2023-04-20 PROCEDURE — 99183 HYPERBARIC OXYGEN THERAPY: CPT | Mod: S$PBB,,, | Performed by: FAMILY MEDICINE

## 2023-04-20 PROCEDURE — 99499 NO LOS: ICD-10-PCS | Mod: S$PBB,,, | Performed by: FAMILY MEDICINE

## 2023-04-20 NOTE — PROGRESS NOTES
Subjective:      Patient ID: Jean Pierre Paulson is a 37 y.o. male.    Chief Complaint: Hyperbaric Oxygen Therapy (R DFU Grade 3)    Jean Pierre Paulson a 37 y.o. male presents for follow up on all regular problems which are reviewed and discussed.     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, with necrosis of bone    -  Primary            Past Medical History:  Past Medical History:   Diagnosis Date    NOLAN (acute kidney injury) 04/07/2023    Anemia     Diabetes mellitus with neuropathy     HLD (hyperlipidemia) 04/07/2023    HTN (hypertension)     Obesity     TEDDY (obstructive sleep apnea) 04/07/2023    Osteomyelitis 10/2020    Hx of R foot, Tx with IV Abx    Peripheral vascular disease      Past Surgical History:   Procedure Laterality Date    APPLICATION OF SPLIT-THICKNESS SKIN GRAFT (STSG) TO LOWER EXTREMITY Right 03/02/2022    Procedure: APPLICATION, GRAFT, SKIN, SPLIT-THICKNESS, TO LOWER EXTREMITY;  Surgeon: Greg Ramírez MD;  Location: [facility];  Service: General;  Laterality: Right;    CHOLECYSTECTOMY      DEBRIDEMENT OF FOOT Right 10/2020    right great toe amputation       Review of patient's allergies indicates:   Allergen Reactions    Tomato Itching     Current Outpatient Medications on File Prior to Visit   Medication Sig Dispense Refill    amLODIPine (NORVASC) 2.5 MG tablet Take 1 tablet (2.5 mg total) by mouth once daily. 30 tablet 11    blood sugar diagnostic (BLOOD GLUCOSE TEST) Strp 1 strip by Misc.(Non-Drug; Combo Route) route once daily. accu-chek Veronika strips 90 strip 5    dextrose 5 % in water (D5W) 5 % PgBk 100 mL with piperacillin-tazobactam 4.5 gram SolR 4.5 g Inject 4.5 g into the vein every 8 (eight) hours.      FEROSUL 325 mg (65 mg iron) Tab tablet Take 1 tablet (325 mg total) by mouth once daily. 90 tablet 1    gabapentin (NEURONTIN) 300 MG capsule Take 1  capsule (300 mg total) by mouth every 8 (eight) hours. 270 capsule 1    hydrALAZINE (APRESOLINE) 25 MG tablet Take 1 tablet (25 mg total) by mouth every 12 (twelve) hours. 180 tablet 1    hydroCHLOROthiazide (HYDRODIURIL) 25 MG tablet Take 1 tablet (25 mg total) by mouth once daily. 90 tablet 1    HYDROcodone-acetaminophen (NORCO)  mg per tablet Take 1 tablet by mouth every 12 (twelve) hours as needed for Pain. 60 tablet 0    losartan (COZAAR) 50 MG tablet Take 1 tablet (50 mg total) by mouth once daily. 90 tablet 1    semaglutide (OZEMPIC) 2 mg/dose (8 mg/3 mL) PnIj Inject 2 mg into the skin every 7 days. 8 mL 2    sodium chloride 0.9% SolP 50 mL with DAPTOmycin 500 mg SolR 1,000 mg Inject 1,000 mg into the vein once daily. for 20 days      [DISCONTINUED] blood-glucose meter kit 1 each by Other route 3 (three) times daily. 1 each 0    [DISCONTINUED] insulin asp prt-insulin aspart, NovoLOG 70/30, (NOVOLOG 70/30) 100 unit/mL (70-30) Soln Inject 15 Units into the skin once daily. 450 Units 0    [DISCONTINUED] insulin detemir U-100 (LEVEMIR) 100 unit/mL injection Inject 40 Units into the skin every evening. 12 mL 0    [DISCONTINUED] sodium hypochlorite 0.5 % (DAKIN'S SOLUTION) external solution Apply topically once daily. 473 mL 5     No current facility-administered medications on file prior to visit.     Social History     Socioeconomic History    Marital status: Single    Number of children: 1   Tobacco Use    Smoking status: Every Day     Packs/day: 0.25     Years: 25.00     Pack years: 6.25     Types: Cigarettes     Start date:      Last attempt to quit: 2022     Years since quittin.2    Smokeless tobacco: Never   Substance and Sexual Activity    Alcohol use: Yes     Alcohol/week: 2.0 standard drinks     Types: 1 Glasses of wine, 1 Cans of beer per week     Comment: occasionally    Drug use: Never    Sexual activity: Yes     Partners: Female     Social Determinants of Health      Financial Resource Strain: Low Risk     Difficulty of Paying Living Expenses: Not hard at all   Food Insecurity: No Food Insecurity    Worried About Running Out of Food in the Last Year: Never true    Ran Out of Food in the Last Year: Never true   Transportation Needs: Unmet Transportation Needs    Lack of Transportation (Medical): No    Lack of Transportation (Non-Medical): Yes   Physical Activity: Inactive    Days of Exercise per Week: 0 days    Minutes of Exercise per Session: 0 min   Stress: No Stress Concern Present    Feeling of Stress : Not at all   Social Connections: Socially Isolated    Frequency of Communication with Friends and Family: More than three times a week    Frequency of Social Gatherings with Friends and Family: More than three times a week    Attends Sikhism Services: Never    Active Member of Clubs or Organizations: No    Attends Club or Organization Meetings: Never    Marital Status: Never    Housing Stability: Unknown    Unable to Pay for Housing in the Last Year: No    Unstable Housing in the Last Year: No     Family History   Problem Relation Age of Onset    Hypertension Father     Diabetes Father     Hypertension Maternal Grandmother     Diabetes Maternal Grandmother        Review of Systems   Unable to perform ROS: Other     Objective:     BP (!) 183/90   Pulse 70   Temp 97.6 °F (36.4 °C)   Resp 18     Physical Exam  Constitutional:       Appearance: Normal appearance. He is obese.   Assessment:     1. 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      I am having Jean Pierre Paulson maintain his blood sugar diagnostic, FeroSuL, gabapentin,  hydrALAZINE, hydroCHLOROthiazide, losartan, OZEMPIC, and HYDROcodone-acetaminophen.    Jean Pierre was seen today for hyperbaric oxygen therapy.    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

## 2023-04-20 NOTE — PROGRESS NOTES
I have reviewed the notes, assessments, and/or procedures performed. I concur with her/his documentation of Sarath Schulz.     Jabier Walden, DO   Subjective:      Patient ID: Jean Pierre Paulson is a 37 y.o. male.    Chief Complaint: Hyperbaric Oxygen Therapy (R DFU Grade 3)    Jean Pierre Paulson a 37 y.o. male presents for follow up on all regular problems which are reviewed and discussed.     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, with necrosis of bone    -  Primary            Past Medical History:  Past Medical History:   Diagnosis Date    NOLAN (acute kidney injury) 04/07/2023    Anemia     Diabetes mellitus with neuropathy     HLD (hyperlipidemia) 04/07/2023    HTN (hypertension)     Obesity     TEDDY (obstructive sleep apnea) 04/07/2023    Osteomyelitis 10/2020    Hx of R foot, Tx with IV Abx    Peripheral vascular disease      Past Surgical History:   Procedure Laterality Date    APPLICATION OF SPLIT-THICKNESS SKIN GRAFT (STSG) TO LOWER EXTREMITY Right 03/02/2022    Procedure: APPLICATION, GRAFT, SKIN, SPLIT-THICKNESS, TO LOWER EXTREMITY;  Surgeon: Greg Ramírez MD;  Location: [facility], [place];  Service: General;  Laterality: Right;    CHOLECYSTECTOMY      DEBRIDEMENT OF FOOT Right 10/2020    right great toe amputation       Review of patient's allergies indicates:   Allergen Reactions    Tomato Itching     Current Outpatient Medications on File Prior to Visit   Medication Sig Dispense Refill    amLODIPine (NORVASC) 2.5 MG tablet Take 1 tablet (2.5 mg total) by mouth once daily. 30 tablet 11    blood sugar diagnostic (BLOOD GLUCOSE TEST) Strp 1 strip by Misc.(Non-Drug; Combo Route) route once daily. accu-chek Veronika strips 90 strip 5    dextrose 5 % in water (D5W) 5 % PgBk 100 mL with piperacillin-tazobactam 4.5 gram SolR 4.5 g Inject 4.5 g into the vein every 8 (eight) hours.      FEROSUL 325 mg (65 mg iron) Tab tablet Take 1 tablet (325 mg total) by mouth once daily. 90 tablet 1    gabapentin (NEURONTIN) 300 MG capsule Take 1  capsule (300 mg total) by mouth every 8 (eight) hours. 270 capsule 1    hydrALAZINE (APRESOLINE) 25 MG tablet Take 1 tablet (25 mg total) by mouth every 12 (twelve) hours. 180 tablet 1    hydroCHLOROthiazide (HYDRODIURIL) 25 MG tablet Take 1 tablet (25 mg total) by mouth once daily. 90 tablet 1    HYDROcodone-acetaminophen (NORCO)  mg per tablet Take 1 tablet by mouth every 12 (twelve) hours as needed for Pain. 60 tablet 0    losartan (COZAAR) 50 MG tablet Take 1 tablet (50 mg total) by mouth once daily. 90 tablet 1    semaglutide (OZEMPIC) 2 mg/dose (8 mg/3 mL) PnIj Inject 2 mg into the skin every 7 days. 8 mL 2    sodium chloride 0.9% SolP 50 mL with DAPTOmycin 500 mg SolR 1,000 mg Inject 1,000 mg into the vein once daily. for 20 days      [DISCONTINUED] blood-glucose meter kit 1 each by Other route 3 (three) times daily. 1 each 0    [DISCONTINUED] insulin asp prt-insulin aspart, NovoLOG 70/30, (NOVOLOG 70/30) 100 unit/mL (70-30) Soln Inject 15 Units into the skin once daily. 450 Units 0    [DISCONTINUED] insulin detemir U-100 (LEVEMIR) 100 unit/mL injection Inject 40 Units into the skin every evening. 12 mL 0    [DISCONTINUED] sodium hypochlorite 0.5 % (DAKIN'S SOLUTION) external solution Apply topically once daily. 473 mL 5     No current facility-administered medications on file prior to visit.     Social History     Socioeconomic History    Marital status: Single    Number of children: 1   Tobacco Use    Smoking status: Every Day     Packs/day: 0.25     Years: 25.00     Pack years: 6.25     Types: Cigarettes     Start date:      Last attempt to quit: 2022     Years since quittin.2    Smokeless tobacco: Never   Substance and Sexual Activity    Alcohol use: Yes     Alcohol/week: 2.0 standard drinks     Types: 1 Glasses of wine, 1 Cans of beer per week     Comment: occasionally    Drug use: Never    Sexual activity: Yes     Partners: Female     Social Determinants of Health      Financial Resource Strain: Low Risk     Difficulty of Paying Living Expenses: Not hard at all   Food Insecurity: No Food Insecurity    Worried About Running Out of Food in the Last Year: Never true    Ran Out of Food in the Last Year: Never true   Transportation Needs: Unmet Transportation Needs    Lack of Transportation (Medical): No    Lack of Transportation (Non-Medical): Yes   Physical Activity: Inactive    Days of Exercise per Week: 0 days    Minutes of Exercise per Session: 0 min   Stress: No Stress Concern Present    Feeling of Stress : Not at all   Social Connections: Socially Isolated    Frequency of Communication with Friends and Family: More than three times a week    Frequency of Social Gatherings with Friends and Family: More than three times a week    Attends Scientologist Services: Never    Active Member of Clubs or Organizations: No    Attends Club or Organization Meetings: Never    Marital Status: Never    Housing Stability: Unknown    Unable to Pay for Housing in the Last Year: No    Unstable Housing in the Last Year: No     Family History   Problem Relation Age of Onset    Hypertension Father     Diabetes Father     Hypertension Maternal Grandmother     Diabetes Maternal Grandmother        Review of Systems   Unable to perform ROS: Other     Objective:     BP (!) 146/82   Pulse 83   Temp 98 °F (36.7 °C)   Resp 18     Physical Exam  Constitutional:       Appearance: He is normal weight.   Neurological:      Mental Status: He is alert.   Assessment:     1. 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      I am having Jean Pierre Paulson maintain his blood  sugar diagnostic, FeroSuL, gabapentin, hydrALAZINE, hydroCHLOROthiazide, losartan, OZEMPIC, and HYDROcodone-acetaminophen.    Jean Pierre was seen today for hyperbaric oxygen therapy.    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

## 2023-04-21 ENCOUNTER — OFFICE VISIT (OUTPATIENT)
Dept: WOUND CARE | Facility: CLINIC | Age: 38
End: 2023-04-21
Attending: FAMILY MEDICINE
Payer: MEDICARE

## 2023-04-21 VITALS
SYSTOLIC BLOOD PRESSURE: 151 MMHG | TEMPERATURE: 98 F | HEART RATE: 93 BPM | RESPIRATION RATE: 20 BRPM | DIASTOLIC BLOOD PRESSURE: 94 MMHG

## 2023-04-21 DIAGNOSIS — L97.414 ULCER OF RIGHT HEEL, WITH NECROSIS OF BONE: Primary | ICD-10-CM

## 2023-04-21 PROCEDURE — 99183 HYPERBARIC OXYGEN THERAPY: CPT | Mod: PBBFAC | Performed by: FAMILY MEDICINE

## 2023-04-21 PROCEDURE — 99183 PR HYPERBARIC OXYGEN THERAPY ATTENDANCE/SUPERVISION, PER SESSION: ICD-10-PCS | Mod: S$PBB,,, | Performed by: FAMILY MEDICINE

## 2023-04-21 PROCEDURE — 99183 HYPERBARIC OXYGEN THERAPY: CPT | Mod: S$PBB,,, | Performed by: FAMILY MEDICINE

## 2023-04-21 PROCEDURE — 82962 GLUCOSE BLOOD TEST: CPT | Mod: PBBFAC | Performed by: FAMILY MEDICINE

## 2023-04-21 PROCEDURE — 99499 NO LOS: ICD-10-PCS | Mod: S$PBB,,, | Performed by: FAMILY MEDICINE

## 2023-04-21 PROCEDURE — 99499 UNLISTED E&M SERVICE: CPT | Mod: S$PBB,,, | Performed by: FAMILY MEDICINE

## 2023-04-21 PROCEDURE — 99214 OFFICE O/P EST MOD 30 MIN: CPT | Mod: PBBFAC | Performed by: FAMILY MEDICINE

## 2023-04-21 NOTE — PROGRESS NOTES
Subjective:      Patient ID: Jean Pierre Paulson is a 37 y.o. male.    Chief Complaint: Hyperbaric Oxygen Therapy (R DFU Grade 3)    Jean Pierre Paulson a 37 y.o. male presents for follow up on all regular problems which are reviewed and discussed.     Problem List Items Addressed This Visit    None  Visit Diagnoses       Ulcer of right heel, with necrosis of bone    -  Primary    Relevant Orders    POCT Glucose, Hand-Held Device (Completed)    POCT Glucose, Hand-Held Device (Completed)    POCT Glucose, Hand-Held Device (Completed)            Past Medical History:  Past Medical History:   Diagnosis Date    NOLAN (acute kidney injury) 04/07/2023    Anemia     Diabetes mellitus with neuropathy     HLD (hyperlipidemia) 04/07/2023    HTN (hypertension)     Obesity     TEDDY (obstructive sleep apnea) 04/07/2023    Osteomyelitis 10/2020    Hx of R foot, Tx with IV Abx    Peripheral vascular disease      Past Surgical History:   Procedure Laterality Date    APPLICATION OF SPLIT-THICKNESS SKIN GRAFT (STSG) TO LOWER EXTREMITY Right 03/02/2022    Procedure: APPLICATION, GRAFT, SKIN, SPLIT-THICKNESS, TO LOWER EXTREMITY;  Surgeon: Greg Ramírez MD;  Location: [facility];  Service: General;  Laterality: Right;    CHOLECYSTECTOMY      DEBRIDEMENT OF FOOT Right 10/2020    right great toe amputation       Review of patient's allergies indicates:   Allergen Reactions    Tomato Itching     Current Outpatient Medications on File Prior to Visit   Medication Sig Dispense Refill    amLODIPine (NORVASC) 2.5 MG tablet Take 1 tablet (2.5 mg total) by mouth once daily. 30 tablet 11    blood sugar diagnostic (BLOOD GLUCOSE TEST) Strp 1 strip by Misc.(Non-Drug; Combo Route) route once daily. accu-chek Veronika strips 90 strip 5    dextrose 5 % in water (D5W) 5 % PgBk 100 mL with piperacillin-tazobactam 4.5 gram SolR 4.5 g Inject 4.5 g into the vein every 8 (eight) hours.      FEROSUL 325 mg (65 mg iron) Tab tablet Take 1 tablet (325 mg total)  by mouth once daily. 90 tablet 1    gabapentin (NEURONTIN) 300 MG capsule Take 1 capsule (300 mg total) by mouth every 8 (eight) hours. 270 capsule 1    hydrALAZINE (APRESOLINE) 25 MG tablet Take 1 tablet (25 mg total) by mouth every 12 (twelve) hours. 180 tablet 1    hydroCHLOROthiazide (HYDRODIURIL) 25 MG tablet Take 1 tablet (25 mg total) by mouth once daily. 90 tablet 1    HYDROcodone-acetaminophen (NORCO)  mg per tablet Take 1 tablet by mouth every 12 (twelve) hours as needed for Pain. 60 tablet 0    losartan (COZAAR) 50 MG tablet Take 1 tablet (50 mg total) by mouth once daily. 90 tablet 1    semaglutide (OZEMPIC) 2 mg/dose (8 mg/3 mL) PnIj Inject 2 mg into the skin every 7 days. 8 mL 2    sodium chloride 0.9% SolP 50 mL with DAPTOmycin 500 mg SolR 1,000 mg Inject 1,000 mg into the vein once daily. for 20 days      [DISCONTINUED] blood-glucose meter kit 1 each by Other route 3 (three) times daily. 1 each 0    [DISCONTINUED] insulin asp prt-insulin aspart, NovoLOG 70/30, (NOVOLOG 70/30) 100 unit/mL (70-30) Soln Inject 15 Units into the skin once daily. 450 Units 0    [DISCONTINUED] insulin detemir U-100 (LEVEMIR) 100 unit/mL injection Inject 40 Units into the skin every evening. 12 mL 0    [DISCONTINUED] sodium hypochlorite 0.5 % (DAKIN'S SOLUTION) external solution Apply topically once daily. 473 mL 5     No current facility-administered medications on file prior to visit.     Social History     Socioeconomic History    Marital status: Single    Number of children: 1   Tobacco Use    Smoking status: Every Day     Packs/day: 0.25     Years: 25.00     Pack years: 6.25     Types: Cigarettes     Start date:      Last attempt to quit: 2022     Years since quittin.2    Smokeless tobacco: Never   Substance and Sexual Activity    Alcohol use: Yes     Alcohol/week: 2.0 standard drinks     Types: 1 Glasses of wine, 1 Cans of beer per week     Comment: occasionally    Drug use: Never     Sexual activity: Yes     Partners: Female     Social Determinants of Health     Financial Resource Strain: Low Risk     Difficulty of Paying Living Expenses: Not hard at all   Food Insecurity: No Food Insecurity    Worried About Running Out of Food in the Last Year: Never true    Ran Out of Food in the Last Year: Never true   Transportation Needs: Unmet Transportation Needs    Lack of Transportation (Medical): No    Lack of Transportation (Non-Medical): Yes   Physical Activity: Inactive    Days of Exercise per Week: 0 days    Minutes of Exercise per Session: 0 min   Stress: No Stress Concern Present    Feeling of Stress : Not at all   Social Connections: Socially Isolated    Frequency of Communication with Friends and Family: More than three times a week    Frequency of Social Gatherings with Friends and Family: More than three times a week    Attends Holiness Services: Never    Active Member of Clubs or Organizations: No    Attends Club or Organization Meetings: Never    Marital Status: Never    Housing Stability: Unknown    Unable to Pay for Housing in the Last Year: No    Unstable Housing in the Last Year: No     Family History   Problem Relation Age of Onset    Hypertension Father     Diabetes Father     Hypertension Maternal Grandmother     Diabetes Maternal Grandmother        Review of Systems   Unable to perform ROS: Other     Objective:     BP (!) 153/87   Pulse 88   Temp 97.7 °F (36.5 °C)   Resp 18     Physical Exam  Constitutional:       Appearance: Normal appearance. He is obese.   Assessment:     1. Ulcer of right heel, with necrosis of bone        Plan:     Problem List Items Addressed This Visit    None  Visit Diagnoses       Ulcer of right heel, with necrosis of bone    -  Primary    Relevant Orders    POCT Glucose, Hand-Held Device (Completed)    POCT Glucose, Hand-Held Device (Completed)    POCT Glucose, Hand-Held Device (Completed)          No follow-ups on  file.      I am having Jean Pierre Paulson maintain his blood sugar diagnostic, FeroSuL, gabapentin, hydrALAZINE, hydroCHLOROthiazide, losartan, OZEMPIC, and HYDROcodone-acetaminophen.    Jean Pierre was seen today for hyperbaric oxygen therapy.    Diagnoses and all orders for this visit:    Ulcer of right heel, with necrosis of bone  -     POCT Glucose, Hand-Held Device  -     POCT Glucose, Hand-Held Device  -     POCT Glucose, Hand-Held Device         [unfilled]  Orders Placed This Encounter   Procedures    POCT Glucose, Hand-Held Device    POCT Glucose, Hand-Held Device    POCT Glucose, Hand-Held Device

## 2023-04-21 NOTE — PROGRESS NOTES
Subjective:      Patient ID: Jean Pierre Paulson is a 37 y.o. male.    Chief Complaint: Hyperbaric Oxygen Therapy (R DFU Grade 3)    Jean Pierre Paulson a 37 y.o. male presents for follow up on all regular problems which are reviewed and discussed.     Problem List Items Addressed This Visit    None  Visit Diagnoses       Ulcer of right heel, with necrosis of bone    -  Primary            Past Medical History:  Past Medical History:   Diagnosis Date    NOLAN (acute kidney injury) 04/07/2023    Anemia     Diabetes mellitus with neuropathy     HLD (hyperlipidemia) 04/07/2023    HTN (hypertension)     Obesity     TEDDY (obstructive sleep apnea) 04/07/2023    Osteomyelitis 10/2020    Hx of R foot, Tx with IV Abx    Peripheral vascular disease      Past Surgical History:   Procedure Laterality Date    APPLICATION OF SPLIT-THICKNESS SKIN GRAFT (STSG) TO LOWER EXTREMITY Right 03/02/2022    Procedure: APPLICATION, GRAFT, SKIN, SPLIT-THICKNESS, TO LOWER EXTREMITY;  Surgeon: Greg Ramírez MD;  Location: [facility];  Service: General;  Laterality: Right;    CHOLECYSTECTOMY      DEBRIDEMENT OF FOOT Right 10/2020    right great toe amputation       Review of patient's allergies indicates:   Allergen Reactions    Tomato Itching     Current Outpatient Medications on File Prior to Visit   Medication Sig Dispense Refill    amLODIPine (NORVASC) 2.5 MG tablet Take 1 tablet (2.5 mg total) by mouth once daily. 30 tablet 11    blood sugar diagnostic (BLOOD GLUCOSE TEST) Strp 1 strip by Misc.(Non-Drug; Combo Route) route once daily. accu-chek Veronika strips 90 strip 5    dextrose 5 % in water (D5W) 5 % PgBk 100 mL with piperacillin-tazobactam 4.5 gram SolR 4.5 g Inject 4.5 g into the vein every 8 (eight) hours.      FEROSUL 325 mg (65 mg iron) Tab tablet Take 1 tablet (325 mg total) by mouth once daily. 90 tablet 1    gabapentin (NEURONTIN) 300 MG capsule Take 1 capsule (300 mg total) by mouth every 8 (eight) hours. 270 capsule 1     hydrALAZINE (APRESOLINE) 25 MG tablet Take 1 tablet (25 mg total) by mouth every 12 (twelve) hours. 180 tablet 1    hydroCHLOROthiazide (HYDRODIURIL) 25 MG tablet Take 1 tablet (25 mg total) by mouth once daily. 90 tablet 1    HYDROcodone-acetaminophen (NORCO)  mg per tablet Take 1 tablet by mouth every 12 (twelve) hours as needed for Pain. 60 tablet 0    losartan (COZAAR) 50 MG tablet Take 1 tablet (50 mg total) by mouth once daily. 90 tablet 1    semaglutide (OZEMPIC) 2 mg/dose (8 mg/3 mL) PnIj Inject 2 mg into the skin every 7 days. 8 mL 2    sodium chloride 0.9% SolP 50 mL with DAPTOmycin 500 mg SolR 1,000 mg Inject 1,000 mg into the vein once daily. for 20 days      [DISCONTINUED] blood-glucose meter kit 1 each by Other route 3 (three) times daily. 1 each 0    [DISCONTINUED] insulin asp prt-insulin aspart, NovoLOG 70/30, (NOVOLOG 70/30) 100 unit/mL (70-30) Soln Inject 15 Units into the skin once daily. 450 Units 0    [DISCONTINUED] insulin detemir U-100 (LEVEMIR) 100 unit/mL injection Inject 40 Units into the skin every evening. 12 mL 0    [DISCONTINUED] sodium hypochlorite 0.5 % (DAKIN'S SOLUTION) external solution Apply topically once daily. 473 mL 5     No current facility-administered medications on file prior to visit.     Social History     Socioeconomic History    Marital status: Single    Number of children: 1   Tobacco Use    Smoking status: Every Day     Packs/day: 0.25     Years: 25.00     Pack years: 6.25     Types: Cigarettes     Start date:      Last attempt to quit: 2022     Years since quittin.2    Smokeless tobacco: Never   Substance and Sexual Activity    Alcohol use: Yes     Alcohol/week: 2.0 standard drinks     Types: 1 Glasses of wine, 1 Cans of beer per week     Comment: occasionally    Drug use: Never    Sexual activity: Yes     Partners: Female     Social Determinants of Health     Financial Resource Strain: Low Risk     Difficulty of Paying Living  Expenses: Not hard at all   Food Insecurity: No Food Insecurity    Worried About Running Out of Food in the Last Year: Never true    Ran Out of Food in the Last Year: Never true   Transportation Needs: Unmet Transportation Needs    Lack of Transportation (Medical): No    Lack of Transportation (Non-Medical): Yes   Physical Activity: Inactive    Days of Exercise per Week: 0 days    Minutes of Exercise per Session: 0 min   Stress: No Stress Concern Present    Feeling of Stress : Not at all   Social Connections: Socially Isolated    Frequency of Communication with Friends and Family: More than three times a week    Frequency of Social Gatherings with Friends and Family: More than three times a week    Attends [employer] Services: Never    Active Member of Clubs or Organizations: No    Attends Club or Organization Meetings: Never    Marital Status: Never    Housing Stability: Unknown    Unable to Pay for Housing in the Last Year: No    Unstable Housing in the Last Year: No     Family History   Problem Relation Age of Onset    Hypertension Father     Diabetes Father     Hypertension Maternal Grandmother     Diabetes Maternal Grandmother        Review of Systems   Unable to perform ROS: Other     Objective:     Temp 97.8 °F (36.6 °C)   Resp 18     Physical Exam  Constitutional:       Appearance: Normal appearance. He is normal weight.   Assessment:     1. Ulcer of right heel, with necrosis of bone        Plan:     Problem List Items Addressed This Visit    None  Visit Diagnoses       Ulcer of right heel, with necrosis of bone    -  Primary          No follow-ups on file.      I am having Jean Pierre Paulson maintain his blood sugar diagnostic, FeroSuL, gabapentin, hydrALAZINE, hydroCHLOROthiazide, losartan, OZEMPIC, HYDROcodone-acetaminophen, amLODIPine, (sodium chloride 0.9% SolP 50 mL with DAPTOmycin 500 mg SolR 1,000 mg), and dextrose 5 % in water (D5W) 5 % PgBk 100 mL with piperacillin-tazobactam  4.5 gram SolR 4.5 g.    Jean Pierre was seen today for hyperbaric oxygen therapy.    Diagnoses and all orders for this visit:    Ulcer of right heel, with necrosis of bone         [unfilled]  No orders of the defined types were placed in this encounter.

## 2023-04-21 NOTE — PROGRESS NOTES
Subjective:      Patient ID: Jean Pierre Paulson is a 37 y.o. male.    Chief Complaint: Hyperbaric Oxygen Therapy (R DFU Grade 3)    Jean Pierre Paulson a 37 y.o. male presents for follow up on all regular problems which are reviewed and discussed.     Problem List Items Addressed This Visit    None  Visit Diagnoses       Ulcer of right heel, with necrosis of bone    -  Primary    Relevant Orders    POCT Glucose, Hand-Held Device (Completed)            Past Medical History:  Past Medical History:   Diagnosis Date    NOLAN (acute kidney injury) 04/07/2023    Anemia     Diabetes mellitus with neuropathy     HLD (hyperlipidemia) 04/07/2023    HTN (hypertension)     Obesity     TEDDY (obstructive sleep apnea) 04/07/2023    Osteomyelitis 10/2020    Hx of R foot, Tx with IV Abx    Peripheral vascular disease      Past Surgical History:   Procedure Laterality Date    APPLICATION OF SPLIT-THICKNESS SKIN GRAFT (STSG) TO LOWER EXTREMITY Right 03/02/2022    Procedure: APPLICATION, GRAFT, SKIN, SPLIT-THICKNESS, TO LOWER EXTREMITY;  Surgeon: Greg Ramírez MD;  Location: [facility] [place];  Service: General;  Laterality: Right;    CHOLECYSTECTOMY      DEBRIDEMENT OF FOOT Right 10/2020    right great toe amputation       Review of patient's allergies indicates:   Allergen Reactions    Tomato Itching     Current Outpatient Medications on File Prior to Visit   Medication Sig Dispense Refill    amLODIPine (NORVASC) 2.5 MG tablet Take 1 tablet (2.5 mg total) by mouth once daily. 30 tablet 11    blood sugar diagnostic (BLOOD GLUCOSE TEST) Strp 1 strip by Misc.(Non-Drug; Combo Route) route once daily. accu-chek Veronika strips 90 strip 5    dextrose 5 % in water (D5W) 5 % PgBk 100 mL with piperacillin-tazobactam 4.5 gram SolR 4.5 g Inject 4.5 g into the vein every 8 (eight) hours.      FEROSUL 325 mg (65 mg iron) Tab tablet Take 1 tablet (325 mg total) by mouth once daily. 90 tablet 1    gabapentin (NEURONTIN) 300 MG capsule Take 1 capsule  (300 mg total) by mouth every 8 (eight) hours. 270 capsule 1    hydrALAZINE (APRESOLINE) 25 MG tablet Take 1 tablet (25 mg total) by mouth every 12 (twelve) hours. 180 tablet 1    hydroCHLOROthiazide (HYDRODIURIL) 25 MG tablet Take 1 tablet (25 mg total) by mouth once daily. 90 tablet 1    HYDROcodone-acetaminophen (NORCO)  mg per tablet Take 1 tablet by mouth every 12 (twelve) hours as needed for Pain. 60 tablet 0    losartan (COZAAR) 50 MG tablet Take 1 tablet (50 mg total) by mouth once daily. 90 tablet 1    semaglutide (OZEMPIC) 2 mg/dose (8 mg/3 mL) PnIj Inject 2 mg into the skin every 7 days. 8 mL 2    sodium chloride 0.9% SolP 50 mL with DAPTOmycin 500 mg SolR 1,000 mg Inject 1,000 mg into the vein once daily. for 20 days      [DISCONTINUED] blood-glucose meter kit 1 each by Other route 3 (three) times daily. 1 each 0    [DISCONTINUED] insulin asp prt-insulin aspart, NovoLOG 70/30, (NOVOLOG 70/30) 100 unit/mL (70-30) Soln Inject 15 Units into the skin once daily. 450 Units 0    [DISCONTINUED] insulin detemir U-100 (LEVEMIR) 100 unit/mL injection Inject 40 Units into the skin every evening. 12 mL 0    [DISCONTINUED] sodium hypochlorite 0.5 % (DAKIN'S SOLUTION) external solution Apply topically once daily. 473 mL 5     No current facility-administered medications on file prior to visit.     Social History     Socioeconomic History    Marital status: Single    Number of children: 1   Tobacco Use    Smoking status: Every Day     Packs/day: 0.25     Years: 25.00     Pack years: 6.25     Types: Cigarettes     Start date:      Last attempt to quit: 2022     Years since quittin.2    Smokeless tobacco: Never   Substance and Sexual Activity    Alcohol use: Yes     Alcohol/week: 2.0 standard drinks     Types: 1 Glasses of wine, 1 Cans of beer per week     Comment: occasionally    Drug use: Never    Sexual activity: Yes     Partners: Female     Social Determinants of Health      Financial Resource Strain: Low Risk     Difficulty of Paying Living Expenses: Not hard at all   Food Insecurity: No Food Insecurity    Worried About Running Out of Food in the Last Year: Never true    Ran Out of Food in the Last Year: Never true   Transportation Needs: Unmet Transportation Needs    Lack of Transportation (Medical): No    Lack of Transportation (Non-Medical): Yes   Physical Activity: Inactive    Days of Exercise per Week: 0 days    Minutes of Exercise per Session: 0 min   Stress: No Stress Concern Present    Feeling of Stress : Not at all   Social Connections: Socially Isolated    Frequency of Communication with Friends and Family: More than three times a week    Frequency of Social Gatherings with Friends and Family: More than three times a week    Attends [employer] Services: Never    Active Member of Clubs or Organizations: No    Attends Club or Organization Meetings: Never    Marital Status: Never    Housing Stability: Unknown    Unable to Pay for Housing in the Last Year: No    Unstable Housing in the Last Year: No     Family History   Problem Relation Age of Onset    Hypertension Father     Diabetes Father     Hypertension Maternal Grandmother     Diabetes Maternal Grandmother        Review of Systems   Unable to perform ROS: Other     Objective:     BP (!) 164/90   Pulse 77   Temp 98.4 °F (36.9 °C)   Resp 20     Physical Exam  Constitutional:       Appearance: Normal appearance. He is obese.   Assessment:     1. Ulcer of right heel, with necrosis of bone        Plan:     Problem List Items Addressed This Visit    None  Visit Diagnoses       Ulcer of right heel, with necrosis of bone    -  Primary    Relevant Orders    POCT Glucose, Hand-Held Device (Completed)          No follow-ups on file.      I am having Jean Pierre Paulson maintain his blood sugar diagnostic, FeroSuL, gabapentin, hydrALAZINE, hydroCHLOROthiazide, losartan, OZEMPIC, and  HYDROcodone-acetaminophen.    Jean Pierre was seen today for hyperbaric oxygen therapy.    Diagnoses and all orders for this visit:    Ulcer of right heel, with necrosis of bone  -     POCT Glucose, Hand-Held Device         [unfilled]  Orders Placed This Encounter   Procedures    POCT Glucose, Hand-Held Device

## 2023-04-24 ENCOUNTER — OFFICE VISIT (OUTPATIENT)
Dept: WOUND CARE | Facility: CLINIC | Age: 38
End: 2023-04-24
Attending: FAMILY MEDICINE
Payer: MEDICARE

## 2023-04-24 ENCOUNTER — DOCUMENT SCAN (OUTPATIENT)
Dept: HOME HEALTH SERVICES | Facility: HOSPITAL | Age: 38
End: 2023-04-24
Payer: MEDICARE

## 2023-04-24 VITALS — RESPIRATION RATE: 20 BRPM

## 2023-04-24 DIAGNOSIS — E08.621 DIABETIC ULCER OF RIGHT MIDFOOT ASSOCIATED WITH DIABETES MELLITUS DUE TO UNDERLYING CONDITION, WITH NECROSIS OF BONE: Primary | ICD-10-CM

## 2023-04-24 DIAGNOSIS — L97.414 DIABETIC ULCER OF RIGHT MIDFOOT ASSOCIATED WITH DIABETES MELLITUS DUE TO UNDERLYING CONDITION, WITH NECROSIS OF BONE: Primary | ICD-10-CM

## 2023-04-24 LAB
GLUCOSE SERPL-MCNC: 147 MG/DL (ref 70–110)
GLUCOSE SERPL-MCNC: 149 MG/DL (ref 70–110)
GLUCOSE SERPL-MCNC: 169 MG/DL (ref 70–110)

## 2023-04-24 PROCEDURE — 99499 NO LOS: ICD-10-PCS | Mod: S$PBB,,, | Performed by: FAMILY MEDICINE

## 2023-04-24 PROCEDURE — 99183 HYPERBARIC OXYGEN THERAPY: CPT | Mod: PBBFAC | Performed by: FAMILY MEDICINE

## 2023-04-24 PROCEDURE — 99213 OFFICE O/P EST LOW 20 MIN: CPT | Mod: PBBFAC | Performed by: FAMILY MEDICINE

## 2023-04-24 PROCEDURE — 82962 GLUCOSE BLOOD TEST: CPT | Mod: PBBFAC | Performed by: FAMILY MEDICINE

## 2023-04-24 PROCEDURE — 99499 UNLISTED E&M SERVICE: CPT | Mod: S$PBB,,, | Performed by: FAMILY MEDICINE

## 2023-04-24 PROCEDURE — 99183 HYPERBARIC OXYGEN THERAPY: CPT | Mod: S$PBB,,, | Performed by: FAMILY MEDICINE

## 2023-04-24 PROCEDURE — 99183 PR HYPERBARIC OXYGEN THERAPY ATTENDANCE/SUPERVISION, PER SESSION: ICD-10-PCS | Mod: S$PBB,,, | Performed by: FAMILY MEDICINE

## 2023-04-25 LAB — GLUCOSE SERPL-MCNC: 130 MG/DL (ref 70–110)

## 2023-04-26 ENCOUNTER — OFFICE VISIT (OUTPATIENT)
Dept: WOUND CARE | Facility: CLINIC | Age: 38
End: 2023-04-26
Attending: FAMILY MEDICINE
Payer: MEDICARE

## 2023-04-26 VITALS
HEART RATE: 80 BPM | RESPIRATION RATE: 20 BRPM | DIASTOLIC BLOOD PRESSURE: 90 MMHG | TEMPERATURE: 97 F | SYSTOLIC BLOOD PRESSURE: 151 MMHG

## 2023-04-26 DIAGNOSIS — L97.414 DIABETIC ULCER OF RIGHT MIDFOOT ASSOCIATED WITH DIABETES MELLITUS DUE TO UNDERLYING CONDITION, WITH NECROSIS OF BONE: Primary | ICD-10-CM

## 2023-04-26 DIAGNOSIS — M86.60 CHRONIC OSTEOMYELITIS: ICD-10-CM

## 2023-04-26 DIAGNOSIS — E08.621 DIABETIC ULCER OF RIGHT MIDFOOT ASSOCIATED WITH DIABETES MELLITUS DUE TO UNDERLYING CONDITION, WITH NECROSIS OF BONE: Primary | ICD-10-CM

## 2023-04-26 PROCEDURE — 99183 HYPERBARIC OXYGEN THERAPY: CPT | Mod: S$PBB,,, | Performed by: FAMILY MEDICINE

## 2023-04-26 PROCEDURE — 82962 GLUCOSE BLOOD TEST: CPT | Mod: PBBFAC | Performed by: FAMILY MEDICINE

## 2023-04-26 PROCEDURE — 99183 PR HYPERBARIC OXYGEN THERAPY ATTENDANCE/SUPERVISION, PER SESSION: ICD-10-PCS | Mod: S$PBB,,, | Performed by: FAMILY MEDICINE

## 2023-04-26 PROCEDURE — 99499 NO LOS: ICD-10-PCS | Mod: S$PBB,,, | Performed by: FAMILY MEDICINE

## 2023-04-26 PROCEDURE — 99183 HYPERBARIC OXYGEN THERAPY: CPT | Mod: PBBFAC | Performed by: FAMILY MEDICINE

## 2023-04-26 PROCEDURE — 99499 UNLISTED E&M SERVICE: CPT | Mod: S$PBB,,, | Performed by: FAMILY MEDICINE

## 2023-04-26 PROCEDURE — 99214 OFFICE O/P EST MOD 30 MIN: CPT | Mod: PBBFAC | Performed by: FAMILY MEDICINE

## 2023-04-26 NOTE — PROGRESS NOTES
Subjective:      Patient ID: Jean Pierre Paulson is a 37 y.o. male.    Chief Complaint: Hyperbaric Oxygen Therapy (R DFU Grade 3)    Jean Pierre Paulson a 37 y.o. male presents for follow up on all regular problems which are reviewed and discussed.     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, with necrosis of bone    -  Primary            Past Medical History:  Past Medical History:   Diagnosis Date    NOLAN (acute kidney injury) 04/07/2023    Anemia     Diabetes mellitus with neuropathy     HLD (hyperlipidemia) 04/07/2023    HTN (hypertension)     Obesity     TEDDY (obstructive sleep apnea) 04/07/2023    Osteomyelitis 10/2020    Hx of R foot, Tx with IV Abx    Peripheral vascular disease      Past Surgical History:   Procedure Laterality Date    APPLICATION OF SPLIT-THICKNESS SKIN GRAFT (STSG) TO LOWER EXTREMITY Right 03/02/2022    Procedure: APPLICATION, GRAFT, SKIN, SPLIT-THICKNESS, TO LOWER EXTREMITY;  Surgeon: Greg Ramírez MD;  Location: [facility];  Service: General;  Laterality: Right;    CHOLECYSTECTOMY      DEBRIDEMENT OF FOOT Right 10/2020    right great toe amputation       Review of patient's allergies indicates:   Allergen Reactions    Tomato Itching     Current Outpatient Medications on File Prior to Visit   Medication Sig Dispense Refill    amLODIPine (NORVASC) 2.5 MG tablet Take 1 tablet (2.5 mg total) by mouth once daily. 30 tablet 11    blood sugar diagnostic (BLOOD GLUCOSE TEST) Strp 1 strip by Misc.(Non-Drug; Combo Route) route once daily. accu-chek Veronika strips 90 strip 5    dextrose 5 % in water (D5W) 5 % PgBk 100 mL with piperacillin-tazobactam 4.5 gram SolR 4.5 g Inject 4.5 g into the vein every 8 (eight) hours.      FEROSUL 325 mg (65 mg iron) Tab tablet Take 1 tablet (325 mg total) by mouth once daily. 90 tablet 1     gabapentin (NEURONTIN) 300 MG capsule Take 1 capsule (300 mg total) by mouth every 8 (eight) hours. 270 capsule 1    hydrALAZINE (APRESOLINE) 25 MG tablet Take 1 tablet (25 mg total) by mouth every 12 (twelve) hours. 180 tablet 1    hydroCHLOROthiazide (HYDRODIURIL) 25 MG tablet Take 1 tablet (25 mg total) by mouth once daily. 90 tablet 1    HYDROcodone-acetaminophen (NORCO)  mg per tablet Take 1 tablet by mouth every 12 (twelve) hours as needed for Pain. 60 tablet 0    losartan (COZAAR) 50 MG tablet Take 1 tablet (50 mg total) by mouth once daily. 90 tablet 1    semaglutide (OZEMPIC) 2 mg/dose (8 mg/3 mL) PnIj Inject 2 mg into the skin every 7 days. 8 mL 2    sodium chloride 0.9% SolP 50 mL with DAPTOmycin 500 mg SolR 1,000 mg Inject 1,000 mg into the vein once daily. for 20 days      [DISCONTINUED] blood-glucose meter kit 1 each by Other route 3 (three) times daily. 1 each 0    [DISCONTINUED] insulin asp prt-insulin aspart, NovoLOG 70/30, (NOVOLOG 70/30) 100 unit/mL (70-30) Soln Inject 15 Units into the skin once daily. 450 Units 0    [DISCONTINUED] insulin detemir U-100 (LEVEMIR) 100 unit/mL injection Inject 40 Units into the skin every evening. 12 mL 0    [DISCONTINUED] sodium hypochlorite 0.5 % (DAKIN'S SOLUTION) external solution Apply topically once daily. 473 mL 5     No current facility-administered medications on file prior to visit.     Social History     Socioeconomic History    Marital status: Single    Number of children: 1   Tobacco Use    Smoking status: Every Day     Packs/day: 0.25     Years: 25.00     Pack years: 6.25     Types: Cigarettes     Start date:      Last attempt to quit: 2022     Years since quittin.2    Smokeless tobacco: Never   Substance and Sexual Activity    Alcohol use: Yes     Alcohol/week: 2.0 standard drinks     Types: 1 Glasses of wine, 1 Cans of beer per week     Comment: occasionally    Drug use: Never    Sexual activity: Yes     Partners:  Female     Social Determinants of Health     Financial Resource Strain: Low Risk     Difficulty of Paying Living Expenses: Not hard at all   Food Insecurity: No Food Insecurity    Worried About Running Out of Food in the Last Year: Never true    Ran Out of Food in the Last Year: Never true   Transportation Needs: Unmet Transportation Needs    Lack of Transportation (Medical): No    Lack of Transportation (Non-Medical): Yes   Physical Activity: Inactive    Days of Exercise per Week: 0 days    Minutes of Exercise per Session: 0 min   Stress: No Stress Concern Present    Feeling of Stress : Not at all   Social Connections: Socially Isolated    Frequency of Communication with Friends and Family: More than three times a week    Frequency of Social Gatherings with Friends and Family: More than three times a week    Attends Sabianism Services: Never    Active Member of Clubs or Organizations: No    Attends Club or Organization Meetings: Never    Marital Status: Never    Housing Stability: Unknown    Unable to Pay for Housing in the Last Year: No    Unstable Housing in the Last Year: No     Family History   Problem Relation Age of Onset    Hypertension Father     Diabetes Father     Hypertension Maternal Grandmother     Diabetes Maternal Grandmother        Review of Systems   Unable to perform ROS: Other     Objective:     BP (!) 151/90   Pulse 80   Temp 97.3 °F (36.3 °C)   Resp 20     Physical Exam  Constitutional:       Appearance: Normal appearance. He is obese.   Assessment:     1. 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 Chronic osteomyelitis        Plan:     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, with necrosis of bone    -  Primary          No  follow-ups on file.      I am having Jean Pierre Paulson maintain his blood sugar diagnostic, FeroSuL, gabapentin, hydrALAZINE, hydroCHLOROthiazide, losartan, OZEMPIC, HYDROcodone-acetaminophen, amLODIPine, (sodium chloride 0.9% SolP 50 mL with DAPTOmycin 500 mg SolR 1,000 mg), and dextrose 5 % in water (D5W) 5 % PgBk 100 mL with piperacillin-tazobactam 4.5 gram SolR 4.5 g.    Jean Pierre was seen today for hyperbaric oxygen therapy.    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    Alli HBO well. No complaints voiced

## 2023-04-26 NOTE — PROGRESS NOTES
Subjective:       Patient ID: Liyah Fowler is a 18 y.o. male.    Chief Complaint: No chief complaint on file.    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 Reports using controller inhaler BID and needing albuterol only 1-2 times per month. Overall doing well. Vyvanse daily and working well.     Review of Systems   Constitutional: Negative for activity change and unexpected weight change.   HENT: Negative for hearing loss, rhinorrhea and trouble swallowing.    Eyes: Negative for discharge and visual disturbance.   Respiratory: Negative for chest tightness and wheezing.    Cardiovascular: Negative for chest pain and palpitations.   Gastrointestinal: Negative for blood in stool, constipation, diarrhea and vomiting.   Endocrine: Negative for polydipsia and polyuria.   Genitourinary: Negative for difficulty urinating, hematuria and urgency.   Musculoskeletal: Negative for arthralgias, joint swelling and neck pain.   Neurological: Negative for weakness and headaches.   Psychiatric/Behavioral: Negative for confusion and dysphoric mood.        Results for orders placed or performed in visit on 06/22/21   C. trachomatis/N. gonorrhoeae by AMP DNA    Specimen: Genital   Result Value Ref Range    Chlamydia, Amplified DNA Not Detected Not Detected    N gonorrhoeae, amplified DNA Not Detected Not Detected       Objective:   There were no vitals filed for this visit.     Physical Exam  Constitutional:       General: He is not in acute distress.     Appearance: He is well-developed. He is not diaphoretic.      Comments: Exam performed as able, but limited due to the inherent nature of telemedicine visit.    HENT:      Head: Normocephalic  Subjective:      Patient ID: Jean Pierre Paulson is a 37 y.o. male.    Chief Complaint: Hyperbaric Oxygen Therapy (R DFU Grade 3)    Jean Pierre Paulson a 37 y.o. male presents for follow up on all regular problems which are reviewed and discussed.     Problem List Items Addressed This Visit    None  Visit Diagnoses       Ulcer of right heel, with necrosis of bone    -  Primary    Relevant Orders    POCT Glucose, Hand-Held Device (Completed)    POCT Glucose, Hand-Held Device (Completed)            Past Medical History:  Past Medical History:   Diagnosis Date    NOLAN (acute kidney injury) 04/07/2023    Anemia     Diabetes mellitus with neuropathy     HLD (hyperlipidemia) 04/07/2023    HTN (hypertension)     Obesity     TEDDY (obstructive sleep apnea) 04/07/2023    Osteomyelitis 10/2020    Hx of R foot, Tx with IV Abx    Peripheral vascular disease      Past Surgical History:   Procedure Laterality Date    APPLICATION OF SPLIT-THICKNESS SKIN GRAFT (STSG) TO LOWER EXTREMITY Right 03/02/2022    Procedure: APPLICATION, GRAFT, SKIN, SPLIT-THICKNESS, TO LOWER EXTREMITY;  Surgeon: Greg Ramírez MD;  Location: [facility];  Service: General;  Laterality: Right;    CHOLECYSTECTOMY      DEBRIDEMENT OF FOOT Right 10/2020    right great toe amputation       Review of patient's allergies indicates:   Allergen Reactions    Tomato Itching     Current Outpatient Medications on File Prior to Visit   Medication Sig Dispense Refill    amLODIPine (NORVASC) 2.5 MG tablet Take 1 tablet (2.5 mg total) by mouth once daily. 30 tablet 11    blood sugar diagnostic (BLOOD GLUCOSE TEST) Strp 1 strip by Misc.(Non-Drug; Combo Route) route once daily. accu-chek Veronika strips 90 strip 5    dextrose 5 % in water (D5W) 5 % PgBk 100 mL with piperacillin-tazobactam 4.5 gram SolR 4.5 g Inject 4.5 g into the vein every 8 (eight) hours.      FEROSUL 325 mg (65 mg iron) Tab tablet Take 1 tablet (325 mg total) by mouth once daily. 90 tablet 1     and atraumatic.   Eyes:      Conjunctiva/sclera: Conjunctivae normal.   Pulmonary:      Effort: No respiratory distress.      Comments: No audible wheezing  No visible respiratory distress  Musculoskeletal:      Cervical back: Normal range of motion.   Skin:     Findings: No rash.      Comments: No visible rash   Neurological:      Mental Status: He is alert and oriented to person, place, and time.   Psychiatric:         Behavior: Behavior normal.         Thought Content: Thought content normal.         Judgment: Judgment normal.         Assessment:       1. Attention deficit hyperactivity disorder (ADHD), combined type    2. Mild intermittent asthma without complication        Plan:         Attention deficit hyperactivity disorder (ADHD), combined type  -     lisdexamfetamine (VYVANSE) 20 MG capsule; Take 1 capsule (20 mg total) by mouth every morning.  Dispense: 90 capsule; Refill: 0    Mild intermittent asthma without complication  -     fluticasone propionate (FLOVENT HFA) 110 mcg/actuation inhaler; Inhale 2 puffs into the lungs every 12 (twelve) hours. Controller  Dispense: 12 g; Refill: 11  -     albuterol (VENTOLIN HFA) 90 mcg/actuation inhaler; Inhale 2 puffs into the lungs every 6 (six) hours as needed for Wheezing or Shortness of Breath. Rescue  Dispense: 18 g; Refill: 2      Patient's questions answered. Plan reviewed with patient at the end of visit. Relevant precautions to chief complaint and reasons to seek medical care or contact the office sooner reviewed with patient.     Follow up in about 3 months (around 6/21/2022) for Annual Exam.     [place]  The chief complaint leading to consultation is: follow up, refill    Visit type: audiovisual    Face to Face time with patient: 10  15 minutes of total time spent on the encounter, which includes face to face time and non-face to face time preparing to see the patient (eg, review of tests), Obtaining and/or reviewing separately  gabapentin (NEURONTIN) 300 MG capsule Take 1 capsule (300 mg total) by mouth every 8 (eight) hours. 270 capsule 1    hydrALAZINE (APRESOLINE) 25 MG tablet Take 1 tablet (25 mg total) by mouth every 12 (twelve) hours. 180 tablet 1    hydroCHLOROthiazide (HYDRODIURIL) 25 MG tablet Take 1 tablet (25 mg total) by mouth once daily. 90 tablet 1    HYDROcodone-acetaminophen (NORCO)  mg per tablet Take 1 tablet by mouth every 12 (twelve) hours as needed for Pain. 60 tablet 0    losartan (COZAAR) 50 MG tablet Take 1 tablet (50 mg total) by mouth once daily. 90 tablet 1    semaglutide (OZEMPIC) 2 mg/dose (8 mg/3 mL) PnIj Inject 2 mg into the skin every 7 days. 8 mL 2    sodium chloride 0.9% SolP 50 mL with DAPTOmycin 500 mg SolR 1,000 mg Inject 1,000 mg into the vein once daily. for 20 days      [DISCONTINUED] blood-glucose meter kit 1 each by Other route 3 (three) times daily. 1 each 0    [DISCONTINUED] insulin asp prt-insulin aspart, NovoLOG 70/30, (NOVOLOG 70/30) 100 unit/mL (70-30) Soln Inject 15 Units into the skin once daily. 450 Units 0    [DISCONTINUED] insulin detemir U-100 (LEVEMIR) 100 unit/mL injection Inject 40 Units into the skin every evening. 12 mL 0    [DISCONTINUED] sodium hypochlorite 0.5 % (DAKIN'S SOLUTION) external solution Apply topically once daily. 473 mL 5     No current facility-administered medications on file prior to visit.     Social History     Socioeconomic History    Marital status: Single    Number of children: 1   Tobacco Use    Smoking status: Every Day     Packs/day: 0.25     Years: 25.00     Pack years: 6.25     Types: Cigarettes     Start date:      Last attempt to quit: 2022     Years since quittin.2    Smokeless tobacco: Never   Substance and Sexual Activity    Alcohol use: Yes     Alcohol/week: 2.0 standard drinks     Types: 1 Glasses of wine, 1 Cans of beer per week     Comment: occasionally    Drug use: Never    Sexual activity: Yes     Partners:  Female     Social Determinants of Health     Financial Resource Strain: Low Risk     Difficulty of Paying Living Expenses: Not hard at all   Food Insecurity: No Food Insecurity    Worried About Running Out of Food in the Last Year: Never true    Ran Out of Food in the Last Year: Never true   Transportation Needs: Unmet Transportation Needs    Lack of Transportation (Medical): No    Lack of Transportation (Non-Medical): Yes   Physical Activity: Inactive    Days of Exercise per Week: 0 days    Minutes of Exercise per Session: 0 min   Stress: No Stress Concern Present    Feeling of Stress : Not at all   Social Connections: Socially Isolated    Frequency of Communication with Friends and Family: More than three times a week    Frequency of Social Gatherings with Friends and Family: More than three times a week    Attends An[employer] Services: Never    Active Member of Clubs or Organizations: No    Attends Club or Organization Meetings: Never    Marital Status: Never    Housing Stability: Unknown    Unable to Pay for Housing in the Last Year: No    Unstable Housing in the Last Year: No     Family History   Problem Relation Age of Onset    Hypertension Father     Diabetes Father     Hypertension Maternal Grandmother     Diabetes Maternal Grandmother        Review of Systems   Unable to perform ROS: Other     Objective:     BP (!) 163/90   Pulse 86   Resp 20     Physical Exam  Constitutional:       Appearance: Normal appearance. He is obese.   Assessment:     1. Ulcer of right heel, with necrosis of bone        Plan:     Problem List Items Addressed This Visit    None  Visit Diagnoses       Ulcer of right heel, with necrosis of bone    -  Primary    Relevant Orders    POCT Glucose, Hand-Held Device (Completed)    POCT Glucose, Hand-Held Device (Completed)          No follow-ups on file.      I am having Jean Pierre Paulson maintain his blood sugar diagnostic, FeroSuL, gabapentin, hydrALAZINE,  obtained history, Documenting clinical information in the electronic or other health record, Independently interpreting results (not separately reported) and communicating results to the patient/family/caregiver, or Care coordination (not separately reported).     Each patient to whom he or she provides medical services by telemedicine is:  (1) informed of the relationship between the physician and patient and the respective role of any other health care provider with respect to management of the patient; and (2) notified that he or she may decline to receive medical services by telemedicine and may withdraw from such care at any time.                      hydroCHLOROthiazide, losartan, OZEMPIC, HYDROcodone-acetaminophen, amLODIPine, (sodium chloride 0.9% SolP 50 mL with DAPTOmycin 500 mg SolR 1,000 mg), and dextrose 5 % in water (D5W) 5 % PgBk 100 mL with piperacillin-tazobactam 4.5 gram SolR 4.5 g.    Jean Pierre was seen today for hyperbaric oxygen therapy.    Diagnoses and all orders for this visit:    Ulcer of right heel, with necrosis of bone  -     POCT Glucose, Hand-Held Device  -     POCT Glucose, Hand-Held Device         [unfilled]  Orders Placed This Encounter   Procedures    POCT Glucose, Hand-Held Device    POCT Glucose, Hand-Held Device

## 2023-04-28 ENCOUNTER — OFFICE VISIT (OUTPATIENT)
Dept: WOUND CARE | Facility: CLINIC | Age: 38
End: 2023-04-28
Attending: FAMILY MEDICINE
Payer: MEDICARE

## 2023-04-28 VITALS
SYSTOLIC BLOOD PRESSURE: 160 MMHG | HEART RATE: 91 BPM | TEMPERATURE: 98 F | RESPIRATION RATE: 20 BRPM | DIASTOLIC BLOOD PRESSURE: 95 MMHG

## 2023-04-28 DIAGNOSIS — E08.621 DIABETIC ULCER OF RIGHT MIDFOOT ASSOCIATED WITH DIABETES MELLITUS DUE TO UNDERLYING CONDITION, WITH NECROSIS OF BONE: Primary | ICD-10-CM

## 2023-04-28 DIAGNOSIS — I73.9 PVD (PERIPHERAL VASCULAR DISEASE): ICD-10-CM

## 2023-04-28 DIAGNOSIS — L97.414 DIABETIC ULCER OF RIGHT MIDFOOT ASSOCIATED WITH DIABETES MELLITUS DUE TO UNDERLYING CONDITION, WITH NECROSIS OF BONE: Primary | ICD-10-CM

## 2023-04-28 LAB
GLUCOSE SERPL-MCNC: 136 MG/DL (ref 70–110)
GLUCOSE SERPL-MCNC: 147 MG/DL (ref 70–110)

## 2023-04-28 PROCEDURE — 99183 PR HYPERBARIC OXYGEN THERAPY ATTENDANCE/SUPERVISION, PER SESSION: ICD-10-PCS | Mod: S$PBB,,, | Performed by: FAMILY MEDICINE

## 2023-04-28 PROCEDURE — 99499 NO LOS: ICD-10-PCS | Mod: S$PBB,,, | Performed by: FAMILY MEDICINE

## 2023-04-28 PROCEDURE — 99499 UNLISTED E&M SERVICE: CPT | Mod: S$PBB,,, | Performed by: FAMILY MEDICINE

## 2023-04-28 PROCEDURE — 99183 HYPERBARIC OXYGEN THERAPY: CPT | Mod: PBBFAC | Performed by: FAMILY MEDICINE

## 2023-04-28 PROCEDURE — 82962 GLUCOSE BLOOD TEST: CPT | Mod: PBBFAC | Performed by: FAMILY MEDICINE

## 2023-04-28 PROCEDURE — 99183 HYPERBARIC OXYGEN THERAPY: CPT | Mod: S$PBB,,, | Performed by: FAMILY MEDICINE

## 2023-04-28 PROCEDURE — 99213 OFFICE O/P EST LOW 20 MIN: CPT | Mod: PBBFAC,25 | Performed by: FAMILY MEDICINE

## 2023-04-28 NOTE — PROGRESS NOTES
Subjective:      Patient ID: Jean Pierre Paulson is a 37 y.o. male.    Chief Complaint: Hyperbaric Oxygen Therapy (R DFU Grade 3)    Jean Pierre Paulson a 37 y.o. male presents for follow up on all regular problems which are reviewed and discussed.     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, with necrosis of bone    -  Primary    Relevant Orders    POCT Glucose, Hand-Held Device (Completed)    POCT Glucose, Hand-Held Device (Completed)            Past Medical History:  Past Medical History:   Diagnosis Date    NOLAN (acute kidney injury) 04/07/2023    Anemia     Diabetes mellitus with neuropathy     HLD (hyperlipidemia) 04/07/2023    HTN (hypertension)     Obesity     TEDDY (obstructive sleep apnea) 04/07/2023    Osteomyelitis 10/2020    Hx of R foot, Tx with IV Abx    Peripheral vascular disease      Past Surgical History:   Procedure Laterality Date    APPLICATION OF SPLIT-THICKNESS SKIN GRAFT (STSG) TO LOWER EXTREMITY Right 03/02/2022    Procedure: APPLICATION, GRAFT, SKIN, SPLIT-THICKNESS, TO LOWER EXTREMITY;  Surgeon: Greg Ramírez MD;  Location: [facility];  Service: General;  Laterality: Right;    CHOLECYSTECTOMY      DEBRIDEMENT OF FOOT Right 10/2020    right great toe amputation       Review of patient's allergies indicates:   Allergen Reactions    Tomato Itching     Current Outpatient Medications on File Prior to Visit   Medication Sig Dispense Refill    amLODIPine (NORVASC) 2.5 MG tablet Take 1 tablet (2.5 mg total) by mouth once daily. 30 tablet 11    blood sugar diagnostic (BLOOD GLUCOSE TEST) Strp 1 strip by Misc.(Non-Drug; Combo Route) route once daily. accu-chek Veronika strips 90 strip 5    dextrose 5 % in water (D5W) 5 % PgBk 100 mL with piperacillin-tazobactam 4.5 gram SolR 4.5 g Inject 4.5 g into the vein every 8 (eight) hours.      FEROSUL 325 mg (65 mg iron) Tab tablet  Take 1 tablet (325 mg total) by mouth once daily. 90 tablet 1    gabapentin (NEURONTIN) 300 MG capsule Take 1 capsule (300 mg total) by mouth every 8 (eight) hours. 270 capsule 1    hydrALAZINE (APRESOLINE) 25 MG tablet Take 1 tablet (25 mg total) by mouth every 12 (twelve) hours. 180 tablet 1    hydroCHLOROthiazide (HYDRODIURIL) 25 MG tablet Take 1 tablet (25 mg total) by mouth once daily. 90 tablet 1    HYDROcodone-acetaminophen (NORCO)  mg per tablet Take 1 tablet by mouth every 12 (twelve) hours as needed for Pain. 60 tablet 0    losartan (COZAAR) 50 MG tablet Take 1 tablet (50 mg total) by mouth once daily. 90 tablet 1    semaglutide (OZEMPIC) 2 mg/dose (8 mg/3 mL) PnIj Inject 2 mg into the skin every 7 days. 8 mL 2    sodium chloride 0.9% SolP 50 mL with DAPTOmycin 500 mg SolR 1,000 mg Inject 1,000 mg into the vein once daily. for 20 days      [DISCONTINUED] blood-glucose meter kit 1 each by Other route 3 (three) times daily. 1 each 0    [DISCONTINUED] insulin asp prt-insulin aspart, NovoLOG 70/30, (NOVOLOG 70/30) 100 unit/mL (70-30) Soln Inject 15 Units into the skin once daily. 450 Units 0    [DISCONTINUED] insulin detemir U-100 (LEVEMIR) 100 unit/mL injection Inject 40 Units into the skin every evening. 12 mL 0    [DISCONTINUED] sodium hypochlorite 0.5 % (DAKIN'S SOLUTION) external solution Apply topically once daily. 473 mL 5     No current facility-administered medications on file prior to visit.     Social History     Socioeconomic History    Marital status: Single    Number of children: 1   Tobacco Use    Smoking status: Every Day     Packs/day: 0.25     Years: 25.00     Pack years: 6.25     Types: Cigarettes     Start date:      Last attempt to quit: 2022     Years since quittin.2    Smokeless tobacco: Never   Substance and Sexual Activity    Alcohol use: Yes     Alcohol/week: 2.0 standard drinks     Types: 1 Glasses of wine, 1 Cans of beer per week     Comment:  occasionally    Drug use: Never    Sexual activity: Yes     Partners: Female     Social Determinants of Health     Financial Resource Strain: Low Risk     Difficulty of Paying Living Expenses: Not hard at all   Food Insecurity: No Food Insecurity    Worried About Running Out of Food in the Last Year: Never true    Ran Out of Food in the Last Year: Never true   Transportation Needs: Unmet Transportation Needs    Lack of Transportation (Medical): No    Lack of Transportation (Non-Medical): Yes   Physical Activity: Inactive    Days of Exercise per Week: 0 days    Minutes of Exercise per Session: 0 min   Stress: No Stress Concern Present    Feeling of Stress : Not at all   Social Connections: Socially Isolated    Frequency of Communication with Friends and Family: More than three times a week    Frequency of Social Gatherings with Friends and Family: More than three times a week    Attends [employer] Services: Never    Active Member of Clubs or Organizations: No    Attends Club or Organization Meetings: Never    Marital Status: Never    Housing Stability: Unknown    Unable to Pay for Housing in the Last Year: No    Unstable Housing in the Last Year: No     Family History   Problem Relation Age of Onset    Hypertension Father     Diabetes Father     Hypertension Maternal Grandmother     Diabetes Maternal Grandmother        Review of Systems   Unable to perform ROS: Other     Objective:     Resp 20     Physical Exam  Constitutional:       Appearance: Normal appearance. He is obese.   Assessment:     1. 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      I am having Jean Pierre Paulson maintain his blood sugar diagnostic, FeroSuL, gabapentin, hydrALAZINE, hydroCHLOROthiazide, losartan, OZEMPIC, HYDROcodone-acetaminophen, amLODIPine, (sodium chloride 0.9% SolP 50 mL with DAPTOmycin 500 mg SolR 1,000 mg), and dextrose 5 % in water (D5W) 5 % PgBk 100 mL with piperacillin-tazobactam 4.5 gram SolR 4.5 g.    Jean Pierre was seen today for hyperbaric oxygen therapy.    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

## 2023-04-28 NOTE — PROGRESS NOTES
Subjective:      Patient ID: Jean Pierre Paulson is a 37 y.o. male.    Chief Complaint: Hyperbaric Oxygen Therapy (R DFU Grade 3)    Jean Pierre Paulson a 37 y.o. male presents for follow up on all regular problems which are reviewed and discussed.     Problem List Items Addressed This Visit    None  Visit Diagnoses       Ulcer of right heel, with necrosis of bone    -  Primary    Relevant Orders    POCT Glucose, Hand-Held Device (Completed)    POCT Glucose, Hand-Held Device (Completed)            Past Medical History:  Past Medical History:   Diagnosis Date    NOLAN (acute kidney injury) 04/07/2023    Anemia     Diabetes mellitus with neuropathy     HLD (hyperlipidemia) 04/07/2023    HTN (hypertension)     Obesity     TEDDY (obstructive sleep apnea) 04/07/2023    Osteomyelitis 10/2020    Hx of R foot, Tx with IV Abx    Peripheral vascular disease      Past Surgical History:   Procedure Laterality Date    APPLICATION OF SPLIT-THICKNESS SKIN GRAFT (STSG) TO LOWER EXTREMITY Right 03/02/2022    Procedure: APPLICATION, GRAFT, SKIN, SPLIT-THICKNESS, TO LOWER EXTREMITY;  Surgeon: Greg Ramírez MD;  Location: [facility];  Service: General;  Laterality: Right;    CHOLECYSTECTOMY      DEBRIDEMENT OF FOOT Right 10/2020    right great toe amputation       Review of patient's allergies indicates:   Allergen Reactions    Tomato Itching     Current Outpatient Medications on File Prior to Visit   Medication Sig Dispense Refill    amLODIPine (NORVASC) 2.5 MG tablet Take 1 tablet (2.5 mg total) by mouth once daily. 30 tablet 11    blood sugar diagnostic (BLOOD GLUCOSE TEST) Strp 1 strip by Misc.(Non-Drug; Combo Route) route once daily. accu-chek Veronika strips 90 strip 5    dextrose 5 % in water (D5W) 5 % PgBk 100 mL with piperacillin-tazobactam 4.5 gram SolR 4.5 g Inject 4.5 g into the vein every 8 (eight) hours.      FEROSUL 325 mg (65 mg iron) Tab tablet Take 1 tablet (325 mg total) by mouth once daily. 90 tablet 1     gabapentin (NEURONTIN) 300 MG capsule Take 1 capsule (300 mg total) by mouth every 8 (eight) hours. 270 capsule 1    hydrALAZINE (APRESOLINE) 25 MG tablet Take 1 tablet (25 mg total) by mouth every 12 (twelve) hours. 180 tablet 1    hydroCHLOROthiazide (HYDRODIURIL) 25 MG tablet Take 1 tablet (25 mg total) by mouth once daily. 90 tablet 1    HYDROcodone-acetaminophen (NORCO)  mg per tablet Take 1 tablet by mouth every 12 (twelve) hours as needed for Pain. 60 tablet 0    losartan (COZAAR) 50 MG tablet Take 1 tablet (50 mg total) by mouth once daily. 90 tablet 1    semaglutide (OZEMPIC) 2 mg/dose (8 mg/3 mL) PnIj Inject 2 mg into the skin every 7 days. 8 mL 2    sodium chloride 0.9% SolP 50 mL with DAPTOmycin 500 mg SolR 1,000 mg Inject 1,000 mg into the vein once daily. for 20 days      [DISCONTINUED] blood-glucose meter kit 1 each by Other route 3 (three) times daily. 1 each 0    [DISCONTINUED] insulin asp prt-insulin aspart, NovoLOG 70/30, (NOVOLOG 70/30) 100 unit/mL (70-30) Soln Inject 15 Units into the skin once daily. 450 Units 0    [DISCONTINUED] insulin detemir U-100 (LEVEMIR) 100 unit/mL injection Inject 40 Units into the skin every evening. 12 mL 0    [DISCONTINUED] sodium hypochlorite 0.5 % (DAKIN'S SOLUTION) external solution Apply topically once daily. 473 mL 5     No current facility-administered medications on file prior to visit.     Social History     Socioeconomic History    Marital status: Single    Number of children: 1   Tobacco Use    Smoking status: Every Day     Packs/day: 0.25     Years: 25.00     Pack years: 6.25     Types: Cigarettes     Start date:      Last attempt to quit: 2022     Years since quittin.2    Smokeless tobacco: Never   Substance and Sexual Activity    Alcohol use: Yes     Alcohol/week: 2.0 standard drinks     Types: 1 Glasses of wine, 1 Cans of beer per week     Comment: occasionally    Drug use: Never    Sexual activity: Yes     Partners:  Female     Social Determinants of Health     Financial Resource Strain: Low Risk     Difficulty of Paying Living Expenses: Not hard at all   Food Insecurity: No Food Insecurity    Worried About Running Out of Food in the Last Year: Never true    Ran Out of Food in the Last Year: Never true   Transportation Needs: Unmet Transportation Needs    Lack of Transportation (Medical): No    Lack of Transportation (Non-Medical): Yes   Physical Activity: Inactive    Days of Exercise per Week: 0 days    Minutes of Exercise per Session: 0 min   Stress: No Stress Concern Present    Feeling of Stress : Not at all   Social Connections: Socially Isolated    Frequency of Communication with Friends and Family: More than three times a week    Frequency of Social Gatherings with Friends and Family: More than three times a week    Attends Yarsanism Services: Never    Active Member of Clubs or Organizations: No    Attends Club or Organization Meetings: Never    Marital Status: Never    Housing Stability: Unknown    Unable to Pay for Housing in the Last Year: No    Unstable Housing in the Last Year: No     Family History   Problem Relation Age of Onset    Hypertension Father     Diabetes Father     Hypertension Maternal Grandmother     Diabetes Maternal Grandmother        Review of Systems   Unable to perform ROS: Other     Objective:     BP (!) 151/94   Pulse 93   Temp 98.2 °F (36.8 °C)   Resp 20     Physical Exam  Constitutional:       Appearance: Normal appearance. He is obese.   Assessment:     1. Ulcer of right heel, with necrosis of bone        Plan:     Problem List Items Addressed This Visit    None  Visit Diagnoses       Ulcer of right heel, with necrosis of bone    -  Primary    Relevant Orders    POCT Glucose, Hand-Held Device (Completed)    POCT Glucose, Hand-Held Device (Completed)          No follow-ups on file.      I am having Jean Pierre Paulson maintain his blood sugar diagnostic, FeroSuL,  gabapentin, hydrALAZINE, hydroCHLOROthiazide, losartan, OZEMPIC, HYDROcodone-acetaminophen, amLODIPine, (sodium chloride 0.9% SolP 50 mL with DAPTOmycin 500 mg SolR 1,000 mg), and dextrose 5 % in water (D5W) 5 % PgBk 100 mL with piperacillin-tazobactam 4.5 gram SolR 4.5 g.    Jean Pierre was seen today for hyperbaric oxygen therapy.    Diagnoses and all orders for this visit:    Ulcer of right heel, with necrosis of bone  -     POCT Glucose, Hand-Held Device  -     POCT Glucose, Hand-Held Device         [unfilled]  Orders Placed This Encounter   Procedures    POCT Glucose, Hand-Held Device    POCT Glucose, Hand-Held Device

## 2023-05-01 ENCOUNTER — OFFICE VISIT (OUTPATIENT)
Dept: WOUND CARE | Facility: CLINIC | Age: 38
End: 2023-05-01
Attending: FAMILY MEDICINE
Payer: MEDICARE

## 2023-05-01 VITALS
SYSTOLIC BLOOD PRESSURE: 158 MMHG | HEART RATE: 78 BPM | DIASTOLIC BLOOD PRESSURE: 97 MMHG | TEMPERATURE: 99 F | RESPIRATION RATE: 20 BRPM

## 2023-05-01 DIAGNOSIS — L97.414 DIABETIC ULCER OF RIGHT MIDFOOT ASSOCIATED WITH DIABETES MELLITUS DUE TO UNDERLYING CONDITION, WITH NECROSIS OF BONE: Primary | ICD-10-CM

## 2023-05-01 DIAGNOSIS — M86.60 CHRONIC OSTEOMYELITIS: ICD-10-CM

## 2023-05-01 DIAGNOSIS — I73.9 PVD (PERIPHERAL VASCULAR DISEASE): ICD-10-CM

## 2023-05-01 DIAGNOSIS — E08.621 DIABETIC ULCER OF RIGHT MIDFOOT ASSOCIATED WITH DIABETES MELLITUS DUE TO UNDERLYING CONDITION, WITH NECROSIS OF BONE: Primary | ICD-10-CM

## 2023-05-01 LAB
GLUCOSE SERPL-MCNC: 117 MG/DL (ref 70–110)
GLUCOSE SERPL-MCNC: 136 MG/DL (ref 70–110)

## 2023-05-01 PROCEDURE — 99499 NO LOS: ICD-10-PCS | Mod: S$PBB,,, | Performed by: FAMILY MEDICINE

## 2023-05-01 PROCEDURE — 99499 UNLISTED E&M SERVICE: CPT | Mod: S$PBB,,, | Performed by: FAMILY MEDICINE

## 2023-05-01 PROCEDURE — 99183 HYPERBARIC OXYGEN THERAPY: CPT | Mod: PBBFAC | Performed by: FAMILY MEDICINE

## 2023-05-01 PROCEDURE — 99213 OFFICE O/P EST LOW 20 MIN: CPT | Mod: PBBFAC | Performed by: FAMILY MEDICINE

## 2023-05-01 PROCEDURE — 99183 PR HYPERBARIC OXYGEN THERAPY ATTENDANCE/SUPERVISION, PER SESSION: ICD-10-PCS | Mod: S$PBB,,, | Performed by: FAMILY MEDICINE

## 2023-05-01 PROCEDURE — 99183 HYPERBARIC OXYGEN THERAPY: CPT | Mod: S$PBB,,, | Performed by: FAMILY MEDICINE

## 2023-05-01 NOTE — PROGRESS NOTES
Debridement Performed for Assessment: Wound# 1  Performed By: Provider; Yelitza Alas  Assistant:    Debridement: Surgical    Photo taken post procedure:    Time-Out Taken: Yes  Level: Skin/Subcutaneous Tissue  Post Debridement Measurements  Length: (cm) 6.1  Width: (cm) 12.2  Depth: (cm) 0.5      Area: (cm²) 74.42  Percent Debrided: 100  Total Area Debrided: (sq cm)     Tissue and other material debrided:  Adipose, Dermis, Epidermis, Subcutaneous  Devitalized Tissue Debrided:callus  Instrument: Curette  Bleeding: Moderate  Hemostasis Achieved: Pressure  Procedural Pain: Insensate  Post Procedural Pain: Insensate  Response to Treatment: Procedure was tolerated well    Devitalized materials/tissue Removed  the following was removed during debridement  subcutaneous      Post Debridement Diagnosis chronic right diabetic foot ulcer  Post debridement diagnosis   Chronic right diabetic foot ulcer  Same as Pre-operative debridement diagnosis, No Complications noted.      Grafts or implants applied  Was a graft or implant applied?  No      Procedure assistant  Procedure assisted by:  Lizabeth Stone LPN  Assistant is the same as nurse listed above      Complications related to procedure  Did any complication occure during procedure?  No complications noted during or after procedure.      Specimen  Specimen collect during procedure?  No specimen collected      Anaesthesia:  Anesthesia used  None      Blood Loss:  Blood loss during procedure  less than 5 cc         96

## 2023-05-02 ENCOUNTER — OFFICE VISIT (OUTPATIENT)
Dept: WOUND CARE | Facility: CLINIC | Age: 38
End: 2023-05-02
Attending: FAMILY MEDICINE
Payer: MEDICARE

## 2023-05-02 VITALS — DIASTOLIC BLOOD PRESSURE: 97 MMHG | RESPIRATION RATE: 20 BRPM | SYSTOLIC BLOOD PRESSURE: 160 MMHG | HEART RATE: 87 BPM

## 2023-05-02 DIAGNOSIS — E08.621 DIABETIC ULCER OF RIGHT MIDFOOT ASSOCIATED WITH DIABETES MELLITUS DUE TO UNDERLYING CONDITION, WITH NECROSIS OF BONE: Primary | ICD-10-CM

## 2023-05-02 DIAGNOSIS — L97.414 DIABETIC ULCER OF RIGHT MIDFOOT ASSOCIATED WITH DIABETES MELLITUS DUE TO UNDERLYING CONDITION, WITH NECROSIS OF BONE: Primary | ICD-10-CM

## 2023-05-02 DIAGNOSIS — M86.60 CHRONIC OSTEOMYELITIS: ICD-10-CM

## 2023-05-02 LAB — GLUCOSE SERPL-MCNC: 199 MG/DL (ref 70–110)

## 2023-05-02 PROCEDURE — 82962 GLUCOSE BLOOD TEST: CPT | Mod: PBBFAC,91 | Performed by: FAMILY MEDICINE

## 2023-05-02 PROCEDURE — 99499 NO LOS: ICD-10-PCS | Mod: S$PBB,,, | Performed by: FAMILY MEDICINE

## 2023-05-02 PROCEDURE — 99499 UNLISTED E&M SERVICE: CPT | Mod: S$PBB,,, | Performed by: FAMILY MEDICINE

## 2023-05-02 PROCEDURE — 99183 HYPERBARIC OXYGEN THERAPY: CPT | Mod: S$PBB,,, | Performed by: FAMILY MEDICINE

## 2023-05-02 PROCEDURE — 82962 GLUCOSE BLOOD TEST: CPT | Mod: PBBFAC | Performed by: FAMILY MEDICINE

## 2023-05-02 PROCEDURE — 99214 OFFICE O/P EST MOD 30 MIN: CPT | Mod: PBBFAC,25 | Performed by: FAMILY MEDICINE

## 2023-05-02 PROCEDURE — 99183 HYPERBARIC OXYGEN THERAPY: CPT | Mod: PBBFAC | Performed by: FAMILY MEDICINE

## 2023-05-02 PROCEDURE — 99183 PR HYPERBARIC OXYGEN THERAPY ATTENDANCE/SUPERVISION, PER SESSION: ICD-10-PCS | Mod: S$PBB,,, | Performed by: FAMILY MEDICINE

## 2023-05-02 NOTE — PROGRESS NOTES
Subjective:      Patient ID: Jean Pierre Paulson is a 37 y.o. male.    Chief Complaint: Hyperbaric Oxygen Therapy (R DFU quintero grade 3 with bone exposure)    Jean Pierre Paulson a 37 y.o. male presents for follow up on all regular problems which are reviewed and discussed.     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, with necrosis of bone    -  Primary    Relevant Orders    POCT Glucose, Hand-Held Device (Completed)    POCT Glucose, Hand-Held Device (Completed)    PVD (peripheral vascular disease)                Past Medical History:  Past Medical History:   Diagnosis Date    NOLAN (acute kidney injury) 04/07/2023    Anemia     Diabetes mellitus with neuropathy     HLD (hyperlipidemia) 04/07/2023    HTN (hypertension)     Obesity     TEDDY (obstructive sleep apnea) 04/07/2023    Osteomyelitis 10/2020    Hx of R foot, Tx with IV Abx    Peripheral vascular disease      Past Surgical History:   Procedure Laterality Date    APPLICATION OF SPLIT-THICKNESS SKIN GRAFT (STSG) TO LOWER EXTREMITY Right 03/02/2022    Procedure: APPLICATION, GRAFT, SKIN, SPLIT-THICKNESS, TO LOWER EXTREMITY;  Surgeon: Greg Ramírez MD;  Location[facility];  Service: General;  Laterality: Right;    CHOLECYSTECTOMY      DEBRIDEMENT OF FOOT Right 10/2020    right great toe amputation       Review of patient's allergies indicates:   Allergen Reactions    Tomato Itching     Current Outpatient Medications on File Prior to Visit   Medication Sig Dispense Refill    amLODIPine (NORVASC) 2.5 MG tablet Take 1 tablet (2.5 mg total) by mouth once daily. 30 tablet 11    blood sugar diagnostic (BLOOD GLUCOSE TEST) Strp 1 strip by Misc.(Non-Drug; Combo Route) route once daily. accu-chek Veronika strips 90 strip 5    dextrose 5 % in water (D5W) 5 % PgBk 100 mL with piperacillin-tazobactam 4.5 gram SolR 4.5 g Inject 4.5 g into the vein  every 8 (eight) hours.      FEROSUL 325 mg (65 mg iron) Tab tablet Take 1 tablet (325 mg total) by mouth once daily. 90 tablet 1    gabapentin (NEURONTIN) 300 MG capsule Take 1 capsule (300 mg total) by mouth every 8 (eight) hours. 270 capsule 1    hydrALAZINE (APRESOLINE) 25 MG tablet Take 1 tablet (25 mg total) by mouth every 12 (twelve) hours. 180 tablet 1    hydroCHLOROthiazide (HYDRODIURIL) 25 MG tablet Take 1 tablet (25 mg total) by mouth once daily. 90 tablet 1    HYDROcodone-acetaminophen (NORCO)  mg per tablet Take 1 tablet by mouth every 12 (twelve) hours as needed for Pain. 60 tablet 0    losartan (COZAAR) 50 MG tablet Take 1 tablet (50 mg total) by mouth once daily. 90 tablet 1    semaglutide (OZEMPIC) 2 mg/dose (8 mg/3 mL) PnIj Inject 2 mg into the skin every 7 days. 8 mL 2    sodium chloride 0.9% SolP 50 mL with DAPTOmycin 500 mg SolR 1,000 mg Inject 1,000 mg into the vein once daily. for 20 days      [DISCONTINUED] blood-glucose meter kit 1 each by Other route 3 (three) times daily. 1 each 0    [DISCONTINUED] insulin asp prt-insulin aspart, NovoLOG 70/30, (NOVOLOG 70/30) 100 unit/mL (70-30) Soln Inject 15 Units into the skin once daily. 450 Units 0    [DISCONTINUED] insulin detemir U-100 (LEVEMIR) 100 unit/mL injection Inject 40 Units into the skin every evening. 12 mL 0    [DISCONTINUED] sodium hypochlorite 0.5 % (DAKIN'S SOLUTION) external solution Apply topically once daily. 473 mL 5     No current facility-administered medications on file prior to visit.     Social History     Socioeconomic History    Marital status: Single    Number of children: 1   Tobacco Use    Smoking status: Every Day     Packs/day: 0.25     Years: 25.00     Pack years: 6.25     Types: Cigarettes     Start date:      Last attempt to quit: 2022     Years since quittin.2    Smokeless tobacco: Never   Substance and Sexual Activity    Alcohol use: Yes     Alcohol/week: 2.0 standard drinks      Types: 1 Glasses of wine, 1 Cans of beer per week     Comment: occasionally    Drug use: Never    Sexual activity: Yes     Partners: Female     Social Determinants of Health     Financial Resource Strain: Low Risk     Difficulty of Paying Living Expenses: Not hard at all   Food Insecurity: No Food Insecurity    Worried About Running Out of Food in the Last Year: Never true    Ran Out of Food in the Last Year: Never true   Transportation Needs: Unmet Transportation Needs    Lack of Transportation (Medical): No    Lack of Transportation (Non-Medical): Yes   Physical Activity: Inactive    Days of Exercise per Week: 0 days    Minutes of Exercise per Session: 0 min   Stress: No Stress Concern Present    Feeling of Stress : Not at all   Social Connections: Socially Isolated    Frequency of Communication with Friends and Family: More than three times a week    Frequency of Social Gatherings with Friends and Family: More than three times a week    Attends Roman [employer] Services: Never    Active Member of Clubs or Organizations: No    Attends Club or Organization Meetings: Never    Marital Status: Never    Housing Stability: Unknown    Unable to Pay for Housing in the Last Year: No    Unstable Housing in the Last Year: No     Family History   Problem Relation Age of Onset    Hypertension Father     Diabetes Father     Hypertension Maternal Grandmother     Diabetes Maternal Grandmother        Review of Systems   Unable to perform ROS: Other     Objective:     BP (!) 160/95   Pulse 91   Temp 98 °F (36.7 °C)   Resp 20     Physical Exam  Constitutional:       Appearance: Normal appearance. He is obese.   Assessment:     1. 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 PVD (peripheral vascular disease)        Plan:     Problem List Items Addressed This Visit    None  Visit Diagnoses       Diabetic ulcer of right midfoot  associated with diabetes mellitus due to underlying condition, with necrosis of bone    -  Primary    Relevant Orders    POCT Glucose, Hand-Held Device (Completed)    POCT Glucose, Hand-Held Device (Completed)    PVD (peripheral vascular disease)              No follow-ups on file.      I am having Jean Pierre Paulson maintain his blood sugar diagnostic, FeroSuL, gabapentin, hydrALAZINE, hydroCHLOROthiazide, losartan, OZEMPIC, HYDROcodone-acetaminophen, amLODIPine, (sodium chloride 0.9% SolP 50 mL with DAPTOmycin 500 mg SolR 1,000 mg), and dextrose 5 % in water (D5W) 5 % PgBk 100 mL with piperacillin-tazobactam 4.5 gram SolR 4.5 g.    Jean Pierre was seen today for hyperbaric oxygen therapy.    Diagnoses and all orders for this visit:    Diabetic ulcer of right midfoot associated with diabetes mellitus due to underlying condition, with necrosis of bone  -     POCT Glucose, Hand-Held Device  -     POCT Glucose, Hand-Held Device    PVD (peripheral vascular disease)         [unfilled]  Orders Placed This Encounter   Procedures    POCT Glucose, Hand-Held Device    POCT Glucose, Hand-Held Device

## 2023-05-03 ENCOUNTER — OFFICE VISIT (OUTPATIENT)
Dept: WOUND CARE | Facility: CLINIC | Age: 38
End: 2023-05-03
Attending: FAMILY MEDICINE
Payer: MEDICARE

## 2023-05-03 ENCOUNTER — OFFICE VISIT (OUTPATIENT)
Dept: PAIN MEDICINE | Facility: CLINIC | Age: 38
End: 2023-05-03
Payer: MEDICARE

## 2023-05-03 VITALS
DIASTOLIC BLOOD PRESSURE: 93 MMHG | HEIGHT: 73 IN | BODY MASS INDEX: 41.75 KG/M2 | WEIGHT: 315 LBS | DIASTOLIC BLOOD PRESSURE: 103 MMHG | SYSTOLIC BLOOD PRESSURE: 157 MMHG | RESPIRATION RATE: 20 BRPM | RESPIRATION RATE: 19 BRPM | TEMPERATURE: 97 F | SYSTOLIC BLOOD PRESSURE: 142 MMHG | HEART RATE: 80 BPM | HEART RATE: 91 BPM

## 2023-05-03 DIAGNOSIS — E11.40 DIABETIC NEUROPATHY WITH NEUROLOGIC COMPLICATION: Chronic | ICD-10-CM

## 2023-05-03 DIAGNOSIS — E11.49 DIABETIC NEUROPATHY WITH NEUROLOGIC COMPLICATION: Chronic | ICD-10-CM

## 2023-05-03 DIAGNOSIS — I73.9 PERIPHERAL VASCULAR DISEASE, UNSPECIFIED: Chronic | ICD-10-CM

## 2023-05-03 DIAGNOSIS — M86.60 CHRONIC OSTEOMYELITIS: ICD-10-CM

## 2023-05-03 DIAGNOSIS — L97.414 ULCER OF RIGHT HEEL, WITH NECROSIS OF BONE: Primary | Chronic | ICD-10-CM

## 2023-05-03 DIAGNOSIS — E08.621 DIABETIC ULCER OF RIGHT MIDFOOT ASSOCIATED WITH DIABETES MELLITUS DUE TO UNDERLYING CONDITION, WITH NECROSIS OF BONE: Primary | ICD-10-CM

## 2023-05-03 DIAGNOSIS — L97.414 DIABETIC ULCER OF RIGHT MIDFOOT ASSOCIATED WITH DIABETES MELLITUS DUE TO UNDERLYING CONDITION, WITH NECROSIS OF BONE: Primary | ICD-10-CM

## 2023-05-03 DIAGNOSIS — Z79.899 ENCOUNTER FOR LONG-TERM (CURRENT) USE OF OTHER MEDICATIONS: ICD-10-CM

## 2023-05-03 LAB
CTP QC/QA: YES
GLUCOSE SERPL-MCNC: 133 MG/DL (ref 70–110)
GLUCOSE SERPL-MCNC: 164 MG/DL (ref 70–110)
POC (AMP) AMPHETAMINE: NEGATIVE
POC (BAR) BARBITURATES: NEGATIVE
POC (BUP) BUPRENORPHINE: NEGATIVE
POC (BZO) BENZODIAZEPINES: NEGATIVE
POC (COC) COCAINE: NEGATIVE
POC (MDMA) METHYLENEDIOXYMETHAMPHETAMINE 3,4: NEGATIVE
POC (MET) METHAMPHETAMINE: NEGATIVE
POC (MOP) OPIATES: ABNORMAL
POC (MTD) METHADONE: NEGATIVE
POC (OXY) OXYCODONE: NEGATIVE
POC (PCP) PHENCYCLIDINE: NEGATIVE
POC (TCA) TRICYCLIC ANTIDEPRESSANTS: NEGATIVE
POC TEMPERATURE (URINE): 94
POC THC: NEGATIVE

## 2023-05-03 PROCEDURE — 99214 OFFICE O/P EST MOD 30 MIN: CPT | Mod: S$PBB,,, | Performed by: PHYSICIAN ASSISTANT

## 2023-05-03 PROCEDURE — 99214 OFFICE O/P EST MOD 30 MIN: CPT | Mod: PBBFAC | Performed by: FAMILY MEDICINE

## 2023-05-03 PROCEDURE — 99183 HYPERBARIC OXYGEN THERAPY: CPT | Mod: PBBFAC | Performed by: FAMILY MEDICINE

## 2023-05-03 PROCEDURE — 99499 UNLISTED E&M SERVICE: CPT | Mod: S$PBB,,, | Performed by: FAMILY MEDICINE

## 2023-05-03 PROCEDURE — 80305 DRUG TEST PRSMV DIR OPT OBS: CPT | Mod: PBBFAC | Performed by: PHYSICIAN ASSISTANT

## 2023-05-03 PROCEDURE — 99214 OFFICE O/P EST MOD 30 MIN: CPT | Mod: PBBFAC | Performed by: PHYSICIAN ASSISTANT

## 2023-05-03 PROCEDURE — 99183 HYPERBARIC OXYGEN THERAPY: CPT | Mod: S$PBB,,, | Performed by: FAMILY MEDICINE

## 2023-05-03 PROCEDURE — 82962 GLUCOSE BLOOD TEST: CPT | Mod: PBBFAC | Performed by: FAMILY MEDICINE

## 2023-05-03 PROCEDURE — 99183 PR HYPERBARIC OXYGEN THERAPY ATTENDANCE/SUPERVISION, PER SESSION: ICD-10-PCS | Mod: S$PBB,,, | Performed by: FAMILY MEDICINE

## 2023-05-03 PROCEDURE — 99499 NO LOS: ICD-10-PCS | Mod: S$PBB,,, | Performed by: FAMILY MEDICINE

## 2023-05-03 PROCEDURE — 99214 PR OFFICE/OUTPT VISIT, EST, LEVL IV, 30-39 MIN: ICD-10-PCS | Mod: S$PBB,,, | Performed by: PHYSICIAN ASSISTANT

## 2023-05-03 RX ORDER — PIPERACILLIN SODIUM AND TAZOBACTAM SODIUM 12; 1.5 G/60ML; G/60ML
1 INJECTION, POWDER, LYOPHILIZED, FOR SOLUTION INTRAVENOUS DAILY
COMMUNITY
Start: 2023-04-25 | End: 2023-07-24 | Stop reason: ALTCHOICE

## 2023-05-03 RX ORDER — HYDROCODONE BITARTRATE AND ACETAMINOPHEN 10; 325 MG/1; MG/1
1 TABLET ORAL EVERY 12 HOURS PRN
Qty: 60 TABLET | Refills: 0 | Status: SHIPPED | OUTPATIENT
Start: 2023-06-05 | End: 2023-07-24 | Stop reason: ALTCHOICE

## 2023-05-03 RX ORDER — DAPTOMYCIN 50 MG/ML
1 INJECTION, POWDER, LYOPHILIZED, FOR SOLUTION INTRAVENOUS DAILY
COMMUNITY
Start: 2023-04-25 | End: 2023-07-24 | Stop reason: ALTCHOICE

## 2023-05-03 RX ORDER — DIPHENHYDRAMINE HYDROCHLORIDE 50 MG/ML
1 INJECTION INTRAMUSCULAR; INTRAVENOUS
COMMUNITY
Start: 2023-04-17 | End: 2023-07-24 | Stop reason: ALTCHOICE

## 2023-05-03 RX ORDER — HYDROCODONE BITARTRATE AND ACETAMINOPHEN 10; 325 MG/1; MG/1
1 TABLET ORAL EVERY 12 HOURS PRN
Qty: 60 TABLET | Refills: 0 | Status: SHIPPED | OUTPATIENT
Start: 2023-05-06 | End: 2023-07-24 | Stop reason: ALTCHOICE

## 2023-05-03 RX ORDER — SODIUM CHLORIDE 9 MG/ML
500 INJECTION, SOLUTION INTRAVENOUS DAILY
COMMUNITY
Start: 2023-04-25 | End: 2023-10-23 | Stop reason: ALTCHOICE

## 2023-05-03 NOTE — PROGRESS NOTES
Subjective:         Patient ID: Jean Pierre Paulson is a 37 y.o. male.    Chief Complaint: Foot Pain (right)        Pain  This is a chronic problem. The current episode started more than 1 year ago. The problem occurs daily. The problem has been unchanged. Associated symptoms include arthralgias and neck pain. Pertinent negatives include no anorexia, chest pain, chills, coughing, fever, sore throat, vertigo or vomiting.   Review of Systems   Constitutional:  Negative for activity change, appetite change, chills, fever and unexpected weight change.   HENT:  Negative for drooling, ear discharge, ear pain, facial swelling, nosebleeds, sore throat, trouble swallowing, voice change and goiter.    Eyes:  Negative for photophobia, pain, discharge, redness and visual disturbance.   Respiratory:  Negative for apnea, cough, choking, chest tightness, shortness of breath, wheezing and stridor.    Cardiovascular:  Negative for chest pain, palpitations and leg swelling.   Gastrointestinal:  Negative for abdominal distention, anorexia, diarrhea, rectal pain, vomiting and fecal incontinence.   Endocrine: Negative for cold intolerance, heat intolerance, polydipsia, polyphagia and polyuria.   Genitourinary:  Negative for bladder incontinence, dysuria, flank pain and frequency.   Musculoskeletal:  Positive for arthralgias, back pain, leg pain and neck pain. Negative for neck stiffness.   Integumentary:  Negative for color change and pallor.   Neurological:  Negative for dizziness, vertigo, seizures, syncope, facial asymmetry, speech difficulty, light-headedness, coordination difficulties, memory loss and coordination difficulties.   Hematological:  Negative for adenopathy. Does not bruise/bleed easily.   Psychiatric/Behavioral:  Negative for agitation, behavioral problems, confusion, decreased concentration, dysphoric mood, hallucinations, self-injury and suicidal ideas. The patient is not nervous/anxious and is not hyperactive.           Past Medical History:   Diagnosis Date    NOLAN (acute kidney injury) 2023    Anemia     Diabetes mellitus with neuropathy     HLD (hyperlipidemia) 2023    HTN (hypertension)     Obesity     TEDDY (obstructive sleep apnea) 2023    Osteomyelitis 10/2020    Hx of R foot, Tx with IV Abx    Peripheral vascular disease      Past Surgical History:   Procedure Laterality Date    APPLICATION OF SPLIT-THICKNESS SKIN GRAFT (STSG) TO LOWER EXTREMITY Right 2022    Procedure: APPLICATION, GRAFT, SKIN, SPLIT-THICKNESS, TO LOWER EXTREMITY;  Surgeon: Greg Ramírez MD;  Location: [facility];  Service: General;  Laterality: Right;    CHOLECYSTECTOMY      DEBRIDEMENT OF FOOT Right 10/2020    right great toe amputation       Social History     Socioeconomic History    Marital status: Single    Number of children: 1   Tobacco Use    Smoking status: Every Day     Packs/day: 0.25     Years: 25.00     Pack years: 6.25     Types: Cigarettes     Start date:      Last attempt to quit: 2022     Years since quittin.2    Smokeless tobacco: Never   Substance and Sexual Activity    Alcohol use: Yes     Alcohol/week: 2.0 standard drinks     Types: 1 Glasses of wine, 1 Cans of beer per week     Comment: occasionally    Drug use: Never    Sexual activity: Yes     Partners: Female     Social Determinants of Health     Financial Resource Strain: Low Risk     Difficulty of Paying Living Expenses: Not hard at all   Food Insecurity: No Food Insecurity    Worried About Running Out of Food in the Last Year: Never true    Ran Out of Food in the Last Year: Never true   Transportation Needs: Unmet Transportation Needs    Lack of Transportation (Medical): No    Lack of Transportation (Non-Medical): Yes   Physical Activity: Inactive    Days of Exercise per Week: 0 days    Minutes of Exercise per Session: 0 min   Stress: No Stress Concern Present    Feeling of Stress : Not at all   Social Connections: Socially Isolated     "Frequency of Communication with Friends and Family: More than three times a week    Frequency of Social Gatherings with Friends and Family: More than three times a week    Attends Synagogue Services: Never    Active Member of Clubs or Organizations: No    Attends Club or Organization Meetings: Never    Marital Status: Never    Housing Stability: Unknown    Unable to Pay for Housing in the Last Year: No    Unstable Housing in the Last Year: No     Family History   Problem Relation Age of Onset    Hypertension Father     Diabetes Father     Hypertension Maternal Grandmother     Diabetes Maternal Grandmother      Review of patient's allergies indicates:   Allergen Reactions    Tomato Itching        Objective:  Vitals:    05/03/23 1058   BP: (!) 157/103   Pulse: 80   Resp: 19   Weight: (!) 158.8 kg (350 lb)   Height: 6' 1" (1.854 m)   PainSc:   5           Physical Exam  Vitals and nursing note reviewed. Exam conducted with a chaperone present.   Constitutional:       General: He is awake. He is not in acute distress.     Appearance: Normal appearance. He is not toxic-appearing or diaphoretic.   HENT:      Head: Normocephalic and atraumatic.      Nose: Nose normal.      Mouth/Throat:      Mouth: Mucous membranes are moist.      Pharynx: Oropharynx is clear.   Eyes:      Conjunctiva/sclera: Conjunctivae normal.      Pupils: Pupils are equal, round, and reactive to light.   Cardiovascular:      Rate and Rhythm: Normal rate.   Pulmonary:      Effort: Pulmonary effort is normal. No respiratory distress.   Abdominal:      Palpations: Abdomen is soft.      Tenderness: There is no guarding.   Musculoskeletal:         General: Normal range of motion.      Cervical back: Normal range of motion and neck supple. No rigidity.      Lumbar back: Tenderness present.      Right foot: Tenderness present.      Left foot: Tenderness present.   Skin:     General: Skin is warm and dry.      Coloration: Skin is not jaundiced or pale. "   Neurological:      General: No focal deficit present.      Mental Status: He is alert and oriented to person, place, and time. Mental status is at baseline.      Cranial Nerves: No cranial nerve deficit (II-XII).   Psychiatric:         Mood and Affect: Mood normal.         Behavior: Behavior normal. Behavior is cooperative.         Thought Content: Thought content normal.         US Guided Vascular Access  Narrative: EXAMINATION:  US GUIDED VASCULAR ACCESS    CLINICAL HISTORY:  IV antibiotics;    TECHNIQUE:  US GUIDED VASCULAR ACCESS    COMPARISON:  None    FINDINGS:  No images were saved.  Impression: No images were saved.    Electronically signed by: Anjel Neely  Date:    04/14/2023  Time:    15:27  IR PICC Line Placement w/o Port w/ Img > 4 Y/O  Narrative: EXAMINATION:  IR PICC LINE PLACEMENT W/O PORT, W/IMG > 4 Y/O    CLINICAL HISTORY:  IV antibiotics - long term;    TECHNIQUE:  IR PICC LINE PLACEMENT W/O PORT, W/IMG > 4 Y/O    COMPARISON:  None    FINDINGS:  PICC insertion    Performed by A Adrianna RRA    Consent obtained for PICC line.  Formal timeout was called all staff present agree patient procedure.    The left upper extremity was prepped with ChloraPrep and sterile field was established.  1% lidocaine was used as local anesthetic.  Under ultrasound guidance the brachial vein was localized and accessed with a micropuncture needle.  An 018 guidewire was passed through the vein to the level superior vena cava.  A 55 cm PICC line was then placed over the wire with its tip terminating at the atrial caval junction.  The wire was removed and both ports were flushed with heparinized saline.  The PICC line was then cleaned and secured.  The patient tolerated the procedure well there were no immediate postprocedure complications.  Total fluoroscopy time was 18 seconds.  Impression: Successful placement of a left upper extremity PICC    Electronically signed by: Anjel  [facility]  Date:    04/14/2023  Time:    15:17         Office Visit on 05/02/2023   Component Date Value Ref Range Status    POC Glucose 05/02/2023 199 (A)  70 - 110 MG/DL Final    POC Glucose 05/03/2023 164 (A)  70 - 110 MG/DL Final    POC Glucose 05/02/2023 133 (A)  70 - 110 MG/DL Final   Office Visit on 04/28/2023   Component Date Value Ref Range Status    POC Glucose 05/01/2023 136 (A)  70 - 110 MG/DL Final    POC Glucose 04/28/2023 117 (A)  70 - 110 MG/DL Final   Office Visit on 04/26/2023   Component Date Value Ref Range Status    POC Glucose 04/28/2023 136 (A)  70 - 110 MG/DL Final    POC Glucose 04/26/2023 147 (A)  70 - 110 MG/DL Final   Office Visit on 04/24/2023   Component Date Value Ref Range Status    POC Glucose 04/24/2023 149 (A)  70 - 110 MG/DL Final    POC Glucose 04/24/2023 130 (A)  70 - 110 MG/DL Final   Office Visit on 04/21/2023   Component Date Value Ref Range Status    POC Glucose 04/21/2023 147 (A)  70 - 110 MG/DL Final    POC Glucose 04/21/2023 169 (A)  70 - 110 MG/DL Final   Office Visit on 04/20/2023   Component Date Value Ref Range Status    POC Glucose 04/20/2023 148 (A)  70 - 110 MG/DL Final    POC Glucose 04/20/2023 182 (A)  70 - 110 MG/DL Final   No results displayed because visit has over 200 results.      Office Visit on 04/18/2023   Component Date Value Ref Range Status    POC Glucose 04/19/2023 149 (A)  70 - 110 MG/DL Final    POC Glucose 04/19/2023 128 (A)  70 - 110 MG/DL Final    POC Glucose 04/18/2023 172 (A)  70 - 110 MG/DL Final   Office Visit on 04/17/2023   Component Date Value Ref Range Status    POC Glucose 04/17/2023 104  70 - 110 MG/DL Final   Office Visit on 04/14/2023   Component Date Value Ref Range Status    POC Glucose 04/14/2023 145 (A)  70 - 110 MG/DL Final   There may be more visits with results that are not included.         Orders Placed This Encounter   Procedures    POCT Urine Drug Screen Presump     Interpretive Information:     Negative:  No drug detected  at the cut off level.   Positive:  This result represents presumptive positive for the   tested drug, other substances may yield a positive response other   than the analyte of interest. This result should be utilized for   diagnostic purpose only. Confirmation testing will be performed upon physician request only.            Requested Prescriptions     Signed Prescriptions Disp Refills    HYDROcodone-acetaminophen (NORCO)  mg per tablet 60 tablet 0     Sig: Take 1 tablet by mouth every 12 (twelve) hours as needed for Pain.    HYDROcodone-acetaminophen (NORCO)  mg per tablet 60 tablet 0     Sig: Take 1 tablet by mouth every 12 (twelve) hours as needed for Pain.       Assessment:     1. Ulcer of right heel, with necrosis of bone    2. Diabetic neuropathy with neurologic complication    3. Peripheral vascular disease, unspecified    4. Encounter for long-term (current) use of other medications         A's of Opioid Risk Assessment  Activity:Patient can perform ADL.   Analgesia:Patients pain is partially controlled by current medication. Patient has tried OTC medications such as Tylenol and Ibuprofen with out relief.   Adverse Effects: Patient denies constipation or sedation.  Aberrant Behavior:  reviewed with no aberrant drug seeking/taking behavior.  Overdose reversal drug naloxone discussed    Drug screen reviewed      Plan:    Narcan March 2023    Ulcer right foot continues to follow wound management please see photographs    Walking boot right lower extremity    Patient states he is having difficulty obtaining transportation to and from clinic he missed his last appointment by more than a week he has been on medication     He verbalized understanding if he can not keep office visits for compliance with urine drug screens for narcotics management will discontinue narcotics    Presumptive drug screen consistent today     He would like to continue with conservative management    Continue activity as  directed     Continue current medication    Follow-up 2 months    Dr. Paulson, May 2023    Bring original prescription medication bottles/container/box with labels to each visit

## 2023-05-04 ENCOUNTER — OFFICE VISIT (OUTPATIENT)
Dept: WOUND CARE | Facility: CLINIC | Age: 38
End: 2023-05-04
Attending: FAMILY MEDICINE
Payer: MEDICARE

## 2023-05-04 VITALS
SYSTOLIC BLOOD PRESSURE: 153 MMHG | DIASTOLIC BLOOD PRESSURE: 94 MMHG | RESPIRATION RATE: 18 BRPM | HEART RATE: 87 BPM | TEMPERATURE: 98 F

## 2023-05-04 DIAGNOSIS — E08.621 DIABETIC ULCER OF RIGHT MIDFOOT ASSOCIATED WITH DIABETES MELLITUS DUE TO UNDERLYING CONDITION, WITH NECROSIS OF BONE: Primary | ICD-10-CM

## 2023-05-04 DIAGNOSIS — M86.60 CHRONIC OSTEOMYELITIS: ICD-10-CM

## 2023-05-04 DIAGNOSIS — L97.414 DIABETIC ULCER OF RIGHT MIDFOOT ASSOCIATED WITH DIABETES MELLITUS DUE TO UNDERLYING CONDITION, WITH NECROSIS OF BONE: Primary | ICD-10-CM

## 2023-05-04 LAB
GLUCOSE SERPL-MCNC: 132 MG/DL (ref 70–110)
GLUCOSE SERPL-MCNC: 143 MG/DL (ref 70–110)

## 2023-05-04 PROCEDURE — 99183 HYPERBARIC OXYGEN THERAPY: CPT | Mod: PBBFAC | Performed by: FAMILY MEDICINE

## 2023-05-04 PROCEDURE — 99215 OFFICE O/P EST HI 40 MIN: CPT | Mod: PBBFAC,25 | Performed by: FAMILY MEDICINE

## 2023-05-04 PROCEDURE — 99183 PR HYPERBARIC OXYGEN THERAPY ATTENDANCE/SUPERVISION, PER SESSION: ICD-10-PCS | Mod: S$PBB,,, | Performed by: FAMILY MEDICINE

## 2023-05-04 PROCEDURE — 99183 HYPERBARIC OXYGEN THERAPY: CPT | Mod: S$PBB,,, | Performed by: FAMILY MEDICINE

## 2023-05-04 PROCEDURE — 99499 NO LOS: ICD-10-PCS | Mod: S$PBB,,, | Performed by: FAMILY MEDICINE

## 2023-05-04 PROCEDURE — 99499 UNLISTED E&M SERVICE: CPT | Mod: S$PBB,,, | Performed by: FAMILY MEDICINE

## 2023-05-04 NOTE — PROGRESS NOTES
Subjective:      Patient ID: Jean Pierre Paulson is a 37 y.o. male.    Chief Complaint: Hyperbaric Oxygen Therapy (Right foot ulcer with bone exposure)    Jean Pierre Paulson a 37 y.o. male presents for follow up on all regular problems which are reviewed and discussed.     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, with necrosis of bone    -  Primary    PVD (peripheral vascular disease)                Past Medical History:  Past Medical History:   Diagnosis Date    NOLAN (acute kidney injury) 04/07/2023    Anemia     Diabetes mellitus with neuropathy     HLD (hyperlipidemia) 04/07/2023    HTN (hypertension)     Obesity     TEDDY (obstructive sleep apnea) 04/07/2023    Osteomyelitis 10/2020    Hx of R foot, Tx with IV Abx    Peripheral vascular disease      Past Surgical History:   Procedure Laterality Date    APPLICATION OF SPLIT-THICKNESS SKIN GRAFT (STSG) TO LOWER EXTREMITY Right 03/02/2022    Procedure: APPLICATION, GRAFT, SKIN, SPLIT-THICKNESS, TO LOWER EXTREMITY;  Surgeon: Greg Ramírez MD;  Location: [facility];  Service: General;  Laterality: Right;    CHOLECYSTECTOMY      DEBRIDEMENT OF FOOT Right 10/2020    right great toe amputation       Review of patient's allergies indicates:   Allergen Reactions    Tomato Itching     Current Outpatient Medications on File Prior to Visit   Medication Sig Dispense Refill    amLODIPine (NORVASC) 2.5 MG tablet Take 1 tablet (2.5 mg total) by mouth once daily. 30 tablet 11    blood sugar diagnostic (BLOOD GLUCOSE TEST) Strp 1 strip by Misc.(Non-Drug; Combo Route) route once daily. accu-chek Veronika strips 90 strip 5    dextrose 5 % in water (D5W) 5 % PgBk 100 mL with piperacillin-tazobactam 4.5 gram SolR 4.5 g Inject 4.5 g into the vein every 8 (eight) hours.      FEROSUL 325 mg (65 mg iron) Tab tablet Take 1  tablet (325 mg total) by mouth once daily. 90 tablet 1    gabapentin (NEURONTIN) 300 MG capsule Take 1 capsule (300 mg total) by mouth every 8 (eight) hours. 270 capsule 1    hydrALAZINE (APRESOLINE) 25 MG tablet Take 1 tablet (25 mg total) by mouth every 12 (twelve) hours. 180 tablet 1    hydroCHLOROthiazide (HYDRODIURIL) 25 MG tablet Take 1 tablet (25 mg total) by mouth once daily. 90 tablet 1    losartan (COZAAR) 50 MG tablet Take 1 tablet (50 mg total) by mouth once daily. 90 tablet 1    semaglutide (OZEMPIC) 2 mg/dose (8 mg/3 mL) PnIj Inject 2 mg into the skin every 7 days. 8 mL 2    sodium chloride 0.9% SolP 50 mL with DAPTOmycin 500 mg SolR 1,000 mg Inject 1,000 mg into the vein once daily. for 20 days      [DISCONTINUED] blood-glucose meter kit 1 each by Other route 3 (three) times daily. 1 each 0    [DISCONTINUED] insulin asp prt-insulin aspart, NovoLOG 70/30, (NOVOLOG 70/30) 100 unit/mL (70-30) Soln Inject 15 Units into the skin once daily. 450 Units 0    [DISCONTINUED] insulin detemir U-100 (LEVEMIR) 100 unit/mL injection Inject 40 Units into the skin every evening. 12 mL 0    [DISCONTINUED] sodium hypochlorite 0.5 % (DAKIN'S SOLUTION) external solution Apply topically once daily. 473 mL 5     No current facility-administered medications on file prior to visit.     Social History     Socioeconomic History    Marital status: Single    Number of children: 1   Tobacco Use    Smoking status: Every Day     Packs/day: 0.25     Years: 25.00     Pack years: 6.25     Types: Cigarettes     Start date:      Last attempt to quit: 2022     Years since quittin.2    Smokeless tobacco: Never   Substance and Sexual Activity    Alcohol use: Yes     Alcohol/week: 2.0 standard drinks     Types: 1 Glasses of wine, 1 Cans of beer per week     Comment: occasionally    Drug use: Never    Sexual activity: Yes     Partners: Female     Social Determinants of Health     Financial Resource Strain: Low  Risk     Difficulty of Paying Living Expenses: Not hard at all   Food Insecurity: No Food Insecurity    Worried About Running Out of Food in the Last Year: Never true    Ran Out of Food in the Last Year: Never true   Transportation Needs: Unmet Transportation Needs    Lack of Transportation (Medical): No    Lack of Transportation (Non-Medical): Yes   Physical Activity: Inactive    Days of Exercise per Week: 0 days    Minutes of Exercise per Session: 0 min   Stress: No Stress Concern Present    Feeling of Stress : Not at all   Social Connections: Socially Isolated    Frequency of Communication with Friends and Family: More than three times a week    Frequency of Social Gatherings with Friends and Family: More than three times a week    Attends Shinto Services: Never    Active Member of Clubs or Organizations: No    Attends Club or Organization Meetings: Never    Marital Status: Never    Housing Stability: Unknown    Unable to Pay for Housing in the Last Year: No    Unstable Housing in the Last Year: No     Family History   Problem Relation Age of Onset    Hypertension Father     Diabetes Father     Hypertension Maternal Grandmother     Diabetes Maternal Grandmother        Review of Systems   Unable to perform ROS: Other     Objective:     BP (!) 158/97   Pulse 78   Temp 98.6 °F (37 °C)   Resp 20     Physical Exam  Constitutional:       Appearance: Normal appearance. He is obese.   Assessment:     1. 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 PVD (peripheral vascular disease)    3. Chronic osteomyelitis        Plan:     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, with necrosis of bone    -  Primary    PVD (peripheral vascular disease)              No  follow-ups on file.      I am having Jean Pierre Paulson maintain his blood sugar diagnostic, FeroSuL, gabapentin, hydrALAZINE, hydroCHLOROthiazide, losartan, OZEMPIC, amLODIPine, (sodium chloride 0.9% SolP 50 mL with DAPTOmycin 500 mg SolR 1,000 mg), and dextrose 5 % in water (D5W) 5 % PgBk 100 mL with piperacillin-tazobactam 4.5 gram SolR 4.5 g.    Jean Pierre was seen today for hyperbaric oxygen therapy.    Diagnoses and all orders for this visit:    Diabetic ulcer of right midfoot associated with diabetes mellitus due to underlying condition, with necrosis of bone    PVD (peripheral vascular disease)    Chronic osteomyelitis         [unfilled]  No orders of the defined types were placed in this encounter.

## 2023-05-04 NOTE — PROGRESS NOTES
Subjective:      Patient ID: Jean Pierre Paulson is a 37 y.o. male.    Chief Complaint: Hyperbaric Oxygen Therapy (R DFU grade 3)    Jean Pierre Paulson a 37 y.o. male presents for follow up on all regular problems which are reviewed and discussed.     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, with necrosis of bone    -  Primary            Past Medical History:  Past Medical History:   Diagnosis Date    NOLAN (acute kidney injury) 04/07/2023    Anemia     Diabetes mellitus with neuropathy     HLD (hyperlipidemia) 04/07/2023    HTN (hypertension)     Obesity     TEDDY (obstructive sleep apnea) 04/07/2023    Osteomyelitis 10/2020    Hx of R foot, Tx with IV Abx    Peripheral vascular disease      Past Surgical History:   Procedure Laterality Date    APPLICATION OF SPLIT-THICKNESS SKIN GRAFT (STSG) TO LOWER EXTREMITY Right 03/02/2022    Procedure: APPLICATION, GRAFT, SKIN, SPLIT-THICKNESS, TO LOWER EXTREMITY;  Surgeon: Greg Ramírez MD;  Location: [facility], [place];  Service: General;  Laterality: Right;    CHOLECYSTECTOMY      DEBRIDEMENT OF FOOT Right 10/2020    right great toe amputation       Review of patient's allergies indicates:   Allergen Reactions    Tomato Itching     Current Outpatient Medications on File Prior to Visit   Medication Sig Dispense Refill    0.9 % sodium chloride (SODIUM CHLORIDE 0.9%) solution Inject 500 mLs into the vein once daily.      amLODIPine (NORVASC) 2.5 MG tablet Take 1 tablet (2.5 mg total) by mouth once daily. 30 tablet 11    blood sugar diagnostic (BLOOD GLUCOSE TEST) Strp 1 strip by Misc.(Non-Drug; Combo Route) route once daily. accu-chek Veronika strips 90 strip 5    DAPTOmycin (CUBICIN) 500 mg injection Inject 1 each into the vein once daily.      dextrose 5 % in water (D5W) 5 % PgBk 100 mL with piperacillin-tazobactam 4.5  gram SolR 4.5 g Inject 4.5 g into the vein every 8 (eight) hours.      diphenhydrAMINE (BENADRYL) 50 mg/mL injection Inject 1 mL into the vein as needed.      FEROSUL 325 mg (65 mg iron) Tab tablet Take 1 tablet (325 mg total) by mouth once daily. 90 tablet 1    gabapentin (NEURONTIN) 300 MG capsule Take 1 capsule (300 mg total) by mouth every 8 (eight) hours. 270 capsule 1    hydrALAZINE (APRESOLINE) 25 MG tablet Take 1 tablet (25 mg total) by mouth every 12 (twelve) hours. 180 tablet 1    hydroCHLOROthiazide (HYDRODIURIL) 25 MG tablet Take 1 tablet (25 mg total) by mouth once daily. 90 tablet 1    HYDROcodone-acetaminophen (NORCO)  mg per tablet Take 1 tablet by mouth every 12 (twelve) hours as needed for Pain. 60 tablet 0    [START ON 2023] HYDROcodone-acetaminophen (NORCO)  mg per tablet Take 1 tablet by mouth every 12 (twelve) hours as needed for Pain. 60 tablet 0    losartan (COZAAR) 50 MG tablet Take 1 tablet (50 mg total) by mouth once daily. 90 tablet 1    piperacillin sodium/tazobactam (PIPERACILLIN-TAZOBACTAM) 13.5 gram SolR Inject 1 each into the vein once daily.      semaglutide (OZEMPIC) 2 mg/dose (8 mg/3 mL) PnIj Inject 2 mg into the skin every 7 days. 8 mL 2    [] sodium chloride 0.9% SolP 50 mL with DAPTOmycin 500 mg SolR 1,000 mg Inject 1,000 mg into the vein once daily. for 20 days      [DISCONTINUED] blood-glucose meter kit 1 each by Other route 3 (three) times daily. 1 each 0    [DISCONTINUED] insulin asp prt-insulin aspart, NovoLOG 70/30, (NOVOLOG 70/30) 100 unit/mL (70-30) Soln Inject 15 Units into the skin once daily. 450 Units 0    [DISCONTINUED] insulin detemir U-100 (LEVEMIR) 100 unit/mL injection Inject 40 Units into the skin every evening. 12 mL 0    [DISCONTINUED] sodium hypochlorite 0.5 % (DAKIN'S SOLUTION) external solution Apply topically once daily. 473 mL 5     No current facility-administered medications on file prior to visit.     Social  History     Socioeconomic History    Marital status: Single    Number of children: 1   Tobacco Use    Smoking status: Every Day     Packs/day: 0.25     Years: 25.00     Pack years: 6.25     Types: Cigarettes     Start date:      Last attempt to quit: 2022     Years since quittin.2    Smokeless tobacco: Never   Substance and Sexual Activity    Alcohol use: Yes     Alcohol/week: 2.0 standard drinks     Types: 1 Glasses of wine, 1 Cans of beer per week     Comment: occasionally    Drug use: Never    Sexual activity: Yes     Partners: Female     Social Determinants of Health     Financial Resource Strain: Low Risk     Difficulty of Paying Living Expenses: Not hard at all   Food Insecurity: No Food Insecurity    Worried About Running Out of Food in the Last Year: Never true    Ran Out of Food in the Last Year: Never true   Transportation Needs: Unmet Transportation Needs    Lack of Transportation (Medical): No    Lack of Transportation (Non-Medical): Yes   Physical Activity: Inactive    Days of Exercise per Week: 0 days    Minutes of Exercise per Session: 0 min   Stress: No Stress Concern Present    Feeling of Stress : Not at all   Social Connections: Socially Isolated    Frequency of Communication with Friends and Family: More than three times a week    Frequency of Social Gatherings with Friends and Family: More than three times a week    Attends Lutheran Services: Never    Active Member of Clubs or Organizations: No    Attends Club or Organization Meetings: Never    Marital Status: Never    Housing Stability: Unknown    Unable to Pay for Housing in the Last Year: No    Unstable Housing in the Last Year: No     Family History   Problem Relation Age of Onset    Hypertension Father     Diabetes Father     Hypertension Maternal Grandmother     Diabetes Maternal Grandmother        Review of Systems   Unable to perform ROS: Other     Objective:     BP (!) 153/94   Pulse 87    Temp 97.7 °F (36.5 °C)   Resp 18     Physical Exam  Constitutional:       Appearance: Normal appearance. He is obese.   Assessment:     1. 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      I am having Jean Pierre Paulson maintain his blood sugar diagnostic, FeroSuL, gabapentin, hydrALAZINE, hydroCHLOROthiazide, losartan, OZEMPIC, amLODIPine, dextrose 5 % in water (D5W) 5 % PgBk 100 mL with piperacillin-tazobactam 4.5 gram SolR 4.5 g, HYDROcodone-acetaminophen, HYDROcodone-acetaminophen, sodium chloride 0.9%, DAPTOmycin, piperacillin-tazobactam, and diphenhydrAMINE.    Jean Pierre was seen today for hyperbaric oxygen therapy.    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    Alli treatment well. No complaints voiced

## 2023-05-05 ENCOUNTER — OFFICE VISIT (OUTPATIENT)
Dept: WOUND CARE | Facility: CLINIC | Age: 38
End: 2023-05-05
Attending: FAMILY MEDICINE
Payer: MEDICARE

## 2023-05-05 VITALS — RESPIRATION RATE: 20 BRPM

## 2023-05-05 DIAGNOSIS — E08.621 DIABETIC ULCER OF RIGHT MIDFOOT ASSOCIATED WITH DIABETES MELLITUS DUE TO UNDERLYING CONDITION, WITH NECROSIS OF BONE: Primary | ICD-10-CM

## 2023-05-05 DIAGNOSIS — L97.414 DIABETIC ULCER OF RIGHT MIDFOOT ASSOCIATED WITH DIABETES MELLITUS DUE TO UNDERLYING CONDITION, WITH NECROSIS OF BONE: Primary | ICD-10-CM

## 2023-05-05 LAB
GLUCOSE SERPL-MCNC: 127 MG/DL (ref 70–110)
GLUCOSE SERPL-MCNC: 142 MG/DL (ref 70–110)

## 2023-05-05 PROCEDURE — 99214 OFFICE O/P EST MOD 30 MIN: CPT | Mod: PBBFAC | Performed by: FAMILY MEDICINE

## 2023-05-05 PROCEDURE — 99183 PR HYPERBARIC OXYGEN THERAPY ATTENDANCE/SUPERVISION, PER SESSION: ICD-10-PCS | Mod: S$PBB,,, | Performed by: FAMILY MEDICINE

## 2023-05-05 PROCEDURE — 99183 HYPERBARIC OXYGEN THERAPY: CPT | Mod: S$PBB,,, | Performed by: FAMILY MEDICINE

## 2023-05-05 PROCEDURE — 99499 NO LOS: ICD-10-PCS | Mod: S$PBB,,, | Performed by: FAMILY MEDICINE

## 2023-05-05 PROCEDURE — 99183 HYPERBARIC OXYGEN THERAPY: CPT | Mod: PBBFAC | Performed by: FAMILY MEDICINE

## 2023-05-05 PROCEDURE — 82962 GLUCOSE BLOOD TEST: CPT | Mod: PBBFAC | Performed by: FAMILY MEDICINE

## 2023-05-05 PROCEDURE — 99499 UNLISTED E&M SERVICE: CPT | Mod: S$PBB,,, | Performed by: FAMILY MEDICINE

## 2023-05-05 NOTE — PROGRESS NOTES
Subjective:      Patient ID: Jean Pierre Paulson is a 37 y.o. male.    Chief Complaint: Hyperbaric Oxygen Therapy (R DFU Grade 3)    Jean Pierre Paulson a 37 y.o. male presents for follow up on all regular problems which are reviewed and discussed.     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, with necrosis of bone    -  Primary    Relevant Orders    POCT Glucose, Hand-Held Device (Completed)    POCT Glucose, Hand-Held Device (Completed)            Past Medical History:  Past Medical History:   Diagnosis Date    NOLAN (acute kidney injury) 04/07/2023    Anemia     Diabetes mellitus with neuropathy     HLD (hyperlipidemia) 04/07/2023    HTN (hypertension)     Obesity     TEDDY (obstructive sleep apnea) 04/07/2023    Osteomyelitis 10/2020    Hx of R foot, Tx with IV Abx    Peripheral vascular disease      Past Surgical History:   Procedure Laterality Date    APPLICATION OF SPLIT-THICKNESS SKIN GRAFT (STSG) TO LOWER EXTREMITY Right 03/02/2022    Procedure: APPLICATION, GRAFT, SKIN, SPLIT-THICKNESS, TO LOWER EXTREMITY;  Surgeon: Greg Ramírez MD;  Location: [facility];  Service: General;  Laterality: Right;    CHOLECYSTECTOMY      DEBRIDEMENT OF FOOT Right 10/2020    right great toe amputation       Review of patient's allergies indicates:   Allergen Reactions    Tomato Itching     Current Outpatient Medications on File Prior to Visit   Medication Sig Dispense Refill    0.9 % sodium chloride (SODIUM CHLORIDE 0.9%) solution Inject 500 mLs into the vein once daily.      amLODIPine (NORVASC) 2.5 MG tablet Take 1 tablet (2.5 mg total) by mouth once daily. 30 tablet 11    blood sugar diagnostic (BLOOD GLUCOSE TEST) Strp 1 strip by Misc.(Non-Drug; Combo Route) route once daily. accu-chek Veronika strips 90 strip 5    DAPTOmycin (CUBICIN) 500 mg injection Inject 1  each into the vein once daily.      dextrose 5 % in water (D5W) 5 % PgBk 100 mL with piperacillin-tazobactam 4.5 gram SolR 4.5 g Inject 4.5 g into the vein every 8 (eight) hours.      diphenhydrAMINE (BENADRYL) 50 mg/mL injection Inject 1 mL into the vein as needed.      FEROSUL 325 mg (65 mg iron) Tab tablet Take 1 tablet (325 mg total) by mouth once daily. 90 tablet 1    gabapentin (NEURONTIN) 300 MG capsule Take 1 capsule (300 mg total) by mouth every 8 (eight) hours. 270 capsule 1    hydrALAZINE (APRESOLINE) 25 MG tablet Take 1 tablet (25 mg total) by mouth every 12 (twelve) hours. 180 tablet 1    hydroCHLOROthiazide (HYDRODIURIL) 25 MG tablet Take 1 tablet (25 mg total) by mouth once daily. 90 tablet 1    losartan (COZAAR) 50 MG tablet Take 1 tablet (50 mg total) by mouth once daily. 90 tablet 1    piperacillin sodium/tazobactam (PIPERACILLIN-TAZOBACTAM) 13.5 gram SolR Inject 1 each into the vein once daily.      semaglutide (OZEMPIC) 2 mg/dose (8 mg/3 mL) PnIj Inject 2 mg into the skin every 7 days. 8 mL 2    sodium chloride 0.9% SolP 50 mL with DAPTOmycin 500 mg SolR 1,000 mg Inject 1,000 mg into the vein once daily. for 20 days      [DISCONTINUED] blood-glucose meter kit 1 each by Other route 3 (three) times daily. 1 each 0    [DISCONTINUED] insulin asp prt-insulin aspart, NovoLOG 70/30, (NOVOLOG 70/30) 100 unit/mL (70-30) Soln Inject 15 Units into the skin once daily. 450 Units 0    [DISCONTINUED] insulin detemir U-100 (LEVEMIR) 100 unit/mL injection Inject 40 Units into the skin every evening. 12 mL 0    [DISCONTINUED] sodium hypochlorite 0.5 % (DAKIN'S SOLUTION) external solution Apply topically once daily. 473 mL 5     No current facility-administered medications on file prior to visit.     Social History     Socioeconomic History    Marital status: Single    Number of children: 1   Tobacco Use    Smoking status: Every Day     Packs/day: 0.25     Years: 25.00     Pack years: 6.25      Types: Cigarettes     Start date:      Last attempt to quit: 2022     Years since quittin.2    Smokeless tobacco: Never   Substance and Sexual Activity    Alcohol use: Yes     Alcohol/week: 2.0 standard drinks     Types: 1 Glasses of wine, 1 Cans of beer per week     Comment: occasionally    Drug use: Never    Sexual activity: Yes     Partners: Female     Social Determinants of Health     Financial Resource Strain: Low Risk     Difficulty of Paying Living Expenses: Not hard at all   Food Insecurity: No Food Insecurity    Worried About Running Out of Food in the Last Year: Never true    Ran Out of Food in the Last Year: Never true   Transportation Needs: Unmet Transportation Needs    Lack of Transportation (Medical): No    Lack of Transportation (Non-Medical): Yes   Physical Activity: Inactive    Days of Exercise per Week: 0 days    Minutes of Exercise per Session: 0 min   Stress: No Stress Concern Present    Feeling of Stress : Not at all   Social Connections: Socially Isolated    Frequency of Communication with Friends and Family: More than three times a week    Frequency of Social Gatherings with Friends and Family: More than three times a week    Attends Zoroastrianism Services: Never    Active Member of Clubs or Organizations: No    Attends Club or Organization Meetings: Never    Marital Status: Never    Housing Stability: Unknown    Unable to Pay for Housing in the Last Year: No    Unstable Housing in the Last Year: No     Family History   Problem Relation Age of Onset    Hypertension Father     Diabetes Father     Hypertension Maternal Grandmother     Diabetes Maternal Grandmother        Review of Systems   Unable to perform ROS: Other     Objective:     BP (!) 142/93   Pulse 91   Temp 96.9 °F (36.1 °C)   Resp 20     Physical Exam  Constitutional:       Appearance: Normal appearance. He is obese.   Assessment:     1. Diabetic ulcer of right midfoot associated with  diabetes mellitus due to underlying condition, with necrosis of bone    2. 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      I am having Jean Pierre Paulson maintain his blood sugar diagnostic, FeroSuL, gabapentin, hydrALAZINE, hydroCHLOROthiazide, losartan, OZEMPIC, amLODIPine, (sodium chloride 0.9% SolP 50 mL with DAPTOmycin 500 mg SolR 1,000 mg), dextrose 5 % in water (D5W) 5 % PgBk 100 mL with piperacillin-tazobactam 4.5 gram SolR 4.5 g, sodium chloride 0.9%, DAPTOmycin, piperacillin-tazobactam, and diphenhydrAMINE.    Jean Pierre was seen today for hyperbaric oxygen therapy.    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

## 2023-05-05 NOTE — PROGRESS NOTES
Subjective:      Patient ID: Jean Pierre Paulson is a 37 y.o. male.    Chief Complaint: Hyperbaric Oxygen Therapy    Jean Pierre Paulson a 37 y.o. male presents for follow up on all regular problems which are reviewed and discussed.     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, with necrosis of bone    -  Primary    Relevant Orders    POCT Glucose, Hand-Held Device (Completed)    POCT Glucose, Hand-Held Device (Completed)    POCT Glucose, Hand-Held Device (Completed)            Past Medical History:  Past Medical History:   Diagnosis Date    NOLAN (acute kidney injury) 04/07/2023    Anemia     Diabetes mellitus with neuropathy     HLD (hyperlipidemia) 04/07/2023    HTN (hypertension)     Obesity     TEDDY (obstructive sleep apnea) 04/07/2023    Osteomyelitis 10/2020    Hx of R foot, Tx with IV Abx    Peripheral vascular disease      Past Surgical History:   Procedure Laterality Date    APPLICATION OF SPLIT-THICKNESS SKIN GRAFT (STSG) TO LOWER EXTREMITY Right 03/02/2022    Procedure: APPLICATION, GRAFT, SKIN, SPLIT-THICKNESS, TO LOWER EXTREMITY;  Surgeon: Greg Ramírez MD;  Location[facility];  Service: General;  Laterality: Right;    CHOLECYSTECTOMY      DEBRIDEMENT OF FOOT Right 10/2020    right great toe amputation       Review of patient's allergies indicates:   Allergen Reactions    Tomato Itching     Current Outpatient Medications on File Prior to Visit   Medication Sig Dispense Refill    amLODIPine (NORVASC) 2.5 MG tablet Take 1 tablet (2.5 mg total) by mouth once daily. 30 tablet 11    blood sugar diagnostic (BLOOD GLUCOSE TEST) Strp 1 strip by Misc.(Non-Drug; Combo Route) route once daily. accu-chek Veronika strips 90 strip 5    dextrose 5 % in water (D5W) 5 % PgBk 100 mL with piperacillin-tazobactam 4.5 gram SolR 4.5 g Inject 4.5 g into the vein  every 8 (eight) hours.      FEROSUL 325 mg (65 mg iron) Tab tablet Take 1 tablet (325 mg total) by mouth once daily. 90 tablet 1    gabapentin (NEURONTIN) 300 MG capsule Take 1 capsule (300 mg total) by mouth every 8 (eight) hours. 270 capsule 1    hydrALAZINE (APRESOLINE) 25 MG tablet Take 1 tablet (25 mg total) by mouth every 12 (twelve) hours. 180 tablet 1    hydroCHLOROthiazide (HYDRODIURIL) 25 MG tablet Take 1 tablet (25 mg total) by mouth once daily. 90 tablet 1    losartan (COZAAR) 50 MG tablet Take 1 tablet (50 mg total) by mouth once daily. 90 tablet 1    semaglutide (OZEMPIC) 2 mg/dose (8 mg/3 mL) PnIj Inject 2 mg into the skin every 7 days. 8 mL 2    sodium chloride 0.9% SolP 50 mL with DAPTOmycin 500 mg SolR 1,000 mg Inject 1,000 mg into the vein once daily. for 20 days      [DISCONTINUED] blood-glucose meter kit 1 each by Other route 3 (three) times daily. 1 each 0    [DISCONTINUED] insulin asp prt-insulin aspart, NovoLOG 70/30, (NOVOLOG 70/30) 100 unit/mL (70-30) Soln Inject 15 Units into the skin once daily. 450 Units 0    [DISCONTINUED] insulin detemir U-100 (LEVEMIR) 100 unit/mL injection Inject 40 Units into the skin every evening. 12 mL 0    [DISCONTINUED] sodium hypochlorite 0.5 % (DAKIN'S SOLUTION) external solution Apply topically once daily. 473 mL 5     No current facility-administered medications on file prior to visit.     Social History     Socioeconomic History    Marital status: Single    Number of children: 1   Tobacco Use    Smoking status: Every Day     Packs/day: 0.25     Years: 25.00     Pack years: 6.25     Types: Cigarettes     Start date:      Last attempt to quit: 2022     Years since quittin.2    Smokeless tobacco: Never   Substance and Sexual Activity    Alcohol use: Yes     Alcohol/week: 2.0 standard drinks     Types: 1 Glasses of wine, 1 Cans of beer per week     Comment: occasionally    Drug use: Never    Sexual activity: Yes     Partners:  Female     Social Determinants of Health     Financial Resource Strain: Low Risk     Difficulty of Paying Living Expenses: Not hard at all   Food Insecurity: No Food Insecurity    Worried About Running Out of Food in the Last Year: Never true    Ran Out of Food in the Last Year: Never true   Transportation Needs: Unmet Transportation Needs    Lack of Transportation (Medical): No    Lack of Transportation (Non-Medical): Yes   Physical Activity: Inactive    Days of Exercise per Week: 0 days    Minutes of Exercise per Session: 0 min   Stress: No Stress Concern Present    Feeling of Stress : Not at all   Social Connections: Socially Isolated    Frequency of Communication with Friends and Family: More than three times a week    Frequency of Social Gatherings with Friends and Family: More than three times a week    Attends Faith Services: Never    Active Member of Clubs or Organizations: No    Attends Club or Organization Meetings: Never    Marital Status: Never    Housing Stability: Unknown    Unable to Pay for Housing in the Last Year: No    Unstable Housing in the Last Year: No     Family History   Problem Relation Age of Onset    Hypertension Father     Diabetes Father     Hypertension Maternal Grandmother     Diabetes Maternal Grandmother        Review of Systems   Unable to perform ROS: Other     Objective:     BP (!) 160/97   Pulse 87   Resp 20     Physical Exam  Constitutional:       Appearance: Normal appearance. He is obese.   Assessment:     1. 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      I am having Jean Pierre Paulson maintain his blood sugar diagnostic, FeroSuL, gabapentin, hydrALAZINE, hydroCHLOROthiazide, losartan, OZEMPIC, amLODIPine, (sodium chloride 0.9% SolP 50 mL with DAPTOmycin 500 mg SolR 1,000 mg), and dextrose 5 % in water (D5W) 5 % PgBk 100 mL with piperacillin-tazobactam 4.5 gram SolR 4.5 g.    Jean Pierre was seen today for hyperbaric oxygen therapy.    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

## 2023-05-08 ENCOUNTER — OFFICE VISIT (OUTPATIENT)
Dept: WOUND CARE | Facility: CLINIC | Age: 38
End: 2023-05-08
Attending: FAMILY MEDICINE
Payer: MEDICARE

## 2023-05-08 VITALS
TEMPERATURE: 98 F | DIASTOLIC BLOOD PRESSURE: 94 MMHG | SYSTOLIC BLOOD PRESSURE: 142 MMHG | RESPIRATION RATE: 20 BRPM | HEART RATE: 94 BPM

## 2023-05-08 DIAGNOSIS — L97.414 DIABETIC ULCER OF RIGHT MIDFOOT ASSOCIATED WITH DIABETES MELLITUS DUE TO UNDERLYING CONDITION, WITH NECROSIS OF BONE: Primary | ICD-10-CM

## 2023-05-08 DIAGNOSIS — M86.60 CHRONIC OSTEOMYELITIS: ICD-10-CM

## 2023-05-08 DIAGNOSIS — E08.621 DIABETIC ULCER OF RIGHT MIDFOOT ASSOCIATED WITH DIABETES MELLITUS DUE TO UNDERLYING CONDITION, WITH NECROSIS OF BONE: Primary | ICD-10-CM

## 2023-05-08 LAB
GLUCOSE SERPL-MCNC: 152 MG/DL (ref 70–105)
GLUCOSE SERPL-MCNC: 171 MG/DL (ref 70–105)

## 2023-05-08 PROCEDURE — 99215 OFFICE O/P EST HI 40 MIN: CPT | Mod: PBBFAC,25 | Performed by: FAMILY MEDICINE

## 2023-05-08 PROCEDURE — 99183 PR HYPERBARIC OXYGEN THERAPY ATTENDANCE/SUPERVISION, PER SESSION: ICD-10-PCS | Mod: S$PBB,,, | Performed by: FAMILY MEDICINE

## 2023-05-08 PROCEDURE — 99499 NO LOS: ICD-10-PCS | Mod: S$PBB,,, | Performed by: FAMILY MEDICINE

## 2023-05-08 PROCEDURE — 99499 UNLISTED E&M SERVICE: CPT | Mod: S$PBB,,, | Performed by: FAMILY MEDICINE

## 2023-05-08 PROCEDURE — 99183 HYPERBARIC OXYGEN THERAPY: CPT | Mod: PBBFAC | Performed by: FAMILY MEDICINE

## 2023-05-08 PROCEDURE — 99183 HYPERBARIC OXYGEN THERAPY: CPT | Mod: S$PBB,,, | Performed by: FAMILY MEDICINE

## 2023-05-08 NOTE — PATIENT INSTRUCTIONS
Follow up in am for HBOT    Right foot wound    Clean with vashe or baby shampoo and water    Apply aquacel ag to wound,pack heel with aquacel ag then cover with dry drawtex,ABD pad,wrap with kerlix,secure with paper tape. Change daily and as needed  Monitor closely for s/s of infection including fever, chills, increase in pain, odor from wound, and increased redness from foot. Go to ER if any complications develop.   Keep leg elevated and avoid pressure on wound.   Diabetes:  Monitor glucose closely. Check fasting glucose and 2 hours after meals. HgA1C goal <7, fasting glucose , and 2 hours after meals <180  Hypertension:  Check blood pressure twice daily, goal <120/80  Diet:   Increase protein intake, avoid fried, fatty foods and foods high in simple carbs.   Vitamins:  Take vitamin C 1000 mg, zinc 50mg, vitamin d 5000 units, and a daily multivitamin. Angel is a good source of protein and nutrients to aid in wound healing.

## 2023-05-09 ENCOUNTER — OFFICE VISIT (OUTPATIENT)
Dept: WOUND CARE | Facility: CLINIC | Age: 38
End: 2023-05-09
Attending: FAMILY MEDICINE
Payer: MEDICARE

## 2023-05-09 VITALS
SYSTOLIC BLOOD PRESSURE: 142 MMHG | TEMPERATURE: 97 F | HEART RATE: 90 BPM | DIASTOLIC BLOOD PRESSURE: 90 MMHG | RESPIRATION RATE: 20 BRPM

## 2023-05-09 DIAGNOSIS — E08.621 DIABETIC ULCER OF RIGHT MIDFOOT ASSOCIATED WITH DIABETES MELLITUS DUE TO UNDERLYING CONDITION, WITH NECROSIS OF BONE: Primary | ICD-10-CM

## 2023-05-09 DIAGNOSIS — L97.414 DIABETIC ULCER OF RIGHT MIDFOOT ASSOCIATED WITH DIABETES MELLITUS DUE TO UNDERLYING CONDITION, WITH NECROSIS OF BONE: Primary | ICD-10-CM

## 2023-05-09 DIAGNOSIS — M86.60 CHRONIC OSTEOMYELITIS: ICD-10-CM

## 2023-05-09 LAB
GLUCOSE SERPL-MCNC: 129 MG/DL (ref 70–105)
GLUCOSE SERPL-MCNC: 131 MG/DL (ref 70–105)

## 2023-05-09 PROCEDURE — 99215 OFFICE O/P EST HI 40 MIN: CPT | Mod: PBBFAC,25 | Performed by: FAMILY MEDICINE

## 2023-05-09 PROCEDURE — 99499 UNLISTED E&M SERVICE: CPT | Mod: S$PBB,,, | Performed by: FAMILY MEDICINE

## 2023-05-09 PROCEDURE — 99183 HYPERBARIC OXYGEN THERAPY: CPT | Mod: PBBFAC | Performed by: FAMILY MEDICINE

## 2023-05-09 PROCEDURE — 99183 HYPERBARIC OXYGEN THERAPY: CPT | Mod: S$PBB,,, | Performed by: FAMILY MEDICINE

## 2023-05-09 PROCEDURE — 99499 NO LOS: ICD-10-PCS | Mod: S$PBB,,, | Performed by: FAMILY MEDICINE

## 2023-05-09 PROCEDURE — 99183 PR HYPERBARIC OXYGEN THERAPY ATTENDANCE/SUPERVISION, PER SESSION: ICD-10-PCS | Mod: S$PBB,,, | Performed by: FAMILY MEDICINE

## 2023-05-09 NOTE — PROGRESS NOTES
Subjective:      Patient ID: Jean Pierre Paulson is a 37 y.o. male.    Chief Complaint: Hyperbaric Oxygen Therapy (R DFU Grade 3)    Jean Pierre Paulson a 37 y.o. male presents for follow up on all regular problems which are reviewed and discussed.     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, with necrosis of bone    -  Primary    Relevant Orders    POCT Glucose, Hand-Held Device (Completed)    POCT Glucose, Hand-Held Device (Completed)            Past Medical History:  Past Medical History:   Diagnosis Date    NOLAN (acute kidney injury) 04/07/2023    Anemia     Diabetes mellitus with neuropathy     HLD (hyperlipidemia) 04/07/2023    HTN (hypertension)     Obesity     TEDDY (obstructive sleep apnea) 04/07/2023    Osteomyelitis 10/2020    Hx of R foot, Tx with IV Abx    Peripheral vascular disease      Past Surgical History:   Procedure Laterality Date    APPLICATION OF SPLIT-THICKNESS SKIN GRAFT (STSG) TO LOWER EXTREMITY Right 03/02/2022    Procedure: APPLICATION, GRAFT, SKIN, SPLIT-THICKNESS, TO LOWER EXTREMITY;  Surgeon: Greg Ramírez MD;  Location: [facility], [place];  Service: General;  Laterality: Right;    CHOLECYSTECTOMY      DEBRIDEMENT OF FOOT Right 10/2020    right great toe amputation       Review of patient's allergies indicates:   Allergen Reactions    Tomato Itching     Current Outpatient Medications on File Prior to Visit   Medication Sig Dispense Refill    0.9 % sodium chloride (SODIUM CHLORIDE 0.9%) solution Inject 500 mLs into the vein once daily.      amLODIPine (NORVASC) 2.5 MG tablet Take 1 tablet (2.5 mg total) by mouth once daily. 30 tablet 11    blood sugar diagnostic (BLOOD GLUCOSE TEST) Strp 1 strip by Misc.(Non-Drug; Combo Route) route once daily. accu-chek Veronika strips 90 strip 5    DAPTOmycin (CUBICIN) 500 mg injection Inject 1 each into the vein once daily.       dextrose 5 % in water (D5W) 5 % PgBk 100 mL with piperacillin-tazobactam 4.5 gram SolR 4.5 g Inject 4.5 g into the vein every 8 (eight) hours.      diphenhydrAMINE (BENADRYL) 50 mg/mL injection Inject 1 mL into the vein as needed.      FEROSUL 325 mg (65 mg iron) Tab tablet Take 1 tablet (325 mg total) by mouth once daily. 90 tablet 1    gabapentin (NEURONTIN) 300 MG capsule Take 1 capsule (300 mg total) by mouth every 8 (eight) hours. 270 capsule 1    hydrALAZINE (APRESOLINE) 25 MG tablet Take 1 tablet (25 mg total) by mouth every 12 (twelve) hours. 180 tablet 1    hydroCHLOROthiazide (HYDRODIURIL) 25 MG tablet Take 1 tablet (25 mg total) by mouth once daily. 90 tablet 1    HYDROcodone-acetaminophen (NORCO)  mg per tablet Take 1 tablet by mouth every 12 (twelve) hours as needed for Pain. 60 tablet 0    [START ON 2023] HYDROcodone-acetaminophen (NORCO)  mg per tablet Take 1 tablet by mouth every 12 (twelve) hours as needed for Pain. 60 tablet 0    losartan (COZAAR) 50 MG tablet Take 1 tablet (50 mg total) by mouth once daily. 90 tablet 1    piperacillin sodium/tazobactam (PIPERACILLIN-TAZOBACTAM) 13.5 gram SolR Inject 1 each into the vein once daily.      semaglutide (OZEMPIC) 2 mg/dose (8 mg/3 mL) PnIj Inject 2 mg into the skin every 7 days. 8 mL 2    [] sodium chloride 0.9% SolP 50 mL with DAPTOmycin 500 mg SolR 1,000 mg Inject 1,000 mg into the vein once daily. for 20 days      [DISCONTINUED] blood-glucose meter kit 1 each by Other route 3 (three) times daily. 1 each 0    [DISCONTINUED] insulin asp prt-insulin aspart, NovoLOG 70/30, (NOVOLOG 70/30) 100 unit/mL (70-30) Soln Inject 15 Units into the skin once daily. 450 Units 0    [DISCONTINUED] insulin detemir U-100 (LEVEMIR) 100 unit/mL injection Inject 40 Units into the skin every evening. 12 mL 0    [DISCONTINUED] sodium hypochlorite 0.5 % (DAKIN'S SOLUTION) external solution Apply topically once daily. 473 mL 5     No  current facility-administered medications on file prior to visit.     Social History     Socioeconomic History    Marital status: Single    Number of children: 1   Tobacco Use    Smoking status: Every Day     Packs/day: 0.25     Years: 25.00     Pack years: 6.25     Types: Cigarettes     Start date:      Last attempt to quit: 2022     Years since quittin.2    Smokeless tobacco: Never   Substance and Sexual Activity    Alcohol use: Yes     Alcohol/week: 2.0 standard drinks     Types: 1 Glasses of wine, 1 Cans of beer per week     Comment: occasionally    Drug use: Never    Sexual activity: Yes     Partners: Female     Social Determinants of Health     Financial Resource Strain: Low Risk     Difficulty of Paying Living Expenses: Not hard at all   Food Insecurity: No Food Insecurity    Worried About Running Out of Food in the Last Year: Never true    Ran Out of Food in the Last Year: Never true   Transportation Needs: Unmet Transportation Needs    Lack of Transportation (Medical): No    Lack of Transportation (Non-Medical): Yes   Physical Activity: Inactive    Days of Exercise per Week: 0 days    Minutes of Exercise per Session: 0 min   Stress: No Stress Concern Present    Feeling of Stress : Not at all   Social Connections: Socially Isolated    Frequency of Communication with Friends and Family: More than three times a week    Frequency of Social Gatherings with Friends and Family: More than three times a week    Attends Islam Services: Never    Active Member of Clubs or Organizations: No    Attends Club or Organization Meetings: Never    Marital Status: Never    Housing Stability: Unknown    Unable to Pay for Housing in the Last Year: No    Unstable Housing in the Last Year: No     Family History   Problem Relation Age of Onset    Hypertension Father     Diabetes Father     Hypertension Maternal Grandmother     Diabetes Maternal Grandmother        Review of Systems    Unable to perform ROS: Other     Objective:     Resp 20     Physical Exam  Constitutional:       Appearance: Normal appearance. He is obese.   Assessment:     1. 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      I am having Jean Pierre Paulson maintain his blood sugar diagnostic, FeroSuL, gabapentin, hydrALAZINE, hydroCHLOROthiazide, losartan, OZEMPIC, amLODIPine, dextrose 5 % in water (D5W) 5 % PgBk 100 mL with piperacillin-tazobactam 4.5 gram SolR 4.5 g, HYDROcodone-acetaminophen, HYDROcodone-acetaminophen, sodium chloride 0.9%, DAPTOmycin, piperacillin-tazobactam, and diphenhydrAMINE.    Jean Pierre was seen today for hyperbaric oxygen therapy.    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

## 2023-05-09 NOTE — PATIENT INSTRUCTIONS
Clean with vashe or baby shampoo and water    Apply aquacel ag to wound.pack the heel with aquacel ag then cover with dry drawtex,ABD,wrap with kerlix,secure with paper tape. Change daily and as needed    Monitor closely for s/s of infection including fever, chills, increase in pain, odor from wound, and increased redness from foot. Go to ER if any complications develop.   Keep leg elevated and avoid pressure on wound.   Diabetes:  Monitor glucose closely. Check fasting glucose and 2 hours after meals. HgA1C goal <7, fasting glucose , and 2 hours after meals <180  Hypertension:  Check blood pressure twice daily, goal <120/80  Diet:   Increase protein intake, avoid fried, fatty foods and foods high in simple carbs.   Vitamins:  Take vitamin C 1000 mg, zinc 50mg, vitamin d 5000 units, and a daily multivitamin. Angel is a good source of protein and nutrients to aid in wound healing.    Wear walking boot daily or use crutches to offload right foot

## 2023-05-10 NOTE — PROGRESS NOTES
Subjective:      Patient ID: Jean Pierre Paulson is a 37 y.o. male.    Chief Complaint: Hyperbaric Oxygen Therapy (R Dfu Grade 3)    Jean Pierre Paulson a 37 y.o. male presents for follow up on all regular problems which are reviewed and discussed.     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, with necrosis of bone    -  Primary            Past Medical History:  Past Medical History:   Diagnosis Date    NOLAN (acute kidney injury) 04/07/2023    Anemia     Diabetes mellitus with neuropathy     HLD (hyperlipidemia) 04/07/2023    HTN (hypertension)     Obesity     TEDDY (obstructive sleep apnea) 04/07/2023    Osteomyelitis 10/2020    Hx of R foot, Tx with IV Abx    Peripheral vascular disease      Past Surgical History:   Procedure Laterality Date    APPLICATION OF SPLIT-THICKNESS SKIN GRAFT (STSG) TO LOWER EXTREMITY Right 03/02/2022    Procedure: APPLICATION, GRAFT, SKIN, SPLIT-THICKNESS, TO LOWER EXTREMITY;  Surgeon: Greg Ramírez MD;  Location: [facility];  Service: General;  Laterality: Right;    CHOLECYSTECTOMY      DEBRIDEMENT OF FOOT Right 10/2020    right great toe amputation       Review of patient's allergies indicates:   Allergen Reactions    Tomato Itching     Current Outpatient Medications on File Prior to Visit   Medication Sig Dispense Refill    0.9 % sodium chloride (SODIUM CHLORIDE 0.9%) solution Inject 500 mLs into the vein once daily.      amLODIPine (NORVASC) 2.5 MG tablet Take 1 tablet (2.5 mg total) by mouth once daily. 30 tablet 11    blood sugar diagnostic (BLOOD GLUCOSE TEST) Strp 1 strip by Misc.(Non-Drug; Combo Route) route once daily. accu-chek Veronika strips 90 strip 5    DAPTOmycin (CUBICIN) 500 mg injection Inject 1 each into the vein once daily.      dextrose 5 % in water (D5W) 5 % PgBk 100 mL with piperacillin-tazobactam 4.5  gram SolR 4.5 g Inject 4.5 g into the vein every 8 (eight) hours.      diphenhydrAMINE (BENADRYL) 50 mg/mL injection Inject 1 mL into the vein as needed.      FEROSUL 325 mg (65 mg iron) Tab tablet Take 1 tablet (325 mg total) by mouth once daily. 90 tablet 1    gabapentin (NEURONTIN) 300 MG capsule Take 1 capsule (300 mg total) by mouth every 8 (eight) hours. 270 capsule 1    hydrALAZINE (APRESOLINE) 25 MG tablet Take 1 tablet (25 mg total) by mouth every 12 (twelve) hours. 180 tablet 1    hydroCHLOROthiazide (HYDRODIURIL) 25 MG tablet Take 1 tablet (25 mg total) by mouth once daily. 90 tablet 1    HYDROcodone-acetaminophen (NORCO)  mg per tablet Take 1 tablet by mouth every 12 (twelve) hours as needed for Pain. 60 tablet 0    [START ON 6/5/2023] HYDROcodone-acetaminophen (NORCO)  mg per tablet Take 1 tablet by mouth every 12 (twelve) hours as needed for Pain. 60 tablet 0    losartan (COZAAR) 50 MG tablet Take 1 tablet (50 mg total) by mouth once daily. 90 tablet 1    piperacillin sodium/tazobactam (PIPERACILLIN-TAZOBACTAM) 13.5 gram SolR Inject 1 each into the vein once daily.      semaglutide (OZEMPIC) 2 mg/dose (8 mg/3 mL) PnIj Inject 2 mg into the skin every 7 days. 8 mL 2    [DISCONTINUED] blood-glucose meter kit 1 each by Other route 3 (three) times daily. 1 each 0    [DISCONTINUED] insulin asp prt-insulin aspart, NovoLOG 70/30, (NOVOLOG 70/30) 100 unit/mL (70-30) Soln Inject 15 Units into the skin once daily. 450 Units 0    [DISCONTINUED] insulin detemir U-100 (LEVEMIR) 100 unit/mL injection Inject 40 Units into the skin every evening. 12 mL 0    [DISCONTINUED] sodium hypochlorite 0.5 % (DAKIN'S SOLUTION) external solution Apply topically once daily. 473 mL 5     No current facility-administered medications on file prior to visit.     Social History     Socioeconomic History    Marital status: Single    Number of children: 1   Tobacco Use    Smoking status: Every Day      Packs/day: 0.25     Years: 25.00     Pack years: 6.25     Types: Cigarettes     Start date:      Last attempt to quit: 2022     Years since quittin.2    Smokeless tobacco: Never   Substance and Sexual Activity    Alcohol use: Yes     Alcohol/week: 2.0 standard drinks     Types: 1 Glasses of wine, 1 Cans of beer per week     Comment: occasionally    Drug use: Never    Sexual activity: Yes     Partners: Female     Social Determinants of Health     Financial Resource Strain: Low Risk     Difficulty of Paying Living Expenses: Not hard at all   Food Insecurity: No Food Insecurity    Worried About Running Out of Food in the Last Year: Never true    Ran Out of Food in the Last Year: Never true   Transportation Needs: Unmet Transportation Needs    Lack of Transportation (Medical): No    Lack of Transportation (Non-Medical): Yes   Physical Activity: Inactive    Days of Exercise per Week: 0 days    Minutes of Exercise per Session: 0 min   Stress: No Stress Concern Present    Feeling of Stress : Not at all   Social Connections: Socially Isolated    Frequency of Communication with Friends and Family: More than three times a week    Frequency of Social Gatherings with Friends and Family: More than three times a week    Attends Amish Services: Never    Active Member of Clubs or Organizations: No    Attends Club or Organization Meetings: Never    Marital Status: Never    Housing Stability: Unknown    Unable to Pay for Housing in the Last Year: No    Unstable Housing in the Last Year: No     Family History   Problem Relation Age of Onset    Hypertension Father     Diabetes Father     Hypertension Maternal Grandmother     Diabetes Maternal Grandmother        Review of Systems   Unable to perform ROS: Other     Objective:     BP (!) 142/94   Pulse 94   Temp 97.6 °F (36.4 °C)   Resp 20     Physical Exam  Constitutional:       Appearance: Normal appearance. He is obese.   Assessment:      1. 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      I am having Jean Pierre Paulson maintain his blood sugar diagnostic, FeroSuL, gabapentin, hydrALAZINE, hydroCHLOROthiazide, losartan, OZEMPIC, amLODIPine, dextrose 5 % in water (D5W) 5 % PgBk 100 mL with piperacillin-tazobactam 4.5 gram SolR 4.5 g, HYDROcodone-acetaminophen, HYDROcodone-acetaminophen, sodium chloride 0.9%, DAPTOmycin, piperacillin-tazobactam, and diphenhydrAMINE.    Jean Pierre was seen today for hyperbaric oxygen therapy.    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

## 2023-05-12 NOTE — PROGRESS NOTES
Subjective:      Patient ID: Jean Pierre Paulson is a 37 y.o. male.    Chief Complaint: Hyperbaric Oxygen Therapy (R DFU Grade 3)    Jean Pierre Paulson a 37 y.o. male presents for follow up on all regular problems which are reviewed and discussed.     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, with necrosis of bone    -  Primary            Past Medical History:  Past Medical History:   Diagnosis Date    NOLAN (acute kidney injury) 04/07/2023    Anemia     Diabetes mellitus with neuropathy     HLD (hyperlipidemia) 04/07/2023    HTN (hypertension)     Obesity     TEDDY (obstructive sleep apnea) 04/07/2023    Osteomyelitis 10/2020    Hx of R foot, Tx with IV Abx    Peripheral vascular disease      Past Surgical History:   Procedure Laterality Date    APPLICATION OF SPLIT-THICKNESS SKIN GRAFT (STSG) TO LOWER EXTREMITY Right 03/02/2022    Procedure: APPLICATION, GRAFT, SKIN, SPLIT-THICKNESS, TO LOWER EXTREMITY;  Surgeon: Greg Ramírez MD;  Location: [facility];  Service: General;  Laterality: Right;    CHOLECYSTECTOMY      DEBRIDEMENT OF FOOT Right 10/2020    right great toe amputation       Review of patient's allergies indicates:   Allergen Reactions    Tomato Itching     Current Outpatient Medications on File Prior to Visit   Medication Sig Dispense Refill    0.9 % sodium chloride (SODIUM CHLORIDE 0.9%) solution Inject 500 mLs into the vein once daily.      amLODIPine (NORVASC) 2.5 MG tablet Take 1 tablet (2.5 mg total) by mouth once daily. 30 tablet 11    blood sugar diagnostic (BLOOD GLUCOSE TEST) Strp 1 strip by Misc.(Non-Drug; Combo Route) route once daily. accu-chek Veronika strips 90 strip 5    DAPTOmycin (CUBICIN) 500 mg injection Inject 1 each into the vein once daily.      dextrose 5 % in water (D5W) 5 % PgBk 100 mL with piperacillin-tazobactam 4.5  gram SolR 4.5 g Inject 4.5 g into the vein every 8 (eight) hours.      diphenhydrAMINE (BENADRYL) 50 mg/mL injection Inject 1 mL into the vein as needed.      FEROSUL 325 mg (65 mg iron) Tab tablet Take 1 tablet (325 mg total) by mouth once daily. 90 tablet 1    gabapentin (NEURONTIN) 300 MG capsule Take 1 capsule (300 mg total) by mouth every 8 (eight) hours. 270 capsule 1    hydrALAZINE (APRESOLINE) 25 MG tablet Take 1 tablet (25 mg total) by mouth every 12 (twelve) hours. 180 tablet 1    hydroCHLOROthiazide (HYDRODIURIL) 25 MG tablet Take 1 tablet (25 mg total) by mouth once daily. 90 tablet 1    HYDROcodone-acetaminophen (NORCO)  mg per tablet Take 1 tablet by mouth every 12 (twelve) hours as needed for Pain. 60 tablet 0    [START ON 6/5/2023] HYDROcodone-acetaminophen (NORCO)  mg per tablet Take 1 tablet by mouth every 12 (twelve) hours as needed for Pain. 60 tablet 0    losartan (COZAAR) 50 MG tablet Take 1 tablet (50 mg total) by mouth once daily. 90 tablet 1    piperacillin sodium/tazobactam (PIPERACILLIN-TAZOBACTAM) 13.5 gram SolR Inject 1 each into the vein once daily.      semaglutide (OZEMPIC) 2 mg/dose (8 mg/3 mL) PnIj Inject 2 mg into the skin every 7 days. 8 mL 2    [DISCONTINUED] blood-glucose meter kit 1 each by Other route 3 (three) times daily. 1 each 0    [DISCONTINUED] insulin asp prt-insulin aspart, NovoLOG 70/30, (NOVOLOG 70/30) 100 unit/mL (70-30) Soln Inject 15 Units into the skin once daily. 450 Units 0    [DISCONTINUED] insulin detemir U-100 (LEVEMIR) 100 unit/mL injection Inject 40 Units into the skin every evening. 12 mL 0    [DISCONTINUED] sodium hypochlorite 0.5 % (DAKIN'S SOLUTION) external solution Apply topically once daily. 473 mL 5     No current facility-administered medications on file prior to visit.     Social History     Socioeconomic History    Marital status: Single    Number of children: 1   Tobacco Use    Smoking status: Every Day      Packs/day: 0.25     Years: 25.00     Pack years: 6.25     Types: Cigarettes     Start date:      Last attempt to quit: 2022     Years since quittin.2    Smokeless tobacco: Never   Substance and Sexual Activity    Alcohol use: Yes     Alcohol/week: 2.0 standard drinks     Types: 1 Glasses of wine, 1 Cans of beer per week     Comment: occasionally    Drug use: Never    Sexual activity: Yes     Partners: Female     Social Determinants of Health     Financial Resource Strain: Low Risk     Difficulty of Paying Living Expenses: Not hard at all   Food Insecurity: No Food Insecurity    Worried About Running Out of Food in the Last Year: Never true    Ran Out of Food in the Last Year: Never true   Transportation Needs: Unmet Transportation Needs    Lack of Transportation (Medical): No    Lack of Transportation (Non-Medical): Yes   Physical Activity: Inactive    Days of Exercise per Week: 0 days    Minutes of Exercise per Session: 0 min   Stress: No Stress Concern Present    Feeling of Stress : Not at all   Social Connections: Socially Isolated    Frequency of Communication with Friends and Family: More than three times a week    Frequency of Social Gatherings with Friends and Family: More than three times a week    Attends Quaker Services: Never    Active Member of Clubs or Organizations: No    Attends Club or Organization Meetings: Never    Marital Status: Never    Housing Stability: Unknown    Unable to Pay for Housing in the Last Year: No    Unstable Housing in the Last Year: No     Family History   Problem Relation Age of Onset    Hypertension Father     Diabetes Father     Hypertension Maternal Grandmother     Diabetes Maternal Grandmother        Review of Systems   Unable to perform ROS: Other     Objective:     BP (!) 142/90   Pulse 90   Temp 97.4 °F (36.3 °C)   Resp 20     Physical Exam  Constitutional:       Appearance: Normal appearance. He is obese.   Assessment:      1. 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      I am having Jean Pierre Paulson maintain his blood sugar diagnostic, FeroSuL, gabapentin, hydrALAZINE, hydroCHLOROthiazide, losartan, OZEMPIC, amLODIPine, dextrose 5 % in water (D5W) 5 % PgBk 100 mL with piperacillin-tazobactam 4.5 gram SolR 4.5 g, HYDROcodone-acetaminophen, HYDROcodone-acetaminophen, sodium chloride 0.9%, DAPTOmycin, piperacillin-tazobactam, and diphenhydrAMINE.    Jean Pierre was seen today for hyperbaric oxygen therapy.    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

## 2023-05-23 ENCOUNTER — EXTERNAL HOME HEALTH (OUTPATIENT)
Dept: HOME HEALTH SERVICES | Facility: HOSPITAL | Age: 38
End: 2023-05-23
Payer: MEDICARE

## 2023-05-30 ENCOUNTER — OFFICE VISIT (OUTPATIENT)
Dept: WOUND CARE | Facility: CLINIC | Age: 38
End: 2023-05-30
Attending: SURGERY
Payer: MEDICARE

## 2023-05-30 DIAGNOSIS — L97.414 DIABETIC ULCER OF RIGHT MIDFOOT ASSOCIATED WITH DIABETES MELLITUS DUE TO UNDERLYING CONDITION, WITH NECROSIS OF BONE: Primary | ICD-10-CM

## 2023-05-30 DIAGNOSIS — E11.621 DIABETIC ULCER OF RIGHT HEEL ASSOCIATED WITH TYPE 2 DIABETES MELLITUS, WITH FAT LAYER EXPOSED: ICD-10-CM

## 2023-05-30 DIAGNOSIS — I73.9 PVD (PERIPHERAL VASCULAR DISEASE): ICD-10-CM

## 2023-05-30 DIAGNOSIS — E08.621 DIABETIC ULCER OF RIGHT MIDFOOT ASSOCIATED WITH DIABETES MELLITUS DUE TO UNDERLYING CONDITION, WITH NECROSIS OF BONE: Primary | ICD-10-CM

## 2023-05-30 DIAGNOSIS — L97.412 DIABETIC ULCER OF RIGHT HEEL ASSOCIATED WITH TYPE 2 DIABETES MELLITUS, WITH FAT LAYER EXPOSED: ICD-10-CM

## 2023-05-30 DIAGNOSIS — M86.60 CHRONIC OSTEOMYELITIS: ICD-10-CM

## 2023-05-30 PROCEDURE — 99214 OFFICE O/P EST MOD 30 MIN: CPT | Mod: PBBFAC | Performed by: NURSE PRACTITIONER

## 2023-05-30 PROCEDURE — 99213 PR OFFICE/OUTPT VISIT, EST, LEVL III, 20-29 MIN: ICD-10-PCS | Mod: S$PBB,,, | Performed by: NURSE PRACTITIONER

## 2023-05-30 PROCEDURE — 99213 OFFICE O/P EST LOW 20 MIN: CPT | Mod: S$PBB,,, | Performed by: NURSE PRACTITIONER

## 2023-05-30 NOTE — PATIENT INSTRUCTIONS
Clean with acetic acid or vashe     Apply aquacel ag to wound.pack the heel with aquacel ag then cover with dry drawtex,ABD,wrap with kerlix,secure with paper tape. Change daily and as needed     Monitor closely for s/s of infection including fever, chills, increase in pain, odor from wound, and increased redness from foot. Go to ER if any complications develop.   Keep leg elevated and avoid pressure on wound.   Diabetes:  Monitor glucose closely. Check fasting glucose and 2 hours after meals. HgA1C goal <7, fasting glucose , and 2 hours after meals <180  Hypertension:  Check blood pressure twice daily, goal <120/80  Diet:   Increase protein intake, avoid fried, fatty foods and foods high in simple carbs.   Vitamins:  Take vitamin C 1000 mg, zinc 50mg, vitamin d 5000 units, and a daily multivitamin. Angel is a good source of protein and nutrients to aid in wound healing.    Wear walking boot daily or use crutches to offload right foot

## 2023-05-30 NOTE — PROGRESS NOTES
AUGUSTUS Macedo   [facility]  [address] [facility] [place] [address]  [phone]      PATIENT NAME: Jean Pierre Paulson  : 1985  DATE: 23  MRN: [id number]      Billing Provider: AUGUSTUS Macedo  Level of Service:   Patient PCP Information       Provider PCP Type    Mojgan Lomeli NP General            Reason for Visit / Chief Complaint: Non-pressure chronic ulcer of other part of right foot with       History of Present Illness / Problem Focused Workflow     Jean Pierre Paulson is a 37 y.o. male who presents to clinic for follow up on chronic-non healing wound on the right foot. Wound is healing slowly. Continue current plan of care.   Reinforced importance of following plan of care, wearing off-loading boot, keeping pressure off foot, taking antibiotics as prescribed, and increasing protein intake.     Last arterial doppler, The ankle to brachial index is 1.25 on the right and 1.34 on the left.    Significant PMH  Includes diabetes and hypertension. Last HgA1C 7.3 in 2022, diabetes is managed by PCP. He is due for arterial dopplers, last doppler in 2020. Denies fever or chills.       Review of Systems     Review of Systems   Constitutional:  Negative for activity change, chills and fever.   Respiratory:  Negative for chest tightness and shortness of breath.    Cardiovascular:  Positive for leg swelling. Negative for chest pain and palpitations.   Musculoskeletal:  Positive for arthralgias and joint swelling.   Skin:  Positive for wound.        See wound assessment   Neurological:  Positive for weakness and numbness.   Psychiatric/Behavioral:  Negative for agitation, behavioral problems, confusion and self-injury.      Medical / Social / Family History     Past Medical History:   Diagnosis Date    NOLAN (acute kidney injury) 2023    Anemia     Diabetes mellitus with neuropathy     HLD (hyperlipidemia) 2023    HTN  (hypertension)     Obesity     TEDDY (obstructive sleep apnea) 04/07/2023    Osteomyelitis 10/2020    Hx of R foot, Tx with IV Abx    Peripheral vascular disease        Past Surgical History:   Procedure Laterality Date    APPLICATION OF SPLIT-THICKNESS SKIN GRAFT (STSG) TO LOWER EXTREMITY Right 03/02/2022    Procedure: APPLICATION, GRAFT, SKIN, SPLIT-THICKNESS, TO LOWER EXTREMITY;  Surgeon: Greg Ramírez MD;  Location: [facility] [place];  Service: General;  Laterality: Right;    CHOLECYSTECTOMY      DEBRIDEMENT OF FOOT Right 10/2020    right great toe amputation         Social History  Mr. Jean Pierre Paulson  reports that he has been smoking cigarettes. He started smoking about 23 years ago. He has a 6.25 pack-year smoking history. He has never used smokeless tobacco. He reports current alcohol use of about 2.0 standard drinks per week. He reports that he does not use drugs.    Family History  Mr.'s Jean Pierre Paulson family history includes Diabetes in his father and maternal grandmother; Hypertension in his father and maternal grandmother.    Medications and Allergies     Medications  Outpatient Medications Marked as Taking for the 5/30/23 encounter (Office Visit) with AUGUSTUS Macedo   Medication Sig Dispense Refill    amLODIPine (NORVASC) 2.5 MG tablet Take 1 tablet (2.5 mg total) by mouth once daily. 30 tablet 11    blood sugar diagnostic (BLOOD GLUCOSE TEST) Strp 1 strip by Misc.(Non-Drug; Combo Route) route once daily. accu-chek Veronika strips 90 strip 5    FEROSUL 325 mg (65 mg iron) Tab tablet Take 1 tablet (325 mg total) by mouth once daily. 90 tablet 1    gabapentin (NEURONTIN) 300 MG capsule Take 1 capsule (300 mg total) by mouth every 8 (eight) hours. 270 capsule 1    hydroCHLOROthiazide (HYDRODIURIL) 25 MG tablet Take 1 tablet (25 mg total) by mouth once daily. 90 tablet 1    HYDROcodone-acetaminophen (NORCO)  mg per tablet Take 1 tablet by mouth every 12 (twelve) hours as needed for Pain. 60  tablet 0    [START ON 6/5/2023] HYDROcodone-acetaminophen (NORCO)  mg per tablet Take 1 tablet by mouth every 12 (twelve) hours as needed for Pain. 60 tablet 0    semaglutide (OZEMPIC) 2 mg/dose (8 mg/3 mL) PnIj Inject 2 mg into the skin every 7 days. 8 mL 2       Allergies  Review of patient's allergies indicates:   Allergen Reactions    Tomato Itching       Physical Examination   There were no vitals filed for this visit.  Physical Exam  Vitals and nursing note reviewed.   Constitutional:       Appearance: Normal appearance.   HENT:      Head: Normocephalic.   Cardiovascular:      Rate and Rhythm: Normal rate and regular rhythm.      Pulses: Normal pulses.      Heart sounds: Normal heart sounds.   Pulmonary:      Effort: Pulmonary effort is normal. No respiratory distress.   Chest:      Chest wall: No tenderness.   Musculoskeletal:         General: Swelling, tenderness and deformity present.      Right lower leg: Edema present.   Skin:     General: Skin is warm and dry.      Capillary Refill: Capillary refill takes 2 to 3 seconds.      Comments: See LDA for photos and measurements   Neurological:      General: No focal deficit present.      Mental Status: He is alert and oriented to person, place, and time. Mental status is at baseline.   Psychiatric:         Mood and Affect: Mood normal.         Behavior: Behavior normal.         Thought Content: Thought content normal.         Judgment: Judgment normal.       Assessment and Plan             Altered Skin Integrity 04/10/23 1200 Right plantar Foot Diabetic Ulcer Full thickness tissue loss with exposed bone, tendon, or muscle. Often includes undermining and tunneling. May extend into muscle and/or supporting structures. (Active)   04/10/23 1200   Altered Skin Integrity Present on Admission - Did Patient arrive to the hospital with altered skin?: yes   Side: Right   Orientation: plantar   Location: Foot   Wound Number:    Is this injury device related?:     Primary Wound Type: Diabetic ulc   Description of Altered Skin Integrity: Full thickness tissue loss with exposed bone, tendon, or muscle. Often includes undermining and tunneling. May extend into muscle and/or supporting structures.   Ankle-Brachial Index:    Pulses:    Removal Indication and Assessment:    Wound Outcome:    (Retired) Wound Length (cm):    (Retired) Wound Width (cm):    (Retired) Depth (cm):    Wound Description (Comments):    Removal Indications:    Wound Image    05/30/23 1155   Dressing Appearance Dry;Intact;Clean 05/30/23 1155   Drainage Amount Moderate 05/30/23 1155   Drainage Characteristics/Odor Yellow 05/30/23 1155   Appearance Intact;Pink;Red;Moist;Granulating;Epithelialization;Adipose 05/30/23 1155   Black (%), Wound Tissue Color 0 % 05/30/23 1155   Red (%), Wound Tissue Color 100 % 05/30/23 1155   Yellow (%), Wound Tissue Color 0 % 05/30/23 1155   Periwound Area Intact;Moist 05/30/23 1155   Wound Edges Callused 05/30/23 1155   Elliott Classification (diabetic foot ulcers only) Grade 3 05/30/23 1155   Wound Length (cm) 10.8 cm 05/30/23 1155   Wound Width (cm) 5.7 cm 05/30/23 1155   Wound Depth (cm) 0.3 cm 05/30/23 1155   Wound Volume (cm^3) 18.468 cm^3 05/30/23 1155   Wound Surface Area (cm^2) 61.56 cm^2 05/30/23 1155   Care Cleansed with:;Antimicrobial agent 05/30/23 1155   Dressing Applied;Gauze, wet to dry;Gauze;Foam;Rolled gauze 05/30/23 1155   Periwound Care Moisture barrier applied 05/30/23 1155     Problem List Items Addressed This Visit          ID    Chronic osteomyelitis       Endocrine    Diabetic ulcer of right heel associated with type 2 diabetes mellitus, with fat layer exposed    Overview                                  Current Assessment & Plan     Clean with acetic acid or vashe     Apply aquacel ag to wound.pack the heel with aquacel ag then cover with dry drawtex,ABD,wrap with kerlix,secure with paper tape. Change daily and as needed     Monitor closely for s/s of  infection including fever, chills, increase in pain, odor from wound, and increased redness from foot. Go to ER if any complications develop.   Keep leg elevated and avoid pressure on wound.   Diabetes:  Monitor glucose closely. Check fasting glucose and 2 hours after meals. HgA1C goal <7, fasting glucose , and 2 hours after meals <180  Hypertension:  Check blood pressure twice daily, goal <120/80  Diet:   Increase protein intake, avoid fried, fatty foods and foods high in simple carbs.   Vitamins:  Take vitamin C 1000 mg, zinc 50mg, vitamin d 5000 units, and a daily multivitamin. Angel is a good source of protein and nutrients to aid in wound healing.    Wear walking boot daily or use crutches to offload right foot          Other Visit Diagnoses       Diabetic ulcer of right midfoot associated with diabetes mellitus due to underlying condition, with necrosis of bone    -  Primary    PVD (peripheral vascular disease)                Future Appointments   Date Time Provider Department Center   6/14/2023 10:00 AM [facility]USTUS Maced[facility]   7/5/2023 10:30 AM Nadja Paulson MD [facility]            Signature:  AUGUSTUS Macedo  [facility]  [address] [facility] [place] [address]  [phone]    Date of encounter: 5/30/23

## 2023-06-08 ENCOUNTER — DOCUMENT SCAN (OUTPATIENT)
Dept: HOME HEALTH SERVICES | Facility: HOSPITAL | Age: 38
End: 2023-06-08
Payer: MEDICARE

## 2023-06-13 ENCOUNTER — OFFICE VISIT (OUTPATIENT)
Dept: WOUND CARE | Facility: CLINIC | Age: 38
End: 2023-06-13
Attending: FAMILY MEDICINE
Payer: MEDICARE

## 2023-06-13 VITALS
RESPIRATION RATE: 20 BRPM | SYSTOLIC BLOOD PRESSURE: 148 MMHG | DIASTOLIC BLOOD PRESSURE: 96 MMHG | HEART RATE: 87 BPM | TEMPERATURE: 98 F

## 2023-06-13 DIAGNOSIS — L97.412 DIABETIC ULCER OF RIGHT HEEL ASSOCIATED WITH TYPE 2 DIABETES MELLITUS, WITH FAT LAYER EXPOSED: ICD-10-CM

## 2023-06-13 DIAGNOSIS — I73.9 PVD (PERIPHERAL VASCULAR DISEASE): ICD-10-CM

## 2023-06-13 DIAGNOSIS — L97.414 DIABETIC ULCER OF RIGHT MIDFOOT ASSOCIATED WITH DIABETES MELLITUS DUE TO UNDERLYING CONDITION, WITH NECROSIS OF BONE: Primary | ICD-10-CM

## 2023-06-13 DIAGNOSIS — E11.621 DIABETIC ULCER OF RIGHT HEEL ASSOCIATED WITH TYPE 2 DIABETES MELLITUS, WITH FAT LAYER EXPOSED: ICD-10-CM

## 2023-06-13 DIAGNOSIS — E08.621 DIABETIC ULCER OF RIGHT MIDFOOT ASSOCIATED WITH DIABETES MELLITUS DUE TO UNDERLYING CONDITION, WITH NECROSIS OF BONE: Primary | ICD-10-CM

## 2023-06-13 PROCEDURE — 11042 DEBRIDEMENT: ICD-10-PCS | Mod: S$PBB,,, | Performed by: NURSE PRACTITIONER

## 2023-06-13 PROCEDURE — 99499 NO LOS: ICD-10-PCS | Mod: S$PBB,,, | Performed by: NURSE PRACTITIONER

## 2023-06-13 PROCEDURE — 11045 DBRDMT SUBQ TISS EACH ADDL: CPT | Mod: S$PBB,,, | Performed by: NURSE PRACTITIONER

## 2023-06-13 PROCEDURE — 11045 DEBRIDEMENT: ICD-10-PCS | Mod: S$PBB,,, | Performed by: NURSE PRACTITIONER

## 2023-06-13 PROCEDURE — 11042 DBRDMT SUBQ TIS 1ST 20SQCM/<: CPT | Mod: PBBFAC | Performed by: NURSE PRACTITIONER

## 2023-06-13 PROCEDURE — 99499 UNLISTED E&M SERVICE: CPT | Mod: S$PBB,,, | Performed by: NURSE PRACTITIONER

## 2023-06-13 PROCEDURE — 99215 OFFICE O/P EST HI 40 MIN: CPT | Mod: PBBFAC | Performed by: NURSE PRACTITIONER

## 2023-06-13 NOTE — PROGRESS NOTES
Debridement Performed for Assessment: Wound# right plantar foot  Performed By: Provider: Yelitza Alas NP  Assistant:Sisi Alberto RN    Debridement: Surgical    Photo taken post procedure:    Time-Out Taken: Yes  Level: Skin/Subcutaneous Tissue  Post Debridement Measurements  Length: (cm) 11.1  Width: (cm) 6.5  Depth: (cm) 0.4      Area: (cm²) 72.15  Percent Debrided: 100%  Total Area Debrided: (sq cm) 72.15    Tissue and other material debrided:  Adipose, Dermis, Epidermis, Subcutaneous  Devitalized Tissue Debrided:Biofilm, Slough, Eschar  Instrument: Curette  Bleeding: Moderate  Hemostasis Achieved: Pressure  Procedural Pain: Insensate  Post Procedural Pain: Insensate  Response to Treatment: Procedure was tolerated well    Devitalized materials/tissue Removed  the following was removed during debridement  subcutaneous      Post Debridement Diagnosis  Post debridement diagnosis  Same as Pre-operative debridement diagnosis, No Complications noted.      Grafts or implants applied  Was a graft or implant applied?  No      Procedure assistant  Procedure assisted by:  Assistant is the same as nurse listed above      Complications related to procedure  Did any complication occure during procedure?  No complications noted during or after procedure.      Specimen  Specimen collect during procedure?  No specimen collected      Anaesthesia:  Anesthesia used  None      Blood Loss:  Blood loss during procedure  less than 5 cc

## 2023-06-13 NOTE — PATIENT INSTRUCTIONS
Clean with acetic acid or vashe     Apply aquacel ag to wound.pack the heel with aquacel ag then cover with dry drawtex,ABD,wrap with kerlix,secure with paper tape. Wrap with ace wrap from toes to knee. Change daily and as needed     Monitor closely for s/s of infection including fever, chills, increase in pain, odor from wound, and increased redness from foot. Go to ER if any complications develop.   Keep leg elevated and avoid pressure on wound.   Diabetes:  Monitor glucose closely. Check fasting glucose and 2 hours after meals. HgA1C goal <7, fasting glucose , and 2 hours after meals <180  Hypertension:  Check blood pressure twice daily, goal <120/80  Diet:   Increase protein intake, avoid fried, fatty foods and foods high in simple carbs.   Vitamins:  Take vitamin C 1000 mg, zinc 50mg, vitamin d 5000 units, and a daily multivitamin. Angel is a good source of protein and nutrients to aid in wound healing.    Wear walking boot daily or use crutches to offload right foot

## 2023-06-13 NOTE — PROCEDURES
"Debridement    Date/Time: 6/13/2023 10:00 AM  Performed by: AUGUSTUS Macedo  Authorized by: AUGUSTUS Macedo     Time out: Immediately prior to procedure a "time out" was called to verify the correct patient, procedure, equipment, support staff and site/side marked as required.    Consent Done?:  Yes (Written)    Wound Details:    Location:  Right foot    Location:  Right Plantar    Type of Debridement:  Excisional       Length (cm):  11.1       Area (sq cm):  72.15       Width (cm):  6.5       Percent Debrided (%):  100       Depth (cm):  0.4       Total Area Debrided (sq cm):  72.15    Depth of debridement:  Subcutaneous tissue    Tissue debrided:  Adipose, Dermis, Epidermis and Subcutaneous    Devitalized tissue debrided:  Biofilm, Callus, Clots and Exudate    Instruments:  Curette  Bleeding:  Minimal  Hemostasis Achieved: Yes  Method Used:  Pressure  Patient tolerance:  Patient tolerated the procedure well with no immediate complications  1st Wound Pain Assessment: 0     Assistant CYNDY Alberto RN  "

## 2023-06-13 NOTE — PROGRESS NOTES
AUGUSTUS Macedo   [facility]  [address] [facility] [place] [address]  [phone]      PATIENT NAME: Jean Pierre Paulson  : 1985  DATE: 23  MRN: [id number]      Billing Provider: AUGUSTUS Macedo  Level of Service:   Patient PCP Information       Provider PCP Type    Mojgan Lomeli NP General            Reason for Visit / Chief Complaint: Diabetic ulcer of right midfoot associated with diabetes me       History of Present Illness / Problem Focused Workflow     Jean Pierre Paulson is a 38 y.o. male who presents to clinic for follow up on chronic-non healing wound on the right foot. Wound is healing slowly but continue to improve. Wound continue to have biofilm and callus filippo-wound, bedside debridement today. Discussed irrigating crevice in heel with [facility] and continue alternating drawtex and aquacel ag.     Reinforced importance of following plan of care, wearing off-loading boot, keeping pressure off foot, taking antibiotics as prescribed, and increasing protein intake.     Last arterial doppler, The ankle to brachial index is 1.25 on the right and 1.34 on the left.    Significant PMH  Includes diabetes and hypertension. Last HgA1C 7.3 in 2022, diabetes is managed by PCP.  Denies fever or chills.     Review of Systems     Review of Systems   Constitutional:  Negative for activity change, chills and fever.   Respiratory:  Negative for chest tightness and shortness of breath.    Cardiovascular:  Positive for leg swelling. Negative for chest pain and palpitations.   Musculoskeletal:  Positive for arthralgias and joint swelling.   Skin:  Positive for wound.        See wound assessment   Neurological:  Positive for weakness and numbness.   Psychiatric/Behavioral:  Negative for agitation, behavioral problems, confusion and self-injury.      Medical / Social / Family History     Past Medical History:   Diagnosis Date    NOLAN (acute kidney injury)  04/07/2023    Anemia     Diabetes mellitus with neuropathy     HLD (hyperlipidemia) 04/07/2023    HTN (hypertension)     Obesity     TEDDY (obstructive sleep apnea) 04/07/2023    Osteomyelitis 10/2020    Hx of R foot, Tx with IV Abx    Peripheral vascular disease        Past Surgical History:   Procedure Laterality Date    APPLICATION OF SPLIT-THICKNESS SKIN GRAFT (STSG) TO LOWER EXTREMITY Right 03/02/2022    Procedure: APPLICATION, GRAFT, SKIN, SPLIT-THICKNESS, TO LOWER EXTREMITY;  Surgeon: Greg Ramírez MD;  Location: [facility];  Service: General;  Laterality: Right;    CHOLECYSTECTOMY      DEBRIDEMENT OF FOOT Right 10/2020    right great toe amputation         Social History  Mr. Jean Pierre Paulson  reports that he has been smoking cigarettes. He started smoking about 23 years ago. He has a 6.25 pack-year smoking history. He has never used smokeless tobacco. He reports current alcohol use of about 2.0 standard drinks per week. He reports that he does not use drugs.    Family History  'anjana Paulson family history includes Diabetes in his father and maternal grandmother; Hypertension in his father and maternal grandmother.    Medications and Allergies     Medications  Outpatient Medications Marked as Taking for the 6/13/23 encounter (Office Visit) with AUGUSTUS Macedo   Medication Sig Dispense Refill    0.9 % sodium chloride (SODIUM CHLORIDE 0.9%) solution Inject 500 mLs into the vein once daily.      amLODIPine (NORVASC) 2.5 MG tablet Take 1 tablet (2.5 mg total) by mouth once daily. 30 tablet 11    blood sugar diagnostic (BLOOD GLUCOSE TEST) Strp 1 strip by Misc.(Non-Drug; Combo Route) route once daily. accu-chek Veronika strips 90 strip 5    diphenhydrAMINE (BENADRYL) 50 mg/mL injection Inject 1 mL into the vein as needed.      FEROSUL 325 mg (65 mg iron) Tab tablet Take 1 tablet (325 mg total) by mouth once daily. 90 tablet 1    gabapentin (NEURONTIN) 300 MG capsule Take 1 capsule (300  mg total) by mouth every 8 (eight) hours. 270 capsule 1    hydroCHLOROthiazide (HYDRODIURIL) 25 MG tablet Take 1 tablet (25 mg total) by mouth once daily. 90 tablet 1    HYDROcodone-acetaminophen (NORCO)  mg per tablet Take 1 tablet by mouth every 12 (twelve) hours as needed for Pain. 60 tablet 0    HYDROcodone-acetaminophen (NORCO)  mg per tablet Take 1 tablet by mouth every 12 (twelve) hours as needed for Pain. 60 tablet 0    semaglutide (OZEMPIC) 2 mg/dose (8 mg/3 mL) PnIj Inject 2 mg into the skin every 7 days. 8 mL 2       Allergies  Review of patient's allergies indicates:   Allergen Reactions    Tomato Itching       Physical Examination     Vitals:    06/13/23 1018   BP: (!) 148/96   Pulse: 87   Resp: 20   Temp: 97.7 °F (36.5 °C)     Physical Exam  Vitals and nursing note reviewed.   Constitutional:       Appearance: Normal appearance.   HENT:      Head: Normocephalic.   Cardiovascular:      Rate and Rhythm: Normal rate and regular rhythm.      Pulses: Normal pulses.      Heart sounds: Normal heart sounds.   Pulmonary:      Effort: Pulmonary effort is normal. No respiratory distress.   Chest:      Chest wall: No tenderness.   Musculoskeletal:         General: Swelling, tenderness and deformity present.      Right lower leg: Edema present.   Skin:     General: Skin is warm and dry.      Capillary Refill: Capillary refill takes 2 to 3 seconds.      Comments: See LDA for photos and measurements   Neurological:      General: No focal deficit present.      Mental Status: He is alert and oriented to person, place, and time. Mental status is at baseline.   Psychiatric:         Mood and Affect: Mood normal.         Behavior: Behavior normal.         Thought Content: Thought content normal.         Judgment: Judgment normal.     Assessment and Plan             Altered Skin Integrity 04/10/23 1200 Right plantar Foot Diabetic Ulcer Full thickness tissue loss with exposed bone, tendon, or muscle. Often  includes undermining and tunneling. May extend into muscle and/or supporting structures. (Active)   04/10/23 1200   Altered Skin Integrity Present on Admission - Did Patient arrive to the hospital with altered skin?: yes   Side: Right   Orientation: plantar   Location: Foot   Wound Number:    Is this injury device related?:    Primary Wound Type: Diabetic ulc   Description of Altered Skin Integrity: Full thickness tissue loss with exposed bone, tendon, or muscle. Often includes undermining and tunneling. May extend into muscle and/or supporting structures.   Ankle-Brachial Index:    Pulses:    Removal Indication and Assessment:    Wound Outcome:    (Retired) Wound Length (cm):    (Retired) Wound Width (cm):    (Retired) Depth (cm):    Wound Description (Comments):    Removal Indications:    Wound Image   06/13/23 1115   Description of Altered Skin Integrity Full thickness tissue loss with exposed bone, tendon, or muscle. Often includes undermining and tunneling. May extend into muscle and/or supporting structures. 06/13/23 1027   Dressing Appearance Dried drainage 06/13/23 1027   Drainage Amount Moderate 06/13/23 1027   Drainage Characteristics/Odor Serosanguineous 06/13/23 1027   Appearance Pink;Red;Moist 06/13/23 1027   Tissue loss description Full thickness 06/13/23 1027   Black (%), Wound Tissue Color 0 % 06/13/23 1027   Red (%), Wound Tissue Color 100 % 06/13/23 1027   Yellow (%), Wound Tissue Color 0 % 06/13/23 1027   Periwound Area Moist;Macerated 06/13/23 1027   Wound Edges Open 06/13/23 1027   Wound Length (cm) 11.1 cm 06/13/23 1115   Wound Width (cm) 6.5 cm 06/13/23 1115   Wound Depth (cm) 0.4 cm 06/13/23 1115   Wound Volume (cm^3) 28.86 cm^3 06/13/23 1115   Wound Surface Area (cm^2) 72.15 cm^2 06/13/23 1115   Care Cleansed with:;Soap and water;Applied:;Skin Barrier;Debrided 06/13/23 1027   Dressing Applied;Gauze, wet to dry;Gauze;Absorptive Pad;Rolled gauze 06/13/23 1027   Periwound Care Moisture barrier  applied 06/13/23 1027   Off Loading Off loading shoe 06/13/23 1027   Dressing Change Due 06/14/23 06/13/23 1027     Problem List Items Addressed This Visit          Endocrine    Diabetic ulcer of right heel associated with type 2 diabetes mellitus, with fat layer exposed    Overview                                      Current Assessment & Plan     Clean with acetic acid or vashe     Apply aquacel ag to wound.pack the heel with aquacel ag then cover with dry drawtex,ABD,wrap with kerlix,secure with paper tape. Wrap with ace wrap from toes to knee. Change daily and as needed     Monitor closely for s/s of infection including fever, chills, increase in pain, odor from wound, and increased redness from foot. Go to ER if any complications develop.   Keep leg elevated and avoid pressure on wound.   Diabetes:  Monitor glucose closely. Check fasting glucose and 2 hours after meals. HgA1C goal <7, fasting glucose , and 2 hours after meals <180  Hypertension:  Check blood pressure twice daily, goal <120/80  Diet:   Increase protein intake, avoid fried, fatty foods and foods high in simple carbs.   Vitamins:  Take vitamin C 1000 mg, zinc 50mg, vitamin d 5000 units, and a daily multivitamin. Angel is a good source of protein and nutrients to aid in wound healing.    Wear walking boot daily or use crutches to offload right foot          Other Visit Diagnoses       Diabetic ulcer of right midfoot associated with diabetes mellitus due to underlying condition, with necrosis of bone    -  Primary    PVD (peripheral vascular disease)                Future Appointments   Date Time Provider Department Center   7/3/2023 10:00 AM [facility] Maced[facility]   7/5/2023 10:30 AM Nadja Paulson MD [facility]            Signature:  AUGUSTUS Macedo  [facility]  [address]  [place] [address]  [phone]    Date of encounter: 6/13/23

## 2023-06-18 PROCEDURE — G0179 MD RECERTIFICATION HHA PT: HCPCS | Mod: ,,, | Performed by: NURSE PRACTITIONER

## 2023-06-18 PROCEDURE — G0179 PR HOME HEALTH MD RECERTIFICATION: ICD-10-PCS | Mod: ,,, | Performed by: NURSE PRACTITIONER

## 2023-06-27 ENCOUNTER — DOCUMENT SCAN (OUTPATIENT)
Dept: HOME HEALTH SERVICES | Facility: HOSPITAL | Age: 38
End: 2023-06-27
Payer: MEDICARE

## 2023-07-03 ENCOUNTER — OFFICE VISIT (OUTPATIENT)
Dept: WOUND CARE | Facility: CLINIC | Age: 38
End: 2023-07-03
Attending: FAMILY MEDICINE
Payer: MEDICARE

## 2023-07-03 ENCOUNTER — HOSPITAL ENCOUNTER (OUTPATIENT)
Dept: RADIOLOGY | Facility: HOSPITAL | Age: 38
Discharge: HOME | End: 2023-07-03
Attending: NURSE PRACTITIONER
Payer: MEDICARE

## 2023-07-03 VITALS
TEMPERATURE: 98 F | DIASTOLIC BLOOD PRESSURE: 83 MMHG | HEART RATE: 86 BPM | RESPIRATION RATE: 20 BRPM | SYSTOLIC BLOOD PRESSURE: 133 MMHG

## 2023-07-03 DIAGNOSIS — L97.414 DIABETIC ULCER OF RIGHT MIDFOOT ASSOCIATED WITH DIABETES MELLITUS DUE TO UNDERLYING CONDITION, WITH NECROSIS OF BONE: Primary | ICD-10-CM

## 2023-07-03 DIAGNOSIS — L97.518 NON-PRESSURE CHRONIC ULCER OF OTHER PART OF RIGHT FOOT WITH OTHER SPECIFIED SEVERITY: ICD-10-CM

## 2023-07-03 DIAGNOSIS — E08.621 DIABETIC ULCER OF RIGHT MIDFOOT ASSOCIATED WITH DIABETES MELLITUS DUE TO UNDERLYING CONDITION, WITH NECROSIS OF BONE: ICD-10-CM

## 2023-07-03 DIAGNOSIS — E08.621 DIABETIC ULCER OF RIGHT MIDFOOT ASSOCIATED WITH DIABETES MELLITUS DUE TO UNDERLYING CONDITION, WITH NECROSIS OF BONE: Primary | ICD-10-CM

## 2023-07-03 DIAGNOSIS — I73.9 PVD (PERIPHERAL VASCULAR DISEASE): ICD-10-CM

## 2023-07-03 DIAGNOSIS — L97.414 DIABETIC ULCER OF RIGHT MIDFOOT ASSOCIATED WITH DIABETES MELLITUS DUE TO UNDERLYING CONDITION, WITH NECROSIS OF BONE: ICD-10-CM

## 2023-07-03 PROCEDURE — 99215 OFFICE O/P EST HI 40 MIN: CPT | Mod: PBBFAC,25 | Performed by: NURSE PRACTITIONER

## 2023-07-03 PROCEDURE — 99499 UNLISTED E&M SERVICE: CPT | Mod: S$PBB,,, | Performed by: NURSE PRACTITIONER

## 2023-07-03 PROCEDURE — 73630 XR FOOT COMPLETE 3 VIEW RIGHT: ICD-10-PCS | Mod: 26,RT,, | Performed by: RADIOLOGY

## 2023-07-03 PROCEDURE — 73630 X-RAY EXAM OF FOOT: CPT | Mod: 26,RT,, | Performed by: RADIOLOGY

## 2023-07-03 PROCEDURE — 99499 NO LOS: ICD-10-PCS | Mod: S$PBB,,, | Performed by: NURSE PRACTITIONER

## 2023-07-03 PROCEDURE — 11045 DBRDMT SUBQ TISS EACH ADDL: CPT | Mod: S$PBB,,, | Performed by: NURSE PRACTITIONER

## 2023-07-03 PROCEDURE — 11042 DBRDMT SUBQ TIS 1ST 20SQCM/<: CPT | Mod: PBBFAC | Performed by: NURSE PRACTITIONER

## 2023-07-03 PROCEDURE — 11045 DEBRIDEMENT: ICD-10-PCS | Mod: S$PBB,,, | Performed by: NURSE PRACTITIONER

## 2023-07-03 PROCEDURE — 11042 DEBRIDEMENT: ICD-10-PCS | Mod: S$PBB,,, | Performed by: NURSE PRACTITIONER

## 2023-07-03 PROCEDURE — 73630 X-RAY EXAM OF FOOT: CPT | Mod: TC,RT

## 2023-07-03 RX ORDER — SULFAMETHOXAZOLE AND TRIMETHOPRIM 800; 160 MG/1; MG/1
1 TABLET ORAL 2 TIMES DAILY
COMMUNITY
Start: 2023-06-07 | End: 2023-08-14 | Stop reason: SDUPTHER

## 2023-07-03 NOTE — PROGRESS NOTES
AUGUSTUS Macedo   [facility]  [address] [facility] [place] [address]  [phone]      PATIENT NAME: Jean Pierre Paulson  : 1985  DATE: 7/3/23  MRN: [id number]      Billing Provider: AUGUSTUS Macedo  Level of Service:   Patient PCP Information       Provider PCP Type    Mojgan Lomeli NP General            Reason for Visit / Chief Complaint: Diabetic ulcer of right midfoot associated with diabetes me       History of Present Illness / Problem Focused Workflow     Jean Pierre Paulson is a 38 y.o. male who presents to clinic for follow up on chronic-non healing wound on the right foot. Wound is healing slowly.  Wound continue to have biofilm and callus filippo-wound, bedside debridement today. Area in center of wound fluctuant and concerning for abscess or hematoma, x-ray today. Discussed irrigating crevice in heel with [facility] and continue alternating drawtex and aquacel ag.     Reinforced importance of following plan of care, wearing off-loading boot, keeping pressure off foot, taking antibiotics as prescribed, and increasing protein intake.     Last arterial doppler, The ankle to brachial index is 1.25 on the right and 1.34 on the left.    Significant PMH  Includes diabetes and hypertension. Last HgA1C 7.3 in 2022, diabetes is managed by PCP.  Denies fever or chills.     Review of Systems     Review of Systems   Constitutional:  Negative for activity change, chills and fever.   Respiratory:  Negative for chest tightness and shortness of breath.    Cardiovascular:  Positive for leg swelling. Negative for chest pain and palpitations.   Musculoskeletal:  Positive for arthralgias and joint swelling.   Skin:  Positive for wound.        See wound assessment   Neurological:  Positive for weakness and numbness.   Psychiatric/Behavioral:  Negative for agitation, behavioral problems, confusion and self-injury.      Medical / Social / Family History     Past Medical  History:   Diagnosis Date    NOLAN (acute kidney injury) 04/07/2023    Anemia     Diabetes mellitus with neuropathy     HLD (hyperlipidemia) 04/07/2023    HTN (hypertension)     Obesity     TEDDY (obstructive sleep apnea) 04/07/2023    Osteomyelitis 10/2020    Hx of R foot, Tx with IV Abx    Peripheral vascular disease        Past Surgical History:   Procedure Laterality Date    APPLICATION OF SPLIT-THICKNESS SKIN GRAFT (STSG) TO LOWER EXTREMITY Right 03/02/2022    Procedure: APPLICATION, GRAFT, SKIN, SPLIT-THICKNESS, TO LOWER EXTREMITY;  Surgeon: Greg Ramírez MD;  Location: [facility];  Service: General;  Laterality: Right;    CHOLECYSTECTOMY      DEBRIDEMENT OF FOOT Right 10/2020    right great toe amputation         Social History  Mr. Jean Pierre Paulson  reports that he has been smoking cigarettes. He started smoking about 23 years ago. He has a 6.25 pack-year smoking history. He has never used smokeless tobacco. He reports current alcohol use of about 2.0 standard drinks per week. He reports that he does not use drugs.    Family History  .'s Jean Pierre Paulson family history includes Diabetes in his father and maternal grandmother; Hypertension in his father and maternal grandmother.    Medications and Allergies     Medications  Outpatient Medications Marked as Taking for the 7/3/23 encounter (Office Visit) with AUGUSTUS Macedo   Medication Sig Dispense Refill    0.9 % sodium chloride (SODIUM CHLORIDE 0.9%) solution Inject 500 mLs into the vein once daily.      amLODIPine (NORVASC) 2.5 MG tablet Take 1 tablet (2.5 mg total) by mouth once daily. 30 tablet 11    blood sugar diagnostic (BLOOD GLUCOSE TEST) Strp 1 strip by Misc.(Non-Drug; Combo Route) route once daily. accu-chek Veronika strips 90 strip 5    DAPTOmycin (CUBICIN) 500 mg injection Inject 1 each into the vein once daily.      diphenhydrAMINE (BENADRYL) 50 mg/mL injection Inject 1 mL into the vein as needed.      FEROSUL 325 mg (65 mg  iron) Tab tablet Take 1 tablet (325 mg total) by mouth once daily. 90 tablet 1    gabapentin (NEURONTIN) 300 MG capsule Take 1 capsule (300 mg total) by mouth every 8 (eight) hours. 270 capsule 1    HYDROcodone-acetaminophen (NORCO)  mg per tablet Take 1 tablet by mouth every 12 (twelve) hours as needed for Pain. 60 tablet 0    HYDROcodone-acetaminophen (NORCO)  mg per tablet Take 1 tablet by mouth every 12 (twelve) hours as needed for Pain. 60 tablet 0    piperacillin sodium/tazobactam (PIPERACILLIN-TAZOBACTAM) 13.5 gram SolR Inject 1 each into the vein once daily.      semaglutide (OZEMPIC) 2 mg/dose (8 mg/3 mL) PnIj Inject 2 mg into the skin every 7 days. 8 mL 2    sulfamethoxazole-trimethoprim 800-160mg (BACTRIM DS) 800-160 mg Tab Take 1 tablet by mouth 2 (two) times daily.         Allergies  Review of patient's allergies indicates:   Allergen Reactions    Tomato Itching       Physical Examination     Vitals:    07/03/23 1104   BP: 133/83   Pulse: 86   Resp: 20   Temp: 97.8 °F (36.6 °C)     Physical Exam  Vitals and nursing note reviewed.   Constitutional:       Appearance: Normal appearance.   HENT:      Head: Normocephalic.   Cardiovascular:      Rate and Rhythm: Normal rate and regular rhythm.      Pulses: Normal pulses.      Heart sounds: Normal heart sounds.   Pulmonary:      Effort: Pulmonary effort is normal. No respiratory distress.   Chest:      Chest wall: No tenderness.   Musculoskeletal:         General: Swelling, tenderness and deformity present.      Right lower leg: Edema present.   Skin:     General: Skin is warm and dry.      Capillary Refill: Capillary refill takes 2 to 3 seconds.      Comments: See LDA for photos and measurements   Neurological:      General: No focal deficit present.      Mental Status: He is alert and oriented to person, place, and time. Mental status is at baseline.   Psychiatric:         Mood and Affect: Mood normal.         Behavior: Behavior normal.          Thought Content: Thought content normal.         Judgment: Judgment normal.     Assessment and Plan             Altered Skin Integrity 04/10/23 1200 Right plantar Foot Diabetic Ulcer Full thickness tissue loss with exposed bone, tendon, or muscle. Often includes undermining and tunneling. May extend into muscle and/or supporting structures. (Active)   04/10/23 1200   Altered Skin Integrity Present on Admission - Did Patient arrive to the hospital with altered skin?: yes   Side: Right   Orientation: plantar   Location: Foot   Wound Number:    Is this injury device related?:    Primary Wound Type: Diabetic ulc   Description of Altered Skin Integrity: Full thickness tissue loss with exposed bone, tendon, or muscle. Often includes undermining and tunneling. May extend into muscle and/or supporting structures.   Ankle-Brachial Index:    Pulses:    Removal Indication and Assessment:    Wound Outcome:    (Retired) Wound Length (cm):    (Retired) Wound Width (cm):    (Retired) Depth (cm):    Wound Description (Comments):    Removal Indications:    Wound Image   07/03/23 1142   Description of Altered Skin Integrity Full thickness tissue loss. Subcutaneous fat may be visible but bone, tendon or muscle are not exposed 07/03/23 1110   Dressing Appearance Moist drainage 07/03/23 1110   Drainage Amount Moderate 07/03/23 1110   Drainage Characteristics/Odor Serosanguineous 07/03/23 1110   Appearance Pink;Red;Slough;Yellow 07/03/23 1110   Tissue loss description Full thickness 07/03/23 1110   Black (%), Wound Tissue Color 0 % 07/03/23 1110   Red (%), Wound Tissue Color 70 % 07/03/23 1110   Yellow (%), Wound Tissue Color 30 % 07/03/23 1110   Periwound Area Macerated;Moist;Pale white 07/03/23 1110   Wound Edges Callused;Open 07/03/23 1110   Wound Length (cm) 11 cm 07/03/23 1142   Wound Width (cm) 6.5 cm 07/03/23 1142   Wound Depth (cm) 0.4 cm 07/03/23 1142   Wound Volume (cm^3) 28.6 cm^3 07/03/23 1142   Wound Surface Area (cm^2)  71.5 cm^2 07/03/23 1142   Care Cleansed with:;Soap and water;Applied:;Moisturizing agent 07/03/23 1110   Dressing Applied;Gauze, wet to dry;Gauze;Rolled gauze 07/03/23 1110   Periwound Care Moisture barrier applied 07/03/23 1110   Off Loading Off loading shoe 07/03/23 1110   Dressing Change Due 07/04/23 07/03/23 1110     Problem List Items Addressed This Visit          Orthopedic    Non-pressure chronic ulcer of other part of right foot with other specified severity    Overview                                               Current Assessment & Plan     Clean with acetic acid or vashe     Apply aquacel ag to wound.pack the heel with aquacel ag then cover with dry drawtex,ABD,wrap with kerlix,secure with paper tape. Wrap with ace wrap from toes to knee. Change daily and as needed     Monitor closely for s/s of infection including fever, chills, increase in pain, odor from wound, and increased redness from foot. Go to ER if any complications develop.   Keep leg elevated and avoid pressure on wound.   Diabetes:  Monitor glucose closely. Check fasting glucose and 2 hours after meals. HgA1C goal <7, fasting glucose , and 2 hours after meals <180  Hypertension:  Check blood pressure twice daily, goal <120/80  Diet:   Increase protein intake, avoid fried, fatty foods and foods high in simple carbs.   Vitamins:  Take vitamin C 1000 mg, zinc 50mg, vitamin d 5000 units, and a daily multivitamin. Angel is a good source of protein and nutrients to aid in wound healing.    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

## 2023-07-03 NOTE — PROCEDURES
"Debridement    Date/Time: 7/3/2023 10:00 AM  Performed by: AUGUSTUS Macedo  Authorized by: AUGUSTUS Macedo     Time out: Immediately prior to procedure a "time out" was called to verify the correct patient, procedure, equipment, support staff and site/side marked as required.    Consent Done?:  Yes (Written)    Wound Details:    Location:  Right foot    Location:  Right Plantar    Type of Debridement:  Excisional       Length (cm):  11       Area (sq cm):  71.5       Width (cm):  6.5       Percent Debrided (%):  100       Depth (cm):  0.4       Total Area Debrided (sq cm):  71.5    Depth of debridement:  Subcutaneous tissue    Tissue debrided:  Adipose, Dermis, Epidermis and Subcutaneous    Devitalized tissue debrided:  Biofilm, Clots, Callus, Exudate and Fibrin    Instruments:  Curette  Bleeding:  Moderate  Hemostasis Achieved: Yes  Method Used:  Pressure  Patient tolerance:  Patient tolerated the procedure well with no immediate complications  1st Wound Pain Assessment: 0     Assistant CYNDY Alberto RN  "

## 2023-07-05 ENCOUNTER — TELEPHONE (OUTPATIENT)
Dept: WOUND CARE | Facility: CLINIC | Age: 38
End: 2023-07-05
Payer: MEDICARE

## 2023-07-05 ENCOUNTER — OFFICE VISIT (OUTPATIENT)
Dept: PAIN MEDICINE | Facility: CLINIC | Age: 38
End: 2023-07-05
Payer: MEDICARE

## 2023-07-05 VITALS
RESPIRATION RATE: 20 BRPM | BODY MASS INDEX: 40.43 KG/M2 | HEART RATE: 88 BPM | WEIGHT: 315 LBS | HEIGHT: 74 IN | DIASTOLIC BLOOD PRESSURE: 80 MMHG | SYSTOLIC BLOOD PRESSURE: 144 MMHG

## 2023-07-05 DIAGNOSIS — E11.49 DIABETIC NEUROPATHY WITH NEUROLOGIC COMPLICATION: Primary | ICD-10-CM

## 2023-07-05 DIAGNOSIS — L97.414 ULCER OF RIGHT HEEL, WITH NECROSIS OF BONE: ICD-10-CM

## 2023-07-05 DIAGNOSIS — E11.40 DIABETIC NEUROPATHY WITH NEUROLOGIC COMPLICATION: Primary | ICD-10-CM

## 2023-07-05 PROCEDURE — 99215 OFFICE O/P EST HI 40 MIN: CPT | Mod: PBBFAC | Performed by: PAIN MEDICINE

## 2023-07-05 PROCEDURE — 99213 PR OFFICE/OUTPT VISIT, EST, LEVL III, 20-29 MIN: ICD-10-PCS | Mod: S$PBB,,, | Performed by: PAIN MEDICINE

## 2023-07-05 PROCEDURE — 99213 OFFICE O/P EST LOW 20 MIN: CPT | Mod: S$PBB,,, | Performed by: PAIN MEDICINE

## 2023-07-05 RX ORDER — HYDROCODONE BITARTRATE AND ACETAMINOPHEN 10; 325 MG/1; MG/1
1 TABLET ORAL EVERY 12 HOURS PRN
Qty: 60 TABLET | Refills: 0 | Status: SHIPPED | OUTPATIENT
Start: 2023-07-07 | End: 2023-07-24 | Stop reason: ALTCHOICE

## 2023-07-05 RX ORDER — HYDROCODONE BITARTRATE AND ACETAMINOPHEN 10; 325 MG/1; MG/1
1 TABLET ORAL EVERY 12 HOURS PRN
Qty: 60 TABLET | Refills: 0 | Status: SHIPPED | OUTPATIENT
Start: 2023-08-06 | End: 2023-08-31 | Stop reason: SDUPTHER

## 2023-07-05 NOTE — PROGRESS NOTES
She Disclaimer: This note has been generated using voice-recognition software. There may be typographical errors that have been missed during proof-reading        Patient ID: Jean Pierre Paulson is a 38 y.o. male.      Chief Complaint: Foot Pain (rt)      38-year-old male returns today for re-evaluation and medication refill.  Patient suffers from osteomyelitis and diabetic foot ulceration.  He remains in wound care and is status post debridement 2 days ago.  He returns today for medication refill.  He was prescribed Ozempic and notes better control of his blood sugars and weight loss.  He denies any additional changes.                Pain Assessment  Pain Assessment: 0-10  Pain Score:   5  Pain Location: Foot  Pain Orientation: Right  Pain Descriptors: Constant, Aching, Dull  Pain Frequency: Continuous  Pain Onset: Awakened from sleep  Clinical Progression: Not changed  Aggravating Factors: Standing, Walking, Stairs  Pain Intervention(s): Medication (See eMAR), Rest (elevation of foot)      A's of Opioid Risk Assessment  Activity:Patient has difficulty performing  ADL.   Analgesia:Patients pain is  controlled by current medication.   Adverse Effects: Patient denies constipation or sedation.  Aberrant Behavior:  reviewed with no aberrant drug seeking/taking behavior.      Patient denies any suicidal or homicidal ideations    Physical Therapy/Home Exercise: yes      X-Ray Foot Complete 3 view Right  Narrative: EXAMINATION:  XR FOOT COMPLETE 3 VIEW RIGHT    CLINICAL HISTORY:  .  Diabetes mellitus due to underlying condition with foot ulcer    COMPARISON:  April 7, 2023 right foot x-ray available    TECHNIQUE:  AP, lateral, and oblique views right foot    FINDINGS:  No new areas of active bony erosion are seen.  No changes have occurred since the previous study to specifically suggest acute osteomyelitis.  There has been previous amputation of the 1st digit at the distal metatarsal level as before.  Sclerotic joint  changes with osteophyte formation are noted at the 1st through 5th tarsometatarsal joints as before.  There is chronic erosive change at the plantar aspect of the calcaneus posteriorly as before.  There is no acute fracture or dislocation.    There is continued soft tissue swelling about the foot.  There is no radiopaque foreign body within soft tissues.  There is soft tissue ulceration at the plantar aspect of the calcaneus.  Impression: No acute bony changes.  Chronic erosive changes plantar aspect of the calcaneus posteriorly    Chronic degenerative changes of the tarsometatarsal joint level as before.    Soft tissue swelling about the foot.  Calcaneal plantar soft tissue ulcer as before.    Electronically signed by: Geovany Berrios  Date:    07/03/2023  Time:    13:11      Review of Systems   Constitutional: Negative.    HENT: Negative.     Eyes: Negative.    Respiratory: Negative.     Cardiovascular: Negative.    Gastrointestinal: Negative.    Endocrine: Negative.    Genitourinary: Negative.    Musculoskeletal: Negative.  Positive for leg pain.   Integumentary:  Positive for wound (Plantar of right foot).   Allergic/Immunologic: Negative.    Neurological:  Positive for numbness.   Hematological: Negative.    Psychiatric/Behavioral: Negative.             Past Medical History:   Diagnosis Date    NOLAN (acute kidney injury) 04/07/2023    Anemia     Diabetes mellitus with neuropathy     HLD (hyperlipidemia) 04/07/2023    HTN (hypertension)     Obesity     TEDDY (obstructive sleep apnea) 04/07/2023    Osteomyelitis 10/2020    Hx of R foot, Tx with IV Abx    Peripheral vascular disease      Past Surgical History:   Procedure Laterality Date    APPLICATION OF SPLIT-THICKNESS SKIN GRAFT (STSG) TO LOWER EXTREMITY Right 03/02/2022    Procedure: APPLICATION, GRAFT, SKIN, SPLIT-THICKNESS, TO LOWER EXTREMITY;  Surgeon: Greg Ramírez MD;  Location[facility];  Service: General;  Laterality: Right;    CHOLECYSTECTOMY       DEBRIDEMENT OF FOOT Right 10/2020    right great toe amputation       Social History     Socioeconomic History    Marital status: Single    Number of children: 1   Tobacco Use    Smoking status: Every Day     Packs/day: 0.25     Years: 25.00     Pack years: 6.25     Types: Cigarettes     Start date:      Last attempt to quit: 2022     Years since quittin.4    Smokeless tobacco: Never   Substance and Sexual Activity    Alcohol use: Yes     Alcohol/week: 2.0 standard drinks     Types: 1 Glasses of wine, 1 Cans of beer per week     Comment: occasionally    Drug use: Never    Sexual activity: Yes     Partners: Female     Social Determinants of Health     Financial Resource Strain: Low Risk     Difficulty of Paying Living Expenses: Not hard at all   Food Insecurity: No Food Insecurity    Worried About Running Out of Food in the Last Year: Never true    Ran Out of Food in the Last Year: Never true   Transportation Needs: Unmet Transportation Needs    Lack of Transportation (Medical): No    Lack of Transportation (Non-Medical): Yes   Physical Activity: Inactive    Days of Exercise per Week: 0 days    Minutes of Exercise per Session: 0 min   Stress: No Stress Concern Present    Feeling of Stress : Not at all   Social Connections: Socially Isolated    Frequency of Communication with Friends and Family: More than three times a week    Frequency of Social Gatherings with Friends and Family: More than three times a week    Attends Jehovah's witness Services: Never    Active Member of Clubs or Organizations: No    Attends Club or Organization Meetings: Never    Marital Status: Never    Housing Stability: Unknown    Unable to Pay for Housing in the Last Year: No    Unstable Housing in the Last Year: No     Family History   Problem Relation Age of Onset    Hypertension Father     Diabetes Father     Hypertension Maternal Grandmother     Diabetes Maternal Grandmother      Review of patient's allergies indicates:    Allergen Reactions    Tomato Itching     has a current medication list which includes the following prescription(s): sodium chloride 0.9%, amlodipine, blood sugar diagnostic, daptomycin, dextrose 5 % in water (D5W) 5 % PgBk 100 mL with piperacillin-tazobactam 4.5 gram SolR 4.5 g, diphenhydramine, ferosul, gabapentin, hydrochlorothiazide, hydrocodone-acetaminophen, hydrocodone-acetaminophen, piperacillin-tazobactam, ozempic, sulfamethoxazole-trimethoprim 800-160mg, hydralazine, [START ON 7/7/2023] hydrocodone-acetaminophen, [START ON 8/6/2023] hydrocodone-acetaminophen, losartan, [DISCONTINUED] blood-glucose meter, [DISCONTINUED] insulin asp prt-insulin aspart (novolog 70/30), [DISCONTINUED] insulin detemir u-100, and [DISCONTINUED] dakin's solution.      Objective:  Vitals:    07/05/23 1111   BP: (!) 144/80   Pulse: 88   Resp: 20        Physical Exam  Vitals and nursing note reviewed.   Constitutional:       General: He is not in acute distress.     Appearance: Normal appearance. He is not ill-appearing, toxic-appearing or diaphoretic.   HENT:      Head: Normocephalic and atraumatic.      Nose: Nose normal.      Mouth/Throat:      Mouth: Mucous membranes are moist.   Eyes:      Extraocular Movements: Extraocular movements intact.      Pupils: Pupils are equal, round, and reactive to light.   Cardiovascular:      Rate and Rhythm: Normal rate and regular rhythm.      Heart sounds: Normal heart sounds.   Pulmonary:      Effort: Pulmonary effort is normal. No respiratory distress.      Breath sounds: Normal breath sounds. No stridor. No wheezing or rhonchi.   Abdominal:      General: Bowel sounds are normal.      Palpations: Abdomen is soft.   Musculoskeletal:         General: No swelling, tenderness or deformity.      Cervical back: Normal and normal range of motion. No spasms or tenderness. No pain with movement. Normal range of motion.      Thoracic back: Normal.      Lumbar back: No spasms, tenderness or bony  tenderness. Normal range of motion. Negative right straight leg raise test and negative left straight leg raise test. No scoliosis.      Right lower leg: No edema.      Left lower leg: No edema.      Right foot: Decreased range of motion.   Feet:      Right foot:      Skin integrity: Ulcer present.   Skin:     General: Skin is warm.   Neurological:      General: No focal deficit present.      Mental Status: He is alert and oriented to person, place, and time. Mental status is at baseline.      Cranial Nerves: No cranial nerve deficit.      Sensory: Sensation is intact. No sensory deficit.      Motor: No weakness.      Coordination: Coordination normal.      Gait: Gait (Right walking boot in place) normal.      Deep Tendon Reflexes: Reflexes are normal and symmetric.   Psychiatric:         Mood and Affect: Mood normal.         Behavior: Behavior normal.         Assessment:      1. Diabetic neuropathy with neurologic complication    2. Ulcer of right heel, with necrosis of bone          Plan:  1. reviewed  2.Addiction, Dependency, Tolerance, Opioid abuse-misuse, Death, Diversion Discussed. Overdose reversal drug Naloxone discussed.  3.Refill/Continue medications for pain control and function.        Requested Prescriptions     Signed Prescriptions Disp Refills    HYDROcodone-acetaminophen (NORCO)  mg per tablet 60 tablet 0     Sig: Take 1 tablet by mouth every 12 (twelve) hours as needed for Pain.    HYDROcodone-acetaminophen (NORCO)  mg per tablet 60 tablet 0     Sig: Take 1 tablet by mouth every 12 (twelve) hours as needed for Pain.     4.Patient defers nerve block injections, physical therapy or surgical consultation     5.Follow with VONNIE Miller in 2 months for re-evaluation and medication refill    6. Continue wound care as prescribed         report:  Reviewed and consistent with medication use as prescribed.      The total time spent for evaluation and management on 07/05/2023 including  reviewing separately obtained history, performing a medically appropriate exam and evaluation, documenting clinical information in the health record, independently interpreting results and communicating them to the patient/family/caregiver, and ordering medications/tests/procedures was between 15-29 minutes.    The above plan and management options were discussed at length with patient. Patient is in agreement with the above and verbalized understanding. It will be communicated with the referring physician via electronic record, fax, or mail.

## 2023-07-05 NOTE — TELEPHONE ENCOUNTER
Informed patient of below message. V/u. Encouraged patient to keep f/u appointment with [facility]. V/u    ----- Message from AUGUSTUS Macedo sent at 7/3/2023  2:29 PM CDT -----  Please let him know that x-ray does not show abscess of any concerning findings.

## 2023-07-06 ENCOUNTER — EXTERNAL HOME HEALTH (OUTPATIENT)
Dept: HOME HEALTH SERVICES | Facility: HOSPITAL | Age: 38
End: 2023-07-06
Payer: MEDICARE

## 2023-07-06 ENCOUNTER — DOCUMENT SCAN (OUTPATIENT)
Dept: HOME HEALTH SERVICES | Facility: HOSPITAL | Age: 38
End: 2023-07-06
Payer: MEDICARE

## 2023-07-07 NOTE — PROGRESS NOTES
AUGUSTUS Macedo   [facility]  [address] [facility] [place] [address]  [phone]      PATIENT NAME: Jean Pierre Paulson  : 1985  DATE: 7/10/23  MRN: [id number]      Billing Provider: AUGUSTUS Macedo  Level of Service:   Patient PCP Information       Provider PCP Type    Mojgan Lomeli NP General            Reason for Visit / Chief Complaint: Diabetic Foot Ulcer (R DFU )       History of Present Illness / Problem Focused Workflow     Jean Pierre Paulson is a 38 y.o. male who presents to clinic for follow up on chronic-non healing wound on the right foot. Wound has improved since last visit. Biofilm removed during wound cleansing. Continue current plan of care.     Reinforced importance of following plan of care, wearing off-loading boot, keeping pressure off foot, taking antibiotics as prescribed,increasing protein intake, and keeping diabetes well controlled. Patient requested prescription for Dexacom to help keep diabetes under control. Reports that he has numbness in fingertips from frequent finger sticks. Due to recurrent wound infections he requires a sliding scale insulin to keep diabetes controlled. He checks his blood glucose 4 times daily and uses insulin when needed. He is motivated to keep his diabetes controlled to enhance wound healing and would benefit from CGM. Prescription sent to [facility] supply.     Last arterial doppler, The ankle to brachial index is 1.25 on the right and 1.34 on the left.    Significant PMH  Includes diabetes and hypertension. Last HgA1C 7.3 in 2022, diabetes is managed by PCP.  Denies fever or chills.     Review of Systems     Review of Systems   Constitutional:  Negative for activity change, chills and fever.   Respiratory:  Negative for chest tightness and shortness of breath.    Cardiovascular:  Positive for leg swelling. Negative for chest pain and palpitations.   Musculoskeletal:  Positive for  arthralgias and joint swelling.   Skin:  Positive for wound.        See wound assessment   Neurological:  Positive for weakness and numbness.   Psychiatric/Behavioral:  Negative for agitation, behavioral problems, confusion and self-injury.      Medical / Social / Family History     Past Medical History:   Diagnosis Date    NOLAN (acute kidney injury) 04/07/2023    Anemia     Diabetes mellitus with neuropathy     HLD (hyperlipidemia) 04/07/2023    HTN (hypertension)     Obesity     TEDDY (obstructive sleep apnea) 04/07/2023    Osteomyelitis 10/2020    Hx of R foot, Tx with IV Abx    Peripheral vascular disease        Past Surgical History:   Procedure Laterality Date    APPLICATION OF SPLIT-THICKNESS SKIN GRAFT (STSG) TO LOWER EXTREMITY Right 03/02/2022    Procedure: APPLICATION, GRAFT, SKIN, SPLIT-THICKNESS, TO LOWER EXTREMITY;  Surgeon: Greg Ramírez MD;  Location: [facility] [place];  Service: General;  Laterality: Right;    CHOLECYSTECTOMY      DEBRIDEMENT OF FOOT Right 10/2020    right great toe amputation         Social History  Mr. Jean Pierre Paulson  reports that he has been smoking cigarettes. He started smoking about 23 years ago. He has a 6.25 pack-year smoking history. He has never used smokeless tobacco. He reports current alcohol use of about 2.0 standard drinks per week. He reports that he does not use drugs.    Family History  's Jean Pierre Paulson family history includes Diabetes in his father and maternal grandmother; Hypertension in his father and maternal grandmother.    Medications and Allergies     Medications  Outpatient Medications Marked as Taking for the 7/10/23 encounter (Office Visit) with AUGUSTUS Macedo   Medication Sig Dispense Refill    0.9 % sodium chloride (SODIUM CHLORIDE 0.9%) solution Inject 500 mLs into the vein once daily.      amLODIPine (NORVASC) 2.5 MG tablet Take 1 tablet (2.5 mg total) by mouth once daily. 30 tablet 11    blood sugar diagnostic (BLOOD GLUCOSE TEST) Strp 1 strip by  Misc.(Non-Drug; Combo Route) route once daily. accu-chek Veronika strips 90 strip 5    DAPTOmycin (CUBICIN) 500 mg injection Inject 1 each into the vein once daily.      diphenhydrAMINE (BENADRYL) 50 mg/mL injection Inject 1 mL into the vein as needed.      FEROSUL 325 mg (65 mg iron) Tab tablet Take 1 tablet (325 mg total) by mouth once daily. 90 tablet 1    gabapentin (NEURONTIN) 300 MG capsule Take 1 capsule (300 mg total) by mouth every 8 (eight) hours. 270 capsule 1    hydrALAZINE (APRESOLINE) 25 MG tablet Take 1 tablet (25 mg total) by mouth every 12 (twelve) hours. 180 tablet 1    hydroCHLOROthiazide (HYDRODIURIL) 25 MG tablet Take 1 tablet (25 mg total) by mouth once daily. 90 tablet 1    HYDROcodone-acetaminophen (NORCO)  mg per tablet Take 1 tablet by mouth every 12 (twelve) hours as needed for Pain. 60 tablet 0    HYDROcodone-acetaminophen (NORCO)  mg per tablet Take 1 tablet by mouth every 12 (twelve) hours as needed for Pain. 60 tablet 0    HYDROcodone-acetaminophen (NORCO)  mg per tablet Take 1 tablet by mouth every 12 (twelve) hours as needed for Pain. 60 tablet 0    [START ON 8/6/2023] HYDROcodone-acetaminophen (NORCO)  mg per tablet Take 1 tablet by mouth every 12 (twelve) hours as needed for Pain. 60 tablet 0    piperacillin sodium/tazobactam (PIPERACILLIN-TAZOBACTAM) 13.5 gram SolR Inject 1 each into the vein once daily.      semaglutide (OZEMPIC) 2 mg/dose (8 mg/3 mL) PnIj Inject 2 mg into the skin every 7 days. 8 mL 2    sulfamethoxazole-trimethoprim 800-160mg (BACTRIM DS) 800-160 mg Tab Take 1 tablet by mouth 2 (two) times daily.         Allergies  Review of patient's allergies indicates:   Allergen Reactions    Tomato Itching       Physical Examination     Vitals:    07/10/23 1123   BP: (!) 144/94   Pulse: 84   Resp: 20   Temp: 98 °F (36.7 °C)     Physical Exam  Vitals and nursing note reviewed.   Constitutional:       Appearance: Normal appearance.   HENT:      Head:  Normocephalic.   Cardiovascular:      Rate and Rhythm: Normal rate and regular rhythm.      Pulses: Normal pulses.      Heart sounds: Normal heart sounds.   Pulmonary:      Effort: Pulmonary effort is normal. No respiratory distress.   Chest:      Chest wall: No tenderness.   Musculoskeletal:         General: Swelling, tenderness and deformity present.      Right lower leg: Edema present.   Skin:     General: Skin is warm and dry.      Capillary Refill: Capillary refill takes 2 to 3 seconds.      Comments: See LDA for photos and measurements   Neurological:      General: No focal deficit present.      Mental Status: He is alert and oriented to person, place, and time. Mental status is at baseline.   Psychiatric:         Mood and Affect: Mood normal.         Behavior: Behavior normal.         Thought Content: Thought content normal.         Judgment: Judgment normal.     Assessment and Plan             Altered Skin Integrity 04/10/23 1200 Right plantar Foot Diabetic Ulcer Full thickness tissue loss with exposed bone, tendon, or muscle. Often includes undermining and tunneling. May extend into muscle and/or supporting structures. (Active)   04/10/23 1200   Altered Skin Integrity Present on Admission - Did Patient arrive to the hospital with altered skin?: yes   Side: Right   Orientation: plantar   Location: Foot   Wound Number:    Is this injury device related?:    Primary Wound Type: Diabetic ulc   Description of Altered Skin Integrity: Full thickness tissue loss with exposed bone, tendon, or muscle. Often includes undermining and tunneling. May extend into muscle and/or supporting structures.   Ankle-Brachial Index:    Pulses:    Removal Indication and Assessment:    Wound Outcome:    (Retired) Wound Length (cm):    (Retired) Wound Width (cm):    (Retired) Depth (cm):    Wound Description (Comments):    Removal Indications:    Wound Image    07/10/23 1124   Description of Altered Skin Integrity Full thickness tissue  loss with exposed bone, tendon, or muscle. Often includes undermining and tunneling. May extend into muscle and/or supporting structures. 07/10/23 1124   Dressing Appearance Moist drainage 07/10/23 1124   Drainage Amount Moderate 07/10/23 1124   Drainage Characteristics/Odor Serosanguineous 07/10/23 1124   Appearance Pink;Moist;Granulating;Muscle 07/10/23 1124   Tissue loss description Full thickness 07/10/23 1124   Black (%), Wound Tissue Color 0 % 07/10/23 1124   Red (%), Wound Tissue Color 100 % 07/10/23 1124   Yellow (%), Wound Tissue Color 0 % 07/10/23 1124   Periwound Area Moist;Pale white 07/10/23 1124   Wound Edges Open 07/10/23 1124   Wound Length (cm) 6 cm 07/10/23 1124   Wound Width (cm) 11 cm 07/10/23 1124   Wound Depth (cm) 0.5 cm 07/10/23 1124   Wound Volume (cm^3) 33 cm^3 07/10/23 1124   Wound Surface Area (cm^2) 66 cm^2 07/10/23 1124   Care Cleansed with:;Antimicrobial agent 07/10/23 1124   Dressing Applied;Hydrofiber;Gauze;Absorptive Pad;Rolled gauze 07/10/23 1124   Periwound Care Moisturizer applied 07/10/23 1124   Off Loading Off loading shoe 07/10/23 1124   Dressing Change Due 07/11/23 07/10/23 1124     Problem List Items Addressed This Visit          Cardiac/Vascular    Peripheral vascular disease (Chronic)    Overview     Impression:     The ankle to brachial index is 1.25 on the right and 1.34 on the left.  No arterial occlusions.  Right inguinal lymphadenopathy.  This is similar dating back to comparison exam.  Recommend clinical correlation.         Current Assessment & Plan     Heart Healthy Diet-reduce sodium intake to 1,500  mg/day and limit alcohol  Smoking Cessation - if smoke set goals to quit  Exercise daily  Weight loss -  Maintain healthy weight.  If overweight, set goal to  lose about 5% to 10% of baseline weight within six months  Keep blood pressure well controlled - take medications as prescribed   Ensure diabetes is controlled - diabetic diet, medications as prescribed, check  blood glucose routinely               Endocrine    Diabetic ulcer of right heel associated with type 2 diabetes mellitus, with fat layer exposed - Primary    Overview                                          Current Assessment & Plan     Clean with  vashe then apply [place] antibiotic spray for 10 mins then rinse with vashe     Apply aquacel ag to wound.pack the heel with aquacel ag then cover with dry drawtex,ABD,wrap with kerlix,secure with paper tape. Wrap with ace wrap from toes to knee. Change daily and as needed     Monitor closely for s/s of infection including fever, chills, increase in pain, odor from wound, and increased redness from foot. Go to ER if any complications develop.   Keep leg elevated and avoid pressure on wound.   Diabetes:  Monitor glucose closely. Check fasting glucose and 2 hours after meals. HgA1C goal <7, fasting glucose , and 2 hours after meals <180  Hypertension:  Check blood pressure twice daily, goal <120/80  Diet:   Increase protein intake, avoid fried, fatty foods and foods high in simple carbs.   Vitamins:  Take vitamin C 1000 mg, zinc 50mg, vitamin d 5000 units, and a daily multivitamin. Angel is a good source of protein and nutrients to aid in wound healing.    Wear walking boot daily or use crutches to offload right foot            Orthopedic    Non-pressure chronic ulcer of other part of right foot with other specified severity    Overview                                                   Current Assessment & Plan     Clean with  vashe then apply [place] antibiotic spray for 10 mins then rinse with vashe     Apply aquacel ag to wound.pack the heel with aquacel ag then cover with dry drawtex,ABD,wrap with kerlix,secure with paper tape. Wrap with ace wrap from toes to knee. Change daily and as needed     Monitor closely for s/s of infection including fever, chills, increase in pain, odor from wound, and increased redness from foot. Go to ER if any complications develop.    Keep leg elevated and avoid pressure on wound.   Diabetes:  Monitor glucose closely. Check fasting glucose and 2 hours after meals. HgA1C goal <7, fasting glucose , and 2 hours after meals <180  Hypertension:  Check blood pressure twice daily, goal <120/80  Diet:   Increase protein intake, avoid fried, fatty foods and foods high in simple carbs.   Vitamins:  Take vitamin C 1000 mg, zinc 50mg, vitamin d 5000 units, and a daily multivitamin. Angel is a good source of protein and nutrients to aid in wound healing.    Wear walking boot daily or use crutches to offload right foot            Future Appointments   Date Time Provider Department Center   7/24/2023 10:00 AM [facility]USTUS Macedo [facility]   9/5/2023 10:30 AM VONNIE Ma RASEMILIE [facility]            Signature:  AUGUSTUS Macedo  [facility]  [address] [facility] [place] [address]  [phone]    Date of encounter: 7/10/23

## 2023-07-07 NOTE — PATIENT INSTRUCTIONS
Clean with  vashe then apply [place] antibiotic spray for 10 mins then rinse with vashe     Apply aquacel ag to wound.pack the heel with aquacel ag then cover with dry drawtex,ABD,wrap with kerlix,secure with paper tape. Wrap with ace wrap from toes to knee. Change daily and as needed     Monitor closely for s/s of infection including fever, chills, increase in pain, odor from wound, and increased redness from foot. Go to ER if any complications develop.   Keep leg elevated and avoid pressure on wound.   Diabetes:  Monitor glucose closely. Check fasting glucose and 2 hours after meals. HgA1C goal <7, fasting glucose , and 2 hours after meals <180  Hypertension:  Check blood pressure twice daily, goal <120/80  Diet:   Increase protein intake, avoid fried, fatty foods and foods high in simple carbs.   Vitamins:  Take vitamin C 1000 mg, zinc 50mg, vitamin d 5000 units, and a daily multivitamin. Angel is a good source of protein and nutrients to aid in wound healing.    Wear walking boot daily or use crutches to offload right foot

## 2023-07-10 ENCOUNTER — OFFICE VISIT (OUTPATIENT)
Dept: WOUND CARE | Facility: CLINIC | Age: 38
End: 2023-07-10
Attending: FAMILY MEDICINE
Payer: MEDICARE

## 2023-07-10 VITALS
RESPIRATION RATE: 20 BRPM | SYSTOLIC BLOOD PRESSURE: 144 MMHG | HEART RATE: 84 BPM | DIASTOLIC BLOOD PRESSURE: 94 MMHG | TEMPERATURE: 98 F

## 2023-07-10 DIAGNOSIS — L97.412 DIABETIC ULCER OF RIGHT HEEL ASSOCIATED WITH TYPE 2 DIABETES MELLITUS, WITH FAT LAYER EXPOSED: Primary | ICD-10-CM

## 2023-07-10 DIAGNOSIS — E11.621 DIABETIC ULCER OF RIGHT HEEL ASSOCIATED WITH TYPE 2 DIABETES MELLITUS, WITH FAT LAYER EXPOSED: Primary | ICD-10-CM

## 2023-07-10 DIAGNOSIS — L97.518 NON-PRESSURE CHRONIC ULCER OF OTHER PART OF RIGHT FOOT WITH OTHER SPECIFIED SEVERITY: ICD-10-CM

## 2023-07-10 DIAGNOSIS — I73.9 PERIPHERAL VASCULAR DISEASE: Chronic | ICD-10-CM

## 2023-07-10 PROCEDURE — 99215 OFFICE O/P EST HI 40 MIN: CPT | Mod: PBBFAC | Performed by: NURSE PRACTITIONER

## 2023-07-10 PROCEDURE — 99213 PR OFFICE/OUTPT VISIT, EST, LEVL III, 20-29 MIN: ICD-10-PCS | Mod: S$PBB,,, | Performed by: NURSE PRACTITIONER

## 2023-07-10 PROCEDURE — 99213 OFFICE O/P EST LOW 20 MIN: CPT | Mod: S$PBB,,, | Performed by: NURSE PRACTITIONER

## 2023-07-10 RX ORDER — BLOOD-GLUCOSE SENSOR
1 EACH MISCELLANEOUS
Qty: 3 EACH | Refills: 11 | Status: SHIPPED | OUTPATIENT
Start: 2023-07-10 | End: 2024-07-09

## 2023-07-10 RX ORDER — BLOOD-GLUCOSE,RECEIVER,CONT
1 EACH MISCELLANEOUS
Qty: 1 EACH | Refills: 0 | Status: SHIPPED | OUTPATIENT
Start: 2023-07-10 | End: 2024-07-09

## 2023-07-10 NOTE — ASSESSMENT & PLAN NOTE
Clean with  vashe then apply [place] antibiotic spray for 10 mins then rinse with vashe     Apply aquacel ag to wound.pack the heel with aquacel ag then cover with dry drawtex,ABD,wrap with kerlix,secure with paper tape. Wrap with ace wrap from toes to knee. Change daily and as needed     Monitor closely for s/s of infection including fever, chills, increase in pain, odor from wound, and increased redness from foot. Go to ER if any complications develop.   Keep leg elevated and avoid pressure on wound.   Diabetes:  Monitor glucose closely. Check fasting glucose and 2 hours after meals. HgA1C goal <7, fasting glucose , and 2 hours after meals <180  Hypertension:  Check blood pressure twice daily, goal <120/80  Diet:   Increase protein intake, avoid fried, fatty foods and foods high in simple carbs.   Vitamins:  Take vitamin C 1000 mg, zinc 50mg, vitamin d 5000 units, and a daily multivitamin. Angel is a good source of protein and nutrients to aid in wound healing.    Wear walking boot daily or use crutches to offload right foot

## 2023-07-10 NOTE — ASSESSMENT & PLAN NOTE
Clean with  vashe then apply [place] antibiotic spray for 10 mins then rinse with vashe     Apply aquacel ag to wound.pack the heel with aquacel ag then cover with dry drawtex,ABD,wrap with kerlix,secure with paper tape. Wrap with ace wrap from toes to knee. Change daily and as needed     Monitor closely for s/s of infection including fever, chills, increase in pain, odor from wound, and increased redness from foot. Go to ER if any complications develop.   Keep leg elevated and avoid pressure on wound.   Diabetes:  Monitor glucose closely. Check fasting glucose and 2 hours after meals. HgA1C goal <7, fasting glucose , and 2 hours after meals <180  Hypertension:  Check blood pressure twice daily, goal <120/80  Diet:   Increase protein intake, avoid fried, fatty foods and foods high in simple carbs.   Vitamins:  Take vitamin C 1000 mg, zinc 50mg, vitamin d 5000 units, and a daily multivitamin. Angel is a good source of protein and nutrients to aid in wound healing.    Wear walking boot daily or use crutches to offload right foot

## 2023-07-17 ENCOUNTER — DOCUMENT SCAN (OUTPATIENT)
Dept: HOME HEALTH SERVICES | Facility: HOSPITAL | Age: 38
End: 2023-07-17
Payer: MEDICARE

## 2023-07-18 ENCOUNTER — DOCUMENT SCAN (OUTPATIENT)
Dept: HOME HEALTH SERVICES | Facility: HOSPITAL | Age: 38
End: 2023-07-18
Payer: MEDICARE

## 2023-07-24 ENCOUNTER — OFFICE VISIT (OUTPATIENT)
Dept: WOUND CARE | Facility: CLINIC | Age: 38
End: 2023-07-24
Attending: FAMILY MEDICINE
Payer: MEDICARE

## 2023-07-24 VITALS
DIASTOLIC BLOOD PRESSURE: 83 MMHG | TEMPERATURE: 99 F | SYSTOLIC BLOOD PRESSURE: 122 MMHG | HEART RATE: 100 BPM | RESPIRATION RATE: 20 BRPM

## 2023-07-24 DIAGNOSIS — E11.621 DIABETIC ULCER OF RIGHT HEEL ASSOCIATED WITH TYPE 2 DIABETES MELLITUS, WITH FAT LAYER EXPOSED: Primary | ICD-10-CM

## 2023-07-24 DIAGNOSIS — L97.412 DIABETIC ULCER OF RIGHT HEEL ASSOCIATED WITH TYPE 2 DIABETES MELLITUS, WITH FAT LAYER EXPOSED: Primary | ICD-10-CM

## 2023-07-24 PROCEDURE — 99499 NO LOS: ICD-10-PCS | Mod: S$PBB,,, | Performed by: NURSE PRACTITIONER

## 2023-07-24 PROCEDURE — 11042 DEBRIDEMENT: ICD-10-PCS | Mod: S$PBB,,, | Performed by: NURSE PRACTITIONER

## 2023-07-24 PROCEDURE — 11045 DEBRIDEMENT: ICD-10-PCS | Mod: S$PBB,,, | Performed by: NURSE PRACTITIONER

## 2023-07-24 PROCEDURE — 99499 UNLISTED E&M SERVICE: CPT | Mod: S$PBB,,, | Performed by: NURSE PRACTITIONER

## 2023-07-24 PROCEDURE — 11045 DBRDMT SUBQ TISS EACH ADDL: CPT | Mod: S$PBB,,, | Performed by: NURSE PRACTITIONER

## 2023-07-24 PROCEDURE — 99213 OFFICE O/P EST LOW 20 MIN: CPT | Mod: PBBFAC,25 | Performed by: NURSE PRACTITIONER

## 2023-07-24 PROCEDURE — 11042 DBRDMT SUBQ TIS 1ST 20SQCM/<: CPT | Mod: PBBFAC | Performed by: NURSE PRACTITIONER

## 2023-07-24 NOTE — PROGRESS NOTES
AUGUSTUS Macedo   [facility]  [address] [facility] [place] [address]  [phone]      PATIENT NAME: Jean Pierre Paulson  : 1985  DATE: 23  MRN: [id number]      Billing Provider: AUGUSTUS Macedo  Level of Service:   Patient PCP Information       Provider PCP Type    Mojgan Lomeli NP General            Reason for Visit / Chief Complaint: Diabetic ulcer of right heel associated with type 2 diabete       History of Present Illness / Problem Focused Workflow     Jean Pierre Paulson is a 38 y.o. male who presents to clinic for follow up on chronic-non healing wound on the right foot. Maceration and callus periwound, biofilm to wound bed. Bedside debridement today.     Reinforced importance of following plan of care, wearing off-loading boot, keeping pressure off foot, taking antibiotics as prescribed,increasing protein intake, and keeping diabetes well controlled. Patient requested prescription for Dexacom to help keep diabetes under control. Reports that he has numbness in fingertips from frequent finger sticks. Due to recurrent wound infections he requires a sliding scale insulin to keep diabetes controlled. He checks his blood glucose 4 times daily and uses insulin when needed. He is motivated to keep his diabetes controlled to enhance wound healing and would benefit from CGM. Prescription sent to [facility].     Last arterial doppler, The ankle to brachial index is 1.25 on the right and 1.34 on the left.    Significant PMH  Includes diabetes and hypertension. Last HgA1C 7.3 in 2022, diabetes is managed by PCP.  Denies fever or chills.       Review of Systems     Review of Systems   Constitutional:  Negative for activity change, chills and fever.   Respiratory:  Negative for chest tightness and shortness of breath.    Cardiovascular:  Positive for leg swelling. Negative for chest pain and palpitations.   Musculoskeletal:  Positive for  arthralgias and joint swelling.   Skin:  Positive for wound.        See wound assessment   Neurological:  Positive for weakness and numbness.   Psychiatric/Behavioral:  Negative for agitation, behavioral problems, confusion and self-injury.      Medical / Social / Family History     Past Medical History:   Diagnosis Date    NOLAN (acute kidney injury) 04/07/2023    Anemia     Diabetes mellitus with neuropathy     HLD (hyperlipidemia) 04/07/2023    HTN (hypertension)     Obesity     TEDDY (obstructive sleep apnea) 04/07/2023    Osteomyelitis 10/2020    Hx of R foot, Tx with IV Abx    Peripheral vascular disease        Past Surgical History:   Procedure Laterality Date    APPLICATION OF SPLIT-THICKNESS SKIN GRAFT (STSG) TO LOWER EXTREMITY Right 03/02/2022    Procedure: APPLICATION, GRAFT, SKIN, SPLIT-THICKNESS, TO LOWER EXTREMITY;  Surgeon: Greg Ramírez MD;  Location: [facility];  Service: General;  Laterality: Right;    CHOLECYSTECTOMY      DEBRIDEMENT OF FOOT Right 10/2020    right great toe amputation         Social History  Mr. Jean Pirere Paulson  reports that he has been smoking cigarettes. He started smoking about 23 years ago. He has a 6.25 pack-year smoking history. He has never used smokeless tobacco. He reports current alcohol use of about 2.0 standard drinks per week. He reports that he does not use drugs.    Family History  's Jean Pierre Paulson family history includes Diabetes in his father and maternal grandmother; Hypertension in his father and maternal grandmother.    Medications and Allergies     Medications  Outpatient Medications Marked as Taking for the 7/24/23 encounter (Office Visit) with AUGUSTUS Macedo   Medication Sig Dispense Refill    blood-glucose sensor (DEXCOM G7 SENSOR) Aria 1 each by Misc.(Non-Drug; Combo Route) route every 10 days. 3 each 11    FEROSUL 325 mg (65 mg iron) Tab tablet Take 1 tablet (325 mg total) by mouth once daily. 90 tablet 1    gabapentin (NEURONTIN)  300 MG capsule Take 1 capsule (300 mg total) by mouth every 8 (eight) hours. 270 capsule 1    hydroCHLOROthiazide (HYDRODIURIL) 25 MG tablet Take 1 tablet (25 mg total) by mouth once daily. 90 tablet 1    [START ON 8/6/2023] HYDROcodone-acetaminophen (NORCO)  mg per tablet Take 1 tablet by mouth every 12 (twelve) hours as needed for Pain. 60 tablet 0    losartan (COZAAR) 50 MG tablet Take 1 tablet (50 mg total) by mouth once daily. 90 tablet 1    semaglutide (OZEMPIC) 2 mg/dose (8 mg/3 mL) PnIj Inject 2 mg into the skin every 7 days. 8 mL 2    sulfamethoxazole-trimethoprim 800-160mg (BACTRIM DS) 800-160 mg Tab Take 1 tablet by mouth 2 (two) times daily.         Allergies  Review of patient's allergies indicates:   Allergen Reactions    Tomato Itching       Physical Examination     Vitals:    07/24/23 1037   BP: 122/83   Pulse: 100   Resp: 20   Temp: 98.8 °F (37.1 °C)     Physical Exam  Vitals and nursing note reviewed.   Constitutional:       Appearance: Normal appearance.   HENT:      Head: Normocephalic.   Cardiovascular:      Rate and Rhythm: Normal rate and regular rhythm.      Pulses: Normal pulses.      Heart sounds: Normal heart sounds.   Pulmonary:      Effort: Pulmonary effort is normal. No respiratory distress.   Chest:      Chest wall: No tenderness.   Musculoskeletal:         General: Swelling, tenderness and deformity present.      Right lower leg: Edema present.   Skin:     General: Skin is warm and dry.      Capillary Refill: Capillary refill takes 2 to 3 seconds.      Comments: See LDA for photos and measurements   Neurological:      General: No focal deficit present.      Mental Status: He is alert and oriented to person, place, and time. Mental status is at baseline.   Psychiatric:         Mood and Affect: Mood normal.         Behavior: Behavior normal.         Thought Content: Thought content normal.         Judgment: Judgment normal.       Assessment and Plan             Altered Skin  Integrity 04/10/23 1200 Right plantar Foot Diabetic Ulcer Full thickness tissue loss with exposed bone, tendon, or muscle. Often includes undermining and tunneling. May extend into muscle and/or supporting structures. (Active)   04/10/23 1200   Altered Skin Integrity Present on Admission - Did Patient arrive to the hospital with altered skin?: yes   Side: Right   Orientation: plantar   Location: Foot   Wound Number:    Is this injury device related?:    Primary Wound Type: Diabetic ulc   Description of Altered Skin Integrity: Full thickness tissue loss with exposed bone, tendon, or muscle. Often includes undermining and tunneling. May extend into muscle and/or supporting structures.   Ankle-Brachial Index:    Pulses:    Removal Indication and Assessment:    Wound Outcome:    (Retired) Wound Length (cm):    (Retired) Wound Width (cm):    (Retired) Depth (cm):    Wound Description (Comments):    Removal Indications:    Wound Image      07/24/23 1039   Description of Altered Skin Integrity Full thickness tissue loss. Subcutaneous fat may be visible but bone, tendon or muscle are not exposed 07/24/23 1039   Dressing Appearance Dried drainage 07/24/23 1039   Drainage Amount Moderate 07/24/23 1039   Drainage Characteristics/Odor Sanguineous;Malodorous 07/24/23 1039   Appearance Pink;Red;Moist 07/24/23 1039   Tissue loss description Full thickness 07/24/23 1039   Black (%), Wound Tissue Color 0 % 07/24/23 1039   Red (%), Wound Tissue Color 100 % 07/24/23 1039   Yellow (%), Wound Tissue Color 0 % 07/24/23 1039   Periwound Area Dry 07/24/23 1039   Wound Edges Callused;Rolled/closed 07/24/23 1039   Wound Length (cm) 11 cm 07/24/23 1039   Wound Width (cm) 6.5 cm 07/24/23 1039   Wound Depth (cm) 0.6 cm 07/24/23 1039   Wound Volume (cm^3) 42.9 cm^3 07/24/23 1039   Wound Surface Area (cm^2) 71.5 cm^2 07/24/23 1039   Care Cleansed with:;Soap and water;Applied:;Moisturizing agent;Debrided 07/24/23 1039   Dressing Applied;Gauze, wet  to dry;Gauze;Non-adherent;Absorptive Pad;Rolled gauze 07/24/23 1039   Periwound Care Moisturizer applied 07/24/23 1039   Off Loading Off loading shoe 07/24/23 1039   Dressing Change Due 07/25/23 07/24/23 1039     Problem List Items Addressed This Visit          Endocrine    Diabetic ulcer of right heel associated with type 2 diabetes mellitus, with fat layer exposed    Overview                                              Current Assessment & Plan     Clean with  vashe then apply [place] antibiotic spray for 10 mins then rinse with vashe     Apply aquacel ag to wound.pack the heel with aquacel ag then cover with dry drawtex,ABD,wrap with kerlix,secure with paper tape. Wrap with ace wrap from toes to knee. Change daily and as needed     Monitor closely for s/s of infection including fever, chills, increase in pain, odor from wound, and increased redness from foot. Go to ER if any complications develop.   Keep leg elevated and avoid pressure on wound.   Diabetes:  Monitor glucose closely. Check fasting glucose and 2 hours after meals. HgA1C goal <7, fasting glucose , and 2 hours after meals <180  Hypertension:  Check blood pressure twice daily, goal <120/80  Diet:   Increase protein intake, avoid fried, fatty foods and foods high in simple carbs.   Vitamins:  Take vitamin C 1000 mg, zinc 50mg, vitamin d 5000 units, and a daily multivitamin. Angel is a good source of protein and nutrients to aid in wound healing.    Wear walking boot daily or use crutches to offload right foot            Future Appointments   Date Time Provider Department Center   8/14/2023 10:00 AM [facility]   [facility] 10:30 [facility]            Signature:  AUGUSTUS Macedo  [facility]  [address]  [place] [address]  [phone]    Date of encounter: 7/24/23

## 2023-07-24 NOTE — ASSESSMENT & PLAN NOTE
Clean with  vashe then apply [place] antibiotic spray for 10 mins then rinse with vashe     Apply aquacel ag to wound.pack the heel with aquacel ag then cover with dry drawtex,ABD,wrap with kerlix,secure with paper tape. Wrap with ace wrap from toes to knee. Change daily and as needed     Monitor closely for s/s of infection including fever, chills, increase in pain, odor from wound, and increased redness from foot. Go to ER if any complications develop.   Keep leg elevated and avoid pressure on wound.   Diabetes:  Monitor glucose closely. Check fasting glucose and 2 hours after meals. HgA1C goal <7, fasting glucose , and 2 hours after meals <180  Hypertension:  Check blood pressure twice daily, goal <120/80  Diet:   Increase protein intake, avoid fried, fatty foods and foods high in simple carbs.   Vitamins:  Take vitamin C 1000 mg, zinc 50mg, vitamin d 5000 units, and a daily multivitamin. Angel is a good source of protein and nutrients to aid in wound healing.    Wear walking boot daily or use crutches to offload right foot

## 2023-07-24 NOTE — PROCEDURES
"Debridement    Date/Time: 7/24/2023 10:00 AM  Performed by: AUGUSTUS Macedo  Authorized by: AUGUSTUS Macedo     Time out: Immediately prior to procedure a "time out" was called to verify the correct patient, procedure, equipment, support staff and site/side marked as required.    Consent Done?:  Yes (Written)    Wound Details:    Location:  Right foot    Location:  Right Plantar    Type of Debridement:  Excisional       Length (cm):  11       Area (sq cm):  71.5       Width (cm):  6.5       Percent Debrided (%):  100       Depth (cm):  0.6       Total Area Debrided (sq cm):  71.5    Depth of debridement:  Subcutaneous tissue    Tissue debrided:  Adipose, Dermis, Epidermis and Subcutaneous    Devitalized tissue debrided:  Biofilm, Callus, Clots, Exudate and Slough    Instruments:  Curette  Bleeding:  Moderate  Hemostasis Achieved: Yes  Patient tolerance:  Patient tolerated the procedure well with no immediate complications  1st Wound Pain Assessment: 0     Assistant JOSE ALFREDO Stone LPN   "

## 2023-07-24 NOTE — PATIENT INSTRUCTIONS
Clean with  vashe then apply [place] antibiotic spray for 10 mins then rinse with vashe     Apply aquacel ag to wound.pack the heel with aquacel ag then cover with dry drawtex,ABD,wrap with kerlix,secure with paper tape. Wrap with ace wrap from toes to knee. Change daily and as needed     Monitor closely for s/s of infection including fever, chills, increase in pain, odor from wound, and increased redness from foot. Go to ER if any complications develop.   Keep leg elevated and avoid pressure on wound.   Diabetes:  Monitor glucose closely. Check fasting glucose and 2 hours after meals. HgA1C goal <7, fasting glucose , and 2 hours after meals <180  Hypertension:  Check blood pressure twice daily, goal <120/80  Diet:   Increase protein intake, avoid fried, fatty foods and foods high in simple carbs.   Vitamins:  Take vitamin C 1000 mg, zinc 50mg, vitamin d 5000 units, and a daily multivitamin. Angel is a good source of protein and nutrients to aid in wound healing.    Wear walking boot daily or use crutches to offload right foot

## 2023-07-24 NOTE — PROGRESS NOTES
Debridement Performed for Assessment: Wound# right plantar foot  Performed By: Provider: Yelitza Alas NP  Assistant: Sisi Alberto RN    Debridement: Surgical    Photo taken post procedure:    Time-Out Taken: Yes  Level: Skin/Subcutaneous Tissue/  Post Debridement Measurements  Length: (cm) 11  Width: (cm) 6.5  Depth: (cm) 0.6      Area: (cm²) 71.5  Percent Debrided: 100%  Total Area Debrided: (sq cm) 71.5    Tissue and other material debrided:  Adipose, Dermis, Epidermis, Subcutaneous,  Devitalized Tissue Debrided:Biofilm, Slough, Eschar  Instrument: Curette  Bleeding: Moderate  Hemostasis Achieved: Pressure  Procedural Pain: Insensate  Post Procedural Pain: Insensate  Response to Treatment: Procedure was tolerated well    Devitalized materials/tissue Removed  the following was removed during debridement  subcutaneous,      Post Debridement Diagnosis  Post debridement diagnosis  Same as Pre-operative debridement diagnosis, No Complications noted.      Grafts or implants applied  Was a graft or implant applied?  No      Procedure assistant  Procedure assisted by:  Assistant is the same as nurse listed above      Complications related to procedure  Did any complication occure during procedure?  No complications noted during or after procedure.      Specimen  Specimen collect during procedure?  No specimen collected      Anaesthesia:  Anesthesia used  None      Blood Loss:  Blood loss during procedure  less than 5 cc

## 2023-08-14 ENCOUNTER — OFFICE VISIT (OUTPATIENT)
Dept: WOUND CARE | Facility: CLINIC | Age: 38
End: 2023-08-14
Attending: FAMILY MEDICINE
Payer: MEDICARE

## 2023-08-14 VITALS
TEMPERATURE: 98 F | RESPIRATION RATE: 20 BRPM | SYSTOLIC BLOOD PRESSURE: 164 MMHG | HEART RATE: 82 BPM | DIASTOLIC BLOOD PRESSURE: 101 MMHG

## 2023-08-14 DIAGNOSIS — L97.518 NON-PRESSURE CHRONIC ULCER OF OTHER PART OF RIGHT FOOT WITH OTHER SPECIFIED SEVERITY: ICD-10-CM

## 2023-08-14 DIAGNOSIS — E11.621 DIABETIC ULCER OF RIGHT HEEL ASSOCIATED WITH TYPE 2 DIABETES MELLITUS, WITH FAT LAYER EXPOSED: Primary | ICD-10-CM

## 2023-08-14 DIAGNOSIS — L97.412 DIABETIC ULCER OF RIGHT HEEL ASSOCIATED WITH TYPE 2 DIABETES MELLITUS, WITH FAT LAYER EXPOSED: Primary | ICD-10-CM

## 2023-08-14 DIAGNOSIS — I73.9 PERIPHERAL VASCULAR DISEASE: ICD-10-CM

## 2023-08-14 PROCEDURE — 99215 OFFICE O/P EST HI 40 MIN: CPT | Mod: PBBFAC | Performed by: NURSE PRACTITIONER

## 2023-08-14 PROCEDURE — 99213 PR OFFICE/OUTPT VISIT, EST, LEVL III, 20-29 MIN: ICD-10-PCS | Mod: S$PBB,,, | Performed by: NURSE PRACTITIONER

## 2023-08-14 PROCEDURE — 99213 OFFICE O/P EST LOW 20 MIN: CPT | Mod: S$PBB,,, | Performed by: NURSE PRACTITIONER

## 2023-08-14 RX ORDER — SULFAMETHOXAZOLE AND TRIMETHOPRIM 800; 160 MG/1; MG/1
1 TABLET ORAL 2 TIMES DAILY
Qty: 60 TABLET | Refills: 0 | Status: SHIPPED | OUTPATIENT
Start: 2023-08-14 | End: 2023-09-13

## 2023-08-14 NOTE — ASSESSMENT & PLAN NOTE
Clean with  vashe then apply [place] antibiotic spray for 10 mins then rinse with vashe     Apply aquacel ag to wound.pack the heel with aquacel ag then cover with dry drawtex,ABD,wrap with kerlix,secure with paper tape. Wrap with ace wrap from toes to knee. Change daily and as needed     Monitor closely for s/s of infection including fever, chills, increase in pain, odor from wound, and increased redness from foot. Go to ER if any complications develop.   Keep leg elevated and avoid pressure on wound.   Diabetes:  Monitor glucose closely. Check fasting glucose and 2 hours after meals. HgA1C goal <7, fasting glucose , and 2 hours after meals <180  Hypertension:  Check blood pressure twice daily, goal <120/80  Diet:   Increase protein intake, avoid fried, fatty foods and foods high in simple carbs.   Vitamins:  Take vitamin C 1000 mg, zinc 50mg, vitamin d 5000 units, and a daily multivitamin. Angel is a good source of protein and nutrients to aid in wound healing.    Wear walking boot daily or use crutches to offload right foot

## 2023-08-14 NOTE — PROGRESS NOTES
AUGUSTUS Macedo   [facility]  [address] [place] [address]  [phone]      PATIENT NAME: Jean Pierre Paulson  : 1985  DATE: 23  MRN: [id number]      Billing Provider: AUGUSTUS Macedo  Level of Service:   Patient PCP Information       Provider PCP Type    Mojgan Lomeli NP General            Reason for Visit / Chief Complaint: Diabetic Foot Ulcer (R DFU Grade 2)       History of Present Illness / Problem Focused Workflow     Jean Pierre Paulson is a 38 y.o. male who presents to clinic for follow up on chronic-non healing wound on the right foot. Wound has improved since last visit.  Initiate Regranex today. Discussed changes to plan of care with patient who verbalized understanding.     Reinforced importance of following plan of care, wearing off-loading boot, keeping pressure off foot, taking antibiotics as prescribed,increasing protein intake, and keeping diabetes well controlled.     Follow up on Dexacom prescription to help keep diabetes under control. Reports that he has numbness in fingertips from frequent finger sticks. Due to recurrent wound infections he requires a sliding scale insulin to keep diabetes controlled. He checks his blood glucose 4 times daily and uses insulin when needed. He is motivated to keep his diabetes controlled to enhance wound healing and would benefit from CGM. Prescription sent to [facility] diabetes supply.     Last arterial doppler, The ankle to brachial index is 1.25 on the right and 1.34 on the left.    Significant PMH  Includes diabetes and hypertension. Last HgA1C 7.3 in 2022, diabetes is managed by PCP.  Denies fever or chills.       Review of Systems     Review of Systems   Constitutional:  Negative for activity change, chills and fever.   Respiratory:  Negative for chest tightness and shortness of breath.    Cardiovascular:  Positive for leg swelling. Negative for chest pain and palpitations.    Musculoskeletal:  Positive for arthralgias and joint swelling.   Skin:  Positive for wound.        See wound assessment   Neurological:  Positive for weakness and numbness.   Psychiatric/Behavioral:  Negative for agitation, behavioral problems, confusion and self-injury.        Medical / Social / Family History     Past Medical History:   Diagnosis Date    NOLAN (acute kidney injury) 04/07/2023    Anemia     Diabetes mellitus with neuropathy     HLD (hyperlipidemia) 04/07/2023    HTN (hypertension)     Obesity     TEDDY (obstructive sleep apnea) 04/07/2023    Osteomyelitis 10/2020    Hx of R foot, Tx with IV Abx    Peripheral vascular disease        Past Surgical History:   Procedure Laterality Date    APPLICATION OF SPLIT-THICKNESS SKIN GRAFT (STSG) TO LOWER EXTREMITY Right 03/02/2022    Procedure: APPLICATION, GRAFT, SKIN, SPLIT-THICKNESS, TO LOWER EXTREMITY;  Surgeon: Greg Ramírez MD;  Location: [facility];  Service: General;  Laterality: Right;    CHOLECYSTECTOMY      DEBRIDEMENT OF FOOT Right 10/2020    right great toe amputation         Social History  Mr. Jean Pierre Paulson  reports that he has been smoking cigarettes. He started smoking about 23 years ago. He has a 6.2 pack-year smoking history. He has never used smokeless tobacco. He reports current alcohol use of about 2.0 standard drinks of alcohol per week. He reports that he does not use drugs.    Family History  .'anjana Paulson family history includes Diabetes in his father and maternal grandmother; Hypertension in his father and maternal grandmother.    Medications and Allergies     Medications  Outpatient Medications Marked as Taking for the 8/14/23 encounter (Office Visit) with Yelitza Alas FNP   Medication Sig Dispense Refill    amLODIPine (NORVASC) 2.5 MG tablet Take 1 tablet (2.5 mg total) by mouth once daily. 30 tablet 11    FEROSUL 325 mg (65 mg iron) Tab tablet Take 1 tablet (325 mg total) by mouth once daily. 90 tablet 1    gabapentin  (NEURONTIN) 300 MG capsule Take 1 capsule (300 mg total) by mouth every 8 (eight) hours. 270 capsule 1    hydroCHLOROthiazide (HYDRODIURIL) 25 MG tablet Take 1 tablet (25 mg total) by mouth once daily. 90 tablet 1    HYDROcodone-acetaminophen (NORCO)  mg per tablet Take 1 tablet by mouth every 12 (twelve) hours as needed for Pain. 60 tablet 0    semaglutide (OZEMPIC) 2 mg/dose (8 mg/3 mL) PnIj Inject 2 mg into the skin every 7 days. 8 mL 2       Allergies  Review of patient's allergies indicates:   Allergen Reactions    Tomato Itching       Physical Examination     Vitals:    08/14/23 1043   BP: (!) 164/101   Pulse: 82   Resp: 20   Temp: 97.8 °F (36.6 °C)     Physical Exam  Vitals and nursing note reviewed.   Constitutional:       Appearance: Normal appearance.   HENT:      Head: Normocephalic.   Cardiovascular:      Rate and Rhythm: Normal rate and regular rhythm.      Pulses: Normal pulses.      Heart sounds: Normal heart sounds.   Pulmonary:      Effort: Pulmonary effort is normal. No respiratory distress.   Chest:      Chest wall: No tenderness.   Musculoskeletal:         General: Swelling, tenderness and deformity present.      Right lower leg: Edema present.   Skin:     General: Skin is warm and dry.      Capillary Refill: Capillary refill takes 2 to 3 seconds.      Comments: See LDA for photos and measurements   Neurological:      General: No focal deficit present.      Mental Status: He is alert and oriented to person, place, and time. Mental status is at baseline.   Psychiatric:         Mood and Affect: Mood normal.         Behavior: Behavior normal.         Thought Content: Thought content normal.         Judgment: Judgment normal.       Assessment and Plan             Altered Skin Integrity 04/10/23 1200 Right plantar Foot Diabetic Ulcer Full thickness tissue loss with exposed bone, tendon, or muscle. Often includes undermining and tunneling. May extend into muscle and/or supporting structures.  (Active)   04/10/23 1200   Altered Skin Integrity Present on Admission - Did Patient arrive to the hospital with altered skin?: yes   Side: Right   Orientation: plantar   Location: Foot   Wound Number:    Is this injury device related?:    Primary Wound Type: Diabetic ulc   Description of Altered Skin Integrity: Full thickness tissue loss with exposed bone, tendon, or muscle. Often includes undermining and tunneling. May extend into muscle and/or supporting structures.   Ankle-Brachial Index:    Pulses:    Removal Indication and Assessment:    Wound Outcome:    (Retired) Wound Length (cm):    (Retired) Wound Width (cm):    (Retired) Depth (cm):    Wound Description (Comments):    Removal Indications:    Wound Image    08/14/23 1044   Description of Altered Skin Integrity Full thickness tissue loss. Subcutaneous fat may be visible but bone, tendon or muscle are not exposed 08/14/23 1044   Dressing Appearance Moist drainage 08/14/23 1044   Drainage Amount Moderate 08/14/23 1044   Drainage Characteristics/Odor Serosanguineous 08/14/23 1044   Appearance Pink;Moist;Granulating 08/14/23 1044   Tissue loss description Full thickness 08/14/23 1044   Black (%), Wound Tissue Color 0 % 08/14/23 1044   Red (%), Wound Tissue Color 100 % 08/14/23 1044   Yellow (%), Wound Tissue Color 0 % 08/14/23 1044   Periwound Area Intact;Moist 08/14/23 1044   Wound Edges Open 08/14/23 1044   Wound Length (cm) 6 cm 08/14/23 1044   Wound Width (cm) 11 cm 08/14/23 1044   Wound Depth (cm) 1.1 cm 08/14/23 1044   Wound Volume (cm^3) 72.6 cm^3 08/14/23 1044   Wound Surface Area (cm^2) 66 cm^2 08/14/23 1044   Care Cleansed with:;Soap and water 08/14/23 1044   Dressing Applied;Hydrofiber;Gauze;Rolled gauze 08/14/23 1044   Periwound Care Moisture barrier applied 08/14/23 1044   Off Loading Off loading shoe 08/14/23 1044   Dressing Change Due 08/15/23 08/14/23 1044     Problem List Items Addressed This Visit          Cardiac/Vascular    Peripheral  vascular disease (Chronic)    Overview     Impression:     The ankle to brachial index is 1.25 on the right and 1.34 on the left.  No arterial occlusions.  Right inguinal lymphadenopathy.  This is similar dating back to comparison exam.  Recommend clinical correlation.            Endocrine    Diabetic ulcer of right heel associated with type 2 diabetes mellitus, with fat layer exposed - Primary    Overview                                                 Orthopedic    Non-pressure chronic ulcer of other part of right foot with other specified severity    Overview                                                   Current Assessment & Plan      Clean with  vashe then apply [place] antibiotic spray for 10 mins then rinse with vashe     Apply regranex with saline moistened drawtex  Cover and secure with ABD,wrap with kerlix,secure with paper tape.  Wrap with ace wrap from toes to knee. Change daily and as needed     Monitor closely for s/s of infection including fever, chills, increase in pain, odor from wound, and increased redness from foot. Go to ER if any complications develop.   Keep leg elevated and avoid pressure on wound.   Diabetes:  Monitor glucose closely. Check fasting glucose and 2 hours after meals. HgA1C goal <7, fasting glucose , and 2 hours after meals <180  Hypertension:  Check blood pressure twice daily, goal <120/80  Diet:   Increase protein intake, avoid fried, fatty foods and foods high in simple carbs.   Vitamins:  Take vitamin C 1000 mg, zinc 50mg, vitamin d 5000 units, and a daily multivitamin. Angel is a good source of protein and nutrients to aid in wound healing.    Wear walking boot daily or use crutches to offload right foot             Future Appointments   Date Time Provider Department Center   8/29/2023 10:00 AM Yelitza Alas FNP [facility]   9/5/2023 10:30 AM Franck Kee PA [facility]            Signature:  AUGUSTUS Macedo  [facility]  [address] [facility] [place] [address]  [phone]    Date of encounter: 8/14/23

## 2023-08-14 NOTE — PATIENT INSTRUCTIONS
Clean with  vashe then apply [place] antibiotic spray for 10 mins then rinse with vashe     Apply regranex with saline moistened drawtex  Cover and secure with ABD,wrap with kerlix,secure with paper tape.  Wrap with ace wrap from toes to knee. Change daily and as needed     Monitor closely for s/s of infection including fever, chills, increase in pain, odor from wound, and increased redness from foot. Go to ER if any complications develop.   Keep leg elevated and avoid pressure on wound.   Diabetes:  Monitor glucose closely. Check fasting glucose and 2 hours after meals. HgA1C goal <7, fasting glucose , and 2 hours after meals <180  Hypertension:  Check blood pressure twice daily, goal <120/80  Diet:   Increase protein intake, avoid fried, fatty foods and foods high in simple carbs.   Vitamins:  Take vitamin C 1000 mg, zinc 50mg, vitamin d 5000 units, and a daily multivitamin. Angel is a good source of protein and nutrients to aid in wound healing.    Wear walking boot daily or use crutches to offload right foot

## 2023-08-14 NOTE — ASSESSMENT & PLAN NOTE
Clean with  vashe then apply [place] antibiotic spray for 10 mins then rinse with vashe     Apply regranex with saline moistened drawtex  Cover and secure with ABD,wrap with kerlix,secure with paper tape.  Wrap with ace wrap from toes to knee. Change daily and as needed     Monitor closely for s/s of infection including fever, chills, increase in pain, odor from wound, and increased redness from foot. Go to ER if any complications develop.   Keep leg elevated and avoid pressure on wound.   Diabetes:  Monitor glucose closely. Check fasting glucose and 2 hours after meals. HgA1C goal <7, fasting glucose , and 2 hours after meals <180  Hypertension:  Check blood pressure twice daily, goal <120/80  Diet:   Increase protein intake, avoid fried, fatty foods and foods high in simple carbs.   Vitamins:  Take vitamin C 1000 mg, zinc 50mg, vitamin d 5000 units, and a daily multivitamin. Angel is a good source of protein and nutrients to aid in wound healing.    Wear walking boot daily or use crutches to offload right foot

## 2023-08-28 NOTE — PATIENT INSTRUCTIONS
Clean with  vashe then apply [place] antibiotic spray for 10 mins then rinse with vashe     Apply regranex with saline moistened drawtex  Cover and secure with ABD,wrap with kerlix,secure with paper tape.  Wrap with ace wrap from toes to knee. Change daily and as needed     Monitor closely for s/s of infection including fever, chills, increase in pain, odor from wound, and increased redness from foot. Go to ER if any complications develop.   Keep leg elevated and avoid pressure on wound.   Diabetes:  Monitor glucose closely. Check fasting glucose and 2 hours after meals. HgA1C goal <7, fasting glucose , and 2 hours after meals <180  Hypertension:  Check blood pressure twice daily, goal <120/80  Diet:   Increase protein intake, avoid fried, fatty foods and foods high in simple carbs.   Vitamins:  Take vitamin C 1000 mg, zinc 50mg, vitamin d 5000 units, and a daily multivitamin. Angel is a good source of protein and nutrients to aid in wound healing.    Wear walking boot daily or use crutches to offload right foot

## 2023-08-28 NOTE — PROGRESS NOTES
AUGUSTUS Macedo   [facility]  [address] [facility] [place] [address]  [phone]      PATIENT NAME: Jean Pierre Paulson  : 1985  DATE: 23  MRN: [id number]      Billing Provider: AUGUSTUS Macedo  Level of Service:   Patient PCP Information       Provider PCP Type    Mojgan Lomeli NP General            Reason for Visit / Chief Complaint: Diabetic ulcer of right heel associated with type 2 diabete       History of Present Illness / Problem Focused Workflow     Jean Pierre Paulson is a 38 y.o. male who presents to clinic for follow up on chronic-non healing wound on the right foot. Wound continues to improve and is smaller today. Callus and maceration filippo-wound, bedside debridement today.   Reinforced importance of following plan of care, wearing off-loading boot, keeping pressure off foot, taking antibiotics as prescribed,increasing protein intake, and keeping diabetes well controlled.    Reports he is now using Dexacom to check his blood sugars.     Last arterial doppler 4/10/23,  The ankle to brachial index is 1.25 on the right and 1.34 on the left.    Significant PMH  Includes diabetes and hypertension. Last HgA1C 7.3 in 2022, diabetes is managed by PCP.  Denies fever or chills.     Review of Systems     Review of Systems   Constitutional:  Negative for activity change, chills and fever.   Respiratory:  Negative for chest tightness and shortness of breath.    Cardiovascular:  Positive for leg swelling. Negative for chest pain and palpitations.   Musculoskeletal:  Positive for arthralgias and joint swelling.   Skin:  Positive for wound.        See wound assessment   Neurological:  Positive for weakness and numbness.   Psychiatric/Behavioral:  Negative for agitation, behavioral problems, confusion and self-injury.        Medical / Social / Family History     Past Medical History:   Diagnosis Date    NOLAN (acute kidney injury) 2023    Anemia      Diabetes mellitus with neuropathy     HLD (hyperlipidemia) 04/07/2023    HTN (hypertension)     Obesity     TEDDY (obstructive sleep apnea) 04/07/2023    Osteomyelitis 10/2020    Hx of R foot, Tx with IV Abx    Peripheral vascular disease        Past Surgical History:   Procedure Laterality Date    APPLICATION OF SPLIT-THICKNESS SKIN GRAFT (STSG) TO LOWER EXTREMITY Right 03/02/2022    Procedure: APPLICATION, GRAFT, SKIN, SPLIT-THICKNESS, TO LOWER EXTREMITY;  Surgeon: Greg Ramírez MD;  Location: [facility];  Service: General;  Laterality: Right;    CHOLECYSTECTOMY      DEBRIDEMENT OF FOOT Right 10/2020    right great toe amputation         Social History  Mr. Jean Pierre Paulson  reports that he has been smoking cigarettes. He started smoking about 23 years ago. He has a 6.2 pack-year smoking history. He has never used smokeless tobacco. He reports current alcohol use of about 2.0 standard drinks of alcohol per week. He reports that he does not use drugs.    Family History  Mr.'s Jean Pierre Paulson family history includes Diabetes in his father and maternal grandmother; Hypertension in his father and maternal grandmother.    Medications and Allergies     Medications  Outpatient Medications Marked as Taking for the 8/29/23 encounter (Office Visit) with Yelitza Alas FNP   Medication Sig Dispense Refill    0.9 % sodium chloride (SODIUM CHLORIDE 0.9%) solution Inject 500 mLs into the vein once daily.      amLODIPine (NORVASC) 2.5 MG tablet Take 1 tablet (2.5 mg total) by mouth once daily. 30 tablet 11    blood sugar diagnostic (BLOOD GLUCOSE TEST) Strp 1 strip by Misc.(Non-Drug; Combo Route) route once daily. accu-chek Veronika strips 90 strip 5    blood-glucose meter,continuous (DEXCOM ) Misc 1 each by Misc.(Non-Drug; Combo Route) route 4 (four) times daily with meals and nightly. 1 each 0    blood-glucose sensor (DEXCOM G7 SENSOR) Aria 1 each by Misc.(Non-Drug; Combo Route) route every 10 days. 3  each 11    FEROSUL 325 mg (65 mg iron) Tab tablet Take 1 tablet (325 mg total) by mouth once daily. 90 tablet 1    gabapentin (NEURONTIN) 300 MG capsule Take 1 capsule (300 mg total) by mouth every 8 (eight) hours. 270 capsule 1    hydroCHLOROthiazide (HYDRODIURIL) 25 MG tablet Take 1 tablet (25 mg total) by mouth once daily. 90 tablet 1    HYDROcodone-acetaminophen (NORCO)  mg per tablet Take 1 tablet by mouth every 12 (twelve) hours as needed for Pain. 60 tablet 0    semaglutide (OZEMPIC) 2 mg/dose (8 mg/3 mL) PnIj Inject 2 mg into the skin every 7 days. 8 mL 2    sulfamethoxazole-trimethoprim 800-160mg (BACTRIM DS) 800-160 mg Tab Take 1 tablet by mouth 2 (two) times daily. 60 tablet 0       Allergies  Review of patient's allergies indicates:   Allergen Reactions    Tomato Itching       Physical Examination     Vitals:    08/29/23 0950   BP: (!) 148/92   Pulse: 82   Resp: 20   Temp: 97.7 °F (36.5 °C)     Physical Exam  Vitals and nursing note reviewed.   Constitutional:       Appearance: Normal appearance.   HENT:      Head: Normocephalic.   Cardiovascular:      Rate and Rhythm: Normal rate and regular rhythm.      Pulses: Normal pulses.      Heart sounds: Normal heart sounds.   Pulmonary:      Effort: Pulmonary effort is normal. No respiratory distress.   Chest:      Chest wall: No tenderness.   Musculoskeletal:         General: Swelling, tenderness and deformity present.      Right lower leg: Edema present.   Skin:     General: Skin is warm and dry.      Capillary Refill: Capillary refill takes 2 to 3 seconds.      Comments: See LDA for photos and measurements   Neurological:      General: No focal deficit present.      Mental Status: He is alert and oriented to person, place, and time. Mental status is at baseline.   Psychiatric:         Mood and Affect: Mood normal.         Behavior: Behavior normal.         Thought Content: Thought content normal.         Judgment: Judgment normal.     Assessment  and Plan             Altered Skin Integrity 04/10/23 1200 Right plantar Foot Diabetic Ulcer Full thickness tissue loss with exposed bone, tendon, or muscle. Often includes undermining and tunneling. May extend into muscle and/or supporting structures. (Active)   04/10/23 1200   Altered Skin Integrity Present on Admission - Did Patient arrive to the hospital with altered skin?: yes   Side: Right   Orientation: plantar   Location: Foot   Wound Number:    Is this injury device related?:    Primary Wound Type: Diabetic ulc   Description of Altered Skin Integrity: Full thickness tissue loss with exposed bone, tendon, or muscle. Often includes undermining and tunneling. May extend into muscle and/or supporting structures.   Ankle-Brachial Index:    Pulses:    Removal Indication and Assessment:    Wound Outcome:    (Retired) Wound Length (cm):    (Retired) Wound Width (cm):    (Retired) Depth (cm):    Wound Description (Comments):    Removal Indications:    Wound Image   08/29/23 1021   Description of Altered Skin Integrity Full thickness tissue loss. Subcutaneous fat may be visible but bone, tendon or muscle are not exposed 08/29/23 0955   Dressing Appearance Dried drainage 08/29/23 0955   Drainage Amount Moderate 08/29/23 0955   Drainage Characteristics/Odor Serosanguineous;No odor 08/29/23 0955   Appearance Pink;Red;Moist;Granulating 08/29/23 0955   Tissue loss description Full thickness 08/29/23 0955   Black (%), Wound Tissue Color 0 % 08/29/23 0955   Red (%), Wound Tissue Color 100 % 08/29/23 0955   Yellow (%), Wound Tissue Color 0 % 08/29/23 0955   Periwound Area Macerated;Moist;Pale white 08/29/23 0955   Wound Edges Callused;Open 08/29/23 0955   Wound Length (cm) 10.2 cm 08/29/23 1021   Wound Width (cm) 6.4 cm 08/29/23 1021   Wound Depth (cm) 0.7 cm 08/29/23 1021   Wound Volume (cm^3) 45.696 cm^3 08/29/23 1021   Wound Surface Area (cm^2) 65.28 cm^2 08/29/23 1021   Care Cleansed with:;Soap and water;Applied:;Skin  Barrier;Debrided 08/29/23 0955   Dressing Applied;Gauze, wet to dry;Gauze;Rolled gauze;Absorptive Pad;Non-adherent 08/29/23 0955   Periwound Care Moisture barrier applied 08/29/23 0955   Off Loading Off loading shoe 08/29/23 0955   Dressing Change Due 08/30/23 08/29/23 0955     Problem List Items Addressed This Visit          Cardiac/Vascular    Peripheral vascular disease (Chronic)    Overview     Impression:     The ankle to brachial index is 1.25 on the right and 1.34 on the left.  No arterial occlusions.  Right inguinal lymphadenopathy.  This is similar dating back to comparison exam.  Recommend clinical correlation.            Endocrine    Diabetic ulcer of right heel associated with type 2 diabetes mellitus, with fat layer exposed - Primary    Overview                                                  Current Assessment & Plan       Clean with  vashe then apply [place] antibiotic spray for 10 mins then rinse with vashe     Apply regranex with saline moistened drawtex  Cover and secure with ABD,wrap with kerlix,secure with paper tape.  Wrap with ace wrap from toes to knee. Change daily and as needed     Monitor closely for s/s of infection including fever, chills, increase in pain, odor from wound, and increased redness from foot. Go to ER if any complications develop.   Keep leg elevated and avoid pressure on wound.   Diabetes:  Monitor glucose closely. Check fasting glucose and 2 hours after meals. HgA1C goal <7, fasting glucose , and 2 hours after meals <180  Hypertension:  Check blood pressure twice daily, goal <120/80  Diet:   Increase protein intake, avoid fried, fatty foods and foods high in simple carbs.   Vitamins:  Take vitamin C 1000 mg, zinc 50mg, vitamin d 5000 units, and a daily multivitamin. Angel is a good source of protein and nutrients to aid in wound healing.    Wear walking boot daily or use crutches to offload right foot             Future Appointments   Date Time Provider Department  [place]   9/5/2023 10:30 AM Franck Kee PA [facility]   9/19/2023 10:00 AM Yelitza Alas FNP [facility]            Signature:  AUGUSTUS Macedo  [facility]  [address]  [place] [address]  [phone]    Date of encounter: 8/29/23

## 2023-08-29 ENCOUNTER — EXTERNAL HOME HEALTH (OUTPATIENT)
Dept: HOME HEALTH SERVICES | Facility: HOSPITAL | Age: 38
End: 2023-08-29
Payer: MEDICARE

## 2023-08-29 ENCOUNTER — OFFICE VISIT (OUTPATIENT)
Dept: WOUND CARE | Facility: CLINIC | Age: 38
End: 2023-08-29
Attending: FAMILY MEDICINE
Payer: MEDICARE

## 2023-08-29 VITALS
DIASTOLIC BLOOD PRESSURE: 92 MMHG | TEMPERATURE: 98 F | SYSTOLIC BLOOD PRESSURE: 148 MMHG | HEART RATE: 82 BPM | RESPIRATION RATE: 20 BRPM

## 2023-08-29 DIAGNOSIS — L97.412 DIABETIC ULCER OF RIGHT HEEL ASSOCIATED WITH TYPE 2 DIABETES MELLITUS, WITH FAT LAYER EXPOSED: Primary | ICD-10-CM

## 2023-08-29 DIAGNOSIS — I73.9 PERIPHERAL VASCULAR DISEASE: ICD-10-CM

## 2023-08-29 DIAGNOSIS — E11.621 DIABETIC ULCER OF RIGHT HEEL ASSOCIATED WITH TYPE 2 DIABETES MELLITUS, WITH FAT LAYER EXPOSED: Primary | ICD-10-CM

## 2023-08-29 PROCEDURE — 11042 DEBRIDEMENT: ICD-10-PCS | Mod: S$PBB,,, | Performed by: NURSE PRACTITIONER

## 2023-08-29 PROCEDURE — 11042 DBRDMT SUBQ TIS 1ST 20SQCM/<: CPT | Mod: PBBFAC | Performed by: NURSE PRACTITIONER

## 2023-08-29 PROCEDURE — 11045 DEBRIDEMENT: ICD-10-PCS | Mod: S$PBB,,, | Performed by: NURSE PRACTITIONER

## 2023-08-29 PROCEDURE — 99499 NO LOS: ICD-10-PCS | Mod: S$PBB,,, | Performed by: NURSE PRACTITIONER

## 2023-08-29 PROCEDURE — 99499 UNLISTED E&M SERVICE: CPT | Mod: S$PBB,,, | Performed by: NURSE PRACTITIONER

## 2023-08-29 PROCEDURE — 99215 OFFICE O/P EST HI 40 MIN: CPT | Mod: PBBFAC | Performed by: NURSE PRACTITIONER

## 2023-08-29 PROCEDURE — 11045 DBRDMT SUBQ TISS EACH ADDL: CPT | Mod: S$PBB,,, | Performed by: NURSE PRACTITIONER

## 2023-08-29 NOTE — PROGRESS NOTES
Debridement Performed for Assessment: Wound# right plantar foot  Performed By: Provider: Yelitza Alas NP  Assistant:Sisi Alberto RN    Debridement: Surgical    Photo taken post procedure:    Time-Out Taken: Yes  Level: Skin/Subcutaneous Tissue/  Post Debridement Measurements  Length: (cm) 10.2  Width: (cm) 6.4  Depth: (cm) 0.7      Area: (cm²) 65.28  Percent Debrided: 100%  Total Area Debrided: (sq cm) 65.28    Tissue and other material debrided:  Adipose, Dermis, Epidermis, Subcutaneous,  Devitalized Tissue Debrided:Biofilm, Slough, Eschar  Instrument: Curette  Bleeding: Moderate  Hemostasis Achieved: Pressure  Procedural Pain: Insensate  Post Procedural Pain: Insensate  Response to Treatment: Procedure was tolerated well    Devitalized materials/tissue Removed  the following was removed during debridement  subcutaneous,      Post Debridement Diagnosis  Post debridement diagnosis  Same as Pre-operative debridement diagnosis, No Complications noted.      Grafts or implants applied  Was a graft or implant applied?  No      Procedure assistant  Procedure assisted by:  Assistant is the same as nurse listed above      Complications related to procedure  Did any complication occure during procedure?  No complications noted during or after procedure.      Specimen  Specimen collect during procedure?  No specimen collected      Anaesthesia:  Anesthesia used  None      Blood Loss:  Blood loss during procedure  less than 5 cc

## 2023-08-29 NOTE — ASSESSMENT & PLAN NOTE
Clean with  vashe then apply [place] antibiotic spray for 10 mins then rinse with vashe     Apply regranex with saline moistened drawtex  Cover and secure with ABD,wrap with kerlix,secure with paper tape.  Wrap with ace wrap from toes to knee. Change daily and as needed     Monitor closely for s/s of infection including fever, chills, increase in pain, odor from wound, and increased redness from foot. Go to ER if any complications develop.   Keep leg elevated and avoid pressure on wound.   Diabetes:  Monitor glucose closely. Check fasting glucose and 2 hours after meals. HgA1C goal <7, fasting glucose , and 2 hours after meals <180  Hypertension:  Check blood pressure twice daily, goal <120/80  Diet:   Increase protein intake, avoid fried, fatty foods and foods high in simple carbs.   Vitamins:  Take vitamin C 1000 mg, zinc 50mg, vitamin d 5000 units, and a daily multivitamin. Angel is a good source of protein and nutrients to aid in wound healing.    Wear walking boot daily or use crutches to offload right foot

## 2023-08-29 NOTE — PROCEDURES
"Debridement    Date/Time: 8/29/2023 10:00 AM    Performed by: Yelitza Alas FNP  Authorized by: Yelitza Alas FNP    Time out: Immediately prior to procedure a "time out" was called to verify the correct patient, procedure, equipment, support staff and site/side marked as required.    Consent Done?:  Yes (Written)    Type of Debridement:  Excisional       Length (cm):  10.2       Area (sq cm):  65.28       Width (cm):  6.4       Percent Debrided (%):  100       Depth (cm):  0.7       Total Area Debrided (sq cm):  65.28    Depth of debridement:  Subcutaneous tissue    Tissue debrided:  Adipose, Dermis, Epidermis and Subcutaneous    Devitalized tissue debrided:  Biofilm, Callus, Clots, Exudate and Fibrin    Instruments:  Curette  Bleeding:  Moderate  Hemostasis Achieved: Yes  Method Used:  Pressure  Patient tolerance:  Patient tolerated the procedure well with no immediate complications  1st Wound Pain Assessment: 0     Assistant CYNDY Alberto RN    "

## 2023-08-31 NOTE — PROGRESS NOTES
Subjective:         Patient ID: Jean Pierre Paulson is a 38 y.o. male.    Chief Complaint: Foot Pain (right)        Pain  This is a chronic problem. The current episode started more than 1 year ago. The problem occurs daily. The problem has been waxing and waning. Associated symptoms include arthralgias and neck pain. Pertinent negatives include no anorexia, chest pain, chills, coughing, fever, sore throat, vertigo or vomiting.     Review of Systems   Constitutional:  Negative for activity change, appetite change, chills, fever and unexpected weight change.   HENT:  Negative for drooling, ear discharge, ear pain, facial swelling, nosebleeds, sore throat, trouble swallowing, voice change and goiter.    Eyes:  Negative for photophobia, pain, discharge, redness and visual disturbance.   Respiratory:  Negative for apnea, cough, choking, chest tightness, shortness of breath, wheezing and stridor.    Cardiovascular:  Negative for chest pain, palpitations and leg swelling.   Gastrointestinal:  Negative for abdominal distention, anorexia, diarrhea, rectal pain, vomiting and fecal incontinence.   Endocrine: Negative for cold intolerance, heat intolerance, polydipsia, polyphagia and polyuria.   Genitourinary:  Negative for bladder incontinence, dysuria, flank pain and frequency.   Musculoskeletal:  Positive for arthralgias, back pain, leg pain and neck pain. Negative for neck stiffness.   Integumentary:  Negative for color change and pallor.   Neurological:  Negative for dizziness, vertigo, seizures, syncope, facial asymmetry, speech difficulty, light-headedness, coordination difficulties, memory loss and coordination difficulties.   Hematological:  Negative for adenopathy. Does not bruise/bleed easily.   Psychiatric/Behavioral:  Negative for agitation, behavioral problems, confusion, decreased concentration, dysphoric mood, hallucinations, self-injury and suicidal ideas. The patient is not nervous/anxious and is not hyperactive.             Past Medical History:   Diagnosis Date    NOLAN (acute kidney injury) 2023    Anemia     Diabetes mellitus with neuropathy     HLD (hyperlipidemia) 2023    HTN (hypertension)     Obesity     TEDDY (obstructive sleep apnea) 2023    Osteomyelitis 10/2020    Hx of R foot, Tx with IV Abx    Peripheral vascular disease      Past Surgical History:   Procedure Laterality Date    APPLICATION OF SPLIT-THICKNESS SKIN GRAFT (STSG) TO LOWER EXTREMITY Right 2022    Procedure: APPLICATION, GRAFT, SKIN, SPLIT-THICKNESS, TO LOWER EXTREMITY;  Surgeon: Greg Ramírez MD;  Location: [facility];  Service: General;  Laterality: Right;    CHOLECYSTECTOMY      DEBRIDEMENT OF FOOT Right 10/2020    right great toe amputation       Social History     Socioeconomic History    Marital status: Single    Number of children: 1   Tobacco Use    Smoking status: Every Day     Current packs/day: 0.00     Average packs/day: 0.3 packs/day for 25.0 years (6.2 ttl pk-yrs)     Types: Cigarettes     Start date:      Last attempt to quit: 2022     Years since quittin.5    Smokeless tobacco: Never   Substance and Sexual Activity    Alcohol use: Yes     Alcohol/week: 2.0 standard drinks of alcohol     Types: 1 Glasses of wine, 1 Cans of beer per week     Comment: occasionally    Drug use: Never    Sexual activity: Yes     Partners: Female     Social Determinants of Health     Financial Resource Strain: Low Risk  (4/10/2023)    Overall Financial Resource Strain (CARDIA)     Difficulty of Paying Living Expenses: Not hard at all   Food Insecurity: No Food Insecurity (4/10/2023)    Hunger Vital Sign     Worried About Running Out of Food in the Last Year: Never true     Ran Out of Food in the Last Year: Never true   Transportation Needs: Unmet Transportation Needs (4/10/2023)    PRAPARE - Transportation     Lack of Transportation (Medical): No     Lack of Transportation (Non-Medical): Yes   Physical Activity: Inactive  "(4/10/2023)    Exercise Vital Sign     Days of Exercise per Week: 0 days     Minutes of Exercise per Session: 0 min   Stress: No Stress Concern Present (4/10/2023)    [place] [employer] - Occupational Stress Questionnaire     Feeling of Stress : Not at all   Social Connections: Socially Isolated (4/10/2023)    Social Connection and Isolation Panel [NHANES]     Frequency of Communication with Friends and Family: More than three times a week     Frequency of Social Gatherings with Friends and Family: More than three times a week     Attends Gnosticism Services: Never     Active Member of Clubs or Organizations: No     Attends Club or Organization Meetings: Never     Marital Status: Never    Housing Stability: Unknown (4/10/2023)    Housing Stability Vital Sign     Unable to Pay for Housing in the Last Year: No     Unstable Housing in the Last Year: No     Family History   Problem Relation Age of Onset    Hypertension Father     Diabetes Father     Hypertension Maternal Grandmother     Diabetes Maternal Grandmother      Review of patient's allergies indicates:   Allergen Reactions    Tomato Itching        Objective:  Vitals:    09/05/23 1028   BP: (!) 163/88   Pulse: 84   Resp: 18   Weight: (!) 156 kg (344 lb)   Height: 6' 2" (1.88 m)   PainSc:   4           Physical Exam  Vitals and nursing note reviewed. Exam conducted with a chaperone present.   Constitutional:       General: He is awake. He is not in acute distress.     Appearance: Normal appearance. He is not toxic-appearing or diaphoretic.   HENT:      Head: Normocephalic and atraumatic.      Nose: Nose normal.      Mouth/Throat:      Mouth: Mucous membranes are moist.      Pharynx: Oropharynx is clear.   Eyes:      Conjunctiva/sclera: Conjunctivae normal.      Pupils: Pupils are equal, round, and reactive to light.   Cardiovascular:      Rate and Rhythm: Normal rate.   Pulmonary:      Effort: Pulmonary effort is normal. No respiratory " distress.   Abdominal:      Palpations: Abdomen is soft.      Tenderness: There is no guarding.   Musculoskeletal:         General: Normal range of motion.      Cervical back: Normal range of motion and neck supple. No rigidity.      Lumbar back: Tenderness present.      Right foot: Tenderness present.      Left foot: Tenderness present.   Skin:     General: Skin is warm and dry.      Coloration: Skin is not jaundiced or pale.   Neurological:      General: No focal deficit present.      Mental Status: He is alert and oriented to person, place, and time. Mental status is at baseline.      Cranial Nerves: No cranial nerve deficit (II-XII).   Psychiatric:         Mood and Affect: Mood normal.         Behavior: Behavior normal. Behavior is cooperative.         Thought Content: Thought content normal.           X-Ray Foot Complete 3 view Right  Narrative: EXAMINATION:  XR FOOT COMPLETE 3 VIEW RIGHT    CLINICAL HISTORY:  .  Diabetes mellitus due to underlying condition with foot ulcer    COMPARISON:  April 7, 2023 right foot x-ray available    TECHNIQUE:  AP, lateral, and oblique views right foot    FINDINGS:  No new areas of active bony erosion are seen.  No changes have occurred since the previous study to specifically suggest acute osteomyelitis.  There has been previous amputation of the 1st digit at the distal metatarsal level as before.  Sclerotic joint changes with osteophyte formation are noted at the 1st through 5th tarsometatarsal joints as before.  There is chronic erosive change at the plantar aspect of the calcaneus posteriorly as before.  There is no acute fracture or dislocation.    There is continued soft tissue swelling about the foot.  There is no radiopaque foreign body within soft tissues.  There is soft tissue ulceration at the plantar aspect of the calcaneus.  Impression: No acute bony changes.  Chronic erosive changes plantar aspect of the calcaneus posteriorly    Chronic degenerative changes of the  tarsometatarsal joint level as before.    Soft tissue swelling about the foot.  Calcaneal plantar soft tissue ulcer as before.    Electronically signed by: Geovany Berrios  Date:    07/03/2023  Time:    13:11         Office Visit on 05/05/2023   Component Date Value Ref Range Status    POC Glucose 05/05/2023 127 (A)  70 - 110 MG/DL Final    POC Glucose 05/05/2023 142 (A)  70 - 110 MG/DL Final   Office Visit on 05/03/2023   Component Date Value Ref Range Status    POC Glucose 05/04/2023 132 (A)  70 - 110 MG/DL Final    POC Glucose 05/04/2023 143 (A)  70 - 110 MG/DL Final   Office Visit on 05/03/2023   Component Date Value Ref Range Status    POC Amphetamines 05/03/2023 Negative  Negative, Inconclusive Final    POC Barbiturates 05/03/2023 Negative  Negative, Inconclusive Final    POC Benzodiazepines 05/03/2023 Negative  Negative, Inconclusive Final    POC Cocaine 05/03/2023 Negative  Negative, Inconclusive Final    POC THC 05/03/2023 Negative  Negative, Inconclusive Final    POC Methadone 05/03/2023 Negative  Negative, Inconclusive Final    POC Methamphetamine 05/03/2023 Negative  Negative, Inconclusive Final    POC Opiates 05/03/2023 Presumptive Positive (A)  Negative, Inconclusive Final    POC Oxycodone 05/03/2023 Negative  Negative, Inconclusive Final    POC Phencyclidine 05/03/2023 Negative  Negative, Inconclusive Final    POC Methylenedioxymethamphetamine * 05/03/2023 Negative  Negative, Inconclusive Final    POC Tricyclic Antidepressants 05/03/2023 Negative  Negative, Inconclusive Final    POC Buprenorphine 05/03/2023 Negative   Final     Acceptable 05/03/2023 Yes   Final    POC Temperature (Urine) 05/03/2023 94   Final   Office Visit on 05/02/2023   Component Date Value Ref Range Status    POC Glucose 05/02/2023 199 (A)  70 - 110 MG/DL Final    POC Glucose 05/03/2023 164 (A)  70 - 110 MG/DL Final    POC Glucose 05/02/2023 133 (A)  70 - 110 MG/DL Final   Office Visit on 04/28/2023   Component  Date Value Ref Range Status    POC Glucose 05/01/2023 136 (A)  70 - 110 MG/DL Final    POC Glucose 04/28/2023 117 (A)  70 - 110 MG/DL Final   Office Visit on 04/26/2023   Component Date Value Ref Range Status    POC Glucose 04/28/2023 136 (A)  70 - 110 MG/DL Final    POC Glucose 04/26/2023 147 (A)  70 - 110 MG/DL Final   Office Visit on 04/24/2023   Component Date Value Ref Range Status    POC Glucose 04/24/2023 149 (A)  70 - 110 MG/DL Final    POC Glucose 04/24/2023 130 (A)  70 - 110 MG/DL Final   Document Scan on 04/24/2023   Component Date Value Ref Range Status    POC Glucose 05/08/2023 152 (H)  70 - 105 mg/dL Final    POC Glucose 05/08/2023 171 (H)  70 - 105 mg/dL Final    POC Glucose 05/09/2023 131 (H)  70 - 105 mg/dL Final    POC Glucose 05/09/2023 129 (H)  70 - 105 mg/dL Final   Office Visit on 04/21/2023   Component Date Value Ref Range Status    POC Glucose 04/21/2023 147 (A)  70 - 110 MG/DL Final    POC Glucose 04/21/2023 169 (A)  70 - 110 MG/DL Final   Office Visit on 04/20/2023   Component Date Value Ref Range Status    POC Glucose 04/20/2023 148 (A)  70 - 110 MG/DL Final    POC Glucose 04/20/2023 182 (A)  70 - 110 MG/DL Final   There may be more visits with results that are not included.         Orders Placed This Encounter   Procedures    POCT Urine Drug Screen Presump     Interpretive Information:     Negative:  No drug detected at the cut off level.   Positive:  This result represents presumptive positive for the   tested drug, other substances may yield a positive response other   than the analyte of interest. This result should be utilized for   diagnostic purpose only. Confirmation testing will be performed upon physician request only.            Requested Prescriptions     Signed Prescriptions Disp Refills    HYDROcodone-acetaminophen (NORCO)  mg per tablet 60 tablet 0     Sig: Take 1 tablet by mouth every 12 (twelve) hours as needed for Pain.    HYDROcodone-acetaminophen (NORCO)  mg  per tablet 60 tablet 0     Sig: Take 1 tablet by mouth every 12 (twelve) hours as needed for Pain.       Assessment:     1. Diabetic neuropathy with neurologic complication    2. Peripheral vascular disease    3. Encounter for long-term (current) use of other medications         A's of Opioid Risk Assessment  Activity:Patient can perform ADL.   Analgesia:Patients pain is partially controlled by current medication. Patient has tried OTC medications such as Tylenol and Ibuprofen with out relief.   Adverse Effects: Patient denies constipation or sedation.  Aberrant Behavior:  reviewed with no aberrant drug seeking/taking behavior.  Overdose reversal drug naloxone discussed    Drug screen reviewed      Plan:    Narcan March 2023    Presumptive drug screen today September 5, 2023 consistent with opiates      Ulcer right foot continues to follow wound management please see photographs    Walking boot right lower extremity    No new complaints continues with chronic lower extremity pain    He states he would like to continue with conservative management    He verbalized understanding if he can not keep office visits for compliance with urine drug screens for narcotics management will discontinue narcotics    He would like to continue with conservative management    Continue activity as directed     Continue current medication    Follow-up 2 months    Dr. Paulson, July 2024    Bring original prescription medication bottles/container/box with labels to each visit

## 2023-09-05 ENCOUNTER — OFFICE VISIT (OUTPATIENT)
Dept: PAIN MEDICINE | Facility: CLINIC | Age: 38
End: 2023-09-05
Payer: MEDICARE

## 2023-09-05 VITALS
HEIGHT: 74 IN | WEIGHT: 315 LBS | HEART RATE: 84 BPM | RESPIRATION RATE: 18 BRPM | DIASTOLIC BLOOD PRESSURE: 88 MMHG | BODY MASS INDEX: 40.43 KG/M2 | SYSTOLIC BLOOD PRESSURE: 163 MMHG

## 2023-09-05 DIAGNOSIS — Z79.899 ENCOUNTER FOR LONG-TERM (CURRENT) USE OF OTHER MEDICATIONS: ICD-10-CM

## 2023-09-05 DIAGNOSIS — I73.9 PERIPHERAL VASCULAR DISEASE: Chronic | ICD-10-CM

## 2023-09-05 DIAGNOSIS — E11.49 DIABETIC NEUROPATHY WITH NEUROLOGIC COMPLICATION: Primary | Chronic | ICD-10-CM

## 2023-09-05 DIAGNOSIS — E11.40 DIABETIC NEUROPATHY WITH NEUROLOGIC COMPLICATION: Primary | Chronic | ICD-10-CM

## 2023-09-05 PROCEDURE — 99214 OFFICE O/P EST MOD 30 MIN: CPT | Mod: S$PBB,,, | Performed by: PHYSICIAN ASSISTANT

## 2023-09-05 PROCEDURE — 99214 OFFICE O/P EST MOD 30 MIN: CPT | Mod: PBBFAC | Performed by: PHYSICIAN ASSISTANT

## 2023-09-05 PROCEDURE — 99999PBSHW POCT URINE DRUG SCREEN PRESUMP: Mod: PBBFAC,,,

## 2023-09-05 PROCEDURE — 99214 PR OFFICE/OUTPT VISIT, EST, LEVL IV, 30-39 MIN: ICD-10-PCS | Mod: S$PBB,,, | Performed by: PHYSICIAN ASSISTANT

## 2023-09-05 PROCEDURE — 99999PBSHW POCT URINE DRUG SCREEN PRESUMP: ICD-10-PCS | Mod: PBBFAC,,,

## 2023-09-05 PROCEDURE — 80305 DRUG TEST PRSMV DIR OPT OBS: CPT | Mod: PBBFAC | Performed by: PHYSICIAN ASSISTANT

## 2023-09-05 RX ORDER — HYDROCODONE BITARTRATE AND ACETAMINOPHEN 10; 325 MG/1; MG/1
1 TABLET ORAL EVERY 12 HOURS PRN
Qty: 60 TABLET | Refills: 0 | Status: SHIPPED | OUTPATIENT
Start: 2023-09-08 | End: 2023-10-31 | Stop reason: SDUPTHER

## 2023-09-05 RX ORDER — HYDROCODONE BITARTRATE AND ACETAMINOPHEN 10; 325 MG/1; MG/1
1 TABLET ORAL EVERY 12 HOURS PRN
Qty: 60 TABLET | Refills: 0 | Status: SHIPPED | OUTPATIENT
Start: 2023-10-07 | End: 2023-10-31 | Stop reason: SDUPTHER

## 2023-09-20 ENCOUNTER — PATIENT MESSAGE (OUTPATIENT)
Dept: ADMINISTRATIVE | Facility: HOSPITAL | Age: 38
End: 2023-09-20

## 2023-09-28 ENCOUNTER — OFFICE VISIT (OUTPATIENT)
Dept: WOUND CARE | Facility: CLINIC | Age: 38
End: 2023-09-28
Attending: FAMILY MEDICINE
Payer: MEDICARE

## 2023-09-28 DIAGNOSIS — L97.412 DIABETIC ULCER OF RIGHT HEEL ASSOCIATED WITH TYPE 2 DIABETES MELLITUS, WITH FAT LAYER EXPOSED: Primary | ICD-10-CM

## 2023-09-28 DIAGNOSIS — E11.610 CHARCOT FOOT DUE TO DIABETES MELLITUS: ICD-10-CM

## 2023-09-28 DIAGNOSIS — E11.621 DIABETIC ULCER OF RIGHT HEEL ASSOCIATED WITH TYPE 2 DIABETES MELLITUS, WITH FAT LAYER EXPOSED: Primary | ICD-10-CM

## 2023-09-28 DIAGNOSIS — I73.9 PERIPHERAL VASCULAR DISEASE: Chronic | ICD-10-CM

## 2023-09-28 DIAGNOSIS — T14.8XXA DRAINAGE FROM WOUND: ICD-10-CM

## 2023-09-28 PROCEDURE — 87075 CULTR BACTERIA EXCEPT BLOOD: CPT | Mod: ,,, | Performed by: CLINICAL MEDICAL LABORATORY

## 2023-09-28 PROCEDURE — 11042 DBRDMT SUBQ TIS 1ST 20SQCM/<: CPT | Mod: PBBFAC | Performed by: NURSE PRACTITIONER

## 2023-09-28 PROCEDURE — 99499 NO LOS: ICD-10-PCS | Mod: S$PBB,,, | Performed by: NURSE PRACTITIONER

## 2023-09-28 PROCEDURE — 99213 OFFICE O/P EST LOW 20 MIN: CPT | Mod: PBBFAC,25 | Performed by: NURSE PRACTITIONER

## 2023-09-28 PROCEDURE — 87070 CULTURE OTHR SPECIMN AEROBIC: CPT | Mod: ,,, | Performed by: CLINICAL MEDICAL LABORATORY

## 2023-09-28 PROCEDURE — 99499 UNLISTED E&M SERVICE: CPT | Mod: S$PBB,,, | Performed by: NURSE PRACTITIONER

## 2023-09-28 PROCEDURE — 11045 DBRDMT SUBQ TISS EACH ADDL: CPT | Mod: S$PBB,,, | Performed by: NURSE PRACTITIONER

## 2023-09-28 PROCEDURE — 11045 DEBRIDEMENT: ICD-10-PCS | Mod: S$PBB,,, | Performed by: NURSE PRACTITIONER

## 2023-09-28 PROCEDURE — 87077 CULTURE AEROBIC IDENTIFY: CPT | Mod: ,,, | Performed by: CLINICAL MEDICAL LABORATORY

## 2023-09-28 PROCEDURE — 87077 CULTURE, WOUND: ICD-10-PCS | Mod: ,,, | Performed by: CLINICAL MEDICAL LABORATORY

## 2023-09-28 PROCEDURE — 87186 CULTURE, WOUND: ICD-10-PCS | Mod: ,,, | Performed by: CLINICAL MEDICAL LABORATORY

## 2023-09-28 PROCEDURE — 87075 CULTURE, ANAEROBE: ICD-10-PCS | Mod: ,,, | Performed by: CLINICAL MEDICAL LABORATORY

## 2023-09-28 PROCEDURE — 87186 SC STD MICRODIL/AGAR DIL: CPT | Mod: ,,, | Performed by: CLINICAL MEDICAL LABORATORY

## 2023-09-28 PROCEDURE — 87070 CULTURE, WOUND: ICD-10-PCS | Mod: ,,, | Performed by: CLINICAL MEDICAL LABORATORY

## 2023-09-28 PROCEDURE — 11042 DEBRIDEMENT: ICD-10-PCS | Mod: S$PBB,,, | Performed by: NURSE PRACTITIONER

## 2023-09-28 NOTE — PATIENT INSTRUCTIONS
Clean with  vashe then apply [place] antibiotic spray for 10 mins then rinse with vashe     Apply silver polymem cut slightly larger than wound.     Cover and secure with 4x4, ABD,wrap with kerlix,secure with paper tape.  Wrap with ace wrap from toes to knee. Change daily and as needed     Monitor closely for s/s of infection including fever, chills, increase in pain, odor from wound, and increased redness from foot. Go to ER if any complications develop.   Keep leg elevated and avoid pressure on wound.   Diabetes:  Monitor glucose closely. Check fasting glucose and 2 hours after meals. HgA1C goal <7, fasting glucose , and 2 hours after meals <180  Hypertension:  Check blood pressure twice daily, goal <120/80  Diet:   Increase protein intake, avoid fried, fatty foods and foods high in simple carbs.   Vitamins:  Take vitamin C 1000 mg, zinc 50mg, vitamin d 5000 units, and a daily multivitamin. Angel is a good source of protein and nutrients to aid in wound healing.    Wear walking boot daily or use crutches to offload right foot    Home health to FOLLOW wound orders above

## 2023-09-29 PROBLEM — L97.518 NON-PRESSURE CHRONIC ULCER OF OTHER PART OF RIGHT FOOT WITH OTHER SPECIFIED SEVERITY: Status: RESOLVED | Noted: 2021-07-07 | Resolved: 2023-09-29

## 2023-09-29 PROBLEM — E11.610 CHARCOT FOOT DUE TO DIABETES MELLITUS: Status: ACTIVE | Noted: 2023-09-29

## 2023-09-29 NOTE — PROGRESS NOTES
AUGUSTUS Macedo   [facility]  [address] [facility] [place] [address]  [phone]      PATIENT NAME: Jean Pierre Paulson  : 1985  DATE: 23  MRN: [id number]      Billing Provider: AUGUSTUS Macedo  Level of Service:   Patient PCP Information       Provider PCP Type    Mojgan Lomeli NP General            Reason for Visit / Chief Complaint: Diabetic ulcer of right heel associated with type 2 diabete       History of Present Illness / Problem Focused Workflow     Jean Pierre Paulson is a 38 y.o. male who presents to clinic for follow up on chronic-non healing wound on the right foot. Wound remains stable. Area of increased pressure in center of wound with spongy material draining serous drainage. Callus and maceration filippo-wound, bedside debridement today. Cultures obtained today. He is currently on broad spectrum antibiotics, will adjust per cultures.   He is not wearing off-loading shoe today. Discussed importance off proper off-loading to aid in wound healing. Discussed Charcot's foot and reviewed previous x-rays with patient. Prescription for CROW orthotic shoe given today.     Reinforced importance of following plan of care, wearing off-loading boot, keeping pressure off foot, taking antibiotics as prescribed,increasing protein intake, and keeping diabetes well controlled.    Reports he is now using Dexacom to check his blood sugars.     Last arterial doppler 4/10/23,  The ankle to brachial index is 1.25 on the right and 1.34 on the left.    Significant PMH  Includes diabetes and hypertension. Last HgA1C 7.3 in 2022, diabetes is managed by PCP.  Denies fever or chills.       Review of Systems     Review of Systems   Constitutional:  Negative for activity change, chills and fever.   Respiratory:  Negative for chest tightness and shortness of breath.    Cardiovascular:  Positive for leg swelling. Negative for chest pain and palpitations.    Musculoskeletal:  Positive for arthralgias and joint swelling.   Skin:  Positive for wound.        See wound assessment   Neurological:  Positive for weakness and numbness.   Psychiatric/Behavioral:  Negative for agitation, behavioral problems, confusion and self-injury.        Medical / Social / Family History     Past Medical History:   Diagnosis Date    NOLAN (acute kidney injury) 04/07/2023    Anemia     Diabetes mellitus with neuropathy     HLD (hyperlipidemia) 04/07/2023    HTN (hypertension)     Obesity     TEDDY (obstructive sleep apnea) 04/07/2023    Osteomyelitis 10/2020    Hx of R foot, Tx with IV Abx    Peripheral vascular disease        Past Surgical History:   Procedure Laterality Date    APPLICATION OF SPLIT-THICKNESS SKIN GRAFT (STSG) TO LOWER EXTREMITY Right 03/02/2022    Procedure: APPLICATION, GRAFT, SKIN, SPLIT-THICKNESS, TO LOWER EXTREMITY;  Surgeon: Greg Ramírez MD;  Location: [facility];  Service: General;  Laterality: Right;    CHOLECYSTECTOMY      DEBRIDEMENT OF FOOT Right 10/2020    right great toe amputation         Social History  Mr. Jean Pierre Paulson  reports that he has been smoking cigarettes. He started smoking about 23 years ago. He has a 6.2 pack-year smoking history. He has never used smokeless tobacco. He reports current alcohol use of about 2.0 standard drinks of alcohol per week. He reports that he does not use drugs.    Family History  .'anjana Paulson family history includes Diabetes in his father and maternal grandmother; Hypertension in his father and maternal grandmother.    Medications and Allergies     Medications  Outpatient Medications Marked as Taking for the 9/28/23 encounter (Office Visit) with Yelitza Alas FNP   Medication Sig Dispense Refill    0.9 % sodium chloride (SODIUM CHLORIDE 0.9%) solution Inject 500 mLs into the vein once daily.      amLODIPine (NORVASC) 2.5 MG tablet Take 1 tablet (2.5 mg total) by mouth once daily. 30 tablet 11    blood sugar  diagnostic (BLOOD GLUCOSE TEST) Strp 1 strip by Misc.(Non-Drug; Combo Route) route once daily. accu-chek Veronika strips 90 strip 5    blood-glucose meter,continuous (DEXCOM ) Misc 1 each by Misc.(Non-Drug; Combo Route) route 4 (four) times daily with meals and nightly. 1 each 0    blood-glucose sensor (DEXCOM G7 SENSOR) Aria 1 each by Misc.(Non-Drug; Combo Route) route every 10 days. 3 each 11    FEROSUL 325 mg (65 mg iron) Tab tablet Take 1 tablet (325 mg total) by mouth once daily. 90 tablet 1    gabapentin (NEURONTIN) 300 MG capsule Take 1 capsule (300 mg total) by mouth every 8 (eight) hours. 270 capsule 1    hydroCHLOROthiazide (HYDRODIURIL) 25 MG tablet Take 1 tablet (25 mg total) by mouth once daily. 90 tablet 1    HYDROcodone-acetaminophen (NORCO)  mg per tablet Take 1 tablet by mouth every 12 (twelve) hours as needed for Pain. 60 tablet 0    [START ON 10/7/2023] HYDROcodone-acetaminophen (NORCO)  mg per tablet Take 1 tablet by mouth every 12 (twelve) hours as needed for Pain. 60 tablet 0    semaglutide (OZEMPIC) 2 mg/dose (8 mg/3 mL) PnIj Inject 2 mg into the skin every 7 days. 8 mL 2       Allergies  Review of patient's allergies indicates:   Allergen Reactions    Tomato Itching       Physical Examination   There were no vitals filed for this visit.  Physical Exam  Vitals and nursing note reviewed.   Constitutional:       Appearance: Normal appearance.   HENT:      Head: Normocephalic.   Cardiovascular:      Rate and Rhythm: Normal rate and regular rhythm.      Pulses: Normal pulses.      Heart sounds: Normal heart sounds.   Pulmonary:      Effort: Pulmonary effort is normal. No respiratory distress.   Chest:      Chest wall: No tenderness.   Musculoskeletal:         General: Swelling, tenderness and deformity present.      Right lower leg: Edema present.   Skin:     General: Skin is warm and dry.      Capillary Refill: Capillary refill takes 2 to 3 seconds.      Comments: See LDA for  photos and measurements   Neurological:      General: No focal deficit present.      Mental Status: He is alert and oriented to person, place, and time. Mental status is at baseline.   Psychiatric:         Mood and Affect: Mood normal.         Behavior: Behavior normal.         Thought Content: Thought content normal.         Judgment: Judgment normal.       Assessment and Plan             Altered Skin Integrity 04/10/23 1200 Right plantar Foot Diabetic Ulcer Full thickness tissue loss with exposed bone, tendon, or muscle. Often includes undermining and tunneling. May extend into muscle and/or supporting structures. (Active)   04/10/23 1200   Altered Skin Integrity Present on Admission - Did Patient arrive to the hospital with altered skin?: yes   Side: Right   Orientation: plantar   Location: Foot   Wound Number:    Is this injury device related?:    Primary Wound Type: Diabetic ulc   Description of Altered Skin Integrity: Full thickness tissue loss with exposed bone, tendon, or muscle. Often includes undermining and tunneling. May extend into muscle and/or supporting structures.   Ankle-Brachial Index:    Pulses:    Removal Indication and Assessment:    Wound Outcome:    (Retired) Wound Length (cm):    (Retired) Wound Width (cm):    (Retired) Depth (cm):    Wound Description (Comments):    Removal Indications:    Wound Image   09/28/23 1200   Wound Length (cm) 10.5 cm 09/28/23 1200   Wound Width (cm) 6.5 cm 09/28/23 1200   Wound Depth (cm) 0.8 cm 09/28/23 1200   Wound Volume (cm^3) 54.6 cm^3 09/28/23 1200   Wound Surface Area (cm^2) 68.25 cm^2 09/28/23 1200     Problem List Items Addressed This Visit          Cardiac/Vascular    Peripheral vascular disease (Chronic)    Overview     Impression:     The ankle to brachial index is 1.25 on the right and 1.34 on the left.  No arterial occlusions.  Right inguinal lymphadenopathy.  This is similar dating back to comparison exam.  Recommend clinical correlation.          Current Assessment & Plan     Heart Healthy Diet-reduce sodium intake to 1,500  mg/day and limit alcohol  Smoking Cessation - if smoke set goals to quit  Exercise daily  Weight loss -  Maintain healthy weight.  If overweight, set goal to  lose about 5% to 10% of baseline weight within six months  Keep blood pressure well controlled - take medications as prescribed   Ensure diabetes is controlled - diabetic diet, medications as prescribed, check blood glucose routinely               Endocrine    Diabetic ulcer of right heel associated with type 2 diabetes mellitus, with fat layer exposed - Primary    Overview                                                  Current Assessment & Plan     Clean with  vashe then apply [place] antibiotic spray for 10 mins then rinse with vashe     Apply silver polymem cut slightly larger than wound.     Cover and secure with 4x4, ABD,wrap with kerlix,secure with paper tape.  Wrap with ace wrap from toes to knee. Change daily and as needed     Monitor closely for s/s of infection including fever, chills, increase in pain, odor from wound, and increased redness from foot. Go to ER if any complications develop.   Keep leg elevated and avoid pressure on wound.   Diabetes:  Monitor glucose closely. Check fasting glucose and 2 hours after meals. HgA1C goal <7, fasting glucose , and 2 hours after meals <180  Hypertension:  Check blood pressure twice daily, goal <120/80  Diet:   Increase protein intake, avoid fried, fatty foods and foods high in simple carbs.   Vitamins:  Take vitamin C 1000 mg, zinc 50mg, vitamin d 5000 units, and a daily multivitamin. Angel is a good source of protein and nutrients to aid in wound healing.    Wear walking boot daily or use crutches to offload right foot         Relevant Orders    Culture, Anaerobe    Culture, Wound (Completed)       Orthopedic    Charcot foot due to diabetes mellitus    Overview     Prescription for CROW orthotic boot given today          Current Assessment & Plan     Take care of your feet.  Wash your feet each day with soap and warm water. Dry them carefully and check for any signs of sores or wounds. If you are unable to see the bottom of your feet, use a mirror or ask someone else to look at them. It is important to check the bottom of your feet and toes, so you can catch any changes.  Put on lotion or moisturizer. Avoid lotion in the areas between your toes, as the extra moisture can cause infection.  Do not soak your feet, as soaking can cause dry skin.  Trim your nails straight across to avoid harming the skin.    Protect you feet  Do not walk barefoot. Wear shoes at all times, even in the house.  Wear shoes and socks that fit properly. Ask about special shoes that may be covered by insurance with a prescription.  Before putting on your shoes, check the inside for small items such as a pebble. If you have decreased feeling in your feet, walking on such items could cause injury.  If your feet are cold, wear warm socks. Do not use heating pads, any kind of heater, or soak your feet to get them warm.  Talk to your doctor about using a pumice stone to prevent calluses from forming.  See your doctor if:  You need to have a corn or callus taken off. Do not attempt to remove corns and calluses yourself by cutting them or using chemicals.  You have sores or blisters on your feet.  You step on an object that cuts or penetrates the sole of your foot    When do I need to call the doctor?   Signs of infection. These include a fever of 100.4°F (38°C) or higher, chills, or sore that will not heal.  Signs of wound infection. These include swelling, redness, warmth around the wound; too much pain when touched; yellowish, greenish, or bloody discharge; foul smell coming from the wound; wound opens up.  New blisters, cuts, or sores on your foot  Feet or legs are numb  Black or dead tissue in or around your ulcer  Sore is not better or is getting worse             Other Visit Diagnoses       Drainage from wound        Relevant Orders    Culture, Anaerobe    Culture, Wound (Completed)            Future Appointments   Date Time Provider Department Center   10/26/2023 10:00 AM Yelitza Alas FNP [facility]   11/2/2023 10:45 AM Franck Kee PA [facility]            Signature:  AUGUSTUS Macedo  [facility]  [address] [facility] [address]  [phone]    Date of encounter: 9/28/23

## 2023-09-29 NOTE — ASSESSMENT & PLAN NOTE
Clean with  vashe then apply [place] antibiotic spray for 10 mins then rinse with vashe     Apply silver polymem cut slightly larger than wound.     Cover and secure with 4x4, ABD,wrap with kerlix,secure with paper tape.  Wrap with ace wrap from toes to knee. Change daily and as needed     Monitor closely for s/s of infection including fever, chills, increase in pain, odor from wound, and increased redness from foot. Go to ER if any complications develop.   Keep leg elevated and avoid pressure on wound.   Diabetes:  Monitor glucose closely. Check fasting glucose and 2 hours after meals. HgA1C goal <7, fasting glucose , and 2 hours after meals <180  Hypertension:  Check blood pressure twice daily, goal <120/80  Diet:   Increase protein intake, avoid fried, fatty foods and foods high in simple carbs.   Vitamins:  Take vitamin C 1000 mg, zinc 50mg, vitamin d 5000 units, and a daily multivitamin. Angel is a good source of protein and nutrients to aid in wound healing.    Wear walking boot daily or use crutches to offload right foot

## 2023-09-29 NOTE — PROCEDURES
"Debridement    Date/Time: 9/28/2023 10:00 AM    Performed by: Yelitza Alas FNP  Authorized by: Yelitza Alas FNP    Time out: Immediately prior to procedure a "time out" was called to verify the correct patient, procedure, equipment, support staff and site/side marked as required.    Consent Done?:  Yes (Written)    Wound Details:    Location:  Right foot    Location:  Right Plantar    Type of Debridement:  Excisional       Length (cm):  10.5       Area (sq cm):  68.25       Width (cm):  6.5       Percent Debrided (%):  100       Depth (cm):  100       Total Area Debrided (sq cm):  68.25    Depth of debridement:  Subcutaneous tissue    Tissue debrided:  Adipose, Dermis, Epidermis and Subcutaneous    Devitalized tissue debrided:  Callus, Biofilm, Clots, Exudate and Fibrin    Instruments:  Curette  Bleeding:  Minimal  Hemostasis Achieved: Yes  Method Used:  Pressure  Patient tolerance:  Patient tolerated the procedure well with no immediate complications  1st Wound Pain Assessment: 0     Assistant CYNDY Alberto RN    "

## 2023-09-29 NOTE — ASSESSMENT & PLAN NOTE
Take care of your feet.  Wash your feet each day with soap and warm water. Dry them carefully and check for any signs of sores or wounds. If you are unable to see the bottom of your feet, use a mirror or ask someone else to look at them. It is important to check the bottom of your feet and toes, so you can catch any changes.  Put on lotion or moisturizer. Avoid lotion in the areas between your toes, as the extra moisture can cause infection.  Do not soak your feet, as soaking can cause dry skin.  Trim your nails straight across to avoid harming the skin.    Protect you feet  Do not walk barefoot. Wear shoes at all times, even in the house.  Wear shoes and socks that fit properly. Ask about special shoes that may be covered by insurance with a prescription.  Before putting on your shoes, check the inside for small items such as a pebble. If you have decreased feeling in your feet, walking on such items could cause injury.  If your feet are cold, wear warm socks. Do not use heating pads, any kind of heater, or soak your feet to get them warm.  Talk to your doctor about using a pumice stone to prevent calluses from forming.  See your doctor if:  ? You need to have a corn or callus taken off. Do not attempt to remove corns and calluses yourself by cutting them or using chemicals.  ? You have sores or blisters on your feet.  ? You step on an object that cuts or penetrates the sole of your foot    When do I need to call the doctor?    Signs of infection. These include a fever of 100.4°F (38°C) or higher, chills, or sore that will not heal.   Signs of wound infection. These include swelling, redness, warmth around the wound; too much pain when touched; yellowish, greenish, or bloody discharge; foul smell coming from the wound; wound opens up.   New blisters, cuts, or sores on your foot   Feet or legs are numb   Black or dead tissue in or around your ulcer   Sore is not better or is getting worse

## 2023-09-30 LAB — MICROORGANISM SPEC CULT: ABNORMAL

## 2023-10-02 LAB — BACTERIA SPEC ANAEROBE CULT: NORMAL

## 2023-10-02 RX ORDER — DOXYCYCLINE HYCLATE 100 MG
100 TABLET ORAL 2 TIMES DAILY
Qty: 60 TABLET | Refills: 2 | Status: SHIPPED | OUTPATIENT
Start: 2023-10-02 | End: 2023-12-14 | Stop reason: SDUPTHER

## 2023-10-03 ENCOUNTER — TELEPHONE (OUTPATIENT)
Dept: WOUND CARE | Facility: CLINIC | Age: 38
End: 2023-10-03
Payer: MEDICARE

## 2023-10-03 NOTE — TELEPHONE ENCOUNTER
Called patient to inform him of below message. V/u. States he will  abx today and begin use        ----- Message from AUGUSTUS Macedo sent at 10/2/2023  6:25 PM CDT -----  Lets try Doxycycline, if not improvement may need IV antibiotics.

## 2023-10-16 PROCEDURE — G0179 PR HOME HEALTH MD RECERTIFICATION: ICD-10-PCS | Mod: ,,, | Performed by: NURSE PRACTITIONER

## 2023-10-16 PROCEDURE — G0179 MD RECERTIFICATION HHA PT: HCPCS | Mod: ,,, | Performed by: NURSE PRACTITIONER

## 2023-10-23 ENCOUNTER — OFFICE VISIT (OUTPATIENT)
Dept: WOUND CARE | Facility: CLINIC | Age: 38
End: 2023-10-23
Attending: FAMILY MEDICINE
Payer: MEDICARE

## 2023-10-23 DIAGNOSIS — L97.412 DIABETIC ULCER OF RIGHT HEEL ASSOCIATED WITH TYPE 2 DIABETES MELLITUS, WITH FAT LAYER EXPOSED: Primary | ICD-10-CM

## 2023-10-23 DIAGNOSIS — E11.610 CHARCOT FOOT DUE TO DIABETES MELLITUS: ICD-10-CM

## 2023-10-23 DIAGNOSIS — E11.621 DIABETIC ULCER OF RIGHT HEEL ASSOCIATED WITH TYPE 2 DIABETES MELLITUS, WITH FAT LAYER EXPOSED: Primary | ICD-10-CM

## 2023-10-23 DIAGNOSIS — I73.9 PERIPHERAL VASCULAR DISEASE, UNSPECIFIED: ICD-10-CM

## 2023-10-23 PROCEDURE — 11046 DEBRIDEMENT: ICD-10-PCS | Mod: S$PBB,,, | Performed by: NURSE PRACTITIONER

## 2023-10-23 PROCEDURE — 11043 DEBRIDEMENT: ICD-10-PCS | Mod: S$PBB,,, | Performed by: NURSE PRACTITIONER

## 2023-10-23 PROCEDURE — 87186 SC STD MICRODIL/AGAR DIL: CPT | Mod: ,,, | Performed by: CLINICAL MEDICAL LABORATORY

## 2023-10-23 PROCEDURE — 87070 CULTURE, WOUND: ICD-10-PCS | Mod: ,,, | Performed by: CLINICAL MEDICAL LABORATORY

## 2023-10-23 PROCEDURE — 11043 DBRDMT MUSC&/FSCA 1ST 20/<: CPT | Mod: PBBFAC | Performed by: NURSE PRACTITIONER

## 2023-10-23 PROCEDURE — 87075 CULTURE, ANAEROBE: ICD-10-PCS | Mod: ,,, | Performed by: CLINICAL MEDICAL LABORATORY

## 2023-10-23 PROCEDURE — 11045 DBRDMT SUBQ TISS EACH ADDL: CPT | Mod: PBBFAC | Performed by: NURSE PRACTITIONER

## 2023-10-23 PROCEDURE — 87077 CULTURE, WOUND: ICD-10-PCS | Mod: XU,,, | Performed by: CLINICAL MEDICAL LABORATORY

## 2023-10-23 PROCEDURE — 87186 CULTURE, WOUND: ICD-10-PCS | Mod: ,,, | Performed by: CLINICAL MEDICAL LABORATORY

## 2023-10-23 PROCEDURE — 11046 DBRDMT MUSC&/FSCA EA ADDL: CPT | Mod: S$PBB,,, | Performed by: NURSE PRACTITIONER

## 2023-10-23 PROCEDURE — 99213 OFFICE O/P EST LOW 20 MIN: CPT | Mod: PBBFAC,25 | Performed by: NURSE PRACTITIONER

## 2023-10-23 PROCEDURE — 99499 UNLISTED E&M SERVICE: CPT | Mod: S$PBB,,, | Performed by: NURSE PRACTITIONER

## 2023-10-23 PROCEDURE — 87070 CULTURE OTHR SPECIMN AEROBIC: CPT | Mod: ,,, | Performed by: CLINICAL MEDICAL LABORATORY

## 2023-10-23 PROCEDURE — 87075 CULTR BACTERIA EXCEPT BLOOD: CPT | Mod: ,,, | Performed by: CLINICAL MEDICAL LABORATORY

## 2023-10-23 PROCEDURE — 99213 OFFICE O/P EST LOW 20 MIN: CPT | Mod: PBBFAC | Performed by: NURSE PRACTITIONER

## 2023-10-23 PROCEDURE — 87077 CULTURE AEROBIC IDENTIFY: CPT | Mod: XU,,, | Performed by: CLINICAL MEDICAL LABORATORY

## 2023-10-23 PROCEDURE — 99499 NO LOS: ICD-10-PCS | Mod: S$PBB,,, | Performed by: NURSE PRACTITIONER

## 2023-10-23 NOTE — PROGRESS NOTES
AUGUSTUS Macedo   [facility]  [address] [facility] [place] [address]  [phone]      PATIENT NAME: Jean Pierre Paulson  : 1985  DATE: 10/23/23  MRN: [id number]      Billing Provider: AUGUSTUS Macedo  Level of Service:   Patient PCP Information       Provider PCP Type    Mojgan Lomeli NP General            Reason for Visit / Chief Complaint: Diabetic Foot Ulcer (R DFU Grade 3)       History of Present Illness / Problem Focused Workflow     Jean Pierre Paulson is a 38 y.o. male who presents to clinic for follow up on chronic-non healing wound on the right foot. Wound is malodorous and macerated today. Continues to have callus filippo-wound, bedside debridement today. Patient has been using Keystone topical antibiotics and taking doxycycline as prescribed with no improvement. Cultures repeated today. Set up IV [facility] through [facility].    He is wearing off-loading shoe today. Follow up with [facility] on insurance issues in regards to prescription for CROW orthotic boot.     Reinforced importance of following plan of care, wearing off-loading boot, keeping pressure off foot, taking antibiotics as prescribed,increasing protein intake, and keeping diabetes well controlled.    Last arterial doppler 4/10/23,  The ankle to brachial index is 1.25 on the right and 1.34 on the left.    Significant PMH  Includes diabetes and hypertension. Last HgA1C 7.3 in 2022, diabetes is managed by PCP.  Denies fever or chills.       Review of Systems     Review of Systems   Constitutional:  Negative for activity change, chills and fever.   Respiratory:  Negative for chest tightness and shortness of breath.    Cardiovascular:  Positive for leg swelling. Negative for chest pain and palpitations.   Musculoskeletal:  Positive for arthralgias and joint swelling.   Skin:  Positive for wound.        See wound assessment   Neurological:  Positive for weakness and numbness.    Psychiatric/Behavioral:  Negative for agitation, behavioral problems, confusion and self-injury.        Medical / Social / Family History     Past Medical History:   Diagnosis Date    NOLAN (acute kidney injury) 04/07/2023    Anemia     Diabetes mellitus with neuropathy     HLD (hyperlipidemia) 04/07/2023    HTN (hypertension)     Obesity     TEDDY (obstructive sleep apnea) 04/07/2023    Osteomyelitis 10/2020    Hx of R foot, Tx with IV Abx    Peripheral vascular disease        Past Surgical History:   Procedure Laterality Date    APPLICATION OF SPLIT-THICKNESS SKIN GRAFT (STSG) TO LOWER EXTREMITY Right 03/02/2022    Procedure: APPLICATION, GRAFT, SKIN, SPLIT-THICKNESS, TO LOWER EXTREMITY;  Surgeon: Greg Ramírez MD;  Location: [facility];  Service: General;  Laterality: Right;    CHOLECYSTECTOMY      DEBRIDEMENT OF FOOT Right 10/2020    right great toe amputation         Social History  Mr. Jean Pierre Paulson  reports that he has been smoking cigarettes. He started smoking about 23 years ago. He has a 6.2 pack-year smoking history. He has never used smokeless tobacco. He reports current alcohol use of about 2.0 standard drinks of alcohol per week. He reports that he does not use drugs.    Family History  Mr.'s Jean Pierre Paulson family history includes Diabetes in his father and maternal grandmother; Hypertension in his father and maternal grandmother.    Medications and Allergies     Medications  No outpatient medications have been marked as taking for the 10/23/23 encounter (Office Visit) with Yelitza Alas FNP.       Allergies  Review of patient's allergies indicates:   Allergen Reactions    Tomato Itching       Physical Examination   There were no vitals filed for this visit.  Physical Exam  Vitals and nursing note reviewed.   Constitutional:       Appearance: Normal appearance.   HENT:      Head: Normocephalic.   Cardiovascular:      Rate and Rhythm: Normal rate and regular rhythm.      Pulses: Normal pulses.       Heart sounds: Normal heart sounds.   Pulmonary:      Effort: Pulmonary effort is normal. No respiratory distress.   Chest:      Chest wall: No tenderness.   Musculoskeletal:         General: Swelling, tenderness and deformity present.      Right lower leg: Edema present.   Skin:     General: Skin is warm and dry.      Capillary Refill: Capillary refill takes 2 to 3 seconds.      Comments: See LDA for photos and measurements   Neurological:      General: No focal deficit present.      Mental Status: He is alert and oriented to person, place, and time. Mental status is at baseline.   Psychiatric:         Mood and Affect: Mood normal.         Behavior: Behavior normal.         Thought Content: Thought content normal.         Judgment: Judgment normal.       Assessment and Plan             Altered Skin Integrity 04/10/23 1200 Right plantar Foot Diabetic Ulcer Full thickness tissue loss with exposed bone, tendon, or muscle. Often includes undermining and tunneling. May extend into muscle and/or supporting structures. (Active)   04/10/23 1200   Altered Skin Integrity Present on Admission - Did Patient arrive to the hospital with altered skin?: yes   Side: Right   Orientation: plantar   Location: Foot   Wound Number:    Is this injury device related?:    Primary Wound Type: Diabetic ulc   Description of Altered Skin Integrity: Full thickness tissue loss with exposed bone, tendon, or muscle. Often includes undermining and tunneling. May extend into muscle and/or supporting structures.   Ankle-Brachial Index:    Pulses:    Removal Indication and Assessment:    Wound Outcome:    (Retired) Wound Length (cm):    (Retired) Wound Width (cm):    (Retired) Depth (cm):    Wound Description (Comments):    Removal Indications:    Wound Image   10/23/23 1140   Description of Altered Skin Integrity Full thickness tissue loss with exposed bone, tendon, or muscle. Often includes undermining and tunneling. May extend into muscle and/or  supporting structures. 10/23/23 1114   Dressing Appearance Saturated 10/23/23 1114   Drainage Amount Large 10/23/23 1114   Drainage Characteristics/Odor Green;Serosanguineous 10/23/23 1114   Appearance Pink;Moist;Granulating 10/23/23 1114   Tissue loss description Full thickness 10/23/23 1114   Black (%), Wound Tissue Color 0 % 10/23/23 1114   Red (%), Wound Tissue Color 100 % 10/23/23 1114   Yellow (%), Wound Tissue Color 0 % 10/23/23 1114   Periwound Area Moist;Macerated 10/23/23 1114   Wound Edges Open 10/23/23 1114   Wound Length (cm) 6.5 cm 10/23/23 1114   Wound Width (cm) 11.5 cm 10/23/23 1114   Wound Depth (cm) 1.2 cm 10/23/23 1114   Wound Volume (cm^3) 89.7 cm^3 10/23/23 1114   Wound Surface Area (cm^2) 74.75 cm^2 10/23/23 1114   Care Cleansed with:;Antimicrobial agent 10/23/23 1114   Dressing Applied;Hydrofiber;Gauze;Rolled gauze 10/23/23 1114   Periwound Care Moisture barrier applied 10/23/23 1114   Off Loading Off loading shoe 10/23/23 1114   Dressing Change Due 10/24/23 10/23/23 1114     Problem List Items Addressed This Visit          Cardiac/Vascular    Peripheral vascular disease, unspecified    Overview     Impression:     The ankle to brachial index is 1.25 on the right and 1.34 on the left.  No arterial occlusions.  Right inguinal lymphadenopathy.  This is similar dating back to comparison exam.  Recommend clinical correlation.         Current Assessment & Plan     Heart Healthy Diet-reduce sodium intake to 1,500  mg/day and limit alcohol  Smoking Cessation - if smoke set goals to quit  Exercise daily  Weight loss -  Maintain healthy weight.  If overweight, set goal to  lose about 5% to 10% of baseline weight within six months  Keep blood pressure well controlled - take medications as prescribed   Ensure diabetes is controlled - diabetic diet, medications as prescribed, check blood glucose routinely               Endocrine    Diabetic ulcer of right heel associated with type 2 diabetes mellitus,  with fat layer exposed - Primary    Overview                                                          Current Assessment & Plan     Clean with  vashe or baby shampoo and water     Apply betadine moisten drawtex to wound     Cover and secure with 4x4, ABD,wrap with kerlix,secure with paper tape.  Wrap with ace wrap from toes to knee. Change daily and as needed     Monitor closely for s/s of infection including fever, chills, increase in pain, odor from wound, and increased redness from foot. Go to ER if any complications develop.   Keep leg elevated and avoid pressure on wound.   Diabetes:  Monitor glucose closely. Check fasting glucose and 2 hours after meals. HgA1C goal <7, fasting glucose , and 2 hours after meals <180  Hypertension:  Check blood pressure twice daily, goal <120/80  Diet:   Increase protein intake, avoid fried, fatty foods and foods high in simple carbs.   Vitamins:  Take vitamin C 1000 mg, zinc 50mg, vitamin d 5000 units, and a daily multivitamin. Angel is a good source of protein and nutrients to aid in wound healing.    Wear walking boot daily or use crutches to offload right foot     Home health to FOLLOW wound orders above    Expect a call from [facility] concerning IV antibiotics         Relevant Orders    Culture, Wound    Culture, Anaerobe    Debridement       Orthopedic    Charcot foot due to diabetes mellitus    Overview     Follow  Up with [facility] on CROW orthotic boot            Future Appointments   Date Time Provider Department Center   11/2/2023 10:45 AM Franck Kee PA [facility]   11/13/2023 10:00 AM [facility]el[facility]as F[facility]            Signature:  AUGUSTUS Macedo  [facility]  [address]  [place] [address]  [phone]    Date of encounter: 10/23/23

## 2023-10-23 NOTE — PATIENT INSTRUCTIONS
Clean with  vashe or baby shampoo and water     Apply betadine moisten drawtex to wound     Cover and secure with 4x4, ABD,wrap with kerlix,secure with paper tape.  Wrap with ace wrap from toes to knee. Change daily and as needed     Monitor closely for s/s of infection including fever, chills, increase in pain, odor from wound, and increased redness from foot. Go to ER if any complications develop.   Keep leg elevated and avoid pressure on wound.   Diabetes:  Monitor glucose closely. Check fasting glucose and 2 hours after meals. HgA1C goal <7, fasting glucose , and 2 hours after meals <180  Hypertension:  Check blood pressure twice daily, goal <120/80  Diet:   Increase protein intake, avoid fried, fatty foods and foods high in simple carbs.   Vitamins:  Take vitamin C 1000 mg, zinc 50mg, vitamin d 5000 units, and a daily multivitamin. Angel is a good source of protein and nutrients to aid in wound healing.    Wear walking boot daily or use crutches to offload right foot     Home health to FOLLOW wound orders above    Expect a call from [facility] concerning IV antibiotics

## 2023-10-23 NOTE — PROGRESS NOTES
Pt returns to clinic with concerns about increase odor and drainage to R DFU. See wound assessment. Pt here for evaluation and treatment. Pt has been on oral Bactrim DS and presently on Doxycyclline oral. Pt has failed all oral antibiotic therapy. Provider suggest IV antibiotics. Cultures done today. Last culture results faxed to [facility]. New orders faxed to Home health

## 2023-10-23 NOTE — PROCEDURES
"Debridement    Date/Time: 10/23/2023 2:00 PM    Performed by: Yelitza Alas FNP  Authorized by: Yelitza Alas FNP    Time out: Immediately prior to procedure a "time out" was called to verify the correct patient, procedure, equipment, support staff and site/side marked as required.    Consent Done?:  Yes (Written)    Wound Details:    Location:  Right foot    Location:  Right Plantar    Type of Debridement:  Excisional       Length (cm):  7.2       Area (sq cm):  90       Width (cm):  12.5       Percent Debrided (%):  100       Depth (cm):  0.5       Total Area Debrided (sq cm):  90    Depth of debridement:  Muscle/fascia/tendon    Tissue debrided:  Adipose, Dermis, Subcutaneous, Epidermis and Muscle    Devitalized tissue debrided:  Biofilm, Callus, Clots, Fibrin, Exudate and Slough    Instruments:  Curette  Bleeding:  Moderate  Hemostasis Achieved: Yes  Method Used:  Pressure  Patient tolerance:  Patient tolerated the procedure well with no immediate complications  1st Wound Pain Assessment: 0     Assistant JOSE ALFREDO Stone LPN    "

## 2023-10-23 NOTE — PROGRESS NOTES
AUGUSTUS Macedo   [facility]  [address] [facility] [place] [address]  [phone]      PATIENT NAME: Jean Pierre Paulson  : 1985  DATE: 10/23/23  MRN: [id number]      Billing Provider: AUGUSTUS Macedo  Level of Service:   Patient PCP Information       Provider PCP Type    Mojgan Lomeli NP General            Reason for Visit / Chief Complaint: Diabetic ulcer of right heel associated with type 2 diabete       History of Present Illness / Problem Focused Workflow     Jean Pierre Paulson is a 38 y.o. male who presents to clinic for follow up on chronic-non healing wound on the right foot. Wound is malodorous and macerated today. Continues to have callus filippo-wound, bedside debridement today. Patient has been using Keystone topical antibiotics and taking doxycycline as prescribed with no improvement. Cultures repeated today. Set up IV [facility] through [facility].    He is wearing off-loading shoe today. Follow up with [facility] on insurance issues in regards to prescription for CROW orthotic boot.     Reinforced importance of following plan of care, wearing off-loading boot, keeping pressure off foot, taking antibiotics as prescribed,increasing protein intake, and keeping diabetes well controlled.    Last arterial doppler 4/10/23,  The ankle to brachial index is 1.25 on the right and 1.34 on the left.    Significant PMH  Includes diabetes and hypertension. Last HgA1C 7.3 in 2022, diabetes is managed by PCP.  Denies fever or chills.     Review of Systems     Review of Systems   Constitutional:  Negative for activity change, chills and fever.   Respiratory:  Negative for chest tightness and shortness of breath.    Cardiovascular:  Positive for leg swelling. Negative for chest pain and palpitations.   Musculoskeletal:  Positive for arthralgias and joint swelling.   Skin:  Positive for wound.        See wound assessment   Neurological:  Positive for  weakness and numbness.   Psychiatric/Behavioral:  Negative for agitation, behavioral problems, confusion and self-injury.      Medical / Social / Family History     Past Medical History:   Diagnosis Date    NOLAN (acute kidney injury) 04/07/2023    Anemia     Diabetes mellitus with neuropathy     HLD (hyperlipidemia) 04/07/2023    HTN (hypertension)     Obesity     TEDDY (obstructive sleep apnea) 04/07/2023    Osteomyelitis 10/2020    Hx of R foot, Tx with IV Abx    Peripheral vascular disease        Past Surgical History:   Procedure Laterality Date    APPLICATION OF SPLIT-THICKNESS SKIN GRAFT (STSG) TO LOWER EXTREMITY Right 03/02/2022    Procedure: APPLICATION, GRAFT, SKIN, SPLIT-THICKNESS, TO LOWER EXTREMITY;  Surgeon: Greg Ramírez MD;  Location: [facility];  Service: General;  Laterality: Right;    CHOLECYSTECTOMY      DEBRIDEMENT OF FOOT Right 10/2020    right great toe amputation         Social History  Mr. Jean Pierre Paulson  reports that he has been smoking cigarettes. He started smoking about 23 years ago. He has a 6.2 pack-year smoking history. He has never used smokeless tobacco. He reports current alcohol use of about 2.0 standard drinks of alcohol per week. He reports that he does not use drugs.    Family History  'anjana Paulson family history includes Diabetes in his father and maternal grandmother; Hypertension in his father and maternal grandmother.    Medications and Allergies     Medications  Outpatient Medications Marked as Taking for the 10/23/23 encounter (Office Visit) with Yelitza Alas FNP   Medication Sig Dispense Refill    amLODIPine (NORVASC) 2.5 MG tablet Take 1 tablet (2.5 mg total) by mouth once daily. 30 tablet 11    doxycycline (VIBRA-TABS) 100 MG tablet Take 1 tablet (100 mg total) by mouth 2 (two) times daily. 60 tablet 2    hydroCHLOROthiazide (HYDRODIURIL) 25 MG tablet Take 1 tablet (25 mg total) by mouth once daily. 90 tablet 1    HYDROcodone-acetaminophen  (NORCO)  mg per tablet Take 1 tablet by mouth every 12 (twelve) hours as needed for Pain. 60 tablet 0    semaglutide (OZEMPIC) 2 mg/dose (8 mg/3 mL) PnIj Inject 2 mg into the skin every 7 days. 8 mL 2       Allergies  Review of patient's allergies indicates:   Allergen Reactions    Tomato Itching       Physical Examination   There were no vitals filed for this visit.  Physical Exam  Vitals and nursing note reviewed.   Constitutional:       Appearance: Normal appearance.   HENT:      Head: Normocephalic.   Cardiovascular:      Rate and Rhythm: Normal rate and regular rhythm.      Pulses: Normal pulses.      Heart sounds: Normal heart sounds.   Pulmonary:      Effort: Pulmonary effort is normal. No respiratory distress.   Chest:      Chest wall: No tenderness.   Musculoskeletal:         General: Swelling, tenderness and deformity present.      Right lower leg: Edema present.   Skin:     General: Skin is warm and dry.      Capillary Refill: Capillary refill takes 2 to 3 seconds.      Comments: See LDA for photos and measurements   Neurological:      General: No focal deficit present.      Mental Status: He is alert and oriented to person, place, and time. Mental status is at baseline.   Psychiatric:         Mood and Affect: Mood normal.         Behavior: Behavior normal.         Thought Content: Thought content normal.         Judgment: Judgment normal.     Assessment and Plan           Problem List Items Addressed This Visit          Endocrine    Diabetic ulcer of right heel associated with type 2 diabetes mellitus, with fat layer exposed - Primary    Overview                                                          Current Assessment & Plan     Clean with  vashe then apply [place] antibiotic spray for 10 mins then rinse with vashe     Apply silver polymem cut slightly larger than wound.      Cover and secure with 4x4, ABD,wrap with kerlix,secure with paper tape.  Wrap with ace wrap from toes to knee. Change  daily and as needed     Monitor closely for s/s of infection including fever, chills, increase in pain, odor from wound, and increased redness from foot. Go to ER if any complications develop.   Keep leg elevated and avoid pressure on wound.   Diabetes:  Monitor glucose closely. Check fasting glucose and 2 hours after meals. HgA1C goal <7, fasting glucose , and 2 hours after meals <180  Hypertension:  Check blood pressure twice daily, goal <120/80  Diet:   Increase protein intake, avoid fried, fatty foods and foods high in simple carbs.   Vitamins:  Take vitamin C 1000 mg, zinc 50mg, vitamin d 5000 units, and a daily multivitamin. Angel is a good source of protein and nutrients to aid in wound healing.    Wear walking boot daily or use crutches to offload right foot     Home health to FOLLOW wound orders above            Future Appointments   Date Time Provider Department Center   11/2/2023 10:45 AM Franck Kee PA [facility]   11/13/2023 10:00 AM [facility]            Signature:  AUGUSTUS Macedo  [facility]  [address]  [place] [address]  [phone]    Date of encounter: 10/23/23

## 2023-10-23 NOTE — ASSESSMENT & PLAN NOTE
Clean with  vashe then apply [place] antibiotic spray for 10 mins then rinse with vashe     Apply silver polymem cut slightly larger than wound.      Cover and secure with 4x4, ABD,wrap with kerlix,secure with paper tape.  Wrap with ace wrap from toes to knee. Change daily and as needed     Monitor closely for s/s of infection including fever, chills, increase in pain, odor from wound, and increased redness from foot. Go to ER if any complications develop.   Keep leg elevated and avoid pressure on wound.   Diabetes:  Monitor glucose closely. Check fasting glucose and 2 hours after meals. HgA1C goal <7, fasting glucose , and 2 hours after meals <180  Hypertension:  Check blood pressure twice daily, goal <120/80  Diet:   Increase protein intake, avoid fried, fatty foods and foods high in simple carbs.   Vitamins:  Take vitamin C 1000 mg, zinc 50mg, vitamin d 5000 units, and a daily multivitamin. Angel is a good source of protein and nutrients to aid in wound healing.    Wear walking boot daily or use crutches to offload right foot

## 2023-10-23 NOTE — PROGRESS NOTES
Debridement Performed for Assessment: Wound# Right plantar foot  Performed By: Provider: Yelitza Ward NP  Assistant:  Lizabeth Stone LPN    Debridement: Surgical    Photo taken post procedure:    Time-Out Taken: Yes  Level: Skin/Subcutaneous Tissue/ Muscle  Post Debridement Measurements  Length: (cm) 7.2  Width: (cm) 12.5  Depth: (cm) 0.5      Area: (cm²) 88.0   Percent Debrided: 100%  Total Area Debrided: (sq cm)     Tissue and other material debrided:  Adipose, Dermis, Epidermis, Subcutaneous, Muscle  Devitalized Tissue Debrided:Biofilm, Slough, Eschar  Instrument: Curette  Bleeding: Moderate  Hemostasis Achieved: Pressure  Procedural Pain: Insensate  Post Procedural Pain: Insensate  Response to Treatment: Procedure was tolerated well    Devitalized materials/tissue Removed  the following was removed during debridement  subcutaneous, muscle      Post Debridement Diagnosis chronic right diabetic foot ulcer  Post debridement diagnosis  Same as Pre-operative debridement diagnosis, No Complications noted.      Grafts or implants applied  Was a graft or implant applied?  No      Procedure assistant  Procedure assisted by:  Assistant is the same as nurse listed above      Complications related to procedure  Did any complication occure during procedure?  No complications noted during or after procedure.      Specimen  Specimen collect during procedure?  No specimen collected      Anaesthesia:  Anesthesia used  None      Blood Loss:  Blood loss during procedure  less than 5 cc

## 2023-10-23 NOTE — PATIENT INSTRUCTIONS
Clean with  vashe then apply [place] antibiotic spray for 10 mins then rinse with vashe     Apply silver polymem cut slightly larger than wound.      Cover and secure with 4x4, ABD,wrap with kerlix,secure with paper tape.  Wrap with ace wrap from toes to knee. Change daily and as needed     Monitor closely for s/s of infection including fever, chills, increase in pain, odor from wound, and increased redness from foot. Go to ER if any complications develop.   Keep leg elevated and avoid pressure on wound.   Diabetes:  Monitor glucose closely. Check fasting glucose and 2 hours after meals. HgA1C goal <7, fasting glucose , and 2 hours after meals <180  Hypertension:  Check blood pressure twice daily, goal <120/80  Diet:   Increase protein intake, avoid fried, fatty foods and foods high in simple carbs.   Vitamins:  Take vitamin C 1000 mg, zinc 50mg, vitamin d 5000 units, and a daily multivitamin. Angel is a good source of protein and nutrients to aid in wound healing.    Wear walking boot daily or use crutches to offload right foot     Home health to FOLLOW wound orders above

## 2023-10-24 ENCOUNTER — EXTERNAL HOME HEALTH (OUTPATIENT)
Dept: HOME HEALTH SERVICES | Facility: HOSPITAL | Age: 38
End: 2023-10-24
Payer: MEDICARE

## 2023-10-30 NOTE — PROCEDURES
"Debridement    Date/Time: 10/23/2023 9:00 AM    Performed by: Yelitza Alas FNP  Authorized by: Yelitza Alas FNP    Time out: Immediately prior to procedure a "time out" was called to verify the correct patient, procedure, equipment, support staff and site/side marked as required.    Consent Done?:  Yes (Written)    Wound Details:    Location:  Right foot    Location:  Right Plantar    Type of Debridement:  Excisional       Length (cm):  6.5       Area (sq cm):  74.75       Width (cm):  11.5       Percent Debrided (%):  100       Depth (cm):  1.2       Total Area Debrided (sq cm):  74.75    Depth of debridement:  Subcutaneous tissue    Tissue debrided:  Adipose, Dermis, Epidermis and Subcutaneous    Devitalized tissue debrided:  Biofilm, Callus, Clots, Exudate and Fibrin    Instruments:  Curette  Bleeding:  Minimal  Hemostasis Achieved: Yes  Method Used:  Pressure  Patient tolerance:  Patient tolerated the procedure well with no immediate complications  1st Wound Pain Assessment: 0     Assistant CYNDY Alberto RN    "

## 2023-10-30 NOTE — PROGRESS NOTES
AUGUSTUS Macedo   [facility]  [address] [facility] [place] [address]  [phone]      PATIENT NAME: Jean Pierre Paulson  : 1985  DATE: 10/23/23  MRN: [id number]      Billing Provider: AUGUSTUS Macedo  Level of Service:   Patient PCP Information       Provider PCP Type    Mojgan Lomeli NP General            Reason for Visit / Chief Complaint: Diabetic ulcer of right heel associated with type 2 diabete       History of Present Illness / Problem Focused Workflow     Jean Pierre Paulson is a 38 y.o. male presents to clinic for follow up on chronic-non healing wound on the right foot. Wound with callus and maceration filippo-wound, bedside debridement today. Cultures obtained today. He is currently on broad spectrum antibiotics, will adjust per cultures.   Wound is malodorous with increased drainage. He reports increased pain and tenderness to foot. Reports he had some issues getting with insurance concerning CROW orthotoid shoe, will follow up with [facility].    Reinforced importance of following plan of care, wearing off-loading boot, keeping pressure off foot, taking antibiotics as prescribed,increasing protein intake, and keeping diabetes well controlled.    Reports he is now using Dexacom to check his blood sugars.     Last arterial doppler 4/10/23,  The ankle to brachial index is 1.25 on the right and 1.34 on the left.    Significant PMH  Includes diabetes and hypertension. Last HgA1C 7.3 in 2022, diabetes is managed by PCP.  Denies fever or chills.       Review of Systems     Review of Systems   Constitutional:  Negative for activity change, chills and fever.   Respiratory:  Negative for chest tightness and shortness of breath.    Cardiovascular:  Positive for leg swelling. Negative for chest pain and palpitations.   Musculoskeletal:  Positive for arthralgias and joint swelling.   Skin:  Positive for wound.        See wound assessment    Neurological:  Positive for weakness and numbness.   Psychiatric/Behavioral:  Negative for agitation, behavioral problems, confusion and self-injury.        Medical / Social / Family History     Past Medical History:   Diagnosis Date    NOLAN (acute kidney injury) 04/07/2023    Anemia     Diabetes mellitus with neuropathy     HLD (hyperlipidemia) 04/07/2023    HTN (hypertension)     Obesity     TEDDY (obstructive sleep apnea) 04/07/2023    Osteomyelitis 10/2020    Hx of R foot, Tx with IV Abx    Peripheral vascular disease        Past Surgical History:   Procedure Laterality Date    APPLICATION OF SPLIT-THICKNESS SKIN GRAFT (STSG) TO LOWER EXTREMITY Right 03/02/2022    Procedure: APPLICATION, GRAFT, SKIN, SPLIT-THICKNESS, TO LOWER EXTREMITY;  Surgeon: Greg Ramírez MD;  Location: [facility];  Service: General;  Laterality: Right;    CHOLECYSTECTOMY      DEBRIDEMENT OF FOOT Right 10/2020    right great toe amputation         Social History  Mr. Jean Pierre Paulson  reports that he has been smoking cigarettes. He started smoking about 23 years ago. He has a 6.2 pack-year smoking history. He has never used smokeless tobacco. He reports current alcohol use of about 2.0 standard drinks of alcohol per week. He reports that he does not use drugs.    Family History  'anjana Paulson family history includes Diabetes in his father and maternal grandmother; Hypertension in his father and maternal grandmother.    Medications and Allergies     Medications  Outpatient Medications Marked as Taking for the 10/23/23 encounter (Office Visit) with Yelitza Alas FNP   Medication Sig Dispense Refill    amLODIPine (NORVASC) 2.5 MG tablet Take 1 tablet (2.5 mg total) by mouth once daily. 30 tablet 11    doxycycline (VIBRA-TABS) 100 MG tablet Take 1 tablet (100 mg total) by mouth 2 (two) times daily. 60 tablet 2    hydroCHLOROthiazide (HYDRODIURIL) 25 MG tablet Take 1 tablet (25 mg total) by mouth once daily. 90 tablet 1     HYDROcodone-acetaminophen (NORCO)  mg per tablet Take 1 tablet by mouth every 12 (twelve) hours as needed for Pain. 60 tablet 0    semaglutide (OZEMPIC) 2 mg/dose (8 mg/3 mL) PnIj Inject 2 mg into the skin every 7 days. 8 mL 2       Allergies  Review of patient's allergies indicates:   Allergen Reactions    Tomato Itching       Physical Examination   There were no vitals filed for this visit.  Physical Exam  Vitals and nursing note reviewed.   Constitutional:       Appearance: Normal appearance.   HENT:      Head: Normocephalic.   Cardiovascular:      Rate and Rhythm: Normal rate and regular rhythm.      Pulses: Normal pulses.      Heart sounds: Normal heart sounds.   Pulmonary:      Effort: Pulmonary effort is normal. No respiratory distress.   Chest:      Chest wall: No tenderness.   Musculoskeletal:         General: Swelling, tenderness and deformity present.      Right lower leg: Swelling present. Edema present.      Right foot: Swelling, deformity, Charcot foot, tenderness and bony tenderness present.   Skin:     General: Skin is warm and dry.      Capillary Refill: Capillary refill takes 2 to 3 seconds.      Comments: See LDA for photos and measurements   Neurological:      General: No focal deficit present.      Mental Status: He is alert and oriented to person, place, and time. Mental status is at baseline.   Psychiatric:         Mood and Affect: Mood normal.         Behavior: Behavior normal.         Thought Content: Thought content normal.         Judgment: Judgment normal.       Assessment and Plan           Problem List Items Addressed This Visit          Endocrine    Diabetic ulcer of right heel associated with type 2 diabetes mellitus, with fat layer exposed - Primary    Overview                                                          Current Assessment & Plan     Clean with  vashe then apply [place] antibiotic spray for 10 mins then rinse with vashe     Apply silver polymem cut slightly larger  than wound.      Cover and secure with 4x4, ABD,wrap with kerlix,secure with paper tape.  Wrap with ace wrap from toes to knee. Change daily and as needed     Monitor closely for s/s of infection including fever, chills, increase in pain, odor from wound, and increased redness from foot. Go to ER if any complications develop.   Keep leg elevated and avoid pressure on wound.   Diabetes:  Monitor glucose closely. Check fasting glucose and 2 hours after meals. HgA1C goal <7, fasting glucose , and 2 hours after meals <180  Hypertension:  Check blood pressure twice daily, goal <120/80  Diet:   Increase protein intake, avoid fried, fatty foods and foods high in simple carbs.   Vitamins:  Take vitamin C 1000 mg, zinc 50mg, vitamin d 5000 units, and a daily multivitamin. Angel is a good source of protein and nutrients to aid in wound healing.    Wear walking boot daily or use crutches to offload right foot              Future Appointments   Date Time Provider Department Center   11/2/2023 10:45 AM Franck Kee PA [facility]   11/13/2023 10:00 AM Yelitza Alas FNP [facility]            Signature:  AUGUSTUS Macedo  [facility]  [address] [facility] [place] [address]  [phone]    Date of encounter: 10/23/23

## 2023-10-31 NOTE — PROGRESS NOTES
Subjective:         Patient ID: Jean Pierre Paulson is a 38 y.o. male.    Chief Complaint: Foot Pain        Pain  This is a chronic problem. The current episode started more than 1 year ago. The problem occurs daily. The problem has been unchanged. Associated symptoms include arthralgias and neck pain. Pertinent negatives include no anorexia, chest pain, chills, coughing, fever, sore throat, vertigo or vomiting.     Review of Systems   Constitutional:  Negative for activity change, appetite change, chills, fever and unexpected weight change.   HENT:  Negative for drooling, ear discharge, ear pain, facial swelling, nosebleeds, sore throat, trouble swallowing, voice change and goiter.    Eyes:  Negative for photophobia, pain, discharge, redness and visual disturbance.   Respiratory:  Negative for apnea, cough, choking, chest tightness, shortness of breath, wheezing and stridor.    Cardiovascular:  Negative for chest pain, palpitations and leg swelling.   Gastrointestinal:  Negative for abdominal distention, anorexia, diarrhea, rectal pain, vomiting and fecal incontinence.   Endocrine: Negative for cold intolerance, heat intolerance, polydipsia, polyphagia and polyuria.   Genitourinary:  Negative for bladder incontinence, dysuria, flank pain and frequency.   Musculoskeletal:  Positive for arthralgias, back pain, leg pain and neck pain. Negative for neck stiffness.   Integumentary:  Negative for color change and pallor.   Neurological:  Negative for dizziness, vertigo, seizures, syncope, facial asymmetry, speech difficulty, light-headedness, memory loss and coordination difficulties.   Hematological:  Negative for adenopathy. Does not bruise/bleed easily.   Psychiatric/Behavioral:  Negative for agitation, behavioral problems, confusion, decreased concentration, dysphoric mood, hallucinations, self-injury and suicidal ideas. The patient is not nervous/anxious and is not hyperactive.            Past Medical History:    Diagnosis Date    NOLAN (acute kidney injury) 2023    Anemia     Diabetes mellitus with neuropathy     HLD (hyperlipidemia) 2023    HTN (hypertension)     Obesity     TEDDY (obstructive sleep apnea) 2023    Osteomyelitis 10/2020    Hx of R foot, Tx with IV Abx    Peripheral vascular disease      Past Surgical History:   Procedure Laterality Date    APPLICATION OF SPLIT-THICKNESS SKIN GRAFT (STSG) TO LOWER EXTREMITY Right 2022    Procedure: APPLICATION, GRAFT, SKIN, SPLIT-THICKNESS, TO LOWER EXTREMITY;  Surgeon: Greg Ramírez MD;  Location: [facility], [place];  Service: General;  Laterality: Right;    CHOLECYSTECTOMY      DEBRIDEMENT OF FOOT Right 10/2020    right great toe amputation       Social History     Socioeconomic History    Marital status: Single    Number of children: 1   Tobacco Use    Smoking status: Every Day     Current packs/day: 0.00     Average packs/day: 0.3 packs/day for 25.0 years (6.2 ttl pk-yrs)     Types: Cigarettes     Start date:      Last attempt to quit: 2022     Years since quittin.7    Smokeless tobacco: Never   Substance and Sexual Activity    Alcohol use: Yes     Alcohol/week: 2.0 standard drinks of alcohol     Types: 1 Glasses of wine, 1 Cans of beer per week     Comment: occasionally    Drug use: Never    Sexual activity: Yes     Partners: Female     Social Determinants of Health     Financial Resource Strain: Low Risk  (4/10/2023)    Overall Financial Resource Strain (CARDIA)     Difficulty of Paying Living Expenses: Not hard at all   Food Insecurity: No Food Insecurity (4/10/2023)    Hunger Vital Sign     Worried About Running Out of Food in the Last Year: Never true     Ran Out of Food in the Last Year: Never true   Transportation Needs: Unmet Transportation Needs (4/10/2023)    PRAPARE - Transportation     Lack of Transportation (Medical): No     Lack of Transportation (Non-Medical): Yes   Physical Activity: Inactive  "(4/10/2023)    Exercise Vital Sign     Days of Exercise per Week: 0 days     Minutes of Exercise per Session: 0 min   Stress: No Stress Concern Present (4/10/2023)    [employer] - Occupational Stress Questionnaire     Feeling of Stress : Not at all   Social Connections: Socially Isolated (4/10/2023)    Social Connection and Isolation Panel [NHANES]     Frequency of Communication with Friends and Family: More than three times a week     Frequency of Social Gatherings with Friends and Family: More than three times a week     Attends Caodaism Services: Never     Active Member of Clubs or Organizations: No     Attends Club or Organization Meetings: Never     Marital Status: Never    Housing Stability: Unknown (4/10/2023)    Housing Stability Vital Sign     Unable to Pay for Housing in the Last Year: No     Unstable Housing in the Last Year: No     Family History   Problem Relation Age of Onset    Hypertension Father     Diabetes Father     Hypertension Maternal Grandmother     Diabetes Maternal Grandmother      Review of patient's allergies indicates:   Allergen Reactions    Tomato Itching        Objective:  Vitals:    11/02/23 1254   BP: (!) 153/97   Pulse: 73   Resp: 18   Weight: (!) 157.4 kg (347 lb)   Height: 6' 2" (1.88 m)   PainSc:   2           Physical Exam  Vitals and nursing note reviewed. Exam conducted with a chaperone present.   Constitutional:       General: He is awake. He is not in acute distress.     Appearance: Normal appearance. He is not toxic-appearing or diaphoretic.   HENT:      Head: Normocephalic and atraumatic.      Nose: Nose normal.      Mouth/Throat:      Mouth: Mucous membranes are moist.      Pharynx: Oropharynx is clear.   Eyes:      Conjunctiva/sclera: Conjunctivae normal.      Pupils: Pupils are equal, round, and reactive to light.   Cardiovascular:      Rate and Rhythm: Normal rate.   Pulmonary:      Effort: Pulmonary effort is " normal. No respiratory distress.   Abdominal:      Palpations: Abdomen is soft.      Tenderness: There is no guarding.   Musculoskeletal:         General: Normal range of motion.      Cervical back: Normal range of motion and neck supple. No rigidity.      Lumbar back: Tenderness present.      Right foot: Tenderness present.      Left foot: Tenderness present.   Skin:     General: Skin is warm and dry.      Coloration: Skin is not jaundiced or pale.   Neurological:      General: No focal deficit present.      Mental Status: He is alert and oriented to person, place, and time. Mental status is at baseline.      Cranial Nerves: No cranial nerve deficit (II-XII).   Psychiatric:         Mood and Affect: Mood normal.         Behavior: Behavior normal. Behavior is cooperative.         Thought Content: Thought content normal.         X-Ray Foot Complete 3 view Right  Narrative: EXAMINATION:  XR FOOT COMPLETE 3 VIEW RIGHT    CLINICAL HISTORY:  .  Diabetes mellitus due to underlying condition with foot ulcer    COMPARISON:  April 7, 2023 right foot x-ray available    TECHNIQUE:  AP, lateral, and oblique views right foot    FINDINGS:  No new areas of active bony erosion are seen.  No changes have occurred since the previous study to specifically suggest acute osteomyelitis.  There has been previous amputation of the 1st digit at the distal metatarsal level as before.  Sclerotic joint changes with osteophyte formation are noted at the 1st through 5th tarsometatarsal joints as before.  There is chronic erosive change at the plantar aspect of the calcaneus posteriorly as before.  There is no acute fracture or dislocation.    There is continued soft tissue swelling about the foot.  There is no radiopaque foreign body within soft tissues.  There is soft tissue ulceration at the plantar aspect of the calcaneus.  Impression: No acute bony changes.  Chronic erosive changes plantar aspect of the calcaneus posteriorly    Chronic  degenerative changes of the tarsometatarsal joint level as before.    Soft tissue swelling about the foot.  Calcaneal plantar soft tissue ulcer as before.    Electronically signed by: Geovany Berrios  Date:    07/03/2023  Time:    13:11         Lab Requisition on 10/30/2023   Component Date Value Ref Range Status    Sodium 10/30/2023 138  136 - 145 mmol/L Final    Potassium 10/30/2023 5.4 (H)  3.5 - 5.1 mmol/L Final    Chloride 10/30/2023 106  98 - 107 mmol/L Final    CO2 10/30/2023 26  21 - 32 mmol/L Final    Anion Gap 10/30/2023 11  7 - 16 mmol/L Final    Glucose 10/30/2023 164 (H)  74 - 106 mg/dL Final    BUN 10/30/2023 15  7 - 18 mg/dL Final    Creatinine 10/30/2023 1.22  0.70 - 1.30 mg/dL Final    BUN/Creatinine Ratio 10/30/2023 12  6 - 20 Final    Calcium 10/30/2023 8.4 (L)  8.5 - 10.1 mg/dL Final    eGFR 10/30/2023 77  >=60 mL/min/1.73m2 Final    CRP 10/30/2023 1.03 (H)  0.00 - 0.80 mg/dL Final    ESR Westergren 10/30/2023 63 (H)  0 - 15 mm/Hr Final    WBC 10/30/2023 4.82  4.50 - 11.00 K/uL Final    RBC 10/30/2023 4.19 (L)  4.60 - 6.20 M/uL Final    Hemoglobin 10/30/2023 11.2 (L)  13.5 - 18.0 g/dL Final    Hematocrit 10/30/2023 34.9 (L)  40.0 - 54.0 % Final    MCV 10/30/2023 83.3  80.0 - 96.0 fL Final    MCH 10/30/2023 26.7 (L)  27.0 - 31.0 pg Final    MCHC 10/30/2023 32.1  32.0 - 36.0 g/dL Final    RDW 10/30/2023 14.1  11.5 - 14.5 % Final    Platelet Count 10/30/2023 290  150 - 400 K/uL Final    MPV 10/30/2023 10.4  9.4 - 12.4 fL Final    Neutrophils % 10/30/2023 51.7 (L)  53.0 - 65.0 % Final    Lymphocytes % 10/30/2023 33.8  27.0 - 41.0 % Final    Monocytes % 10/30/2023 7.9 (H)  2.0 - 6.0 % Final    Eosinophils % 10/30/2023 5.4 (H)  1.0 - 4.0 % Final    Basophils % 10/30/2023 1.0  0.0 - 1.0 % Final    Immature Granulocytes % 10/30/2023 0.2  0.0 - 0.4 % Final    nRBC, Auto 10/30/2023 0.0  <=0.0 % Final    Neutrophils, Abs 10/30/2023 2.49  1.80 - 7.70 K/uL Final     Lymphocytes, Absolute 10/30/2023 1.63  1.00 - 4.80 K/uL Final    Monocytes, Absolute 10/30/2023 0.38  0.00 - 0.80 K/uL Final    Eosinophils, Absolute 10/30/2023 0.26  0.00 - 0.50 K/uL Final    Basophils, Absolute 10/30/2023 0.05  0.00 - 0.20 K/uL Final    Immature Granulocytes, Absolute 10/30/2023 0.01  0.00 - 0.04 K/uL Final    nRBC, Absolute 10/30/2023 0.00  <=0.00 x10e3/uL Final    Diff Type 10/30/2023 Auto   Final   Office Visit on 10/23/2023   Component Date Value Ref Range Status    Culture, Wound/Abscess 10/23/2023 Moderate Growth Escherichia coli ESBL (A)   Final    Culture, Wound/Abscess 10/23/2023 Heavy Growth Streptococcus agalactiae (Group B) (A)   Final    Culture, Anaerobe 10/23/2023 No Anaerobes Isolated   Final   Lab Requisition on 10/23/2023   Component Date Value Ref Range Status    Sodium 10/23/2023 139  136 - 145 mmol/L Final    Potassium 10/23/2023 4.3  3.5 - 5.1 mmol/L Final    Chloride 10/23/2023 108 (H)  98 - 107 mmol/L Final    CO2 10/23/2023 27  21 - 32 mmol/L Final    Anion Gap 10/23/2023 8  7 - 16 mmol/L Final    Glucose 10/23/2023 118 (H)  74 - 106 mg/dL Final    BUN 10/23/2023 20 (H)  7 - 18 mg/dL Final    Creatinine 10/23/2023 1.19  0.70 - 1.30 mg/dL Final    BUN/Creatinine Ratio 10/23/2023 17  6 - 20 Final    Calcium 10/23/2023 8.5  8.5 - 10.1 mg/dL Final    Total Protein 10/23/2023 7.3  6.4 - 8.2 g/dL Final    Albumin 10/23/2023 2.6 (L)  3.5 - 5.0 g/dL Final    Globulin 10/23/2023 4.7 (H)  2.0 - 4.0 g/dL Final    A/G Ratio 10/23/2023 0.6   Final    Bilirubin, Total 10/23/2023 0.5  >0.0 - 1.2 mg/dL Final    Alk Phos 10/23/2023 112  45 - 115 U/L Final    ALT 10/23/2023 29  16 - 61 U/L Final    AST 10/23/2023 15  15 - 37 U/L Final    eGFR 10/23/2023 79  >=60 mL/min/1.73m2 Final    Hemoglobin A1C 10/23/2023 5.9  4.5 - 6.6 % Final    Estimated Average Glucose 10/23/2023 110  mg/dL Final    Triglycerides 10/23/2023 105  35 - 150 mg/dL Final    Cholesterol  10/23/2023 103  0 - 200 mg/dL Final    HDL Cholesterol 10/23/2023 51  40 - 60 mg/dL Final    Cholesterol/HDL Ratio (Risk Factor) 10/23/2023 2.0   Final    Non-HDL 10/23/2023 52  mg/dL Final    LDL Calculated 10/23/2023 31  mg/dL Final    VLDL 10/23/2023 21  mg/dL Final    WBC 10/23/2023 6.02  4.50 - 11.00 K/uL Final    RBC 10/23/2023 3.98 (L)  4.60 - 6.20 M/uL Final    Hemoglobin 10/23/2023 10.7 (L)  13.5 - 18.0 g/dL Final    Hematocrit 10/23/2023 33.1 (L)  40.0 - 54.0 % Final    MCV 10/23/2023 83.2  80.0 - 96.0 fL Final    MCH 10/23/2023 26.9 (L)  27.0 - 31.0 pg Final    MCHC 10/23/2023 32.3  32.0 - 36.0 g/dL Final    RDW 10/23/2023 14.3  11.5 - 14.5 % Final    Platelet Count 10/23/2023 299  150 - 400 K/uL Final    MPV 10/23/2023 10.7  9.4 - 12.4 fL Final    Neutrophils % 10/23/2023 53.2  53.0 - 65.0 % Final    Lymphocytes % 10/23/2023 34.2  27.0 - 41.0 % Final    Monocytes % 10/23/2023 8.8 (H)  2.0 - 6.0 % Final    Eosinophils % 10/23/2023 2.8  1.0 - 4.0 % Final    Basophils % 10/23/2023 0.8  0.0 - 1.0 % Final    Immature Granulocytes % 10/23/2023 0.2  0.0 - 0.4 % Final    nRBC, Auto 10/23/2023 0.0  <=0.0 % Final    Neutrophils, Abs 10/23/2023 3.20  1.80 - 7.70 K/uL Final    Lymphocytes, Absolute 10/23/2023 2.06  1.00 - 4.80 K/uL Final    Monocytes, Absolute 10/23/2023 0.53  0.00 - 0.80 K/uL Final    Eosinophils, Absolute 10/23/2023 0.17  0.00 - 0.50 K/uL Final    Basophils, Absolute 10/23/2023 0.05  0.00 - 0.20 K/uL Final    Immature Granulocytes, Absolute 10/23/2023 0.01  0.00 - 0.04 K/uL Final    nRBC, Absolute 10/23/2023 0.00  <=0.00 x10e3/uL Final    Diff Type 10/23/2023 Auto   Final   Office Visit on 09/28/2023   Component Date Value Ref Range Status    Culture, Anaerobe 09/28/2023 No Anaerobes Isolated   Final    Culture, Wound/Abscess 09/28/2023 Moderate Growth Escherichia coli ESBL (A)   Final   Office Visit on 09/05/2023   Component Date Value Ref Range Status    POC  Amphetamines 09/05/2023 Negative  Negative, Inconclusive Final    POC Barbiturates 09/05/2023 Negative  Negative, Inconclusive Final    POC Benzodiazepines 09/05/2023 Negative  Negative, Inconclusive Final    POC Cocaine 09/05/2023 Negative  Negative, Inconclusive Final    POC THC 09/05/2023 Negative  Negative, Inconclusive Final    POC Methadone 09/05/2023 Negative  Negative, Inconclusive Final    POC Methamphetamine 09/05/2023 Negative  Negative, Inconclusive Final    POC Opiates 09/05/2023 Presumptive Positive (A)  Negative, Inconclusive Final    POC Oxycodone 09/05/2023 Negative  Negative, Inconclusive Final    POC Phencyclidine 09/05/2023 Negative  Negative, Inconclusive Final    POC Methylenedioxymethamphetamine * 09/05/2023 Negative  Negative, Inconclusive Final    POC Tricyclic Antidepressants 09/05/2023 Negative  Negative, Inconclusive Final    POC Buprenorphine 09/05/2023 Negative   Final     Acceptable 09/05/2023 Yes   Final    POC Temperature (Urine) 09/05/2023 92   Final   Office Visit on 05/05/2023   Component Date Value Ref Range Status    POC Glucose 05/05/2023 127 (A)  70 - 110 MG/DL Final    POC Glucose 05/05/2023 142 (A)  70 - 110 MG/DL Final   Office Visit on 05/03/2023   Component Date Value Ref Range Status    POC Glucose 05/04/2023 132 (A)  70 - 110 MG/DL Final    POC Glucose 05/04/2023 143 (A)  70 - 110 MG/DL Final   Office Visit on 05/03/2023   Component Date Value Ref Range Status    POC Amphetamines 05/03/2023 Negative  Negative, Inconclusive Final    POC Barbiturates 05/03/2023 Negative  Negative, Inconclusive Final    POC Benzodiazepines 05/03/2023 Negative  Negative, Inconclusive Final    POC Cocaine 05/03/2023 Negative  Negative, Inconclusive Final    POC THC 05/03/2023 Negative  Negative, Inconclusive Final    POC Methadone 05/03/2023 Negative  Negative, Inconclusive Final    POC Methamphetamine 05/03/2023 Negative  Negative, Inconclusive Final     POC Opiates 05/03/2023 Presumptive Positive (A)  Negative, Inconclusive Final    POC Oxycodone 05/03/2023 Negative  Negative, Inconclusive Final    POC Phencyclidine 05/03/2023 Negative  Negative, Inconclusive Final    POC Methylenedioxymethamphetamine * 05/03/2023 Negative  Negative, Inconclusive Final    POC Tricyclic Antidepressants 05/03/2023 Negative  Negative, Inconclusive Final    POC Buprenorphine 05/03/2023 Negative   Final     Acceptable 05/03/2023 Yes   Final    POC Temperature (Urine) 05/03/2023 94   Final         No orders of the defined types were placed in this encounter.        Requested Prescriptions     Signed Prescriptions Disp Refills    HYDROcodone-acetaminophen (NORCO)  mg per tablet 60 tablet 0     Sig: Take 1 tablet by mouth every 12 (twelve) hours as needed for Pain.    HYDROcodone-acetaminophen (NORCO)  mg per tablet 60 tablet 0     Sig: Take 1 tablet by mouth every 12 (twelve) hours as needed for Pain.       Assessment:     1. Diabetic neuropathy with neurologic complication    2. Peripheral vascular disease         A's of Opioid Risk Assessment  Activity:Patient can perform ADL.   Analgesia:Patients pain is partially controlled by current medication. Patient has tried OTC medications such as Tylenol and Ibuprofen with out relief.   Adverse Effects: Patient denies constipation or sedation.  Aberrant Behavior:  reviewed with no aberrant drug seeking/taking behavior.  Overdose reversal drug naloxone discussed    Drug screen reviewed      Plan:    Narcan March 2023    Ulcer right foot continues to follow wound management please see photographs    Walking boot right lower extremity    He verbalized understanding if he can not keep office visits for compliance with urine drug screens for narcotics management will discontinue narcotics    He would like to continue with conservative management    He has no new complaints today current medications helping  control his discomfort    Continue activity as directed     Continue current medication    Follow-up 2 months    Dr. Paulson, July 2024    Bring original prescription medication bottles/container/box with labels to each visit

## 2023-11-02 ENCOUNTER — DOCUMENT SCAN (OUTPATIENT)
Dept: HOME HEALTH SERVICES | Facility: HOSPITAL | Age: 38
End: 2023-11-02
Payer: MEDICARE

## 2023-11-02 ENCOUNTER — OFFICE VISIT (OUTPATIENT)
Dept: PAIN MEDICINE | Facility: CLINIC | Age: 38
End: 2023-11-02
Payer: MEDICARE

## 2023-11-02 VITALS
SYSTOLIC BLOOD PRESSURE: 153 MMHG | HEART RATE: 73 BPM | DIASTOLIC BLOOD PRESSURE: 97 MMHG | RESPIRATION RATE: 18 BRPM | HEIGHT: 74 IN | WEIGHT: 315 LBS | BODY MASS INDEX: 40.43 KG/M2

## 2023-11-02 DIAGNOSIS — E11.40 DIABETIC NEUROPATHY WITH NEUROLOGIC COMPLICATION: Primary | Chronic | ICD-10-CM

## 2023-11-02 DIAGNOSIS — E11.49 DIABETIC NEUROPATHY WITH NEUROLOGIC COMPLICATION: Primary | Chronic | ICD-10-CM

## 2023-11-02 DIAGNOSIS — I73.9 PERIPHERAL VASCULAR DISEASE: Chronic | ICD-10-CM

## 2023-11-02 PROCEDURE — 99214 PR OFFICE/OUTPT VISIT, EST, LEVL IV, 30-39 MIN: ICD-10-PCS | Mod: S$PBB,,, | Performed by: PHYSICIAN ASSISTANT

## 2023-11-02 PROCEDURE — 99214 OFFICE O/P EST MOD 30 MIN: CPT | Mod: PBBFAC | Performed by: PHYSICIAN ASSISTANT

## 2023-11-02 PROCEDURE — 99214 OFFICE O/P EST MOD 30 MIN: CPT | Mod: S$PBB,,, | Performed by: PHYSICIAN ASSISTANT

## 2023-11-02 RX ORDER — HYDROCODONE BITARTRATE AND ACETAMINOPHEN 10; 325 MG/1; MG/1
1 TABLET ORAL EVERY 12 HOURS PRN
Qty: 60 TABLET | Refills: 0 | Status: SHIPPED | OUTPATIENT
Start: 2023-11-08 | End: 2023-12-08

## 2023-11-02 RX ORDER — HYDROCODONE BITARTRATE AND ACETAMINOPHEN 10; 325 MG/1; MG/1
1 TABLET ORAL EVERY 12 HOURS PRN
Qty: 60 TABLET | Refills: 0 | Status: SHIPPED | OUTPATIENT
Start: 2023-12-08 | End: 2024-01-22 | Stop reason: SDUPTHER

## 2023-11-06 ENCOUNTER — DOCUMENT SCAN (OUTPATIENT)
Dept: HOME HEALTH SERVICES | Facility: HOSPITAL | Age: 38
End: 2023-11-06
Payer: MEDICARE

## 2023-11-10 NOTE — PATIENT INSTRUCTIONS
Clean with  vashe or baby shampoo and water     Apply vashe  moisten drawtex to wound     Cover and secure with 4x4, ABD,wrap with kerlix,secure with paper tape.  Wrap with ace wrap from toes to knee. Change daily and as needed     Monitor closely for s/s of infection including fever, chills, increase in pain, odor from wound, and increased redness from foot. Go to ER if any complications develop.   Keep leg elevated and avoid pressure on wound.   Diabetes:  Monitor glucose closely. Check fasting glucose and 2 hours after meals. HgA1C goal <7, fasting glucose , and 2 hours after meals <180  Hypertension:  Check blood pressure twice daily, goal <120/80  Diet:   Increase protein intake, avoid fried, fatty foods and foods high in simple carbs.   Vitamins:  Take vitamin C 1000 mg, zinc 50mg, vitamin d 5000 units, and a daily multivitamin. Angel is a good source of protein and nutrients to aid in wound healing.    Wear walking boot daily or use crutches to offload right foot     Home health to FOLLOW wound orders above    Labs today, continue IV antibiotics

## 2023-11-10 NOTE — PROGRESS NOTES
AUGUSTUS Macedo   [facility]  [address] [facility] [place] [address]  [phone]      PATIENT NAME: Jean Pierre Paulson  : 1985  DATE: 23  MRN: [id number]      Billing Provider: AUGUSTUS Macedo  Level of Service:   Patient PCP Information       Provider PCP Type    Mojgan Lomeli NP General            Reason for Visit / Chief Complaint: Non-healing Wound Follow Up (Right DFU Grade 3)       History of Present Illness / Problem Focused Workflow     Jean Pierre Paulson is a 38 y.o. male who presents to clinic for follow up on chronic-non healing wound on the right foot. He is currently on Invanz IV, labs today. Wound has improved since starting IV antibiotics. Wound with biofilm and callus filippo-wound, bedside debridement today.  The cavity at inferior aspect of heel has filled in. Drainage ha significantly improved.  Continue with IV antibiotics, local wound care, and off-loading.     Patient needs CROW orthotic boot due to charcot's foot. He has prescription for CROW orthotic boot at [facility], pending insurance approval.       Reinforced importance of following plan of care, wearing off-loading boot, keeping pressure off foot, taking antibiotics as prescribed,increasing protein intake, and keeping diabetes well controlled.    Last arterial doppler 4/10/23,  The ankle to brachial index is 1.25 on the right and 1.34 on the left.    Significant PMH  Includes diabetes and hypertension. Last HgA1C 7.3 in 2022, diabetes is managed by PCP.  Denies fever or chills.     Review of Systems     Review of Systems   Constitutional:  Negative for activity change, chills and fever.   Respiratory:  Negative for chest tightness and shortness of breath.    Cardiovascular:  Positive for leg swelling. Negative for chest pain and palpitations.   Musculoskeletal:  Positive for arthralgias and joint swelling.   Skin:  Positive for wound.        See wound assessment    Neurological:  Positive for weakness and numbness.   Psychiatric/Behavioral:  Negative for agitation, behavioral problems, confusion and self-injury.        Medical / Social / Family History     Past Medical History:   Diagnosis Date    NOLAN (acute kidney injury) 04/07/2023    Anemia     Diabetes mellitus with neuropathy     HLD (hyperlipidemia) 04/07/2023    HTN (hypertension)     Obesity     TEDDY (obstructive sleep apnea) 04/07/2023    Osteomyelitis 10/2020    Hx of R foot, Tx with IV Abx    Peripheral vascular disease        Past Surgical History:   Procedure Laterality Date    APPLICATION OF SPLIT-THICKNESS SKIN GRAFT (STSG) TO LOWER EXTREMITY Right 03/02/2022    Procedure: APPLICATION, GRAFT, SKIN, SPLIT-THICKNESS, TO LOWER EXTREMITY;  Surgeon: Greg Ramírez MD;  Location: [facility];  Service: General;  Laterality: Right;    CHOLECYSTECTOMY      DEBRIDEMENT OF FOOT Right 10/2020    right great toe amputation         Social History  Mr. Jean Pierre Paulson  reports that he has been smoking cigarettes. He started smoking about 23 years ago. He has a 6.2 pack-year smoking history. He has never used smokeless tobacco. He reports current alcohol use of about 2.0 standard drinks of alcohol per week. He reports that he does not use drugs.    Family History  Mr.'s Jean Pierre Paulson family history includes Diabetes in his father and maternal grandmother; Hypertension in his father and maternal grandmother.    Medications and Allergies     Medications  No outpatient medications have been marked as taking for the 11/13/23 encounter (Office Visit) with Yelitza Alas FNP.       Allergies  Review of patient's allergies indicates:   Allergen Reactions    Tomato Itching       Physical Examination     Vitals:    11/13/23 1052   BP: (!) 158/89   Pulse: 73   Resp: 20   Temp: 97.4 °F (36.3 °C)     Physical Exam  Vitals and nursing note reviewed.   Constitutional:       Appearance: Normal appearance.   HENT:       Head: Normocephalic.   Cardiovascular:      Rate and Rhythm: Normal rate and regular rhythm.      Pulses: Normal pulses.      Heart sounds: Normal heart sounds.   Pulmonary:      Effort: Pulmonary effort is normal. No respiratory distress.   Chest:      Chest wall: No tenderness.   Musculoskeletal:         General: Swelling, tenderness and deformity present.      Right lower leg: Edema present.   Skin:     General: Skin is warm and dry.      Capillary Refill: Capillary refill takes 2 to 3 seconds.      Comments: See LDA for photos and measurements   Neurological:      General: No focal deficit present.      Mental Status: He is alert and oriented to person, place, and time. Mental status is at baseline.   Psychiatric:         Mood and Affect: Mood normal.         Behavior: Behavior normal.         Thought Content: Thought content normal.         Judgment: Judgment normal.     Assessment and Plan             Altered Skin Integrity 04/10/23 1200 Right plantar Foot Diabetic Ulcer Full thickness tissue loss with exposed bone, tendon, or muscle. Often includes undermining and tunneling. May extend into muscle and/or supporting structures. (Active)   04/10/23 1200   Altered Skin Integrity Present on Admission - Did Patient arrive to the hospital with altered skin?: yes   Side: Right   Orientation: plantar   Location: Foot   Wound Number:    Is this injury device related?:    Primary Wound Type: Diabetic ulc   Description of Altered Skin Integrity: Full thickness tissue loss with exposed bone, tendon, or muscle. Often includes undermining and tunneling. May extend into muscle and/or supporting structures.   Ankle-Brachial Index:    Pulses:    Removal Indication and Assessment:    Wound Outcome:    (Retired) Wound Length (cm):    (Retired) Wound Width (cm):    (Retired) Depth (cm):    Wound Description (Comments):    Removal Indications:    Wound Image      11/13/23 1038   Description of Altered Skin Integrity Full  thickness tissue loss. Subcutaneous fat may be visible but bone, tendon or muscle are not exposed 11/13/23 1038   Dressing Appearance Intact 11/13/23 1038   Drainage Amount Moderate 11/13/23 1038   Drainage Characteristics/Odor Serosanguineous 11/13/23 1038   Appearance Pink 11/13/23 1038   Tissue loss description Full thickness 11/13/23 1038   Black (%), Wound Tissue Color 0 % 11/13/23 1038   Red (%), Wound Tissue Color 100 % 11/13/23 1038   Yellow (%), Wound Tissue Color 0 % 11/13/23 1038   Periwound Area Intact 11/13/23 1038   Wound Edges Callused 11/13/23 1038   Elliott Classification (diabetic foot ulcers only) Grade 2 11/13/23 1038   Wound Length (cm) 11.4 cm 11/13/23 1038   Wound Width (cm) 7.1 cm 11/13/23 1038   Wound Surface Area (cm^2) 80.94 cm^2 11/13/23 1038     Problem List Items Addressed This Visit          Endocrine    Diabetic ulcer of right heel associated with type 2 diabetes mellitus, with fat layer exposed - Primary    Overview                                                                  Current Assessment & Plan     Clean with  vashe or baby shampoo and water     Apply vashe  moisten drawtex to wound     Cover and secure with 4x4, ABD,wrap with kerlix,secure with paper tape.  Wrap with ace wrap from toes to knee. Change daily and as needed     Monitor closely for s/s of infection including fever, chills, increase in pain, odor from wound, and increased redness from foot. Go to ER if any complications develop.   Keep leg elevated and avoid pressure on wound.   Diabetes:  Monitor glucose closely. Check fasting glucose and 2 hours after meals. HgA1C goal <7, fasting glucose , and 2 hours after meals <180  Hypertension:  Check blood pressure twice daily, goal <120/80  Diet:   Increase protein intake, avoid fried, fatty foods and foods high in simple carbs.   Vitamins:  Take vitamin C 1000 mg, zinc 50mg, vitamin d 5000 units, and a daily multivitamin. Angel is a good source of protein and  nutrients to aid in wound healing.    Wear walking boot daily or use crutches to offload right foot     Home health to FOLLOW wound orders above    Labs today, continue IV antibiotics         Relevant Orders    C-Reactive Protein (Completed)    CBC Auto Differential (Completed)    Sedimentation Rate (Completed)    Basic Metabolic Panel (Completed)       Future Appointments   Date Time Provider Department Center   12/4/2023 10:00 AM Yelitza Alas FNP [facility]   1/3/2024  9:00 AM Franck Kee PA [facility]            Signature:  AUGUSTUS Macedo  [facility]  [address] [facility] [address]  [phone]    Date of encounter: 11/13/23

## 2023-11-13 ENCOUNTER — OFFICE VISIT (OUTPATIENT)
Dept: WOUND CARE | Facility: CLINIC | Age: 38
End: 2023-11-13
Attending: FAMILY MEDICINE
Payer: MEDICARE

## 2023-11-13 VITALS
TEMPERATURE: 97 F | RESPIRATION RATE: 20 BRPM | SYSTOLIC BLOOD PRESSURE: 158 MMHG | DIASTOLIC BLOOD PRESSURE: 89 MMHG | HEART RATE: 73 BPM

## 2023-11-13 DIAGNOSIS — L97.412 DIABETIC ULCER OF RIGHT HEEL ASSOCIATED WITH TYPE 2 DIABETES MELLITUS, WITH FAT LAYER EXPOSED: Primary | ICD-10-CM

## 2023-11-13 DIAGNOSIS — E11.621 DIABETIC ULCER OF RIGHT HEEL ASSOCIATED WITH TYPE 2 DIABETES MELLITUS, WITH FAT LAYER EXPOSED: Primary | ICD-10-CM

## 2023-11-13 DIAGNOSIS — E11.610 CHARCOT FOOT DUE TO DIABETES MELLITUS: ICD-10-CM

## 2023-11-13 PROCEDURE — 11046 DBRDMT MUSC&/FSCA EA ADDL: CPT | Mod: PBBFAC | Performed by: NURSE PRACTITIONER

## 2023-11-13 PROCEDURE — 99499 UNLISTED E&M SERVICE: CPT | Mod: S$PBB,,, | Performed by: NURSE PRACTITIONER

## 2023-11-13 PROCEDURE — 11043 DBRDMT MUSC&/FSCA 1ST 20/<: CPT | Mod: S$PBB,,, | Performed by: NURSE PRACTITIONER

## 2023-11-13 PROCEDURE — 99499 NO LOS: ICD-10-PCS | Mod: S$PBB,,, | Performed by: NURSE PRACTITIONER

## 2023-11-13 PROCEDURE — 11046 DEBRIDEMENT: ICD-10-PCS | Mod: S$PBB,,, | Performed by: NURSE PRACTITIONER

## 2023-11-13 PROCEDURE — 11043 DEBRIDEMENT: ICD-10-PCS | Mod: S$PBB,,, | Performed by: NURSE PRACTITIONER

## 2023-11-13 PROCEDURE — 99215 OFFICE O/P EST HI 40 MIN: CPT | Mod: PBBFAC | Performed by: NURSE PRACTITIONER

## 2023-11-13 NOTE — PROGRESS NOTES
Debridement Performed for Assessment: Wound# Right plantar foot   Performed By: Provider: Yelitza Ward NP  Assistant:  Lizabeth Stone LPN    Debridement: Surgical    Photo taken post procedure:    Time-Out Taken: Yes  Level: Skin/Subcutaneous Tissue/ Muscle  Post Debridement Measurements  Length: (cm) 12.1  Width: (cm) 8.2  Depth: (cm) 0.3      Area: (cm²) 99.22  Percent Debrided: 100%  Total Area Debrided: (sq cm)     Tissue and other material debrided:  Adipose, Dermis, Epidermis, Subcutaneous, Muscle  Devitalized Tissue Debrided:Biofilm, Slough  Instrument: Curette  Bleeding: Moderate  Hemostasis Achieved: Pressure  Procedural Pain: Insensate  Post Procedural Pain: Insensate  Response to Treatment: Procedure was tolerated well    Devitalized materials/tissue Removed  the following was removed during debridement  subcutaneous, muscle      Post Debridement Diagnosis  chronic right foot diabetic ulcer  Post debridement diagnosis  Same as Pre-operative debridement diagnosis, No Complications noted.      Grafts or implants applied  Was a graft or implant applied?  No      Procedure assistant  Procedure assisted by:  Assistant is the same as nurse listed above      Complications related to procedure  Did any complication occure during procedure?  No complications noted during or after procedure.      Specimen  Specimen collect during procedure?  No specimen collected      Anaesthesia:  Anesthesia used  None      Blood Loss:  Blood loss during procedure  less than 5 cc

## 2023-11-14 NOTE — PROCEDURES
"Debridement    Date/Time: 11/13/2023 10:00 AM    Performed by: Yelitza Alas FNP  Authorized by: Yelitza Alas FNP    Time out: Immediately prior to procedure a "time out" was called to verify the correct patient, procedure, equipment, support staff and site/side marked as required.      Wound Details:    Location:  Right foot    Location:  Right Plantar    Type of Debridement:  Excisional       Length (cm):  12.1       Area (sq cm):  99.22       Width (cm):  8.2       Percent Debrided (%):  100       Depth (cm):  0.3       Total Area Debrided (sq cm):  99.22    Depth of debridement:  Muscle/fascia/tendon    Tissue debrided:  Adipose, Epidermis, Dermis, Muscle and Subcutaneous    Devitalized tissue debrided:  Biofilm, Callus, Clots, Exudate and Fibrin    Instruments:  Curette  Bleeding:  Moderate  Hemostasis Achieved: Yes  Method Used:  Pressure  Patient tolerance:  Patient tolerated the procedure well with no immediate complications  1st Wound Pain Assessment: 0     Assistant JOSE ALFREDO Stone LPN    "

## 2023-11-28 ENCOUNTER — TELEPHONE (OUTPATIENT)
Dept: WOUND CARE | Facility: CLINIC | Age: 38
End: 2023-11-28
Payer: MEDICARE

## 2023-12-05 ENCOUNTER — DOCUMENT SCAN (OUTPATIENT)
Dept: HOME HEALTH SERVICES | Facility: HOSPITAL | Age: 38
End: 2023-12-05
Payer: MEDICARE

## 2023-12-07 NOTE — PROGRESS NOTES
AUGUSTUS Macedo   [facility]  [address] [facility] [place] [address]  [phone]      PATIENT NAME: Jean Pierre Paulson  : 1985  DATE: 23  MRN: [id number]      Billing Provider: AUGUSTUS Macedo  Level of Service:   Patient PCP Information       None on File            Reason for Visit / Chief Complaint: Diabetic Foot Ulcer (Right heel)       History of Present Illness / Problem Focused Workflow     Jean Pierre Paulson is a 38 y.o. male who presents to clinic for follow up on chronic-non healing wound on the right foot. He has completed IV antibiotics  and had PICC line removed.Continues to have improvement in drainage. Initiate gentamicin and drawtex. He is wearing off-loading boat as recommended.     Patient to schedule appointment with Jihan Lopez for CROW orthotic boot due to charcot's foot.     Reinforced importance of following plan of care, wearing off-loading boot, keeping pressure off foot, taking antibiotics as prescribed,increasing protein intake, and keeping diabetes well controlled.    Last arterial doppler 4/10/23,  The ankle to brachial index is 1.25 on the right and 1.34 on the left.    Significant PMH  Includes diabetes and hypertension. Last HgA1C 7.3 in 2022, diabetes is managed by PCP.  Denies fever or chills.     Review of Systems     Review of Systems   Constitutional:  Negative for activity change, chills and fever.   Respiratory:  Negative for chest tightness and shortness of breath.    Cardiovascular:  Positive for leg swelling. Negative for chest pain and palpitations.   Musculoskeletal:  Positive for arthralgias and joint swelling.   Skin:  Positive for wound.        See wound assessment   Neurological:  Positive for weakness and numbness.   Psychiatric/Behavioral:  Negative for agitation, behavioral problems, confusion and self-injury.        Medical / Social / Family History     Past Medical History:   Diagnosis Date     NOLAN (acute kidney injury) 04/07/2023    Anemia     Diabetes mellitus with neuropathy     HLD (hyperlipidemia) 04/07/2023    HTN (hypertension)     Obesity     TEDDY (obstructive sleep apnea) 04/07/2023    Osteomyelitis 10/2020    Hx of R foot, Tx with IV Abx    Peripheral vascular disease        Past Surgical History:   Procedure Laterality Date    APPLICATION OF SPLIT-THICKNESS SKIN GRAFT (STSG) TO LOWER EXTREMITY Right 03/02/2022    Procedure: APPLICATION, GRAFT, SKIN, SPLIT-THICKNESS, TO LOWER EXTREMITY;  Surgeon: Greg Ramírez MD;  Location: [facility];  Service: General;  Laterality: Right;    CHOLECYSTECTOMY      DEBRIDEMENT OF FOOT Right 10/2020    right great toe amputation         Social History  Mr. Jean Pierre Paulson  reports that he has been smoking cigarettes. He started smoking about 23 years ago. He has a 6.2 pack-year smoking history. He has never used smokeless tobacco. He reports current alcohol use of about 2.0 standard drinks of alcohol per week. He reports that he does not use drugs.    Family History  .'s Jean Pierre Paulson family history includes Diabetes in his father and maternal grandmother; Hypertension in his father and maternal grandmother.    Medications and Allergies     Medications  Outpatient Medications Marked as Taking for the 12/14/23 encounter (Office Visit) with Yelitza Alas FNP   Medication Sig Dispense Refill    gentamicin (GARAMYCIN) 0.1 % cream Apply topically once daily. 90 g 2    hydroCHLOROthiazide (HYDRODIURIL) 25 MG tablet Take 1 tablet (25 mg total) by mouth once daily. 90 tablet 1    HYDROcodone-acetaminophen (NORCO)  mg per tablet Take 1 tablet by mouth every 12 (twelve) hours as needed for Pain. 60 tablet 0    semaglutide (OZEMPIC) 2 mg/dose (8 mg/3 mL) PnIj Inject 2 mg into the skin every 7 days. 8 mL 2       Allergies  Review of patient's allergies indicates:   Allergen Reactions    Tomato Itching       Physical Examination     Vitals:     12/14/23 1145   BP: (!) 166/93   Pulse: 89   Resp: 20   Temp: 97.5 °F (36.4 °C)     Physical Exam  Vitals and nursing note reviewed.   Constitutional:       Appearance: Normal appearance.   HENT:      Head: Normocephalic.   Cardiovascular:      Rate and Rhythm: Normal rate and regular rhythm.      Pulses: Normal pulses.      Heart sounds: Normal heart sounds.   Pulmonary:      Effort: Pulmonary effort is normal. No respiratory distress.   Chest:      Chest wall: No tenderness.   Musculoskeletal:         General: Swelling, tenderness and deformity present.      Right lower leg: Edema present.   Skin:     General: Skin is warm and dry.      Capillary Refill: Capillary refill takes 2 to 3 seconds.      Comments: See LDA for photos and measurements   Neurological:      General: No focal deficit present.      Mental Status: He is alert and oriented to person, place, and time. Mental status is at baseline.   Psychiatric:         Mood and Affect: Mood normal.         Behavior: Behavior normal.         Thought Content: Thought content normal.         Judgment: Judgment normal.     Assessment and Plan             Altered Skin Integrity 04/10/23 1200 Right plantar Foot Diabetic Ulcer Full thickness tissue loss with exposed bone, tendon, or muscle. Often includes undermining and tunneling. May extend into muscle and/or supporting structures. (Active)   04/10/23 1200   Altered Skin Integrity Present on Admission - Did Patient arrive to the hospital with altered skin?: yes   Side: Right   Orientation: plantar   Location: Foot   Wound Number:    Is this injury device related?:    Primary Wound Type: Diabetic ulc   Description of Altered Skin Integrity: Full thickness tissue loss with exposed bone, tendon, or muscle. Often includes undermining and tunneling. May extend into muscle and/or supporting structures.   Ankle-Brachial Index:    Pulses:    Removal Indication and Assessment:    Wound Outcome:    (Retired) Wound Length  (cm):    (Retired) Wound Width (cm):    (Retired) Depth (cm):    Wound Description (Comments):    Removal Indications:    Wound Image     12/14/23 1149   Description of Altered Skin Integrity Full thickness tissue loss with exposed bone, tendon, or muscle. Often includes undermining and tunneling. May extend into muscle and/or supporting structures. 12/14/23 1149   Dressing Appearance Moist drainage 12/14/23 1149   Drainage Amount Moderate 12/14/23 1149   Drainage Characteristics/Odor Serosanguineous;Malodorous 12/14/23 1149   Appearance Pink;Moist;Granulating 12/14/23 1149   Tissue loss description Full thickness 12/14/23 1149   Black (%), Wound Tissue Color 0 % 12/14/23 1149   Red (%), Wound Tissue Color 100 % 12/14/23 1149   Yellow (%), Wound Tissue Color 0 % 12/14/23 1149   Periwound Area Intact 12/14/23 1149   Wound Edges Callused 12/14/23 1149   Wound Length (cm) 6.6 cm 12/14/23 1149   Wound Width (cm) 10 cm 12/14/23 1149   Wound Depth (cm) 0.7 cm 12/14/23 1149   Wound Volume (cm^3) 46.2 cm^3 12/14/23 1149   Wound Surface Area (cm^2) 66 cm^2 12/14/23 1149   Care Cleansed with:;Antimicrobial agent 12/14/23 1149   Dressing Applied;Hydrofiber;Gauze;Rolled gauze 12/14/23 1149   Periwound Care Moisturizer applied 12/14/23 1149   Dressing Change Due 12/15/23 12/14/23 1149     Problem List Items Addressed This Visit          Endocrine    Diabetic ulcer of right heel associated with type 2 diabetes mellitus, with fat layer exposed - Primary    Overview                                                                  Current Assessment & Plan     Clean with  vashe or baby shampoo and water     Apply vashe  moisten drawtex to wound     Cover and secure with 4x4, ABD,wrap with kerlix,secure with paper tape.  Wrap with ace wrap from toes to knee. Change daily and as needed     Monitor closely for s/s of infection including fever, chills, increase in pain, odor from wound, and increased redness from foot. Go to ER if any  complications develop.   Keep leg elevated and avoid pressure on wound.   Diabetes:  Monitor glucose closely. Check fasting glucose and 2 hours after meals. HgA1C goal <7, fasting glucose , and 2 hours after meals <180  Hypertension:  Check blood pressure twice daily, goal <120/80  Diet:   Increase protein intake, avoid fried, fatty foods and foods high in simple carbs.   Vitamins:  Take vitamin C 1000 mg, zinc 50mg, vitamin d 5000 units, and a daily multivitamin. Angel is a good source of protein and nutrients to aid in wound healing.    Wear walking boot daily or use crutches to offload right foot     Home health to FOLLOW wound orders above            Orthopedic    Charcot foot due to diabetes mellitus    Overview     Follow  Up with [facility] on CROW orthotic boot            Future Appointments   Date Time Provider Department Center   1/3/2024  9:00 AM Franck Kee PA [facility]   1/11/2024 10:00 AM Yelitza Alas FNP [facility]            Signature:  AUGUSTUS Macedo  [facility]  [address] [facility] [address]  [phone]    Date of encounter: 12/14/23

## 2023-12-07 NOTE — PATIENT INSTRUCTIONS
Clean with  vashe or baby shampoo and water     Apply vashe  moisten drawtex to wound     Cover and secure with 4x4, ABD,wrap with kerlix,secure with paper tape.  Wrap with ace wrap from toes to knee. Change daily and as needed     Monitor closely for s/s of infection including fever, chills, increase in pain, odor from wound, and increased redness from foot. Go to ER if any complications develop.   Keep leg elevated and avoid pressure on wound.   Diabetes:  Monitor glucose closely. Check fasting glucose and 2 hours after meals. HgA1C goal <7, fasting glucose , and 2 hours after meals <180  Hypertension:  Check blood pressure twice daily, goal <120/80  Diet:   Increase protein intake, avoid fried, fatty foods and foods high in simple carbs.   Vitamins:  Take vitamin C 1000 mg, zinc 50mg, vitamin d 5000 units, and a daily multivitamin. Angel is a good source of protein and nutrients to aid in wound healing.    Wear walking boot daily or use crutches to offload right foot     Home health to FOLLOW wound orders above

## 2023-12-14 ENCOUNTER — OFFICE VISIT (OUTPATIENT)
Dept: WOUND CARE | Facility: CLINIC | Age: 38
End: 2023-12-14
Attending: FAMILY MEDICINE
Payer: MEDICARE

## 2023-12-14 VITALS
TEMPERATURE: 98 F | SYSTOLIC BLOOD PRESSURE: 166 MMHG | HEART RATE: 89 BPM | RESPIRATION RATE: 20 BRPM | DIASTOLIC BLOOD PRESSURE: 93 MMHG

## 2023-12-14 DIAGNOSIS — E11.610 CHARCOT FOOT DUE TO DIABETES MELLITUS: ICD-10-CM

## 2023-12-14 DIAGNOSIS — L97.412 DIABETIC ULCER OF RIGHT HEEL ASSOCIATED WITH TYPE 2 DIABETES MELLITUS, WITH FAT LAYER EXPOSED: Primary | ICD-10-CM

## 2023-12-14 DIAGNOSIS — E11.621 DIABETIC ULCER OF RIGHT HEEL ASSOCIATED WITH TYPE 2 DIABETES MELLITUS, WITH FAT LAYER EXPOSED: Primary | ICD-10-CM

## 2023-12-14 PROCEDURE — 99213 OFFICE O/P EST LOW 20 MIN: CPT | Mod: S$PBB,,, | Performed by: NURSE PRACTITIONER

## 2023-12-14 PROCEDURE — 99215 OFFICE O/P EST HI 40 MIN: CPT | Mod: PBBFAC | Performed by: NURSE PRACTITIONER

## 2023-12-14 PROCEDURE — 99213 PR OFFICE/OUTPT VISIT, EST, LEVL III, 20-29 MIN: ICD-10-PCS | Mod: S$PBB,,, | Performed by: NURSE PRACTITIONER

## 2023-12-14 RX ORDER — AMLODIPINE BESYLATE 2.5 MG/1
2.5 TABLET ORAL DAILY
Qty: 30 TABLET | Refills: 11 | Status: SHIPPED | OUTPATIENT
Start: 2023-12-14 | End: 2024-04-01 | Stop reason: DRUGHIGH

## 2023-12-14 RX ORDER — DOXYCYCLINE HYCLATE 100 MG
100 TABLET ORAL 2 TIMES DAILY
Qty: 60 TABLET | Refills: 2 | Status: ON HOLD | OUTPATIENT
Start: 2023-12-14 | End: 2024-02-12 | Stop reason: HOSPADM

## 2023-12-14 RX ORDER — GENTAMICIN SULFATE 1 MG/G
CREAM TOPICAL DAILY
Qty: 90 G | Refills: 2 | Status: SHIPPED | OUTPATIENT
Start: 2023-12-14

## 2023-12-15 PROCEDURE — G0179 MD RECERTIFICATION HHA PT: HCPCS | Mod: ,,, | Performed by: NURSE PRACTITIONER

## 2023-12-26 ENCOUNTER — DOCUMENT SCAN (OUTPATIENT)
Dept: HOME HEALTH SERVICES | Facility: HOSPITAL | Age: 38
End: 2023-12-26
Payer: MEDICARE

## 2023-12-27 NOTE — PROGRESS NOTES
AUGUSTUS Macedo   [facility]  [address] [facility] [address]  [phone]      PATIENT NAME: Jean Pierre Paulson  : 1985  DATE: 24  MRN: [id number]      Billing Provider: AUGUSTUS Macedo  Level of Service:   Patient PCP Information       Provider PCP Type    Primary Doctor No General            Reason for Visit / Chief Complaint: Diabetic Foot Ulcer (R DFU)       History of Present Illness / Problem Focused Workflow     Jean Pierre Paulson is a 38 y.o. male who presents to clinic for follow up on chronic-non healing wound on the right foot. He is taking doxycyline as prescribed. Wound is doing well. Continue with current plan of care.  He is wearing off-loading shoe as recommended.     He has not made appointment at [facility] due to having dental work. Patient to schedule appointment with [facility] for CROW orthotic boot due to charcot's foot.     Reinforced importance of following plan of care, wearing off-loading boot, keeping pressure off foot, taking antibiotics as prescribed,increasing protein intake, and keeping diabetes well controlled.    Last arterial doppler 4/10/23,  The ankle to brachial index is 1.25 on the right and 1.34 on the left.    Significant PMH  Includes diabetes and hypertension. Last HgA1C 7.3 in 2022, diabetes is managed by PCP.  Denies fever or chills.     Review of Systems     Review of Systems   Constitutional:  Negative for activity change, chills and fever.   Respiratory:  Negative for chest tightness and shortness of breath.    Cardiovascular:  Positive for leg swelling. Negative for chest pain and palpitations.   Musculoskeletal:  Positive for arthralgias and joint swelling.   Skin:  Positive for wound.        See wound assessment   Neurological:  Positive for weakness and numbness.   Psychiatric/Behavioral:  Negative for agitation, behavioral problems, confusion and self-injury.        Medical /  Social / Family History     Past Medical History:   Diagnosis Date    NOLAN (acute kidney injury) 04/07/2023    Anemia     Diabetes mellitus with neuropathy     HLD (hyperlipidemia) 04/07/2023    HTN (hypertension)     Obesity     TEDDY (obstructive sleep apnea) 04/07/2023    Osteomyelitis 10/2020    Hx of R foot, Tx with IV Abx    Peripheral vascular disease        Past Surgical History:   Procedure Laterality Date    APPLICATION OF SPLIT-THICKNESS SKIN GRAFT (STSG) TO LOWER EXTREMITY Right 03/02/2022    Procedure: APPLICATION, GRAFT, SKIN, SPLIT-THICKNESS, TO LOWER EXTREMITY;  Surgeon: Greg Ramírez MD;  Location: [facility];  Service: General;  Laterality: Right;    CHOLECYSTECTOMY      DEBRIDEMENT OF FOOT Right 10/2020    right great toe amputation         Social History  Mr. Jean Pierre Paulson  reports that he has been smoking cigarettes. He started smoking about 24 years ago. He has a 6.2 pack-year smoking history. He has never used smokeless tobacco. He reports current alcohol use of about 2.0 standard drinks of alcohol per week. He reports that he does not use drugs.    Family History  Mr.'s Jean Pierre Paulson family history includes Diabetes in his father and maternal grandmother; Hypertension in his father and maternal grandmother.    Medications and Allergies     Medications  Outpatient Medications Marked as Taking for the 1/11/24 encounter (Office Visit) with Yelitza Alas FNP   Medication Sig Dispense Refill    amLODIPine (NORVASC) 2.5 MG tablet Take 1 tablet (2.5 mg total) by mouth once daily. 30 tablet 11    blood sugar diagnostic (BLOOD GLUCOSE TEST) Strp 1 strip by Misc.(Non-Drug; Combo Route) route once daily. accu-chek Veronika strips 90 strip 5    blood-glucose meter,continuous (DEXCOM ) Misc 1 each by Misc.(Non-Drug; Combo Route) route 4 (four) times daily with meals and nightly. 1 each 0    blood-glucose sensor (DEXCOM G7 SENSOR) Aria 1 each by Misc.(Non-Drug; Combo Route) route every 10 days.  3 each 11    doxycycline (VIBRA-TABS) 100 MG tablet Take 1 tablet (100 mg total) by mouth 2 (two) times daily. 60 tablet 2    FEROSUL 325 mg (65 mg iron) Tab tablet Take 1 tablet (325 mg total) by mouth once daily. 90 tablet 1    gabapentin (NEURONTIN) 300 MG capsule Take 1 capsule (300 mg total) by mouth every 8 (eight) hours. 270 capsule 1    gentamicin (GARAMYCIN) 0.1 % cream Apply topically once daily. 90 g 2    hydroCHLOROthiazide (HYDRODIURIL) 25 MG tablet Take 1 tablet (25 mg total) by mouth once daily. 90 tablet 1    rosuvastatin (CRESTOR) 10 MG tablet Take 10 mg by mouth once daily.      semaglutide (OZEMPIC) 2 mg/dose (8 mg/3 mL) PnIj Inject 2 mg into the skin every 7 days. 8 mL 2       Allergies  Review of patient's allergies indicates:   Allergen Reactions    Tomato Itching       Physical Examination     Vitals:    01/11/24 0939   BP: (!) 155/85   Pulse: 86   Resp: 20   Temp: 97.4 °F (36.3 °C)     Physical Exam  Vitals and nursing note reviewed.   Constitutional:       Appearance: Normal appearance.   HENT:      Head: Normocephalic.   Cardiovascular:      Rate and Rhythm: Normal rate and regular rhythm.      Pulses: Normal pulses.      Heart sounds: Normal heart sounds.   Pulmonary:      Effort: Pulmonary effort is normal. No respiratory distress.   Chest:      Chest wall: No tenderness.   Musculoskeletal:         General: Swelling, tenderness and deformity present.      Right lower leg: Edema present.   Skin:     General: Skin is warm and dry.      Capillary Refill: Capillary refill takes 2 to 3 seconds.      Comments: See LDA for photos and measurements   Neurological:      General: No focal deficit present.      Mental Status: He is alert and oriented to person, place, and time. Mental status is at baseline.   Psychiatric:         Mood and Affect: Mood normal.         Behavior: Behavior normal.         Thought Content: Thought content normal.         Judgment: Judgment normal.     Assessment and Plan              Altered Skin Integrity 04/10/23 1200 Right plantar Foot Diabetic Ulcer Full thickness tissue loss with exposed bone, tendon, or muscle. Often includes undermining and tunneling. May extend into muscle and/or supporting structures. (Active)   04/10/23 1200   Altered Skin Integrity Present on Admission - Did Patient arrive to the hospital with altered skin?: yes   Side: Right   Orientation: plantar   Location: Foot   Wound Number:    Is this injury device related?:    Primary Wound Type: Diabetic ulc   Description of Altered Skin Integrity: Full thickness tissue loss with exposed bone, tendon, or muscle. Often includes undermining and tunneling. May extend into muscle and/or supporting structures.   Ankle-Brachial Index:    Pulses:    Removal Indication and Assessment:    Wound Outcome:    (Retired) Wound Length (cm):    (Retired) Wound Width (cm):    (Retired) Depth (cm):    Wound Description (Comments):    Removal Indications:    Wound Image    01/11/24 0940   Description of Altered Skin Integrity Full thickness tissue loss. Subcutaneous fat may be visible but bone, tendon or muscle are not exposed 01/11/24 0940   Dressing Appearance Moist drainage 01/11/24 0940   Drainage Amount Moderate 01/11/24 0940   Drainage Characteristics/Odor Serosanguineous 01/11/24 0940   Appearance Pink;Moist;Not granulating 01/11/24 0940   Tissue loss description Full thickness 01/11/24 0940   Black (%), Wound Tissue Color 0 % 01/11/24 0940   Red (%), Wound Tissue Color 100 % 01/11/24 0940   Yellow (%), Wound Tissue Color 0 % 01/11/24 0940   Periwound Area Intact 01/11/24 0940   Wound Edges Callused 01/11/24 0940   Wound Length (cm) 11.2 cm 01/11/24 0940   Wound Width (cm) 10.2 cm 01/11/24 0940   Wound Depth (cm) 0.9 cm 01/11/24 0940   Wound Volume (cm^3) 102.816 cm^3 01/11/24 0940   Wound Surface Area (cm^2) 114.24 cm^2 01/11/24 0940   Care Cleansed with:;Antimicrobial agent 01/11/24 0940   Dressing  Applied;Hydrofiber;Gauze;Island/border;Rolled gauze 01/11/24 0940   Periwound Care Moisturizer applied 01/11/24 0940   Off Loading Off loading shoe 01/11/24 0940   Dressing Change Due 01/12/24 01/11/24 0940     Problem List Items Addressed This Visit          Cardiac/Vascular    Peripheral vascular disease, unspecified    Overview     Impression:     The ankle to brachial index is 1.25 on the right and 1.34 on the left.  No arterial occlusions.  Right inguinal lymphadenopathy.  This is similar dating back to comparison exam.  Recommend clinical correlation.            Endocrine    Diabetic ulcer of right heel associated with type 2 diabetes mellitus, with fat layer exposed - Primary    Overview                                                              Current Assessment & Plan     Clean with  vashe or baby shampoo and water     Apply vashe  moisten drawtex to wound     Cover and secure with 4x4, ABD,wrap with kerlix,secure with paper tape.  Wrap with ace wrap from toes to knee. Change daily and as needed     Monitor closely for s/s of infection including fever, chills, increase in pain, odor from wound, and increased redness from foot. Go to ER if any complications develop.   Keep leg elevated and avoid pressure on wound.   Diabetes:  Monitor glucose closely. Check fasting glucose and 2 hours after meals. HgA1C goal <7, fasting glucose , and 2 hours after meals <180  Hypertension:  Check blood pressure twice daily, goal <120/80  Diet:   Increase protein intake, avoid fried, fatty foods and foods high in simple carbs.   Vitamins:  Take vitamin C 1000 mg, zinc 50mg, vitamin d 5000 units, and a daily multivitamin. Angle is a good source of protein and nutrients to aid in wound healing.    Wear walking boot daily or use crutches to offload right foot     Home health to FOLLOW wound orders above            Orthopedic    Charcot foot due to diabetes mellitus    Overview     Follow  Up with [facility]  orthotic boot            Future Appointments   Date Time Provider Department Center   1/24/2024  1:15 PM Franck Kee PA [facility]   2/1/2024 10:00 AM Yelitza Alas FNP [facility]            Signature:  AUGUSTUS Macedo  [facility]  [address]  [place] [address]  [phone]    Date of encounter: 1/11/24

## 2023-12-28 ENCOUNTER — EXTERNAL HOME HEALTH (OUTPATIENT)
Dept: HOME HEALTH SERVICES | Facility: HOSPITAL | Age: 38
End: 2023-12-28
Payer: MEDICARE

## 2024-01-03 ENCOUNTER — TELEPHONE (OUTPATIENT)
Dept: PAIN MEDICINE | Facility: CLINIC | Age: 39
End: 2024-01-03
Payer: MEDICARE

## 2024-01-03 NOTE — TELEPHONE ENCOUNTER
----- Message from Joan Beckford sent at 1/3/2024  9:19 AM CST -----  Regarding: rx  Pt unable to make today's appt due to transportation pt is rescheduled to come in 1-24-24 pt is needing a refill on meds until that next visit please call pt back at [phone]    GENESIS SUTTON   01/03/2024   3:03 PM   No med refill until uds, keep appt on 1/24/24

## 2024-01-08 ENCOUNTER — DOCUMENT SCAN (OUTPATIENT)
Dept: HOME HEALTH SERVICES | Facility: HOSPITAL | Age: 39
End: 2024-01-08
Payer: MEDICARE

## 2024-01-11 ENCOUNTER — OFFICE VISIT (OUTPATIENT)
Dept: WOUND CARE | Facility: CLINIC | Age: 39
End: 2024-01-11
Attending: FAMILY MEDICINE
Payer: MEDICARE

## 2024-01-11 VITALS
TEMPERATURE: 97 F | HEART RATE: 86 BPM | DIASTOLIC BLOOD PRESSURE: 85 MMHG | SYSTOLIC BLOOD PRESSURE: 155 MMHG | RESPIRATION RATE: 20 BRPM

## 2024-01-11 DIAGNOSIS — I73.9 PERIPHERAL VASCULAR DISEASE, UNSPECIFIED: ICD-10-CM

## 2024-01-11 DIAGNOSIS — L97.412 DIABETIC ULCER OF RIGHT HEEL ASSOCIATED WITH TYPE 2 DIABETES MELLITUS, WITH FAT LAYER EXPOSED: Primary | ICD-10-CM

## 2024-01-11 DIAGNOSIS — E11.621 DIABETIC ULCER OF RIGHT HEEL ASSOCIATED WITH TYPE 2 DIABETES MELLITUS, WITH FAT LAYER EXPOSED: Primary | ICD-10-CM

## 2024-01-11 DIAGNOSIS — E11.610 CHARCOT FOOT DUE TO DIABETES MELLITUS: ICD-10-CM

## 2024-01-11 PROCEDURE — 99214 OFFICE O/P EST MOD 30 MIN: CPT | Mod: PBBFAC | Performed by: NURSE PRACTITIONER

## 2024-01-11 PROCEDURE — 99213 OFFICE O/P EST LOW 20 MIN: CPT | Mod: S$PBB,,, | Performed by: NURSE PRACTITIONER

## 2024-01-11 RX ORDER — ROSUVASTATIN CALCIUM 10 MG/1
10 TABLET, COATED ORAL DAILY
COMMUNITY
Start: 2023-10-09

## 2024-01-11 RX ORDER — AMOXICILLIN 500 MG/1
500 CAPSULE ORAL EVERY 6 HOURS
COMMUNITY
Start: 2023-11-16 | End: 2024-02-10

## 2024-01-22 NOTE — PROGRESS NOTES
Subjective:         Patient ID: Jean Pierre Paulson is a 38 y.o. male.    Chief Complaint: Foot Pain        Pain  This is a chronic problem. The current episode started more than 1 year ago. The problem occurs daily. The problem has been unchanged. Associated symptoms include arthralgias and neck pain. Pertinent negatives include no anorexia, chest pain, chills, coughing, fever, sore throat, vertigo or vomiting.     Review of Systems   Constitutional:  Negative for activity change, appetite change, chills, fever and unexpected weight change.   HENT:  Negative for drooling, ear discharge, ear pain, facial swelling, nosebleeds, sore throat, trouble swallowing, voice change and goiter.    Eyes:  Negative for photophobia, pain, discharge, redness and visual disturbance.   Respiratory:  Negative for apnea, cough, choking, chest tightness, shortness of breath, wheezing and stridor.    Cardiovascular:  Negative for chest pain, palpitations and leg swelling.   Gastrointestinal:  Negative for abdominal distention, anorexia, diarrhea, rectal pain, vomiting and fecal incontinence.   Endocrine: Negative for cold intolerance, heat intolerance, polydipsia, polyphagia and polyuria.   Genitourinary:  Negative for bladder incontinence, dysuria, flank pain and frequency.   Musculoskeletal:  Positive for arthralgias, back pain, leg pain and neck pain. Negative for neck stiffness.   Integumentary:  Negative for color change and pallor.   Neurological:  Negative for dizziness, vertigo, seizures, syncope, facial asymmetry, speech difficulty, light-headedness, memory loss and coordination difficulties.   Hematological:  Negative for adenopathy. Does not bruise/bleed easily.   Psychiatric/Behavioral:  Negative for agitation, behavioral problems, confusion, decreased concentration, dysphoric mood, hallucinations, self-injury and suicidal ideas. The patient is not nervous/anxious and is not hyperactive.            Past Medical History:    Diagnosis Date    NOLAN (acute kidney injury) 2023    Anemia     Diabetes mellitus with neuropathy     HLD (hyperlipidemia) 2023    HTN (hypertension)     Obesity     TEDDY (obstructive sleep apnea) 2023    Osteomyelitis 10/2020    Hx of R foot, Tx with IV Abx    Peripheral vascular disease      Past Surgical History:   Procedure Laterality Date    APPLICATION OF SPLIT-THICKNESS SKIN GRAFT (STSG) TO LOWER EXTREMITY Right 2022    Procedure: APPLICATION, GRAFT, SKIN, SPLIT-THICKNESS, TO LOWER EXTREMITY;  Surgeon: Greg Ramírez MD;  Location: [facility];  Service: General;  Laterality: Right;    CHOLECYSTECTOMY      DEBRIDEMENT OF FOOT Right 10/2020    right great toe amputation       Social History     Socioeconomic History    Marital status: Single    Number of children: 1   Tobacco Use    Smoking status: Every Day     Current packs/day: 0.00     Average packs/day: 0.3 packs/day for 25.0 years (6.2 ttl pk-yrs)     Types: Cigarettes     Start date:      Last attempt to quit: 2022     Years since quittin.9    Smokeless tobacco: Never   Substance and Sexual Activity    Alcohol use: Yes     Alcohol/week: 2.0 standard drinks of alcohol     Types: 1 Glasses of wine, 1 Cans of beer per week     Comment: occasionally    Drug use: Never    Sexual activity: Yes     Partners: Female     Social Determinants of Health     Financial Resource Strain: Low Risk  (4/10/2023)    Overall Financial Resource Strain (CARDIA)     Difficulty of Paying Living Expenses: Not hard at all   Food Insecurity: No Food Insecurity (4/10/2023)    Hunger Vital Sign     Worried About Running Out of Food in the Last Year: Never true     Ran Out of Food in the Last Year: Never true   Transportation Needs: Unmet Transportation Needs (4/10/2023)    PRAPARE - Transportation     Lack of Transportation (Medical): No     Lack of Transportation (Non-Medical): Yes   Physical Activity: Inactive (4/10/2023)    Exercise Vital Sign  "    Days of Exercise per Week: 0 days     Minutes of Exercise per Session: 0 min   Stress: No Stress Concern Present (4/10/2023)    [employer] [place] [employer] - Occupational Stress Questionnaire     Feeling of Stress : Not at all   Social Connections: Socially Isolated (4/10/2023)    Social Connection and Isolation Panel [NHANES]     Frequency of Communication with Friends and Family: More than three times a week     Frequency of Social Gatherings with Friends and Family: More than three times a week     Attends Hinduism Services: Never     Active Member of Clubs or Organizations: No     Attends Club or Organization Meetings: Never     Marital Status: Never    Housing Stability: Unknown (4/10/2023)    Housing Stability Vital Sign     Unable to Pay for Housing in the Last Year: No     Unstable Housing in the Last Year: No     Family History   Problem Relation Age of Onset    Hypertension Father     Diabetes Father     Hypertension Maternal Grandmother     Diabetes Maternal Grandmother      Review of patient's allergies indicates:   Allergen Reactions    Tomato Itching        Objective:  Vitals:    01/24/24 1336   BP: (!) 171/105   Pulse: 75   Resp: 18   Weight: (!) 157.9 kg (348 lb)   Height: 6' 2" (1.88 m)   PainSc: 0-No pain           Physical Exam  Vitals and nursing note reviewed. Exam conducted with a chaperone present.   Constitutional:       General: He is awake. He is not in acute distress.     Appearance: Normal appearance. He is not toxic-appearing or diaphoretic.   HENT:      Head: Normocephalic and atraumatic.      Nose: Nose normal.      Mouth/Throat:      Mouth: Mucous membranes are moist.      Pharynx: Oropharynx is clear.   Eyes:      Conjunctiva/sclera: Conjunctivae normal.      Pupils: Pupils are equal, round, and reactive to light.   Cardiovascular:      Rate and Rhythm: Normal rate.   Pulmonary:      Effort: Pulmonary effort is normal. No respiratory distress.   Abdominal:    "   Palpations: Abdomen is soft.      Tenderness: There is no guarding.   Musculoskeletal:         General: Normal range of motion.      Cervical back: Normal range of motion and neck supple. No rigidity.      Lumbar back: Tenderness present.      Right foot: Tenderness present.      Left foot: Tenderness present.   Skin:     General: Skin is warm and dry.      Coloration: Skin is not jaundiced or pale.   Neurological:      General: No focal deficit present.      Mental Status: He is alert and oriented to person, place, and time. Mental status is at baseline.      Cranial Nerves: No cranial nerve deficit (II-XII).   Psychiatric:         Mood and Affect: Mood normal.         Behavior: Behavior normal. Behavior is cooperative.         Thought Content: Thought content normal.           X-Ray Foot Complete 3 view Right  Narrative: EXAMINATION:  XR FOOT COMPLETE 3 VIEW RIGHT    CLINICAL HISTORY:  .  Diabetes mellitus due to underlying condition with foot ulcer    COMPARISON:  April 7, 2023 right foot x-ray available    TECHNIQUE:  AP, lateral, and oblique views right foot    FINDINGS:  No new areas of active bony erosion are seen.  No changes have occurred since the previous study to specifically suggest acute osteomyelitis.  There has been previous amputation of the 1st digit at the distal metatarsal level as before.  Sclerotic joint changes with osteophyte formation are noted at the 1st through 5th tarsometatarsal joints as before.  There is chronic erosive change at the plantar aspect of the calcaneus posteriorly as before.  There is no acute fracture or dislocation.    There is continued soft tissue swelling about the foot.  There is no radiopaque foreign body within soft tissues.  There is soft tissue ulceration at the plantar aspect of the calcaneus.  Impression: No acute bony changes.  Chronic erosive changes plantar aspect of the calcaneus posteriorly    Chronic degenerative changes of the tarsometatarsal joint  level as before.    Soft tissue swelling about the foot.  Calcaneal plantar soft tissue ulcer as before.    Electronically signed by: Geovany Berrios  Date:    07/03/2023  Time:    13:11         Lab Visit on 11/13/2023   Component Date Value Ref Range Status    CRP 11/13/2023 2.72 (H)  0.00 - 0.80 mg/dL Final    ESR Westergren 11/13/2023 65 (H)  0 - 15 mm/Hr Final    Sodium 11/13/2023 140  136 - 145 mmol/L Final    Potassium 11/13/2023 4.8  3.5 - 5.1 mmol/L Final    Chloride 11/13/2023 111 (H)  98 - 107 mmol/L Final    CO2 11/13/2023 28  21 - 32 mmol/L Final    Anion Gap 11/13/2023 6 (L)  7 - 16 mmol/L Final    Glucose 11/13/2023 157 (H)  74 - 106 mg/dL Final    BUN 11/13/2023 16  7 - 18 mg/dL Final    Creatinine 11/13/2023 1.21  0.70 - 1.30 mg/dL Final    BUN/Creatinine Ratio 11/13/2023 13  6 - 20 Final    Calcium 11/13/2023 8.9  8.5 - 10.1 mg/dL Final    eGFR 11/13/2023 78  >=60 mL/min/1.73m2 Final    WBC 11/13/2023 4.92  4.50 - 11.00 K/uL Final    RBC 11/13/2023 4.78  4.60 - 6.20 M/uL Final    Hemoglobin 11/13/2023 12.6 (L)  13.5 - 18.0 g/dL Final    Hematocrit 11/13/2023 40.2  40.0 - 54.0 % Final    MCV 11/13/2023 84.1  80.0 - 96.0 fL Final    MCH 11/13/2023 26.4 (L)  27.0 - 31.0 pg Final    MCHC 11/13/2023 31.3 (L)  32.0 - 36.0 g/dL Final    RDW 11/13/2023 13.7  11.5 - 14.5 % Final    Platelet Count 11/13/2023 308  150 - 400 K/uL Final    MPV 11/13/2023 10.0  9.4 - 12.4 fL Final    Neutrophils % 11/13/2023 49.0 (L)  53.0 - 65.0 % Final    Lymphocytes % 11/13/2023 35.8  27.0 - 41.0 % Final    Monocytes % 11/13/2023 8.5 (H)  2.0 - 6.0 % Final    Eosinophils % 11/13/2023 5.3 (H)  1.0 - 4.0 % Final    Basophils % 11/13/2023 1.2 (H)  0.0 - 1.0 % Final    Immature Granulocytes % 11/13/2023 0.2  0.0 - 0.4 % Final    nRBC, Auto 11/13/2023 0.0  <=0.0 % Final    Neutrophils, Abs 11/13/2023 2.41  1.80 - 7.70 K/uL Final    Lymphocytes, Absolute 11/13/2023 1.76  1.00 - 4.80 K/uL Final    Monocytes, Absolute 11/13/2023 0.42   0.00 - 0.80 K/uL Final    Eosinophils, Absolute 11/13/2023 0.26  0.00 - 0.50 K/uL Final    Basophils, Absolute 11/13/2023 0.06  0.00 - 0.20 K/uL Final    Immature Granulocytes, Absolute 11/13/2023 0.01  0.00 - 0.04 K/uL Final    nRBC, Absolute 11/13/2023 0.00  <=0.00 x10e3/uL Final    Diff Type 11/13/2023 Auto   Final   Lab Requisition on 11/08/2023   Component Date Value Ref Range Status    Sodium 11/08/2023 137  136 - 145 mmol/L Final    Potassium 11/08/2023 5.4 (H)  3.5 - 5.1 mmol/L Final    Chloride 11/08/2023 106  98 - 107 mmol/L Final    CO2 11/08/2023 27  21 - 32 mmol/L Final    Anion Gap 11/08/2023 9  7 - 16 mmol/L Final    Glucose 11/08/2023 90  74 - 106 mg/dL Final    BUN 11/08/2023 22 (H)  7 - 18 mg/dL Final    Creatinine 11/08/2023 1.54 (H)  0.70 - 1.30 mg/dL Final    BUN/Creatinine Ratio 11/08/2023 14  6 - 20 Final    Calcium 11/08/2023 8.3 (L)  8.5 - 10.1 mg/dL Final    eGFR 11/08/2023 58 (L)  >=60 mL/min/1.73m2 Final    ESR Westergren 11/08/2023 72 (H)  0 - 15 mm/Hr Final    CRP 11/08/2023 1.93 (H)  0.00 - 0.80 mg/dL Final    WBC 11/08/2023 6.32  4.50 - 11.00 K/uL Final    RBC 11/08/2023 4.33 (L)  4.60 - 6.20 M/uL Final    Hemoglobin 11/08/2023 11.5 (L)  13.5 - 18.0 g/dL Final    Hematocrit 11/08/2023 35.6 (L)  40.0 - 54.0 % Final    MCV 11/08/2023 82.2  80.0 - 96.0 fL Final    MCH 11/08/2023 26.6 (L)  27.0 - 31.0 pg Final    MCHC 11/08/2023 32.3  32.0 - 36.0 g/dL Final    RDW 11/08/2023 13.6  11.5 - 14.5 % Final    Platelet Count 11/08/2023 304  150 - 400 K/uL Final    MPV 11/08/2023 10.7  9.4 - 12.4 fL Final    Neutrophils % 11/08/2023 46.3 (L)  53.0 - 65.0 % Final    Lymphocytes % 11/08/2023 38.0  27.0 - 41.0 % Final    Monocytes % 11/08/2023 10.1 (H)  2.0 - 6.0 % Final    Eosinophils % 11/08/2023 4.1 (H)  1.0 - 4.0 % Final    Basophils % 11/08/2023 1.3 (H)  0.0 - 1.0 % Final    Immature Granulocytes % 11/08/2023 0.2  0.0 - 0.4 % Final    nRBC, Auto 11/08/2023 0.0  <=0.0 % Final    Neutrophils,  Abs 11/08/2023 2.93  1.80 - 7.70 K/uL Final    Lymphocytes, Absolute 11/08/2023 2.40  1.00 - 4.80 K/uL Final    Monocytes, Absolute 11/08/2023 0.64  0.00 - 0.80 K/uL Final    Eosinophils, Absolute 11/08/2023 0.26  0.00 - 0.50 K/uL Final    Basophils, Absolute 11/08/2023 0.08  0.00 - 0.20 K/uL Final    Immature Granulocytes, Absolute 11/08/2023 0.01  0.00 - 0.04 K/uL Final    nRBC, Absolute 11/08/2023 0.00  <=0.00 x10e3/uL Final    Diff Type 11/08/2023 Auto   Final   Lab Requisition on 10/30/2023   Component Date Value Ref Range Status    Sodium 10/30/2023 138  136 - 145 mmol/L Final    Potassium 10/30/2023 5.4 (H)  3.5 - 5.1 mmol/L Final    Chloride 10/30/2023 106  98 - 107 mmol/L Final    CO2 10/30/2023 26  21 - 32 mmol/L Final    Anion Gap 10/30/2023 11  7 - 16 mmol/L Final    Glucose 10/30/2023 164 (H)  74 - 106 mg/dL Final    BUN 10/30/2023 15  7 - 18 mg/dL Final    Creatinine 10/30/2023 1.22  0.70 - 1.30 mg/dL Final    BUN/Creatinine Ratio 10/30/2023 12  6 - 20 Final    Calcium 10/30/2023 8.4 (L)  8.5 - 10.1 mg/dL Final    eGFR 10/30/2023 77  >=60 mL/min/1.73m2 Final    CRP 10/30/2023 1.03 (H)  0.00 - 0.80 mg/dL Final    ESR Westergren 10/30/2023 63 (H)  0 - 15 mm/Hr Final    WBC 10/30/2023 4.82  4.50 - 11.00 K/uL Final    RBC 10/30/2023 4.19 (L)  4.60 - 6.20 M/uL Final    Hemoglobin 10/30/2023 11.2 (L)  13.5 - 18.0 g/dL Final    Hematocrit 10/30/2023 34.9 (L)  40.0 - 54.0 % Final    MCV 10/30/2023 83.3  80.0 - 96.0 fL Final    MCH 10/30/2023 26.7 (L)  27.0 - 31.0 pg Final    MCHC 10/30/2023 32.1  32.0 - 36.0 g/dL Final    RDW 10/30/2023 14.1  11.5 - 14.5 % Final    Platelet Count 10/30/2023 290  150 - 400 K/uL Final    MPV 10/30/2023 10.4  9.4 - 12.4 fL Final    Neutrophils % 10/30/2023 51.7 (L)  53.0 - 65.0 % Final    Lymphocytes % 10/30/2023 33.8  27.0 - 41.0 % Final    Monocytes % 10/30/2023 7.9 (H)  2.0 - 6.0 % Final    Eosinophils % 10/30/2023 5.4 (H)  1.0 - 4.0 % Final    Basophils % 10/30/2023 1.0  0.0  - 1.0 % Final    Immature Granulocytes % 10/30/2023 0.2  0.0 - 0.4 % Final    nRBC, Auto 10/30/2023 0.0  <=0.0 % Final    Neutrophils, Abs 10/30/2023 2.49  1.80 - 7.70 K/uL Final    Lymphocytes, Absolute 10/30/2023 1.63  1.00 - 4.80 K/uL Final    Monocytes, Absolute 10/30/2023 0.38  0.00 - 0.80 K/uL Final    Eosinophils, Absolute 10/30/2023 0.26  0.00 - 0.50 K/uL Final    Basophils, Absolute 10/30/2023 0.05  0.00 - 0.20 K/uL Final    Immature Granulocytes, Absolute 10/30/2023 0.01  0.00 - 0.04 K/uL Final    nRBC, Absolute 10/30/2023 0.00  <=0.00 x10e3/uL Final    Diff Type 10/30/2023 Auto   Final   Office Visit on 10/23/2023   Component Date Value Ref Range Status    Culture, Wound/Abscess 10/23/2023 Moderate Growth Escherichia coli ESBL (A)   Final    Culture, Wound/Abscess 10/23/2023 Heavy Growth Streptococcus agalactiae (Group B) (A)   Final    Culture, Anaerobe 10/23/2023 No Anaerobes Isolated   Final   Lab Requisition on 10/23/2023   Component Date Value Ref Range Status    Sodium 10/23/2023 139  136 - 145 mmol/L Final    Potassium 10/23/2023 4.3  3.5 - 5.1 mmol/L Final    Chloride 10/23/2023 108 (H)  98 - 107 mmol/L Final    CO2 10/23/2023 27  21 - 32 mmol/L Final    Anion Gap 10/23/2023 8  7 - 16 mmol/L Final    Glucose 10/23/2023 118 (H)  74 - 106 mg/dL Final    BUN 10/23/2023 20 (H)  7 - 18 mg/dL Final    Creatinine 10/23/2023 1.19  0.70 - 1.30 mg/dL Final    BUN/Creatinine Ratio 10/23/2023 17  6 - 20 Final    Calcium 10/23/2023 8.5  8.5 - 10.1 mg/dL Final    Total Protein 10/23/2023 7.3  6.4 - 8.2 g/dL Final    Albumin 10/23/2023 2.6 (L)  3.5 - 5.0 g/dL Final    Globulin 10/23/2023 4.7 (H)  2.0 - 4.0 g/dL Final    A/G Ratio 10/23/2023 0.6   Final    Bilirubin, Total 10/23/2023 0.5  >0.0 - 1.2 mg/dL Final    Alk Phos 10/23/2023 112  45 - 115 U/L Final    ALT 10/23/2023 29  16 - 61 U/L Final    AST 10/23/2023 15  15 - 37 U/L Final    eGFR 10/23/2023 79  >=60 mL/min/1.73m2 Final    Hemoglobin A1C 10/23/2023  5.9  4.5 - 6.6 % Final    Estimated Average Glucose 10/23/2023 110  mg/dL Final    Triglycerides 10/23/2023 105  35 - 150 mg/dL Final    Cholesterol 10/23/2023 103  0 - 200 mg/dL Final    HDL Cholesterol 10/23/2023 51  40 - 60 mg/dL Final    Cholesterol/HDL Ratio (Risk Factor) 10/23/2023 2.0   Final    Non-HDL 10/23/2023 52  mg/dL Final    LDL Calculated 10/23/2023 31  mg/dL Final    VLDL 10/23/2023 21  mg/dL Final    WBC 10/23/2023 6.02  4.50 - 11.00 K/uL Final    RBC 10/23/2023 3.98 (L)  4.60 - 6.20 M/uL Final    Hemoglobin 10/23/2023 10.7 (L)  13.5 - 18.0 g/dL Final    Hematocrit 10/23/2023 33.1 (L)  40.0 - 54.0 % Final    MCV 10/23/2023 83.2  80.0 - 96.0 fL Final    MCH 10/23/2023 26.9 (L)  27.0 - 31.0 pg Final    MCHC 10/23/2023 32.3  32.0 - 36.0 g/dL Final    RDW 10/23/2023 14.3  11.5 - 14.5 % Final    Platelet Count 10/23/2023 299  150 - 400 K/uL Final    MPV 10/23/2023 10.7  9.4 - 12.4 fL Final    Neutrophils % 10/23/2023 53.2  53.0 - 65.0 % Final    Lymphocytes % 10/23/2023 34.2  27.0 - 41.0 % Final    Monocytes % 10/23/2023 8.8 (H)  2.0 - 6.0 % Final    Eosinophils % 10/23/2023 2.8  1.0 - 4.0 % Final    Basophils % 10/23/2023 0.8  0.0 - 1.0 % Final    Immature Granulocytes % 10/23/2023 0.2  0.0 - 0.4 % Final    nRBC, Auto 10/23/2023 0.0  <=0.0 % Final    Neutrophils, Abs 10/23/2023 3.20  1.80 - 7.70 K/uL Final    Lymphocytes, Absolute 10/23/2023 2.06  1.00 - 4.80 K/uL Final    Monocytes, Absolute 10/23/2023 0.53  0.00 - 0.80 K/uL Final    Eosinophils, Absolute 10/23/2023 0.17  0.00 - 0.50 K/uL Final    Basophils, Absolute 10/23/2023 0.05  0.00 - 0.20 K/uL Final    Immature Granulocytes, Absolute 10/23/2023 0.01  0.00 - 0.04 K/uL Final    nRBC, Absolute 10/23/2023 0.00  <=0.00 x10e3/uL Final    Diff Type 10/23/2023 Auto   Final   Office Visit on 09/28/2023   Component Date Value Ref Range Status    Culture, Anaerobe 09/28/2023 No Anaerobes Isolated   Final    Culture, Wound/Abscess 09/28/2023 Moderate  Growth Escherichia coli ESBL (A)   Final   Office Visit on 09/05/2023   Component Date Value Ref Range Status    POC Amphetamines 09/05/2023 Negative  Negative, Inconclusive Final    POC Barbiturates 09/05/2023 Negative  Negative, Inconclusive Final    POC Benzodiazepines 09/05/2023 Negative  Negative, Inconclusive Final    POC Cocaine 09/05/2023 Negative  Negative, Inconclusive Final    POC THC 09/05/2023 Negative  Negative, Inconclusive Final    POC Methadone 09/05/2023 Negative  Negative, Inconclusive Final    POC Methamphetamine 09/05/2023 Negative  Negative, Inconclusive Final    POC Opiates 09/05/2023 Presumptive Positive (A)  Negative, Inconclusive Final    POC Oxycodone 09/05/2023 Negative  Negative, Inconclusive Final    POC Phencyclidine 09/05/2023 Negative  Negative, Inconclusive Final    POC Methylenedioxymethamphetamine * 09/05/2023 Negative  Negative, Inconclusive Final    POC Tricyclic Antidepressants 09/05/2023 Negative  Negative, Inconclusive Final    POC Buprenorphine 09/05/2023 Negative   Final     Acceptable 09/05/2023 Yes   Final    POC Temperature (Urine) 09/05/2023 92   Final         Orders Placed This Encounter   Procedures    POCT Urine Drug Screen Presump     Interpretive Information:     Negative:  No drug detected at the cut off level.   Positive:  This result represents presumptive positive for the   tested drug, other substances may yield a positive response other   than the analyte of interest. This result should be utilized for   diagnostic purpose only. Confirmation testing will be performed upon physician request only.            Requested Prescriptions     Pending Prescriptions Disp Refills    HYDROcodone-acetaminophen (NORCO)  mg per tablet 60 tablet 0     Sig: Take 1 tablet by mouth every 12 (twelve) hours as needed for Pain.       Assessment:     1. Diabetic neuropathy with neurologic complication    2. Peripheral vascular disease    3. Encounter for  long-term (current) use of other medications         A's of Opioid Risk Assessment  Activity:Patient can perform ADL.   Analgesia:Patients pain is partially controlled by current medication. Patient has tried OTC medications such as Tylenol and Ibuprofen with out relief.   Adverse Effects: Patient denies constipation or sedation.  Aberrant Behavior:  reviewed with no aberrant drug seeking/taking behavior.  Overdose reversal drug naloxone discussed    Drug screen reviewed      Plan:    Narcan March 2023    Did not keep appointment January 3rd after new year's    Last refill date December 8, 2023 Norco 10, 60 tablets          Ulcer right foot continues to follow wound management please see photographs    Walking boot right lower extremity    He verbalized understanding if he can not keep office visits for compliance with urine drug screens for narcotics management will discontinue narcotics    No new complaints today states current medication does help with his discomfort     Continue activity as directed     Continue current medication    Follow-up 2 months    Dr. Paulson, July 2024    Bring original prescription medication bottles/container/box with labels to each visit

## 2024-01-24 ENCOUNTER — OFFICE VISIT (OUTPATIENT)
Dept: PAIN MEDICINE | Facility: CLINIC | Age: 39
End: 2024-01-24
Payer: MEDICARE

## 2024-01-24 VITALS
SYSTOLIC BLOOD PRESSURE: 171 MMHG | WEIGHT: 315 LBS | HEIGHT: 74 IN | HEART RATE: 75 BPM | BODY MASS INDEX: 40.43 KG/M2 | RESPIRATION RATE: 18 BRPM | DIASTOLIC BLOOD PRESSURE: 105 MMHG

## 2024-01-24 DIAGNOSIS — Z79.899 ENCOUNTER FOR LONG-TERM (CURRENT) USE OF OTHER MEDICATIONS: ICD-10-CM

## 2024-01-24 DIAGNOSIS — I73.9 PERIPHERAL VASCULAR DISEASE: Chronic | ICD-10-CM

## 2024-01-24 DIAGNOSIS — E11.49 DIABETIC NEUROPATHY WITH NEUROLOGIC COMPLICATION: Primary | Chronic | ICD-10-CM

## 2024-01-24 DIAGNOSIS — E11.40 DIABETIC NEUROPATHY WITH NEUROLOGIC COMPLICATION: Primary | Chronic | ICD-10-CM

## 2024-01-24 LAB

## 2024-01-24 PROCEDURE — 99999PBSHW POCT URINE DRUG SCREEN PRESUMP: Mod: PBBFAC,,,

## 2024-01-24 PROCEDURE — 99215 OFFICE O/P EST HI 40 MIN: CPT | Mod: PBBFAC | Performed by: PHYSICIAN ASSISTANT

## 2024-01-24 PROCEDURE — 99214 OFFICE O/P EST MOD 30 MIN: CPT | Mod: S$PBB,,, | Performed by: PHYSICIAN ASSISTANT

## 2024-01-24 PROCEDURE — 80305 DRUG TEST PRSMV DIR OPT OBS: CPT | Mod: PBBFAC | Performed by: PHYSICIAN ASSISTANT

## 2024-01-24 RX ORDER — HYDROCODONE BITARTRATE AND ACETAMINOPHEN 10; 325 MG/1; MG/1
1 TABLET ORAL EVERY 12 HOURS PRN
Qty: 60 TABLET | Refills: 0 | Status: SHIPPED | OUTPATIENT
Start: 2024-01-24 | End: 2024-02-26 | Stop reason: SDUPTHER

## 2024-01-29 ENCOUNTER — OFFICE VISIT (OUTPATIENT)
Dept: WOUND CARE | Facility: CLINIC | Age: 39
End: 2024-01-29
Attending: FAMILY MEDICINE
Payer: MEDICARE

## 2024-01-29 VITALS — TEMPERATURE: 99 F | DIASTOLIC BLOOD PRESSURE: 92 MMHG | SYSTOLIC BLOOD PRESSURE: 180 MMHG | HEART RATE: 84 BPM

## 2024-01-29 DIAGNOSIS — L97.412 DIABETIC ULCER OF RIGHT HEEL ASSOCIATED WITH TYPE 2 DIABETES MELLITUS, WITH FAT LAYER EXPOSED: Primary | ICD-10-CM

## 2024-01-29 DIAGNOSIS — E11.621 DIABETIC ULCER OF RIGHT HEEL ASSOCIATED WITH TYPE 2 DIABETES MELLITUS, WITH FAT LAYER EXPOSED: Primary | ICD-10-CM

## 2024-01-29 PROCEDURE — 11042 DBRDMT SUBQ TIS 1ST 20SQCM/<: CPT | Mod: S$PBB,,, | Performed by: NURSE PRACTITIONER

## 2024-01-29 PROCEDURE — 99215 OFFICE O/P EST HI 40 MIN: CPT | Mod: PBBFAC | Performed by: NURSE PRACTITIONER

## 2024-01-29 PROCEDURE — 99499 UNLISTED E&M SERVICE: CPT | Mod: S$PBB,,, | Performed by: NURSE PRACTITIONER

## 2024-01-29 PROCEDURE — 11045 DBRDMT SUBQ TISS EACH ADDL: CPT | Mod: PBBFAC | Performed by: NURSE PRACTITIONER

## 2024-01-29 NOTE — PROGRESS NOTES
Debridement Performed for Assessment: Wound# right plantar foot  Performed By: Provider: Yelitza Ward NP  Assistant: Lizabeth Stone LPN    Debridement: Surgical    Photo taken post procedure:    Time-Out Taken: Yes  Level: Skin/Subcutaneous Tissue/ Muscle  Post Debridement Measurements  Length: (cm) 12.1  Width: (cm) 9.3  Depth: (cm) 0.3      Area: (cm²) 119.7  Percent Debrided: 100%  Total Area Debrided: (sq cm)     Tissue and other material debrided:  Adipose, Dermis, Epidermis, Subcutaneous  Devitalized Tissue Debrided:Biofilm, callus  Instrument: Curette  Bleeding: Moderate  Hemostasis Achieved: Pressure  Procedural Pain: Insensate  Post Procedural Pain: Insensate  Response to Treatment: Procedure was tolerated well    Devitalized materials/tissue Removed  the following was removed during debridement  subcutaneous      Post Debridement Diagnosis  chronic right foot diabetic ulcer  Post debridement diagnosis  Same as Pre-operative debridement diagnosis, No Complications noted.      Grafts or implants applied  Was a graft or implant applied?  No      Procedure assistant  Procedure assisted by:  Assistant is the same as nurse listed above      Complications related to procedure  Did any complication occure during procedure?  No complications noted during or after procedure.      Specimen  Specimen collect during procedure?  No specimen collected      Anaesthesia:  Anesthesia used  None      Blood Loss:  Blood loss during procedure  less than 5 cc

## 2024-01-29 NOTE — PROGRESS NOTES
AUGUSTUS Macedo   [facility]  [address] [facility] [place] [address]  [phone]      PATIENT NAME: Jean Pierre Paulson  : 1985  DATE: 24  MRN: [id number]      Billing Provider: AUGUSTUS Macedo  Level of Service:   Patient PCP Information       Provider PCP Type    Primary Doctor No General            Reason for Visit / Chief Complaint: Non-healing Wound Follow Up (R DFU )       History of Present Illness / Problem Focused Workflow     Jean Pierre Paulson is a 38 y.o. male who presents to clinic for follow up on chronic-non healing wound on the right foot. He is taking doxycyline as prescribed. He has increased edema to right foot and new breakdown on toes. Blood pressure is elevated today. Wound with increased drainage and spongy granulation tissue, callus filippo-wound, bedside debridement today. Discussed changes in plan of care and two layer compression. He has appointment with Jihan Lopez later this week for evaluation of CROW orthotic boot.   Reinforced importance of following plan of care, wearing off-loading boot, keeping pressure off foot, taking antibiotics as prescribed,increasing protein intake, and keeping diabetes well controlled.    Last arterial doppler 4/10/23,  The ankle to brachial index is 1.25 on the right and 1.34 on the left.    Significant PMH  Includes diabetes and hypertension. Last HgA1C 7.3 in 2022, diabetes is managed by PCP.  Denies fever or chills.     Review of Systems     Review of Systems   Constitutional:  Negative for activity change, chills and fever.   Respiratory:  Negative for chest tightness and shortness of breath.    Cardiovascular:  Positive for leg swelling. Negative for chest pain and palpitations.   Musculoskeletal:  Positive for arthralgias and joint swelling.   Skin:  Positive for wound.        See wound assessment   Neurological:  Positive for weakness and numbness.   Psychiatric/Behavioral:   Negative for agitation, behavioral problems, confusion and self-injury.        Medical / Social / Family History     Past Medical History:   Diagnosis Date    NOLAN (acute kidney injury) 04/07/2023    Anemia     Diabetes mellitus with neuropathy     HLD (hyperlipidemia) 04/07/2023    HTN (hypertension)     Obesity     TEDDY (obstructive sleep apnea) 04/07/2023    Osteomyelitis 10/2020    Hx of R foot, Tx with IV Abx    Peripheral vascular disease        Past Surgical History:   Procedure Laterality Date    APPLICATION OF SPLIT-THICKNESS SKIN GRAFT (STSG) TO LOWER EXTREMITY Right 03/02/2022    Procedure: APPLICATION, GRAFT, SKIN, SPLIT-THICKNESS, TO LOWER EXTREMITY;  Surgeon: Greg Ramírez MD;  Location: [facility];  Service: General;  Laterality: Right;    CHOLECYSTECTOMY      DEBRIDEMENT OF FOOT Right 10/2020    right great toe amputation         Social History  Mr. Jean Pierre Paulson  reports that he has been smoking cigarettes. He started smoking about 24 years ago. He has a 6.2 pack-year smoking history. He has never used smokeless tobacco. He reports current alcohol use of about 2.0 standard drinks of alcohol per week. He reports that he does not use drugs.    Family History  'anjana Paulson family history includes Diabetes in his father and maternal grandmother; Hypertension in his father and maternal grandmother.    Medications and Allergies     Medications  Outpatient Medications Marked as Taking for the 1/29/24 encounter (Office Visit) with Yelitza Alas FNP   Medication Sig Dispense Refill    amLODIPine (NORVASC) 2.5 MG tablet Take 1 tablet (2.5 mg total) by mouth once daily. 30 tablet 11    amoxicillin (AMOXIL) 500 MG capsule Take 500 mg by mouth every 6 (six) hours.      blood sugar diagnostic (BLOOD GLUCOSE TEST) Strp 1 strip by Misc.(Non-Drug; Combo Route) route once daily. accu-chek Veronika strips 90 strip 5    blood-glucose meter,continuous (DEXCOM ) Misc 1 each by  Misc.(Non-Drug; Combo Route) route 4 (four) times daily with meals and nightly. 1 each 0    blood-glucose sensor (DEXCOM G7 SENSOR) Aria 1 each by Misc.(Non-Drug; Combo Route) route every 10 days. 3 each 11    doxycycline (VIBRA-TABS) 100 MG tablet Take 1 tablet (100 mg total) by mouth 2 (two) times daily. 60 tablet 2    FEROSUL 325 mg (65 mg iron) Tab tablet Take 1 tablet (325 mg total) by mouth once daily. 90 tablet 1    gabapentin (NEURONTIN) 300 MG capsule Take 1 capsule (300 mg total) by mouth every 8 (eight) hours. 270 capsule 1    gentamicin (GARAMYCIN) 0.1 % cream Apply topically once daily. 90 g 2    hydroCHLOROthiazide (HYDRODIURIL) 25 MG tablet Take 1 tablet (25 mg total) by mouth once daily. 90 tablet 1    HYDROcodone-acetaminophen (NORCO)  mg per tablet Take 1 tablet by mouth every 12 (twelve) hours as needed for Pain. 60 tablet 0    rosuvastatin (CRESTOR) 10 MG tablet Take 10 mg by mouth once daily.      semaglutide (OZEMPIC) 2 mg/dose (8 mg/3 mL) PnIj Inject 2 mg into the skin every 7 days. 8 mL 2       Allergies  Review of patient's allergies indicates:   Allergen Reactions    Tomato Itching       Physical Examination     Vitals:    01/29/24 1047   BP: (!) 180/92   Pulse: 84   Temp: 98.8 °F (37.1 °C)     Physical Exam  Vitals and nursing note reviewed.   Constitutional:       Appearance: Normal appearance.   HENT:      Head: Normocephalic.   Cardiovascular:      Rate and Rhythm: Normal rate and regular rhythm.      Pulses: Normal pulses.      Heart sounds: Normal heart sounds.   Pulmonary:      Effort: Pulmonary effort is normal. No respiratory distress.   Chest:      Chest wall: No tenderness.   Musculoskeletal:         General: Swelling, tenderness and deformity present.      Right lower leg: Edema present.   Skin:     General: Skin is warm and dry.      Capillary Refill: Capillary refill takes 2 to 3 seconds.      Comments: See LDA for photos and measurements   Neurological:       General: No focal deficit present.      Mental Status: He is alert and oriented to person, place, and time. Mental status is at baseline.   Psychiatric:         Mood and Affect: Mood normal.         Behavior: Behavior normal.         Thought Content: Thought content normal.         Judgment: Judgment normal.     Assessment and Plan             Altered Skin Integrity 04/10/23 1200 Right plantar Foot Diabetic Ulcer Full thickness tissue loss with exposed bone, tendon, or muscle. Often includes undermining and tunneling. May extend into muscle and/or supporting structures. (Active)   04/10/23 1200   Altered Skin Integrity Present on Admission - Did Patient arrive to the hospital with altered skin?: yes   Side: Right   Orientation: plantar   Location: Foot   Wound Number:    Is this injury device related?:    Primary Wound Type: Diabetic ulc   Description of Altered Skin Integrity: Full thickness tissue loss with exposed bone, tendon, or muscle. Often includes undermining and tunneling. May extend into muscle and/or supporting structures.   Ankle-Brachial Index:    Pulses:    Removal Indication and Assessment:    Wound Outcome:    (Retired) Wound Length (cm):    (Retired) Wound Width (cm):    (Retired) Depth (cm):    Wound Description (Comments):    Removal Indications:    Wound Image     01/29/24 0945   Dressing Appearance Dry;Intact;Clean 01/29/24 0945   Drainage Amount Moderate 01/29/24 0945   Drainage Characteristics/Odor Yellow 01/29/24 0945   Appearance Pink;Red;Moist;Granulating;Adipose 01/29/24 0945   Black (%), Wound Tissue Color 0 % 01/29/24 0945   Red (%), Wound Tissue Color 100 % 01/29/24 0945   Yellow (%), Wound Tissue Color 0 % 01/29/24 0945   Periwound Area Intact 01/29/24 0945   Wound Edges Callused 01/29/24 0945   Elliott Classification (diabetic foot ulcers only) Grade 1 01/29/24 0945   Wound Length (cm) 11 cm 01/29/24 0945   Wound Width (cm) 7 cm 01/29/24 0945   Wound Depth (cm) 0.4 cm 01/29/24 0945    Wound Volume (cm^3) 30.8 cm^3 01/29/24 0945   Wound Surface Area (cm^2) 77 cm^2 01/29/24 0945   Care Cleansed with:;Antimicrobial agent 01/29/24 0945   Dressing Applied;Silver;Foam;Gauze;Absorptive Pad;Rolled gauze 01/29/24 0945   Periwound Care Moisturizer applied 01/29/24 0945            Altered Skin Integrity 01/29/24 0947 Right anterior Toe, second #2 Diabetic Ulcer Full thickness tissue loss. Subcutaneous fat may be visible but bone, tendon or muscle are not exposed (Active)   01/29/24 0947   Altered Skin Integrity Present on Admission - Did Patient arrive to the hospital with altered skin?: yes   Side: Right   Orientation: anterior   Location: Toe, second   Wound Number: #2   Is this injury device related?: No   Primary Wound Type: Diabetic ulc   Description of Altered Skin Integrity: Full thickness tissue loss. Subcutaneous fat may be visible but bone, tendon or muscle are not exposed   Ankle-Brachial Index:    Pulses:    Removal Indication and Assessment:    Wound Outcome:    (Retired) Wound Length (cm):    (Retired) Wound Width (cm):    (Retired) Depth (cm):    Wound Description (Comments):    Removal Indications:    Wound Image    01/29/24 0948   Drainage Amount Moderate 01/29/24 0948   Drainage Characteristics/Odor Yellow 01/29/24 0948   Appearance Pink;Red;Moist;Granulating;Adipose 01/29/24 0948   Black (%), Wound Tissue Color 0 % 01/29/24 0948   Red (%), Wound Tissue Color 80 % 01/29/24 0948   Yellow (%), Wound Tissue Color 20 % 01/29/24 0948   Periwound Area Blistered 01/29/24 0948   Wound Edges Callused 01/29/24 0948   Elliott Classification (diabetic foot ulcers only) Grade 1 01/29/24 0948   Wound Length (cm) 2 cm 01/29/24 0948   Wound Width (cm) 2.4 cm 01/29/24 0948   Wound Depth (cm) 0.1 cm 01/29/24 0948   Wound Volume (cm^3) 0.48 cm^3 01/29/24 0948   Wound Surface Area (cm^2) 4.8 cm^2 01/29/24 0948   Care Cleansed with:;Antimicrobial agent 01/29/24 0900   Dressing Applied;Silver;Foam;Gauze;Rolled  gauze 01/29/24 0948   Periwound Care Moisturizer applied 01/29/24 0948     Problem List Items Addressed This Visit          Endocrine    Diabetic ulcer of right heel associated with type 2 diabetes mellitus, with fat layer exposed - Primary    Overview                                                                    Current Assessment & Plan     Clean with  vashe or baby shampoo and water     Apply gentamicin cream to drawtex     Cover and secure with 4x4, ABD,wrap with 2 layer compression from toe to knee. Change twice a week and as needed     Monitor closely for s/s of infection including fever, chills, increase in pain, odor from wound, and increased redness from foot. Go to ER if any complications develop.   Keep leg elevated and avoid pressure on wound.   Diabetes:  Monitor glucose closely. Check fasting glucose and 2 hours after meals. HgA1C goal <7, fasting glucose , and 2 hours after meals <180  Hypertension:  Check blood pressure twice daily, goal <120/80  Diet:   Increase protein intake, avoid fried, fatty foods and foods high in simple carbs.   Vitamins:  Take vitamin C 1000 mg, zinc 50mg, vitamin d 5000 units, and a daily multivitamin. Angel is a good source of protein and nutrients to aid in wound healing.    Wear walking boot daily or use crutches to offload right foot     Home health to FOLLOW wound orders above            Future Appointments   Date Time Provider Department Center   2/12/2024 10:00 AM Yelitza Alas FNP [facility]   2/21/2024  1:15 PM Franck Kee PA [facility]            Signature:  AUGUSTUS Macedo  [facility]  [address] [facility] [place] [address]  [phone]    Date of encounter: 1/29/24

## 2024-01-29 NOTE — PROCEDURES
"Debridement    Date/Time: 1/29/2024 10:00 AM    Performed by: Yelitza Alas FNP  Authorized by: Yelitza Alas FNP    Time out: Immediately prior to procedure a "time out" was called to verify the correct patient, procedure, equipment, support staff and site/side marked as required.    Consent Done?:  Yes (Written)    Wound Details:    Location:  Right foot    Location:  Right Plantar    Type of Debridement:  Excisional       Length (cm):  12.1       Area (sq cm):  112.53       Width (cm):  9.3       Percent Debrided (%):  100       Depth (cm):  0.3       Total Area Debrided (sq cm):  112.53    Depth of debridement:  Subcutaneous tissue    Tissue debrided:  Adipose, Dermis, Epidermis and Subcutaneous    Devitalized tissue debrided:  Biofilm, Callus, Clots, Exudate, Fibrin and Slough    Instruments:  Curette  Bleeding:  Moderate  Hemostasis Achieved: Yes  Method Used:  Pressure  Patient tolerance:  Patient tolerated the procedure well with no immediate complications  1st Wound Pain Assessment: 0     Assistant JOSE ALFREDO Stone LPN    "

## 2024-01-29 NOTE — PATIENT INSTRUCTIONS
Clean with  vashe or baby shampoo and water     Apply gentamicin cream to drawtex     Cover and secure with 4x4, ABD,wrap with 2 layer compression from toe to knee. Change twice a week and as needed     Monitor closely for s/s of infection including fever, chills, increase in pain, odor from wound, and increased redness from foot. Go to ER if any complications develop.   Keep leg elevated and avoid pressure on wound.   Diabetes:  Monitor glucose closely. Check fasting glucose and 2 hours after meals. HgA1C goal <7, fasting glucose , and 2 hours after meals <180  Hypertension:  Check blood pressure twice daily, goal <120/80  Diet:   Increase protein intake, avoid fried, fatty foods and foods high in simple carbs.   Vitamins:  Take vitamin C 1000 mg, zinc 50mg, vitamin d 5000 units, and a daily multivitamin. Angel is a good source of protein and nutrients to aid in wound healing.    Wear walking boot daily or use crutches to offload right foot     Home health to FOLLOW wound orders above

## 2024-02-10 ENCOUNTER — HOSPITAL ENCOUNTER (INPATIENT)
Facility: HOSPITAL | Age: 39
LOS: 2 days | Discharge: HOME HEALTH | DRG: 683 | End: 2024-02-12
Attending: FAMILY MEDICINE | Admitting: FAMILY MEDICINE
Payer: MEDICARE

## 2024-02-10 DIAGNOSIS — R05.9 COUGH: ICD-10-CM

## 2024-02-10 DIAGNOSIS — N17.9 AKI (ACUTE KIDNEY INJURY): ICD-10-CM

## 2024-02-10 DIAGNOSIS — R31.9 URINARY TRACT INFECTION WITH HEMATURIA, SITE UNSPECIFIED: ICD-10-CM

## 2024-02-10 DIAGNOSIS — N17.9 ACUTE KIDNEY INJURY: Primary | ICD-10-CM

## 2024-02-10 DIAGNOSIS — N39.0 URINARY TRACT INFECTION WITH HEMATURIA, SITE UNSPECIFIED: ICD-10-CM

## 2024-02-10 DIAGNOSIS — E11.621 TYPE 2 DIABETES MELLITUS WITH RIGHT DIABETIC FOOT ULCER: ICD-10-CM

## 2024-02-10 DIAGNOSIS — S91.309A OPEN WOUND OF FOOT: ICD-10-CM

## 2024-02-10 DIAGNOSIS — J10.1 INFLUENZA A: ICD-10-CM

## 2024-02-10 DIAGNOSIS — L97.519 TYPE 2 DIABETES MELLITUS WITH RIGHT DIABETIC FOOT ULCER: ICD-10-CM

## 2024-02-10 LAB
ALBUMIN SERPL BCP-MCNC: 2.8 G/DL (ref 3.5–5)
ALBUMIN/GLOB SERPL: 0.5 {RATIO}
ALP SERPL-CCNC: 116 U/L (ref 45–115)
ALT SERPL W P-5'-P-CCNC: 42 U/L (ref 16–61)
AMYLASE SERPL-CCNC: 63 U/L (ref 25–115)
ANION GAP SERPL CALCULATED.3IONS-SCNC: 14 MMOL/L (ref 7–16)
AST SERPL W P-5'-P-CCNC: 42 U/L (ref 15–37)
BACTERIA #/AREA URNS HPF: ABNORMAL /HPF
BASOPHILS # BLD AUTO: 0.02 K/UL (ref 0–0.2)
BASOPHILS NFR BLD AUTO: 0.5 % (ref 0–1)
BILIRUB SERPL-MCNC: 0.4 MG/DL (ref ?–1.2)
BILIRUB UR QL STRIP: ABNORMAL
BUN SERPL-MCNC: 35 MG/DL (ref 7–18)
BUN/CREAT SERPL: 15 (ref 6–20)
CALCIUM SERPL-MCNC: 8.5 MG/DL (ref 8.5–10.1)
CHLORIDE SERPL-SCNC: 96 MMOL/L (ref 98–107)
CLARITY UR: ABNORMAL
CO2 SERPL-SCNC: 27 MMOL/L (ref 21–32)
COARSE GRAN CASTS #/AREA URNS LPF: ABNORMAL /LPF
COLOR UR: ABNORMAL
CREAT SERPL-MCNC: 2.41 MG/DL (ref 0.7–1.3)
DIFFERENTIAL METHOD BLD: ABNORMAL
EGFR (NO RACE VARIABLE) (RUSH/TITUS): 34 ML/MIN/1.73M2
EOSINOPHIL # BLD AUTO: 0.01 K/UL (ref 0–0.5)
EOSINOPHIL NFR BLD AUTO: 0.2 % (ref 1–4)
ERYTHROCYTE [DISTWIDTH] IN BLOOD BY AUTOMATED COUNT: 14.7 % (ref 11.5–14.5)
FLUAV AG UPPER RESP QL IA.RAPID: POSITIVE
FLUBV AG UPPER RESP QL IA.RAPID: NEGATIVE
GLOBULIN SER-MCNC: 5.6 G/DL (ref 2–4)
GLUCOSE SERPL-MCNC: 114 MG/DL (ref 74–106)
GLUCOSE UR STRIP-MCNC: NEGATIVE MG/DL
HCT VFR BLD AUTO: 38.6 % (ref 40–54)
HGB BLD-MCNC: 12 G/DL (ref 13.5–18)
KETONES UR STRIP-SCNC: NEGATIVE MG/DL
LACTATE SERPL-SCNC: 1.1 MMOL/L (ref 0.4–2)
LEUKOCYTE ESTERASE UR QL STRIP: NEGATIVE
LIPASE SERPL-CCNC: 28 U/L (ref 16–77)
LYMPHOCYTES # BLD AUTO: 1.43 K/UL (ref 1–4.8)
LYMPHOCYTES NFR BLD AUTO: 32.2 % (ref 27–41)
MCH RBC QN AUTO: 25.2 PG (ref 27–31)
MCHC RBC AUTO-ENTMCNC: 31.1 G/DL (ref 32–36)
MCV RBC AUTO: 80.9 FL (ref 80–96)
MONOCYTES # BLD AUTO: 0.6 K/UL (ref 0–0.8)
MONOCYTES NFR BLD AUTO: 13.5 % (ref 2–6)
MPC BLD CALC-MCNC: 9.7 FL (ref 9.4–12.4)
MUCOUS THREADS #/AREA URNS HPF: ABNORMAL /HPF
NEUTROPHILS # BLD AUTO: 2.38 K/UL (ref 1.8–7.7)
NEUTROPHILS NFR BLD AUTO: 53.6 % (ref 53–65)
NITRITE UR QL STRIP: NEGATIVE
PH UR STRIP: 5.5 PH UNITS
PLATELET # BLD AUTO: 241 K/UL (ref 150–400)
POTASSIUM SERPL-SCNC: 3.8 MMOL/L (ref 3.5–5.1)
PROT SERPL-MCNC: 8.4 G/DL (ref 6.4–8.2)
PROT UR QL STRIP: 100
RAPID GROUP A STREP: NEGATIVE
RBC # BLD AUTO: 4.77 M/UL (ref 4.6–6.2)
RBC # UR STRIP: ABNORMAL /UL
RBC #/AREA URNS HPF: ABNORMAL /HPF
SARS-COV-2 RDRP RESP QL NAA+PROBE: NEGATIVE
SODIUM SERPL-SCNC: 133 MMOL/L (ref 136–145)
SP GR UR STRIP: >=1.03
SQUAMOUS #/AREA URNS LPF: ABNORMAL /LPF
UROBILINOGEN UR STRIP-ACNC: 0.2 MG/DL
WBC # BLD AUTO: 4.44 K/UL (ref 4.5–11)
WBC #/AREA URNS HPF: ABNORMAL /HPF

## 2024-02-10 PROCEDURE — 94761 N-INVAS EAR/PLS OXIMETRY MLT: CPT

## 2024-02-10 PROCEDURE — 80053 COMPREHEN METABOLIC PANEL: CPT | Performed by: NURSE PRACTITIONER

## 2024-02-10 PROCEDURE — 63600175 PHARM REV CODE 636 W HCPCS: Performed by: REGISTERED NURSE

## 2024-02-10 PROCEDURE — 87086 URINE CULTURE/COLONY COUNT: CPT | Performed by: NURSE PRACTITIONER

## 2024-02-10 PROCEDURE — 83036 HEMOGLOBIN GLYCOSYLATED A1C: CPT | Performed by: REGISTERED NURSE

## 2024-02-10 PROCEDURE — 82150 ASSAY OF AMYLASE: CPT | Performed by: NURSE PRACTITIONER

## 2024-02-10 PROCEDURE — 25000003 PHARM REV CODE 250: Performed by: NURSE PRACTITIONER

## 2024-02-10 PROCEDURE — 25000003 PHARM REV CODE 250: Performed by: REGISTERED NURSE

## 2024-02-10 PROCEDURE — 87804 INFLUENZA ASSAY W/OPTIC: CPT | Performed by: REGISTERED NURSE

## 2024-02-10 PROCEDURE — 99285 EMERGENCY DEPT VISIT HI MDM: CPT | Mod: 25

## 2024-02-10 PROCEDURE — 11000001 HC ACUTE MED/SURG PRIVATE ROOM

## 2024-02-10 PROCEDURE — 85025 COMPLETE CBC W/AUTO DIFF WBC: CPT | Performed by: NURSE PRACTITIONER

## 2024-02-10 PROCEDURE — 87880 STREP A ASSAY W/OPTIC: CPT | Performed by: REGISTERED NURSE

## 2024-02-10 PROCEDURE — 81003 URINALYSIS AUTO W/O SCOPE: CPT | Performed by: NURSE PRACTITIONER

## 2024-02-10 PROCEDURE — 87040 BLOOD CULTURE FOR BACTERIA: CPT | Performed by: REGISTERED NURSE

## 2024-02-10 PROCEDURE — 99285 EMERGENCY DEPT VISIT HI MDM: CPT | Mod: ,,, | Performed by: REGISTERED NURSE

## 2024-02-10 PROCEDURE — 87635 SARS-COV-2 COVID-19 AMP PRB: CPT | Performed by: REGISTERED NURSE

## 2024-02-10 PROCEDURE — 99222 1ST HOSP IP/OBS MODERATE 55: CPT | Mod: ,,, | Performed by: REGISTERED NURSE

## 2024-02-10 PROCEDURE — 87070 CULTURE OTHR SPECIMN AEROBIC: CPT | Performed by: REGISTERED NURSE

## 2024-02-10 PROCEDURE — 96374 THER/PROPH/DIAG INJ IV PUSH: CPT

## 2024-02-10 PROCEDURE — 83690 ASSAY OF LIPASE: CPT | Performed by: NURSE PRACTITIONER

## 2024-02-10 PROCEDURE — 83605 ASSAY OF LACTIC ACID: CPT | Performed by: NURSE PRACTITIONER

## 2024-02-10 PROCEDURE — 87186 SC STD MICRODIL/AGAR DIL: CPT | Performed by: REGISTERED NURSE

## 2024-02-10 RX ORDER — GLUCAGON 1 MG
1 KIT INJECTION
Status: DISCONTINUED | OUTPATIENT
Start: 2024-02-10 | End: 2024-02-12 | Stop reason: HOSPADM

## 2024-02-10 RX ORDER — FAMOTIDINE 20 MG/1
20 TABLET, FILM COATED ORAL 2 TIMES DAILY
Status: DISCONTINUED | OUTPATIENT
Start: 2024-02-10 | End: 2024-02-12 | Stop reason: HOSPADM

## 2024-02-10 RX ORDER — TALC
6 POWDER (GRAM) TOPICAL NIGHTLY PRN
Status: DISCONTINUED | OUTPATIENT
Start: 2024-02-10 | End: 2024-02-12 | Stop reason: HOSPADM

## 2024-02-10 RX ORDER — GABAPENTIN 300 MG/1
300 CAPSULE ORAL EVERY 8 HOURS
Status: DISCONTINUED | OUTPATIENT
Start: 2024-02-10 | End: 2024-02-12 | Stop reason: HOSPADM

## 2024-02-10 RX ORDER — DOXYCYCLINE HYCLATE 100 MG
100 TABLET ORAL 2 TIMES DAILY
Status: DISCONTINUED | OUTPATIENT
Start: 2024-02-10 | End: 2024-02-12 | Stop reason: HOSPADM

## 2024-02-10 RX ORDER — PROMETHAZINE HYDROCHLORIDE 25 MG/1
25 TABLET ORAL EVERY 6 HOURS PRN
Status: DISCONTINUED | OUTPATIENT
Start: 2024-02-10 | End: 2024-02-12 | Stop reason: HOSPADM

## 2024-02-10 RX ORDER — HEPARIN SODIUM 5000 [USP'U]/ML
5000 INJECTION, SOLUTION INTRAVENOUS; SUBCUTANEOUS EVERY 12 HOURS
Status: DISCONTINUED | OUTPATIENT
Start: 2024-02-10 | End: 2024-02-12

## 2024-02-10 RX ORDER — SODIUM CHLORIDE 9 MG/ML
INJECTION, SOLUTION INTRAVENOUS CONTINUOUS
Status: DISCONTINUED | OUTPATIENT
Start: 2024-02-10 | End: 2024-02-12 | Stop reason: HOSPADM

## 2024-02-10 RX ORDER — ONDANSETRON HYDROCHLORIDE 2 MG/ML
4 INJECTION, SOLUTION INTRAVENOUS
Status: COMPLETED | OUTPATIENT
Start: 2024-02-10 | End: 2024-02-10

## 2024-02-10 RX ORDER — SODIUM CHLORIDE 0.9 % (FLUSH) 0.9 %
10 SYRINGE (ML) INJECTION
Status: DISCONTINUED | OUTPATIENT
Start: 2024-02-10 | End: 2024-02-12 | Stop reason: HOSPADM

## 2024-02-10 RX ORDER — HYDROCODONE BITARTRATE AND ACETAMINOPHEN 10; 325 MG/1; MG/1
1 TABLET ORAL EVERY 12 HOURS PRN
Status: DISCONTINUED | OUTPATIENT
Start: 2024-02-10 | End: 2024-02-12 | Stop reason: HOSPADM

## 2024-02-10 RX ORDER — IBUPROFEN 200 MG
16 TABLET ORAL
Status: DISCONTINUED | OUTPATIENT
Start: 2024-02-10 | End: 2024-02-12 | Stop reason: HOSPADM

## 2024-02-10 RX ORDER — POLYETHYLENE GLYCOL 3350 17 G/17G
17 POWDER, FOR SOLUTION ORAL DAILY
Status: DISCONTINUED | OUTPATIENT
Start: 2024-02-11 | End: 2024-02-12 | Stop reason: HOSPADM

## 2024-02-10 RX ORDER — IBUPROFEN 200 MG
24 TABLET ORAL
Status: DISCONTINUED | OUTPATIENT
Start: 2024-02-10 | End: 2024-02-12 | Stop reason: HOSPADM

## 2024-02-10 RX ORDER — AMLODIPINE BESYLATE 2.5 MG/1
2.5 TABLET ORAL DAILY
Status: DISCONTINUED | OUTPATIENT
Start: 2024-02-11 | End: 2024-02-12 | Stop reason: HOSPADM

## 2024-02-10 RX ORDER — INSULIN ASPART 100 [IU]/ML
0-10 INJECTION, SOLUTION INTRAVENOUS; SUBCUTANEOUS
Status: DISCONTINUED | OUTPATIENT
Start: 2024-02-10 | End: 2024-02-12 | Stop reason: HOSPADM

## 2024-02-10 RX ORDER — GENTAMICIN SULFATE 1 MG/G
OINTMENT TOPICAL DAILY
Status: DISCONTINUED | OUTPATIENT
Start: 2024-02-11 | End: 2024-02-12 | Stop reason: HOSPADM

## 2024-02-10 RX ORDER — ONDANSETRON 4 MG/1
8 TABLET, ORALLY DISINTEGRATING ORAL EVERY 8 HOURS PRN
Status: DISCONTINUED | OUTPATIENT
Start: 2024-02-10 | End: 2024-02-12 | Stop reason: HOSPADM

## 2024-02-10 RX ADMIN — HYDROCODONE BITARTRATE AND ACETAMINOPHEN 1 TABLET: 10; 325 TABLET ORAL at 11:02

## 2024-02-10 RX ADMIN — HEPARIN SODIUM 5000 UNITS: 5000 INJECTION INTRAVENOUS; SUBCUTANEOUS at 09:02

## 2024-02-10 RX ADMIN — ONDANSETRON 4 MG: 2 INJECTION INTRAMUSCULAR; INTRAVENOUS at 06:02

## 2024-02-10 RX ADMIN — SODIUM CHLORIDE: 9 INJECTION, SOLUTION INTRAVENOUS at 09:02

## 2024-02-10 RX ADMIN — FAMOTIDINE 20 MG: 20 TABLET, FILM COATED ORAL at 09:02

## 2024-02-10 RX ADMIN — GABAPENTIN 300 MG: 300 CAPSULE ORAL at 09:02

## 2024-02-10 RX ADMIN — DOXYCYCLINE HYCLATE 100 MG: 100 TABLET, COATED ORAL at 09:02

## 2024-02-10 RX ADMIN — CEFTRIAXONE 1 G: 1 INJECTION, POWDER, FOR SOLUTION INTRAMUSCULAR; INTRAVENOUS at 08:02

## 2024-02-10 NOTE — ED PROVIDER NOTES
"Encounter Date: 2/10/2024       History     Chief Complaint   Patient presents with    Dizziness    Weakness     Weakness and dizziness onset today, has had nausea, vomiting and diarrhea x the past week     38 y-old AAM received to ED with complaint of N/V/D that has been ongoing for the past week. Became concerned today when he started having dizziness/weakness. He states that his symptoms started "after having a bad sinus infection." States that he had so much drainage that it made him nauseated.       Review of patient's allergies indicates:   Allergen Reactions    Tomato Itching     Past Medical History:   Diagnosis Date    NOLAN (acute kidney injury) 2023    Anemia     Diabetes mellitus with neuropathy     HLD (hyperlipidemia) 2023    HTN (hypertension)     Obesity     TEDDY (obstructive sleep apnea) 2023    Osteomyelitis 10/2020    Hx of R foot, Tx with IV Abx    Peripheral vascular disease      Past Surgical History:   Procedure Laterality Date    APPLICATION OF SPLIT-THICKNESS SKIN GRAFT (STSG) TO LOWER EXTREMITY Right 2022    Procedure: APPLICATION, GRAFT, SKIN, SPLIT-THICKNESS, TO LOWER EXTREMITY;  Surgeon: Greg Ramírez MD;  Location: [facility];  Service: General;  Laterality: Right;    CHOLECYSTECTOMY      DEBRIDEMENT OF FOOT Right 10/2020    right great toe amputation       Family History   Problem Relation Age of Onset    Hypertension Father     Diabetes Father     Hypertension Maternal Grandmother     Diabetes Maternal Grandmother      Social History     Tobacco Use    Smoking status: Every Day     Current packs/day: 0.00     Average packs/day: 0.3 packs/day for 25.0 years (6.2 ttl pk-yrs)     Types: Cigarettes     Start date:      Last attempt to quit: 2022     Years since quittin.0    Smokeless tobacco: Never   Substance Use Topics    Alcohol use: Yes     Alcohol/week: 2.0 standard drinks of alcohol     Types: 1 Glasses of wine, 1 Cans of beer per week     Comment: " occasionally    Drug use: Never     Review of Systems   Constitutional:  Positive for activity change, diaphoresis and fatigue.   HENT:  Positive for congestion, postnasal drip and rhinorrhea.    Eyes: Negative.    Respiratory: Negative.     Cardiovascular: Negative.    Gastrointestinal:  Positive for abdominal pain (Generalized), diarrhea (x 2-3 per day), nausea and vomiting.   Endocrine: Negative.    Genitourinary: Negative.    Musculoskeletal: Negative.    Skin: Negative.    Allergic/Immunologic: Negative.    Neurological: Negative.    Hematological: Negative.    Psychiatric/Behavioral: Negative.         Physical Exam     Initial Vitals [02/10/24 1709]   BP Pulse Resp Temp SpO2   92/67 96 18 97.5 °F (36.4 °C) (!) 93 %      MAP       --         Physical Exam    Nursing note and vitals reviewed.  Constitutional: He appears well-developed and well-nourished.   HENT:   Head: Normocephalic and atraumatic.   Right Ear: External ear normal.   Left Ear: External ear normal.   Nose: Nose normal.   Mouth/Throat: Oropharynx is clear and moist.   Eyes: Conjunctivae are normal.   Neck: Neck supple.   Normal range of motion.  Cardiovascular:  Regular rhythm, normal heart sounds and intact distal pulses.     Exam reveals no gallop and no friction rub.       No murmur heard.  Pulmonary/Chest: Breath sounds normal.   Abdominal: Abdomen is soft. Bowel sounds are normal. He exhibits no distension and no mass. There is abdominal tenderness (Generalized with palpation). There is no rebound and no guarding.   Musculoskeletal:         General: Normal range of motion.      Cervical back: Normal range of motion and neck supple.     Neurological: He is alert and oriented to person, place, and time. He has normal strength. GCS score is 15. GCS eye subscore is 4. GCS verbal subscore is 5. GCS motor subscore is 6.   Skin: Skin is warm and dry. Capillary refill takes less than 2 seconds.   Large open wound to bottom of his right foot that is  followed by wound care at [facility].    Psychiatric: He has a normal mood and affect. His behavior is normal. Judgment and thought content normal.         Medical Screening Exam   See Full Note    ED Course   Procedures  Labs Reviewed   RAPID INFLUENZA A/B - Abnormal; Notable for the following components:       Result Value    Influenza A Positive (*)     All other components within normal limits   COMPREHENSIVE METABOLIC PANEL - Abnormal; Notable for the following components:    Sodium 133 (*)     Chloride 96 (*)     Glucose 114 (*)     BUN 35 (*)     Creatinine 2.41 (*)     Total Protein 8.4 (*)     Albumin 2.8 (*)     Globulin 5.6 (*)     Alk Phos 116 (*)     AST 42 (*)     eGFR 34 (*)     All other components within normal limits   URINALYSIS, REFLEX TO URINE CULTURE - Abnormal; Notable for the following components:    Protein,  (*)     Bilirubin, UA Small (*)     Blood, UA Trace-Intact (*)     Specific Gravity, UA >=1.030 (*)     All other components within normal limits   CBC WITH DIFFERENTIAL - Abnormal; Notable for the following components:    WBC 4.44 (*)     Hemoglobin 12.0 (*)     Hematocrit 38.6 (*)     MCH 25.2 (*)     MCHC 31.1 (*)     RDW 14.7 (*)     Monocytes % 13.5 (*)     Eosinophils % 0.2 (*)     All other components within normal limits   URINALYSIS, MICROSCOPIC - Abnormal; Notable for the following components:    RBC, UA 5-10 (*)     Bacteria, UA Moderate (*)     Squamous Epithelial Cells, UA Few (*)     Mucus, UA Moderate (*)     Coarse Granular Casts, UA 0-2 (*)     All other components within normal limits   THROAT SCREEN, RAPID STREP - Normal   AMYLASE - Normal   LIPASE - Normal   LACTIC ACID, PLASMA - Normal   SARS-COV-2 RNA AMPLIFICATION, QUAL - Normal    Narrative:     Negative SARS-CoV results should not be used as the sole basis for treatment or patient management decisions; negative results should be considered in the context of a patient's recent exposures, history and the  presene of clinical signs and symptoms consistent with COVID-19.  Negative results should be treated as presumptive and confirmed by molecular assay, if necessary for patient management.   CULTURE, URINE   CULTURE, BLOOD   CULTURE, BLOOD   CULTURE, WOUND   CBC W/ AUTO DIFFERENTIAL    Narrative:     The following orders were created for panel order CBC auto differential.  Procedure                               Abnormality         Status                     ---------                               -----------         ------                     CBC with Differential[[id number]]       Abnormal            Final result                 Please view results for these tests on the individual orders.          Imaging Results              CT Abdomen Pelvis  Without Contrast (Final result)  Result time 02/10/24 19:03:05      Final result by Sheng Anderson II, MD (02/10/24 19:03:05)                   Impression:      No evidence of acute process demonstrated.      Electronically signed by: Sheng Anderson  Date:    02/10/2024  Time:    19:03               Narrative:    EXAMINATION:  CT ABDOMEN PELVIS WITHOUT CONTRAST    CLINICAL HISTORY:  Abdominal pain, acute, nonlocalized;    TECHNIQUE:  Axial CT imaging of the abdomen and pelvis is performed without contrast.    CT dose reduction technique used - Dose Rite and tube current modulation.    COMPARISON:  19 February 2020    FINDINGS:  Cardiac and lung bases are within normal limits    CT abdomen: The gallbladder has been removed.  The liver, spleen, pancreas and adrenal glands are normal in size and density.  No evidence of focal lesion is demonstrated in these solid organs.    Kidneys are normal in size and density.  No evidence of hydronephrosis or nephrolithiasis is seen.    The bowel caliber is normal and no wall thickening or adjacent inflammatory change is seen.  No evidence of free fluid or free air is present.  Appendix is normal.    CT pelvis: The bowel and bladder  "appear within normal limits.  The pelvic organs show no evidence of abnormality                                       X-Ray Foot Complete Right (Final result)  Result time 02/10/24 19:00:19      Final result by Sheng Anderson II, MD (02/10/24 19:00:19)                   Impression:      No acute osteomyelitis.  Plantar foot soft tissue wound.      Electronically signed by: Sheng Anderson  Date:    02/10/2024  Time:    19:00               Narrative:    EXAMINATION:  XR FOOT COMPLETE 3 VIEW RIGHT    CLINICAL HISTORY:  Unspecified open wound, unspecified foot, initial encounter    COMPARISON:  3 July 2023    TECHNIQUE:  XR FOOT 3 VIEW RIGHT    FINDINGS:  No evidence of fracture seen.  First digit and distal 1st metatarsal have been amputated.  No acute erosions or new destructive changes seen.  The alignment of the joints appears normal.  Moderate to severe midfoot degenerative change is present.  Soft tissue wound of plantar foot.  No other soft tissue abnormality is seen.                                       Medications   sodium chloride 0.9% bolus 1,000 mL 1,000 mL (0 mLs Intravenous Stopped 2/10/24 1930)   cefTRIAXone (Rocephin) 1 g in dextrose 5 % in water (D5W) 100 mL IVPB (MB+) (has no administration in time range)   ondansetron injection 4 mg (4 mg Intravenous Given 2/10/24 1810)     Medical Decision Making  38 y-old AAM received to ED with complaint of N/V/D that has been ongoing for the past week. Became concerned today when he started having dizziness/weakness. He states that his symptoms started "after having a bad sinus infection." States that he had so much drainage that it made him nauseated.   Upon reviewing the patients medication list, it was noted that he has been on Doxycycline BID since December of 23 for chronic foot wound that is followed by wound care at [facility].     Amount and/or Complexity of Data Reviewed  Labs: ordered.     Details: WBC 4.44, H&H 12/38.6 (at his baseline), Na+ " 133, BUN/Creat 35/2.41 (16/1.21 in November 23). UA with moderate bacteria. Influenza A positive.   Radiology: ordered.     Details: CT abdomen/pelvis without any acute changes  No osteo to right foot.   Discussion of management or test interpretation with external provider(s): Secure chat with Dr. Zaragoza who is the hospitalist on-call at [facility] at this time. Will admit to hospital for IV hydration and treatment of UTI.     Risk  Prescription drug management.  Decision regarding hospitalization.               ED Course as of 02/10/24 2009   Sat Feb 10, 2024   1739 Patient care taken over by OCHOA Rader NP [LP]      ED Course User Index  [LP] Kary Nguyen, AUGUSTUS                           Clinical Impression:   Final diagnoses:  [S91.309A] Open wound of foot  [N17.9] Acute kidney injury (Primary)  [J10.1] Influenza A  [N39.0, R31.9] Urinary tract infection with hematuria, site unspecified        ED Disposition Condition    Admit Stable                Zac Rader NP-C  02/10/24 2009

## 2024-02-10 NOTE — Clinical Note
Diagnosis: Acute kidney injury [179638]   Future Attending Provider: RASHAAD FOURNIER [[id number]]   Reason for IP Medical Treatment  (Clinical interventions that can only be accomplished in the IP setting? ) :: Needs IV fluids, IV abx   I certify that Inpatient services for greater than or equal to 2 midnights are medically necessary:: Yes   Plans for Post-Acute care--if anticipated (pick the single best option):: B. Discharge home with medical equipment   Special Needs:: Fall Risk [15]

## 2024-02-11 LAB
ALBUMIN SERPL BCP-MCNC: 2.3 G/DL (ref 3.5–5)
ALBUMIN/GLOB SERPL: 0.5 {RATIO}
ALP SERPL-CCNC: 99 U/L (ref 45–115)
ALT SERPL W P-5'-P-CCNC: 41 U/L (ref 16–61)
ANION GAP SERPL CALCULATED.3IONS-SCNC: 13 MMOL/L (ref 7–16)
AST SERPL W P-5'-P-CCNC: 39 U/L (ref 15–37)
BASOPHILS # BLD AUTO: 0.02 K/UL (ref 0–0.2)
BASOPHILS NFR BLD AUTO: 0.5 % (ref 0–1)
BILIRUB SERPL-MCNC: 0.3 MG/DL (ref ?–1.2)
BUN SERPL-MCNC: 33 MG/DL (ref 7–18)
BUN/CREAT SERPL: 18 (ref 6–20)
CALCIUM SERPL-MCNC: 8 MG/DL (ref 8.5–10.1)
CHLORIDE SERPL-SCNC: 99 MMOL/L (ref 98–107)
CO2 SERPL-SCNC: 26 MMOL/L (ref 21–32)
CREAT SERPL-MCNC: 1.88 MG/DL (ref 0.7–1.3)
DIFFERENTIAL METHOD BLD: ABNORMAL
EGFR (NO RACE VARIABLE) (RUSH/TITUS): 46 ML/MIN/1.73M2
EOSINOPHIL # BLD AUTO: 0 K/UL (ref 0–0.5)
EOSINOPHIL NFR BLD AUTO: 0 % (ref 1–4)
ERYTHROCYTE [DISTWIDTH] IN BLOOD BY AUTOMATED COUNT: 14.7 % (ref 11.5–14.5)
EST. AVERAGE GLUCOSE BLD GHB EST-MCNC: 146 MG/DL
GLOBULIN SER-MCNC: 4.9 G/DL (ref 2–4)
GLUCOSE SERPL-MCNC: 128 MG/DL (ref 70–105)
GLUCOSE SERPL-MCNC: 138 MG/DL (ref 70–105)
GLUCOSE SERPL-MCNC: 144 MG/DL (ref 70–105)
GLUCOSE SERPL-MCNC: 86 MG/DL (ref 74–106)
GLUCOSE SERPL-MCNC: 98 MG/DL (ref 70–105)
HBA1C MFR BLD HPLC: 6.7 % (ref 4.5–6.6)
HCT VFR BLD AUTO: 34.9 % (ref 40–54)
HGB BLD-MCNC: 10.7 G/DL (ref 13.5–18)
LYMPHOCYTES # BLD AUTO: 1.99 K/UL (ref 1–4.8)
LYMPHOCYTES NFR BLD AUTO: 52.5 % (ref 27–41)
LYMPHOCYTES NFR BLD MANUAL: 47 % (ref 27–41)
MCH RBC QN AUTO: 25.1 PG (ref 27–31)
MCHC RBC AUTO-ENTMCNC: 30.7 G/DL (ref 32–36)
MCV RBC AUTO: 81.9 FL (ref 80–96)
MONOCYTES # BLD AUTO: 0.64 K/UL (ref 0–0.8)
MONOCYTES NFR BLD AUTO: 16.9 % (ref 2–6)
MONOCYTES NFR BLD MANUAL: 14 % (ref 2–6)
MPC BLD CALC-MCNC: 9.7 FL (ref 9.4–12.4)
NEUTROPHILS # BLD AUTO: 1.14 K/UL (ref 1.8–7.7)
NEUTROPHILS NFR BLD AUTO: 30.1 % (ref 53–65)
NEUTS BAND NFR BLD MANUAL: 2 % (ref 1–5)
NEUTS SEG NFR BLD MANUAL: 37 % (ref 50–62)
NRBC BLD MANUAL-RTO: ABNORMAL %
PLATELET # BLD AUTO: 227 K/UL (ref 150–400)
PLATELET MORPHOLOGY: NORMAL
POTASSIUM SERPL-SCNC: 3.6 MMOL/L (ref 3.5–5.1)
PROT SERPL-MCNC: 7.2 G/DL (ref 6.4–8.2)
RBC # BLD AUTO: 4.26 M/UL (ref 4.6–6.2)
RBC MORPH BLD: NORMAL
SODIUM SERPL-SCNC: 134 MMOL/L (ref 136–145)
WBC # BLD AUTO: 3.79 K/UL (ref 4.5–11)

## 2024-02-11 PROCEDURE — 25000003 PHARM REV CODE 250: Performed by: NURSE PRACTITIONER

## 2024-02-11 PROCEDURE — 85025 COMPLETE CBC W/AUTO DIFF WBC: CPT | Performed by: REGISTERED NURSE

## 2024-02-11 PROCEDURE — 63600175 PHARM REV CODE 636 W HCPCS: Performed by: REGISTERED NURSE

## 2024-02-11 PROCEDURE — 27000221 HC OXYGEN, UP TO 24 HOURS

## 2024-02-11 PROCEDURE — 25000003 PHARM REV CODE 250: Performed by: REGISTERED NURSE

## 2024-02-11 PROCEDURE — 80053 COMPREHEN METABOLIC PANEL: CPT | Performed by: REGISTERED NURSE

## 2024-02-11 PROCEDURE — 82962 GLUCOSE BLOOD TEST: CPT

## 2024-02-11 PROCEDURE — 94761 N-INVAS EAR/PLS OXIMETRY MLT: CPT

## 2024-02-11 PROCEDURE — 11000001 HC ACUTE MED/SURG PRIVATE ROOM

## 2024-02-11 RX ADMIN — GENTAMICIN SULFATE: 1 OINTMENT TOPICAL at 08:02

## 2024-02-11 RX ADMIN — FAMOTIDINE 20 MG: 20 TABLET, FILM COATED ORAL at 08:02

## 2024-02-11 RX ADMIN — GABAPENTIN 300 MG: 300 CAPSULE ORAL at 09:02

## 2024-02-11 RX ADMIN — DOXYCYCLINE HYCLATE 100 MG: 100 TABLET, COATED ORAL at 08:02

## 2024-02-11 RX ADMIN — SODIUM CHLORIDE: 9 INJECTION, SOLUTION INTRAVENOUS at 04:02

## 2024-02-11 RX ADMIN — HEPARIN SODIUM 5000 UNITS: 5000 INJECTION INTRAVENOUS; SUBCUTANEOUS at 08:02

## 2024-02-11 RX ADMIN — CEFTRIAXONE 1 G: 1 INJECTION, POWDER, FOR SOLUTION INTRAMUSCULAR; INTRAVENOUS at 08:02

## 2024-02-11 RX ADMIN — AMLODIPINE BESYLATE 2.5 MG: 2.5 TABLET ORAL at 08:02

## 2024-02-11 RX ADMIN — SODIUM CHLORIDE: 9 INJECTION, SOLUTION INTRAVENOUS at 11:02

## 2024-02-11 RX ADMIN — GABAPENTIN 300 MG: 300 CAPSULE ORAL at 02:02

## 2024-02-11 RX ADMIN — GABAPENTIN 300 MG: 300 CAPSULE ORAL at 06:02

## 2024-02-11 RX ADMIN — SODIUM CHLORIDE: 9 INJECTION, SOLUTION INTRAVENOUS at 08:02

## 2024-02-11 RX ADMIN — HYDROCODONE BITARTRATE AND ACETAMINOPHEN 1 TABLET: 10; 325 TABLET ORAL at 11:02

## 2024-02-11 NOTE — ASSESSMENT & PLAN NOTE
Patient currently has well controlled DM with an HGA1C of 5.9. Uses Dexacom continuous monitor. Will hold any oral hypoglycemics while in hospital and cover with Moderate SSI.

## 2024-02-11 NOTE — HPI
38 y-old AAM with PMH of HTN, HLD, anemia, obesity, DM type II, and a chronic non-healing wound to his right root who initially presented to [facility] ED with a 1 week history of N/V/D. States that he became concerned on day of admission when he started feeling weak when he tried to stand. Patient reports that his symptoms initially started with URI symptoms, but progressed to the GI symptoms. States that he vomits every time he tries to eat and has diarrhea 3-4 times per day. He was initially hypotensive and tachycardic in the ED, but responded well to 1 liter of NS. Work up includes an NOLAN with BUN/Creat of 35/2.41 (16/1.21 in Nov 23), Na+ 133 and a UA with moderate bacteria. He also tested positive for Influenza A. He will be admitted to [facility] for IVF, ABX and re-check of his labs.

## 2024-02-11 NOTE — H&P
[facility] - Medical Surgical Unit  Hospital Medicine  History & Physical    Patient Name: Jean Pierre Paulson  MRN: [id number]  Patient Class: IP- Inpatient  Admission Date: 2/10/2024  Attending Physician: Tristan Rodas DO   Primary Care Provider: Yenifer Primary Doctor         Patient information was obtained from patient, past medical records, and ER records.     Subjective:     Principal Problem:NOLAN (acute kidney injury)    Chief Complaint:   Chief Complaint   Patient presents with    Dizziness    Weakness     Weakness and dizziness onset today, has had nausea, vomiting and diarrhea x the past week        HPI: 38 y-old AAM with PMH of HTN, HLD, anemia, obesity, DM type II, and a chronic non-healing wound to his right root who initially presented to [facility] ED with a 1 week history of N/V/D. States that he became concerned on day of admission when he started feeling weak when he tried to stand. Patient reports that his symptoms initially started with URI symptoms, but progressed to the GI symptoms. States that he vomits every time he tries to eat and has diarrhea 3-4 times per day. He was initially hypotensive and tachycardic in the ED, but responded well to 1 liter of NS. Work up includes an NOLAN with BUN/Creat of 35/2.41 (16/1.21 in Nov 23), Na+ 133 and a UA with moderate bacteria. He also tested positive for Influenza A. He will be admitted to [facility] for IVF, ABX and re-check of his labs.      Past Medical History:   Diagnosis Date    NOLAN (acute kidney injury) 04/07/2023    Anemia     Diabetes mellitus with neuropathy     HLD (hyperlipidemia) 04/07/2023    HTN (hypertension)     Obesity     TEDDY (obstructive sleep apnea) 04/07/2023    Osteomyelitis 10/2020    Hx of R foot, Tx with IV Abx    Peripheral vascular disease        Past Surgical History:   Procedure Laterality Date    APPLICATION OF SPLIT-THICKNESS SKIN GRAFT (STSG) TO LOWER EXTREMITY Right 03/02/2022    Procedure: APPLICATION,  GRAFT, SKIN, SPLIT-THICKNESS, TO LOWER EXTREMITY;  Surgeon: Greg Ramírez MD;  Location: [facility];  Service: General;  Laterality: Right;    CHOLECYSTECTOMY      DEBRIDEMENT OF FOOT Right 10/2020    right great toe amputation         Review of patient's allergies indicates:   Allergen Reactions    Tomato Itching       No current facility-administered medications on file prior to encounter.     Current Outpatient Medications on File Prior to Encounter   Medication Sig    amLODIPine (NORVASC) 2.5 MG tablet Take 1 tablet (2.5 mg total) by mouth once daily.    blood-glucose meter,continuous (DEXCOM ) Misc 1 each by Misc.(Non-Drug; Combo Route) route 4 (four) times daily with meals and nightly.    doxycycline (VIBRA-TABS) 100 MG tablet Take 1 tablet (100 mg total) by mouth 2 (two) times daily.    gabapentin (NEURONTIN) 300 MG capsule Take 1 capsule (300 mg total) by mouth every 8 (eight) hours.    gentamicin (GARAMYCIN) 0.1 % cream Apply topically once daily.    hydroCHLOROthiazide (HYDRODIURIL) 25 MG tablet Take 1 tablet (25 mg total) by mouth once daily.    HYDROcodone-acetaminophen (NORCO)  mg per tablet Take 1 tablet by mouth every 12 (twelve) hours as needed for Pain.    rosuvastatin (CRESTOR) 10 MG tablet Take 10 mg by mouth once daily.    blood sugar diagnostic (BLOOD GLUCOSE TEST) Strp 1 strip by Misc.(Non-Drug; Combo Route) route once daily. accu-chek Veronika strips    blood-glucose sensor (DEXCOM G7 SENSOR) Aria 1 each by Misc.(Non-Drug; Combo Route) route every 10 days.    hydrALAZINE (APRESOLINE) 25 MG tablet Take 1 tablet (25 mg total) by mouth every 12 (twelve) hours.    losartan (COZAAR) 50 MG tablet Take 1 tablet (50 mg total) by mouth once daily.    semaglutide (OZEMPIC) 2 mg/dose (8 mg/3 mL) PnIj Inject 2 mg into the skin every 7 days. (Patient taking differently: Inject 2.5 mg into the skin every 7 days.)    [DISCONTINUED] amoxicillin (AMOXIL) 500 MG capsule Take 500 mg by mouth every 6  "(six) hours.    [DISCONTINUED] blood-glucose meter kit 1 each by Other route 3 (three) times daily.    [DISCONTINUED] FEROSUL 325 mg (65 mg iron) Tab tablet Take 1 tablet (325 mg total) by mouth once daily.    [DISCONTINUED] insulin asp prt-insulin aspart, NovoLOG 70/30, (NOVOLOG 70/30) 100 unit/mL (70-30) Soln Inject 15 Units into the skin once daily.    [DISCONTINUED] insulin detemir U-100 (LEVEMIR) 100 unit/mL injection Inject 40 Units into the skin every evening.    [DISCONTINUED] sodium hypochlorite 0.5 % (DAKIN'S SOLUTION) external solution Apply topically once daily.     Family History       Problem Relation (Age of Onset)    Diabetes Father, Maternal Grandmother    Hypertension Father, Maternal Grandmother          Tobacco Use    Smoking status: Every Day     Current packs/day: 0.00     Average packs/day: 0.3 packs/day for 25.0 years (6.2 ttl pk-yrs)     Types: Cigarettes     Start date:      Last attempt to quit: 2022     Years since quittin.0    Smokeless tobacco: Never   Substance and Sexual Activity    Alcohol use: Yes     Alcohol/week: 2.0 standard drinks of alcohol     Types: 1 Glasses of wine, 1 Cans of beer per week     Comment: occasionally    Drug use: Never    Sexual activity: Yes     Partners: Female     Review of Systems   Constitutional:  Positive for activity change (decreased) and fatigue.   HENT:  Positive for congestion and postnasal drip.    Eyes: Negative.    Respiratory:  Positive for cough.    Cardiovascular: Negative.  Negative for chest pain, palpitations and leg swelling.   Gastrointestinal:  Positive for abdominal pain (Generalized "aching"), diarrhea (3-4 times per day), nausea and vomiting. Negative for abdominal distention, anal bleeding, blood in stool, constipation and rectal pain.   Endocrine: Negative.    Genitourinary: Negative.    Musculoskeletal: Negative.    Skin:  Positive for wound (Plantar aspect of his right foot, followed by [facility] wound care). "   Allergic/Immunologic: Negative.    Neurological:  Positive for weakness.   Hematological: Negative.    Psychiatric/Behavioral: Negative.       Objective:     Vital Signs (Most Recent):  Temp: 98.2 °F (36.8 °C) (02/10/24 2049)  Pulse: 89 (02/10/24 2049)  Resp: 20 (02/10/24 2049)  BP: 122/83 (02/10/24 2049)  SpO2: (!) 94 % (02/10/24 2049) Vital Signs (24h Range):  Temp:  [97.5 °F (36.4 °C)-98.2 °F (36.8 °C)] 98.2 °F (36.8 °C)  Pulse:  [] 89  Resp:  [18-22] 20  SpO2:  [93 %-95 %] 94 %  BP: ()/(59-87) 122/83     Weight: (!) 149.1 kg (328 lb 12.8 oz)  Body mass index is 42.22 kg/m².     Physical Exam  Vitals and nursing note reviewed. Exam conducted with a chaperone present.   Constitutional:       General: He is not in acute distress.     Appearance: He is obese. He is not ill-appearing, toxic-appearing or diaphoretic.   HENT:      Head: Normocephalic.      Right Ear: Tympanic membrane, ear canal and external ear normal.      Left Ear: Tympanic membrane, ear canal and external ear normal.      Nose: Nose normal.      Mouth/Throat:      Mouth: Mucous membranes are moist.      Pharynx: Oropharynx is clear.   Eyes:      Conjunctiva/sclera: Conjunctivae normal.   Cardiovascular:      Rate and Rhythm: Normal rate and regular rhythm.      Pulses: Normal pulses.      Heart sounds: Normal heart sounds.   Pulmonary:      Effort: Pulmonary effort is normal.      Breath sounds: Normal breath sounds.   Abdominal:      General: Abdomen is flat. Bowel sounds are normal.      Palpations: Abdomen is soft.      Tenderness: There is abdominal tenderness (Generalized).   Musculoskeletal:         General: Normal range of motion.      Cervical back: Normal range of motion.   Skin:     General: Skin is warm and dry.      Capillary Refill: Capillary refill takes less than 2 seconds.   Neurological:      General: No focal deficit present.      Mental Status: He is alert and oriented to person, place, and time. Mental status is at  "baseline.      Cranial Nerves: No cranial nerve deficit.      Sensory: No sensory deficit.      Motor: Weakness (Generalized) present.      Coordination: Coordination normal.      Gait: Gait normal.      Deep Tendon Reflexes: Reflexes normal.   Psychiatric:         Mood and Affect: Mood normal.         Behavior: Behavior normal.         Thought Content: Thought content normal.         Judgment: Judgment normal.                Significant Labs: All pertinent labs within the past 24 hours have been reviewed.  CBC:   Recent Labs   Lab 02/10/24  1735   WBC 4.44*   HGB 12.0*   HCT 38.6*        CMP:   Recent Labs   Lab 02/10/24  1735   *   K 3.8   CL 96*   CO2 27   *   BUN 35*   CREATININE 2.41*   CALCIUM 8.5   PROT 8.4*   ALBUMIN 2.8*   BILITOT 0.4   ALKPHOS 116*   AST 42*   ALT 42   ANIONGAP 14     Lactic Acid:   Recent Labs   Lab 02/10/24  1735   LACTATE 1.1     Lipase:   Recent Labs   Lab 02/10/24  1735   LIPASE 28     Magnesium: No results for input(s): "MG" in the last 48 hours.  Urine Studies:   Recent Labs   Lab 02/10/24  1754   COLORU Kell   APPEARANCEUA Cloudy   PHUR 5.5   SPECGRAV >=1.030*   PROTEINUA 100*   GLUCUA Negative   KETONESU Negative   BILIRUBINUA Small*   OCCULTUA Trace-Intact*   NITRITE Negative   UROBILINOGEN 0.2   LEUKOCYTESUR Negative   RBCUA 5-10*   WBCUA 0-5   BACTERIA Moderate*       Significant Imaging: I have reviewed all pertinent imaging results/findings within the past 24 hours.  Assessment/Plan:     * NOLAN (acute kidney injury)  Patient with acute kidney injury/acute renal failure likely due to pre-renal azotemia due to dehydration NOLAN is currently stable. Baseline creatinine  1.2  - Labs reviewed- Renal function/electrolytes with Estimated Creatinine Clearance: 62.8 mL/min (A) (based on SCr of 2.41 mg/dL (H)). according to latest data. Monitor urine output and serial BMP and adjust therapy as needed. Avoid nephrotoxins and renally dose meds for GFR listed " "above.    -NS at 150 ml/hr and re-check labs daily while in hospital    Urinary tract infection with hematuria  Moderate bacteria on UA. Patient does have a hx of ESBL, but this was from a wound CX.   -Rocephin 1 gm IVPB daily      Influenza A  Patients symptoms have been ongoing for approximately 1 week. He is outside the window for Tamiflu or Xofluza. Recommend supportive care.       Type 2 diabetes mellitus, with long-term current use of insulin  Patient currently has well controlled DM with an HGA1C of 5.9. Uses Dexacom continuous monitor. Will hold any oral hypoglycemics while in hospital and cover with Moderate SSI.       Type 2 diabetes mellitus with right diabetic foot ulcer  Patient's FSGs are controlled on current medication regimen.  Last A1c reviewed-   Lab Results   Component Value Date    HGBA1C 5.9 10/23/2023     Most recent fingerstick glucose reviewed- No results for input(s): "POCTGLUCOSE" in the last 24 hours.  Current correctional scale  Medium  Maintain anti-hyperglycemic dose as follows-   Antihyperglycemics (From admission, onward)      None          Hold Oral hypoglycemics while patient is in the hospital.    HTN (hypertension)  Chronic, controlled. Latest blood pressure and vitals reviewed-     Temp:  [97.5 °F (36.4 °C)-98.2 °F (36.8 °C)]   Pulse:  []   Resp:  [18-22]   BP: ()/(59-87)   SpO2:  [93 %-95 %] .   Home meds for hypertension were reviewed and noted below.   Hypertension Medications               amLODIPine (NORVASC) 2.5 MG tablet Take 1 tablet (2.5 mg total) by mouth once daily.    hydroCHLOROthiazide (HYDRODIURIL) 25 MG tablet Take 1 tablet (25 mg total) by mouth once daily.    hydrALAZINE (APRESOLINE) 25 MG tablet Take 1 tablet (25 mg total) by mouth every 12 (twelve) hours.    losartan (COZAAR) 50 MG tablet Take 1 tablet (50 mg total) by mouth once daily.            While in the hospital, will manage blood pressure as follows; Continue home antihypertensive " regimen    Will utilize p.r.n. blood pressure medication only if patient's blood pressure greater than 180/110 and he develops symptoms such as worsening chest pain or shortness of breath.    Will hold his HCTZ and ARB while in hospital r/t his NOLAN      VTE Risk Mitigation (From admission, onward)      None        Patients presentation, hx, POC and medication reconciliation were discussed with Dr. Zaragoza who [facility].                     Zac Rader NP-C  Department of Hospital Medicine  [facility] - Medical Surgical Unit

## 2024-02-11 NOTE — ASSESSMENT & PLAN NOTE
Patients symptoms have been ongoing for approximately 1 week. He is outside the window for Tamiflu or Xofluza. Recommend supportive care.

## 2024-02-11 NOTE — HOSPITAL COURSE
Pt doing well since IV fluids. Denies any nausea and vomiting, continues to have some diarrhea but has improved. BUN/CR improved to 33/1.88. Will decrease IV rate to 100 mL/hr and repeat BMP in AM. Pt is tolerating po fluids as well. Mild generalized abdominal tenderness with hyperactive BS x4 quadrants. Case discussed with Dr. Carlisle via telemedicine consultation. Otherwise, continue current POC. On day of discharge Cr was 1.38, improved. Patient states that he feels much better and requests to go home. Patient has received maximum benefit of hospitalization and will be discharged home.

## 2024-02-11 NOTE — PLAN OF CARE
Patient is awake and alert sitting up in bed.  He is in no distress.  HR 82, RR 18, o2 sat 88% on room air.  Tech had patient takes several deep breaths. O2 sat did not increase.  Discussed with nurse and she reported getting a sat of 95% on the dynamap. He also has very diminished bilateral breath sounds.    Problem: Gas Exchange Impaired  Goal: Optimal Gas Exchange  Outcome: Ongoing, Progressing  Intervention: Optimize Oxygenation and Ventilation  Flowsheets (Taken 2/11/2024 2647)  Airway/Ventilation Management:   airway patency maintained   pulmonary hygiene promoted   calming measures promoted  Head of Bed (HOB) Positioning: HOB at 20-30 degrees

## 2024-02-11 NOTE — ASSESSMENT & PLAN NOTE
Patient with acute kidney injury/acute renal failure likely due to pre-renal azotemia due to dehydration NOLAN is currently stable. Baseline creatinine  1.2  - Labs reviewed- Renal function/electrolytes with Estimated Creatinine Clearance: 62.8 mL/min (A) (based on SCr of 2.41 mg/dL (H)). according to latest data. Monitor urine output and serial BMP and adjust therapy as needed. Avoid nephrotoxins and renally dose meds for GFR listed above.    -NS at 150 ml/hr and re-check labs daily while in hospital

## 2024-02-11 NOTE — ASSESSMENT & PLAN NOTE
"Patient's FSGs are controlled on current medication regimen.  Last A1c reviewed-   Lab Results   Component Value Date    HGBA1C 5.9 10/23/2023     Most recent fingerstick glucose reviewed- No results for input(s): "POCTGLUCOSE" in the last 24 hours.  Current correctional scale  Medium  Maintain anti-hyperglycemic dose as follows-   Antihyperglycemics (From admission, onward)      None          Hold Oral hypoglycemics while patient is in the hospital.  "

## 2024-02-11 NOTE — ASSESSMENT & PLAN NOTE
Moderate bacteria on UA. Patient does have a hx of ESBL, but this was from a wound CX.   -Rocephin 1 gm IVPB daily

## 2024-02-11 NOTE — ASSESSMENT & PLAN NOTE
Chronic, controlled. Latest blood pressure and vitals reviewed-     Temp:  [97.5 °F (36.4 °C)-98.2 °F (36.8 °C)]   Pulse:  []   Resp:  [18-22]   BP: ()/(59-87)   SpO2:  [93 %-95 %] .   Home meds for hypertension were reviewed and noted below.   Hypertension Medications               amLODIPine (NORVASC) 2.5 MG tablet Take 1 tablet (2.5 mg total) by mouth once daily.    hydroCHLOROthiazide (HYDRODIURIL) 25 MG tablet Take 1 tablet (25 mg total) by mouth once daily.    hydrALAZINE (APRESOLINE) 25 MG tablet Take 1 tablet (25 mg total) by mouth every 12 (twelve) hours.    losartan (COZAAR) 50 MG tablet Take 1 tablet (50 mg total) by mouth once daily.            While in the hospital, will manage blood pressure as follows; Continue home antihypertensive regimen    Will utilize p.r.n. blood pressure medication only if patient's blood pressure greater than 180/110 and he develops symptoms such as worsening chest pain or shortness of breath.    Will hold his HCTZ and ARB while in hospital r/t his NOLAN

## 2024-02-11 NOTE — CARE UPDATE
RT tech got 88% on pulse ox. Referred to nurse who said she got 95% on the dynamap.  Patient does not seem to be short of breath or in any distress.  He does have very diminished bilateral breath sounds.  Will recheck o2 sat.

## 2024-02-12 VITALS
RESPIRATION RATE: 17 BRPM | HEART RATE: 75 BPM | SYSTOLIC BLOOD PRESSURE: 131 MMHG | DIASTOLIC BLOOD PRESSURE: 80 MMHG | WEIGHT: 315 LBS | TEMPERATURE: 98 F | OXYGEN SATURATION: 98 % | HEIGHT: 74 IN | BODY MASS INDEX: 40.43 KG/M2

## 2024-02-12 PROBLEM — N39.0 URINARY TRACT INFECTION WITH HEMATURIA: Status: RESOLVED | Noted: 2024-02-10 | Resolved: 2024-02-12

## 2024-02-12 PROBLEM — R31.9 URINARY TRACT INFECTION WITH HEMATURIA: Status: RESOLVED | Noted: 2024-02-10 | Resolved: 2024-02-12

## 2024-02-12 LAB
ALBUMIN SERPL BCP-MCNC: 2.1 G/DL (ref 3.5–5)
ALBUMIN/GLOB SERPL: 0.4 {RATIO}
ALP SERPL-CCNC: 100 U/L (ref 45–115)
ALT SERPL W P-5'-P-CCNC: 41 U/L (ref 16–61)
ANION GAP SERPL CALCULATED.3IONS-SCNC: 11 MMOL/L (ref 7–16)
AST SERPL W P-5'-P-CCNC: 33 U/L (ref 15–37)
BASOPHILS # BLD AUTO: 0.01 K/UL (ref 0–0.2)
BASOPHILS NFR BLD AUTO: 0.3 % (ref 0–1)
BILIRUB SERPL-MCNC: 0.2 MG/DL (ref ?–1.2)
BUN SERPL-MCNC: 22 MG/DL (ref 7–18)
BUN/CREAT SERPL: 16 (ref 6–20)
CALCIUM SERPL-MCNC: 8.2 MG/DL (ref 8.5–10.1)
CHLORIDE SERPL-SCNC: 103 MMOL/L (ref 98–107)
CO2 SERPL-SCNC: 27 MMOL/L (ref 21–32)
CREAT SERPL-MCNC: 1.38 MG/DL (ref 0.7–1.3)
DIFFERENTIAL METHOD BLD: ABNORMAL
EGFR (NO RACE VARIABLE) (RUSH/TITUS): 66 ML/MIN/1.73M2
EOSINOPHIL # BLD AUTO: 0.02 K/UL (ref 0–0.5)
EOSINOPHIL NFR BLD AUTO: 0.7 % (ref 1–4)
ERYTHROCYTE [DISTWIDTH] IN BLOOD BY AUTOMATED COUNT: 14.5 % (ref 11.5–14.5)
GLOBULIN SER-MCNC: 4.9 G/DL (ref 2–4)
GLUCOSE SERPL-MCNC: 116 MG/DL (ref 74–106)
HCT VFR BLD AUTO: 34.6 % (ref 40–54)
HGB BLD-MCNC: 10.6 G/DL (ref 13.5–18)
LYMPHOCYTES # BLD AUTO: 1.37 K/UL (ref 1–4.8)
LYMPHOCYTES NFR BLD AUTO: 47.9 % (ref 27–41)
MCH RBC QN AUTO: 25.2 PG (ref 27–31)
MCHC RBC AUTO-ENTMCNC: 30.6 G/DL (ref 32–36)
MCV RBC AUTO: 82.4 FL (ref 80–96)
MONOCYTES # BLD AUTO: 0.41 K/UL (ref 0–0.8)
MONOCYTES NFR BLD AUTO: 14.3 % (ref 2–6)
MPC BLD CALC-MCNC: 10 FL (ref 9.4–12.4)
NEUTROPHILS # BLD AUTO: 1.05 K/UL (ref 1.8–7.7)
NEUTROPHILS NFR BLD AUTO: 36.8 % (ref 53–65)
PLATELET # BLD AUTO: 191 K/UL (ref 150–400)
POTASSIUM SERPL-SCNC: 4 MMOL/L (ref 3.5–5.1)
PROT SERPL-MCNC: 7 G/DL (ref 6.4–8.2)
RBC # BLD AUTO: 4.2 M/UL (ref 4.6–6.2)
SODIUM SERPL-SCNC: 137 MMOL/L (ref 136–145)
WBC # BLD AUTO: 2.86 K/UL (ref 4.5–11)

## 2024-02-12 PROCEDURE — 25000003 PHARM REV CODE 250: Performed by: FAMILY MEDICINE

## 2024-02-12 PROCEDURE — 80053 COMPREHEN METABOLIC PANEL: CPT | Performed by: REGISTERED NURSE

## 2024-02-12 PROCEDURE — 25000003 PHARM REV CODE 250: Performed by: NURSE PRACTITIONER

## 2024-02-12 PROCEDURE — 94761 N-INVAS EAR/PLS OXIMETRY MLT: CPT

## 2024-02-12 PROCEDURE — 25000003 PHARM REV CODE 250: Performed by: REGISTERED NURSE

## 2024-02-12 PROCEDURE — 85025 COMPLETE CBC W/AUTO DIFF WBC: CPT | Performed by: REGISTERED NURSE

## 2024-02-12 PROCEDURE — 27000221 HC OXYGEN, UP TO 24 HOURS

## 2024-02-12 PROCEDURE — 99900035 HC TECH TIME PER 15 MIN (STAT)

## 2024-02-12 RX ORDER — OSELTAMIVIR PHOSPHATE 75 MG/1
75 CAPSULE ORAL 2 TIMES DAILY
Qty: 10 CAPSULE | Refills: 0 | Status: SHIPPED | OUTPATIENT
Start: 2024-02-12 | End: 2024-02-19 | Stop reason: ALTCHOICE

## 2024-02-12 RX ORDER — ENOXAPARIN SODIUM 100 MG/ML
40 INJECTION SUBCUTANEOUS EVERY 24 HOURS
Status: DISCONTINUED | OUTPATIENT
Start: 2024-02-12 | End: 2024-02-12 | Stop reason: HOSPADM

## 2024-02-12 RX ORDER — CEFUROXIME AXETIL 500 MG/1
500 TABLET ORAL EVERY 12 HOURS
Qty: 20 TABLET | Refills: 0 | Status: SHIPPED | OUTPATIENT
Start: 2024-02-12 | End: 2024-02-22

## 2024-02-12 RX ORDER — OSELTAMIVIR PHOSPHATE 75 MG/1
75 CAPSULE ORAL 2 TIMES DAILY
Status: DISCONTINUED | OUTPATIENT
Start: 2024-02-12 | End: 2024-02-12 | Stop reason: HOSPADM

## 2024-02-12 RX ADMIN — FAMOTIDINE 20 MG: 20 TABLET, FILM COATED ORAL at 09:02

## 2024-02-12 RX ADMIN — GABAPENTIN 300 MG: 300 CAPSULE ORAL at 05:02

## 2024-02-12 RX ADMIN — DOXYCYCLINE HYCLATE 100 MG: 100 TABLET, COATED ORAL at 09:02

## 2024-02-12 RX ADMIN — AMLODIPINE BESYLATE 2.5 MG: 2.5 TABLET ORAL at 09:02

## 2024-02-12 RX ADMIN — HYDROCODONE BITARTRATE AND ACETAMINOPHEN 1 TABLET: 10; 325 TABLET ORAL at 04:02

## 2024-02-12 RX ADMIN — OSELTAMIVIR PHOSPHATE 75 MG: 75 CAPSULE ORAL at 11:02

## 2024-02-12 RX ADMIN — SODIUM CHLORIDE: 9 INJECTION, SOLUTION INTRAVENOUS at 06:02

## 2024-02-12 RX ADMIN — GENTAMICIN SULFATE: 1 OINTMENT TOPICAL at 11:02

## 2024-02-12 NOTE — DISCHARGE SUMMARY
[facility] - Medical Surgical Unit  Hospital Medicine  Discharge Summary      Patient Name: Jean Pierre Paulson  MRN: [id number]  [facility]: [id number]  Patient Class: IP- Inpatient  Admission Date: 2/10/2024  Hospital Length of Stay: 2 days  Discharge Date and Time:  02/12/2024 9:59 AM  Attending Physician: Tristan Rodas DO   Discharging Provider: Chirag Portillo MD  Primary Care Provider: Yenifer Primary Doctor    Primary Care Team: Networked reference to record PCT     HPI:   38 y-old AAM with PMH of HTN, HLD, anemia, obesity, DM type II, and a chronic non-healing wound to his right root who initially presented to [facility] ED with a 1 week history of N/V/D. States that he became concerned on day of admission when he started feeling weak when he tried to stand. Patient reports that his symptoms initially started with URI symptoms, but progressed to the GI symptoms. States that he vomits every time he tries to eat and has diarrhea 3-4 times per day. He was initially hypotensive and tachycardic in the ED, but responded well to 1 liter of NS. Work up includes an NOLAN with BUN/Creat of 35/2.41 (16/1.21 in Nov 23), Na+ 133 and a UA with moderate bacteria. He also tested positive for Influenza A. He will be admitted to [facility] for IVF, ABX and re-check of his labs.      * No surgery found *      Hospital Course:   Pt doing well since IV fluids. Denies any nausea and vomiting, continues to have some diarrhea but has improved. BUN/CR improved to 33/1.88. Will decrease IV rate to 100 mL/hr and repeat BMP in AM. Pt is tolerating po fluids as well. Mild generalized abdominal tenderness with hyperactive BS x4 quadrants. Case discussed with Dr. Carlisle via telemedicine consultation. Otherwise, continue current POC. On day of discharge Cr was 1.38, improved. Patient states that he feels much better and requests to go home. Patient has received maximum benefit of hospitalization and will be discharged home.     Goals of  Care Treatment Preferences:  Code Status: Full Code      Consults:     Cardiac/Vascular  HTN (hypertension)  Chronic, controlled. Latest blood pressure and vitals reviewed-     Temp:  [98.2 °F (36.8 °C)-99.1 °F (37.3 °C)]   Pulse:  [75-83]   Resp:  [18-20]   BP: (113-155)/(71-92)   SpO2:  [88 %-98 %] .   Home meds for hypertension were reviewed and noted below.   Hypertension Medications               amLODIPine (NORVASC) 2.5 MG tablet Take 1 tablet (2.5 mg total) by mouth once daily.    hydroCHLOROthiazide (HYDRODIURIL) 25 MG tablet Take 1 tablet (25 mg total) by mouth once daily.    hydrALAZINE (APRESOLINE) 25 MG tablet Take 1 tablet (25 mg total) by mouth every 12 (twelve) hours.    losartan (COZAAR) 50 MG tablet Take 1 tablet (50 mg total) by mouth once daily.            While in the hospital, will manage blood pressure as follows; Continue home antihypertensive regimen    Will utilize p.r.n. blood pressure medication only if patient's blood pressure greater than 180/110 and he develops symptoms such as worsening chest pain or shortness of breath.    Will hold his HCTZ and ARB while in hospital r/t his NOLAN    Follow up with PCP    Renal/  * NOLAN (acute kidney injury)  Patient with acute kidney injury/acute renal failure likely due to pre-renal azotemia due to dehydration NOLAN is currently stable. Baseline creatinine  1.2  - Labs reviewed- Renal function/electrolytes with Estimated Creatinine Clearance: 109.7 mL/min (A) (based on SCr of 1.38 mg/dL (H)). according to latest data. Monitor urine output and serial BMP and adjust therapy as needed. Avoid nephrotoxins and renally dose meds for GFR listed above.    -NS at 150 ml/hr and re-check labs daily while in hospital    Follow up with PCP    ID  Influenza A  Tamiflu as outpatient    Follow up with PCP      Endocrine  Type 2 diabetes mellitus, with long-term current use of insulin  Patient currently has well controlled DM with an HGA1C of 5.9. Uses Dexacom  "continuous monitor. Will hold any oral hypoglycemics while in hospital and cover with Moderate SSI.     Follow up with PCP    Type 2 diabetes mellitus with right diabetic foot ulcer  Patient's FSGs are controlled on current medication regimen.  Last A1c reviewed-   Lab Results   Component Value Date    HGBA1C 6.7 (H) 02/10/2024     Most recent fingerstick glucose reviewed- No results for input(s): "POCTGLUCOSE" in the last 24 hours.  Current correctional scale  Medium  Maintain anti-hyperglycemic dose as follows-   Antihyperglycemics (From admission, onward)      Start     Stop Route Frequency Ordered    02/10/24 2221  insulin aspart U-100 injection 0-10 Units         -- SubQ Before meals & nightly PRN 02/10/24 2122          Hold Oral hypoglycemics while patient is in the hospital.    Follow up with PCP and wound care      Final Active Diagnoses:    Diagnosis Date Noted POA    PRINCIPAL PROBLEM:  NOLAN (acute kidney injury) [N17.9] 04/07/2023 Yes    Type 2 diabetes mellitus with right diabetic foot ulcer [E11.621, L97.519] 10/08/2021 Yes    Type 2 diabetes mellitus, with long-term current use of insulin [E11.9, Z79.4] 08/01/2022 Not Applicable    Influenza A [J10.1] 02/10/2024 Yes    HTN (hypertension) [I10] 03/19/2021 Yes      Problems Resolved During this Admission:    Diagnosis Date Noted Date Resolved POA    Urinary tract infection with hematuria [N39.0, R31.9] 02/10/2024 02/12/2024 Yes       Discharged Condition: stable    Disposition: Home or Self Care    Follow Up:   Follow-up Information       No, Primary Doctor Follow up.                           Patient Instructions:      Diet diabetic     Notify your health care provider if you experience any of the following:  temperature >100.4     Notify your health care provider if you experience any of the following:  persistent nausea and vomiting or diarrhea     Notify your health care provider if you experience any of the following:  redness, tenderness, or signs " "of infection (pain, swelling, redness, odor or green/yellow discharge around incision site)     Notify your health care provider if you experience any of the following:  severe uncontrolled pain     Notify your health care provider if you experience any of the following:  severe persistent headache     Notify your health care provider if you experience any of the following:  increased confusion or weakness     Notify your health care provider if you experience any of the following:  persistent dizziness, light-headedness, or visual disturbances     Notify your health care provider if you experience any of the following:  difficulty breathing or increased cough     Activity as tolerated       Significant Diagnostic Studies: Labs: All labs within the past 24 hours have been reviewed  Microbiology: Blood Culture No results found for: "LABBLOO" and Wound Culture: pending  Radiology: X-Ray: CXR: X-Ray Chest 1 View (CXR): No results found for this visit on 02/10/24. and X-Ray Chest PA and Lateral (CXR): No results found for this visit on 02/10/24. and KUB: X-Ray Abdomen AP 1 View (KUB): No results found for this visit on 02/10/24.  CT scan: CT ABDOMEN PELVIS WITH CONTRAST: No results found for this visit on 02/10/24. and CT ABDOMEN PELVIS WITHOUT CONTRAST:   Results for orders placed or performed during the hospital encounter of 02/10/24   CT Abdomen Pelvis  Without Contrast    Narrative    EXAMINATION:  CT ABDOMEN PELVIS WITHOUT CONTRAST    CLINICAL HISTORY:  Abdominal pain, acute, nonlocalized;    TECHNIQUE:  Axial CT imaging of the abdomen and pelvis is performed without contrast.    CT dose reduction technique used - Dose Rite and tube current modulation.    COMPARISON:  19 February 2020    FINDINGS:  Cardiac and lung bases are within normal limits    CT abdomen: The gallbladder has been removed.  The liver, spleen, pancreas and adrenal glands are normal in size and density.  No evidence of focal lesion is " demonstrated in these solid organs.    Kidneys are normal in size and density.  No evidence of hydronephrosis or nephrolithiasis is seen.    The bowel caliber is normal and no wall thickening or adjacent inflammatory change is seen.  No evidence of free fluid or free air is present.  Appendix is normal.    CT pelvis: The bowel and bladder appear within normal limits.  The pelvic organs show no evidence of abnormality      Impression    No evidence of acute process demonstrated.      Electronically signed by: Sheng Anderson  Date:    02/10/2024  Time:    19:03       Pending Diagnostic Studies:       Procedure Component Value Units Date/Time    X-Ray Foot Complete Right [[id number]]     Order Status: Sent Lab Status: No result            Medications:  Reconciled Home Medications:      Medication List        START taking these medications      cefUROXime 500 MG tablet  Commonly known as: CEFTIN  Take 1 tablet (500 mg total) by mouth every 12 (twelve) hours. for 10 days     oseltamivir 75 MG capsule  Commonly known as: TAMIFLU  Take 1 capsule (75 mg total) by mouth 2 (two) times daily. for 5 days            CONTINUE taking these medications      amLODIPine 2.5 MG tablet  Commonly known as: NORVASC  Take 1 tablet (2.5 mg total) by mouth once daily.     blood sugar diagnostic Strp  Commonly known as: BLOOD GLUCOSE TEST  1 strip by Misc.(Non-Drug; Combo Route) route once daily. accu-chek Veronika strips     DEXCOM G7 SENSOR Aria  Generic drug: blood-glucose sensor  1 each by Misc.(Non-Drug; Combo Route) route every 10 days.     DEXCOM  Misc  Generic drug: blood-glucose meter,continuous  1 each by Misc.(Non-Drug; Combo Route) route 4 (four) times daily with meals and nightly.     gabapentin 300 MG capsule  Commonly known as: NEURONTIN  Take 1 capsule (300 mg total) by mouth every 8 (eight) hours.     gentamicin 0.1 % cream  Commonly known as: GARAMYCIN  Apply topically once daily.     hydrALAZINE 25 MG  tablet  Commonly known as: APRESOLINE  Take 1 tablet (25 mg total) by mouth every 12 (twelve) hours.     hydroCHLOROthiazide 25 MG tablet  Commonly known as: HYDRODIURIL  Take 1 tablet (25 mg total) by mouth once daily.     HYDROcodone-acetaminophen  mg per tablet  Commonly known as: NORCO  Take 1 tablet by mouth every 12 (twelve) hours as needed for Pain.     losartan 50 MG tablet  Commonly known as: COZAAR  Take 1 tablet (50 mg total) by mouth once daily.     OZEMPIC 2 mg/dose (8 mg/3 mL) Pnij  Generic drug: semaglutide  Inject 2 mg into the skin every 7 days.     rosuvastatin 10 MG tablet  Commonly known as: CRESTOR  Take 10 mg by mouth once daily.            STOP taking these medications      doxycycline 100 MG tablet  Commonly known as: VIBRA-TABS              Indwelling Lines/Drains at time of discharge:   Lines/Drains/Airways       None                   Time spent on the discharge of patient: 40 minutes         Chirag Portillo MD  Department of Hospital Medicine  [facility] - [facility] Surgical [facility]

## 2024-02-12 NOTE — ASSESSMENT & PLAN NOTE
Patient currently has well controlled DM with an HGA1C of 5.9. Uses Dexacom continuous monitor. Will hold any oral hypoglycemics while in hospital and cover with Moderate SSI.     Follow up with PCP

## 2024-02-12 NOTE — PLAN OF CARE
[facility]  Discharge Final Note    Primary Care Provider: Yenifer Primary Doctor    Expected Discharge Date: 2/12/2024    Final Discharge Note (most recent)       Final Note - 02/12/24 1115          Final Note    Assessment Type Final Discharge Note     Anticipated Discharge Disposition Home-Health Care Sv     What phone number can be called within the next 1-3 days to see how you are doing after discharge? [phone]        Post-Acute Status    Post-Acute Authorization Home Health     Home Health Status Referrals Sent     Coverage medicare     Patient choice form signed by patient/caregiver List with quality metrics by geographic area provided     Discharge Delays None known at this time                     Important Message from Medicare  Important Message from Medicare regarding Discharge Appeal Rights: Given to patient/caregiver, Explained to patient/caregiver, Signed/date by patient/caregiver     Date IMM was signed: 02/12/24  Time IMM was signed: 1114    Contact Info       Yenifer Primary Doctor   Relationship: PCP - General        Next Steps: Follow up          Home with [facility]

## 2024-02-12 NOTE — PLAN OF CARE
[facility]  Initial Discharge Assessment       Primary Care Provider: No, Primary Doctor    Admission Diagnosis: Influenza A [J10.1]  Open wound of foot [S91.309A]  Acute kidney injury [N17.9]  Urinary tract infection with hematuria, site unspecified [N39.0, R31.9]    Admission Date: 2/10/2024  Expected Discharge Date: 2/12/2024    Transition of Care Barriers: None    Payor: MEDICARE / Plan: MEDICARE PART A & B / Product Type: Government /     Extended Emergency Contact Information  Primary Emergency Contact: GAXIOLAVERONICA  Mobile Phone: [phone]  Relation: Mother  Preferred language: English   needed? No    Discharge Plan A: Home, Home Health         [facility] [address] [place] [address], [place] - [address]  [address]  [place] [address]  Phone: [phone] Fax: [phone]    [facility] - [address] [address]  Phone: [phone] Fax: [phone]      Initial Assessment (most recent)       Adult Discharge Assessment - 02/12/24 1046          Discharge Assessment    Assessment Type Discharge Planning Assessment     Confirmed/corrected address, phone number and insurance Yes     Confirmed Demographics Correct on Facesheet     Source of Information patient     Communicated WOOD with patient/caregiver Yes     Reason For Admission accute kidney injury     People in Home alone     Facility Arrived From: home     Do you expect to return to your current living situation? Yes     Do you have help at home or someone to help you manage your care at home? No     Prior to hospitilization cognitive status: Unable to Assess     Current cognitive status: Alert/Oriented     Walking or Climbing Stairs Difficulty no     Dressing/Bathing Difficulty no     Home Accessibility not wheelchair accessible     Home Layout Able to live on 1st floor     Equipment Currently Used at Home orthotic device     Patient  currently being followed by outpatient case management? No     Do you currently have service(s) that help you manage your care at home? Yes     [facility]     Is the pt/caregiver preference to resume services with current agency Yes     Do you take prescription medications? Yes     Do you have prescription coverage? Yes     Coverage medicare medicaid     Who is going to help you get home at discharge? self     How do you get to doctors appointments? car, drives self     Are you on dialysis? No     Do you take coumadin? No     Discharge Plan A Home;Home Health     DME Needed Upon Discharge  none     Discharge Plan discussed with: Patient     Transition of Care Barriers None        Physical Activity    On average, how many days per week do you engage in moderate to strenuous exercise (like a brisk walk)? 0 days     On average, how many minutes do you engage in exercise at this level? 0 min        Financial Resource Strain    How hard is it for you to pay for the very basics like food, housing, medical care, and heating? Somewhat hard        Housing Stability    In the last 12 months, was there a time when you were not able to pay the mortgage or rent on time? No     In the last 12 months, how many places have you lived? 1     In the last 12 months, was there a time when you did not have a steady place to sleep or slept in a shelter (including now)? No        Transportation Needs    In the past 12 months, has lack of transportation kept you from medical appointments or from getting medications? No     In the past 12 months, has lack of transportation kept you from meetings, work, or from getting things needed for daily living? No        Food Insecurity    Within the past 12 months, you worried that your food would run out before you got the money to buy more. Sometimes true     Within the past 12 months, the food you bought just didn't last and you didn't have money to get  more. Sometimes true        Stress    Do you feel stress - tense, restless, nervous, or anxious, or unable to sleep at night because your mind is troubled all the time - these days? Not at all        Social Connections    In a typical week, how many times do you talk on the phone with family, friends, or neighbors? More than three times a week     How often do you get together with friends or relatives? More than three times a week     How often do you attend Taoist or Protestant services? 1 to 4 times per year     Do you belong to any clubs or organizations such as Taoist groups, unions, fraternal or athletic groups, or school groups? No     How often do you attend meetings of the clubs or organizations you belong to? Never     Are you , , , , never , or living with a partner? Never         Alcohol Use    Q1: How often do you have a drink containing alcohol? Never     Q2: How many drinks containing alcohol do you have on a typical day when you are drinking? Patient does not drink     Q3: How often do you have six or more drinks on one occasion? Never                 Pt to be discharged today , home with [facility] Pt s choice signed and pt has all needed DME and will follow up wit primary Dr Isabel Lugo [place]. He did voice concern of not able to get food stamps he has applied and draws too much . Pt has medicare and medicaid . He lives alone. Will set up [facility]

## 2024-02-12 NOTE — NURSING
Discharge instructions given to patient as well as iv dc'd. Patient states he is going to get dressed and then would like to walk out. Refuses wheelchair. RT in room and sats maintained 98% on room air.

## 2024-02-12 NOTE — ASSESSMENT & PLAN NOTE
Patient with acute kidney injury/acute renal failure likely due to pre-renal azotemia due to dehydration NOLAN is currently stable. Baseline creatinine  1.2  - Labs reviewed- Renal function/electrolytes with Estimated Creatinine Clearance: 109.7 mL/min (A) (based on SCr of 1.38 mg/dL (H)). according to latest data. Monitor urine output and serial BMP and adjust therapy as needed. Avoid nephrotoxins and renally dose meds for GFR listed above.    -NS at 150 ml/hr and re-check labs daily while in hospital    Follow up with PCP

## 2024-02-12 NOTE — NURSING
Pt wound check appt was resheduled to 2/19 at 10 AM with AUGUSTUS Macedo at [facility] - wound care.  Patient made aware.

## 2024-02-12 NOTE — ASSESSMENT & PLAN NOTE
Chronic, controlled. Latest blood pressure and vitals reviewed-     Temp:  [98.2 °F (36.8 °C)-99.1 °F (37.3 °C)]   Pulse:  [75-83]   Resp:  [18-20]   BP: (113-155)/(71-92)   SpO2:  [88 %-98 %] .   Home meds for hypertension were reviewed and noted below.   Hypertension Medications               amLODIPine (NORVASC) 2.5 MG tablet Take 1 tablet (2.5 mg total) by mouth once daily.    hydroCHLOROthiazide (HYDRODIURIL) 25 MG tablet Take 1 tablet (25 mg total) by mouth once daily.    hydrALAZINE (APRESOLINE) 25 MG tablet Take 1 tablet (25 mg total) by mouth every 12 (twelve) hours.    losartan (COZAAR) 50 MG tablet Take 1 tablet (50 mg total) by mouth once daily.            While in the hospital, will manage blood pressure as follows; Continue home antihypertensive regimen    Will utilize p.r.n. blood pressure medication only if patient's blood pressure greater than 180/110 and he develops symptoms such as worsening chest pain or shortness of breath.    Will hold his HCTZ and ARB while in hospital r/t his NOLAN    Follow up with PCP

## 2024-02-12 NOTE — PLAN OF CARE
Patient is awake and alert. HR 75, RR 18, o2 sat. 97% on 1 L nasal cannula with clear and slightly diminished bilateral breath sounds.    Problem: Gas Exchange Impaired  Goal: Optimal Gas Exchange  Outcome: Ongoing, Progressing  Intervention: Optimize Oxygenation and Ventilation  Flowsheets (Taken 2/12/2024 3084)  Airway/Ventilation Management:   airway patency maintained   calming measures promoted   pulmonary hygiene promoted  Head of Bed (HOB) Positioning: HOB at 20-30 degrees

## 2024-02-12 NOTE — ASSESSMENT & PLAN NOTE
"Patient's FSGs are controlled on current medication regimen.  Last A1c reviewed-   Lab Results   Component Value Date    HGBA1C 6.7 (H) 02/10/2024     Most recent fingerstick glucose reviewed- No results for input(s): "POCTGLUCOSE" in the last 24 hours.  Current correctional scale  Medium  Maintain anti-hyperglycemic dose as follows-   Antihyperglycemics (From admission, onward)      Start     Stop Route Frequency Ordered    02/10/24 2221  insulin aspart U-100 injection 0-10 Units         -- SubQ Before meals & nightly PRN 02/10/24 2122          Hold Oral hypoglycemics while patient is in the hospital.    Follow up with PCP and wound care  "

## 2024-02-12 NOTE — PLAN OF CARE
Problem: Adult Inpatient Plan of Care  Goal: Plan of Care Review  Outcome: Ongoing, Progressing  Goal: Patient-Specific Goal (Individualized)  Outcome: Ongoing, Progressing  Goal: Absence of Hospital-Acquired Illness or Injury  Outcome: Ongoing, Progressing  Goal: Optimal Comfort and Wellbeing  Outcome: Ongoing, Progressing  Goal: Readiness for Transition of Care  Outcome: Ongoing, Progressing     Problem: Bariatric Environmental Safety  Goal: Safety Maintained with Care  Outcome: Ongoing, Progressing     Problem: Impaired Wound Healing  Goal: Optimal Wound Healing  Outcome: Ongoing, Progressing     Problem: Fluid and Electrolyte Imbalance (Acute Kidney Injury/Impairment)  Goal: Fluid and Electrolyte Balance  Outcome: Ongoing, Progressing     Problem: Oral Intake Inadequate (Acute Kidney Injury/Impairment)  Goal: Optimal Nutrition Intake  Outcome: Ongoing, Progressing     Problem: Renal Function Impairment (Acute Kidney Injury/Impairment)  Goal: Effective Renal Function  Outcome: Ongoing, Progressing     Problem: Diabetes Comorbidity  Goal: Blood Glucose Level Within Targeted Range  Outcome: Ongoing, Progressing     Problem: Infection  Goal: Absence of Infection Signs and Symptoms  Outcome: Ongoing, Progressing     Problem: Gas Exchange Impaired  Goal: Optimal Gas Exchange  Outcome: Ongoing, Progressing

## 2024-02-13 ENCOUNTER — PATIENT OUTREACH (OUTPATIENT)
Dept: ADMINISTRATIVE | Facility: CLINIC | Age: 39
End: 2024-02-13

## 2024-02-13 LAB — UA COMPLETE W REFLEX CULTURE PNL UR: NO GROWTH

## 2024-02-13 PROCEDURE — G0180 MD CERTIFICATION HHA PATIENT: HCPCS | Mod: ,,, | Performed by: NURSE PRACTITIONER

## 2024-02-13 NOTE — PROGRESS NOTES
C3 nurse attempted to contact Jean Pierre Paulson for a TCC post hospital discharge follow up call. No answer. The patient does not have a scheduled HOSFU appointment, and the pt does not have an [facility] PCP.   Contacted patient for post discharge call patient asked the patient to spell his name for verification.  Patient states his name.   C-3 nurse asked patient once again to spell his name for verification.  Patient responds do you not have my name.   C-3 Nurse response yes but I need to verify I am speaking to the correct person.    Phone disconnected.   Attempted to call back with no answer.

## 2024-02-14 NOTE — PROGRESS NOTES
C3 nurse spoke with Jean Pierre Paulson  for a TCC post hospital discharge follow up call. The patient reports does not have a scheduled HOSFU appointment. C3 nurse was unable to schedule [facility]OSFU appointment [facility]. Patient advised to contact their PCP to schedule a HOSPFU within 5-7 days.  Patient voiced understanding.

## 2024-02-15 DIAGNOSIS — E11.621 DIABETIC ULCER OF RIGHT HEEL ASSOCIATED WITH TYPE 2 DIABETES MELLITUS, WITH FAT LAYER EXPOSED: Primary | ICD-10-CM

## 2024-02-15 DIAGNOSIS — L97.412 DIABETIC ULCER OF RIGHT HEEL ASSOCIATED WITH TYPE 2 DIABETES MELLITUS, WITH FAT LAYER EXPOSED: Primary | ICD-10-CM

## 2024-02-15 LAB
MICROORGANISM SPEC CULT: ABNORMAL
MICROORGANISM SPEC CULT: ABNORMAL

## 2024-02-15 NOTE — TELEPHONE ENCOUNTER
----- Message from Tristan Rodas DO sent at 2/15/2024  1:01 PM CST -----  Augmentin 875 BID for 10 days

## 2024-02-15 NOTE — PATIENT INSTRUCTIONS
Clean with  vashe or baby shampoo and water     Apply mepilex ag or equivalent to wound     Cover and secure with 4x4, ABD,wrap with 2 layer compression from toe to knee. Change twice a week and as needed     Monitor closely for s/s of infection including fever, chills, increase in pain, odor from wound, and increased redness from foot. Go to ER if any complications develop.   Keep leg elevated and avoid pressure on wound.   Diabetes:  Monitor glucose closely. Check fasting glucose and 2 hours after meals. HgA1C goal <7, fasting glucose , and 2 hours after meals <180  Hypertension:  Check blood pressure twice daily, goal <120/80  Diet:   Increase protein intake, avoid fried, fatty foods and foods high in simple carbs.   Vitamins:  Take vitamin C 1000 mg, zinc 50mg, vitamin d 5000 units, and a daily multivitamin. Angel is a good source of protein and nutrients to aid in wound healing.    Wear walking boot daily or use crutches to offload right foot     Home health to FOLLOW wound orders above

## 2024-02-15 NOTE — PROGRESS NOTES
AUGUSTUS Macedo   [facility]  [address]  [facility] [address]  [phone]      PATIENT NAME: Jean Pierre Paulson  : 1985  DATE: 24  MRN: [id number]      Billing Provider: AUGUSTUS Macedo  Level of Service:   Patient PCP Information       Provider PCP Type    Primary Doctor No General            Reason for Visit / Chief Complaint: Non-healing Wound Follow Up (RLE)       History of Present Illness / Problem Focused Workflow     Jean Pierre Paulson is a 38 y.o. male who presents to clinic for follow up on chronic-non healing wound on the right foot. Wound has improved since last visit. Granulation tissue to wound bed. Drainage has improved. He is currently on Ceftin.  He has been evaluated at [facility] and they are in process of making CROW boot.  Reinforced importance of following plan of care, wearing off-loading boot, keeping pressure off foot, taking antibiotics as prescribed,increasing protein intake, and keeping diabetes well controlled.    Last arterial doppler 4/10/23,  The ankle to brachial index is 1.25 on the right and 1.34 on the left.    Significant PMH  Includes diabetes and hypertension. Last HgA1C 7.3 in 2022, diabetes is managed by PCP.  Denies fever or chills.     Review of Systems     Review of Systems   Constitutional:  Negative for activity change, chills and fever.   Respiratory:  Negative for chest tightness and shortness of breath.    Cardiovascular:  Positive for leg swelling. Negative for chest pain and palpitations.   Musculoskeletal:  Positive for arthralgias and joint swelling.   Skin:  Positive for wound.        See wound assessment   Neurological:  Positive for weakness and numbness.   Psychiatric/Behavioral:  Negative for agitation, behavioral problems, confusion and self-injury.        Medical / Social / Family History     Past Medical History:   Diagnosis Date    NOLAN (acute kidney injury) 2023     Anemia     Diabetes mellitus with neuropathy     HLD (hyperlipidemia) 04/07/2023    HTN (hypertension)     Obesity     TEDDY (obstructive sleep apnea) 04/07/2023    Osteomyelitis 10/2020    Hx of R foot, Tx with IV Abx    Peripheral vascular disease     Urinary tract infection with hematuria 02/10/2024       Past Surgical History:   Procedure Laterality Date    APPLICATION OF SPLIT-THICKNESS SKIN GRAFT (STSG) TO LOWER EXTREMITY Right 03/02/2022    Procedure: APPLICATION, GRAFT, SKIN, SPLIT-THICKNESS, TO LOWER EXTREMITY;  Surgeon: Greg Ramírez MD;  Location: [facility];  Service: General;  Laterality: Right;    CHOLECYSTECTOMY      DEBRIDEMENT OF FOOT Right 10/2020    right great toe amputation         Social History  Mr. Jean Pierre Paulson  reports that he has been smoking cigarettes. He started smoking about 24 years ago. He has a 6.2 pack-year smoking history. He has never used smokeless tobacco. He reports current alcohol use of about 2.0 standard drinks of alcohol per week. He reports that he does not use drugs.    Family History  .'anjana Paulson family history includes Diabetes in his father and maternal grandmother; Hypertension in his father and maternal grandmother.    Medications and Allergies     Medications  Outpatient Medications Marked as Taking for the 2/19/24 encounter (Office Visit) with Yelitza Alas FNP   Medication Sig Dispense Refill    amLODIPine (NORVASC) 2.5 MG tablet Take 1 tablet (2.5 mg total) by mouth once daily. 30 tablet 11    amoxicillin-clavulanate 875-125mg (AUGMENTIN) 875-125 mg per tablet Take 1 tablet by mouth every 12 (twelve) hours. 20 tablet 0    blood sugar diagnostic (BLOOD GLUCOSE TEST) Strp 1 strip by Misc.(Non-Drug; Combo Route) route once daily. accu-chek Veronika strips 90 strip 5    blood-glucose meter,continuous (DEXCOM ) Misc 1 each by Misc.(Non-Drug; Combo Route) route 4 (four) times daily with meals and nightly. 1 each 0    blood-glucose  sensor (DEXCOM G7 SENSOR) Aria 1 each by Misc.(Non-Drug; Combo Route) route every 10 days. 3 each 11    cefUROXime (CEFTIN) 500 MG tablet Take 1 tablet (500 mg total) by mouth every 12 (twelve) hours. for 10 days 20 tablet 0    gabapentin (NEURONTIN) 300 MG capsule Take 1 capsule (300 mg total) by mouth every 8 (eight) hours. 270 capsule 1    gentamicin (GARAMYCIN) 0.1 % cream Apply topically once daily. 90 g 2    hydroCHLOROthiazide (HYDRODIURIL) 25 MG tablet Take 1 tablet (25 mg total) by mouth once daily. 90 tablet 1    HYDROcodone-acetaminophen (NORCO)  mg per tablet Take 1 tablet by mouth every 12 (twelve) hours as needed for Pain. 60 tablet 0    ibuprofen (ADVIL,MOTRIN) 800 MG tablet Take 800 mg by mouth 3 (three) times daily as needed for Pain.      rosuvastatin (CRESTOR) 10 MG tablet Take 10 mg by mouth once daily.      semaglutide (OZEMPIC) 2 mg/dose (8 mg/3 mL) PnIj Inject 2 mg into the skin every 7 days. (Patient taking differently: Inject 2.5 mg into the skin every 7 days.) 8 mL 2       Allergies  Review of patient's allergies indicates:   Allergen Reactions    Tomato Itching       Physical Examination     Vitals:    02/19/24 1128   BP: (!) 159/94   Pulse: 80   Resp: 20   Temp: 97.1 °F (36.2 °C)     Physical Exam  Vitals and nursing note reviewed.   Constitutional:       Appearance: Normal appearance.   HENT:      Head: Normocephalic.   Cardiovascular:      Rate and Rhythm: Normal rate and regular rhythm.      Pulses: Normal pulses.      Heart sounds: Normal heart sounds.   Pulmonary:      Effort: Pulmonary effort is normal. No respiratory distress.   Chest:      Chest wall: No tenderness.   Musculoskeletal:         General: Swelling, tenderness and deformity present.      Right lower leg: Edema present.   Skin:     General: Skin is warm and dry.      Capillary Refill: Capillary refill takes 2 to 3 seconds.      Comments: See LDA for photos and measurements   Neurological:      General: No  focal deficit present.      Mental Status: He is alert and oriented to person, place, and time. Mental status is at baseline.   Psychiatric:         Mood and Affect: Mood normal.         Behavior: Behavior normal.         Thought Content: Thought content normal.         Judgment: Judgment normal.     Assessment and Plan             Altered Skin Integrity 02/10/24 2200 Right plantar Foot Diabetic Ulcer Full thickness tissue loss with exposed bone, tendon, or muscle. Often includes undermining and tunneling. May extend into muscle and/or supporting structures. (Active)   02/10/24 2200   Altered Skin Integrity Present on Admission - Did Patient arrive to the hospital with altered skin?: yes   Side: Right   Orientation: plantar   Location: Foot   Wound Number:    Is this injury device related?:    Primary Wound Type: Diabetic ulc   Description of Altered Skin Integrity: Full thickness tissue loss with exposed bone, tendon, or muscle. Often includes undermining and tunneling. May extend into muscle and/or supporting structures.   Ankle-Brachial Index:    Pulses:    Removal Indication and Assessment:    Wound Outcome:    (Retired) Wound Length (cm):    (Retired) Wound Width (cm):    (Retired) Depth (cm):    Wound Description (Comments):    Removal Indications:    Wound Image    02/19/24 1054   Dressing Appearance Dry;Intact;Clean 02/19/24 1054   Drainage Amount Moderate 02/19/24 1054   Drainage Characteristics/Odor Yellow;Serosanguineous 02/19/24 1054   Appearance Pink;Red;Moist;Granulating;Adipose;Muscle 02/19/24 1054   Black (%), Wound Tissue Color 0 % 02/19/24 1054   Red (%), Wound Tissue Color 90 % 02/19/24 1054   Yellow (%), Wound Tissue Color 10 % 02/19/24 1054   Periwound Area Moist 02/19/24 1054   Elliott Classification (diabetic foot ulcers only) Grade 3 02/19/24 1054   Wound Length (cm) 10.5 cm 02/19/24 1054   Wound Width (cm) 7.5 cm 02/19/24 1054   Wound Depth (cm) 0.3 cm 02/19/24 1054   Wound Volume (cm^3)  23.625 cm^3 02/19/24 1054   Wound Surface Area (cm^2) 78.75 cm^2 02/19/24 1054   Care Cleansed with:;Antimicrobial agent 02/19/24 1054   Dressing Applied;Silver;Calcium alginate;Gauze;Absorptive Pad;Rolled gauze 02/19/24 1054   Periwound Care Moisture barrier applied 02/19/24 1054     Problem List Items Addressed This Visit          Endocrine    Diabetic ulcer of right heel associated with type 2 diabetes mellitus, with fat layer exposed - Primary    Overview                                                                Current Assessment & Plan     Clean with  vashe or baby shampoo and water     Apply mepilex ag or equivalent to wound     Cover and secure with 4x4, ABD,wrap with 2 layer compression from toe to knee. Change twice a week and as needed     Monitor closely for s/s of infection including fever, chills, increase in pain, odor from wound, and increased redness from foot. Go to ER if any complications develop.   Keep leg elevated and avoid pressure on wound.   Diabetes:  Monitor glucose closely. Check fasting glucose and 2 hours after meals. HgA1C goal <7, fasting glucose , and 2 hours after meals <180  Hypertension:  Check blood pressure twice daily, goal <120/80  Diet:   Increase protein intake, avoid fried, fatty foods and foods high in simple carbs.   Vitamins:  Take vitamin C 1000 mg, zinc 50mg, vitamin d 5000 units, and a daily multivitamin. Angel is a good source of protein and nutrients to aid in wound healing.    Wear walking boot daily or use crutches to offload right foot               Orthopedic    Charcot foot due to diabetes mellitus    Overview     Follow  Up with [facility] on CROW orthotic boot            Future Appointments   Date Time Provider Department Center   2/27/2024  2:15 PM Franck Kee PA [facility]   3/11/2024 10:00 AM [facility]            Signature:  AUGUSTUS Macedo  [facility]  [address] [facility] [address]  [phone]    Date of encounter: 2/19/24

## 2024-02-16 RX ORDER — AMOXICILLIN AND CLAVULANATE POTASSIUM 875; 125 MG/1; MG/1
1 TABLET, FILM COATED ORAL EVERY 12 HOURS
Qty: 20 TABLET | Refills: 0 | Status: SHIPPED | OUTPATIENT
Start: 2024-02-16 | End: 2024-06-13

## 2024-02-17 LAB
BACTERIA BLD CULT: NORMAL
BACTERIA BLD CULT: NORMAL

## 2024-02-19 ENCOUNTER — OFFICE VISIT (OUTPATIENT)
Dept: WOUND CARE | Facility: CLINIC | Age: 39
End: 2024-02-19
Attending: FAMILY MEDICINE
Payer: MEDICARE

## 2024-02-19 VITALS
HEART RATE: 80 BPM | SYSTOLIC BLOOD PRESSURE: 159 MMHG | DIASTOLIC BLOOD PRESSURE: 94 MMHG | TEMPERATURE: 97 F | RESPIRATION RATE: 20 BRPM

## 2024-02-19 DIAGNOSIS — E11.610 CHARCOT FOOT DUE TO DIABETES MELLITUS: ICD-10-CM

## 2024-02-19 DIAGNOSIS — L97.412 DIABETIC ULCER OF RIGHT HEEL ASSOCIATED WITH TYPE 2 DIABETES MELLITUS, WITH FAT LAYER EXPOSED: Primary | ICD-10-CM

## 2024-02-19 DIAGNOSIS — E11.621 DIABETIC ULCER OF RIGHT HEEL ASSOCIATED WITH TYPE 2 DIABETES MELLITUS, WITH FAT LAYER EXPOSED: Primary | ICD-10-CM

## 2024-02-19 PROCEDURE — 99213 OFFICE O/P EST LOW 20 MIN: CPT | Mod: S$PBB,,, | Performed by: NURSE PRACTITIONER

## 2024-02-19 PROCEDURE — 99215 OFFICE O/P EST HI 40 MIN: CPT | Mod: PBBFAC | Performed by: NURSE PRACTITIONER

## 2024-02-19 RX ORDER — IBUPROFEN 800 MG/1
800 TABLET ORAL 3 TIMES DAILY PRN
COMMUNITY
Start: 2024-01-11

## 2024-02-19 NOTE — ASSESSMENT & PLAN NOTE
Clean with  vashe or baby shampoo and water     Apply mepilex ag or equivalent to wound     Cover and secure with 4x4, ABD,wrap with 2 layer compression from toe to knee. Change twice a week and as needed     Monitor closely for s/s of infection including fever, chills, increase in pain, odor from wound, and increased redness from foot. Go to ER if any complications develop.   Keep leg elevated and avoid pressure on wound.   Diabetes:  Monitor glucose closely. Check fasting glucose and 2 hours after meals. HgA1C goal <7, fasting glucose , and 2 hours after meals <180  Hypertension:  Check blood pressure twice daily, goal <120/80  Diet:   Increase protein intake, avoid fried, fatty foods and foods high in simple carbs.   Vitamins:  Take vitamin C 1000 mg, zinc 50mg, vitamin d 5000 units, and a daily multivitamin. Angel is a good source of protein and nutrients to aid in wound healing.    Wear walking boot daily or use crutches to offload right foot

## 2024-02-26 NOTE — PROGRESS NOTES
Subjective:         Patient ID: Jean Pierre Paulson is a 38 y.o. male.    Chief Complaint: Foot Pain        Pain  This is a chronic problem. The current episode started more than 1 year ago. The problem occurs daily. The problem has been waxing and waning. Associated symptoms include arthralgias and neck pain. Pertinent negatives include no anorexia, chest pain, chills, coughing, fever, sore throat, vertigo or vomiting.     Review of Systems   Constitutional:  Negative for activity change, appetite change, chills, fever and unexpected weight change.   HENT:  Negative for drooling, ear discharge, ear pain, facial swelling, nosebleeds, sore throat, trouble swallowing, voice change and goiter.    Eyes:  Negative for photophobia, pain, discharge, redness and visual disturbance.   Respiratory:  Negative for apnea, cough, choking, chest tightness, shortness of breath, wheezing and stridor.    Cardiovascular:  Negative for chest pain, palpitations and leg swelling.   Gastrointestinal:  Negative for abdominal distention, anorexia, diarrhea, rectal pain, vomiting and fecal incontinence.   Endocrine: Negative for cold intolerance, heat intolerance, polydipsia, polyphagia and polyuria.   Genitourinary:  Negative for bladder incontinence, dysuria, flank pain and frequency.   Musculoskeletal:  Positive for arthralgias, back pain, leg pain and neck pain. Negative for neck stiffness.   Integumentary:  Negative for color change and pallor.   Neurological:  Negative for dizziness, vertigo, seizures, syncope, facial asymmetry, speech difficulty, light-headedness, memory loss and coordination difficulties.   Hematological:  Negative for adenopathy. Does not bruise/bleed easily.   Psychiatric/Behavioral:  Negative for agitation, behavioral problems, confusion, decreased concentration, dysphoric mood, hallucinations, self-injury and suicidal ideas. The patient is not nervous/anxious and is not hyperactive.            Past Medical History:    Diagnosis Date    NOLAN (acute kidney injury) 2023    Anemia     Diabetes mellitus with neuropathy     HLD (hyperlipidemia) 2023    HTN (hypertension)     Obesity     TEDDY (obstructive sleep apnea) 2023    Osteomyelitis 10/2020    Hx of R foot, Tx with IV Abx    Peripheral vascular disease     Urinary tract infection with hematuria 02/10/2024     Past Surgical History:   Procedure Laterality Date    APPLICATION OF SPLIT-THICKNESS SKIN GRAFT (STSG) TO LOWER EXTREMITY Right 2022    Procedure: APPLICATION, GRAFT, SKIN, SPLIT-THICKNESS, TO LOWER EXTREMITY;  Surgeon: Greg Ramírez MD;  Location: [facility];  Service: General;  Laterality: Right;    CHOLECYSTECTOMY      DEBRIDEMENT OF FOOT Right 10/2020    right great toe amputation       Social History     Socioeconomic History    Marital status: Single    Number of children: 1   Tobacco Use    Smoking status: Every Day     Current packs/day: 0.00     Average packs/day: 0.3 packs/day for 25.0 years (6.2 ttl pk-yrs)     Types: Cigarettes     Start date:      Last attempt to quit: 2022     Years since quittin.0    Smokeless tobacco: Never   Substance and Sexual Activity    Alcohol use: Yes     Alcohol/week: 2.0 standard drinks of alcohol     Types: 1 Glasses of wine, 1 Cans of beer per week     Comment: occasionally    Drug use: Never    Sexual activity: Yes     Partners: Female     Social Determinants of Health     Financial Resource Strain: Medium Risk (2024)    Overall Financial Resource Strain (CARDIA)     Difficulty of Paying Living Expenses: Somewhat hard   Food Insecurity: Food Insecurity Present (2024)    Hunger Vital Sign     Worried About Running Out of Food in the Last Year: Sometimes true     Ran Out of Food in the Last Year: Sometimes true   Transportation Needs: No Transportation Needs (2024)    PRAPARE - Transportation     Lack of Transportation (Medical): No     Lack of Transportation (Non-Medical): No  "  Physical Activity: Inactive (2/12/2024)    Exercise Vital Sign     Days of Exercise per Week: 0 days     Minutes of Exercise per Session: 0 min   Stress: No Stress Concern Present (2/12/2024)    [employer] [place] [employer] - Occupational Stress Questionnaire     Feeling of Stress : Not at all   Social Connections: Moderately Isolated (2/12/2024)    Social Connection and Isolation Panel [NHANES]     Frequency of Communication with Friends and Family: More than three times a week     Frequency of Social Gatherings with Friends and Family: More than three times a week     Attends [employer] Services: 1 to 4 times per year     Active Member of Clubs or Organizations: No     Attends Club or Organization Meetings: Never     Marital Status: Never    Housing Stability: Low Risk  (2/12/2024)    Housing Stability Vital Sign     Unable to Pay for Housing in the Last Year: No     Number of Places Lived in the Last Year: 1     Unstable Housing in the Last Year: No     Family History   Problem Relation Age of Onset    Hypertension Father     Diabetes Father     Hypertension Maternal Grandmother     Diabetes Maternal Grandmother      Review of patient's allergies indicates:   Allergen Reactions    Tomato Itching        Objective:  Vitals:    02/27/24 1428   BP: (!) 152/94   Pulse: 87   Resp: 16   Weight: (!) 155.6 kg (343 lb)   Height: 6' 2" (1.88 m)   PainSc:   3           Physical Exam  Vitals and nursing note reviewed. Exam conducted with a chaperone present.   Constitutional:       General: He is awake. He is not in acute distress.     Appearance: Normal appearance. He is not toxic-appearing or diaphoretic.   HENT:      Head: Normocephalic and atraumatic.      Nose: Nose normal.      Mouth/Throat:      Mouth: Mucous membranes are moist.      Pharynx: Oropharynx is clear.   Eyes:      Conjunctiva/sclera: Conjunctivae normal.      Pupils: Pupils are equal, round, and reactive to light.   Cardiovascular:      " Rate and Rhythm: Normal rate.   Pulmonary:      Effort: Pulmonary effort is normal. No respiratory distress.   Abdominal:      Palpations: Abdomen is soft.      Tenderness: There is no guarding.   Musculoskeletal:         General: Normal range of motion.      Cervical back: Normal range of motion and neck supple. No rigidity.      Lumbar back: Tenderness present.      Right foot: Tenderness present.      Left foot: Tenderness present.   Skin:     General: Skin is warm and dry.      Coloration: Skin is not jaundiced or pale.   Neurological:      General: No focal deficit present.      Mental Status: He is alert and oriented to person, place, and time. Mental status is at baseline.      Cranial Nerves: No cranial nerve deficit (II-XII).   Psychiatric:         Mood and Affect: Mood normal.         Behavior: Behavior normal. Behavior is cooperative.         Thought Content: Thought content normal.           X-Ray Chest AP Portable  Narrative: EXAMINATION:  XR CHEST AP PORTABLE    CLINICAL HISTORY:  Cough, unspecified    COMPARISON:  Chest x-ray April 7, 2023    TECHNIQUE:  Frontal view/views of the chest.    FINDINGS:  The cardiomediastinal silhouette is stable in configuration.  No focal consolidation, pleural effusion, or pneumothorax.  Visualized osseous and surrounding soft tissue structures demonstrate no acute abnormality.  Impression: No acute cardiopulmonary process demonstrated.    Point of Service: [facility]    Electronically signed by: Inder Mckee  Date:    02/12/2024  Time:    09:11         Admission on 02/10/2024, Discharged on 02/12/2024   Component Date Value Ref Range Status    Sodium 02/10/2024 133 (L)  136 - 145 mmol/L Final    Potassium 02/10/2024 3.8  3.5 - 5.1 mmol/L Final    Chloride 02/10/2024 96 (L)  98 - 107 mmol/L Final    CO2 02/10/2024 27  21 - 32 mmol/L Final    Anion Gap 02/10/2024 14  7 - 16 mmol/L Final    Glucose 02/10/2024 114 (H)  74 - 106 mg/dL Final    BUN 02/10/2024  35 (H)  7 - 18 mg/dL Final    Creatinine 02/10/2024 2.41 (H)  0.70 - 1.30 mg/dL Final    BUN/Creatinine Ratio 02/10/2024 15  6 - 20 Final    Calcium 02/10/2024 8.5  8.5 - 10.1 mg/dL Final    Total Protein 02/10/2024 8.4 (H)  6.4 - 8.2 g/dL Final    Albumin 02/10/2024 2.8 (L)  3.5 - 5.0 g/dL Final    Globulin 02/10/2024 5.6 (H)  2.0 - 4.0 g/dL Final    A/G Ratio 02/10/2024 0.5   Final    Bilirubin, Total 02/10/2024 0.4  >0.0 - 1.2 mg/dL Final    Alk Phos 02/10/2024 116 (H)  45 - 115 U/L Final    ALT 02/10/2024 42  16 - 61 U/L Final    AST 02/10/2024 42 (H)  15 - 37 U/L Final    eGFR 02/10/2024 34 (L)  >=60 mL/min/1.73m2 Final    Amylase 02/10/2024 63  25 - 115 U/L Final    Lipase 02/10/2024 28  16 - 77 U/L Final    Color, UA 02/10/2024 Orange  Colorless, Straw, Yellow, Light Yellow, Dark Yellow Final    Clarity, UA 02/10/2024 Cloudy  Clear Final    pH, UA 02/10/2024 5.5  5.0 to 8.0 pH Units Final    Leukocytes, UA 02/10/2024 Negative  Negative Final    Nitrites, UA 02/10/2024 Negative  Negative Final    Protein, UA 02/10/2024 100 (A)  Negative Final    Glucose, UA 02/10/2024 Negative  Normal mg/dL Final    Ketones, UA 02/10/2024 Negative  Negative mg/dL Final    Urobilinogen, UA 02/10/2024 0.2  0.2, 1.0, Normal mg/dL Final    Bilirubin, UA 02/10/2024 Small (A)  Negative Final    Blood, UA 02/10/2024 Trace-Intact (A)  Negative Final    Specific Gravity, UA 02/10/2024 >=1.030 (A)  <=1.005, 1.010, 1.015, 1.020, 1.025, 1.030 Final    Lactic Acid 02/10/2024 1.1  0.4 - 2.0 mmol/L Final    WBC 02/10/2024 4.44 (L)  4.50 - 11.00 K/uL Final    RBC 02/10/2024 4.77  4.60 - 6.20 M/uL Final    Hemoglobin 02/10/2024 12.0 (L)  13.5 - 18.0 g/dL Final    Hematocrit 02/10/2024 38.6 (L)  40.0 - 54.0 % Final    MCV 02/10/2024 80.9  80.0 - 96.0 fL Final    MCH 02/10/2024 25.2 (L)  27.0 - 31.0 pg Final    MCHC 02/10/2024 31.1 (L)  32.0 - 36.0 g/dL Final    RDW 02/10/2024 14.7 (H)  11.5 - 14.5 % Final    Platelet Count 02/10/2024 241  150 -  400 K/uL Final    MPV 02/10/2024 9.7  9.4 - 12.4 fL Final    Neutrophils % 02/10/2024 53.6  53.0 - 65.0 % Final    Lymphocytes % 02/10/2024 32.2  27.0 - 41.0 % Final    Neutrophils, Abs 02/10/2024 2.38  1.80 - 7.70 K/uL Final    Lymphocytes, Absolute 02/10/2024 1.43  1.00 - 4.80 K/uL Final    Diff Type 02/10/2024 Auto   Final    Monocytes % 02/10/2024 13.5 (H)  2.0 - 6.0 % Final    Eosinophils % 02/10/2024 0.2 (L)  1.0 - 4.0 % Final    Basophils % 02/10/2024 0.5  0.0 - 1.0 % Final    Monocytes, Absolute 02/10/2024 0.60  0.00 - 0.80 K/uL Final    Eosinophils, Absolute 02/10/2024 0.01  0.00 - 0.50 K/uL Final    Basophils, Absolute 02/10/2024 0.02  0.00 - 0.20 K/uL Final    WBC, UA 02/10/2024 0-5  None Seen, 0-5 /hpf Final    RBC, UA 02/10/2024 5-10 (A)  None Seen, 0-3 /hpf Final    Bacteria, UA 02/10/2024 Moderate (A)  None Seen /hpf Final    Squamous Epithelial Cells, UA 02/10/2024 Few (A)  None Seen, Rare, None Seen To Occasional /lpf Final    Mucus, UA 02/10/2024 Moderate (A)  None Seen /hpf Final    Coarse Granular Casts, UA 02/10/2024 0-2 (A)  None Seen /lpf Final    SARS COV-2 MOLECULAR 02/10/2024 Negative  Negative, Invalid Final    Rapid Group A Strep 02/10/2024 Negative  Negative, Invalid Final    Influenza A 02/10/2024 Positive (A)  Negative, Invalid Final    Influenza B 02/10/2024 Negative  Negative, Invalid Final    Culture, Urine 02/10/2024 No Growth   Final    Culture, Blood 02/10/2024 No Growth at 5 days   Final    Culture, Blood 02/10/2024 No Growth at 5 days   Final    Culture, Wound/Abscess 02/10/2024 Heavy Growth Acinetobacter baumannii complex/haemolyticus (A)   Final    Culture, Wound/Abscess 02/10/2024 Heavy Growth Group G streptococcus (A)   Final    Hemoglobin A1C 02/10/2024 6.7 (H)  4.5 - 6.6 % Final    Estimated Average Glucose 02/10/2024 146  mg/dL Final    Sodium 02/11/2024 134 (L)  136 - 145 mmol/L Final    Potassium 02/11/2024 3.6  3.5 - 5.1 mmol/L Final    Chloride 02/11/2024 99  98 -  107 mmol/L Final    CO2 02/11/2024 26  21 - 32 mmol/L Final    Anion Gap 02/11/2024 13  7 - 16 mmol/L Final    Glucose 02/11/2024 86  74 - 106 mg/dL Final    BUN 02/11/2024 33 (H)  7 - 18 mg/dL Final    Creatinine 02/11/2024 1.88 (H)  0.70 - 1.30 mg/dL Final    BUN/Creatinine Ratio 02/11/2024 18  6 - 20 Final    Calcium 02/11/2024 8.0 (L)  8.5 - 10.1 mg/dL Final    Total Protein 02/11/2024 7.2  6.4 - 8.2 g/dL Final    Albumin 02/11/2024 2.3 (L)  3.5 - 5.0 g/dL Final    Globulin 02/11/2024 4.9 (H)  2.0 - 4.0 g/dL Final    A/G Ratio 02/11/2024 0.5   Final    Bilirubin, Total 02/11/2024 0.3  >0.0 - 1.2 mg/dL Final    Alk Phos 02/11/2024 99  45 - 115 U/L Final    ALT 02/11/2024 41  16 - 61 U/L Final    AST 02/11/2024 39 (H)  15 - 37 U/L Final    eGFR 02/11/2024 46 (L)  >=60 mL/min/1.73m2 Final    WBC 02/11/2024 3.79 (L)  4.50 - 11.00 K/uL Final    RBC 02/11/2024 4.26 (L)  4.60 - 6.20 M/uL Final    Hemoglobin 02/11/2024 10.7 (L)  13.5 - 18.0 g/dL Final    Hematocrit 02/11/2024 34.9 (L)  40.0 - 54.0 % Final    MCV 02/11/2024 81.9  80.0 - 96.0 fL Final    MCH 02/11/2024 25.1 (L)  27.0 - 31.0 pg Final    MCHC 02/11/2024 30.7 (L)  32.0 - 36.0 g/dL Final    RDW 02/11/2024 14.7 (H)  11.5 - 14.5 % Final    Platelet Count 02/11/2024 227  150 - 400 K/uL Final    MPV 02/11/2024 9.7  9.4 - 12.4 fL Final    Neutrophils % 02/11/2024 30.1 (L)  53.0 - 65.0 % Final    Lymphocytes % 02/11/2024 52.5 (H)  27.0 - 41.0 % Final    Neutrophils, Abs 02/11/2024 1.14 (L)  1.80 - 7.70 K/uL Final    Lymphocytes, Absolute 02/11/2024 1.99  1.00 - 4.80 K/uL Final    Diff Type 02/11/2024 Manual   Final    Monocytes % 02/11/2024 16.9 (H)  2.0 - 6.0 % Final    Eosinophils % 02/11/2024 0.0 (L)  1.0 - 4.0 % Final    Basophils % 02/11/2024 0.5  0.0 - 1.0 % Final    Monocytes, Absolute 02/11/2024 0.64  0.00 - 0.80 K/uL Final    Eosinophils, Absolute 02/11/2024 0.00  0.00 - 0.50 K/uL Final    Basophils, Absolute 02/11/2024 0.02  0.00 - 0.20 K/uL Final     Segmented Neutrophils, Man % 02/11/2024 37 (L)  50 - 62 % Final    Bands, Man % 02/11/2024 2  1 - 5 % Final    Lymphocytes, Man % 02/11/2024 47 (H)  27 - 41 % Final    Monocytes, Man % 02/11/2024 14 (H)  2 - 6 % Final    Platelet Morphology 02/11/2024 Normal  Normal Final    RBC Morphology 02/11/2024 Normal   Final    POC Glucose 02/11/2024 98  70 - 105 mg/dL Final    POC Glucose 02/11/2024 138 (H)  70 - 105 mg/dL Final    POC Glucose 02/11/2024 128 (H)  70 - 105 mg/dL Final    POC Glucose 02/11/2024 144 (H)  70 - 105 mg/dL Final    Sodium 02/12/2024 137  136 - 145 mmol/L Final    Potassium 02/12/2024 4.0  3.5 - 5.1 mmol/L Final    Chloride 02/12/2024 103  98 - 107 mmol/L Final    CO2 02/12/2024 27  21 - 32 mmol/L Final    Anion Gap 02/12/2024 11  7 - 16 mmol/L Final    Glucose 02/12/2024 116 (H)  74 - 106 mg/dL Final    BUN 02/12/2024 22 (H)  7 - 18 mg/dL Final    Creatinine 02/12/2024 1.38 (H)  0.70 - 1.30 mg/dL Final    BUN/Creatinine Ratio 02/12/2024 16  6 - 20 Final    Calcium 02/12/2024 8.2 (L)  8.5 - 10.1 mg/dL Final    Total Protein 02/12/2024 7.0  6.4 - 8.2 g/dL Final    Albumin 02/12/2024 2.1 (L)  3.5 - 5.0 g/dL Final    Globulin 02/12/2024 4.9 (H)  2.0 - 4.0 g/dL Final    A/G Ratio 02/12/2024 0.4   Final    Bilirubin, Total 02/12/2024 0.2  >0.0 - 1.2 mg/dL Final    Alk Phos 02/12/2024 100  45 - 115 U/L Final    ALT 02/12/2024 41  16 - 61 U/L Final    AST 02/12/2024 33  15 - 37 U/L Final    eGFR 02/12/2024 66  >=60 mL/min/1.73m2 Final    WBC 02/12/2024 2.86 (L)  4.50 - 11.00 K/uL Final    RBC 02/12/2024 4.20 (L)  4.60 - 6.20 M/uL Final    Hemoglobin 02/12/2024 10.6 (L)  13.5 - 18.0 g/dL Final    Hematocrit 02/12/2024 34.6 (L)  40.0 - 54.0 % Final    MCV 02/12/2024 82.4  80.0 - 96.0 fL Final    MCH 02/12/2024 25.2 (L)  27.0 - 31.0 pg Final    MCHC 02/12/2024 30.6 (L)  32.0 - 36.0 g/dL Final    RDW 02/12/2024 14.5  11.5 - 14.5 % Final    Platelet Count 02/12/2024 191  150 - 400 K/uL Final    MPV 02/12/2024  10.0  9.4 - 12.4 fL Final    Neutrophils % 02/12/2024 36.8 (L)  53.0 - 65.0 % Final    Lymphocytes % 02/12/2024 47.9 (H)  27.0 - 41.0 % Final    Neutrophils, Abs 02/12/2024 1.05 (L)  1.80 - 7.70 K/uL Final    Lymphocytes, Absolute 02/12/2024 1.37  1.00 - 4.80 K/uL Final    Diff Type 02/12/2024 Auto   Final    Monocytes % 02/12/2024 14.3 (H)  2.0 - 6.0 % Final    Eosinophils % 02/12/2024 0.7 (L)  1.0 - 4.0 % Final    Basophils % 02/12/2024 0.3  0.0 - 1.0 % Final    Monocytes, Absolute 02/12/2024 0.41  0.00 - 0.80 K/uL Final    Eosinophils, Absolute 02/12/2024 0.02  0.00 - 0.50 K/uL Final    Basophils, Absolute 02/12/2024 0.01  0.00 - 0.20 K/uL Final   Office Visit on 01/24/2024   Component Date Value Ref Range Status    POC Amphetamines 01/24/2024 Negative  Negative, Inconclusive Final    POC Barbiturates 01/24/2024 Negative  Negative, Inconclusive Final    POC Benzodiazepines 01/24/2024 Negative  Negative, Inconclusive Final    POC Cocaine 01/24/2024 Negative  Negative, Inconclusive Final    POC THC 01/24/2024 Negative  Negative, Inconclusive Final    POC Methadone 01/24/2024 Negative  Negative, Inconclusive Final    POC Methamphetamine 01/24/2024 Negative  Negative, Inconclusive Final    POC Opiates 01/24/2024 Negative  Negative, Inconclusive Final    POC Oxycodone 01/24/2024 Negative  Negative, Inconclusive Final    POC Phencyclidine 01/24/2024 Negative  Negative, Inconclusive Final    POC Methylenedioxymethamphetamine * 01/24/2024 Negative  Negative, Inconclusive Final    POC Tricyclic Antidepressants 01/24/2024 Negative  Negative, Inconclusive Final    POC Buprenorphine 01/24/2024 Negative   Final     Acceptable 01/24/2024 Yes   Final    POC Temperature (Urine) 01/24/2024 92   Final   Lab Visit on 11/13/2023   Component Date Value Ref Range Status    CRP 11/13/2023 2.72 (H)  0.00 - 0.80 mg/dL Final    ESR Westergren 11/13/2023 65 (H)  0 - 15 mm/Hr Final    Sodium 11/13/2023 140  136 - 145 mmol/L  Final    Potassium 11/13/2023 4.8  3.5 - 5.1 mmol/L Final    Chloride 11/13/2023 111 (H)  98 - 107 mmol/L Final    CO2 11/13/2023 28  21 - 32 mmol/L Final    Anion Gap 11/13/2023 6 (L)  7 - 16 mmol/L Final    Glucose 11/13/2023 157 (H)  74 - 106 mg/dL Final    BUN 11/13/2023 16  7 - 18 mg/dL Final    Creatinine 11/13/2023 1.21  0.70 - 1.30 mg/dL Final    BUN/Creatinine Ratio 11/13/2023 13  6 - 20 Final    Calcium 11/13/2023 8.9  8.5 - 10.1 mg/dL Final    eGFR 11/13/2023 78  >=60 mL/min/1.73m2 Final    WBC 11/13/2023 4.92  4.50 - 11.00 K/uL Final    RBC 11/13/2023 4.78  4.60 - 6.20 M/uL Final    Hemoglobin 11/13/2023 12.6 (L)  13.5 - 18.0 g/dL Final    Hematocrit 11/13/2023 40.2  40.0 - 54.0 % Final    MCV 11/13/2023 84.1  80.0 - 96.0 fL Final    MCH 11/13/2023 26.4 (L)  27.0 - 31.0 pg Final    MCHC 11/13/2023 31.3 (L)  32.0 - 36.0 g/dL Final    RDW 11/13/2023 13.7  11.5 - 14.5 % Final    Platelet Count 11/13/2023 308  150 - 400 K/uL Final    MPV 11/13/2023 10.0  9.4 - 12.4 fL Final    Neutrophils % 11/13/2023 49.0 (L)  53.0 - 65.0 % Final    Lymphocytes % 11/13/2023 35.8  27.0 - 41.0 % Final    Monocytes % 11/13/2023 8.5 (H)  2.0 - 6.0 % Final    Eosinophils % 11/13/2023 5.3 (H)  1.0 - 4.0 % Final    Basophils % 11/13/2023 1.2 (H)  0.0 - 1.0 % Final    Immature Granulocytes % 11/13/2023 0.2  0.0 - 0.4 % Final    nRBC, Auto 11/13/2023 0.0  <=0.0 % Final    Neutrophils, Abs 11/13/2023 2.41  1.80 - 7.70 K/uL Final    Lymphocytes, Absolute 11/13/2023 1.76  1.00 - 4.80 K/uL Final    Monocytes, Absolute 11/13/2023 0.42  0.00 - 0.80 K/uL Final    Eosinophils, Absolute 11/13/2023 0.26  0.00 - 0.50 K/uL Final    Basophils, Absolute 11/13/2023 0.06  0.00 - 0.20 K/uL Final    Immature Granulocytes, Absolute 11/13/2023 0.01  0.00 - 0.04 K/uL Final    nRBC, Absolute 11/13/2023 0.00  <=0.00 x10e3/uL Final    Diff Type 11/13/2023 Auto   Final   Lab Requisition on 11/08/2023   Component Date Value Ref Range Status    Sodium  11/08/2023 137  136 - 145 mmol/L Final    Potassium 11/08/2023 5.4 (H)  3.5 - 5.1 mmol/L Final    Chloride 11/08/2023 106  98 - 107 mmol/L Final    CO2 11/08/2023 27  21 - 32 mmol/L Final    Anion Gap 11/08/2023 9  7 - 16 mmol/L Final    Glucose 11/08/2023 90  74 - 106 mg/dL Final    BUN 11/08/2023 22 (H)  7 - 18 mg/dL Final    Creatinine 11/08/2023 1.54 (H)  0.70 - 1.30 mg/dL Final    BUN/Creatinine Ratio 11/08/2023 14  6 - 20 Final    Calcium 11/08/2023 8.3 (L)  8.5 - 10.1 mg/dL Final    eGFR 11/08/2023 58 (L)  >=60 mL/min/1.73m2 Final    ESR Westergren 11/08/2023 72 (H)  0 - 15 mm/Hr Final    CRP 11/08/2023 1.93 (H)  0.00 - 0.80 mg/dL Final    WBC 11/08/2023 6.32  4.50 - 11.00 K/uL Final    RBC 11/08/2023 4.33 (L)  4.60 - 6.20 M/uL Final    Hemoglobin 11/08/2023 11.5 (L)  13.5 - 18.0 g/dL Final    Hematocrit 11/08/2023 35.6 (L)  40.0 - 54.0 % Final    MCV 11/08/2023 82.2  80.0 - 96.0 fL Final    MCH 11/08/2023 26.6 (L)  27.0 - 31.0 pg Final    MCHC 11/08/2023 32.3  32.0 - 36.0 g/dL Final    RDW 11/08/2023 13.6  11.5 - 14.5 % Final    Platelet Count 11/08/2023 304  150 - 400 K/uL Final    MPV 11/08/2023 10.7  9.4 - 12.4 fL Final    Neutrophils % 11/08/2023 46.3 (L)  53.0 - 65.0 % Final    Lymphocytes % 11/08/2023 38.0  27.0 - 41.0 % Final    Monocytes % 11/08/2023 10.1 (H)  2.0 - 6.0 % Final    Eosinophils % 11/08/2023 4.1 (H)  1.0 - 4.0 % Final    Basophils % 11/08/2023 1.3 (H)  0.0 - 1.0 % Final    Immature Granulocytes % 11/08/2023 0.2  0.0 - 0.4 % Final    nRBC, Auto 11/08/2023 0.0  <=0.0 % Final    Neutrophils, Abs 11/08/2023 2.93  1.80 - 7.70 K/uL Final    Lymphocytes, Absolute 11/08/2023 2.40  1.00 - 4.80 K/uL Final    Monocytes, Absolute 11/08/2023 0.64  0.00 - 0.80 K/uL Final    Eosinophils, Absolute 11/08/2023 0.26  0.00 - 0.50 K/uL Final    Basophils, Absolute 11/08/2023 0.08  0.00 - 0.20 K/uL Final    Immature Granulocytes, Absolute 11/08/2023 0.01  0.00 - 0.04 K/uL Final    nRBC, Absolute 11/08/2023  0.00  <=0.00 x10e3/uL Final    Diff Type 11/08/2023 Auto   Final   Lab Requisition on 10/30/2023   Component Date Value Ref Range Status    Sodium 10/30/2023 138  136 - 145 mmol/L Final    Potassium 10/30/2023 5.4 (H)  3.5 - 5.1 mmol/L Final    Chloride 10/30/2023 106  98 - 107 mmol/L Final    CO2 10/30/2023 26  21 - 32 mmol/L Final    Anion Gap 10/30/2023 11  7 - 16 mmol/L Final    Glucose 10/30/2023 164 (H)  74 - 106 mg/dL Final    BUN 10/30/2023 15  7 - 18 mg/dL Final    Creatinine 10/30/2023 1.22  0.70 - 1.30 mg/dL Final    BUN/Creatinine Ratio 10/30/2023 12  6 - 20 Final    Calcium 10/30/2023 8.4 (L)  8.5 - 10.1 mg/dL Final    eGFR 10/30/2023 77  >=60 mL/min/1.73m2 Final    CRP 10/30/2023 1.03 (H)  0.00 - 0.80 mg/dL Final    ESR Westergren 10/30/2023 63 (H)  0 - 15 mm/Hr Final    WBC 10/30/2023 4.82  4.50 - 11.00 K/uL Final    RBC 10/30/2023 4.19 (L)  4.60 - 6.20 M/uL Final    Hemoglobin 10/30/2023 11.2 (L)  13.5 - 18.0 g/dL Final    Hematocrit 10/30/2023 34.9 (L)  40.0 - 54.0 % Final    MCV 10/30/2023 83.3  80.0 - 96.0 fL Final    MCH 10/30/2023 26.7 (L)  27.0 - 31.0 pg Final    MCHC 10/30/2023 32.1  32.0 - 36.0 g/dL Final    RDW 10/30/2023 14.1  11.5 - 14.5 % Final    Platelet Count 10/30/2023 290  150 - 400 K/uL Final    MPV 10/30/2023 10.4  9.4 - 12.4 fL Final    Neutrophils % 10/30/2023 51.7 (L)  53.0 - 65.0 % Final    Lymphocytes % 10/30/2023 33.8  27.0 - 41.0 % Final    Monocytes % 10/30/2023 7.9 (H)  2.0 - 6.0 % Final    Eosinophils % 10/30/2023 5.4 (H)  1.0 - 4.0 % Final    Basophils % 10/30/2023 1.0  0.0 - 1.0 % Final    Immature Granulocytes % 10/30/2023 0.2  0.0 - 0.4 % Final    nRBC, Auto 10/30/2023 0.0  <=0.0 % Final    Neutrophils, Abs 10/30/2023 2.49  1.80 - 7.70 K/uL Final    Lymphocytes, Absolute 10/30/2023 1.63  1.00 - 4.80 K/uL Final    Monocytes, Absolute 10/30/2023 0.38  0.00 - 0.80 K/uL Final    Eosinophils, Absolute 10/30/2023 0.26  0.00 - 0.50 K/uL Final    Basophils, Absolute  10/30/2023 0.05  0.00 - 0.20 K/uL Final    Immature Granulocytes, Absolute 10/30/2023 0.01  0.00 - 0.04 K/uL Final    nRBC, Absolute 10/30/2023 0.00  <=0.00 x10e3/uL Final    Diff Type 10/30/2023 Auto   Final   Office Visit on 10/23/2023   Component Date Value Ref Range Status    Culture, Wound/Abscess 10/23/2023 Moderate Growth Escherichia coli ESBL (A)   Final    Culture, Wound/Abscess 10/23/2023 Heavy Growth Streptococcus agalactiae (Group B) (A)   Final    Culture, Anaerobe 10/23/2023 No Anaerobes Isolated   Final   Lab Requisition on 10/23/2023   Component Date Value Ref Range Status    Sodium 10/23/2023 139  136 - 145 mmol/L Final    Potassium 10/23/2023 4.3  3.5 - 5.1 mmol/L Final    Chloride 10/23/2023 108 (H)  98 - 107 mmol/L Final    CO2 10/23/2023 27  21 - 32 mmol/L Final    Anion Gap 10/23/2023 8  7 - 16 mmol/L Final    Glucose 10/23/2023 118 (H)  74 - 106 mg/dL Final    BUN 10/23/2023 20 (H)  7 - 18 mg/dL Final    Creatinine 10/23/2023 1.19  0.70 - 1.30 mg/dL Final    BUN/Creatinine Ratio 10/23/2023 17  6 - 20 Final    Calcium 10/23/2023 8.5  8.5 - 10.1 mg/dL Final    Total Protein 10/23/2023 7.3  6.4 - 8.2 g/dL Final    Albumin 10/23/2023 2.6 (L)  3.5 - 5.0 g/dL Final    Globulin 10/23/2023 4.7 (H)  2.0 - 4.0 g/dL Final    A/G Ratio 10/23/2023 0.6   Final    Bilirubin, Total 10/23/2023 0.5  >0.0 - 1.2 mg/dL Final    Alk Phos 10/23/2023 112  45 - 115 U/L Final    ALT 10/23/2023 29  16 - 61 U/L Final    AST 10/23/2023 15  15 - 37 U/L Final    eGFR 10/23/2023 79  >=60 mL/min/1.73m2 Final    Hemoglobin A1C 10/23/2023 5.9  4.5 - 6.6 % Final    Estimated Average Glucose 10/23/2023 110  mg/dL Final    Triglycerides 10/23/2023 105  35 - 150 mg/dL Final    Cholesterol 10/23/2023 103  0 - 200 mg/dL Final    HDL Cholesterol 10/23/2023 51  40 - 60 mg/dL Final    Cholesterol/HDL Ratio (Risk Factor) 10/23/2023 2.0   Final    Non-HDL 10/23/2023 52  mg/dL Final    LDL Calculated 10/23/2023 31  mg/dL Final    VLDL  10/23/2023 21  mg/dL Final    WBC 10/23/2023 6.02  4.50 - 11.00 K/uL Final    RBC 10/23/2023 3.98 (L)  4.60 - 6.20 M/uL Final    Hemoglobin 10/23/2023 10.7 (L)  13.5 - 18.0 g/dL Final    Hematocrit 10/23/2023 33.1 (L)  40.0 - 54.0 % Final    MCV 10/23/2023 83.2  80.0 - 96.0 fL Final    MCH 10/23/2023 26.9 (L)  27.0 - 31.0 pg Final    MCHC 10/23/2023 32.3  32.0 - 36.0 g/dL Final    RDW 10/23/2023 14.3  11.5 - 14.5 % Final    Platelet Count 10/23/2023 299  150 - 400 K/uL Final    MPV 10/23/2023 10.7  9.4 - 12.4 fL Final    Neutrophils % 10/23/2023 53.2  53.0 - 65.0 % Final    Lymphocytes % 10/23/2023 34.2  27.0 - 41.0 % Final    Monocytes % 10/23/2023 8.8 (H)  2.0 - 6.0 % Final    Eosinophils % 10/23/2023 2.8  1.0 - 4.0 % Final    Basophils % 10/23/2023 0.8  0.0 - 1.0 % Final    Immature Granulocytes % 10/23/2023 0.2  0.0 - 0.4 % Final    nRBC, Auto 10/23/2023 0.0  <=0.0 % Final    Neutrophils, Abs 10/23/2023 3.20  1.80 - 7.70 K/uL Final    Lymphocytes, Absolute 10/23/2023 2.06  1.00 - 4.80 K/uL Final    Monocytes, Absolute 10/23/2023 0.53  0.00 - 0.80 K/uL Final    Eosinophils, Absolute 10/23/2023 0.17  0.00 - 0.50 K/uL Final    Basophils, Absolute 10/23/2023 0.05  0.00 - 0.20 K/uL Final    Immature Granulocytes, Absolute 10/23/2023 0.01  0.00 - 0.04 K/uL Final    nRBC, Absolute 10/23/2023 0.00  <=0.00 x10e3/uL Final    Diff Type 10/23/2023 Auto   Final   Office Visit on 09/28/2023   Component Date Value Ref Range Status    Culture, Anaerobe 09/28/2023 No Anaerobes Isolated   Final    Culture, Wound/Abscess 09/28/2023 Moderate Growth Escherichia coli ESBL (A)   Final   Office Visit on 09/05/2023   Component Date Value Ref Range Status    POC Amphetamines 09/05/2023 Negative  Negative, Inconclusive Final    POC Barbiturates 09/05/2023 Negative  Negative, Inconclusive Final    POC Benzodiazepines 09/05/2023 Negative  Negative, Inconclusive Final    POC Cocaine 09/05/2023 Negative  Negative, Inconclusive Final    POC  THC 09/05/2023 Negative  Negative, Inconclusive Final    POC Methadone 09/05/2023 Negative  Negative, Inconclusive Final    POC Methamphetamine 09/05/2023 Negative  Negative, Inconclusive Final    POC Opiates 09/05/2023 Presumptive Positive (A)  Negative, Inconclusive Final    POC Oxycodone 09/05/2023 Negative  Negative, Inconclusive Final    POC Phencyclidine 09/05/2023 Negative  Negative, Inconclusive Final    POC Methylenedioxymethamphetamine * 09/05/2023 Negative  Negative, Inconclusive Final    POC Tricyclic Antidepressants 09/05/2023 Negative  Negative, Inconclusive Final    POC Buprenorphine 09/05/2023 Negative   Final     Acceptable 09/05/2023 Yes   Final    POC Temperature (Urine) 09/05/2023 92   Final         No orders of the defined types were placed in this encounter.        Requested Prescriptions     Signed Prescriptions Disp Refills    HYDROcodone-acetaminophen (NORCO)  mg per tablet 60 tablet 0     Sig: Take 1 tablet by mouth every 12 (twelve) hours.       Assessment:     1. Diabetic neuropathy with neurologic complication    2. Peripheral vascular disease         A's of Opioid Risk Assessment  Activity:Patient can perform ADL.   Analgesia:Patients pain is partially controlled by current medication. Patient has tried OTC medications such as Tylenol and Ibuprofen with out relief.   Adverse Effects: Patient denies constipation or sedation.  Aberrant Behavior:  reviewed with no aberrant drug seeking/taking behavior.  Overdose reversal drug naloxone discussed    Drug screen reviewed      Plan:    Narcan March 2023    Did not keep appointment January 3rd     Last refill date January 24, 2024, Norco 10, 60 tablets    Presumptive drug screen negative today should be positive for opiates    Will order definitive drug screen for clarification    Patient canceled appointment February 21, 2024    Has not had narcotics 3 days     He verbalized understanding need to keep appointments for  clarification with drug screens and pill counts     Any further missed appointments are follow-up after refill dates will discontinue narcotics     Patient verbalized understanding        Ulcer right foot continues to follow wound management please see photographs    Walking boot right lower extremity    He verbalized understanding if he can not keep office visits for compliance with urine drug screens for narcotics management will discontinue narcotics    No new complaints today states current medication does help with his discomfort     Continue activity as directed     Continue current medication    Follow-up 1 month, pill count, drug screen    Dr. Paulson, July 2024    Bring original prescription medication bottles/container/box with labels to each visit

## 2024-02-27 ENCOUNTER — OFFICE VISIT (OUTPATIENT)
Dept: PAIN MEDICINE | Facility: CLINIC | Age: 39
End: 2024-02-27
Payer: MEDICARE

## 2024-02-27 VITALS
HEIGHT: 74 IN | SYSTOLIC BLOOD PRESSURE: 152 MMHG | BODY MASS INDEX: 40.43 KG/M2 | RESPIRATION RATE: 16 BRPM | HEART RATE: 87 BPM | DIASTOLIC BLOOD PRESSURE: 94 MMHG | WEIGHT: 315 LBS

## 2024-02-27 DIAGNOSIS — E11.40 DIABETIC NEUROPATHY WITH NEUROLOGIC COMPLICATION: Primary | Chronic | ICD-10-CM

## 2024-02-27 DIAGNOSIS — I73.9 PERIPHERAL VASCULAR DISEASE: Chronic | ICD-10-CM

## 2024-02-27 DIAGNOSIS — E11.49 DIABETIC NEUROPATHY WITH NEUROLOGIC COMPLICATION: Primary | Chronic | ICD-10-CM

## 2024-02-27 PROCEDURE — 99214 OFFICE O/P EST MOD 30 MIN: CPT | Mod: PBBFAC | Performed by: PHYSICIAN ASSISTANT

## 2024-02-27 PROCEDURE — 99214 OFFICE O/P EST MOD 30 MIN: CPT | Mod: S$PBB,,, | Performed by: PHYSICIAN ASSISTANT

## 2024-02-27 RX ORDER — HYDROCODONE BITARTRATE AND ACETAMINOPHEN 10; 325 MG/1; MG/1
1 TABLET ORAL EVERY 12 HOURS
Qty: 60 TABLET | Refills: 0 | Status: SHIPPED | OUTPATIENT
Start: 2024-03-28 | End: 2024-02-27

## 2024-02-27 RX ORDER — HYDROCODONE BITARTRATE AND ACETAMINOPHEN 10; 325 MG/1; MG/1
1 TABLET ORAL EVERY 12 HOURS
Qty: 60 TABLET | Refills: 0 | Status: SHIPPED | OUTPATIENT
Start: 2024-02-27 | End: 2024-02-27

## 2024-02-27 RX ORDER — HYDROCODONE BITARTRATE AND ACETAMINOPHEN 10; 325 MG/1; MG/1
1 TABLET ORAL EVERY 12 HOURS
Qty: 60 TABLET | Refills: 0 | Status: SHIPPED | OUTPATIENT
Start: 2024-02-27 | End: 2024-03-07 | Stop reason: SDUPTHER

## 2024-02-27 NOTE — PROGRESS NOTES
AUGUSTUS Macedo   [facility]  [address] [facility] [address]  [phone]      PATIENT NAME: Jean Pierre Paulson  : 1985  DATE: 3/11/24  MRN: [id number]      Billing Provider: AUGUSTUS Macedo  Level of Service:   Patient PCP Information       Provider PCP Type    Primary Doctor No General            Reason for Visit / Chief Complaint: Diabetic Foot Ulcer (Right heel)       History of Present Illness / Problem Focused Workflow     Jean Pierre Paulson is a 38 y.o. male who presents to clinic for follow up on chronic-non healing wound on the right foot. Wound continues to improve. Callus and maceration filippo-wound, bedside debridement today. Granulation tissue to wound bed. Reports he has CROW boot from [facility] and has been wearing it. He does not have on today, but reports that he does wear it the majority of the time.   Reinforced importance of following plan of care, wearing off-loading boot, keeping pressure off foot, taking antibiotics as prescribed,increasing protein intake, and keeping diabetes well controlled.    Last arterial doppler 4/10/23,  The ankle to brachial index is 1.25 on the right and 1.34 on the left.    Significant PMH  Includes diabetes and hypertension. Last HgA1C 7.3 in 2022, diabetes is managed by PCP.  Denies fever or chills.       Review of Systems     Review of Systems   Constitutional:  Negative for activity change, chills and fever.   Respiratory:  Negative for chest tightness and shortness of breath.    Cardiovascular:  Positive for leg swelling. Negative for chest pain and palpitations.   Musculoskeletal:  Positive for arthralgias and joint swelling.   Skin:  Positive for wound.        See wound assessment   Neurological:  Positive for weakness and numbness.   Psychiatric/Behavioral:  Negative for agitation, behavioral problems, confusion and self-injury.        Medical / Social / Family History     Past Medical  History:   Diagnosis Date    NOLAN (acute kidney injury) 04/07/2023    Anemia     Diabetes mellitus with neuropathy     HLD (hyperlipidemia) 04/07/2023    HTN (hypertension)     Obesity     TEDDY (obstructive sleep apnea) 04/07/2023    Osteomyelitis 10/2020    Hx of R foot, Tx with IV Abx    Peripheral vascular disease     Urinary tract infection with hematuria 02/10/2024       Past Surgical History:   Procedure Laterality Date    APPLICATION OF SPLIT-THICKNESS SKIN GRAFT (STSG) TO LOWER EXTREMITY Right 03/02/2022    Procedure: APPLICATION, GRAFT, SKIN, SPLIT-THICKNESS, TO LOWER EXTREMITY;  Surgeon: Greg Ramírez MD;  Location: [facility];  Service: General;  Laterality: Right;    CHOLECYSTECTOMY      DEBRIDEMENT OF FOOT Right 10/2020    right great toe amputation         Social History  Mr. Jean Pierre Paulson  reports that he has been smoking cigarettes. He started smoking about 24 years ago. He has a 6.2 pack-year smoking history. He has never used smokeless tobacco. He reports current alcohol use of about 2.0 standard drinks of alcohol per week. He reports that he does not use drugs.    Family History  'anjana Paulson family history includes Diabetes in his father and maternal grandmother; Hypertension in his father and maternal grandmother.    Medications and Allergies     Medications  Outpatient Medications Marked as Taking for the 3/11/24 encounter (Office Visit) with Yelitza Alas FNP   Medication Sig Dispense Refill    amLODIPine (NORVASC) 2.5 MG tablet Take 1 tablet (2.5 mg total) by mouth once daily. 30 tablet 11    amoxicillin-clavulanate 875-125mg (AUGMENTIN) 875-125 mg per tablet Take 1 tablet by mouth every 12 (twelve) hours. 20 tablet 0    blood sugar diagnostic (BLOOD GLUCOSE TEST) Strp 1 strip by Misc.(Non-Drug; Combo Route) route once daily. accu-chek Veronika strips 90 strip 5    blood-glucose meter,continuous (DEXCOM ) Misc 1 each by Misc.(Non-Drug; Combo Route) route 4 (four) times  daily with meals and nightly. 1 each 0    blood-glucose sensor (DEXCOM G7 SENSOR) Aria 1 each by Misc.(Non-Drug; Combo Route) route every 10 days. 3 each 11    gabapentin (NEURONTIN) 300 MG capsule Take 1 capsule (300 mg total) by mouth every 8 (eight) hours. 270 capsule 1    gentamicin (GARAMYCIN) 0.1 % cream Apply topically once daily. 90 g 2    hydroCHLOROthiazide (HYDRODIURIL) 25 MG tablet Take 1 tablet (25 mg total) by mouth once daily. 90 tablet 1    HYDROcodone-acetaminophen (NORCO)  mg per tablet Take 1 tablet by mouth every 12 (twelve) hours. 60 tablet 0    ibuprofen (ADVIL,MOTRIN) 800 MG tablet Take 800 mg by mouth 3 (three) times daily as needed for Pain.      rosuvastatin (CRESTOR) 10 MG tablet Take 10 mg by mouth once daily.      semaglutide (OZEMPIC) 2 mg/dose (8 mg/3 mL) PnIj Inject 2 mg into the skin every 7 days. (Patient taking differently: Inject 2.5 mg into the skin every 7 days.) 8 mL 2       Allergies  Review of patient's allergies indicates:   Allergen Reactions    Tomato Itching       Physical Examination     Vitals:    03/11/24 1041   BP: (!) 181/105   Pulse: 75   Resp: 20   Temp: 96.2 °F (35.7 °C)       Physical Exam  Vitals and nursing note reviewed.   Constitutional:       Appearance: Normal appearance.   HENT:      Head: Normocephalic.   Cardiovascular:      Rate and Rhythm: Normal rate and regular rhythm.      Pulses: Normal pulses.      Heart sounds: Normal heart sounds.   Pulmonary:      Effort: Pulmonary effort is normal. No respiratory distress.   Chest:      Chest wall: No tenderness.   Musculoskeletal:         General: Swelling, tenderness and deformity present.      Right lower leg: Edema present.   Skin:     General: Skin is warm and dry.      Capillary Refill: Capillary refill takes 2 to 3 seconds.      Comments: See LDA for photos and measurements   Neurological:      General: No focal deficit present.      Mental Status: He is alert and oriented to person, place, and  time. Mental status is at baseline.   Psychiatric:         Mood and Affect: Mood normal.         Behavior: Behavior normal.         Thought Content: Thought content normal.         Judgment: Judgment normal.       Assessment and Plan             Altered Skin Integrity 02/10/24 2200 Right plantar Foot Diabetic Ulcer Full thickness tissue loss with exposed bone, tendon, or muscle. Often includes undermining and tunneling. May extend into muscle and/or supporting structures. (Active)   02/10/24 2200   Altered Skin Integrity Present on Admission - Did Patient arrive to the hospital with altered skin?: yes   Side: Right   Orientation: plantar   Location: Foot   Wound Number:    Is this injury device related?:    Primary Wound Type: Diabetic ulc   Description of Altered Skin Integrity: Full thickness tissue loss with exposed bone, tendon, or muscle. Often includes undermining and tunneling. May extend into muscle and/or supporting structures.   Ankle-Brachial Index:    Pulses:    Removal Indication and Assessment:    Wound Outcome:    (Retired) Wound Length (cm):    (Retired) Wound Width (cm):    (Retired) Depth (cm):    Wound Description (Comments):    Removal Indications:    Wound Image   03/11/24 1046   Description of Altered Skin Integrity Full thickness tissue loss. Subcutaneous fat may be visible but bone, tendon or muscle are not exposed 03/11/24 1046   Dressing Appearance Moist drainage 03/11/24 1046   Drainage Amount Moderate 03/11/24 1046   Drainage Characteristics/Odor Serosanguineous 03/11/24 1046   Appearance Pink;Red 03/11/24 1046   Tissue loss description Full thickness 03/11/24 1046   Black (%), Wound Tissue Color 0 % 03/11/24 1046   Red (%), Wound Tissue Color 100 % 03/11/24 1046   Yellow (%), Wound Tissue Color 0 % 03/11/24 1046   Periwound Area Dry;Hemosiderin Staining;Scar tissue 03/11/24 1046   Wound Edges Defined;Open 03/11/24 1046   Wound Length (cm) 11.5 cm 03/11/24 1046   Wound Width (cm) 6 cm  03/11/24 1046   Wound Depth (cm) 0.5 cm 03/11/24 1046   Wound Volume (cm^3) 34.5 cm^3 03/11/24 1046   Wound Surface Area (cm^2) 69 cm^2 03/11/24 1046       Problem List Items Addressed This Visit          Endocrine    Diabetic ulcer of right heel associated with type 2 diabetes mellitus, with fat layer exposed - Primary    Overview                                                                      Current Assessment & Plan     Clean with  vashe or baby shampoo and water     Apply mepilex ag or equivalent to wound     Cover and secure with 4x4, ABD,wrap with 2 layer compression from toe to knee. Change twice a week and as needed     Monitor closely for s/s of infection including fever, chills, increase in pain, odor from wound, and increased redness from foot. Go to ER if any complications develop.   Keep leg elevated and avoid pressure on wound.   Diabetes:  Monitor glucose closely. Check fasting glucose and 2 hours after meals. HgA1C goal <7, fasting glucose , and 2 hours after meals <180  Hypertension:  Check blood pressure twice daily, goal <120/80  Diet:   Increase protein intake, avoid fried, fatty foods and foods high in simple carbs.   Vitamins:  Take vitamin C 1000 mg, zinc 50mg, vitamin d 5000 units, and a daily multivitamin. Angel is a good source of protein and nutrients to aid in wound healing.    Wear walking boot daily or use crutches to offload right foot     Home health to FOLLOW wound orders above         Relevant Orders    Debridement       Orthopedic    Charcot foot due to diabetes mellitus    Overview     CROW orthotic boot            Future Appointments   Date Time Provider Department Center   3/18/2024  9:15 AM Franck Kee PA [facility]   4/1/2024 10:00 AM Yelitza Alas FNP [facility]             Signature:  AUGUSTUS Macedo  [facility]  [address]  [place] [address]  [phone]    Date of  encounter: 3/11/24

## 2024-03-07 NOTE — PROGRESS NOTES
Subjective:         Patient ID: Jean Pierre Paulson is a 38 y.o. male.    Chief Complaint: Foot Pain        Pain  This is a chronic problem. The current episode started more than 1 year ago. The problem occurs daily. The problem has been waxing and waning. Associated symptoms include arthralgias and neck pain. Pertinent negatives include no anorexia, chest pain, chills, coughing, fever, sore throat, vertigo or vomiting.     Review of Systems   Constitutional:  Negative for activity change, appetite change, chills, fever and unexpected weight change.   HENT:  Negative for drooling, ear discharge, ear pain, facial swelling, nosebleeds, sore throat, trouble swallowing, voice change and goiter.    Eyes:  Negative for photophobia, pain, discharge, redness and visual disturbance.   Respiratory:  Negative for apnea, cough, choking, chest tightness, shortness of breath, wheezing and stridor.    Cardiovascular:  Negative for chest pain, palpitations and leg swelling.   Gastrointestinal:  Negative for abdominal distention, anorexia, diarrhea, rectal pain, vomiting and fecal incontinence.   Endocrine: Negative for cold intolerance, heat intolerance, polydipsia, polyphagia and polyuria.   Genitourinary:  Negative for bladder incontinence, dysuria, flank pain and frequency.   Musculoskeletal:  Positive for arthralgias, back pain, leg pain and neck pain. Negative for neck stiffness.   Integumentary:  Negative for color change and pallor.   Neurological:  Negative for dizziness, vertigo, seizures, syncope, facial asymmetry, speech difficulty, light-headedness, memory loss and coordination difficulties.   Hematological:  Negative for adenopathy. Does not bruise/bleed easily.   Psychiatric/Behavioral:  Negative for agitation, behavioral problems, confusion, decreased concentration, dysphoric mood, hallucinations, self-injury and suicidal ideas. The patient is not nervous/anxious and is not hyperactive.            Past Medical History:    Diagnosis Date    NOLAN (acute kidney injury) 2023    Anemia     Diabetes mellitus with neuropathy     HLD (hyperlipidemia) 2023    HTN (hypertension)     Obesity     TEDDY (obstructive sleep apnea) 2023    Osteomyelitis 10/2020    Hx of R foot, Tx with IV Abx    Peripheral vascular disease     Urinary tract infection with hematuria 02/10/2024     Past Surgical History:   Procedure Laterality Date    APPLICATION OF SPLIT-THICKNESS SKIN GRAFT (STSG) TO LOWER EXTREMITY Right 2022    Procedure: APPLICATION, GRAFT, SKIN, SPLIT-THICKNESS, TO LOWER EXTREMITY;  Surgeon: Greg Ramírez MD;  Location: [facility];  Service: General;  Laterality: Right;    CHOLECYSTECTOMY      DEBRIDEMENT OF FOOT Right 10/2020    right great toe amputation       Social History     Socioeconomic History    Marital status: Single    Number of children: 1   Tobacco Use    Smoking status: Every Day     Current packs/day: 0.00     Average packs/day: 0.3 packs/day for 25.0 years (6.2 ttl pk-yrs)     Types: Cigarettes     Start date:      Last attempt to quit: 2022     Years since quittin.1    Smokeless tobacco: Never   Substance and Sexual Activity    Alcohol use: Yes     Alcohol/week: 2.0 standard drinks of alcohol     Types: 1 Glasses of wine, 1 Cans of beer per week     Comment: occasionally    Drug use: Never    Sexual activity: Yes     Partners: Female     Social Determinants of Health     Financial Resource Strain: Medium Risk (2024)    Overall Financial Resource Strain (CARDIA)     Difficulty of Paying Living Expenses: Somewhat hard   Food Insecurity: Food Insecurity Present (2024)    Hunger Vital Sign     Worried About Running Out of Food in the Last Year: Sometimes true     Ran Out of Food in the Last Year: Sometimes true   Transportation Needs: No Transportation Needs (2024)    PRAPARE - Transportation     Lack of Transportation (Medical): No     Lack of Transportation (Non-Medical): No  "  Physical Activity: Inactive (2/12/2024)    Exercise Vital Sign     Days of Exercise per Week: 0 days     Minutes of Exercise per Session: 0 min   Stress: No Stress Concern Present (2/12/2024)    [place] [employer] - Occupational Stress Questionnaire     Feeling of Stress : Not at all   Social Connections: Moderately Isolated (2/12/2024)    Social Connection and Isolation Panel [NHANES]     Frequency of Communication with Friends and Family: More than three times a week     Frequency of Social Gatherings with Friends and Family: More than three times a week     Attends Protestant Services: 1 to 4 times per year     Active Member of Clubs or Organizations: No     Attends Club or Organization Meetings: Never     Marital Status: Never    Housing Stability: Low Risk  (2/12/2024)    Housing Stability Vital Sign     Unable to Pay for Housing in the Last Year: No     Number of Places Lived in the Last Year: 1     Unstable Housing in the Last Year: No     Family History   Problem Relation Age of Onset    Hypertension Father     Diabetes Father     Hypertension Maternal Grandmother     Diabetes Maternal Grandmother      Review of patient's allergies indicates:   Allergen Reactions    Tomato Itching        Objective:  Vitals:    03/18/24 1018   BP: (!) 171/96   Pulse: 87   Resp: 18   Weight: (!) 161.9 kg (357 lb)   Height: 6' 1" (1.854 m)   PainSc:   5           Physical Exam  Vitals and nursing note reviewed. Exam conducted with a chaperone present.   Constitutional:       General: He is awake. He is not in acute distress.     Appearance: Normal appearance. He is not toxic-appearing or diaphoretic.   HENT:      Head: Normocephalic and atraumatic.      Nose: Nose normal.      Mouth/Throat:      Mouth: Mucous membranes are moist.      Pharynx: Oropharynx is clear.   Eyes:      Conjunctiva/sclera: Conjunctivae normal.      Pupils: Pupils are equal, round, and reactive to light.   Cardiovascular:     "  Rate and Rhythm: Normal rate.   Pulmonary:      Effort: Pulmonary effort is normal. No respiratory distress.   Abdominal:      Palpations: Abdomen is soft.      Tenderness: There is no guarding.   Musculoskeletal:         General: Normal range of motion.      Cervical back: Normal range of motion and neck supple. No rigidity.      Lumbar back: Tenderness present.      Right foot: Tenderness present.      Left foot: Tenderness present.   Skin:     General: Skin is warm and dry.      Coloration: Skin is not jaundiced or pale.   Neurological:      General: No focal deficit present.      Mental Status: He is alert and oriented to person, place, and time. Mental status is at baseline.      Cranial Nerves: No cranial nerve deficit (II-XII).   Psychiatric:         Mood and Affect: Mood normal.         Behavior: Behavior normal. Behavior is cooperative.         Thought Content: Thought content normal.           X-Ray Chest AP Portable  Narrative: EXAMINATION:  XR CHEST AP PORTABLE    CLINICAL HISTORY:  Cough, unspecified    COMPARISON:  Chest x-ray April 7, 2023    TECHNIQUE:  Frontal view/views of the chest.    FINDINGS:  The cardiomediastinal silhouette is stable in configuration.  No focal consolidation, pleural effusion, or pneumothorax.  Visualized osseous and surrounding soft tissue structures demonstrate no acute abnormality.  Impression: No acute cardiopulmonary process demonstrated.    Point of Service: [facility]    Electronically signed by: Inder Mckee  Date:    02/12/2024  Time:    09:11         Admission on 02/10/2024, Discharged on 02/12/2024   Component Date Value Ref Range Status    Sodium 02/10/2024 133 (L)  136 - 145 mmol/L Final    Potassium 02/10/2024 3.8  3.5 - 5.1 mmol/L Final    Chloride 02/10/2024 96 (L)  98 - 107 mmol/L Final    CO2 02/10/2024 27  21 - 32 mmol/L Final    Anion Gap 02/10/2024 14  7 - 16 mmol/L Final    Glucose 02/10/2024 114 (H)  74 - 106 mg/dL Final    BUN 02/10/2024  35 (H)  7 - 18 mg/dL Final    Creatinine 02/10/2024 2.41 (H)  0.70 - 1.30 mg/dL Final    BUN/Creatinine Ratio 02/10/2024 15  6 - 20 Final    Calcium 02/10/2024 8.5  8.5 - 10.1 mg/dL Final    Total Protein 02/10/2024 8.4 (H)  6.4 - 8.2 g/dL Final    Albumin 02/10/2024 2.8 (L)  3.5 - 5.0 g/dL Final    Globulin 02/10/2024 5.6 (H)  2.0 - 4.0 g/dL Final    A/G Ratio 02/10/2024 0.5   Final    Bilirubin, Total 02/10/2024 0.4  >0.0 - 1.2 mg/dL Final    Alk Phos 02/10/2024 116 (H)  45 - 115 U/L Final    ALT 02/10/2024 42  16 - 61 U/L Final    AST 02/10/2024 42 (H)  15 - 37 U/L Final    eGFR 02/10/2024 34 (L)  >=60 mL/min/1.73m2 Final    Amylase 02/10/2024 63  25 - 115 U/L Final    Lipase 02/10/2024 28  16 - 77 U/L Final    Color, UA 02/10/2024 Orange  Colorless, Straw, Yellow, Light Yellow, Dark Yellow Final    Clarity, UA 02/10/2024 Cloudy  Clear Final    pH, UA 02/10/2024 5.5  5.0 to 8.0 pH Units Final    Leukocytes, UA 02/10/2024 Negative  Negative Final    Nitrites, UA 02/10/2024 Negative  Negative Final    Protein, UA 02/10/2024 100 (A)  Negative Final    Glucose, UA 02/10/2024 Negative  Normal mg/dL Final    Ketones, UA 02/10/2024 Negative  Negative mg/dL Final    Urobilinogen, UA 02/10/2024 0.2  0.2, 1.0, Normal mg/dL Final    Bilirubin, UA 02/10/2024 Small (A)  Negative Final    Blood, UA 02/10/2024 Trace-Intact (A)  Negative Final    Specific Gravity, UA 02/10/2024 >=1.030 (A)  <=1.005, 1.010, 1.015, 1.020, 1.025, 1.030 Final    Lactic Acid 02/10/2024 1.1  0.4 - 2.0 mmol/L Final    WBC 02/10/2024 4.44 (L)  4.50 - 11.00 K/uL Final    RBC 02/10/2024 4.77  4.60 - 6.20 M/uL Final    Hemoglobin 02/10/2024 12.0 (L)  13.5 - 18.0 g/dL Final    Hematocrit 02/10/2024 38.6 (L)  40.0 - 54.0 % Final    MCV 02/10/2024 80.9  80.0 - 96.0 fL Final    MCH 02/10/2024 25.2 (L)  27.0 - 31.0 pg Final    MCHC 02/10/2024 31.1 (L)  32.0 - 36.0 g/dL Final    RDW 02/10/2024 14.7 (H)  11.5 - 14.5 % Final    Platelet Count 02/10/2024 241  150 -  400 K/uL Final    MPV 02/10/2024 9.7  9.4 - 12.4 fL Final    Neutrophils % 02/10/2024 53.6  53.0 - 65.0 % Final    Lymphocytes % 02/10/2024 32.2  27.0 - 41.0 % Final    Neutrophils, Abs 02/10/2024 2.38  1.80 - 7.70 K/uL Final    Lymphocytes, Absolute 02/10/2024 1.43  1.00 - 4.80 K/uL Final    Diff Type 02/10/2024 Auto   Final    Monocytes % 02/10/2024 13.5 (H)  2.0 - 6.0 % Final    Eosinophils % 02/10/2024 0.2 (L)  1.0 - 4.0 % Final    Basophils % 02/10/2024 0.5  0.0 - 1.0 % Final    Monocytes, Absolute 02/10/2024 0.60  0.00 - 0.80 K/uL Final    Eosinophils, Absolute 02/10/2024 0.01  0.00 - 0.50 K/uL Final    Basophils, Absolute 02/10/2024 0.02  0.00 - 0.20 K/uL Final    WBC, UA 02/10/2024 0-5  None Seen, 0-5 /hpf Final    RBC, UA 02/10/2024 5-10 (A)  None Seen, 0-3 /hpf Final    Bacteria, UA 02/10/2024 Moderate (A)  None Seen /hpf Final    Squamous Epithelial Cells, UA 02/10/2024 Few (A)  None Seen, Rare, None Seen To Occasional /lpf Final    Mucus, UA 02/10/2024 Moderate (A)  None Seen /hpf Final    Coarse Granular Casts, UA 02/10/2024 0-2 (A)  None Seen /lpf Final    SARS COV-2 MOLECULAR 02/10/2024 Negative  Negative, Invalid Final    Rapid Group A Strep 02/10/2024 Negative  Negative, Invalid Final    Influenza A 02/10/2024 Positive (A)  Negative, Invalid Final    Influenza B 02/10/2024 Negative  Negative, Invalid Final    Culture, Urine 02/10/2024 No Growth   Final    Culture, Blood 02/10/2024 No Growth at 5 days   Final    Culture, Blood 02/10/2024 No Growth at 5 days   Final    Culture, Wound/Abscess 02/10/2024 Heavy Growth Acinetobacter baumannii complex/haemolyticus (A)   Final    Culture, Wound/Abscess 02/10/2024 Heavy Growth Group G streptococcus (A)   Final    Hemoglobin A1C 02/10/2024 6.7 (H)  4.5 - 6.6 % Final    Estimated Average Glucose 02/10/2024 146  mg/dL Final    Sodium 02/11/2024 134 (L)  136 - 145 mmol/L Final    Potassium 02/11/2024 3.6  3.5 - 5.1 mmol/L Final    Chloride 02/11/2024 99  98 -  107 mmol/L Final    CO2 02/11/2024 26  21 - 32 mmol/L Final    Anion Gap 02/11/2024 13  7 - 16 mmol/L Final    Glucose 02/11/2024 86  74 - 106 mg/dL Final    BUN 02/11/2024 33 (H)  7 - 18 mg/dL Final    Creatinine 02/11/2024 1.88 (H)  0.70 - 1.30 mg/dL Final    BUN/Creatinine Ratio 02/11/2024 18  6 - 20 Final    Calcium 02/11/2024 8.0 (L)  8.5 - 10.1 mg/dL Final    Total Protein 02/11/2024 7.2  6.4 - 8.2 g/dL Final    Albumin 02/11/2024 2.3 (L)  3.5 - 5.0 g/dL Final    Globulin 02/11/2024 4.9 (H)  2.0 - 4.0 g/dL Final    A/G Ratio 02/11/2024 0.5   Final    Bilirubin, Total 02/11/2024 0.3  >0.0 - 1.2 mg/dL Final    Alk Phos 02/11/2024 99  45 - 115 U/L Final    ALT 02/11/2024 41  16 - 61 U/L Final    AST 02/11/2024 39 (H)  15 - 37 U/L Final    eGFR 02/11/2024 46 (L)  >=60 mL/min/1.73m2 Final    WBC 02/11/2024 3.79 (L)  4.50 - 11.00 K/uL Final    RBC 02/11/2024 4.26 (L)  4.60 - 6.20 M/uL Final    Hemoglobin 02/11/2024 10.7 (L)  13.5 - 18.0 g/dL Final    Hematocrit 02/11/2024 34.9 (L)  40.0 - 54.0 % Final    MCV 02/11/2024 81.9  80.0 - 96.0 fL Final    MCH 02/11/2024 25.1 (L)  27.0 - 31.0 pg Final    MCHC 02/11/2024 30.7 (L)  32.0 - 36.0 g/dL Final    RDW 02/11/2024 14.7 (H)  11.5 - 14.5 % Final    Platelet Count 02/11/2024 227  150 - 400 K/uL Final    MPV 02/11/2024 9.7  9.4 - 12.4 fL Final    Neutrophils % 02/11/2024 30.1 (L)  53.0 - 65.0 % Final    Lymphocytes % 02/11/2024 52.5 (H)  27.0 - 41.0 % Final    Neutrophils, Abs 02/11/2024 1.14 (L)  1.80 - 7.70 K/uL Final    Lymphocytes, Absolute 02/11/2024 1.99  1.00 - 4.80 K/uL Final    Diff Type 02/11/2024 Manual   Final    Monocytes % 02/11/2024 16.9 (H)  2.0 - 6.0 % Final    Eosinophils % 02/11/2024 0.0 (L)  1.0 - 4.0 % Final    Basophils % 02/11/2024 0.5  0.0 - 1.0 % Final    Monocytes, Absolute 02/11/2024 0.64  0.00 - 0.80 K/uL Final    Eosinophils, Absolute 02/11/2024 0.00  0.00 - 0.50 K/uL Final    Basophils, Absolute 02/11/2024 0.02  0.00 - 0.20 K/uL Final     Segmented Neutrophils, Man % 02/11/2024 37 (L)  50 - 62 % Final    Bands, Man % 02/11/2024 2  1 - 5 % Final    Lymphocytes, Man % 02/11/2024 47 (H)  27 - 41 % Final    Monocytes, Man % 02/11/2024 14 (H)  2 - 6 % Final    Platelet Morphology 02/11/2024 Normal  Normal Final    RBC Morphology 02/11/2024 Normal   Final    POC Glucose 02/11/2024 98  70 - 105 mg/dL Final    POC Glucose 02/11/2024 138 (H)  70 - 105 mg/dL Final    POC Glucose 02/11/2024 128 (H)  70 - 105 mg/dL Final    POC Glucose 02/11/2024 144 (H)  70 - 105 mg/dL Final    Sodium 02/12/2024 137  136 - 145 mmol/L Final    Potassium 02/12/2024 4.0  3.5 - 5.1 mmol/L Final    Chloride 02/12/2024 103  98 - 107 mmol/L Final    CO2 02/12/2024 27  21 - 32 mmol/L Final    Anion Gap 02/12/2024 11  7 - 16 mmol/L Final    Glucose 02/12/2024 116 (H)  74 - 106 mg/dL Final    BUN 02/12/2024 22 (H)  7 - 18 mg/dL Final    Creatinine 02/12/2024 1.38 (H)  0.70 - 1.30 mg/dL Final    BUN/Creatinine Ratio 02/12/2024 16  6 - 20 Final    Calcium 02/12/2024 8.2 (L)  8.5 - 10.1 mg/dL Final    Total Protein 02/12/2024 7.0  6.4 - 8.2 g/dL Final    Albumin 02/12/2024 2.1 (L)  3.5 - 5.0 g/dL Final    Globulin 02/12/2024 4.9 (H)  2.0 - 4.0 g/dL Final    A/G Ratio 02/12/2024 0.4   Final    Bilirubin, Total 02/12/2024 0.2  >0.0 - 1.2 mg/dL Final    Alk Phos 02/12/2024 100  45 - 115 U/L Final    ALT 02/12/2024 41  16 - 61 U/L Final    AST 02/12/2024 33  15 - 37 U/L Final    eGFR 02/12/2024 66  >=60 mL/min/1.73m2 Final    WBC 02/12/2024 2.86 (L)  4.50 - 11.00 K/uL Final    RBC 02/12/2024 4.20 (L)  4.60 - 6.20 M/uL Final    Hemoglobin 02/12/2024 10.6 (L)  13.5 - 18.0 g/dL Final    Hematocrit 02/12/2024 34.6 (L)  40.0 - 54.0 % Final    MCV 02/12/2024 82.4  80.0 - 96.0 fL Final    MCH 02/12/2024 25.2 (L)  27.0 - 31.0 pg Final    MCHC 02/12/2024 30.6 (L)  32.0 - 36.0 g/dL Final    RDW 02/12/2024 14.5  11.5 - 14.5 % Final    Platelet Count 02/12/2024 191  150 - 400 K/uL Final    MPV 02/12/2024  10.0  9.4 - 12.4 fL Final    Neutrophils % 02/12/2024 36.8 (L)  53.0 - 65.0 % Final    Lymphocytes % 02/12/2024 47.9 (H)  27.0 - 41.0 % Final    Neutrophils, Abs 02/12/2024 1.05 (L)  1.80 - 7.70 K/uL Final    Lymphocytes, Absolute 02/12/2024 1.37  1.00 - 4.80 K/uL Final    Diff Type 02/12/2024 Auto   Final    Monocytes % 02/12/2024 14.3 (H)  2.0 - 6.0 % Final    Eosinophils % 02/12/2024 0.7 (L)  1.0 - 4.0 % Final    Basophils % 02/12/2024 0.3  0.0 - 1.0 % Final    Monocytes, Absolute 02/12/2024 0.41  0.00 - 0.80 K/uL Final    Eosinophils, Absolute 02/12/2024 0.02  0.00 - 0.50 K/uL Final    Basophils, Absolute 02/12/2024 0.01  0.00 - 0.20 K/uL Final   Office Visit on 01/24/2024   Component Date Value Ref Range Status    POC Amphetamines 01/24/2024 Negative  Negative, Inconclusive Final    POC Barbiturates 01/24/2024 Negative  Negative, Inconclusive Final    POC Benzodiazepines 01/24/2024 Negative  Negative, Inconclusive Final    POC Cocaine 01/24/2024 Negative  Negative, Inconclusive Final    POC THC 01/24/2024 Negative  Negative, Inconclusive Final    POC Methadone 01/24/2024 Negative  Negative, Inconclusive Final    POC Methamphetamine 01/24/2024 Negative  Negative, Inconclusive Final    POC Opiates 01/24/2024 Negative  Negative, Inconclusive Final    POC Oxycodone 01/24/2024 Negative  Negative, Inconclusive Final    POC Phencyclidine 01/24/2024 Negative  Negative, Inconclusive Final    POC Methylenedioxymethamphetamine * 01/24/2024 Negative  Negative, Inconclusive Final    POC Tricyclic Antidepressants 01/24/2024 Negative  Negative, Inconclusive Final    POC Buprenorphine 01/24/2024 Negative   Final     Acceptable 01/24/2024 Yes   Final    POC Temperature (Urine) 01/24/2024 92   Final   Lab Visit on 11/13/2023   Component Date Value Ref Range Status    CRP 11/13/2023 2.72 (H)  0.00 - 0.80 mg/dL Final    ESR Westergren 11/13/2023 65 (H)  0 - 15 mm/Hr Final    Sodium 11/13/2023 140  136 - 145 mmol/L  Final    Potassium 11/13/2023 4.8  3.5 - 5.1 mmol/L Final    Chloride 11/13/2023 111 (H)  98 - 107 mmol/L Final    CO2 11/13/2023 28  21 - 32 mmol/L Final    Anion Gap 11/13/2023 6 (L)  7 - 16 mmol/L Final    Glucose 11/13/2023 157 (H)  74 - 106 mg/dL Final    BUN 11/13/2023 16  7 - 18 mg/dL Final    Creatinine 11/13/2023 1.21  0.70 - 1.30 mg/dL Final    BUN/Creatinine Ratio 11/13/2023 13  6 - 20 Final    Calcium 11/13/2023 8.9  8.5 - 10.1 mg/dL Final    eGFR 11/13/2023 78  >=60 mL/min/1.73m2 Final    WBC 11/13/2023 4.92  4.50 - 11.00 K/uL Final    RBC 11/13/2023 4.78  4.60 - 6.20 M/uL Final    Hemoglobin 11/13/2023 12.6 (L)  13.5 - 18.0 g/dL Final    Hematocrit 11/13/2023 40.2  40.0 - 54.0 % Final    MCV 11/13/2023 84.1  80.0 - 96.0 fL Final    MCH 11/13/2023 26.4 (L)  27.0 - 31.0 pg Final    MCHC 11/13/2023 31.3 (L)  32.0 - 36.0 g/dL Final    RDW 11/13/2023 13.7  11.5 - 14.5 % Final    Platelet Count 11/13/2023 308  150 - 400 K/uL Final    MPV 11/13/2023 10.0  9.4 - 12.4 fL Final    Neutrophils % 11/13/2023 49.0 (L)  53.0 - 65.0 % Final    Lymphocytes % 11/13/2023 35.8  27.0 - 41.0 % Final    Monocytes % 11/13/2023 8.5 (H)  2.0 - 6.0 % Final    Eosinophils % 11/13/2023 5.3 (H)  1.0 - 4.0 % Final    Basophils % 11/13/2023 1.2 (H)  0.0 - 1.0 % Final    Immature Granulocytes % 11/13/2023 0.2  0.0 - 0.4 % Final    nRBC, Auto 11/13/2023 0.0  <=0.0 % Final    Neutrophils, Abs 11/13/2023 2.41  1.80 - 7.70 K/uL Final    Lymphocytes, Absolute 11/13/2023 1.76  1.00 - 4.80 K/uL Final    Monocytes, Absolute 11/13/2023 0.42  0.00 - 0.80 K/uL Final    Eosinophils, Absolute 11/13/2023 0.26  0.00 - 0.50 K/uL Final    Basophils, Absolute 11/13/2023 0.06  0.00 - 0.20 K/uL Final    Immature Granulocytes, Absolute 11/13/2023 0.01  0.00 - 0.04 K/uL Final    nRBC, Absolute 11/13/2023 0.00  <=0.00 x10e3/uL Final    Diff Type 11/13/2023 Auto   Final   Lab Requisition on 11/08/2023   Component Date Value Ref Range Status    Sodium  11/08/2023 137  136 - 145 mmol/L Final    Potassium 11/08/2023 5.4 (H)  3.5 - 5.1 mmol/L Final    Chloride 11/08/2023 106  98 - 107 mmol/L Final    CO2 11/08/2023 27  21 - 32 mmol/L Final    Anion Gap 11/08/2023 9  7 - 16 mmol/L Final    Glucose 11/08/2023 90  74 - 106 mg/dL Final    BUN 11/08/2023 22 (H)  7 - 18 mg/dL Final    Creatinine 11/08/2023 1.54 (H)  0.70 - 1.30 mg/dL Final    BUN/Creatinine Ratio 11/08/2023 14  6 - 20 Final    Calcium 11/08/2023 8.3 (L)  8.5 - 10.1 mg/dL Final    eGFR 11/08/2023 58 (L)  >=60 mL/min/1.73m2 Final    ESR Westergren 11/08/2023 72 (H)  0 - 15 mm/Hr Final    CRP 11/08/2023 1.93 (H)  0.00 - 0.80 mg/dL Final    WBC 11/08/2023 6.32  4.50 - 11.00 K/uL Final    RBC 11/08/2023 4.33 (L)  4.60 - 6.20 M/uL Final    Hemoglobin 11/08/2023 11.5 (L)  13.5 - 18.0 g/dL Final    Hematocrit 11/08/2023 35.6 (L)  40.0 - 54.0 % Final    MCV 11/08/2023 82.2  80.0 - 96.0 fL Final    MCH 11/08/2023 26.6 (L)  27.0 - 31.0 pg Final    MCHC 11/08/2023 32.3  32.0 - 36.0 g/dL Final    RDW 11/08/2023 13.6  11.5 - 14.5 % Final    Platelet Count 11/08/2023 304  150 - 400 K/uL Final    MPV 11/08/2023 10.7  9.4 - 12.4 fL Final    Neutrophils % 11/08/2023 46.3 (L)  53.0 - 65.0 % Final    Lymphocytes % 11/08/2023 38.0  27.0 - 41.0 % Final    Monocytes % 11/08/2023 10.1 (H)  2.0 - 6.0 % Final    Eosinophils % 11/08/2023 4.1 (H)  1.0 - 4.0 % Final    Basophils % 11/08/2023 1.3 (H)  0.0 - 1.0 % Final    Immature Granulocytes % 11/08/2023 0.2  0.0 - 0.4 % Final    nRBC, Auto 11/08/2023 0.0  <=0.0 % Final    Neutrophils, Abs 11/08/2023 2.93  1.80 - 7.70 K/uL Final    Lymphocytes, Absolute 11/08/2023 2.40  1.00 - 4.80 K/uL Final    Monocytes, Absolute 11/08/2023 0.64  0.00 - 0.80 K/uL Final    Eosinophils, Absolute 11/08/2023 0.26  0.00 - 0.50 K/uL Final    Basophils, Absolute 11/08/2023 0.08  0.00 - 0.20 K/uL Final    Immature Granulocytes, Absolute 11/08/2023 0.01  0.00 - 0.04 K/uL Final    nRBC, Absolute 11/08/2023  0.00  <=0.00 x10e3/uL Final    Diff Type 11/08/2023 Auto   Final   Lab Requisition on 10/30/2023   Component Date Value Ref Range Status    Sodium 10/30/2023 138  136 - 145 mmol/L Final    Potassium 10/30/2023 5.4 (H)  3.5 - 5.1 mmol/L Final    Chloride 10/30/2023 106  98 - 107 mmol/L Final    CO2 10/30/2023 26  21 - 32 mmol/L Final    Anion Gap 10/30/2023 11  7 - 16 mmol/L Final    Glucose 10/30/2023 164 (H)  74 - 106 mg/dL Final    BUN 10/30/2023 15  7 - 18 mg/dL Final    Creatinine 10/30/2023 1.22  0.70 - 1.30 mg/dL Final    BUN/Creatinine Ratio 10/30/2023 12  6 - 20 Final    Calcium 10/30/2023 8.4 (L)  8.5 - 10.1 mg/dL Final    eGFR 10/30/2023 77  >=60 mL/min/1.73m2 Final    CRP 10/30/2023 1.03 (H)  0.00 - 0.80 mg/dL Final    ESR Westergren 10/30/2023 63 (H)  0 - 15 mm/Hr Final    WBC 10/30/2023 4.82  4.50 - 11.00 K/uL Final    RBC 10/30/2023 4.19 (L)  4.60 - 6.20 M/uL Final    Hemoglobin 10/30/2023 11.2 (L)  13.5 - 18.0 g/dL Final    Hematocrit 10/30/2023 34.9 (L)  40.0 - 54.0 % Final    MCV 10/30/2023 83.3  80.0 - 96.0 fL Final    MCH 10/30/2023 26.7 (L)  27.0 - 31.0 pg Final    MCHC 10/30/2023 32.1  32.0 - 36.0 g/dL Final    RDW 10/30/2023 14.1  11.5 - 14.5 % Final    Platelet Count 10/30/2023 290  150 - 400 K/uL Final    MPV 10/30/2023 10.4  9.4 - 12.4 fL Final    Neutrophils % 10/30/2023 51.7 (L)  53.0 - 65.0 % Final    Lymphocytes % 10/30/2023 33.8  27.0 - 41.0 % Final    Monocytes % 10/30/2023 7.9 (H)  2.0 - 6.0 % Final    Eosinophils % 10/30/2023 5.4 (H)  1.0 - 4.0 % Final    Basophils % 10/30/2023 1.0  0.0 - 1.0 % Final    Immature Granulocytes % 10/30/2023 0.2  0.0 - 0.4 % Final    nRBC, Auto 10/30/2023 0.0  <=0.0 % Final    Neutrophils, Abs 10/30/2023 2.49  1.80 - 7.70 K/uL Final    Lymphocytes, Absolute 10/30/2023 1.63  1.00 - 4.80 K/uL Final    Monocytes, Absolute 10/30/2023 0.38  0.00 - 0.80 K/uL Final    Eosinophils, Absolute 10/30/2023 0.26  0.00 - 0.50 K/uL Final    Basophils, Absolute  10/30/2023 0.05  0.00 - 0.20 K/uL Final    Immature Granulocytes, Absolute 10/30/2023 0.01  0.00 - 0.04 K/uL Final    nRBC, Absolute 10/30/2023 0.00  <=0.00 x10e3/uL Final    Diff Type 10/30/2023 Auto   Final   Office Visit on 10/23/2023   Component Date Value Ref Range Status    Culture, Wound/Abscess 10/23/2023 Moderate Growth Escherichia coli ESBL (A)   Final    Culture, Wound/Abscess 10/23/2023 Heavy Growth Streptococcus agalactiae (Group B) (A)   Final    Culture, Anaerobe 10/23/2023 No Anaerobes Isolated   Final   Lab Requisition on 10/23/2023   Component Date Value Ref Range Status    Sodium 10/23/2023 139  136 - 145 mmol/L Final    Potassium 10/23/2023 4.3  3.5 - 5.1 mmol/L Final    Chloride 10/23/2023 108 (H)  98 - 107 mmol/L Final    CO2 10/23/2023 27  21 - 32 mmol/L Final    Anion Gap 10/23/2023 8  7 - 16 mmol/L Final    Glucose 10/23/2023 118 (H)  74 - 106 mg/dL Final    BUN 10/23/2023 20 (H)  7 - 18 mg/dL Final    Creatinine 10/23/2023 1.19  0.70 - 1.30 mg/dL Final    BUN/Creatinine Ratio 10/23/2023 17  6 - 20 Final    Calcium 10/23/2023 8.5  8.5 - 10.1 mg/dL Final    Total Protein 10/23/2023 7.3  6.4 - 8.2 g/dL Final    Albumin 10/23/2023 2.6 (L)  3.5 - 5.0 g/dL Final    Globulin 10/23/2023 4.7 (H)  2.0 - 4.0 g/dL Final    A/G Ratio 10/23/2023 0.6   Final    Bilirubin, Total 10/23/2023 0.5  >0.0 - 1.2 mg/dL Final    Alk Phos 10/23/2023 112  45 - 115 U/L Final    ALT 10/23/2023 29  16 - 61 U/L Final    AST 10/23/2023 15  15 - 37 U/L Final    eGFR 10/23/2023 79  >=60 mL/min/1.73m2 Final    Hemoglobin A1C 10/23/2023 5.9  4.5 - 6.6 % Final    Estimated Average Glucose 10/23/2023 110  mg/dL Final    Triglycerides 10/23/2023 105  35 - 150 mg/dL Final    Cholesterol 10/23/2023 103  0 - 200 mg/dL Final    HDL Cholesterol 10/23/2023 51  40 - 60 mg/dL Final    Cholesterol/HDL Ratio (Risk Factor) 10/23/2023 2.0   Final    Non-HDL 10/23/2023 52  mg/dL Final    LDL Calculated 10/23/2023 31  mg/dL Final    VLDL  10/23/2023 21  mg/dL Final    WBC 10/23/2023 6.02  4.50 - 11.00 K/uL Final    RBC 10/23/2023 3.98 (L)  4.60 - 6.20 M/uL Final    Hemoglobin 10/23/2023 10.7 (L)  13.5 - 18.0 g/dL Final    Hematocrit 10/23/2023 33.1 (L)  40.0 - 54.0 % Final    MCV 10/23/2023 83.2  80.0 - 96.0 fL Final    MCH 10/23/2023 26.9 (L)  27.0 - 31.0 pg Final    MCHC 10/23/2023 32.3  32.0 - 36.0 g/dL Final    RDW 10/23/2023 14.3  11.5 - 14.5 % Final    Platelet Count 10/23/2023 299  150 - 400 K/uL Final    MPV 10/23/2023 10.7  9.4 - 12.4 fL Final    Neutrophils % 10/23/2023 53.2  53.0 - 65.0 % Final    Lymphocytes % 10/23/2023 34.2  27.0 - 41.0 % Final    Monocytes % 10/23/2023 8.8 (H)  2.0 - 6.0 % Final    Eosinophils % 10/23/2023 2.8  1.0 - 4.0 % Final    Basophils % 10/23/2023 0.8  0.0 - 1.0 % Final    Immature Granulocytes % 10/23/2023 0.2  0.0 - 0.4 % Final    nRBC, Auto 10/23/2023 0.0  <=0.0 % Final    Neutrophils, Abs 10/23/2023 3.20  1.80 - 7.70 K/uL Final    Lymphocytes, Absolute 10/23/2023 2.06  1.00 - 4.80 K/uL Final    Monocytes, Absolute 10/23/2023 0.53  0.00 - 0.80 K/uL Final    Eosinophils, Absolute 10/23/2023 0.17  0.00 - 0.50 K/uL Final    Basophils, Absolute 10/23/2023 0.05  0.00 - 0.20 K/uL Final    Immature Granulocytes, Absolute 10/23/2023 0.01  0.00 - 0.04 K/uL Final    nRBC, Absolute 10/23/2023 0.00  <=0.00 x10e3/uL Final    Diff Type 10/23/2023 Auto   Final   Office Visit on 09/28/2023   Component Date Value Ref Range Status    Culture, Anaerobe 09/28/2023 No Anaerobes Isolated   Final    Culture, Wound/Abscess 09/28/2023 Moderate Growth Escherichia coli ESBL (A)   Final         Orders Placed This Encounter   Procedures    POCT Urine Drug Screen Presump     Interpretive Information:     Negative:  No drug detected at the cut off level.   Positive:  This result represents presumptive positive for the   tested drug, other substances may yield a positive response other   than the analyte of interest. This result should be  utilized for   diagnostic purpose only. Confirmation testing will be performed upon physician request only.            Requested Prescriptions     Signed Prescriptions Disp Refills    HYDROcodone-acetaminophen (NORCO)  mg per tablet 60 tablet 0     Sig: Take 1 tablet by mouth every 12 (twelve) hours.    HYDROcodone-acetaminophen (NORCO)  mg per tablet 60 tablet 0     Sig: Take 1 tablet by mouth every 12 (twelve) hours.       Assessment:     1. Diabetic neuropathy with neurologic complication    2. Peripheral vascular disease    3. Encounter for long-term (current) use of other medications         A's of Opioid Risk Assessment  Activity:Patient can perform ADL.   Analgesia:Patients pain is partially controlled by current medication. Patient has tried OTC medications such as Tylenol and Ibuprofen with out relief.   Adverse Effects: Patient denies constipation or sedation.  Aberrant Behavior:  reviewed with no aberrant drug seeking/taking behavior.  Overdose reversal drug naloxone discussed    Drug screen reviewed      Plan:    Narcan March 2023    Did not keep appointment January 3rd     Last refill date January 24, 2024, Norco 10, 60 tablets    Definitive drug screen order January 24 2024 loss per lab  Did not have narcotics x3 days prior to visit    Patient canceled appointment February 21, 2024    He verbalized understanding need to keep appointments for clarification with drug screens and pill counts     Any further missed appointments are follow-up after refill dates will discontinue narcotics March 18, 2024        Ulcer right foot continues to follow wound management please see photographs    Walking boot right lower extremity    March 18, 2022 pill count correct     Presumptive drug screen positive for opiates    He verbalized understanding if he can not keep office visits for compliance with urine drug screens for narcotics management will discontinue narcotics    No new complaints today states  current medication does help with his discomfort     Continue activity as directed     Continue current medication    Follow-up 2 months    Dr. Paulson, July 2024    Bring original prescription medication bottles/container/box with labels to each visit

## 2024-03-11 ENCOUNTER — OFFICE VISIT (OUTPATIENT)
Dept: WOUND CARE | Facility: CLINIC | Age: 39
End: 2024-03-11
Attending: FAMILY MEDICINE
Payer: MEDICARE

## 2024-03-11 ENCOUNTER — DOCUMENT SCAN (OUTPATIENT)
Dept: HOME HEALTH SERVICES | Facility: HOSPITAL | Age: 39
End: 2024-03-11
Payer: MEDICARE

## 2024-03-11 VITALS
HEART RATE: 75 BPM | SYSTOLIC BLOOD PRESSURE: 181 MMHG | TEMPERATURE: 96 F | RESPIRATION RATE: 20 BRPM | DIASTOLIC BLOOD PRESSURE: 105 MMHG

## 2024-03-11 DIAGNOSIS — E11.610 CHARCOT FOOT DUE TO DIABETES MELLITUS: ICD-10-CM

## 2024-03-11 DIAGNOSIS — L97.412 DIABETIC ULCER OF RIGHT HEEL ASSOCIATED WITH TYPE 2 DIABETES MELLITUS, WITH FAT LAYER EXPOSED: Primary | ICD-10-CM

## 2024-03-11 DIAGNOSIS — E11.621 DIABETIC ULCER OF RIGHT HEEL ASSOCIATED WITH TYPE 2 DIABETES MELLITUS, WITH FAT LAYER EXPOSED: Primary | ICD-10-CM

## 2024-03-11 PROCEDURE — 11042 DBRDMT SUBQ TIS 1ST 20SQCM/<: CPT | Mod: PBBFAC | Performed by: NURSE PRACTITIONER

## 2024-03-11 PROCEDURE — 11045 DBRDMT SUBQ TISS EACH ADDL: CPT | Mod: S$PBB,,, | Performed by: NURSE PRACTITIONER

## 2024-03-11 PROCEDURE — 99215 OFFICE O/P EST HI 40 MIN: CPT | Mod: PBBFAC | Performed by: NURSE PRACTITIONER

## 2024-03-11 PROCEDURE — 99499 UNLISTED E&M SERVICE: CPT | Mod: S$PBB,,, | Performed by: NURSE PRACTITIONER

## 2024-03-11 NOTE — PROGRESS NOTES
Debridement Performed for Assessment: Wound# right plantar foot  Performed By: Provider: Yelitza Ward NP  Assistant:  Viji Trejo LPN    Debridement: Surgical    Photo taken post procedure:    Time-Out Taken: Yes  Level: Skin/Subcutaneous Tissue  Post Debridement Measurements  Length: (cm) 12.0  Width: (cm) 7.0  Depth: (cm) 0.3      Area: (cm²) 96.0  Percent Debrided: 100%  Total Area Debrided: (sq cm)     Tissue and other material debrided:  Adipose, Dermis, Epidermis, Subcutaneous  Devitalized Tissue Debrided:Biofilm, callus  Instrument: Curette  Bleeding: Moderate  Hemostasis Achieved: Pressure  Procedural Pain: Insensate  Post Procedural Pain: Insensate  Response to Treatment: Procedure was tolerated well    Devitalized materials/tissue Removed  the following was removed during debridement  subcutaneous      Post Debridement Diagnosis   chronic right heel diabetic ulcer  Post debridement diagnosis  Same as Pre-operative debridement diagnosis, No Complications noted.      Grafts or implants applied  Was a graft or implant applied?  No      Procedure assistant  Procedure assisted by:  Assistant is the same as nurse listed above      Complications related to procedure  Did any complication occure during procedure?  No complications noted during or after procedure.      Specimen  Specimen collect during procedure?  No specimen collected      Anaesthesia:  Anesthesia used  None      Blood Loss:  Blood loss during procedure  less than 5 cc

## 2024-03-12 ENCOUNTER — EXTERNAL HOME HEALTH (OUTPATIENT)
Dept: HOME HEALTH SERVICES | Facility: HOSPITAL | Age: 39
End: 2024-03-12
Payer: MEDICARE

## 2024-03-12 RX ORDER — DOXYCYCLINE HYCLATE 100 MG
100 TABLET ORAL 2 TIMES DAILY
Qty: 60 TABLET | Refills: 0 | Status: SHIPPED | OUTPATIENT
Start: 2024-03-12 | End: 2024-04-11

## 2024-03-13 NOTE — PATIENT INSTRUCTIONS
Clean with  vashe or baby shampoo and water     Apply mepilex ag or equivalent to wound     Cover and secure with 4x4, ABD,wrap with 2 layer compression from toe to knee. Change daily  and as needed.  Pt knows how to apply 2 layer compression.  Please provide pat with enough wraps to change dressing daily     Monitor closely for s/s of infection including fever, chills, increase in pain, odor from wound, and increased redness from foot. Go to ER if any complications develop.   Keep leg elevated and avoid pressure on wound.   Diabetes:  Monitor glucose closely. Check fasting glucose and 2 hours after meals. HgA1C goal <7, fasting glucose , and 2 hours after meals <180  Hypertension:  Check blood pressure twice daily, goal <120/80  Diet:   Increase protein intake, avoid fried, fatty foods and foods high in simple carbs.   Vitamins:  Take vitamin C 1000 mg, zinc 50mg, vitamin d 5000 units, and a daily multivitamin. Angel is a good source of protein and nutrients to aid in wound healing.    Wear CROW boot daily or use crutches to offload right foot     Home health to FOLLOW wound orders above

## 2024-03-13 NOTE — PROGRESS NOTES
AUGUSTUS Macedo   [facility]  [address] [facility] [address]  [phone]      PATIENT NAME: Jean Pierre Paulson  : 1985  DATE: 24  MRN: [id number]      Billing Provider: AUGUSTUS Macedo  Level of Service:   Patient PCP Information       Provider PCP Type    Primary Doctor No General            Reason for Visit / Chief Complaint: Non-healing Wound Follow Up (R DFU)       History of Present Illness / Problem Focused Workflow     Jean Pierre Paulson is a 38 y.o. male who presents to clinic for follow up on chronic-non healing wound on the right foot. Wound continues to improve. Callus and maceration filippo-wound, bedside debridement today. Granulation tissue to wound bed. He was wearing two layer compression as discussed last visit but reports that foot was macerated with twice weekly changes. Discussed continuing 2 layer compression with daily dressing changes.   Reinforced importance of following plan of care, wearing off-loading boot, keeping pressure off foot, taking antibiotics as prescribed,increasing protein intake, and keeping diabetes well controlled.    Last arterial doppler 4/10/23,  The ankle to brachial index is 1.25 on the right and 1.34 on the left.    Significant PMH  Includes diabetes and hypertension. Last HgA1C 7.3 in 2022, diabetes is managed by PCP.  Denies fever or chills.     Review of Systems     Review of Systems   Constitutional:  Negative for activity change, chills and fever.   Respiratory:  Negative for chest tightness and shortness of breath.    Cardiovascular:  Positive for leg swelling. Negative for chest pain and palpitations.   Musculoskeletal:  Positive for arthralgias and joint swelling.   Skin:  Positive for wound.        See wound assessment   Neurological:  Positive for weakness and numbness.   Psychiatric/Behavioral:  Negative for agitation, behavioral problems, confusion and self-injury.        Medical /  Social / Family History     Past Medical History:   Diagnosis Date    NOLAN (acute kidney injury) 04/07/2023    Anemia     Diabetes mellitus with neuropathy     HLD (hyperlipidemia) 04/07/2023    HTN (hypertension)     Obesity     TEDDY (obstructive sleep apnea) 04/07/2023    Osteomyelitis 10/2020    Hx of R foot, Tx with IV Abx    Peripheral vascular disease     Urinary tract infection with hematuria 02/10/2024       Past Surgical History:   Procedure Laterality Date    APPLICATION OF SPLIT-THICKNESS SKIN GRAFT (STSG) TO LOWER EXTREMITY Right 03/02/2022    Procedure: APPLICATION, GRAFT, SKIN, SPLIT-THICKNESS, TO LOWER EXTREMITY;  Surgeon: Greg Ramírez MD;  Location: [facility];  Service: General;  Laterality: Right;    CHOLECYSTECTOMY      DEBRIDEMENT OF FOOT Right 10/2020    right great toe amputation         Social History  Mr. Jean Pierre Paulson  reports that he has been smoking cigarettes. He started smoking about 24 years ago. He has a 6.2 pack-year smoking history. He has never used smokeless tobacco. He reports current alcohol use of about 2.0 standard drinks of alcohol per week. He reports that he does not use drugs.    Family History  'anjana Paulson family history includes Diabetes in his father and maternal grandmother; Hypertension in his father and maternal grandmother.    Medications and Allergies     Medications  No outpatient medications have been marked as taking for the 4/1/24 encounter (Office Visit) with Yelitza Alas FNP.       Allergies  Review of patient's allergies indicates:   Allergen Reactions    Tomato Itching       Physical Examination     Vitals:    04/01/24 1029   BP: (!) 149/85   Pulse: 94   Resp: 20   Temp: 98.4 °F (36.9 °C)         Physical Exam  Vitals and nursing note reviewed.   Constitutional:       Appearance: Normal appearance.   HENT:      Head: Normocephalic.   Cardiovascular:      Rate and Rhythm: Normal rate and regular rhythm.      Pulses: Normal  pulses.      Heart sounds: Normal heart sounds.   Pulmonary:      Effort: Pulmonary effort is normal. No respiratory distress.   Chest:      Chest wall: No tenderness.   Musculoskeletal:         General: Swelling, tenderness and deformity present.      Right lower leg: Edema present.   Skin:     General: Skin is warm and dry.      Capillary Refill: Capillary refill takes 2 to 3 seconds.      Comments: See LDA for photos and measurements   Neurological:      General: No focal deficit present.      Mental Status: He is alert and oriented to person, place, and time. Mental status is at baseline.   Psychiatric:         Mood and Affect: Mood normal.         Behavior: Behavior normal.         Thought Content: Thought content normal.         Judgment: Judgment normal.     Assessment and Plan             Altered Skin Integrity 02/10/24 2200 Right plantar Foot Diabetic Ulcer Full thickness tissue loss with exposed bone, tendon, or muscle. Often includes undermining and tunneling. May extend into muscle and/or supporting structures. (Active)   02/10/24 2200   Altered Skin Integrity Present on Admission - Did Patient arrive to the hospital with altered skin?: yes   Side: Right   Orientation: plantar   Location: Foot   Wound Number:    Is this injury device related?:    Primary Wound Type: Diabetic ulc   Description of Altered Skin Integrity: Full thickness tissue loss with exposed bone, tendon, or muscle. Often includes undermining and tunneling. May extend into muscle and/or supporting structures.   Ankle-Brachial Index:    Pulses:    Removal Indication and Assessment:    Wound Outcome:    (Retired) Wound Length (cm):    (Retired) Wound Width (cm):    (Retired) Depth (cm):    Wound Description (Comments):    Removal Indications:    Wound Image     04/01/24 1032   Dressing Appearance Dry;Intact;Clean 04/01/24 1032   Drainage Amount Moderate 04/01/24 1032   Drainage Characteristics/Odor Serosanguineous;Yellow 04/01/24 1032    Appearance Pink;Red;Moist;Granulating;Epithelialization 04/01/24 1032   Black (%), Wound Tissue Color 0 % 04/01/24 1032   Red (%), Wound Tissue Color 100 % 04/01/24 1032   Yellow (%), Wound Tissue Color 0 % 04/01/24 1032   Periwound Area Intact 04/01/24 1032   Wound Length (cm) 10.8 cm 04/01/24 1032   Wound Width (cm) 6.3 cm 04/01/24 1032   Wound Depth (cm) 0.4 cm 04/01/24 1032   Wound Volume (cm^3) 27.216 cm^3 04/01/24 1032   Wound Surface Area (cm^2) 68.04 cm^2 04/01/24 1032   Care Cleansed with:;Antimicrobial agent 04/01/24 1032   Dressing Applied;Hydrofiber;Gauze, wet to dry;Gauze;Absorptive Pad;Rolled gauze 04/01/24 1032   Periwound Care Moisture barrier applied 04/01/24 1032         Problem List Items Addressed This Visit          Endocrine    Diabetic ulcer of right heel associated with type 2 diabetes mellitus, with fat layer exposed - Primary    Overview                                                                        Current Assessment & Plan     Clean with  vashe or baby shampoo and water     Apply mepilex ag or equivalent to wound     Cover and secure with 4x4, ABD,wrap with 2 layer compression from toe to knee. Change daily  and as needed.  Pt knows how to apply 2 layer compression.  Please provide pat with enough wraps to change dressing daily     Monitor closely for s/s of infection including fever, chills, increase in pain, odor from wound, and increased redness from foot. Go to ER if any complications develop.   Keep leg elevated and avoid pressure on wound.   Diabetes:  Monitor glucose closely. Check fasting glucose and 2 hours after meals. HgA1C goal <7, fasting glucose , and 2 hours after meals <180  Hypertension:  Check blood pressure twice daily, goal <120/80  Diet:   Increase protein intake, avoid fried, fatty foods and foods high in simple carbs.   Vitamins:  Take vitamin C 1000 mg, zinc 50mg, vitamin d 5000 units, and a daily multivitamin. Angel is a good source of protein and  nutrients to aid in wound healing.    Wear CROW boot daily or use crutches to offload right foot     Home health to FOLLOW wound orders above            Future Appointments   Date Time Provider Department Center   4/22/2024 10:00 AM Yelitza Alas FNP [facility]   5/20/2024 12:45 PM Nadja Paulson MD [facility]            Signature:  AUGUSTUS Macedo  [facility]  [address] [facility] [place] [address]  [phone]    Date of encounter: 4/1/24

## 2024-03-18 ENCOUNTER — OFFICE VISIT (OUTPATIENT)
Dept: PAIN MEDICINE | Facility: CLINIC | Age: 39
End: 2024-03-18
Payer: MEDICARE

## 2024-03-18 VITALS
WEIGHT: 315 LBS | SYSTOLIC BLOOD PRESSURE: 171 MMHG | HEART RATE: 87 BPM | BODY MASS INDEX: 41.75 KG/M2 | RESPIRATION RATE: 18 BRPM | DIASTOLIC BLOOD PRESSURE: 96 MMHG | HEIGHT: 73 IN

## 2024-03-18 DIAGNOSIS — E11.49 DIABETIC NEUROPATHY WITH NEUROLOGIC COMPLICATION: Primary | Chronic | ICD-10-CM

## 2024-03-18 DIAGNOSIS — E11.40 DIABETIC NEUROPATHY WITH NEUROLOGIC COMPLICATION: Primary | Chronic | ICD-10-CM

## 2024-03-18 DIAGNOSIS — Z79.899 ENCOUNTER FOR LONG-TERM (CURRENT) USE OF OTHER MEDICATIONS: ICD-10-CM

## 2024-03-18 DIAGNOSIS — I73.9 PERIPHERAL VASCULAR DISEASE: Chronic | ICD-10-CM

## 2024-03-18 PROCEDURE — 99999PBSHW POCT URINE DRUG SCREEN PRESUMP: Mod: PBBFAC,,,

## 2024-03-18 PROCEDURE — 80305 DRUG TEST PRSMV DIR OPT OBS: CPT | Mod: PBBFAC | Performed by: PHYSICIAN ASSISTANT

## 2024-03-18 PROCEDURE — 99214 OFFICE O/P EST MOD 30 MIN: CPT | Mod: S$PBB,,, | Performed by: PHYSICIAN ASSISTANT

## 2024-03-18 PROCEDURE — 99215 OFFICE O/P EST HI 40 MIN: CPT | Mod: PBBFAC | Performed by: PHYSICIAN ASSISTANT

## 2024-03-18 RX ORDER — HYDROCODONE BITARTRATE AND ACETAMINOPHEN 10; 325 MG/1; MG/1
1 TABLET ORAL EVERY 12 HOURS
Qty: 60 TABLET | Refills: 0 | Status: SHIPPED | OUTPATIENT
Start: 2024-03-28 | End: 2024-04-27

## 2024-03-18 RX ORDER — HYDROCODONE BITARTRATE AND ACETAMINOPHEN 10; 325 MG/1; MG/1
1 TABLET ORAL EVERY 12 HOURS
Qty: 60 TABLET | Refills: 0 | Status: SHIPPED | OUTPATIENT
Start: 2024-04-27 | End: 2024-05-27

## 2024-04-01 ENCOUNTER — OFFICE VISIT (OUTPATIENT)
Dept: WOUND CARE | Facility: CLINIC | Age: 39
End: 2024-04-01
Attending: FAMILY MEDICINE
Payer: MEDICARE

## 2024-04-01 VITALS
DIASTOLIC BLOOD PRESSURE: 85 MMHG | SYSTOLIC BLOOD PRESSURE: 149 MMHG | RESPIRATION RATE: 20 BRPM | TEMPERATURE: 98 F | HEART RATE: 94 BPM

## 2024-04-01 DIAGNOSIS — L97.412 DIABETIC ULCER OF RIGHT HEEL ASSOCIATED WITH TYPE 2 DIABETES MELLITUS, WITH FAT LAYER EXPOSED: Primary | ICD-10-CM

## 2024-04-01 DIAGNOSIS — E11.621 DIABETIC ULCER OF RIGHT HEEL ASSOCIATED WITH TYPE 2 DIABETES MELLITUS, WITH FAT LAYER EXPOSED: Primary | ICD-10-CM

## 2024-04-01 PROCEDURE — 99215 OFFICE O/P EST HI 40 MIN: CPT | Mod: PBBFAC,25 | Performed by: NURSE PRACTITIONER

## 2024-04-01 PROCEDURE — 99499 UNLISTED E&M SERVICE: CPT | Mod: S$PBB,,, | Performed by: NURSE PRACTITIONER

## 2024-04-01 PROCEDURE — 11042 DBRDMT SUBQ TIS 1ST 20SQCM/<: CPT | Mod: S$PBB,,, | Performed by: NURSE PRACTITIONER

## 2024-04-01 PROCEDURE — 11045 DBRDMT SUBQ TISS EACH ADDL: CPT | Mod: PBBFAC | Performed by: NURSE PRACTITIONER

## 2024-04-01 RX ORDER — AMLODIPINE BESYLATE 10 MG/1
TABLET ORAL DAILY
COMMUNITY

## 2024-04-01 NOTE — PROCEDURES
"Debridement    Date/Time: 4/1/2024 10:00 AM    Performed by: Yelitza Alas FNP  Authorized by: Yelitza Alas FNP    Time out: Immediately prior to procedure a "time out" was called to verify the correct patient, procedure, equipment, support staff and site/side marked as required.    Consent Done?:  Yes (Written)    Wound Details:    Location:  Left foot    Location:  Left Plantar    Type of Debridement:  Excisional       Length (cm):  11.7       Area (sq cm):  94.77       Width (cm):  8.1       Percent Debrided (%):  100       Depth (cm):  0.2       Total Area Debrided (sq cm):  94.77    Depth of debridement:  Subcutaneous tissue    Tissue debrided:  Adipose, Dermis, Epidermis and Subcutaneous    Devitalized tissue debrided:  Biofilm, Callus, Exudate, Fibrin and Clots    Instruments:  Curette  Bleeding:  Moderate  Hemostasis Achieved: Yes  Method Used:  Pressure  Patient tolerance:  Patient tolerated the procedure well with no immediate complications  1st Wound Pain Assessment: 0     Assistant JOSE ALFREDO Stone LPN    "

## 2024-04-01 NOTE — PROGRESS NOTES
Debridement Performed for Assessment: Wound# Right plantar foot  Performed By: Provider: Yelitza Ward NP  Assistant:  Lizabeth Stone LPN    Debridement: Surgical    Photo taken post procedure:    Time-Out Taken: Yes  Level: Skin/Subcutaneous Tissue  Post Debridement Measurements  Length: (cm) 11.7   Width (cm) 8.1  Depth (cm) 0.2    Area: (cm²) 94.7  Percent Debrided: 100%  Total Area Debrided: (sq cm)     Tissue and other material debrided:  Adipose, Dermis, Epidermis, Subcutaneous  Devitalized Tissue Debrided:Biofilm, callus  Instrument: Curette  Bleeding: Moderate  Hemostasis Achieved: Pressure  Procedural Pain: Insensate  Post Procedural Pain: Insensate  Response to Treatment: Procedure was tolerated well    Devitalized materials/tissue Removed  the following was removed during debridement  subcutaneous    Post Debridement Diagnosis chronic right foot diabetic ulcer  Post debridement diagnosis  Same as Pre-operative debridement diagnosis, No Complications noted.      Grafts or implants applied  Was a graft or implant applied?  No      Procedure assistant  Procedure assisted by:  Assistant is the same as nurse listed above      Complications related to procedure  Did any complication occure during procedure?  No complications noted during or after procedure.      Specimen  Specimen collect during procedure?  No specimen collected      Anaesthesia:  Anesthesia used  None      Blood Loss:  Blood loss during procedure  less than 5 cc

## 2024-04-03 NOTE — PROGRESS NOTES
AUGUSTUS Macedo   [facility]  [address] [facility] [address]  [phone]      PATIENT NAME: Jean Pierre Paulson  : 1985  DATE: 24  MRN: [id number]      Billing Provider: AUGUSTUS Macedo  Level of Service:   Patient PCP Information       Provider PCP Type    Primary Doctor No General            Reason for Visit / Chief Complaint: Diabetic Foot Ulcer (Right heel)       History of Present Illness / Problem Focused Workflow     Jean Pierre Paulson is a 38 y.o. male who presents to clinic for follow up on chronic-non healing wound on the right foot. Wound is smaller today but he has new area of breakdown on lateral foot. He reports he was on his foot yesterday and wore regular [employer] shoes. Callus and maceration filippo-wound, bedside debridement today. Granulation tissue to wound bed. Continue with two layer compression and antibiotics. X-ray today. Encouraged to keep pressure off foot and elevate leg.   Reinforced importance of following plan of care, wearing off-loading boot, keeping pressure off foot, taking antibiotics as prescribed,increasing protein intake, and keeping diabetes well controlled.    Last arterial doppler 4/10/23,  The ankle to brachial index is 1.25 on the right and 1.34 on the left.    Significant PMH  Includes diabetes and hypertension. Last HgA1C 7.3 in 2022, diabetes is managed by PCP.  Denies fever or chills.     Review of Systems     Review of Systems   Constitutional:  Negative for activity change, chills and fever.   Respiratory:  Negative for chest tightness and shortness of breath.    Cardiovascular:  Positive for leg swelling. Negative for chest pain and palpitations.   Musculoskeletal:  Positive for arthralgias and joint swelling.   Skin:  Positive for wound.        See wound assessment   Neurological:  Positive for weakness and numbness.   Psychiatric/Behavioral:  Negative for agitation, behavioral problems,  confusion and self-injury.        Medical / Social / Family History     Past Medical History:   Diagnosis Date    NOLAN (acute kidney injury) 04/07/2023    Anemia     Diabetes mellitus with neuropathy     HLD (hyperlipidemia) 04/07/2023    HTN (hypertension)     Obesity     TEDDY (obstructive sleep apnea) 04/07/2023    Osteomyelitis 10/2020    Hx of R foot, Tx with IV Abx    Peripheral vascular disease     Urinary tract infection with hematuria 02/10/2024       Past Surgical History:   Procedure Laterality Date    APPLICATION OF SPLIT-THICKNESS SKIN GRAFT (STSG) TO LOWER EXTREMITY Right 03/02/2022    Procedure: APPLICATION, GRAFT, SKIN, SPLIT-THICKNESS, TO LOWER EXTREMITY;  Surgeon: Greg Ramírez MD;  Location: [facility];  Service: General;  Laterality: Right;    CHOLECYSTECTOMY      DEBRIDEMENT OF FOOT Right 10/2020    right great toe amputation         Social History  Mr. Jean Pierre Paulson  reports that he has been smoking cigarettes. He started smoking about 24 years ago. He has a 6.2 pack-year smoking history. He has never used smokeless tobacco. He reports current alcohol use of about 2.0 standard drinks of alcohol per week. He reports that he does not use drugs.    Family History  Mr.'s Jean Pierre Paulson family history includes Diabetes in his father and maternal grandmother; Hypertension in his father and maternal grandmother.    Medications and Allergies     Medications  Current Outpatient Medications   Medication Sig Dispense Refill    amLODIPine (NORVASC) 10 MG tablet Take by mouth once daily.      blood sugar diagnostic (BLOOD GLUCOSE TEST) Strp 1 strip by Misc.(Non-Drug; Combo Route) route once daily. accu-chek Veronika strips 90 strip 5    blood-glucose meter,continuous (DEXCOM ) Misc 1 each by Misc.(Non-Drug; Combo Route) route 4 (four) times daily with meals and nightly. 1 each 0    blood-glucose sensor (DEXCOM G7 SENSOR) Aria 1 each by Misc.(Non-Drug; Combo Route) route every 10 days. 3  each 11    gabapentin (NEURONTIN) 300 MG capsule Take 1 capsule (300 mg total) by mouth every 8 (eight) hours. 270 capsule 1    hydroCHLOROthiazide (HYDRODIURIL) 25 MG tablet Take 1 tablet (25 mg total) by mouth once daily. 90 tablet 1    HYDROcodone-acetaminophen (NORCO)  mg per tablet Take 1 tablet by mouth every 12 (twelve) hours. 60 tablet 0    ibuprofen (ADVIL,MOTRIN) 800 MG tablet Take 800 mg by mouth 3 (three) times daily as needed for Pain.      rosuvastatin (CRESTOR) 10 MG tablet Take 10 mg by mouth once daily.      semaglutide (OZEMPIC) 2 mg/dose (8 mg/3 mL) PnIj Inject 2 mg into the skin every 7 days. (Patient taking differently: Inject 2.5 mg into the skin every 7 days.) 8 mL 2    amoxicillin-clavulanate 875-125mg (AUGMENTIN) 875-125 mg per tablet Take 1 tablet by mouth every 12 (twelve) hours. 20 tablet 0    gentamicin (GARAMYCIN) 0.1 % cream Apply topically once daily. 90 g 2    hydrALAZINE (APRESOLINE) 25 MG tablet Take 1 tablet (25 mg total) by mouth every 12 (twelve) hours. 180 tablet 1    [START ON 4/27/2024] HYDROcodone-acetaminophen (NORCO)  mg per tablet Take 1 tablet by mouth every 12 (twelve) hours. 60 tablet 0    losartan (COZAAR) 50 MG tablet Take 1 tablet (50 mg total) by mouth once daily. 90 tablet 1     No current facility-administered medications for this visit.       Allergies  Review of patient's allergies indicates:   Allergen Reactions    Tomato Itching       Physical Examination     Vitals:    04/22/24 1055   BP: (!) 160/100   Pulse: 81   Resp: 20   Temp: 97.8 °F (36.6 °C)           Physical Exam  Vitals and nursing note reviewed.   Constitutional:       Appearance: Normal appearance.   HENT:      Head: Normocephalic.   Cardiovascular:      Rate and Rhythm: Normal rate and regular rhythm.      Pulses: Normal pulses.      Heart sounds: Normal heart sounds.   Pulmonary:      Effort: Pulmonary effort is normal. No respiratory distress.   Chest:      Chest wall:  No tenderness.   Musculoskeletal:         General: Swelling, tenderness and deformity present.      Right lower leg: Edema present.   Skin:     General: Skin is warm and dry.      Capillary Refill: Capillary refill takes 2 to 3 seconds.      Comments: See LDA for photos and measurements   Neurological:      General: No focal deficit present.      Mental Status: He is alert and oriented to person, place, and time. Mental status is at baseline.   Psychiatric:         Mood and Affect: Mood normal.         Behavior: Behavior normal.         Thought Content: Thought content normal.         Judgment: Judgment normal.     Assessment and Plan             Altered Skin Integrity 02/10/24 2200 Right plantar Foot Diabetic Ulcer Full thickness tissue loss with exposed bone, tendon, or muscle. Often includes undermining and tunneling. May extend into muscle and/or supporting structures. (Active)   02/10/24 2200   Altered Skin Integrity Present on Admission - Did Patient arrive to the hospital with altered skin?: yes   Side: Right   Orientation: plantar   Location: Foot   Wound Number:    Is this injury device related?:    Primary Wound Type: Diabetic ulc   Description of Altered Skin Integrity: Full thickness tissue loss with exposed bone, tendon, or muscle. Often includes undermining and tunneling. May extend into muscle and/or supporting structures.   Ankle-Brachial Index:    Pulses:    Removal Indication and Assessment:    Wound Outcome:    (Retired) Wound Length (cm):    (Retired) Wound Width (cm):    (Retired) Depth (cm):    Wound Description (Comments):    Removal Indications:    Wound Image      04/22/24 1055   Description of Altered Skin Integrity Full thickness tissue loss. Subcutaneous fat may be visible but bone, tendon or muscle are not exposed 04/22/24 1055   Dressing Appearance Moist drainage 04/22/24 1055   Drainage Amount Moderate 04/22/24 1055   Drainage Characteristics/Odor Serosanguineous 04/22/24 1055    Appearance Pink;Moist;Granulating 04/22/24 1055   Tissue loss description Full thickness 04/22/24 1055   Black (%), Wound Tissue Color 0 % 04/22/24 1055   Red (%), Wound Tissue Color 100 % 04/22/24 1055   Yellow (%), Wound Tissue Color 0 % 04/22/24 1055   Periwound Area Intact 04/22/24 1055   Wound Edges Callused 04/22/24 1055   Wound Length (cm) 6.5 cm 04/22/24 1055   Wound Width (cm) 10.7 cm 04/22/24 1055   Wound Depth (cm) 1.2 cm 04/22/24 1055   Wound Volume (cm^3) 83.46 cm^3 04/22/24 1055   Wound Surface Area (cm^2) 69.55 cm^2 04/22/24 1055   Care Cleansed with:;Antimicrobial agent 04/22/24 1055   Dressing Applied;Gauze;Absorptive Pad;Rolled gauze 04/22/24 1055   Dressing Change Due 04/23/24 04/22/24 1055           Problem List Items Addressed This Visit          Endocrine    Diabetic ulcer of right heel associated with type 2 diabetes mellitus, with fat layer exposed - Primary    Overview                                                                          Current Assessment & Plan     Clean with  vashe or baby shampoo and water     Apply silver polymem to wound    Apply betadine to wound edges/blisters     Cover and secure with 4x4, drawtex,wrap with 2 layer compression from toe to knee. Change daily  and as needed.  Pt knows how to apply 2 layer compression.  Please provide pat with enough wraps to change dressing daily     Monitor closely for s/s of infection including fever, chills, increase in pain, odor from wound, and increased redness from foot. Go to ER if any complications develop.   Keep leg elevated and avoid pressure on wound.   Diabetes:  Monitor glucose closely. Check fasting glucose and 2 hours after meals. HgA1C goal <7, fasting glucose , and 2 hours after meals <180  Hypertension:  Check blood pressure twice daily, goal <120/80  Diet:   Increase protein intake, avoid fried, fatty foods and foods high in simple carbs.   Vitamins:  Take vitamin C 1000 mg, zinc 50mg, vitamin d 5000  units, and a daily multivitamin. Angel is a good source of protein and nutrients to aid in wound healing.    Wear CROW boot daily or use crutches to offload right foot     Home health to FOLLOW wound orders above         Relevant Orders    X-Ray Foot Complete 3 view Right       Orthopedic    Charcot foot due to diabetes mellitus    Overview     CROW orthotic boot         Relevant Orders    X-Ray Foot Complete 3 view Right       Future Appointments   Date Time Provider Department Center   5/20/2024 10:00 AM Yelitza Alas FNP [facility]   5/20/2024 12:45 PM Nadja Paulson MD [facility]            Signature:  AUGUSTUS Macedo  [facility]  [address] [facility] [place] [address]  [phone]    Date of encounter: 4/22/24

## 2024-04-03 NOTE — PATIENT INSTRUCTIONS
Clean with  vashe or baby shampoo and water     Apply silver polymem to wound    Apply betadine to wound edges/blisters     Cover and secure with 4x4, drawtex,wrap with 2 layer compression from toe to knee. Change daily  and as needed.  Pt knows how to apply 2 layer compression.  Please provide pat with enough wraps to change dressing daily     Monitor closely for s/s of infection including fever, chills, increase in pain, odor from wound, and increased redness from foot. Go to ER if any complications develop.   Keep leg elevated and avoid pressure on wound.   Diabetes:  Monitor glucose closely. Check fasting glucose and 2 hours after meals. HgA1C goal <7, fasting glucose , and 2 hours after meals <180  Hypertension:  Check blood pressure twice daily, goal <120/80  Diet:   Increase protein intake, avoid fried, fatty foods and foods high in simple carbs.   Vitamins:  Take vitamin C 1000 mg, zinc 50mg, vitamin d 5000 units, and a daily multivitamin. Angel is a good source of protein and nutrients to aid in wound healing.    Wear CROW boot daily or use crutches to offload right foot     Home health to FOLLOW wound orders above

## 2024-04-09 ENCOUNTER — DOCUMENT SCAN (OUTPATIENT)
Dept: HOME HEALTH SERVICES | Facility: HOSPITAL | Age: 39
End: 2024-04-09
Payer: MEDICARE

## 2024-04-11 ENCOUNTER — DOCUMENT SCAN (OUTPATIENT)
Dept: HOME HEALTH SERVICES | Facility: HOSPITAL | Age: 39
End: 2024-04-11
Payer: MEDICARE

## 2024-04-13 PROCEDURE — G0179 MD RECERTIFICATION HHA PT: HCPCS | Mod: ,,, | Performed by: NURSE PRACTITIONER

## 2024-04-22 ENCOUNTER — OFFICE VISIT (OUTPATIENT)
Dept: WOUND CARE | Facility: CLINIC | Age: 39
End: 2024-04-22
Attending: FAMILY MEDICINE
Payer: MEDICARE

## 2024-04-22 VITALS
SYSTOLIC BLOOD PRESSURE: 160 MMHG | HEART RATE: 81 BPM | TEMPERATURE: 98 F | DIASTOLIC BLOOD PRESSURE: 100 MMHG | RESPIRATION RATE: 20 BRPM

## 2024-04-22 DIAGNOSIS — E11.621 DIABETIC ULCER OF RIGHT HEEL ASSOCIATED WITH TYPE 2 DIABETES MELLITUS, WITH FAT LAYER EXPOSED: Primary | ICD-10-CM

## 2024-04-22 DIAGNOSIS — L97.412 DIABETIC ULCER OF RIGHT HEEL ASSOCIATED WITH TYPE 2 DIABETES MELLITUS, WITH FAT LAYER EXPOSED: Primary | ICD-10-CM

## 2024-04-22 DIAGNOSIS — E11.610 CHARCOT FOOT DUE TO DIABETES MELLITUS: ICD-10-CM

## 2024-04-22 PROCEDURE — 99499 UNLISTED E&M SERVICE: CPT | Mod: S$PBB,,, | Performed by: NURSE PRACTITIONER

## 2024-04-22 PROCEDURE — 11045 DBRDMT SUBQ TISS EACH ADDL: CPT | Mod: PBBFAC | Performed by: NURSE PRACTITIONER

## 2024-04-22 PROCEDURE — 99215 OFFICE O/P EST HI 40 MIN: CPT | Mod: PBBFAC,25 | Performed by: NURSE PRACTITIONER

## 2024-04-22 PROCEDURE — 11042 DBRDMT SUBQ TIS 1ST 20SQCM/<: CPT | Mod: S$PBB,,, | Performed by: NURSE PRACTITIONER

## 2024-04-22 RX ORDER — FLUCONAZOLE 150 MG/1
150 TABLET ORAL DAILY
Qty: 5 TABLET | Refills: 0 | Status: SHIPPED | OUTPATIENT
Start: 2024-04-22 | End: 2024-04-27

## 2024-04-22 NOTE — PROGRESS NOTES
Debridement Performed for Assessment: Wound# Right plantar foot  Performed By: Provider: Yelitza Ward NP  Assistant:  Lizabeth Stone LPN    Debridement: Surgical    Photo taken post procedure:    Time-Out Taken: Yes  Level: Skin/Subcutaneous Tissue  Post Debridement Measurements  Length: (cm) 7.1  Width: (cm) 11.2  Depth: (cm) 0.9      Area: (cm²) 79.52  Percent Debrided: 100%  Total Area Debrided: (sq cm)     Tissue and other material debrided:  Adipose, Dermis, Epidermis, Subcutaneous  Devitalized Tissue Debrided:Biofilm, callus  Instrument: Curette  Bleeding: Moderate  Hemostasis Achieved: Pressure  Procedural Pain: Insensate  Post Procedural Pain: Insensate  Response to Treatment: Procedure was tolerated well    Devitalized materials/tissue Removed  the following was removed during debridement  subcutaneous      Post Debridement Diagnosis  chronic right foot diabetic ulcer  Post debridement diagnosis  Same as Pre-operative debridement diagnosis, No Complications noted.      Grafts or implants applied  Was a graft or implant applied?  No      Procedure assistant  Procedure assisted by:  Assistant is the same as nurse listed above      Complications related to procedure  Did any complication occure during procedure?  No complications noted during or after procedure.      Specimen  Specimen collect during procedure?  No specimen collected      Anaesthesia:  Anesthesia used  None      Blood Loss:  Blood loss during procedure  less than 5 cc

## 2024-04-22 NOTE — PROCEDURES
"Debridement    Date/Time: 4/22/2024 10:00 AM    Performed by: Yelitza Alas FNP  Authorized by: Yelitza Alas FNP    Time out: Immediately prior to procedure a "time out" was called to verify the correct patient, procedure, equipment, support staff and site/side marked as required.    Consent Done?:  Yes (Written)    Wound Details:    Location:  Right foot    Location:  Right Plantar    Type of Debridement:  Excisional       Length (cm):  7.1       Area (sq cm):  79.52       Width (cm):  11.2       Percent Debrided (%):  100       Depth (cm):  0.9       Total Area Debrided (sq cm):  79.52    Depth of debridement:  Subcutaneous tissue    Tissue debrided:  Adipose, Dermis, Epidermis and Subcutaneous    Devitalized tissue debrided:  Biofilm, Callus, Clots, Exudate, Fibrin and Slough    Instruments:  Curette  Bleeding:  Moderate  Hemostasis Achieved: Yes  Method Used:  Pressure  Patient tolerance:  Patient tolerated the procedure well with no immediate complications  1st Wound Pain Assessment: 0     Assistant JOSE ALFREDO Stone LPN    "

## 2024-05-02 NOTE — PATIENT INSTRUCTIONS
Clean with  vashe or baby shampoo and water     Apply betadine moisten drawtex to wound       Cover and secure with 4x4,wrap with 2 layer compression from toe to knee. Change daily  and as needed.  Pt knows how to apply 2 layer compression.  Please provide pat with enough wraps to change dressing daily     Monitor closely for s/s of infection including fever, chills, increase in pain, odor from wound, and increased redness from foot. Go to ER if any complications develop.   Keep leg elevated and avoid pressure on wound.   Diabetes:  Monitor glucose closely. Check fasting glucose and 2 hours after meals. HgA1C goal <7, fasting glucose , and 2 hours after meals <180  Hypertension:  Check blood pressure twice daily, goal <120/80  Diet:   Increase protein intake, avoid fried, fatty foods and foods high in simple carbs.   Vitamins:  Take vitamin C 1000 mg, zinc 50mg, vitamin d 5000 units, and a daily multivitamin. Angel is a good source of protein and nutrients to aid in wound healing.    Wear CROW boot daily or use crutches to offload right foot     Home health to FOLLOW wound orders above

## 2024-05-02 NOTE — PROGRESS NOTES
AUGUSTUS Macedo   [facility]  [address] [facility] [place] [address]  [phone]      PATIENT NAME: Jean Pierre Paulson  : 1985  DATE: 24  MRN: [id number]      Billing Provider: AUGUSTUS Macedo  Level of Service:   Patient PCP Information       Provider PCP Type    Primary Doctor No General            Reason for Visit / Chief Complaint: Diabetic Foot Ulcer (R DFU)       History of Present Illness / Problem Focused Workflow     Jean Pierre Paulson is a 38 y.o. male who presents to clinic for follow up on chronic-non healing wound on the right foot. Wound remains stable. Increased drainage noted today. Initiate betadine and drawtex. Granulation tissue to wound bed. Continue with two layer compression and antibiotics. Encouraged to keep pressure off foot and elevate leg.   Reinforced importance of following plan of care, wearing off-loading boot, keeping pressure off foot, taking antibiotics as prescribed,increasing protein intake, and keeping diabetes well controlled.    Last arterial doppler 4/10/23,  The ankle to brachial index is 1.25 on the right and 1.34 on the left.    Significant PMH  Includes diabetes and hypertension. Last HgA1C 7.3 in 2022, diabetes is managed by PCP.  Denies fever or chills.     Review of Systems     Review of Systems   Constitutional:  Negative for activity change, chills and fever.   Respiratory:  Negative for chest tightness and shortness of breath.    Cardiovascular:  Positive for leg swelling. Negative for chest pain and palpitations.   Musculoskeletal:  Positive for arthralgias and joint swelling.   Skin:  Positive for wound.        See wound assessment   Neurological:  Positive for weakness and numbness.   Psychiatric/Behavioral:  Negative for agitation, behavioral problems, confusion and self-injury.        Medical / Social / Family History     Past Medical History:   Diagnosis Date    NOLAN (acute kidney injury)  04/07/2023    Anemia     Diabetes mellitus with neuropathy     HLD (hyperlipidemia) 04/07/2023    HTN (hypertension)     Obesity     TEDDY (obstructive sleep apnea) 04/07/2023    Osteomyelitis 10/2020    Hx of R foot, Tx with IV Abx    Peripheral vascular disease     Urinary tract infection with hematuria 02/10/2024       Past Surgical History:   Procedure Laterality Date    APPLICATION OF SPLIT-THICKNESS SKIN GRAFT (STSG) TO LOWER EXTREMITY Right 03/02/2022    Procedure: APPLICATION, GRAFT, SKIN, SPLIT-THICKNESS, TO LOWER EXTREMITY;  Surgeon: Greg Ramírez MD;  Location: [facility];  Service: General;  Laterality: Right;    CHOLECYSTECTOMY      DEBRIDEMENT OF FOOT Right 10/2020    right great toe amputation         Social History  Mr. Jean Pierre Paulson  reports that he has been smoking cigarettes. He started smoking about 24 years ago. He has a 6.2 pack-year smoking history. He has never used smokeless tobacco. He reports current alcohol use of about 2.0 standard drinks of alcohol per week. He reports that he does not use drugs.    Family History  .'s Jean Pierre Paulson family history includes Diabetes in his father and maternal grandmother; Hypertension in his father and maternal grandmother.    Medications and Allergies     Medications  Outpatient Medications Marked as Taking for the 5/20/24 encounter (Office Visit) with Yelitza Alas FNP   Medication Sig Dispense Refill    amLODIPine (NORVASC) 10 MG tablet Take by mouth once daily.      blood sugar diagnostic (BLOOD GLUCOSE TEST) Strp 1 strip by Misc.(Non-Drug; Combo Route) route once daily. accu-chek Veronika strips 90 strip 5    blood-glucose meter,continuous (DEXCOM ) Misc 1 each by Misc.(Non-Drug; Combo Route) route 4 (four) times daily with meals and nightly. 1 each 0    blood-glucose sensor (DEXCOM G7 SENSOR) Aria 1 each by Misc.(Non-Drug; Combo Route) route every 10 days. 3 each 11    gabapentin (NEURONTIN) 300 MG capsule Take 1  capsule (300 mg total) by mouth every 8 (eight) hours. 270 capsule 1    gentamicin (GARAMYCIN) 0.1 % cream Apply topically once daily. 90 g 2    hydroCHLOROthiazide (HYDRODIURIL) 25 MG tablet Take 1 tablet (25 mg total) by mouth once daily. 90 tablet 1    HYDROcodone-acetaminophen (NORCO)  mg per tablet Take 1 tablet by mouth every 12 (twelve) hours. 60 tablet 0    ibuprofen (ADVIL,MOTRIN) 800 MG tablet Take 800 mg by mouth 3 (three) times daily as needed for Pain.      rosuvastatin (CRESTOR) 10 MG tablet Take 10 mg by mouth once daily.      semaglutide (OZEMPIC) 2 mg/dose (8 mg/3 mL) PnIj Inject 2 mg into the skin every 7 days. (Patient taking differently: Inject 2.5 mg into the skin every 7 days.) 8 mL 2       Allergies  Review of patient's allergies indicates:   Allergen Reactions    Tomato Itching       Physical Examination     There were no vitals filed for this visit.          Physical Exam  Vitals and nursing note reviewed.   Constitutional:       Appearance: Normal appearance.   HENT:      Head: Normocephalic.   Cardiovascular:      Rate and Rhythm: Normal rate and regular rhythm.      Pulses: Normal pulses.      Heart sounds: Normal heart sounds.   Pulmonary:      Effort: Pulmonary effort is normal. No respiratory distress.   Chest:      Chest wall: No tenderness.   Musculoskeletal:         General: Swelling, tenderness and deformity present.      Right lower leg: Edema present.   Skin:     General: Skin is warm and dry.      Capillary Refill: Capillary refill takes 2 to 3 seconds.      Comments: See LDA for photos and measurements   Neurological:      General: No focal deficit present.      Mental Status: He is alert and oriented to person, place, and time. Mental status is at baseline.   Psychiatric:         Mood and Affect: Mood normal.         Behavior: Behavior normal.         Thought Content: Thought content normal.         Judgment: Judgment normal.     Assessment and Plan              Altered Skin Integrity 02/10/24 2200 Right plantar Foot Diabetic Ulcer Full thickness tissue loss with exposed bone, tendon, or muscle. Often includes undermining and tunneling. May extend into muscle and/or supporting structures. (Active)   02/10/24 2200   Altered Skin Integrity Present on Admission - Did Patient arrive to the hospital with altered skin?: yes   Side: Right   Orientation: plantar   Location: Foot   Wound Number:    Is this injury device related?:    Primary Wound Type: Diabetic ulc   Description of Altered Skin Integrity: Full thickness tissue loss with exposed bone, tendon, or muscle. Often includes undermining and tunneling. May extend into muscle and/or supporting structures.   Ankle-Brachial Index:    Pulses:    Removal Indication and Assessment:    Wound Outcome:    (Retired) Wound Length (cm):    (Retired) Wound Width (cm):    (Retired) Depth (cm):    Wound Description (Comments):    Removal Indications:    Wound Image    05/20/24 1124   Dressing Appearance Dry;Intact;Clean 05/20/24 1124   Drainage Amount Moderate 05/20/24 1124   Drainage Characteristics/Odor Yellow;Serosanguineous 05/20/24 1124   Appearance Pink;Red;Yellow;Slough;Fibrin;Moist;Granulating;Adipose;Muscle 05/20/24 1124   Black (%), Wound Tissue Color 0 % 05/20/24 1124   Red (%), Wound Tissue Color 90 % 05/20/24 1124   Yellow (%), Wound Tissue Color 10 % 05/20/24 1124   Periwound Area Intact;Dry 05/20/24 1124   Wound Edges Callused 05/20/24 1124   Elliott Classification (diabetic foot ulcers only) Grade 3 05/20/24 1124   Wound Length (cm) 10.8 cm 05/20/24 1124   Wound Width (cm) 6.9 cm 05/20/24 1124   Wound Depth (cm) 0.9 cm 05/20/24 1124   Wound Volume (cm^3) 67.068 cm^3 05/20/24 1124   Wound Surface Area (cm^2) 74.52 cm^2 05/20/24 1124   Care Cleansed with:;Antimicrobial agent 05/20/24 1124   Dressing Applied;Silver;Foam;Gauze;Absorptive Pad;Rolled gauze 05/20/24 1124             Problem List Items Addressed This Visit           Endocrine    Diabetic ulcer of right heel associated with type 2 diabetes mellitus, with fat layer exposed - Primary    Overview                                                                            Current Assessment & Plan     Clean with  vashe or baby shampoo and water     Apply betadine moisten drawtex to wound       Cover and secure with 4x4,wrap with 2 layer compression from toe to knee. Change daily  and as needed.  Pt knows how to apply 2 layer compression.  Please provide pat with enough wraps to change dressing daily     Monitor closely for s/s of infection including fever, chills, increase in pain, odor from wound, and increased redness from foot. Go to ER if any complications develop.   Keep leg elevated and avoid pressure on wound.   Diabetes:  Monitor glucose closely. Check fasting glucose and 2 hours after meals. HgA1C goal <7, fasting glucose , and 2 hours after meals <180  Hypertension:  Check blood pressure twice daily, goal <120/80  Diet:   Increase protein intake, avoid fried, fatty foods and foods high in simple carbs.   Vitamins:  Take vitamin C 1000 mg, zinc 50mg, vitamin d 5000 units, and a daily multivitamin. Angel is a good source of protein and nutrients to aid in wound healing.    Wear CROW boot daily or use crutches to offload right foot     Home health to FOLLOW wound orders above            Orthopedic    Charcot foot due to diabetes mellitus    Overview     CROW orthotic boot            Future Appointments   Date Time Provider Department Center   6/10/2024 10:00 AM Yelitza Alas FNP [facility]            Signature:  AUGUSTUS Macedo  [facility]  [address] [facility] [address]  [phone]    Date of encounter: 5/20/24

## 2024-05-08 ENCOUNTER — EXTERNAL HOME HEALTH (OUTPATIENT)
Dept: HOME HEALTH SERVICES | Facility: HOSPITAL | Age: 39
End: 2024-05-08
Payer: MEDICARE

## 2024-05-13 PROBLEM — N17.9 AKI (ACUTE KIDNEY INJURY): Status: RESOLVED | Noted: 2023-04-07 | Resolved: 2024-05-13

## 2024-05-15 NOTE — PATIENT INSTRUCTIONS
RTC in for HBOT   Subjective   Patient ID: Candie Toure is a 55 y.o. female who presents for Follow-up.    HPI   DM- didn't tolerate metformin and Glimepreide made her very sick to her stomach so stopped all meds 2023  On mounjaro since 2/24-   She tries eat healthy and stay active  No other weight loss medications in the past    Upper back pain   Told djd   No recent imaging   No injury   No numbness or tingling in arms or hands     Review of Systems  As noted HPI  Objective   /80   Temp 36.6 °C (97.9 °F)   Wt 88 kg (194 lb)   BMI 33.83 kg/m²     Physical Exam  Constitutional:       Appearance: Normal appearance. She is obese.   Musculoskeletal:      Cervical back: Normal.      Thoracic back: Spasms present. No tenderness or bony tenderness. Normal range of motion. No scoliosis.      Lumbar back: Normal.   Neurological:      General: No focal deficit present.      Mental Status: She is alert.   Psychiatric:         Mood and Affect: Mood normal.         Behavior: Behavior normal.         Thought Content: Thought content normal.         Judgment: Judgment normal.         Assessment/Plan   Reviewed recent labs with patient scanned into chart  1. Class 2 severe obesity with serious comorbidity and body mass index (BMI) of 35.0 to 35.9 in adult, unspecified obesity type (Multi)  Weight and diabetes improving with Mounjaro reviewed recent labs.  Continue on current medication and follow-up 3 to 6 months unless concerns sooner  - tirzepatide (Mounjaro) 2.5 mg/0.5 mL pen injector; Inject 2.5 mg under the skin 1 (one) time per week.  Dispense: 2 mL; Refill: 1    2. Chronic midline thoracic back pain  Start with x-ray.  Follow-up to be determined  - XR thoracic spine 3 views; Future    3. Type 2 diabetes mellitus with obesity (Multi)  As above #1    4. Primary hypertension  Controlled. Continue current medicines/regimen.     5. Elevated cholesterol with elevated triglycerides  Controlled. Continue current medicines/regimen.        Skip Mckeon, DO

## 2024-05-19 NOTE — PROGRESS NOTES
She Disclaimer: This note has been generated using voice-recognition software. There may be typographical errors that have been missed during proof-reading        Patient ID: Jean Pierre Paulson is a 38 y.o. male.      Chief Complaint: Foot Pain (right)      38-year-old male returns for re-evaluation and  medication refill.  He has diabetes mellitus and remains in wound care for osteomyelitis of the right foot. And chronic pain  He received an I&D today and remains on doxycycline.  He notes that his blood sugars are reasonably controlled.  He denies any additional changes.      l            Pain Assessment  Pain Assessment: 0-10  Pain Score:   4  Pain Location: Foot  Pain Orientation: Right  Pain Descriptors: Aching  Pain Frequency: Constant/continuous  Pain Onset: Awakened from sleep  Clinical Progression: Gradually worsening  Aggravating Factors: Standing  Pain Intervention(s): Medication (See eMAR), Home medication      A's of Opioid Risk Assessment  Activity:Patient has difficulty performing  ADL.   Analgesia:Patients pain is  controlled by current medication.   Adverse Effects: Patient denies constipation or sedation.  Aberrant Behavior:  reviewed with no aberrant drug seeking/taking behavior.      Patient denies any suicidal or homicidal ideations    Physical Therapy/Home Exercise: yes      X-Ray Chest AP Portable  Narrative: EXAMINATION:  XR CHEST AP PORTABLE    CLINICAL HISTORY:  Cough, unspecified    COMPARISON:  Chest x-ray April 7, 2023    TECHNIQUE:  Frontal view/views of the chest.    FINDINGS:  The cardiomediastinal silhouette is stable in configuration.  No focal consolidation, pleural effusion, or pneumothorax.  Visualized osseous and surrounding soft tissue structures demonstrate no acute abnormality.  Impression: No acute cardiopulmonary process demonstrated.    Point of Service: [facility]    Electronically signed by: Inder Mckee  Date:    02/12/2024  Time:    09:11      Review of  Systems   Constitutional: Negative.    HENT: Negative.     Eyes: Negative.    Respiratory: Negative.     Cardiovascular: Negative.    Gastrointestinal: Negative.    Endocrine: Negative.    Genitourinary: Negative.    Musculoskeletal: Negative.  Positive for leg pain.   Integumentary:  Positive for wound (Plantar of right foot).   Allergic/Immunologic: Negative.    Neurological:  Positive for numbness.   Hematological: Negative.    Psychiatric/Behavioral: Negative.               Past Medical History:   Diagnosis Date    NOLAN (acute kidney injury) 2023    Anemia     Diabetes mellitus with neuropathy     HLD (hyperlipidemia) 2023    HTN (hypertension)     Obesity     TEDDY (obstructive sleep apnea) 2023    Osteomyelitis 10/2020    Hx of R foot, Tx with IV Abx    Peripheral vascular disease     Urinary tract infection with hematuria 02/10/2024     Past Surgical History:   Procedure Laterality Date    APPLICATION OF SPLIT-THICKNESS SKIN GRAFT (STSG) TO LOWER EXTREMITY Right 2022    Procedure: APPLICATION, GRAFT, SKIN, SPLIT-THICKNESS, TO LOWER EXTREMITY;  Surgeon: Greg Ramírez MD;  Location[facility];  Service: General;  Laterality: Right;    CHOLECYSTECTOMY      DEBRIDEMENT OF FOOT Right 10/2020    right great toe amputation       Social History     Socioeconomic History    Marital status: Single    Number of children: 1   Tobacco Use    Smoking status: Every Day     Current packs/day: 0.00     Average packs/day: 0.3 packs/day for 25.0 years (6.2 ttl pk-yrs)     Types: Cigarettes     Start date:      Last attempt to quit: 2022     Years since quittin.3    Smokeless tobacco: Never   Substance and Sexual Activity    Alcohol use: Yes     Alcohol/week: 2.0 standard drinks of alcohol     Types: 1 Glasses of wine, 1 Cans of beer per week     Comment: occasionally    Drug use: Never    Sexual activity: Yes     Partners: Female     Social Determinants of Health     Financial Resource  Strain: Medium Risk (2/12/2024)    Overall Financial Resource Strain (CARDIA)     Difficulty of Paying Living Expenses: Somewhat hard   Food Insecurity: Food Insecurity Present (2/12/2024)    Hunger Vital Sign     Worried About Running Out of Food in the Last Year: Sometimes true     Ran Out of Food in the Last Year: Sometimes true   Transportation Needs: No Transportation Needs (2/12/2024)    PRAPARE - Transportation     Lack of Transportation (Medical): No     Lack of Transportation (Non-Medical): No   Physical Activity: Inactive (2/12/2024)    Exercise Vital Sign     Days of Exercise per Week: 0 days     Minutes of Exercise per Session: 0 min   Stress: No Stress Concern Present (2/12/2024)    [employer] - Occupational Stress Questionnaire     Feeling of Stress : Not at all   Housing Stability: Low Risk  (2/12/2024)    Housing Stability Vital Sign     Unable to Pay for Housing in the Last Year: No     Number of Places Lived in the Last Year: 1     Unstable Housing in the Last Year: No     Family History   Problem Relation Name Age of Onset    Hypertension Father      Diabetes Father      Hypertension Maternal Grandmother      Diabetes Maternal Grandmother       Review of patient's allergies indicates:   Allergen Reactions    Tomato Itching     has a current medication list which includes the following prescription(s): amlodipine, blood sugar diagnostic, dexcom , dexcom g7 sensor, gabapentin, gentamicin, hydrochlorothiazide, hydrocodone-acetaminophen, ibuprofen, rosuvastatin, ozempic, amoxicillin-clavulanate 875-125mg, hydralazine, [START ON 5/27/2024] hydrocodone-acetaminophen, [START ON 6/26/2024] hydrocodone-acetaminophen, [START ON 7/26/2024] hydrocodone-acetaminophen, losartan, [DISCONTINUED] blood-glucose meter, [DISCONTINUED] insulin asp prt-insulin aspart (novolog 70/30), [DISCONTINUED] insulin detemir u-100, and [DISCONTINUED] dakin's solution.      Objective:  Vitals:     05/20/24 1304   BP: (!) 149/95   Pulse: 87   Resp: 18          Physical Exam  Vitals and nursing note reviewed.   Constitutional:       General: He is not in acute distress.     Appearance: Normal appearance. He is not ill-appearing, toxic-appearing or diaphoretic.   HENT:      Head: Normocephalic and atraumatic.      Nose: Nose normal.      Mouth/Throat:      Mouth: Mucous membranes are moist.   Eyes:      Extraocular Movements: Extraocular movements intact.      Pupils: Pupils are equal, round, and reactive to light.   Cardiovascular:      Rate and Rhythm: Normal rate and regular rhythm.      Heart sounds: Normal heart sounds.   Pulmonary:      Effort: Pulmonary effort is normal. No respiratory distress.      Breath sounds: Normal breath sounds. No stridor. No wheezing or rhonchi.   Abdominal:      General: Bowel sounds are normal.      Palpations: Abdomen is soft.   Musculoskeletal:         General: No swelling, tenderness or deformity.      Cervical back: Normal and normal range of motion. No spasms or tenderness. No pain with movement. Normal range of motion.      Thoracic back: Normal.      Lumbar back: No spasms, tenderness or bony tenderness. Normal range of motion. Negative right straight leg raise test and negative left straight leg raise test. No scoliosis.      Right lower leg: No edema.      Left lower leg: No edema.      Right foot: Decreased range of motion.   Feet:      Right foot:      Skin integrity: Ulcer (clean intact bandage in place) present.   Skin:     General: Skin is warm.   Neurological:      General: No focal deficit present.      Mental Status: He is alert and oriented to person, place, and time. Mental status is at baseline.      Cranial Nerves: No cranial nerve deficit.      Sensory: Sensation is intact. No sensory deficit.      Motor: No weakness.      Coordination: Coordination normal.      Gait: Gait abnormal (Right walking boot in place).      Deep Tendon Reflexes: Reflexes are  normal and symmetric.   Psychiatric:         Mood and Affect: Mood normal.         Behavior: Behavior normal.           Assessment:      1. Peripheral vascular disease    2. Diabetic neuropathy with neurologic complication    3. Chronic pain syndrome            Plan:  1. reviewed  2.Addiction, Dependency, Tolerance, Opioid abuse-misuse, Death, Diversion Discussed. Overdose reversal drug Naloxone discussed. Patient is prescribed opiates for chronic nonmalignant pain pathology.  Patient is receiving opiates which require greater than a 72 hour supply of therapy.  Patient was educated on potential dependency associated with long-term opioid use as well as decreasing efficacy with prolonged use.  Patient was advised of risks, benefits and side effects and how to utilize each medication.  Patient was also informed that any deviation from therapy protocol will  lead to discontinuation of opiates.  It is reasonable to prescribe opioid analgesics for patient based on positive response to opioid medications, lack of side effects and  limited aberrant behavior.    3.Refill/Continue medications for pain control and function.        Requested Prescriptions     Signed Prescriptions Disp Refills    HYDROcodone-acetaminophen (NORCO)  mg per tablet 60 tablet 0     Sig: Take 1 tablet by mouth every 12 (twelve) hours as needed for Pain (pain).    HYDROcodone-acetaminophen (NORCO)  mg per tablet 60 tablet 0     Sig: Take 1 tablet by mouth every 12 (twelve) hours as needed for Pain (pain).    HYDROcodone-acetaminophen (NORCO)  mg per tablet 60 tablet 0     Sig: Take 1 tablet by mouth every 12 (twelve) hours as needed for Pain (pain).     4.Patient defers nerve block injections, physical therapy or surgical consultation     5.Follow with VONNIE Miller in 3months for re-evaluation and medication refill    6. Continue wound care as prescribed         report:  Reviewed and consistent with medication use as  prescribed.      The total time spent for evaluation and management on 05/21/2024 including reviewing separately obtained history, performing a medically appropriate exam and evaluation, documenting clinical information in the health record, independently interpreting results and communicating them to the patient/family/caregiver, and ordering medications/tests/procedures was between 15-29 minutes.    The above plan and management options were discussed at length with patient. Patient is in agreement with the above and verbalized understanding. It will be communicated with the referring physician via electronic record, fax, or mail.

## 2024-05-20 ENCOUNTER — OFFICE VISIT (OUTPATIENT)
Dept: WOUND CARE | Facility: CLINIC | Age: 39
End: 2024-05-20
Attending: FAMILY MEDICINE
Payer: MEDICARE

## 2024-05-20 ENCOUNTER — OFFICE VISIT (OUTPATIENT)
Dept: PAIN MEDICINE | Facility: CLINIC | Age: 39
End: 2024-05-20
Payer: MEDICARE

## 2024-05-20 VITALS
BODY MASS INDEX: 40.43 KG/M2 | DIASTOLIC BLOOD PRESSURE: 95 MMHG | RESPIRATION RATE: 18 BRPM | HEIGHT: 74 IN | WEIGHT: 315 LBS | HEART RATE: 87 BPM | SYSTOLIC BLOOD PRESSURE: 149 MMHG

## 2024-05-20 DIAGNOSIS — I73.9 PERIPHERAL VASCULAR DISEASE: Primary | Chronic | ICD-10-CM

## 2024-05-20 DIAGNOSIS — E11.49 DIABETIC NEUROPATHY WITH NEUROLOGIC COMPLICATION: Chronic | ICD-10-CM

## 2024-05-20 DIAGNOSIS — E11.621 DIABETIC ULCER OF RIGHT HEEL ASSOCIATED WITH TYPE 2 DIABETES MELLITUS, WITH FAT LAYER EXPOSED: Primary | ICD-10-CM

## 2024-05-20 DIAGNOSIS — L97.412 DIABETIC ULCER OF RIGHT HEEL ASSOCIATED WITH TYPE 2 DIABETES MELLITUS, WITH FAT LAYER EXPOSED: Primary | ICD-10-CM

## 2024-05-20 DIAGNOSIS — E11.610 CHARCOT FOOT DUE TO DIABETES MELLITUS: ICD-10-CM

## 2024-05-20 DIAGNOSIS — E11.40 DIABETIC NEUROPATHY WITH NEUROLOGIC COMPLICATION: Chronic | ICD-10-CM

## 2024-05-20 DIAGNOSIS — G89.4 CHRONIC PAIN SYNDROME: Chronic | ICD-10-CM

## 2024-05-20 PROCEDURE — 99213 OFFICE O/P EST LOW 20 MIN: CPT | Mod: S$PBB,,, | Performed by: NURSE PRACTITIONER

## 2024-05-20 PROCEDURE — 99214 OFFICE O/P EST MOD 30 MIN: CPT | Mod: S$PBB,,, | Performed by: PAIN MEDICINE

## 2024-05-20 PROCEDURE — 99214 OFFICE O/P EST MOD 30 MIN: CPT | Mod: PBBFAC | Performed by: NURSE PRACTITIONER

## 2024-05-20 PROCEDURE — 99215 OFFICE O/P EST HI 40 MIN: CPT | Mod: PBBFAC | Performed by: PAIN MEDICINE

## 2024-05-20 RX ORDER — HYDROCODONE BITARTRATE AND ACETAMINOPHEN 10; 325 MG/1; MG/1
1 TABLET ORAL EVERY 12 HOURS PRN
Qty: 60 TABLET | Refills: 0 | Status: SHIPPED | OUTPATIENT
Start: 2024-05-27 | End: 2024-06-26

## 2024-05-20 RX ORDER — HYDROCODONE BITARTRATE AND ACETAMINOPHEN 10; 325 MG/1; MG/1
1 TABLET ORAL EVERY 12 HOURS PRN
Qty: 60 TABLET | Refills: 0 | Status: SHIPPED | OUTPATIENT
Start: 2024-06-26 | End: 2024-07-26

## 2024-05-20 RX ORDER — HYDROCODONE BITARTRATE AND ACETAMINOPHEN 10; 325 MG/1; MG/1
1 TABLET ORAL EVERY 12 HOURS PRN
Qty: 60 TABLET | Refills: 0 | Status: SHIPPED | OUTPATIENT
Start: 2024-07-26 | End: 2024-08-25

## 2024-05-22 NOTE — PATIENT INSTRUCTIONS
Clean with  vashe or baby shampoo and water     Apply betadine moisten drawtex to wound       Cover and secure with 4x4,wrap with 2 layer compression from toe to knee. Change daily  and as needed.  Pt knows how to apply 2 layer compression.  Please provide pat with enough wraps to change dressing daily     Monitor closely for s/s of infection including fever, chills, increase in pain, odor from wound, and increased redness from foot. Go to ER if any complications develop.   Keep leg elevated and avoid pressure on wound.   Diabetes:  Monitor glucose closely. Check fasting glucose and 2 hours after meals. HgA1C goal <7, fasting glucose , and 2 hours after meals <180  Hypertension:  Check blood pressure twice daily, goal <120/80  Diet:   Increase protein intake, avoid fried, fatty foods and foods high in simple carbs.   Vitamins:  Take vitamin C 1000 mg, zinc 50mg, vitamin d 5000 units, and a daily multivitamin. Angel is a good source of protein and nutrients to aid in wound healing.    Hold CROW BOOT for now     Home health to FOLLOW wound orders above

## 2024-05-22 NOTE — PROGRESS NOTES
AUGUSTUS Macedo   [facility]  [address] [facility] [place] [address]  [phone]      PATIENT NAME: Jean Pierre Paulson  : 1985  DATE: 6/10/24  MRN: [id number]      Billing Provider: AUGUSTUS Macedo  Level of Service:   Patient PCP Information       Provider PCP Type    Primary Doctor No General            Reason for Visit / Chief Complaint: Diabetic Foot Ulcer (Right heel)       History of Present Illness / Problem Focused Workflow     Jean Pierre Paulson is a 38 y.o. male who presents to clinic for follow up on chronic-non healing wound on the right foot. Wound remains stable. He has new area of breakdown on lateral aspect of foot. Patient reports increased pain and swelling to foot. Bedside debridement today. Initiate betadine and drawtex. Compression sock and brace ordered for patient. Encouraged to keep pressure off foot and elevate leg.   Reinforced importance of following plan of care, wearing off-loading boot, keeping pressure off foot, taking antibiotics as prescribed,increasing protein intake, and keeping diabetes well controlled.    Last arterial doppler 4/10/23,  The ankle to brachial index is 1.25 on the right and 1.34 on the left.    Significant PMH  Includes diabetes and hypertension. Last HgA1C 7.3 in 2022, diabetes is managed by PCP.  Denies fever or chills.     Review of Systems     Review of Systems   Constitutional:  Negative for activity change, chills and fever.   Respiratory:  Negative for chest tightness and shortness of breath.    Cardiovascular:  Positive for leg swelling. Negative for chest pain and palpitations.   Musculoskeletal:  Positive for arthralgias and joint swelling.   Skin:  Positive for wound.        See wound assessment   Neurological:  Positive for weakness and numbness.   Psychiatric/Behavioral:  Negative for agitation, behavioral problems, confusion and self-injury.        Medical / Social / Family History      Past Medical History:   Diagnosis Date    NOLAN (acute kidney injury) 04/07/2023    Anemia     Diabetes mellitus with neuropathy     HLD (hyperlipidemia) 04/07/2023    HTN (hypertension)     Obesity     TEDDY (obstructive sleep apnea) 04/07/2023    Osteomyelitis 10/2020    Hx of R foot, Tx with IV Abx    Peripheral vascular disease     Urinary tract infection with hematuria 02/10/2024       Past Surgical History:   Procedure Laterality Date    APPLICATION OF SPLIT-THICKNESS SKIN GRAFT (STSG) TO LOWER EXTREMITY Right 03/02/2022    Procedure: APPLICATION, GRAFT, SKIN, SPLIT-THICKNESS, TO LOWER EXTREMITY;  Surgeon: Greg Ramírez MD;  Location: [facility];  Service: General;  Laterality: Right;    CHOLECYSTECTOMY      DEBRIDEMENT OF FOOT Right 10/2020    right great toe amputation         Social History  Mr. Jean Pierre Paulson  reports that he has been smoking cigarettes. He started smoking about 24 years ago. He has a 6.2 pack-year smoking history. He has never used smokeless tobacco. He reports current alcohol use of about 2.0 standard drinks of alcohol per week. He reports that he does not use drugs.    Family History  'anjana Paulson family history includes Diabetes in his father and maternal grandmother; Hypertension in his father and maternal grandmother.    Medications and Allergies     Medications  Outpatient Medications Marked as Taking for the 6/10/24 encounter (Office Visit) with Yelitza Alas FNP   Medication Sig Dispense Refill    amLODIPine (NORVASC) 10 MG tablet Take by mouth once daily.      blood sugar diagnostic (BLOOD GLUCOSE TEST) Strp 1 strip by Misc.(Non-Drug; Combo Route) route once daily. accu-chek Veronika strips 90 strip 5    blood-glucose meter,continuous (DEXCOM ) Misc 1 each by Misc.(Non-Drug; Combo Route) route 4 (four) times daily with meals and nightly. 1 each 0    blood-glucose sensor (DEXCOM G7 SENSOR) Aria 1 each by Misc.(Non-Drug; Combo Route) route  every 10 days. 3 each 11    doxycycline (VIBRA-TABS) 100 MG tablet Take 1 tablet (100 mg total) by mouth 2 (two) times daily. 60 tablet 2    gabapentin (NEURONTIN) 300 MG capsule Take 1 capsule (300 mg total) by mouth every 8 (eight) hours. 270 capsule 1    hydroCHLOROthiazide (HYDRODIURIL) 25 MG tablet Take 1 tablet (25 mg total) by mouth once daily. 90 tablet 1    HYDROcodone-acetaminophen (NORCO)  mg per tablet Take 1 tablet by mouth every 12 (twelve) hours as needed for Pain (pain). 60 tablet 0    ibuprofen (ADVIL,MOTRIN) 800 MG tablet Take 800 mg by mouth 3 (three) times daily as needed for Pain.      rosuvastatin (CRESTOR) 10 MG tablet Take 10 mg by mouth once daily.      semaglutide (OZEMPIC) 2 mg/dose (8 mg/3 mL) PnIj Inject 2 mg into the skin every 7 days. (Patient taking differently: Inject 2.5 mg into the skin every 7 days.) 8 mL 2       Allergies  Review of patient's allergies indicates:   Allergen Reactions    Tomato Itching       Physical Examination     Vitals:    06/10/24 1053   BP: (!) 143/92   Pulse: 90   Resp: 20   Temp: 97.7 °F (36.5 °C)             Physical Exam  Vitals and nursing note reviewed.   Constitutional:       Appearance: Normal appearance.   HENT:      Head: Normocephalic.   Cardiovascular:      Rate and Rhythm: Normal rate and regular rhythm.      Pulses: Normal pulses.      Heart sounds: Normal heart sounds.   Pulmonary:      Effort: Pulmonary effort is normal. No respiratory distress.   Chest:      Chest wall: No tenderness.   Musculoskeletal:         General: Swelling, tenderness and deformity present.      Right lower leg: Edema present.   Skin:     General: Skin is warm and dry.      Capillary Refill: Capillary refill takes 2 to 3 seconds.      Comments: See LDA for photos and measurements   Neurological:      General: No focal deficit present.      Mental Status: He is alert and oriented to person, place, and time. Mental status is at baseline.   Psychiatric:          Mood and Affect: Mood normal.         Behavior: Behavior normal.         Thought Content: Thought content normal.         Judgment: Judgment normal.     Assessment and Plan             Altered Skin Integrity 02/10/24 2200 Right plantar Foot Diabetic Ulcer Full thickness tissue loss with exposed bone, tendon, or muscle. Often includes undermining and tunneling. May extend into muscle and/or supporting structures. (Active)   02/10/24 2200   Altered Skin Integrity Present on Admission - Did Patient arrive to the hospital with altered skin?: yes   Side: Right   Orientation: plantar   Location: Foot   Wound Number:    Is this injury device related?:    Primary Wound Type: Diabetic ulc   Description of Altered Skin Integrity: Full thickness tissue loss with exposed bone, tendon, or muscle. Often includes undermining and tunneling. May extend into muscle and/or supporting structures.   Ankle-Brachial Index:    Pulses:    Removal Indication and Assessment:    Wound Outcome:    (Retired) Wound Length (cm):    (Retired) Wound Width (cm):    (Retired) Depth (cm):    Wound Description (Comments):    Removal Indications:    Wound Image     06/10/24 1056   Description of Altered Skin Integrity Full thickness tissue loss. Subcutaneous fat may be visible but bone, tendon or muscle are not exposed 06/10/24 1056   Dressing Appearance Moist drainage 06/10/24 1056   Drainage Amount Moderate 06/10/24 1056   Drainage Characteristics/Odor Serosanguineous 06/10/24 1056   Appearance Pink;Moist;Granulating 06/10/24 1056   Tissue loss description Full thickness 06/10/24 1056   Black (%), Wound Tissue Color 0 % 06/10/24 1056   Red (%), Wound Tissue Color 100 % 06/10/24 1056   Yellow (%), Wound Tissue Color 0 % 06/10/24 1056   Periwound Area Intact 06/10/24 1056   Wound Edges Callused 06/10/24 1056   Wound Length (cm) 5.8 cm 06/10/24 1056   Wound Width (cm) 10.9 cm 06/10/24 1056   Wound Depth (cm) 1 cm 06/10/24 1056   Wound Volume (cm^3)  63.22 cm^3 06/10/24 1056   Wound Surface Area (cm^2) 63.22 cm^2 06/10/24 1056   Care Cleansed with:;Antimicrobial agent 06/10/24 1056   Dressing Applied;Silver;Hydrofiber;Gauze;Rolled gauze 06/10/24 1056   Periwound Care Moisture barrier applied 06/10/24 1056   Off Loading Other (see comments) 06/10/24 1056   Dressing Change Due 06/11/24 06/10/24 1056            Wound 06/10/24 Abscess Right lateral Foot #4 (Active)   06/10/24    Present on Original Admission: Y   Primary Wound Type: Abscess   Side: Right   Orientation: lateral   Location: Foot   Wound Approximate Age at First Assessment (Weeks):    Wound Number: #4   Is this injury device related?:    Incision Type:    Closure Method:    Wound Description (Comments):    Type:    Additional Comments:    Ankle-Brachial Index:    Pulses:    Removal Indication and Assessment:    Wound Outcome:    Wound Image     06/10/24 1142   Dressing Appearance Open to air 06/10/24 1142   Drainage Amount Moderate 06/10/24 1142   Drainage Characteristics/Odor Purulent 06/10/24 1142   Appearance Tan;Moist;Slough 06/10/24 1142   Tissue loss description Full thickness 06/10/24 1142   Black (%), Wound Tissue Color 0 % 06/10/24 1142   Red (%), Wound Tissue Color 0 % 06/10/24 1142   Yellow (%), Wound Tissue Color 100 % 06/10/24 1142   Periwound Area Intact 06/10/24 1142   Wound Edges Callused 06/10/24 1142   Wound Length (cm) 0.7 cm 06/10/24 1142   Wound Width (cm) 0.7 cm 06/10/24 1142   Wound Depth (cm) 0.2 cm 06/10/24 1142   Wound Volume (cm^3) 0.098 cm^3 06/10/24 1142   Wound Surface Area (cm^2) 0.49 cm^2 06/10/24 1142   Care Cleansed with:;Antimicrobial agent;Soap and water 06/10/24 1142   Dressing Applied;Gauze;Rolled gauze 06/10/24 1142   Periwound Care Moisture barrier applied 06/10/24 1142   Dressing Change Due 06/11/24 06/10/24 [id number]               Problem List Items Addressed This Visit          Endocrine    Diabetic ulcer of right heel associated with type 2 diabetes mellitus, with  fat layer exposed - Primary    Overview                                                                                  Current Assessment & Plan     Clean with  vashe or baby shampoo and water     Apply betadine moisten drawtex to wound       Cover and secure with 4x4,wrap with 2 layer compression from toe to knee. Change daily  and as needed.  Pt knows how to apply 2 layer compression.  Please provide pat with enough wraps to change dressing daily     Monitor closely for s/s of infection including fever, chills, increase in pain, odor from wound, and increased redness from foot. Go to ER if any complications develop.   Keep leg elevated and avoid pressure on wound.   Diabetes:  Monitor glucose closely. Check fasting glucose and 2 hours after meals. HgA1C goal <7, fasting glucose , and 2 hours after meals <180  Hypertension:  Check blood pressure twice daily, goal <120/80  Diet:   Increase protein intake, avoid fried, fatty foods and foods high in simple carbs.   Vitamins:  Take vitamin C 1000 mg, zinc 50mg, vitamin d 5000 units, and a daily multivitamin. Angel is a good source of protein and nutrients to aid in wound healing.    Hold CROW BOOT for now     Home health to FOLLOW wound orders above         Relevant Orders    Culture, Wound    Culture, Anaerobe       Orthopedic    Charcot foot due to diabetes mellitus    Overview     CROW orthotic boot            Future Appointments   Date Time Provider Department Center   7/1/2024 10:00 AM Yelitza Alas FNP [facility]   8/19/2024  1:00 PM Franck Kee PA [facility]            Signature:  AUGUSTUS Macedo  [facility]  [address] [facility] [address]  [phone]    Date of encounter: 6/10/24

## 2024-05-25 RX ORDER — DOXYCYCLINE HYCLATE 100 MG
100 TABLET ORAL 2 TIMES DAILY
Qty: 60 TABLET | Refills: 2 | Status: SHIPPED | OUTPATIENT
Start: 2024-05-25 | End: 2024-08-23

## 2024-05-31 ENCOUNTER — DOCUMENT SCAN (OUTPATIENT)
Dept: HOME HEALTH SERVICES | Facility: HOSPITAL | Age: 39
End: 2024-05-31
Payer: MEDICARE

## 2024-06-10 ENCOUNTER — OFFICE VISIT (OUTPATIENT)
Dept: WOUND CARE | Facility: CLINIC | Age: 39
End: 2024-06-10
Attending: FAMILY MEDICINE
Payer: MEDICARE

## 2024-06-10 VITALS
RESPIRATION RATE: 20 BRPM | HEART RATE: 90 BPM | TEMPERATURE: 98 F | SYSTOLIC BLOOD PRESSURE: 143 MMHG | DIASTOLIC BLOOD PRESSURE: 92 MMHG

## 2024-06-10 DIAGNOSIS — E11.621 DIABETIC ULCER OF RIGHT HEEL ASSOCIATED WITH TYPE 2 DIABETES MELLITUS, WITH FAT LAYER EXPOSED: Primary | ICD-10-CM

## 2024-06-10 DIAGNOSIS — E11.610 CHARCOT FOOT DUE TO DIABETES MELLITUS: ICD-10-CM

## 2024-06-10 DIAGNOSIS — E08.621 DIABETIC ULCER OF RIGHT MIDFOOT ASSOCIATED WITH DIABETES MELLITUS DUE TO UNDERLYING CONDITION, WITH FAT LAYER EXPOSED: ICD-10-CM

## 2024-06-10 DIAGNOSIS — L97.412 DIABETIC ULCER OF RIGHT HEEL ASSOCIATED WITH TYPE 2 DIABETES MELLITUS, WITH FAT LAYER EXPOSED: Primary | ICD-10-CM

## 2024-06-10 DIAGNOSIS — L97.412 DIABETIC ULCER OF RIGHT MIDFOOT ASSOCIATED WITH DIABETES MELLITUS DUE TO UNDERLYING CONDITION, WITH FAT LAYER EXPOSED: ICD-10-CM

## 2024-06-10 PROCEDURE — 87186 SC STD MICRODIL/AGAR DIL: CPT | Mod: ,,, | Performed by: CLINICAL MEDICAL LABORATORY

## 2024-06-10 PROCEDURE — 87184 SC STD DISK METHOD PER PLATE: CPT | Mod: ,,, | Performed by: CLINICAL MEDICAL LABORATORY

## 2024-06-10 PROCEDURE — 87070 CULTURE OTHR SPECIMN AEROBIC: CPT | Mod: ,,, | Performed by: CLINICAL MEDICAL LABORATORY

## 2024-06-10 PROCEDURE — 87075 CULTR BACTERIA EXCEPT BLOOD: CPT | Mod: ,,, | Performed by: CLINICAL MEDICAL LABORATORY

## 2024-06-10 PROCEDURE — 87077 CULTURE AEROBIC IDENTIFY: CPT | Mod: ,,, | Performed by: CLINICAL MEDICAL LABORATORY

## 2024-06-10 PROCEDURE — 99499 UNLISTED E&M SERVICE: CPT | Mod: S$PBB,,, | Performed by: NURSE PRACTITIONER

## 2024-06-10 PROCEDURE — 11042 DBRDMT SUBQ TIS 1ST 20SQCM/<: CPT | Mod: PBBFAC | Performed by: NURSE PRACTITIONER

## 2024-06-10 PROCEDURE — 99215 OFFICE O/P EST HI 40 MIN: CPT | Mod: PBBFAC,25 | Performed by: NURSE PRACTITIONER

## 2024-06-10 NOTE — PROGRESS NOTES
Debridement Performed for Assessment: Wound# right lateral foot  Performed By: Provider: Yelitza Ward NP  Assistant:  Lizabeth Stone LPN    Debridement: Surgical    Photo taken post procedure:    Time-Out Taken: Yes  Level: Skin/Subcutaneous Tissue  Post Debridement Measurements  Length: (cm) 1.2  Width: (cm) 1.1  Depth: (cm) 0.4      Area: (cm²) 1.32  Percent Debrided: 100%  Total Area Debrided: (sq cm)     Tissue and other material debrided:  Adipose, Dermis, Epidermis, Subcutaneous  Devitalized Tissue Debrided:Biofilm, Slough, Fibrin  Instrument: Curette  Bleeding: Moderate  Hemostasis Achieved: Pressure  Procedural Pain: Insensate  Post Procedural Pain: Insensate  Response to Treatment: Procedure was tolerated well    Devitalized materials/tissue Removed  the following was removed during debridement  subcutaneous      Post Debridement Diagnosis right diabetic foot ulcer  Post debridement diagnosis  Same as Pre-operative debridement diagnosis, No Complications noted.      Grafts or implants applied  Was a graft or implant applied?  No      Procedure assistant  Procedure assisted by:  Assistant is the same as nurse listed above      Complications related to procedure  Did any complication occure during procedure?  No complications noted during or after procedure.      Specimen  Specimen collect during procedure?  No specimen collected      Anaesthesia:  Anesthesia used  None      Blood Loss:  Blood loss during procedure  less than 5 cc

## 2024-06-10 NOTE — PROCEDURES
"Debridement    Date/Time: 6/10/2024 10:00 AM    Performed by: Yelitza Alas FNP  Authorized by: Yelitza Alas FNP    Time out: Immediately prior to procedure a "time out" was called to verify the correct patient, procedure, equipment, support staff and site/side marked as required.    Consent Done?:  Yes (Written)    Type of Debridement:  Excisional       Length (cm):  1.2       Area (sq cm):  1.32       Width (cm):  1.1       Percent Debrided (%):  100       Depth (cm):  0.4       Total Area Debrided (sq cm):  1.32    Depth of debridement:  Subcutaneous tissue    Tissue debrided:  Adipose, Dermis, Epidermis and Subcutaneous    Devitalized tissue debrided:  Biofilm, Callus, Exudate, Clots and Fibrin    Instruments:  Curette  Bleeding:  Moderate  Hemostasis Achieved: Yes  Method Used:  Pressure  Patient tolerance:  Patient tolerated the procedure well with no immediate complications  1st Wound Pain Assessment: 0     Assistant JOSE ALFREDO Stone LPN    "

## 2024-06-13 LAB — MICROORGANISM SPEC CULT: ABNORMAL

## 2024-06-13 RX ORDER — AMOXICILLIN AND CLAVULANATE POTASSIUM 875; 125 MG/1; MG/1
1 TABLET, FILM COATED ORAL EVERY 12 HOURS
Qty: 60 TABLET | Refills: 0 | Status: SHIPPED | OUTPATIENT
Start: 2024-06-13 | End: 2024-07-13

## 2024-06-14 LAB — BACTERIA SPEC ANAEROBE CULT: NORMAL

## 2024-06-24 NOTE — PROGRESS NOTES
AUGUSTUS Macedo   [facility]  [address] [facility] [place] [address]  [phone]      PATIENT NAME: Jean Pierre Paulson  : 1985  DATE: 24  MRN: [id number]      Billing Provider: AUGUSTUS Macedo  Level of Service:   Patient PCP Information       Provider PCP Type    Primary Doctor No General            Reason for Visit / Chief Complaint: Diabetic Foot Ulcer and Wound Care (Right heel)       History of Present Illness / Problem Focused Workflow     Jean Pierre Paulson is a 39 y.o. male who presents to clinic for follow up on chronic-non healing wound on the right foot. Wound has improved since last visit. Lateral foot remains stable. Patient reports improvement in pain and swelling to foot. Bedside debridement today. Continue with betadine and drawtex with compression sock. Encouraged to continue to keep pressure off foot and elevate leg.   Reinforced importance of following plan of care, wearing off-loading boot, keeping pressure off foot, taking antibiotics as prescribed,increasing protein intake, and keeping diabetes well controlled.    Last arterial doppler 4/10/23,  The ankle to brachial index is 1.25 on the right and 1.34 on the left.    Significant PMH  Includes diabetes and hypertension. Last HgA1C 7.3 in 2022, diabetes is managed by PCP.  Denies fever or chills.     Review of Systems     Review of Systems   Constitutional:  Negative for activity change, chills and fever.   Respiratory:  Negative for chest tightness and shortness of breath.    Cardiovascular:  Positive for leg swelling. Negative for chest pain and palpitations.   Musculoskeletal:  Positive for arthralgias and joint swelling.   Skin:  Positive for wound.        See wound assessment   Neurological:  Positive for weakness and numbness.   Psychiatric/Behavioral:  Negative for agitation, behavioral problems, confusion and self-injury.        Medical / Social / Family History     Past  Medical History:   Diagnosis Date    NOLAN (acute kidney injury) 04/07/2023    Anemia     Diabetes mellitus with neuropathy     HLD (hyperlipidemia) 04/07/2023    HTN (hypertension)     Obesity     TEDDY (obstructive sleep apnea) 04/07/2023    Osteomyelitis 10/2020    Hx of R foot, Tx with IV Abx    Peripheral vascular disease     Urinary tract infection with hematuria 02/10/2024       Past Surgical History:   Procedure Laterality Date    APPLICATION OF SPLIT-THICKNESS SKIN GRAFT (STSG) TO LOWER EXTREMITY Right 03/02/2022    Procedure: APPLICATION, GRAFT, SKIN, SPLIT-THICKNESS, TO LOWER EXTREMITY;  Surgeon: Greg Ramírez MD;  Location: [facility];  Service: General;  Laterality: Right;    CHOLECYSTECTOMY      DEBRIDEMENT OF FOOT Right 10/2020    right great toe amputation         Social History  Mr. Jean Pierre Paulson  reports that he has been smoking cigarettes. He started smoking about 24 years ago. He has a 6.2 pack-year smoking history. He has never used smokeless tobacco. He reports current alcohol use of about 2.0 standard drinks of alcohol per week. He reports that he does not use drugs.    Family History  'anjana Paulson family history includes Diabetes in his father and maternal grandmother; Hypertension in his father and maternal grandmother.    Medications and Allergies     Medications  Outpatient Medications Marked as Taking for the 7/1/24 encounter (Office Visit) with Yelitza Alas FNP   Medication Sig Dispense Refill    amLODIPine (NORVASC) 10 MG tablet Take by mouth once daily.      amoxicillin-clavulanate 875-125mg (AUGMENTIN) 875-125 mg per tablet Take 1 tablet by mouth every 12 (twelve) hours. 60 tablet 0    blood sugar diagnostic (BLOOD GLUCOSE TEST) Strp 1 strip by Misc.(Non-Drug; Combo Route) route once daily. accu-chek Veronika strips 90 strip 5    blood-glucose meter,continuous (DEXCOM ) Misc 1 each by Misc.(Non-Drug; Combo Route) route 4 (four) times daily with  meals and nightly. 1 each 0    blood-glucose sensor (DEXCOM G7 SENSOR) Aria 1 each by Misc.(Non-Drug; Combo Route) route every 10 days. 3 each 11    gabapentin (NEURONTIN) 300 MG capsule Take 1 capsule (300 mg total) by mouth every 8 (eight) hours. 270 capsule 1    gentamicin (GARAMYCIN) 0.1 % cream Apply topically once daily. 90 g 2    hydroCHLOROthiazide (HYDRODIURIL) 25 MG tablet Take 1 tablet (25 mg total) by mouth once daily. 90 tablet 1    HYDROcodone-acetaminophen (NORCO)  mg per tablet Take 1 tablet by mouth every 12 (twelve) hours as needed for Pain (pain). 60 tablet 0    [START ON 7/26/2024] HYDROcodone-acetaminophen (NORCO)  mg per tablet Take 1 tablet by mouth every 12 (twelve) hours as needed for Pain (pain). 60 tablet 0    ibuprofen (ADVIL,MOTRIN) 800 MG tablet Take 800 mg by mouth 3 (three) times daily as needed for Pain.      rosuvastatin (CRESTOR) 10 MG tablet Take 10 mg by mouth once daily.      semaglutide (OZEMPIC) 2 mg/dose (8 mg/3 mL) PnIj Inject 2 mg into the skin every 7 days. (Patient taking differently: Inject 2.5 mg into the skin every 7 days.) 8 mL 2       Allergies  Review of patient's allergies indicates:   Allergen Reactions    Tomato Itching       Physical Examination     Vitals:    07/01/24 1020   BP: (!) 155/91   Pulse: 84   Resp: 18   Temp: 97.5 °F (36.4 °C)               Physical Exam  Vitals and nursing note reviewed.   Constitutional:       Appearance: Normal appearance.   HENT:      Head: Normocephalic.   Cardiovascular:      Rate and Rhythm: Normal rate and regular rhythm.      Pulses: Normal pulses.      Heart sounds: Normal heart sounds.   Pulmonary:      Effort: Pulmonary effort is normal. No respiratory distress.   Chest:      Chest wall: No tenderness.   Musculoskeletal:         General: Swelling, tenderness and deformity present.      Right lower leg: Edema present.   Skin:     General: Skin is warm and dry.      Capillary Refill: Capillary refill  takes 2 to 3 seconds.      Comments: See LDA for photos and measurements   Neurological:      General: No focal deficit present.      Mental Status: He is alert and oriented to person, place, and time. Mental status is at baseline.   Psychiatric:         Mood and Affect: Mood normal.         Behavior: Behavior normal.         Thought Content: Thought content normal.         Judgment: Judgment normal.     Assessment and Plan             Altered Skin Integrity 02/10/24 2200 Right plantar Foot Diabetic Ulcer Full thickness tissue loss with exposed bone, tendon, or muscle. Often includes undermining and tunneling. May extend into muscle and/or supporting structures. (Active)   02/10/24 2200   Altered Skin Integrity Present on Admission - Did Patient arrive to the hospital with altered skin?: yes   Side: Right   Orientation: plantar   Location: Foot   Wound Number:    Is this injury device related?:    Primary Wound Type: Diabetic ulc   Description of Altered Skin Integrity: Full thickness tissue loss with exposed bone, tendon, or muscle. Often includes undermining and tunneling. May extend into muscle and/or supporting structures.   Ankle-Brachial Index:    Pulses:    Removal Indication and Assessment:    Wound Outcome:    (Retired) Wound Length (cm):    (Retired) Wound Width (cm):    (Retired) Depth (cm):    Wound Description (Comments):    Removal Indications:    Wound Image   07/01/24 1101   Description of Altered Skin Integrity Full thickness tissue loss. Subcutaneous fat may be visible but bone, tendon or muscle are not exposed 07/01/24 1023   Dressing Appearance Intact;Moist drainage 07/01/24 1023   Drainage Amount Small 07/01/24 1023   Drainage Characteristics/Odor Serosanguineous 07/01/24 1023   Appearance Red;Granulating 07/01/24 1023   Tissue loss description Full thickness 07/01/24 1023   Black (%), Wound Tissue Color 0 % 07/01/24 1023   Red (%), Wound Tissue Color 100 % 07/01/24 1023   Yellow (%), Wound  Tissue Color 0 % 07/01/24 1023   Periwound Area Intact;Dry 07/01/24 1023   Wound Edges Callused 07/01/24 1023   Elliott Classification (diabetic foot ulcers only) Grade 1 07/01/24 1023   Wound Length (cm) 11 cm 07/01/24 1023   Wound Width (cm) 6.4 cm 07/01/24 1023   Wound Depth (cm) 0.2 cm 07/01/24 1023   Wound Volume (cm^3) 14.08 cm^3 07/01/24 1023   Wound Surface Area (cm^2) 70.4 cm^2 07/01/24 1023   Care Cleansed with:;Soap and water 07/01/24 1023   Dressing Applied;Hydrofiber;Gauze;Rolled gauze;Compression wrap 07/01/24 1023   Periwound Care Moisture barrier applied 07/01/24 1023            Wound 06/10/24 Abscess Right lateral Foot #4 (Active)   06/10/24    Present on Original Admission: Y   Primary Wound Type: Abscess   Side: Right   Orientation: lateral   Location: Foot   Wound Approximate Age at First Assessment (Weeks):    Wound Number: #4   Is this injury device related?:    Incision Type:    Closure Method:    Wound Description (Comments):    Type:    Additional Comments:    Ankle-Brachial Index:    Pulses:    Removal Indication and Assessment:    Wound Outcome:    Wound Image    07/01/24 1024   Dressing Appearance Intact;Moist drainage 07/01/24 1024   Drainage Amount Scant 07/01/24 1024   Drainage Characteristics/Odor Serosanguineous 07/01/24 1024   Appearance Red;Slough;Yellow;Moist;Granulating 07/01/24 1024   Tissue loss description Full thickness 07/01/24 1024   Black (%), Wound Tissue Color 0 % 07/01/24 1024   Red (%), Wound Tissue Color 80 % 07/01/24 1024   Yellow (%), Wound Tissue Color 20 % 07/01/24 1024   Periwound Area Intact;Dry 07/01/24 1024   Wound Edges Defined 07/01/24 1024   Wound Length (cm) 2 cm 07/01/24 1024   Wound Width (cm) 2 cm 07/01/24 1024   Wound Depth (cm) 0.3 cm 07/01/24 1024   Wound Volume (cm^3) 1.2 cm^3 07/01/24 1024   Wound Surface Area (cm^2) 4 cm^2 07/01/24 1024   Care Cleansed with:;Antimicrobial agent 07/01/24 1024   Dressing Applied;Hydrofiber;Gauze;Rolled gauze 07/01/24  1024                 Problem List Items Addressed This Visit          Endocrine    Diabetic ulcer of right heel associated with type 2 diabetes mellitus, with fat layer exposed - Primary    Overview                                                                                        Current Assessment & Plan     Clean with  vashe or baby shampoo and water     Apply betadine moisten drawtex to wound       Cover and secure with 4x4,wrap with 2 layer compression from toe to knee. Change daily  and as needed.  Pt knows how to apply 2 layer compression.  Please provide pat with enough wraps to change dressing daily     Monitor closely for s/s of infection including fever, chills, increase in pain, odor from wound, and increased redness from foot. Go to ER if any complications develop.   Keep leg elevated and avoid pressure on wound.   Diabetes:  Monitor glucose closely. Check fasting glucose and 2 hours after meals. HgA1C goal <7, fasting glucose , and 2 hours after meals <180  Hypertension:  Check blood pressure twice daily, goal <120/80  Diet:   Increase protein intake, avoid fried, fatty foods and foods high in simple carbs.   Vitamins:  Take vitamin C 1000 mg, zinc 50mg, vitamin d 5000 units, and a daily multivitamin. Angel is a good source of protein and nutrients to aid in wound healing.    Hold CROW BOOT for now     Home health to FOLLOW wound orders above              Future Appointments   Date Time Provider Department Center   7/22/2024 10:00 AM Yelitza Alas FNP [facility]   8/19/2024  1:00 PM Franck Kee PA [facility]            Signature:  AUGUSTUS Macedo  [facility]  [address] [facility] [address]  [phone]    Date of encounter: 7/1/24

## 2024-07-01 ENCOUNTER — OFFICE VISIT (OUTPATIENT)
Dept: WOUND CARE | Facility: CLINIC | Age: 39
End: 2024-07-01
Attending: FAMILY MEDICINE
Payer: MEDICARE

## 2024-07-01 VITALS
RESPIRATION RATE: 18 BRPM | DIASTOLIC BLOOD PRESSURE: 91 MMHG | TEMPERATURE: 98 F | HEART RATE: 84 BPM | SYSTOLIC BLOOD PRESSURE: 155 MMHG

## 2024-07-01 DIAGNOSIS — L97.412 DIABETIC ULCER OF RIGHT HEEL ASSOCIATED WITH TYPE 2 DIABETES MELLITUS, WITH FAT LAYER EXPOSED: Primary | ICD-10-CM

## 2024-07-01 DIAGNOSIS — E11.621 DIABETIC ULCER OF RIGHT HEEL ASSOCIATED WITH TYPE 2 DIABETES MELLITUS, WITH FAT LAYER EXPOSED: Primary | ICD-10-CM

## 2024-07-01 PROCEDURE — 11042 DBRDMT SUBQ TIS 1ST 20SQCM/<: CPT | Mod: PBBFAC | Performed by: NURSE PRACTITIONER

## 2024-07-01 PROCEDURE — 11045 DBRDMT SUBQ TISS EACH ADDL: CPT | Mod: S$PBB,,, | Performed by: NURSE PRACTITIONER

## 2024-07-01 PROCEDURE — 99213 OFFICE O/P EST LOW 20 MIN: CPT | Mod: PBBFAC | Performed by: NURSE PRACTITIONER

## 2024-07-01 PROCEDURE — 99499 UNLISTED E&M SERVICE: CPT | Mod: S$PBB,,, | Performed by: NURSE PRACTITIONER

## 2024-07-01 PROCEDURE — 99999 PR PBB SHADOW E&M-EST. PATIENT-LVL III: CPT | Mod: PBBFAC,,, | Performed by: NURSE PRACTITIONER

## 2024-07-01 NOTE — PROGRESS NOTES
Debridement Performed for Assessment: Wound# right plantar  Performed By: Provider: Yelitza Ward NP  Assistant: MULUGETA Hanson, RN    Debridement: Surgical    Photo taken post procedure:    Time-Out Taken: Yes  Level: Skin/Subcutaneous Tissue  Post Debridement Measurements  Length: (cm) 11.2  Width: (cm) 6.6  Depth: (cm)       Area: (cm²) 73.92  Percent Debrided: 100%  Total Area Debrided: (sq cm)     Tissue and other material debrided:  Adipose, Dermis, Epidermis, Subcutaneous  Devitalized Tissue Debrided:Biofilm, Slough, Fibrin  Instrument: Curette  Bleeding: Moderate  Hemostasis Achieved: Pressure  Procedural Pain: Insensate  Post Procedural Pain: Insensate  Response to Treatment: Procedure was tolerated well    Devitalized materials/tissue Removed  the following was removed during debridement  subcutaneous      Post Debridement Diagnosis  Post debridement diagnosis  Same as Pre-operative debridement diagnosis, No Complications noted.      Grafts or implants applied  Was a graft or implant applied?  No      Procedure assistant  Procedure assisted by:  Assistant is the same as nurse listed above      Complications related to procedure  Did any complication occure during procedure?  No complications noted during or after procedure.      Specimen  Specimen collect during procedure?  No specimen collected      Anaesthesia:  Anesthesia used  None      Blood Loss:  Blood loss during procedure  less than 5 cc

## 2024-07-01 NOTE — PROCEDURES
"Debridement    Date/Time: 7/1/2024 10:00 AM    Performed by: Yelitza Alas FNP  Authorized by: Yelitza Alas FNP    Time out: Immediately prior to procedure a "time out" was called to verify the correct patient, procedure, equipment, support staff and site/side marked as required.    Consent Done?:  Yes (Written)    Wound Details:    Location:  Right foot    Location:  Right Plantar    Type of Debridement:  Excisional       Length (cm):  11.2       Area (sq cm):  73.92       Width (cm):  6.6       Percent Debrided (%):  100       Depth (cm):  0.2       Total Area Debrided (sq cm):  73.92    Depth of debridement:  Subcutaneous tissue    Tissue debrided:  Adipose, Dermis, Epidermis and Subcutaneous    Devitalized tissue debrided:  Biofilm, Callus, Clots, Exudate and Fibrin    Instruments:  Curette  Bleeding:  Moderate  Hemostasis Achieved: Yes  Method Used:  Pressure  Patient tolerance:  Patient tolerated the procedure well with no immediate complications  1st Wound Pain Assessment: 0     Assistant FER Shabazz RN    "

## 2024-07-03 ENCOUNTER — EXTERNAL HOME HEALTH (OUTPATIENT)
Dept: HOME HEALTH SERVICES | Facility: HOSPITAL | Age: 39
End: 2024-07-03
Payer: MEDICARE

## 2024-07-11 NOTE — PROGRESS NOTES
AUGUSTUS Macedo   [facility]  [address] [facility] [place] [address]  [phone]      PATIENT NAME: Jean Pierre Paulson  : 1985  DATE: 24  MRN: [id number]      Billing Provider: AUGUSTUS Macedo  Level of Service:   Patient PCP Information       Provider PCP Type    Primary Doctor No General            Reason for Visit / Chief Complaint: Diabetic Foot Ulcer and Wound Check (Right plantar)       History of Present Illness / Problem Focused Workflow     Jean Pierre Paulson is a 39 y.o. male who presents to clinic for follow up on chronic-non healing wound on the right foot. Wound has improved and is smaller today. Continue with betadine and drawtex with compression sock. Encouraged to continue to keep pressure off foot and elevate leg.   Reinforced importance of following plan of care, wearing off-loading boot, keeping pressure off foot, taking antibiotics as prescribed,increasing protein intake, and keeping diabetes well controlled.    Last arterial doppler 4/10/23,  The ankle to brachial index is 1.25 on the right and 1.34 on the left.    Significant PMH  Includes diabetes and hypertension. Last HgA1C 7.3 in 2022, diabetes is managed by PCP.  Denies fever or chills.     Review of Systems     Review of Systems   Constitutional:  Negative for activity change, chills and fever.   Respiratory:  Negative for chest tightness and shortness of breath.    Cardiovascular:  Positive for leg swelling. Negative for chest pain and palpitations.   Musculoskeletal:  Positive for arthralgias and joint swelling.   Skin:  Positive for wound.        See wound assessment   Neurological:  Positive for weakness and numbness.   Psychiatric/Behavioral:  Negative for agitation, behavioral problems, confusion and self-injury.        Medical / Social / Family History     Past Medical History:   Diagnosis Date    NOLAN (acute kidney injury) 2023    Anemia     Diabetes  mellitus with neuropathy     HLD (hyperlipidemia) 04/07/2023    HTN (hypertension)     Obesity     TEDDY (obstructive sleep apnea) 04/07/2023    Osteomyelitis 10/2020    Hx of R foot, Tx with IV Abx    Peripheral vascular disease     Urinary tract infection with hematuria 02/10/2024       Past Surgical History:   Procedure Laterality Date    APPLICATION OF SPLIT-THICKNESS SKIN GRAFT (STSG) TO LOWER EXTREMITY Right 03/02/2022    Procedure: APPLICATION, GRAFT, SKIN, SPLIT-THICKNESS, TO LOWER EXTREMITY;  Surgeon: Greg Ramírez MD;  Location: [facility];  Service: General;  Laterality: Right;    CHOLECYSTECTOMY      DEBRIDEMENT OF FOOT Right 10/2020    right great toe amputation         Social History  Mr. Jean Pierre Paulson  reports that he has been smoking vaping with nicotine. He started smoking about 7 weeks ago. He has never used smokeless tobacco. He reports current alcohol use of about 2.0 standard drinks of alcohol per week. He reports that he does not use drugs.    Family History  Mr.'s Jean Pierre Paulson family history includes Diabetes in his father and maternal grandmother; Hypertension in his father and maternal grandmother.    Medications and Allergies     Medications  Outpatient Medications Marked as Taking for the 7/22/24 encounter (Office Visit) with Yelitza Alas FNP   Medication Sig Dispense Refill    amLODIPine (NORVASC) 10 MG tablet Take by mouth once daily.      blood sugar diagnostic (BLOOD GLUCOSE TEST) Strp 1 strip by Misc.(Non-Drug; Combo Route) route once daily. accu-chek Veronika strips 90 strip 5    blood-glucose meter,continuous (DEXCOM ) Misc 1 each by Misc.(Non-Drug; Combo Route) route 4 (four) times daily with meals and nightly. 1 each 0    gabapentin (NEURONTIN) 300 MG capsule Take 1 capsule (300 mg total) by mouth every 8 (eight) hours. 270 capsule 1    gentamicin (GARAMYCIN) 0.1 % cream Apply topically once daily. 90 g 2    hydroCHLOROthiazide (HYDRODIURIL) 25 MG  tablet Take 1 tablet (25 mg total) by mouth once daily. 90 tablet 1    HYDROcodone-acetaminophen (NORCO)  mg per tablet Take 1 tablet by mouth every 12 (twelve) hours as needed for Pain (pain). 60 tablet 0    [START ON 7/26/2024] HYDROcodone-acetaminophen (NORCO)  mg per tablet Take 1 tablet by mouth every 12 (twelve) hours as needed for Pain (pain). 60 tablet 0    ibuprofen (ADVIL,MOTRIN) 800 MG tablet Take 800 mg by mouth 3 (three) times daily as needed for Pain.      losartan (COZAAR) 50 MG tablet Take 1 tablet (50 mg total) by mouth once daily. 90 tablet 1    rosuvastatin (CRESTOR) 10 MG tablet Take 10 mg by mouth once daily.      semaglutide (OZEMPIC) 2 mg/dose (8 mg/3 mL) PnIj Inject 2 mg into the skin every 7 days. (Patient taking differently: Inject 2.5 mg into the skin every 7 days.) 8 mL 2       Allergies  Review of patient's allergies indicates:   Allergen Reactions    Tomato Itching       Physical Examination     Vitals:    07/22/24 1053   BP: (!) 141/91   Pulse: 91   Resp: 18   Temp: 98.2 °F (36.8 °C)                 Physical Exam  Vitals and nursing note reviewed.   Constitutional:       Appearance: Normal appearance.   HENT:      Head: Normocephalic.   Cardiovascular:      Rate and Rhythm: Normal rate and regular rhythm.      Pulses: Normal pulses.      Heart sounds: Normal heart sounds.   Pulmonary:      Effort: Pulmonary effort is normal. No respiratory distress.   Chest:      Chest wall: No tenderness.   Musculoskeletal:         General: Swelling, tenderness and deformity present.      Right lower leg: Edema present.   Skin:     General: Skin is warm and dry.      Capillary Refill: Capillary refill takes 2 to 3 seconds.      Comments: See LDA for photos and measurements   Neurological:      General: No focal deficit present.      Mental Status: He is alert and oriented to person, place, and time. Mental status is at baseline.   Psychiatric:         Mood and Affect: Mood normal.          Behavior: Behavior normal.         Thought Content: Thought content normal.         Judgment: Judgment normal.     Assessment and Plan             Altered Skin Integrity 02/10/24 2200 Right plantar Foot Diabetic Ulcer Full thickness tissue loss with exposed bone, tendon, or muscle. Often includes undermining and tunneling. May extend into muscle and/or supporting structures. (Active)   02/10/24 2200   Altered Skin Integrity Present on Admission - Did Patient arrive to the hospital with altered skin?: yes   Side: Right   Orientation: plantar   Location: Foot   Wound Number:    Is this injury device related?:    Primary Wound Type: Diabetic ulc   Description of Altered Skin Integrity: Full thickness tissue loss with exposed bone, tendon, or muscle. Often includes undermining and tunneling. May extend into muscle and/or supporting structures.   Ankle-Brachial Index:    Pulses:    Removal Indication and Assessment:    Wound Outcome:    (Retired) Wound Length (cm):    (Retired) Wound Width (cm):    (Retired) Depth (cm):    Wound Description (Comments):    Removal Indications:    Wound Image   07/22/24 1102   Description of Altered Skin Integrity Full thickness tissue loss. Subcutaneous fat may be visible but bone, tendon or muscle are not exposed 07/22/24 1102   Dressing Appearance Clean;Moist drainage 07/22/24 1102   Drainage Amount Moderate 07/22/24 1102   Drainage Characteristics/Odor Serosanguineous 07/22/24 1102   Appearance Red;Moist;Granulating 07/22/24 1102   Tissue loss description Full thickness 07/22/24 1102   Black (%), Wound Tissue Color 0 % 07/22/24 1102   Red (%), Wound Tissue Color 100 % 07/22/24 1102   Yellow (%), Wound Tissue Color 0 % 07/22/24 1102   Periwound Area Intact;Dry 07/22/24 1102   Wound Edges Callused 07/22/24 1102   Elliott Classification (diabetic foot ulcers only) Grade 1 07/22/24 1102   Wound Length (cm) 11 cm 07/22/24 1102   Wound Width (cm) 6.5 cm 07/22/24 1102   Wound Depth (cm)  0.3 cm 07/22/24 1102   Wound Volume (cm^3) 21.45 cm^3 07/22/24 1102   Wound Surface Area (cm^2) 71.5 cm^2 07/22/24 1102   Care Cleansed with:;Antimicrobial agent 07/22/24 1102   Dressing Applied;Hydrofiber;Gauze;Rolled gauze 07/22/24 1102            Wound 06/10/24 Abscess Right lateral Foot #4 (Active)   06/10/24    Present on Original Admission: Y   Primary Wound Type: Abscess   Side: Right   Orientation: lateral   Location: Foot   Wound Approximate Age at First Assessment (Weeks):    Wound Number: #4   Is this injury device related?:    Incision Type:    Closure Method:    Wound Description (Comments):    Type:    Additional Comments:    Ankle-Brachial Index:    Pulses:    Removal Indication and Assessment:    Wound Outcome:    Wound Image    07/22/24 1057   Dressing Appearance Intact;Moist drainage 07/22/24 1057   Drainage Amount Scant 07/22/24 1057   Drainage Characteristics/Odor Serosanguineous 07/22/24 1057   Appearance Pink;Red;Moist;Granulating 07/22/24 1057   Tissue loss description Full thickness 07/22/24 1057   Black (%), Wound Tissue Color 0 % 07/22/24 1057   Red (%), Wound Tissue Color 100 % 07/22/24 1057   Yellow (%), Wound Tissue Color 0 % 07/22/24 1057   Periwound Area Intact;Dry 07/22/24 1057   Wound Edges Callused 07/22/24 1057   Wound Length (cm) 1 cm 07/22/24 1057   Wound Width (cm) 1.5 cm 07/22/24 1057   Wound Depth (cm) 0.2 cm 07/22/24 1057   Wound Volume (cm^3) 0.3 cm^3 07/22/24 1057   Wound Surface Area (cm^2) 1.5 cm^2 07/22/24 1057   Care Cleansed with:;Antimicrobial agent 07/22/24 1057   Dressing Applied;Hydrofiber;Gauze;Rolled gauze 07/22/24 1057                   Problem List Items Addressed This Visit          Endocrine    Diabetic ulcer of right heel associated with type 2 diabetes mellitus, with fat layer exposed - Primary    Overview                                                                                  Current Assessment & Plan     Clean with vashe or baby shampoo and  water     Apply betadine moisten drawtex to wound       Cover and secure with 4x4 gauze,wrap with 2 layer compression from toe to knee. Change daily and as needed.  Pt knows how to apply 2 layer compression.  Please provide pt with enough wraps to change dressing daily     Monitor closely for s/s of infection including fever, chills, increase in pain, odor from wound, and increased redness from foot. Go to ER if any complications develop.   Keep leg elevated and avoid pressure on wound.   Diabetes:  Monitor glucose closely. Check fasting glucose and 2 hours after meals. HgA1C goal <7, fasting glucose , and 2 hours after meals <180  Hypertension:  Check blood pressure twice daily, goal <120/80  Diet:   Increase protein intake, avoid fried, fatty foods and foods high in simple carbs.   Vitamins:  Take vitamin C 1000 mg, zinc 50mg, vitamin d 5000 units, and a daily multivitamin. Angel is a good source of protein and nutrients to aid in wound healing.    Hold CROW BOOT for now     Home health to FOLLOW wound orders above              Future Appointments   Date Time Provider Department Center   8/12/2024 10:00 AM [facility]el[facility]as [facility]   8/19/2024  1:00 PM Shows, VONNIE [facility]            Signature:  AUGUSTUS Macedo  [facility]  [address] [facility] [place] [address]  [phone]    Date of encounter: 7/22/24

## 2024-07-11 NOTE — PATIENT INSTRUCTIONS
Clean with vashe or baby shampoo and water     Apply betadine moisten drawtex to wound       Cover and secure with 4x4 gauze,wrap with 2 layer compression from toe to knee. Change daily and as needed.  Pt knows how to apply 2 layer compression.  Please provide pt with enough wraps to change dressing daily     Monitor closely for s/s of infection including fever, chills, increase in pain, odor from wound, and increased redness from foot. Go to ER if any complications develop.   Keep leg elevated and avoid pressure on wound.   Diabetes:  Monitor glucose closely. Check fasting glucose and 2 hours after meals. HgA1C goal <7, fasting glucose , and 2 hours after meals <180  Hypertension:  Check blood pressure twice daily, goal <120/80  Diet:   Increase protein intake, avoid fried, fatty foods and foods high in simple carbs.   Vitamins:  Take vitamin C 1000 mg, zinc 50mg, vitamin d 5000 units, and a daily multivitamin. Angel is a good source of protein and nutrients to aid in wound healing.    Hold CROW BOOT for now     Home health to FOLLOW wound orders above

## 2024-07-22 ENCOUNTER — OFFICE VISIT (OUTPATIENT)
Dept: WOUND CARE | Facility: CLINIC | Age: 39
End: 2024-07-22
Attending: FAMILY MEDICINE
Payer: MEDICARE

## 2024-07-22 VITALS
SYSTOLIC BLOOD PRESSURE: 141 MMHG | HEART RATE: 91 BPM | DIASTOLIC BLOOD PRESSURE: 91 MMHG | RESPIRATION RATE: 18 BRPM | TEMPERATURE: 98 F

## 2024-07-22 DIAGNOSIS — L97.412 DIABETIC ULCER OF RIGHT HEEL ASSOCIATED WITH TYPE 2 DIABETES MELLITUS, WITH FAT LAYER EXPOSED: Primary | ICD-10-CM

## 2024-07-22 DIAGNOSIS — E11.621 DIABETIC ULCER OF RIGHT HEEL ASSOCIATED WITH TYPE 2 DIABETES MELLITUS, WITH FAT LAYER EXPOSED: Primary | ICD-10-CM

## 2024-07-22 PROCEDURE — 99215 OFFICE O/P EST HI 40 MIN: CPT | Mod: PBBFAC | Performed by: NURSE PRACTITIONER

## 2024-07-22 PROCEDURE — 99213 OFFICE O/P EST LOW 20 MIN: CPT | Mod: S$PBB,,, | Performed by: NURSE PRACTITIONER

## 2024-07-22 PROCEDURE — 99999 PR PBB SHADOW E&M-EST. PATIENT-LVL V: CPT | Mod: PBBFAC,,, | Performed by: NURSE PRACTITIONER

## 2024-07-22 RX ORDER — AZITHROMYCIN 250 MG/1
TABLET, FILM COATED ORAL
Qty: 6 TABLET | Refills: 0 | Status: SHIPPED | OUTPATIENT
Start: 2024-07-22 | End: 2024-07-27

## 2024-07-22 RX ORDER — PROMETHAZINE HYDROCHLORIDE AND DEXTROMETHORPHAN HYDROBROMIDE 6.25; 15 MG/5ML; MG/5ML
5 SYRUP ORAL EVERY 4 HOURS PRN
Qty: 118 ML | Refills: 1 | Status: SHIPPED | OUTPATIENT
Start: 2024-07-22 | End: 2024-08-01

## 2024-07-22 RX ORDER — METHYLPREDNISOLONE 4 MG/1
TABLET ORAL
Qty: 21 EACH | Refills: 0 | Status: SHIPPED | OUTPATIENT
Start: 2024-07-22 | End: 2024-08-12

## 2024-07-26 NOTE — PATIENT INSTRUCTIONS
Clean with vashe or baby shampoo and water     Apply betadine moisten drawtex to wound       Cover and secure with 4x4 gauze and ABD pad, wrap with 2 layer compression from toe to knee. Change daily and as needed.  Pt knows how to apply 2 layer compression.  Please provide pt with enough wraps to change dressing daily     Monitor closely for s/s of infection including fever, chills, increase in pain, odor from wound, and increased redness from foot. Go to ER if any complications develop.   Keep leg elevated and avoid pressure on wound.   Diabetes:  Monitor glucose closely. Check fasting glucose and 2 hours after meals. HgA1C goal <7, fasting glucose , and 2 hours after meals <180  Hypertension:  Check blood pressure twice daily, goal <120/80  Diet:   Increase protein intake, avoid fried, fatty foods and foods high in simple carbs.   Vitamins:  Take vitamin C 1000 mg, zinc 50mg, vitamin d 5000 units, and a daily multivitamin. Angel is a good source of protein and nutrients to aid in wound healing.    Hold CROW BOOT for now     Home health to FOLLOW wound orders above

## 2024-07-26 NOTE — PROGRESS NOTES
AUGUSTUS Macedo   [facility]  [address] [facility] [address]  [phone]      PATIENT NAME: Jean Pierre Paulson  : 1985  DATE: 24  MRN: [id number]      Billing Provider: AUGUSTUS Macedo  Level of Service:   Patient PCP Information       Provider PCP Type    Primary Doctor No General            Reason for Visit / Chief Complaint: Diabetic Foot Ulcer and Wound Check (Right foot)       History of Present Illness / Problem Focused Workflow     Jean Pierre Paulson is a 39 y.o. male who presents to clinic for follow up on chronic-non healing wound on the right foot. Callus and maceration filippo-wound, biofilm to wound bed. Bedside debridement today. Wound is smaller today. Continue with betadine and drawtex with compression sock. Encouraged to continue to keep pressure off foot and elevate leg. Take antibiotics as prescribed.   Reinforced importance of following plan of care, wearing off-loading boot, keeping pressure off foot, taking antibiotics as prescribed,increasing protein intake, and keeping diabetes well controlled.    Last arterial doppler 4/10/23,  The ankle to brachial index is 1.25 on the right and 1.34 on the left.    Significant PMH  Includes diabetes and hypertension. Last HgA1C 7.3 in 2022, diabetes is managed by PCP.  Denies fever or chills.     Review of Systems     Review of Systems   Constitutional:  Negative for activity change, chills and fever.   Respiratory:  Negative for chest tightness and shortness of breath.    Cardiovascular:  Positive for leg swelling. Negative for chest pain and palpitations.   Musculoskeletal:  Positive for arthralgias and joint swelling.   Skin:  Positive for wound.        See wound assessment   Neurological:  Positive for weakness and numbness.   Psychiatric/Behavioral:  Negative for agitation, behavioral problems, confusion and self-injury.        Medical / Social / Family History     Past Medical  History:   Diagnosis Date    NOLAN (acute kidney injury) 04/07/2023    Anemia     Diabetes mellitus with neuropathy     HLD (hyperlipidemia) 04/07/2023    HTN (hypertension)     Obesity     TEDDY (obstructive sleep apnea) 04/07/2023    Osteomyelitis 10/2020    Hx of R foot, Tx with IV Abx    Peripheral vascular disease     Urinary tract infection with hematuria 02/10/2024       Past Surgical History:   Procedure Laterality Date    APPLICATION OF SPLIT-THICKNESS SKIN GRAFT (STSG) TO LOWER EXTREMITY Right 03/02/2022    Procedure: APPLICATION, GRAFT, SKIN, SPLIT-THICKNESS, TO LOWER EXTREMITY;  Surgeon: Greg Ramírez MD;  Location: [facility];  Service: General;  Laterality: Right;    CHOLECYSTECTOMY      DEBRIDEMENT OF FOOT Right 10/2020    right great toe amputation         Social History  Mr. Jean Pierre Paulson  reports that he has been smoking vaping with nicotine. He started smoking about 2 months ago. He has never used smokeless tobacco. He reports current alcohol use of about 2.0 standard drinks of alcohol per week. He reports that he does not use drugs.    Family History  's Jean Pierre Paulson family history includes Diabetes in his father and maternal grandmother; Hypertension in his father and maternal grandmother.    Medications and Allergies     Medications  Outpatient Medications Marked as Taking for the 8/12/24 encounter (Office Visit) with Yelitza Alas FNP   Medication Sig Dispense Refill    amLODIPine (NORVASC) 10 MG tablet Take by mouth once daily.      blood sugar diagnostic (BLOOD GLUCOSE TEST) Strp 1 strip by Misc.(Non-Drug; Combo Route) route once daily. accu-chek Veronika strips 90 strip 5    gabapentin (NEURONTIN) 300 MG capsule Take 1 capsule (300 mg total) by mouth every 8 (eight) hours. 270 capsule 1    gentamicin (GARAMYCIN) 0.1 % cream Apply topically once daily. 90 g 2    hydroCHLOROthiazide (HYDRODIURIL) 25 MG tablet Take 1 tablet (25 mg total) by mouth once daily. 90 tablet  1    HYDROcodone-acetaminophen (NORCO)  mg per tablet Take 1 tablet by mouth every 12 (twelve) hours as needed for Pain (pain). 60 tablet 0    ibuprofen (ADVIL,MOTRIN) 800 MG tablet Take 800 mg by mouth 3 (three) times daily as needed for Pain.      methylPREDNISolone (MEDROL DOSEPACK) 4 mg tablet use as directed 21 each 0    rosuvastatin (CRESTOR) 10 MG tablet Take 10 mg by mouth once daily.      semaglutide (OZEMPIC) 2 mg/dose (8 mg/3 mL) PnIj Inject 2 mg into the skin every 7 days. (Patient taking differently: Inject 2.5 mg into the skin every 7 days.) 8 mL 2       Allergies  Review of patient's allergies indicates:   Allergen Reactions    Tomato Itching       Physical Examination     Vitals:    08/12/24 1034   BP: (!) 145/84   Pulse: 93   Resp: 18   Temp: 97.7 °F (36.5 °C)                   Physical Exam  Vitals and nursing note reviewed.   Constitutional:       Appearance: Normal appearance.   HENT:      Head: Normocephalic.   Cardiovascular:      Rate and Rhythm: Normal rate and regular rhythm.      Pulses: Normal pulses.      Heart sounds: Normal heart sounds.   Pulmonary:      Effort: Pulmonary effort is normal. No respiratory distress.   Chest:      Chest wall: No tenderness.   Musculoskeletal:         General: Swelling, tenderness and deformity present.      Right lower leg: Edema present.   Skin:     General: Skin is warm and dry.      Capillary Refill: Capillary refill takes 2 to 3 seconds.      Comments: See LDA for photos and measurements   Neurological:      General: No focal deficit present.      Mental Status: He is alert and oriented to person, place, and time. Mental status is at baseline.   Psychiatric:         Mood and Affect: Mood normal.         Behavior: Behavior normal.         Thought Content: Thought content normal.         Judgment: Judgment normal.     Assessment and Plan             Altered Skin Integrity 02/10/24 2200 Right plantar Foot Diabetic Ulcer Full thickness tissue  loss with exposed bone, tendon, or muscle. Often includes undermining and tunneling. May extend into muscle and/or supporting structures. (Active)   02/10/24 2200   Altered Skin Integrity Present on Admission - Did Patient arrive to the hospital with altered skin?: yes   Side: Right   Orientation: plantar   Location: Foot   Wound Number:    Is this injury device related?:    Primary Wound Type: Diabetic ulc   Description of Altered Skin Integrity: Full thickness tissue loss with exposed bone, tendon, or muscle. Often includes undermining and tunneling. May extend into muscle and/or supporting structures.   Ankle-Brachial Index:    Pulses:    Removal Indication and Assessment:    Wound Outcome:    (Retired) Wound Length (cm):    (Retired) Wound Width (cm):    (Retired) Depth (cm):    Wound Description (Comments):    Removal Indications:    Wound Image   08/12/24 1112   Description of Altered Skin Integrity Full thickness tissue loss. Subcutaneous fat may be visible but bone, tendon or muscle are not exposed 08/12/24 1041   Dressing Appearance Intact;Moist drainage 08/12/24 1041   Drainage Amount Moderate 08/12/24 1041   Drainage Characteristics/Odor Serosanguineous 08/12/24 1041   Appearance Red;Moist;Granulating 08/12/24 1041   Tissue loss description Full thickness 08/12/24 1041   Black (%), Wound Tissue Color 0 % 08/12/24 1041   Red (%), Wound Tissue Color 100 % 08/12/24 1041   Yellow (%), Wound Tissue Color 0 % 08/12/24 1041   Periwound Area Intact;Dry 08/12/24 1041   Wound Edges Callused 08/12/24 1041   Elliott Classification (diabetic foot ulcers only) Grade 1 08/12/24 1041   Wound Length (cm) 10 cm 08/12/24 1041   Wound Width (cm) 6.4 cm 08/12/24 1041   Wound Depth (cm) 0.3 cm 08/12/24 1041   Wound Volume (cm^3) 19.2 cm^3 08/12/24 1041   Wound Surface Area (cm^2) 64 cm^2 08/12/24 1041   Care Cleansed with:;Antimicrobial agent 08/12/24 1041   Dressing Applied;Hydrofiber;Gauze;Rolled gauze;Compression wrap  08/12/24 1041   Compression Two layer compression 08/12/24 1041            Wound 06/10/24 Abscess Right lateral Foot #4 (Active)   06/10/24    Present on Original Admission: Y   Primary Wound Type: Abscess   Side: Right   Orientation: lateral   Location: Foot   Wound Approximate Age at First Assessment (Weeks):    Wound Number: #4   Is this injury device related?:    Incision Type:    Closure Method:    Wound Description (Comments):    Type:    Additional Comments:    Ankle-Brachial Index:    Pulses:    Removal Indication and Assessment:    Wound Outcome:    Wound Image   08/12/24 1110   Dressing Appearance Intact;Moist drainage 08/12/24 1035   Drainage Amount Small 08/12/24 1035   Drainage Characteristics/Odor Serous 08/12/24 1035   Appearance Red;Moist;Granulating 08/12/24 1035   Tissue loss description Full thickness 08/12/24 1035   Black (%), Wound Tissue Color 0 % 08/12/24 1035   Red (%), Wound Tissue Color 100 % 08/12/24 1035   Yellow (%), Wound Tissue Color 0 % 08/12/24 1035   Periwound Area Intact;Dry 08/12/24 1035   Wound Edges Callused 08/12/24 1035   Wound Length (cm) 1.2 cm 08/12/24 1035   Wound Width (cm) 1.2 cm 08/12/24 1035   Wound Depth (cm) 0.2 cm 08/12/24 1035   Wound Volume (cm^3) 0.288 cm^3 08/12/24 1035   Wound Surface Area (cm^2) 1.44 cm^2 08/12/24 1035   Care Cleansed with:;Antimicrobial agent 08/12/24 1035   Dressing Applied;Hydrofiber;Gauze;Rolled gauze 08/12/24 1035                     Problem List Items Addressed This Visit          Endocrine    Diabetic ulcer of right heel associated with type 2 diabetes mellitus, with fat layer exposed - Primary    Overview                                                                                            Current Assessment & Plan     Clean with vashe or baby shampoo and water     Apply betadine moisten drawtex to wound       Cover and secure with 4x4 gauze and ABD pad, wrap with 2 layer compression from toe to knee. Change daily and as  needed.  Pt knows how to apply 2 layer compression.  Please provide pt with enough wraps to change dressing daily     Monitor closely for s/s of infection including fever, chills, increase in pain, odor from wound, and increased redness from foot. Go to ER if any complications develop.   Keep leg elevated and avoid pressure on wound.   Diabetes:  Monitor glucose closely. Check fasting glucose and 2 hours after meals. HgA1C goal <7, fasting glucose , and 2 hours after meals <180  Hypertension:  Check blood pressure twice daily, goal <120/80  Diet:   Increase protein intake, avoid fried, fatty foods and foods high in simple carbs.   Vitamins:  Take vitamin C 1000 mg, zinc 50mg, vitamin d 5000 units, and a daily multivitamin. Angel is a good source of protein and nutrients to aid in wound healing.    Hold CROW BOOT for now     Home health to FOLLOW wound orders above              Future Appointments   Date Time Provider Department Center   8/19/2024  1:00 PM Shows, VONNIE [facility]   9/3/2024 10:00 AM Yelitza Alas FNP [facility]            Signature:  AUGUSTUS Macedo  [facility]  [address] [facility] [place] [address]  [phone]    Date of encounter: 8/12/24

## 2024-08-07 NOTE — PROGRESS NOTES
Subjective:         Patient ID: Jean Pierre Paulson is a 39 y.o. male.    Chief Complaint: Leg Pain (Right leg and right foot)        Pain  This is a chronic problem. The current episode started more than 1 year ago. The problem occurs daily. The problem has been unchanged. Associated symptoms include arthralgias and neck pain. Pertinent negatives include no anorexia, chest pain, chills, coughing, fever, sore throat, vertigo or vomiting.     Review of Systems   Constitutional:  Negative for activity change, appetite change, chills, fever and unexpected weight change.   HENT:  Negative for drooling, ear discharge, ear pain, facial swelling, nosebleeds, sore throat, trouble swallowing, voice change and goiter.    Eyes:  Negative for photophobia, pain, discharge, redness and visual disturbance.   Respiratory:  Negative for apnea, cough, choking, chest tightness, shortness of breath, wheezing and stridor.    Cardiovascular:  Negative for chest pain, palpitations and leg swelling.   Gastrointestinal:  Negative for abdominal distention, anorexia, diarrhea, rectal pain, vomiting and fecal incontinence.   Endocrine: Negative for cold intolerance, heat intolerance, polydipsia, polyphagia and polyuria.   Genitourinary:  Negative for bladder incontinence, dysuria, flank pain and frequency.   Musculoskeletal:  Positive for arthralgias, back pain, leg pain and neck pain. Negative for neck stiffness.   Integumentary:  Negative for color change and pallor.   Neurological:  Negative for dizziness, vertigo, seizures, syncope, facial asymmetry, speech difficulty, light-headedness, memory loss and coordination difficulties.   Hematological:  Negative for adenopathy. Does not bruise/bleed easily.   Psychiatric/Behavioral:  Negative for agitation, behavioral problems, confusion, decreased concentration, dysphoric mood, hallucinations, self-injury and suicidal ideas. The patient is not nervous/anxious and is not hyperactive.            Past  Medical History:   Diagnosis Date    NOLAN (acute kidney injury) 04/07/2023    Anemia     Diabetes mellitus with neuropathy     HLD (hyperlipidemia) 04/07/2023    HTN (hypertension)     Obesity     TEDDY (obstructive sleep apnea) 04/07/2023    Osteomyelitis 10/2020    Hx of R foot, Tx with IV Abx    Peripheral vascular disease     Urinary tract infection with hematuria 02/10/2024     Past Surgical History:   Procedure Laterality Date    APPLICATION OF SPLIT-THICKNESS SKIN GRAFT (STSG) TO LOWER EXTREMITY Right 03/02/2022    Procedure: APPLICATION, GRAFT, SKIN, SPLIT-THICKNESS, TO LOWER EXTREMITY;  Surgeon: Greg Ramírez MD;  Location: [facility];  Service: General;  Laterality: Right;    CHOLECYSTECTOMY      DEBRIDEMENT OF FOOT Right 10/2020    right great toe amputation       Social History     Socioeconomic History    Marital status: Single    Number of children: 1   Tobacco Use    Smoking status: Every Day     Types: Vaping with nicotine     Start date: 6/1/2024    Smokeless tobacco: Never   Substance and Sexual Activity    Alcohol use: Yes     Alcohol/week: 2.0 standard drinks of alcohol     Types: 1 Glasses of wine, 1 Cans of beer per week     Comment: occasionally    Drug use: Never    Sexual activity: Yes     Partners: Female     Social Determinants of Health     Financial Resource Strain: Medium Risk (2/12/2024)    Overall Financial Resource Strain (CARDIA)     Difficulty of Paying Living Expenses: Somewhat hard   Food Insecurity: Food Insecurity Present (2/12/2024)    Hunger Vital Sign     Worried About Running Out of Food in the Last Year: Sometimes true     Ran Out of Food in the Last Year: Sometimes true   Transportation Needs: No Transportation Needs (2/12/2024)    PRAPARE - Transportation     Lack of Transportation (Medical): No     Lack of Transportation (Non-Medical): No   Physical Activity: Inactive (2/12/2024)    Exercise Vital Sign     Days of Exercise per Week: 0 days     Minutes of Exercise per  "Session: 0 min   Stress: No Stress Concern Present (2/12/2024)    [employer] - Occupational Stress Questionnaire     Feeling of Stress : Not at all   Housing Stability: Low Risk  (2/12/2024)    Housing Stability Vital Sign     Unable to Pay for Housing in the Last Year: No     Number of Places Lived in the Last Year: 1     Unstable Housing in the Last Year: No     Family History   Problem Relation Name Age of Onset    Hypertension Father      Diabetes Father      Hypertension Maternal Grandmother      Diabetes Maternal Grandmother       Review of patient's allergies indicates:   Allergen Reactions    Tomato Itching        Objective:  Vitals:    08/19/24 1016 08/19/24 1017   BP: 128/87    Pulse: 91    Weight: (!) 152.9 kg (337 lb)    Height: 6' 1" (1.854 m)    PainSc:   5   5             Physical Exam  Vitals and nursing note reviewed. Exam conducted with a chaperone present.   Constitutional:       General: He is awake. He is not in acute distress.     Appearance: Normal appearance. He is not toxic-appearing or diaphoretic.   HENT:      Head: Normocephalic and atraumatic.      Nose: Nose normal.      Mouth/Throat:      Mouth: Mucous membranes are moist.      Pharynx: Oropharynx is clear.   Eyes:      Conjunctiva/sclera: Conjunctivae normal.      Pupils: Pupils are equal, round, and reactive to light.   Cardiovascular:      Rate and Rhythm: Normal rate.   Pulmonary:      Effort: Pulmonary effort is normal. No respiratory distress.   Abdominal:      Palpations: Abdomen is soft.      Tenderness: There is no guarding.   Musculoskeletal:         General: Normal range of motion.      Cervical back: Normal range of motion and neck supple. No rigidity.      Lumbar back: Tenderness present.      Right foot: Tenderness present.      Left foot: Tenderness present.   Skin:     General: Skin is warm and dry.      Coloration: Skin is not jaundiced or pale.   Neurological:      General: No focal " deficit present.      Mental Status: He is alert and oriented to person, place, and time. Mental status is at baseline.      Cranial Nerves: No cranial nerve deficit (II-XII).   Psychiatric:         Mood and Affect: Mood normal.         Behavior: Behavior normal. Behavior is cooperative.         Thought Content: Thought content normal.           X-Ray Chest AP Portable  Narrative: EXAMINATION:  XR CHEST AP PORTABLE    CLINICAL HISTORY:  Cough, unspecified    COMPARISON:  Chest x-ray April 7, 2023    TECHNIQUE:  Frontal view/views of the chest.    FINDINGS:  The cardiomediastinal silhouette is stable in configuration.  No focal consolidation, pleural effusion, or pneumothorax.  Visualized osseous and surrounding soft tissue structures demonstrate no acute abnormality.  Impression: No acute cardiopulmonary process demonstrated.    Point of Service: [facility]    Electronically signed by: Inder Mckee  Date:    02/12/2024  Time:    09:11         Lab Requisition on 07/01/2024   Component Date Value Ref Range Status    Hemoglobin A1C 07/01/2024 6.8 (H)  4.5 - 6.6 % Final    Estimated Average Glucose 07/01/2024 148  mg/dL Final    Sodium 07/01/2024 139  136 - 145 mmol/L Final    Potassium 07/01/2024 4.5  3.5 - 5.1 mmol/L Final    Chloride 07/01/2024 108 (H)  98 - 107 mmol/L Final    CO2 07/01/2024 28  21 - 32 mmol/L Final    Anion Gap 07/01/2024 8  7 - 16 mmol/L Final    Glucose 07/01/2024 131 (H)  74 - 106 mg/dL Final    BUN 07/01/2024 27 (H)  7 - 18 mg/dL Final    Creatinine 07/01/2024 1.33 (H)  0.70 - 1.30 mg/dL Final    BUN/Creatinine Ratio 07/01/2024 20  6 - 20 Final    Calcium 07/01/2024 8.6  8.5 - 10.1 mg/dL Final    Total Protein 07/01/2024 7.5  6.4 - 8.2 g/dL Final    Albumin 07/01/2024 2.6 (L)  3.5 - 5.0 g/dL Final    Globulin 07/01/2024 4.9 (H)  2.0 - 4.0 g/dL Final    A/G Ratio 07/01/2024 0.5   Final    Bilirubin, Total 07/01/2024 0.3  >0.0 - 1.2 mg/dL Final    Alk Phos 07/01/2024 122 (H)  45 -  115 U/L Final    ALT 07/01/2024 26  16 - 61 U/L Final    AST 07/01/2024 18  15 - 37 U/L Final    eGFR 07/01/2024 69  >=60 mL/min/1.73m2 Final   Office Visit on 06/10/2024   Component Date Value Ref Range Status    Culture, Wound/Abscess 06/10/2024 Heavy Growth Group G streptococcus (A)   Final    Culture, Anaerobe 06/10/2024 No Anaerobes Isolated   Final   Lab Requisition on 03/28/2024   Component Date Value Ref Range Status    PSA Total 03/28/2024 0.631  <=4.000 ng/mL Final   Lab Requisition on 03/28/2024   Component Date Value Ref Range Status    Sodium 03/28/2024 140  136 - 145 mmol/L Final    Potassium 03/28/2024 4.6  3.5 - 5.1 mmol/L Final    Chloride 03/28/2024 106  98 - 107 mmol/L Final    CO2 03/28/2024 28  21 - 32 mmol/L Final    Anion Gap 03/28/2024 11  7 - 16 mmol/L Final    Glucose 03/28/2024 141 (H)  74 - 106 mg/dL Final    BUN 03/28/2024 20 (H)  7 - 18 mg/dL Final    Creatinine 03/28/2024 1.31 (H)  0.70 - 1.30 mg/dL Final    BUN/Creatinine Ratio 03/28/2024 15  6 - 20 Final    Calcium 03/28/2024 8.8  8.5 - 10.1 mg/dL Final    Total Protein 03/28/2024 7.1  6.4 - 8.2 g/dL Final    Albumin 03/28/2024 2.5 (L)  3.5 - 5.0 g/dL Final    Globulin 03/28/2024 4.6 (H)  2.0 - 4.0 g/dL Final    A/G Ratio 03/28/2024 0.5   Final    Bilirubin, Total 03/28/2024 0.3  >0.0 - 1.2 mg/dL Final    Alk Phos 03/28/2024 118 (H)  45 - 115 U/L Final    ALT 03/28/2024 27  16 - 61 U/L Final    AST 03/28/2024 17  15 - 37 U/L Final    eGFR 03/28/2024 71  >=60 mL/min/1.73m2 Final    Hemoglobin A1C 03/28/2024 7.2 (H)  4.5 - 6.6 % Final    Estimated Average Glucose 03/28/2024 160  mg/dL Final    WBC 03/28/2024 6.30  4.50 - 11.00 K/uL Final    RBC 03/28/2024 4.36 (L)  4.60 - 6.20 M/uL Final    Hemoglobin 03/28/2024 11.0 (L)  13.5 - 18.0 g/dL Final    Hematocrit 03/28/2024 36.0 (L)  40.0 - 54.0 % Final    MCV 03/28/2024 82.6  80.0 - 96.0 fL Final    MCH 03/28/2024 25.2 (L)  27.0 - 31.0 pg Final    MCHC 03/28/2024 30.6 (L)  32.0 - 36.0 g/dL  Final    RDW 03/28/2024 15.3 (H)  11.5 - 14.5 % Final    Platelet Count 03/28/2024 293  150 - 400 K/uL Final    MPV 03/28/2024 11.2  9.4 - 12.4 fL Final    Neutrophils % 03/28/2024 53.9  53.0 - 65.0 % Final    Lymphocytes % 03/28/2024 32.2  27.0 - 41.0 % Final    Monocytes % 03/28/2024 10.2 (H)  2.0 - 6.0 % Final    Eosinophils % 03/28/2024 2.5  1.0 - 4.0 % Final    Basophils % 03/28/2024 1.0  0.0 - 1.0 % Final    Immature Granulocytes % 03/28/2024 0.2  0.0 - 0.4 % Final    nRBC, Auto 03/28/2024 0.0  <=0.0 % Final    Neutrophils, Abs 03/28/2024 3.40  1.80 - 7.70 K/uL Final    Lymphocytes, Absolute 03/28/2024 2.03  1.00 - 4.80 K/uL Final    Monocytes, Absolute 03/28/2024 0.64  0.00 - 0.80 K/uL Final    Eosinophils, Absolute 03/28/2024 0.16  0.00 - 0.50 K/uL Final    Basophils, Absolute 03/28/2024 0.06  0.00 - 0.20 K/uL Final    Immature Granulocytes, Absolute 03/28/2024 0.01  0.00 - 0.04 K/uL Final    nRBC, Absolute 03/28/2024 0.00  <=0.00 x10e3/uL Final    Diff Type 03/28/2024 Auto   Final   Office Visit on 03/18/2024   Component Date Value Ref Range Status    POC Amphetamines 03/18/2024 Negative  Negative, Inconclusive Final    POC Barbiturates 03/18/2024 Negative  Negative, Inconclusive Final    POC Benzodiazepines 03/18/2024 Negative  Negative, Inconclusive Final    POC Cocaine 03/18/2024 Negative  Negative, Inconclusive Final    POC THC 03/18/2024 Negative  Negative, Inconclusive Final    POC Methadone 03/18/2024 Negative  Negative, Inconclusive Final    POC Methamphetamine 03/18/2024 Negative  Negative, Inconclusive Final    POC Opiates 03/18/2024 Presumptive Positive (A)  Negative, Inconclusive Final    POC Oxycodone 03/18/2024 Negative  Negative, Inconclusive Final    POC Phencyclidine 03/18/2024 Negative  Negative, Inconclusive Final    POC Methylenedioxymethamphetamine * 03/18/2024 Negative  Negative, Inconclusive Final    POC Tricyclic Antidepressants 03/18/2024 Negative  Negative, Inconclusive Final     POC Buprenorphine 03/18/2024 Negative   Final     Acceptable 03/18/2024 Yes   Final    POC Temperature (Urine) 03/18/2024 92   Final         Orders Placed This Encounter   Procedures    POCT Urine Drug Screen Presump     Interpretive Information:     Negative:  No drug detected at the cut off level.   Positive:  This result represents presumptive positive for the   tested drug, other substances may yield a positive response other   than the analyte of interest. This result should be utilized for   diagnostic purpose only. Confirmation testing will be performed upon physician request only.            Requested Prescriptions     Signed Prescriptions Disp Refills    HYDROcodone-acetaminophen (NORCO)  mg per tablet 60 tablet 0     Sig: Take 1 tablet by mouth every 12 (twelve) hours as needed for Pain (pain).    HYDROcodone-acetaminophen (NORCO)  mg per tablet 60 tablet 0     Sig: Take 1 tablet by mouth every 12 (twelve) hours as needed for Pain (pain).       Assessment:     1. Diabetic neuropathy with neurologic complication    2. Peripheral vascular disease    3. Encounter for long-term (current) use of other medications           A's of Opioid Risk Assessment  Activity:Patient can perform ADL.   Analgesia:Patients pain is partially controlled by current medication. Patient has tried OTC medications such as Tylenol and Ibuprofen with out relief.   Adverse Effects: Patient denies constipation or sedation.  Aberrant Behavior:  reviewed with no aberrant drug seeking/taking behavior.  Overdose reversal drug naloxone discussed    Drug screen reviewed      Plan:    Narcan March 2023    Did not keep appointment January 3rd     Last refill date January 24, 2024, Norco 10, 60 tablets    Definitive drug screen order January 24 2024 loss per lab  Did not have narcotics x3 days prior to visit    Patient canceled appointment February 21, 2024    He verbalized understanding need to keep appointments for  clarification with drug screens and pill counts     Any further missed appointments are follow-up after refill dates will discontinue narcotics March 18, 2024        Ulcer right foot     Continues to follow wound management please see photographs  Dressing in place    Pill count correct   August 19, 2024 March 18, 2024    Presumptive drug screen positive for opiates    He verbalized understanding if he can not keep office visits for compliance with urine drug screens for narcotics management will discontinue narcotics    Doing well current medication, No new complaints today   states current medication does help with his discomfort     Continue activity as directed     Continue current medication    Follow-up 3 months    Dr. Paulson, June 2025    Bring original prescription medication bottles/container/box with labels to each visit

## 2024-08-12 ENCOUNTER — OFFICE VISIT (OUTPATIENT)
Dept: WOUND CARE | Facility: CLINIC | Age: 39
End: 2024-08-12
Attending: FAMILY MEDICINE
Payer: MEDICARE

## 2024-08-12 VITALS
SYSTOLIC BLOOD PRESSURE: 145 MMHG | RESPIRATION RATE: 18 BRPM | TEMPERATURE: 98 F | HEART RATE: 93 BPM | DIASTOLIC BLOOD PRESSURE: 84 MMHG

## 2024-08-12 DIAGNOSIS — E11.621 DIABETIC ULCER OF RIGHT HEEL ASSOCIATED WITH TYPE 2 DIABETES MELLITUS, WITH FAT LAYER EXPOSED: Primary | ICD-10-CM

## 2024-08-12 DIAGNOSIS — L97.412 DIABETIC ULCER OF RIGHT HEEL ASSOCIATED WITH TYPE 2 DIABETES MELLITUS, WITH FAT LAYER EXPOSED: Primary | ICD-10-CM

## 2024-08-12 PROCEDURE — 99999 PR PBB SHADOW E&M-EST. PATIENT-LVL V: CPT | Mod: PBBFAC,,, | Performed by: NURSE PRACTITIONER

## 2024-08-12 PROCEDURE — 99215 OFFICE O/P EST HI 40 MIN: CPT | Mod: PBBFAC,25 | Performed by: NURSE PRACTITIONER

## 2024-08-12 PROCEDURE — 11045 DBRDMT SUBQ TISS EACH ADDL: CPT | Mod: PBBFAC | Performed by: NURSE PRACTITIONER

## 2024-08-12 RX ORDER — CEFUROXIME AXETIL 500 MG/1
500 TABLET ORAL EVERY 12 HOURS
Qty: 60 TABLET | Refills: 0 | Status: SHIPPED | OUTPATIENT
Start: 2024-08-12 | End: 2024-09-11

## 2024-08-12 NOTE — PROGRESS NOTES
Debridement Performed for Assessment: Wound# right plantar  Performed By: Provider: Yelitza Ward NP  Assistant: MULUGETA Hanson, RN    Debridement: Surgical    Photo taken post procedure:    Time-Out Taken: Yes  Level: Skin/Subcutaneous Tissue  Post Debridement Measurements  Length: (cm) 10.2  Width: (cm) 6.5  Depth: (cm)       Area: (cm²) 66.33  Percent Debrided: 100%  Total Area Debrided: (sq cm) 66.33    Tissue and other material debrided:  Adipose, Dermis, Epidermis, Subcutaneous  Devitalized Tissue Debrided:Biofilm, Slough, Fibrin  Instrument: Curette  Bleeding: Moderate  Hemostasis Achieved: Pressure  Procedural Pain: Insensate  Post Procedural Pain: Insensate  Response to Treatment: Procedure was tolerated well    Devitalized materials/tissue Removed  the following was removed during debridement  subcutaneous      Post Debridement Diagnosis  Post debridement diagnosis  Same as Pre-operative debridement diagnosis, No Complications noted.      Grafts or implants applied  Was a graft or implant applied?  No      Procedure assistant  Procedure assisted by:  Assistant is the same as nurse listed above      Complications related to procedure  Did any complication occure during procedure?  No complications noted during or after procedure.      Specimen  Specimen collect during procedure?  No specimen collected      Anaesthesia:  Anesthesia used  None      Blood Loss:  Blood loss during procedure  less than 5 cc

## 2024-08-12 NOTE — PROGRESS NOTES
Debridement Performed for Assessment: Wound# right lateral foot  Performed By: Provider: Yelitza Ward NP  Assistant: MULUGETA Hanson, RN    Debridement: Surgical    Photo taken post procedure:    Time-Out Taken: Yes  Level: Skin/Subcutaneous Tissue  Post Debridement Measurements  Length: (cm) 1.3  Width: (cm) 1.4  Depth: (cm)       Area: (cm²) 1.82  Percent Debrided: 100%  Total Area Debrided: (sq cm) 1.82    Tissue and other material debrided:  Adipose, Dermis, Epidermis, Subcutaneous  Devitalized Tissue Debrided:Biofilm, Slough, Fibrin  Instrument: Curette  Bleeding: Moderate  Hemostasis Achieved: Pressure  Procedural Pain: Insensate  Post Procedural Pain: Insensate  Response to Treatment: Procedure was tolerated well    Devitalized materials/tissue Removed  the following was removed during debridement  subcutaneous      Post Debridement Diagnosis  Post debridement diagnosis  Same as Pre-operative debridement diagnosis, No Complications noted.      Grafts or implants applied  Was a graft or implant applied?  No      Procedure assistant  Procedure assisted by:  Assistant is the same as nurse listed above      Complications related to procedure  Did any complication occure during procedure?  No complications noted during or after procedure.      Specimen  Specimen collect during procedure?  No specimen collected      Anaesthesia:  Anesthesia used  None      Blood Loss:  Blood loss during procedure  less than 5 cc

## 2024-08-12 NOTE — PROCEDURES
"Debridement    Date/Time: 8/12/2024 10:00 AM    Performed by: Yelitza Alas FNP  Authorized by: Yelitza Alas FNP    Time out: Immediately prior to procedure a "time out" was called to verify the correct patient, procedure, equipment, support staff and site/side marked as required.    Consent Done?:  Yes (Written)    Wound Details:    Location:  Right foot    Location:  Right Plantar    Type of Debridement:  Excisional       Length (cm):  10.2       Area (sq cm):  66.3       Width (cm):  6.5       Percent Debrided (%):  100       Depth (cm):  0.5       Total Area Debrided (sq cm):  66.3    Depth of debridement:  Subcutaneous tissue    Tissue debrided:  Adipose, Dermis, Epidermis and Subcutaneous    Devitalized tissue debrided:  Biofilm, Callus, Clots, Exudate, Fibrin and Slough    Instruments:  Curette  Bleeding:  Moderate  Hemostasis Achieved: Yes  Method Used:  Pressure  Patient tolerance:  Patient tolerated the procedure well with no immediate complications  1st Wound Pain Assessment: 0     Assistant FER Shabazz RN    "

## 2024-08-19 ENCOUNTER — OFFICE VISIT (OUTPATIENT)
Dept: PAIN MEDICINE | Facility: CLINIC | Age: 39
End: 2024-08-19
Payer: MEDICARE

## 2024-08-19 VITALS
SYSTOLIC BLOOD PRESSURE: 128 MMHG | BODY MASS INDEX: 41.75 KG/M2 | HEIGHT: 73 IN | HEART RATE: 91 BPM | DIASTOLIC BLOOD PRESSURE: 87 MMHG | WEIGHT: 315 LBS

## 2024-08-19 DIAGNOSIS — E11.40 DIABETIC NEUROPATHY WITH NEUROLOGIC COMPLICATION: Primary | Chronic | ICD-10-CM

## 2024-08-19 DIAGNOSIS — E11.49 DIABETIC NEUROPATHY WITH NEUROLOGIC COMPLICATION: Primary | Chronic | ICD-10-CM

## 2024-08-19 DIAGNOSIS — Z79.899 ENCOUNTER FOR LONG-TERM (CURRENT) USE OF OTHER MEDICATIONS: ICD-10-CM

## 2024-08-19 DIAGNOSIS — I73.9 PERIPHERAL VASCULAR DISEASE: Chronic | ICD-10-CM

## 2024-08-19 PROCEDURE — 99214 OFFICE O/P EST MOD 30 MIN: CPT | Mod: S$PBB,,, | Performed by: PHYSICIAN ASSISTANT

## 2024-08-19 PROCEDURE — 99999PBSHW POCT URINE DRUG SCREEN PRESUMP: Mod: PBBFAC,,,

## 2024-08-19 PROCEDURE — 99214 OFFICE O/P EST MOD 30 MIN: CPT | Mod: PBBFAC | Performed by: PHYSICIAN ASSISTANT

## 2024-08-19 PROCEDURE — 99999 PR PBB SHADOW E&M-EST. PATIENT-LVL IV: CPT | Mod: PBBFAC,,, | Performed by: PHYSICIAN ASSISTANT

## 2024-08-19 PROCEDURE — 80305 DRUG TEST PRSMV DIR OPT OBS: CPT | Mod: PBBFAC | Performed by: PHYSICIAN ASSISTANT

## 2024-08-19 RX ORDER — HYDROCODONE BITARTRATE AND ACETAMINOPHEN 10; 325 MG/1; MG/1
1 TABLET ORAL EVERY 12 HOURS PRN
Qty: 60 TABLET | Refills: 0 | Status: SHIPPED | OUTPATIENT
Start: 2024-09-30 | End: 2024-10-30

## 2024-08-19 RX ORDER — HYDROCODONE BITARTRATE AND ACETAMINOPHEN 10; 325 MG/1; MG/1
1 TABLET ORAL EVERY 12 HOURS PRN
Qty: 60 TABLET | Refills: 0 | Status: SHIPPED | OUTPATIENT
Start: 2024-08-31 | End: 2024-09-30

## 2024-09-03 ENCOUNTER — EXTERNAL HOME HEALTH (OUTPATIENT)
Dept: HOME HEALTH SERVICES | Facility: HOSPITAL | Age: 39
End: 2024-09-03
Payer: MEDICARE

## 2024-09-10 ENCOUNTER — OFFICE VISIT (OUTPATIENT)
Dept: WOUND CARE | Facility: CLINIC | Age: 39
End: 2024-09-10
Attending: FAMILY MEDICINE
Payer: MEDICARE

## 2024-09-10 VITALS
RESPIRATION RATE: 20 BRPM | DIASTOLIC BLOOD PRESSURE: 90 MMHG | SYSTOLIC BLOOD PRESSURE: 154 MMHG | TEMPERATURE: 97 F | HEART RATE: 90 BPM

## 2024-09-10 DIAGNOSIS — L97.412 DIABETIC ULCER OF RIGHT HEEL ASSOCIATED WITH TYPE 2 DIABETES MELLITUS, WITH FAT LAYER EXPOSED: ICD-10-CM

## 2024-09-10 DIAGNOSIS — E11.621 DIABETIC ULCER OF RIGHT HEEL ASSOCIATED WITH TYPE 2 DIABETES MELLITUS, WITH FAT LAYER EXPOSED: ICD-10-CM

## 2024-09-10 DIAGNOSIS — L97.415 DIABETIC ULCER OF RIGHT HEEL ASSOCIATED WITH TYPE 2 DIABETES MELLITUS, WITH MUSCLE INVOLVEMENT WITHOUT EVIDENCE OF NECROSIS: ICD-10-CM

## 2024-09-10 DIAGNOSIS — E11.610 CHARCOT FOOT DUE TO DIABETES MELLITUS: Primary | ICD-10-CM

## 2024-09-10 DIAGNOSIS — E11.621 DIABETIC ULCER OF RIGHT HEEL ASSOCIATED WITH TYPE 2 DIABETES MELLITUS, WITH MUSCLE INVOLVEMENT WITHOUT EVIDENCE OF NECROSIS: ICD-10-CM

## 2024-09-10 PROCEDURE — 99999 PR PBB SHADOW E&M-EST. PATIENT-LVL V: CPT | Mod: PBBFAC,,, | Performed by: NURSE PRACTITIONER

## 2024-09-10 PROCEDURE — 99213 OFFICE O/P EST LOW 20 MIN: CPT | Mod: S$PBB,,, | Performed by: NURSE PRACTITIONER

## 2024-09-10 PROCEDURE — 99215 OFFICE O/P EST HI 40 MIN: CPT | Mod: PBBFAC | Performed by: NURSE PRACTITIONER

## 2024-09-10 NOTE — PROGRESS NOTES
AUGUSTUS Macedo   [facility]  [address] [facility] [address]  [phone]      PATIENT NAME: Jean Pierre Paulson  : 1985  DATE: 9/10/24  MRN: [id number]      Billing Provider: AUGUSTUS Macedo  Level of Service:   Patient PCP Information       Provider PCP Type    Primary Doctor No General            Reason for Visit / Chief Complaint: Diabetic Foot Ulcer (Right heel)       History of Present Illness / Problem Focused Workflow     Jean Pierre Paulson is a 39 y.o. male who presents to clinic for follow up on chronic-non healing wound on the right foot. Wound has improved with betadine, drawtex, and compression. Continue current plan of care. Encouraged to continue to keep pressure off foot and elevate leg. Continue antibiotics as prescribed.   Reinforced importance of following plan of care, wearing off-loading boot, keeping pressure off foot, taking antibiotics as prescribed,increasing protein intake, and keeping diabetes well controlled.    Last arterial doppler 4/10/23,  The ankle to brachial index is 1.25 on the right and 1.34 on the left.    Significant PMH  Includes diabetes and hypertension. Last HgA1C 7.3 in 2022, diabetes is managed by PCP.  Denies fever or chills.       Review of Systems     Review of Systems   Constitutional:  Negative for activity change, chills and fever.   Respiratory:  Negative for chest tightness and shortness of breath.    Cardiovascular:  Positive for leg swelling. Negative for chest pain and palpitations.   Musculoskeletal:  Positive for arthralgias and joint swelling.   Skin:  Positive for wound.        See wound assessment   Neurological:  Positive for weakness and numbness.   Psychiatric/Behavioral:  Negative for agitation, behavioral problems, confusion and self-injury.        Medical / Social / Family History     Past Medical History:   Diagnosis Date    NOLAN (acute kidney injury) 2023    Anemia      Diabetes mellitus with neuropathy     HLD (hyperlipidemia) 04/07/2023    HTN (hypertension)     Obesity     TEDDY (obstructive sleep apnea) 04/07/2023    Osteomyelitis 10/2020    Hx of R foot, Tx with IV Abx    Peripheral vascular disease     Urinary tract infection with hematuria 02/10/2024       Past Surgical History:   Procedure Laterality Date    APPLICATION OF SPLIT-THICKNESS SKIN GRAFT (STSG) TO LOWER EXTREMITY Right 03/02/2022    Procedure: APPLICATION, GRAFT, SKIN, SPLIT-THICKNESS, TO LOWER EXTREMITY;  Surgeon: Greg Ramírez MD;  Location: [facility];  Service: General;  Laterality: Right;    CHOLECYSTECTOMY      DEBRIDEMENT OF FOOT Right 10/2020    right great toe amputation         Social History  Mr. Jean Pierre Paulson  reports that he has been smoking vaping with nicotine. He started smoking about 3 months ago. He has never used smokeless tobacco. He reports current alcohol use of about 2.0 standard drinks of alcohol per week. He reports that he does not use drugs.    Family History  Mr.'s Jean Pierre Paulson family history includes Diabetes in his father and maternal grandmother; Hypertension in his father and maternal grandmother.    Medications and Allergies     Medications  Outpatient Medications Marked as Taking for the 9/10/24 encounter (Office Visit) with Yelitza Alas FNP   Medication Sig Dispense Refill    amLODIPine (NORVASC) 10 MG tablet Take by mouth once daily.      blood sugar diagnostic (BLOOD GLUCOSE TEST) Strp 1 strip by Misc.(Non-Drug; Combo Route) route once daily. accu-chek Veronika strips 90 strip 5    cefUROXime (CEFTIN) 500 MG tablet Take 1 tablet (500 mg total) by mouth every 12 (twelve) hours. 60 tablet 0    gabapentin (NEURONTIN) 300 MG capsule Take 1 capsule (300 mg total) by mouth every 8 (eight) hours. 270 capsule 1    hydroCHLOROthiazide (HYDRODIURIL) 25 MG tablet Take 1 tablet (25 mg total) by mouth once daily. 90 tablet 1    HYDROcodone-acetaminophen (NORCO)  mg per tablet  Take 1 tablet by mouth every 12 (twelve) hours as needed for Pain (pain). 60 tablet 0    ibuprofen (ADVIL,MOTRIN) 800 MG tablet Take 800 mg by mouth 3 (three) times daily as needed for Pain.      rosuvastatin (CRESTOR) 10 MG tablet Take 10 mg by mouth once daily.      semaglutide (OZEMPIC) 2 mg/dose (8 mg/3 mL) PnIj Inject 2 mg into the skin every 7 days. (Patient taking differently: Inject 2.5 mg into the skin every 7 days.) 8 mL 2       Allergies  Review of patient's allergies indicates:   Allergen Reactions    Tomato Itching       Physical Examination     Vitals:    09/10/24 1019   BP: (!) 154/90   Pulse: 90   Resp: 20   Temp: 97.3 °F (36.3 °C)     Physical Exam  Vitals and nursing note reviewed.   Constitutional:       Appearance: Normal appearance.   HENT:      Head: Normocephalic.   Cardiovascular:      Rate and Rhythm: Normal rate and regular rhythm.      Pulses: Normal pulses.      Heart sounds: Normal heart sounds.   Pulmonary:      Effort: Pulmonary effort is normal. No respiratory distress.   Chest:      Chest wall: No tenderness.   Musculoskeletal:         General: Swelling, tenderness and deformity present.      Right lower leg: Edema present.   Skin:     General: Skin is warm and dry.      Capillary Refill: Capillary refill takes 2 to 3 seconds.      Comments: See LDA for photos and measurements   Neurological:      General: No focal deficit present.      Mental Status: He is alert and oriented to person, place, and time. Mental status is at baseline.   Psychiatric:         Mood and Affect: Mood normal.         Behavior: Behavior normal.         Thought Content: Thought content normal.         Judgment: Judgment normal.       Assessment and Plan             Altered Skin Integrity 02/10/24 2200 Right plantar Foot Diabetic Ulcer Full thickness tissue loss with exposed bone, tendon, or muscle. Often includes undermining and tunneling. May extend into muscle and/or supporting structures. (Active)    02/10/24 2200   Altered Skin Integrity Present on Admission - Did Patient arrive to the hospital with altered skin?: yes   Side: Right   Orientation: plantar   Location: Foot   Wound Number:    Is this injury device related?:    Primary Wound Type: Diabetic ulc   Description of Altered Skin Integrity: Full thickness tissue loss with exposed bone, tendon, or muscle. Often includes undermining and tunneling. May extend into muscle and/or supporting structures.   Ankle-Brachial Index:    Pulses:    Removal Indication and Assessment:    Wound Outcome:    (Retired) Wound Length (cm):    (Retired) Wound Width (cm):    (Retired) Depth (cm):    Wound Description (Comments):    Removal Indications:    Wound Image    09/10/24 1006   Dressing Appearance Dry;Intact;Clean 09/10/24 1006   Drainage Amount Moderate 09/10/24 1006   Drainage Characteristics/Odor Yellow 09/10/24 1006   Appearance Pink;Red;Moist;Adipose;Muscle 09/10/24 1006   Black (%), Wound Tissue Color 0 % 09/10/24 1006   Red (%), Wound Tissue Color 100 % 09/10/24 1006   Yellow (%), Wound Tissue Color 0 % 09/10/24 1006   Periwound Area Dry;[place] 09/10/24 1006   Wound Edges Defined 09/10/24 1006   Elliott Classification (diabetic foot ulcers only) Grade 1 09/10/24 1006   Wound Length (cm) 11 cm 09/10/24 1006   Wound Width (cm) 7 cm 09/10/24 1006   Wound Depth (cm) 0.4 cm 09/10/24 1006   Wound Volume (cm^3) 30.8 cm^3 09/10/24 1006   Wound Surface Area (cm^2) 77 cm^2 09/10/24 1006   Care Cleansed with:;Antimicrobial agent 09/10/24 1006   Dressing Applied;Silver;Calcium alginate;Gauze;Absorptive Pad;Rolled gauze 09/10/24 1006   Periwound Care Moisture barrier applied 09/10/24 1006            Wound 06/10/24 Abscess Right lateral Foot #4 (Active)   06/10/24    Present on Original Admission: Y   Primary Wound Type: Abscess   Side: Right   Orientation: lateral   Location: Foot   Wound Approximate Age at First Assessment (Weeks):    Wound Number: #4   Is this injury device  related?:    Incision Type:    Closure Method:    Wound Description (Comments):    Type:    Additional Comments:    Ankle-Brachial Index:    Pulses:    Removal Indication and Assessment:    Wound Outcome:    Wound Image   09/10/24 1011     Problem List Items Addressed This Visit          Endocrine    Diabetic ulcer of right heel associated with type 2 diabetes mellitus, with fat layer exposed    Overview                                                                                      Current Assessment & Plan     Clean with vashe or baby shampoo and water     Apply betadine moisten drawtex to wound    Cover and secure with 4x4 gauze and ABD pad, wrap with 2 layer compression from toe to knee. Change daily and as needed.  Pt knows how to apply 2 layer compression.  Please provide pt with enough wraps to change dressing daily     Monitor closely for s/s of infection including fever, chills, increase in pain, odor from wound, and increased redness from foot. Go to ER if any complications develop.   Keep leg elevated and avoid pressure on wound.   Diabetes:  Monitor glucose closely. Check fasting glucose and 2 hours after meals. HgA1C goal <7, fasting glucose , and 2 hours after meals <180  Hypertension:  Check blood pressure twice daily, goal <120/80  Diet:   Increase protein intake, avoid fried, fatty foods and foods high in simple carbs.   Vitamins:  Take vitamin C 1000 mg, zinc 50mg, vitamin d 5000 units, and a daily multivitamin. Angel is a good source of protein and nutrients to aid in wound healing.              Orthopedic    Charcot foot due to diabetes mellitus - Primary    Overview     CROW orthotic boot          Other Visit Diagnoses       Diabetic ulcer of right heel associated with type 2 diabetes mellitus, with muscle involvement without evidence of necrosis                Future Appointments   Date Time Provider Department Center   10/1/2024 10:00 AM Yelitza Alas FNP [facility]  [place]   10/14/2024 10:00 AM Franck Kee PA [facility]            Signature:  AUGUSTUS Macedo  [facility]  [address] [facility] [place] [address]  [phone]    Date of encounter: 9/10/24

## 2024-09-10 NOTE — ASSESSMENT & PLAN NOTE
Clean with vashe or baby shampoo and water     Apply betadine moisten drawtex to wound    Cover and secure with 4x4 gauze and ABD pad, wrap with 2 layer compression from toe to knee. Change daily and as needed.  Pt knows how to apply 2 layer compression.  Please provide pt with enough wraps to change dressing daily     Monitor closely for s/s of infection including fever, chills, increase in pain, odor from wound, and increased redness from foot. Go to ER if any complications develop.   Keep leg elevated and avoid pressure on wound.   Diabetes:  Monitor glucose closely. Check fasting glucose and 2 hours after meals. HgA1C goal <7, fasting glucose , and 2 hours after meals <180  Hypertension:  Check blood pressure twice daily, goal <120/80  Diet:   Increase protein intake, avoid fried, fatty foods and foods high in simple carbs.   Vitamins:  Take vitamin C 1000 mg, zinc 50mg, vitamin d 5000 units, and a daily multivitamin. Angel is a good source of protein and nutrients to aid in wound healing.

## 2024-09-12 NOTE — PROGRESS NOTES
AUGUSTUS Macedo   [facility]  [address] [facility] [address]  [phone]      PATIENT NAME: Jean Pierre Paulson  : 1985  DATE: 10/1/24  MRN: [id number]      Billing Provider: AUGUSTUS Macedo  Level of Service:   Patient PCP Information       Provider PCP Type    Primary Doctor No General            Reason for Visit / Chief Complaint: Diabetic Foot Ulcer (R DFU)       History of Present Illness / Problem Focused Workflow     Jean Pierre Paulson is a 39 y.o. male who presents to clinic for follow up on chronic-non healing wound on the right foot. Wound is smaller today. Betadine filippo-wound, puracol ag and drawtex to wound bed. Continue to wear compression.  Encouraged to continue to keep pressure off foot and elevate leg. Take antibiotics as prescribed.   Reinforced importance of following plan of care, wearing off-loading boot, keeping pressure off foot, taking antibiotics as prescribed,increasing protein intake, and keeping diabetes well controlled.    Last arterial doppler 4/10/23,  The ankle to brachial index is 1.25 on the right and 1.34 on the left.    Significant PMH  Includes diabetes and hypertension. Last HgA1C 7.3 in 2022, diabetes is managed by PCP.  Denies fever or chills.     Review of Systems     Review of Systems   Constitutional:  Negative for activity change, chills and fever.   Respiratory:  Negative for chest tightness and shortness of breath.    Cardiovascular:  Positive for leg swelling. Negative for chest pain and palpitations.   Musculoskeletal:  Positive for arthralgias and joint swelling.   Skin:  Positive for wound.        See wound assessment   Neurological:  Positive for weakness and numbness.   Psychiatric/Behavioral:  Negative for agitation, behavioral problems, confusion and self-injury.        Medical / Social / Family History     Past Medical History:   Diagnosis Date    NOLAN (acute kidney injury) 2023     Anemia     Diabetes mellitus with neuropathy     HLD (hyperlipidemia) 04/07/2023    HTN (hypertension)     Obesity     TEDDY (obstructive sleep apnea) 04/07/2023    Osteomyelitis 10/2020    Hx of R foot, Tx with IV Abx    Peripheral vascular disease     Urinary tract infection with hematuria 02/10/2024       Past Surgical History:   Procedure Laterality Date    APPLICATION OF SPLIT-THICKNESS SKIN GRAFT (STSG) TO LOWER EXTREMITY Right 03/02/2022    Procedure: APPLICATION, GRAFT, SKIN, SPLIT-THICKNESS, TO LOWER EXTREMITY;  Surgeon: Greg Ramírez MD;  Location: [facility];  Service: General;  Laterality: Right;    CHOLECYSTECTOMY      DEBRIDEMENT OF FOOT Right 10/2020    right great toe amputation         Social History  Mr. Jean Pierre Paulson  reports that he has been smoking vaping with nicotine. He started smoking about 4 months ago. He has never used smokeless tobacco. He reports current alcohol use of about 2.0 standard drinks of alcohol per week. He reports that he does not use drugs.    Family History  Mr.'s Jean Pierre Paulson family history includes Diabetes in his father and maternal grandmother; Hypertension in his father and maternal grandmother.    Medications and Allergies     Medications  Outpatient Medications Marked as Taking for the 10/1/24 encounter (Office Visit) with Yelitza Alas FNP   Medication Sig Dispense Refill    ibuprofen (ADVIL,MOTRIN) 800 MG tablet Take 800 mg by mouth 3 (three) times daily as needed for Pain.      rosuvastatin (CRESTOR) 10 MG tablet Take 10 mg by mouth once daily.         Allergies  Review of patient's allergies indicates:   Allergen Reactions    Tomato Itching       Physical Examination     There were no vitals filed for this visit.                  Physical Exam  Vitals and nursing note reviewed.   Constitutional:       Appearance: Normal appearance.   HENT:      Head: Normocephalic.   Cardiovascular:      Rate and Rhythm: Normal rate and regular rhythm.       Pulses: Normal pulses.      Heart sounds: Normal heart sounds.   Pulmonary:      Effort: Pulmonary effort is normal. No respiratory distress.   Chest:      Chest wall: No tenderness.   Musculoskeletal:         General: Swelling, tenderness and deformity present.      Right lower leg: Edema present.   Skin:     General: Skin is warm and dry.      Capillary Refill: Capillary refill takes 2 to 3 seconds.      Comments: See LDA for photos and measurements   Neurological:      General: No focal deficit present.      Mental Status: He is alert and oriented to person, place, and time. Mental status is at baseline.   Psychiatric:         Mood and Affect: Mood normal.         Behavior: Behavior normal.         Thought Content: Thought content normal.         Judgment: Judgment normal.     Assessment and Plan             Wound 06/10/24 Abscess Right lateral Foot #4 (Active)   06/10/24    Present on Original Admission: Y   Primary Wound Type: Abscess   Side: Right   Orientation: lateral   Location: Foot   Wound Approximate Age at First Assessment (Weeks):    Wound Number: #4   Is this injury device related?:    Incision Type:    Closure Method:    Wound Description (Comments):    Type:    Additional Comments:    Ankle-Brachial Index:    Pulses:    Removal Indication and Assessment:    Wound Outcome:    Wound Image    10/01/24 1100   Dressing Appearance Dry;Intact;Clean 10/01/24 1100   Drainage Amount Moderate 10/01/24 1100   Drainage Characteristics/Odor Yellow 10/01/24 1100   Appearance Pink;Red;Moist;Granulating;Adipose;Muscle 10/01/24 1100   Black (%), Wound Tissue Color 0 % 10/01/24 1100   Red (%), Wound Tissue Color 90 % 10/01/24 1100   Yellow (%), Wound Tissue Color 10 % 10/01/24 1100   Periwound Area Moist;Pink;Warm 10/01/24 1100   Wound Edges Callused 10/01/24 1100   Wound Length (cm) 3 cm 10/01/24 1100                       Problem List Items Addressed This Visit          Endocrine    Diabetic ulcer of right heel  associated with type 2 diabetes mellitus, with fat layer exposed    Overview                                                                                        Current Assessment & Plan     Clean with vashe or baby shampoo and water     Apply betadine moisten drawtex to wound       Cover and secure with 4x4 gauze and ABD pad, wrap with 2 layer compression from toe to knee. Change daily and as needed.  Pt knows how to apply 2 layer compression.  Please provide pt with enough wraps to change dressing daily     Monitor closely for s/s of infection including fever, chills, increase in pain, odor from wound, and increased redness from foot. Go to ER if any complications develop.   Keep leg elevated and avoid pressure on wound.   Diabetes:  Monitor glucose closely. Check fasting glucose and 2 hours after meals. HgA1C goal <7, fasting glucose , and 2 hours after meals <180  Hypertension:  Check blood pressure twice daily, goal <120/80  Diet:   Increase protein intake, avoid fried, fatty foods and foods high in simple carbs.   Vitamins:  Take vitamin C 1000 mg, zinc 50mg, vitamin d 5000 units, and a daily multivitamin. Angel is a good source of protein and nutrients to aid in wound healing.    Hold CROW BOOT for now     Home health to FOLLOW wound orders above            Orthopedic    Charcot foot due to diabetes mellitus - Primary    Overview     CROW orthotic boot              Future Appointments   Date Time Provider Department Center   10/14/2024 10:00 AM Franck Kee PA [facility]   10/22/2024 10:00 AM Yelitza Alas FNP [facility]            Signature:  AUGUSTUS Macedo  [facility]  [address] [facility] [place] [address]  [phone]    Date of encounter: 10/1/24

## 2024-09-28 NOTE — SUBJECTIVE & OBJECTIVE
Interval History:  Patient doing well no complaints at all.  He had admits to not following the diabetic diet.  Sugars have been upper normal but overall improved from when he 1st came in since repeat him on the Lantus.  No fever.    Review of Systems   Constitutional:  Negative for appetite change, chills and fever.   Respiratory:  Negative for cough, shortness of breath and wheezing.    Cardiovascular:  Negative for leg swelling.   Gastrointestinal:  Negative for abdominal pain, diarrhea, nausea and vomiting.   Genitourinary:  Negative for difficulty urinating and hematuria.   Skin:  Negative for rash and wound (Right plantar foot wound).   Objective:     Vital Signs (Most Recent):  Temp: 97.3 °F (36.3 °C) (02/20/22 1200)  Pulse: 78 (02/20/22 1200)  Resp: 20 (02/20/22 1200)  BP: (!) 142/96 (02/20/22 1200)  SpO2: 98 % (02/20/22 1200)   Vital Signs (24h Range):  Temp:  [97.3 °F (36.3 °C)-98.3 °F (36.8 °C)] 97.3 °F (36.3 °C)  Pulse:  [70-84] 78  Resp:  [18-20] 20  SpO2:  [93 %-100 %] 98 %  BP: (122-153)/(74-96) 142/96     Weight: (!) 156.9 kg (345 lb 14.4 oz)  Body mass index is 44.41 kg/m².    Intake/Output Summary (Last 24 hours) at 2/20/2022 1411  Last data filed at 2/20/2022 0745  Gross per 24 hour   Intake 480 ml   Output --   Net 480 ml      Physical Exam  Vitals reviewed.   Constitutional:       General: He is not in acute distress.     Appearance: He is obese. He is not toxic-appearing.   HENT:      Mouth/Throat:      Mouth: Mucous membranes are moist.      Pharynx: Oropharynx is clear. No oropharyngeal exudate.   Eyes:      General: No scleral icterus.        Right eye: No discharge.         Left eye: No discharge.   Cardiovascular:      Rate and Rhythm: Regular rhythm.      Heart sounds: Normal heart sounds. No murmur heard.  Pulmonary:      Effort: No respiratory distress.      Breath sounds: No wheezing, rhonchi or rales.   Abdominal:      General: Bowel sounds are normal. There is no distension.       Palpations: Abdomen is soft. There is no mass.      Tenderness: There is no abdominal tenderness.   Musculoskeletal:      Right lower leg: Edema (Right plantar foot wound bandaged) present.      Left lower leg: Edema present.   Skin:     Coloration: Skin is not jaundiced.      Findings: No rash.   Neurological:      Mental Status: He is alert.       Significant Labs: All pertinent labs within the past 24 hours have been reviewed.  BMP:   Recent Labs   Lab 02/20/22  0310   *      K 4.7      CO2 31   BUN 33*   CREATININE 1.28   CALCIUM 8.5     CBC:   Recent Labs   Lab 02/20/22  0310   WBC 5.80   HGB 11.7*   HCT 37.7*          Significant Imaging: I have reviewed all pertinent imaging results/findings within the past 24 hours.   [id number]

## 2024-10-01 ENCOUNTER — OFFICE VISIT (OUTPATIENT)
Dept: WOUND CARE | Facility: CLINIC | Age: 39
End: 2024-10-01
Attending: FAMILY MEDICINE
Payer: MEDICARE

## 2024-10-01 DIAGNOSIS — E11.610 CHARCOT FOOT DUE TO DIABETES MELLITUS: Primary | ICD-10-CM

## 2024-10-01 DIAGNOSIS — L97.412 DIABETIC ULCER OF RIGHT HEEL ASSOCIATED WITH TYPE 2 DIABETES MELLITUS, WITH FAT LAYER EXPOSED: ICD-10-CM

## 2024-10-01 DIAGNOSIS — E11.621 DIABETIC ULCER OF RIGHT HEEL ASSOCIATED WITH TYPE 2 DIABETES MELLITUS, WITH FAT LAYER EXPOSED: ICD-10-CM

## 2024-10-01 PROCEDURE — 99999 PR PBB SHADOW E&M-EST. PATIENT-LVL IV: CPT | Mod: PBBFAC,,, | Performed by: NURSE PRACTITIONER

## 2024-10-01 PROCEDURE — 99213 OFFICE O/P EST LOW 20 MIN: CPT | Mod: S$PBB,,, | Performed by: NURSE PRACTITIONER

## 2024-10-01 PROCEDURE — 99214 OFFICE O/P EST MOD 30 MIN: CPT | Mod: PBBFAC | Performed by: NURSE PRACTITIONER

## 2024-10-08 ENCOUNTER — DOCUMENT SCAN (OUTPATIENT)
Dept: HOME HEALTH SERVICES | Facility: HOSPITAL | Age: 39
End: 2024-10-08
Payer: MEDICARE

## 2024-10-10 PROCEDURE — G0179 MD RECERTIFICATION HHA PT: HCPCS | Mod: ,,, | Performed by: NURSE PRACTITIONER

## 2024-10-14 ENCOUNTER — OFFICE VISIT (OUTPATIENT)
Dept: WOUND CARE | Facility: CLINIC | Age: 39
End: 2024-10-14
Attending: FAMILY MEDICINE
Payer: MEDICARE

## 2024-10-14 ENCOUNTER — HOSPITAL ENCOUNTER (OUTPATIENT)
Dept: RADIOLOGY | Facility: HOSPITAL | Age: 39
Discharge: HOME | End: 2024-10-14
Attending: NURSE PRACTITIONER
Payer: MEDICARE

## 2024-10-14 VITALS
RESPIRATION RATE: 20 BRPM | DIASTOLIC BLOOD PRESSURE: 81 MMHG | HEART RATE: 99 BPM | TEMPERATURE: 99 F | SYSTOLIC BLOOD PRESSURE: 131 MMHG

## 2024-10-14 DIAGNOSIS — E11.610 CHARCOT FOOT DUE TO DIABETES MELLITUS: ICD-10-CM

## 2024-10-14 DIAGNOSIS — L97.415 DIABETIC ULCER OF RIGHT HEEL ASSOCIATED WITH TYPE 2 DIABETES MELLITUS, WITH MUSCLE INVOLVEMENT WITHOUT EVIDENCE OF NECROSIS: Primary | ICD-10-CM

## 2024-10-14 DIAGNOSIS — E11.621 DIABETIC ULCER OF RIGHT HEEL ASSOCIATED WITH TYPE 2 DIABETES MELLITUS, WITH MUSCLE INVOLVEMENT WITHOUT EVIDENCE OF NECROSIS: Primary | ICD-10-CM

## 2024-10-14 DIAGNOSIS — L08.9 WOUND INFECTION: ICD-10-CM

## 2024-10-14 DIAGNOSIS — L97.412 DIABETIC ULCER OF RIGHT HEEL ASSOCIATED WITH TYPE 2 DIABETES MELLITUS, WITH FAT LAYER EXPOSED: ICD-10-CM

## 2024-10-14 DIAGNOSIS — T14.8XXA WOUND INFECTION: ICD-10-CM

## 2024-10-14 DIAGNOSIS — E11.621 DIABETIC ULCER OF RIGHT HEEL ASSOCIATED WITH TYPE 2 DIABETES MELLITUS, WITH FAT LAYER EXPOSED: ICD-10-CM

## 2024-10-14 DIAGNOSIS — E11.621 DIABETIC ULCER OF RIGHT HEEL ASSOCIATED WITH TYPE 2 DIABETES MELLITUS, WITH MUSCLE INVOLVEMENT WITHOUT EVIDENCE OF NECROSIS: ICD-10-CM

## 2024-10-14 DIAGNOSIS — L97.415 DIABETIC ULCER OF RIGHT HEEL ASSOCIATED WITH TYPE 2 DIABETES MELLITUS, WITH MUSCLE INVOLVEMENT WITHOUT EVIDENCE OF NECROSIS: ICD-10-CM

## 2024-10-14 PROCEDURE — 99999PBSHW PR PBB SHADOW TECHNICAL ONLY FILED TO HB: Mod: PBBFAC,,,

## 2024-10-14 PROCEDURE — 99999 PR PBB SHADOW E&M-EST. PATIENT-LVL V: CPT | Mod: PBBFAC,,, | Performed by: NURSE PRACTITIONER

## 2024-10-14 PROCEDURE — 99213 OFFICE O/P EST LOW 20 MIN: CPT | Mod: S$PBB,25,, | Performed by: NURSE PRACTITIONER

## 2024-10-14 PROCEDURE — 87077 CULTURE AEROBIC IDENTIFY: CPT | Mod: ,,, | Performed by: CLINICAL MEDICAL LABORATORY

## 2024-10-14 PROCEDURE — 87075 CULTR BACTERIA EXCEPT BLOOD: CPT | Mod: ,,, | Performed by: CLINICAL MEDICAL LABORATORY

## 2024-10-14 PROCEDURE — 99215 OFFICE O/P EST HI 40 MIN: CPT | Mod: PBBFAC,25 | Performed by: NURSE PRACTITIONER

## 2024-10-14 PROCEDURE — 73630 X-RAY EXAM OF FOOT: CPT | Mod: 26,RT,, | Performed by: RADIOLOGY

## 2024-10-14 PROCEDURE — 96372 THER/PROPH/DIAG INJ SC/IM: CPT | Mod: PBBFAC | Performed by: NURSE PRACTITIONER

## 2024-10-14 PROCEDURE — 87186 SC STD MICRODIL/AGAR DIL: CPT | Mod: ,,, | Performed by: CLINICAL MEDICAL LABORATORY

## 2024-10-14 PROCEDURE — 73610 X-RAY EXAM OF ANKLE: CPT | Mod: 26,RT,, | Performed by: RADIOLOGY

## 2024-10-14 PROCEDURE — 73630 X-RAY EXAM OF FOOT: CPT | Mod: TC,RT

## 2024-10-14 PROCEDURE — 87070 CULTURE OTHR SPECIMN AEROBIC: CPT | Mod: ,,, | Performed by: CLINICAL MEDICAL LABORATORY

## 2024-10-14 PROCEDURE — 73610 X-RAY EXAM OF ANKLE: CPT | Mod: TC,RT

## 2024-10-14 RX ORDER — CEFTRIAXONE 1 G/1
1 INJECTION, POWDER, FOR SOLUTION INTRAMUSCULAR; INTRAVENOUS DAILY
Qty: 10 G | Refills: 0 | Status: SHIPPED | OUTPATIENT
Start: 2024-10-14 | End: 2024-10-24

## 2024-10-14 RX ORDER — CEFTRIAXONE 1 G/1
1 INJECTION, POWDER, FOR SOLUTION INTRAMUSCULAR; INTRAVENOUS ONCE
Status: COMPLETED | OUTPATIENT
Start: 2024-10-14 | End: 2024-10-14

## 2024-10-14 RX ADMIN — CEFTRIAXONE SODIUM 1 G: 1 INJECTION, POWDER, FOR SOLUTION INTRAMUSCULAR; INTRAVENOUS at 03:10

## 2024-10-14 NOTE — PROGRESS NOTES
AUGUSTUS Macedo   [facility]  [address] [facility] [address]  [phone]      PATIENT NAME: Jean Pierre Paulson  : 1985  DATE: 10/14/24  MRN: [id number]      Billing Provider: AUGUSTUS Macedo  Level of Service:   Patient PCP Information       Provider PCP Type    Primary Doctor No General            Reason for Visit / Chief Complaint: Diabetic Foot Ulcer (Right foot)       History of Present Illness / Problem Focused Workflow     Jean Pierre Paulson is a 39 y.o. male who presents to clinic for follow up on chronic-non healing wound on the right foot. Right foot is erythematous and lateral aspect of foot is hot to the touch. Wound is draining copious amounts of serosanguinous drainage. Patient reports increased pain when ambulating and difficulty putting weight on foot. X-ray, labs, and cultures today. Rocephin 1 gram given IM in clinic today. Rocephin to pharmacy, start 10/15 outpatient until cultures result. Discussed s/s of worsening infection and instructed to go to ED if he develops fever, chills, increased pain or swelling, body aches, nausea/vomiting, or increased redness to foot.     Last arterial doppler 4/10/23,  The ankle to brachial index is 1.25 on the right and 1.34 on the left.    Significant PMH  Includes diabetes and hypertension. Last HgA1C 7.3 in 2022, diabetes is managed by PCP.  Denies fever or chills.       Review of Systems     Review of Systems   Constitutional:  Positive for activity change. Negative for chills and fever.   Respiratory:  Negative for chest tightness and shortness of breath.    Cardiovascular:  Positive for leg swelling. Negative for chest pain and palpitations.   Musculoskeletal:  Positive for arthralgias, gait problem and joint swelling.   Skin:  Positive for color change and wound.        See wound assessment   Psychiatric/Behavioral:  Negative for agitation, behavioral problems, confusion and  self-injury.        Medical / Social / Family History     Past Medical History:   Diagnosis Date    NOLAN (acute kidney injury) 04/07/2023    Anemia     Diabetes mellitus with neuropathy     HLD (hyperlipidemia) 04/07/2023    HTN (hypertension)     Obesity     TEDDY (obstructive sleep apnea) 04/07/2023    Osteomyelitis 10/2020    Hx of R foot, Tx with IV Abx    Peripheral vascular disease     Urinary tract infection with hematuria 02/10/2024       Past Surgical History:   Procedure Laterality Date    APPLICATION OF SPLIT-THICKNESS SKIN GRAFT (STSG) TO LOWER EXTREMITY Right 03/02/2022    Procedure: APPLICATION, GRAFT, SKIN, SPLIT-THICKNESS, TO LOWER EXTREMITY;  Surgeon: Greg Ramírez MD;  Location: [facility];  Service: General;  Laterality: Right;    CHOLECYSTECTOMY      DEBRIDEMENT OF FOOT Right 10/2020    right great toe amputation         Social History  Mr. Jean Pierre Paulson  reports that he has been smoking vaping with nicotine. He started smoking about 4 months ago. He has never used smokeless tobacco. He reports current alcohol use of about 2.0 standard drinks of alcohol per week. He reports that he does not use drugs.    Family History  'anjana Paulson family history includes Diabetes in his father and maternal grandmother; Hypertension in his father and maternal grandmother.    Medications and Allergies     Medications  No outpatient medications have been marked as taking for the 10/14/24 encounter (Office Visit) with Yelitza Alas FNP.     Current Facility-Administered Medications for the 10/14/24 encounter (Office Visit) with Yelitza Alas FNP   Medication Dose Route Frequency Provider Last Rate Last Admin    [COMPLETED] cefTRIAXone injection 1 g  1 g Intramuscular Once Yelitza Alas FNP   1 g at 10/14/24 1558       Allergies  Review of patient's allergies indicates:   Allergen Reactions    Tomato Itching       Physical Examination     Vitals:    10/14/24 1344   BP: 131/81   Pulse: 99    Resp: 20   Temp: 99.3 °F (37.4 °C)     Physical Exam  Vitals and nursing note reviewed.   Constitutional:       Appearance: Normal appearance.   HENT:      Head: Normocephalic.   Cardiovascular:      Rate and Rhythm: Normal rate and regular rhythm.      Pulses: Normal pulses.      Heart sounds: Normal heart sounds.   Pulmonary:      Effort: Pulmonary effort is normal. No respiratory distress.   Chest:      Chest wall: No tenderness.   Musculoskeletal:         General: Swelling, tenderness and deformity present.      Right lower leg: Edema present.   Skin:     General: Skin is warm and dry.      Capillary Refill: Capillary refill takes 2 to 3 seconds.      Comments: See LDA for photos and measurements   Neurological:      General: No focal deficit present.      Mental Status: He is alert and oriented to person, place, and time. Mental status is at baseline.   Psychiatric:         Mood and Affect: Mood normal.         Behavior: Behavior normal.         Thought Content: Thought content normal.         Judgment: Judgment normal.       Assessment and Plan             Altered Skin Integrity 02/10/24 2200 Right plantar Foot Diabetic Ulcer Full thickness tissue loss with exposed bone, tendon, or muscle. Often includes undermining and tunneling. May extend into muscle and/or supporting structures. (Active)   02/10/24 2200   Altered Skin Integrity Present on Admission - Did Patient arrive to the hospital with altered skin?: yes   Side: Right   Orientation: plantar   Location: Foot   Wound Number:    Is this injury device related?:    Primary Wound Type: Diabetic ulc   Description of Altered Skin Integrity: Full thickness tissue loss with exposed bone, tendon, or muscle. Often includes undermining and tunneling. May extend into muscle and/or supporting structures.   Ankle-Brachial Index:    Pulses:    Removal Indication and Assessment:    Wound Outcome:    (Retired) Wound Length (cm):    (Retired) Wound Width (cm):     (Retired) Depth (cm):    Wound Description (Comments):    Removal Indications:    Dressing Appearance Intact;Clean 10/14/24 1334   Drainage Amount Moderate 10/14/24 1334   Drainage Characteristics/Odor Yellow 10/14/24 1334   Appearance Pink;Red;Yellow;Slough;Moist;Granulating;Adipose;Muscle 10/14/24 1334   Black (%), Wound Tissue Color 0 % 10/14/24 1334   Red (%), Wound Tissue Color 90 % 10/14/24 1334   Yellow (%), Wound Tissue Color 10 % 10/14/24 1334   Periwound Area Intact 10/14/24 1334   Wound Edges Irregular;Undefined 10/14/24 1334   Elliott Classification (diabetic foot ulcers only) Grade 3 10/14/24 1334   Wound Length (cm) 10 cm 10/14/24 1334   Wound Width (cm) 7.4 cm 10/14/24 1334   Wound Depth (cm) 0.8 cm 10/14/24 1334   Wound Volume (cm^3) 59.2 cm^3 10/14/24 1334   Wound Surface Area (cm^2) 74 cm^2 10/14/24 1334   Care Cleansed with:;Antimicrobial agent 10/14/24 1334   Dressing Applied;Gauze, wet to dry;Gauze;Absorptive Pad;Rolled gauze 10/14/24 1334   Periwound Care Moisture barrier applied 10/14/24 1334            Wound 06/10/24 Abscess Right lateral Foot #4 (Active)   06/10/24    Present on Original Admission: Y   Primary Wound Type: Abscess   Side: Right   Orientation: lateral   Location: Foot   Wound Approximate Age at First Assessment (Weeks):    Wound Number: #4   Is this injury device related?:    Incision Type:    Closure Method:    Wound Description (Comments):    Type:    Additional Comments:    Ankle-Brachial Index:    Pulses:    Removal Indication and Assessment:    Wound Outcome:    Wound Image      10/14/24 1331   Dressing Appearance Dry;Intact;Clean 10/14/24 1331   Drainage Amount Moderate 10/14/24 1331   Drainage Characteristics/Odor Yellow 10/14/24 1331   Appearance Pink;Red;Yellow;Slough;Moist;Granulating;Adipose 10/14/24 1331   Black (%), Wound Tissue Color 0 % 10/14/24 1331   Red (%), Wound Tissue Color 90 % 10/14/24 1331   Yellow (%), Wound Tissue Color 10 % 10/14/24 1331   Periwound  Area Moist;[place];Redness 10/14/24 1331   Wound Edges Irregular;Undefined 10/14/24 1331   Wound Length (cm) 10 cm 10/14/24 1331   Wound Width (cm) 7.4 cm 10/14/24 1331   Wound Depth (cm) 0.8 cm 10/14/24 1331   Wound Volume (cm^3) 59.2 cm^3 10/14/24 1331   Wound Surface Area (cm^2) 74 cm^2 10/14/24 1331   Care Cleansed with:;Antimicrobial agent 10/14/24 1331   Dressing Applied;Gauze, wet to dry;Gauze;Rolled gauze 10/14/24 1331     Problem List Items Addressed This Visit          Endocrine    Diabetic ulcer of right heel associated with type 2 diabetes mellitus, with fat layer exposed    Overview                                                                                              Current Assessment & Plan     Clean with vashe or baby shampoo and water     Apply betadine moisten drawtex to wound       Cover and secure with 4x4 gauze and ABD pad, wrap with 2 layer compression from toe to knee. Change daily and as needed.  Pt knows how to apply 2 layer compression.  Please provide pt with enough wraps to change dressing daily     Monitor closely for s/s of infection including fever, chills, increase in pain, odor from wound, and increased redness from foot. Go to ER if any complications develop.   Keep leg elevated and avoid pressure on wound.   Diabetes:  Monitor glucose closely. Check fasting glucose and 2 hours after meals. HgA1C goal <7, fasting glucose , and 2 hours after meals <180  Hypertension:  Check blood pressure twice daily, goal <120/80  Diet:   Increase protein intake, avoid fried, fatty foods and foods high in simple carbs.   Vitamins:  Take vitamin C 1000 mg, zinc 50mg, vitamin d 5000 units, and a daily multivitamin. Angel is a good source of protein and nutrients to aid in wound healing.     X-ray and lab work today  Cultures pending, Rocephin 1 gram given IM in clinic today. Continue Rocephin daily, will adjust once cultures result.             Orthopedic    Charcot foot due to diabetes  mellitus    Overview     CROW orthotic boot         Relevant Orders    X-Ray Foot Complete 3 view Right    X-Ray Ankle Complete 3 View Right     Other Visit Diagnoses       Diabetic ulcer of right heel associated with type 2 diabetes mellitus, with muscle involvement without evidence of necrosis    -  Primary    Relevant Orders    X-Ray Foot Complete 3 view Right    X-Ray Ankle Complete 3 View Right    CBC Auto Differential (Completed)    Sedimentation Rate    C-Reactive Protein    Comprehensive Metabolic Panel    Culture, Wound    Culture, Anaerobe    Wound infection        Relevant Orders    CBC Auto Differential (Completed)    Sedimentation Rate    C-Reactive Protein    Comprehensive Metabolic Panel            Future Appointments   Date Time Provider Department Center   10/21/2024 10:00 AM [facility]el[facility]za [facility]as [facility]            Signature:  AUGUSTUS Macedo  [facility]  [address] [facility] [address]  [phone]    Date of encounter: 10/14/24

## 2024-10-14 NOTE — ASSESSMENT & PLAN NOTE
Clean with vashe or baby shampoo and water     Apply betadine moisten drawtex to wound       Cover and secure with 4x4 gauze and ABD pad, wrap with 2 layer compression from toe to knee. Change daily and as needed.  Pt knows how to apply 2 layer compression.  Please provide pt with enough wraps to change dressing daily     Monitor closely for s/s of infection including fever, chills, increase in pain, odor from wound, and increased redness from foot. Go to ER if any complications develop.   Keep leg elevated and avoid pressure on wound.   Diabetes:  Monitor glucose closely. Check fasting glucose and 2 hours after meals. HgA1C goal <7, fasting glucose , and 2 hours after meals <180  Hypertension:  Check blood pressure twice daily, goal <120/80  Diet:   Increase protein intake, avoid fried, fatty foods and foods high in simple carbs.   Vitamins:  Take vitamin C 1000 mg, zinc 50mg, vitamin d 5000 units, and a daily multivitamin. Angel is a good source of protein and nutrients to aid in wound healing.     X-ray and lab work today  Cultures pending, Rocephin 1 gram given IM in clinic today. Continue Rocephin daily, will adjust once cultures result.

## 2024-10-16 LAB — MICROORGANISM SPEC CULT: ABNORMAL

## 2024-10-16 NOTE — PROGRESS NOTES
AUGUSTUS Macedo   [facility]  [address] [facility] [address]  [phone]      PATIENT NAME: Jean Pierre Paulson  : 1985  DATE: 10/21/24  MRN: [id number]      Billing Provider: AUGUSTUS Macedo  Level of Service:   Patient PCP Information       Provider PCP Type    Primary Doctor No General            Reason for Visit / Chief Complaint: Diabetic Foot Ulcer and Wound Check (Right heel)       History of Present Illness / Problem Focused Workflow     Jean Pierre Paulson is a 40 yo male presents for follow up on chronic non healing wound to right foot. Reports pain and edema have improved since last visit. Wound is smaller today with improvement in drainage. Continue rocephin as prescribed. Continue with betadine, drawtex, and two layer compression.       10/14/2024  39 y.o. male who presents to clinic for follow up on chronic-non healing wound on the right foot. Right foot is erythematous and lateral aspect of foot is hot to the touch. Wound is draining copious amounts of serosanguinous drainage. Patient reports increased pain when ambulating and difficulty putting weight on foot. X-ray, labs, and cultures today. Rocephin 1 gram given IM in clinic today. Rocephin to pharmacy, start 10/15 outpatient until cultures result. Discussed s/s of worsening infection and instructed to go to ED if he develops fever, chills, increased pain or swelling, body aches, nausea/vomiting, or increased redness to foot.     Last arterial doppler 4/10/23,  The ankle to brachial index is 1.25 on the right and 1.34 on the left.    Significant PMH  Includes diabetes and hypertension. Last HgA1C 7.3 in 2022, diabetes is managed by PCP.  Denies fever or chills.     Review of Systems     Review of Systems   Constitutional:  Positive for activity change. Negative for chills and fever.   Respiratory:  Negative for chest tightness and shortness of breath.    Cardiovascular:  Positive  for leg swelling. Negative for chest pain and palpitations.   Musculoskeletal:  Positive for arthralgias, gait problem and joint swelling.   Skin:  Positive for color change and wound.        See wound assessment   Psychiatric/Behavioral:  Negative for agitation, behavioral problems, confusion and self-injury.        Medical / Social / Family History     Past Medical History:   Diagnosis Date    NOLAN (acute kidney injury) 04/07/2023    Anemia     Diabetes mellitus with neuropathy     HLD (hyperlipidemia) 04/07/2023    HTN (hypertension)     Obesity     TEDDY (obstructive sleep apnea) 04/07/2023    Osteomyelitis 10/2020    Hx of R foot, Tx with IV Abx    Peripheral vascular disease     Urinary tract infection with hematuria 02/10/2024       Past Surgical History:   Procedure Laterality Date    APPLICATION OF SPLIT-THICKNESS SKIN GRAFT (STSG) TO LOWER EXTREMITY Right 03/02/2022    Procedure: APPLICATION, GRAFT, SKIN, SPLIT-THICKNESS, TO LOWER EXTREMITY;  Surgeon: Greg Ramírez MD;  Location: [facility];  Service: General;  Laterality: Right;    CHOLECYSTECTOMY      DEBRIDEMENT OF FOOT Right 10/2020    right great toe amputation         Social History  Mr. Jean Pierre Paulson  reports that he has been smoking vaping with nicotine. He started smoking about 4 months ago. He has never used smokeless tobacco. He reports current alcohol use of about 2.0 standard drinks of alcohol per week. He reports that he does not use drugs.    Family History  's Jean Pierre Paulson family history includes Diabetes in his father and maternal grandmother; Hypertension in his father and maternal grandmother.    Medications and Allergies     Medications  Outpatient Medications Marked as Taking for the 10/21/24 encounter (Office Visit) with Yelitza Alas FNP   Medication Sig Dispense Refill    amLODIPine (NORVASC) 10 MG tablet Take by mouth once daily.      blood sugar diagnostic (BLOOD GLUCOSE TEST) Strp 1 strip by  Misc.(Non-Drug; Combo Route) route once daily. accu-chek [place] strips 90 strip 5    cefTRIAXone (ROCEPHIN) 1 gram injection Inject 1 g into the muscle Daily. for 10 days 10 g 0    gabapentin (NEURONTIN) 300 MG capsule Take 1 capsule (300 mg total) by mouth every 8 (eight) hours. 270 capsule 1    gentamicin (GARAMYCIN) 0.1 % cream Apply topically once daily. 90 g 2    hydroCHLOROthiazide (HYDRODIURIL) 25 MG tablet Take 1 tablet (25 mg total) by mouth once daily. 90 tablet 1    HYDROcodone-acetaminophen (NORCO)  mg per tablet Take 1 tablet by mouth every 12 (twelve) hours as needed for Pain (pain). 60 tablet 0    ibuprofen (ADVIL,MOTRIN) 800 MG tablet Take 800 mg by mouth 3 (three) times daily as needed for Pain.      LIDOcaine, PF, 10 mg/ml, 1%, 10 mg/mL (1 %) Soln Inject 2 mLs (20 mg total) into the muscle Daily. for 10 doses 20 mL 0    rosuvastatin (CRESTOR) 10 MG tablet Take 10 mg by mouth once daily.      semaglutide (OZEMPIC) 2 mg/dose (8 mg/3 mL) PnIj Inject 2 mg into the skin every 7 days. (Patient taking differently: Inject 2.5 mg into the skin every 7 days.) 8 mL 2       Allergies  Review of patient's allergies indicates:   Allergen Reactions    Tomato Itching       Physical Examination     Vitals:    10/21/24 1129   BP: 133/83   Pulse: 94   Resp: 17   Temp: 97.5 °F (36.4 °C)       Physical Exam  Vitals and nursing note reviewed.   Constitutional:       Appearance: Normal appearance.   HENT:      Head: Normocephalic.   Cardiovascular:      Rate and Rhythm: Normal rate and regular rhythm.      Pulses: Normal pulses.      Heart sounds: Normal heart sounds.   Pulmonary:      Effort: Pulmonary effort is normal. No respiratory distress.   Chest:      Chest wall: No tenderness.   Musculoskeletal:         General: Swelling, tenderness and deformity present.      Right lower leg: Edema present.   Skin:     General: Skin is warm and dry.      Capillary Refill: Capillary refill takes 2 to 3 seconds.       Comments: See LDA for photos and measurements   Neurological:      General: No focal deficit present.      Mental Status: He is alert and oriented to person, place, and time. Mental status is at baseline.   Psychiatric:         Mood and Affect: Mood normal.         Behavior: Behavior normal.         Thought Content: Thought content normal.         Judgment: Judgment normal.     Assessment and Plan             Wound 06/10/24 Abscess Right lateral Foot #4 (Active)   06/10/24    Present on Original Admission: Y   Primary Wound Type: Abscess   Side: Right   Orientation: lateral   Location: Foot   Wound Approximate Age at First Assessment (Weeks):    Wound Number: #4   Is this injury device related?:    Incision Type:    Closure Method:    Wound Description (Comments):    Type:    Additional Comments:    Ankle-Brachial Index:    Pulses:    Removal Indication and Assessment:    Wound Outcome:    Wound Image    10/21/24 1130   Dressing Appearance Intact;Moist drainage 10/21/24 1130   Drainage Amount Moderate 10/21/24 1130   Drainage Characteristics/Odor [place] 10/21/24 1130   Appearance Red;Granulating 10/21/24 1130   Tissue loss description Full thickness 10/21/24 1130   Black (%), Wound Tissue Color 0 % 10/21/24 1130   Red (%), Wound Tissue Color 100 % 10/21/24 1130   Yellow (%), Wound Tissue Color 0 % 10/21/24 1130   Periwound Area Intact;Dry 10/21/24 1130   Wound Edges Callused 10/21/24 1130   Wound Length (cm) 10.8 cm 10/21/24 1130   Wound Width (cm) 6.2 cm 10/21/24 1130   Wound Depth (cm) 0.4 cm 10/21/24 1130   Wound Volume (cm^3) 26.784 cm^3 10/21/24 1130   Wound Surface Area (cm^2) 66.96 cm^2 10/21/24 1130   Care Cleansed with:;Antimicrobial agent 10/21/24 1130   Dressing Applied;Gauze, wet to moist;Hydrofiber;Absorptive Pad;Compression wrap 10/21/24 1130   Compression Two layer compression 10/21/24 1130       Problem List Items Addressed This Visit          Endocrine    Diabetic ulcer of right heel associated with  type 2 diabetes mellitus, with fat layer exposed    Overview                                                                                            Current Assessment & Plan     Clean with vashe or baby shampoo and water     Apply betadine moisten drawtex to wound       Cover and secure with 4x4 gauze and ABD pad, wrap with 2 layer compression from toe to knee. Change daily and as needed.  Pt knows how to apply 2 layer compression.  Please provide pt with enough wraps to change dressing daily     Monitor closely for s/s of infection including fever, chills, increase in pain, odor from wound, and increased redness from foot. Go to ER if any complications develop.   Keep leg elevated and avoid pressure on wound.   Diabetes:  Monitor glucose closely. Check fasting glucose and 2 hours after meals. HgA1C goal <7, fasting glucose , and 2 hours after meals <180  Hypertension:  Check blood pressure twice daily, goal <120/80  Diet:   Increase protein intake, avoid fried, fatty foods and foods high in simple carbs.   Vitamins:  Take vitamin C 1000 mg, zinc 50mg, vitamin d 5000 units, and a daily multivitamin. Angel is a good source of protein and nutrients to aid in wound healing.     X-ray and lab work today  Cultures pending, Rocephin 1 gram given IM in clinic today. Continue Rocephin daily, will adjust once cultures result.             Orthopedic    Charcot foot due to diabetes mellitus    Overview     CROW orthotic boot          Other Visit Diagnoses       Diabetic ulcer of right heel associated with type 2 diabetes mellitus, with muscle involvement without evidence of necrosis    -  Primary            Future Appointments   Date Time Provider Department Center   11/4/2024 10:00 AM Yelitza Alas FNP [facility]            Signature:  AUGUSTUS Macedo  [facility]  [address] [facility] [address]  [phone]    Date of encounter:  10/21/24

## 2024-10-17 RX ORDER — LIDOCAINE HYDROCHLORIDE 10 MG/ML
2 INJECTION, SOLUTION EPIDURAL; INFILTRATION; INTRACAUDAL; PERINEURAL DAILY
Qty: 20 ML | Refills: 0 | Status: SHIPPED | OUTPATIENT
Start: 2024-10-17 | End: 2024-10-27

## 2024-10-18 LAB — BACTERIA SPEC ANAEROBE CULT: NORMAL

## 2024-10-21 ENCOUNTER — OFFICE VISIT (OUTPATIENT)
Dept: WOUND CARE | Facility: CLINIC | Age: 39
End: 2024-10-21
Attending: FAMILY MEDICINE
Payer: MEDICARE

## 2024-10-21 VITALS
SYSTOLIC BLOOD PRESSURE: 133 MMHG | RESPIRATION RATE: 17 BRPM | DIASTOLIC BLOOD PRESSURE: 83 MMHG | HEART RATE: 94 BPM | TEMPERATURE: 98 F

## 2024-10-21 DIAGNOSIS — E11.621 DIABETIC ULCER OF RIGHT HEEL ASSOCIATED WITH TYPE 2 DIABETES MELLITUS, WITH FAT LAYER EXPOSED: ICD-10-CM

## 2024-10-21 DIAGNOSIS — E11.610 CHARCOT FOOT DUE TO DIABETES MELLITUS: ICD-10-CM

## 2024-10-21 DIAGNOSIS — L97.412 DIABETIC ULCER OF RIGHT HEEL ASSOCIATED WITH TYPE 2 DIABETES MELLITUS, WITH FAT LAYER EXPOSED: ICD-10-CM

## 2024-10-21 DIAGNOSIS — E11.621 DIABETIC ULCER OF RIGHT HEEL ASSOCIATED WITH TYPE 2 DIABETES MELLITUS, WITH MUSCLE INVOLVEMENT WITHOUT EVIDENCE OF NECROSIS: Primary | ICD-10-CM

## 2024-10-21 DIAGNOSIS — L97.415 DIABETIC ULCER OF RIGHT HEEL ASSOCIATED WITH TYPE 2 DIABETES MELLITUS, WITH MUSCLE INVOLVEMENT WITHOUT EVIDENCE OF NECROSIS: Primary | ICD-10-CM

## 2024-10-21 PROCEDURE — 99213 OFFICE O/P EST LOW 20 MIN: CPT | Mod: S$PBB,,, | Performed by: NURSE PRACTITIONER

## 2024-10-21 PROCEDURE — 99215 OFFICE O/P EST HI 40 MIN: CPT | Mod: PBBFAC | Performed by: NURSE PRACTITIONER

## 2024-10-21 PROCEDURE — 99999 PR PBB SHADOW E&M-EST. PATIENT-LVL V: CPT | Mod: PBBFAC,,, | Performed by: NURSE PRACTITIONER

## 2024-10-24 RX ORDER — HYDROCODONE BITARTRATE AND ACETAMINOPHEN 10; 325 MG/1; MG/1
1 TABLET ORAL EVERY 12 HOURS PRN
Qty: 60 TABLET | Refills: 0 | Status: CANCELLED | OUTPATIENT
Start: 2024-10-24 | End: 2024-11-23

## 2024-10-28 ENCOUNTER — OFFICE VISIT (OUTPATIENT)
Dept: PAIN MEDICINE | Facility: CLINIC | Age: 39
End: 2024-10-28
Payer: MEDICARE

## 2024-10-28 VITALS
DIASTOLIC BLOOD PRESSURE: 88 MMHG | RESPIRATION RATE: 18 BRPM | HEART RATE: 87 BPM | BODY MASS INDEX: 40.43 KG/M2 | HEIGHT: 74 IN | SYSTOLIC BLOOD PRESSURE: 140 MMHG | WEIGHT: 315 LBS

## 2024-10-28 DIAGNOSIS — E11.49 DIABETIC NEUROPATHY WITH NEUROLOGIC COMPLICATION: Chronic | ICD-10-CM

## 2024-10-28 DIAGNOSIS — Z79.899 ENCOUNTER FOR LONG-TERM (CURRENT) USE OF MEDICATIONS: ICD-10-CM

## 2024-10-28 DIAGNOSIS — E11.40 DIABETIC NEUROPATHY WITH NEUROLOGIC COMPLICATION: Chronic | ICD-10-CM

## 2024-10-28 DIAGNOSIS — I73.9 PERIPHERAL VASCULAR DISEASE: Primary | Chronic | ICD-10-CM

## 2024-10-28 PROCEDURE — 99999 PR PBB SHADOW E&M-EST. PATIENT-LVL V: CPT | Mod: PBBFAC,,, | Performed by: PHYSICIAN ASSISTANT

## 2024-10-28 PROCEDURE — 99215 OFFICE O/P EST HI 40 MIN: CPT | Mod: PBBFAC | Performed by: PHYSICIAN ASSISTANT

## 2024-10-28 PROCEDURE — 99214 OFFICE O/P EST MOD 30 MIN: CPT | Mod: S$PBB,,, | Performed by: PHYSICIAN ASSISTANT

## 2024-10-28 RX ORDER — HYDROCODONE BITARTRATE AND ACETAMINOPHEN 10; 325 MG/1; MG/1
1 TABLET ORAL EVERY 12 HOURS PRN
Qty: 60 TABLET | Refills: 0 | Status: SHIPPED | OUTPATIENT
Start: 2024-11-04 | End: 2024-12-04

## 2024-10-28 RX ORDER — HYDROCODONE BITARTRATE AND ACETAMINOPHEN 10; 325 MG/1; MG/1
1 TABLET ORAL EVERY 12 HOURS PRN
Qty: 60 TABLET | Refills: 0 | Status: SHIPPED | OUTPATIENT
Start: 2025-01-03 | End: 2025-02-02

## 2024-10-28 RX ORDER — HYDROCODONE BITARTRATE AND ACETAMINOPHEN 10; 325 MG/1; MG/1
1 TABLET ORAL EVERY 12 HOURS PRN
Qty: 60 TABLET | Refills: 0 | Status: SHIPPED | OUTPATIENT
Start: 2024-12-04 | End: 2025-01-03

## 2024-10-29 ENCOUNTER — OFFICE VISIT (OUTPATIENT)
Dept: WOUND CARE | Facility: CLINIC | Age: 39
End: 2024-10-29
Attending: FAMILY MEDICINE
Payer: MEDICARE

## 2024-10-29 VITALS
HEART RATE: 85 BPM | TEMPERATURE: 98 F | SYSTOLIC BLOOD PRESSURE: 151 MMHG | RESPIRATION RATE: 20 BRPM | DIASTOLIC BLOOD PRESSURE: 96 MMHG

## 2024-10-29 DIAGNOSIS — L97.415 DIABETIC ULCER OF RIGHT HEEL ASSOCIATED WITH TYPE 2 DIABETES MELLITUS, WITH MUSCLE INVOLVEMENT WITHOUT EVIDENCE OF NECROSIS: Primary | ICD-10-CM

## 2024-10-29 DIAGNOSIS — E11.621 DIABETIC ULCER OF RIGHT HEEL ASSOCIATED WITH TYPE 2 DIABETES MELLITUS, WITH FAT LAYER EXPOSED: ICD-10-CM

## 2024-10-29 DIAGNOSIS — L97.412 DIABETIC ULCER OF RIGHT HEEL ASSOCIATED WITH TYPE 2 DIABETES MELLITUS, WITH FAT LAYER EXPOSED: ICD-10-CM

## 2024-10-29 DIAGNOSIS — E11.621 DIABETIC ULCER OF RIGHT HEEL ASSOCIATED WITH TYPE 2 DIABETES MELLITUS, WITH MUSCLE INVOLVEMENT WITHOUT EVIDENCE OF NECROSIS: Primary | ICD-10-CM

## 2024-10-29 PROCEDURE — 11045 DBRDMT SUBQ TISS EACH ADDL: CPT | Mod: S$PBB,,, | Performed by: NURSE PRACTITIONER

## 2024-10-29 PROCEDURE — 99215 OFFICE O/P EST HI 40 MIN: CPT | Mod: PBBFAC,25 | Performed by: NURSE PRACTITIONER

## 2024-10-29 PROCEDURE — 11042 DBRDMT SUBQ TIS 1ST 20SQCM/<: CPT | Mod: PBBFAC | Performed by: NURSE PRACTITIONER

## 2024-10-29 PROCEDURE — 99999 PR PBB SHADOW E&M-EST. PATIENT-LVL V: CPT | Mod: PBBFAC,,, | Performed by: NURSE PRACTITIONER

## 2024-10-29 PROCEDURE — 99499 UNLISTED E&M SERVICE: CPT | Mod: S$PBB,,, | Performed by: NURSE PRACTITIONER

## 2024-11-01 LAB
1OH-MIDAZOLAM UR QL SCN: NOT DETECTED NG/ML
2-OH-ETHYLFLURAZ UR QL SCN: NOT DETECTED NG/ML
6MAM UR QL SCN: NOT DETECTED NG/ML
7AMINOCLONAZEPAM UR QL SCN: NOT DETECTED NG/ML
7AMINOFLUNITRAZEPAM UR QL SCN: NOT DETECTED NG/ML
A-OH ALPRAZ GLUC UR QL SCN: NOT DETECTED NG/ML
A-OH ALPRAZ UR QL SCN: NOT DETECTED NG/ML
A-OH-TRIAZOLAM UR QL SCN: NOT DETECTED NG/ML
ALPRAZ UR QL SCN: NOT DETECTED NG/ML
AMPHET UR QL SCN: NOT DETECTED NG/ML
ANNOTATION COMMENT IMP: NORMAL
BARBITURATES UR QL SCN>200 NG/ML: NEGATIVE NG/ML
BUPRENORPHINE UR QL SCN: NOT DETECTED NG/ML
C6G UR QL SCN: NOT DETECTED NG/ML
CHLORDIAZEP UR QL SCN: NOT DETECTED NG/ML
CLOBAZAM UR QL SCN: NOT DETECTED NG/ML
CLONAZEPAM UR QL SCN: NOT DETECTED NG/ML
COCAINE UR QL SCN: NEGATIVE NG/ML
CODEINE UR QL SCN: NOT DETECTED NG/ML
CREAT UR-MCNC: 314.5 MG/DL
DHC UR QL SCN: PRESENT NG/ML
DIAZEPAM UR QL SCN: NOT DETECTED NG/ML
DRUG SCREEN COMMENT UR-IMP: ABNORMAL
EDDP UR QL SCN: NOT DETECTED NG/ML
EPHEDRIN UR QL SCN: NOT DETECTED NG/ML
FENTANYL UR QL SCN: NOT DETECTED NG/ML
FLUNITRAZEPAM UR QL SCN: NOT DETECTED NG/ML
FLURAZEPAM UR QL SCN: NOT DETECTED NG/ML
H3G UR QL SCN: PRESENT NG/ML
HYDROCODONE UR QL SCN: PRESENT NG/ML
HYDROMORPHONE UR QL SCN: NOT DETECTED NG/ML
LORAZEPAM GLUCURONIDE UR QL SCN: NOT DETECTED NG/ML
LORAZEPAM UR QL SCN: NOT DETECTED NG/ML
MDA UR QL SCN: NOT DETECTED NG/ML
MDEA UR QL SCN: NOT DETECTED NG/ML
MDMA UR QL SCN: NOT DETECTED NG/ML
ME-PHENIDATE UR QL SCN: NOT DETECTED NG/ML
MEDICATIONS MEDICATION.CURRENT: ABNORMAL
MEPERIDINE UR QL SCN: NOT DETECTED NG/ML
METHADONE UR QL SCN: NOT DETECTED NG/ML
METHAMPHET UR QL SCN: NOT DETECTED NG/ML
MIDAZOLAM UR QL SCN: NOT DETECTED NG/ML
MORPHINE UR QL SCN: NOT DETECTED NG/ML
MORPHINE-6-GLUCURONIDE UR QL SCN: NOT DETECTED NG/ML
N3G UR QL SCN: NOT DETECTED NG/ML
NALOXONE UR QL SCN: NOT DETECTED NG/ML
NALOXONE-3G UR QL SCN: NOT DETECTED NG/ML
NORBUPRENORPHINE UR QL SCN: NOT DETECTED NG/ML
NORCLOBAZAM UR QL SCN: NOT DETECTED NG/ML
NORDIAZEPAM UR QL SCN: NOT DETECTED NG/ML
NORFENTANYL UR QL: NOT DETECTED NG/ML
NORHYDROCODONE UR QL SCN: PRESENT NG/ML
NORMEPERIDINE UR QL: NOT DETECTED NG/ML
NOROXYCODONE UR QL SCN: NOT DETECTED NG/ML
NOROXYMORPHONE UR QL SCN: NOT DETECTED NG/ML
NORPROPOXYPH UR QL SCN: NOT DETECTED NG/ML
NORTAPENTADOL UR QL SCN: NOT DETECTED NG/ML
O-NORTRAMADOL UR QL: NOT DETECTED NG/ML
O3G UR QL SCN: NOT DETECTED NG/ML
OXAZEPAM GLUCURONIDE UR QL SCN: NOT DETECTED NG/ML
OXAZEPAM UR QL SCN: NOT DETECTED NG/ML
OXIDANTS UR QL: NEGATIVE
OXYCODONE UR QL SCN: NOT DETECTED NG/ML
OXYMORPHONE UR QL SCN: NOT DETECTED NG/ML
PCP UR QL SCN: NOT DETECTED NG/ML
PH UR: 5.2 [PH]
PHENTERMINE UR QL SCN: NOT DETECTED NG/ML
PPAA UR QL SCN: NOT DETECTED NG/ML
PRAZEPAM UR QL SCN: NOT DETECTED NG/ML
PROPOXYPH UR QL SCN: NOT DETECTED NG/ML
PSEUDOEPHEDRINE UR QL SCN: NOT DETECTED NG/ML
SP GR UR REFRACTOMETRY: 1.03
TAPEN GLUC UR QL SCN: NOT DETECTED NG/ML
TAPENTADOL UR QL SCN: NOT DETECTED NG/ML
TEMAZEPAM GLUCURONIDE UR QL SCN: NOT DETECTED NG/ML
TEMAZEPAM UR QL SCN: NOT DETECTED NG/ML
THC UR QL SCN: NEGATIVE NG/ML
TOXICOLOGIST REVIEW: ABNORMAL
TOXICOLOGIST REVIEW: ABNORMAL
TRAMADOL UR QL SCN: NOT DETECTED NG/ML
TRIAZOLAM UR QL SCN: NOT DETECTED NG/ML
ZOLPIDEM PHENYL-4-CARB UR QL SCN: NOT DETECTED NG/ML
ZOLPIDEM UR QL SCN: NOT DETECTED NG/ML

## 2024-11-06 ENCOUNTER — EXTERNAL HOME HEALTH (OUTPATIENT)
Dept: HOME HEALTH SERVICES | Facility: HOSPITAL | Age: 39
End: 2024-11-06
Payer: MEDICARE

## 2024-11-20 NOTE — PROGRESS NOTES
AUGUSTUS Macedo   [facility]  [address] [facility] [address]  [phone]      PATIENT NAME: Jean Pierre Paulson  : 1985  DATE: 24  MRN: [id number]      Billing Provider: AUGUSTUS Macedo  Level of Service:   Patient PCP Information       Provider PCP Type    Primary Doctor No General            Reason for Visit / Chief Complaint: Diabetic Foot Ulcer       History of Present Illness / Problem Focused Workflow     Jean Pierre Paulson is a 40 yo male presents for follow up on chronic non healing wound to right foot. Wound remains stable. Increased edema and drainage from foot. Discussed referral to foot specialist at [facility]. Patient is agreeable to referral. He is using crutches today and wearing CROW boot.     10/14/2024  39 y.o. male who presents to clinic for follow up on chronic-non healing wound on the right foot. Right foot is erythematous and lateral aspect of foot is hot to the touch. Wound is draining copious amounts of serosanguinous drainage. Patient reports increased pain when ambulating and difficulty putting weight on foot. X-ray, labs, and cultures today. Rocephin 1 gram given IM in clinic today. Rocephin to pharmacy, start 10/15 outpatient until cultures result. Discussed s/s of worsening infection and instructed to go to ED if he develops fever, chills, increased pain or swelling, body aches, nausea/vomiting, or increased redness to foot.     Last arterial doppler 4/10/23,  The ankle to brachial index is 1.25 on the right and 1.34 on the left.    Significant PMH  Includes diabetes and hypertension. Last HgA1C 7.3 in 2022, diabetes is managed by PCP.  Denies fever or chills.       Review of Systems     Review of Systems   Constitutional:  Positive for activity change. Negative for chills and fever.   Respiratory:  Negative for chest tightness and shortness of breath.    Cardiovascular:  Positive for leg swelling.  Negative for chest pain and palpitations.   Musculoskeletal:  Positive for arthralgias, gait problem and joint swelling.   Skin:  Positive for color change and wound.        See wound assessment   Psychiatric/Behavioral:  Negative for agitation, behavioral problems, confusion and self-injury.        Medical / Social / Family History     Past Medical History:   Diagnosis Date    NOLAN (acute kidney injury) 04/07/2023    Anemia     Diabetes mellitus with neuropathy     HLD (hyperlipidemia) 04/07/2023    HTN (hypertension)     Obesity     TEDDY (obstructive sleep apnea) 04/07/2023    Osteomyelitis 10/2020    Hx of R foot, Tx with IV Abx    Peripheral vascular disease     Urinary tract infection with hematuria 02/10/2024       Past Surgical History:   Procedure Laterality Date    APPLICATION OF SPLIT-THICKNESS SKIN GRAFT (STSG) TO LOWER EXTREMITY Right 03/02/2022    Procedure: APPLICATION, GRAFT, SKIN, SPLIT-THICKNESS, TO LOWER EXTREMITY;  Surgeon: Greg Ramírez MD;  Location: [facility] [place];  Service: General;  Laterality: Right;    CHOLECYSTECTOMY      DEBRIDEMENT OF FOOT Right 10/2020    right great toe amputation         Social History  Mr. Jean Pierre Paulson  reports that he has been smoking vaping with nicotine. He started smoking about 5 months ago. He has never used smokeless tobacco. He reports current alcohol use of about 2.0 standard drinks of alcohol per week. He reports that he does not use drugs.    Family History  'anjana Paulson family history includes Diabetes in his father and maternal grandmother; Hypertension in his father and maternal grandmother.    Medications and Allergies     Medications  No outpatient medications have been marked as taking for the 11/21/24 encounter (Office Visit) with Yelitza Alas FNP.       Allergies  Review of patient's allergies indicates:   Allergen Reactions    Tomato Itching       Physical Examination     There were no vitals filed for this  visit.    Physical Exam  Vitals and nursing note reviewed.   Constitutional:       Appearance: Normal appearance.   HENT:      Head: Normocephalic.   Cardiovascular:      Rate and Rhythm: Normal rate and regular rhythm.      Pulses: Normal pulses.      Heart sounds: Normal heart sounds.   Pulmonary:      Effort: Pulmonary effort is normal. No respiratory distress.   Chest:      Chest wall: No tenderness.   Musculoskeletal:         General: Swelling, tenderness and deformity present.      Right lower leg: Edema present.   Skin:     General: Skin is warm and dry.      Capillary Refill: Capillary refill takes 2 to 3 seconds.      Comments: See LDA for photos and measurements   Neurological:      General: No focal deficit present.      Mental Status: He is alert and oriented to person, place, and time. Mental status is at baseline.   Psychiatric:         Mood and Affect: Mood normal.         Behavior: Behavior normal.         Thought Content: Thought content normal.         Judgment: Judgment normal.       Assessment and Plan             Altered Skin Integrity 02/10/24 2200 Right plantar Foot Diabetic Ulcer Full thickness tissue loss with exposed bone, tendon, or muscle. Often includes undermining and tunneling. May extend into muscle and/or supporting structures. (Active)   02/10/24 2200   Altered Skin Integrity Present on Admission - Did Patient arrive to the hospital with altered skin?: yes   Side: Right   Orientation: plantar   Location: Foot   Wound Number:    Is this injury device related?:    Primary Wound Type: Diabetic ulc   Description of Altered Skin Integrity: Full thickness tissue loss with exposed bone, tendon, or muscle. Often includes undermining and tunneling. May extend into muscle and/or supporting structures.   Ankle-Brachial Index:    Pulses:    Removal Indication and Assessment:    Wound Outcome:    (Retired) Wound Length (cm):    (Retired) Wound Width (cm):    (Retired) Depth (cm):    Wound  Description (Comments):    Removal Indications:    Wound Image     11/21/24 1320   Dressing Appearance Intact;Moist drainage 11/21/24 1320   Drainage Amount Large 11/21/24 1320   Drainage Characteristics/Odor Yellow;Green 11/21/24 1320   Appearance Pink;Red;Moist;Granulating;Adipose;Muscle 11/21/24 1320   Black (%), Wound Tissue Color 0 % 11/21/24 1320   Red (%), Wound Tissue Color 90 % 11/21/24 1320   Yellow (%), Wound Tissue Color 10 % 11/21/24 1320   Periwound Area Moist;[place];Swelling 11/21/24 1320   Wound Edges Irregular 11/21/24 1320   Elliott Classification (diabetic foot ulcers only) Grade 2 11/21/24 1320   Wound Length (cm) 9.7 cm 11/21/24 1320   Wound Width (cm) 7.2 cm 11/21/24 1320   Wound Depth (cm) 0.4 cm 11/21/24 1320   Wound Volume (cm^3) 27.936 cm^3 11/21/24 1320   Wound Surface Area (cm^2) 69.84 cm^2 11/21/24 1320   Care Cleansed with:;Antimicrobial agent 11/21/24 1320   Dressing Applied;Silver;Calcium alginate;Gauze;Absorptive Pad;Rolled gauze 11/21/24 1320   Compression Two layer compression 11/21/24 1320       Problem List Items Addressed This Visit          Endocrine    Diabetic ulcer of right heel associated with type 2 diabetes mellitus, with fat layer exposed    Overview                                                                                                    Current Assessment & Plan     Clean with vashe or baby shampoo and water     Apply betadine moisten drawtex to wound       Cover and secure with 4x4 gauze and ABD pad, wrap with 2 layer compression from toe to knee. Change daily and as needed.  Pt knows how to apply 2 layer compression.  Please provide pt with enough wraps to change dressing daily     Monitor closely for s/s of infection including fever, chills, increase in pain, odor from wound, and increased redness from foot. Go to ER if any complications develop.   Keep leg elevated and avoid pressure on wound.   Diabetes:  Monitor glucose closely. Check fasting glucose and 2  hours after meals. HgA1C goal <7, fasting glucose , and 2 hours after meals <180  Hypertension:  Check blood pressure twice daily, goal <120/80  Diet:   Increase protein intake, avoid fried, fatty foods and foods high in simple carbs.   Vitamins:  Take vitamin C 1000 mg, zinc 50mg, vitamin d 5000 units, and a daily multivitamin. Angel is a good source of protein and nutrients to aid in wound healing.            Other Visit Diagnoses       Diabetic ulcer of right heel associated with type 2 diabetes mellitus, with muscle involvement without evidence of necrosis    -  Primary            Future Appointments   Date Time Provider Department Center   12/3/2024 10:00 AM Yelitza Alas FNP [facility]   1/30/2025  1:30 PM Franck Kee PA [facility]            Signature:  AUGUSTUS Macedo  [facility]  [address] [address]  [phone]    Date of encounter: 11/21/24

## 2024-11-21 ENCOUNTER — OFFICE VISIT (OUTPATIENT)
Dept: WOUND CARE | Facility: CLINIC | Age: 39
End: 2024-11-21
Attending: FAMILY MEDICINE
Payer: MEDICARE

## 2024-11-21 DIAGNOSIS — E11.621 DIABETIC ULCER OF RIGHT HEEL ASSOCIATED WITH TYPE 2 DIABETES MELLITUS, WITH FAT LAYER EXPOSED: ICD-10-CM

## 2024-11-21 DIAGNOSIS — L97.412 DIABETIC ULCER OF RIGHT HEEL ASSOCIATED WITH TYPE 2 DIABETES MELLITUS, WITH FAT LAYER EXPOSED: ICD-10-CM

## 2024-11-21 DIAGNOSIS — L97.415 DIABETIC ULCER OF RIGHT HEEL ASSOCIATED WITH TYPE 2 DIABETES MELLITUS, WITH MUSCLE INVOLVEMENT WITHOUT EVIDENCE OF NECROSIS: Primary | ICD-10-CM

## 2024-11-21 DIAGNOSIS — E11.621 DIABETIC ULCER OF RIGHT HEEL ASSOCIATED WITH TYPE 2 DIABETES MELLITUS, WITH MUSCLE INVOLVEMENT WITHOUT EVIDENCE OF NECROSIS: Primary | ICD-10-CM

## 2024-11-21 PROCEDURE — 99999 PR PBB SHADOW E&M-EST. PATIENT-LVL III: CPT | Mod: PBBFAC,,, | Performed by: NURSE PRACTITIONER

## 2024-11-21 PROCEDURE — 99213 OFFICE O/P EST LOW 20 MIN: CPT | Mod: PBBFAC | Performed by: NURSE PRACTITIONER

## 2024-11-21 PROCEDURE — 99213 OFFICE O/P EST LOW 20 MIN: CPT | Mod: S$PBB,,, | Performed by: NURSE PRACTITIONER

## 2024-12-09 PROCEDURE — G0179 MD RECERTIFICATION HHA PT: HCPCS | Mod: ,,, | Performed by: NURSE PRACTITIONER

## 2024-12-11 NOTE — PROGRESS NOTES
AUGUSTUS Macedo   [facility]  [address] [facility] [place] [address]  [phone]      PATIENT NAME: Jean Pierre Paulson  : 1985  DATE: 24  MRN: [id number]      Billing Provider: AUGUSTUS Macedo  Level of Service:   Patient PCP Information       Provider PCP Type    Primary Doctor No General            Reason for Visit / Chief Complaint: Diabetic Foot Ulcer and Wound Check (Right plantar)       History of Present Illness / Problem Focused Workflow     Jean Pierre Paulson is a 38 yo male presents for follow up on chronic non healing wound to right foot. Wound remains stable. Purulent drainage from lateral aspect of foot, cultures today. He is using crutches today and wearing CROW boot. Edema has improved. Urgoclean ag ordered. Tentatively plan to initiate [place] soaks once cultures result.     10/14/2024  39 y.o. male who presents to clinic for follow up on chronic-non healing wound on the right foot. Right foot is erythematous and lateral aspect of foot is hot to the touch. Wound is draining copious amounts of serosanguinous drainage. Patient reports increased pain when ambulating and difficulty putting weight on foot. X-ray, labs, and cultures today. Rocephin 1 gram given IM in clinic today. Rocephin to pharmacy, start 10/15 outpatient until cultures result. Discussed s/s of worsening infection and instructed to go to ED if he develops fever, chills, increased pain or swelling, body aches, nausea/vomiting, or increased redness to foot.     Last arterial doppler 4/10/23,  The ankle to brachial index is 1.25 on the right and 1.34 on the left.    Significant PMH  Includes diabetes and hypertension. Last HgA1C 7.3 in 2022, diabetes is managed by PCP.  Denies fever or chills.       Review of Systems     Review of Systems   Constitutional:  Positive for activity change. Negative for chills and fever.   Respiratory:  Negative for chest tightness and  shortness of breath.    Cardiovascular:  Positive for leg swelling. Negative for chest pain and palpitations.   Musculoskeletal:  Positive for arthralgias, gait problem and joint swelling.   Skin:  Positive for color change and wound.        See wound assessment   Psychiatric/Behavioral:  Negative for agitation, behavioral problems, confusion and self-injury.        Medical / Social / Family History     Past Medical History:   Diagnosis Date    NOLAN (acute kidney injury) 04/07/2023    Anemia     Diabetes mellitus with neuropathy     HLD (hyperlipidemia) 04/07/2023    HTN (hypertension)     Obesity     TEDDY (obstructive sleep apnea) 04/07/2023    Osteomyelitis 10/2020    Hx of R foot, Tx with IV Abx    Peripheral vascular disease     Urinary tract infection with hematuria 02/10/2024       Past Surgical History:   Procedure Laterality Date    APPLICATION OF SPLIT-THICKNESS SKIN GRAFT (STSG) TO LOWER EXTREMITY Right 03/02/2022    Procedure: APPLICATION, GRAFT, SKIN, SPLIT-THICKNESS, TO LOWER EXTREMITY;  Surgeon: Greg Ramírez MD;  Location: [facility];  Service: General;  Laterality: Right;    CHOLECYSTECTOMY      DEBRIDEMENT OF FOOT Right 10/2020    right great toe amputation         Social History  Mr. Jean Pierre Paulson  reports that he has been smoking vaping with nicotine. He started smoking about 6 months ago. He has never used smokeless tobacco. He reports current alcohol use of about 2.0 standard drinks of alcohol per week. He reports that he does not use drugs.    Family History  'anjana Paulson family history includes Diabetes in his father and maternal grandmother; Hypertension in his father and maternal grandmother.    Medications and Allergies     Medications  Outpatient Medications Marked as Taking for the 12/12/24 encounter (Office Visit) with Yelitza Alas FNP   Medication Sig Dispense Refill    amLODIPine (NORVASC) 10 MG tablet Take by mouth once daily.      blood sugar diagnostic  (BLOOD GLUCOSE TEST) Strp 1 strip by Misc.(Non-Drug; Combo Route) route once daily. accu-chek [place] strips 90 strip 5    gabapentin (NEURONTIN) 300 MG capsule Take 1 capsule (300 mg total) by mouth every 8 (eight) hours. 270 capsule 1    gentamicin (GARAMYCIN) 0.1 % cream Apply topically once daily. 90 g 2    hydroCHLOROthiazide (HYDRODIURIL) 25 MG tablet Take 1 tablet (25 mg total) by mouth once daily. 90 tablet 1    HYDROcodone-acetaminophen (NORCO)  mg per tablet Take 1 tablet by mouth every 12 (twelve) hours as needed for Pain (pain). 60 tablet 0    [START ON 1/3/2025] HYDROcodone-acetaminophen (NORCO)  mg per tablet Take 1 tablet by mouth every 12 (twelve) hours as needed for Pain (pain). 60 tablet 0    ibuprofen (ADVIL,MOTRIN) 800 MG tablet Take 800 mg by mouth 3 (three) times daily as needed for Pain.      rosuvastatin (CRESTOR) 10 MG tablet Take 10 mg by mouth once daily.      semaglutide (OZEMPIC) 2 mg/dose (8 mg/3 mL) PnIj Inject 2 mg into the skin every 7 days. (Patient taking differently: Inject 2.5 mg into the skin every 7 days.) 8 mL 2     Current Facility-Administered Medications for the 12/12/24 encounter (Office Visit) with Yelitza Alas FNP   Medication Dose Route Frequency Provider Last Rate Last Admin    [COMPLETED] cefTRIAXone injection 1 g  1 g Intramuscular Once Yelitza Alas FNP   1 g at 12/12/24 1045       Allergies  Review of patient's allergies indicates:   Allergen Reactions    Tomato Itching       Physical Examination     Vitals:    12/12/24 1018   BP: 137/76   Pulse: 97   Resp: 18   Temp: 97.7 °F (36.5 °C)       Physical Exam  Vitals and nursing note reviewed.   Constitutional:       Appearance: Normal appearance.   HENT:      Head: Normocephalic.   Cardiovascular:      Rate and Rhythm: Normal rate and regular rhythm.      Pulses: Normal pulses.      Heart sounds: Normal heart sounds.   Pulmonary:      Effort: Pulmonary effort is normal. No  respiratory distress.   Chest:      Chest wall: No tenderness.   Musculoskeletal:         General: Swelling, tenderness and deformity present.      Right lower leg: Edema present.   Skin:     General: Skin is warm and dry.      Capillary Refill: Capillary refill takes 2 to 3 seconds.      Comments: See LDA for photos and measurements   Neurological:      General: No focal deficit present.      Mental Status: He is alert and oriented to person, place, and time. Mental status is at baseline.   Psychiatric:         Mood and Affect: Mood normal.         Behavior: Behavior normal.         Thought Content: Thought content normal.         Judgment: Judgment normal.       Assessment and Plan             Altered Skin Integrity 02/10/24 2200 Right plantar Foot Diabetic Ulcer Full thickness tissue loss with exposed bone, tendon, or muscle. Often includes undermining and tunneling. May extend into muscle and/or supporting structures. (Active)   02/10/24 2200   Altered Skin Integrity Present on Admission - Did Patient arrive to the hospital with altered skin?: yes   Side: Right   Orientation: plantar   Location: Foot   Wound Number:    Is this injury device related?:    Primary Wound Type: Diabetic ulc   Description of Altered Skin Integrity: Full thickness tissue loss with exposed bone, tendon, or muscle. Often includes undermining and tunneling. May extend into muscle and/or supporting structures.   Ankle-Brachial Index:    Pulses:    Removal Indication and Assessment:    Wound Outcome:    (Retired) Wound Length (cm):    (Retired) Wound Width (cm):    (Retired) Depth (cm):    Wound Description (Comments):    Removal Indications:    Wound Image    12/12/24 1044   Description of Altered Skin Integrity Full thickness tissue loss. Subcutaneous fat may be visible but bone, tendon or muscle are not exposed 12/12/24 1011   Dressing Appearance Intact;Moist drainage 12/12/24 1011   Drainage Amount Moderate 12/12/24 1011   Drainage  Characteristics/Odor Serosanguineous 12/12/24 1011   Appearance Red;Granulating 12/12/24 1011   Tissue loss description Full thickness 12/12/24 1011   Black (%), Wound Tissue Color 0 % 12/12/24 1011   Red (%), Wound Tissue Color 100 % 12/12/24 1011   Yellow (%), Wound Tissue Color 0 % 12/12/24 1011   Periwound Area Intact 12/12/24 1011   Wound Edges Callused 12/12/24 1011   Elliott Classification (diabetic foot ulcers only) Grade 1 12/12/24 1011   Wound Length (cm) 10.2 cm 12/12/24 1011   Wound Width (cm) 5.5 cm 12/12/24 1011   Wound Depth (cm) 0.5 cm 12/12/24 1011   Wound Volume (cm^3) 28.05 cm^3 12/12/24 1011   Wound Surface Area (cm^2) 56.1 cm^2 12/12/24 1011   Care Cleansed with:;Soap and water 12/12/24 1011   Dressing Applied;Hydrofiber;Silver;Compression wrap;Rolled gauze;Absorptive Pad 12/12/24 1011         Problem List Items Addressed This Visit          Endocrine    Diabetic ulcer of right heel associated with type 2 diabetes mellitus, with fat layer exposed    Overview                                                                                      Current Assessment & Plan     Clean with vashe or baby shampoo and water  Apply Vashe moistened gauze and soak wound for 10 minutes  Apply UrgoCLean Ag to wound bed.   Cover and secure with 4x4 gauze and ABD pad, wrap with Ace wrap from toe to knee. Change daily and as needed.     Monitor closely for s/s of infection including fever, chills, increase in pain, odor from wound, and increased redness from foot. Go to ER if any complications develop.   Keep leg elevated and avoid pressure on wound.   Diabetes:  Monitor glucose closely. Check fasting glucose and 2 hours after meals. HgA1C goal <7, fasting glucose , and 2 hours after meals <180  Hypertension:  Check blood pressure twice daily, goal <120/80  Diet:   Increase protein intake, avoid fried, fatty foods and foods high in simple carbs.   Vitamins:  Take vitamin C 1000 mg, zinc 50mg, vitamin d 5000  units, and a daily multivitamin. Angel is a good source of protein and nutrients to aid in wound healing.            Other Visit Diagnoses       Diabetic ulcer of right heel associated with type 2 diabetes mellitus, with muscle involvement without evidence of necrosis    -  Primary    Relevant Orders    Culture, Anaerobe    Culture, Wound            Future Appointments   Date Time Provider Department Center   12/23/2024  9:00 AM Yelitza Alas FNP [facility]   1/30/2025  1:30 PM ShowsFranck PA [facility]            Signature:  AUGUSTUS Macedo  [facility]  [address] [facility] [address]  [phone]    Date of encounter: 12/12/24

## 2024-12-11 NOTE — PATIENT INSTRUCTIONS
Clean with vashe or baby shampoo and water  Apply Vashe moistened gauze and soak wound for 10 minutes  Apply UrgoCLean Ag to wound bed.   Cover and secure with 4x4 gauze and ABD pad, wrap with Ace wrap from toe to knee. Change daily and as needed.     Monitor closely for s/s of infection including fever, chills, increase in pain, odor from wound, and increased redness from foot. Go to ER if any complications develop.   Keep leg elevated and avoid pressure on wound.   Diabetes:  Monitor glucose closely. Check fasting glucose and 2 hours after meals. HgA1C goal <7, fasting glucose , and 2 hours after meals <180  Hypertension:  Check blood pressure twice daily, goal <120/80  Diet:   Increase protein intake, avoid fried, fatty foods and foods high in simple carbs.   Vitamins:  Take vitamin C 1000 mg, zinc 50mg, vitamin d 5000 units, and a daily multivitamin. Angel is a good source of protein and nutrients to aid in wound healing.

## 2024-12-12 ENCOUNTER — OFFICE VISIT (OUTPATIENT)
Dept: WOUND CARE | Facility: CLINIC | Age: 39
End: 2024-12-12
Attending: FAMILY MEDICINE
Payer: MEDICARE

## 2024-12-12 VITALS
DIASTOLIC BLOOD PRESSURE: 76 MMHG | HEART RATE: 97 BPM | SYSTOLIC BLOOD PRESSURE: 137 MMHG | RESPIRATION RATE: 18 BRPM | TEMPERATURE: 98 F

## 2024-12-12 DIAGNOSIS — L97.415 DIABETIC ULCER OF RIGHT HEEL ASSOCIATED WITH TYPE 2 DIABETES MELLITUS, WITH MUSCLE INVOLVEMENT WITHOUT EVIDENCE OF NECROSIS: Primary | ICD-10-CM

## 2024-12-12 DIAGNOSIS — E11.621 DIABETIC ULCER OF RIGHT HEEL ASSOCIATED WITH TYPE 2 DIABETES MELLITUS, WITH FAT LAYER EXPOSED: ICD-10-CM

## 2024-12-12 DIAGNOSIS — L97.412 DIABETIC ULCER OF RIGHT HEEL ASSOCIATED WITH TYPE 2 DIABETES MELLITUS, WITH FAT LAYER EXPOSED: ICD-10-CM

## 2024-12-12 DIAGNOSIS — E11.621 DIABETIC ULCER OF RIGHT HEEL ASSOCIATED WITH TYPE 2 DIABETES MELLITUS, WITH MUSCLE INVOLVEMENT WITHOUT EVIDENCE OF NECROSIS: Primary | ICD-10-CM

## 2024-12-12 PROCEDURE — 99215 OFFICE O/P EST HI 40 MIN: CPT | Mod: PBBFAC | Performed by: NURSE PRACTITIONER

## 2024-12-12 PROCEDURE — 99999PBSHW PR PBB SHADOW TECHNICAL ONLY FILED TO HB: Mod: PBBFAC,,,

## 2024-12-12 PROCEDURE — 96372 THER/PROPH/DIAG INJ SC/IM: CPT | Mod: PBBFAC | Performed by: NURSE PRACTITIONER

## 2024-12-12 PROCEDURE — 87186 SC STD MICRODIL/AGAR DIL: CPT | Mod: ,,, | Performed by: CLINICAL MEDICAL LABORATORY

## 2024-12-12 PROCEDURE — 11042 DBRDMT SUBQ TIS 1ST 20SQCM/<: CPT | Mod: PBBFAC | Performed by: NURSE PRACTITIONER

## 2024-12-12 PROCEDURE — 99499 UNLISTED E&M SERVICE: CPT | Mod: S$PBB,,, | Performed by: NURSE PRACTITIONER

## 2024-12-12 PROCEDURE — 87070 CULTURE OTHR SPECIMN AEROBIC: CPT | Mod: ,,, | Performed by: CLINICAL MEDICAL LABORATORY

## 2024-12-12 PROCEDURE — 87075 CULTR BACTERIA EXCEPT BLOOD: CPT | Mod: ,,, | Performed by: CLINICAL MEDICAL LABORATORY

## 2024-12-12 PROCEDURE — 99999 PR PBB SHADOW E&M-EST. PATIENT-LVL V: CPT | Mod: PBBFAC,,, | Performed by: NURSE PRACTITIONER

## 2024-12-12 RX ORDER — CEFTRIAXONE 1 G/1
1 INJECTION, POWDER, FOR SOLUTION INTRAMUSCULAR; INTRAVENOUS ONCE
Status: COMPLETED | OUTPATIENT
Start: 2024-12-12 | End: 2024-12-12

## 2024-12-12 RX ADMIN — CEFTRIAXONE SODIUM 1 G: 1 INJECTION, POWDER, FOR SOLUTION INTRAMUSCULAR; INTRAVENOUS at 10:12

## 2024-12-12 NOTE — PROGRESS NOTES
Debridement Performed for Assessment: Wound# right plantar  Performed By: Provider: Yelitza Ward NP  Assistant: MULUGETA Hanson, RN    Debridement: Surgical    Photo taken post procedure:    Time-Out Taken: Yes  Level: Skin/Subcutaneous Tissue  Post Debridement Measurements  Length: (cm) 10.3  Width: (cm) 5.6  Depth: (cm)       Area: (cm²) 57.68  Percent Debrided: 100%  Total Area Debrided: (sq cm) 57.65    Tissue and other material debrided:  Adipose, Dermis, Epidermis, Subcutaneous  Devitalized Tissue Debrided:Biofilm, Slough, Fibrin  Instrument: Curette  Bleeding: Moderate  Hemostasis Achieved: Pressure  Procedural Pain: Insensate  Post Procedural Pain: Insensate  Response to Treatment: Procedure was tolerated well    Devitalized materials/tissue Removed  the following was removed during debridement  subcutaneous      Post Debridement Diagnosis  Post debridement diagnosis  Same as Pre-operative debridement diagnosis, No Complications noted.      Grafts or implants applied  Was a graft or implant applied?  No      Procedure assistant  Procedure assisted by:  Assistant is the same as nurse listed above      Complications related to procedure  Did any complication occure during procedure?  No complications noted during or after procedure.      Specimen  Specimen collect during procedure?  No specimen collected      Anaesthesia:  Anesthesia used  None      Blood Loss:  Blood loss during procedure  less than 5 cc

## 2024-12-12 NOTE — PROCEDURES
"Debridement    Date/Time: 12/12/2024 9:00 AM    Performed by: Yelitza Alas FNP  Authorized by: Yelitza Alas FNP    Time out: Immediately prior to procedure a "time out" was called to verify the correct patient, procedure, equipment, support staff and site/side marked as required.    Consent Done?:  Yes (Written)    Wound Details:    Location:  Right foot    Location:  Right Plantar    Type of Debridement:  Excisional       Length (cm):  10.3       Width (cm):  5.6       Depth (cm):  0.4       Area (sq cm):  57.68       Percent Debrided (%):  10       Total Area Debrided (sq cm):  5.77    Depth of debridement:  Subcutaneous tissue    Tissue debrided:  Adipose, Epidermis, Dermis and Subcutaneous    Devitalized tissue debrided:  Biofilm, Callus, Clots, Exudate, Fibrin and Slough    Instruments:  Curette  Bleeding:  Moderate  Hemostasis Achieved: Yes  Method Used:  Pressure  Patient tolerance:  Patient tolerated the procedure well with no immediate complications  1st Wound Pain Assessment: 0     Assistant FER Shabazz RN    "

## 2024-12-13 ENCOUNTER — TELEPHONE (OUTPATIENT)
Dept: WOUND CARE | Facility: CLINIC | Age: 39
End: 2024-12-13
Payer: MEDICARE

## 2024-12-13 NOTE — TELEPHONE ENCOUNTER
JOVITA Mckeon at [facility] called to verify wound orders and states they are unable to order Vashe. AUGUSTUS Macedo notified and orders to clean wound with Dakins. JOVITA Mckeon aware.

## 2024-12-14 LAB
MICROORGANISM SPEC CULT: ABNORMAL
MICROORGANISM SPEC CULT: ABNORMAL

## 2024-12-16 ENCOUNTER — TELEPHONE (OUTPATIENT)
Dept: WOUND CARE | Facility: CLINIC | Age: 39
End: 2024-12-16
Payer: MEDICARE

## 2024-12-16 RX ORDER — PENICILLIN V POTASSIUM 500 MG/1
500 TABLET, FILM COATED ORAL EVERY 8 HOURS
Qty: 42 TABLET | Refills: 0 | Status: SHIPPED | OUTPATIENT
Start: 2024-12-16 | End: 2024-12-30

## 2024-12-16 RX ORDER — SULFAMETHOXAZOLE AND TRIMETHOPRIM 800; 160 MG/1; MG/1
1 TABLET ORAL 2 TIMES DAILY
Qty: 28 TABLET | Refills: 0 | Status: SHIPPED | OUTPATIENT
Start: 2024-12-16 | End: 2024-12-30

## 2024-12-16 NOTE — PATIENT INSTRUCTIONS
Clean with vashe or baby shampoo and water  Apply Vashe moistened gauze and soak wound for 10 minutes  Apply silver polymem to wound bed.   Cover and secure with 4x4 gauze and ABD pad, wrap with Ace wrap from toe to knee. Change daily and as needed.     Monitor closely for s/s of infection including fever, chills, increase in pain, odor from wound, and increased redness from foot. Go to ER if any complications develop.   Keep leg elevated and avoid pressure on wound.   Diabetes:  Monitor glucose closely. Check fasting glucose and 2 hours after meals. HgA1C goal <7, fasting glucose , and 2 hours after meals <180  Hypertension:  Check blood pressure twice daily, goal <120/80  Diet:   Increase protein intake, avoid fried, fatty foods and foods high in simple carbs.   Vitamins:  Take vitamin C 1000 mg, zinc 50mg, vitamin d 5000 units, and a daily multivitamin. Angel is a good source of protein and nutrients to aid in wound healing.

## 2024-12-16 NOTE — PROGRESS NOTES
AUGUSTUS Macedo   [facility]  [address]  [facility] [place] [address]  [phone]      PATIENT NAME: Jean Pierre Paulson  : 1985  DATE: 24  MRN: [id number]      Billing Provider: AUGUSTUS Macedo  Level of Service:   Patient PCP Information       Provider PCP Type    Primary Doctor No General            Reason for Visit / Chief Complaint: Diabetic Foot Ulcer (Right DFU)       History of Present Illness / Problem Focused Workflow     Jean Pierre Paulson is a 38 yo male presents for follow up on chronic non healing wound to right foot Wound has significantly improved since last visit and is smaller today.. He is using crutches today and wearing CROW boot. Edema has improved. He is taking calcium and biotin and keeping pressure off foot. He has appointment with Dr. Wells at [facility] on Monday. Silver polymem to wound bed. Continue compression and wearing CROW boot.     10/14/2024  39 y.o. male who presents to clinic for follow up on chronic-non healing wound on the right foot. Right foot is erythematous and lateral aspect of foot is hot to the touch. Wound is draining copious amounts of serosanguinous drainage. Patient reports increased pain when ambulating and difficulty putting weight on foot. X-ray, labs, and cultures today. Rocephin 1 gram given IM in clinic today. Rocephin to pharmacy, start 10/15 outpatient until cultures result. Discussed s/s of worsening infection and instructed to go to ED if he develops fever, chills, increased pain or swelling, body aches, nausea/vomiting, or increased redness to foot.     Last arterial doppler 4/10/23,  The ankle to brachial index is 1.25 on the right and 1.34 on the left.    Significant PMH  Includes diabetes and hypertension. Last HgA1C 7.3 in 2022, diabetes is managed by PCP.  Denies fever or chills.       Review of Systems     Review of Systems   Constitutional:  Positive for activity change. Negative for  chills and fever.   Respiratory:  Negative for chest tightness and shortness of breath.    Cardiovascular:  Positive for leg swelling. Negative for chest pain and palpitations.   Musculoskeletal:  Positive for arthralgias, gait problem and joint swelling.   Skin:  Positive for color change and wound.        See wound assessment   Psychiatric/Behavioral:  Negative for agitation, behavioral problems, confusion and self-injury.        Medical / Social / Family History     Past Medical History:   Diagnosis Date    NOLAN (acute kidney injury) 04/07/2023    Anemia     Diabetes mellitus with neuropathy     HLD (hyperlipidemia) 04/07/2023    HTN (hypertension)     Obesity     TEDDY (obstructive sleep apnea) 04/07/2023    Osteomyelitis 10/2020    Hx of R foot, Tx with IV Abx    Peripheral vascular disease     Urinary tract infection with hematuria 02/10/2024       Past Surgical History:   Procedure Laterality Date    APPLICATION OF SPLIT-THICKNESS SKIN GRAFT (STSG) TO LOWER EXTREMITY Right 03/02/2022    Procedure: APPLICATION, GRAFT, SKIN, SPLIT-THICKNESS, TO LOWER EXTREMITY;  Surgeon: Greg Ramírez MD;  Location: [facility];  Service: General;  Laterality: Right;    CHOLECYSTECTOMY      DEBRIDEMENT OF FOOT Right 10/2020    right great toe amputation         Social History  Mr. Jean Pierre Paulson  reports that he has been smoking vaping with nicotine. He started smoking about 6 months ago. He has never used smokeless tobacco. He reports current alcohol use of about 2.0 standard drinks of alcohol per week. He reports that he does not use drugs.    Family History  Mr.'s Jean Pierre Paulson family history includes Diabetes in his father and maternal grandmother; Hypertension in his father and maternal grandmother.    Medications and Allergies     Medications  No outpatient medications have been marked as taking for the 12/23/24 encounter (Office Visit) with Yelitza Alas FNP.       Allergies  Review of patient's  allergies indicates:   Allergen Reactions    Tomato Itching       Physical Examination     Vitals:    12/23/24 1057   BP: (!) 144/95   Pulse: 87   Resp: 18   Temp: 97 °F (36.1 °C)         Physical Exam  Vitals and nursing note reviewed.   Constitutional:       Appearance: Normal appearance.   HENT:      Head: Normocephalic.   Cardiovascular:      Rate and Rhythm: Normal rate and regular rhythm.      Pulses: Normal pulses.      Heart sounds: Normal heart sounds.   Pulmonary:      Effort: Pulmonary effort is normal. No respiratory distress.   Chest:      Chest wall: No tenderness.   Musculoskeletal:         General: Swelling, tenderness and deformity present.      Right lower leg: Edema present.   Skin:     General: Skin is warm and dry.      Capillary Refill: Capillary refill takes 2 to 3 seconds.      Comments: See LDA for photos and measurements   Neurological:      General: No focal deficit present.      Mental Status: He is alert and oriented to person, place, and time. Mental status is at baseline.   Psychiatric:         Mood and Affect: Mood normal.         Behavior: Behavior normal.         Thought Content: Thought content normal.         Judgment: Judgment normal.       Assessment and Plan             Altered Skin Integrity 02/10/24 2200 Right plantar Foot Diabetic Ulcer Full thickness tissue loss with exposed bone, tendon, or muscle. Often includes undermining and tunneling. May extend into muscle and/or supporting structures. (Active)   02/10/24 2200   Altered Skin Integrity Present on Admission - Did Patient arrive to the hospital with altered skin?: yes   Side: Right   Orientation: plantar   Location: Foot   Wound Number:    Is this injury device related?:    Primary Wound Type: Diabetic ulc   Description of Altered Skin Integrity: Full thickness tissue loss with exposed bone, tendon, or muscle. Often includes undermining and tunneling. May extend into muscle and/or supporting structures.    Ankle-Brachial Index:    Pulses:    Removal Indication and Assessment:    Wound Outcome:    (Retired) Wound Length (cm):    (Retired) Wound Width (cm):    (Retired) Depth (cm):    Wound Description (Comments):    Removal Indications:    Wound Image      12/23/24 1049   Description of Altered Skin Integrity Full thickness tissue loss. Subcutaneous fat may be visible but bone, tendon or muscle are not exposed 12/23/24 1049   Dressing Appearance Moist drainage 12/23/24 1049   Drainage Amount Moderate 12/23/24 1049   Drainage Characteristics/Odor Serosanguineous 12/23/24 1049   Appearance Pink;Moist;Granulating 12/23/24 1049   Tissue loss description Full thickness 12/23/24 1049   Black (%), Wound Tissue Color 0 % 12/23/24 1049   Red (%), Wound Tissue Color 100 % 12/23/24 1049   Yellow (%), Wound Tissue Color 0 % 12/23/24 1049   Periwound Area Intact;Dry 12/23/24 1049   Wound Edges Callused 12/23/24 1049   Wound Length (cm) 4.4 cm 12/23/24 1049   Wound Width (cm) 10.4 cm 12/23/24 1049   Wound Depth (cm) 0.5 cm 12/23/24 1049   Wound Volume (cm^3) 22.88 cm^3 12/23/24 1049   Wound Surface Area (cm^2) 45.76 cm^2 12/23/24 1049   Care Cleansed with:;Antimicrobial agent;Soap and water 12/23/24 1049   Dressing Applied;Calcium alginate;Silver;Gauze;Absorptive Pad;Rolled gauze 12/23/24 1049   Periwound Care Moisture barrier applied 12/23/24 1049   Dressing Change Due 12/24/24 12/23/24 1049           Problem List Items Addressed This Visit          Endocrine    Diabetic ulcer of right heel associated with type 2 diabetes mellitus, with fat layer exposed    Overview                                                                                    Current Assessment & Plan     Clean with vashe or baby shampoo and water  Apply Vashe moistened gauze and soak wound for 10 minutes  Apply silver polymem to wound bed.   Cover and secure with 4x4 gauze and ABD pad, wrap with Ace wrap from toe to knee. Change daily and as needed.      Monitor closely for s/s of infection including fever, chills, increase in pain, odor from wound, and increased redness from foot. Go to ER if any complications develop.   Keep leg elevated and avoid pressure on wound.   Diabetes:  Monitor glucose closely. Check fasting glucose and 2 hours after meals. HgA1C goal <7, fasting glucose , and 2 hours after meals <180  Hypertension:  Check blood pressure twice daily, goal <120/80  Diet:   Increase protein intake, avoid fried, fatty foods and foods high in simple carbs.   Vitamins:  Take vitamin C 1000 mg, zinc 50mg, vitamin d 5000 units, and a daily multivitamin. Angel is a good source of protein and nutrients to aid in wound healing.            Other Visit Diagnoses       Diabetic ulcer of right heel associated with type 2 diabetes mellitus, with muscle involvement without evidence of necrosis    -  Primary            Future Appointments   Date Time Provider Department Center   1/13/2025 10:00 AM [facility]   1/30/2025  1:30 PM [facility]            Signature:  AUGUSTUS Macedo  [facility]  [address]  [place] [address]  [phone]    Date of encounter: 12/23/24

## 2024-12-16 NOTE — TELEPHONE ENCOUNTER
I called patient and informed him of the wound culture results and that AUGUSTUS Macedo has sent bactrim and penicillin into the pharmacy for him to start. He verbalizes understanding.

## 2024-12-17 ENCOUNTER — TELEPHONE (OUTPATIENT)
Dept: WOUND CARE | Facility: CLINIC | Age: 39
End: 2024-12-17
Payer: MEDICARE

## 2024-12-18 ENCOUNTER — EXTERNAL HOME HEALTH (OUTPATIENT)
Dept: HOME HEALTH SERVICES | Facility: HOSPITAL | Age: 39
End: 2024-12-18
Payer: MEDICARE

## 2024-12-23 ENCOUNTER — OFFICE VISIT (OUTPATIENT)
Dept: WOUND CARE | Facility: CLINIC | Age: 39
End: 2024-12-23
Attending: FAMILY MEDICINE
Payer: MEDICARE

## 2024-12-23 VITALS
HEART RATE: 87 BPM | DIASTOLIC BLOOD PRESSURE: 95 MMHG | SYSTOLIC BLOOD PRESSURE: 144 MMHG | TEMPERATURE: 97 F | RESPIRATION RATE: 18 BRPM

## 2024-12-23 DIAGNOSIS — E11.621 DIABETIC ULCER OF RIGHT HEEL ASSOCIATED WITH TYPE 2 DIABETES MELLITUS, WITH MUSCLE INVOLVEMENT WITHOUT EVIDENCE OF NECROSIS: Primary | ICD-10-CM

## 2024-12-23 DIAGNOSIS — L97.415 DIABETIC ULCER OF RIGHT HEEL ASSOCIATED WITH TYPE 2 DIABETES MELLITUS, WITH MUSCLE INVOLVEMENT WITHOUT EVIDENCE OF NECROSIS: Primary | ICD-10-CM

## 2024-12-23 DIAGNOSIS — E11.621 DIABETIC ULCER OF RIGHT HEEL ASSOCIATED WITH TYPE 2 DIABETES MELLITUS, WITH FAT LAYER EXPOSED: ICD-10-CM

## 2024-12-23 DIAGNOSIS — L97.412 DIABETIC ULCER OF RIGHT HEEL ASSOCIATED WITH TYPE 2 DIABETES MELLITUS, WITH FAT LAYER EXPOSED: ICD-10-CM

## 2024-12-23 PROCEDURE — 99999 PR PBB SHADOW E&M-EST. PATIENT-LVL V: CPT | Mod: PBBFAC,,, | Performed by: NURSE PRACTITIONER

## 2024-12-23 PROCEDURE — 99215 OFFICE O/P EST HI 40 MIN: CPT | Mod: PBBFAC | Performed by: NURSE PRACTITIONER

## 2024-12-23 PROCEDURE — 99213 OFFICE O/P EST LOW 20 MIN: CPT | Mod: S$PBB,,, | Performed by: NURSE PRACTITIONER

## 2024-12-30 NOTE — PROGRESS NOTES
AUGUSTUS Macedo   [facility]  [address] [facility] [place] [address]  [phone]      PATIENT NAME: Jean Pierre Paulson  : 1985  DATE: 25  MRN: [id number]      Billing Provider: AUGUSTUS Macedo  Level of Service:   Patient PCP Information       Provider PCP Type    Primary Doctor No General            Reason for Visit / Chief Complaint: Diabetic Foot Ulcer (Right heel)       History of Present Illness / Problem Focused Workflow     Jean Pierre Paulson is a 40 yo male presents for follow up on chronic non healing wound to right foot. Wound with maceration and callus filippo-wound, bedside debridement today, Wound remains stable. Patient reports he was on vacation and had to walk more than usual and noticed increased edema and pain to his foot. Continue with current plan of care. Encouraged to wear CROW boot and elevate leg when sitting.     10/14/2024  39 y.o. male who presents to clinic for follow up on chronic-non healing wound on the right foot. Right foot is erythematous and lateral aspect of foot is hot to the touch. Wound is draining copious amounts of serosanguinous drainage. Patient reports increased pain when ambulating and difficulty putting weight on foot. X-ray, labs, and cultures today. Rocephin 1 gram given IM in clinic today. Rocephin to pharmacy, start 10/15 outpatient until cultures result. Discussed s/s of worsening infection and instructed to go to ED if he develops fever, chills, increased pain or swelling, body aches, nausea/vomiting, or increased redness to foot.     Last arterial doppler 4/10/23,  The ankle to brachial index is 1.25 on the right and 1.34 on the left.    Significant PMH  Includes diabetes and hypertension. Last HgA1C 7.3 in 2022, diabetes is managed by PCP.  Denies fever or chills.         Review of Systems     Review of Systems   Constitutional:  Positive for activity change. Negative for chills and fever.    Respiratory:  Negative for chest tightness and shortness of breath.    Cardiovascular:  Positive for leg swelling. Negative for chest pain and palpitations.   Musculoskeletal:  Positive for arthralgias, gait problem and joint swelling.   Skin:  Positive for color change and wound.        See wound assessment   Psychiatric/Behavioral:  Negative for agitation, behavioral problems, confusion and self-injury.        Medical / Social / Family History     Past Medical History:   Diagnosis Date    NOLAN (acute kidney injury) 04/07/2023    Anemia     Diabetes mellitus with neuropathy     HLD (hyperlipidemia) 04/07/2023    HTN (hypertension)     Obesity     TEDDY (obstructive sleep apnea) 04/07/2023    Osteomyelitis 10/2020    Hx of R foot, Tx with IV Abx    Peripheral vascular disease     Urinary tract infection with hematuria 02/10/2024       Past Surgical History:   Procedure Laterality Date    APPLICATION OF SPLIT-THICKNESS SKIN GRAFT (STSG) TO LOWER EXTREMITY Right 03/02/2022    Procedure: APPLICATION, GRAFT, SKIN, SPLIT-THICKNESS, TO LOWER EXTREMITY;  Surgeon: Greg Ramírez MD;  Location: [facility];  Service: General;  Laterality: Right;    CHOLECYSTECTOMY      DEBRIDEMENT OF FOOT Right 10/2020    right great toe amputation         Social History  Mr. Jean Pierre Paulson  reports that he has been smoking vaping with nicotine. He started smoking about 7 months ago. He has never used smokeless tobacco. He reports current alcohol use of about 2.0 standard drinks of alcohol per week. He reports that he does not use drugs.    Family History  'anjana Paulson family history includes Diabetes in his father and maternal grandmother; Hypertension in his father and maternal grandmother.    Medications and Allergies     Medications  Outpatient Medications Marked as Taking for the 1/13/25 encounter (Office Visit) with Yelitza Alas FNP   Medication Sig Dispense Refill    amLODIPine (NORVASC) 10 MG tablet Take by mouth once  daily.      blood sugar diagnostic (BLOOD GLUCOSE TEST) Strp 1 strip by Misc.(Non-Drug; Combo Route) route once daily. accu-chek Veronika strips 90 strip 5    gabapentin (NEURONTIN) 300 MG capsule Take 1 capsule (300 mg total) by mouth every 8 (eight) hours. 270 capsule 1    hydroCHLOROthiazide (HYDRODIURIL) 25 MG tablet Take 1 tablet (25 mg total) by mouth once daily. 90 tablet 1    HYDROcodone-acetaminophen (NORCO)  mg per tablet Take 1 tablet by mouth every 12 (twelve) hours as needed for Pain (pain). 60 tablet 0       Allergies  Review of patient's allergies indicates:   Allergen Reactions    Tomato Itching       Physical Examination     Vitals:    01/13/25 1102   BP: (!) 144/84   Pulse: 87   Resp: 20   Temp: 97.1 °F (36.2 °C)           Physical Exam  Vitals and nursing note reviewed.   Constitutional:       Appearance: Normal appearance.   HENT:      Head: Normocephalic.   Cardiovascular:      Rate and Rhythm: Normal rate and regular rhythm.      Pulses: Normal pulses.      Heart sounds: Normal heart sounds.   Pulmonary:      Effort: Pulmonary effort is normal. No respiratory distress.   Chest:      Chest wall: No tenderness.   Musculoskeletal:         General: Swelling, tenderness and deformity present.      Right lower leg: Edema present.   Skin:     General: Skin is warm and dry.      Capillary Refill: Capillary refill takes 2 to 3 seconds.      Comments: See LDA for photos and measurements   Neurological:      General: No focal deficit present.      Mental Status: He is alert and oriented to person, place, and time. Mental status is at baseline.   Psychiatric:         Mood and Affect: Mood normal.         Behavior: Behavior normal.         Thought Content: Thought content normal.         Judgment: Judgment normal.         Assessment and Plan             Altered Skin Integrity 02/10/24 2200 Right plantar Foot Diabetic Ulcer Full thickness tissue loss with exposed bone, tendon, or muscle. Often includes  undermining and tunneling. May extend into muscle and/or supporting structures. (Active)   02/10/24 2200   Altered Skin Integrity Present on Admission - Did Patient arrive to the hospital with altered skin?: yes   Side: Right   Orientation: plantar   Location: Foot   Wound Number:    Is this injury device related?:    Primary Wound Type: Diabetic ulc   Description of Altered Skin Integrity: Full thickness tissue loss with exposed bone, tendon, or muscle. Often includes undermining and tunneling. May extend into muscle and/or supporting structures.   Ankle-Brachial Index:    Pulses:    Removal Indication and Assessment:    Wound Outcome:    (Retired) Wound Length (cm):    (Retired) Wound Width (cm):    (Retired) Depth (cm):    Wound Description (Comments):    Removal Indications:    Wound Image      01/13/25 1049   Description of Altered Skin Integrity Full thickness tissue loss. Subcutaneous fat may be visible but bone, tendon or muscle are not exposed 01/13/25 1049   Dressing Appearance Moist drainage 01/13/25 1049   Drainage Amount Moderate 01/13/25 1049   Drainage Characteristics/Odor Serosanguineous 01/13/25 1049   Appearance Pink;Moist;Granulating 01/13/25 1049   Tissue loss description Full thickness 01/13/25 1049   Black (%), Wound Tissue Color 0 % 01/13/25 1049   Red (%), Wound Tissue Color 100 % 01/13/25 1049   Yellow (%), Wound Tissue Color 0 % 01/13/25 1049   Periwound Area Intact 01/13/25 1049   Wound Edges Defined 01/13/25 1049   Wound Length (cm) 6 cm 01/13/25 1049   Wound Width (cm) 8.5 cm 01/13/25 1049   Wound Depth (cm) 0.5 cm 01/13/25 1049   Wound Volume (cm^3) 25.5 cm^3 01/13/25 1049   Wound Surface Area (cm^2) 51 cm^2 01/13/25 1049   Care Cleansed with:;Antimicrobial agent 01/13/25 1049   Dressing Applied;Gauze;Rolled gauze 01/13/25 1049   Periwound Care Moisturizer applied 01/13/25 1049   Off Loading Other (see comments) 01/13/25 1049   Dressing Change Due 01/14/25 01/13/25 1049              Problem List Items Addressed This Visit          Endocrine    Diabetic ulcer of right heel associated with type 2 diabetes mellitus, with fat layer exposed    Overview                                                                                          Current Assessment & Plan     Clean with vashe or baby shampoo and water  Apply Vashe moistened gauze and soak wound for 10 minutes  Apply silver polymem to wound bed.   Cover and secure with 4x4 gauze and ABD pad, wrap with Ace wrap from toe to knee. Change daily and as needed.     Monitor closely for s/s of infection including fever, chills, increase in pain, odor from wound, and increased redness from foot. Go to ER if any complications develop.   Keep leg elevated and avoid pressure on wound.   Diabetes:  Monitor glucose closely. Check fasting glucose and 2 hours after meals. HgA1C goal <7, fasting glucose , and 2 hours after meals <180  Hypertension:  Check blood pressure twice daily, goal <120/80  Diet:   Increase protein intake, avoid fried, fatty foods and foods high in simple carbs.   Vitamins:  Take vitamin C 1000 mg, zinc 50mg, vitamin d 5000 units, and a daily multivitamin. Angel is a good source of protein and nutrients to aid in wound healing.            Other Visit Diagnoses       Diabetic ulcer of right heel associated with type 2 diabetes mellitus, with muscle involvement without evidence of necrosis    -  Primary    Relevant Orders    Debridement            Future Appointments   Date Time Provider Department Center   1/30/2025  1:30 PM Vidhya, VONNIE [facility]            Signature:  AUGUSTUS Macedo  [facility]  [address] [facility] [address]  [phone]    Date of encounter: 1/13/25

## 2025-01-13 ENCOUNTER — OFFICE VISIT (OUTPATIENT)
Dept: WOUND CARE | Facility: CLINIC | Age: 40
End: 2025-01-13
Attending: FAMILY MEDICINE
Payer: MEDICARE

## 2025-01-13 VITALS
HEART RATE: 87 BPM | TEMPERATURE: 97 F | SYSTOLIC BLOOD PRESSURE: 144 MMHG | RESPIRATION RATE: 20 BRPM | DIASTOLIC BLOOD PRESSURE: 84 MMHG

## 2025-01-13 DIAGNOSIS — E11.621 DIABETIC ULCER OF RIGHT HEEL ASSOCIATED WITH TYPE 2 DIABETES MELLITUS, WITH FAT LAYER EXPOSED: ICD-10-CM

## 2025-01-13 DIAGNOSIS — E11.621 DIABETIC ULCER OF RIGHT HEEL ASSOCIATED WITH TYPE 2 DIABETES MELLITUS, WITH MUSCLE INVOLVEMENT WITHOUT EVIDENCE OF NECROSIS: Primary | ICD-10-CM

## 2025-01-13 DIAGNOSIS — L97.412 DIABETIC ULCER OF RIGHT HEEL ASSOCIATED WITH TYPE 2 DIABETES MELLITUS, WITH FAT LAYER EXPOSED: ICD-10-CM

## 2025-01-13 DIAGNOSIS — L97.415 DIABETIC ULCER OF RIGHT HEEL ASSOCIATED WITH TYPE 2 DIABETES MELLITUS, WITH MUSCLE INVOLVEMENT WITHOUT EVIDENCE OF NECROSIS: Primary | ICD-10-CM

## 2025-01-13 PROCEDURE — 99499 UNLISTED E&M SERVICE: CPT | Mod: S$PBB,,, | Performed by: NURSE PRACTITIONER

## 2025-01-13 PROCEDURE — 11042 DBRDMT SUBQ TIS 1ST 20SQCM/<: CPT | Mod: PBBFAC | Performed by: NURSE PRACTITIONER

## 2025-01-13 PROCEDURE — 99999 PR PBB SHADOW E&M-EST. PATIENT-LVL V: CPT | Mod: PBBFAC,,, | Performed by: NURSE PRACTITIONER

## 2025-01-13 PROCEDURE — 11045 DBRDMT SUBQ TISS EACH ADDL: CPT | Mod: S$PBB,,, | Performed by: NURSE PRACTITIONER

## 2025-01-13 PROCEDURE — 99215 OFFICE O/P EST HI 40 MIN: CPT | Mod: PBBFAC | Performed by: NURSE PRACTITIONER

## 2025-01-13 NOTE — PROGRESS NOTES
Debridement Performed for Assessment: Wound# right heel  Performed By: Provider: Yelitza Ward NP  Assistant:  Lizabeth Stone LPN    Debridement: Surgical    Photo taken post procedure:    Time-Out Taken: Yes  Level: Skin/Subcutaneous Tissue  Post Debridement Measurements  Length: (cm) 6.7  Width: (cm) 8.9  Depth: (cm) 0.7      Area: (cm²) 59.63  Percent Debrided: 100%  Total Area Debrided: (sq cm)     Tissue and other material debrided:  Adipose, Dermis, Epidermis, Subcutaneous  Devitalized Tissue Debrided:Biofilm, Slough, Fibrin  Instrument: Curette  Bleeding: Moderate  Hemostasis Achieved: Pressure  Procedural Pain: Insensate  Post Procedural Pain: Insensate  Response to Treatment: Procedure was tolerated well    Devitalized materials/tissue Removed  the following was removed during debridement  subcutaneous      Post Debridement Diagnosis  chronic right heel diabetic ulcer  Post debridement diagnosis  Same as Pre-operative debridement diagnosis, No Complications noted.      Grafts or implants applied  Was a graft or implant applied?  No      Procedure assistant  Procedure assisted by:  Assistant is the same as nurse listed above      Complications related to procedure  Did any complication occure during procedure?  No complications noted during or after procedure.      Specimen  Specimen collect during procedure?  No specimen collected      Anaesthesia:  Anesthesia used  None      Blood Loss:  Blood loss during procedure  less than 5 cc

## 2025-01-14 NOTE — PROCEDURES
"Debridement    Date/Time: 1/13/2025 10:00 AM    Performed by: Yelitza Alas FNP  Authorized by: Yelitza Alas FNP    Time out: Immediately prior to procedure a "time out" was called to verify the correct patient, procedure, equipment, support staff and site/side marked as required.    Consent Done?:  Yes (Written)    Wound Details:    Location:  Right foot    Location:  Right Plantar    Type of Debridement:  Excisional       Length (cm):  6.7       Width (cm):  8.9       Depth (cm):  0.7       Area (sq cm):  59.63       Percent Debrided (%):  100       Total Area Debrided (sq cm):  59.63    Depth of debridement:  Subcutaneous tissue    Tissue debrided:  Adipose, Dermis, Epidermis and Subcutaneous    Devitalized tissue debrided:  Biofilm, Callus, Clots, Exudate and Fibrin    Instruments:  Curette and Scissors  Bleeding:  Moderate  Hemostasis Achieved: Yes  Method Used:  Pressure  Patient tolerance:  Patient tolerated the procedure well with no immediate complications  1st Wound Pain Assessment: 0     Assistant JOSE ALFREDO Stone LPN    "

## 2025-02-07 PROCEDURE — G0179 MD RECERTIFICATION HHA PT: HCPCS | Mod: ,,, | Performed by: NURSE PRACTITIONER

## 2025-02-11 NOTE — PROGRESS NOTES
Subjective:         Patient ID: Jean Pierre Paulson is a 39 y.o. male.    Chief Complaint: Foot Pain (rt)        Pain  This is a chronic problem. The current episode started more than 1 year ago. The problem occurs daily. The problem has been waxing and waning. Associated symptoms include arthralgias and neck pain. Pertinent negatives include no anorexia, chest pain, chills, coughing, fever, sore throat, vertigo or vomiting.     Review of Systems   Constitutional:  Negative for activity change, appetite change, chills, fever and unexpected weight change.   HENT:  Negative for drooling, ear discharge, ear pain, facial swelling, nosebleeds, sore throat, trouble swallowing, voice change and goiter.    Eyes:  Negative for photophobia, pain, discharge, redness and visual disturbance.   Respiratory:  Negative for apnea, cough, choking, chest tightness, shortness of breath, wheezing and stridor.    Cardiovascular:  Negative for chest pain, palpitations and leg swelling.   Gastrointestinal:  Negative for abdominal distention, anorexia, diarrhea, rectal pain, vomiting and fecal incontinence.   Endocrine: Negative for cold intolerance, heat intolerance, polydipsia, polyphagia and polyuria.   Genitourinary:  Negative for bladder incontinence, dysuria, flank pain and frequency.   Musculoskeletal:  Positive for arthralgias, back pain, leg pain and neck pain. Negative for neck stiffness.   Integumentary:  Negative for color change and pallor.   Neurological:  Negative for dizziness, vertigo, seizures, syncope, facial asymmetry, speech difficulty, light-headedness, memory loss and coordination difficulties.   Hematological:  Negative for adenopathy. Does not bruise/bleed easily.   Psychiatric/Behavioral:  Negative for agitation, behavioral problems, confusion, decreased concentration, dysphoric mood, hallucinations, self-injury and suicidal ideas. The patient is not nervous/anxious and is not hyperactive.            Past Medical  History:   Diagnosis Date    NOLAN (acute kidney injury) 04/07/2023    Anemia     Diabetes mellitus with neuropathy     HLD (hyperlipidemia) 04/07/2023    HTN (hypertension)     Obesity     TEDDY (obstructive sleep apnea) 04/07/2023    Osteomyelitis 10/2020    Hx of R foot, Tx with IV Abx    Peripheral vascular disease     Urinary tract infection with hematuria 02/10/2024     Past Surgical History:   Procedure Laterality Date    APPLICATION OF SPLIT-THICKNESS SKIN GRAFT (STSG) TO LOWER EXTREMITY Right 03/02/2022    Procedure: APPLICATION, GRAFT, SKIN, SPLIT-THICKNESS, TO LOWER EXTREMITY;  Surgeon: Greg Ramírez MD;  Location: [facility], [place];  Service: General;  Laterality: Right;    CHOLECYSTECTOMY      DEBRIDEMENT OF FOOT Right 10/2020    right great toe amputation       Social History     Socioeconomic History    Marital status: Single    Number of children: 1   Tobacco Use    Smoking status: Every Day     Types: Vaping with nicotine     Start date: 6/1/2024    Smokeless tobacco: Never   Substance and Sexual Activity    Alcohol use: Yes     Alcohol/week: 2.0 standard drinks of alcohol     Types: 1 Glasses of wine, 1 Cans of beer per week     Comment: occasionally    Drug use: Never    Sexual activity: Yes     Partners: Female     Social Drivers of Health     Financial Resource Strain: Medium Risk (2/12/2024)    Overall Financial Resource Strain (CARDIA)     Difficulty of Paying Living Expenses: Somewhat hard   Food Insecurity: Food Insecurity Present (2/12/2024)    Hunger Vital Sign     Worried About Running Out of Food in the Last Year: Sometimes true     Ran Out of Food in the Last Year: Sometimes true   Transportation Needs: No Transportation Needs (2/12/2024)    PRAPARE - Transportation     Lack of Transportation (Medical): No     Lack of Transportation (Non-Medical): No   Physical Activity: Inactive (2/12/2024)    Exercise Vital Sign     Days of Exercise per Week: 0 days     Minutes of  "Exercise per Session: 0 min   Stress: No Stress Concern Present (2/12/2024)    [employer] - Occupational Stress Questionnaire     Feeling of Stress : Not at all   Housing Stability: Low Risk  (2/12/2024)    Housing Stability Vital Sign     Unable to Pay for Housing in the Last Year: No     Number of Places Lived in the Last Year: 1     Unstable Housing in the Last Year: No     Family History   Problem Relation Name Age of Onset    Hypertension Father      Diabetes Father      Hypertension Maternal Grandmother      Diabetes Maternal Grandmother       Review of patient's allergies indicates:   Allergen Reactions    Tomato Itching        Objective:  Vitals:    02/19/25 1332 02/19/25 1334   BP: (!) 150/84    Pulse: 90    Resp: 20    Weight: (!) 163.3 kg (360 lb)    Height: 6' 2" (1.88 m)    PainSc:   2   2                 Physical Exam  Vitals and nursing note reviewed. Exam conducted with a chaperone present.   Constitutional:       General: He is awake. He is not in acute distress.     Appearance: Normal appearance. He is not toxic-appearing or diaphoretic.   HENT:      Head: Normocephalic and atraumatic.      Nose: Nose normal.      Mouth/Throat:      Mouth: Mucous membranes are moist.      Pharynx: Oropharynx is clear.   Eyes:      Conjunctiva/sclera: Conjunctivae normal.      Pupils: Pupils are equal, round, and reactive to light.   Cardiovascular:      Rate and Rhythm: Normal rate.   Pulmonary:      Effort: Pulmonary effort is normal. No respiratory distress.   Abdominal:      Palpations: Abdomen is soft.      Tenderness: There is no guarding.   Musculoskeletal:         General: Normal range of motion.      Cervical back: Normal range of motion and neck supple. No rigidity.      Lumbar back: Tenderness present.      Right foot: Tenderness present.      Left foot: Tenderness present.   Skin:     General: Skin is warm and dry.      Coloration: Skin is not jaundiced or pale. "   Neurological:      General: No focal deficit present.      Mental Status: He is alert and oriented to person, place, and time. Mental status is at baseline.      Cranial Nerves: No cranial nerve deficit (II-XII).   Psychiatric:         Mood and Affect: Mood normal.         Behavior: Behavior normal. Behavior is cooperative.         Thought Content: Thought content normal.         X-Ray Foot Complete 3 view Right  Narrative: EXAMINATION:  XR FOOT COMPLETE 3 VIEW RIGHT    CLINICAL HISTORY:  . Type 2 diabetes mellitus with foot ulcer    TECHNIQUE:  AP, lateral, and oblique views of the right foot were performed.    COMPARISON:  02/10/2024    FINDINGS:  There has been partial amputation of the 1st ray at the level of the distal metatarsal shaft.  No acute fracture or dislocation.  No osseous erosive changes.  There is advanced arthritic change of the midfoot.  Diffuse soft tissue swelling about the right foot is present.  Pes planus is present.  Impression: No radiographic evidence for acute osteomyelitis.  Diffuse soft tissue swelling.    Advanced midfoot arthritic change which may be neuropathic in nature.    Partial right 1st ray amputation.    Electronically signed by: Jesús Self MD  Date:    10/15/2024  Time:    15:47  X-Ray Ankle Complete 3 View Right  Narrative: EXAMINATION:  XR ANKLE COMPLETE 3 VIEW RIGHT    CLINICAL HISTORY:  Type 2 diabetes mellitus with foot ulcer    TECHNIQUE:  AP, lateral and oblique views of the ankle were performed.    COMPARISON:  Right foot x-ray of October 14, 2024 and February 10, 2024    FINDINGS:  A fracture of the tibia fibula or talus is not seen.  Periosteal thickening of the distal tibia and fibula are noted.  The ankle mortise is intact.  Soft tissue swelling is noted of the lower leg and foot.  The patient has deformity of the calcaneus and prior amputations  Impression: Soft tissue swelling of the lower leg and foot.  Flowing periosteal thickening of the distal tibia  and fibula.  The ankle mortise is intact.  Diabetic foot    Electronically signed by: Jesse Velasco MD  Date:    10/15/2024  Time:    08:16         Lab Requisition on 01/09/2025   Component Date Value Ref Range Status    Sodium 01/09/2025 136  136 - 145 mmol/L Final    Potassium 01/09/2025 4.7  3.5 - 5.1 mmol/L Final    Chloride 01/09/2025 107  98 - 107 mmol/L Final    CO2 01/09/2025 24  22 - 29 mmol/L Final    Anion Gap 01/09/2025 10  7 - 16 mmol/L Final    Glucose 01/09/2025 180 (H)  74 - 100 mg/dL Final    BUN 01/09/2025 19  9 - 21 mg/dL Final    Creatinine 01/09/2025 1.41 (H)  0.72 - 1.25 mg/dL Final    BUN/Creatinine Ratio 01/09/2025 13  6 - 20 Final    Calcium 01/09/2025 8.1 (L)  8.4 - 10.2 mg/dL Final    Total Protein 01/09/2025 7.6  6.4 - 8.3 g/dL Final    Albumin 01/09/2025 2.8 (L)  3.5 - 5.0 g/dL Final    Globulin 01/09/2025 4.8 (H)  2.0 - 4.0 g/dL Final    A/G Ratio 01/09/2025 0.6   Final    Bilirubin, Total 01/09/2025 0.4  <=1.5 mg/dL Final    Alk Phos 01/09/2025 108  40 - 150 U/L Final    ALT 01/09/2025 25  <=55 U/L Final    AST 01/09/2025 21  5 - 34 U/L Final    eGFR 01/09/2025 64  >=60 mL/min/1.73m2 Final    Hemoglobin A1C 01/09/2025 7.1 (H)  <=7.0 % Final    Estimated Average Glucose 01/09/2025 157  mg/dL Final   Office Visit on 12/12/2024   Component Date Value Ref Range Status    Culture, Anaerobe 12/12/2024 No Anaerobes Isolated   Final    Culture, Wound/Abscess 12/12/2024 Moderate Growth Proteus mirabilis (A)   Final    Culture, Wound/Abscess 12/12/2024 Heavy Growth Group G streptococcus (A)   Final   Office Visit on 10/28/2024   Component Date Value Ref Range Status    Creatinine, U 10/28/2024 314.5  mg/dL Final    Specific Gravity 10/28/2024 1.033   Final    pH 10/28/2024 5.2   Final    Oxidants 10/28/2024 Negative  Cutoff: 200 mg/L Final    Comment 10/28/2024 Normal   Final    Codeine 10/28/2024 Not Detected  Cutoff: 25 ng/mL Final    C6BG 10/28/2024 Not  Detected  Cutoff: 100 ng/mL Final    Morphine 10/28/2024 Not Detected  Cutoff: 25 ng/mL Final    M6BG 10/28/2024 Not Detected  Cutoff: 100 ng/mL Final    6 Monoacetylmorphine 10/28/2024 Not Detected  Cutoff: 25 ng/mL Final    Hydrocodone 10/28/2024 Present (A)  Cutoff: 25 ng/mL Final    Norhydrocodone 10/28/2024 Present (A)  Cutoff: 25 ng/mL Final    Dihydrocodeine 10/28/2024 Present (A)  Cutoff: 25 ng/mL Final    Hydromorphone 10/28/2024 Not Detected  Cutoff: 25 ng/mL Final    Hydromorphone-3-beta-glucuronide 10/28/2024 Present (A)  Cutoff: 100 ng/mL Final    Oxycodone 10/28/2024 Not Detected  Cutoff: 25 ng/mL Final    Noroxycodone 10/28/2024 Not Detected  Cutoff: 25 ng/mL Final    Oxymorphone 10/28/2024 Not Detected  Cutoff: 25 ng/mL Final    Oxymorphone-3-beta-glucuronid 10/28/2024 Not Detected  Cutoff: 100 ng/mL Final    Noroxymorphone 10/28/2024 Not Detected  Cutoff: 25 ng/mL Final    Fentanyl 10/28/2024 Not Detected  Cutoff: 2 ng/mL Final    Norfentanyl 10/28/2024 Not Detected  Cutoff: 2 ng/mL Final    Meperidine 10/28/2024 Not Detected  Cutoff: 25 ng/mL Final    Normeperidine 10/28/2024 Not Detected  Cutoff: 25 ng/mL Final    Naloxone 10/28/2024 Not Detected  Cutoff: 25 ng/mL Final    Naloxone-3-beta-glucuronide 10/28/2024 Not Detected  Cutoff: 100 ng/mL Final    Methadone 10/28/2024 Not Detected  Cutoff: 25 ng/mL Final    EDDP 10/28/2024 Not Detected  Cutoff: 25 ng/mL Final    Propoxyphene 10/28/2024 Not Detected  Cutoff: 25 ng/mL Final    Norpropoxyphene 10/28/2024 Not Detected  Cutoff: 25 ng/mL Final    Tramadol 10/28/2024 Not Detected  Cutoff: 25 ng/mL Final    O-desmethyltramadol 10/28/2024 Not Detected  Cutoff: 25 ng/mL Final    Tapentadol 10/28/2024 Not Detected  Cutoff: 25 ng/mL Final    N-Desmethyltapentadol 10/28/2024 Not Detected  Cutoff: 50 ng/mL Final    M TAPENTADOL-BETA-GLUCURONIDE 10/28/2024 Not Detected  Cutoff: 100 ng/mL Final    Buprenorphine 10/28/2024 Not  Detected  Cutoff: 5 ng/mL Final    Norbuprenorphine 10/28/2024 Not Detected  Cutoff: 5 ng/mL Final    Norbuprenorphine glucuronide 10/28/2024 Not Detected  Cutoff: 20 ng/mL Final    Opioid Interpretation 10/28/2024 SEE COMMENTS   Final    Cocaine 10/28/2024 Negative  Cutoff: 150 ng/mL Final    Tetrahydrocannabinol 10/28/2024 Negative  Cutoff: 50 ng/mL Final    Alprazolam 10/28/2024 Not Detected  Cutoff: 10 ng/mL Final    Alpha-Hydroxyalprazolam 10/28/2024 Not Detected  Cutoff: 10 ng/mL Final    Alpha-Hydroxyalprazolam Glucuronide 10/28/2024 Not Detected  Cutoff: 50 ng/mL Final    Chlordiazepoxide 10/28/2024 Not Detected  Cutoff: 10 ng/mL Final    Clobazam 10/28/2024 Not Detected  Cutoff: 10 ng/mL Final    N-Desmethylclobazam 10/28/2024 Not Detected  Cutoff: 200 ng/mL Final    Clonazepam 10/28/2024 Not Detected  Cutoff: 10 ng/mL Final    7-aminoclonazepam 10/28/2024 Not Detected  Cutoff: 10 ng/mL Final    Diazepam 10/28/2024 Not Detected  Cutoff: 10 ng/mL Final    Nordiazepam 10/28/2024 Not Detected  Cutoff: 10 ng/mL Final    Flunitrazepam 10/28/2024 Not Detected  Cutoff: 10 ng/mL Final    7-aminoflunitrazepam 10/28/2024 Not Detected  Cutoff: 10 ng/mL Final    Flurazepam 10/28/2024 Not Detected  Cutoff: 10 ng/mL Final    2-Hydroxy Ethyl Flurazepam 10/28/2024 Not Detected  Cutoff: 10 ng/mL Final    Lorazepam 10/28/2024 Not Detected  Cutoff: 10 ng/mL Final    Lorazepam Glucuronide 10/28/2024 Not Detected  Cutoff: 50 ng/mL Final    Midazolam 10/28/2024 Not Detected  Cutoff: 10 ng/mL Final    Alpha-Hydroxy Midazolam 10/28/2024 Not Detected  Cutoff: 10 ng/mL Final    Oxazepam 10/28/2024 Not Detected  Cutoff: 10 ng/mL Final    Oxazepam Glucuronide 10/28/2024 Not Detected  Cutoff: 50 ng/mL Final    Prazepam 10/28/2024 Not Detected  Cutoff: 10 ng/mL Final    Temazepam 10/28/2024 Not Detected  Cutoff: 10 ng/mL Final    Temazepam Glucuronide 10/28/2024 Not Detected  Cutoff: 50 ng/mL Final     Triazolam 10/28/2024 Not Detected  Cutoff: 10 ng/mL Final    Alpha-Hydroxy Triazolam 10/28/2024 Not Detected  Cutoff: 10 ng/mL Final    Zolpidem 10/28/2024 Not Detected  Cutoff: 10 ng/mL Final    Zolpidem Phenyl-4-Carboxylic acid 10/28/2024 Not Detected  Cutoff: 10 ng/mL Final    Benzodiazepine Interpretation 10/28/2024 SEE COMMENTS   Final    List Patient's Current Medications 10/28/2024 na   Final    Methamphetamine 10/28/2024 Not Detected  Cutoff: 100 ng/mL Final    Amphetamine 10/28/2024 Not Detected  Cutoff: 100 ng/mL Final    3,4-methylenedioxymethamphetamine * 10/28/2024 Not Detected  Cutoff: 100 ng/mL Final    3,2-yilnljewajqgfs-L-ethylamphetam* 10/28/2024 Not Detected  Cutoff: 100 ng/mL Final    3,4-methylenedioxyamphetamine (MDA) 10/28/2024 Not Detected  Cutoff: 100 ng/mL Final    Ephedrine 10/28/2024 Not Detected  Cutoff: 100 ng/mL Final    Pseudoephedrine 10/28/2024 Not Detected  Cutoff: 100 ng/mL Final    Phentermine 10/28/2024 Not Detected  Cutoff: 100 ng/mL Final    Phencyclidine (PCP) 10/28/2024 Not Detected  Cutoff: 20 ng/mL Final    Methylphenidate 10/28/2024 Not Detected  Cutoff: 20 ng/mL Final    Ritalinic acid 10/28/2024 Not Detected  Cutoff: 100 ng/mL Final    Stimulant Interpretation 10/28/2024 SEE COMMENTS   Final    Barbiturates 10/28/2024 Negative  Cutoff: 200 ng/mL Final   Lab Visit on 10/14/2024   Component Date Value Ref Range Status    ESR Westergren 10/14/2024 125 (H)  0 - 15 mm/Hr Final    CRP 10/14/2024 10.60 (H)  0.00 - 0.80 mg/dL Final    Sodium 10/14/2024 134 (L)  136 - 145 mmol/L Final    Potassium 10/14/2024 4.1  3.5 - 5.1 mmol/L Final    Chloride 10/14/2024 101  98 - 107 mmol/L Final    CO2 10/14/2024 27  21 - 32 mmol/L Final    Anion Gap 10/14/2024 10  7 - 16 mmol/L Final    Glucose 10/14/2024 141 (H)  74 - 106 mg/dL Final    BUN 10/14/2024 18  7 - 18 mg/dL Final    Creatinine 10/14/2024 1.76 (H)  0.70 - 1.30 mg/dL Final    BUN/Creatinine Ratio  10/14/2024 10  6 - 20 Final    Calcium 10/14/2024 9.0  8.5 - 10.1 mg/dL Final    Total Protein 10/14/2024 8.1  6.4 - 8.2 g/dL Final    Albumin 10/14/2024 2.5 (L)  3.5 - 5.0 g/dL Final    Globulin 10/14/2024 5.6 (H)  2.0 - 4.0 g/dL Final    A/G Ratio 10/14/2024 0.4   Final    Bilirubin, Total 10/14/2024 0.7  >0.0 - 1.2 mg/dL Final    Alk Phos 10/14/2024 109  45 - 115 U/L Final    ALT 10/14/2024 28  16 - 61 U/L Final    AST 10/14/2024 21  15 - 37 U/L Final    eGFR 10/14/2024 49 (L)  >=60 mL/min/1.73m2 Final    WBC 10/14/2024 8.36  4.50 - 11.00 K/uL Final    RBC 10/14/2024 4.30 (L)  4.60 - 6.20 M/uL Final    Hemoglobin 10/14/2024 10.8 (L)  13.5 - 18.0 g/dL Final    Hematocrit 10/14/2024 35.1 (L)  40.0 - 54.0 % Final    MCV 10/14/2024 81.6  80.0 - 96.0 fL Final    MCH 10/14/2024 25.1 (L)  27.0 - 31.0 pg Final    MCHC 10/14/2024 30.8 (L)  32.0 - 36.0 g/dL Final    RDW 10/14/2024 13.9  11.5 - 14.5 % Final    Platelet Count 10/14/2024 353  150 - 400 K/uL Final    MPV 10/14/2024 9.9  9.4 - 12.4 fL Final    Neutrophils % 10/14/2024 68.4 (H)  53.0 - 65.0 % Final    Lymphocytes % 10/14/2024 19.7 (L)  27.0 - 41.0 % Final    Monocytes % 10/14/2024 9.2 (H)  2.0 - 6.0 % Final    Eosinophils % 10/14/2024 1.8  1.0 - 4.0 % Final    Basophils % 10/14/2024 0.8  0.0 - 1.0 % Final    Immature Granulocytes % 10/14/2024 0.1  0.0 - 0.4 % Final    nRBC, Auto 10/14/2024 0.0  <=0.0 % Final    Neutrophils, Abs 10/14/2024 5.71  1.80 - 7.70 K/uL Final    Lymphocytes, Absolute 10/14/2024 1.65  1.00 - 4.80 K/uL Final    Monocytes, Absolute 10/14/2024 0.77  0.00 - 0.80 K/uL Final    Eosinophils, Absolute 10/14/2024 0.15  0.00 - 0.50 K/uL Final    Basophils, Absolute 10/14/2024 0.07  0.00 - 0.20 K/uL Final    Immature Granulocytes, Absolute 10/14/2024 0.01  0.00 - 0.04 K/uL Final    nRBC, Absolute 10/14/2024 0.00  <=0.00 x10e3/uL Final    Diff Type 10/14/2024 Auto   Final   Office Visit on 10/14/2024   Component  Date Value Ref Range Status    Culture, Wound/Abscess 10/14/2024 Moderate Growth Providencia rettgeri (A)   Final    Culture, Anaerobe 10/14/2024 No Anaerobes Isolated   Final   Lab Requisition on 10/01/2024   Component Date Value Ref Range Status    Sodium 10/01/2024 136  136 - 145 mmol/L Final    Potassium 10/01/2024 4.4  3.5 - 5.1 mmol/L Final    Chloride 10/01/2024 104  98 - 107 mmol/L Final    CO2 10/01/2024 27  21 - 32 mmol/L Final    Anion Gap 10/01/2024 9  7 - 16 mmol/L Final    Glucose 10/01/2024 113 (H)  74 - 106 mg/dL Final    BUN 10/01/2024 16  7 - 18 mg/dL Final    Creatinine 10/01/2024 1.25  0.70 - 1.30 mg/dL Final    BUN/Creatinine Ratio 10/01/2024 13  6 - 20 Final    Calcium 10/01/2024 8.2 (L)  8.5 - 10.1 mg/dL Final    Total Protein 10/01/2024 7.4  6.4 - 8.2 g/dL Final    Albumin 10/01/2024 2.7 (L)  3.5 - 5.0 g/dL Final    Globulin 10/01/2024 4.7 (H)  2.0 - 4.0 g/dL Final    A/G Ratio 10/01/2024 0.6   Final    Bilirubin, Total 10/01/2024 0.3  >0.0 - 1.2 mg/dL Final    Alk Phos 10/01/2024 98  45 - 115 U/L Final    ALT 10/01/2024 22  16 - 61 U/L Final    AST 10/01/2024 16  15 - 37 U/L Final    eGFR 10/01/2024 74  >=60 mL/min/1.73m2 Final    Triglycerides 10/01/2024 126  35 - 150 mg/dL Final    Cholesterol 10/01/2024 117  0 - 200 mg/dL Final    HDL Cholesterol 10/01/2024 49  40 - 60 mg/dL Final    Cholesterol/HDL Ratio (Risk Factor) 10/01/2024 2.4   Final    Non-HDL 10/01/2024 68  mg/dL Final    LDL Calculated 10/01/2024 43  mg/dL Final    VLDL 10/01/2024 25  mg/dL Final    Hemoglobin A1C 10/01/2024 6.4  4.5 - 6.6 % Final    Estimated Average Glucose 10/01/2024 137  mg/dL Final    PSA Total 10/01/2024 0.874  <=4.000 ng/mL Final    WBC 10/01/2024 5.94  4.50 - 11.00 K/uL Final    RBC 10/01/2024 4.25 (L)  4.60 - 6.20 M/uL Final    Hemoglobin 10/01/2024 10.8 (L)  13.5 - 18.0 g/dL Final    Hematocrit 10/01/2024 34.9 (L)  40.0 - 54.0 % Final    MCV 10/01/2024 82.1  80.0 -  96.0 fL Final    MCH 10/01/2024 25.4 (L)  27.0 - 31.0 pg Final    MCHC 10/01/2024 30.9 (L)  32.0 - 36.0 g/dL Final    RDW 10/01/2024 13.9  11.5 - 14.5 % Final    Platelet Count 10/01/2024 160  150 - 400 K/uL Final    MPV 10/01/2024 11.4  9.4 - 12.4 fL Final    Neutrophils % 10/01/2024 52.0 (L)  53.0 - 65.0 % Final    Lymphocytes % 10/01/2024 36.0  27.0 - 41.0 % Final    Monocytes % 10/01/2024 7.6 (H)  2.0 - 6.0 % Final    Eosinophils % 10/01/2024 3.4  1.0 - 4.0 % Final    Basophils % 10/01/2024 0.8  0.0 - 1.0 % Final    Immature Granulocytes % 10/01/2024 0.2  0.0 - 0.4 % Final    nRBC, Auto 10/01/2024 0.0  <=0.0 % Final    Neutrophils, Abs 10/01/2024 3.09  1.80 - 7.70 K/uL Final    Lymphocytes, Absolute 10/01/2024 2.14  1.00 - 4.80 K/uL Final    Monocytes, Absolute 10/01/2024 0.45  0.00 - 0.80 K/uL Final    Eosinophils, Absolute 10/01/2024 0.20  0.00 - 0.50 K/uL Final    Basophils, Absolute 10/01/2024 0.05  0.00 - 0.20 K/uL Final    Immature Granulocytes, Absolute 10/01/2024 0.01  0.00 - 0.04 K/uL Final    nRBC, Absolute 10/01/2024 0.00  <=0.00 x10e3/uL Final    Diff Type 10/01/2024 Auto   Final         Orders Placed This Encounter   Procedures    Controlled Substance Monitoring Panel, Random, Urine     Standing Status:   Future     Number of Occurrences:   1     Expected Date:   2/19/2025     Expiration Date:   4/20/2026     Send normal result to authorizing provider's In Basket if patient is active on MyChart::   Yes    POCT Urine Drug Screen Presump     Interpretive Information:     Negative:  No drug detected at the cut off level.   Positive:  This result represents presumptive positive for the   tested drug, other substances may yield a positive response other   than the analyte of interest. This result should be utilized for   diagnostic purpose only. Confirmation testing will be performed upon physician request only.            Requested Prescriptions     Signed Prescriptions Disp  Refills    buprenorphine 10 mcg/hr (BUTRANS) weekly patch 4 patch 2     Sig: Place 1 patch onto the skin once a week.       Assessment:     1. Peripheral vascular disease    2. Diabetic neuropathy with neurologic complication    3. Encounter for long-term (current) use of medications               A's of Opioid Risk Assessment  Activity:Patient can perform ADL.   Analgesia:Patients pain is partially controlled by current medication. Patient has tried OTC medications such as Tylenol and Ibuprofen with out relief.   Adverse Effects: Patient denies constipation or sedation.  Aberrant Behavior:  reviewed with no aberrant drug seeking/taking behavior.  Overdose reversal drug naloxone discussed    Drug screen reviewed        Plan:    Narcan March 2023    Last refill hydrocodone 10, 60 tablets January 7, 2025    No further scheduled to narcotics from our office February 11, 2025      Patient canceled appointment   January 30, 2025  December 30, 2024  October 29, 2024  February 21, 2024  January 3, 2024      No show  October 14, 2024        He verbalized understanding need to keep appointments for clarification with drug screens and pill counts October 28, 2024    Any further missed appointments are follow-up after refill dates will discontinue narcotics March 18, 2024    Bring medication each visit in current bottle with current date and all narcotic medication  October 28, 2024    Pill count correct   October 28, 2024 did not bring medication  August 19, 2024 March 18, 2024      Chronic Ulcer right foot     Continues to follow wound management please see photographs  Dressing in place      Patient states hydrocodone no longer controlling his discomfort     Will discontinue hydrocodone     No scheduled to narcotics from our office due to noncompliance with office visits    He verbalized understanding if he can not keep office visits for compliance with urine drug screens for narcotics management will discontinue All  narcotics      Trial buprenorphine patch as directed    Continue activity as directed     Continue current medication    Follow-up 3 months    Dr. Paulson, June 2025    Bring original prescription medication bottles/container/box with labels to each visit

## 2025-02-19 ENCOUNTER — OFFICE VISIT (OUTPATIENT)
Dept: PAIN MEDICINE | Facility: CLINIC | Age: 40
End: 2025-02-19
Payer: MEDICARE

## 2025-02-19 VITALS
DIASTOLIC BLOOD PRESSURE: 84 MMHG | HEART RATE: 90 BPM | HEIGHT: 74 IN | BODY MASS INDEX: 40.43 KG/M2 | RESPIRATION RATE: 20 BRPM | SYSTOLIC BLOOD PRESSURE: 150 MMHG | WEIGHT: 315 LBS

## 2025-02-19 DIAGNOSIS — E11.49 DIABETIC NEUROPATHY WITH NEUROLOGIC COMPLICATION: Chronic | ICD-10-CM

## 2025-02-19 DIAGNOSIS — Z79.899 ENCOUNTER FOR LONG-TERM (CURRENT) USE OF MEDICATIONS: ICD-10-CM

## 2025-02-19 DIAGNOSIS — I73.9 PERIPHERAL VASCULAR DISEASE: Primary | Chronic | ICD-10-CM

## 2025-02-19 DIAGNOSIS — E11.40 DIABETIC NEUROPATHY WITH NEUROLOGIC COMPLICATION: Chronic | ICD-10-CM

## 2025-02-19 LAB
CTP QC/QA: YES
POC (AMP) AMPHETAMINE: NEGATIVE
POC (BAR) BARBITURATES: NEGATIVE
POC (BUP) BUPRENORPHINE: NEGATIVE
POC (BZO) BENZODIAZEPINES: NEGATIVE
POC (COC) COCAINE: NEGATIVE
POC (MDMA) METHYLENEDIOXYMETHAMPHETAMINE 3,4: NEGATIVE
POC (MET) METHAMPHETAMINE: NEGATIVE
POC (MOP) OPIATES: NEGATIVE
POC (MTD) METHADONE: NEGATIVE
POC (OXY) OXYCODONE: NEGATIVE
POC (PCP) PHENCYCLIDINE: NEGATIVE
POC (TCA) TRICYCLIC ANTIDEPRESSANTS: NEGATIVE
POC TEMPERATURE (URINE): 90
POC THC: NEGATIVE

## 2025-02-19 PROCEDURE — 99214 OFFICE O/P EST MOD 30 MIN: CPT | Mod: PBBFAC | Performed by: PHYSICIAN ASSISTANT

## 2025-02-19 PROCEDURE — 80305 DRUG TEST PRSMV DIR OPT OBS: CPT | Mod: PBBFAC | Performed by: PHYSICIAN ASSISTANT

## 2025-02-19 RX ORDER — BUPRENORPHINE 10 UG/H
1 PATCH TRANSDERMAL WEEKLY
Qty: 4 PATCH | Refills: 2 | Status: SHIPPED | OUTPATIENT
Start: 2025-02-19

## 2025-02-27 ENCOUNTER — TELEPHONE (OUTPATIENT)
Dept: PAIN MEDICINE | Facility: CLINIC | Age: 40
End: 2025-02-27
Payer: MEDICARE

## 2025-02-27 NOTE — TELEPHONE ENCOUNTER
Patient is scheduled to see Devonte on 03/19/2025 at 07:45 am. Patient is notified and voices understanding. 02/27/2025@14:16 pm TC.  ----- Message from VONNIE Brooks sent at 2/26/2025  3:42 PM CST -----  Patient will need to be seen clinic prior to refilling hydrocodoneHistory noncompliance with pill countsHistory with noncompliance with office visits  ----- Message -----  From: Teodoro Edwards LPN  Sent: 2/26/2025   3:35 PM CST  To: VONNIE Ma      ----- Message -----  From: Symone Alves, Patient Care Assistant  Sent: 2/25/2025   9:14 AM CST  To: Vidhya ROTHMAN Staff    Who Called: Jean Pierre Morrissey is requesting assistance/information from provider's office.Preferred Method of Contact: Phone CallPatient's Preferred Phone Number on File: [phone] Best Call Back Number, if different:Additional Information: patient said that he switched over to the patches and the cost was $112 he is on a fixed income and could not afford that, wanted to know if he could get back on the Hydrocodone and if a prescription could be called in. [facility] [address] [place] [address], [place] [address] [facility]

## 2025-03-03 ENCOUNTER — EXTERNAL HOME HEALTH (OUTPATIENT)
Dept: HOME HEALTH SERVICES | Facility: HOSPITAL | Age: 40
End: 2025-03-03
Payer: MEDICARE

## 2025-03-17 NOTE — PROGRESS NOTES
Alli HBOT well     Pt had nyquil and excederin at 0430 this morning   When offered tylenol refused and said would take dayquil when patient gets home   Reports has been having a hard time sleeping the last few months as well

## 2025-03-26 NOTE — PROGRESS NOTES
Subjective:         Patient ID: Jean Pierre Paulson is a 39 y.o. male.    Chief Complaint: Foot Pain (right)        Pain  This is a chronic problem. The current episode started more than 1 year ago. The problem occurs daily. The problem has been waxing and waning. Associated symptoms include arthralgias and neck pain. Pertinent negatives include no anorexia, chest pain, chills, fever, sore throat, vertigo or vomiting.     Review of Systems   Constitutional:  Negative for activity change, appetite change, chills, fever and unexpected weight change.   HENT:  Negative for drooling, ear discharge, ear pain, facial swelling, nosebleeds, sore throat, trouble swallowing, voice change and goiter.    Eyes:  Negative for photophobia, pain, discharge, redness and visual disturbance.   Respiratory:  Negative for apnea, choking, chest tightness, shortness of breath, wheezing and stridor.    Cardiovascular:  Negative for chest pain, palpitations and leg swelling.   Gastrointestinal:  Negative for abdominal distention, anorexia, diarrhea, rectal pain, vomiting and fecal incontinence.   Endocrine: Negative for cold intolerance, heat intolerance, polydipsia, polyphagia and polyuria.   Genitourinary:  Negative for bladder incontinence, dysuria, flank pain and frequency.   Musculoskeletal:  Positive for arthralgias, back pain, leg pain and neck pain. Negative for neck stiffness.   Integumentary:  Negative for color change and pallor.   Neurological:  Negative for dizziness, vertigo, seizures, syncope, facial asymmetry, speech difficulty, light-headedness and coordination difficulties.   Hematological:  Negative for adenopathy. Does not bruise/bleed easily.   Psychiatric/Behavioral:  Negative for agitation, behavioral problems, confusion, decreased concentration, dysphoric mood, hallucinations, self-injury and suicidal ideas. The patient is not nervous/anxious and is not hyperactive.            Past Medical History:   Diagnosis Date    NOLAN  (acute kidney injury) 04/07/2023    Anemia     Diabetes mellitus with neuropathy     HLD (hyperlipidemia) 04/07/2023    HTN (hypertension)     Obesity     TEDDY (obstructive sleep apnea) 04/07/2023    Osteomyelitis 10/2020    Hx of R foot, Tx with IV Abx    Peripheral vascular disease     Urinary tract infection with hematuria 02/10/2024     Past Surgical History:   Procedure Laterality Date    APPLICATION OF SPLIT-THICKNESS SKIN GRAFT (STSG) TO LOWER EXTREMITY Right 03/02/2022    Procedure: APPLICATION, GRAFT, SKIN, SPLIT-THICKNESS, TO LOWER EXTREMITY;  Surgeon: Greg Ramírez MD;  Location: [facility], [place];  Service: General;  Laterality: Right;    CHOLECYSTECTOMY      DEBRIDEMENT OF FOOT Right 10/2020    right great toe amputation       Social History     Socioeconomic History    Marital status: Single    Number of children: 1   Tobacco Use    Smoking status: Every Day     Types: Vaping with nicotine     Start date: 6/1/2024    Smokeless tobacco: Never   Substance and Sexual Activity    Alcohol use: Yes     Alcohol/week: 2.0 standard drinks of alcohol     Types: 1 Glasses of wine, 1 Cans of beer per week     Comment: occasionally    Drug use: Never    Sexual activity: Yes     Partners: Female     Social Drivers of Health     Financial Resource Strain: Medium Risk (2/12/2024)    Overall Financial Resource Strain (CARDIA)     Difficulty of Paying Living Expenses: Somewhat hard   Food Insecurity: Food Insecurity Present (2/12/2024)    Hunger Vital Sign     Worried About Running Out of Food in the Last Year: Sometimes true     Ran Out of Food in the Last Year: Sometimes true   Transportation Needs: No Transportation Needs (2/12/2024)    PRAPARE - Transportation     Lack of Transportation (Medical): No     Lack of Transportation (Non-Medical): No   Physical Activity: Inactive (2/12/2024)    Exercise Vital Sign     Days of Exercise per Week: 0 days     Minutes of Exercise per Session: 0 min   Stress: No Stress Concern  "Present (2/12/2024)    [employer] - Occupational Stress Questionnaire     Feeling of Stress : Not at all   Housing Stability: Low Risk  (2/12/2024)    Housing Stability Vital Sign     Unable to Pay for Housing in the Last Year: No     Number of Places Lived in the Last Year: 1     Unstable Housing in the Last Year: No     Family History   Problem Relation Name Age of Onset    Hypertension Father      Diabetes Father      Hypertension Maternal Grandmother      Diabetes Maternal Grandmother       Review of patient's allergies indicates:   Allergen Reactions    Tomato Itching        Objective:  Vitals:    04/01/25 1100 04/01/25 1101   BP: (!) 149/78    Pulse: 92    Resp: 16    Weight: (!) 156.9 kg (346 lb)    Height: 6' 2" (1.88 m)    PainSc:   5   5                   Physical Exam  Vitals and nursing note reviewed. Exam conducted with a chaperone present.   Constitutional:       General: He is awake. He is not in acute distress.     Appearance: Normal appearance. He is not toxic-appearing or diaphoretic.   HENT:      Head: Normocephalic and atraumatic.      Nose: Nose normal.      Mouth/Throat:      Mouth: Mucous membranes are moist.      Pharynx: Oropharynx is clear.   Eyes:      Conjunctiva/sclera: Conjunctivae normal.      Pupils: Pupils are equal, round, and reactive to light.   Cardiovascular:      Rate and Rhythm: Normal rate.   Pulmonary:      Effort: Pulmonary effort is normal. No respiratory distress.   Abdominal:      Palpations: Abdomen is soft.      Tenderness: There is no guarding.   Musculoskeletal:         General: Normal range of motion.      Cervical back: Normal range of motion and neck supple. No rigidity.      Lumbar back: Tenderness present.      Right foot: Tenderness present.      Left foot: Tenderness present.   Skin:     General: Skin is warm and dry.      Coloration: Skin is not jaundiced or pale.   Neurological:      General: No focal deficit present.      " Mental Status: He is alert and oriented to person, place, and time. Mental status is at baseline.      Cranial Nerves: No cranial nerve deficit (II-XII).   Psychiatric:         Mood and Affect: Mood normal.         Behavior: Behavior normal. Behavior is cooperative.         Thought Content: Thought content normal.           X-Ray Foot Complete 3 view Right  Narrative: EXAMINATION:  XR FOOT COMPLETE 3 VIEW RIGHT    CLINICAL HISTORY:  . Type 2 diabetes mellitus with foot ulcer    TECHNIQUE:  AP, lateral, and oblique views of the right foot were performed.    COMPARISON:  02/10/2024    FINDINGS:  There has been partial amputation of the 1st ray at the level of the distal metatarsal shaft.  No acute fracture or dislocation.  No osseous erosive changes.  There is advanced arthritic change of the midfoot.  Diffuse soft tissue swelling about the right foot is present.  Pes planus is present.  Impression: No radiographic evidence for acute osteomyelitis.  Diffuse soft tissue swelling.    Advanced midfoot arthritic change which may be neuropathic in nature.    Partial right 1st ray amputation.    Electronically signed by: Jesús Self MD  Date:    10/15/2024  Time:    15:47  X-Ray Ankle Complete 3 View Right  Narrative: EXAMINATION:  XR ANKLE COMPLETE 3 VIEW RIGHT    CLINICAL HISTORY:  Type 2 diabetes mellitus with foot ulcer    TECHNIQUE:  AP, lateral and oblique views of the ankle were performed.    COMPARISON:  Right foot x-ray of October 14, 2024 and February 10, 2024    FINDINGS:  A fracture of the tibia fibula or talus is not seen.  Periosteal thickening of the distal tibia and fibula are noted.  The ankle mortise is intact.  Soft tissue swelling is noted of the lower leg and foot.  The patient has deformity of the calcaneus and prior amputations  Impression: Soft tissue swelling of the lower leg and foot.  Flowing periosteal thickening of the distal tibia and fibula.  The ankle mortise is intact.  Diabetic  foot    Electronically signed by: Jesse Velasco MD  Date:    10/15/2024  Time:    08:16         Office Visit on 02/19/2025   Component Date Value Ref Range Status    POC Amphetamines 02/19/2025 Negative  Negative, Inconclusive Final    POC Barbiturates 02/19/2025 Negative  Negative, Inconclusive Final    POC Benzodiazepines 02/19/2025 Negative  Negative, Inconclusive Final    POC Cocaine 02/19/2025 Negative  Negative, Inconclusive Final    POC THC 02/19/2025 Negative  Negative, Inconclusive Final    POC Methadone 02/19/2025 Negative  Negative, Inconclusive Final    POC Methamphetamine 02/19/2025 Negative  Negative, Inconclusive Final    POC Opiates 02/19/2025 Negative  Negative, Inconclusive Final    POC Oxycodone 02/19/2025 Negative  Negative, Inconclusive Final    POC Phencyclidine 02/19/2025 Negative  Negative, Inconclusive Final    POC Methylenedioxymethamphetamine * 02/19/2025 Negative  Negative, Inconclusive Final    POC Tricyclic Antidepressants 02/19/2025 Negative  Negative, Inconclusive Final    POC Buprenorphine 02/19/2025 Negative   Final     Acceptable 02/19/2025 Yes   Final    POC Temperature (Urine) 02/19/2025 90   Final    Creatinine, U 02/19/2025 364.7  mg/dL Final    Specific Gravity 02/19/2025 1.030   Final    pH 02/19/2025 5.1   Final    Oxidants 02/19/2025 Negative  Cutoff: 200 mg/L Final    Comment 02/19/2025 Normal   Final    Codeine 02/19/2025 Not Detected  Cutoff: 25 ng/mL Final    C6BG 02/19/2025 Not Detected  Cutoff: 100 ng/mL Final    Morphine 02/19/2025 Not Detected  Cutoff: 25 ng/mL Final    M6BG 02/19/2025 Not Detected  Cutoff: 100 ng/mL Final    6 Monoacetylmorphine 02/19/2025 Not Detected  Cutoff: 25 ng/mL Final    Hydrocodone 02/19/2025 Not Detected  Cutoff: 25 ng/mL Final    Norhydrocodone 02/19/2025 Not Detected  Cutoff: 25 ng/mL Final    Dihydrocodeine 02/19/2025 Not Detected  Cutoff: 25 ng/mL Final    Hydromorphone 02/19/2025 Not Detected  Cutoff: 25 ng/mL Final     Hydromorphone-3-beta-glucuronide 02/19/2025 Not Detected  Cutoff: 100 ng/mL Final    Oxycodone 02/19/2025 Not Detected  Cutoff: 25 ng/mL Final    Noroxycodone 02/19/2025 Not Detected  Cutoff: 25 ng/mL Final    Oxymorphone 02/19/2025 Not Detected  Cutoff: 25 ng/mL Final    Oxymorphone-3-beta-glucuronid 02/19/2025 Not Detected  Cutoff: 100 ng/mL Final    Noroxymorphone 02/19/2025 Not Detected  Cutoff: 25 ng/mL Final    Fentanyl 02/19/2025 Not Detected  Cutoff: 2 ng/mL Final    Norfentanyl 02/19/2025 Not Detected  Cutoff: 2 ng/mL Final    Meperidine 02/19/2025 Not Detected  Cutoff: 25 ng/mL Final    Normeperidine 02/19/2025 Not Detected  Cutoff: 25 ng/mL Final    Naloxone 02/19/2025 Not Detected  Cutoff: 25 ng/mL Final    Naloxone-3-beta-glucuronide 02/19/2025 Not Detected  Cutoff: 100 ng/mL Final    Methadone 02/19/2025 Not Detected  Cutoff: 25 ng/mL Final    EDDP 02/19/2025 Not Detected  Cutoff: 25 ng/mL Final    Propoxyphene 02/19/2025 Not Detected  Cutoff: 25 ng/mL Final    Norpropoxyphene 02/19/2025 Not Detected  Cutoff: 25 ng/mL Final    Tramadol 02/19/2025 Not Detected  Cutoff: 25 ng/mL Final    O-desmethyltramadol 02/19/2025 Not Detected  Cutoff: 25 ng/mL Final    Tapentadol 02/19/2025 Not Detected  Cutoff: 25 ng/mL Final    N-Desmethyltapentadol 02/19/2025 Not Detected  Cutoff: 50 ng/mL Final    M TAPENTADOL-BETA-GLUCURONIDE 02/19/2025 Not Detected  Cutoff: 100 ng/mL Final    Buprenorphine 02/19/2025 Not Detected  Cutoff: 5 ng/mL Final    Norbuprenorphine 02/19/2025 SEE COMMENTS  Cutoff: 5 ng/mL Final    Norbuprenorphine glucuronide 02/19/2025 Not Detected  Cutoff: 20 ng/mL Final    Opioid Interpretation 02/19/2025 SEE COMMENTS   Final    Cocaine 02/19/2025 Negative  Cutoff: 150 ng/mL Final    Tetrahydrocannabinol 02/19/2025 Negative  Cutoff: 50 ng/mL Final    Alprazolam 02/19/2025 Not Detected  Cutoff: 10 ng/mL Final    Alpha-Hydroxyalprazolam 02/19/2025 Not Detected  Cutoff: 10 ng/mL Final     Alpha-Hydroxyalprazolam Glucuronide 02/19/2025 Not Detected  Cutoff: 50 ng/mL Final    Chlordiazepoxide 02/19/2025 Not Detected  Cutoff: 10 ng/mL Final    Clobazam 02/19/2025 Not Detected  Cutoff: 10 ng/mL Final    N-Desmethylclobazam 02/19/2025 Not Detected  Cutoff: 200 ng/mL Final    Clonazepam 02/19/2025 Not Detected  Cutoff: 10 ng/mL Final    7-aminoclonazepam 02/19/2025 Not Detected  Cutoff: 10 ng/mL Final    Diazepam 02/19/2025 Not Detected  Cutoff: 10 ng/mL Final    Nordiazepam 02/19/2025 Not Detected  Cutoff: 10 ng/mL Final    Flunitrazepam 02/19/2025 Not Detected  Cutoff: 10 ng/mL Final    7-aminoflunitrazepam 02/19/2025 Not Detected  Cutoff: 10 ng/mL Final    Flurazepam 02/19/2025 Not Detected  Cutoff: 10 ng/mL Final    2-Hydroxy Ethyl Flurazepam 02/19/2025 Not Detected  Cutoff: 10 ng/mL Final    Lorazepam 02/19/2025 Not Detected  Cutoff: 10 ng/mL Final    Lorazepam Glucuronide 02/19/2025 Not Detected  Cutoff: 50 ng/mL Final    Midazolam 02/19/2025 Not Detected  Cutoff: 10 ng/mL Final    Alpha-Hydroxy Midazolam 02/19/2025 Not Detected  Cutoff: 10 ng/mL Final    Oxazepam 02/19/2025 Not Detected  Cutoff: 10 ng/mL Final    Oxazepam Glucuronide 02/19/2025 Not Detected  Cutoff: 50 ng/mL Final    Prazepam 02/19/2025 Not Detected  Cutoff: 10 ng/mL Final    Temazepam 02/19/2025 Not Detected  Cutoff: 10 ng/mL Final    Temazepam Glucuronide 02/19/2025 Not Detected  Cutoff: 50 ng/mL Final    Triazolam 02/19/2025 Not Detected  Cutoff: 10 ng/mL Final    Alpha-Hydroxy Triazolam 02/19/2025 Not Detected  Cutoff: 10 ng/mL Final    Zolpidem 02/19/2025 Not Detected  Cutoff: 10 ng/mL Final    Zolpidem Phenyl-4-Carboxylic acid 02/19/2025 Not Detected  Cutoff: 10 ng/mL Final    Benzodiazepine Interpretation 02/19/2025 SEE COMMENTS   Final    List Patient's Current Medications 02/19/2025 -----   Final    Methamphetamine 02/19/2025 Not Detected  Cutoff: 100 ng/mL Final    Amphetamine 02/19/2025 Not Detected  Cutoff: 100 ng/mL  Final    3,4-methylenedioxymethamphetamine * 02/19/2025 Not Detected  Cutoff: 100 ng/mL Final    3,6-mxmrsepvjdnick-R-ethylamphetam* 02/19/2025 Not Detected  Cutoff: 100 ng/mL Final    3,4-methylenedioxyamphetamine (MDA) 02/19/2025 Not Detected  Cutoff: 100 ng/mL Final    Ephedrine 02/19/2025 Not Detected  Cutoff: 100 ng/mL Final    Pseudoephedrine 02/19/2025 Not Detected  Cutoff: 100 ng/mL Final    Phentermine 02/19/2025 Not Detected  Cutoff: 100 ng/mL Final    Phencyclidine (PCP) 02/19/2025 Not Detected  Cutoff: 20 ng/mL Final    Methylphenidate 02/19/2025 Not Detected  Cutoff: 20 ng/mL Final    Ritalinic acid 02/19/2025 Not Detected  Cutoff: 100 ng/mL Final    Stimulant Interpretation 02/19/2025 SEE COMMENTS   Final    Barbiturates 02/19/2025 Negative  Cutoff: 200 ng/mL Final   Lab Requisition on 01/09/2025   Component Date Value Ref Range Status    Sodium 01/09/2025 136  136 - 145 mmol/L Final    Potassium 01/09/2025 4.7  3.5 - 5.1 mmol/L Final    Chloride 01/09/2025 107  98 - 107 mmol/L Final    CO2 01/09/2025 24  22 - 29 mmol/L Final    Anion Gap 01/09/2025 10  7 - 16 mmol/L Final    Glucose 01/09/2025 180 (H)  74 - 100 mg/dL Final    BUN 01/09/2025 19  9 - 21 mg/dL Final    Creatinine 01/09/2025 1.41 (H)  0.72 - 1.25 mg/dL Final    BUN/Creatinine Ratio 01/09/2025 13  6 - 20 Final    Calcium 01/09/2025 8.1 (L)  8.4 - 10.2 mg/dL Final    Total Protein 01/09/2025 7.6  6.4 - 8.3 g/dL Final    Albumin 01/09/2025 2.8 (L)  3.5 - 5.0 g/dL Final    Globulin 01/09/2025 4.8 (H)  2.0 - 4.0 g/dL Final    A/G Ratio 01/09/2025 0.6   Final    Bilirubin, Total 01/09/2025 0.4  <=1.5 mg/dL Final    Alk Phos 01/09/2025 108  40 - 150 U/L Final    ALT 01/09/2025 25  <=55 U/L Final    AST 01/09/2025 21  5 - 34 U/L Final    eGFR 01/09/2025 64  >=60 mL/min/1.73m2 Final    Hemoglobin A1C 01/09/2025 7.1 (H)  <=7.0 % Final    Estimated Average Glucose 01/09/2025 157  mg/dL Final   Office Visit on 12/12/2024   Component Date Value Ref  Range Status    Culture, Anaerobe 12/12/2024 No Anaerobes Isolated   Final    Culture, Wound/Abscess 12/12/2024 Moderate Growth Proteus mirabilis (A)   Final    Culture, Wound/Abscess 12/12/2024 Heavy Growth Group G streptococcus (A)   Final   Office Visit on 10/28/2024   Component Date Value Ref Range Status    Creatinine, U 10/28/2024 314.5  mg/dL Final    Specific Gravity 10/28/2024 1.033   Final    pH 10/28/2024 5.2   Final    Oxidants 10/28/2024 Negative  Cutoff: 200 mg/L Final    Comment 10/28/2024 Normal   Final    Codeine 10/28/2024 Not Detected  Cutoff: 25 ng/mL Final    C6BG 10/28/2024 Not Detected  Cutoff: 100 ng/mL Final    Morphine 10/28/2024 Not Detected  Cutoff: 25 ng/mL Final    M6BG 10/28/2024 Not Detected  Cutoff: 100 ng/mL Final    6 Monoacetylmorphine 10/28/2024 Not Detected  Cutoff: 25 ng/mL Final    Hydrocodone 10/28/2024 Present (A)  Cutoff: 25 ng/mL Final    Norhydrocodone 10/28/2024 Present (A)  Cutoff: 25 ng/mL Final    Dihydrocodeine 10/28/2024 Present (A)  Cutoff: 25 ng/mL Final    Hydromorphone 10/28/2024 Not Detected  Cutoff: 25 ng/mL Final    Hydromorphone-3-beta-glucuronide 10/28/2024 Present (A)  Cutoff: 100 ng/mL Final    Oxycodone 10/28/2024 Not Detected  Cutoff: 25 ng/mL Final    Noroxycodone 10/28/2024 Not Detected  Cutoff: 25 ng/mL Final    Oxymorphone 10/28/2024 Not Detected  Cutoff: 25 ng/mL Final    Oxymorphone-3-beta-glucuronid 10/28/2024 Not Detected  Cutoff: 100 ng/mL Final    Noroxymorphone 10/28/2024 Not Detected  Cutoff: 25 ng/mL Final    Fentanyl 10/28/2024 Not Detected  Cutoff: 2 ng/mL Final    Norfentanyl 10/28/2024 Not Detected  Cutoff: 2 ng/mL Final    Meperidine 10/28/2024 Not Detected  Cutoff: 25 ng/mL Final    Normeperidine 10/28/2024 Not Detected  Cutoff: 25 ng/mL Final    Naloxone 10/28/2024 Not Detected  Cutoff: 25 ng/mL Final    Naloxone-3-beta-glucuronide 10/28/2024 Not Detected  Cutoff: 100 ng/mL Final    Methadone 10/28/2024 Not Detected  Cutoff: 25 ng/mL  Final    EDDP 10/28/2024 Not Detected  Cutoff: 25 ng/mL Final    Propoxyphene 10/28/2024 Not Detected  Cutoff: 25 ng/mL Final    Norpropoxyphene 10/28/2024 Not Detected  Cutoff: 25 ng/mL Final    Tramadol 10/28/2024 Not Detected  Cutoff: 25 ng/mL Final    O-desmethyltramadol 10/28/2024 Not Detected  Cutoff: 25 ng/mL Final    Tapentadol 10/28/2024 Not Detected  Cutoff: 25 ng/mL Final    N-Desmethyltapentadol 10/28/2024 Not Detected  Cutoff: 50 ng/mL Final    M TAPENTADOL-BETA-GLUCURONIDE 10/28/2024 Not Detected  Cutoff: 100 ng/mL Final    Buprenorphine 10/28/2024 Not Detected  Cutoff: 5 ng/mL Final    Norbuprenorphine 10/28/2024 Not Detected  Cutoff: 5 ng/mL Final    Norbuprenorphine glucuronide 10/28/2024 Not Detected  Cutoff: 20 ng/mL Final    Opioid Interpretation 10/28/2024 SEE COMMENTS   Final    Cocaine 10/28/2024 Negative  Cutoff: 150 ng/mL Final    Tetrahydrocannabinol 10/28/2024 Negative  Cutoff: 50 ng/mL Final    Alprazolam 10/28/2024 Not Detected  Cutoff: 10 ng/mL Final    Alpha-Hydroxyalprazolam 10/28/2024 Not Detected  Cutoff: 10 ng/mL Final    Alpha-Hydroxyalprazolam Glucuronide 10/28/2024 Not Detected  Cutoff: 50 ng/mL Final    Chlordiazepoxide 10/28/2024 Not Detected  Cutoff: 10 ng/mL Final    Clobazam 10/28/2024 Not Detected  Cutoff: 10 ng/mL Final    N-Desmethylclobazam 10/28/2024 Not Detected  Cutoff: 200 ng/mL Final    Clonazepam 10/28/2024 Not Detected  Cutoff: 10 ng/mL Final    7-aminoclonazepam 10/28/2024 Not Detected  Cutoff: 10 ng/mL Final    Diazepam 10/28/2024 Not Detected  Cutoff: 10 ng/mL Final    Nordiazepam 10/28/2024 Not Detected  Cutoff: 10 ng/mL Final    Flunitrazepam 10/28/2024 Not Detected  Cutoff: 10 ng/mL Final    7-aminoflunitrazepam 10/28/2024 Not Detected  Cutoff: 10 ng/mL Final    Flurazepam 10/28/2024 Not Detected  Cutoff: 10 ng/mL Final    2-Hydroxy Ethyl Flurazepam 10/28/2024 Not Detected  Cutoff: 10 ng/mL Final    Lorazepam 10/28/2024 Not Detected  Cutoff: 10 ng/mL Final     Lorazepam Glucuronide 10/28/2024 Not Detected  Cutoff: 50 ng/mL Final    Midazolam 10/28/2024 Not Detected  Cutoff: 10 ng/mL Final    Alpha-Hydroxy Midazolam 10/28/2024 Not Detected  Cutoff: 10 ng/mL Final    Oxazepam 10/28/2024 Not Detected  Cutoff: 10 ng/mL Final    Oxazepam Glucuronide 10/28/2024 Not Detected  Cutoff: 50 ng/mL Final    Prazepam 10/28/2024 Not Detected  Cutoff: 10 ng/mL Final    Temazepam 10/28/2024 Not Detected  Cutoff: 10 ng/mL Final    Temazepam Glucuronide 10/28/2024 Not Detected  Cutoff: 50 ng/mL Final    Triazolam 10/28/2024 Not Detected  Cutoff: 10 ng/mL Final    Alpha-Hydroxy Triazolam 10/28/2024 Not Detected  Cutoff: 10 ng/mL Final    Zolpidem 10/28/2024 Not Detected  Cutoff: 10 ng/mL Final    Zolpidem Phenyl-4-Carboxylic acid 10/28/2024 Not Detected  Cutoff: 10 ng/mL Final    Benzodiazepine Interpretation 10/28/2024 SEE COMMENTS   Final    List Patient's Current Medications 10/28/2024 na   Final    Methamphetamine 10/28/2024 Not Detected  Cutoff: 100 ng/mL Final    Amphetamine 10/28/2024 Not Detected  Cutoff: 100 ng/mL Final    3,4-methylenedioxymethamphetamine * 10/28/2024 Not Detected  Cutoff: 100 ng/mL Final    3,9-uwqgsynvpzefoz-V-ethylamphetam* 10/28/2024 Not Detected  Cutoff: 100 ng/mL Final    3,4-methylenedioxyamphetamine (MDA) 10/28/2024 Not Detected  Cutoff: 100 ng/mL Final    Ephedrine 10/28/2024 Not Detected  Cutoff: 100 ng/mL Final    Pseudoephedrine 10/28/2024 Not Detected  Cutoff: 100 ng/mL Final    Phentermine 10/28/2024 Not Detected  Cutoff: 100 ng/mL Final    Phencyclidine (PCP) 10/28/2024 Not Detected  Cutoff: 20 ng/mL Final    Methylphenidate 10/28/2024 Not Detected  Cutoff: 20 ng/mL Final    Ritalinic acid 10/28/2024 Not Detected  Cutoff: 100 ng/mL Final    Stimulant Interpretation 10/28/2024 SEE COMMENTS   Final    Barbiturates 10/28/2024 Negative  Cutoff: 200 ng/mL Final   Lab Visit on 10/14/2024   Component Date Value Ref Range Status    ESR Westergren  10/14/2024 125 (H)  0 - 15 mm/Hr Final    CRP 10/14/2024 10.60 (H)  0.00 - 0.80 mg/dL Final    Sodium 10/14/2024 134 (L)  136 - 145 mmol/L Final    Potassium 10/14/2024 4.1  3.5 - 5.1 mmol/L Final    Chloride 10/14/2024 101  98 - 107 mmol/L Final    CO2 10/14/2024 27  21 - 32 mmol/L Final    Anion Gap 10/14/2024 10  7 - 16 mmol/L Final    Glucose 10/14/2024 141 (H)  74 - 106 mg/dL Final    BUN 10/14/2024 18  7 - 18 mg/dL Final    Creatinine 10/14/2024 1.76 (H)  0.70 - 1.30 mg/dL Final    BUN/Creatinine Ratio 10/14/2024 10  6 - 20 Final    Calcium 10/14/2024 9.0  8.5 - 10.1 mg/dL Final    Total Protein 10/14/2024 8.1  6.4 - 8.2 g/dL Final    Albumin 10/14/2024 2.5 (L)  3.5 - 5.0 g/dL Final    Globulin 10/14/2024 5.6 (H)  2.0 - 4.0 g/dL Final    A/G Ratio 10/14/2024 0.4   Final    Bilirubin, Total 10/14/2024 0.7  >0.0 - 1.2 mg/dL Final    Alk Phos 10/14/2024 109  45 - 115 U/L Final    ALT 10/14/2024 28  16 - 61 U/L Final    AST 10/14/2024 21  15 - 37 U/L Final    eGFR 10/14/2024 49 (L)  >=60 mL/min/1.73m2 Final    WBC 10/14/2024 8.36  4.50 - 11.00 K/uL Final    RBC 10/14/2024 4.30 (L)  4.60 - 6.20 M/uL Final    Hemoglobin 10/14/2024 10.8 (L)  13.5 - 18.0 g/dL Final    Hematocrit 10/14/2024 35.1 (L)  40.0 - 54.0 % Final    MCV 10/14/2024 81.6  80.0 - 96.0 fL Final    MCH 10/14/2024 25.1 (L)  27.0 - 31.0 pg Final    MCHC 10/14/2024 30.8 (L)  32.0 - 36.0 g/dL Final    RDW 10/14/2024 13.9  11.5 - 14.5 % Final    Platelet Count 10/14/2024 353  150 - 400 K/uL Final    MPV 10/14/2024 9.9  9.4 - 12.4 fL Final    Neutrophils % 10/14/2024 68.4 (H)  53.0 - 65.0 % Final    Lymphocytes % 10/14/2024 19.7 (L)  27.0 - 41.0 % Final    Monocytes % 10/14/2024 9.2 (H)  2.0 - 6.0 % Final    Eosinophils % 10/14/2024 1.8  1.0 - 4.0 % Final    Basophils % 10/14/2024 0.8  0.0 - 1.0 % Final    Immature Granulocytes % 10/14/2024 0.1  0.0 - 0.4 % Final    nRBC, Auto 10/14/2024 0.0  <=0.0 % Final    Neutrophils, Abs 10/14/2024 5.71  1.80 - 7.70  K/uL Final    Lymphocytes, Absolute 10/14/2024 1.65  1.00 - 4.80 K/uL Final    Monocytes, Absolute 10/14/2024 0.77  0.00 - 0.80 K/uL Final    Eosinophils, Absolute 10/14/2024 0.15  0.00 - 0.50 K/uL Final    Basophils, Absolute 10/14/2024 0.07  0.00 - 0.20 K/uL Final    Immature Granulocytes, Absolute 10/14/2024 0.01  0.00 - 0.04 K/uL Final    nRBC, Absolute 10/14/2024 0.00  <=0.00 x10e3/uL Final    Diff Type 10/14/2024 Auto   Final   Office Visit on 10/14/2024   Component Date Value Ref Range Status    Culture, Wound/Abscess 10/14/2024 Moderate Growth Providencia rettgeri (A)   Final    Culture, Anaerobe 10/14/2024 No Anaerobes Isolated   Final         No orders of the defined types were placed in this encounter.        Requested Prescriptions     Signed Prescriptions Disp Refills    HYDROcodone-acetaminophen (NORCO)  mg per tablet 60 tablet 0     Sig: Take 1 tablet by mouth every 12 (twelve) hours as needed for Pain (pain).    HYDROcodone-acetaminophen (NORCO)  mg per tablet 60 tablet 0     Sig: Take 1 tablet by mouth every 12 (twelve) hours as needed for Pain (pain).       Assessment:     1. Diabetic neuropathy with neurologic complication    2. Peripheral vascular disease                 A's of Opioid Risk Assessment  Activity:Patient can perform ADL.   Analgesia:Patients pain is partially controlled by current medication. Patient has tried OTC medications such as Tylenol and Ibuprofen with out relief.   Adverse Effects: Patient denies constipation or sedation.  Aberrant Behavior:  reviewed with no aberrant drug seeking/taking behavior.  Overdose reversal drug naloxone discussed    Drug screen reviewed        Plan:    Narcan March 2023        No further scheduled 2 narcotics from our office February 11, 2025  Noncompliance with office visits/pill count      Patient canceled appointment   March 19, 2025  January 30, 2025  December 30, 2024  October 29, 2024  February 21, 2024  January 3,  2024      No show  October 14, 2024        He verbalized understanding need to keep appointments for clarification with drug screens and pill counts April 1, 2025 October 28, 2024    Any further missed appointments are follow-up after refill dates will discontinue narcotics April 1, 2025, March 18, 2024    Bring medication each visit in current bottle with current date and all narcotic medication  April 1, 2025 October 28, 2024    Pill count correct   October 28, 2024 did not bring medication  August 19, 2024 March 18, 2024      Chronic Ulcer right foot     Continues to follow wound management please see photographs  Dressing in place      Requesting resume hydrocodone    He did not  buprenorphine patches    He verbalized understanding if he can not keep office visits for compliance with urine drug screens for narcotics management will discontinue All narcotics    Will resume hydrocodone    Patient verbalized understanding narcotics agreement he states he will comply    Continue activity as directed     Continue current medication    Follow-up 6 weeks, pill count, drug screen    Dr. Paulson, June 2025    Bring original prescription medication bottles/container/box with labels to each visit

## 2025-04-01 ENCOUNTER — OFFICE VISIT (OUTPATIENT)
Dept: PAIN MEDICINE | Facility: CLINIC | Age: 40
End: 2025-04-01
Payer: MEDICARE

## 2025-04-01 VITALS
SYSTOLIC BLOOD PRESSURE: 149 MMHG | RESPIRATION RATE: 16 BRPM | WEIGHT: 315 LBS | HEART RATE: 92 BPM | BODY MASS INDEX: 40.43 KG/M2 | HEIGHT: 74 IN | DIASTOLIC BLOOD PRESSURE: 78 MMHG

## 2025-04-01 DIAGNOSIS — E11.49 DIABETIC NEUROPATHY WITH NEUROLOGIC COMPLICATION: Primary | Chronic | ICD-10-CM

## 2025-04-01 DIAGNOSIS — E11.40 DIABETIC NEUROPATHY WITH NEUROLOGIC COMPLICATION: Primary | Chronic | ICD-10-CM

## 2025-04-01 DIAGNOSIS — I73.9 PERIPHERAL VASCULAR DISEASE: Chronic | ICD-10-CM

## 2025-04-01 PROCEDURE — 99214 OFFICE O/P EST MOD 30 MIN: CPT | Mod: S$PBB,,, | Performed by: PHYSICIAN ASSISTANT

## 2025-04-01 PROCEDURE — 99999 PR PBB SHADOW E&M-EST. PATIENT-LVL IV: CPT | Mod: PBBFAC,,, | Performed by: PHYSICIAN ASSISTANT

## 2025-04-01 PROCEDURE — 99214 OFFICE O/P EST MOD 30 MIN: CPT | Mod: PBBFAC | Performed by: PHYSICIAN ASSISTANT

## 2025-04-01 RX ORDER — HYDROCODONE BITARTRATE AND ACETAMINOPHEN 10; 325 MG/1; MG/1
1 TABLET ORAL EVERY 12 HOURS PRN
Qty: 60 TABLET | Refills: 0 | Status: SHIPPED | OUTPATIENT
Start: 2025-05-01 | End: 2025-05-31

## 2025-04-01 RX ORDER — HYDROCODONE BITARTRATE AND ACETAMINOPHEN 10; 325 MG/1; MG/1
1 TABLET ORAL EVERY 12 HOURS PRN
Qty: 60 TABLET | Refills: 0 | Status: SHIPPED | OUTPATIENT
Start: 2025-04-01 | End: 2025-05-01

## 2025-04-03 NOTE — PROGRESS NOTES
AUGUSTUS Macedo   [facility]  [address]  [facility] [address]  [phone]      PATIENT NAME: Jean Pierre Paulson  : 1985  DATE: 25  MRN: [id number]      Billing Provider: AUGUSTUS Macedo  Level of Service:   Patient PCP Information       Provider PCP Type    Primary Doctor No General            Reason for Visit / Chief Complaint: Diabetic Foot Ulcer and Wound Check (Right plantar)       History of Present Illness / Problem Focused Workflow     Jean Pierre Paulson is a 38 yo male presents for follow up on chronic non healing wound to right foot. Wound has improved since last visit. Callus and maceration filippo-wound, bedside debridement today. Puracol and drawtex to wound bed. Continue two layer compression and CROW walking boot.     10/14/2024  39 y.o. male who presents to clinic for follow up on chronic-non healing wound on the right foot. Right foot is erythematous and lateral aspect of foot is hot to the touch. Wound is draining copious amounts of serosanguinous drainage. Patient reports increased pain when ambulating and difficulty putting weight on foot. X-ray, labs, and cultures today. Rocephin 1 gram given IM in clinic today. Rocephin to pharmacy, start 10/15 outpatient until cultures result. Discussed s/s of worsening infection and instructed to go to ED if he develops fever, chills, increased pain or swelling, body aches, nausea/vomiting, or increased redness to foot.     Last arterial doppler 4/10/23,  The ankle to brachial index is 1.25 on the right and 1.34 on the left.    Significant PMH  Includes diabetes and hypertension. Last HgA1C 7.3 in 2022, diabetes is managed by PCP.  Denies fever or chills.       Review of Systems     Review of Systems   Constitutional:  Positive for activity change. Negative for chills and fever.   Respiratory:  Negative for chest tightness and shortness of breath.    Cardiovascular:  Positive for leg  swelling. Negative for chest pain and palpitations.   Musculoskeletal:  Positive for arthralgias, gait problem and joint swelling.   Skin:  Positive for color change and wound.        See wound assessment   Psychiatric/Behavioral:  Negative for agitation, behavioral problems, confusion and self-injury.        Medical / Social / Family History     Past Medical History:   Diagnosis Date    NOLAN (acute kidney injury) 04/07/2023    Anemia     Diabetes mellitus with neuropathy     HLD (hyperlipidemia) 04/07/2023    HTN (hypertension)     Obesity     TEDDY (obstructive sleep apnea) 04/07/2023    Osteomyelitis 10/2020    Hx of R foot, Tx with IV Abx    Peripheral vascular disease     Urinary tract infection with hematuria 02/10/2024       Past Surgical History:   Procedure Laterality Date    APPLICATION OF SPLIT-THICKNESS SKIN GRAFT (STSG) TO LOWER EXTREMITY Right 03/02/2022    Procedure: APPLICATION, GRAFT, SKIN, SPLIT-THICKNESS, TO LOWER EXTREMITY;  Surgeon: Greg Ramírez MD;  Location: [facility];  Service: General;  Laterality: Right;    CHOLECYSTECTOMY      DEBRIDEMENT OF FOOT Right 10/2020    right great toe amputation         Social History  Mr. Jean Pierre Paulson  reports that he has been smoking vaping with nicotine. He started smoking about 10 months ago. He has never used smokeless tobacco. He reports current alcohol use of about 2.0 standard drinks of alcohol per week. He reports that he does not use drugs.    Family History  .'s Jean Pierre Paulson family history includes Diabetes in his father and maternal grandmother; Hypertension in his father and maternal grandmother.    Medications and Allergies     Medications  Outpatient Medications Marked as Taking for the 4/8/25 encounter (Office Visit) with Yelitza Alas FNP   Medication Sig Dispense Refill    amLODIPine (NORVASC) 10 MG tablet Take by mouth once daily.      blood sugar diagnostic (BLOOD GLUCOSE TEST) Strp 1 strip by Misc.(Non-Drug; Combo  Route) route once daily. accu-chek Veronika strips 90 strip 5    gabapentin (NEURONTIN) 300 MG capsule Take 1 capsule (300 mg total) by mouth every 8 (eight) hours. 270 capsule 1    hydroCHLOROthiazide (HYDRODIURIL) 25 MG tablet Take 1 tablet (25 mg total) by mouth once daily. 90 tablet 1    HYDROcodone-acetaminophen (NORCO)  mg per tablet Take 1 tablet by mouth every 12 (twelve) hours as needed for Pain (pain). 60 tablet 0    [START ON 5/1/2025] HYDROcodone-acetaminophen (NORCO)  mg per tablet Take 1 tablet by mouth every 12 (twelve) hours as needed for Pain (pain). 60 tablet 0    ibuprofen (ADVIL,MOTRIN) 800 MG tablet Take 800 mg by mouth 3 (three) times daily as needed for Pain.      rosuvastatin (CRESTOR) 10 MG tablet Take 10 mg by mouth once daily.      semaglutide (OZEMPIC) 2 mg/dose (8 mg/3 mL) PnIj Inject 2 mg into the skin every 7 days. (Patient taking differently: Inject 2.5 mg into the skin every 7 days.) 8 mL 2       Allergies  Review of patient's allergies indicates:   Allergen Reactions    Tomato Itching       Physical Examination     Vitals:    04/08/25 1111   BP: (!) 138/94   Pulse: 86   Resp: 18   Temp: 98.2 °F (36.8 °C)             Physical Exam  Vitals and nursing note reviewed.   Constitutional:       Appearance: Normal appearance.   HENT:      Head: Normocephalic.   Cardiovascular:      Rate and Rhythm: Normal rate and regular rhythm.      Pulses: Normal pulses.      Heart sounds: Normal heart sounds.   Pulmonary:      Effort: Pulmonary effort is normal. No respiratory distress.   Chest:      Chest wall: No tenderness.   Musculoskeletal:         General: Swelling, tenderness and deformity present.      Right lower leg: Edema present.   Skin:     General: Skin is warm and dry.      Capillary Refill: Capillary refill takes 2 to 3 seconds.      Comments: See LDA for photos and measurements   Neurological:      General: No focal deficit present.      Mental Status: He is alert and  oriented to person, place, and time. Mental status is at baseline.   Psychiatric:         Mood and Affect: Mood normal.         Behavior: Behavior normal.         Thought Content: Thought content normal.         Judgment: Judgment normal.       Assessment and Plan             Altered Skin Integrity 02/10/24 2200 Right plantar Foot Diabetic Ulcer Full thickness tissue loss with exposed bone, tendon, or muscle. Often includes undermining and tunneling. May extend into muscle and/or supporting structures. (Active)   02/10/24 2200   Altered Skin Integrity Present on Admission - Did Patient arrive to the hospital with altered skin?: yes   Side: Right   Orientation: plantar   Location: Foot   Wound Number:    Is this injury device related?:    Primary Wound Type: Diabetic ulc   Description of Altered Skin Integrity: Full thickness tissue loss with exposed bone, tendon, or muscle. Often includes undermining and tunneling. May extend into muscle and/or supporting structures.   Ankle-Brachial Index:    Pulses:    Removal Indication and Assessment:    Wound Outcome:    (Retired) Wound Length (cm):    (Retired) Wound Width (cm):    (Retired) Depth (cm):    Wound Description (Comments):    Removal Indications:    Wound Image   04/08/25 1133   Description of Altered Skin Integrity Full thickness tissue loss. Subcutaneous fat may be visible but bone, tendon or muscle are not exposed 04/08/25 1114   Dressing Appearance Moist drainage;Intact 04/08/25 1114   Drainage Amount Moderate 04/08/25 1114   Drainage Characteristics/Odor Serosanguineous 04/08/25 1114   Appearance Red;Moist;Granulating 04/08/25 1114   Tissue loss description Full thickness 04/08/25 1114   Black (%), Wound Tissue Color 0 % 04/08/25 1114   Red (%), Wound Tissue Color 100 % 04/08/25 1114   Yellow (%), Wound Tissue Color 0 % 04/08/25 1114   Periwound Area Dry 04/08/25 1114   Wound Edges Defined;Callused 04/08/25 1114   Elliott Classification (diabetic foot ulcers  only) Grade 1 04/08/25 1114   Wound Length (cm) 4.5 cm 04/08/25 1114   Wound Width (cm) 4 cm 04/08/25 1114   Wound Depth (cm) 0.2 cm 04/08/25 1114   Wound Volume (cm^3) 1.885 cm^3 04/08/25 1114   Wound Surface Area (cm^2) 14.14 cm^2 04/08/25 1114   Care Cleansed with:;Soap and water 04/08/25 1114   Dressing Applied;Absorptive Pad;Collagen;Compression wrap;Hydrofiber;Rolled gauze 04/08/25 1114   Compression Two layer compression 04/08/25 1114               Problem List Items Addressed This Visit          Endocrine    Diabetic ulcer of right heel associated with type 2 diabetes mellitus, with fat layer exposed    Overview                                                                          Current Assessment & Plan   Clean with vashe or baby shampoo and water  Apply collagen to wound bed. Cover with drawtex. Apply silicone foam dressing to lateral foot for protection. Cover and secure with 4x4 gauze and ABD pad, wrap with 2 layer compression from toe to knee. Change daily and as needed.     Monitor closely for s/s of infection including fever, chills, increase in pain, odor from wound, and increased redness from foot. Go to ER if any complications develop.   Keep leg elevated and avoid pressure on wound.   Diabetes:  Monitor glucose closely. Check fasting glucose and 2 hours after meals. HgA1C goal <7, fasting glucose , and 2 hours after meals <180  Hypertension:  Check blood pressure twice daily, goal <120/80  Diet:   Increase protein intake, avoid fried, fatty foods and foods high in simple carbs.   Vitamins:  Take vitamin C 1000 mg, zinc 50mg, vitamin d 5000 units, and a daily multivitamin. Angel is a good source of protein and nutrients to aid in wound healing.            Other Visit Diagnoses         Diabetic ulcer of right heel associated with type 2 diabetes mellitus, with muscle involvement without evidence of necrosis    -  Primary            Future Appointments   Date Time Provider Department  [place]   4/22/2025 10:00 AM Yelitza Alas FNP [facility]   5/12/2025 11:00 AM Nadja Paulson MD [facility]   5/19/2025 10:45 AM Nadja Paulson MD [facility]            Signature:  AUGUSTUS Macedo  [facility]  [address] [facility] [address]  [phone]    Date of encounter: 4/8/25

## 2025-04-03 NOTE — PATIENT INSTRUCTIONS
Clean with vashe or baby shampoo and water  Apply collagen to wound bed. Cover with drawtex. Apply silicone foam dressing to lateral foot for protection. Cover and secure with 4x4 gauze and ABD pad, wrap with 2 layer compression from toe to knee. Change daily and as needed.     Monitor closely for s/s of infection including fever, chills, increase in pain, odor from wound, and increased redness from foot. Go to ER if any complications develop.   Keep leg elevated and avoid pressure on wound.   Diabetes:  Monitor glucose closely. Check fasting glucose and 2 hours after meals. HgA1C goal <7, fasting glucose , and 2 hours after meals <180  Hypertension:  Check blood pressure twice daily, goal <120/80  Diet:   Increase protein intake, avoid fried, fatty foods and foods high in simple carbs.   Vitamins:  Take vitamin C 1000 mg, zinc 50mg, vitamin d 5000 units, and a daily multivitamin. Angel is a good source of protein and nutrients to aid in wound healing.

## 2025-04-08 ENCOUNTER — OFFICE VISIT (OUTPATIENT)
Dept: WOUND CARE | Facility: CLINIC | Age: 40
End: 2025-04-08
Payer: MEDICARE

## 2025-04-08 VITALS
HEART RATE: 86 BPM | SYSTOLIC BLOOD PRESSURE: 138 MMHG | TEMPERATURE: 98 F | RESPIRATION RATE: 18 BRPM | DIASTOLIC BLOOD PRESSURE: 94 MMHG

## 2025-04-08 DIAGNOSIS — L97.412 DIABETIC ULCER OF RIGHT HEEL ASSOCIATED WITH TYPE 2 DIABETES MELLITUS, WITH FAT LAYER EXPOSED: ICD-10-CM

## 2025-04-08 DIAGNOSIS — E11.621 DIABETIC ULCER OF RIGHT HEEL ASSOCIATED WITH TYPE 2 DIABETES MELLITUS, WITH MUSCLE INVOLVEMENT WITHOUT EVIDENCE OF NECROSIS: Primary | ICD-10-CM

## 2025-04-08 DIAGNOSIS — E11.621 DIABETIC ULCER OF RIGHT HEEL ASSOCIATED WITH TYPE 2 DIABETES MELLITUS, WITH FAT LAYER EXPOSED: ICD-10-CM

## 2025-04-08 DIAGNOSIS — L97.415 DIABETIC ULCER OF RIGHT HEEL ASSOCIATED WITH TYPE 2 DIABETES MELLITUS, WITH MUSCLE INVOLVEMENT WITHOUT EVIDENCE OF NECROSIS: Primary | ICD-10-CM

## 2025-04-08 PROCEDURE — 11042 DBRDMT SUBQ TIS 1ST 20SQCM/<: CPT | Mod: PBBFAC | Performed by: NURSE PRACTITIONER

## 2025-04-08 PROCEDURE — G0179 MD RECERTIFICATION HHA PT: HCPCS | Mod: ,,, | Performed by: NURSE PRACTITIONER

## 2025-04-08 PROCEDURE — 99499 UNLISTED E&M SERVICE: CPT | Mod: S$PBB,,, | Performed by: NURSE PRACTITIONER

## 2025-04-08 PROCEDURE — 99215 OFFICE O/P EST HI 40 MIN: CPT | Mod: PBBFAC | Performed by: NURSE PRACTITIONER

## 2025-04-08 PROCEDURE — 99999 PR PBB SHADOW E&M-EST. PATIENT-LVL V: CPT | Mod: PBBFAC,,, | Performed by: NURSE PRACTITIONER

## 2025-04-08 NOTE — PROCEDURES
"Debridement    Date/Time: 4/8/2025 10:00 AM    Performed by: Yelitza Alas FNP  Authorized by: Yelitza Alas FNP    Time out: Immediately prior to procedure a "time out" was called to verify the correct patient, procedure, equipment, support staff and site/side marked as required.    Consent Done?:  Yes (Written)    Type of Debridement:  Excisional       Length (cm):  4.6       Width (cm):  4.1       Depth (cm):  0.5       Area (sq cm):  14.81       Percent Debrided (%):  100       Total Area Debrided (sq cm):  14.81    Depth of debridement:  Subcutaneous tissue    Tissue debrided:  Adipose, Dermis, Epidermis and Subcutaneous    Devitalized tissue debrided:  Biofilm, Clots, Callus, Exudate, Fibrin and Slough    Instruments:  Curette  Bleeding:  Moderate  Hemostasis Achieved: Yes  Method Used:  Pressure  Patient tolerance:  Patient tolerated the procedure well with no immediate complications  1st Wound Pain Assessment: 0     Assistant FER Shabazz RN    "

## 2025-04-08 NOTE — PROGRESS NOTES
Debridement Performed for Assessment: Wound# right plantar   Performed By: Provider: Yelitza Ward NP  Assistant: MULUGETA Hanson, RN    Debridement: Surgical    Photo taken post procedure:    Time-Out Taken: Yes  Level: Skin/Subcutaneous Tissue  Post Debridement Measurements  Length: (cm) 4.6  Width: (cm) 4.1  Depth: (cm)       Area: (cm²) 18.86  Percent Debrided: 100%  Total Area Debrided: (sq cm) 18.86    Tissue and other material debrided:  Adipose, Dermis, Epidermis, Subcutaneous  Devitalized Tissue Debrided:Biofilm, Slough, Fibrin  Instrument: Curette  Bleeding: Moderate  Hemostasis Achieved: Pressure  Procedural Pain: Insensate  Post Procedural Pain: Insensate  Response to Treatment: Procedure was tolerated well    Devitalized materials/tissue Removed  the following was removed during debridement  subcutaneous      Post Debridement Diagnosis  Post debridement diagnosis  Same as Pre-operative debridement diagnosis, No Complications noted.      Grafts or implants applied  Was a graft or implant applied?  No      Procedure assistant  Procedure assisted by:  Assistant is the same as nurse listed above      Complications related to procedure  Did any complication occure during procedure?  No complications noted during or after procedure.      Specimen  Specimen collect during procedure?  No specimen collected      Anaesthesia:  Anesthesia used  None      Blood Loss:  Blood loss during procedure  less than 5 cc

## 2025-04-22 NOTE — PROGRESS NOTES
AUGUSTUS Macedo   [facility]  [address] [facility] [place] [address]  [phone]      PATIENT NAME: Jean Pierre Paulson  : 1985  DATE: 25  MRN: [id number]      Billing Provider: AUGUSTUS Macedo  Level of Service:   Patient PCP Information       Provider PCP Type    Primary Doctor No General            Reason for Visit / Chief Complaint: Diabetic Foot Ulcer (R DFU)       History of Present Illness / Problem Focused Workflow     Jean Pierre Paulson is a 38 yo male presents for follow up on chronic non healing wound to right foot. Wound remains stable. Increased drainage to inferior aspect of wound. Rocephin given IM today and started on broad spectrum antibiotics. Continue with puracol, drawtex, and two layer compression. He is compliant with wearing CROW walking boot when ambulating.     10/14/2024  39 y.o. male who presents to clinic for follow up on chronic-non healing wound on the right foot. Right foot is erythematous and lateral aspect of foot is hot to the touch. Wound is draining copious amounts of serosanguinous drainage. Patient reports increased pain when ambulating and difficulty putting weight on foot. X-ray, labs, and cultures today. Rocephin 1 gram given IM in clinic today. Rocephin to pharmacy, start 10/15 outpatient until cultures result. Discussed s/s of worsening infection and instructed to go to ED if he develops fever, chills, increased pain or swelling, body aches, nausea/vomiting, or increased redness to foot.     Last arterial doppler 4/10/23,  The ankle to brachial index is 1.25 on the right and 1.34 on the left.    Significant PMH  Includes diabetes and hypertension. Last HgA1C 7.3 in 2022, diabetes is managed by PCP.  Denies fever or chills.       Review of Systems     Review of Systems   Constitutional:  Positive for activity change. Negative for chills and fever.   Respiratory:  Negative for chest tightness and shortness of  breath.    Cardiovascular:  Positive for leg swelling. Negative for chest pain and palpitations.   Musculoskeletal:  Positive for arthralgias, gait problem and joint swelling.   Skin:  Positive for color change and wound.        See wound assessment   Psychiatric/Behavioral:  Negative for agitation, behavioral problems, confusion and self-injury.        Medical / Social / Family History     Past Medical History:   Diagnosis Date    NOLAN (acute kidney injury) 04/07/2023    Anemia     Diabetes mellitus with neuropathy     HLD (hyperlipidemia) 04/07/2023    HTN (hypertension)     Obesity     TEDDY (obstructive sleep apnea) 04/07/2023    Osteomyelitis 10/2020    Hx of R foot, Tx with IV Abx    Peripheral vascular disease     Urinary tract infection with hematuria 02/10/2024       Past Surgical History:   Procedure Laterality Date    APPLICATION OF SPLIT-THICKNESS SKIN GRAFT (STSG) TO LOWER EXTREMITY Right 03/02/2022    Procedure: APPLICATION, GRAFT, SKIN, SPLIT-THICKNESS, TO LOWER EXTREMITY;  Surgeon: Greg Ramírez MD;  Location: [facility];  Service: General;  Laterality: Right;    CHOLECYSTECTOMY      DEBRIDEMENT OF FOOT Right 10/2020    right great toe amputation         Social History  Mr. Jean Pierre Paulson  reports that he has been smoking vaping with nicotine. He started smoking about 10 months ago. He has never used smokeless tobacco. He reports current alcohol use of about 2.0 standard drinks of alcohol per week. He reports that he does not use drugs.    Family History  'anjana Paulson family history includes Diabetes in his father and maternal grandmother; Hypertension in his father and maternal grandmother.    Medications and Allergies     Medications  No outpatient medications have been marked as taking for the 4/23/25 encounter (Office Visit) with Yelitza Alas FNP.     Current Facility-Administered Medications for the 4/23/25 encounter (Office Visit) with Yelitza Alas FNP    Medication Dose Route Frequency Provider Last Rate Last Admin    cefTRIAXone injection 1 g  1 g Intramuscular Once Yelitza Alas FNP           Allergies  Review of patient's allergies indicates:   Allergen Reactions    Tomato Itching       Physical Examination     Vitals:    04/23/25 1244   BP: (!) 149/90   Pulse: 91   Resp: 18   Temp: 98 °F (36.7 °C)               Physical Exam  Vitals and nursing note reviewed.   Constitutional:       Appearance: Normal appearance.   HENT:      Head: Normocephalic.   Cardiovascular:      Rate and Rhythm: Normal rate and regular rhythm.      Pulses: Normal pulses.      Heart sounds: Normal heart sounds.   Pulmonary:      Effort: Pulmonary effort is normal. No respiratory distress.   Chest:      Chest wall: No tenderness.   Musculoskeletal:         General: Swelling, tenderness and deformity present.      Right lower leg: Edema present.   Skin:     General: Skin is warm and dry.      Capillary Refill: Capillary refill takes 2 to 3 seconds.      Comments: See LDA for photos and measurements   Neurological:      General: No focal deficit present.      Mental Status: He is alert and oriented to person, place, and time. Mental status is at baseline.   Psychiatric:         Mood and Affect: Mood normal.         Behavior: Behavior normal.         Thought Content: Thought content normal.         Judgment: Judgment normal.       Assessment and Plan             Altered Skin Integrity 02/10/24 2200 Right plantar Foot Diabetic Ulcer Full thickness tissue loss with exposed bone, tendon, or muscle. Often includes undermining and tunneling. May extend into muscle and/or supporting structures. (Active)   02/10/24 2200   Altered Skin Integrity Present on Admission - Did Patient arrive to the hospital with altered skin?: yes   Side: Right   Orientation: plantar   Location: Foot   Wound Number:    Is this injury device related?:    Primary Wound Type: Diabetic ulc   Description of Altered  Skin Integrity: Full thickness tissue loss with exposed bone, tendon, or muscle. Often includes undermining and tunneling. May extend into muscle and/or supporting structures.   Ankle-Brachial Index:    Pulses:    Removal Indication and Assessment:    Wound Outcome:    (Retired) Wound Length (cm):    (Retired) Wound Width (cm):    (Retired) Depth (cm):    Wound Description (Comments):    Removal Indications:    Wound Image    04/23/25 1253   Description of Altered Skin Integrity Full thickness tissue loss. Subcutaneous fat may be visible but bone, tendon or muscle are not exposed 04/23/25 1253   Dressing Appearance Moist drainage 04/23/25 1253   Drainage Amount Moderate 04/23/25 1253   Drainage Characteristics/Odor Serosanguineous 04/23/25 1253   Appearance Pink;Moist;Granulating 04/23/25 1253   Tissue loss description Full thickness 04/23/25 1253   Black (%), Wound Tissue Color 0 % 04/23/25 1253   Red (%), Wound Tissue Color 100 % 04/23/25 1253   Yellow (%), Wound Tissue Color 0 % 04/23/25 1253   Periwound Area Intact;Dry 04/23/25 1253   Wound Edges Defined 04/23/25 1253   Wound Length (cm) 4.5 cm 04/23/25 1253   Wound Width (cm) 6.2 cm 04/23/25 1253   Wound Depth (cm) 0.4 cm 04/23/25 1253   Wound Volume (cm^3) 5.843 cm^3 04/23/25 1253   Wound Surface Area (cm^2) 21.91 cm^2 04/23/25 1253   Care Cleansed with:;Antimicrobial agent;Soap and water 04/23/25 1253   Dressing Applied;Collagen;Gauze;Rolled gauze 04/23/25 1253   Periwound Care Moisturizer applied 04/23/25 1253   Off Loading Other (see comments) 04/23/25 1253   Dressing Change Due 04/24/25 04/23/25 1253                 Problem List Items Addressed This Visit          Endocrine    Diabetic ulcer of right heel associated with type 2 diabetes mellitus, with fat layer exposed    Overview                                                                            Current Assessment & Plan   Clean with vashe or baby shampoo and water  Apply collagen to wound bed.  Cover with drawtex. Apply silicone foam dressing to lateral foot for protection. Cover and secure with 4x4 gauze and ABD pad, wrap with 2 layer compression from toe to knee. Change daily and as needed.     Monitor closely for s/s of infection including fever, chills, increase in pain, odor from wound, and increased redness from foot. Go to ER if any complications develop.   Keep leg elevated and avoid pressure on wound.   Diabetes:  Monitor glucose closely. Check fasting glucose and 2 hours after meals. HgA1C goal <7, fasting glucose , and 2 hours after meals <180  Hypertension:  Check blood pressure twice daily, goal <120/80  Diet:   Increase protein intake, avoid fried, fatty foods and foods high in simple carbs.   Vitamins:  Take vitamin C 1000 mg, zinc 50mg, vitamin d 5000 units, and a daily multivitamin. Angel is a good source of protein and nutrients to aid in wound healing.            Other Visit Diagnoses         Diabetic ulcer of right heel associated with type 2 diabetes mellitus, with muscle involvement without evidence of necrosis    -  Primary            Future Appointments   Date Time Provider Department Center   5/7/2025  1:00 PM Yelitza Alas FNP [facility]   5/12/2025 11:00 AM Nadja Paulson MD [facility]   5/19/2025 10:45 AM Nadja Paulson MD [facility]            Signature:  AUGUSTUS Macedo  [facility]  [address] [facility] [address]  [phone]    Date of encounter: 4/23/25

## 2025-04-23 ENCOUNTER — OFFICE VISIT (OUTPATIENT)
Dept: WOUND CARE | Facility: CLINIC | Age: 40
End: 2025-04-23
Payer: MEDICARE

## 2025-04-23 VITALS
SYSTOLIC BLOOD PRESSURE: 149 MMHG | TEMPERATURE: 98 F | RESPIRATION RATE: 18 BRPM | HEART RATE: 91 BPM | DIASTOLIC BLOOD PRESSURE: 90 MMHG

## 2025-04-23 DIAGNOSIS — L97.412 DIABETIC ULCER OF RIGHT HEEL ASSOCIATED WITH TYPE 2 DIABETES MELLITUS, WITH FAT LAYER EXPOSED: ICD-10-CM

## 2025-04-23 DIAGNOSIS — L97.415 DIABETIC ULCER OF RIGHT HEEL ASSOCIATED WITH TYPE 2 DIABETES MELLITUS, WITH MUSCLE INVOLVEMENT WITHOUT EVIDENCE OF NECROSIS: Primary | ICD-10-CM

## 2025-04-23 DIAGNOSIS — E11.621 DIABETIC ULCER OF RIGHT HEEL ASSOCIATED WITH TYPE 2 DIABETES MELLITUS, WITH FAT LAYER EXPOSED: ICD-10-CM

## 2025-04-23 DIAGNOSIS — E11.621 DIABETIC ULCER OF RIGHT HEEL ASSOCIATED WITH TYPE 2 DIABETES MELLITUS, WITH MUSCLE INVOLVEMENT WITHOUT EVIDENCE OF NECROSIS: Primary | ICD-10-CM

## 2025-04-23 PROCEDURE — 99213 OFFICE O/P EST LOW 20 MIN: CPT | Mod: S$PBB,25,, | Performed by: NURSE PRACTITIONER

## 2025-04-23 PROCEDURE — 99999 PR PBB SHADOW E&M-EST. PATIENT-LVL V: CPT | Mod: PBBFAC,,, | Performed by: NURSE PRACTITIONER

## 2025-04-23 PROCEDURE — 99999PBSHW PR PBB SHADOW TECHNICAL ONLY FILED TO HB: Mod: PBBFAC,,,

## 2025-04-23 PROCEDURE — 99215 OFFICE O/P EST HI 40 MIN: CPT | Mod: PBBFAC | Performed by: NURSE PRACTITIONER

## 2025-04-23 PROCEDURE — 96372 THER/PROPH/DIAG INJ SC/IM: CPT | Mod: PBBFAC | Performed by: NURSE PRACTITIONER

## 2025-04-23 RX ORDER — CEFUROXIME AXETIL 500 MG/1
500 TABLET ORAL EVERY 12 HOURS
Qty: 28 TABLET | Refills: 0 | Status: SHIPPED | OUTPATIENT
Start: 2025-04-23 | End: 2025-05-07

## 2025-04-23 RX ORDER — SULFAMETHOXAZOLE AND TRIMETHOPRIM 800; 160 MG/1; MG/1
1 TABLET ORAL 2 TIMES DAILY
Qty: 28 TABLET | Refills: 0 | Status: SHIPPED | OUTPATIENT
Start: 2025-04-23 | End: 2025-05-07

## 2025-04-23 RX ORDER — CEFTRIAXONE 1 G/1
1 INJECTION, POWDER, FOR SOLUTION INTRAMUSCULAR; INTRAVENOUS ONCE
Status: COMPLETED | OUTPATIENT
Start: 2025-04-23 | End: 2025-04-23

## 2025-04-23 RX ADMIN — CEFTRIAXONE SODIUM 1 G: 1 INJECTION, POWDER, FOR SOLUTION INTRAMUSCULAR; INTRAVENOUS at 01:04

## 2025-05-06 ENCOUNTER — OFFICE VISIT (OUTPATIENT)
Dept: PAIN MEDICINE | Facility: CLINIC | Age: 40
End: 2025-05-06
Payer: MEDICARE

## 2025-05-06 VITALS
SYSTOLIC BLOOD PRESSURE: 133 MMHG | RESPIRATION RATE: 18 BRPM | HEIGHT: 74 IN | WEIGHT: 315 LBS | BODY MASS INDEX: 40.43 KG/M2 | HEART RATE: 78 BPM | DIASTOLIC BLOOD PRESSURE: 87 MMHG

## 2025-05-06 DIAGNOSIS — Z79.899 ENCOUNTER FOR LONG-TERM (CURRENT) USE OF MEDICATIONS: Primary | ICD-10-CM

## 2025-05-06 DIAGNOSIS — I73.9 PERIPHERAL VASCULAR DISEASE: Chronic | ICD-10-CM

## 2025-05-06 DIAGNOSIS — E11.40 DIABETIC NEUROPATHY WITH NEUROLOGIC COMPLICATION: Chronic | ICD-10-CM

## 2025-05-06 DIAGNOSIS — E11.49 DIABETIC NEUROPATHY WITH NEUROLOGIC COMPLICATION: Chronic | ICD-10-CM

## 2025-05-06 DIAGNOSIS — G89.4 CHRONIC PAIN SYNDROME: Chronic | ICD-10-CM

## 2025-05-06 PROCEDURE — 99999 PR PBB SHADOW E&M-EST. PATIENT-LVL V: CPT | Mod: PBBFAC,,, | Performed by: PAIN MEDICINE

## 2025-05-06 PROCEDURE — 80305 DRUG TEST PRSMV DIR OPT OBS: CPT | Mod: PBBFAC | Performed by: PAIN MEDICINE

## 2025-05-06 PROCEDURE — 99215 OFFICE O/P EST HI 40 MIN: CPT | Mod: PBBFAC | Performed by: PAIN MEDICINE

## 2025-05-06 PROCEDURE — 99214 OFFICE O/P EST MOD 30 MIN: CPT | Mod: S$PBB,,, | Performed by: PAIN MEDICINE

## 2025-05-06 PROCEDURE — 99999PBSHW POCT URINE DRUG SCREEN PRESUMP: Mod: PBBFAC,,,

## 2025-05-06 RX ORDER — HYDROCODONE BITARTRATE AND ACETAMINOPHEN 10; 325 MG/1; MG/1
1 TABLET ORAL EVERY 12 HOURS PRN
Qty: 60 TABLET | Refills: 0 | Status: SHIPPED | OUTPATIENT
Start: 2025-07-30 | End: 2025-08-29

## 2025-05-06 RX ORDER — HYDROCODONE BITARTRATE AND ACETAMINOPHEN 10; 325 MG/1; MG/1
1 TABLET ORAL EVERY 12 HOURS PRN
Qty: 60 TABLET | Refills: 0 | Status: SHIPPED | OUTPATIENT
Start: 2025-06-30 | End: 2025-07-30

## 2025-05-06 RX ORDER — HYDROCODONE BITARTRATE AND ACETAMINOPHEN 10; 325 MG/1; MG/1
1 TABLET ORAL EVERY 12 HOURS PRN
Qty: 60 TABLET | Refills: 0 | Status: SHIPPED | OUTPATIENT
Start: 2025-05-31 | End: 2025-06-30

## 2025-05-06 NOTE — PROGRESS NOTES
She Disclaimer: This note has been generated using voice-recognition software. There may be typographical errors that have been missed during proof-reading        Patient ID: Jean Pierre Paulson is a 39 y.o. male.      Chief Complaint: Foot Pain (right)      39-year-old male returns for re-evaluation of right open foot wound and ulceration.  He requires antibiotics and has been treated conservatively for several months.  He has lost greater than 70 lb and his blood glucose has significantly improved with Ozempic.  Hydrocodone and gabapentin are providing adequate relief and he returns today for medication refill.            Pain Assessment  Pain Assessment: 0-10  Pain Score:   4  Pain Location: Foot  Pain Orientation: Right  Pain Descriptors: Dull  Pain Frequency: Constant/continuous  Pain Onset: Awakened from sleep  Clinical Progression: Not changed  Aggravating Factors: Walking  Pain Intervention(s): Medication (See eMAR), Home medication      A's of Opioid Risk Assessment  Activity:Patient is unable to perform ADL.   Analgesia:Patients pain is partially controlled by current medication.   Adverse Effects: Patient denies constipation or sedation.  Aberrant Behavior:  reviewed with no aberrant drug seeking/taking behavior.      Patient denies any suicidal or homicidal ideations    Physical Therapy/Home Exercise: yes     X-Ray Foot Complete 3 view Right  Narrative: EXAMINATION:  XR FOOT COMPLETE 3 VIEW RIGHT    CLINICAL HISTORY:  . Type 2 diabetes mellitus with foot ulcer    TECHNIQUE:  AP, lateral, and oblique views of the right foot were performed.    COMPARISON:  02/10/2024    FINDINGS:  There has been partial amputation of the 1st ray at the level of the distal metatarsal shaft.  No acute fracture or dislocation.  No osseous erosive changes.  There is advanced arthritic change of the midfoot.  Diffuse soft tissue swelling about the right foot is present.  Pes planus is present.  Impression: No radiographic evidence  for acute osteomyelitis.  Diffuse soft tissue swelling.    Advanced midfoot arthritic change which may be neuropathic in nature.    Partial right 1st ray amputation.    Electronically signed by: Jesús Self MD  Date:    10/15/2024  Time:    15:47  X-Ray Ankle Complete 3 View Right  Narrative: EXAMINATION:  XR ANKLE COMPLETE 3 VIEW RIGHT    CLINICAL HISTORY:  Type 2 diabetes mellitus with foot ulcer    TECHNIQUE:  AP, lateral and oblique views of the ankle were performed.    COMPARISON:  Right foot x-ray of October 14, 2024 and February 10, 2024    FINDINGS:  A fracture of the tibia fibula or talus is not seen.  Periosteal thickening of the distal tibia and fibula are noted.  The ankle mortise is intact.  Soft tissue swelling is noted of the lower leg and foot.  The patient has deformity of the calcaneus and prior amputations  Impression: Soft tissue swelling of the lower leg and foot.  Flowing periosteal thickening of the distal tibia and fibula.  The ankle mortise is intact.  Diabetic foot    Electronically signed by: Jesse Velasco MD  Date:    10/15/2024  Time:    08:16      Review of Systems   Constitutional: Negative.    HENT: Negative.     Eyes: Negative.    Respiratory: Negative.     Cardiovascular: Negative.    Gastrointestinal: Negative.    Endocrine: Negative.    Genitourinary: Negative.    Musculoskeletal:  Positive for arthralgias and gait problem.   Integumentary:  Positive for wound (Plantar aspect of the right foot).   Allergic/Immunologic: Negative.    Hematological: Negative.    Psychiatric/Behavioral: Negative.               Past Medical History:   Diagnosis Date    NOLAN (acute kidney injury) 04/07/2023    Anemia     Diabetes mellitus with neuropathy     HLD (hyperlipidemia) 04/07/2023    HTN (hypertension)     Obesity     TEDDY (obstructive sleep apnea) 04/07/2023    Osteomyelitis 10/2020    Hx of R foot, Tx with IV Abx    Peripheral vascular disease     Urinary tract infection with hematuria  02/10/2024     Past Surgical History:   Procedure Laterality Date    APPLICATION OF SPLIT-THICKNESS SKIN GRAFT (STSG) TO LOWER EXTREMITY Right 03/02/2022    Procedure: APPLICATION, GRAFT, SKIN, SPLIT-THICKNESS, TO LOWER EXTREMITY;  Surgeon: Greg Ramírez MD;  Location: [facility];  Service: General;  Laterality: Right;    CHOLECYSTECTOMY      DEBRIDEMENT OF FOOT Right 10/2020    right great toe amputation       Social History[1]  Family History   Problem Relation Name Age of Onset    Hypertension Father      Diabetes Father      Hypertension Maternal Grandmother      Diabetes Maternal Grandmother       Review of patient's allergies indicates:   Allergen Reactions    Tomato Itching     has a current medication list which includes the following prescription(s): amlodipine, blood sugar diagnostic, cefuroxime, gabapentin, hydrochlorothiazide, hydrocodone-acetaminophen, ibuprofen, rosuvastatin, ozempic, sulfamethoxazole-trimethoprim 800-160mg, dexcom , dexcom g7 sensor, gentamicin, hydralazine, [START ON 5/31/2025] hydrocodone-acetaminophen, [START ON 6/30/2025] hydrocodone-acetaminophen, [START ON 7/30/2025] hydrocodone-acetaminophen, losartan, [DISCONTINUED] blood-glucose meter, [DISCONTINUED] insulin asp prt-insulin aspart (novolog 70/30), [DISCONTINUED] insulin detemir u-100, and [DISCONTINUED] dakin's solution.      Objective:  Vitals:    05/06/25 1337   BP: 133/87   Pulse: 78   Resp: 18        Physical Exam  Vitals and nursing note reviewed.   Constitutional:       General: He is not in acute distress.     Appearance: Normal appearance. He is not ill-appearing, toxic-appearing or diaphoretic.   HENT:      Head: Normocephalic and atraumatic.      Nose: Nose normal.      Mouth/Throat:      Mouth: Mucous membranes are moist.   Eyes:      Extraocular Movements: Extraocular movements intact.      Pupils: Pupils are equal, round, and reactive to light.   Cardiovascular:      Rate and Rhythm: Normal rate and  regular rhythm.      Heart sounds: Normal heart sounds.   Pulmonary:      Effort: Pulmonary effort is normal. No respiratory distress.      Breath sounds: Normal breath sounds. No stridor. No wheezing or rhonchi.   Abdominal:      General: Bowel sounds are normal.      Palpations: Abdomen is soft.   Musculoskeletal:         General: No swelling or deformity.      Cervical back: Normal and normal range of motion. No spasms or tenderness. No pain with movement. Normal range of motion.      Thoracic back: Normal.      Lumbar back: No spasms, tenderness or bony tenderness. Normal range of motion. Negative right straight leg raise test and negative left straight leg raise test. No scoliosis.      Right lower leg: No edema.      Left lower leg: No edema.      Right foot: Decreased range of motion.   Feet:      Right foot:      Skin integrity: Ulcer present.      Comments: Right boot in place  Skin:     General: Skin is warm.   Neurological:      General: No focal deficit present.      Mental Status: He is alert and oriented to person, place, and time. Mental status is at baseline.      Cranial Nerves: No cranial nerve deficit.      Sensory: Sensation is intact. No sensory deficit.      Motor: No weakness.      Coordination: Coordination normal.      Gait: Gait normal.      Deep Tendon Reflexes: Reflexes are normal and symmetric.   Psychiatric:         Mood and Affect: Mood normal.         Behavior: Behavior normal.           Assessment:      1. Encounter for long-term (current) use of medications    2. Diabetic neuropathy with neurologic complication    3. Peripheral vascular disease    4. Chronic pain syndrome          Plan:  1. reviewed  2.Addiction, Dependency, Tolerance, Opioid abuse-misuse, Death, Diversion Discussed. Overdose reversal drug Naloxone discussed.Patient is prescribed opiates for chronic nonmalignant pain pathology.  Patient is receiving opiates which require greater than a 72 hour supply of therapy.   Patient was educated on potential dependency associated with long-term opioid use as well as decreasing efficacy with prolonged use.  Patient was advised of risks, benefits and side effects and how to utilize each medication.  Patient was also informed that any deviation from therapy protocol will  lead to discontinuation of opiates.  It is reasonable to prescribe opioid analgesics for patient based on positive response to opioid medications, lack of side effects and  limited aberrant behavior.    3.Refill/Continue medications for pain control and function       Requested Prescriptions     Signed Prescriptions Disp Refills    HYDROcodone-acetaminophen (NORCO)  mg per tablet 60 tablet 0     Sig: Take 1 tablet by mouth every 12 (twelve) hours as needed for Pain (pain).    HYDROcodone-acetaminophen (NORCO)  mg per tablet 60 tablet 0     Sig: Take 1 tablet by mouth every 12 (twelve) hours as needed for Pain (pain).    HYDROcodone-acetaminophen (NORCO)  mg per tablet 60 tablet 0     Sig: Take 1 tablet by mouth every 12 (twelve) hours as needed for Pain (pain).     4.Urine drug screen point of care obtained and consistent with prescribed medications and medication refill date.  Drug screen is to monitor compliance with prescribed opiates and to ensure that there was no misuse/abuse of other non-prescribed opioids or illicit drugs.  From this information I will determine if patient is suitable for opioid therapy    Orders Placed This Encounter   Procedures    POCT Urine Drug Screen Presump     Interpretive Information:     Negative:  No drug detected at the cut off level.   Positive:  This result represents presumptive positive for the   tested drug, other substances may yield a positive response other   than the analyte of interest. This result should be utilized for   diagnostic purpose only. Confirmation testing will be performed upon physician request only.         5. Patient is not a candidate for  nerve block injections  6.Return in 3 months with me  for re-evaluation and medication refill       report:  Reviewed and consistent with medication use as prescribed.      The total time spent for evaluation and management on 05/06/2025 including reviewing separately obtained history, performing a medically appropriate exam and evaluation, documenting clinical information in the health record, independently interpreting results and communicating them to the patient/family/caregiver, and ordering medications/tests/procedures was between 15-29 minutes.    The above plan and management options were discussed at length with patient. Patient is in agreement with the above and verbalized understanding. It will be communicated with the referring physician via electronic record, fax, or mail.         [1]   Social History  Socioeconomic History    Marital status: Single    Number of children: 1   Tobacco Use    Smoking status: Every Day     Types: Vaping with nicotine     Start date: 6/1/2024    Smokeless tobacco: Never   Substance and Sexual Activity    Alcohol use: Yes     Alcohol/week: 2.0 standard drinks of alcohol     Types: 1 Glasses of wine, 1 Cans of beer per week     Comment: occasionally    Drug use: Never    Sexual activity: Yes     Partners: Female     Social Drivers of Health     Financial Resource Strain: Medium Risk (2/12/2024)    Overall Financial Resource Strain (CARDIA)     Difficulty of Paying Living Expenses: Somewhat hard   Food Insecurity: Food Insecurity Present (2/12/2024)    Hunger Vital Sign     Worried About Running Out of Food in the Last Year: Sometimes true     Ran Out of Food in the Last Year: Sometimes true   Transportation Needs: No Transportation Needs (2/12/2024)    PRAPARE - Transportation     Lack of Transportation (Medical): No     Lack of Transportation (Non-Medical): No   Physical Activity: Inactive (2/12/2024)    Exercise Vital Sign     Days of Exercise per Week: 0 days      Minutes of Exercise per Session: 0 min   Stress: No Stress Concern Present (2/12/2024)    [employer] - Occupational Stress Questionnaire     Feeling of Stress : Not at all   Housing Stability: Low Risk  (2/12/2024)    Housing Stability Vital Sign     Unable to Pay for Housing in the Last Year: No     Number of Places Lived in the Last Year: 1     Unstable Housing in the Last Year: No

## 2025-06-11 ENCOUNTER — EXTERNAL HOME HEALTH (OUTPATIENT)
Dept: HOME HEALTH SERVICES | Facility: HOSPITAL | Age: 40
End: 2025-06-11
Payer: MEDICARE

## 2025-06-23 ENCOUNTER — OFFICE VISIT (OUTPATIENT)
Dept: WOUND CARE | Facility: CLINIC | Age: 40
End: 2025-06-23
Payer: MEDICARE

## 2025-06-23 ENCOUNTER — DOCUMENT SCAN (OUTPATIENT)
Dept: HOME HEALTH SERVICES | Facility: HOSPITAL | Age: 40
End: 2025-06-23
Payer: MEDICARE

## 2025-06-23 VITALS
RESPIRATION RATE: 18 BRPM | DIASTOLIC BLOOD PRESSURE: 100 MMHG | SYSTOLIC BLOOD PRESSURE: 168 MMHG | TEMPERATURE: 98 F | HEART RATE: 93 BPM

## 2025-06-23 DIAGNOSIS — E11.621 DIABETIC ULCER OF RIGHT HEEL ASSOCIATED WITH TYPE 2 DIABETES MELLITUS, WITH FAT LAYER EXPOSED: Primary | ICD-10-CM

## 2025-06-23 DIAGNOSIS — L97.412 DIABETIC ULCER OF RIGHT HEEL ASSOCIATED WITH TYPE 2 DIABETES MELLITUS, WITH FAT LAYER EXPOSED: Primary | ICD-10-CM

## 2025-06-23 PROCEDURE — 99999 PR PBB SHADOW E&M-EST. PATIENT-LVL V: CPT | Mod: PBBFAC,,, | Performed by: NURSE PRACTITIONER

## 2025-06-23 PROCEDURE — 87070 CULTURE OTHR SPECIMN AEROBIC: CPT | Mod: ,,, | Performed by: CLINICAL MEDICAL LABORATORY

## 2025-06-23 PROCEDURE — 99215 OFFICE O/P EST HI 40 MIN: CPT | Mod: PBBFAC | Performed by: NURSE PRACTITIONER

## 2025-06-23 PROCEDURE — 11045 DBRDMT SUBQ TISS EACH ADDL: CPT | Mod: S$PBB,,, | Performed by: NURSE PRACTITIONER

## 2025-06-23 PROCEDURE — 87186 SC STD MICRODIL/AGAR DIL: CPT | Mod: XU,,, | Performed by: CLINICAL MEDICAL LABORATORY

## 2025-06-23 PROCEDURE — 99499 UNLISTED E&M SERVICE: CPT | Mod: S$PBB,,, | Performed by: NURSE PRACTITIONER

## 2025-06-23 PROCEDURE — 87075 CULTR BACTERIA EXCEPT BLOOD: CPT | Mod: ,,, | Performed by: CLINICAL MEDICAL LABORATORY

## 2025-06-23 PROCEDURE — 11042 DBRDMT SUBQ TIS 1ST 20SQCM/<: CPT | Mod: PBBFAC | Performed by: NURSE PRACTITIONER

## 2025-06-23 RX ORDER — SULFAMETHOXAZOLE AND TRIMETHOPRIM 800; 160 MG/1; MG/1
1 TABLET ORAL 2 TIMES DAILY
Qty: 28 TABLET | Refills: 0 | Status: SHIPPED | OUTPATIENT
Start: 2025-06-23 | End: 2025-06-25 | Stop reason: ALTCHOICE

## 2025-06-23 RX ORDER — AMOXICILLIN AND CLAVULANATE POTASSIUM 875; 125 MG/1; MG/1
1 TABLET, FILM COATED ORAL EVERY 12 HOURS
Qty: 28 TABLET | Refills: 0 | Status: SHIPPED | OUTPATIENT
Start: 2025-06-23 | End: 2025-07-07

## 2025-06-23 NOTE — PROGRESS NOTES
Debridement Performed for Assessment: Wound# Right plantar foot  Performed By: Provider: Yelitza Ward NP  Assistant:  Lizabeth Stone LPN    Debridement: Surgical    Photo taken post procedure:    Time-Out Taken: Yes  Level: Skin/Subcutaneous Tissue  Post Debridement Measurements  Length: (cm) 6.5  Width: (cm) 5.3  Depth: (cm) 0.5      Area: (cm²) 34.45  Percent Debrided: 100%  Total Area Debrided: (sq cm)     Tissue and other material debrided:  Adipose, Dermis, Epidermis, Subcutaneous  Devitalized Tissue Debrided:Biofilm, Slough, Fibrin  Instrument: Curette  Bleeding: Moderate  Hemostasis Achieved: Pressure  Procedural Pain: Insensate  Post Procedural Pain: Insensate  Response to Treatment: Procedure was tolerated well    Devitalized materials/tissue Removed  the following was removed during debridement  subcutaneous      Post Debridement Diagnosis  chronic right diabetic foot ulcer  Post debridement diagnosis  Same as Pre-operative debridement diagnosis, No Complications noted.      Grafts or implants applied  Was a graft or implant applied?  No      Procedure assistant  Procedure assisted by:  Assistant is the same as nurse listed above      Complications related to procedure  Did any complication occure during procedure?  No complications noted during or after procedure.      Specimen  Specimen collect during procedure?  No specimen collected      Anaesthesia:  Anesthesia used  None      Blood Loss:  Blood loss during procedure  less than 5 cc

## 2025-06-23 NOTE — PROCEDURES
"Debridement    Date/Time: 6/23/2025 1:00 PM    Performed by: Yelitza Alas FNP  Authorized by: Yelitza Alas FNP    Time out: Immediately prior to procedure a "time out" was called to verify the correct patient, procedure, equipment, support staff and site/side marked as required.    Consent Done?:  Yes (Written)    Wound Details:    Location:  Right foot    Location:  Right Midfoot    Type of Debridement:  Excisional       Length (cm):  6.5       Width (cm):  5.3       Depth (cm):  0.5       Area (sq cm):  27.06       Percent Debrided (%):  100       Total Area Debrided (sq cm):  27.06    Depth of debridement:  Subcutaneous tissue    Tissue debrided:  Adipose, Dermis, Epidermis and Subcutaneous    Devitalized tissue debrided:  Biofilm, Callus, Clots, Fibrin, Slough and Exudate    Instruments:  Curette  Bleeding:  Moderate  Hemostasis Achieved: Yes  Method Used:  Pressure  Patient tolerance:  Patient tolerated the procedure well with no immediate complications  1st Wound Pain Assessment: 0     Assistant KARINA Alexandre    "

## 2025-06-23 NOTE — PROGRESS NOTES
AUGUSTUS Macedo   [facility]  [address] [facility] [place] [address]  [phone]      PATIENT NAME: Jean Pierre Paulson  : 1985  DATE: 25  MRN: [id number]      Billing Provider: AUGUSTUS Macedo  Level of Service:   Patient PCP Information       Provider PCP Type    Primary Doctor No General            Reason for Visit / Chief Complaint: Diabetic Foot Ulcer (R Heel)       History of Present Illness / Problem Focused Workflow     Jean Pierre Paulson is a 39 yo male presents for follow up on chronic non healing wound to right foot. Wound is smaller but has odor and increased maceration and callus. Bedside debridement today. Cultures today. Mepilex up to wound bed. Continue two layer compression and CROW walking boot. Elevate leg when sitting.     10/14/2024  39 y.o. male who presents to clinic for follow up on chronic-non healing wound on the right foot. Right foot is erythematous and lateral aspect of foot is hot to the touch. Wound is draining copious amounts of serosanguinous drainage. Patient reports increased pain when ambulating and difficulty putting weight on foot. X-ray, labs, and cultures today. Rocephin 1 gram given IM in clinic today. Rocephin to pharmacy, start 10/15 outpatient until cultures result. Discussed s/s of worsening infection and instructed to go to ED if he develops fever, chills, increased pain or swelling, body aches, nausea/vomiting, or increased redness to foot.     Last arterial doppler 4/10/23,  The ankle to brachial index is 1.25 on the right and 1.34 on the left.    Significant PMH  Includes diabetes and hypertension. Last HgA1C 7.3 in 2022, diabetes is managed by PCP.  Denies fever or chills.       Review of Systems     Review of Systems   Constitutional:  Positive for activity change. Negative for chills and fever.   Respiratory:  Negative for chest tightness and shortness of breath.    Cardiovascular:  Positive for  leg swelling. Negative for chest pain and palpitations.   Musculoskeletal:  Positive for arthralgias, gait problem and joint swelling.   Skin:  Positive for color change and wound.        See wound assessment   Psychiatric/Behavioral:  Negative for agitation, behavioral problems, confusion and self-injury.        Medical / Social / Family History     Past Medical History:   Diagnosis Date    NOLAN (acute kidney injury) 04/07/2023    Anemia     Diabetes mellitus with neuropathy     HLD (hyperlipidemia) 04/07/2023    HTN (hypertension)     Obesity     TEDDY (obstructive sleep apnea) 04/07/2023    Osteomyelitis 10/2020    Hx of R foot, Tx with IV Abx    Peripheral vascular disease     Urinary tract infection with hematuria 02/10/2024       Past Surgical History:   Procedure Laterality Date    APPLICATION OF SPLIT-THICKNESS SKIN GRAFT (STSG) TO LOWER EXTREMITY Right 03/02/2022    Procedure: APPLICATION, GRAFT, SKIN, SPLIT-THICKNESS, TO LOWER EXTREMITY;  Surgeon: Greg Ramírez MD;  Location: [facility];  Service: General;  Laterality: Right;    CHOLECYSTECTOMY      DEBRIDEMENT OF FOOT Right 10/2020    right great toe amputation         Social History  Mr. Jean Pierre Paulson  reports that he has been smoking vaping with nicotine. He started smoking about 12 months ago. He has never used smokeless tobacco. He reports current alcohol use of about 2.0 standard drinks of alcohol per week. He reports that he does not use drugs.    Family History  's Jean Pierre Paulson family history includes Diabetes in his father and maternal grandmother; Hypertension in his father and maternal grandmother.    Medications and Allergies     Medications  No outpatient medications have been marked as taking for the 6/23/25 encounter (Office Visit) with Yelitza Alas FNP.       Allergies  Review of patient's allergies indicates:   Allergen Reactions    Tomato Itching       Physical Examination     Vitals:    06/23/25 1003   BP: (!)  168/100   Pulse: 93   Resp: 18   Temp: 98 °F (36.7 °C)               Physical Exam  Vitals and nursing note reviewed.   Constitutional:       Appearance: Normal appearance.   HENT:      Head: Normocephalic.   Cardiovascular:      Rate and Rhythm: Normal rate and regular rhythm.      Pulses: Normal pulses.      Heart sounds: Normal heart sounds.   Pulmonary:      Effort: Pulmonary effort is normal. No respiratory distress.   Chest:      Chest wall: No tenderness.   Musculoskeletal:         General: Swelling, tenderness and deformity present.      Right lower leg: Edema present.   Skin:     General: Skin is warm and dry.      Capillary Refill: Capillary refill takes 2 to 3 seconds.      Comments: See LDA for photos and measurements   Neurological:      General: No focal deficit present.      Mental Status: He is alert and oriented to person, place, and time. Mental status is at baseline.   Psychiatric:         Mood and Affect: Mood normal.         Behavior: Behavior normal.         Thought Content: Thought content normal.         Judgment: Judgment normal.       Assessment and Plan             Altered Skin Integrity 02/10/24 2200 Right plantar Foot Diabetic Ulcer Full thickness tissue loss with exposed bone, tendon, or muscle. Often includes undermining and tunneling. May extend into muscle and/or supporting structures. (Active)   02/10/24 2200   Altered Skin Integrity Present on Admission - Did Patient arrive to the hospital with altered skin?: yes   Side: Right   Orientation: plantar   Location: Foot   Wound Number:    Is this injury device related?:    Primary Wound Type: Diabetic ulc   Description of Altered Skin Integrity: Full thickness tissue loss with exposed bone, tendon, or muscle. Often includes undermining and tunneling. May extend into muscle and/or supporting structures.   Ankle-Brachial Index:    Pulses:    Removal Indication and Assessment:    Wound Outcome:    (Retired) Wound Length (cm):    (Retired)  Wound Width (cm):    (Retired) Depth (cm):    Wound Description (Comments):    Removal Indications:    Wound Image     06/23/25 1008   Description of Altered Skin Integrity Full thickness tissue loss. Subcutaneous fat may be visible but bone, tendon or muscle are not exposed 06/23/25 1008   Dressing Appearance Moist drainage 06/23/25 1008   Drainage Amount Moderate 06/23/25 1008   Drainage Characteristics/Odor Serosanguineous 06/23/25 1008   Appearance Pink;Moist;Granulating 06/23/25 1008   Tissue loss description Full thickness 06/23/25 1008   Black (%), Wound Tissue Color 0 % 06/23/25 1008   Red (%), Wound Tissue Color 100 % 06/23/25 1008   Yellow (%), Wound Tissue Color 0 % 06/23/25 1008   Periwound Area Intact;Dry 06/23/25 1008   Wound Edges Defined 06/23/25 1008   Wound Length (cm) 6 cm 06/23/25 1008   Wound Width (cm) 5 cm 06/23/25 1008   Wound Depth (cm) 0.5 cm 06/23/25 1008   Wound Volume (cm^3) 7.854 cm^3 06/23/25 1008   Wound Surface Area (cm^2) 23.56 cm^2 06/23/25 1008   Care Cleansed with:;Antimicrobial agent 06/23/25 1008   Dressing Applied;Gauze;Rolled gauze 06/23/25 1008   Periwound Care Moisture barrier applied 06/23/25 1008   Dressing Change Due 06/24/25 06/23/25 1008                   Problem List Items Addressed This Visit          Endocrine    Diabetic ulcer of right heel associated with type 2 diabetes mellitus, with fat layer exposed - Primary    Overview                                                                              Current Assessment & Plan   Clean with vashe or baby shampoo and water  Apply Mepilex UP transfer to wound bed. Cover with drawtex. Apply silicone foam dressing to lateral foot for protection. Cover and secure with 4x4 gauze and ABD pad, wrap with 2 layer compression from toe to knee. Change daily and as needed.     Monitor closely for s/s of infection including fever, chills, increase in pain, odor from wound, and increased redness from foot. Go to ER if any  complications develop.   Keep leg elevated and avoid pressure on wound.   Diabetes:  Monitor glucose closely. Check fasting glucose and 2 hours after meals. HgA1C goal <7, fasting glucose , and 2 hours after meals <180  Hypertension:  Check blood pressure twice daily, goal <120/80  Diet:   Increase protein intake, avoid fried, fatty foods and foods high in simple carbs.   Vitamins:  Take vitamin C 1000 mg, zinc 50mg, vitamin d 5000 units, and a daily multivitamin. Angel is a good source of protein and nutrients to aid in wound healing.           Relevant Orders    Culture, Wound    Culture, Anaerobe         Future Appointments   Date Time Provider Department Center   7/17/2025 10:00 AM Yelitza Alas FNP [facility]   8/6/2025 10:15 AM Nadja Paulson MD [facility]            Signature:  AUGUSTUS Macedo  [facility]  [address] [facility] [address]  [phone]    Date of encounter: 6/23/25

## 2025-06-23 NOTE — PATIENT INSTRUCTIONS
Clean with vashe or baby shampoo and water  Apply Mepilex UP transfer to wound bed. Cover with drawtex. Apply silicone foam dressing to lateral foot for protection. Cover and secure with 4x4 gauze and ABD pad, wrap with 2 layer compression from toe to knee. Change daily and as needed.     Monitor closely for s/s of infection including fever, chills, increase in pain, odor from wound, and increased redness from foot. Go to ER if any complications develop.   Keep leg elevated and avoid pressure on wound.   Diabetes:  Monitor glucose closely. Check fasting glucose and 2 hours after meals. HgA1C goal <7, fasting glucose , and 2 hours after meals <180  Hypertension:  Check blood pressure twice daily, goal <120/80  Diet:   Increase protein intake, avoid fried, fatty foods and foods high in simple carbs.   Vitamins:  Take vitamin C 1000 mg, zinc 50mg, vitamin d 5000 units, and a daily multivitamin. Angel is a good source of protein and nutrients to aid in wound healing.

## 2025-06-25 ENCOUNTER — RESULTS FOLLOW-UP (OUTPATIENT)
Dept: WOUND CARE | Facility: CLINIC | Age: 40
End: 2025-06-25
Payer: MEDICARE

## 2025-06-25 ENCOUNTER — TELEPHONE (OUTPATIENT)
Dept: WOUND CARE | Facility: CLINIC | Age: 40
End: 2025-06-25
Payer: MEDICARE

## 2025-06-25 DIAGNOSIS — L08.9 WOUND INFECTION: Primary | ICD-10-CM

## 2025-06-25 DIAGNOSIS — T14.8XXA WOUND INFECTION: Primary | ICD-10-CM

## 2025-06-25 RX ORDER — CIPROFLOXACIN 500 MG/1
500 TABLET, FILM COATED ORAL EVERY 12 HOURS
Qty: 20 TABLET | Refills: 0 | Status: SHIPPED | OUTPATIENT
Start: 2025-06-25 | End: 2025-07-05

## 2025-06-25 NOTE — TELEPHONE ENCOUNTER
Reviewed culture results with pat; instructed pat to stop taking bactrim,cont augmentin and start taking cipro. VU

## 2025-06-25 NOTE — TELEPHONE ENCOUNTER
----- Message from AUGUSTUS Macedo sent at 6/25/2025  3:34 PM CDT -----  He can stop the bactrim. Continue augmentin and I am sending in some cipro. He grew out three bacteria but one is still pending sensitivity.   ----- Message -----  From: Lab, Background User  Sent: 6/24/2025   8:43 AM CDT  To: AUGUSTUS Macedo

## 2025-06-28 LAB
BACTERIA SPEC ANAEROBE CULT: NORMAL
MICROORGANISM SPEC CULT: ABNORMAL

## 2025-07-01 ENCOUNTER — TELEPHONE (OUTPATIENT)
Dept: WOUND CARE | Facility: CLINIC | Age: 40
End: 2025-07-01
Payer: MEDICARE

## 2025-07-01 NOTE — TELEPHONE ENCOUNTER
Culture results reviewed with pat and to start bactrim DS po BID along with cipro and augmentin as prescribed. VU. Encourage pat to take antibiotics with cottage cheese,yogurt,and/or buttermilk. VU

## 2025-07-01 NOTE — TELEPHONE ENCOUNTER
----- Message from AUGUSTUS Macedo sent at 6/30/2025  4:06 PM CDT -----  Third bacteria resulted. He needs to take bactrim ds, cipro, and augmentin  ----- Message -----  From: Lab, Background User  Sent: 6/24/2025   8:43 AM CDT  To: AUGUSTUS Macedo

## 2025-08-06 ENCOUNTER — OFFICE VISIT (OUTPATIENT)
Dept: PAIN MEDICINE | Facility: CLINIC | Age: 40
End: 2025-08-06
Payer: MEDICARE

## 2025-08-06 VITALS
OXYGEN SATURATION: 99 % | SYSTOLIC BLOOD PRESSURE: 157 MMHG | WEIGHT: 315 LBS | HEIGHT: 74 IN | DIASTOLIC BLOOD PRESSURE: 97 MMHG | BODY MASS INDEX: 40.43 KG/M2 | HEART RATE: 78 BPM | RESPIRATION RATE: 18 BRPM

## 2025-08-06 DIAGNOSIS — I73.9 PERIPHERAL VASCULAR DISEASE, UNSPECIFIED: Chronic | ICD-10-CM

## 2025-08-06 DIAGNOSIS — Z79.899 ENCOUNTER FOR LONG-TERM (CURRENT) USE OF MEDICATIONS: Primary | ICD-10-CM

## 2025-08-06 DIAGNOSIS — L97.414 ULCER OF RIGHT HEEL, WITH NECROSIS OF BONE: Chronic | ICD-10-CM

## 2025-08-06 DIAGNOSIS — E11.40 DIABETIC NEUROPATHY WITH NEUROLOGIC COMPLICATION: Chronic | ICD-10-CM

## 2025-08-06 DIAGNOSIS — E11.49 DIABETIC NEUROPATHY WITH NEUROLOGIC COMPLICATION: Chronic | ICD-10-CM

## 2025-08-06 LAB
CTP QC/QA: YES
POC (AMP) AMPHETAMINE: NEGATIVE
POC (BAR) BARBITURATES: NEGATIVE
POC (BUP) BUPRENORPHINE: NEGATIVE
POC (BZO) BENZODIAZEPINES: NEGATIVE
POC (COC) COCAINE: NEGATIVE
POC (MDMA) METHYLENEDIOXYMETHAMPHETAMINE 3,4: NEGATIVE
POC (MET) METHAMPHETAMINE: NEGATIVE
POC (MOP) OPIATES: ABNORMAL
POC (MTD) METHADONE: NEGATIVE
POC (OXY) OXYCODONE: NEGATIVE
POC (PCP) PHENCYCLIDINE: NEGATIVE
POC (TCA) TRICYCLIC ANTIDEPRESSANTS: NEGATIVE
POC TEMPERATURE (URINE): 90
POC THC: NEGATIVE

## 2025-08-06 PROCEDURE — 99215 OFFICE O/P EST HI 40 MIN: CPT | Mod: PBBFAC | Performed by: PAIN MEDICINE

## 2025-08-06 PROCEDURE — 3077F SYST BP >= 140 MM HG: CPT | Mod: CPTII,,, | Performed by: PAIN MEDICINE

## 2025-08-06 PROCEDURE — 3051F HG A1C>EQUAL 7.0%<8.0%: CPT | Mod: CPTII,,, | Performed by: PAIN MEDICINE

## 2025-08-06 PROCEDURE — 3080F DIAST BP >= 90 MM HG: CPT | Mod: CPTII,,, | Performed by: PAIN MEDICINE

## 2025-08-06 PROCEDURE — 1159F MED LIST DOCD IN RCRD: CPT | Mod: CPTII,,, | Performed by: PAIN MEDICINE

## 2025-08-06 PROCEDURE — 80305 DRUG TEST PRSMV DIR OPT OBS: CPT | Mod: PBBFAC | Performed by: PAIN MEDICINE

## 2025-08-06 PROCEDURE — 99214 OFFICE O/P EST MOD 30 MIN: CPT | Mod: S$PBB,,, | Performed by: PAIN MEDICINE

## 2025-08-06 PROCEDURE — 99999 PR PBB SHADOW E&M-EST. PATIENT-LVL V: CPT | Mod: PBBFAC,,, | Performed by: PAIN MEDICINE

## 2025-08-06 PROCEDURE — 3008F BODY MASS INDEX DOCD: CPT | Mod: CPTII,,, | Performed by: PAIN MEDICINE

## 2025-08-06 PROCEDURE — 99999PBSHW POCT URINE DRUG SCREEN PRESUMP: Mod: PBBFAC,,,

## 2025-08-06 RX ORDER — HYDROCODONE BITARTRATE AND ACETAMINOPHEN 10; 325 MG/1; MG/1
1 TABLET ORAL EVERY 12 HOURS PRN
Qty: 60 TABLET | Refills: 0 | Status: SHIPPED | OUTPATIENT
Start: 2025-09-28 | End: 2025-10-28

## 2025-08-06 RX ORDER — HYDROCODONE BITARTRATE AND ACETAMINOPHEN 10; 325 MG/1; MG/1
1 TABLET ORAL EVERY 12 HOURS PRN
Qty: 60 TABLET | Refills: 0 | Status: SHIPPED | OUTPATIENT
Start: 2025-08-29 | End: 2025-09-28

## 2025-08-06 RX ORDER — GABAPENTIN 300 MG/1
300 CAPSULE ORAL EVERY 8 HOURS
Qty: 270 CAPSULE | Refills: 1 | Status: SHIPPED | OUTPATIENT
Start: 2025-08-06

## 2025-08-06 RX ORDER — HYDROCODONE BITARTRATE AND ACETAMINOPHEN 10; 325 MG/1; MG/1
1 TABLET ORAL EVERY 12 HOURS PRN
Qty: 60 TABLET | Refills: 0 | Status: SHIPPED | OUTPATIENT
Start: 2025-10-28 | End: 2025-11-27

## 2025-08-06 NOTE — PROGRESS NOTES
Nadja Paulson M.D.  [facility]  [address] [place] [address], [place] [id number]    Established    CHIEF COMPLAINT:  Wound on the bottom of the right foot and medication management.    HPI:  Patient presents for follow-up after being seen in May. He reports having a few days of discomfort overall but states he is generally fine. The wound on the bottom of his right foot is improving, which he anticipates will close in the next 3-4 months. He is currently receiving wound therapy at [facility], attending sessions every three weeks, down from once or twice weekly when he was undergoing hyperbaric treatment.    He has been off antibiotics for about a week, though his wound care specialist is considering restarting them until his next appointment on Friday. For pain management, he takes both Gabapentin and Norco, adjusting usage based on activities. He avoids taking Norco when driving or going out due to its sedating effects, and can manage with Gabapentin alone for daily activities.    He was previously on Ozempic but has not taken it for about a month due to insurance coverage issues. A recent MDOT physical detected high levels of protein in his urine. He denies any further weight loss since his last visit. He denies any new symptoms or concerns beyond those discussed.    PREVIOUS TREATMENTS:  Patient is currently receiving treatment for a wound on the bottom of his right foot.     MEDICATIONS:  Patient is on Gabapentin for pain management, which he uses especially when driving or going out. He also takes Norco for pain management, but less frequently than Gabapentin due to its sedating effects. Patient was previously on Ozempic but discontinued it about a month ago due to insurance coverage issues.    WORK STATUS:  Patient works as a . He recently underwent a [employer] ([employer] of Transportation) physical exam.          Pain Assessment  Pain Assessment: 0-10  Pain Score:   5  Pain Location: Foot  Pain Orientation:  "Right  Pain Descriptors: Aching  Pain Frequency: Continuous  Aggravating Factors: Walking, Standing  Pain Intervention(s): Home medication, Rest        4A"s of Opioid Risk Assessment  Activity: Patient is unable to perform  ADL  Analgesia:  Patient's pain is partially controlled by current medication.   Aberrant Behavior:  reviewed with no aberrant drug seeking/taking behavior    Review of patient's allergies indicates:   Allergen Reactions    Tomato Itching       Current Medications[1]    HISTORY:    Past Medical History:   Diagnosis Date    NOLAN (acute kidney injury) 04/07/2023    Anemia     Diabetes mellitus with neuropathy     HLD (hyperlipidemia) 04/07/2023    HTN (hypertension)     Obesity     TEDDY (obstructive sleep apnea) 04/07/2023    Osteomyelitis 10/2020    Hx of R foot, Tx with IV Abx    Peripheral vascular disease     Urinary tract infection with hematuria 02/10/2024        Past Surgical History:   Procedure Laterality Date    APPLICATION OF SPLIT-THICKNESS SKIN GRAFT (STSG) TO LOWER EXTREMITY Right 03/02/2022    Procedure: APPLICATION, GRAFT, SKIN, SPLIT-THICKNESS, TO LOWER EXTREMITY;  Surgeon: Greg Ramírez MD;  Location: [facility];  Service: General;  Laterality: Right;    CHOLECYSTECTOMY      DEBRIDEMENT OF FOOT Right 10/2020    right great toe amputation          Family History   Problem Relation Name Age of Onset    Hypertension Father      Diabetes Father      Hypertension Maternal Grandmother      Diabetes Maternal Grandmother          Social History: He  reports that he has been smoking vaping with nicotine. He started smoking about 14 months ago. He has never used smokeless tobacco. He reports current alcohol use of about 2.0 standard drinks of alcohol per week. He reports that he does not use drugs.    Imaging:  X-Ray Foot Complete 3 view Right  Narrative: EXAMINATION:  XR FOOT COMPLETE 3 VIEW RIGHT    CLINICAL HISTORY:  . Type 2 diabetes mellitus with foot ulcer    TECHNIQUE:  AP, " lateral, and oblique views of the right foot were performed.    COMPARISON:  02/10/2024    FINDINGS:  There has been partial amputation of the 1st ray at the level of the distal metatarsal shaft.  No acute fracture or dislocation.  No osseous erosive changes.  There is advanced arthritic change of the midfoot.  Diffuse soft tissue swelling about the right foot is present.  Pes planus is present.  Impression: No radiographic evidence for acute osteomyelitis.  Diffuse soft tissue swelling.    Advanced midfoot arthritic change which may be neuropathic in nature.    Partial right 1st ray amputation.    Electronically signed by: Jesús Self MD  Date:    10/15/2024  Time:    15:47  X-Ray Ankle Complete 3 View Right  Narrative: EXAMINATION:  XR ANKLE COMPLETE 3 VIEW RIGHT    CLINICAL HISTORY:  Type 2 diabetes mellitus with foot ulcer    TECHNIQUE:  AP, lateral and oblique views of the ankle were performed.    COMPARISON:  Right foot x-ray of October 14, 2024 and February 10, 2024    FINDINGS:  A fracture of the tibia fibula or talus is not seen.  Periosteal thickening of the distal tibia and fibula are noted.  The ankle mortise is intact.  Soft tissue swelling is noted of the lower leg and foot.  The patient has deformity of the calcaneus and prior amputations  Impression: Soft tissue swelling of the lower leg and foot.  Flowing periosteal thickening of the distal tibia and fibula.  The ankle mortise is intact.  Diabetic foot    Electronically signed by: Jesse Velasco MD  Date:    10/15/2024  Time:    08:16       Labs:  Office Visit on 06/23/2025   Component Date Value Ref Range Status    Culture, Wound/Abscess 06/23/2025 Heavy Growth Proteus mirabilis ESBL (A)   Final    Culture, Wound/Abscess 06/23/2025 Heavy Growth Acinetobacter baumannii complex/haemolyticus (A)   Final    Culture, Wound/Abscess 06/23/2025 Heavy Growth Enterococcus faecalis (A)   Final    Culture, Anaerobe 06/23/2025 No Anaerobes Isolated    Final   Office Visit on 05/06/2025   Component Date Value Ref Range Status    POC Amphetamines 05/06/2025 Negative  Negative, Inconclusive Final    POC Barbiturates 05/06/2025 Negative  Negative, Inconclusive Final    POC Benzodiazepines 05/06/2025 Negative  Negative, Inconclusive Final    POC Cocaine 05/06/2025 Negative  Negative, Inconclusive Final    POC THC 05/06/2025 Negative  Negative, Inconclusive Final    POC Methadone 05/06/2025 Negative  Negative, Inconclusive Final    POC Methamphetamine 05/06/2025 Negative  Negative, Inconclusive Final    POC Opiates 05/06/2025 Presumptive Positive (A)  Negative, Inconclusive Final    POC Oxycodone 05/06/2025 Negative  Negative, Inconclusive Final    POC Phencyclidine 05/06/2025 Negative  Negative, Inconclusive Final    POC Methylenedioxymethamphetamine * 05/06/2025 Negative  Negative, Inconclusive Final    POC Tricyclic Antidepressants 05/06/2025 Negative  Negative, Inconclusive Final    POC Buprenorphine 05/06/2025 Negative   Final     Acceptable 05/06/2025 Yes   Final    POC Temperature (Urine) 05/06/2025 92   Final   Lab Requisition on 04/10/2025   Component Date Value Ref Range Status    Hemoglobin A1C 04/10/2025 7.0  <=7.0 % Final    Estimated Average Glucose 04/10/2025 154  mg/dL Final    Sodium 04/10/2025 138  136 - 145 mmol/L Final    Potassium 04/10/2025 4.6  3.5 - 5.1 mmol/L Final    Chloride 04/10/2025 109 (H)  98 - 107 mmol/L Final    CO2 04/10/2025 22  22 - 29 mmol/L Final    Anion Gap 04/10/2025 12  7 - 16 mmol/L Final    Glucose 04/10/2025 183 (H)  74 - 100 mg/dL Final    BUN 04/10/2025 20  9 - 21 mg/dL Final    Creatinine 04/10/2025 1.16  0.72 - 1.25 mg/dL Final    BUN/Creatinine Ratio 04/10/2025 17  6 - 20 Final    Calcium 04/10/2025 8.6  8.4 - 10.2 mg/dL Final    Total Protein 04/10/2025 7.6  6.4 - 8.3 g/dL Final    Albumin 04/10/2025 2.9 (L)  3.5 - 5.0 g/dL Final    Globulin 04/10/2025 4.7 (H)  2.0 - 4.0 g/dL Final    A/G Ratio  04/10/2025 0.6   Final    Bilirubin, Total 04/10/2025 0.2  <=1.5 mg/dL Final    Alk Phos 04/10/2025 118  40 - 150 U/L Final    ALT 04/10/2025 29  <=55 U/L Final    AST 04/10/2025 22  11 - 45 U/L Final    eGFR 04/10/2025 81  >=60 mL/min/1.73m2 Final    Triglycerides 04/10/2025 218 (H)  34 - 140 mg/dL Final    Cholesterol 04/10/2025 143  <=200 mg/dL Final    HDL Cholesterol 04/10/2025 38  35 - 60 mg/dL Final    Cholesterol/HDL Ratio (Risk Factor) 04/10/2025 3.8   Final    Non-HDL 04/10/2025 105  mg/dL Final    LDL Calculated 04/10/2025 61  mg/dL Final    LDL/HDL 04/10/2025 1.6   Final    VLDL 04/10/2025 44  mg/dL Final    WBC 04/10/2025 5.31  4.50 - 11.00 K/uL Final    RBC 04/10/2025 4.85  4.60 - 6.20 M/uL Final    Hemoglobin 04/10/2025 11.9 (L)  13.5 - 18.0 g/dL Final    Hematocrit 04/10/2025 40.4  40.0 - 54.0 % Final    MCV 04/10/2025 83.3  80.0 - 96.0 fL Final    MCH 04/10/2025 24.5 (L)  27.0 - 31.0 pg Final    MCHC 04/10/2025 29.5 (L)  32.0 - 36.0 g/dL Final    RDW 04/10/2025 15.0 (H)  11.5 - 14.5 % Final    Platelet Count 04/10/2025 339  150 - 400 K/uL Final    MPV 04/10/2025 11.0  9.4 - 12.4 fL Final    Neutrophils % 04/10/2025 45.1 (L)  53.0 - 65.0 % Final    Lymphocytes % 04/10/2025 41.6 (H)  27.0 - 41.0 % Final    Monocytes % 04/10/2025 7.9 (H)  2.0 - 6.0 % Final    Eosinophils % 04/10/2025 4.1 (H)  1.0 - 4.0 % Final    Basophils % 04/10/2025 1.1 (H)  0.0 - 1.0 % Final    Immature Granulocytes % 04/10/2025 0.2  0.0 - 0.4 % Final    nRBC, Auto 04/10/2025 0.0  <=0.0 % Final    Neutrophils, Abs 04/10/2025 2.39  1.80 - 7.70 K/uL Final    Lymphocytes, Absolute 04/10/2025 2.21  1.00 - 4.80 K/uL Final    Monocytes, Absolute 04/10/2025 0.42  0.00 - 0.80 K/uL Final    Eosinophils, Absolute 04/10/2025 0.22  0.00 - 0.50 K/uL Final    Basophils, Absolute 04/10/2025 0.06  0.00 - 0.20 K/uL Final    Immature Granulocytes, Absolute 04/10/2025 0.01  0.00 - 0.04 K/uL Final    nRBC, Absolute 04/10/2025 0.00  <=0.00 x10e3/uL  Final    Diff Type 04/10/2025 Auto   Final   Office Visit on 02/19/2025   Component Date Value Ref Range Status    POC Amphetamines 02/19/2025 Negative  Negative, Inconclusive Final    POC Barbiturates 02/19/2025 Negative  Negative, Inconclusive Final    POC Benzodiazepines 02/19/2025 Negative  Negative, Inconclusive Final    POC Cocaine 02/19/2025 Negative  Negative, Inconclusive Final    POC THC 02/19/2025 Negative  Negative, Inconclusive Final    POC Methadone 02/19/2025 Negative  Negative, Inconclusive Final    POC Methamphetamine 02/19/2025 Negative  Negative, Inconclusive Final    POC Opiates 02/19/2025 Negative  Negative, Inconclusive Final    POC Oxycodone 02/19/2025 Negative  Negative, Inconclusive Final    POC Phencyclidine 02/19/2025 Negative  Negative, Inconclusive Final    POC Methylenedioxymethamphetamine * 02/19/2025 Negative  Negative, Inconclusive Final    POC Tricyclic Antidepressants 02/19/2025 Negative  Negative, Inconclusive Final    POC Buprenorphine 02/19/2025 Negative   Final     Acceptable 02/19/2025 Yes   Final    POC Temperature (Urine) 02/19/2025 90   Final    Creatinine, U 02/19/2025 364.7  mg/dL Final    Specific Gravity 02/19/2025 1.030   Final    pH 02/19/2025 5.1   Final    Oxidants 02/19/2025 Negative  Cutoff: 200 mg/L Final    Comment 02/19/2025 Normal   Final    Codeine 02/19/2025 Not Detected  Cutoff: 25 ng/mL Final    C6BG 02/19/2025 Not Detected  Cutoff: 100 ng/mL Final    Morphine 02/19/2025 Not Detected  Cutoff: 25 ng/mL Final    M6BG 02/19/2025 Not Detected  Cutoff: 100 ng/mL Final    6 Monoacetylmorphine 02/19/2025 Not Detected  Cutoff: 25 ng/mL Final    Hydrocodone 02/19/2025 Not Detected  Cutoff: 25 ng/mL Final    Norhydrocodone 02/19/2025 Not Detected  Cutoff: 25 ng/mL Final    Dihydrocodeine 02/19/2025 Not Detected  Cutoff: 25 ng/mL Final    Hydromorphone 02/19/2025 Not Detected  Cutoff: 25 ng/mL Final    Hydromorphone-3-beta-glucuronide 02/19/2025 Not  Detected  Cutoff: 100 ng/mL Final    Oxycodone 02/19/2025 Not Detected  Cutoff: 25 ng/mL Final    Noroxycodone 02/19/2025 Not Detected  Cutoff: 25 ng/mL Final    Oxymorphone 02/19/2025 Not Detected  Cutoff: 25 ng/mL Final    Oxymorphone-3-beta-glucuronid 02/19/2025 Not Detected  Cutoff: 100 ng/mL Final    Noroxymorphone 02/19/2025 Not Detected  Cutoff: 25 ng/mL Final    Fentanyl 02/19/2025 Not Detected  Cutoff: 2 ng/mL Final    Norfentanyl 02/19/2025 Not Detected  Cutoff: 2 ng/mL Final    Meperidine 02/19/2025 Not Detected  Cutoff: 25 ng/mL Final    Normeperidine 02/19/2025 Not Detected  Cutoff: 25 ng/mL Final    Naloxone 02/19/2025 Not Detected  Cutoff: 25 ng/mL Final    Naloxone-3-beta-glucuronide 02/19/2025 Not Detected  Cutoff: 100 ng/mL Final    Methadone 02/19/2025 Not Detected  Cutoff: 25 ng/mL Final    EDDP 02/19/2025 Not Detected  Cutoff: 25 ng/mL Final    Propoxyphene 02/19/2025 Not Detected  Cutoff: 25 ng/mL Final    Norpropoxyphene 02/19/2025 Not Detected  Cutoff: 25 ng/mL Final    Tramadol 02/19/2025 Not Detected  Cutoff: 25 ng/mL Final    O-desmethyltramadol 02/19/2025 Not Detected  Cutoff: 25 ng/mL Final    Tapentadol 02/19/2025 Not Detected  Cutoff: 25 ng/mL Final    N-Desmethyltapentadol 02/19/2025 Not Detected  Cutoff: 50 ng/mL Final    M TAPENTADOL-BETA-GLUCURONIDE 02/19/2025 Not Detected  Cutoff: 100 ng/mL Final    Buprenorphine 02/19/2025 Not Detected  Cutoff: 5 ng/mL Final    Norbuprenorphine 02/19/2025 SEE COMMENTS  Cutoff: 5 ng/mL Final    Norbuprenorphine glucuronide 02/19/2025 Not Detected  Cutoff: 20 ng/mL Final    Opioid Interpretation 02/19/2025 SEE COMMENTS   Final    Cocaine 02/19/2025 Negative  Cutoff: 150 ng/mL Final    Tetrahydrocannabinol 02/19/2025 Negative  Cutoff: 50 ng/mL Final    Alprazolam 02/19/2025 Not Detected  Cutoff: 10 ng/mL Final    Alpha-Hydroxyalprazolam 02/19/2025 Not Detected  Cutoff: 10 ng/mL Final    Alpha-Hydroxyalprazolam Glucuronide 02/19/2025 Not Detected   Cutoff: 50 ng/mL Final    Chlordiazepoxide 02/19/2025 Not Detected  Cutoff: 10 ng/mL Final    Clobazam 02/19/2025 Not Detected  Cutoff: 10 ng/mL Final    N-Desmethylclobazam 02/19/2025 Not Detected  Cutoff: 200 ng/mL Final    Clonazepam 02/19/2025 Not Detected  Cutoff: 10 ng/mL Final    7-aminoclonazepam 02/19/2025 Not Detected  Cutoff: 10 ng/mL Final    Diazepam 02/19/2025 Not Detected  Cutoff: 10 ng/mL Final    Nordiazepam 02/19/2025 Not Detected  Cutoff: 10 ng/mL Final    Flunitrazepam 02/19/2025 Not Detected  Cutoff: 10 ng/mL Final    7-aminoflunitrazepam 02/19/2025 Not Detected  Cutoff: 10 ng/mL Final    Flurazepam 02/19/2025 Not Detected  Cutoff: 10 ng/mL Final    2-Hydroxy Ethyl Flurazepam 02/19/2025 Not Detected  Cutoff: 10 ng/mL Final    Lorazepam 02/19/2025 Not Detected  Cutoff: 10 ng/mL Final    Lorazepam Glucuronide 02/19/2025 Not Detected  Cutoff: 50 ng/mL Final    Midazolam 02/19/2025 Not Detected  Cutoff: 10 ng/mL Final    Alpha-Hydroxy Midazolam 02/19/2025 Not Detected  Cutoff: 10 ng/mL Final    Oxazepam 02/19/2025 Not Detected  Cutoff: 10 ng/mL Final    Oxazepam Glucuronide 02/19/2025 Not Detected  Cutoff: 50 ng/mL Final    Prazepam 02/19/2025 Not Detected  Cutoff: 10 ng/mL Final    Temazepam 02/19/2025 Not Detected  Cutoff: 10 ng/mL Final    Temazepam Glucuronide 02/19/2025 Not Detected  Cutoff: 50 ng/mL Final    Triazolam 02/19/2025 Not Detected  Cutoff: 10 ng/mL Final    Alpha-Hydroxy Triazolam 02/19/2025 Not Detected  Cutoff: 10 ng/mL Final    Zolpidem 02/19/2025 Not Detected  Cutoff: 10 ng/mL Final    Zolpidem Phenyl-4-Carboxylic acid 02/19/2025 Not Detected  Cutoff: 10 ng/mL Final    Benzodiazepine Interpretation 02/19/2025 SEE COMMENTS   Final    List Patient's Current Medications 02/19/2025 -----   Final    Methamphetamine 02/19/2025 Not Detected  Cutoff: 100 ng/mL Final    Amphetamine 02/19/2025 Not Detected  Cutoff: 100 ng/mL Final    3,4-methylenedioxymethamphetamine * 02/19/2025 Not  "Detected  Cutoff: 100 ng/mL Final    3,9-tdlqfkqgvnsmdg-L-ethylamphetam* 02/19/2025 Not Detected  Cutoff: 100 ng/mL Final    3,4-methylenedioxyamphetamine (MDA) 02/19/2025 Not Detected  Cutoff: 100 ng/mL Final    Ephedrine 02/19/2025 Not Detected  Cutoff: 100 ng/mL Final    Pseudoephedrine 02/19/2025 Not Detected  Cutoff: 100 ng/mL Final    Phentermine 02/19/2025 Not Detected  Cutoff: 100 ng/mL Final    Phencyclidine (PCP) 02/19/2025 Not Detected  Cutoff: 20 ng/mL Final    Methylphenidate 02/19/2025 Not Detected  Cutoff: 20 ng/mL Final    Ritalinic acid 02/19/2025 Not Detected  Cutoff: 100 ng/mL Final    Stimulant Interpretation 02/19/2025 SEE COMMENTS   Final    Barbiturates 02/19/2025 Negative  Cutoff: 200 ng/mL Final       Vitals:   Vitals:    08/06/25 1017 08/06/25 1018   BP: (!) 157/97    Pulse: 78    Resp: 18    SpO2: 99%    Weight: (!) 160.6 kg (354 lb)    Height: 6' 2" (1.88 m)    PainSc:   5   5        Review of Systems   Constitutional: Negative.    HENT: Negative.     Eyes: Negative.    Respiratory: Negative.     Cardiovascular: Negative.    Gastrointestinal: Negative.    Endocrine: Negative.    Genitourinary: Negative.    Musculoskeletal:  Positive for arthralgias and gait problem.   Integumentary:  Positive for wound (Plantar aspect of the right foot).   Allergic/Immunologic: Negative.    Hematological: Negative.    Psychiatric/Behavioral: Negative.         PHYSICAL EXAMINATION:  Physical Exam  Vitals and nursing note reviewed.   Constitutional:       General: He is not in acute distress.     Appearance: Normal appearance. He is not ill-appearing, toxic-appearing or diaphoretic.   HENT:      Head: Normocephalic and atraumatic.      Nose: Nose normal.      Mouth/Throat:      Mouth: Mucous membranes are moist.   Eyes:      Extraocular Movements: Extraocular movements intact.      Pupils: Pupils are equal, round, and reactive to light.   Cardiovascular:      Rate and Rhythm: Normal rate and regular rhythm. "      Heart sounds: Normal heart sounds.   Pulmonary:      Effort: Pulmonary effort is normal. No respiratory distress.      Breath sounds: Normal breath sounds. No stridor. No wheezing or rhonchi.   Abdominal:      General: Bowel sounds are normal.      Palpations: Abdomen is soft.   Musculoskeletal:         General: No swelling or deformity.      Cervical back: Normal and normal range of motion. No spasms or tenderness. No pain with movement. Normal range of motion.      Thoracic back: Normal.      Lumbar back: No spasms, tenderness or bony tenderness. Normal range of motion. Negative right straight leg raise test and negative left straight leg raise test. No scoliosis.      Right lower leg: No edema.      Left lower leg: No edema.      Right foot: Decreased range of motion.      Comments: Right boot in place   Skin:     General: Skin is warm.   Neurological:      General: No focal deficit present.      Mental Status: He is alert and oriented to person, place, and time. Mental status is at baseline.      Cranial Nerves: No cranial nerve deficit.      Sensory: Sensation is intact. No sensory deficit.      Motor: No weakness.      Coordination: Coordination normal.      Gait: Gait normal.      Deep Tendon Reflexes: Reflexes are normal and symmetric.   Psychiatric:         Mood and Affect: Mood normal.         Behavior: Behavior normal.            Assessment and Plan:    Encounter Diagnoses   Name Primary?    Encounter for long-term (current) use of medications Yes    Diabetic neuropathy with neurologic complication     Ulcer of right heel, with necrosis of bone     Peripheral vascular disease, unspecified          Plan:  1. report:  Reviewed and consistent with medication use as prescribed.    2.Addiction, Dependency, Tolerance, Opioid abuse-misuse, Death, Diversion Discussed. Overdose reversal drug Naloxone discussed. Patient is prescribed opiates for chronic nonmalignant pain pathology.  Patient is receiving  opiates which require greater than a 72 hour supply of therapy.  Patient was educated on potential dependency associated with long-term opioid use as well as decreasing efficacy with prolonged use.  Patient was advised of risks, benefits and side effects and how to utilize each medication.  Patient was also informed that any deviation from therapy protocol will  lead to discontinuation of opiates.  It is reasonable to prescribe opioid analgesics for patient based on positive response to opioid medications, lack of side effects and  limited aberrant behavior.      3.Refill/Continue medications for pain control and function       Requested Prescriptions     Signed Prescriptions Disp Refills    HYDROcodone-acetaminophen (NORCO)  mg per tablet 60 tablet 0     Sig: Take 1 tablet by mouth every 12 (twelve) hours as needed for Pain (pain).    HYDROcodone-acetaminophen (NORCO)  mg per tablet 60 tablet 0     Sig: Take 1 tablet by mouth every 12 (twelve) hours as needed for Pain (pain).    HYDROcodone-acetaminophen (NORCO)  mg per tablet 60 tablet 0     Sig: Take 1 tablet by mouth every 12 (twelve) hours as needed for Pain (pain).    gabapentin (NEURONTIN) 300 MG capsule 270 capsule 1     Sig: Take 1 capsule (300 mg total) by mouth every 8 (eight) hours.     4.Urine drug screen point of care obtained and consistent with prescribed medications and medication refill date.  Drug screen is to monitor compliance with prescribed opiates and to ensure that there was no misuse/abuse of other non-prescribed opioids or illicit drugs.  From this information I will determine if patient is suitable for opioid therapy      Orders Placed This Encounter   Procedures    POCT Urine Drug Screen Presump     Interpretive Information:     Negative:  No drug detected at the cut off level.   Positive:  This result represents presumptive positive for the   tested drug, other substances may yield a positive response other   than the  analyte of interest. This result should be utilized for   diagnostic purpose only. Confirmation testing will be performed upon physician request only.         5.Patient defers nerve block injections, physical therapy or surgical consultation    6.Follow with VONNIE Miller in 3 months for re-evaluation and medication refill           This note was generated with the assistance of ambient listening technology. Verbal consent was obtained by the patient and accompanying visitor(s) for the recording of patient appointment to facilitate this note. I attest to having reviewed and edited the generated note for accuracy, though some syntax or spelling errors may persist. Please contact the author of this note for any clarification.       Date of encounter: 8/6/2025  Nadja Paulson MD            [1]   Current Outpatient Medications:     amLODIPine (NORVASC) 10 MG tablet, Take by mouth once daily., Disp: , Rfl:     blood sugar diagnostic (BLOOD GLUCOSE TEST) Strp, 1 strip by Misc.(Non-Drug; Combo Route) route once daily. accu-chek Veronika strips, Disp: 90 strip, Rfl: 5    blood-glucose meter,continuous (DEXCOM ) Misc, 1 each by Misc.(Non-Drug; Combo Route) route 4 (four) times daily with meals and nightly., Disp: 1 each, Rfl: 0    blood-glucose sensor (DEXCOM G7 SENSOR) Aria, 1 each by Misc.(Non-Drug; Combo Route) route every 10 days., Disp: 3 each, Rfl: 11    gabapentin (NEURONTIN) 300 MG capsule, Take 1 capsule (300 mg total) by mouth every 8 (eight) hours., Disp: 270 capsule, Rfl: 1    gentamicin (GARAMYCIN) 0.1 % cream, Apply topically once daily. (Patient not taking: Reported on 5/6/2025), Disp: 90 g, Rfl: 2    hydrALAZINE (APRESOLINE) 25 MG tablet, Take 1 tablet (25 mg total) by mouth every 12 (twelve) hours., Disp: 180 tablet, Rfl: 1    hydroCHLOROthiazide (HYDRODIURIL) 25 MG tablet, Take 1 tablet (25 mg total) by mouth once daily., Disp: 90 tablet, Rfl: 1    HYDROcodone-acetaminophen (NORCO)  mg per  tablet, Take 1 tablet by mouth every 12 (twelve) hours as needed for Pain (pain)., Disp: 60 tablet, Rfl: 0    [START ON 8/29/2025] HYDROcodone-acetaminophen (NORCO)  mg per tablet, Take 1 tablet by mouth every 12 (twelve) hours as needed for Pain (pain)., Disp: 60 tablet, Rfl: 0    [START ON 9/28/2025] HYDROcodone-acetaminophen (NORCO)  mg per tablet, Take 1 tablet by mouth every 12 (twelve) hours as needed for Pain (pain)., Disp: 60 tablet, Rfl: 0    [START ON 10/28/2025] HYDROcodone-acetaminophen (NORCO)  mg per tablet, Take 1 tablet by mouth every 12 (twelve) hours as needed for Pain (pain)., Disp: 60 tablet, Rfl: 0    ibuprofen (ADVIL,MOTRIN) 800 MG tablet, Take 800 mg by mouth 3 (three) times daily as needed for Pain., Disp: , Rfl:     losartan (COZAAR) 50 MG tablet, Take 1 tablet (50 mg total) by mouth once daily., Disp: 90 tablet, Rfl: 1    rosuvastatin (CRESTOR) 10 MG tablet, Take 10 mg by mouth once daily., Disp: , Rfl:     semaglutide (OZEMPIC) 2 mg/dose (8 mg/3 mL) PnIj, Inject 2 mg into the skin every 7 days. (Patient taking differently: Inject 2.5 mg into the skin every 7 days.), Disp: 8 mL, Rfl: 2

## 2025-08-15 ENCOUNTER — OFFICE VISIT (OUTPATIENT)
Dept: WOUND CARE | Facility: CLINIC | Age: 40
End: 2025-08-15
Payer: MEDICARE

## 2025-08-15 VITALS
TEMPERATURE: 98 F | DIASTOLIC BLOOD PRESSURE: 87 MMHG | HEART RATE: 82 BPM | RESPIRATION RATE: 17 BRPM | SYSTOLIC BLOOD PRESSURE: 138 MMHG

## 2025-08-15 DIAGNOSIS — M86.60 CHRONIC OSTEOMYELITIS: ICD-10-CM

## 2025-08-15 DIAGNOSIS — T14.8XXA WOUND INFECTION: ICD-10-CM

## 2025-08-15 DIAGNOSIS — E55.9 VITAMIN D DEFICIENCY: ICD-10-CM

## 2025-08-15 DIAGNOSIS — E11.621 DIABETIC ULCER OF RIGHT HEEL ASSOCIATED WITH TYPE 2 DIABETES MELLITUS, WITH FAT LAYER EXPOSED: Primary | ICD-10-CM

## 2025-08-15 DIAGNOSIS — L08.9 WOUND INFECTION: ICD-10-CM

## 2025-08-15 DIAGNOSIS — L97.412 DIABETIC ULCER OF RIGHT HEEL ASSOCIATED WITH TYPE 2 DIABETES MELLITUS, WITH FAT LAYER EXPOSED: Primary | ICD-10-CM

## 2025-08-15 PROCEDURE — 96372 THER/PROPH/DIAG INJ SC/IM: CPT | Mod: PBBFAC | Performed by: NURSE PRACTITIONER

## 2025-08-15 PROCEDURE — 11047 DBRDMT BONE EACH ADDL: CPT | Mod: PBBFAC | Performed by: NURSE PRACTITIONER

## 2025-08-15 PROCEDURE — 87070 CULTURE OTHR SPECIMN AEROBIC: CPT | Mod: ,,, | Performed by: CLINICAL MEDICAL LABORATORY

## 2025-08-15 PROCEDURE — 99499 UNLISTED E&M SERVICE: CPT | Mod: S$PBB,,, | Performed by: NURSE PRACTITIONER

## 2025-08-15 PROCEDURE — 99215 OFFICE O/P EST HI 40 MIN: CPT | Mod: PBBFAC | Performed by: NURSE PRACTITIONER

## 2025-08-15 PROCEDURE — 11044 DBRDMT BONE 1ST 20 SQ CM/<: CPT | Mod: S$PBB,,, | Performed by: NURSE PRACTITIONER

## 2025-08-15 PROCEDURE — 99999 PR PBB SHADOW E&M-EST. PATIENT-LVL V: CPT | Mod: PBBFAC,,, | Performed by: NURSE PRACTITIONER

## 2025-08-15 PROCEDURE — 99999PBSHW PR PBB SHADOW TECHNICAL ONLY FILED TO HB: Mod: PBBFAC,,,

## 2025-08-15 RX ORDER — AMOXICILLIN 500 MG/1
500 TABLET, FILM COATED ORAL EVERY 12 HOURS
Qty: 28 TABLET | Refills: 0 | Status: SHIPPED | OUTPATIENT
Start: 2025-08-15 | End: 2025-08-29

## 2025-08-15 RX ORDER — SULFAMETHOXAZOLE AND TRIMETHOPRIM 800; 160 MG/1; MG/1
1 TABLET ORAL 2 TIMES DAILY
Qty: 28 TABLET | Refills: 0 | Status: SHIPPED | OUTPATIENT
Start: 2025-08-15 | End: 2025-08-29

## 2025-08-15 RX ORDER — CEFTRIAXONE 1 G/1
1 INJECTION, POWDER, FOR SOLUTION INTRAMUSCULAR; INTRAVENOUS ONCE
Status: COMPLETED | OUTPATIENT
Start: 2025-08-15 | End: 2025-08-15

## 2025-08-15 RX ADMIN — CEFTRIAXONE SODIUM 1 G: 1 INJECTION, POWDER, FOR SOLUTION INTRAMUSCULAR; INTRAVENOUS at 11:08

## 2025-08-16 LAB
CULTURE, BONE: ABNORMAL
CULTURE, BONE: ABNORMAL
MICROORGANISM SPEC CULT: ABNORMAL

## 2025-08-18 ENCOUNTER — TELEPHONE (OUTPATIENT)
Dept: WOUND CARE | Facility: CLINIC | Age: 40
End: 2025-08-18
Payer: MEDICARE

## 2025-08-18 ENCOUNTER — RESULTS FOLLOW-UP (OUTPATIENT)
Dept: WOUND CARE | Facility: CLINIC | Age: 40
End: 2025-08-18
Payer: MEDICARE

## 2025-08-18 RX ORDER — CEFDINIR 300 MG/1
300 CAPSULE ORAL 2 TIMES DAILY
Qty: 60 CAPSULE | Refills: 0 | Status: SHIPPED | OUTPATIENT
Start: 2025-08-18 | End: 2025-09-17

## 2025-08-18 RX ORDER — CIPROFLOXACIN 500 MG/1
500 TABLET, FILM COATED ORAL EVERY 12 HOURS
Qty: 28 TABLET | Refills: 0 | Status: SHIPPED | OUTPATIENT
Start: 2025-08-18 | End: 2025-09-01

## 2025-08-22 ENCOUNTER — OFFICE VISIT (OUTPATIENT)
Dept: WOUND CARE | Facility: CLINIC | Age: 40
End: 2025-08-22
Payer: MEDICARE

## 2025-08-22 VITALS
DIASTOLIC BLOOD PRESSURE: 90 MMHG | SYSTOLIC BLOOD PRESSURE: 163 MMHG | RESPIRATION RATE: 17 BRPM | TEMPERATURE: 98 F | HEART RATE: 95 BPM

## 2025-08-22 DIAGNOSIS — L97.412 DIABETIC ULCER OF RIGHT HEEL ASSOCIATED WITH TYPE 2 DIABETES MELLITUS, WITH FAT LAYER EXPOSED: Primary | ICD-10-CM

## 2025-08-22 DIAGNOSIS — E11.621 DIABETIC ULCER OF RIGHT HEEL ASSOCIATED WITH TYPE 2 DIABETES MELLITUS, WITH FAT LAYER EXPOSED: Primary | ICD-10-CM

## 2025-08-22 PROCEDURE — 11042 DBRDMT SUBQ TIS 1ST 20SQCM/<: CPT | Mod: S$PBB,,, | Performed by: NURSE PRACTITIONER

## 2025-08-22 PROCEDURE — 99499 UNLISTED E&M SERVICE: CPT | Mod: S$PBB,,, | Performed by: NURSE PRACTITIONER

## 2025-08-22 PROCEDURE — 11045 DBRDMT SUBQ TISS EACH ADDL: CPT | Mod: PBBFAC | Performed by: NURSE PRACTITIONER

## 2025-08-22 PROCEDURE — 99215 OFFICE O/P EST HI 40 MIN: CPT | Mod: PBBFAC,25 | Performed by: NURSE PRACTITIONER

## 2025-08-22 PROCEDURE — 99999 PR PBB SHADOW E&M-EST. PATIENT-LVL V: CPT | Mod: PBBFAC,,, | Performed by: NURSE PRACTITIONER

## (undated) DEVICE — BANDAGE KERLIX AMD  4.5IN  SPONGE ROLL

## (undated) DEVICE — TRAY SKIN PREP PROVIDONE IODINE STERILE LATEX FREE

## (undated) DEVICE — GLOVE SURGICAL PROTEXIS PI CLASSIC SIZE 6.5

## (undated) DEVICE — BANDAGE ELASTIC 4IN X 5YD W/CLIP (STERILE)

## (undated) DEVICE — BLADE DERMATOME STERILE

## (undated) DEVICE — GLOVE SURGICAL PROTEXIS PI SIZE 7

## (undated) DEVICE — GLOVE SURGICAL PROTEXIS PI BLUE SIZE 6.0

## (undated) DEVICE — DRESSING XEROFORM 5X9

## (undated) DEVICE — GLOVE SURGICAL PROTEXIS PI CLASSIC SIZE 8.0

## (undated) DEVICE — Device

## (undated) DEVICE — CDS BASIC

## (undated) DEVICE — SPONGE GAUZE 4X4 12 PLY STL AMD 10/TRAY

## (undated) DEVICE — GLOVE SURGICAL PROTEXIS PI BLUE SIZE 8.0

## (undated) DEVICE — GLOVE SURGICAL PROTEXIS PI BLUE SIZE 6.5

## (undated) DEVICE — GLOVE SURGICAL PROTEXIS PI SIZE 6

## (undated) DEVICE — TUBING INVIA LIBERTY DBL LUMEN W/QUICK CONNECT (MEDELA)

## (undated) DEVICE — GOWN SURGICAL SMARTGOWN LEVEL 4 / EXTRA LARGE STERILE

## (undated) DEVICE — STAPLER SKIN REFLEX ONE 35 WIDE DISP

## (undated) DEVICE — DRESSING SPONGE GAUZE STL 4X4

## (undated) DEVICE — CURETTE DERMAL DISP 5MM